# Patient Record
Sex: FEMALE | Race: WHITE | Employment: OTHER | URBAN - METROPOLITAN AREA
[De-identification: names, ages, dates, MRNs, and addresses within clinical notes are randomized per-mention and may not be internally consistent; named-entity substitution may affect disease eponyms.]

---

## 2017-12-27 ENCOUNTER — OFFICE VISIT (OUTPATIENT)
Dept: ORTHOPEDIC SURGERY | Age: 72
End: 2017-12-27

## 2017-12-27 VITALS — HEART RATE: 60 BPM | WEIGHT: 191.36 LBS | BODY MASS INDEX: 33.91 KG/M2 | HEIGHT: 63 IN | RESPIRATION RATE: 16 BRPM

## 2017-12-27 DIAGNOSIS — S42.202A CLOSED FRACTURE OF PROXIMAL END OF LEFT HUMERUS, UNSPECIFIED FRACTURE MORPHOLOGY, INITIAL ENCOUNTER: Primary | ICD-10-CM

## 2017-12-27 DIAGNOSIS — S92.355A CLOSED NONDISPLACED FRACTURE OF FIFTH METATARSAL BONE OF LEFT FOOT, INITIAL ENCOUNTER: ICD-10-CM

## 2017-12-27 PROCEDURE — 99203 OFFICE O/P NEW LOW 30 MIN: CPT | Performed by: ORTHOPAEDIC SURGERY

## 2017-12-27 NOTE — PROGRESS NOTES
CHIEF COMPLAINT: Left shoulder and foot pain. DATE OF INJURY: 12/25/17    HISTORY:  Ms. Kamran Lozano is a 67 y.o.  female, who presents today for evaluation of a left shoulder and foot injury which occurred on 12/25/17. The patient reports that this injury occurred when tripped and fell. She was first seen and evaluated in [facility], when she was evaluated, placed in sling and postop shoe, and asked to follow up with Orthopedics. The patient denies any other injuries. She rates pain at a level of 4/10 on an analog scale. Movement makes the pain worse. Sling / shoe, and resting makes the pain better. No numbness or tingling sensation. History reviewed. No pertinent past medical history. History reviewed. No pertinent surgical history. Current Outpatient Prescriptions   Medication Sig Dispense Refill    furosemide (LASIX) 20 MG tablet Take 20 mg by mouth 2 times daily      tiZANidine (ZANAFLEX) 4 MG tablet Take 4 mg by mouth every 6 hours as needed      gabapentin (NEURONTIN) 300 MG capsule Take 300 mg by mouth 3 times daily      prochlorperazine (COMPAZINE) 10 MG tablet Take 10 mg by mouth every 6 hours as needed      naproxen (NAPROSYN) 500 MG tablet Take 500 mg by mouth 2 times daily (with meals)      sertraline (ZOLOFT) 100 MG tablet Take 100 mg by mouth daily      omeprazole (PRILOSEC) 20 MG capsule Take 20 mg by mouth daily.  metoclopramide (REGLAN) 10 MG/10ML SOLN Take 10 mg by mouth 4 times daily (before meals and nightly).  spironolactone (ALDACTONE) 25 MG tablet Take 25 mg by mouth daily.  pregabalin (LYRICA) 300 MG capsule Take 300 mg by mouth 2 times daily.  celecoxib (CELEBREX) 200 MG capsule Take 200 mg by mouth 2 times daily.  calcium citrate-vitamin D (CITRICAL + D) 315-250 MG-UNIT TABS Take  by mouth.  Vitamin D (CHOLECALCIFEROL) 1000 UNITS CAPS capsule Take 1,000 Units by mouth daily.       ALPRAZolam (XANAX) 1 MG tablet Take 1 mg by mouth nightly as needed.  HYDROcodone-acetaminophen (NORCO) 5-325 MG per tablet Take 1 tablet by mouth every 6 hours as needed. No current facility-administered medications for this visit. Allergies   Allergen Reactions    Demerol Hcl [Meperidine]     Pcn [Penicillins]        Social History     Social History    Marital status:      Spouse name: N/A    Number of children: N/A    Years of education: N/A     Occupational History    Not on file. Social History Main Topics    Smoking status: Never Smoker    Smokeless tobacco: Never Used    Alcohol use Not on file    Drug use: No    Sexual activity: No     Other Topics Concern    Not on file     Social History Narrative    No narrative on file       History reviewed. No pertinent family history. Review of Systems:   Pertinent items are noted in HPI. Review of systems from Patient History Form dated 12/28/17 and available in the patient's chart under the Media tab. PHYSICAL EXAM:  Ms. Heywood Councilman is a 67 y.o.  female who presents today in no acute distress, awake, alert, and oriented. Pulse 60   Resp 16   Ht 5' 2.99\" (1.6 m)   Wt 191 lb 5.8 oz (86.8 kg)   BMI 33.91 kg/m²      On evaluation of her right upper extremity, there is no obvious deformity. There is swelling. She is tender to palpation over the proximal humerus, and otherwise nontender over the remainder of the extremity. Range of motion is decreased secondary to pain over the left shoulder. The skin overlying the left shoulder is intact without evidence of lesion, laceration or abrasion. Distal pulses are 2+ and symmetric bilaterally. Sensation is grossly intact to light touch and symmetric bilaterally. EPL / FPL / Interossei intact. On evaluation of her left lower extremity, there is no obvious deformity. There is swelling.   She is mildly tender to palpation over the proximal 5th metatarsal, and otherwise nontender over the remainder of the extremity. The skin overlying the left foot is intact without evidence of lesion, laceration or abrasion. Distal pulses are 2+ and symmetric bilaterally. Sensation is grossly intact to light touch and symmetric bilaterally. Dorsiflexion / plantarflexion intact. Left shoulder Xrays:  ER xrays reviewed. 3 view x-rays of the shoulder including AP in internal and external rotation and scapular Y were obtained and reviewed. Comminuted (at least 3 part)  impacted proximal humerus fracture. About 50% displacement on scapular Y view and one of AP views    Left foot xrays: from ER reviewed. 5th proximal metatarsal avulsion fracture        IMPRESSION:    Left proximal humerus fracture   Left foot 5th metatarsal proximal avulsion fracture  HTN  Osteoporosis      PLAN:  Discussed that metatarsal fracture can be treated nonoperatively. She is weight bearing as tolerated. Continue postop shoe. Ice to foot. Discussed proximal humerus fracture. It is at least 50% displaced. It is at least a 3-part fracture . Discussed nonoperative and operative treatment. Discussed that there is a chance the fracture could heal nonoperatively, but that there will be more likelihood for decreased function of the shoulder/arm and stiffness. She would like to discuss operative treatment. Will have her see Dr. Adriana Hookerr for surgical evaluation. She specifically does not want to see Dr. Harriett Valentine because of 's surgical history with him. Continue sling and swathe left shoulder.

## 2017-12-27 NOTE — LETTER
[facility]  [address] [facility]  [address]  Phone: [phone]  Fax: [phone]    Ida Gunn MD        December 28, 2017     [address] [place], [place]  [address] [facility] [place] [address]    Patient: Margot Rocha  MR Number: [id number]  YOB: 1945  Date of Visit: 12/27/2017    Dear  [address] [place]:    Thank you for the request for consultation for Yinka Randhawa to me for the evaluation. Below are the relevant portions of my assessment and plan of care. CHIEF COMPLAINT: Left shoulder and foot pain. DATE OF INJURY: 12/25/17    HISTORY:  Ms. Gaby Rangel is a 67 y.o.  female, who presents today for evaluation of a left shoulder and foot injury which occurred on 12/25/17. The patient reports that this injury occurred when tripped and fell. She was first seen and evaluated in [facility] ER, when she was evaluated, placed in sling and postop shoe, and asked to follow up with Orthopedics. The patient denies any other injuries. She rates pain at a level of 4/10 on an analog scale. Movement makes the pain worse. Sling / shoe, and resting makes the pain better. No numbness or tingling sensation. History reviewed. No pertinent past medical history. History reviewed. No pertinent surgical history.     Current Outpatient Prescriptions   Medication Sig Dispense Refill    furosemide (LASIX) 20 MG tablet Take 20 mg by mouth 2 times daily      tiZANidine (ZANAFLEX) 4 MG tablet Take 4 mg by mouth every 6 hours as needed      gabapentin (NEURONTIN) 300 MG capsule Take 300 mg by mouth 3 times daily      prochlorperazine (COMPAZINE) 10 MG tablet Take 10 mg by mouth every 6 hours as needed      naproxen (NAPROSYN) 500 MG tablet Take 500 mg by mouth 2 times daily (with meals)      sertraline (ZOLOFT) 100 MG tablet Take 100 mg by mouth daily There is swelling. She is tender to palpation over the proximal humerus, and otherwise nontender over the remainder of the extremity. Range of motion is decreased secondary to pain over the left shoulder. The skin overlying the left shoulder is intact without evidence of lesion, laceration or abrasion. Distal pulses are 2+ and symmetric bilaterally. Sensation is grossly intact to light touch and symmetric bilaterally. EPL / FPL / Interossei intact. On evaluation of her left lower extremity, there is no obvious deformity. There is swelling. She is mildly tender to palpation over the proximal 5th metatarsal, and otherwise nontender over the remainder of the extremity. The skin overlying the left foot is intact without evidence of lesion, laceration or abrasion. Distal pulses are 2+ and symmetric bilaterally. Sensation is grossly intact to light touch and symmetric bilaterally. Dorsiflexion / plantarflexion intact. Left shoulder Xrays:  ER xrays reviewed. 3 view x-rays of the shoulder including AP in internal and external rotation and scapular Y were obtained and reviewed. Comminuted (at least 3 part)  impacted proximal humerus fracture. About 50% displacement on scapular Y view and one of AP views    Left foot xrays: from ER reviewed. 5th proximal metatarsal avulsion fracture        IMPRESSION:    Left proximal humerus fracture   Left foot 5th metatarsal proximal avulsion fracture  HTN  Osteoporosis      PLAN:  Discussed that metatarsal fracture can be treated nonoperatively. She is weight bearing as tolerated. Continue postop shoe. Ice to foot. Discussed proximal humerus fracture. It is at least 50% displaced. It is at least a 3-part fracture . Discussed nonoperative and operative treatment.   Discussed that there is a chance the fracture could heal nonoperatively, but that there will be more likelihood for decreased

## 2018-01-13 PROBLEM — I48.91 ATRIAL FIBRILLATION WITH RVR (HCC): Status: ACTIVE | Noted: 2018-01-13

## 2018-01-13 PROBLEM — G93.40 ACUTE ENCEPHALOPATHY: Status: ACTIVE | Noted: 2018-01-13

## 2018-01-13 PROBLEM — R44.3 HALLUCINATION: Status: ACTIVE | Noted: 2018-01-13
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
## 2018-01-13 PROBLEM — D64.9 ANEMIA: Status: ACTIVE | Noted: 2018-01-13

## 2018-01-16 ENCOUNTER — TELEPHONE (OUTPATIENT)
Dept: ORTHOPEDIC SURGERY | Age: 73
End: 2018-01-16

## 2018-01-17 PROBLEM — S42.202D CLOSED FRACTURE OF PROXIMAL END OF LEFT HUMERUS WITH ROUTINE HEALING: Status: ACTIVE | Noted: 2017-12-27

## 2018-02-08 ENCOUNTER — OFFICE VISIT (OUTPATIENT)
Dept: ORTHOPEDIC SURGERY | Age: 73
End: 2018-02-08

## 2018-02-08 VITALS — HEART RATE: 85 BPM | RESPIRATION RATE: 16 BRPM | SYSTOLIC BLOOD PRESSURE: 120 MMHG | DIASTOLIC BLOOD PRESSURE: 71 MMHG

## 2018-02-08 DIAGNOSIS — S42.202D CLOSED FRACTURE OF PROXIMAL END OF LEFT HUMERUS WITH ROUTINE HEALING, UNSPECIFIED FRACTURE MORPHOLOGY, SUBSEQUENT ENCOUNTER: Primary | ICD-10-CM

## 2018-02-08 DIAGNOSIS — S92.355D CLOSED NONDISPLACED FRACTURE OF FIFTH METATARSAL BONE OF LEFT FOOT WITH ROUTINE HEALING, SUBSEQUENT ENCOUNTER: ICD-10-CM

## 2018-02-08 PROCEDURE — 99214 OFFICE O/P EST MOD 30 MIN: CPT | Performed by: ORTHOPAEDIC SURGERY

## 2018-02-08 NOTE — PROGRESS NOTES
Closed fracture of proximal end of left humerus with routine healing, unspecified fracture morphology, subsequent encounter  XR SHOULDER LEFT (MIN 2 VIEWS)   2. Closed nondisplaced fracture of fifth metatarsal bone of left foot with routine healing, subsequent encounter  XR FOOT LEFT (MIN 3 VIEWS)       No orders of the defined types were placed in this encounter.     Left shoulder - full passive ROM   Can start active assist ROM and active ROM    No lifting heavier than a cup of coffee    Left foot - WBAT, can discontinue cast shoe and use regular shoes   Compression stockings for swelling, elevation    FU 2 months with left shoulder XRs only    Mago Forman

## 2018-02-13 ENCOUNTER — TELEPHONE (OUTPATIENT)
Dept: ORTHOPEDIC SURGERY | Age: 73
End: 2018-02-13

## 2018-02-26 ENCOUNTER — OFFICE VISIT (OUTPATIENT)
Dept: ENT CLINIC | Age: 73
End: 2018-02-26

## 2018-02-26 VITALS — DIASTOLIC BLOOD PRESSURE: 70 MMHG | HEART RATE: 82 BPM | SYSTOLIC BLOOD PRESSURE: 110 MMHG

## 2018-02-26 DIAGNOSIS — H92.01 REFERRED OTALGIA OF RIGHT EAR: ICD-10-CM

## 2018-02-26 DIAGNOSIS — K14.8 TONGUE LESION: Primary | ICD-10-CM

## 2018-02-26 PROCEDURE — 99203 OFFICE O/P NEW LOW 30 MIN: CPT | Performed by: OTOLARYNGOLOGY

## 2018-02-26 RX ORDER — POTASSIUM CHLORIDE 20 MEQ/1
20 TABLET, EXTENDED RELEASE ORAL 2 TIMES DAILY
COMMUNITY
End: 2019-09-21 | Stop reason: SDUPTHER

## 2018-02-26 RX ORDER — HYDROCODONE BITARTRATE AND ACETAMINOPHEN 10; 325 MG/1; MG/1
1 TABLET ORAL EVERY 6 HOURS PRN
COMMUNITY
End: 2018-10-24 | Stop reason: ALTCHOICE

## 2018-02-26 RX ORDER — ALPRAZOLAM 1 MG/1
1 TABLET ORAL NIGHTLY PRN
COMMUNITY
End: 2018-10-24 | Stop reason: ALTCHOICE

## 2018-03-13 ENCOUNTER — OFFICE VISIT (OUTPATIENT)
Dept: ENT CLINIC | Age: 73
End: 2018-03-13

## 2018-03-13 VITALS — DIASTOLIC BLOOD PRESSURE: 70 MMHG | HEART RATE: 92 BPM | SYSTOLIC BLOOD PRESSURE: 130 MMHG

## 2018-03-13 DIAGNOSIS — K14.8 TONGUE LESION: Primary | ICD-10-CM

## 2018-03-13 PROCEDURE — 99212 OFFICE O/P EST SF 10 MIN: CPT | Performed by: OTOLARYNGOLOGY

## 2018-03-21 ENCOUNTER — OFFICE VISIT (OUTPATIENT)
Dept: CARDIOLOGY CLINIC | Age: 73
End: 2018-03-21

## 2018-03-21 VITALS
SYSTOLIC BLOOD PRESSURE: 120 MMHG | HEART RATE: 96 BPM | DIASTOLIC BLOOD PRESSURE: 70 MMHG | HEIGHT: 63 IN | WEIGHT: 167 LBS | OXYGEN SATURATION: 96 % | BODY MASS INDEX: 29.59 KG/M2

## 2018-03-21 DIAGNOSIS — I48.0 PAROXYSMAL A-FIB (HCC): Primary | ICD-10-CM

## 2018-03-21 DIAGNOSIS — R29.6 FALLS FREQUENTLY: ICD-10-CM

## 2018-03-21 PROCEDURE — 99215 OFFICE O/P EST HI 40 MIN: CPT | Performed by: INTERNAL MEDICINE

## 2018-03-21 PROCEDURE — 93000 ELECTROCARDIOGRAM COMPLETE: CPT | Performed by: INTERNAL MEDICINE

## 2018-03-21 NOTE — PROGRESS NOTES
today  However son says it is not that bad now  Encouraged use of walker for safety    Return in 4-6 weeks     Thank you very much for allowing me to participate in the care of your patient. Please do not hesitate to contact me if you have any questions.         Sincerely,    Brenden Rodriguez M.D  [facility], [address] [place], [place] [address] [address]  Ph: [phone]  Fax: [phone]

## 2018-03-21 NOTE — PATIENT INSTRUCTIONS
a pneumococcal vaccine shot. If you have had one before, ask your doctor whether you need another dose. Get a flu shot every year. If you must be around people with colds or flu, wash your hands often. Activity  ? · If your doctor recommends it, get more exercise. Walking is a good choice. Bit by bit, increase the amount you walk every day. Try for at least 30 minutes on most days of the week. You also may want to swim, bike, or do other activities. Your doctor may suggest that you join a cardiac rehabilitation program so that you can have help increasing your physical activity safely. ? · Start light exercise if your doctor says it is okay. Even a small amount will help you get stronger, have more energy, and manage stress. Walking is an easy way to get exercise. Start out by walking a little more than you did in the hospital. Gradually increase the amount you walk. ? · When you exercise, watch for signs that your heart is working too hard. You are pushing too hard if you cannot talk while you are exercising. If you become short of breath or dizzy or have chest pain, sit down and rest immediately. ? · Check your pulse regularly. Place two fingers on the artery at the palm side of your wrist, in line with your thumb. If your heartbeat seems uneven or fast, talk to your doctor. When should you call for help? Call 911 anytime you think you may need emergency care. For example, call if:  ? · You have symptoms of a heart attack. These may include:  ¨ Chest pain or pressure, or a strange feeling in the chest.  ¨ Sweating. ¨ Shortness of breath. ¨ Nausea or vomiting. ¨ Pain, pressure, or a strange feeling in the back, neck, jaw, or upper belly or in one or both shoulders or arms. ¨ Lightheadedness or sudden weakness. ¨ A fast or irregular heartbeat. After you call 911, the  may tell you to chew 1 adult-strength or 2 to 4 low-dose aspirin. Wait for an ambulance. Do not try to drive yourself.    ? · You have symptoms of a stroke. These may include:  ¨ Sudden numbness, tingling, weakness, or loss of movement in your face, arm, or leg, especially on only one side of your body. ¨ Sudden vision changes. ¨ Sudden trouble speaking. ¨ Sudden confusion or trouble understanding simple statements. ¨ Sudden problems with walking or balance. ¨ A sudden, severe headache that is different from past headaches. ? · You passed out (lost consciousness). ?Call your doctor now or seek immediate medical care if:  ? · You have new or increased shortness of breath. ? · You feel dizzy or lightheaded, or you feel like you may faint. ? · Your heart rate becomes irregular. ? · You can feel your heart flutter in your chest or skip heartbeats. Tell your doctor if these symptoms are new or worse. ? Watch closely for changes in your health, and be sure to contact your doctor if you have any problems. Where can you learn more? Go to [email or web address] org and sign in to your BISON account. Enter U020 in the PlayhouseSquare box to learn more about \"Atrial Fibrillation: Care Instructions. \"     If you do not have an account, please click on the \"Sign Up Now\" link. Current as of: September 21, 2016  Content Version: 11.5  © 8592-5598 [employer]. Care instructions adapted under license by [facility] ([facility]). If you have questions about a medical condition or this instruction, always ask your healthcare professional. [place] [address] any warranty or liability for your use of this information. Patient Education        Preventing Falls: Care Instructions  Your Care Instructions    Getting around your home safely can be a challenge if you have injuries or health problems that make it easy for you to fall. Loose rugs and furniture in walkways are among the dangers for many older people who have problems walking or who have poor eyesight.  People who have conditions such as [email or web address] org and sign in to your Jama Softwarehart account. Enter [phone] in the [facility] box to learn more about \"Preventing Falls: Care Instructions. \"     If you do not have an account, please click on the \"Sign Up Now\" link. Current as of: May 12, 2017  Content Version: 11.5  © 1269-4669 Healthwise, [employer]. Care instructions adapted under license by [facility] ([facility]). If you have questions about a medical condition or this instruction, always ask your healthcare professional. [place] [address] any warranty or liability for your use of this information.
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
## 2018-04-12 ENCOUNTER — OFFICE VISIT (OUTPATIENT)
Dept: ORTHOPEDIC SURGERY | Age: 73
End: 2018-04-12

## 2018-04-12 VITALS
WEIGHT: 167 LBS | RESPIRATION RATE: 16 BRPM | BODY MASS INDEX: 29.59 KG/M2 | SYSTOLIC BLOOD PRESSURE: 111 MMHG | DIASTOLIC BLOOD PRESSURE: 56 MMHG | HEIGHT: 63 IN | HEART RATE: 43 BPM

## 2018-04-12 DIAGNOSIS — S42.292K OTHER CLOSED DISPLACED FRACTURE OF PROXIMAL END OF LEFT HUMERUS WITH NONUNION, SUBSEQUENT ENCOUNTER: Primary | ICD-10-CM

## 2018-04-12 PROCEDURE — 99213 OFFICE O/P EST LOW 20 MIN: CPT | Performed by: ORTHOPAEDIC SURGERY

## 2018-04-17 ENCOUNTER — TELEPHONE (OUTPATIENT)
Dept: PAIN MANAGEMENT | Age: 73
End: 2018-04-17

## 2018-05-02 ENCOUNTER — OFFICE VISIT (OUTPATIENT)
Dept: CARDIOLOGY CLINIC | Age: 73
End: 2018-05-02

## 2018-05-02 VITALS
DIASTOLIC BLOOD PRESSURE: 62 MMHG | SYSTOLIC BLOOD PRESSURE: 118 MMHG | HEIGHT: 63 IN | BODY MASS INDEX: 28.7 KG/M2 | OXYGEN SATURATION: 98 % | HEART RATE: 96 BPM | WEIGHT: 162 LBS

## 2018-05-02 DIAGNOSIS — R29.6 FREQUENT FALLS: ICD-10-CM

## 2018-05-02 DIAGNOSIS — I48.0 PAROXYSMAL ATRIAL FIBRILLATION (HCC): Primary | ICD-10-CM

## 2018-05-02 PROCEDURE — 99214 OFFICE O/P EST MOD 30 MIN: CPT | Performed by: INTERNAL MEDICINE

## 2018-05-02 PROCEDURE — 93228 REMOTE 30 DAY ECG REV/REPORT: CPT | Performed by: INTERNAL MEDICINE

## 2018-05-02 PROCEDURE — 93000 ELECTROCARDIOGRAM COMPLETE: CPT | Performed by: INTERNAL MEDICINE

## 2018-05-02 RX ORDER — RANITIDINE 300 MG/1
300 TABLET ORAL NIGHTLY
COMMUNITY
Start: 2018-04-16 | End: 2018-11-28

## 2018-05-03 DIAGNOSIS — I48.91 ATRIAL FIBRILLATION WITH RAPID VENTRICULAR RESPONSE (HCC): Primary | ICD-10-CM

## 2018-08-21 ENCOUNTER — OFFICE VISIT (OUTPATIENT)
Dept: ORTHOPEDIC SURGERY | Age: 73
End: 2018-08-21

## 2018-08-21 VITALS
SYSTOLIC BLOOD PRESSURE: 122 MMHG | RESPIRATION RATE: 16 BRPM | HEART RATE: 87 BPM | WEIGHT: 169 LBS | HEIGHT: 63 IN | DIASTOLIC BLOOD PRESSURE: 62 MMHG | BODY MASS INDEX: 29.95 KG/M2

## 2018-08-21 DIAGNOSIS — S42.292K OTHER CLOSED DISPLACED FRACTURE OF PROXIMAL END OF LEFT HUMERUS WITH NONUNION, SUBSEQUENT ENCOUNTER: Primary | ICD-10-CM

## 2018-08-21 PROCEDURE — 99213 OFFICE O/P EST LOW 20 MIN: CPT | Performed by: ORTHOPAEDIC SURGERY

## 2018-08-21 PROCEDURE — 20610 DRAIN/INJ JOINT/BURSA W/O US: CPT | Performed by: ORTHOPAEDIC SURGERY

## 2018-10-24 ENCOUNTER — APPOINTMENT (OUTPATIENT)
Dept: GENERAL RADIOLOGY | Age: 73
DRG: 956 | End: 2018-10-24
Payer: MEDICARE

## 2018-10-24 ENCOUNTER — APPOINTMENT (OUTPATIENT)
Dept: CT IMAGING | Age: 73
DRG: 956 | End: 2018-10-24
Payer: MEDICARE

## 2018-10-24 ENCOUNTER — TELEPHONE (OUTPATIENT)
Dept: ORTHOPEDIC SURGERY | Age: 73
End: 2018-10-24

## 2018-10-24 ENCOUNTER — HOSPITAL ENCOUNTER (INPATIENT)
Age: 73
LOS: 9 days | Discharge: NURSING, REHAB OR LONG-TERM CARE | DRG: 956 | End: 2018-11-02
Attending: INTERNAL MEDICINE | Admitting: INTERNAL MEDICINE
Payer: MEDICARE

## 2018-10-24 DIAGNOSIS — E61.2 MAGNESIUM DEFICIENCY: ICD-10-CM

## 2018-10-24 DIAGNOSIS — E87.6 HYPOKALEMIA: ICD-10-CM

## 2018-10-24 DIAGNOSIS — T79.6XXA TRAUMATIC RHABDOMYOLYSIS, INITIAL ENCOUNTER (HCC): ICD-10-CM

## 2018-10-24 DIAGNOSIS — I21.4 NSTEMI (NON-ST ELEVATED MYOCARDIAL INFARCTION) (HCC): ICD-10-CM

## 2018-10-24 DIAGNOSIS — F41.9 ANXIETY: ICD-10-CM
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
## 2018-10-24 DIAGNOSIS — S72.331A CLOSED DISPLACED OBLIQUE FRACTURE OF SHAFT OF RIGHT FEMUR, INITIAL ENCOUNTER (HCC): ICD-10-CM

## 2018-10-24 DIAGNOSIS — S42.491A OTHER CLOSED DISPLACED FRACTURE OF DISTAL END OF RIGHT HUMERUS, INITIAL ENCOUNTER: ICD-10-CM

## 2018-10-24 DIAGNOSIS — I48.91 ATRIAL FIBRILLATION WITH RVR (HCC): ICD-10-CM

## 2018-10-24 PROBLEM — S72.91XA CLOSED FRACTURE OF RIGHT FEMUR (HCC): Status: ACTIVE | Noted: 2018-10-24

## 2018-10-24 LAB
A/G RATIO: 1.4 (ref 1.1–2.2)
ABO/RH: NORMAL
ALBUMIN SERPL-MCNC: 3.9 G/DL (ref 3.4–5)
ALP BLD-CCNC: 65 U/L (ref 40–129)
ALT SERPL-CCNC: 57 U/L (ref 10–40)
ANION GAP SERPL CALCULATED.3IONS-SCNC: 19 MMOL/L (ref 3–16)
ANTIBODY SCREEN: NORMAL
AST SERPL-CCNC: 80 U/L (ref 15–37)
BASOPHILS ABSOLUTE: 0 K/UL (ref 0–0.2)
BASOPHILS RELATIVE PERCENT: 0 %
BILIRUB SERPL-MCNC: 0.6 MG/DL (ref 0–1)
BILIRUBIN URINE: ABNORMAL
BLOOD, URINE: ABNORMAL
BUN BLDV-MCNC: 19 MG/DL (ref 7–20)
CALCIUM SERPL-MCNC: 9.6 MG/DL (ref 8.3–10.6)
CHLORIDE BLD-SCNC: 98 MMOL/L (ref 99–110)
CLARITY: CLEAR
CO2: 19 MMOL/L (ref 21–32)
COLOR: YELLOW
CREAT SERPL-MCNC: 1.2 MG/DL (ref 0.6–1.2)
EOSINOPHILS ABSOLUTE: 0 K/UL (ref 0–0.6)
EOSINOPHILS RELATIVE PERCENT: 0 %
EPITHELIAL CELLS, UA: 2 /HPF (ref 0–5)
GFR AFRICAN AMERICAN: 53
GFR NON-AFRICAN AMERICAN: 44
GLOBULIN: 2.8 G/DL
GLUCOSE BLD-MCNC: 172 MG/DL (ref 70–99)
GLUCOSE BLD-MCNC: 186 MG/DL (ref 70–99)
GLUCOSE BLD-MCNC: 194 MG/DL (ref 70–99)
GLUCOSE URINE: NEGATIVE MG/DL
HCT VFR BLD CALC: 35.2 % (ref 36–48)
HEMOGLOBIN: 11.8 G/DL (ref 12–16)
HYALINE CASTS: 5 /LPF (ref 0–8)
INR BLD: 1.09 (ref 0.86–1.14)
KETONES, URINE: 40 MG/DL
LACTIC ACID: 1.7 MMOL/L (ref 0.4–2)
LACTIC ACID: 2.8 MMOL/L (ref 0.4–2)
LEUKOCYTE ESTERASE, URINE: NEGATIVE
LYMPHOCYTES ABSOLUTE: 0.7 K/UL (ref 1–5.1)
LYMPHOCYTES RELATIVE PERCENT: 3.5 %
MAGNESIUM: 1.6 MG/DL (ref 1.8–2.4)
MCH RBC QN AUTO: 31.7 PG (ref 26–34)
MCHC RBC AUTO-ENTMCNC: 33.6 G/DL (ref 31–36)
MCV RBC AUTO: 94.2 FL (ref 80–100)
MICROSCOPIC EXAMINATION: YES
MONOCYTES ABSOLUTE: 1.4 K/UL (ref 0–1.3)
MONOCYTES RELATIVE PERCENT: 7.6 %
NEUTROPHILS ABSOLUTE: 16.9 K/UL (ref 1.7–7.7)
NEUTROPHILS RELATIVE PERCENT: 88.9 %
NITRITE, URINE: NEGATIVE
PDW BLD-RTO: 17.8 % (ref 12.4–15.4)
PERFORMED ON: ABNORMAL
PERFORMED ON: ABNORMAL
PH UA: 6
PLATELET # BLD: 236 K/UL (ref 135–450)
PMV BLD AUTO: 8.5 FL (ref 5–10.5)
POTASSIUM SERPL-SCNC: 3.3 MMOL/L (ref 3.5–5.1)
PROTEIN UA: 30 MG/DL
PROTHROMBIN TIME: 12.4 SEC (ref 9.8–13)
RBC # BLD: 3.74 M/UL (ref 4–5.2)
RBC UA: 2 /HPF (ref 0–4)
SODIUM BLD-SCNC: 136 MMOL/L (ref 136–145)
SPECIFIC GRAVITY UA: 1.02
TOTAL CK: 722 U/L (ref 26–192)
TOTAL PROTEIN: 6.7 G/DL (ref 6.4–8.2)
TROPONIN: 0.05 NG/ML
TROPONIN: 0.06 NG/ML
TSH SERPL DL<=0.05 MIU/L-ACNC: 0.44 UIU/ML (ref 0.27–4.2)
URINE REFLEX TO CULTURE: ABNORMAL
URINE TYPE: ABNORMAL
UROBILINOGEN, URINE: 0.2 E.U./DL
WBC # BLD: 19 K/UL (ref 4–11)
WBC UA: 5 /HPF (ref 0–5)

## 2018-10-24 PROCEDURE — 6360000002 HC RX W HCPCS: Performed by: NURSE PRACTITIONER

## 2018-10-24 PROCEDURE — 72128 CT CHEST SPINE W/O DYE: CPT

## 2018-10-24 PROCEDURE — 99221 1ST HOSP IP/OBS SF/LOW 40: CPT | Performed by: NURSE PRACTITIONER

## 2018-10-24 PROCEDURE — 6370000000 HC RX 637 (ALT 250 FOR IP): Performed by: INTERNAL MEDICINE

## 2018-10-24 PROCEDURE — 94664 DEMO&/EVAL PT USE INHALER: CPT

## 2018-10-24 PROCEDURE — 84484 ASSAY OF TROPONIN QUANT: CPT

## 2018-10-24 PROCEDURE — 73552 X-RAY EXAM OF FEMUR 2/>: CPT

## 2018-10-24 PROCEDURE — 93005 ELECTROCARDIOGRAM TRACING: CPT | Performed by: INTERNAL MEDICINE

## 2018-10-24 PROCEDURE — 99223 1ST HOSP IP/OBS HIGH 75: CPT | Performed by: INTERNAL MEDICINE

## 2018-10-24 PROCEDURE — 80053 COMPREHEN METABOLIC PANEL: CPT

## 2018-10-24 PROCEDURE — 86900 BLOOD TYPING SEROLOGIC ABO: CPT

## 2018-10-24 PROCEDURE — 82550 ASSAY OF CK (CPK): CPT

## 2018-10-24 PROCEDURE — 2580000003 HC RX 258: Performed by: NURSE PRACTITIONER

## 2018-10-24 PROCEDURE — 96368 THER/DIAG CONCURRENT INF: CPT

## 2018-10-24 PROCEDURE — 86901 BLOOD TYPING SEROLOGIC RH(D): CPT

## 2018-10-24 PROCEDURE — 81001 URINALYSIS AUTO W/SCOPE: CPT

## 2018-10-24 PROCEDURE — 83605 ASSAY OF LACTIC ACID: CPT

## 2018-10-24 PROCEDURE — 85025 COMPLETE CBC W/AUTO DIFF WBC: CPT

## 2018-10-24 PROCEDURE — 86850 RBC ANTIBODY SCREEN: CPT

## 2018-10-24 PROCEDURE — 96361 HYDRATE IV INFUSION ADD-ON: CPT

## 2018-10-24 PROCEDURE — 73060 X-RAY EXAM OF HUMERUS: CPT

## 2018-10-24 PROCEDURE — 96367 TX/PROPH/DG ADDL SEQ IV INF: CPT

## 2018-10-24 PROCEDURE — 2W3AX2Z IMMOBILIZATION OF RIGHT UPPER ARM USING CAST: ICD-10-PCS | Performed by: EMERGENCY MEDICINE

## 2018-10-24 PROCEDURE — 96375 TX/PRO/DX INJ NEW DRUG ADDON: CPT

## 2018-10-24 PROCEDURE — 2500000003 HC RX 250 WO HCPCS: Performed by: NURSE PRACTITIONER

## 2018-10-24 PROCEDURE — 96376 TX/PRO/DX INJ SAME DRUG ADON: CPT

## 2018-10-24 PROCEDURE — 36415 COLL VENOUS BLD VENIPUNCTURE: CPT

## 2018-10-24 PROCEDURE — 72125 CT NECK SPINE W/O DYE: CPT

## 2018-10-24 PROCEDURE — 73502 X-RAY EXAM HIP UNI 2-3 VIEWS: CPT

## 2018-10-24 PROCEDURE — 6360000002 HC RX W HCPCS: Performed by: INTERNAL MEDICINE

## 2018-10-24 PROCEDURE — 99285 EMERGENCY DEPT VISIT HI MDM: CPT

## 2018-10-24 PROCEDURE — 83735 ASSAY OF MAGNESIUM: CPT

## 2018-10-24 PROCEDURE — 96365 THER/PROPH/DIAG IV INF INIT: CPT

## 2018-10-24 PROCEDURE — 72131 CT LUMBAR SPINE W/O DYE: CPT

## 2018-10-24 PROCEDURE — 85610 PROTHROMBIN TIME: CPT

## 2018-10-24 PROCEDURE — 93005 ELECTROCARDIOGRAM TRACING: CPT | Performed by: NURSE PRACTITIONER

## 2018-10-24 PROCEDURE — 70450 CT HEAD/BRAIN W/O DYE: CPT

## 2018-10-24 PROCEDURE — 2060000000 HC ICU INTERMEDIATE R&B

## 2018-10-24 PROCEDURE — 71045 X-RAY EXAM CHEST 1 VIEW: CPT

## 2018-10-24 PROCEDURE — 73080 X-RAY EXAM OF ELBOW: CPT

## 2018-10-24 PROCEDURE — 84443 ASSAY THYROID STIM HORMONE: CPT

## 2018-10-24 RX ORDER — ALPRAZOLAM 0.5 MG/1
2 TABLET ORAL 2 TIMES DAILY PRN
Status: DISCONTINUED | OUTPATIENT
Start: 2018-10-24 | End: 2018-10-28 | Stop reason: ALTCHOICE

## 2018-10-24 RX ORDER — PANTOPRAZOLE SODIUM 40 MG/1
40 TABLET, DELAYED RELEASE ORAL
Status: DISCONTINUED | OUTPATIENT
Start: 2018-10-25 | End: 2018-11-02 | Stop reason: HOSPADM

## 2018-10-24 RX ORDER — METOPROLOL SUCCINATE 25 MG/1
25 TABLET, EXTENDED RELEASE ORAL DAILY
Status: DISCONTINUED | OUTPATIENT
Start: 2018-10-24 | End: 2018-10-24

## 2018-10-24 RX ORDER — ONDANSETRON 2 MG/ML
4 INJECTION INTRAMUSCULAR; INTRAVENOUS EVERY 6 HOURS PRN
Status: DISCONTINUED | OUTPATIENT
Start: 2018-10-24 | End: 2018-11-02 | Stop reason: HOSPADM

## 2018-10-24 RX ORDER — TRAZODONE HYDROCHLORIDE 100 MG/1
100 TABLET ORAL NIGHTLY
Status: DISCONTINUED | OUTPATIENT
Start: 2018-10-24 | End: 2018-10-28

## 2018-10-24 RX ORDER — FENTANYL CITRATE 50 UG/ML
25 INJECTION, SOLUTION INTRAMUSCULAR; INTRAVENOUS ONCE
Status: COMPLETED | OUTPATIENT
Start: 2018-10-24 | End: 2018-10-24

## 2018-10-24 RX ORDER — METHOCARBAMOL 100 MG/ML
250 INJECTION, SOLUTION INTRAMUSCULAR; INTRAVENOUS ONCE
Status: COMPLETED | OUTPATIENT
Start: 2018-10-24 | End: 2018-10-24

## 2018-10-24 RX ORDER — SODIUM CHLORIDE 0.9 % (FLUSH) 0.9 %
10 SYRINGE (ML) INJECTION EVERY 12 HOURS SCHEDULED
Status: DISCONTINUED | OUTPATIENT
Start: 2018-10-24 | End: 2018-11-02 | Stop reason: HOSPADM

## 2018-10-24 RX ORDER — POTASSIUM CHLORIDE 20 MEQ/1
20 TABLET, EXTENDED RELEASE ORAL 2 TIMES DAILY
Status: DISCONTINUED | OUTPATIENT
Start: 2018-10-24 | End: 2018-11-02 | Stop reason: HOSPADM

## 2018-10-24 RX ORDER — MAGNESIUM SULFATE 1 G/100ML
1 INJECTION INTRAVENOUS ONCE
Status: COMPLETED | OUTPATIENT
Start: 2018-10-24 | End: 2018-10-24

## 2018-10-24 RX ORDER — CLINDAMYCIN PHOSPHATE 900 MG/50ML
900 INJECTION INTRAVENOUS
Status: DISCONTINUED | OUTPATIENT
Start: 2018-10-25 | End: 2018-10-25

## 2018-10-24 RX ORDER — SERTRALINE HYDROCHLORIDE 100 MG/1
100 TABLET, FILM COATED ORAL DAILY
Status: DISCONTINUED | OUTPATIENT
Start: 2018-10-24 | End: 2018-11-02 | Stop reason: HOSPADM

## 2018-10-24 RX ORDER — MORPHINE SULFATE 2 MG/ML
2 INJECTION, SOLUTION INTRAMUSCULAR; INTRAVENOUS EVERY 6 HOURS PRN
Status: DISCONTINUED | OUTPATIENT
Start: 2018-10-24 | End: 2018-10-27

## 2018-10-24 RX ORDER — LANOLIN ALCOHOL/MO/W.PET/CERES
2000 CREAM (GRAM) TOPICAL DAILY
Status: DISCONTINUED | OUTPATIENT
Start: 2018-10-24 | End: 2018-11-02 | Stop reason: HOSPADM

## 2018-10-24 RX ORDER — MORPHINE SULFATE 4 MG/ML
4 INJECTION, SOLUTION INTRAMUSCULAR; INTRAVENOUS ONCE
Status: COMPLETED | OUTPATIENT
Start: 2018-10-24 | End: 2018-10-24

## 2018-10-24 RX ORDER — ONDANSETRON 2 MG/ML
4 INJECTION INTRAMUSCULAR; INTRAVENOUS ONCE
Status: COMPLETED | OUTPATIENT
Start: 2018-10-24 | End: 2018-10-24

## 2018-10-24 RX ORDER — ALPRAZOLAM 2 MG/1
2 TABLET ORAL 3 TIMES DAILY PRN
Status: ON HOLD | COMMUNITY
End: 2018-10-26 | Stop reason: HOSPADM

## 2018-10-24 RX ORDER — HYDROCODONE BITARTRATE AND ACETAMINOPHEN 5; 325 MG/1; MG/1
1 TABLET ORAL EVERY 6 HOURS PRN
Status: DISCONTINUED | OUTPATIENT
Start: 2018-10-24 | End: 2018-10-31

## 2018-10-24 RX ORDER — TRAZODONE HYDROCHLORIDE 100 MG/1
100 TABLET ORAL NIGHTLY
Status: ON HOLD | COMMUNITY
End: 2018-10-30 | Stop reason: HOSPADM

## 2018-10-24 RX ORDER — DILTIAZEM HYDROCHLORIDE 5 MG/ML
5 INJECTION INTRAVENOUS ONCE
Status: COMPLETED | OUTPATIENT
Start: 2018-10-24 | End: 2018-10-24

## 2018-10-24 RX ORDER — POTASSIUM CHLORIDE 7.45 MG/ML
10 INJECTION INTRAVENOUS ONCE
Status: COMPLETED | OUTPATIENT
Start: 2018-10-24 | End: 2018-10-24

## 2018-10-24 RX ORDER — SODIUM CHLORIDE, SODIUM LACTATE, POTASSIUM CHLORIDE, CALCIUM CHLORIDE 600; 310; 30; 20 MG/100ML; MG/100ML; MG/100ML; MG/100ML
INJECTION, SOLUTION INTRAVENOUS CONTINUOUS
Status: DISCONTINUED | OUTPATIENT
Start: 2018-10-24 | End: 2018-10-25

## 2018-10-24 RX ORDER — OMEPRAZOLE 40 MG/1
40 CAPSULE, DELAYED RELEASE ORAL DAILY
Status: ON HOLD | COMMUNITY
End: 2018-10-30 | Stop reason: HOSPADM

## 2018-10-24 RX ORDER — SODIUM CHLORIDE 0.9 % (FLUSH) 0.9 %
10 SYRINGE (ML) INJECTION PRN
Status: DISCONTINUED | OUTPATIENT
Start: 2018-10-24 | End: 2018-11-02 | Stop reason: HOSPADM

## 2018-10-24 RX ADMIN — MORPHINE SULFATE 2 MG: 2 INJECTION, SOLUTION INTRAMUSCULAR; INTRAVENOUS at 17:30

## 2018-10-24 RX ADMIN — DILTIAZEM HYDROCHLORIDE 10 MG/HR: 5 INJECTION INTRAVENOUS at 17:33

## 2018-10-24 RX ADMIN — METOPROLOL TARTRATE 25 MG: 25 TABLET ORAL at 17:34

## 2018-10-24 RX ADMIN — ONDANSETRON 4 MG: 2 INJECTION INTRAMUSCULAR; INTRAVENOUS at 12:46

## 2018-10-24 RX ADMIN — POTASSIUM CHLORIDE 20 MEQ: 20 TABLET, EXTENDED RELEASE ORAL at 22:22

## 2018-10-24 RX ADMIN — DILTIAZEM HYDROCHLORIDE 5 MG/HR: 5 INJECTION INTRAVENOUS at 15:25

## 2018-10-24 RX ADMIN — METHOCARBAMOL 250 MG: 1000 INJECTION, SOLUTION INTRAMUSCULAR; INTRAVENOUS at 12:47

## 2018-10-24 RX ADMIN — TRAZODONE HYDROCHLORIDE 100 MG: 100 TABLET ORAL at 22:22

## 2018-10-24 RX ADMIN — MORPHINE SULFATE 4 MG: 4 INJECTION INTRAVENOUS at 19:57

## 2018-10-24 RX ADMIN — SERTRALINE 100 MG: 100 TABLET, FILM COATED ORAL at 22:29

## 2018-10-24 RX ADMIN — FENTANYL CITRATE 25 MCG: 50 INJECTION, SOLUTION INTRAMUSCULAR; INTRAVENOUS at 12:56

## 2018-10-24 RX ADMIN — SODIUM CHLORIDE, POTASSIUM CHLORIDE, SODIUM LACTATE AND CALCIUM CHLORIDE: 600; 310; 30; 20 INJECTION, SOLUTION INTRAVENOUS at 12:44

## 2018-10-24 RX ADMIN — CYANOCOBALAMIN TAB 1000 MCG 2000 MCG: 1000 TAB at 22:22

## 2018-10-24 RX ADMIN — POTASSIUM CHLORIDE 10 MEQ: 7.46 INJECTION, SOLUTION INTRAVENOUS at 14:10

## 2018-10-24 RX ADMIN — ENOXAPARIN SODIUM 40 MG: 40 INJECTION SUBCUTANEOUS at 22:21

## 2018-10-24 RX ADMIN — MAGNESIUM SULFATE HEPTAHYDRATE 1 G: 1 INJECTION, SOLUTION INTRAVENOUS at 14:34

## 2018-10-24 RX ADMIN — ALPRAZOLAM 2 MG: 0.5 TABLET ORAL at 22:29

## 2018-10-24 RX ADMIN — ONDANSETRON 4 MG: 2 INJECTION INTRAMUSCULAR; INTRAVENOUS at 22:29

## 2018-10-24 RX ADMIN — DILTIAZEM HYDROCHLORIDE 5 MG: 5 INJECTION INTRAVENOUS at 15:20

## 2018-10-24 ASSESSMENT — ENCOUNTER SYMPTOMS
GASTROINTESTINAL NEGATIVE: 1
BACK PAIN: 0
RESPIRATORY NEGATIVE: 1

## 2018-10-24 ASSESSMENT — PAIN SCALES - GENERAL
PAINLEVEL_OUTOF10: 10
PAINLEVEL_OUTOF10: 7
PAINLEVEL_OUTOF10: 5
PAINLEVEL_OUTOF10: 9
PAINLEVEL_OUTOF10: 0
PAINLEVEL_OUTOF10: 10
PAINLEVEL_OUTOF10: 8
PAINLEVEL_OUTOF10: 10

## 2018-10-24 ASSESSMENT — PAIN DESCRIPTION - LOCATION
LOCATION: ARM;LEG
LOCATION_2: LEG

## 2018-10-24 ASSESSMENT — PAIN DESCRIPTION - PAIN TYPE
TYPE: ACUTE PAIN
TYPE_2: ACUTE PAIN

## 2018-10-24 ASSESSMENT — PAIN DESCRIPTION - INTENSITY: RATING_2: 8

## 2018-10-24 ASSESSMENT — PAIN DESCRIPTION - ONSET: ONSET: ON-GOING

## 2018-10-24 ASSESSMENT — PAIN DESCRIPTION - FREQUENCY
FREQUENCY: CONTINUOUS
FREQUENCY: CONTINUOUS

## 2018-10-24 ASSESSMENT — PAIN DESCRIPTION - ORIENTATION: ORIENTATION: RIGHT

## 2018-10-24 ASSESSMENT — PAIN DESCRIPTION - DESCRIPTORS
DESCRIPTORS: CONSTANT
DESCRIPTORS_2: CONSTANT

## 2018-10-24 ASSESSMENT — PAIN DESCRIPTION - PROGRESSION: CLINICAL_PROGRESSION: GRADUALLY IMPROVING

## 2018-10-24 NOTE — PROGRESS NOTES
[place] traction applied to RLE per orders with du rn at bedside. Educated bedside rn du on skin assessment guidelines.  Electronically signed by Walter Blue RN on 10/24/2018 at 5:51 PM

## 2018-10-24 NOTE — CONSULTS
arthralgias and pain  All other ROS reviewed in chart or with patient or family and are grossly negative. PHYSICAL EXAM:    VITALS:  BP (!) 154/89   Pulse 158   Temp 98.2 °F (36.8 °C) (Oral)   Resp 16   Ht 5' 3.5\" (1.613 m)   Wt 148 lb 5.9 oz (67.3 kg)   SpO2 99%   BMI 25.87 kg/m²     MUSCULOSKELETAL:  Right hand and right foot NVI. Tender right elbow and right hip. Mild tenderness right knee. NOntender right ankle. Nontender left hip knee or ankle.  Nontender right shoulder or wrist. Nontender left shoulder elbow or wrist.   NEUROLOGIC:   Sensory:    Touch:                     Right Upper Extremity:  normal                   Left Upper Extremity:  normal                  Right Lower Extremity:  normal                  Left Lower Extremity:  normal  Skin warm and dry  Resp deep and easy  Abdomen soft and round  Pulse is with regular rate and rhythm    DATA:    CBC:   Lab Results   Component Value Date    WBC 19.0 10/24/2018    RBC 3.74 10/24/2018    HGB 11.8 10/24/2018    HCT 35.2 10/24/2018    MCV 94.2 10/24/2018    MCH 31.7 10/24/2018    MCHC 33.6 10/24/2018    RDW 17.8 10/24/2018     10/24/2018    MPV 8.5 10/24/2018     WBC:    Lab Results   Component Value Date    WBC 19.0 10/24/2018     PT/INR:    Lab Results   Component Value Date    PROTIME 12.4 10/24/2018    INR 1.09 10/24/2018     PTT:    Lab Results   Component Value Date    APTT 31.5 06/02/2018   [APTT  Radiology Review:    Narrative   EXAMINATION:   3 XRAY VIEWS OF THE RIGHT ELBOW;       TWO XRAY VIEWS OF THE RIGHT HUMERUS       10/24/2018 11:36 am       COMPARISON:   None.       HISTORY:   ORDERING SYSTEM PROVIDED HISTORY: Injury   TECHNOLOGIST PROVIDED HISTORY:   Reason for exam:->Injury   Ordering Physician Provided Reason for Exam: FALL   Acuity: Acute   Type of Exam: Initial; ORDERING SYSTEM PROVIDED HISTORY: ? distal humeral   fracture, fall   TECHNOLOGIST PROVIDED HISTORY:   Reason for exam:->? distal humeral fracture, fall

## 2018-10-25 ENCOUNTER — ANESTHESIA (OUTPATIENT)
Dept: OPERATING ROOM | Age: 73
DRG: 956 | End: 2018-10-25
Payer: MEDICARE

## 2018-10-25 ENCOUNTER — ANESTHESIA EVENT (OUTPATIENT)
Dept: OPERATING ROOM | Age: 73
DRG: 956 | End: 2018-10-25
Payer: MEDICARE

## 2018-10-25 ENCOUNTER — APPOINTMENT (OUTPATIENT)
Dept: GENERAL RADIOLOGY | Age: 73
DRG: 956 | End: 2018-10-25
Payer: MEDICARE

## 2018-10-25 VITALS
OXYGEN SATURATION: 98 % | SYSTOLIC BLOOD PRESSURE: 128 MMHG | TEMPERATURE: 96.8 F | RESPIRATION RATE: 10 BRPM | DIASTOLIC BLOOD PRESSURE: 63 MMHG

## 2018-10-25 LAB
ANION GAP SERPL CALCULATED.3IONS-SCNC: 15 MMOL/L (ref 3–16)
BASOPHILS ABSOLUTE: 0 K/UL (ref 0–0.2)
BASOPHILS RELATIVE PERCENT: 0.2 %
BUN BLDV-MCNC: 20 MG/DL (ref 7–20)
CALCIUM SERPL-MCNC: 8.8 MG/DL (ref 8.3–10.6)
CHLORIDE BLD-SCNC: 98 MMOL/L (ref 99–110)
CO2: 20 MMOL/L (ref 21–32)
CREAT SERPL-MCNC: 1 MG/DL (ref 0.6–1.2)
EOSINOPHILS ABSOLUTE: 0 K/UL (ref 0–0.6)
EOSINOPHILS RELATIVE PERCENT: 0.1 %
GFR AFRICAN AMERICAN: >60
GFR NON-AFRICAN AMERICAN: 54
GLUCOSE BLD-MCNC: 119 MG/DL (ref 70–99)
GLUCOSE BLD-MCNC: 127 MG/DL (ref 70–99)
GLUCOSE BLD-MCNC: 128 MG/DL (ref 70–99)
HCT VFR BLD CALC: 28.2 % (ref 36–48)
HEMOGLOBIN: 9.4 G/DL (ref 12–16)
LACTIC ACID: 0.9 MMOL/L (ref 0.4–2)
LV EF: 60 %
LVEF MODALITY: NORMAL
LYMPHOCYTES ABSOLUTE: 0.9 K/UL (ref 1–5.1)
LYMPHOCYTES RELATIVE PERCENT: 7.2 %
MAGNESIUM: 1.8 MG/DL (ref 1.8–2.4)
MCH RBC QN AUTO: 31.9 PG (ref 26–34)
MCHC RBC AUTO-ENTMCNC: 33.3 G/DL (ref 31–36)
MCV RBC AUTO: 96 FL (ref 80–100)
MONOCYTES ABSOLUTE: 1 K/UL (ref 0–1.3)
MONOCYTES RELATIVE PERCENT: 7.8 %
NEUTROPHILS ABSOLUTE: 10.5 K/UL (ref 1.7–7.7)
NEUTROPHILS RELATIVE PERCENT: 84.7 %
PDW BLD-RTO: 17.8 % (ref 12.4–15.4)
PERFORMED ON: ABNORMAL
PERFORMED ON: ABNORMAL
PLATELET # BLD: 153 K/UL (ref 135–450)
PMV BLD AUTO: 8.9 FL (ref 5–10.5)
POTASSIUM SERPL-SCNC: 4.1 MMOL/L (ref 3.5–5.1)
RBC # BLD: 2.94 M/UL (ref 4–5.2)
SODIUM BLD-SCNC: 133 MMOL/L (ref 136–145)
TOTAL CK: 269 U/L (ref 26–192)
TROPONIN: 0.03 NG/ML
WBC # BLD: 12.3 K/UL (ref 4–11)

## 2018-10-25 PROCEDURE — 94664 DEMO&/EVAL PT USE INHALER: CPT

## 2018-10-25 PROCEDURE — 2500000003 HC RX 250 WO HCPCS: Performed by: ORTHOPAEDIC SURGERY

## 2018-10-25 PROCEDURE — 2580000003 HC RX 258: Performed by: NURSE ANESTHETIST, CERTIFIED REGISTERED

## 2018-10-25 PROCEDURE — 2580000003 HC RX 258: Performed by: INTERNAL MEDICINE

## 2018-10-25 PROCEDURE — 3600000015 HC SURGERY LEVEL 5 ADDTL 15MIN: Performed by: ORTHOPAEDIC SURGERY

## 2018-10-25 PROCEDURE — 6360000002 HC RX W HCPCS: Performed by: INTERNAL MEDICINE

## 2018-10-25 PROCEDURE — 3209999900 FLUORO FOR SURGICAL PROCEDURES

## 2018-10-25 PROCEDURE — 6370000000 HC RX 637 (ALT 250 FOR IP): Performed by: INTERNAL MEDICINE

## 2018-10-25 PROCEDURE — L1830 KO IMMOB CANVAS LONG PRE OTS: HCPCS | Performed by: ORTHOPAEDIC SURGERY

## 2018-10-25 PROCEDURE — 85025 COMPLETE CBC W/AUTO DIFF WBC: CPT

## 2018-10-25 PROCEDURE — C1769 GUIDE WIRE: HCPCS | Performed by: ORTHOPAEDIC SURGERY

## 2018-10-25 PROCEDURE — 2500000003 HC RX 250 WO HCPCS: Performed by: NURSE ANESTHETIST, CERTIFIED REGISTERED

## 2018-10-25 PROCEDURE — 84484 ASSAY OF TROPONIN QUANT: CPT

## 2018-10-25 PROCEDURE — 7100000000 HC PACU RECOVERY - FIRST 15 MIN: Performed by: ORTHOPAEDIC SURGERY

## 2018-10-25 PROCEDURE — 94762 N-INVAS EAR/PLS OXIMTRY CONT: CPT

## 2018-10-25 PROCEDURE — C1713 ANCHOR/SCREW BN/BN,TIS/BN: HCPCS | Performed by: ORTHOPAEDIC SURGERY

## 2018-10-25 PROCEDURE — 93306 TTE W/DOPPLER COMPLETE: CPT

## 2018-10-25 PROCEDURE — 6370000000 HC RX 637 (ALT 250 FOR IP): Performed by: NURSE PRACTITIONER

## 2018-10-25 PROCEDURE — 83605 ASSAY OF LACTIC ACID: CPT

## 2018-10-25 PROCEDURE — 2720000010 HC SURG SUPPLY STERILE: Performed by: ORTHOPAEDIC SURGERY

## 2018-10-25 PROCEDURE — 6360000002 HC RX W HCPCS: Performed by: NURSE ANESTHETIST, CERTIFIED REGISTERED

## 2018-10-25 PROCEDURE — 2580000003 HC RX 258: Performed by: NURSE PRACTITIONER

## 2018-10-25 PROCEDURE — 73552 X-RAY EXAM OF FEMUR 2/>: CPT

## 2018-10-25 PROCEDURE — 36415 COLL VENOUS BLD VENIPUNCTURE: CPT

## 2018-10-25 PROCEDURE — 94760 N-INVAS EAR/PLS OXIMETRY 1: CPT

## 2018-10-25 PROCEDURE — 2580000003 HC RX 258: Performed by: ORTHOPAEDIC SURGERY

## 2018-10-25 PROCEDURE — 7100000001 HC PACU RECOVERY - ADDTL 15 MIN: Performed by: ORTHOPAEDIC SURGERY

## 2018-10-25 PROCEDURE — 99233 SBSQ HOSP IP/OBS HIGH 50: CPT | Performed by: INTERNAL MEDICINE

## 2018-10-25 PROCEDURE — 0QSB04Z REPOSITION RIGHT LOWER FEMUR WITH INTERNAL FIXATION DEVICE, OPEN APPROACH: ICD-10-PCS | Performed by: ORTHOPAEDIC SURGERY

## 2018-10-25 PROCEDURE — 3700000001 HC ADD 15 MINUTES (ANESTHESIA): Performed by: ORTHOPAEDIC SURGERY

## 2018-10-25 PROCEDURE — 82550 ASSAY OF CK (CPK): CPT

## 2018-10-25 PROCEDURE — 3700000000 HC ANESTHESIA ATTENDED CARE: Performed by: ORTHOPAEDIC SURGERY

## 2018-10-25 PROCEDURE — 6370000000 HC RX 637 (ALT 250 FOR IP): Performed by: ORTHOPAEDIC SURGERY

## 2018-10-25 PROCEDURE — 80048 BASIC METABOLIC PNL TOTAL CA: CPT

## 2018-10-25 PROCEDURE — 83735 ASSAY OF MAGNESIUM: CPT

## 2018-10-25 PROCEDURE — 6360000002 HC RX W HCPCS: Performed by: ORTHOPAEDIC SURGERY

## 2018-10-25 PROCEDURE — 2709999900 HC NON-CHARGEABLE SUPPLY: Performed by: ORTHOPAEDIC SURGERY

## 2018-10-25 PROCEDURE — C1729 CATH, DRAINAGE: HCPCS | Performed by: ORTHOPAEDIC SURGERY

## 2018-10-25 PROCEDURE — 2500000003 HC RX 250 WO HCPCS: Performed by: NURSE PRACTITIONER

## 2018-10-25 PROCEDURE — 2060000000 HC ICU INTERMEDIATE R&B

## 2018-10-25 PROCEDURE — 3600000005 HC SURGERY LEVEL 5 BASE: Performed by: ORTHOPAEDIC SURGERY

## 2018-10-25 DEVICE — SUPRACONDYLAR NAIL
Type: IMPLANTABLE DEVICE | Site: FEMUR | Status: FUNCTIONAL
Brand: T2

## 2018-10-25 DEVICE — CABLE SURG L635MM DIA1.8MM CO CHROM CERCLAGE CBL RDY: Type: IMPLANTABLE DEVICE | Site: FEMUR | Status: FUNCTIONAL

## 2018-10-25 DEVICE — LOCKING SCREW, FULLY THREADED: Type: IMPLANTABLE DEVICE | Site: FEMUR | Status: FUNCTIONAL

## 2018-10-25 DEVICE — FIXATION K-WIRE SPI, WCH COATED
Type: IMPLANTABLE DEVICE | Site: FEMUR | Status: FUNCTIONAL
Brand: T2

## 2018-10-25 RX ORDER — FENTANYL CITRATE 50 UG/ML
25 INJECTION, SOLUTION INTRAMUSCULAR; INTRAVENOUS EVERY 5 MIN PRN
Status: DISCONTINUED | OUTPATIENT
Start: 2018-10-25 | End: 2018-10-25 | Stop reason: HOSPADM

## 2018-10-25 RX ORDER — NICOTINE POLACRILEX 4 MG
15 LOZENGE BUCCAL PRN
Status: DISCONTINUED | OUTPATIENT
Start: 2018-10-25 | End: 2018-11-02 | Stop reason: HOSPADM

## 2018-10-25 RX ORDER — CLINDAMYCIN PHOSPHATE 900 MG/50ML
900 INJECTION INTRAVENOUS
Status: COMPLETED | OUTPATIENT
Start: 2018-10-25 | End: 2018-10-25

## 2018-10-25 RX ORDER — ONDANSETRON 2 MG/ML
INJECTION INTRAMUSCULAR; INTRAVENOUS PRN
Status: DISCONTINUED | OUTPATIENT
Start: 2018-10-25 | End: 2018-10-25 | Stop reason: SDUPTHER

## 2018-10-25 RX ORDER — SUCCINYLCHOLINE CHLORIDE 20 MG/ML
INJECTION INTRAMUSCULAR; INTRAVENOUS PRN
Status: DISCONTINUED | OUTPATIENT
Start: 2018-10-25 | End: 2018-10-25 | Stop reason: SDUPTHER

## 2018-10-25 RX ORDER — DEXTROSE MONOHYDRATE 25 G/50ML
12.5 INJECTION, SOLUTION INTRAVENOUS PRN
Status: DISCONTINUED | OUTPATIENT
Start: 2018-10-25 | End: 2018-11-02 | Stop reason: HOSPADM

## 2018-10-25 RX ORDER — METOPROLOL TARTRATE 50 MG/1
50 TABLET, FILM COATED ORAL 2 TIMES DAILY
Status: DISCONTINUED | OUTPATIENT
Start: 2018-10-25 | End: 2018-11-02 | Stop reason: HOSPADM

## 2018-10-25 RX ORDER — OXYCODONE HYDROCHLORIDE AND ACETAMINOPHEN 5; 325 MG/1; MG/1
1 TABLET ORAL PRN
Status: DISCONTINUED | OUTPATIENT
Start: 2018-10-25 | End: 2018-10-25 | Stop reason: HOSPADM

## 2018-10-25 RX ORDER — DOCUSATE SODIUM 100 MG/1
100 CAPSULE, LIQUID FILLED ORAL 2 TIMES DAILY
Status: DISCONTINUED | OUTPATIENT
Start: 2018-10-25 | End: 2018-11-02 | Stop reason: HOSPADM

## 2018-10-25 RX ORDER — DEXTROSE MONOHYDRATE 50 MG/ML
100 INJECTION, SOLUTION INTRAVENOUS PRN
Status: DISCONTINUED | OUTPATIENT
Start: 2018-10-25 | End: 2018-11-02 | Stop reason: HOSPADM

## 2018-10-25 RX ORDER — LIDOCAINE HYDROCHLORIDE 20 MG/ML
INJECTION, SOLUTION EPIDURAL; INFILTRATION; INTRACAUDAL; PERINEURAL PRN
Status: DISCONTINUED | OUTPATIENT
Start: 2018-10-25 | End: 2018-10-25 | Stop reason: SDUPTHER

## 2018-10-25 RX ORDER — FENTANYL CITRATE 50 UG/ML
INJECTION, SOLUTION INTRAMUSCULAR; INTRAVENOUS PRN
Status: DISCONTINUED | OUTPATIENT
Start: 2018-10-25 | End: 2018-10-25 | Stop reason: SDUPTHER

## 2018-10-25 RX ORDER — PROPOFOL 10 MG/ML
INJECTION, EMULSION INTRAVENOUS PRN
Status: DISCONTINUED | OUTPATIENT
Start: 2018-10-25 | End: 2018-10-25 | Stop reason: SDUPTHER

## 2018-10-25 RX ORDER — OXYCODONE HYDROCHLORIDE AND ACETAMINOPHEN 5; 325 MG/1; MG/1
2 TABLET ORAL PRN
Status: DISCONTINUED | OUTPATIENT
Start: 2018-10-25 | End: 2018-10-25 | Stop reason: HOSPADM

## 2018-10-25 RX ORDER — SODIUM CHLORIDE, SODIUM LACTATE, POTASSIUM CHLORIDE, CALCIUM CHLORIDE 600; 310; 30; 20 MG/100ML; MG/100ML; MG/100ML; MG/100ML
INJECTION, SOLUTION INTRAVENOUS CONTINUOUS PRN
Status: DISCONTINUED | OUTPATIENT
Start: 2018-10-25 | End: 2018-10-25 | Stop reason: SDUPTHER

## 2018-10-25 RX ORDER — BUPIVACAINE HYDROCHLORIDE 2.5 MG/ML
INJECTION, SOLUTION EPIDURAL; INFILTRATION; INTRACAUDAL
Status: COMPLETED | OUTPATIENT
Start: 2018-10-25 | End: 2018-10-25

## 2018-10-25 RX ORDER — BUTALBITAL, ACETAMINOPHEN AND CAFFEINE 50; 325; 40 MG/1; MG/1; MG/1
1 TABLET ORAL EVERY 4 HOURS PRN
Status: DISCONTINUED | OUTPATIENT
Start: 2018-10-25 | End: 2018-10-29

## 2018-10-25 RX ORDER — SODIUM CHLORIDE 9 MG/ML
INJECTION, SOLUTION INTRAVENOUS CONTINUOUS
Status: DISCONTINUED | OUTPATIENT
Start: 2018-10-25 | End: 2018-10-28

## 2018-10-25 RX ORDER — ROCURONIUM BROMIDE 10 MG/ML
INJECTION, SOLUTION INTRAVENOUS PRN
Status: DISCONTINUED | OUTPATIENT
Start: 2018-10-25 | End: 2018-10-25 | Stop reason: SDUPTHER

## 2018-10-25 RX ORDER — DEXAMETHASONE SODIUM PHOSPHATE 4 MG/ML
INJECTION, SOLUTION INTRA-ARTICULAR; INTRALESIONAL; INTRAMUSCULAR; INTRAVENOUS; SOFT TISSUE PRN
Status: DISCONTINUED | OUTPATIENT
Start: 2018-10-25 | End: 2018-10-25 | Stop reason: SDUPTHER

## 2018-10-25 RX ORDER — EPHEDRINE SULFATE/0.9% NACL/PF 50 MG/5 ML
SYRINGE (ML) INTRAVENOUS PRN
Status: DISCONTINUED | OUTPATIENT
Start: 2018-10-25 | End: 2018-10-25 | Stop reason: SDUPTHER

## 2018-10-25 RX ORDER — ONDANSETRON 2 MG/ML
4 INJECTION INTRAMUSCULAR; INTRAVENOUS
Status: DISCONTINUED | OUTPATIENT
Start: 2018-10-25 | End: 2018-10-25 | Stop reason: HOSPADM

## 2018-10-25 RX ADMIN — DEXAMETHASONE SODIUM PHOSPHATE 8 MG: 4 INJECTION, SOLUTION INTRAMUSCULAR; INTRAVENOUS at 14:26

## 2018-10-25 RX ADMIN — SODIUM CHLORIDE: 9 INJECTION, SOLUTION INTRAVENOUS at 10:16

## 2018-10-25 RX ADMIN — ALPRAZOLAM 2 MG: 0.5 TABLET ORAL at 21:57

## 2018-10-25 RX ADMIN — HYDROCODONE BITARTRATE AND ACETAMINOPHEN 1 TABLET: 5; 325 TABLET ORAL at 21:56

## 2018-10-25 RX ADMIN — DILTIAZEM HYDROCHLORIDE 10 MG/HR: 5 INJECTION INTRAVENOUS at 05:23

## 2018-10-25 RX ADMIN — Medication 10 MG: at 14:26

## 2018-10-25 RX ADMIN — SUCCINYLCHOLINE CHLORIDE 100 MG: 20 INJECTION, SOLUTION INTRAMUSCULAR; INTRAVENOUS at 14:03

## 2018-10-25 RX ADMIN — PHENYLEPHRINE HYDROCHLORIDE 200 MCG: 10 INJECTION, SOLUTION INTRAMUSCULAR; INTRAVENOUS; SUBCUTANEOUS at 14:13

## 2018-10-25 RX ADMIN — CEFAZOLIN 2 G: 10 INJECTION, POWDER, FOR SOLUTION INTRAVENOUS; PARENTERAL at 22:07

## 2018-10-25 RX ADMIN — ONDANSETRON 4 MG: 2 INJECTION INTRAMUSCULAR; INTRAVENOUS at 06:55

## 2018-10-25 RX ADMIN — BUTALBITAL, ACETAMINOPHEN, AND CAFFEINE 1 TABLET: 50; 325; 40 TABLET ORAL at 21:36

## 2018-10-25 RX ADMIN — HYDROCODONE BITARTRATE AND ACETAMINOPHEN 1 TABLET: 5; 325 TABLET ORAL at 10:22

## 2018-10-25 RX ADMIN — Medication 10 MG: at 14:13

## 2018-10-25 RX ADMIN — TRAZODONE HYDROCHLORIDE 100 MG: 100 TABLET ORAL at 21:36

## 2018-10-25 RX ADMIN — SODIUM CHLORIDE: 9 INJECTION, SOLUTION INTRAVENOUS at 18:41

## 2018-10-25 RX ADMIN — SODIUM CHLORIDE: 9 INJECTION, SOLUTION INTRAVENOUS at 13:52

## 2018-10-25 RX ADMIN — FENTANYL CITRATE 25 MCG: 50 INJECTION INTRAMUSCULAR; INTRAVENOUS at 16:18

## 2018-10-25 RX ADMIN — FENTANYL CITRATE 50 MCG: 50 INJECTION INTRAMUSCULAR; INTRAVENOUS at 14:44

## 2018-10-25 RX ADMIN — MORPHINE SULFATE 2 MG: 2 INJECTION, SOLUTION INTRAMUSCULAR; INTRAVENOUS at 17:35

## 2018-10-25 RX ADMIN — PROPOFOL 150 MG: 10 INJECTION, EMULSION INTRAVENOUS at 14:03

## 2018-10-25 RX ADMIN — Medication 10 ML: at 21:36

## 2018-10-25 RX ADMIN — PHENYLEPHRINE HYDROCHLORIDE 100 MCG: 10 INJECTION, SOLUTION INTRAMUSCULAR; INTRAVENOUS; SUBCUTANEOUS at 14:10

## 2018-10-25 RX ADMIN — SODIUM CHLORIDE, POTASSIUM CHLORIDE, SODIUM LACTATE AND CALCIUM CHLORIDE: 600; 310; 30; 20 INJECTION, SOLUTION INTRAVENOUS at 15:05

## 2018-10-25 RX ADMIN — ROCURONIUM BROMIDE 10 MG: 10 INJECTION INTRAVENOUS at 14:03

## 2018-10-25 RX ADMIN — POTASSIUM CHLORIDE 20 MEQ: 20 TABLET, EXTENDED RELEASE ORAL at 10:22

## 2018-10-25 RX ADMIN — ONDANSETRON 4 MG: 2 INJECTION INTRAMUSCULAR; INTRAVENOUS at 12:30

## 2018-10-25 RX ADMIN — DOCUSATE SODIUM 100 MG: 100 CAPSULE, LIQUID FILLED ORAL at 21:36

## 2018-10-25 RX ADMIN — MORPHINE SULFATE 2 MG: 2 INJECTION, SOLUTION INTRAMUSCULAR; INTRAVENOUS at 06:55

## 2018-10-25 RX ADMIN — SUGAMMADEX 200 MG: 100 INJECTION, SOLUTION INTRAVENOUS at 15:46

## 2018-10-25 RX ADMIN — FENTANYL CITRATE 25 MCG: 50 INJECTION INTRAMUSCULAR; INTRAVENOUS at 15:50

## 2018-10-25 RX ADMIN — METOPROLOL TARTRATE 50 MG: 50 TABLET ORAL at 21:36

## 2018-10-25 RX ADMIN — LIDOCAINE HYDROCHLORIDE 80 MG: 20 INJECTION, SOLUTION EPIDURAL; INFILTRATION; INTRACAUDAL; PERINEURAL at 14:03

## 2018-10-25 RX ADMIN — ROCURONIUM BROMIDE 30 MG: 10 INJECTION INTRAVENOUS at 14:16

## 2018-10-25 RX ADMIN — CLINDAMYCIN PHOSPHATE 900 MG: 900 INJECTION, SOLUTION INTRAVENOUS at 13:56

## 2018-10-25 RX ADMIN — CYANOCOBALAMIN TAB 1000 MCG 2000 MCG: 1000 TAB at 21:57

## 2018-10-25 RX ADMIN — MORPHINE SULFATE 2 MG: 2 INJECTION, SOLUTION INTRAMUSCULAR; INTRAVENOUS at 00:21

## 2018-10-25 RX ADMIN — SERTRALINE 100 MG: 100 TABLET, FILM COATED ORAL at 21:56

## 2018-10-25 RX ADMIN — PHENYLEPHRINE HYDROCHLORIDE 200 MCG: 10 INJECTION, SOLUTION INTRAMUSCULAR; INTRAVENOUS; SUBCUTANEOUS at 14:07

## 2018-10-25 RX ADMIN — METOPROLOL TARTRATE 25 MG: 25 TABLET ORAL at 12:30

## 2018-10-25 RX ADMIN — METOPROLOL TARTRATE 25 MG: 25 TABLET ORAL at 10:22

## 2018-10-25 RX ADMIN — POTASSIUM CHLORIDE 20 MEQ: 20 TABLET, EXTENDED RELEASE ORAL at 21:36

## 2018-10-25 RX ADMIN — FENTANYL CITRATE 100 MCG: 50 INJECTION INTRAMUSCULAR; INTRAVENOUS at 14:02

## 2018-10-25 RX ADMIN — ONDANSETRON 4 MG: 2 INJECTION INTRAMUSCULAR; INTRAVENOUS at 14:34

## 2018-10-25 RX ADMIN — ROCURONIUM BROMIDE 10 MG: 10 INJECTION INTRAVENOUS at 14:44

## 2018-10-25 ASSESSMENT — PULMONARY FUNCTION TESTS
PIF_VALUE: 14
PIF_VALUE: 15
PIF_VALUE: 15
PIF_VALUE: 14
PIF_VALUE: 15
PIF_VALUE: 15
PIF_VALUE: 13
PIF_VALUE: 15
PIF_VALUE: 14
PIF_VALUE: 12
PIF_VALUE: 13
PIF_VALUE: 15
PIF_VALUE: 15
PIF_VALUE: 17
PIF_VALUE: 3
PIF_VALUE: 10
PIF_VALUE: 14
PIF_VALUE: 11
PIF_VALUE: 13
PIF_VALUE: 13
PIF_VALUE: 12
PIF_VALUE: 19
PIF_VALUE: 20
PIF_VALUE: 15
PIF_VALUE: 13
PIF_VALUE: 17
PIF_VALUE: 15
PIF_VALUE: 13
PIF_VALUE: 14
PIF_VALUE: 12
PIF_VALUE: 14
PIF_VALUE: 16
PIF_VALUE: 12
PIF_VALUE: 15
PIF_VALUE: 13
PIF_VALUE: 5
PIF_VALUE: 13
PIF_VALUE: 14
PIF_VALUE: 17
PIF_VALUE: 15
PIF_VALUE: 14
PIF_VALUE: 17
PIF_VALUE: 15
PIF_VALUE: 1
PIF_VALUE: 13
PIF_VALUE: 3
PIF_VALUE: 15
PIF_VALUE: 20
PIF_VALUE: 15
PIF_VALUE: 13
PIF_VALUE: 3
PIF_VALUE: 13
PIF_VALUE: 3
PIF_VALUE: 13
PIF_VALUE: 15
PIF_VALUE: 13
PIF_VALUE: 13
PIF_VALUE: 15
PIF_VALUE: 15
PIF_VALUE: 14
PIF_VALUE: 3
PIF_VALUE: 13
PIF_VALUE: 18
PIF_VALUE: 14
PIF_VALUE: 15
PIF_VALUE: 14
PIF_VALUE: 18
PIF_VALUE: 15
PIF_VALUE: 14
PIF_VALUE: 14
PIF_VALUE: 15
PIF_VALUE: 14
PIF_VALUE: 13
PIF_VALUE: 15
PIF_VALUE: 19
PIF_VALUE: 15
PIF_VALUE: 13
PIF_VALUE: 15
PIF_VALUE: 13
PIF_VALUE: 1
PIF_VALUE: 16
PIF_VALUE: 15
PIF_VALUE: 15
PIF_VALUE: 1
PIF_VALUE: 15
PIF_VALUE: 13
PIF_VALUE: 15
PIF_VALUE: 17
PIF_VALUE: 3
PIF_VALUE: 3
PIF_VALUE: 14
PIF_VALUE: 15
PIF_VALUE: 15
PIF_VALUE: 13
PIF_VALUE: 13
PIF_VALUE: 14
PIF_VALUE: 14
PIF_VALUE: 15
PIF_VALUE: 14
PIF_VALUE: 15
PIF_VALUE: 13
PIF_VALUE: 14
PIF_VALUE: 15
PIF_VALUE: 15
PIF_VALUE: 13
PIF_VALUE: 14
PIF_VALUE: 3
PIF_VALUE: 13
PIF_VALUE: 14
PIF_VALUE: 15
PIF_VALUE: 16
PIF_VALUE: 15
PIF_VALUE: 15
PIF_VALUE: 13
PIF_VALUE: 14
PIF_VALUE: 14
PIF_VALUE: 15
PIF_VALUE: 14
PIF_VALUE: 16
PIF_VALUE: 13
PIF_VALUE: 3
PIF_VALUE: 14
PIF_VALUE: 13
PIF_VALUE: 15
PIF_VALUE: 13
PIF_VALUE: 15

## 2018-10-25 ASSESSMENT — PAIN SCALES - GENERAL
PAINLEVEL_OUTOF10: 6
PAINLEVEL_OUTOF10: 8
PAINLEVEL_OUTOF10: 7
PAINLEVEL_OUTOF10: 0
PAINLEVEL_OUTOF10: 9
PAINLEVEL_OUTOF10: 0
PAINLEVEL_OUTOF10: 7
PAINLEVEL_OUTOF10: 8
PAINLEVEL_OUTOF10: 3
PAINLEVEL_OUTOF10: 8
PAINLEVEL_OUTOF10: 2
PAINLEVEL_OUTOF10: 2

## 2018-10-25 ASSESSMENT — ENCOUNTER SYMPTOMS: SHORTNESS OF BREATH: 0

## 2018-10-25 ASSESSMENT — LIFESTYLE VARIABLES: SMOKING_STATUS: 0

## 2018-10-25 ASSESSMENT — PAIN SCALES - WONG BAKER: WONGBAKER_NUMERICALRESPONSE: 0

## 2018-10-25 NOTE — PROGRESS NOTES
1740-pt returned from OR. Pt is awake alert and oriented, able to make needs known. Respirations easy even no distress. Pt placed back on telemetry monitor #21 and verified with CMU. Pt is complaining of pain, will give morphine, please see MAR. Pt reoriented to room, call light and safety measures. 1800-message left for Pollo Singh wound ostomy nurse in regards to [address] found on return to room from OR.  1810-low air loss mattress ordered for pt.

## 2018-10-25 NOTE — FLOWSHEET NOTE
HCPOA completed. Original and copies to patient. Copy placed on soft chart. 10/25/18 1249   Encounter Summary   Services provided to: Patient and family together   Referral/Consult From: Nurse   Support System Children;Family members   Place of [facility] none   Continue Visiting (support and prayer. HCPOA completed.  10/25)   Complexity of Encounter Moderate   Length of Encounter 30 minutes   Spiritual/Advent   Type Spiritual support   Assessment Calm; Approachable   Intervention Active listening;Prayer;Sustaining presence/ Ministry of presence   Outcome Expressed gratitude   Advance Directives (For Healthcare)   Healthcare Directive Yes, patient has an advance directive for healthcare treatment   Type of Healthcare Directive Durable power of  for health care   Copy in Chart Yes, copy in chart   Chart Copy Status : Active;Current   Date Reviewed and Current: 10/25/18   [address] power of    Healthcare Agent's Name Elliot Warner (daughter)   Healthcare Agent's Phone Number [phone]   If you are unable to speak for yourself, does your Healthcare Agent or Legal Spokesperson know your healthcare wishes?  Yes   [address]  Electronically signed by Harvey Guan on 10/25/2018 at 12:52 PM

## 2018-10-25 NOTE — ANESTHESIA PRE PROCEDURE
GI/Hepatic/Renal:   (+) GERD: well controlled,      (-) PUD, hepatitis, liver disease and no renal disease       Endo/Other:    (+) : arthritis:., .    (-) diabetes mellitus, hypothyroidism, hyperthyroidism               Abdominal:           Vascular:                                      Anesthesia Plan      general     ASA 3       Induction: intravenous. MIPS: Postoperative opioids intended and Prophylactic antiemetics administered. Anesthetic plan and risks discussed with patient. Plan discussed with CRNA. This pre-anesthesia assessment may be used as a history and physical.    DOS STAFF ADDENDUM:    Pt seen and examined, chart reviewed (including anesthesia, drug and allergy history). No interval changes to history and physical examination. Anesthetic plan, risks, benefits, alternatives, and personnel involved discussed with patient. Patient verbalized an understanding and agrees to proceed.       Keaton Reid MD  October 25, 2018  1:36 PM        Keaton Reid MD   10/25/2018

## 2018-10-25 NOTE — CARE COORDINATION
SOCIAL WORK ASSESSMENT:    Sw spoke with patient about her discharge plans. Patient is presented with her daughter Alize Terry [phone] and Nilsa Cornejo . Sw informed patient daughter that doctor is recommending a skilled nursing facility. Sw left a list with family of the facilities who accepts the patient's insurance. Pt daughter states she would like a referral sent to [facility] since her father did his rehab there and had a great experience. Pt daughter states the family is interested in [facility] along with Power of  and Living Will. [facility] and [facility].     Sw contacted [address]. Electronically signed by Elvia Denver on 10/25/2018 at 12:27 PM     3:32 PM     [address] states they have a bed available for patient as long as PT/OT states patient is a candidate and can participate in rehab.      Electronically signed by Elvia Denver on 10/25/2018 at 3:33 PM   Electronically signed by Ibrahima Temple on 10/25/2018 at 4:40 PM

## 2018-10-25 NOTE — PROGRESS NOTES
Pt's IV in left upper arm infiltrated. This nurse was unsuccessful starting a peripheral line. LR IVF are not compatible with cardizem. LR put on hold and cardizem infusing to IV in left hand. Message left with PICC nurse to attempt to start a peripheral line. Perfect Serve message sent to Dr. Benjamin Lynch to inform her and to ask if fluids can be changed to Normal Saline. Waiting for return call.

## 2018-10-25 NOTE — PROGRESS NOTES
Pt arrived to PACU from OR. Pt awake and alert. Right arm with ace wrap and splint noted. Right leg with knee immobilizer noted. Toes warm. Cap refill WNL. Denies any c/o pain at this time. VSS.

## 2018-10-25 NOTE — PLAN OF CARE
Problem: Falls - Risk of:  Goal: Will remain free from falls  Will remain free from falls   Outcome: Ongoing  Fall risk assessment completed every shift. All precautions in place. Pt has call light within reach at all times. Room clear of clutter. Pt aware to call for assistance when getting up. Goal: Absence of physical injury  Absence of physical injury   Outcome: Ongoing  No signs of physical injury. Problem: Risk for Impaired Skin Integrity  Goal: Tissue integrity - skin and mucous membranes  Structural intactness and normal physiological function of skin and  mucous membranes. Outcome: Ongoing  Skin assessment completed every shift. Pt assessed for incontinence, appropriate barrier cream applied prn. Pt encouraged to turn/rotate every 2 hours. Assistance provided if pt unable to do so themselves. Problem: Pain:  Goal: Pain level will decrease  Pain level will decrease   Outcome: Ongoing  Pt has received pain relief with pain medication. Goal: Control of acute pain  Control of acute pain   Outcome: Ongoing  Pain/discomfort being managed with PRN analgesics per MD orders. Pt able to express presence and absence of pain and rate pain appropriately using numerical scale. Goal: Control of chronic pain  Control of chronic pain   Outcome: Ongoing  . Problem: Skin Integrity:  Goal: Will show no infection signs and symptoms  Will show no infection signs and symptoms  Outcome: Ongoing  No signs of infection  Goal: Absence of new skin breakdown  Absence of new skin breakdown  Outcome: Ongoing  Upon returning from OR a DTI was discovered.

## 2018-10-25 NOTE — PROGRESS NOTES
Spoke to [place] [address] nurse Renee Loya- she reported that Dr. Shayan Newsome gave the clearance for surgery. Spoke with [facility] at Dr. Lowell Beavers office and informed her that pt is cleared for surgery.

## 2018-10-26 PROBLEM — E43 SEVERE MALNUTRITION (HCC): Chronic | Status: ACTIVE | Noted: 2018-10-26

## 2018-10-26 LAB
ANION GAP SERPL CALCULATED.3IONS-SCNC: 12 MMOL/L (ref 3–16)
BUN BLDV-MCNC: 18 MG/DL (ref 7–20)
CALCIUM SERPL-MCNC: 8.5 MG/DL (ref 8.3–10.6)
CHLORIDE BLD-SCNC: 103 MMOL/L (ref 99–110)
CO2: 20 MMOL/L (ref 21–32)
CREAT SERPL-MCNC: 0.8 MG/DL (ref 0.6–1.2)
EKG ATRIAL RATE: 116 BPM
EKG ATRIAL RATE: 182 BPM
EKG DIAGNOSIS: NORMAL
EKG DIAGNOSIS: NORMAL
EKG P AXIS: 75 DEGREES
EKG P AXIS: 86 DEGREES
EKG P-R INTERVAL: 114 MS
EKG P-R INTERVAL: 96 MS
EKG Q-T INTERVAL: 276 MS
EKG Q-T INTERVAL: 318 MS
EKG QRS DURATION: 76 MS
EKG QRS DURATION: 82 MS
EKG QTC CALCULATION (BAZETT): 434 MS
EKG QTC CALCULATION (BAZETT): 442 MS
EKG R AXIS: 23 DEGREES
EKG R AXIS: 78 DEGREES
EKG T AXIS: 72 DEGREES
EKG T AXIS: 85 DEGREES
EKG VENTRICULAR RATE: 116 BPM
EKG VENTRICULAR RATE: 149 BPM
GFR AFRICAN AMERICAN: >60
GFR NON-AFRICAN AMERICAN: >60
GLUCOSE BLD-MCNC: 104 MG/DL (ref 70–99)
GLUCOSE BLD-MCNC: 117 MG/DL (ref 70–99)
GLUCOSE BLD-MCNC: 148 MG/DL (ref 70–99)
GLUCOSE BLD-MCNC: 87 MG/DL (ref 70–99)
GLUCOSE BLD-MCNC: 98 MG/DL (ref 70–99)
HCT VFR BLD CALC: 22.1 % (ref 36–48)
HEMOGLOBIN: 7.4 G/DL (ref 12–16)
MCH RBC QN AUTO: 32.6 PG (ref 26–34)
MCHC RBC AUTO-ENTMCNC: 33.6 G/DL (ref 31–36)
MCV RBC AUTO: 97 FL (ref 80–100)
PDW BLD-RTO: 17.4 % (ref 12.4–15.4)
PERFORMED ON: ABNORMAL
PERFORMED ON: NORMAL
PLATELET # BLD: 132 K/UL (ref 135–450)
PMV BLD AUTO: 8.3 FL (ref 5–10.5)
POTASSIUM SERPL-SCNC: 4.4 MMOL/L (ref 3.5–5.1)
RBC # BLD: 2.28 M/UL (ref 4–5.2)
SODIUM BLD-SCNC: 135 MMOL/L (ref 136–145)
TOTAL CK: 300 U/L (ref 26–192)
WBC # BLD: 6.9 K/UL (ref 4–11)

## 2018-10-26 PROCEDURE — 6370000000 HC RX 637 (ALT 250 FOR IP): Performed by: NURSE PRACTITIONER

## 2018-10-26 PROCEDURE — G8979 MOBILITY GOAL STATUS: HCPCS

## 2018-10-26 PROCEDURE — 85027 COMPLETE CBC AUTOMATED: CPT

## 2018-10-26 PROCEDURE — 6370000000 HC RX 637 (ALT 250 FOR IP): Performed by: INTERNAL MEDICINE

## 2018-10-26 PROCEDURE — G8988 SELF CARE GOAL STATUS: HCPCS

## 2018-10-26 PROCEDURE — 6370000000 HC RX 637 (ALT 250 FOR IP): Performed by: ORTHOPAEDIC SURGERY

## 2018-10-26 PROCEDURE — 2580000003 HC RX 258: Performed by: ORTHOPAEDIC SURGERY

## 2018-10-26 PROCEDURE — G8978 MOBILITY CURRENT STATUS: HCPCS

## 2018-10-26 PROCEDURE — 82550 ASSAY OF CK (CPK): CPT

## 2018-10-26 PROCEDURE — 97166 OT EVAL MOD COMPLEX 45 MIN: CPT

## 2018-10-26 PROCEDURE — 1200000000 HC SEMI PRIVATE

## 2018-10-26 PROCEDURE — 6360000002 HC RX W HCPCS: Performed by: ORTHOPAEDIC SURGERY

## 2018-10-26 PROCEDURE — 97530 THERAPEUTIC ACTIVITIES: CPT

## 2018-10-26 PROCEDURE — 80048 BASIC METABOLIC PNL TOTAL CA: CPT

## 2018-10-26 PROCEDURE — 2580000003 HC RX 258: Performed by: INTERNAL MEDICINE

## 2018-10-26 PROCEDURE — 97110 THERAPEUTIC EXERCISES: CPT

## 2018-10-26 PROCEDURE — 94760 N-INVAS EAR/PLS OXIMETRY 1: CPT

## 2018-10-26 PROCEDURE — G8987 SELF CARE CURRENT STATUS: HCPCS

## 2018-10-26 PROCEDURE — 36415 COLL VENOUS BLD VENIPUNCTURE: CPT

## 2018-10-26 PROCEDURE — 93010 ELECTROCARDIOGRAM REPORT: CPT | Performed by: INTERNAL MEDICINE

## 2018-10-26 PROCEDURE — 97162 PT EVAL MOD COMPLEX 30 MIN: CPT

## 2018-10-26 PROCEDURE — 6360000002 HC RX W HCPCS: Performed by: INTERNAL MEDICINE

## 2018-10-26 RX ORDER — HYDROCODONE BITARTRATE AND ACETAMINOPHEN 5; 325 MG/1; MG/1
1 TABLET ORAL EVERY 6 HOURS PRN
Qty: 30 TABLET | Refills: 0 | Status: SHIPPED | OUTPATIENT
Start: 2018-10-26 | End: 2018-11-02 | Stop reason: HOSPADM

## 2018-10-26 RX ORDER — ALPRAZOLAM 2 MG/1
2 TABLET ORAL 2 TIMES DAILY PRN
Qty: 14 TABLET | Refills: 0 | Status: SHIPPED | OUTPATIENT
Start: 2018-10-26 | End: 2018-11-02

## 2018-10-26 RX ORDER — ASPIRIN 325 MG
325 TABLET, DELAYED RELEASE (ENTERIC COATED) ORAL DAILY
Qty: 30 TABLET | Refills: 0 | Status: SHIPPED | OUTPATIENT
Start: 2018-10-26 | End: 2018-10-30 | Stop reason: HOSPADM

## 2018-10-26 RX ADMIN — Medication 10 ML: at 20:56

## 2018-10-26 RX ADMIN — DOCUSATE SODIUM 100 MG: 100 CAPSULE, LIQUID FILLED ORAL at 20:56

## 2018-10-26 RX ADMIN — BUTALBITAL, ACETAMINOPHEN, AND CAFFEINE 1 TABLET: 50; 325; 40 TABLET ORAL at 22:11

## 2018-10-26 RX ADMIN — HYDROCODONE BITARTRATE AND ACETAMINOPHEN 1 TABLET: 5; 325 TABLET ORAL at 22:11

## 2018-10-26 RX ADMIN — SODIUM CHLORIDE: 9 INJECTION, SOLUTION INTRAVENOUS at 22:15

## 2018-10-26 RX ADMIN — ENOXAPARIN SODIUM 40 MG: 40 INJECTION SUBCUTANEOUS at 10:09

## 2018-10-26 RX ADMIN — METOPROLOL TARTRATE 50 MG: 50 TABLET ORAL at 20:56

## 2018-10-26 RX ADMIN — SERTRALINE 100 MG: 100 TABLET, FILM COATED ORAL at 10:08

## 2018-10-26 RX ADMIN — ONDANSETRON 4 MG: 2 INJECTION INTRAMUSCULAR; INTRAVENOUS at 10:08

## 2018-10-26 RX ADMIN — METOPROLOL TARTRATE 50 MG: 50 TABLET ORAL at 10:08

## 2018-10-26 RX ADMIN — HYDROCODONE BITARTRATE AND ACETAMINOPHEN 1 TABLET: 5; 325 TABLET ORAL at 15:59

## 2018-10-26 RX ADMIN — TRAZODONE HYDROCHLORIDE 100 MG: 100 TABLET ORAL at 20:55

## 2018-10-26 RX ADMIN — SODIUM CHLORIDE: 9 INJECTION, SOLUTION INTRAVENOUS at 10:07

## 2018-10-26 RX ADMIN — POTASSIUM CHLORIDE 20 MEQ: 20 TABLET, EXTENDED RELEASE ORAL at 20:55

## 2018-10-26 RX ADMIN — ALPRAZOLAM 2 MG: 0.5 TABLET ORAL at 10:08

## 2018-10-26 RX ADMIN — DOCUSATE SODIUM 100 MG: 100 CAPSULE, LIQUID FILLED ORAL at 10:08

## 2018-10-26 RX ADMIN — BUTALBITAL, ACETAMINOPHEN, AND CAFFEINE 1 TABLET: 50; 325; 40 TABLET ORAL at 10:08

## 2018-10-26 RX ADMIN — HYDROCODONE BITARTRATE AND ACETAMINOPHEN 1 TABLET: 5; 325 TABLET ORAL at 06:13

## 2018-10-26 RX ADMIN — CEFAZOLIN 2 G: 10 INJECTION, POWDER, FOR SOLUTION INTRAVENOUS; PARENTERAL at 06:01

## 2018-10-26 RX ADMIN — ALPRAZOLAM 2 MG: 0.5 TABLET ORAL at 22:11

## 2018-10-26 RX ADMIN — MAGNESIUM HYDROXIDE 30 ML: 400 SUSPENSION ORAL at 17:28

## 2018-10-26 RX ADMIN — CYANOCOBALAMIN TAB 1000 MCG 2000 MCG: 1000 TAB at 10:08

## 2018-10-26 RX ADMIN — POTASSIUM CHLORIDE 20 MEQ: 20 TABLET, EXTENDED RELEASE ORAL at 10:08

## 2018-10-26 RX ADMIN — BUTALBITAL, ACETAMINOPHEN, AND CAFFEINE 1 TABLET: 50; 325; 40 TABLET ORAL at 01:52

## 2018-10-26 RX ADMIN — INSULIN LISPRO 1 UNITS: 100 INJECTION, SOLUTION INTRAVENOUS; SUBCUTANEOUS at 22:18

## 2018-10-26 RX ADMIN — BUTALBITAL, ACETAMINOPHEN, AND CAFFEINE 1 TABLET: 50; 325; 40 TABLET ORAL at 17:28

## 2018-10-26 RX ADMIN — PANTOPRAZOLE SODIUM 40 MG: 40 TABLET, DELAYED RELEASE ORAL at 06:01

## 2018-10-26 ASSESSMENT — PAIN DESCRIPTION - PROGRESSION
CLINICAL_PROGRESSION: GRADUALLY IMPROVING
CLINICAL_PROGRESSION: NOT CHANGED

## 2018-10-26 ASSESSMENT — PAIN SCALES - GENERAL
PAINLEVEL_OUTOF10: 6
PAINLEVEL_OUTOF10: 8
PAINLEVEL_OUTOF10: 4
PAINLEVEL_OUTOF10: 5
PAINLEVEL_OUTOF10: 6
PAINLEVEL_OUTOF10: 8
PAINLEVEL_OUTOF10: 8
PAINLEVEL_OUTOF10: 6
PAINLEVEL_OUTOF10: 7
PAINLEVEL_OUTOF10: 5
PAINLEVEL_OUTOF10: 6

## 2018-10-26 ASSESSMENT — PAIN DESCRIPTION - ONSET
ONSET: ON-GOING
ONSET: GRADUAL
ONSET: ON-GOING
ONSET: ON-GOING
ONSET: GRADUAL
ONSET: GRADUAL

## 2018-10-26 ASSESSMENT — PAIN DESCRIPTION - LOCATION
LOCATION: LEG
LOCATION: HEAD
LOCATION: LEG
LOCATION: HIP;ARM

## 2018-10-26 ASSESSMENT — PAIN DESCRIPTION - PAIN TYPE
TYPE: ACUTE PAIN
TYPE: SURGICAL PAIN
TYPE: SURGICAL PAIN
TYPE: ACUTE PAIN
TYPE: ACUTE PAIN
TYPE: SURGICAL PAIN
TYPE: ACUTE PAIN

## 2018-10-26 ASSESSMENT — PAIN DESCRIPTION - ORIENTATION
ORIENTATION: RIGHT

## 2018-10-26 ASSESSMENT — PAIN DESCRIPTION - DESCRIPTORS
DESCRIPTORS: HEADACHE
DESCRIPTORS: ACHING
DESCRIPTORS: ACHING
DESCRIPTORS: HEADACHE

## 2018-10-26 ASSESSMENT — PAIN DESCRIPTION - FREQUENCY
FREQUENCY: CONTINUOUS

## 2018-10-26 NOTE — CARE COORDINATION
[address] [address] Continence Nurse  Consult Note       NAME:  Lolly Flores RECORD NUMBER:  [id number]  AGE: 75 y.o. GENDER: female  : 1945  TODAY'S DATE:  10/26/2018    Subjective   Reason for WOCN Evaluation and Assessment: to evaluate and treat \"Sacral deep tissue pressure injury with full and ruptured blisters\"       Rocio Fan is a 75 y.o. female referred by:   [] Physician  [x] Nursing  [] Other:     Wound Identification:  Wound Type: pressure  Contributing Factors: chronic pressure, decreased mobility and shear force    Wound History: admitted from home after falling at home multiple times. Pt found down of the floor by her neighbor. After patient returned from surgery bedside nurse noticed deep tissue pressure injury on sacrum. See wound details, measurement, photo, and plan of care. Will continue to follow. Bedside nurse to follow plan of care measures below. Patient Goal of Care:  [x] Wound Healing  [] Odor Control  [] Palliative Care  [] Pain Control   [] Other:         PAST MEDICAL HISTORY        Diagnosis Date    Atrial fibrillation ([facility] [address]        PAST SURGICAL HISTORY    Past Surgical History:   Procedure Laterality Date     SECTION      x4    OPEN TX FEMORAL SUPRACONDYLAR FRACTURE W/O EXTENSION Right 10/25/2018    OPEN REDUCTION INTERNAL FIXATION RIGHT FEMUR SUPRACONDYLAR FEMORAL FRACTURE WITH C-ARM performed by Kenneth Jimenez MD at [place] [address]      neck surgery     TOTAL KNEE ARTHROPLASTY Left        FAMILY HISTORY    History reviewed. No pertinent family history. SOCIAL HISTORY    Social History   Substance Use Topics    Smoking status: Never Smoker    Smokeless tobacco: Never Used    Alcohol use No       ALLERGIES    Allergies   Allergen Reactions    Demerol Hcl [Meperidine]     Pcn [Penicillins]      Patient reports she has not had since she was a child, thinks reaction was hives.         MEDICATIONS    No fracture of fifth left metatarsal bone with routine healing S92.355D    Referred otalgia of right ear H92.01    Tongue lesion K14.8    Closed fracture of right femur ([facility]) S72.91XA    Closed fracture of right distal humerus S42.401A    Tachyarrhythmia R00.0    Severe malnutrition ([facility]) E43       Measurements:  Wound 10/25/18 Other (Comment) Sacrum reddish purple area noted to coccyx sacrum upon return from surgery (Active)   Wound Image   10/26/2018 11:00 AM   Wound Type Wound 10/26/2018 11:00 AM   Wound Deep tissue/Injury 10/26/2018 11:00 AM   Dressing Status Changed 10/26/2018 11:00 AM   Dressing Changed Changed/New 10/26/2018 11:00 AM   Dressing/Treatment Barrier Film 10/26/2018 11:00 AM   Dressing Change Due 10/26/18 10/26/2018 11:00 AM   Wound Length (cm) 10 cm 10/26/2018 11:00 AM   Wound Width (cm) 5.5 cm 10/26/2018 11:00 AM   Wound Depth (cm)  0 10/26/2018 11:00 AM   Calculated Wound Size (cm^2) (l*w) 55 cm^2 10/26/2018 11:00 AM   Wound Assessment Dry; Intact;Painful;Light purple;Pink 10/26/2018 11:00 AM   Drainage Amount None 10/26/2018 11:00 AM   Odor None 10/26/2018 11:00 AM   Nallely-wound Assessment Dry; Intact; Full blisters; Open blisters 10/26/2018 11:00 AM   Non-staged Wound Description Partial thickness 10/26/2018 11:00 AM   Red%Wound Bed 50 10/26/2018 11:00 AM   Purple%Wound Bed 50 10/26/2018 11:00 AM   Number of days: 0     Sacral deep tissue pressure injury with full and ruptured blisters      Sacral deep tissue pressure injury with full and ruptured blisters     Response to treatment:  Well tolerated by patient., With complaints of pain. Pain Assessment:  Severity:  5 / 10  Quality of pain: burning, tender  Wound Pain Timing/Severity: intermittent  Premedicated: No    Plan   Plan of Care:  Incision 10/25/18 Leg Right-Dressing/Treatment: Other (Comment) (surgical dressing, dry guaze)  Wound 10/25/18 Other (Comment) Sacrum reddish purple area noted to coccyx sacrum upon return from

## 2018-10-26 NOTE — PROGRESS NOTES
assist x 2 to maintain RUE/RLE NWB). She is unsafe to return home, and would benefit from continued therapy to gradually improve strength and independence with mobility tasks. Recommend low-frequency PT upon D/C. Treatment Diagnosis: Decreased functional mobility  Specific instructions for Next Treatment: Progress EOB/OOB activity as tolerated  Prognosis: Good  Decision Making: Medium Complexity  History: A-fib, L TKA  Exam: Strength; ROM; Balance; Transfers  Clinical Presentation: Evolving  Patient Education: Goals of PT: HEP review; Review of WB precautions  Barriers to Learning: Pain; Fatigue  REQUIRES PT FOLLOW UP: Yes  Activity Tolerance  Activity Tolerance: Patient Tolerated treatment well         Plan   Plan  Times per week: 3-5x  Specific instructions for Next Treatment: Progress EOB/OOB activity as tolerated  Current Treatment Recommendations: Strengthening, ROM, Balance Training, Endurance Training, Functional Mobility Training, Wheelchair Mobility Training, Transfer Training, ADL/Self-care Training, Positioning, Pain Management, Modalities, Equipment Evaluation, Education, & procurement, Patient/Caregiver Education & Training, Safety Education & Training, Home Exercise Program, Neuromuscular Re-education, Manual Therapy - Soft Tissue Mobilization  Safety Devices  Type of devices: All fall risk precautions in place, Call light within reach, Bed alarm in place, Left in bed  Restraints  Initially in place: No    G-Code  PT G-Codes  Functional Assessment Tool Used: [facility] Mobility Inpatient  Score: 6  Functional Limitation: Mobility: Walking and moving around  Mobility: Walking and Moving Around Current Status (): 100 percent impaired, limited or restricted  Mobility: Walking and Moving Around Goal Status ():  At least 80 percent but less than 100 percent impaired, limited or restricted    AM-PAC Score  AM-PAC Inpatient Mobility Raw Score : 6  AM-PAC Inpatient T-Scale Score : 23.55  Mobility

## 2018-10-26 NOTE — PROGRESS NOTES
Accentuation of the lumbar lordosis. Mild-to-moderate multilevel facet   arthrosis. 3. Diffuse osteopenia. 4. Nonobstructing lower pole left renal calculi. 5. Partially visualized right adnexal cystic lesion measuring 2.8 x 2.5 cm. Further evaluation with pelvic ultrasound is recommended given the patient's   age and the presence of this finding. 6. Adreniform enlargement of the adrenal glands which can be seen with   adrenal hyperplasia. CT Head WO Contrast   Final Result   Artifact-limited exam.  No acute intracranial hemorrhage or mass effect. CT THORACIC SPINE WO CONTRAST   Final Result   No acute abnormality of the thoracic spine. CT Cervical Spine WO Contrast   Final Result   No acute fracture. Unchanged minimal anterolisthesis. XR HUMERUS RIGHT (MIN 2 VIEWS)   Final Result   Transverse distal humeral supracondylar fracture with moderate soft tissue   edema. XR ELBOW RIGHT (MIN 3 VIEWS)   Final Result   Transverse distal humeral supracondylar fracture with moderate soft tissue   edema. XR HIP 2-3 VW W PELVIS RIGHT   Final Result   Acute fracture of the femoral shaft. No acute abnormality of the right hip or pelvis. XR FEMUR RIGHT (MIN 2 VIEWS)   Final Result   Acute fracture of the femoral shaft. No acute abnormality of the right hip or pelvis. XR CHEST PORTABLE   Final Result   No evidence of acute cardiopulmonary disease. Assessment/Plan:    Active Hospital Problems    Diagnosis Date Noted    Severe malnutrition ([address] [E43] 10/26/2018    Closed fracture of right femur ([address] [place] 10/24/2018    Tachyarrhythmia [R00.0]          Par a fib - pt was in sinus tach flipping into par a fib, started on cardizem gtt in ER, cardio consulted, awaiting echo, HR better. TSH normal     Rt elbow fracture/Rt femur fracture - ortho consulted, pain meds prn.  S/p OR 10/25 for femur fracture repair   Elevated trop -

## 2018-10-26 NOTE — PROGRESS NOTES
Patient is alert and oriented, not OOB, call light within reach. Fall precautions in place. AM meds complete, patient tolerated well. VSS and WDL. No s/s of distress, no further needs noted at this time.  Electronically signed by Javy Calvert RN on 10/26/2018 at 10:42 AM

## 2018-10-26 NOTE — PLAN OF CARE
Problem: Falls - Risk of:  Goal: Will remain free from falls  Will remain free from falls   Outcome: Ongoing  Fall risk assessment completed every shift. All precautions in place. Pt has call light within reach at all times. Room clear of clutter. Pt aware to call for assistance when getting up. Bed alarm on. No falls this shift. Problem: Risk for Impaired Skin Integrity  Goal: Tissue integrity - skin and mucous membranes  Structural intactness and normal physiological function of skin and  mucous membranes. Outcome: Not Met This Shift  Treating sore on buttocks per protocol. Specialty bed to relieve pressure to area. Frequent repositioning throughout shift. Problem: Pain:  Goal: Pain level will decrease  Pain level will decrease   Outcome: Ongoing  Pain level and characteristics of pain assessed. Patient included in decisions related to pain management. Pain medications given as ordered after other non-medication management options have been attempted. Effectiveness of pain medications assessed and ineffective pain management reported to MD as needed. Beginning to have headaches as at home.  NP ordered PRN pain medication for HA per pt request.    Problem: Skin Integrity:  Goal: Will show no infection signs and symptoms  Will show no infection signs and symptoms   Outcome: Ongoing    Goal: Absence of new skin breakdown  Absence of new skin breakdown   Outcome: Ongoing

## 2018-10-26 NOTE — PROGRESS NOTES
female who presents s/p open reduction and IM nail fixation right supracondylar femur fracture without intracondylar extension d/t Closed right spiral supracondylar femur fracture is NWB on RLE. Pt fell at home and also presents with Right distal humeral supracondylar fx therefore NWB on RUE. Family / Caregiver Present: No  Referring Practitioner: Maco Alex MD  Subjective  Subjective: Patient presents in bed and agreeable to OT eval.   Pain Assessment  Patient Currently in Pain: Yes  Pain Assessment: 0-10  Araujo-Baker Pain Rating: No hurt  Pain Level: 8  Pain Type: Surgical pain  Pain Location: Hip, Arm  Pain Orientation: Right  Pain Descriptors: Constant  Pain Frequency: Continuous  Clinical Progression: Gradually improving  Patient's Stated Pain Goal: 3  Pain Intervention(s): Medication (see eMar)  Response to Pain Intervention: Asleep with RR greater than 10  Multiple Pain Sites: Yes     Social/Functional History  Social/Functional History  Lives With: Alone  Type of Home: House  Home Layout: One level  Home Access: Ramped entrance  Bathroom Shower/Tub: Tub/Shower unit  Bathroom Toilet: Standard  Bathroom Equipment: Shower chair, Grab bars in shower, Grab bars around toilet  Bathroom Accessibility: Walker accessible  Home Equipment: Rolling walker, 4 wheeled walker  ADL Assistance: Independent  Homemaking Responsibilities: Yes  Ambulation Assistance: Independent (4XQ)  Transfer Assistance: Independent  Additional Comments: Patient reports multiple falls at home; pt reports she sleeps in a flat bed at home. Objective   Vision: Within Functional Limits  Hearing: Within functional limits    Orientation  Overall Orientation Status: Within Functional Limits     Balance  Sitting Balance: Contact guard assistance (EOB)  Standing Balance: Dependent/Total (on STEDY for 15 secs and mod A with max assist of another to maintain NWB with RLE. )  Standing Balance  Sit to stand:  Moderate assistance (x 2 from Goal Status (): At least 40 percent but less than 60 percent impaired, limited or restricted    AM-PAC Score        AM-PAC Inpatient Daily Activity Raw Score: 11  AM-PAC Inpatient ADL T-Scale Score : 29.04  ADL Inpatient CMS 0-100% Score: 70.42  ADL Inpatient CMS G-Code Modifier : CL    Goals  Short term goals  Time Frame for Short term goals: until d/c:   Short term goal 1: Fxl transfers on STEDY with min A x 2 while maintaining NWB status. Short term goal 2: UE dressing/bathing with min A. Short term goal 3: 3 grooming tasks with setup and CGA. Short term goal 4: Bed mobility with max A x 2. Short term goal 5: Tolerate standing on STEDY for 1 min with LLE to increase activity tolerance for transfers. Patient Goals   Patient goals : \"I want to be able to be independent again\"        Therapy Time   Individual Concurrent Group Co-treatment   Time In 0905         Time Out 0958         Minutes 53         Timed Code Treatment Minutes: 38 Minutes (15 min eval )     If pt is discharged prior to next OT session, this note will serve as the discharge summary.   Jd Krishna OTR/L #[id number]

## 2018-10-27 LAB
ANION GAP SERPL CALCULATED.3IONS-SCNC: 11 MMOL/L (ref 3–16)
BASOPHILS ABSOLUTE: 0 K/UL (ref 0–0.2)
BASOPHILS RELATIVE PERCENT: 0 %
BILIRUBIN URINE: NEGATIVE
BLOOD BANK DISPENSE STATUS: NORMAL
BLOOD BANK PRODUCT CODE: NORMAL
BLOOD, URINE: ABNORMAL
BPU ID: NORMAL
BUN BLDV-MCNC: 10 MG/DL (ref 7–20)
CALCIUM SERPL-MCNC: 8.4 MG/DL (ref 8.3–10.6)
CHLORIDE BLD-SCNC: 104 MMOL/L (ref 99–110)
CLARITY: CLEAR
CO2: 22 MMOL/L (ref 21–32)
COLOR: YELLOW
CREAT SERPL-MCNC: 0.6 MG/DL (ref 0.6–1.2)
DESCRIPTION BLOOD BANK: NORMAL
EOSINOPHILS ABSOLUTE: 0 K/UL (ref 0–0.6)
EOSINOPHILS RELATIVE PERCENT: 0.1 %
EPITHELIAL CELLS, UA: 0 /HPF (ref 0–5)
GFR AFRICAN AMERICAN: >60
GFR NON-AFRICAN AMERICAN: >60
GLUCOSE BLD-MCNC: 104 MG/DL (ref 70–99)
GLUCOSE BLD-MCNC: 118 MG/DL (ref 70–99)
GLUCOSE BLD-MCNC: 198 MG/DL (ref 70–99)
GLUCOSE BLD-MCNC: 89 MG/DL (ref 70–99)
GLUCOSE BLD-MCNC: 97 MG/DL (ref 70–99)
GLUCOSE URINE: 100 MG/DL
HCT VFR BLD CALC: 19.1 % (ref 36–48)
HCT VFR BLD CALC: 26.5 % (ref 36–48)
HEMOGLOBIN: 6.5 G/DL (ref 12–16)
HEMOGLOBIN: 9.2 G/DL (ref 12–16)
HYALINE CASTS: 1 /LPF (ref 0–8)
KETONES, URINE: 15 MG/DL
LEUKOCYTE ESTERASE, URINE: NEGATIVE
LYMPHOCYTES ABSOLUTE: 0.7 K/UL (ref 1–5.1)
LYMPHOCYTES RELATIVE PERCENT: 13.4 %
MCH RBC QN AUTO: 32.5 PG (ref 26–34)
MCHC RBC AUTO-ENTMCNC: 34.2 G/DL (ref 31–36)
MCV RBC AUTO: 95.3 FL (ref 80–100)
MICROSCOPIC EXAMINATION: YES
MONOCYTES ABSOLUTE: 0.5 K/UL (ref 0–1.3)
MONOCYTES RELATIVE PERCENT: 10.6 %
NEUTROPHILS ABSOLUTE: 3.9 K/UL (ref 1.7–7.7)
NEUTROPHILS RELATIVE PERCENT: 75.9 %
NITRITE, URINE: NEGATIVE
PDW BLD-RTO: 16.9 % (ref 12.4–15.4)
PERFORMED ON: ABNORMAL
PERFORMED ON: NORMAL
PH UA: 7
PLATELET # BLD: 124 K/UL (ref 135–450)
PMV BLD AUTO: 8.2 FL (ref 5–10.5)
POTASSIUM SERPL-SCNC: 4 MMOL/L (ref 3.5–5.1)
PROTEIN UA: NEGATIVE MG/DL
RBC # BLD: 2.01 M/UL (ref 4–5.2)
RBC UA: 4 /HPF (ref 0–4)
SODIUM BLD-SCNC: 137 MMOL/L (ref 136–145)
SPECIFIC GRAVITY UA: 1.01
URINE TYPE: ABNORMAL
UROBILINOGEN, URINE: 0.2 E.U./DL
WBC # BLD: 5.1 K/UL (ref 4–11)
WBC UA: 2 /HPF (ref 0–5)

## 2018-10-27 PROCEDURE — 6370000000 HC RX 637 (ALT 250 FOR IP): Performed by: INTERNAL MEDICINE

## 2018-10-27 PROCEDURE — 86923 COMPATIBILITY TEST ELECTRIC: CPT

## 2018-10-27 PROCEDURE — 1200000000 HC SEMI PRIVATE

## 2018-10-27 PROCEDURE — 6370000000 HC RX 637 (ALT 250 FOR IP): Performed by: ORTHOPAEDIC SURGERY

## 2018-10-27 PROCEDURE — 85025 COMPLETE CBC W/AUTO DIFF WBC: CPT

## 2018-10-27 PROCEDURE — 85014 HEMATOCRIT: CPT

## 2018-10-27 PROCEDURE — 85018 HEMOGLOBIN: CPT

## 2018-10-27 PROCEDURE — 81001 URINALYSIS AUTO W/SCOPE: CPT

## 2018-10-27 PROCEDURE — 6360000002 HC RX W HCPCS: Performed by: INTERNAL MEDICINE

## 2018-10-27 PROCEDURE — 2580000003 HC RX 258: Performed by: INTERNAL MEDICINE

## 2018-10-27 PROCEDURE — 36430 TRANSFUSION BLD/BLD COMPNT: CPT

## 2018-10-27 PROCEDURE — 36415 COLL VENOUS BLD VENIPUNCTURE: CPT

## 2018-10-27 PROCEDURE — 94760 N-INVAS EAR/PLS OXIMETRY 1: CPT

## 2018-10-27 PROCEDURE — 6370000000 HC RX 637 (ALT 250 FOR IP): Performed by: NURSE PRACTITIONER

## 2018-10-27 PROCEDURE — P9016 RBC LEUKOCYTES REDUCED: HCPCS

## 2018-10-27 PROCEDURE — 6360000002 HC RX W HCPCS: Performed by: ORTHOPAEDIC SURGERY

## 2018-10-27 PROCEDURE — 80048 BASIC METABOLIC PNL TOTAL CA: CPT

## 2018-10-27 RX ORDER — LANOLIN ALCOHOL/MO/W.PET/CERES
3 CREAM (GRAM) TOPICAL NIGHTLY
Status: DISCONTINUED | OUTPATIENT
Start: 2018-10-27 | End: 2018-10-29

## 2018-10-27 RX ORDER — IRON POLYSACCHARIDE COMPLEX 150 MG
150 CAPSULE ORAL DAILY
Status: DISCONTINUED | OUTPATIENT
Start: 2018-10-27 | End: 2018-11-02 | Stop reason: HOSPADM

## 2018-10-27 RX ORDER — QUETIAPINE FUMARATE 25 MG/1
12.5 TABLET, FILM COATED ORAL NIGHTLY PRN
Status: DISCONTINUED | OUTPATIENT
Start: 2018-10-27 | End: 2018-11-02 | Stop reason: HOSPADM

## 2018-10-27 RX ORDER — 0.9 % SODIUM CHLORIDE 0.9 %
250 INTRAVENOUS SOLUTION INTRAVENOUS ONCE
Status: DISCONTINUED | OUTPATIENT
Start: 2018-10-27 | End: 2018-10-27

## 2018-10-27 RX ADMIN — HYDROCODONE BITARTRATE AND ACETAMINOPHEN 1 TABLET: 5; 325 TABLET ORAL at 12:36

## 2018-10-27 RX ADMIN — SERTRALINE 100 MG: 100 TABLET, FILM COATED ORAL at 09:59

## 2018-10-27 RX ADMIN — TRAZODONE HYDROCHLORIDE 100 MG: 100 TABLET ORAL at 21:06

## 2018-10-27 RX ADMIN — SODIUM CHLORIDE: 9 INJECTION, SOLUTION INTRAVENOUS at 15:03

## 2018-10-27 RX ADMIN — MORPHINE SULFATE 2 MG: 2 INJECTION, SOLUTION INTRAMUSCULAR; INTRAVENOUS at 04:51

## 2018-10-27 RX ADMIN — Medication 3 MG: at 18:07

## 2018-10-27 RX ADMIN — DOCUSATE SODIUM 100 MG: 100 CAPSULE, LIQUID FILLED ORAL at 21:06

## 2018-10-27 RX ADMIN — BUTALBITAL, ACETAMINOPHEN, AND CAFFEINE 1 TABLET: 50; 325; 40 TABLET ORAL at 21:05

## 2018-10-27 RX ADMIN — MAGNESIUM HYDROXIDE 30 ML: 400 SUSPENSION ORAL at 09:59

## 2018-10-27 RX ADMIN — PANTOPRAZOLE SODIUM 40 MG: 40 TABLET, DELAYED RELEASE ORAL at 05:59

## 2018-10-27 RX ADMIN — ALPRAZOLAM 2 MG: 0.5 TABLET ORAL at 05:58

## 2018-10-27 RX ADMIN — SODIUM CHLORIDE 250 ML: 9 INJECTION, SOLUTION INTRAVENOUS at 10:06

## 2018-10-27 RX ADMIN — HYDROCODONE BITARTRATE AND ACETAMINOPHEN 1 TABLET: 5; 325 TABLET ORAL at 22:43

## 2018-10-27 RX ADMIN — QUETIAPINE FUMARATE 12.5 MG: 25 TABLET ORAL at 21:06

## 2018-10-27 RX ADMIN — POTASSIUM CHLORIDE 20 MEQ: 20 TABLET, EXTENDED RELEASE ORAL at 09:59

## 2018-10-27 RX ADMIN — INSULIN LISPRO 2 UNITS: 100 INJECTION, SOLUTION INTRAVENOUS; SUBCUTANEOUS at 12:32

## 2018-10-27 RX ADMIN — POTASSIUM CHLORIDE 20 MEQ: 20 TABLET, EXTENDED RELEASE ORAL at 21:06

## 2018-10-27 RX ADMIN — CYANOCOBALAMIN TAB 1000 MCG 2000 MCG: 1000 TAB at 09:59

## 2018-10-27 RX ADMIN — METOPROLOL TARTRATE 50 MG: 50 TABLET ORAL at 21:05

## 2018-10-27 RX ADMIN — DOCUSATE SODIUM 100 MG: 100 CAPSULE, LIQUID FILLED ORAL at 09:59

## 2018-10-27 RX ADMIN — Medication 150 MG: at 18:07

## 2018-10-27 RX ADMIN — METOPROLOL TARTRATE 50 MG: 50 TABLET ORAL at 09:59

## 2018-10-27 RX ADMIN — ENOXAPARIN SODIUM 40 MG: 40 INJECTION SUBCUTANEOUS at 09:59

## 2018-10-27 ASSESSMENT — PAIN DESCRIPTION - PAIN TYPE
TYPE: ACUTE PAIN

## 2018-10-27 ASSESSMENT — PAIN SCALES - GENERAL
PAINLEVEL_OUTOF10: 0
PAINLEVEL_OUTOF10: 3
PAINLEVEL_OUTOF10: 4
PAINLEVEL_OUTOF10: 0
PAINLEVEL_OUTOF10: 7
PAINLEVEL_OUTOF10: 2
PAINLEVEL_OUTOF10: 8
PAINLEVEL_OUTOF10: 0
PAINLEVEL_OUTOF10: 0
PAINLEVEL_OUTOF10: 5
PAINLEVEL_OUTOF10: 3

## 2018-10-27 ASSESSMENT — PAIN DESCRIPTION - DESCRIPTORS
DESCRIPTORS: ACHING
DESCRIPTORS: THROBBING
DESCRIPTORS: SHARP

## 2018-10-27 ASSESSMENT — PAIN SCALES - WONG BAKER
WONGBAKER_NUMERICALRESPONSE: 0
WONGBAKER_NUMERICALRESPONSE: 4
WONGBAKER_NUMERICALRESPONSE: 0

## 2018-10-27 ASSESSMENT — PAIN DESCRIPTION - LOCATION
LOCATION: LEG;ELBOW
LOCATION: HEAD

## 2018-10-27 ASSESSMENT — PAIN DESCRIPTION - FREQUENCY
FREQUENCY: CONTINUOUS

## 2018-10-27 ASSESSMENT — PAIN DESCRIPTION - PROGRESSION
CLINICAL_PROGRESSION: GRADUALLY IMPROVING

## 2018-10-27 ASSESSMENT — PAIN DESCRIPTION - ORIENTATION
ORIENTATION: RIGHT

## 2018-10-27 ASSESSMENT — PAIN DESCRIPTION - ONSET: ONSET: GRADUAL

## 2018-10-28 LAB
AMMONIA: 32 UMOL/L (ref 11–51)
ANION GAP SERPL CALCULATED.3IONS-SCNC: 11 MMOL/L (ref 3–16)
BASOPHILS ABSOLUTE: 0 K/UL (ref 0–0.2)
BASOPHILS RELATIVE PERCENT: 0.1 %
BUN BLDV-MCNC: 6 MG/DL (ref 7–20)
CALCIUM SERPL-MCNC: 7.7 MG/DL (ref 8.3–10.6)
CHLORIDE BLD-SCNC: 104 MMOL/L (ref 99–110)
CO2: 22 MMOL/L (ref 21–32)
CREAT SERPL-MCNC: <0.5 MG/DL (ref 0.6–1.2)
EOSINOPHILS ABSOLUTE: 0 K/UL (ref 0–0.6)
EOSINOPHILS RELATIVE PERCENT: 0.4 %
FOLATE: 16.79 NG/ML (ref 4.78–24.2)
GFR AFRICAN AMERICAN: >60
GFR NON-AFRICAN AMERICAN: >60
GLUCOSE BLD-MCNC: 115 MG/DL (ref 70–99)
GLUCOSE BLD-MCNC: 129 MG/DL (ref 70–99)
GLUCOSE BLD-MCNC: 184 MG/DL (ref 70–99)
GLUCOSE BLD-MCNC: 88 MG/DL (ref 70–99)
GLUCOSE BLD-MCNC: 96 MG/DL (ref 70–99)
HCT VFR BLD CALC: 22.9 % (ref 36–48)
HCT VFR BLD CALC: 28.3 % (ref 36–48)
HEMOGLOBIN: 7.8 G/DL (ref 12–16)
HEMOGLOBIN: 9.6 G/DL (ref 12–16)
LYMPHOCYTES ABSOLUTE: 0.9 K/UL (ref 1–5.1)
LYMPHOCYTES RELATIVE PERCENT: 15 %
MCH RBC QN AUTO: 32.1 PG (ref 26–34)
MCHC RBC AUTO-ENTMCNC: 33.8 G/DL (ref 31–36)
MCV RBC AUTO: 95.1 FL (ref 80–100)
MONOCYTES ABSOLUTE: 0.7 K/UL (ref 0–1.3)
MONOCYTES RELATIVE PERCENT: 11.4 %
NEUTROPHILS ABSOLUTE: 4.2 K/UL (ref 1.7–7.7)
NEUTROPHILS RELATIVE PERCENT: 73.1 %
PDW BLD-RTO: 16.9 % (ref 12.4–15.4)
PERFORMED ON: ABNORMAL
PERFORMED ON: NORMAL
PLATELET # BLD: 155 K/UL (ref 135–450)
PMV BLD AUTO: 7.7 FL (ref 5–10.5)
POTASSIUM SERPL-SCNC: 3.6 MMOL/L (ref 3.5–5.1)
RBC # BLD: 2.41 M/UL (ref 4–5.2)
SODIUM BLD-SCNC: 137 MMOL/L (ref 136–145)
VITAMIN B-12: >2000 PG/ML (ref 211–911)
WBC # BLD: 5.7 K/UL (ref 4–11)

## 2018-10-28 PROCEDURE — 6370000000 HC RX 637 (ALT 250 FOR IP): Performed by: INTERNAL MEDICINE

## 2018-10-28 PROCEDURE — 85018 HEMOGLOBIN: CPT

## 2018-10-28 PROCEDURE — 82607 VITAMIN B-12: CPT

## 2018-10-28 PROCEDURE — 6370000000 HC RX 637 (ALT 250 FOR IP): Performed by: ORTHOPAEDIC SURGERY

## 2018-10-28 PROCEDURE — 2580000003 HC RX 258: Performed by: INTERNAL MEDICINE

## 2018-10-28 PROCEDURE — 85014 HEMATOCRIT: CPT

## 2018-10-28 PROCEDURE — 80048 BASIC METABOLIC PNL TOTAL CA: CPT

## 2018-10-28 PROCEDURE — 82746 ASSAY OF FOLIC ACID SERUM: CPT

## 2018-10-28 PROCEDURE — 6370000000 HC RX 637 (ALT 250 FOR IP): Performed by: NURSE PRACTITIONER

## 2018-10-28 PROCEDURE — 94760 N-INVAS EAR/PLS OXIMETRY 1: CPT

## 2018-10-28 PROCEDURE — 85025 COMPLETE CBC W/AUTO DIFF WBC: CPT

## 2018-10-28 PROCEDURE — 82140 ASSAY OF AMMONIA: CPT

## 2018-10-28 PROCEDURE — 6360000002 HC RX W HCPCS: Performed by: INTERNAL MEDICINE

## 2018-10-28 PROCEDURE — 36415 COLL VENOUS BLD VENIPUNCTURE: CPT

## 2018-10-28 PROCEDURE — 1200000000 HC SEMI PRIVATE

## 2018-10-28 RX ORDER — CALCIUM CARBONATE 200(500)MG
500 TABLET,CHEWABLE ORAL 3 TIMES DAILY PRN
Status: DISCONTINUED | OUTPATIENT
Start: 2018-10-28 | End: 2018-11-02 | Stop reason: HOSPADM

## 2018-10-28 RX ORDER — METHOCARBAMOL 500 MG/1
500 TABLET, FILM COATED ORAL 3 TIMES DAILY PRN
Status: DISCONTINUED | OUTPATIENT
Start: 2018-10-28 | End: 2018-10-29

## 2018-10-28 RX ORDER — ALPRAZOLAM 0.5 MG/1
1 TABLET ORAL 2 TIMES DAILY PRN
Status: DISCONTINUED | OUTPATIENT
Start: 2018-10-28 | End: 2018-11-02 | Stop reason: HOSPADM

## 2018-10-28 RX ADMIN — METHOCARBAMOL 500 MG: 500 TABLET, FILM COATED ORAL at 22:27

## 2018-10-28 RX ADMIN — SERTRALINE 100 MG: 100 TABLET, FILM COATED ORAL at 09:09

## 2018-10-28 RX ADMIN — ONDANSETRON 4 MG: 2 INJECTION INTRAMUSCULAR; INTRAVENOUS at 15:58

## 2018-10-28 RX ADMIN — BUTALBITAL, ACETAMINOPHEN, AND CAFFEINE 1 TABLET: 50; 325; 40 TABLET ORAL at 20:53

## 2018-10-28 RX ADMIN — POTASSIUM CHLORIDE 20 MEQ: 20 TABLET, EXTENDED RELEASE ORAL at 20:54

## 2018-10-28 RX ADMIN — DOCUSATE SODIUM 100 MG: 100 CAPSULE, LIQUID FILLED ORAL at 09:10

## 2018-10-28 RX ADMIN — Medication 3 MG: at 17:49

## 2018-10-28 RX ADMIN — METOPROLOL TARTRATE 50 MG: 50 TABLET ORAL at 20:53

## 2018-10-28 RX ADMIN — Medication 10 ML: at 09:10

## 2018-10-28 RX ADMIN — POTASSIUM CHLORIDE 20 MEQ: 20 TABLET, EXTENDED RELEASE ORAL at 09:09

## 2018-10-28 RX ADMIN — WARFARIN SODIUM 3.5 MG: 2.5 TABLET ORAL at 17:49

## 2018-10-28 RX ADMIN — ANTACID TABLETS 500 MG: 500 TABLET, CHEWABLE ORAL at 15:58

## 2018-10-28 RX ADMIN — QUETIAPINE FUMARATE 12.5 MG: 25 TABLET ORAL at 22:27

## 2018-10-28 RX ADMIN — Medication 10 ML: at 20:55

## 2018-10-28 RX ADMIN — Medication 150 MG: at 09:10

## 2018-10-28 RX ADMIN — CYANOCOBALAMIN TAB 1000 MCG 2000 MCG: 1000 TAB at 09:09

## 2018-10-28 RX ADMIN — PANTOPRAZOLE SODIUM 40 MG: 40 TABLET, DELAYED RELEASE ORAL at 05:47

## 2018-10-28 RX ADMIN — METOPROLOL TARTRATE 50 MG: 50 TABLET ORAL at 09:10

## 2018-10-28 RX ADMIN — ALPRAZOLAM 2 MG: 0.5 TABLET ORAL at 02:58

## 2018-10-28 RX ADMIN — ONDANSETRON 4 MG: 2 INJECTION INTRAMUSCULAR; INTRAVENOUS at 10:24

## 2018-10-28 RX ADMIN — SODIUM CHLORIDE: 9 INJECTION, SOLUTION INTRAVENOUS at 02:55

## 2018-10-28 RX ADMIN — BUTALBITAL, ACETAMINOPHEN, AND CAFFEINE 1 TABLET: 50; 325; 40 TABLET ORAL at 09:55

## 2018-10-28 RX ADMIN — DOCUSATE SODIUM 100 MG: 100 CAPSULE, LIQUID FILLED ORAL at 20:54

## 2018-10-28 RX ADMIN — INSULIN LISPRO 2 UNITS: 100 INJECTION, SOLUTION INTRAVENOUS; SUBCUTANEOUS at 17:50

## 2018-10-28 ASSESSMENT — PAIN SCALES - GENERAL
PAINLEVEL_OUTOF10: 7
PAINLEVEL_OUTOF10: 0
PAINLEVEL_OUTOF10: 4
PAINLEVEL_OUTOF10: 6
PAINLEVEL_OUTOF10: 0

## 2018-10-28 ASSESSMENT — PAIN DESCRIPTION - DESCRIPTORS: DESCRIPTORS: NAGGING

## 2018-10-28 ASSESSMENT — PAIN DESCRIPTION - PAIN TYPE
TYPE: ACUTE PAIN

## 2018-10-28 ASSESSMENT — PAIN DESCRIPTION - FREQUENCY: FREQUENCY: CONTINUOUS

## 2018-10-28 ASSESSMENT — PAIN DESCRIPTION - LOCATION
LOCATION: HEAD

## 2018-10-28 NOTE — PROGRESS NOTES
[facility] Orthopedic Surgery   Progress Note      S/P :  SUBJECTIVE  Continues with hallucinations. Pain is described in right arm and right leg and with the intensity of moderate at rest and severe with movment of limbs. Pain is described as aching, burning, shooting. OBJECTIVE              Physical                      VITALS:  BP (!) 146/66   Pulse 104   Temp 97.3 °F (36.3 °C) (Oral)   Resp 20   Ht 5' 3\" (1.6 m)   Wt 146 lb 6.2 oz (66.4 kg)   SpO2 94%   BMI 25.93 kg/m²     Gen: Awake in bed and alert, no acute distress. MUSCULOSKELETAL:  Right hand she can squeeze my hand and extend her thumb. Right LE: knee immobilizer in place and right foot NVI, warm and pink with brisk cap refill noted. Correa feet are cradled currently. NEUROLOGIC:                                  Sensory:  Touch:  Right Upper Extremity:  normal       Right Lower Extremity:  normal                                                 Surgical wound appears clean and dry right hip, knee and thigh.  Right arm in posterior splint with ACE    Data       CBC:   Lab Results   Component Value Date    WBC 5.7 10/28/2018    RBC 2.41 10/28/2018    HGB 7.8 10/28/2018    HCT 22.9 10/28/2018    MCV 95.1 10/28/2018    MCH 32.1 10/28/2018    MCHC 33.8 10/28/2018    RDW 16.9 10/28/2018     10/28/2018    MPV 7.7 10/28/2018        WBC:    Lab Results   Component Value Date    WBC 5.7 10/28/2018        Hemoglobin/Hematocrit:    Lab Results   Component Value Date    HGB 7.8 10/28/2018    HCT 22.9 10/28/2018        PT/INR:    Lab Results   Component Value Date    PROTIME 12.4 10/24/2018    INR 1.09 10/24/2018              Current Inpatient Medications             Current Facility-Administered Medications: iron polysaccharides (NIFEREX) capsule 150 mg, 150 mg, Oral, Daily  melatonin tablet 3 mg, 3 mg, Oral, Nightly  QUEtiapine (SEROQUEL) tablet 12.5 mg, 12.5 mg, Oral, Nightly PRN  0.9 % sodium chloride infusion, , to 2.5      Delirium / confusion: SpO2 does not indicate hypoxia, anemia may be contributing versus narcotics. Stopped morphine yesterday. Still on Xanax, trazadone and norco.  Seroquel started. DVT ppx: lovenox stopped, transition to warfarin for 4 weeks due to her limited mobility. No further orthopaedic interventions planned at this time.     Polly PABLO  10/28/2018  8:23 AM

## 2018-10-28 NOTE — PROGRESS NOTES
LUMBAR SPINE WO CONTRAST   Final Result   1. Remote compression deformity of T12.   2. No clear evidence for superimposed acute fracture in the lumbar spine. Accentuation of the lumbar lordosis. Mild-to-moderate multilevel facet   arthrosis. 3. Diffuse osteopenia. 4. Nonobstructing lower pole left renal calculi. 5. Partially visualized right adnexal cystic lesion measuring 2.8 x 2.5 cm. Further evaluation with pelvic ultrasound is recommended given the patient's   age and the presence of this finding. 6. Adreniform enlargement of the adrenal glands which can be seen with   adrenal hyperplasia. CT Head WO Contrast   Final Result   Artifact-limited exam.  No acute intracranial hemorrhage or mass effect. CT THORACIC SPINE WO CONTRAST   Final Result   No acute abnormality of the thoracic spine. CT Cervical Spine WO Contrast   Final Result   No acute fracture. Unchanged minimal anterolisthesis. XR HUMERUS RIGHT (MIN 2 VIEWS)   Final Result   Transverse distal humeral supracondylar fracture with moderate soft tissue   edema. XR ELBOW RIGHT (MIN 3 VIEWS)   Final Result   Transverse distal humeral supracondylar fracture with moderate soft tissue   edema. XR HIP 2-3 VW W PELVIS RIGHT   Final Result   Acute fracture of the femoral shaft. No acute abnormality of the right hip or pelvis. XR FEMUR RIGHT (MIN 2 VIEWS)   Final Result   Acute fracture of the femoral shaft. No acute abnormality of the right hip or pelvis. XR CHEST PORTABLE   Final Result   No evidence of acute cardiopulmonary disease.                  Assessment/Plan:    Active Hospital Problems    Diagnosis Date Noted    Severe malnutrition ([address] [E43] 10/26/2018    Closed fracture of right femur ([address] [facility] 10/24/2018    Tachyarrhythmia [R00.0]             Par a fib - pt was in sinus tach flipping into par a fib, started on cardizem gtt in ER, cardio consulted, awaiting echo, HR better. TSH normal     Rt elbow fracture/Rt femur fracture - ortho consulted, pain meds prn.  S/p OR 10/25 for femur fracture repair   Elevated trop - trend trops, check echo, cardio consulted  Hypokalemia/hypomag - repleted  Acute metabolic encephalopathy - suspect 2/2 anemia/meds  Lactic acidosis - no source of infection, cont IVF and recheck LA  Leucocytosis - suspect 2/2 stress, UA/CXR clean  Elevated CPK - 2/2 fall and found down, monitor CPK improved  Acute blood loss Anemia - hb dropped from 11.8-->9.4-->6.4, monitor post op, blood transfusion today        DVT Prophylaxis: lovenox  Diet: DIET GENERAL;  Code Status: Full Code    PT/OT Eval Status: ordered    [facility] care    Valarie Guevara MD

## 2018-10-29 ENCOUNTER — APPOINTMENT (OUTPATIENT)
Dept: CT IMAGING | Age: 73
DRG: 956 | End: 2018-10-29
Payer: MEDICARE

## 2018-10-29 LAB
ANION GAP SERPL CALCULATED.3IONS-SCNC: 12 MMOL/L (ref 3–16)
BASOPHILS ABSOLUTE: 0 K/UL (ref 0–0.2)
BASOPHILS RELATIVE PERCENT: 0.2 %
BUN BLDV-MCNC: 7 MG/DL (ref 7–20)
CALCIUM SERPL-MCNC: 8.6 MG/DL (ref 8.3–10.6)
CHLORIDE BLD-SCNC: 100 MMOL/L (ref 99–110)
CO2: 25 MMOL/L (ref 21–32)
CREAT SERPL-MCNC: <0.5 MG/DL (ref 0.6–1.2)
EOSINOPHILS ABSOLUTE: 0 K/UL (ref 0–0.6)
EOSINOPHILS RELATIVE PERCENT: 0.7 %
GFR AFRICAN AMERICAN: >60
GFR NON-AFRICAN AMERICAN: >60
GLUCOSE BLD-MCNC: 114 MG/DL (ref 70–99)
GLUCOSE BLD-MCNC: 115 MG/DL (ref 70–99)
GLUCOSE BLD-MCNC: 115 MG/DL (ref 70–99)
GLUCOSE BLD-MCNC: 169 MG/DL (ref 70–99)
GLUCOSE BLD-MCNC: 217 MG/DL (ref 70–99)
HCT VFR BLD CALC: 26 % (ref 36–48)
HCT VFR BLD CALC: 27.6 % (ref 36–48)
HEMOGLOBIN: 8.8 G/DL (ref 12–16)
HEMOGLOBIN: 9.5 G/DL (ref 12–16)
INR BLD: 1.26 (ref 0.86–1.14)
LYMPHOCYTES ABSOLUTE: 0.9 K/UL (ref 1–5.1)
LYMPHOCYTES RELATIVE PERCENT: 14.3 %
MCH RBC QN AUTO: 32.2 PG (ref 26–34)
MCHC RBC AUTO-ENTMCNC: 33.9 G/DL (ref 31–36)
MCV RBC AUTO: 95 FL (ref 80–100)
MONOCYTES ABSOLUTE: 0.8 K/UL (ref 0–1.3)
MONOCYTES RELATIVE PERCENT: 12.2 %
NEUTROPHILS ABSOLUTE: 4.6 K/UL (ref 1.7–7.7)
NEUTROPHILS RELATIVE PERCENT: 72.6 %
OCCULT BLOOD SCREENING: NORMAL
PDW BLD-RTO: 16.8 % (ref 12.4–15.4)
PERFORMED ON: ABNORMAL
PLATELET # BLD: 188 K/UL (ref 135–450)
PMV BLD AUTO: 7.5 FL (ref 5–10.5)
POTASSIUM SERPL-SCNC: 4.1 MMOL/L (ref 3.5–5.1)
PROTHROMBIN TIME: 14.4 SEC (ref 9.8–13)
RBC # BLD: 2.74 M/UL (ref 4–5.2)
SODIUM BLD-SCNC: 137 MMOL/L (ref 136–145)
WBC # BLD: 6.4 K/UL (ref 4–11)

## 2018-10-29 PROCEDURE — 2580000003 HC RX 258: Performed by: INTERNAL MEDICINE

## 2018-10-29 PROCEDURE — 85014 HEMATOCRIT: CPT

## 2018-10-29 PROCEDURE — 6370000000 HC RX 637 (ALT 250 FOR IP): Performed by: INTERNAL MEDICINE

## 2018-10-29 PROCEDURE — 73701 CT LOWER EXTREMITY W/DYE: CPT

## 2018-10-29 PROCEDURE — 97530 THERAPEUTIC ACTIVITIES: CPT | Performed by: PHYSICAL THERAPIST

## 2018-10-29 PROCEDURE — G0328 FECAL BLOOD SCRN IMMUNOASSAY: HCPCS

## 2018-10-29 PROCEDURE — 94760 N-INVAS EAR/PLS OXIMETRY 1: CPT

## 2018-10-29 PROCEDURE — 97530 THERAPEUTIC ACTIVITIES: CPT

## 2018-10-29 PROCEDURE — 85018 HEMOGLOBIN: CPT

## 2018-10-29 PROCEDURE — 85610 PROTHROMBIN TIME: CPT

## 2018-10-29 PROCEDURE — 6370000000 HC RX 637 (ALT 250 FOR IP): Performed by: ORTHOPAEDIC SURGERY

## 2018-10-29 PROCEDURE — 36415 COLL VENOUS BLD VENIPUNCTURE: CPT

## 2018-10-29 PROCEDURE — 1200000000 HC SEMI PRIVATE

## 2018-10-29 PROCEDURE — 6360000004 HC RX CONTRAST MEDICATION: Performed by: FAMILY MEDICINE

## 2018-10-29 PROCEDURE — 80048 BASIC METABOLIC PNL TOTAL CA: CPT

## 2018-10-29 PROCEDURE — 85025 COMPLETE CBC W/AUTO DIFF WBC: CPT

## 2018-10-29 PROCEDURE — 6370000000 HC RX 637 (ALT 250 FOR IP): Performed by: FAMILY MEDICINE

## 2018-10-29 RX ORDER — MIRTAZAPINE 15 MG/1
15 TABLET, ORALLY DISINTEGRATING ORAL NIGHTLY
Status: DISCONTINUED | OUTPATIENT
Start: 2018-10-29 | End: 2018-11-02 | Stop reason: HOSPADM

## 2018-10-29 RX ORDER — BACLOFEN 10 MG/1
5 TABLET ORAL 3 TIMES DAILY PRN
Status: DISCONTINUED | OUTPATIENT
Start: 2018-10-29 | End: 2018-11-02 | Stop reason: HOSPADM

## 2018-10-29 RX ORDER — WARFARIN SODIUM 2.5 MG/1
2.5 TABLET ORAL
Status: COMPLETED | OUTPATIENT
Start: 2018-10-29 | End: 2018-10-29

## 2018-10-29 RX ORDER — SENNA PLUS 8.6 MG/1
1 TABLET ORAL NIGHTLY
Status: DISCONTINUED | OUTPATIENT
Start: 2018-10-29 | End: 2018-11-02 | Stop reason: HOSPADM

## 2018-10-29 RX ADMIN — QUETIAPINE FUMARATE 12.5 MG: 25 TABLET ORAL at 20:02

## 2018-10-29 RX ADMIN — SERTRALINE 100 MG: 100 TABLET, FILM COATED ORAL at 09:26

## 2018-10-29 RX ADMIN — Medication 150 MG: at 09:26

## 2018-10-29 RX ADMIN — Medication 10 ML: at 21:00

## 2018-10-29 RX ADMIN — METOPROLOL TARTRATE 50 MG: 50 TABLET ORAL at 20:05

## 2018-10-29 RX ADMIN — POTASSIUM CHLORIDE 20 MEQ: 20 TABLET, EXTENDED RELEASE ORAL at 20:00

## 2018-10-29 RX ADMIN — PANTOPRAZOLE SODIUM 40 MG: 40 TABLET, DELAYED RELEASE ORAL at 06:11

## 2018-10-29 RX ADMIN — METHOCARBAMOL 500 MG: 500 TABLET, FILM COATED ORAL at 06:11

## 2018-10-29 RX ADMIN — MIRTAZAPINE 15 MG: 15 TABLET, ORALLY DISINTEGRATING ORAL at 20:15

## 2018-10-29 RX ADMIN — INSULIN LISPRO 4 UNITS: 100 INJECTION, SOLUTION INTRAVENOUS; SUBCUTANEOUS at 11:13

## 2018-10-29 RX ADMIN — WARFARIN SODIUM 2.5 MG: 2.5 TABLET ORAL at 17:07

## 2018-10-29 RX ADMIN — METOPROLOL TARTRATE 50 MG: 50 TABLET ORAL at 09:25

## 2018-10-29 RX ADMIN — POTASSIUM CHLORIDE 20 MEQ: 20 TABLET, EXTENDED RELEASE ORAL at 09:26

## 2018-10-29 RX ADMIN — HYDROCODONE BITARTRATE AND ACETAMINOPHEN 1 TABLET: 5; 325 TABLET ORAL at 16:29

## 2018-10-29 RX ADMIN — SENNOSIDES 8.6 MG: 8.6 TABLET, FILM COATED ORAL at 20:01

## 2018-10-29 RX ADMIN — INSULIN LISPRO 1 UNITS: 100 INJECTION, SOLUTION INTRAVENOUS; SUBCUTANEOUS at 22:59

## 2018-10-29 RX ADMIN — CYANOCOBALAMIN TAB 1000 MCG 2000 MCG: 1000 TAB at 09:25

## 2018-10-29 RX ADMIN — HYDROCODONE BITARTRATE AND ACETAMINOPHEN 1 TABLET: 5; 325 TABLET ORAL at 04:21

## 2018-10-29 RX ADMIN — IOPAMIDOL 75 ML: 755 INJECTION, SOLUTION INTRAVENOUS at 17:43

## 2018-10-29 RX ADMIN — DOCUSATE SODIUM 100 MG: 100 CAPSULE, LIQUID FILLED ORAL at 09:25

## 2018-10-29 RX ADMIN — Medication 10 ML: at 09:26

## 2018-10-29 RX ADMIN — BACLOFEN 5 MG: 10 TABLET ORAL at 20:03

## 2018-10-29 RX ADMIN — DOCUSATE SODIUM 100 MG: 100 CAPSULE, LIQUID FILLED ORAL at 20:15

## 2018-10-29 ASSESSMENT — PAIN DESCRIPTION - PAIN TYPE
TYPE: ACUTE PAIN

## 2018-10-29 ASSESSMENT — PAIN SCALES - GENERAL
PAINLEVEL_OUTOF10: 6
PAINLEVEL_OUTOF10: 5
PAINLEVEL_OUTOF10: 8
PAINLEVEL_OUTOF10: 3
PAINLEVEL_OUTOF10: 0
PAINLEVEL_OUTOF10: 0

## 2018-10-29 ASSESSMENT — PAIN DESCRIPTION - ORIENTATION
ORIENTATION: RIGHT

## 2018-10-29 ASSESSMENT — PAIN DESCRIPTION - LOCATION
LOCATION: ARM;LEG

## 2018-10-29 ASSESSMENT — PAIN DESCRIPTION - DESCRIPTORS
DESCRIPTORS: ACHING
DESCRIPTORS: SHARP;NAGGING
DESCRIPTORS: SHARP;NAGGING

## 2018-10-29 ASSESSMENT — PAIN DESCRIPTION - ONSET
ONSET: ON-GOING

## 2018-10-29 ASSESSMENT — PAIN DESCRIPTION - FREQUENCY
FREQUENCY: CONTINUOUS

## 2018-10-29 ASSESSMENT — PAIN DESCRIPTION - PROGRESSION
CLINICAL_PROGRESSION: GRADUALLY IMPROVING
CLINICAL_PROGRESSION: GRADUALLY IMPROVING

## 2018-10-29 NOTE — PROGRESS NOTES
Hospitalist Progress Note      PCP: Lenka Wei MD    Date of Admission: 10/24/2018    Subjective: Pt S/E. Pt with continued confusion. Son and granddaughter at bedside, updated, all questions answered. Medications:  Reviewed    Infusion Medications    dextrose       Scheduled Medications    warfarin  2.5 mg Oral Once    warfarin (COUMADIN) daily dosing (placeholder)   Other RX Placeholder    iron polysaccharides  150 mg Oral Daily    melatonin  3 mg Oral Nightly    metoprolol tartrate  50 mg Oral BID    docusate sodium  100 mg Oral BID    insulin lispro  0-12 Units Subcutaneous TID WC    insulin lispro  0-6 Units Subcutaneous Nightly    pantoprazole  40 mg Oral QAM AC    potassium chloride  20 mEq Oral BID    sertraline  100 mg Oral Daily    vitamin B-12  2,000 mcg Oral Daily    sodium chloride flush  10 mL Intravenous 2 times per day     PRN Meds: calcium carbonate, methocarbamol, ALPRAZolam, QUEtiapine, glucose, dextrose, glucagon (rDNA), dextrose, butalbital-acetaminophen-caffeine, sodium chloride flush, magnesium hydroxide, ondansetron, HYDROcodone 5 mg - acetaminophen      Intake/Output Summary (Last 24 hours) at 10/29/18 1007  Last data filed at 10/28/18 2058   Gross per 24 hour   Intake              480 ml   Output             1650 ml   Net            -1170 ml       Physical Exam Performed:    /78   Pulse 84   Temp 98.3 °F (36.8 °C) (Oral)   Resp 16   Ht 5' 3\" (1.6 m)   Wt 151 lb 0.2 oz (68.5 kg)   SpO2 93%   BMI 26.75 kg/m²     General appearance: No apparent distress appears. HEENT Normal cephalic, atraumatic without obvious deformity. Pupils equal, round, and reactive to light.  Extra ocular muscles intact.  Conjunctivae/corneas clear. Neck: Supple, No jugular venous distention/bruits.    Lungs: Clear to auscultation, bilaterally without Rales/Wheezes/Rhonchi with good respiratory effort.   Heart: Irregular rate and rhythm with Normal S1/S2  Abdomen: consulted, pain meds prn. S/p OR 10/25 for femur fracture repair. She will be NWB x 6 weeks and then follow up with ortho  Elevated trop - trend trops with no peak, cardio consulted  Hypokalemia/hypomag - repleted    Acute metabolic encephalopathy - suspect 2/2 polypharmacy. Pt/daughter states she takes 2mg xanax and benadryl every night since the death of her  and has been falling down 2/2 polypharmacy. During her admission in Jan, her xanax was cut back to 1mg BID prn but per daughter PCP increased it to 2mg. Discussed with pt and daughter, will try to wean down benzo off completely in 4 weeks. Will try Remeron for insomnia. Pt has had trazodone 100 mg without improvement. She had no help from melatonin    Lactic acidosis - no source of infection, resolved  Leucocytosis - suspect 2/2 stress, UA/CXR clean -resolved  Elevated CPK - 2/2 fall and found down, monitor CPK improved  Acute blood loss Anemia - unknown source, hgb dropped from 11.8-->9.4-->6.4, s/p blood transfusion, hgb slightly dropped, monitor hgb.  Check FOBT and CT rt hip     DVT Prophylaxis: lovenox  Diet: DIET GENERAL;  Code Status: Full Code    PT/OT Eval Status: ordered    Dispo - needs SNF on d/c    Niki Arriaga DO

## 2018-10-29 NOTE — CARE COORDINATION
10/29/2018  3:01 PM   Alma-wound Assessment Dry; Intact 10/29/2018  3:01 PM   Non-staged Wound Description Partial thickness 10/29/2018  3:01 PM   Pleak%Wound Bed 50 10/29/2018  3:01 PM   Red%Wound Bed 25 10/29/2018  3:01 PM   Purple%Wound Bed 25 10/29/2018  3:01 PM   Number of days: 3       Incision 10/25/18 Thigh Right;Upper (Active)   Wound Assessment JENNIFER 10/29/2018  7:32 AM   Alma-wound Assessment Clean;Dry; Intact 10/29/2018  7:32 AM   Drainage Amount None 10/29/2018  7:32 AM   Drainage Description Serosanguinous 10/26/2018 10:15 AM   Dressing/Treatment Other (Comment) 10/29/2018  7:32 AM   Dressing Status Clean;Dry; Intact 10/28/2018  8:30 PM   Number of days: 3     Mixed evolving deep tissue pressure injury and ruptured blisters with partial-thickness skin loss       Response to treatment:  Well tolerated by patient. Pain Assessment:  Severity:  0 / 10  Quality of pain: N/A  Wound Pain Timing/Severity: none  Premedicated: No  Plan:   Plan of Care: Incision 10/25/18 Thigh Right;Upper-Dressing/Treatment: Other (Comment) (mepilex AG)  Wound 10/25/18 Other (Comment) Sacrum reddish purple area noted to coccyx sacrum upon return from surgery-Dressing/Treatment: Pharmaceutical agent (see eMar) (Calmoseptine Ointment )  Incision 10/27/18 Knee Right-Dressing/Treatment: Other (Comment) (mepilex ag)  Incision 10/27/18 Thigh Right; Anterior; Lower-Dressing/Treatment: Other (Comment) (mepilex)     1. Implement Sami prevention orders. See orders for prevention order details   2. Implement Wound Care orders. See wound care orders for dressing change instructions    Sacrum: Calmoseptine Ointment bid and prn with alma-care   3. Will continue to follow   4. Apply Medline Boots (black) to BLE while in bed at all times and/ or when heels resting on surface. (ex. recliner chair foot rest). Check heel placement in heel protector boot every 2 hours   5. Use the Wedge Pillow to reposition the patient while in bed.    Keep Patient

## 2018-10-29 NOTE — CARE COORDINATION
Patient discharged. Spoke with Angela Jacobson at [facility]. Snf is not a [facility] facility. Per Jan Devoid needs to know how long the patient will be NWB. Not indicated in orthopedic surgery note.      RN case manager to call MD.     Electronically signed by HOLLY Mccallum, TMIOTHYW on 10/29/2018 at 12:23 PM

## 2018-10-29 NOTE — PROGRESS NOTES
T12.   2. No clear evidence for superimposed acute fracture in the lumbar spine. Accentuation of the lumbar lordosis. Mild-to-moderate multilevel facet   arthrosis. 3. Diffuse osteopenia. 4. Nonobstructing lower pole left renal calculi. 5. Partially visualized right adnexal cystic lesion measuring 2.8 x 2.5 cm. Further evaluation with pelvic ultrasound is recommended given the patient's   age and the presence of this finding. 6. Adreniform enlargement of the adrenal glands which can be seen with   adrenal hyperplasia. CT Head WO Contrast   Final Result   Artifact-limited exam.  No acute intracranial hemorrhage or mass effect. CT THORACIC SPINE WO CONTRAST   Final Result   No acute abnormality of the thoracic spine. CT Cervical Spine WO Contrast   Final Result   No acute fracture. Unchanged minimal anterolisthesis. XR HUMERUS RIGHT (MIN 2 VIEWS)   Final Result   Transverse distal humeral supracondylar fracture with moderate soft tissue   edema. XR ELBOW RIGHT (MIN 3 VIEWS)   Final Result   Transverse distal humeral supracondylar fracture with moderate soft tissue   edema. XR HIP 2-3 VW W PELVIS RIGHT   Final Result   Acute fracture of the femoral shaft. No acute abnormality of the right hip or pelvis. XR FEMUR RIGHT (MIN 2 VIEWS)   Final Result   Acute fracture of the femoral shaft. No acute abnormality of the right hip or pelvis. XR CHEST PORTABLE   Final Result   No evidence of acute cardiopulmonary disease. Assessment/Plan:    Active Hospital Problems    Diagnosis Date Noted    Severe malnutrition ([address] [E43] 10/26/2018    Closed fracture of right femur ([address] Ashish Clay 10/24/2018    Tachyarrhythmia [R00.0]             Par a fib - pt was in sinus tach flipping into par a fib, started on cardizem gtt in ER-->PO, cardio consulted, awaiting echo, HR better.  TSH normal     Rt elbow fracture/Rt femur fracture -

## 2018-10-29 NOTE — DISCHARGE INSTR - COC
Continuity of Care Form    Patient Name: Jack Mahoney   :  1945  MRN:  [id number]    Admit date:  10/24/2018  Discharge date:  10/29/18    Code Status Order: Full Code   Advance Directives:   [facility] Documentation     Date/Time Healthcare Directive Type of Healthcare Directive Copy in [address] Agent's Name Healthcare Agent's Phone Number    10/25/18 1247  Yes, patient has an advance directive for healthcare treatment  Durable power of  for health care  Yes, copy in chart  Healthcare power of   Ritika Almonte (daughter)  [phone]    10/25/18 1122  Other (Comment)   pt has advanced directives but no copy in chart  --  No, copy requested from family  --  --  --    10/24/18 1816  Yes, patient has an advance directive for healthcare treatment  Durable power of  for health care;Living will  No, copy requested from family  Healthcare power of   Nilsa Leong  [phone]   may leave voice mail          Admitting Physician:  Valarie Guevara MD  PCP: Kev Solorzano MD    Discharging Nurse: [facility] Unit/Room#: M5Y-5261/4303-19  Discharging Unit Phone Number: [phone]    Emergency Contact:   Extended Emergency Contact Information  Primary Emergency Contact: Raven Friend  Address: [place] [address], [address] [facility],[address] Phone: [phone]  Relation: Child  Secondary Emergency Contact: Slim De La Fuente   [address] Phone: [phone]  Relation: Child    Past Surgical History:  Past Surgical History:   Procedure Laterality Date     SECTION      x4    OPEN TX FEMORAL SUPRACONDYLAR FRACTURE W/O EXTENSION Right 10/25/2018    OPEN REDUCTION INTERNAL FIXATION RIGHT FEMUR SUPRACONDYLAR FEMORAL FRACTURE WITH C-ARM performed by Maco Alex MD at [place] [address]      neck surgery     [address] [place] [address] Left        Immunization History:   Immunization History   Administered Date(s) Administered    Influenza Vaccine, unspecified formulation 10/21/2018    Pneumococcal Vaccine, unspecified formulation 10/21/2018       Active Problems:  Patient Active Problem List   Diagnosis Code    Closed fracture of proximal end of left humerus with routine healing S42.202D    Closed nondisplaced fracture of fifth left metatarsal bone S92.355A    Acute encephalopathy G93.40    Fall W19. [facility]    Hallucination R44.3    Anemia D64.9    Atrial fibrillation with rapid ventricular response ([facility]) I48.91    Nondisplaced fracture of fifth left metatarsal bone with routine healing S92.355D    Referred otalgia of right ear H92.01    Tongue lesion K14.8    Closed fracture of right femur ([facility]) S72.91XA    Closed fracture of right distal humerus S42.401A    Tachyarrhythmia R00.0    Severe malnutrition ([facility]) E43       Isolation/Infection:   Isolation          No Isolation            Nurse Assessment:  Last Vital Signs: /72   Pulse 83   Temp 97.8 °F (36.6 °C) (Oral)   Resp 16   Ht 5' 3\" (1.6 m)   Wt 151 lb 0.2 oz (68.5 kg)   SpO2 96%   BMI 26.75 kg/m²     Last documented pain score (0-10 scale): Pain Level: 0  Last Weight:   Wt Readings from Last 1 Encounters:   10/29/18 151 lb 0.2 oz (68.5 kg)     Mental Status:  alert    IV Access:  - None    Nursing Mobility/ADLs:  Walking   Dependent  Transfer  Dependent  Bathing  Dependent  Dressing  Dependent  Toileting  Dependent  Feeding  Assisted  Med Admin  Dependent  Med Delivery   whole    Wound Care Documentation and Therapy:  Wound 01/14/18 Blister Foot Left blister that has popped (Active)   Number of days: 288       Wound 01/14/18 Other (Comment) Coccyx (Active)   Number of days: 288       Wound 10/25/18 Other (Comment) Sacrum reddish purple area noted to coccyx sacrum upon return from surgery (Active)   Wound Image   10/26/2018 11:00 AM   Wound Type Wound 10/29/2018 7:32 AM   Wound Pressure Stage  2 10/29/2018  7:32 AM   Dressing Status Other (Comment) 10/29/2018  7:32 AM   Dressing Changed Changed/New 10/27/2018  9:45 AM   Dressing/Treatment Barrier Film 10/28/2018  8:30 PM   Wound Cleansed Other (Comment) 10/29/2018  7:32 AM   Dressing Change Due 10/27/18 10/27/2018  9:45 AM   Wound Length (cm) 10 cm 10/26/2018 11:00 AM   Wound Width (cm) 5.5 cm 10/26/2018 11:00 AM   Wound Depth (cm)  0 10/26/2018 11:00 AM   Calculated Wound Size (cm^2) (l*w) 55 cm^2 10/26/2018 11:00 AM   Wound Assessment Dry; Intact;Painful;Light purple;Pink 10/26/2018 11:00 AM   Drainage Amount None 10/26/2018 11:00 AM   Odor None 10/26/2018 11:00 AM   Nallely-wound Assessment Dry; Intact; Full blisters; Open blisters 10/26/2018 11:00 AM   Non-staged Wound Description Partial thickness 10/26/2018 11:00 AM   Red%Wound Bed 50 10/26/2018 11:00 AM   Purple%Wound Bed 50 10/26/2018 11:00 AM   Number of days: 3       Incision 10/25/18 Thigh Right;Upper (Active)   Wound Assessment JENNIFER 10/29/2018  7:32 AM   Nallely-wound Assessment Clean;Dry; Intact 10/29/2018  7:32 AM   Drainage Amount None 10/29/2018  7:32 AM   Drainage Description Serosanguinous 10/26/2018 10:15 AM   Dressing/Treatment Other (Comment) 10/29/2018  7:32 AM   Dressing Status Clean;Dry; Intact 10/28/2018  8:30 PM   Number of days: 3       Incision 10/27/18 Knee Right (Active)   Wound Assessment Clean;Dry; Intact 10/29/2018  7:32 AM   Nallely-wound Assessment Intact 10/29/2018  7:32 AM   Drainage Amount None 10/29/2018  7:32 AM   Odor None 10/29/2018  7:32 AM   Dressing/Treatment Other (Comment) 10/29/2018  7:32 AM   Dressing Status Clean;Dry; Intact 10/28/2018  8:30 PM   Number of days: 1       Incision 10/27/18 Thigh Right; Anterior; Lower (Active)   Wound Assessment Clean;Dry; Intact 10/29/2018  7:32 AM   Nallely-wound Assessment Dry; Intact 10/29/2018  7:32 AM   Drainage Amount None 10/29/2018  7:32 AM   Odor None 10/29/2018  7:32 AM   Dressing/Treatment Other (Comment) 10/29/2018  7:32 AM   Dressing Status Clean;Dry; Intact 10/28/2018  8:30 PM   Number of days: 1        Elimination:  Continence:   · Bowel: No  · Bladder: No  Urinary Catheter: {Urinary Catheter:[id number]}   Colostomy/Ileostomy/Ileal Conduit: No       Date of Last BM: 10/29/18    Intake/Output Summary (Last 24 hours) at 10/29/18 1206  Last data filed at 10/29/18 1021   Gross per 24 hour   Intake              720 ml   Output             1650 ml   Net             -930 ml     I/O last 3 completed shifts: In: 600 [P.O.:600]  Out: 1650 [Urine:1650]    Safety Concerns:     History of Falls (last 30 days) and At Risk for Falls    Impairments/Disabilities:      None    Nutrition Therapy:  Current Nutrition Therapy:   - Oral Diet:  General    Routes of Feeding: Oral  Liquids:  Thin Liquids  Daily Fluid Restriction: no  Last Modified Barium Swallow with Video (Video Swallowing Test): not done    Treatments at the Time of Hospital Discharge:   Respiratory Treatments: nebulizers  Oxygen Therapy: RA  Ventilator:    - No ventilator support    Rehab Therapies: PT,OT  Weight Bearing Status/Restrictions: No weight bearing restirctions  Other Medical Equipment (for information only, NOT a DME order):  n/a  Other Treatments: n/a    Patient's personal belongings (please select all that are sent with patient):  all belongings    RN SIGNATURE:  Electronically signed by Juice Arevalo RN on 10/29/18 at 3:31 PM    CASE MANAGEMENT/SOCIAL WORK SECTION    Inpatient Status Date: 10/24/18    Readmission Risk Assessment Score:  Readmission Risk              Risk of Unplanned Readmission:        23           Discharging to Facility/ Agency   · Name: [facility]   · Address:  · [facility]:[phone]  Fax:[phone]    / signature: Electronically signed by HOLLY Cristina on 11/2/2018 at 12:56 PM      PHYSICIAN SECTION    Prognosis: Good    Condition at Discharge: Stable    Rehab Potential (if transferring to Rehab):

## 2018-10-29 NOTE — CARE COORDINATION
stool.    Transport arranged for 3:00 on 10/30 per daughter's request.  Sneha Arriaga MD, and bedside RN aware. Educated patient and other daughter in [place], who was currently on the phone, about the IMM letter. Letter signed by patient. Patient alert and oriented. Will continue to follow for discharge planning.      Electronically signed by HOLLY Alcaraz, TIMOTHYW on 10/29/2018 at 4:29 PM

## 2018-10-30 LAB
ANION GAP SERPL CALCULATED.3IONS-SCNC: 11 MMOL/L (ref 3–16)
BASOPHILS ABSOLUTE: 0 K/UL (ref 0–0.2)
BASOPHILS RELATIVE PERCENT: 0.2 %
BUN BLDV-MCNC: 8 MG/DL (ref 7–20)
CALCIUM SERPL-MCNC: 8.6 MG/DL (ref 8.3–10.6)
CHLORIDE BLD-SCNC: 102 MMOL/L (ref 99–110)
CO2: 25 MMOL/L (ref 21–32)
CREAT SERPL-MCNC: <0.5 MG/DL (ref 0.6–1.2)
EOSINOPHILS ABSOLUTE: 0.1 K/UL (ref 0–0.6)
EOSINOPHILS RELATIVE PERCENT: 0.8 %
GFR AFRICAN AMERICAN: >60
GFR NON-AFRICAN AMERICAN: >60
GLUCOSE BLD-MCNC: 132 MG/DL (ref 70–99)
GLUCOSE BLD-MCNC: 148 MG/DL (ref 70–99)
GLUCOSE BLD-MCNC: 175 MG/DL (ref 70–99)
GLUCOSE BLD-MCNC: 97 MG/DL (ref 70–99)
GLUCOSE BLD-MCNC: 99 MG/DL (ref 70–99)
HCT VFR BLD CALC: 26.3 % (ref 36–48)
HEMOGLOBIN: 8.8 G/DL (ref 12–16)
INR BLD: 1.9 (ref 0.86–1.14)
LYMPHOCYTES ABSOLUTE: 1.1 K/UL (ref 1–5.1)
LYMPHOCYTES RELATIVE PERCENT: 15.5 %
MCH RBC QN AUTO: 32.3 PG (ref 26–34)
MCHC RBC AUTO-ENTMCNC: 33.6 G/DL (ref 31–36)
MCV RBC AUTO: 96.2 FL (ref 80–100)
MONOCYTES ABSOLUTE: 0.8 K/UL (ref 0–1.3)
MONOCYTES RELATIVE PERCENT: 10.8 %
NEUTROPHILS ABSOLUTE: 5.1 K/UL (ref 1.7–7.7)
NEUTROPHILS RELATIVE PERCENT: 72.7 %
PDW BLD-RTO: 17.1 % (ref 12.4–15.4)
PERFORMED ON: ABNORMAL
PERFORMED ON: NORMAL
PLATELET # BLD: 198 K/UL (ref 135–450)
PMV BLD AUTO: 7.5 FL (ref 5–10.5)
POTASSIUM SERPL-SCNC: 4 MMOL/L (ref 3.5–5.1)
PROTHROMBIN TIME: 21.7 SEC (ref 9.8–13)
RBC # BLD: 2.73 M/UL (ref 4–5.2)
SODIUM BLD-SCNC: 138 MMOL/L (ref 136–145)
WBC # BLD: 7.1 K/UL (ref 4–11)

## 2018-10-30 PROCEDURE — 6370000000 HC RX 637 (ALT 250 FOR IP): Performed by: INTERNAL MEDICINE

## 2018-10-30 PROCEDURE — 85025 COMPLETE CBC W/AUTO DIFF WBC: CPT

## 2018-10-30 PROCEDURE — 6370000000 HC RX 637 (ALT 250 FOR IP): Performed by: FAMILY MEDICINE

## 2018-10-30 PROCEDURE — 97530 THERAPEUTIC ACTIVITIES: CPT

## 2018-10-30 PROCEDURE — 85610 PROTHROMBIN TIME: CPT

## 2018-10-30 PROCEDURE — 94760 N-INVAS EAR/PLS OXIMETRY 1: CPT

## 2018-10-30 PROCEDURE — 80048 BASIC METABOLIC PNL TOTAL CA: CPT

## 2018-10-30 PROCEDURE — 2580000003 HC RX 258: Performed by: INTERNAL MEDICINE

## 2018-10-30 PROCEDURE — 1200000000 HC SEMI PRIVATE

## 2018-10-30 PROCEDURE — 6370000000 HC RX 637 (ALT 250 FOR IP): Performed by: ORTHOPAEDIC SURGERY

## 2018-10-30 PROCEDURE — 36415 COLL VENOUS BLD VENIPUNCTURE: CPT

## 2018-10-30 RX ORDER — ALPRAZOLAM 1 MG/1
1 TABLET ORAL 2 TIMES DAILY PRN
Qty: 4 TABLET | Refills: 0 | Status: SHIPPED | OUTPATIENT
Start: 2018-10-30 | End: 2018-11-01

## 2018-10-30 RX ORDER — MIRTAZAPINE 15 MG/1
15 TABLET, ORALLY DISINTEGRATING ORAL NIGHTLY
Qty: 30 TABLET | Refills: 3 | Status: SHIPPED | OUTPATIENT
Start: 2018-10-30 | End: 2019-06-10 | Stop reason: ALTCHOICE

## 2018-10-30 RX ORDER — WARFARIN SODIUM 1 MG/1
1 TABLET ORAL
Status: COMPLETED | OUTPATIENT
Start: 2018-10-30 | End: 2018-10-30

## 2018-10-30 RX ORDER — PSEUDOEPHEDRINE HCL 30 MG
100 TABLET ORAL 2 TIMES DAILY
Qty: 60 CAPSULE | Refills: 0 | Status: SHIPPED | OUTPATIENT
Start: 2018-10-30 | End: 2019-07-19

## 2018-10-30 RX ORDER — BUTALBITAL, ACETAMINOPHEN AND CAFFEINE 50; 325; 40 MG/1; MG/1; MG/1
1 TABLET ORAL EVERY 4 HOURS PRN
Status: DISCONTINUED | OUTPATIENT
Start: 2018-10-30 | End: 2018-11-02 | Stop reason: HOSPADM

## 2018-10-30 RX ADMIN — DOCUSATE SODIUM 100 MG: 100 CAPSULE, LIQUID FILLED ORAL at 08:43

## 2018-10-30 RX ADMIN — BACLOFEN 5 MG: 10 TABLET ORAL at 03:30

## 2018-10-30 RX ADMIN — MIRTAZAPINE 15 MG: 15 TABLET, ORALLY DISINTEGRATING ORAL at 20:25

## 2018-10-30 RX ADMIN — Medication 10 ML: at 20:25

## 2018-10-30 RX ADMIN — BUTALBITAL, ACETAMINOPHEN, AND CAFFEINE 1 TABLET: 50; 325; 40 TABLET ORAL at 14:07

## 2018-10-30 RX ADMIN — PANTOPRAZOLE SODIUM 40 MG: 40 TABLET, DELAYED RELEASE ORAL at 06:31

## 2018-10-30 RX ADMIN — Medication 10 ML: at 08:43

## 2018-10-30 RX ADMIN — Medication 150 MG: at 08:47

## 2018-10-30 RX ADMIN — HYDROCODONE BITARTRATE AND ACETAMINOPHEN 1 TABLET: 5; 325 TABLET ORAL at 20:24

## 2018-10-30 RX ADMIN — CYANOCOBALAMIN TAB 1000 MCG 2000 MCG: 1000 TAB at 08:43

## 2018-10-30 RX ADMIN — WARFARIN SODIUM 1 MG: 1 TABLET ORAL at 17:14

## 2018-10-30 RX ADMIN — SERTRALINE 100 MG: 100 TABLET, FILM COATED ORAL at 08:43

## 2018-10-30 RX ADMIN — POTASSIUM CHLORIDE 20 MEQ: 20 TABLET, EXTENDED RELEASE ORAL at 20:25

## 2018-10-30 RX ADMIN — INSULIN LISPRO 2 UNITS: 100 INJECTION, SOLUTION INTRAVENOUS; SUBCUTANEOUS at 11:16

## 2018-10-30 RX ADMIN — INSULIN LISPRO 2 UNITS: 100 INJECTION, SOLUTION INTRAVENOUS; SUBCUTANEOUS at 17:14

## 2018-10-30 RX ADMIN — POTASSIUM CHLORIDE 20 MEQ: 20 TABLET, EXTENDED RELEASE ORAL at 08:43

## 2018-10-30 RX ADMIN — METOPROLOL TARTRATE 50 MG: 50 TABLET ORAL at 20:25

## 2018-10-30 RX ADMIN — SENNOSIDES 8.6 MG: 8.6 TABLET, FILM COATED ORAL at 20:25

## 2018-10-30 RX ADMIN — METOPROLOL TARTRATE 50 MG: 50 TABLET ORAL at 08:43

## 2018-10-30 RX ADMIN — HYDROCODONE BITARTRATE AND ACETAMINOPHEN 1 TABLET: 5; 325 TABLET ORAL at 08:50

## 2018-10-30 ASSESSMENT — PAIN DESCRIPTION - LOCATION
LOCATION: ARM;LEG
LOCATION: HEAD
LOCATION: ARM;LEG

## 2018-10-30 ASSESSMENT — PAIN DESCRIPTION - PROGRESSION
CLINICAL_PROGRESSION: GRADUALLY IMPROVING

## 2018-10-30 ASSESSMENT — PAIN SCALES - GENERAL
PAINLEVEL_OUTOF10: 8
PAINLEVEL_OUTOF10: 5
PAINLEVEL_OUTOF10: 2
PAINLEVEL_OUTOF10: 1
PAINLEVEL_OUTOF10: 0
PAINLEVEL_OUTOF10: 0

## 2018-10-30 ASSESSMENT — PAIN DESCRIPTION - PAIN TYPE
TYPE: ACUTE PAIN
TYPE: ACUTE PAIN;SURGICAL PAIN
TYPE: ACUTE PAIN

## 2018-10-30 ASSESSMENT — PAIN DESCRIPTION - FREQUENCY: FREQUENCY: CONTINUOUS

## 2018-10-30 ASSESSMENT — PAIN DESCRIPTION - ORIENTATION
ORIENTATION: RIGHT
ORIENTATION: RIGHT

## 2018-10-30 ASSESSMENT — PAIN DESCRIPTION - DESCRIPTORS: DESCRIPTORS: CRAMPING

## 2018-10-30 NOTE — PROGRESS NOTES
assess (nt--pt up in recliner at end of session. )  Scooting: Maximal assistance (cues to assist to get to eob. )  Transfers  Sit to stand:  (Max A of 2 in blanka stedy, unable to keep NWB. From elevated bed)  Stand to sit: Maximum assistance (Max A of 2, unable to keep NWB)  Transfer Comments: Transfer to recliner chair per blanka stedy and assist of 2. Patient unable to keep NWB. Patient required assist for left UE and LE. Cognition  Overall Cognitive Status: Exceptions  Arousal/Alertness: Delayed responses to stimuli  Following Commands: Follows one step commands with increased time; Follows one step commands with repetition  Attention Span: Attends with cues to redirect  Memory: Decreased recall of precautions;Decreased short term memory  Safety Judgement: Decreased awareness of need for safety;Decreased awareness of need for assistance  Problem Solving: Assistance required to generate solutions;Assistance required to implement solutions;Assistance required to correct errors made;Decreased awareness of errors  Insights: Decreased awareness of deficits  Initiation: Requires cues for all  Sequencing: Requires cues for all              Assessment   Performance deficits / Impairments: Decreased functional mobility ; Decreased ADL status; Decreased ROM; Decreased strength;Decreased safe awareness;Decreased cognition;Decreased endurance;Decreased balance  Assessment: Discussed with OTR am pac score is 11 which indicates need for continued skilled OT to increase [place] and decrease caregiver burden. Patient able to participate in therapy session. Patient able to transfer from bed to chair per blanka stedy and assist of 2, unable to keep NWB of right UE and LE. Patient is unable to return home at this time and is a high fall risk.    Patient Education: Role of OT, NWB status, d/c planning, safe transfers with STEDY  REQUIRES OT FOLLOW UP: Yes  Activity Tolerance  Activity Tolerance: Treatment limited secondary to

## 2018-10-30 NOTE — PROGRESS NOTES
knee immobilizer - may be removed for bathing and dressing, but avoid knee flexion d/t spiral fx. Rosaura  Subjective   General  Chart Reviewed: Yes  Additional Pertinent Hx: Per Dr. Toma Petit, 10/25, \"The patient is a 75 y.o. female with a fall at home resulting in a right supracondylar elbow fracture as well as a right spiral supracondylar femur fracture. He's falls occurred over the course of several days and on presentation to the ER she had rhabdomyolysis and was found to be in atrial fibrillation. \" Pt underwent ORIF R femur fx 10/25. She is NWB RLE and NWB RUE. PMH includes A-fib, L TKA. Subjective  Subjective: Pt supine in bed, very quiet with minimal interactions. Pt did get pain meds just prior to start of PT session. Pain Screening  Patient Currently in Pain: Yes  Pain Assessment  Pain Assessment:  (no # stated when asked. )  Pain Type: Acute pain;Surgical pain  Pain Location: Arm;Leg  Pain Orientation: Right       Orientation  Orientation  Overall Orientation Status: Within Functional Limits (decreased cognition/insight noted. )  Cognition      Objective   Bed mobility  Supine to Sit: Maximum assistance (A x 2, cues to initiate use of bedrail with L UE to assist into upright. )  Sit to Supine: Unable to assess (nt--pt up in recliner at end of session. )  Scooting: Maximal assistance (cues to assist to get to eob. )  Transfers  Sit to Stand: Maximum Assistance (A x 2 for sit<>stand, as well as for assist for NWB to R UE/R LE--pt with poor maintaince of wt bearing status. )  Stand to sit: Maximum Assistance  Bed to Chair: Dependent/Total (via SarMarky, A x 2 person with POOR maintaince of NWB R UE/R LE status.  )  Comment: recommend nursing using maxi move for safest transfers, total A x 2 person and decreased ability to keep NWB status.        Balance  Sitting - Static: Fair (CG/Min A sitting balance at eob. )  Sitting - Dynamic: Fair  Standing - Static: Poor  Standing - Dynamic:  (JENNIFER)       Assessment

## 2018-10-30 NOTE — PROGRESS NOTES
[facility] Orthopedic Surgery   Progress Note      S/P :  SUBJECTIVE  In chair. Was moved per [facility] lift and tolerated well. States has pain in right elbow and right leg that is intermittent and aches. Feels moderate in intensity. Daughter at bedside. May questions re plan of care . Pt appears content and in no distress, alert and oriented x3. OBJECTIVE              Physical                      VITALS:  /73   Pulse 70   Temp (!) 87.2 °F (30.7 °C) (Oral)   Resp 16   Ht 5' 3\" (1.6 m)   Wt 151 lb 14.4 oz (68.9 kg)   SpO2 97%   BMI 26.91 kg/m²                     MUSCULOSKELETAL:  Right hand and right foot NVI, warm and pink with FROM noted. Right arm posterior splint rewrapped since ill fitting ACE and pt tolerated well., No skin breakdown from splint on right arm proximally or distally. Right leg immobilizer removed to reveal clean and dry 2 Mepilex AG dressings on thigh and one Mepilex with scant red on anterior knee. No right leg bruising, deformity noted. Tissue of right thigh soft and supple, no notable hematomas.                     NEUROLOGIC:                                  Sensory:  Touch:  Right Upper Extremity:  normal  Right Lower Extremity:  normal                                               Data       CBC:   Lab Results   Component Value Date    WBC 7.1 10/30/2018    RBC 2.73 10/30/2018    HGB 8.8 10/30/2018    HCT 26.3 10/30/2018    MCV 96.2 10/30/2018    MCH 32.3 10/30/2018    MCHC 33.6 10/30/2018    RDW 17.1 10/30/2018     10/30/2018    MPV 7.5 10/30/2018        WBC:    Lab Results   Component Value Date    WBC 7.1 10/30/2018        Hemoglobin/Hematocrit:    Lab Results   Component Value Date    HGB 8.8 10/30/2018    HCT 26.3 10/30/2018        PT/INR:    Lab Results   Component Value Date    PROTIME 21.7 10/30/2018    INR 1.90 10/30/2018              Current Inpatient Medications             Current Facility-Administered Medications: warfarin (COUMADIN) tablet 1 mg, 1 mg, Oral,

## 2018-10-30 NOTE — PROGRESS NOTES
Hospitalist Progress Note      PCP: Srinivasa Garcia MD    Date of Admission: 10/24/2018    Subjective: Pt S/E. She has no acute complaints. Patient family had many questions about her mom's clinical status. Medications:  Reviewed    Infusion Medications    dextrose       Scheduled Medications    mirtazapine  15 mg Oral Nightly    senna  1 tablet Oral Nightly    warfarin (COUMADIN) daily dosing (placeholder)   Other RX Placeholder    iron polysaccharides  150 mg Oral Daily    metoprolol tartrate  50 mg Oral BID    docusate sodium  100 mg Oral BID    insulin lispro  0-12 Units Subcutaneous TID WC    insulin lispro  0-6 Units Subcutaneous Nightly    pantoprazole  40 mg Oral QAM AC    potassium chloride  20 mEq Oral BID    sertraline  100 mg Oral Daily    vitamin B-12  2,000 mcg Oral Daily    sodium chloride flush  10 mL Intravenous 2 times per day     PRN Meds: butalbital-acetaminophen-caffeine, baclofen, calcium carbonate, ALPRAZolam, QUEtiapine, glucose, dextrose, glucagon (rDNA), dextrose, sodium chloride flush, magnesium hydroxide, ondansetron, HYDROcodone 5 mg - acetaminophen      Intake/Output Summary (Last 24 hours) at 10/30/18 1935  Last data filed at 10/30/18 1633   Gross per 24 hour   Intake             1200 ml   Output             2100 ml   Net             -900 ml       Physical Exam Performed:    /70   Pulse 97   Temp 99.1 °F (37.3 °C) (Oral)   Resp 18   Ht 5' 3\" (1.6 m)   Wt 151 lb 14.4 oz (68.9 kg)   SpO2 97%   BMI 26.91 kg/m²     General appearance: No apparent distress. HEENT Normal cephalic, atraumatic without obvious deformity. Pupils equal, round, and reactive to light.  Extra ocular muscles intact.  Conjunctivae/corneas clear. Neck: Supple, No jugular venous distention/bruits.    Lungs: Clear to auscultation, bilaterally without Rales/Wheezes/Rhonchi with good respiratory effort.   Heart: Irregular rate and rhythm with Normal S1/S2  Abdomen: Soft, organized drainable fluid collection   identified. 3. Moderate to severe osteoarthritis of the right hip. 4. Moderate to severe tricompartmental osteoarthritis of the right knee with   small moderate right knee effusion present. FLUORO FOR SURGICAL PROCEDURES   Final Result   Intraprocedural fluoroscopic spot images as above. See separate procedure   report for more information. XR FEMUR RIGHT (MIN 2 VIEWS)   Final Result   Intraprocedural fluoroscopic spot images as above. See separate procedure   report for more information. CT LUMBAR SPINE WO CONTRAST   Final Result   1. Remote compression deformity of T12.   2. No clear evidence for superimposed acute fracture in the lumbar spine. Accentuation of the lumbar lordosis. Mild-to-moderate multilevel facet   arthrosis. 3. Diffuse osteopenia. 4. Nonobstructing lower pole left renal calculi. 5. Partially visualized right adnexal cystic lesion measuring 2.8 x 2.5 cm. Further evaluation with pelvic ultrasound is recommended given the patient's   age and the presence of this finding. 6. Adreniform enlargement of the adrenal glands which can be seen with   adrenal hyperplasia. CT Head WO Contrast   Final Result   Artifact-limited exam.  No acute intracranial hemorrhage or mass effect. CT THORACIC SPINE WO CONTRAST   Final Result   No acute abnormality of the thoracic spine. CT Cervical Spine WO Contrast   Final Result   No acute fracture. Unchanged minimal anterolisthesis. XR HUMERUS RIGHT (MIN 2 VIEWS)   Final Result   Transverse distal humeral supracondylar fracture with moderate soft tissue   edema. XR ELBOW RIGHT (MIN 3 VIEWS)   Final Result   Transverse distal humeral supracondylar fracture with moderate soft tissue   edema. XR HIP 2-3 VW W PELVIS RIGHT   Final Result   Acute fracture of the femoral shaft. No acute abnormality of the right hip or pelvis.          XR FEMUR warfarin  Diet: DIET GENERAL;  Code Status: Full Code    PT/OT Eval Status: ordered    Dispo - needs SNF on d/c. She is stable from a medical perspective for discharge however family is appealing the discharge. They felt patient was not yet ready for discharge due to recent changes in medications. Also complicating the matter was the patient's , and daughter's father passing away tragically 15 hours after being discharged to a SNF. Had long discussion today with family in regards to the medications she is currently taking and the rationale behind them.     Serina Yen MD

## 2018-10-31 LAB
ANION GAP SERPL CALCULATED.3IONS-SCNC: 11 MMOL/L (ref 3–16)
BASOPHILS ABSOLUTE: 0 K/UL (ref 0–0.2)
BASOPHILS RELATIVE PERCENT: 0.5 %
BUN BLDV-MCNC: 10 MG/DL (ref 7–20)
CALCIUM SERPL-MCNC: 8.6 MG/DL (ref 8.3–10.6)
CHLORIDE BLD-SCNC: 101 MMOL/L (ref 99–110)
CO2: 26 MMOL/L (ref 21–32)
CREAT SERPL-MCNC: <0.5 MG/DL (ref 0.6–1.2)
EOSINOPHILS ABSOLUTE: 0.1 K/UL (ref 0–0.6)
EOSINOPHILS RELATIVE PERCENT: 1 %
GFR AFRICAN AMERICAN: >60
GFR NON-AFRICAN AMERICAN: >60
GLUCOSE BLD-MCNC: 107 MG/DL (ref 70–99)
GLUCOSE BLD-MCNC: 111 MG/DL (ref 70–99)
GLUCOSE BLD-MCNC: 133 MG/DL (ref 70–99)
GLUCOSE BLD-MCNC: 206 MG/DL (ref 70–99)
GLUCOSE BLD-MCNC: 90 MG/DL (ref 70–99)
HCT VFR BLD CALC: 28.3 % (ref 36–48)
HEMOGLOBIN: 9.5 G/DL (ref 12–16)
INR BLD: 1.59 (ref 0.86–1.14)
LYMPHOCYTES ABSOLUTE: 1.1 K/UL (ref 1–5.1)
LYMPHOCYTES RELATIVE PERCENT: 16.6 %
MCH RBC QN AUTO: 32.1 PG (ref 26–34)
MCHC RBC AUTO-ENTMCNC: 33.5 G/DL (ref 31–36)
MCV RBC AUTO: 95.8 FL (ref 80–100)
MONOCYTES ABSOLUTE: 0.6 K/UL (ref 0–1.3)
MONOCYTES RELATIVE PERCENT: 8.8 %
NEUTROPHILS ABSOLUTE: 4.8 K/UL (ref 1.7–7.7)
NEUTROPHILS RELATIVE PERCENT: 73.1 %
PDW BLD-RTO: 17.5 % (ref 12.4–15.4)
PERFORMED ON: ABNORMAL
PERFORMED ON: NORMAL
PLATELET # BLD: 201 K/UL (ref 135–450)
PMV BLD AUTO: 7.4 FL (ref 5–10.5)
POTASSIUM SERPL-SCNC: 4.6 MMOL/L (ref 3.5–5.1)
PROTHROMBIN TIME: 18.1 SEC (ref 9.8–13)
RBC # BLD: 2.95 M/UL (ref 4–5.2)
SODIUM BLD-SCNC: 138 MMOL/L (ref 136–145)
WBC # BLD: 6.5 K/UL (ref 4–11)

## 2018-10-31 PROCEDURE — 85610 PROTHROMBIN TIME: CPT

## 2018-10-31 PROCEDURE — 80048 BASIC METABOLIC PNL TOTAL CA: CPT

## 2018-10-31 PROCEDURE — 1200000000 HC SEMI PRIVATE

## 2018-10-31 PROCEDURE — 2580000003 HC RX 258: Performed by: INTERNAL MEDICINE

## 2018-10-31 PROCEDURE — 6370000000 HC RX 637 (ALT 250 FOR IP): Performed by: ORTHOPAEDIC SURGERY

## 2018-10-31 PROCEDURE — 97110 THERAPEUTIC EXERCISES: CPT

## 2018-10-31 PROCEDURE — 97535 SELF CARE MNGMENT TRAINING: CPT

## 2018-10-31 PROCEDURE — 97530 THERAPEUTIC ACTIVITIES: CPT

## 2018-10-31 PROCEDURE — 6360000002 HC RX W HCPCS: Performed by: INTERNAL MEDICINE

## 2018-10-31 PROCEDURE — 6370000000 HC RX 637 (ALT 250 FOR IP): Performed by: INTERNAL MEDICINE

## 2018-10-31 PROCEDURE — 85025 COMPLETE CBC W/AUTO DIFF WBC: CPT

## 2018-10-31 PROCEDURE — 97110 THERAPEUTIC EXERCISES: CPT | Performed by: PHYSICAL THERAPIST

## 2018-10-31 PROCEDURE — 97530 THERAPEUTIC ACTIVITIES: CPT | Performed by: PHYSICAL THERAPIST

## 2018-10-31 RX ORDER — WARFARIN SODIUM 2 MG/1
2 TABLET ORAL
Status: COMPLETED | OUTPATIENT
Start: 2018-10-31 | End: 2018-10-31

## 2018-10-31 RX ORDER — OXYCODONE HYDROCHLORIDE AND ACETAMINOPHEN 5; 325 MG/1; MG/1
1 TABLET ORAL EVERY 4 HOURS PRN
Status: DISCONTINUED | OUTPATIENT
Start: 2018-10-31 | End: 2018-11-02 | Stop reason: HOSPADM

## 2018-10-31 RX ADMIN — HYDROCODONE BITARTRATE AND ACETAMINOPHEN 1 TABLET: 5; 325 TABLET ORAL at 14:46

## 2018-10-31 RX ADMIN — WARFARIN SODIUM 2 MG: 2 TABLET ORAL at 17:44

## 2018-10-31 RX ADMIN — PANTOPRAZOLE SODIUM 40 MG: 40 TABLET, DELAYED RELEASE ORAL at 06:20

## 2018-10-31 RX ADMIN — INSULIN LISPRO 4 UNITS: 100 INJECTION, SOLUTION INTRAVENOUS; SUBCUTANEOUS at 17:10

## 2018-10-31 RX ADMIN — METOPROLOL TARTRATE 50 MG: 50 TABLET ORAL at 10:00

## 2018-10-31 RX ADMIN — OXYCODONE AND ACETAMINOPHEN 1 TABLET: 5; 325 TABLET ORAL at 20:35

## 2018-10-31 RX ADMIN — Medication 10 ML: at 10:00

## 2018-10-31 RX ADMIN — ONDANSETRON 4 MG: 2 INJECTION INTRAMUSCULAR; INTRAVENOUS at 06:20

## 2018-10-31 RX ADMIN — POTASSIUM CHLORIDE 20 MEQ: 20 TABLET, EXTENDED RELEASE ORAL at 20:35

## 2018-10-31 RX ADMIN — ALPRAZOLAM 1 MG: 0.5 TABLET ORAL at 21:48

## 2018-10-31 RX ADMIN — POTASSIUM CHLORIDE 20 MEQ: 20 TABLET, EXTENDED RELEASE ORAL at 10:00

## 2018-10-31 RX ADMIN — CYANOCOBALAMIN TAB 1000 MCG 2000 MCG: 1000 TAB at 10:14

## 2018-10-31 RX ADMIN — DOCUSATE SODIUM 100 MG: 100 CAPSULE, LIQUID FILLED ORAL at 10:00

## 2018-10-31 RX ADMIN — QUETIAPINE FUMARATE 12.5 MG: 25 TABLET ORAL at 20:35

## 2018-10-31 RX ADMIN — METOPROLOL TARTRATE 50 MG: 50 TABLET ORAL at 20:35

## 2018-10-31 RX ADMIN — Medication 10 ML: at 20:36

## 2018-10-31 RX ADMIN — Medication 150 MG: at 10:14

## 2018-10-31 RX ADMIN — SERTRALINE 100 MG: 100 TABLET, FILM COATED ORAL at 10:14

## 2018-10-31 RX ADMIN — BUTALBITAL, ACETAMINOPHEN, AND CAFFEINE 1 TABLET: 50; 325; 40 TABLET ORAL at 10:13

## 2018-10-31 ASSESSMENT — PAIN SCALES - GENERAL
PAINLEVEL_OUTOF10: 8
PAINLEVEL_OUTOF10: 6
PAINLEVEL_OUTOF10: 8
PAINLEVEL_OUTOF10: 1
PAINLEVEL_OUTOF10: 0

## 2018-10-31 ASSESSMENT — PAIN DESCRIPTION - LOCATION
LOCATION: LEG
LOCATION: HEAD

## 2018-10-31 ASSESSMENT — PAIN DESCRIPTION - PAIN TYPE
TYPE: ACUTE PAIN
TYPE: SURGICAL PAIN

## 2018-10-31 ASSESSMENT — PAIN DESCRIPTION - DESCRIPTORS: DESCRIPTORS: ACHING;DISCOMFORT

## 2018-10-31 NOTE — PROGRESS NOTES
Norco 1 po given d/t c/o right leg pain 8/10. NPI -repositioning was ineffective. 3:36 pain 1/10 prn norco was effective,no further c/o'f of pain voiced,call light in reach.

## 2018-10-31 NOTE — PROGRESS NOTES
subcutaneous edema without organized drainable fluid collection   identified. 3. Moderate to severe osteoarthritis of the right hip. 4. Moderate to severe tricompartmental osteoarthritis of the right knee with   small moderate right knee effusion present. FLUORO FOR SURGICAL PROCEDURES   Final Result   Intraprocedural fluoroscopic spot images as above. See separate procedure   report for more information. XR FEMUR RIGHT (MIN 2 VIEWS)   Final Result   Intraprocedural fluoroscopic spot images as above. See separate procedure   report for more information. CT LUMBAR SPINE WO CONTRAST   Final Result   1. Remote compression deformity of T12.   2. No clear evidence for superimposed acute fracture in the lumbar spine. Accentuation of the lumbar lordosis. Mild-to-moderate multilevel facet   arthrosis. 3. Diffuse osteopenia. 4. Nonobstructing lower pole left renal calculi. 5. Partially visualized right adnexal cystic lesion measuring 2.8 x 2.5 cm. Further evaluation with pelvic ultrasound is recommended given the patient's   age and the presence of this finding. 6. Adreniform enlargement of the adrenal glands which can be seen with   adrenal hyperplasia. CT Head WO Contrast   Final Result   Artifact-limited exam.  No acute intracranial hemorrhage or mass effect. CT THORACIC SPINE WO CONTRAST   Final Result   No acute abnormality of the thoracic spine. CT Cervical Spine WO Contrast   Final Result   No acute fracture. Unchanged minimal anterolisthesis. XR HUMERUS RIGHT (MIN 2 VIEWS)   Final Result   Transverse distal humeral supracondylar fracture with moderate soft tissue   edema. XR ELBOW RIGHT (MIN 3 VIEWS)   Final Result   Transverse distal humeral supracondylar fracture with moderate soft tissue   edema. XR HIP 2-3 VW W PELVIS RIGHT   Final Result   Acute fracture of the femoral shaft.       No acute abnormality of the right hip or pelvis. XR FEMUR RIGHT (MIN 2 VIEWS)   Final Result   Acute fracture of the femoral shaft. No acute abnormality of the right hip or pelvis. XR CHEST PORTABLE   Final Result   No evidence of acute cardiopulmonary disease. Assessment/Plan:    Active Hospital Problems    Diagnosis Date Noted    Severe malnutrition ([address] [E43] 10/26/2018    Closed fracture of right femur ([facility] [address] Lupis Tierney 10/24/2018    Tachyarrhythmia [R00.0]         Paroxsymal a fib - pt was in sinus tach flipping into par a fib, started on cardizem gtt in ER-->PO, cardio consulted, echo with lvef 60%, mod pulm htn, HR better. TSH normal. HR is now controlled. -continue BB  -warfarin, pharmacy to dose     Rt elbow fracture/Rt femur fracture - ortho consulted, pain meds prn. S/p OR 10/25 for femur fracture repair. She will be NWB x 6 weeks and then follow up with ortho in 1 week. -stable for D/C per ortho  -will switch from [place] to Percocet and assess pain response    Elevated trop - trend trops with no peak, cardio consulted    Hypokalemia/hypomag - repleted    Acute metabolic encephalopathy - suspect 2/2 polypharmacy. Pt/daughter states she takes 2mg xanax and benadryl every night since the death of her  and has been falling down 2/2 polypharmacy. During her admission in Jan, her xanax was cut back to 1mg BID prn but per daughter PCP increased it to 2mg. Discussed with pt and daughter, will try to wean down benzo off completely in 4 weeks. Will try Remeron for insomnia. Pt has had trazodone 100 mg without improvement.  She had no help from melatonin  -also has Seroquel PRN for sleep    Lactic acidosis - no source of infection, resolved    Leukocytosis - suspect 2/2 stress, UA/CXR clean -resolved    Elevated CPK - 2/2 fall and found down, monitor CPK improved    Acute blood loss Anemia - unknown source but has stabilized, hgb dropped from 11.8-->9.4-->6.4, s/p blood transfusion, hgb slightly dropped,

## 2018-10-31 NOTE — PROGRESS NOTES
Risk  Lower Extremity Weight Bearing Restrictions  Right Lower Extremity Weight Bearing: Non Weight Bearing  Upper Extremity Weight Bearing Restrictions  Right Upper Extremity Weight Bearing: Non Weight Bearing  Position Activity Restriction  Other position/activity restrictions: R knee immobilizer - may be removed for bathing and dressing, but avoid knee flexion d/t spiral fx. Kaplan  Subjective   General  Chart Reviewed: Yes  Additional Pertinent Hx: Per Dr. Mike Lake, 10/25, \"The patient is a 75 y.o. female with a fall at home resulting in a right supracondylar elbow fracture as well as a right spiral supracondylar femur fracture. He's falls occurred over the course of several days and on presentation to the ER she had rhabdomyolysis and was found to be in atrial fibrillation. \" Pt underwent ORIF R femur fx 10/25. She is NWB RLE and NWB RUE. PMH includes A-fib, L TKA. Referring Practitioner: Dr. Galen Alvarez:  (pt agreeable to getting up with therapy)  Pain Screening  Patient Currently in Pain: No (discomfort with rolling)  Vital Signs  Patient Currently in Pain: No (discomfort with rolling)       Orientation  Orientation  Overall Orientation Status: Within Functional Limits  Cognition   Cognition  Arousal/Alertness: Appropriate responses to stimuli  Following Commands: Follows one step commands with increased time  Attention Span: Appears intact  Memory: Decreased recall of precautions (at times)  Safety Judgement: Good awareness of safety precautions  Problem Solving: Assistance required to generate solutions;Assistance required to implement solutions  Insights: Fully aware of deficits  Initiation: Requires cues for some  Sequencing: Requires cues for some  Objective   Bed mobility  Rolling to Left: Maximum assistance (of 1-2)  Rolling to Right: Moderate assistance; Independent  Comment: pt incontinent of stool upon entering room therefore rolled side to side several times to cleanse pt, change

## 2018-11-01 LAB
ANION GAP SERPL CALCULATED.3IONS-SCNC: 11 MMOL/L (ref 3–16)
BASOPHILS ABSOLUTE: 0 K/UL (ref 0–0.2)
BASOPHILS RELATIVE PERCENT: 0.5 %
BUN BLDV-MCNC: 12 MG/DL (ref 7–20)
CALCIUM SERPL-MCNC: 8.5 MG/DL (ref 8.3–10.6)
CHLORIDE BLD-SCNC: 103 MMOL/L (ref 99–110)
CO2: 24 MMOL/L (ref 21–32)
CREAT SERPL-MCNC: 0.5 MG/DL (ref 0.6–1.2)
EOSINOPHILS ABSOLUTE: 0.1 K/UL (ref 0–0.6)
EOSINOPHILS RELATIVE PERCENT: 1.1 %
GFR AFRICAN AMERICAN: >60
GFR NON-AFRICAN AMERICAN: >60
GLUCOSE BLD-MCNC: 100 MG/DL (ref 70–99)
GLUCOSE BLD-MCNC: 101 MG/DL (ref 70–99)
GLUCOSE BLD-MCNC: 106 MG/DL (ref 70–99)
GLUCOSE BLD-MCNC: 109 MG/DL (ref 70–99)
GLUCOSE BLD-MCNC: 152 MG/DL (ref 70–99)
GLUCOSE BLD-MCNC: 214 MG/DL (ref 70–99)
HCT VFR BLD CALC: 26.3 % (ref 36–48)
HEMOGLOBIN: 8.9 G/DL (ref 12–16)
INR BLD: 1.36 (ref 0.86–1.14)
LYMPHOCYTES ABSOLUTE: 1.3 K/UL (ref 1–5.1)
LYMPHOCYTES RELATIVE PERCENT: 18.7 %
MCH RBC QN AUTO: 32.7 PG (ref 26–34)
MCHC RBC AUTO-ENTMCNC: 33.7 G/DL (ref 31–36)
MCV RBC AUTO: 97 FL (ref 80–100)
MONOCYTES ABSOLUTE: 0.6 K/UL (ref 0–1.3)
MONOCYTES RELATIVE PERCENT: 8 %
NEUTROPHILS ABSOLUTE: 5 K/UL (ref 1.7–7.7)
NEUTROPHILS RELATIVE PERCENT: 71.7 %
PDW BLD-RTO: 17.5 % (ref 12.4–15.4)
PERFORMED ON: ABNORMAL
PLATELET # BLD: 206 K/UL (ref 135–450)
PMV BLD AUTO: 7.9 FL (ref 5–10.5)
POTASSIUM SERPL-SCNC: 4.4 MMOL/L (ref 3.5–5.1)
PROTHROMBIN TIME: 15.5 SEC (ref 9.8–13)
RBC # BLD: 2.71 M/UL (ref 4–5.2)
SODIUM BLD-SCNC: 138 MMOL/L (ref 136–145)
WBC # BLD: 7 K/UL (ref 4–11)

## 2018-11-01 PROCEDURE — 6370000000 HC RX 637 (ALT 250 FOR IP): Performed by: FAMILY MEDICINE

## 2018-11-01 PROCEDURE — 94760 N-INVAS EAR/PLS OXIMETRY 1: CPT

## 2018-11-01 PROCEDURE — 97530 THERAPEUTIC ACTIVITIES: CPT

## 2018-11-01 PROCEDURE — 6370000000 HC RX 637 (ALT 250 FOR IP): Performed by: INTERNAL MEDICINE

## 2018-11-01 PROCEDURE — 97530 THERAPEUTIC ACTIVITIES: CPT | Performed by: PHYSICAL THERAPIST

## 2018-11-01 PROCEDURE — 2580000003 HC RX 258: Performed by: INTERNAL MEDICINE

## 2018-11-01 PROCEDURE — 85025 COMPLETE CBC W/AUTO DIFF WBC: CPT

## 2018-11-01 PROCEDURE — 6370000000 HC RX 637 (ALT 250 FOR IP): Performed by: ORTHOPAEDIC SURGERY

## 2018-11-01 PROCEDURE — 6360000002 HC RX W HCPCS: Performed by: INTERNAL MEDICINE

## 2018-11-01 PROCEDURE — 80048 BASIC METABOLIC PNL TOTAL CA: CPT

## 2018-11-01 PROCEDURE — 85610 PROTHROMBIN TIME: CPT

## 2018-11-01 PROCEDURE — 36415 COLL VENOUS BLD VENIPUNCTURE: CPT

## 2018-11-01 PROCEDURE — 1200000000 HC SEMI PRIVATE

## 2018-11-01 RX ORDER — WARFARIN SODIUM 3 MG/1
3 TABLET ORAL
Status: COMPLETED | OUTPATIENT
Start: 2018-11-01 | End: 2018-11-01

## 2018-11-01 RX ADMIN — PANTOPRAZOLE SODIUM 40 MG: 40 TABLET, DELAYED RELEASE ORAL at 06:29

## 2018-11-01 RX ADMIN — METOPROLOL TARTRATE 50 MG: 50 TABLET ORAL at 08:11

## 2018-11-01 RX ADMIN — Medication 150 MG: at 08:20

## 2018-11-01 RX ADMIN — METOPROLOL TARTRATE 50 MG: 50 TABLET ORAL at 20:52

## 2018-11-01 RX ADMIN — DOCUSATE SODIUM 100 MG: 100 CAPSULE, LIQUID FILLED ORAL at 20:52

## 2018-11-01 RX ADMIN — Medication 10 ML: at 20:53

## 2018-11-01 RX ADMIN — MIRTAZAPINE 15 MG: 15 TABLET, ORALLY DISINTEGRATING ORAL at 00:52

## 2018-11-01 RX ADMIN — BUTALBITAL, ACETAMINOPHEN, AND CAFFEINE 1 TABLET: 50; 325; 40 TABLET ORAL at 15:36

## 2018-11-01 RX ADMIN — ALPRAZOLAM 1 MG: 0.5 TABLET ORAL at 20:57

## 2018-11-01 RX ADMIN — WARFARIN SODIUM 3 MG: 3 TABLET ORAL at 18:29

## 2018-11-01 RX ADMIN — OXYCODONE AND ACETAMINOPHEN 1 TABLET: 5; 325 TABLET ORAL at 08:11

## 2018-11-01 RX ADMIN — OXYCODONE AND ACETAMINOPHEN 1 TABLET: 5; 325 TABLET ORAL at 00:52

## 2018-11-01 RX ADMIN — ONDANSETRON 4 MG: 2 INJECTION INTRAMUSCULAR; INTRAVENOUS at 12:00

## 2018-11-01 RX ADMIN — Medication 10 ML: at 08:20

## 2018-11-01 RX ADMIN — CYANOCOBALAMIN TAB 1000 MCG 2000 MCG: 1000 TAB at 08:11

## 2018-11-01 RX ADMIN — INSULIN LISPRO 4 UNITS: 100 INJECTION, SOLUTION INTRAVENOUS; SUBCUTANEOUS at 12:08

## 2018-11-01 RX ADMIN — DOCUSATE SODIUM 100 MG: 100 CAPSULE, LIQUID FILLED ORAL at 08:11

## 2018-11-01 RX ADMIN — MIRTAZAPINE 15 MG: 15 TABLET, ORALLY DISINTEGRATING ORAL at 20:52

## 2018-11-01 RX ADMIN — SENNOSIDES 8.6 MG: 8.6 TABLET, FILM COATED ORAL at 20:52

## 2018-11-01 RX ADMIN — OXYCODONE AND ACETAMINOPHEN 1 TABLET: 5; 325 TABLET ORAL at 12:00

## 2018-11-01 RX ADMIN — POTASSIUM CHLORIDE 20 MEQ: 20 TABLET, EXTENDED RELEASE ORAL at 20:52

## 2018-11-01 RX ADMIN — BUTALBITAL, ACETAMINOPHEN, AND CAFFEINE 1 TABLET: 50; 325; 40 TABLET ORAL at 09:59

## 2018-11-01 RX ADMIN — SERTRALINE 100 MG: 100 TABLET, FILM COATED ORAL at 08:20

## 2018-11-01 RX ADMIN — POTASSIUM CHLORIDE 20 MEQ: 20 TABLET, EXTENDED RELEASE ORAL at 08:11

## 2018-11-01 RX ADMIN — OXYCODONE AND ACETAMINOPHEN 1 TABLET: 5; 325 TABLET ORAL at 16:42

## 2018-11-01 RX ADMIN — OXYCODONE AND ACETAMINOPHEN 1 TABLET: 5; 325 TABLET ORAL at 20:52

## 2018-11-01 ASSESSMENT — PAIN SCALES - GENERAL
PAINLEVEL_OUTOF10: 0
PAINLEVEL_OUTOF10: 0
PAINLEVEL_OUTOF10: 8
PAINLEVEL_OUTOF10: 0
PAINLEVEL_OUTOF10: 7
PAINLEVEL_OUTOF10: 6
PAINLEVEL_OUTOF10: 5
PAINLEVEL_OUTOF10: 0
PAINLEVEL_OUTOF10: 6
PAINLEVEL_OUTOF10: 0
PAINLEVEL_OUTOF10: 7
PAINLEVEL_OUTOF10: 0
PAINLEVEL_OUTOF10: 6
PAINLEVEL_OUTOF10: 5
PAINLEVEL_OUTOF10: 0
PAINLEVEL_OUTOF10: 6

## 2018-11-01 ASSESSMENT — PAIN DESCRIPTION - LOCATION: LOCATION: LEG

## 2018-11-01 ASSESSMENT — PAIN DESCRIPTION - PAIN TYPE
TYPE: SUPERFICIAL SOMATIC PAIN
TYPE: SURGICAL PAIN

## 2018-11-01 ASSESSMENT — PAIN DESCRIPTION - ONSET: ONSET: ON-GOING

## 2018-11-01 ASSESSMENT — PAIN DESCRIPTION - FREQUENCY: FREQUENCY: INTERMITTENT

## 2018-11-01 ASSESSMENT — PAIN DESCRIPTION - DESCRIPTORS: DESCRIPTORS: ACHING;DISCOMFORT

## 2018-11-01 ASSESSMENT — PAIN DESCRIPTION - ORIENTATION: ORIENTATION: RIGHT

## 2018-11-01 NOTE — PROGRESS NOTES
[facility] Orthopedic Surgery   Progress Note      S/P :  SUBJECTIVE  Up in chair. Alert and oriented. . Pain is   described in right elbow more than right leg and with the intensity of moderate. Pain is described as aching. OBJECTIVE              Physical                      VITALS:  /78   Pulse 101   Temp 98 °F (36.7 °C) (Oral)   Resp 16   Ht 5' 3\" (1.6 m)   Wt 153 lb 14.1 oz (69.8 kg)   SpO2 97%   BMI 27.26 kg/m²                     MUSCULOSKELETAL:  Right hand and right foot NVI. Wiggles fingers and toes well. Right posterior ankle padded with Mepilex dressing where pt complaint of brace rubbing on her, no skin breakdown noted.                     NEUROLOGIC:                                  Sensory:  Touch:  Right Upper Extremity:  normal  Right Lower Extremity:  normal                                                 Surgical wound appears clean and dry right leg with Mepilex dressings, right knee immobilizer is on  Rigth arm in posterior splint with ACE    Data       CBC:   Lab Results   Component Value Date    WBC 7.0 11/01/2018    RBC 2.71 11/01/2018    HGB 8.9 11/01/2018    HCT 26.3 11/01/2018    MCV 97.0 11/01/2018    MCH 32.7 11/01/2018    MCHC 33.7 11/01/2018    RDW 17.5 11/01/2018     11/01/2018    MPV 7.9 11/01/2018        WBC:    Lab Results   Component Value Date    WBC 7.0 11/01/2018        Hemoglobin/Hematocrit:    Lab Results   Component Value Date    HGB 8.9 11/01/2018    HCT 26.3 11/01/2018        PT/INR:    Lab Results   Component Value Date    PROTIME 15.5 11/01/2018    INR 1.36 11/01/2018              Current Inpatient Medications             Current Facility-Administered Medications: warfarin (COUMADIN) tablet 3 mg, 3 mg, Oral, Once  oxyCODONE-acetaminophen (PERCOCET) 5-325 MG per tablet 1 tablet, 1 tablet, Oral, Q4H PRN  butalbital-acetaminophen-caffeine (FIORICET, ESGIC) per tablet 1 tablet, 1 tablet, Oral, Q4H PRN  baclofen (LIORESAL) tablet 5 mg, 5 mg, Oral, TID observe. On Iron supp  Will FU Dr Radha Oakes in office next week to eval wounds, xrays for further plan of care.    Pain med as ordered  Stable for DC per betty [facility]  11/1/2018  10:36 AM

## 2018-11-01 NOTE — PROGRESS NOTES
Type of ROM/Therapeutic Exercise  Type of ROM/Therapeutic Exercise: AROM  Comment: RUE digits to decreased swelling x 10 reps; pt demonstrated improved RUE shoulder movement during session. Assessment   Performance deficits / Impairments: Decreased functional mobility ; Decreased ADL status; Decreased ROM; Decreased strength;Decreased safe awareness;Decreased cognition;Decreased endurance;Decreased balance  Assessment: Patient tolerated therapy session well this date and con't to present motivated during session to maximize pt potential. Pt participated in bed mobility with mod-max. Pt would benefit from con't therapy prior to returning home and has the potential to increase independence with the proper therapy equipment and w/c use. Therapy will con't to follow pt while in the acute care setting. Con't OT poc. Prognosis: Good  Patient Education: importance of OOB activity, safety, bed mobility  REQUIRES OT FOLLOW UP: Yes  Activity Tolerance  Activity Tolerance: Patient Tolerated treatment well  Safety Devices  Safety Devices in place: Yes  Type of devices: Nurse notified;Call light within reach; Left in chair          Plan   Plan  Times per week: 3-5  Times per day: Daily  Current Treatment Recommendations: ROM, Balance Training, Strengthening, Endurance Training, Functional Mobility Training, Safety Education & Training, Patient/Caregiver Education & Training, Self-Care / ADL    AM-PAC Score        AM-[facility] Inpatient Daily Activity Raw Score: 11  AM-PAC Inpatient ADL T-Scale Score : 29.04  ADL Inpatient CMS 0-100% Score: 70.42  ADL Inpatient CMS G-Code Modifier : CL    Goals  Short term goals  Time Frame for Short term goals: status: All goals ongoing   Short term goal 1: Fxl transfers on STEDY with min A x 2 while maintaining NWB status. Short term goal 2: UE dressing/bathing with min A. Short term goal 3: 3 grooming tasks with setup and CGA. Short term goal 4: Bed mobility with max A x 2.    Short term goal 5: Tolerate standing on STEDY for 1 min with LLE to increase activity tolerance for transfers. Patient Goals   Patient goals : \"I want to be able to be independent again\"        Therapy Time   Individual Concurrent Group Co-treatment   Time In 1450         Time Out 1533         Minutes 43         Timed Code Treatment Minutes: 37 Minutes     If pt is discharged prior to next OT session, this note will serve as the discharge summary.   Leslie Castro OTR/L #[id number]

## 2018-11-01 NOTE — PROGRESS NOTES
Physical Therapy  Facility/Department: [facility] 5W PROGRESSIVE CARE  Daily Treatment Note  NAME: Timmy Soto  : 1945  MRN: [id number]    Date of Service: 2018    Discharge Recommendations:  Patient would benefit from continued therapy after discharge, 3-5 sessions per week Pt is a good candidate for continued therapy in SNF setting 3-5x/wk as she will likely be able to make improvements and become more Ind from w/c level when she is in a rehab setting where she has adequate equipment (// bars, proper w/c and low mat tables) to practice transfers and partial stands, etc    PT Equipment Recommendations  Other: defer to next level of care  Timmy Soto scored a  on the AM-PAC short mobility form. Current research shows that an AM-PAC score of 17 or less is typically not associated with a discharge to the patient's home setting. Based on the patients AM-PAC score and their current functional mobility deficits, it is recommended that the patient have 3-5 sessions per week of Physical Therapy at d/c to increase the patients independence. Patient Diagnosis(es): The primary encounter diagnosis was Fall, initial encounter. Diagnoses of NSTEMI (non-ST elevated myocardial infarction) ([facility] [address] Closed displaced oblique fracture of shaft of right femur, initial encounter ([facility] [address] Other closed displaced fracture of distal end of right humerus, initial encounter, Hypokalemia, Magnesium deficiency, Traumatic rhabdomyolysis, initial encounter [facility]), Atrial fibrillation with RVR ([facility] [address] and Anxiety were also pertinent to this visit. has a past medical history of Atrial fibrillation ([address] has a past surgical history that includes  section; orthopedic surgery; Total knee arthroplasty (Left); and open tx femoral supracondylar fracture w/o extension (Right, 10/25/2018).     Restrictions  Restrictions/Precautions  Restrictions/Precautions: Weight Bearing, Fall Risk  Lower Extremity Weight Bearing to decreased swelling         Comment: positioned in chair for comfort and pressure relief using waffle cushion and pillows; assisted with ordering dinner tray              Assessment   Body structures, Functions, Activity limitations: Decreased functional mobility ; Decreased ROM; Decreased strength;Decreased balance;Decreased endurance;Decreased ADL status; Decreased high-level IADLs  Assessment: Pt is a 75 y.o. F. admitted 10/24 s/p several falls, and subsequent R humerus fx, R femur fx. She underwent ORIF for hip 10/25. She is NWB RUE and RLE x 6 weeks per chart. She required Max A x 2 for bed mobility, and was Aax A x2 for SaraStedy transfer bed to recliner (dependent). In addition, pt was total assist x 2 to maintain RUE/RLE NWB. She is unsafe to return home, and would benefit from continued therapy to gradually improve strength and independence with mobility tasks.   Pt is a good candidate for continued therapy in SNF setting 3-5x/wk as she will likely be able to make improvements and become more Ind from w/c level when she is in a rehab setting where she has adequate equipment (// bars, proper w/c and low mat tables) to practice transfers and partial stands, etc  Treatment Diagnosis: Decreased functional mobility  Prognosis: Fair;Good  Patient Education: reviewed call light, role & goals of PT   REQUIRES PT FOLLOW UP: Yes  Activity Tolerance  Activity Tolerance: Patient Tolerated treatment well  Activity Tolerance: somewhat limited by NWB status RUE/LE however pt will likely be able to make improvements and become more Ind from w/c level when she is in a rehab setting where she has adequate equipment (// bars, proper w/c and low mat tables) to practice transfers and partial stands, etc       AM-PAC Score  AM-PAC Inpatient Mobility Raw Score : 8  AM-PAC Inpatient T-Scale Score : 28.52  Mobility Inpatient CMS 0-100% Score: 86.62  Mobility Inpatient CMS G-Code Modifier : CM          Goals  Short term

## 2018-11-02 VITALS
BODY MASS INDEX: 27.23 KG/M2 | HEIGHT: 63 IN | SYSTOLIC BLOOD PRESSURE: 119 MMHG | RESPIRATION RATE: 18 BRPM | DIASTOLIC BLOOD PRESSURE: 67 MMHG | OXYGEN SATURATION: 97 % | TEMPERATURE: 98.2 F | WEIGHT: 153.66 LBS | HEART RATE: 80 BPM

## 2018-11-02 LAB
ANION GAP SERPL CALCULATED.3IONS-SCNC: 8 MMOL/L (ref 3–16)
BASOPHILS ABSOLUTE: 0 K/UL (ref 0–0.2)
BASOPHILS RELATIVE PERCENT: 0.3 %
BUN BLDV-MCNC: 15 MG/DL (ref 7–20)
CALCIUM SERPL-MCNC: 8.5 MG/DL (ref 8.3–10.6)
CHLORIDE BLD-SCNC: 104 MMOL/L (ref 99–110)
CO2: 27 MMOL/L (ref 21–32)
CREAT SERPL-MCNC: <0.5 MG/DL (ref 0.6–1.2)
EOSINOPHILS ABSOLUTE: 0.1 K/UL (ref 0–0.6)
EOSINOPHILS RELATIVE PERCENT: 1.1 %
GFR AFRICAN AMERICAN: >60
GFR NON-AFRICAN AMERICAN: >60
GLUCOSE BLD-MCNC: 102 MG/DL (ref 70–99)
HCT VFR BLD CALC: 28.1 % (ref 36–48)
HEMOGLOBIN: 9.1 G/DL (ref 12–16)
INR BLD: 1.39 (ref 0.86–1.14)
LYMPHOCYTES ABSOLUTE: 1 K/UL (ref 1–5.1)
LYMPHOCYTES RELATIVE PERCENT: 12.9 %
MCH RBC QN AUTO: 32.3 PG (ref 26–34)
MCHC RBC AUTO-ENTMCNC: 32.5 G/DL (ref 31–36)
MCV RBC AUTO: 99.2 FL (ref 80–100)
MONOCYTES ABSOLUTE: 0.5 K/UL (ref 0–1.3)
MONOCYTES RELATIVE PERCENT: 6.4 %
NEUTROPHILS ABSOLUTE: 5.8 K/UL (ref 1.7–7.7)
NEUTROPHILS RELATIVE PERCENT: 79.3 %
PDW BLD-RTO: 18 % (ref 12.4–15.4)
PLATELET # BLD: 199 K/UL (ref 135–450)
PMV BLD AUTO: 7.7 FL (ref 5–10.5)
POTASSIUM SERPL-SCNC: 4.8 MMOL/L (ref 3.5–5.1)
PROTHROMBIN TIME: 15.8 SEC (ref 9.8–13)
RBC # BLD: 2.83 M/UL (ref 4–5.2)
SODIUM BLD-SCNC: 139 MMOL/L (ref 136–145)
WBC # BLD: 7.3 K/UL (ref 4–11)

## 2018-11-02 PROCEDURE — 36415 COLL VENOUS BLD VENIPUNCTURE: CPT

## 2018-11-02 PROCEDURE — 94760 N-INVAS EAR/PLS OXIMETRY 1: CPT

## 2018-11-02 PROCEDURE — 80048 BASIC METABOLIC PNL TOTAL CA: CPT

## 2018-11-02 PROCEDURE — 6370000000 HC RX 637 (ALT 250 FOR IP): Performed by: INTERNAL MEDICINE

## 2018-11-02 PROCEDURE — 85025 COMPLETE CBC W/AUTO DIFF WBC: CPT

## 2018-11-02 PROCEDURE — 6370000000 HC RX 637 (ALT 250 FOR IP): Performed by: ORTHOPAEDIC SURGERY

## 2018-11-02 PROCEDURE — 6360000002 HC RX W HCPCS: Performed by: INTERNAL MEDICINE

## 2018-11-02 PROCEDURE — 2580000003 HC RX 258: Performed by: INTERNAL MEDICINE

## 2018-11-02 PROCEDURE — 85610 PROTHROMBIN TIME: CPT

## 2018-11-02 RX ORDER — OXYCODONE HYDROCHLORIDE AND ACETAMINOPHEN 5; 325 MG/1; MG/1
1 TABLET ORAL EVERY 4 HOURS PRN
Qty: 12 TABLET | Refills: 0 | Status: SHIPPED | OUTPATIENT
Start: 2018-11-02 | End: 2018-11-05

## 2018-11-02 RX ORDER — OXYCODONE HYDROCHLORIDE AND ACETAMINOPHEN 5; 325 MG/1; MG/1
2 TABLET ORAL ONCE
Status: COMPLETED | OUTPATIENT
Start: 2018-11-02 | End: 2018-11-02

## 2018-11-02 RX ORDER — BUTALBITAL, ACETAMINOPHEN AND CAFFEINE 50; 325; 40 MG/1; MG/1; MG/1
1 TABLET ORAL EVERY 6 HOURS PRN
Qty: 10 TABLET | Refills: 3 | Status: SHIPPED | OUTPATIENT
Start: 2018-11-02 | End: 2019-07-09 | Stop reason: SDUPTHER

## 2018-11-02 RX ORDER — ALPRAZOLAM 2 MG/1
2 TABLET ORAL 2 TIMES DAILY PRN
Qty: 14 TABLET | Refills: 0 | Status: SHIPPED | OUTPATIENT
Start: 2018-11-02 | End: 2018-11-09

## 2018-11-02 RX ORDER — WARFARIN SODIUM 2 MG/1
4 TABLET ORAL
Status: COMPLETED | OUTPATIENT
Start: 2018-11-02 | End: 2018-11-02

## 2018-11-02 RX ORDER — WARFARIN SODIUM 2 MG/1
TABLET ORAL
Qty: 30 TABLET | Refills: 3 | Status: SHIPPED | OUTPATIENT
Start: 2018-11-02 | End: 2019-06-10 | Stop reason: ALTCHOICE

## 2018-11-02 RX ADMIN — Medication 10 ML: at 09:39

## 2018-11-02 RX ADMIN — BUTALBITAL, ACETAMINOPHEN, AND CAFFEINE 1 TABLET: 50; 325; 40 TABLET ORAL at 09:38

## 2018-11-02 RX ADMIN — DOCUSATE SODIUM 100 MG: 100 CAPSULE, LIQUID FILLED ORAL at 09:38

## 2018-11-02 RX ADMIN — WARFARIN SODIUM 4 MG: 2 TABLET ORAL at 17:33

## 2018-11-02 RX ADMIN — ALPRAZOLAM 1 MG: 0.5 TABLET ORAL at 12:26

## 2018-11-02 RX ADMIN — SERTRALINE 100 MG: 100 TABLET, FILM COATED ORAL at 09:38

## 2018-11-02 RX ADMIN — OXYCODONE AND ACETAMINOPHEN 2 TABLET: 5; 325 TABLET ORAL at 17:33

## 2018-11-02 RX ADMIN — OXYCODONE AND ACETAMINOPHEN 1 TABLET: 5; 325 TABLET ORAL at 05:35

## 2018-11-02 RX ADMIN — CYANOCOBALAMIN TAB 1000 MCG 2000 MCG: 1000 TAB at 09:38

## 2018-11-02 RX ADMIN — PANTOPRAZOLE SODIUM 40 MG: 40 TABLET, DELAYED RELEASE ORAL at 05:35

## 2018-11-02 RX ADMIN — OXYCODONE AND ACETAMINOPHEN 1 TABLET: 5; 325 TABLET ORAL at 13:19

## 2018-11-02 RX ADMIN — ONDANSETRON 4 MG: 2 INJECTION INTRAMUSCULAR; INTRAVENOUS at 10:27

## 2018-11-02 RX ADMIN — OXYCODONE AND ACETAMINOPHEN 1 TABLET: 5; 325 TABLET ORAL at 01:08

## 2018-11-02 RX ADMIN — BUTALBITAL, ACETAMINOPHEN, AND CAFFEINE 1 TABLET: 50; 325; 40 TABLET ORAL at 15:18

## 2018-11-02 RX ADMIN — METOPROLOL TARTRATE 50 MG: 50 TABLET ORAL at 09:38

## 2018-11-02 RX ADMIN — OXYCODONE AND ACETAMINOPHEN 1 TABLET: 5; 325 TABLET ORAL at 09:38

## 2018-11-02 RX ADMIN — POTASSIUM CHLORIDE 20 MEQ: 20 TABLET, EXTENDED RELEASE ORAL at 09:38

## 2018-11-02 ASSESSMENT — PAIN SCALES - GENERAL
PAINLEVEL_OUTOF10: 0
PAINLEVEL_OUTOF10: 6
PAINLEVEL_OUTOF10: 6
PAINLEVEL_OUTOF10: 8
PAINLEVEL_OUTOF10: 0
PAINLEVEL_OUTOF10: 7
PAINLEVEL_OUTOF10: 0
PAINLEVEL_OUTOF10: 2
PAINLEVEL_OUTOF10: 6
PAINLEVEL_OUTOF10: 6
PAINLEVEL_OUTOF10: 0
PAINLEVEL_OUTOF10: 7
PAINLEVEL_OUTOF10: 5
PAINLEVEL_OUTOF10: 0
PAINLEVEL_OUTOF10: 7
PAINLEVEL_OUTOF10: 0
PAINLEVEL_OUTOF10: 0

## 2018-11-02 ASSESSMENT — PAIN DESCRIPTION - FREQUENCY
FREQUENCY: CONTINUOUS
FREQUENCY: INTERMITTENT

## 2018-11-02 ASSESSMENT — PAIN DESCRIPTION - ORIENTATION
ORIENTATION_2: RIGHT
ORIENTATION: RIGHT
ORIENTATION: RIGHT

## 2018-11-02 ASSESSMENT — PAIN DESCRIPTION - PAIN TYPE
TYPE: SURGICAL PAIN
TYPE: ACUTE PAIN

## 2018-11-02 ASSESSMENT — PAIN DESCRIPTION - LOCATION
LOCATION: LEG
LOCATION: LEG
LOCATION_2: ELBOW

## 2018-11-02 ASSESSMENT — PAIN DESCRIPTION - ONSET
ONSET_2: GRADUAL
ONSET: GRADUAL
ONSET: GRADUAL

## 2018-11-02 ASSESSMENT — PAIN DESCRIPTION - DESCRIPTORS
DESCRIPTORS: ACHING
DESCRIPTORS_2: ACHING
DESCRIPTORS: ACHING

## 2018-11-02 ASSESSMENT — PAIN DESCRIPTION - DURATION: DURATION_2: INTERMITTENT

## 2018-11-02 ASSESSMENT — PAIN SCALES - WONG BAKER
WONGBAKER_NUMERICALRESPONSE: 8
WONGBAKER_NUMERICALRESPONSE: 8

## 2018-11-02 ASSESSMENT — PAIN DESCRIPTION - PROGRESSION
CLINICAL_PROGRESSION: NOT CHANGED
CLINICAL_PROGRESSION_2: GRADUALLY WORSENING
CLINICAL_PROGRESSION: GRADUALLY WORSENING

## 2018-11-02 ASSESSMENT — PAIN DESCRIPTION - INTENSITY: RATING_2: 8

## 2018-11-02 NOTE — PROGRESS NOTES
Called to give report [phone]. tx me to receiving nurse no answer. Called back 1642 no answer and disconnected. Will attempt 3rd time if time allotted.

## 2018-11-02 NOTE — DISCHARGE SUMMARY
Hospital Medicine Discharge Summary    Patient ID: Dee Bosch      Patient's PCP: Marcello Pompa MD    Admit Date: 10/24/2018     Discharge Date:   11/2/2018      Admitting Physician: Madie Richardson MD     Discharge Physician: Madelyn Buchanan MD     Discharge Diagnoses:  Paroxysmal A. Fib  Right elbow right femoral fracture status post repair  Acute metabolic encephalopathy  Non-thrombotic troponin elevation  Acute blood loss anemia  Severe protein calorie malnutrition  Medical deconditioning       The patient was seen and examined on day of discharge and this discharge summary is in conjunction with any daily progress note from day of discharge. Hospital Course:    69-year-old female admitted to the hospital with fall  Found to have elbow and right femoral fracture. Went to the operating room with successful ORIF of femur. Patient had mild troponin elevation which was felt to be non-thrombotic. Patient had elevated CPK and was treated with IV fluids. Patient did require blood transfusion for low hemoglobin. Her medications were adjusted and at the time of discharge she was felt to be stable to go to a skilled nursing facility. Her daughter appealed discharge but appeal was denied and she was discharged to [facility] for acute rehab on 11/2/2018            Labs: For convenience and continuity at follow-up the following most recent labs are provided:      CBC:    Lab Results   Component Value Date    WBC 7.3 11/02/2018    HGB 9.1 11/02/2018    HCT 28.1 11/02/2018     11/02/2018       Renal:    Lab Results   Component Value Date     11/02/2018    K 4.8 11/02/2018    K 3.9 01/14/2018     11/02/2018    CO2 27 11/02/2018    BUN 15 11/02/2018    CREATININE <0.5 11/02/2018    CALCIUM 8.5 11/02/2018         Significant Diagnostic Studies    Radiology:   CT FEMUR RIGHT W CONTRAST   Final Result   1.  Status post ORIF of the right femur with no evidence for organized or   well-defined intramuscular hematoma. Mild heterogeneity of the adjacent   musculature of the anterior compartment of the right leg likely reflects   underlying muscular injury associated with right femur fracture and   postsurgical changes. 2. Diffuse subcutaneous edema without organized drainable fluid collection   identified. 3. Moderate to severe osteoarthritis of the right hip. 4. Moderate to severe tricompartmental osteoarthritis of the right knee with   small moderate right knee effusion present. FLUORO FOR SURGICAL PROCEDURES   Final Result   Intraprocedural fluoroscopic spot images as above. See separate procedure   report for more information. XR FEMUR RIGHT (MIN 2 VIEWS)   Final Result   Intraprocedural fluoroscopic spot images as above. See separate procedure   report for more information. CT LUMBAR SPINE WO CONTRAST   Final Result   1. Remote compression deformity of T12.   2. No clear evidence for superimposed acute fracture in the lumbar spine. Accentuation of the lumbar lordosis. Mild-to-moderate multilevel facet   arthrosis. 3. Diffuse osteopenia. 4. Nonobstructing lower pole left renal calculi. 5. Partially visualized right adnexal cystic lesion measuring 2.8 x 2.5 cm. Further evaluation with pelvic ultrasound is recommended given the patient's   age and the presence of this finding. 6. Adreniform enlargement of the adrenal glands which can be seen with   adrenal hyperplasia. CT Head WO Contrast   Final Result   Artifact-limited exam.  No acute intracranial hemorrhage or mass effect. CT THORACIC SPINE WO CONTRAST   Final Result   No acute abnormality of the thoracic spine. CT Cervical Spine WO Contrast   Final Result   No acute fracture. Unchanged minimal anterolisthesis. XR HUMERUS RIGHT (MIN 2 VIEWS)   Final Result   Transverse distal humeral supracondylar fracture with moderate soft tissue   edema.          XR

## 2018-11-09 ENCOUNTER — OFFICE VISIT (OUTPATIENT)
Dept: ORTHOPEDIC SURGERY | Age: 73
End: 2018-11-09

## 2018-11-09 VITALS
WEIGHT: 153 LBS | BODY MASS INDEX: 27.11 KG/M2 | DIASTOLIC BLOOD PRESSURE: 70 MMHG | HEIGHT: 63 IN | SYSTOLIC BLOOD PRESSURE: 138 MMHG | HEART RATE: 86 BPM

## 2018-11-09 DIAGNOSIS — S42.411D CLOSED SUPRACONDYLAR FRACTURE OF RIGHT HUMERUS WITH ROUTINE HEALING, SUBSEQUENT ENCOUNTER: ICD-10-CM

## 2018-11-09 DIAGNOSIS — S72.451D: Primary | ICD-10-CM

## 2018-11-09 PROCEDURE — 99024 POSTOP FOLLOW-UP VISIT: CPT | Performed by: PHYSICIAN ASSISTANT
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
## 2018-11-28 ENCOUNTER — OFFICE VISIT (OUTPATIENT)
Dept: ORTHOPEDIC SURGERY | Age: 73
End: 2018-11-28
Payer: MEDICARE

## 2018-11-28 VITALS
HEIGHT: 63 IN | WEIGHT: 153 LBS | SYSTOLIC BLOOD PRESSURE: 121 MMHG | BODY MASS INDEX: 27.11 KG/M2 | HEART RATE: 86 BPM | DIASTOLIC BLOOD PRESSURE: 69 MMHG

## 2018-11-28 DIAGNOSIS — S72.451D: Primary | ICD-10-CM

## 2018-11-28 DIAGNOSIS — S42.411D CLOSED SUPRACONDYLAR FRACTURE OF RIGHT HUMERUS WITH ROUTINE HEALING, SUBSEQUENT ENCOUNTER: ICD-10-CM

## 2018-11-28 PROCEDURE — L3660 SO 8 AB RSTR CAN/WEB PRE OTS: HCPCS | Performed by: PHYSICIAN ASSISTANT

## 2018-11-28 PROCEDURE — 99213 OFFICE O/P EST LOW 20 MIN: CPT | Performed by: PHYSICIAN ASSISTANT

## 2018-11-28 RX ORDER — OMEPRAZOLE 20 MG/1
40 CAPSULE, DELAYED RELEASE ORAL DAILY
COMMUNITY
End: 2019-09-26 | Stop reason: ALTCHOICE

## 2018-11-28 RX ORDER — ALPRAZOLAM 1 MG/1
1 TABLET ORAL NIGHTLY PRN
COMMUNITY
End: 2019-06-10

## 2018-11-28 RX ORDER — ONDANSETRON 4 MG/1
4 TABLET, FILM COATED ORAL EVERY 8 HOURS PRN
COMMUNITY
End: 2019-07-02 | Stop reason: SDUPTHER

## 2018-11-28 RX ORDER — OXYCODONE HYDROCHLORIDE AND ACETAMINOPHEN 5; 325 MG/1; MG/1
1 TABLET ORAL EVERY 4 HOURS PRN
COMMUNITY
End: 2019-06-10

## 2018-11-29 ENCOUNTER — TELEPHONE (OUTPATIENT)
Dept: ORTHOPEDIC SURGERY | Age: 73
End: 2018-11-29

## 2018-11-30 NOTE — TELEPHONE ENCOUNTER
LVM for [facility] informing them that PT should be done within the parameters outlined in her office visit from 11/28/18 and should not be advanced any further at this point.

## 2018-12-05 ENCOUNTER — TELEPHONE (OUTPATIENT)
Dept: ORTHOPEDIC SURGERY | Age: 73
End: 2018-12-05

## 2018-12-05 NOTE — TELEPHONE ENCOUNTER
M for Maddie asking her to call office. Also tried patient's mobile number. It went straight to  and home number listed for patient is no longer in service.

## 2018-12-11 ENCOUNTER — HOSPITAL ENCOUNTER (OUTPATIENT)
Dept: WOUND CARE | Age: 73
Discharge: HOME | End: 2018-12-11

## 2018-12-19 ENCOUNTER — TELEPHONE (OUTPATIENT)
Dept: ORTHOPEDIC SURGERY | Age: 73
End: 2018-12-19

## 2018-12-19 ENCOUNTER — OFFICE VISIT (OUTPATIENT)
Dept: ORTHOPEDIC SURGERY | Age: 73
End: 2018-12-19

## 2018-12-19 VITALS — WEIGHT: 155 LBS | HEIGHT: 63 IN | BODY MASS INDEX: 27.46 KG/M2

## 2018-12-19 DIAGNOSIS — S72.451D: Primary | ICD-10-CM

## 2018-12-19 DIAGNOSIS — M79.661 PAIN AND SWELLING OF RIGHT LOWER LEG: ICD-10-CM

## 2018-12-19 DIAGNOSIS — S42.411D CLOSED SUPRACONDYLAR FRACTURE OF RIGHT HUMERUS WITH ROUTINE HEALING, SUBSEQUENT ENCOUNTER: ICD-10-CM

## 2018-12-19 DIAGNOSIS — M79.89 PAIN AND SWELLING OF RIGHT LOWER LEG: ICD-10-CM

## 2018-12-19 PROCEDURE — 99024 POSTOP FOLLOW-UP VISIT: CPT | Performed by: PHYSICIAN ASSISTANT

## 2018-12-19 RX ORDER — HYDROCODONE BITARTRATE AND ACETAMINOPHEN 5; 325 MG/1; MG/1
1 TABLET ORAL EVERY 6 HOURS PRN
Qty: 28 TABLET | Refills: 0 | Status: SHIPPED | OUTPATIENT
Start: 2018-12-19 | End: 2018-12-26

## 2018-12-19 NOTE — PROGRESS NOTES
of her right elbow and wrist without difficulty. She has 4+/5 motor strength with hand grasp. She denies sensory deficits. On examination of patient's right leg there is still mild surgical site swelling. She denies tenderness on palpation. Her range of motion is still very limited with patient struggling to get hip flexion to 100°. Her right knee range of motion is 10-85°. The patient has significant weakness with movements of the right hip and knee. She has a 2+ right lower extremity pitting edema. There is no erythema, induration or warmth. She has 2+ distal pulses. She denies sensory deficits. Radiology:  X-rays obtained and reviewed in office:     Views: 2 view right humerus including AP and lateral  Impression: Patient has a healing transverse distal humeral supracondylar fracture.  There is no interval displacement of the fracture.      Views: 2 view right femur including AP and lateral  Impression: Patient has evidence of a reduced supracondylar fracture. Donzell Tiara is an IM nail and cerclage wires in place without evidence of loosening or hardware failure. Fracture shows evidence of good bone healing and callus formation. Orders:  Orders Placed This Encounter   Procedures    XR FEMUR RIGHT (MIN 2 VIEWS)    XR HUMERUS RIGHT (MIN 2 VIEWS)    US DUP LOWER EXTREMITY RIGHT BUBBA    Ana Michel MD, Hand Surgery (Hand, Wrist, Elbow), [facility]       Impression:   Diagnosis Orders   1. Closed traumatic displaced supracondylar fracture of RIGHT femur repaired with gamma nail and cerclage wires 10/25/2018  XR FEMUR RIGHT (MIN 2 VIEWS)    HYDROcodone-acetaminophen (NORCO) 5-325 MG per tablet   2. Closed supracondylar fracture of right humerus with routine healing, subsequent encounter  XR HUMERUS RIGHT (MIN 2 VIEWS)    HYDROcodone-acetaminophen (NORCO) 5-325 MG per tablet    Phyllis Jones MD, Hand Surgery (Hand, Wrist, Elbow), [facility]   3.  Pain and swelling of

## 2018-12-20 ENCOUNTER — TELEPHONE (OUTPATIENT)
Dept: ORTHOPEDIC SURGERY | Age: 73
End: 2018-12-20

## 2018-12-20 RX ORDER — MELOXICAM 15 MG/1
15 TABLET ORAL DAILY PRN
Qty: 30 TABLET | Refills: 0 | Status: SHIPPED | OUTPATIENT
Start: 2018-12-20 | End: 2019-06-10

## 2018-12-20 NOTE — TELEPHONE ENCOUNTER
Pt doesn't have any of the antiinflammatory left she said to call and let dr Celestino Estrada know that she needs a rx for that called in. Please call patient after we send it in.

## 2018-12-31 ENCOUNTER — TELEPHONE (OUTPATIENT)
Dept: ORTHOPEDIC SURGERY | Age: 73
End: 2018-12-31

## 2018-12-31 DIAGNOSIS — M79.89 PAIN AND SWELLING OF LOWER EXTREMITY, UNSPECIFIED LATERALITY: Primary | ICD-10-CM

## 2018-12-31 DIAGNOSIS — M79.606 PAIN AND SWELLING OF LOWER EXTREMITY, UNSPECIFIED LATERALITY: Primary | ICD-10-CM

## 2018-12-31 NOTE — TELEPHONE ENCOUNTER
Order placed for Bilat. I spoke to pt and let her know. Patient gave verbal understanding to stop the Mobic.

## 2019-01-02 ENCOUNTER — HOSPITAL ENCOUNTER (OUTPATIENT)
Age: 74
Discharge: HOME | End: 2019-01-02
Payer: MEDICARE

## 2019-01-02 ENCOUNTER — HOSPITAL ENCOUNTER (OUTPATIENT)
Dept: VASCULAR LAB | Age: 74
Discharge: HOME | End: 2019-01-02
Payer: MEDICARE

## 2019-01-02 DIAGNOSIS — M79.89 PAIN AND SWELLING OF RIGHT LOWER LEG: ICD-10-CM

## 2019-01-02 DIAGNOSIS — R60.9 SWELLING: Primary | ICD-10-CM

## 2019-01-02 DIAGNOSIS — M79.661 PAIN AND SWELLING OF RIGHT LOWER LEG: ICD-10-CM

## 2019-01-02 LAB
BASOPHILS ABSOLUTE: 0 K/UL (ref 0–0.2)
BASOPHILS RELATIVE PERCENT: 0.5 %
EOSINOPHILS ABSOLUTE: 0.1 K/UL (ref 0–0.6)
EOSINOPHILS RELATIVE PERCENT: 2 %
FERRITIN: 62.3 NG/ML (ref 15–150)
HCT VFR BLD CALC: 31 % (ref 36–48)
HEMOGLOBIN: 10.1 G/DL (ref 12–16)
IRON SATURATION: 32 % (ref 15–50)
IRON: 88 UG/DL (ref 37–145)
LYMPHOCYTES ABSOLUTE: 1 K/UL (ref 1–5.1)
LYMPHOCYTES RELATIVE PERCENT: 25.6 %
MCH RBC QN AUTO: 29.6 PG (ref 26–34)
MCHC RBC AUTO-ENTMCNC: 32.5 G/DL (ref 31–36)
MCV RBC AUTO: 91 FL (ref 80–100)
MONOCYTES ABSOLUTE: 0.3 K/UL (ref 0–1.3)
MONOCYTES RELATIVE PERCENT: 9.1 %
NEUTROPHILS ABSOLUTE: 2.3 K/UL (ref 1.7–7.7)
NEUTROPHILS RELATIVE PERCENT: 62.8 %
PDW BLD-RTO: 15.4 % (ref 12.4–15.4)
PLATELET # BLD: 198 K/UL (ref 135–450)
PMV BLD AUTO: 8.9 FL (ref 5–10.5)
RBC # BLD: 3.41 M/UL (ref 4–5.2)
TOTAL IRON BINDING CAPACITY: 272 UG/DL (ref 260–445)
WBC # BLD: 3.7 K/UL (ref 4–11)

## 2019-01-02 PROCEDURE — 93970 EXTREMITY STUDY: CPT

## 2019-01-02 PROCEDURE — 85025 COMPLETE CBC W/AUTO DIFF WBC: CPT

## 2019-01-02 PROCEDURE — 82728 ASSAY OF FERRITIN: CPT

## 2019-01-02 PROCEDURE — 83540 ASSAY OF IRON: CPT

## 2019-01-02 PROCEDURE — 36415 COLL VENOUS BLD VENIPUNCTURE: CPT

## 2019-01-02 PROCEDURE — 83550 IRON BINDING TEST: CPT

## 2019-01-03 ENCOUNTER — TELEPHONE (OUTPATIENT)
Dept: ORTHOPEDIC SURGERY | Age: 74
End: 2019-01-03

## 2019-01-11 ENCOUNTER — TELEPHONE (OUTPATIENT)
Dept: ORTHOPEDIC SURGERY | Age: 74
End: 2019-01-11

## 2019-01-14 ENCOUNTER — TELEPHONE (OUTPATIENT)
Dept: ORTHOPEDIC SURGERY | Age: 74
End: 2019-01-14

## 2019-01-14 DIAGNOSIS — S72.451D: ICD-10-CM

## 2019-01-14 DIAGNOSIS — S42.411D CLOSED SUPRACONDYLAR FRACTURE OF RIGHT HUMERUS WITH ROUTINE HEALING, SUBSEQUENT ENCOUNTER: ICD-10-CM

## 2019-01-14 RX ORDER — HYDROCODONE BITARTRATE AND ACETAMINOPHEN 5; 325 MG/1; MG/1
1 TABLET ORAL EVERY 6 HOURS PRN
Qty: 28 TABLET | Refills: 0 | Status: CANCELLED | OUTPATIENT
Start: 2019-01-14 | End: 2019-01-21

## 2019-01-15 ENCOUNTER — TELEPHONE (OUTPATIENT)
Dept: CARDIOLOGY CLINIC | Age: 74
End: 2019-01-15

## 2019-01-15 DIAGNOSIS — M79.89 BILATERAL SWELLING OF FEET: ICD-10-CM

## 2019-01-15 DIAGNOSIS — R63.5 WEIGHT GAIN: Primary | ICD-10-CM

## 2019-01-17 RX ORDER — FUROSEMIDE 20 MG/1
TABLET ORAL
COMMUNITY
Start: 2019-01-04 | End: 2019-01-21 | Stop reason: SDUPTHER

## 2019-01-21 ENCOUNTER — OFFICE VISIT (OUTPATIENT)
Dept: ORTHOPEDIC SURGERY | Age: 74
End: 2019-01-21
Payer: MEDICARE

## 2019-01-21 ENCOUNTER — TELEPHONE (OUTPATIENT)
Dept: CARDIOLOGY CLINIC | Age: 74
End: 2019-01-21

## 2019-01-21 VITALS
HEART RATE: 80 BPM | BODY MASS INDEX: 27.46 KG/M2 | HEIGHT: 63 IN | WEIGHT: 155 LBS | SYSTOLIC BLOOD PRESSURE: 138 MMHG | DIASTOLIC BLOOD PRESSURE: 86 MMHG

## 2019-01-21 DIAGNOSIS — S72.451D: Primary | ICD-10-CM

## 2019-01-21 DIAGNOSIS — S42.411D CLOSED SUPRACONDYLAR FRACTURE OF RIGHT HUMERUS WITH ROUTINE HEALING, SUBSEQUENT ENCOUNTER: ICD-10-CM

## 2019-01-21 PROCEDURE — 99213 OFFICE O/P EST LOW 20 MIN: CPT | Performed by: PHYSICIAN ASSISTANT

## 2019-01-22 RX ORDER — FUROSEMIDE 20 MG/1
20 TABLET ORAL 2 TIMES DAILY
Qty: 60 TABLET | Refills: 3 | Status: SHIPPED | OUTPATIENT
Start: 2019-01-22 | End: 2019-08-06 | Stop reason: SDUPTHER

## 2019-04-15 ENCOUNTER — OFFICE VISIT (OUTPATIENT)
Dept: ORTHOPEDIC SURGERY | Age: 74
End: 2019-04-15
Payer: MEDICARE

## 2019-04-15 VITALS
BODY MASS INDEX: 27.46 KG/M2 | HEART RATE: 74 BPM | DIASTOLIC BLOOD PRESSURE: 78 MMHG | WEIGHT: 155 LBS | SYSTOLIC BLOOD PRESSURE: 137 MMHG | HEIGHT: 63 IN

## 2019-04-15 DIAGNOSIS — S72.451D: ICD-10-CM

## 2019-04-15 DIAGNOSIS — S42.411D CLOSED SUPRACONDYLAR FRACTURE OF RIGHT HUMERUS WITH ROUTINE HEALING, SUBSEQUENT ENCOUNTER: Primary | ICD-10-CM

## 2019-04-15 PROCEDURE — 99214 OFFICE O/P EST MOD 30 MIN: CPT | Performed by: PHYSICIAN ASSISTANT

## 2019-04-15 NOTE — PROGRESS NOTES
including AP and lateral  Impression: Patient has a healed transverse distal humeral supracondylar fracture.       Views: 2 view right femur including AP and lateral  Impression: There is a healed oblique fracture of the distal femoral shaft with large callus formation. IM nail is in place without evidence of loosening or hardware failure. Cerclage wires show no evidence of loosening. Orders:  Orders Placed This Encounter   Procedures    XR HUMERUS RIGHT (MIN 2 VIEWS)    XR FEMUR RIGHT (MIN 2 VIEWS)       Impression:   Diagnosis Orders   1. Closed supracondylar fracture of right humerus with routine healing, subsequent encounter  XR HUMERUS RIGHT (MIN 2 VIEWS)   2. Closed traumatic displaced supracondylar fracture of RIGHT femur repaired with gamma nail and cerclage wires 10/25/2018  XR FEMUR RIGHT (MIN 2 VIEWS)       Treatment Plan:    Patient is doing very well almost 6 months out from her initial fall with right humerus and femur fractures. She is having minimal pain. She still lacks extension and flexion of the right elbow but does not have any pain with flexion or extension. She declined consultation with Dr. Jordy Coates during her last visit. She still states she is not interested in any surgical options. She is content with her range of motion. She will continue with her home exercises and stretches. She will continue using a walker as needed for ambulation as to prevent falls. She should not require a scheduled follow-up visit but will follow up as needed. Jovita Laboy was informed of the results of any imaging. We discussed treatment options and a time was given to answer questions. A plan was proposed and Jovita Laboy understand and accepts this course of care.           Electronically signed by Sonal Paula PA-C on 9/90/4518  Board Certified [facility]    Please note that portions of this note were completed with a voice recognition program.  Efforts were made to edit the dictations but occasionally words are mis-transcribed.

## 2019-05-28 ENCOUNTER — TELEPHONE (OUTPATIENT)
Dept: ORTHOPEDIC SURGERY | Age: 74
End: 2019-05-28

## 2019-05-28 DIAGNOSIS — S42.411D CLOSED SUPRACONDYLAR FRACTURE OF RIGHT HUMERUS WITH ROUTINE HEALING, SUBSEQUENT ENCOUNTER: Primary | ICD-10-CM

## 2019-05-28 NOTE — TELEPHONE ENCOUNTER
Pt is calling to let Dr Chanel Perez know that she keeps falling 3 times over weekend. She is a patient of Dr Chanel Perez, but this is a new problem, but could be from her leg giving out.

## 2019-05-28 NOTE — TELEPHONE ENCOUNTER
Falling is what broke her femur and elbow initially. Her leg will take 6 months of performing exercises to regain the strength to the level she had before she had the fracture.   Referral to Dr. Deborah Dela Cruz for further rehab management

## 2019-06-07 ENCOUNTER — NURSE TRIAGE (OUTPATIENT)
Dept: OTHER | Facility: CLINIC | Age: 74
End: 2019-06-07

## 2019-06-07 NOTE — TELEPHONE ENCOUNTER
Reason for Disposition   MODERATE swelling of both ankles (e.g., swelling extends up to the knees) AND new onset or worsening    Protocols used: LEG SWELLING AND EDEMA-ADULT-OH    Received voicemail from [facility] in [address] [place] [address] at [phone]. Patient states she fell twice in her home 5/26. Since that time she has had hip pain and worsening swelling of her lower legs. They are equal in size, but right hurts worse t gomez left, and that's the hip she fell on. She is on blood thinners. Denies chest pain or SOB. She states she does not have anyone to help her until her son gets back in town next week, and has appt scheduled 6/17/19. Explained she cannot wait that long to be seen. Should make other transportation arrangements to see provider or go to urgent care. Call soft transferred to Alba Edwards in [address] [place] [address] to schedule appointment.

## 2019-06-09 NOTE — PROGRESS NOTES
knee. Reports bruising and swelling of left wrist and hand without pain. She admits to hitting head. Denies LOC Increased pain and swelling since fall. She suffered a right femur fracture and right humerus fracture on 10/24 after a fall. The femur fracture was repaired on 10/25 on humerus fracture did not require surgery. Lab Results   Component Value Date    WBC 3.7 (L) 01/02/2019    HGB 10.1 (L) 01/02/2019    HCT 31.0 (L) 01/02/2019    MCV 91.0 01/02/2019     01/02/2019     Lab Results   Component Value Date     11/02/2018    K 4.8 11/02/2018     11/02/2018    CO2 27 11/02/2018    BUN 15 11/02/2018    CREATININE <0.5 (L) 11/02/2018    GLUCOSE 102 (H) 11/02/2018    CALCIUM 8.5 11/02/2018    PROT 6.7 10/24/2018    LABALBU 3.9 10/24/2018    BILITOT 0.6 10/24/2018    ALKPHOS 65 10/24/2018    AST 80 (H) 10/24/2018    ALT 57 (H) 10/24/2018    LABGLOM >60 11/02/2018    GFRAA >60 11/02/2018    AGRATIO 1.4 10/24/2018    GLOB 2.8 10/24/2018       Review of Systems   Constitutional: Negative for activity change and fever. HENT: Negative for congestion. Eyes: Negative for visual disturbance. Respiratory: Negative for chest tightness and shortness of breath. Cardiovascular: Positive for leg swelling. Negative for chest pain and palpitations. Gastrointestinal: Negative for abdominal pain, constipation and diarrhea. Endocrine: Negative for polyuria. Genitourinary: Positive for frequency. Negative for dysuria, pelvic pain and urgency. Musculoskeletal: Positive for gait problem (wheelchair and walker assist), joint swelling (right hip and right knee) and myalgias (right hip, right thigh, right knee). Negative for arthralgias. Skin: Negative for rash. Neurological: Positive for headaches. Negative for dizziness and light-headedness. Hematological: Bruises/bleeds easily (left hand and wrist).    Psychiatric/Behavioral: Negative for agitation, decreased concentration and sleep disturbance. The patient is nervous/anxious. Intermittent depressed mood       Prior to Visit Medications    Medication Sig Taking? Authorizing Provider   ALPRAZolam Randi Batch) 2 MG tablet  Yes Historical Provider, MD   Compression Stockings MISC by Does not apply route Knee highs , use daily for ADL. 20-30 mm/hg. Yes Audrey Terry APRN - CNP   furosemide (LASIX) 20 MG tablet Take 1 tablet by mouth 2 times daily Yes Scott Bolaños MD   omeprazole (PRILOSEC) 20 MG delayed release capsule Take 20 mg by mouth daily Yes Historical Provider, MD   ondansetron (ZOFRAN) 4 MG tablet Take 4 mg by mouth every 8 hours as needed for Nausea or Vomiting Yes Historical Provider, MD   butalbital-acetaminophen-caffeine (FIORICET, ESGIC) -40 MG per tablet Take 1 tablet by mouth every 6 hours as needed for Headaches or Migraine Yes Narayan Durand MD   docusate sodium (COLACE, DULCOLAX) 100 MG CAPS Take 100 mg by mouth 2 times daily Yes Niki Arriaga,    potassium chloride (KLOR-CON M20) 20 MEQ extended release tablet Take 20 mEq by mouth 2 times daily  Yes Historical Provider, MD   vitamin B-12 (CYANOCOBALAMIN) 1000 MCG tablet Take 2,000 mcg by mouth daily Yes Historical Provider, MD   sertraline (ZOLOFT) 100 MG tablet Take 100 mg by mouth daily Yes Historical Provider, MD   calcium citrate-vitamin D (CITRICAL + D) 315-250 MG-UNIT TABS Take 1 tablet by mouth daily (with breakfast)  Yes Historical Provider, MD       Allergies   Allergen Reactions    Demerol Hcl [Meperidine]     Pcn [Penicillins]      Patient reports she has not had since she was a child, thinks reaction was hives.         Past Medical History:   Diagnosis Date    Arthritis     Atrial fibrillation ([address]     Closed fracture dislocation of right elbow     Migraine        Past Surgical History:   Procedure Laterality Date     SECTION      x4    OPEN TX FEMORAL SUPRACONDYLAR FRACTURE W/O EXTENSION Right 10/25/2018    OPEN REDUCTION INTERNAL FIXATION RIGHT FEMUR SUPRACONDYLAR FEMORAL FRACTURE WITH C-ARM performed by [facility]ovana Shelton [facility] at [facility]      neck surgery     TOTAL KNEE ARTHROPLASTY Left        Social History     Socioeconomic History    Marital status:      Spouse name: Not on file    Number of children: 3    Years of education: Not on file    Highest education level: High school graduate   Occupational History    Not on file   Social Needs    Financial resource strain: Not on file    Food insecurity:     Worry: Never true     Inability: Never true   [employer] needs:     Medical: No     Non-medical: No   Tobacco Use    Smoking status: Never Smoker    Smokeless tobacco: Never Used   Substance and Sexual Activity    Alcohol use: No    Drug use: No    Sexual activity: Never   Lifestyle    Physical activity:     Days per week: Not on file     Minutes per session: Not on file    Stress: Not on file   Relationships    Social connections:     Talks on phone: Not on file     Gets together: Not on file     Attends Jehovah's witness service: Not on file     Active member of club or organization: Not on file     Attends meetings of clubs or organizations: Not on file     Relationship status: Not on file    Intimate partner violence:     Fear of current or ex partner: No     Emotionally abused: No     Physically abused: No     Forced sexual activity: No   Other Topics Concern    Not on file   Social History Narrative    Lives with son        History reviewed. No pertinent family history. Vitals:    06/10/19 1344   BP: 119/81   Site: Left Upper Arm   Position: Sitting   Cuff Size: Large Adult   Pulse: 73   SpO2: 97%   Weight: 165 lb (74.8 kg)   Height: 5' 3\" (1.6 m)     Estimated body mass index is 29.23 kg/m² as calculated from the following:    Height as of this encounter: 5' 3\" (1.6 m). Weight as of this encounter: 165 lb (74.8 kg).     Physical Exam   Constitutional: She is oriented to person, place, and time. Vital signs are normal. She appears well-developed and well-nourished. She is cooperative. HENT:   Head: Normocephalic. Cardiovascular: Normal rate, regular rhythm, S1 normal, S2 normal, normal heart sounds and intact distal pulses. Occasional extrasystoles are present. Bilateral lower extremity swelling   Pulmonary/Chest: Effort normal. She has decreased breath sounds in the right lower field and the left lower field. Musculoskeletal:        Right hip: She exhibits decreased strength, tenderness and bony tenderness. She exhibits no laceration. Right knee: She exhibits decreased range of motion and swelling. She exhibits no laceration. Tenderness found. Right ankle: She exhibits swelling. Left ankle: She exhibits swelling. Right upper leg: She exhibits tenderness, swelling and edema. She exhibits no laceration. Right foot: There is swelling. Left foot: There is swelling. Neurological: She is alert and oriented to person, place, and time. Skin: Skin is warm and dry. Bruising (swelling) and ecchymosis noted. Psychiatric: She has a normal mood and affect. Her behavior is normal. Judgment and thought content normal. Cognition and memory are normal.   Vitals reviewed. ASSESSMENT/PLAN:  1. Atrial fibrillation, unspecified type ([address]  -Stop taking Coumadin, not recommended by Cardiology  -Coumadin not being monitored  -suffers from frequent falls, last fall 5/26  - Moose Nieves MD, [facility], [facility]    2. Fall, initial encounter  -have x-rays performed  - XR KNEE RIGHT (3 VIEWS); Future  - XR FEMUR RIGHT (MIN 2 VIEWS); Future  - XR HIP RIGHT MIN 4VW W PELVIS; Future  - XR WRIST LEFT (2 VIEWS); Future  -will consider evaluation by Orthopedic surgery    3.  At risk for hemorrhage associated with anticoagulation therapy  -stop coumadin  -Have Cardiology evaluate for anticoagulation therapy  - Moose Nieves MD, [facility],

## 2019-06-10 ENCOUNTER — TELEPHONE (OUTPATIENT)
Dept: INTERNAL MEDICINE CLINIC | Age: 74
End: 2019-06-10

## 2019-06-10 ENCOUNTER — OFFICE VISIT (OUTPATIENT)
Dept: INTERNAL MEDICINE CLINIC | Age: 74
End: 2019-06-10
Payer: MEDICARE

## 2019-06-10 VITALS
DIASTOLIC BLOOD PRESSURE: 81 MMHG | OXYGEN SATURATION: 97 % | HEIGHT: 63 IN | SYSTOLIC BLOOD PRESSURE: 119 MMHG | BODY MASS INDEX: 29.23 KG/M2 | HEART RATE: 73 BPM | WEIGHT: 165 LBS
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
## 2019-06-10 DIAGNOSIS — M79.604 RIGHT LEG PAIN: ICD-10-CM

## 2019-06-10 DIAGNOSIS — M25.432 PAIN AND SWELLING OF LEFT WRIST: ICD-10-CM

## 2019-06-10 DIAGNOSIS — F32.A ANXIETY AND DEPRESSION: ICD-10-CM

## 2019-06-10 DIAGNOSIS — S42.401D CLOSED FRACTURE OF DISTAL END OF RIGHT HUMERUS WITH ROUTINE HEALING, UNSPECIFIED FRACTURE MORPHOLOGY, SUBSEQUENT ENCOUNTER: ICD-10-CM

## 2019-06-10 DIAGNOSIS — I48.91 ATRIAL FIBRILLATION, UNSPECIFIED TYPE (HCC): ICD-10-CM

## 2019-06-10 DIAGNOSIS — Z91.89 AT RISK FOR HEMORRHAGE ASSOCIATED WITH ANTICOAGULATION THERAPY: ICD-10-CM

## 2019-06-10 DIAGNOSIS — Z51.81 MEDICATION MONITORING ENCOUNTER: ICD-10-CM

## 2019-06-10 DIAGNOSIS — M25.532 PAIN AND SWELLING OF LEFT WRIST: ICD-10-CM

## 2019-06-10 DIAGNOSIS — R60.0 BILATERAL LEG EDEMA: ICD-10-CM

## 2019-06-10 DIAGNOSIS — S72.331D CLOSED DISPLACED OBLIQUE FRACTURE OF SHAFT OF RIGHT FEMUR WITH ROUTINE HEALING, SUBSEQUENT ENCOUNTER: ICD-10-CM

## 2019-06-10 DIAGNOSIS — M25.561 ACUTE PAIN OF RIGHT KNEE: ICD-10-CM

## 2019-06-10 DIAGNOSIS — F41.9 ANXIETY AND DEPRESSION: ICD-10-CM

## 2019-06-10 PROCEDURE — 99204 OFFICE O/P NEW MOD 45 MIN: CPT | Performed by: NURSE PRACTITIONER

## 2019-06-10 RX ORDER — ALPRAZOLAM 2 MG/1
TABLET ORAL
COMMUNITY
Start: 2019-06-05 | End: 2019-07-19

## 2019-06-10 SDOH — SOCIAL STABILITY: SOCIAL INSECURITY
WITHIN THE LAST YEAR, HAVE TO BEEN RAPED OR FORCED TO HAVE ANY KIND OF SEXUAL ACTIVITY BY YOUR PARTNER OR EX-PARTNER?: NO

## 2019-06-10 SDOH — ECONOMIC STABILITY: TRANSPORTATION INSECURITY
IN THE PAST 12 MONTHS, HAS LACK OF TRANSPORTATION KEPT YOU FROM MEETINGS, WORK, OR FROM GETTING THINGS NEEDED FOR DAILY LIVING?: NO

## 2019-06-10 SDOH — SOCIAL STABILITY: SOCIAL INSECURITY
WITHIN THE LAST YEAR, HAVE YOU BEEN KICKED, HIT, SLAPPED, OR OTHERWISE PHYSICALLY HURT BY YOUR PARTNER OR EX-PARTNER?: NO

## 2019-06-10 SDOH — ECONOMIC STABILITY: FOOD INSECURITY: WITHIN THE PAST 12 MONTHS, YOU WORRIED THAT YOUR FOOD WOULD RUN OUT BEFORE YOU GOT MONEY TO BUY MORE.: NEVER TRUE

## 2019-06-10 SDOH — ECONOMIC STABILITY: TRANSPORTATION INSECURITY
IN THE PAST 12 MONTHS, HAS THE LACK OF TRANSPORTATION KEPT YOU FROM MEDICAL APPOINTMENTS OR FROM GETTING MEDICATIONS?: NO

## 2019-06-10 SDOH — ECONOMIC STABILITY: FOOD INSECURITY: WITHIN THE PAST 12 MONTHS, THE FOOD YOU BOUGHT JUST DIDN'T LAST AND YOU DIDN'T HAVE MONEY TO GET MORE.: NEVER TRUE

## 2019-06-10 SDOH — SOCIAL STABILITY: SOCIAL INSECURITY: WITHIN THE LAST YEAR, HAVE YOU BEEN AFRAID OF YOUR PARTNER OR EX-PARTNER?: NO

## 2019-06-10 SDOH — SOCIAL STABILITY: SOCIAL INSECURITY: WITHIN THE LAST YEAR, HAVE YOU BEEN HUMILIATED OR EMOTIONALLY ABUSED IN OTHER WAYS BY YOUR PARTNER OR EX-PARTNER?: NO

## 2019-06-10 ASSESSMENT — ENCOUNTER SYMPTOMS
SHORTNESS OF BREATH: 0
DIARRHEA: 0
CHEST TIGHTNESS: 0
ABDOMINAL PAIN: 0
CONSTIPATION: 0

## 2019-06-10 NOTE — PATIENT INSTRUCTIONS
Stop Coumadin  Make appointment with Dr. Jenniffer Linares  Take 1/2 tablet of Remeron (mirtazapine) nightly )7.5 mg  Take Xanax 1 mg do not take more than three times daily (every 8 hours)  Make appointment to see [facility]  Wear compression stockings during the day, off at night  Have x-rays of right leg

## 2019-06-11 ENCOUNTER — HOSPITAL ENCOUNTER (OUTPATIENT)
Dept: GENERAL RADIOLOGY | Age: 74
Discharge: HOME | End: 2019-06-11
Payer: MEDICARE

## 2019-06-11 ENCOUNTER — HOSPITAL ENCOUNTER (OUTPATIENT)
Age: 74
Discharge: HOME | End: 2019-06-11
Payer: MEDICARE

## 2019-06-11 ENCOUNTER — TELEPHONE (OUTPATIENT)
Dept: INTERNAL MEDICINE CLINIC | Age: 74
End: 2019-06-11
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
## 2019-06-11 PROCEDURE — 73552 X-RAY EXAM OF FEMUR 2/>: CPT

## 2019-06-11 PROCEDURE — 73110 X-RAY EXAM OF WRIST: CPT

## 2019-06-11 PROCEDURE — 73501 X-RAY EXAM HIP UNI 1 VIEW: CPT

## 2019-06-11 PROCEDURE — 73560 X-RAY EXAM OF KNEE 1 OR 2: CPT

## 2019-06-11 NOTE — TELEPHONE ENCOUNTER
[address] [facility]   Xray results. See results    Spoke to [facility].

## 2019-06-11 NOTE — TELEPHONE ENCOUNTER
Advised to make an appointment with Orthopedics (Dr. Lei Cash) to review images and make recommendations.   H

## 2019-06-13 ENCOUNTER — OFFICE VISIT (OUTPATIENT)
Dept: ORTHOPEDIC SURGERY | Age: 74
End: 2019-06-13
Payer: MEDICARE

## 2019-06-13 VITALS
BODY MASS INDEX: 28.35 KG/M2 | HEART RATE: 81 BPM | SYSTOLIC BLOOD PRESSURE: 131 MMHG | DIASTOLIC BLOOD PRESSURE: 73 MMHG | HEIGHT: 63 IN | WEIGHT: 160 LBS

## 2019-06-13 DIAGNOSIS — S70.11XA CONTUSION OF RIGHT THIGH, INITIAL ENCOUNTER: Primary | ICD-10-CM

## 2019-06-13 PROCEDURE — 99214 OFFICE O/P EST MOD 30 MIN: CPT | Performed by: PHYSICIAN ASSISTANT

## 2019-06-13 NOTE — PROGRESS NOTES
Patient Name: Cris Aguayo  Medical Record Number: [id number]  YOB: 1945  Date of Encounter: 6/13/2019    Chief Complaint   Patient presents with    Injury     Possible right greater troch fx. Fell 3 times on 5/26/19. Pain to anterior thigh. History of Present Illness:  Cris Aguayo is a 75 y.o. female here for evaluation of her right thigh. Her pain assessment is documented below and I reviewed this with her today. Patient had a mechanical fall last year on 10/24/2018 and sustained a right supracondylar femur fracture that was repaired with ORIF with cerclage wires and also a intramedullary nail on 10/25/2018. Patient has mostly been in a wheelchair since that time despite being told that she is able to weight-bear as tolerated. She states she has been unable to do physical therapy because she cannot afford it. She lives with her son who is normally at her appointments with her. He is here today. Patient presents today stating she fell 3 times, 2 weeks ago, on 5/26/2019. She states she did fall onto her right side and has been having right mid anterior thigh pain since that time. Although, she states that this is where she has had pain since the initial surgery. She saw a new family doctor who ordered x-rays of her right hip, femur and knee. X-rays were read as a possible fracture of the greater trochanter and also a possible cortical defect of the medial tibial plateau. Patient states she does have posterior hip/buttock pain but this is not new. She denies new lateral or anterior hip pain. She states she also has chronic knee pain which is usually worse with weather changes. She states the pain she is having today is not any worse than baseline.     Pain Assessment  Location of Pain: Leg  Location Modifiers: Right  Severity of Pain: 8  Quality of Pain: Aching, Sharp  Duration of Pain: Persistent  Frequency of Pain: Constant  Date Pain First Started: 05/26/19  Result of Injury: Yes  Work-Related Injury: No  Are there other pain locations you wish to document?: No    Past Medical History:   Diagnosis Date    Arthritis     Atrial fibrillation ([address]     Closed fracture dislocation of right elbow     Migraine        Past Surgical History:   Procedure Laterality Date     SECTION      x4    OPEN TX FEMORAL SUPRACONDYLAR FRACTURE W/O EXTENSION Right 10/25/2018    OPEN REDUCTION INTERNAL FIXATION RIGHT FEMUR SUPRACONDYLAR FEMORAL FRACTURE WITH C-ARM performed by Rosalba Ulloa MD at [place] [address]      neck surgery     TOTAL KNEE ARTHROPLASTY Left        Current Outpatient Medications   Medication Sig Dispense Refill    Polyethylene Glycol 3350 (MIRALAX PO) Take by mouth      ALPRAZolam (XANAX) 2 MG tablet       Compression Stockings MISC by Does not apply route Knee highs , use daily for ADL. 20-30 mm/hg. 1 each 0    furosemide (LASIX) 20 MG tablet Take 1 tablet by mouth 2 times daily 60 tablet 3    omeprazole (PRILOSEC) 20 MG delayed release capsule Take 20 mg by mouth daily      ondansetron (ZOFRAN) 4 MG tablet Take 4 mg by mouth every 8 hours as needed for Nausea or Vomiting      butalbital-acetaminophen-caffeine (FIORICET, ESGIC) -40 MG per tablet Take 1 tablet by mouth every 6 hours as needed for Headaches or Migraine 10 tablet 3    docusate sodium (COLACE, DULCOLAX) 100 MG CAPS Take 100 mg by mouth 2 times daily 60 capsule 0    potassium chloride (KLOR-CON M20) 20 MEQ extended release tablet Take 20 mEq by mouth 2 times daily       vitamin B-12 (CYANOCOBALAMIN) 1000 MCG tablet Take 2,000 mcg by mouth daily      sertraline (ZOLOFT) 100 MG tablet Take 100 mg by mouth daily      calcium citrate-vitamin D (CITRICAL + D) 315-250 MG-UNIT TABS Take 1 tablet by mouth daily (with breakfast)        No current facility-administered medications for this visit.       Allergies, social and family histories were reviewed and updated as appropriate. Review of Systems:  Relevant review of systems reviewed and available in the patient's chart under the 'MEDIA' tab. Vital Signs:  /73   Pulse 81   Ht 5' 3\" (1.6 m)   Wt 160 lb (72.6 kg)   BMI 28.34 kg/m²     General Exam:   Constitutional: Patient is adequately groomed with no evidence of malnutrition  Mental Status: The patient is oriented to time, place and person. The patient's mood and affect are appropriate. Lymphatic: The lymphatic examination bilaterally reveals all areas to be without enlargement or induration. Neurological: The patient has good coordination and balance. There is no focal weakness or sensory deficit. Right Hip Examination:    Inspection:  Normal muscle contours and no significant limb length discrepancy. No gross atrophy in any particular myotome. There is no obvious swelling or joint effusion of the hip. There are no abrasions, lacerations, contusions, hematomas or ecchymosis. There is no erythema, induration or warmth to suggest an infectious process. Palpation: Patient denies tenderness on palpation over the greater trochanter. She does have tenderness on palpation of the mid lateral and anterior thigh. She denies tenderness on palpation of the distal femur/thigh. She denies tenderness on palpation of the medial or lateral joint lines. She specifically denies tenderness on palpation of the medial tibial plateau. Hip Range of Motion:   Hip flexion: 110°   Hip abduction: 40°   Hip adduction: 20°    Knee Range of Motion:   Extension: -10°   Flexion: 105°    Strength:    Hip flexion  4-/5   Hip abduction 4- / 5   Hip adduction 4-/5   Knee flexion and extension 4-/5 pain   DF/ PF ankle 4+-5/5    Skin: There are no rashes, ulcerations or lesions. Sensation to light touch intact. Circulation: The limb is warm and well perfused. Distal pulses intact. Capillary refill is intact. Edema: mild.     Gait: Patient is in a wheelchair at today's visit Radiology:     X-rays obtained at the hospital on 6/11/2019 and reviewed in office today:    FINDINGS:   Right hip:       Intramedullary solomon is partially visualized in the right femoral diaphysis.    This is likely related to ORIF of a prior right femoral diaphyseal fracture   deformity.       Moderate degenerative changes, joint space narrowing and osteophyte spurring   of the right hip joint space.  Mild to moderate degenerative change of the   left hip joint space.  Diffuse osteopenia.  Moderate degenerative changes in   the lower lumbar/lumbosacral spine.  Moderate stool burden.  Pelvic   phleboliths.  Both femoral heads project over the bilateral acetabula without   clear evidence for acute fracture, dislocation or femoral head flattening.       Lucency in the region of the greater trochanter raises suspicion for a   slightly displaced right greater trochanteric fracture fragment.       Right femur:       Intramedullary solomon with proximal and distal interlocking screws related to   prior right femoral diaphyseal fracture ORIF.  Moderate right hip   osteoarthrosis.  Right femoral head projects over the right acetabulum   without clear evidence for acute fracture, dislocation or femoral head   flattening.  Moderate degenerative changes of the pubic symphysis.  Pelvic   phleboliths.  Small suprapatellar right knee joint effusion.  Moderate to   severe degenerative changes at the patellofemoral compartment of the right   knee.  Diffuse osteopenia.       Right knee:       Intramedullary solomon and distal interlocking screws seen projecting over the   distal femur with associated healed distal right femoral diaphyseal fracture   deformity.       Chondrocalcinosis of the medial compartment.  Moderate to severe degenerative   changes of the patellofemoral compartment.  Small suprapatellar right knee   joint effusion.  Diffuse osteopenia.       Moderate to severe narrowing of the medial compartment.       Mild deformity [facility]    Please note that portions of this note were completed with a voice recognition program.  Efforts were made to edit the dictations but occasionally words are mis-transcribed.

## 2019-06-14 ENCOUNTER — TELEPHONE (OUTPATIENT)
Dept: INTERNAL MEDICINE CLINIC | Age: 74
End: 2019-06-14

## 2019-06-14 DIAGNOSIS — S42.401D CLOSED FRACTURE OF DISTAL END OF RIGHT HUMERUS WITH ROUTINE HEALING, UNSPECIFIED FRACTURE MORPHOLOGY, SUBSEQUENT ENCOUNTER: ICD-10-CM

## 2019-06-14 DIAGNOSIS — S72.331D CLOSED DISPLACED OBLIQUE FRACTURE OF SHAFT OF RIGHT FEMUR WITH ROUTINE HEALING, SUBSEQUENT ENCOUNTER: Primary | ICD-10-CM

## 2019-06-14 DIAGNOSIS — S42.202D CLOSED FRACTURE OF PROXIMAL END OF LEFT HUMERUS WITH ROUTINE HEALING, UNSPECIFIED FRACTURE MORPHOLOGY, SUBSEQUENT ENCOUNTER: ICD-10-CM

## 2019-06-14 DIAGNOSIS — S92.355D CLOSED NONDISPLACED FRACTURE OF FIFTH METATARSAL BONE OF LEFT FOOT WITH ROUTINE HEALING, SUBSEQUENT ENCOUNTER: ICD-10-CM

## 2019-06-14 RX ORDER — TRAMADOL HYDROCHLORIDE 50 MG/1
50 TABLET ORAL EVERY 4 HOURS PRN
Qty: 18 TABLET | Refills: 0 | Status: SHIPPED | OUTPATIENT
Start: 2019-06-14 | End: 2019-06-19

## 2019-06-17 RX ORDER — WARFARIN SODIUM 6 MG/1
6 TABLET ORAL DAILY
Qty: 30 TABLET | Refills: 3 | OUTPATIENT
Start: 2019-06-17

## 2019-06-26 ENCOUNTER — TELEPHONE (OUTPATIENT)
Dept: PAIN MANAGEMENT | Age: 74
End: 2019-06-26

## 2019-06-26 NOTE — TELEPHONE ENCOUNTER
She has been scheduled for a new pt appt. Informed her that the first appointment may only be rescheduled once and no showing or giving less than 24 hour notice of inability to come to first visit will result in a cancellation of the referral. Advised her that if she arrives to the appt without completed paperwork, or if she does not arrive early enough to complete it prior to being seen, her appointment may be rescheduled for the next available new patient appt. Also advised her to prepare to be in the office for at least 2-3 hours and that once established with one of our 2 providers, she may not switch to the other.      She requested her new patient packet to be sent to him via: Mail (address confirmed, appt card included)

## 2019-06-26 NOTE — TELEPHONE ENCOUNTER
This patients referral has been approved for scheduling with [facility].

## 2019-07-02 ENCOUNTER — TELEPHONE (OUTPATIENT)
Dept: INTERNAL MEDICINE CLINIC | Age: 74
End: 2019-07-02

## 2019-07-02 DIAGNOSIS — S72.331A CLOSED DISPLACED OBLIQUE FRACTURE OF SHAFT OF RIGHT FEMUR, INITIAL ENCOUNTER (HCC): ICD-10-CM

## 2019-07-02 RX ORDER — BUTALBITAL, ACETAMINOPHEN AND CAFFEINE 50; 325; 40 MG/1; MG/1; MG/1
1 TABLET ORAL EVERY 6 HOURS PRN
Qty: 10 TABLET | Refills: 3 | Status: CANCELLED | OUTPATIENT
Start: 2019-07-02

## 2019-07-02 RX ORDER — ONDANSETRON 4 MG/1
4 TABLET, FILM COATED ORAL EVERY 12 HOURS PRN
Qty: 30 TABLET | Refills: 0 | Status: SHIPPED | OUTPATIENT
Start: 2019-07-02 | End: 2019-07-19 | Stop reason: SDUPTHER

## 2019-07-03 ENCOUNTER — TELEPHONE (OUTPATIENT)
Dept: INTERNAL MEDICINE CLINIC | Age: 74
End: 2019-07-03

## 2019-07-05 NOTE — TELEPHONE ENCOUNTER
[address] [place] [address] calling about med refills.  Meeds to speak to Dr. Dorothy Munroe for clarification  Meds: Butalbital

## 2019-07-08 ENCOUNTER — TELEPHONE (OUTPATIENT)
Dept: INTERNAL MEDICINE CLINIC | Age: 74
End: 2019-07-08

## 2019-07-09 DIAGNOSIS — S72.331A CLOSED DISPLACED OBLIQUE FRACTURE OF SHAFT OF RIGHT FEMUR, INITIAL ENCOUNTER (HCC): ICD-10-CM

## 2019-07-09 RX ORDER — BUTALBITAL, ACETAMINOPHEN AND CAFFEINE 50; 325; 40 MG/1; MG/1; MG/1
1 TABLET ORAL EVERY 6 HOURS PRN
Qty: 10 TABLET | Refills: 3 | Status: SHIPPED | OUTPATIENT
Start: 2019-07-09 | End: 2019-07-19 | Stop reason: SDUPTHER

## 2019-07-09 RX ORDER — LORATADINE 10 MG/1
10 TABLET ORAL DAILY
Qty: 30 TABLET | Refills: 3 | Status: SHIPPED | OUTPATIENT
Start: 2019-07-09 | End: 2019-07-19

## 2019-07-15 ENCOUNTER — TELEPHONE (OUTPATIENT)
Dept: INTERNAL MEDICINE CLINIC | Age: 74
End: 2019-07-15

## 2019-07-19 ENCOUNTER — OFFICE VISIT (OUTPATIENT)
Dept: INTERNAL MEDICINE CLINIC | Age: 74
End: 2019-07-19
Payer: MEDICARE

## 2019-07-19 VITALS
WEIGHT: 179.8 LBS | DIASTOLIC BLOOD PRESSURE: 86 MMHG | SYSTOLIC BLOOD PRESSURE: 136 MMHG | HEART RATE: 87 BPM | HEIGHT: 62 IN | OXYGEN SATURATION: 96 % | BODY MASS INDEX: 33.09 KG/M2

## 2019-07-19 DIAGNOSIS — Z85.038 HISTORY OF COLON CANCER: ICD-10-CM

## 2019-07-19 DIAGNOSIS — H61.23 BILATERAL IMPACTED CERUMEN: ICD-10-CM

## 2019-07-19 DIAGNOSIS — R51.9 ACHING HEADACHE: Primary | ICD-10-CM

## 2019-07-19 DIAGNOSIS — M50.20 DISPLACEMENT OF CERVICAL INTERVERTEBRAL DISC WITHOUT MYELOPATHY: ICD-10-CM

## 2019-07-19 DIAGNOSIS — Z91.81 AT HIGH RISK FOR FALLS: ICD-10-CM

## 2019-07-19 DIAGNOSIS — K21.9 GASTROESOPHAGEAL REFLUX DISEASE WITHOUT ESOPHAGITIS: ICD-10-CM

## 2019-07-19 DIAGNOSIS — R11.0 NAUSEA: ICD-10-CM

## 2019-07-19 DIAGNOSIS — Z00.00 HEALTHCARE MAINTENANCE: ICD-10-CM

## 2019-07-19 DIAGNOSIS — R49.9 CHANGE IN VOICE: ICD-10-CM

## 2019-07-19 DIAGNOSIS — F41.9 ANXIETY: ICD-10-CM

## 2019-07-19 DIAGNOSIS — S72.331A CLOSED DISPLACED OBLIQUE FRACTURE OF SHAFT OF RIGHT FEMUR, INITIAL ENCOUNTER (HCC): ICD-10-CM

## 2019-07-19 LAB
ANION GAP SERPL CALCULATED.3IONS-SCNC: 12 MMOL/L (ref 3–16)
BASOPHILS ABSOLUTE: 0 K/UL (ref 0–0.2)
BASOPHILS RELATIVE PERCENT: 1 %
BUN BLDV-MCNC: 17 MG/DL (ref 7–20)
CALCIUM SERPL-MCNC: 9.8 MG/DL (ref 8.3–10.6)
CHLORIDE BLD-SCNC: 100 MMOL/L (ref 99–110)
CO2: 26 MMOL/L (ref 21–32)
CREAT SERPL-MCNC: 0.6 MG/DL (ref 0.6–1.2)
EOSINOPHILS ABSOLUTE: 0.1 K/UL (ref 0–0.6)
EOSINOPHILS RELATIVE PERCENT: 4.3 %
GFR AFRICAN AMERICAN: >60
GFR NON-AFRICAN AMERICAN: >60
GLUCOSE BLD-MCNC: 99 MG/DL (ref 70–99)
HCT VFR BLD CALC: 31 % (ref 36–48)
HEMOGLOBIN: 9.6 G/DL (ref 12–16)
LYMPHOCYTES ABSOLUTE: 0.7 K/UL (ref 1–5.1)
LYMPHOCYTES RELATIVE PERCENT: 30.1 %
MCH RBC QN AUTO: 27.3 PG (ref 26–34)
MCHC RBC AUTO-ENTMCNC: 31 G/DL (ref 31–36)
MCV RBC AUTO: 88.1 FL (ref 80–100)
MONOCYTES ABSOLUTE: 0.2 K/UL (ref 0–1.3)
MONOCYTES RELATIVE PERCENT: 6.6 %
NEUTROPHILS ABSOLUTE: 1.4 K/UL (ref 1.7–7.7)
NEUTROPHILS RELATIVE PERCENT: 58 %
PDW BLD-RTO: 18.6 % (ref 12.4–15.4)
PLATELET # BLD: 176 K/UL (ref 135–450)
PMV BLD AUTO: 8.7 FL (ref 5–10.5)
POTASSIUM SERPL-SCNC: 5.6 MMOL/L (ref 3.5–5.1)
RBC # BLD: 3.52 M/UL (ref 4–5.2)
SODIUM BLD-SCNC: 138 MMOL/L (ref 136–145)
WBC # BLD: 2.4 K/UL (ref 4–11)

## 2019-07-19 PROCEDURE — 36415 COLL VENOUS BLD VENIPUNCTURE: CPT | Performed by: NURSE PRACTITIONER

## 2019-07-19 PROCEDURE — 99213 OFFICE O/P EST LOW 20 MIN: CPT | Performed by: NURSE PRACTITIONER

## 2019-07-19 RX ORDER — ALPRAZOLAM 1 MG/1
1 TABLET ORAL 2 TIMES DAILY
Qty: 60 TABLET | Refills: 0 | Status: SHIPPED | OUTPATIENT
Start: 2019-07-19 | End: 2019-08-29 | Stop reason: SDUPTHER

## 2019-07-19 RX ORDER — ONDANSETRON 4 MG/1
4 TABLET, FILM COATED ORAL EVERY 12 HOURS PRN
Qty: 30 TABLET | Refills: 0 | Status: SHIPPED | OUTPATIENT
Start: 2019-07-19 | End: 2019-08-12 | Stop reason: SDUPTHER

## 2019-07-19 RX ORDER — BUTALBITAL, ACETAMINOPHEN AND CAFFEINE 50; 325; 40 MG/1; MG/1; MG/1
1 TABLET ORAL EVERY 6 HOURS PRN
Qty: 180 TABLET | Refills: 3 | Status: SHIPPED | OUTPATIENT
Start: 2019-07-19 | End: 2019-09-19 | Stop reason: ALTCHOICE

## 2019-07-19 RX ORDER — TIZANIDINE 4 MG/1
4 TABLET ORAL DAILY
COMMUNITY
Start: 2019-07-01 | End: 2019-08-05 | Stop reason: SDUPTHER

## 2019-07-19 RX ORDER — SERTRALINE HYDROCHLORIDE 100 MG/1
100 TABLET, FILM COATED ORAL DAILY
Qty: 30 TABLET | Refills: 3 | Status: SHIPPED | OUTPATIENT
Start: 2019-07-19 | End: 2019-09-19 | Stop reason: DRUGHIGH

## 2019-07-19 RX ORDER — RANITIDINE 150 MG/1
150 TABLET ORAL NIGHTLY
Qty: 30 TABLET | Refills: 3 | Status: SHIPPED | OUTPATIENT
Start: 2019-07-19 | End: 2019-08-26 | Stop reason: SDUPTHER

## 2019-07-19 RX ORDER — MIRTAZAPINE 15 MG/1
15 TABLET, FILM COATED ORAL DAILY
COMMUNITY
Start: 2019-07-02 | End: 2019-09-19 | Stop reason: SINTOL

## 2019-07-19 ASSESSMENT — PATIENT HEALTH QUESTIONNAIRE - PHQ9
SUM OF ALL RESPONSES TO PHQ QUESTIONS 1-9: 2
SUM OF ALL RESPONSES TO PHQ9 QUESTIONS 1 & 2: 2
SUM OF ALL RESPONSES TO PHQ QUESTIONS 1-9: 2
1. LITTLE INTEREST OR PLEASURE IN DOING THINGS: 2
2. FEELING DOWN, DEPRESSED OR HOPELESS: 0

## 2019-07-19 ASSESSMENT — ENCOUNTER SYMPTOMS
SHORTNESS OF BREATH: 0
CHOKING: 0
COUGH: 0

## 2019-07-19 NOTE — PATIENT INSTRUCTIONS
Start physical therapy  Continue to elevate legs during the day  Make appointment with  for nausea and GERD

## 2019-07-20 LAB
ESTIMATED AVERAGE GLUCOSE: 125.5 MG/DL
HBA1C MFR BLD: 6 %

## 2019-07-22 ENCOUNTER — TELEPHONE (OUTPATIENT)
Dept: INTERNAL MEDICINE CLINIC | Age: 74
End: 2019-07-22

## 2019-07-23 ENCOUNTER — TELEPHONE (OUTPATIENT)
Dept: INTERNAL MEDICINE CLINIC | Age: 74
End: 2019-07-23

## 2019-07-23 NOTE — TELEPHONE ENCOUNTER
Left a message on the answering machine that her lab values were abnormal and wanted to review with her. Advised her to call and review.

## 2019-07-24 ASSESSMENT — ENCOUNTER SYMPTOMS
ABDOMINAL DISTENTION: 0
VOMITING: 0
CONSTIPATION: 0
NAUSEA: 0
RECTAL PAIN: 0
DIARRHEA: 0
ABDOMINAL PAIN: 0
BLOOD IN STOOL: 0

## 2019-07-26 ENCOUNTER — TELEPHONE (OUTPATIENT)
Dept: INTERNAL MEDICINE CLINIC | Age: 74
End: 2019-07-26

## 2019-07-26 DIAGNOSIS — S72.331A CLOSED DISPLACED OBLIQUE FRACTURE OF SHAFT OF RIGHT FEMUR, INITIAL ENCOUNTER (HCC): ICD-10-CM

## 2019-07-26 NOTE — PROGRESS NOTES
Wt 164 lb (74.4 kg)   SpO2 96%   BMI 30.00 kg/m²    HEENT:  Face: Atraumatic, Conjunctiva: Pink; non icteric,  Mucous Memb:  Moist, No thyromegaly or Lymphadenopathy  Respiratory:  Resp Assessment: WNL, Resp Auscultation: clear  Cardiovascular: Auscultation: nl S1 & S2, Palpation:  Nl PMI; No heaves or thrills, JVP:  normal  Abdomen: Soft, non-tender, Normal bowel sounds,  No organomegaly  Extremities: No Cyanosis or Clubbing  Neurological: Oriented to time, place, and person, Non-anxious  Psychiatric: Normal mood and affect  Skin: Warm and dry,  No rash seen     Outpatient Medications Marked as Taking for the 7/30/19 encounter (Office Visit) with Karen Coulter MD   Medication Sig Dispense Refill    sertraline (ZOLOFT) 100 MG tablet Take 1 tablet by mouth daily 30 tablet 3    mirtazapine (REMERON) 15 MG tablet Take 15 mg by mouth daily      tiZANidine (ZANAFLEX) 4 MG tablet Take 4 mg by mouth daily      ranitidine (ZANTAC) 150 MG tablet Take 1 tablet by mouth nightly 30 tablet 3    butalbital-acetaminophen-caffeine (FIORICET, ESGIC) -40 MG per tablet Take 1 tablet by mouth every 6 hours as needed for Headaches or Migraine 180 tablet 3    ondansetron (ZOFRAN) 4 MG tablet Take 1 tablet by mouth every 12 hours as needed for Nausea or Vomiting 30 tablet 0    ALPRAZolam (XANAX) 1 MG tablet Take 1 tablet by mouth 2 times daily for 30 days.  60 tablet 0    Polyethylene Glycol 3350 (MIRALAX PO) Take by mouth      furosemide (LASIX) 20 MG tablet Take 1 tablet by mouth 2 times daily 60 tablet 3    omeprazole (PRILOSEC) 20 MG delayed release capsule Take 40 mg by mouth daily Indications: patient stated that she is taking 40 mg       potassium chloride (KLOR-CON M20) 20 MEQ extended release tablet Take 20 mEq by mouth 2 times daily       vitamin B-12 (CYANOCOBALAMIN) 1000 MCG tablet Take 2,000 mcg by mouth daily       EKG:   3/21/18: SR  5/2/2018: SR with PAC's  7/30/2019 SR rate 92       ECHO: 10/24/18  Normal LV size and systolic function. Estimated ejection fraction is 60%. Severe calcification of the posterior leaflet of the mitral valve. Mild mitral regurgitation is present. The left atrium is severely dilated. Mild aortic regurgitation is present. Mild-to-moderate tricuspid regurgitation  Systolic pulmonary artery pressure (SPAP) is estimated at 45-48 mm Hg consistent with moderate pulmonary hypertension       ASSESSMENT AND PLAN:      Palpitations  ECG today shows NSR  Occasional palpitations   30 day event monitor from 5/2018 showed a few brief episode of PSVT. No Atrial Fibrillation No need for anticoagulation    Will have her wear another 30 day event monitor to evaluate for Afib. LE Edema   Bilateral  Uses walker/wheelchair; sedentary lifestyle   Diuretic helps to relieve swelling   Recommend she lay down after taking Lasix  Keep feet/legs elevated as much as possible   Can also take an additional dose 1-2 days weekly as needed  Can wear support socks  Cut back on fluids and sodium intake      Follow up in 6 weeks        Thank you very much for allowing me to participate in the care of your patient. Please do not hesitate to contact me if you have any questions. Sincerely,      Judy Newton M.D  [facility], [address] [place] [address]  Ph: [phone]  Fax: [phone]    This note was scribed in the presence of Dr. Judy Newton MD by Rosita Moy    Physician Attestation:  The scribes documentation has been prepared under my direction and personally reviewed by me in its entirety. I confirm that the note above accurately reflects all work, treatment, procedures, and medical decision making performed by me.

## 2019-07-29 ENCOUNTER — TELEPHONE (OUTPATIENT)
Dept: ADMINISTRATIVE | Age: 74
End: 2019-07-29

## 2019-07-30 ENCOUNTER — OFFICE VISIT (OUTPATIENT)
Dept: CARDIOLOGY CLINIC | Age: 74
End: 2019-07-30
Payer: MEDICARE

## 2019-07-30 ENCOUNTER — TELEPHONE (OUTPATIENT)
Dept: CARDIOLOGY CLINIC | Age: 74
End: 2019-07-30

## 2019-07-30 VITALS
BODY MASS INDEX: 30.18 KG/M2 | HEART RATE: 64 BPM | OXYGEN SATURATION: 96 % | WEIGHT: 164 LBS | HEIGHT: 62 IN | SYSTOLIC BLOOD PRESSURE: 112 MMHG | DIASTOLIC BLOOD PRESSURE: 60 MMHG

## 2019-07-30 DIAGNOSIS — R60.0 BILATERAL LOWER EXTREMITY EDEMA: ICD-10-CM

## 2019-07-30 DIAGNOSIS — R00.2 PALPITATIONS: Primary | ICD-10-CM

## 2019-07-30 PROCEDURE — 99214 OFFICE O/P EST MOD 30 MIN: CPT | Performed by: INTERNAL MEDICINE

## 2019-07-30 PROCEDURE — 93000 ELECTROCARDIOGRAM COMPLETE: CPT | Performed by: INTERNAL MEDICINE

## 2019-07-30 NOTE — PATIENT INSTRUCTIONS
medical condition or this instruction, always ask your healthcare professional. [place] [address] any warranty or liability for your use of this information. Patient Education        Leg and Ankle Edema: Care Instructions  Your Care Instructions  Swelling in the legs, ankles, and feet is called edema. It is common after you sit or stand for a while. Long plane flights or car rides often cause swelling in the legs and feet. You may also have swelling if you have to stand for long periods of time at your job. Problems with the veins in the legs (varicose veins) and changes in hormones can also cause swelling. Sometimes the swelling in the ankles and feet is caused by a more serious problem, such as heart failure, infection, blood clots, or liver or kidney disease. Follow-up care is a key part of your treatment and safety. Be sure to make and go to all appointments, and call your doctor if you are having problems. It's also a good idea to know your test results and keep a list of the medicines you take. How can you care for yourself at home? · If your doctor gave you medicine, take it as prescribed. Call your doctor if you think you are having a problem with your medicine. · Whenever you are resting, raise your legs up. Try to keep the swollen area higher than the level of your heart. · Take breaks from standing or sitting in one position. ? Walk around to increase the blood flow in your lower legs. ? Move your feet and ankles often while you stand, or tighten and relax your leg muscles. · Wear support stockings. Put them on in the morning, before swelling gets worse. · Eat a balanced diet. Lose weight if you need to. · Limit the amount of salt (sodium) in your diet. Salt holds fluid in the body and may increase swelling. When should you call for help? Call 911 anytime you think you may need emergency care.  For example, call if:    · You have symptoms of a blood clot in your lung

## 2019-07-31 ENCOUNTER — TELEPHONE (OUTPATIENT)
Dept: INTERNAL MEDICINE CLINIC | Age: 74
End: 2019-07-31

## 2019-08-01 NOTE — TELEPHONE ENCOUNTER
Received DENIAL for butalbital-acetaminophen-caffeine (FIORICET, ESGIC) -40 MG per tablet. Denial letter attached. Please advise patient. Thank you.

## 2019-08-05 ENCOUNTER — OFFICE VISIT (OUTPATIENT)
Dept: INTERNAL MEDICINE CLINIC | Age: 74
End: 2019-08-05
Payer: MEDICARE

## 2019-08-05 VITALS
OXYGEN SATURATION: 96 % | BODY MASS INDEX: 29.85 KG/M2 | HEART RATE: 86 BPM | WEIGHT: 162.2 LBS | DIASTOLIC BLOOD PRESSURE: 63 MMHG | SYSTOLIC BLOOD PRESSURE: 107 MMHG | HEIGHT: 62 IN

## 2019-08-05 DIAGNOSIS — T30.0 BURN: Primary | ICD-10-CM

## 2019-08-05 PROCEDURE — 99212 OFFICE O/P EST SF 10 MIN: CPT | Performed by: NURSE PRACTITIONER

## 2019-08-05 RX ORDER — TIZANIDINE 4 MG/1
4 TABLET ORAL NIGHTLY
Qty: 30 TABLET | Refills: 3 | Status: SHIPPED | OUTPATIENT
Start: 2019-08-05 | End: 2019-08-12 | Stop reason: SDUPTHER

## 2019-08-05 RX ORDER — DOXYCYCLINE HYCLATE 100 MG
100 TABLET ORAL 2 TIMES DAILY
Qty: 14 TABLET | Refills: 0 | Status: SHIPPED | OUTPATIENT
Start: 2019-08-05 | End: 2019-08-12

## 2019-08-05 RX ORDER — TIZANIDINE 4 MG/1
4 TABLET ORAL DAILY
Status: CANCELLED | OUTPATIENT
Start: 2019-08-05

## 2019-08-05 NOTE — PROGRESS NOTES
[address] [facility]  [address]  [address] [facility] [place] [address]  Dept: [phone]       2019     Fredo Rick (:  )AE a 75 y.o. female, here for evaluation of the following medical concerns:    HPI   Wound  She reports spilling cream corn on hand and knee while removing from microwave on Monday or Tuesday last week. She states she has been applying triple antibiotic cream to area. She reports swelling of first two fingers and decreased ROM. Reports swelling and pain of right knee. Lab Results   Component Value Date    WBC 2.4 (L) 2019    HGB 9.6 (L) 2019    HCT 31.0 (L) 2019    MCV 88.1 2019     2019     Lab Results   Component Value Date     2019    K 5.6 (H) 2019     2019    CO2 26 2019    BUN 17 2019    CREATININE 0.6 2019    GLUCOSE 99 2019    CALCIUM 9.8 2019    PROT 6.7 10/24/2018    LABALBU 3.9 10/24/2018    BILITOT 0.6 10/24/2018    ALKPHOS 65 10/24/2018    AST 80 (H) 10/24/2018    ALT 57 (H) 10/24/2018    LABGLOM >60 2019    GFRAA >60 2019    AGRATIO 1.4 10/24/2018    GLOB 2.8 10/24/2018       Review of Systems   Respiratory: Negative for chest tightness, shortness of breath and wheezing. Cardiovascular: Negative for chest pain and palpitations. Skin: Positive for wound (right hand and right knee). Prior to Visit Medications    Medication Sig Taking? Authorizing Provider   silver sulfADIAZINE (SILVADENE) 1 % cream Apply topically daily.  Yes Hermine Soulier, APRN - CNP   doxycycline hyclate (VIBRA-TABS) 100 MG tablet Take 1 tablet by mouth 2 times daily for 7 days Yes Hermine Soulier, APRN - CNP   tiZANidine (ZANAFLEX) 4 MG tablet Take 1 tablet by mouth nightly Yes Meli Soulier, APRN - CNP   sertraline (ZOLOFT) 100 MG tablet Take 1 tablet by mouth daily Yes Hermine Soulier, APRN - CNP   mirtazapine (REMERON) 15 MG tablet Take 15 mg by mouth daily Yes Historical Provider, MD   ranitidine (ZANTAC) 150 MG tablet Take 1 tablet by mouth nightly Yes MUSA Bang CNP   butalbital-acetaminophen-caffeine (FIORICET, ESGIC) -40 MG per tablet Take 1 tablet by mouth every 6 hours as needed for Headaches or Migraine Yes MUSA Bang CNP   ondansetron (ZOFRAN) 4 MG tablet Take 1 tablet by mouth every 12 hours as needed for Nausea or Vomiting Yes MUSA Bang CNP   ALPRAZolam [place] [employer]) 1 MG tablet Take 1 tablet by mouth 2 times daily for 30 days. Yes MUSA Bang CNP   Polyethylene Glycol 3350 (MIRALAX PO) Take by mouth Yes Historical Provider, MD   furosemide (LASIX) 20 MG tablet Take 1 tablet by mouth 2 times daily Yes Yadira Huitron MD   omeprazole (PRILOSEC) 20 MG delayed release capsule Take 40 mg by mouth daily Indications: patient stated that she is taking 40 mg  Yes Historical Provider, MD   potassium chloride (KLOR-CON M20) 20 MEQ extended release tablet Take 20 mEq by mouth 2 times daily  Yes Historical Provider, MD   vitamin B-12 (CYANOCOBALAMIN) 1000 MCG tablet Take 2,000 mcg by mouth daily  Historical Provider, MD        Social History     Tobacco Use    Smoking status: Never Smoker    Smokeless tobacco: Never Used   Substance Use Topics    Alcohol use: No        Vitals:    08/05/19 1523   BP: 107/63   Pulse: 86   SpO2: 96%   Weight: 162 lb 3.2 oz (73.6 kg)   Height: 5' 2.4\" (1.585 m)     Estimated body mass index is 29.29 kg/m² as calculated from the following:    Height as of this encounter: 5' 2.4\" (1.585 m). Weight as of this encounter: 162 lb 3.2 oz (73.6 kg). Physical Exam   Musculoskeletal:        Right knee: She exhibits swelling. Tenderness found. Right hand: She exhibits decreased range of motion (index and middle finger), tenderness and swelling. Hands:       Legs:  Actual photo of right knee in Media       ASSESSMENT/PLAN:  1.  Burn  -second

## 2019-08-06 ASSESSMENT — ENCOUNTER SYMPTOMS
CHEST TIGHTNESS: 0
WHEEZING: 0
SHORTNESS OF BREATH: 0

## 2019-08-07 RX ORDER — FUROSEMIDE 20 MG/1
TABLET ORAL
Qty: 60 TABLET | Refills: 2 | Status: SHIPPED | OUTPATIENT
Start: 2019-08-07 | End: 2019-09-19 | Stop reason: SDUPTHER

## 2019-08-08 ENCOUNTER — TELEPHONE (OUTPATIENT)
Dept: INTERNAL MEDICINE CLINIC | Age: 74
End: 2019-08-08

## 2019-08-09 ENCOUNTER — TELEPHONE (OUTPATIENT)
Dept: INTERNAL MEDICINE CLINIC | Age: 74
End: 2019-08-09

## 2019-08-11 ENCOUNTER — HOSPITAL ENCOUNTER (EMERGENCY)
Age: 74
Discharge: HOME | End: 2019-08-12
Attending: EMERGENCY MEDICINE
Payer: MEDICARE

## 2019-08-11 ENCOUNTER — APPOINTMENT (OUTPATIENT)
Dept: GENERAL RADIOLOGY | Age: 74
End: 2019-08-11
Payer: MEDICARE

## 2019-08-11 ENCOUNTER — APPOINTMENT (OUTPATIENT)
Dept: CT IMAGING | Age: 74
End: 2019-08-11
Payer: MEDICARE

## 2019-08-11 DIAGNOSIS — K21.9 GASTROESOPHAGEAL REFLUX DISEASE WITHOUT ESOPHAGITIS: Primary | ICD-10-CM

## 2019-08-11 LAB
A/G RATIO: 1.5 (ref 1.1–2.2)
ALBUMIN SERPL-MCNC: 3.8 G/DL (ref 3.4–5)
ALP BLD-CCNC: 100 U/L (ref 40–129)
ALT SERPL-CCNC: 16 U/L (ref 10–40)
ANION GAP SERPL CALCULATED.3IONS-SCNC: 15 MMOL/L (ref 3–16)
AST SERPL-CCNC: 18 U/L (ref 15–37)
BASOPHILS ABSOLUTE: 0 K/UL (ref 0–0.2)
BASOPHILS RELATIVE PERCENT: 0.6 %
BILIRUB SERPL-MCNC: <0.2 MG/DL (ref 0–1)
BUN BLDV-MCNC: 30 MG/DL (ref 7–20)
CALCIUM SERPL-MCNC: 9.1 MG/DL (ref 8.3–10.6)
CHLORIDE BLD-SCNC: 99 MMOL/L (ref 99–110)
CO2: 24 MMOL/L (ref 21–32)
CREAT SERPL-MCNC: 0.9 MG/DL (ref 0.6–1.2)
EOSINOPHILS ABSOLUTE: 0.1 K/UL (ref 0–0.6)
EOSINOPHILS RELATIVE PERCENT: 2 %
GFR AFRICAN AMERICAN: >60
GFR NON-AFRICAN AMERICAN: >60
GLOBULIN: 2.5 G/DL
GLUCOSE BLD-MCNC: 119 MG/DL (ref 70–99)
HCT VFR BLD CALC: 29.8 % (ref 36–48)
HEMOGLOBIN: 9.5 G/DL (ref 12–16)
LIPASE: 19 U/L (ref 13–60)
LYMPHOCYTES ABSOLUTE: 1.4 K/UL (ref 1–5.1)
LYMPHOCYTES RELATIVE PERCENT: 38.3 %
MCH RBC QN AUTO: 26.9 PG (ref 26–34)
MCHC RBC AUTO-ENTMCNC: 32 G/DL (ref 31–36)
MCV RBC AUTO: 83.9 FL (ref 80–100)
MONOCYTES ABSOLUTE: 0.3 K/UL (ref 0–1.3)
MONOCYTES RELATIVE PERCENT: 8.6 %
NEUTROPHILS ABSOLUTE: 1.8 K/UL (ref 1.7–7.7)
NEUTROPHILS RELATIVE PERCENT: 50.5 %
PDW BLD-RTO: 19.4 % (ref 12.4–15.4)
PLATELET # BLD: 206 K/UL (ref 135–450)
PMV BLD AUTO: 8.6 FL (ref 5–10.5)
POTASSIUM REFLEX MAGNESIUM: 4.8 MMOL/L (ref 3.5–5.1)
RBC # BLD: 3.55 M/UL (ref 4–5.2)
SODIUM BLD-SCNC: 138 MMOL/L (ref 136–145)
TOTAL PROTEIN: 6.3 G/DL (ref 6.4–8.2)
TROPONIN: <0.01 NG/ML
WBC # BLD: 3.6 K/UL (ref 4–11)

## 2019-08-11 PROCEDURE — 96361 HYDRATE IV INFUSION ADD-ON: CPT

## 2019-08-11 PROCEDURE — 6370000000 HC RX 637 (ALT 250 FOR IP): Performed by: EMERGENCY MEDICINE

## 2019-08-11 PROCEDURE — 6360000004 HC RX CONTRAST MEDICATION: Performed by: EMERGENCY MEDICINE

## 2019-08-11 PROCEDURE — 85025 COMPLETE CBC W/AUTO DIFF WBC: CPT

## 2019-08-11 PROCEDURE — 71045 X-RAY EXAM CHEST 1 VIEW: CPT

## 2019-08-11 PROCEDURE — 2580000003 HC RX 258: Performed by: EMERGENCY MEDICINE

## 2019-08-11 PROCEDURE — 99284 EMERGENCY DEPT VISIT MOD MDM: CPT

## 2019-08-11 PROCEDURE — 83690 ASSAY OF LIPASE: CPT

## 2019-08-11 PROCEDURE — 71260 CT THORAX DX C+: CPT

## 2019-08-11 PROCEDURE — 84484 ASSAY OF TROPONIN QUANT: CPT

## 2019-08-11 PROCEDURE — 96374 THER/PROPH/DIAG INJ IV PUSH: CPT

## 2019-08-11 PROCEDURE — 93005 ELECTROCARDIOGRAM TRACING: CPT | Performed by: EMERGENCY MEDICINE

## 2019-08-11 PROCEDURE — 80053 COMPREHEN METABOLIC PANEL: CPT

## 2019-08-11 PROCEDURE — 2500000003 HC RX 250 WO HCPCS: Performed by: EMERGENCY MEDICINE

## 2019-08-11 RX ORDER — 0.9 % SODIUM CHLORIDE 0.9 %
1000 INTRAVENOUS SOLUTION INTRAVENOUS ONCE
Status: COMPLETED | OUTPATIENT
Start: 2019-08-11 | End: 2019-08-11

## 2019-08-11 RX ADMIN — IOPAMIDOL 75 ML: 755 INJECTION, SOLUTION INTRAVENOUS at 23:40

## 2019-08-11 RX ADMIN — FAMOTIDINE 20 MG: 10 INJECTION, SOLUTION INTRAVENOUS at 22:23

## 2019-08-11 RX ADMIN — SODIUM CHLORIDE 1000 ML: 9 INJECTION, SOLUTION INTRAVENOUS at 22:26

## 2019-08-11 RX ADMIN — LIDOCAINE HYDROCHLORIDE: 20 SOLUTION ORAL; TOPICAL at 21:30

## 2019-08-11 ASSESSMENT — ENCOUNTER SYMPTOMS
EYE DISCHARGE: 0
SORE THROAT: 0
RHINORRHEA: 0
NAUSEA: 0
EYE PAIN: 0
SHORTNESS OF BREATH: 0
DIARRHEA: 0
BACK PAIN: 0
COUGH: 0
ABDOMINAL PAIN: 1
VOMITING: 0
WHEEZING: 0

## 2019-08-11 ASSESSMENT — HEART SCORE: ECG: 0

## 2019-08-11 ASSESSMENT — PAIN DESCRIPTION - LOCATION: LOCATION: ABDOMEN

## 2019-08-11 ASSESSMENT — PAIN - FUNCTIONAL ASSESSMENT: PAIN_FUNCTIONAL_ASSESSMENT: ACTIVITIES ARE NOT PREVENTED

## 2019-08-11 ASSESSMENT — PAIN DESCRIPTION - PROGRESSION: CLINICAL_PROGRESSION: NOT CHANGED

## 2019-08-11 ASSESSMENT — PAIN DESCRIPTION - ORIENTATION: ORIENTATION: UPPER

## 2019-08-11 ASSESSMENT — PAIN DESCRIPTION - DESCRIPTORS: DESCRIPTORS: BURNING

## 2019-08-11 ASSESSMENT — PAIN DESCRIPTION - ONSET: ONSET: ON-GOING

## 2019-08-11 ASSESSMENT — PAIN DESCRIPTION - FREQUENCY: FREQUENCY: CONTINUOUS

## 2019-08-11 ASSESSMENT — PAIN DESCRIPTION - PAIN TYPE: TYPE: ACUTE PAIN

## 2019-08-11 ASSESSMENT — PAIN SCALES - GENERAL: PAINLEVEL_OUTOF10: 7

## 2019-08-12 ENCOUNTER — OFFICE VISIT (OUTPATIENT)
Dept: INTERNAL MEDICINE CLINIC | Age: 74
End: 2019-08-12
Payer: MEDICARE

## 2019-08-12 VITALS
DIASTOLIC BLOOD PRESSURE: 56 MMHG | HEIGHT: 62 IN | WEIGHT: 169.53 LBS | HEART RATE: 60 BPM | OXYGEN SATURATION: 97 % | TEMPERATURE: 98 F | BODY MASS INDEX: 31.2 KG/M2 | RESPIRATION RATE: 11 BRPM | SYSTOLIC BLOOD PRESSURE: 121 MMHG

## 2019-08-12 VITALS
BODY MASS INDEX: 30 KG/M2 | OXYGEN SATURATION: 96 % | WEIGHT: 164 LBS | HEART RATE: 70 BPM | DIASTOLIC BLOOD PRESSURE: 72 MMHG | SYSTOLIC BLOOD PRESSURE: 122 MMHG | RESPIRATION RATE: 18 BRPM

## 2019-08-12 DIAGNOSIS — K21.9 CHRONIC GERD: ICD-10-CM

## 2019-08-12 DIAGNOSIS — R11.0 NAUSEA: ICD-10-CM

## 2019-08-12 DIAGNOSIS — T23.201D PARTIAL THICKNESS BURN OF MULTIPLE SITES OF RIGHT HAND, SUBSEQUENT ENCOUNTER: ICD-10-CM

## 2019-08-12 DIAGNOSIS — T24.021D: ICD-10-CM

## 2019-08-12 PROBLEM — G93.40 ACUTE ENCEPHALOPATHY: Status: RESOLVED | Noted: 2018-01-13 | Resolved: 2019-08-12

## 2019-08-12 LAB
BILIRUBIN URINE: NEGATIVE
BLOOD, URINE: NEGATIVE
CLARITY: CLEAR
COLOR: YELLOW
EKG ATRIAL RATE: 51 BPM
EKG ATRIAL RATE: 56 BPM
EKG DIAGNOSIS: NORMAL
EKG DIAGNOSIS: NORMAL
EKG P AXIS: -7 DEGREES
EKG P AXIS: 28 DEGREES
EKG P-R INTERVAL: 124 MS
EKG P-R INTERVAL: 144 MS
EKG Q-T INTERVAL: 464 MS
EKG Q-T INTERVAL: 472 MS
EKG QRS DURATION: 80 MS
EKG QRS DURATION: 86 MS
EKG QTC CALCULATION (BAZETT): 435 MS
EKG QTC CALCULATION (BAZETT): 447 MS
EKG R AXIS: -1 DEGREES
EKG R AXIS: -2 DEGREES
EKG T AXIS: 25 DEGREES
EKG T AXIS: 37 DEGREES
EKG VENTRICULAR RATE: 51 BPM
EKG VENTRICULAR RATE: 56 BPM
GLUCOSE URINE: NEGATIVE MG/DL
KETONES, URINE: NEGATIVE MG/DL
LEUKOCYTE ESTERASE, URINE: NEGATIVE
MICROSCOPIC EXAMINATION: NORMAL
NITRITE, URINE: NEGATIVE
PH UA: 6.5 (ref 5–8)
PROTEIN UA: NEGATIVE MG/DL
SPECIFIC GRAVITY UA: 1.02 (ref 1–1.03)
TROPONIN: <0.01 NG/ML
URINE REFLEX TO CULTURE: NORMAL
URINE TYPE: NORMAL
UROBILINOGEN, URINE: 0.2 E.U./DL

## 2019-08-12 PROCEDURE — 99214 OFFICE O/P EST MOD 30 MIN: CPT | Performed by: FAMILY MEDICINE

## 2019-08-12 PROCEDURE — 84484 ASSAY OF TROPONIN QUANT: CPT

## 2019-08-12 PROCEDURE — 93005 ELECTROCARDIOGRAM TRACING: CPT | Performed by: EMERGENCY MEDICINE

## 2019-08-12 PROCEDURE — 6370000000 HC RX 637 (ALT 250 FOR IP): Performed by: EMERGENCY MEDICINE

## 2019-08-12 PROCEDURE — 81003 URINALYSIS AUTO W/O SCOPE: CPT

## 2019-08-12 PROCEDURE — 93010 ELECTROCARDIOGRAM REPORT: CPT | Performed by: INTERNAL MEDICINE

## 2019-08-12 RX ORDER — TIZANIDINE 4 MG/1
4 TABLET ORAL NIGHTLY
Qty: 7 TABLET | Refills: 0 | Status: SHIPPED | OUTPATIENT
Start: 2019-08-12 | End: 2019-08-19

## 2019-08-12 RX ORDER — ONDANSETRON 4 MG/1
4 TABLET, FILM COATED ORAL EVERY 12 HOURS PRN
Qty: 30 TABLET | Refills: 0 | Status: SHIPPED | OUTPATIENT
Start: 2019-08-12 | End: 2019-09-19 | Stop reason: SDUPTHER

## 2019-08-12 RX ORDER — BUTALBITAL, ACETAMINOPHEN AND CAFFEINE 50; 325; 40 MG/1; MG/1; MG/1
1 TABLET ORAL ONCE
Status: COMPLETED | OUTPATIENT
Start: 2019-08-12 | End: 2019-08-12

## 2019-08-12 RX ADMIN — BUTALBITAL, ACETAMINOPHEN, AND CAFFEINE 1 TABLET: 50; 325; 40 TABLET ORAL at 00:45

## 2019-08-12 ASSESSMENT — ENCOUNTER SYMPTOMS
DIARRHEA: 0
CONSTIPATION: 0
ABDOMINAL PAIN: 0
SHORTNESS OF BREATH: 0
BLOOD IN STOOL: 0

## 2019-08-12 ASSESSMENT — PAIN DESCRIPTION - PROGRESSION
CLINICAL_PROGRESSION: GRADUALLY IMPROVING
CLINICAL_PROGRESSION: RESOLVED
CLINICAL_PROGRESSION: RESOLVED

## 2019-08-12 ASSESSMENT — PAIN SCALES - GENERAL
PAINLEVEL_OUTOF10: 0
PAINLEVEL_OUTOF10: 0
PAINLEVEL_OUTOF10: 3
PAINLEVEL_OUTOF10: 2

## 2019-08-12 ASSESSMENT — PAIN DESCRIPTION - LOCATION: LOCATION: HEAD;ABDOMEN

## 2019-08-12 ASSESSMENT — PAIN DESCRIPTION - PAIN TYPE
TYPE: ACUTE PAIN
TYPE: ACUTE PAIN

## 2019-08-12 NOTE — ED NOTES
Pt presents to ED via EMS for c/o gastric reflux not relieved by her home medications. States she takes daily medication for this in the AM and PM. A couple of hours pta she had a more severe episode of reflux. She states \"I felt like my throat all the way to my stomach was on fire\". Reports some improvement in this pain since onset. GI cocktail given per order. PIV established. Labs obtained and sent. Awaiting test results. Pt verbalized understanding. Call light in reach.       Anton Longoria RN  08/11/19 0768

## 2019-08-12 NOTE — ED PROVIDER NOTES
Dr Ivanna Woodruff signed out patient for me  to follow up 2nd troponin. 75 yr old presented with Epigastirc burning pain now resolved. Hx GERD. GI cocktail given symptoms resolved. PE  Well appearing non toxic without abdominal TTP    Vitals:    08/12/19 0031 08/12/19 0101 08/12/19 0200 08/12/19 0230   BP: (!) 105/48 (!) 98/45 (!) 110/50 (!) 121/56   Pulse: 56 59 61 60   Resp: 12 11 13 11   Temp:       TempSrc:       SpO2: 96% 96% 96% 97%   Weight:       Height:         EKG reassuring    Trop x 2 negative    CT chest and abdomen pelvis reassuring      Both Dr Ivanna Woodruff and myself felt comfortable with discharging patient with PCP follow up in the next few days. I discussed with patient the results of evaluation in the ED, diagnosis, care, and prognosis. The plan is to discharge to home. Patient is in agreement with plan and questions have been answered.      I also discussed with patient the reasons which may require a return visit and the importance of follow-up care. The patient is well-appearing, nontoxic, and improved at the time of discharge. Patient agrees to call to arrange follow-up care as directed. Patient understands to return immediately for worsening/change in symptoms.          Pamela Carroll MD  08/12/19 5421

## 2019-08-12 NOTE — ED PROVIDER NOTES
discharge. Musculoskeletal: Negative for arthralgias, back pain, joint swelling and neck pain. Skin: Negative for rash. Allergic/Immunologic: Negative for environmental allergies. Neurological: Negative for dizziness, seizures, syncope and headaches. Hematological: Negative for adenopathy. Psychiatric/Behavioral: Negative for dysphoric mood and suicidal ideas. The patient is nervous/anxious. PAST MEDICAL HISTORY     Past Medical History:   Diagnosis Date    Arthritis     Atrial fibrillation ([address]     Closed fracture dislocation of right elbow     Migraine          SURGICAL HISTORY     Past Surgical History:   Procedure Laterality Date     SECTION      x4    OPEN TX FEMORAL SUPRACONDYLAR FRACTURE W/O EXTENSION Right 10/25/2018    OPEN REDUCTION INTERNAL FIXATION RIGHT FEMUR SUPRACONDYLAR FEMORAL FRACTURE WITH C-ARM performed by Laya Yost MD at [place] [address]      neck surgery     TOTAL KNEE ARTHROPLASTY Left          [address]       Discharge Medication List as of 2019  3:13 AM      CONTINUE these medications which have NOT CHANGED    Details   furosemide (LASIX) 20 MG tablet TAKE ONE TABLET BY MOUTH TWICE A DAY, Disp-60 tablet, R-2Normal      silver sulfADIAZINE (SILVADENE) 1 % cream Apply topically daily. , Disp-25 g, R-1, Normal      doxycycline hyclate (VIBRA-TABS) 100 MG tablet Take 1 tablet by mouth 2 times daily for 7 days, Disp-14 tablet, R-0Normal      tiZANidine (ZANAFLEX) 4 MG tablet Take 1 tablet by mouth nightly, Disp-30 tablet, R-3Normal      sertraline (ZOLOFT) 100 MG tablet Take 1 tablet by mouth daily, Disp-30 tablet, R-3Normal      mirtazapine (REMERON) 15 MG tablet Take 15 mg by mouth dailyHistorical Med      ranitidine (ZANTAC) 150 MG tablet Take 1 tablet by mouth nightly, Disp-30 tablet, R-3Normal      butalbital-acetaminophen-caffeine (FIORICET, ESGIC) -40 MG per tablet Take 1 tablet by mouth every 6 hours as caliber. No areas of focal wall thickening or obstruction identified. The appendix is not visualized. Pelvis: The bladder is unremarkable. Uterus is atrophic. Uterus and adnexa are without acute process. Peritoneum/Retroperitoneum: The visualized vasculature is without acute process. No lymphadenopathy, free intraperitoneal air, free fluid, or focal fluid collection identified. Bones/Soft Tissues: Soft tissues and osseous structures are without acute process. No acute intrathoracic, intra-abdominal, or intrapelvic abnormality. There is biliary ductal dilatation. While this may be related to the patient's cholecystectomy, this is new compared to the study from 2010. Clinical correlation for biliary obstruction recommended. Nonobstructing bilateral nephrolithiasis. There is minimal asymmetric prominence of the left-sided collecting system. This may be related to extrarenal pelvis. Recently passed stone is unlikely given the absence of adjacent inflammatory changes. Clinical correlation for site and type of abdominal pain recommended. Findings are in agreement with the preliminary report.            PROCEDURES   Unless otherwise noted below, none     Procedures    CRITICAL CARE TIME   N/A    CONSULTS:  None    EMERGENCY DEPARTMENT COURSE and DIFFERENTIAL DIAGNOSIS/MDM:   Vitals:    Vitals:    08/12/19 0031 08/12/19 0101 08/12/19 0200 08/12/19 0230   BP: (!) 105/48 (!) 98/45 (!) 110/50 (!) 121/56   Pulse: 56 59 61 60   Resp: 12 11 13 11   Temp:       TempSrc:       SpO2: 96% 96% 96% 97%   Weight:       Height:           Patient was given the following medications:  Medications   aluminum & magnesium hydroxide-simethicone (MAALOX) 30 mL, lidocaine viscous hcl (XYLOCAINE) 5 mL (GI COCKTAIL) ( Oral Given 8/11/19 2130)   famotidine (PEPCID) injection 20 mg (20 mg Intravenous Given 8/11/19 2223)   0.9 % sodium chloride bolus (0 mLs Intravenous Stopped 8/11/19 2326)   iopamidol (ISOVUE-370) 76 % injection 75 mL

## 2019-08-13 ENCOUNTER — TELEPHONE (OUTPATIENT)
Dept: INTERNAL MEDICINE CLINIC | Age: 74
End: 2019-08-13

## 2019-08-14 ENCOUNTER — TELEPHONE (OUTPATIENT)
Dept: INTERNAL MEDICINE CLINIC | Age: 74
End: 2019-08-14

## 2019-08-16 ENCOUNTER — TELEPHONE (OUTPATIENT)
Dept: CARDIOLOGY CLINIC | Age: 74
End: 2019-08-16

## 2019-08-16 DIAGNOSIS — I48.91 ATRIAL FIBRILLATION WITH RVR (HCC): Primary | ICD-10-CM

## 2019-08-16 DIAGNOSIS — Z51.81 ENCOUNTER FOR MONITORING FLECAINIDE THERAPY: ICD-10-CM

## 2019-08-16 DIAGNOSIS — I48.91 NEW ONSET A-FIB (HCC): ICD-10-CM

## 2019-08-16 DIAGNOSIS — Z79.899 ENCOUNTER FOR MONITORING FLECAINIDE THERAPY: ICD-10-CM

## 2019-08-16 DIAGNOSIS — R06.02 SHORTNESS OF BREATH: ICD-10-CM

## 2019-08-16 RX ORDER — FLECAINIDE ACETATE 50 MG/1
50 TABLET ORAL 2 TIMES DAILY
Qty: 60 TABLET | Refills: 11 | Status: SHIPPED | OUTPATIENT
Start: 2019-08-16 | End: 2019-09-12 | Stop reason: DRUGHIGH

## 2019-08-16 RX ORDER — METOPROLOL SUCCINATE 50 MG/1
50 TABLET, EXTENDED RELEASE ORAL DAILY
Qty: 30 TABLET | Refills: 11 | Status: SHIPPED | OUTPATIENT
Start: 2019-08-16 | End: 2020-07-21

## 2019-08-22 ENCOUNTER — HOSPITAL ENCOUNTER (OUTPATIENT)
Dept: NON INVASIVE DIAGNOSTICS | Age: 74
Discharge: HOME | End: 2019-08-22
Payer: MEDICARE

## 2019-08-22 DIAGNOSIS — R06.02 SHORTNESS OF BREATH: ICD-10-CM

## 2019-08-22 LAB
LV EF: 67 %
LVEF MODALITY: NORMAL

## 2019-08-22 PROCEDURE — 6360000002 HC RX W HCPCS: Performed by: INTERNAL MEDICINE

## 2019-08-22 PROCEDURE — 3430000000 HC RX DIAGNOSTIC RADIOPHARMACEUTICAL: Performed by: INTERNAL MEDICINE

## 2019-08-22 PROCEDURE — 93017 CV STRESS TEST TRACING ONLY: CPT

## 2019-08-22 PROCEDURE — A9502 TC99M TETROFOSMIN: HCPCS | Performed by: INTERNAL MEDICINE

## 2019-08-22 PROCEDURE — 78452 HT MUSCLE IMAGE SPECT MULT: CPT

## 2019-08-22 RX ADMIN — TETROFOSMIN 10 MILLICURIE: 1.38 INJECTION, POWDER, LYOPHILIZED, FOR SOLUTION INTRAVENOUS at 10:57

## 2019-08-22 RX ADMIN — TETROFOSMIN 30 MILLICURIE: 1.38 INJECTION, POWDER, LYOPHILIZED, FOR SOLUTION INTRAVENOUS at 12:50

## 2019-08-22 RX ADMIN — REGADENOSON 0.4 MG: 0.08 INJECTION, SOLUTION INTRAVENOUS at 12:48

## 2019-08-26 ENCOUNTER — OFFICE VISIT (OUTPATIENT)
Dept: INTERNAL MEDICINE CLINIC | Age: 74
End: 2019-08-26
Payer: MEDICARE

## 2019-08-26 VITALS
SYSTOLIC BLOOD PRESSURE: 139 MMHG | HEART RATE: 75 BPM | BODY MASS INDEX: 28.23 KG/M2 | OXYGEN SATURATION: 93 % | HEIGHT: 62 IN | DIASTOLIC BLOOD PRESSURE: 70 MMHG | WEIGHT: 153.4 LBS

## 2019-08-26 DIAGNOSIS — T21.22XD PARTIAL THICKNESS BURN OF ABDOMEN, SUBSEQUENT ENCOUNTER: Primary | ICD-10-CM

## 2019-08-26 DIAGNOSIS — R11.0 NAUSEA: ICD-10-CM

## 2019-08-26 DIAGNOSIS — K21.9 GASTROESOPHAGEAL REFLUX DISEASE WITHOUT ESOPHAGITIS: ICD-10-CM

## 2019-08-26 DIAGNOSIS — L29.9 ITCHING: ICD-10-CM

## 2019-08-26 PROCEDURE — 99213 OFFICE O/P EST LOW 20 MIN: CPT | Performed by: NURSE PRACTITIONER

## 2019-08-26 RX ORDER — LORATADINE 10 MG/1
10 CAPSULE, LIQUID FILLED ORAL DAILY
COMMUNITY
End: 2019-09-20 | Stop reason: ALTCHOICE

## 2019-08-26 RX ORDER — RANITIDINE 150 MG/1
150 TABLET ORAL NIGHTLY
Qty: 30 TABLET | Refills: 3 | Status: SHIPPED | OUTPATIENT
Start: 2019-08-26 | End: 2019-09-19 | Stop reason: SINTOL

## 2019-08-26 RX ORDER — FENOPROFEN CALCIUM 200 MG
CAPSULE ORAL
Qty: 59 ML | Refills: 3 | Status: SHIPPED | OUTPATIENT
Start: 2019-08-26 | End: 2019-09-20

## 2019-08-26 ASSESSMENT — ENCOUNTER SYMPTOMS
EYE DISCHARGE: 0
TROUBLE SWALLOWING: 0
EYE PAIN: 0
SHORTNESS OF BREATH: 0
VOICE CHANGE: 0
SINUS PRESSURE: 0
SORE THROAT: 0
CHEST TIGHTNESS: 0
CHOKING: 0
FACIAL SWELLING: 0
EYE REDNESS: 0
EYE ITCHING: 0
ABDOMINAL DISTENTION: 0

## 2019-08-26 NOTE — PATIENT INSTRUCTIONS
Take Benadryl for itching  Keep knee area clean and dry  Apply face moisturizer   Change hair products (shampoo and conditioner)  Apply hydrocortisone lotion

## 2019-08-26 NOTE — PROGRESS NOTES
[address] [facility]  [place] [address]  Dept: [phone]       2019     Ute Coronado (:  )GD a 75 y.o. female, here for evaluation of the following medical concerns:    HPI   Partial Thickness Burn  She reports spilling cream corn on hand and knee while removing from microwave @ the beginning of August.  She states she has been applying triple antibiotic cream to area. She reports swelling of first two fingers and decreased ROM. Reports swelling and pain of right knee. She was prescribed Silvadene cream and doxycycline. Today the lesion seems to be healing fairly well with  Scab formation. Lesion under right hand is already dry and almost healed but  lesion on the right knee is resolving. No redness, swelling. She is ambulating with a front wheel walker without difficulty  GERD  She is reporting the burning sensation has resolved. States she is taking medications as prescribed and nausea is resolving    Itching  She is reporting head, face, and neck itching. Denies rash or redness, or swelling. Denies new cream, lotion, oil, shampoo. She reports this began over 1 month ago. Denies sick contacts.     Lab Results   Component Value Date    WBC 3.6 (L) 2019    HGB 9.5 (L) 2019    HCT 29.8 (L) 2019    MCV 83.9 2019     2019     Lab Results   Component Value Date     2019    K 4.8 2019    CL 99 2019    CO2 24 2019    BUN 30 (H) 2019    CREATININE 0.9 2019    GLUCOSE 119 (H) 2019    CALCIUM 9.1 2019    PROT 6.3 (L) 2019    LABALBU 3.8 2019    BILITOT <0.2 2019    ALKPHOS 100 2019    AST 18 2019    ALT 16 2019    LABGLOM >60 2019    GFRAA >60 2019    AGRATIO 1.5 2019    GLOB 2.5 2019       Review of Systems   HENT: Negative for dental problem, facial swelling, sinus pressure, sore throat,

## 2019-08-29 DIAGNOSIS — F41.9 ANXIETY: ICD-10-CM

## 2019-08-30 RX ORDER — ALPRAZOLAM 0.5 MG/1
0.5 TABLET ORAL 2 TIMES DAILY
Qty: 60 TABLET | Refills: 0 | Status: SHIPPED | OUTPATIENT
Start: 2019-08-30 | End: 2019-09-29

## 2019-09-01 ENCOUNTER — HOSPITAL ENCOUNTER (EMERGENCY)
Age: 74
Discharge: HOME | End: 2019-09-01
Payer: MEDICARE

## 2019-09-01 ENCOUNTER — APPOINTMENT (OUTPATIENT)
Dept: CT IMAGING | Age: 74
End: 2019-09-01
Payer: MEDICARE

## 2019-09-01 VITALS
HEIGHT: 61 IN | DIASTOLIC BLOOD PRESSURE: 72 MMHG | HEART RATE: 74 BPM | WEIGHT: 160.94 LBS | OXYGEN SATURATION: 99 % | TEMPERATURE: 98.6 F | SYSTOLIC BLOOD PRESSURE: 139 MMHG | RESPIRATION RATE: 16 BRPM | BODY MASS INDEX: 30.39 KG/M2

## 2019-09-01 DIAGNOSIS — S00.03XA CONTUSION OF SCALP, INITIAL ENCOUNTER: ICD-10-CM

## 2019-09-01 DIAGNOSIS — S09.90XA INJURY OF HEAD, INITIAL ENCOUNTER: ICD-10-CM
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
## 2019-09-01 PROCEDURE — 72125 CT NECK SPINE W/O DYE: CPT

## 2019-09-01 PROCEDURE — 70450 CT HEAD/BRAIN W/O DYE: CPT

## 2019-09-01 PROCEDURE — 99284 EMERGENCY DEPT VISIT MOD MDM: CPT

## 2019-09-01 ASSESSMENT — PAIN DESCRIPTION - ONSET: ONSET: GRADUAL

## 2019-09-01 ASSESSMENT — PAIN DESCRIPTION - PROGRESSION: CLINICAL_PROGRESSION: GRADUALLY WORSENING

## 2019-09-01 ASSESSMENT — PAIN DESCRIPTION - PAIN TYPE: TYPE: ACUTE PAIN

## 2019-09-01 ASSESSMENT — ENCOUNTER SYMPTOMS: RESPIRATORY NEGATIVE: 1

## 2019-09-01 ASSESSMENT — PAIN SCALES - GENERAL
PAINLEVEL_OUTOF10: 3
PAINLEVEL_OUTOF10: 0

## 2019-09-01 ASSESSMENT — PAIN DESCRIPTION - LOCATION: LOCATION: HEAD

## 2019-09-01 ASSESSMENT — PAIN DESCRIPTION - FREQUENCY: FREQUENCY: CONTINUOUS

## 2019-09-01 ASSESSMENT — PAIN DESCRIPTION - DESCRIPTORS: DESCRIPTORS: ACHING;TENDER

## 2019-09-01 NOTE — ED PROVIDER NOTES
Negative. Respiratory: Negative. Cardiovascular: Negative. Skin: Positive for wound. Neurological: Positive for headaches. Except as noted above the remainder of the review of systems was reviewed and negative. PAST MEDICAL HISTORY         Diagnosis Date    Arthritis     Atrial fibrillation ([address]     Closed fracture dislocation of right elbow     Migraine        SURGICAL HISTORY           Procedure Laterality Date     SECTION      x4    OPEN TX FEMORAL SUPRACONDYLAR FRACTURE W/O EXTENSION Right 10/25/2018    OPEN REDUCTION INTERNAL FIXATION RIGHT FEMUR SUPRACONDYLAR FEMORAL FRACTURE WITH C-ARM performed by Wm Suero MD at [facility]      neck surgery     TOTAL KNEE ARTHROPLASTY Left        CURRENT MEDICATIONS     [unfilled]    ALLERGIES     Demerol hcl [meperidine] and Pcn [penicillins]    FAMILY HISTORY     History reviewed. No pertinent family history. No family status information on file. SOCIAL HISTORY      reports that she has never smoked. She has never used smokeless tobacco. She reports that she does not drink alcohol or use drugs. PHYSICAL EXAM    (up to 7 for level 4, 8 or more for level 5)     ED Triage Vitals [19 1321]   Enc Vitals Group      /68      Pulse 73      Resp 16      Temp 98.7 °F (37.1 °C)      Temp Source Oral      SpO2 98 %      Weight 160 lb 15 oz (73 kg)      Height 5' 1\" (1.549 m)      Head Circumference       Peak Flow       Pain Score       Pain Loc       Pain Edu? Excl. in [address]? Physical Exam   Constitutional: She is oriented to person, place, and time. She appears well-developed and well-nourished. No distress. HENT:   Head: Head is with abrasion and with contusion. Neck: Trachea normal, normal range of motion, full passive range of motion without pain and phonation normal. Neck supple. No spinous process tenderness and no muscular tenderness present. No neck rigidity. Height: 5' 1\" (1.549 m)       Medications - No data to display    MDM  Patient was seen and evaluated per myself. Dr. Daina De Oliveira present available for consultation as needed. Clinically the patient is awake alert and oriented. She does seem reliable. She is only complaining of mild scalp tenderness at the area of contusion. There is no evidence of laceration. No evidence of hemotympanum, espinoza sign or raccoon eyes. Musculoskeletal exam otherwise is unremarkable. Vital signs are stable. CT of the head negative for any evidence of skull fracture, mass mass-effect. CT the cervical spine negative for fracture subluxation dislocation. She has multilevel disease. I feel that this patient can be safely discharged home. There is no evidence of confusion. She is answering questions appropriately. No neurological derangement. Patient was given contusion and abrasion instructions for home management as well as head injury. She is encouraged to use Tylenol as needed for headache or discomfort. Patient verbalized understanding and she is discharged home in good condition. This dictation was performed with a verbal recognition program (DRAGON) and it was checked for errors. It is possible that there are still dictated errors within this office note. If so, please bring any errors to my attention for an addendum. All efforts were made to ensure that this office note is accurate. CONSULTS:  None    PROCEDURES:  Procedures    FINAL IMPRESSION      1. Fall from wheelchair, initial encounter    2. Contusion of scalp, initial encounter    3.  Injury of head, initial encounter          DISPOSITION/PLAN   [unfilled]    PATIENT REFERRED TO:  MUSA Walker - CNP  [address]  [phone]    Schedule an appointment as soon as possible for a visit         DISCHARGE MEDICATIONS:  Discharge Medication List as of 9/1/2019  2:55 PM          (Please note that portions of this note

## 2019-09-01 NOTE — ED NOTES
Bed: B-05  Expected date: 9/1/19  Expected time: 1:15 PM  Means of arrival: [facility]  Comments:  1 Brody Mensah RN  09/01/19 8588

## 2019-09-03 ENCOUNTER — HOSPITAL ENCOUNTER (EMERGENCY)
Age: 74
Discharge: HOME | End: 2019-09-03
Attending: EMERGENCY MEDICINE
Payer: MEDICARE

## 2019-09-03 VITALS
HEART RATE: 78 BPM | OXYGEN SATURATION: 98 % | RESPIRATION RATE: 18 BRPM | BODY MASS INDEX: 30.63 KG/M2 | TEMPERATURE: 100.1 F | SYSTOLIC BLOOD PRESSURE: 122 MMHG | HEIGHT: 61 IN | DIASTOLIC BLOOD PRESSURE: 57 MMHG | WEIGHT: 162.26 LBS

## 2019-09-03 DIAGNOSIS — R50.9 LOW GRADE FEVER: ICD-10-CM

## 2019-09-03 DIAGNOSIS — R41.82 ALTERED MENTAL STATUS, UNSPECIFIED ALTERED MENTAL STATUS TYPE: Primary | ICD-10-CM

## 2019-09-03 LAB
A/G RATIO: 1.5 (ref 1.1–2.2)
ALBUMIN SERPL-MCNC: 4.5 G/DL (ref 3.4–5)
ALP BLD-CCNC: 90 U/L (ref 40–129)
ALT SERPL-CCNC: 14 U/L (ref 10–40)
ANION GAP SERPL CALCULATED.3IONS-SCNC: 14 MMOL/L (ref 3–16)
AST SERPL-CCNC: 20 U/L (ref 15–37)
BASOPHILS ABSOLUTE: 0 K/UL (ref 0–0.2)
BASOPHILS RELATIVE PERCENT: 0.5 %
BILIRUB SERPL-MCNC: 0.4 MG/DL (ref 0–1)
BILIRUBIN URINE: NEGATIVE
BLOOD, URINE: NEGATIVE
BUN BLDV-MCNC: 17 MG/DL (ref 7–20)
CALCIUM SERPL-MCNC: 9.8 MG/DL (ref 8.3–10.6)
CHLORIDE BLD-SCNC: 97 MMOL/L (ref 99–110)
CLARITY: CLEAR
CO2: 28 MMOL/L (ref 21–32)
COLOR: YELLOW
CREAT SERPL-MCNC: 0.7 MG/DL (ref 0.6–1.2)
EOSINOPHILS ABSOLUTE: 0 K/UL (ref 0–0.6)
EOSINOPHILS RELATIVE PERCENT: 0.6 %
GFR AFRICAN AMERICAN: >60
GFR NON-AFRICAN AMERICAN: >60
GLOBULIN: 3.1 G/DL
GLUCOSE BLD-MCNC: 104 MG/DL (ref 70–99)
GLUCOSE URINE: NEGATIVE MG/DL
HCT VFR BLD CALC: 31.6 % (ref 36–48)
HEMOGLOBIN: 10.1 G/DL (ref 12–16)
KETONES, URINE: NEGATIVE MG/DL
LACTIC ACID: 1.3 MMOL/L (ref 0.4–2)
LEUKOCYTE ESTERASE, URINE: NEGATIVE
LYMPHOCYTES ABSOLUTE: 0.9 K/UL (ref 1–5.1)
LYMPHOCYTES RELATIVE PERCENT: 19.5 %
MCH RBC QN AUTO: 26.6 PG (ref 26–34)
MCHC RBC AUTO-ENTMCNC: 31.9 G/DL (ref 31–36)
MCV RBC AUTO: 83.4 FL (ref 80–100)
MICROSCOPIC EXAMINATION: ABNORMAL
MONOCYTES ABSOLUTE: 0.5 K/UL (ref 0–1.3)
MONOCYTES RELATIVE PERCENT: 10.5 %
NEUTROPHILS ABSOLUTE: 3.2 K/UL (ref 1.7–7.7)
NEUTROPHILS RELATIVE PERCENT: 68.9 %
NITRITE, URINE: NEGATIVE
PDW BLD-RTO: 19.9 % (ref 12.4–15.4)
PH UA: 7 (ref 5–8)
PLATELET # BLD: 226 K/UL (ref 135–450)
PMV BLD AUTO: 8.1 FL (ref 5–10.5)
POTASSIUM SERPL-SCNC: 4 MMOL/L (ref 3.5–5.1)
PROTEIN UA: NEGATIVE MG/DL
RBC # BLD: 3.79 M/UL (ref 4–5.2)
SODIUM BLD-SCNC: 139 MMOL/L (ref 136–145)
SPECIFIC GRAVITY UA: 1.01 (ref 1–1.03)
TOTAL PROTEIN: 7.6 G/DL (ref 6.4–8.2)
URINE REFLEX TO CULTURE: ABNORMAL
URINE TYPE: ABNORMAL
UROBILINOGEN, URINE: 2 E.U./DL
WBC # BLD: 4.7 K/UL (ref 4–11)

## 2019-09-03 PROCEDURE — 2580000003 HC RX 258: Performed by: EMERGENCY MEDICINE

## 2019-09-03 PROCEDURE — 6360000002 HC RX W HCPCS: Performed by: EMERGENCY MEDICINE

## 2019-09-03 PROCEDURE — 80053 COMPREHEN METABOLIC PANEL: CPT

## 2019-09-03 PROCEDURE — 85025 COMPLETE CBC W/AUTO DIFF WBC: CPT

## 2019-09-03 PROCEDURE — 96365 THER/PROPH/DIAG IV INF INIT: CPT

## 2019-09-03 PROCEDURE — 81003 URINALYSIS AUTO W/O SCOPE: CPT

## 2019-09-03 PROCEDURE — 96361 HYDRATE IV INFUSION ADD-ON: CPT

## 2019-09-03 PROCEDURE — 83605 ASSAY OF LACTIC ACID: CPT

## 2019-09-03 PROCEDURE — 99283 EMERGENCY DEPT VISIT LOW MDM: CPT

## 2019-09-03 RX ORDER — 0.9 % SODIUM CHLORIDE 0.9 %
1000 INTRAVENOUS SOLUTION INTRAVENOUS ONCE
Status: COMPLETED | OUTPATIENT
Start: 2019-09-03 | End: 2019-09-03

## 2019-09-03 RX ORDER — CIPROFLOXACIN 2 MG/ML
400 INJECTION, SOLUTION INTRAVENOUS ONCE
Status: COMPLETED | OUTPATIENT
Start: 2019-09-03 | End: 2019-09-03

## 2019-09-03 RX ORDER — CIPROFLOXACIN 500 MG/1
500 TABLET, FILM COATED ORAL 2 TIMES DAILY
Qty: 20 TABLET | Refills: 0 | Status: SHIPPED | OUTPATIENT
Start: 2019-09-03 | End: 2019-09-05 | Stop reason: ALTCHOICE

## 2019-09-03 RX ADMIN — CIPROFLOXACIN 400 MG: 2 INJECTION, SOLUTION INTRAVENOUS at 17:41

## 2019-09-03 RX ADMIN — SODIUM CHLORIDE 1000 ML: 9 INJECTION, SOLUTION INTRAVENOUS at 17:39

## 2019-09-03 RX ADMIN — SODIUM CHLORIDE 1000 ML: 9 INJECTION, SOLUTION INTRAVENOUS at 16:06

## 2019-09-03 ASSESSMENT — PAIN SCALES - GENERAL
PAINLEVEL_OUTOF10: 0

## 2019-09-03 ASSESSMENT — PAIN - FUNCTIONAL ASSESSMENT: PAIN_FUNCTIONAL_ASSESSMENT: 0-10

## 2019-09-03 NOTE — ED PROVIDER NOTES
(CYANOCOBALAMIN) 1000 MCG TABLET    Take 2,000 mcg by mouth daily       ALLERGIES     Demerol hcl [meperidine] and Pcn [penicillins]    FAMILY HISTORY     History reviewed. No pertinent family history. SOCIAL HISTORY       Social History     Socioeconomic History    Marital status:      Spouse name: None    Number of children: 4    Years of education: None    Highest education level: High school graduate   Occupational History    None   Social Needs    Financial resource strain: None    Food insecurity:     Worry: Never true     Inability: Never true    Transportation needs:     Medical: No     Non-medical: No   Tobacco Use    Smoking status: Never Smoker    Smokeless tobacco: Never Used   Substance and Sexual Activity    Alcohol use: No    Drug use: No    Sexual activity: Never   Lifestyle    Physical activity:     Days per week: None     Minutes per session: None    Stress: None   Relationships    Social connections:     Talks on phone: None     Gets together: None     Attends [employer] service: None     Active member of club or organization: None     Attends meetings of clubs or organizations: None     Relationship status: None    Intimate partner violence:     Fear of current or ex partner: No     Emotionally abused: No     Physically abused: No     Forced sexual activity: No   Other Topics Concern    None   Social History Narrative    Lives with son       SCREENINGS           PHYSICAL EXAM    (up to 7 for level 4, 8 or more for level 5)     ED Triage Vitals   BP Temp Temp src Pulse Resp SpO2 Height Weight   -- -- -- -- -- -- -- --       Physical Exam    General: Alert and awake ×1. Nontoxic appearance. Well-developed well-nourished elderly 76-year-old in no distress. Patient is acting confused. Patient is answering some basic questions like who she is and the name of her son. Patient however does not know the date. Denies any hallucination at this time.   HEENT: Normocephalic atraumatic. Neck is supple. Airway intact. No adenopathy  Cardiac: Regular rate and rhythm with no murmurs rubs or gallops  Pulmonary: Lungs are clear in all lung fields. No wheezing. No Rales. Abdomen: Soft and nontender. Negative hepatosplenomegaly. Bowel sounds are active  Extremities: Moving all extremities. No calf tenderness. Peripheral pulses all intact  Skin: No skin lesions. No rashes  Neurologic: Cranial nerves II through XII was grossly intact. Nonfocal neurological exam  Psychiatric: Patient is pleasant. Mood is appropriate. DIAGNOSTIC RESULTS     EKG (Per Emergency Physician):       RADIOLOGY (Per Emergency Physician): Interpretation per the Radiologist below, if available at the time of this note:  No results found.     ED BEDSIDE ULTRASOUND:   Performed by ED Physician - none    LABS:  Labs Reviewed   CBC WITH AUTO DIFFERENTIAL - Abnormal; Notable for the following components:       Result Value    RBC 3.79 (*)     Hemoglobin 10.1 (*)     Hematocrit 31.6 (*)     RDW 19.9 (*)     Lymphocytes Absolute 0.9 (*)     All other components within normal limits    Narrative:     Performed at:  [address] [employer] [address]   Phone [phone]   COMPREHENSIVE METABOLIC PANEL - Abnormal; Notable for the following components:    Chloride 97 (*)     Glucose 104 (*)     All other components within normal limits    Narrative:     Performed at:  [address] [employer] [address]   Phone [phone]   URINE RT REFLEX TO CULTURE - Abnormal; Notable for the following components:    Urobilinogen, Urine 2.0 (*)     All other components within normal limits    Narrative:     Performed at:  [address] [employer] [address]   Phone [phone]   LACTIC ACID, PLASMA    Narrative:     Performed at:  [facility]

## 2019-09-04 ENCOUNTER — TELEPHONE (OUTPATIENT)
Dept: INTERNAL MEDICINE CLINIC | Age: 74
End: 2019-09-04

## 2019-09-05 ENCOUNTER — TELEPHONE (OUTPATIENT)
Dept: INTERNAL MEDICINE CLINIC | Age: 74
End: 2019-09-05

## 2019-09-11 ENCOUNTER — TELEPHONE (OUTPATIENT)
Dept: INTERNAL MEDICINE CLINIC | Age: 74
End: 2019-09-11

## 2019-09-11 NOTE — PROGRESS NOTES
[facility]  Cardiology  Note      Aleena Youngblood  1945, 75 y.o.      CC: \" My doctor thinks I have Afib. \"                 MUSA Barrera - CNP:      HPI:   This is a 75 y.o. female with history of AFIB and multiple falls who presented to [facility] 1/13/18 with confusion and generalized weakness. This may have been associated with multiple falls. She was found to be in AFIB with RVR and started on Cardizem gtt which was transitioned to po. ECHO performed was sub-optimal and showed EF of 55-60%. Continues to complain of palpitations and wore 30 day event monitor showing Afib with RVR. Patient was started on Flecainide 50 mg BID and Toprol XL 25 mg daily (8/2019). Presented to ER 9/1/19 after falling out of her wheelchair at home. Per ER report, she hit her head on the floor, but did not lose consciousness. CT of head was negative. Another ER visit on 9/3/19 with reports of hallucinations and confusion. Treated empirically with antibiotics and discharged home. Patient returns today for check up of Afib. She uses a walker for assistance. She offers taking all medication as prescribed, but says she cannot afford Flecainide. Says she suffers from depression and takes medication for this. She reports compliance to medication and tolerating. Social History     Tobacco Use    Smoking status: Never Smoker    Smokeless tobacco: Never Used   Substance Use Topics    Alcohol use: No    Drug use: No     Allergies   Allergen Reactions    Demerol Hcl [Meperidine]     Pcn [Penicillins]      Patient reports she has not had since she was a child, thinks reaction was hives.           Review of Systems -   Constitutional: Negative for weight gain/loss; malaise, fever  Respiratory: Negative for Asthma;  cough and hemoptysis  Cardiovascular: Negative for palpitations,dizziness   Gastrointestinal: Negative for abd.pain; constipation/diarrhea;    Genitourinary: Negative for stones; hematuria;

## 2019-09-12 ENCOUNTER — OFFICE VISIT (OUTPATIENT)
Dept: CARDIOLOGY CLINIC | Age: 74
End: 2019-09-12
Payer: MEDICARE

## 2019-09-12 ENCOUNTER — TELEPHONE (OUTPATIENT)
Dept: CARDIOLOGY CLINIC | Age: 74
End: 2019-09-12

## 2019-09-12 VITALS
OXYGEN SATURATION: 98 % | HEART RATE: 67 BPM | DIASTOLIC BLOOD PRESSURE: 90 MMHG | SYSTOLIC BLOOD PRESSURE: 120 MMHG | BODY MASS INDEX: 30.4 KG/M2 | WEIGHT: 161 LBS | HEIGHT: 61 IN

## 2019-09-12 DIAGNOSIS — I48.0 PAF (PAROXYSMAL ATRIAL FIBRILLATION) (HCC): Primary | ICD-10-CM

## 2019-09-12 PROCEDURE — 93000 ELECTROCARDIOGRAM COMPLETE: CPT | Performed by: INTERNAL MEDICINE

## 2019-09-12 PROCEDURE — 99214 OFFICE O/P EST MOD 30 MIN: CPT | Performed by: INTERNAL MEDICINE

## 2019-09-12 RX ORDER — FLECAINIDE ACETATE 100 MG/1
100 TABLET ORAL 2 TIMES DAILY
Qty: 60 TABLET | Refills: 5 | Status: SHIPPED | OUTPATIENT
Start: 2019-09-12 | End: 2019-09-12 | Stop reason: SDUPTHER

## 2019-09-12 RX ORDER — FLECAINIDE ACETATE 100 MG/1
100 TABLET ORAL 2 TIMES DAILY
Qty: 60 TABLET | Refills: 5 | Status: SHIPPED
Start: 2019-09-12 | End: 2019-09-12 | Stop reason: SDUPTHER

## 2019-09-12 RX ORDER — FLECAINIDE ACETATE 100 MG/1
100 TABLET ORAL 2 TIMES DAILY
Qty: 60 TABLET | Refills: 5 | Status: SHIPPED
Start: 2019-09-12 | End: 2020-03-13 | Stop reason: DRUGHIGH

## 2019-09-12 NOTE — PATIENT INSTRUCTIONS
· You have symptoms of a stroke. These may include:  ? Sudden numbness, tingling, weakness, or loss of movement in your face, arm, or leg, especially on only one side of your body. ? Sudden vision changes. ? Sudden trouble speaking. ? Sudden confusion or trouble understanding simple statements. ? Sudden problems with walking or balance. ? A sudden, severe headache that is different from past headaches.     · You passed out (lost consciousness).    Call your doctor now or seek immediate medical care if:    · You have new or increased shortness of breath.     · You feel dizzy or lightheaded, or you feel like you may faint.     · Your heart rate becomes irregular.     · You can feel your heart flutter in your chest or skip heartbeats. Tell your doctor if these symptoms are new or worse.    Watch closely for changes in your health, and be sure to contact your doctor if you have any problems. Where can you learn more? Go to [email or web address]://[email or web address] org and sign in to your [employer] account. Enter U020 in the [employer] box to learn more about \"Atrial Fibrillation: Care Instructions. \"     If you do not have an account, please click on the \"Sign Up Now\" link. Current as of: July 22, 2018  Content Version: 12.1  © 6941-0764 Healthwise, Incorporated. Care instructions adapted under license by [facility] ([facility]). If you have questions about a medical condition or this instruction, always ask your healthcare professional. [place] [address] any warranty or liability for your use of this information. PLEASE CHECK WITH OTHER PHARMACIES TO SEE IF THE COST OF FLECAINIDE IS LESS EXPENSIVE THAN AT [facility]. I WILL PRESCRIBE A 100 MG TABLET AND YOU CAN CUT THE TABLET IN HALF. TAKE 1/2 TABLET DAILY. FLECAINIDE IS NEEDED TO KEEP YOU IN A REGULAR RHYTHM.

## 2019-09-13 ENCOUNTER — OFFICE VISIT (OUTPATIENT)
Dept: PSYCHIATRY | Age: 74
End: 2019-09-13
Payer: MEDICARE

## 2019-09-13 VITALS
HEART RATE: 68 BPM | DIASTOLIC BLOOD PRESSURE: 84 MMHG | SYSTOLIC BLOOD PRESSURE: 158 MMHG | BODY MASS INDEX: 30.4 KG/M2 | WEIGHT: 161 LBS | HEIGHT: 61 IN

## 2019-09-13 DIAGNOSIS — F41.1 GAD (GENERALIZED ANXIETY DISORDER): Primary | ICD-10-CM

## 2019-09-13 PROCEDURE — 99204 OFFICE O/P NEW MOD 45 MIN: CPT | Performed by: NURSE PRACTITIONER

## 2019-09-13 ASSESSMENT — PATIENT HEALTH QUESTIONNAIRE - PHQ9
5. POOR APPETITE OR OVEREATING: 2
4. FEELING TIRED OR HAVING LITTLE ENERGY: 0
6. FEELING BAD ABOUT YOURSELF - OR THAT YOU ARE A FAILURE OR HAVE LET YOURSELF OR YOUR FAMILY DOWN: 0
3. TROUBLE FALLING OR STAYING ASLEEP: 2
SUM OF ALL RESPONSES TO PHQ QUESTIONS 1-9: 6
10. IF YOU CHECKED OFF ANY PROBLEMS, HOW DIFFICULT HAVE THESE PROBLEMS MADE IT FOR YOU TO DO YOUR WORK, TAKE CARE OF THINGS AT HOME, OR GET ALONG WITH OTHER PEOPLE: 0
8. MOVING OR SPEAKING SO SLOWLY THAT OTHER PEOPLE COULD HAVE NOTICED. OR THE OPPOSITE, BEING SO FIGETY OR RESTLESS THAT YOU HAVE BEEN MOVING AROUND A LOT MORE THAN USUAL: 0
2. FEELING DOWN, DEPRESSED OR HOPELESS: 0
SUM OF ALL RESPONSES TO PHQ9 QUESTIONS 1 & 2: 2
7. TROUBLE CONCENTRATING ON THINGS, SUCH AS READING THE NEWSPAPER OR WATCHING TELEVISION: 0
SUM OF ALL RESPONSES TO PHQ QUESTIONS 1-9: 6
1. LITTLE INTEREST OR PLEASURE IN DOING THINGS: 2
9. THOUGHTS THAT YOU WOULD BE BETTER OFF DEAD, OR OF HURTING YOURSELF: 0

## 2019-09-13 ASSESSMENT — ANXIETY QUESTIONNAIRES
3. WORRYING TOO MUCH ABOUT DIFFERENT THINGS: 2-OVER HALF THE DAYS
6. BECOMING EASILY ANNOYED OR IRRITABLE: 0-NOT AT ALL SURE
7. FEELING AFRAID AS IF SOMETHING AWFUL MIGHT HAPPEN: 0-NOT AT ALL SURE
5. BEING SO RESTLESS THAT IT IS HARD TO SIT STILL: 0-NOT AT ALL SURE
2. NOT BEING ABLE TO STOP OR CONTROL WORRYING: 2-OVER HALF THE DAYS
1. FEELING NERVOUS, ANXIOUS, OR ON EDGE: 2-OVER HALF THE DAYS
4. TROUBLE RELAXING: 1-SEVERAL DAYS
GAD7 TOTAL SCORE: 7

## 2019-09-13 NOTE — LETTER
MEDICATION AGREEMENT     [facility]  [birth year]      For certain conditions, multiple classes of medications may be used to help better manage your symptoms, and to improve your ability to function at home, work and in social settings. However, these medications do have risks, which will be discussed with you, including addiction and dependency. The following prescribed medications need frequent monitoring and will require you to partner and assist in your healthcare. Medication  Dose, instructions and quantity as indicated on current prescription bottle Diagnosis/Reason(s) for Taking Category     xanax anxiety benzodiazepine                           Benefits and goals of Controlled Substance Medications: There are two potential goals for your treatment: (1) decreased pain and suffering (2) improved daily life functions. There are many possible treatments for your chronic condition(s), and, in addition to controlled substance medications, we will try alternatives such as physical therapy, yoga, massage, home daily exercise, meditation, relaxation techniques, injections, chiropractic manipulations, surgery, cognitive therapy, hypnosis and many medications that are not habit-forming. Use of controlled substance medications may be helpful, but they are unlikely to resolve all of your symptoms or restore all function. Risks of Controlled Substance Medications:    Opioid pain medications: These medications can lead to problems such as addiction/dependence, sedation, lightheadedness/dizziness, memory issues, falls, constipation, nausea, or vomiting. They may also impair the ability to drive or operate machinery. Additionally, these medications may lower testosterone levels, leading to loss of bone strength, stamina and sex drive.   They may cause problems with breathing, sleep apnea and reduced coughing, which are especially dangerous for patients with lung supplements and oral decongestants. Dependence withdrawal symptoms may include depressed mood, loss of interest, suicidal thoughts, anxiety, fatigue, appetite changes and agitation. Testosterone replacement therapy:  Potential side effects include increased risk of stroke and heart attack, blood clots, increased blood pressure, increased cholesterol, enlarged prostate, sleep apnea, irritability/aggression and other mood disorders, and decreased fertility. Other:     1. I understand that I have the following responsibilities:  · I will take medications at the dose and frequency prescribed. · I will not increase or change how I take my medications without the approval of the health care provider who signs this Medication Agreement. · I will arrange for refills at the prescribed interval ONLY during regular office hours. I will not ask for refills earlier than agreed, after-hours, on holidays or on weekends. · I will obtain all refills for these medications at  ·  ____________________________________  pharmacy (phone number  ·  ________________________), with full consent for my provider and pharmacist to exchange information in writing or verbally. · I will not request any pain medications or controlled substances from other providers and will inform this provider of all other medications I am taking. · I will inform my other health care providers that I am taking these medications and of the existence of this [address]. In the event of an emergency, I will provide the same information to the emergency department providers. · I will protect my prescriptions and medications. I understand that lost or misplaced prescriptions will not be replaced. · I will keep medications only for my own use and will not share them with others. I will keep all medications away from children. · I agree to participate in any medical, psychological or psychiatric assessments recommended by my provider. which may also result in my being prevented from receiving further care from this office. · Other:____________________________________________________________________    AGREEMENT:    I have read the above and have had all of my questions answered. For chronic disease management, I know that my symptoms can be managed with many types of treatments. A chronic medication trial may be part of my treatment, but I must be an active participant in my care. Medication therapy is only one part of my symptom management plan. In some cases, there may be limited scientific evidence to support the chronic use of certain medications to improve symptoms and daily function. Furthermore, in certain circumstances, there may be scientific information that suggests that use of chronic controlled substances may actually worsen my symptoms and increase my risk of unintentional death directly related to this medication therapy. I know that if my provider feels my risk from controlled medications is greater than my benefit, I will have my controlled substance medication(s) compassionately lowered or removed altogether. I agree to a controlled substance medication trial.      I further agree to allow this office to contact my HIPAA contact on file if there are concerns about my safety and use of controlled medications. I have agreed to use the following medications above as instructed by my physician and as stated in this [address].      Patient Signature:  ______________________  Date:9/13/2019 or _____________    Provider Signature:______________________  Date:9/13/2019 or _____________

## 2019-09-13 NOTE — PROGRESS NOTES
been on for years. The other doc I had had me up to 2mg. Never took that  Much, made me too tired, would break in half. Now simone gives me 0.5mg. And I break that in half. Can't do without it    Says xanax, fioricet and tizanidine help. Takes mirtazapine     Cardiology recently advised her to reduce zoloft to 50mg/day. Just started lower dose last night. Did ok    Hasn't driven in almost 2 years. Independent in self care, uses bench. Uses walker all the time for ambulation when out of house. Usually sits in manual wc at home, faster to get to bathroom     H/o depression for many years, predating husban'd death. Don't know why, don't have anything to be depressed about.   Doesn't feel overly depressed or anxious at present with current meds    Psych ROS:     Depression: rates 6-7/10 (10 best); decreased sleep (middle insomnia to urinate, sometimes just can't sleep), change in appetite (decreased, was 290# prior to 's death, now down to 161#, unintentional weight loss, stopped cooking after 's death) , sometimes feels tired, will nap for hour or so; increased isolation, DENIES SI/HI     Kenna: DENIES  insomnia with increased energy, rapid speech, easily distracted or decreased attention, irritability, racing thoughts, expansive mood, increase in energy and goal directed behavior, grandiosity, flight of ideas    Anxiety: rates 4/10 (10 worst); constant worry (kids, finances since only has her ssi and not 's), sleep disruption ( (middle insomnia to urinate, sometimes just can't sleep), somatic complaints (headaches, nausea),restlessness, fatigue; fear of doing/saying wrong things, fear of judgement, avoidant of social situations    OCD:  DENIES Repetitive actions or rituals, excessive hand washing, contamination fears, need for symmetry, violent thoughts, hoarding, fear of being harmed, mental or verbal repetition of words or phrases and counting    Panic:  DENIES Shortness of breath, dizziness, diaphoresis, trembling, palpitations, feeing of choking, nausea, feeling outside of self, numbness, chills, hot flashes, chest discomfort and fear of dying    Phobias:  Shortness of breath, trembling, increased heart rate, panic, dread, terror, horror, awareness that fear is out of proportion to the actual threat, overwhelming urge to escape the situation and intense measures taken to steer clear of the feared object or situation    Psychosis: couple weeks ago had hallucinations (seeing people coming to door that weren't there) after starting new heart medication. George Moseley sent me to hospital both times. Had fallen out of wc, thinks ran over shoestring. Has never had hallucinations in the past.  they decided I should go to ER and say maybe I had a uti. They treated me with cipro even though din't actually have uti. Pharmacist wouldn't fill the cipro d/t age and heart meds. Denies ongoing A/VH, paranoia, delusions             PTSD: DENIES nightmares, flashbacks, hypervigilance, easily startled, decreased sleep, reliving the event, avoiding situations that remind you of trauma, physical and mental paralysis when reminded of the experience, same despair, easily angry or irritable, trouble concentrating, fear for safety,  Numbness of emotions, feeling of detachment    Eating disorders      JENNIFER 7 SCORE 9/13/2019   JENNIFER-7 Total Score 7     Interpretation of JENNIFER-7 score: 5-9 = mild anxiety, 10-14 = moderate anxiety, 15+ = severe anxiety. Recommend referral to behavioral health for scores 10 or greater. PHQ-9 Total Score: 6 (9/13/2019 11:24 AM)  Thoughts that you would be better off dead, or of hurting yourself in some way: 0 (9/13/2019 11:24 AM)    Interpretation of PHQ-9 score:  1-4 = minimal depression, 5-9 = mild depression, 10-14 = moderate depression; 15-19 = moderately severe depression, 20-27 = severe depression    History obtained from patient and chart (confirmed by patient today).     Past Substance Monitoring:    Acute and Chronic Pain Monitoring:   No flowsheet data found. OBJECTIVE:  Vitals:   Vitals:    09/13/19 1123   BP: (!) 158/84   Pulse: 68     Wt Readings from Last 3 Encounters:   09/13/19 161 lb (73 kg)   09/12/19 161 lb (73 kg)   09/03/19 162 lb 4.1 oz (73.6 kg)           ROS: Denies trouble with fever, rash, headache, vision changes, chest pain, shortness of breath, nausea, extremity pain, weakness, dysuria.      Mental Status Exam:     Appearance    alert, cooperative, appropriate dress for season, adequate hygiene, appears stated age  Muscle strength/tone: no atrophy or abnormal movements  Gait/station: slow, with walker  Speech    spontaneous, normal rate and normal volume  Mood    Anxious  Affect    anxiety Congruent to thought content and mood  Thought Content    excessive preoccupations and intrusive thoughts, no delusions voiced  Thought Process    coherent   Associations    logical connections  Perceptions: denies AH/VH, does not appear preoccupied with the internal environment  [address]  Orientation    oriented to person, place, time, and general circumstances  Memory    recent and remote memory intact  Attention/Concentration    intact  Ability to understand instructions Yes  Ability to respond meaningfully Yes  Language: [address] of knowledge/Intellect: Average  SI:   no suicidal ideation  HI: Denies HI    Labs:   Lab Review   Admission on 09/03/2019, Discharged on 09/03/2019   Component Date Value    WBC 09/03/2019 4.7     RBC 09/03/2019 3.79*    Hemoglobin 09/03/2019 10.1*    Hematocrit 09/03/2019 31.6*    MCV 09/03/2019 83.4     MCH 09/03/2019 26.6     MCHC 09/03/2019 31.9     RDW 09/03/2019 19.9*    Platelets 89/20/9188 226     MPV 09/03/2019 8.1     Neutrophils % 09/03/2019 68.9     Lymphocytes % 09/03/2019 19.5     Monocytes % 09/03/2019 10.5     Eosinophils % 09/03/2019 0.6     Basophils % 09/03/2019 0.5     Neutrophils

## 2019-09-16 PROBLEM — S92.355A CLOSED NONDISPLACED FRACTURE OF FIFTH LEFT METATARSAL BONE: Status: RESOLVED | Noted: 2017-12-27 | Resolved: 2019-09-16

## 2019-09-16 PROBLEM — D64.9 ANEMIA: Status: RESOLVED | Noted: 2018-01-13 | Resolved: 2019-09-16

## 2019-09-16 PROBLEM — S42.202D CLOSED FRACTURE OF PROXIMAL END OF LEFT HUMERUS WITH ROUTINE HEALING: Status: RESOLVED | Noted: 2017-12-27 | Resolved: 2019-09-16

## 2019-09-16 PROBLEM — R44.3 HALLUCINATION: Status: RESOLVED | Noted: 2018-01-13 | Resolved: 2019-09-16

## 2019-09-16 PROBLEM — E43 SEVERE MALNUTRITION (HCC): Chronic | Status: RESOLVED | Noted: 2018-10-26 | Resolved: 2019-09-16

## 2019-09-16 PROBLEM — H92.01 REFERRED OTALGIA OF RIGHT EAR: Status: RESOLVED | Noted: 2018-02-26 | Resolved: 2019-09-16

## 2019-09-16 PROBLEM — K14.8 TONGUE LESION: Status: RESOLVED | Noted: 2018-03-13 | Resolved: 2019-09-16

## 2019-09-19 ENCOUNTER — OFFICE VISIT (OUTPATIENT)
Dept: INTERNAL MEDICINE CLINIC | Age: 74
End: 2019-09-19
Payer: MEDICARE

## 2019-09-19 VITALS
HEART RATE: 72 BPM | HEIGHT: 61 IN | DIASTOLIC BLOOD PRESSURE: 62 MMHG | OXYGEN SATURATION: 95 % | BODY MASS INDEX: 30.4 KG/M2 | WEIGHT: 161 LBS | SYSTOLIC BLOOD PRESSURE: 112 MMHG

## 2019-09-19 DIAGNOSIS — R51.9 ACHING HEADACHE: ICD-10-CM

## 2019-09-19 DIAGNOSIS — M79.7 FIBROMYALGIA: ICD-10-CM

## 2019-09-19 DIAGNOSIS — R11.0 NAUSEA: ICD-10-CM

## 2019-09-19 DIAGNOSIS — S72.331A CLOSED DISPLACED OBLIQUE FRACTURE OF SHAFT OF RIGHT FEMUR, INITIAL ENCOUNTER (HCC): ICD-10-CM

## 2019-09-19 DIAGNOSIS — K21.9 GASTROESOPHAGEAL REFLUX DISEASE WITHOUT ESOPHAGITIS: Primary | ICD-10-CM

## 2019-09-19 LAB
6-ACETYLMORPHINE: NOT DETECTED
7-AMINOCLONAZEPAM: NOT DETECTED
ALPHA-OH-ALPRAZOLAM: NOT DETECTED
ALPRAZOLAM: NOT DETECTED
AMPHETAMINE: NOT DETECTED
BARBITURATES: PRESENT
BENZOYLECGONINE: NOT DETECTED
BUPRENORPHINE: NOT DETECTED
CARISOPRODOL: NOT DETECTED
CLONAZEPAM: NOT DETECTED
CODEINE: NOT DETECTED
CREATININE URINE: 96.3 MG/DL (ref 20–400)
DIAZEPAM: NOT DETECTED
DRUGS EXPECTED: NORMAL
EER PAIN MGT DRUG PANEL, HIGH RES/EMIT U: NORMAL
ETHYL GLUCURONIDE: NOT DETECTED
FENTANYL: NOT DETECTED
HYDROCODONE: NOT DETECTED
HYDROMORPHONE: NOT DETECTED
LORAZEPAM: NOT DETECTED
MARIJUANA METABOLITE: NOT DETECTED
MDA: NOT DETECTED
MDEA: NOT DETECTED
MDMA URINE: NOT DETECTED
MEPERIDINE: NOT DETECTED
METHADONE: NOT DETECTED
METHAMPHETAMINE: NOT DETECTED
METHYLPHENIDATE: NOT DETECTED
MIDAZOLAM: NOT DETECTED
MORPHINE: NOT DETECTED
NORBUPRENORPHINE, FREE: NOT DETECTED
NORDIAZEPAM: NOT DETECTED
NORFENTANYL: NOT DETECTED
NORHYDROCODONE, URINE: NOT DETECTED
NOROXYCODONE: NOT DETECTED
NOROXYMORPHONE, URINE: NOT DETECTED
OXAZEPAM: NOT DETECTED
OXYCODONE: NOT DETECTED
OXYMORPHONE: NOT DETECTED
PAIN MANAGEMENT DRUG PANEL: NORMAL
PAIN MANAGEMENT DRUG PANEL: NORMAL
PCP: NOT DETECTED
PHENTERMINE: NOT DETECTED
PROPOXYPHENE: NOT DETECTED
TAPENTADOL, URINE: NOT DETECTED
TAPENTADOL-O-SULFATE, URINE: NOT DETECTED
TEMAZEPAM: NOT DETECTED
TRAMADOL: NOT DETECTED
ZOLPIDEM: NOT DETECTED

## 2019-09-19 PROCEDURE — 99214 OFFICE O/P EST MOD 30 MIN: CPT | Performed by: NURSE PRACTITIONER

## 2019-09-19 RX ORDER — TIZANIDINE 4 MG/1
4 TABLET ORAL NIGHTLY
Qty: 7 TABLET | Refills: 0 | Status: CANCELLED | OUTPATIENT
Start: 2019-09-19 | End: 2019-09-26

## 2019-09-19 RX ORDER — TIZANIDINE 2 MG/1
2 TABLET ORAL 2 TIMES DAILY
Qty: 60 TABLET | Refills: 1 | Status: SHIPPED | OUTPATIENT
Start: 2019-09-19 | End: 2019-12-04 | Stop reason: SDUPTHER

## 2019-09-19 RX ORDER — FAMOTIDINE 20 MG/1
20 TABLET, FILM COATED ORAL NIGHTLY
Qty: 60 TABLET | Refills: 3 | Status: SHIPPED | OUTPATIENT
Start: 2019-09-19 | End: 2019-09-24 | Stop reason: ALTCHOICE

## 2019-09-19 RX ORDER — SERTRALINE HYDROCHLORIDE 100 MG/1
50 TABLET, FILM COATED ORAL DAILY
Qty: 30 TABLET | Refills: 3 | Status: SHIPPED | OUTPATIENT
Start: 2019-09-19 | End: 2019-10-11

## 2019-09-19 RX ORDER — FUROSEMIDE 20 MG/1
TABLET ORAL
Qty: 60 TABLET | Refills: 2 | Status: SHIPPED | OUTPATIENT
Start: 2019-09-19 | End: 2020-03-09 | Stop reason: SDUPTHER

## 2019-09-19 RX ORDER — BUTALBITAL, ACETAMINOPHEN AND CAFFEINE 300; 40; 50 MG/1; MG/1; MG/1
1 CAPSULE ORAL EVERY 6 HOURS PRN
Qty: 120 CAPSULE | Refills: 0 | Status: SHIPPED | OUTPATIENT
Start: 2019-09-19 | End: 2019-09-21 | Stop reason: DRUGHIGH

## 2019-09-19 RX ORDER — BUTALBITAL, ACETAMINOPHEN AND CAFFEINE 50; 325; 40 MG/1; MG/1; MG/1
1 TABLET ORAL EVERY 8 HOURS
Qty: 90 TABLET | Refills: 3 | Status: CANCELLED | OUTPATIENT
Start: 2019-09-19

## 2019-09-19 RX ORDER — ONDANSETRON 4 MG/1
4 TABLET, FILM COATED ORAL EVERY 12 HOURS PRN
Qty: 30 TABLET | Refills: 0 | Status: SHIPPED | OUTPATIENT
Start: 2019-09-19 | End: 2019-10-16 | Stop reason: SDUPTHER

## 2019-09-19 NOTE — PROGRESS NOTES
Glycol 3350 (MIRALAX PO) Take by mouth Yes Historical Provider, MD   omeprazole (PRILOSEC) 20 MG delayed release capsule Take 40 mg by mouth daily Indications: patient stated that she is taking 40 mg  Yes Historical Provider, MD   potassium chloride (KLOR-CON M20) 20 MEQ extended release tablet Take 20 mEq by mouth 2 times daily  Yes Historical Provider, MD   vitamin B-12 (CYANOCOBALAMIN) 1000 MCG tablet Take 500 mcg by mouth daily Indications: patient is taking 2 tablets 1,000 mcg in the morning  Yes Historical Provider, MD        Social History     Tobacco Use    Smoking status: Never Smoker    Smokeless tobacco: Never Used   Substance Use Topics    Alcohol use: No        Vitals:    09/19/19 1409   BP: 112/62   Pulse: 72   SpO2: 95%   Weight: 161 lb (73 kg)   Height: 5' 1.2\" (1.554 m)     Estimated body mass index is 30.22 kg/m² as calculated from the following:    Height as of this encounter: 5' 1.2\" (1.554 m). Weight as of this encounter: 161 lb (73 kg). Physical Exam   Constitutional: She is oriented to person, place, and time. Vital signs are normal. She appears well-developed and well-nourished. She is cooperative. HENT:   Head: Normocephalic. Cardiovascular: Normal rate, regular rhythm, S1 normal, S2 normal, normal heart sounds and intact distal pulses. Pulmonary/Chest: Effort normal and breath sounds normal.   Abdominal: Soft. Bowel sounds are normal. She exhibits no distension. Neurological: She is alert and oriented to person, place, and time. Skin: Skin is warm and dry. Psychiatric: She has a normal mood and affect. Her behavior is normal. Thought content normal.   Vitals reviewed. ASSESSMENT/PLAN:  1. Closed displaced oblique fracture of shaft of right femur, initial encounter ([facility].)  -Advised to  Make appointment with Pain management  - tiZANidine (ZANAFLEX) 2 MG tablet; Take 1 tablet by mouth 2 times daily  Dispense: 60 tablet; Refill: 1    2.  Nausea  - ondansetron (ZOFRAN) 4 MG tablet; Take 1 tablet by mouth every 12 hours as needed for Nausea or Vomiting  Dispense: 30 tablet; Refill: 0  - AFL - Jose Weathers DO, Gastroenterology, [facility]    3. Gastroesophageal reflux disease without esophagitis  - MARIANO - Jose Weathers DO, Gastroenterology, [facility]  - famotidine (PEPCID) 20 MG tablet; Take 1 tablet by mouth nightly  Dispense: 60 tablet; Refill: 3    4. Aching headache  -Advised to make appointment with Neurology  - Rosa Isela Gonzalez MD, Neurology, [facility]  - butalbital-APAP-caffeine (FIORICET) -40 MG CAPS per capsule; Take 1 capsule by mouth every 6 hours as needed for Headaches  Dispense: 120 capsule; Refill: 0    5. Fibromyalgia  - tiZANidine (ZANAFLEX) 2 MG tablet; Take 1 tablet by mouth 2 times daily  Dispense: 60 tablet; Refill: 1      Return in about 3 months (around 12/19/2019).       Declined Flu, will obtain at local Pharmacy  --Kathleen Hartmann, MUSA - CNP on 9/20/2019 at 3:16 PM

## 2019-09-20 DIAGNOSIS — S72.331A CLOSED DISPLACED OBLIQUE FRACTURE OF SHAFT OF RIGHT FEMUR, INITIAL ENCOUNTER (HCC): ICD-10-CM

## 2019-09-20 DIAGNOSIS — R00.2 PALPITATIONS: ICD-10-CM

## 2019-09-20 PROBLEM — K21.9 GASTROESOPHAGEAL REFLUX DISEASE WITHOUT ESOPHAGITIS: Status: ACTIVE | Noted: 2019-09-20

## 2019-09-20 PROBLEM — M79.7 FIBROMYALGIA: Status: ACTIVE | Noted: 2019-09-20

## 2019-09-20 PROCEDURE — 93272 ECG/REVIEW INTERPRET ONLY: CPT | Performed by: INTERNAL MEDICINE

## 2019-09-20 ASSESSMENT — ENCOUNTER SYMPTOMS
SHORTNESS OF BREATH: 0
CHEST TIGHTNESS: 0
COUGH: 0
ABDOMINAL DISTENTION: 0
NAUSEA: 1
WHEEZING: 0

## 2019-09-20 NOTE — TELEPHONE ENCOUNTER
Pt cannot get the butalbital-APAP-caffeine (FIORICET) -40 MG CAPS per capsule   because they are over $200 so she cannot afford it. She stated that she will need to stay with the old rx which is butalbital-APAP-caffeine (FIORICET) -40 MG CAPS per capsule. Pt would like a call back. Please advise. Also, pt stated that she IS taking mirtazapine (REMERON) 15 MG tablet but she did not recognize the name, what JOHN Curran called it.

## 2019-09-21 RX ORDER — BUTALBITAL, ACETAMINOPHEN AND CAFFEINE 50; 325; 40 MG/1; MG/1; MG/1
1 TABLET ORAL EVERY 6 HOURS PRN
Qty: 180 TABLET | Refills: 0 | Status: SHIPPED | OUTPATIENT
Start: 2019-09-21 | End: 2019-11-20 | Stop reason: SDUPTHER

## 2019-09-23 ENCOUNTER — TELEPHONE (OUTPATIENT)
Dept: INTERNAL MEDICINE CLINIC | Age: 74
End: 2019-09-23

## 2019-09-23 NOTE — TELEPHONE ENCOUNTER
Ask her where she wants to go. She is usually concerned with the distance. They are located in [facility] and [facility].   If she knows of a closer one place an external referral.
Lauren Cinnamon called she was sent an RX for Pepcid but she usually takes Ranitidine. The Pepcid has warning  about taking it with Tizanidine RE drowsy and falls. Which one  do you want her to take?
all other ROS negative except as per HPI

## 2019-09-24 ENCOUNTER — TELEPHONE (OUTPATIENT)
Dept: INTERNAL MEDICINE CLINIC | Age: 74
End: 2019-09-24

## 2019-09-24 DIAGNOSIS — R21 RASH OF NECK: ICD-10-CM

## 2019-09-24 DIAGNOSIS — R21 FACIAL RASH: Primary | ICD-10-CM

## 2019-09-25 ENCOUNTER — TELEPHONE (OUTPATIENT)
Dept: INTERNAL MEDICINE CLINIC | Age: 74
End: 2019-09-25

## 2019-09-25 DIAGNOSIS — R21 FACIAL RASH: Primary | ICD-10-CM

## 2019-09-26 RX ORDER — PANTOPRAZOLE SODIUM 20 MG/1
20 TABLET, DELAYED RELEASE ORAL
Qty: 90 TABLET | Refills: 1 | Status: SHIPPED | OUTPATIENT
Start: 2019-09-26 | End: 2020-03-13 | Stop reason: DRUGHIGH

## 2019-10-04 ENCOUNTER — TELEPHONE (OUTPATIENT)
Dept: INTERNAL MEDICINE CLINIC | Age: 74
End: 2019-10-04

## 2019-10-11 ENCOUNTER — TELEPHONE (OUTPATIENT)
Dept: CARDIOLOGY CLINIC | Age: 74
End: 2019-10-11

## 2019-10-11 ENCOUNTER — OFFICE VISIT (OUTPATIENT)
Dept: PSYCHIATRY | Age: 74
End: 2019-10-11
Payer: MEDICARE

## 2019-10-11 VITALS
HEIGHT: 61 IN | DIASTOLIC BLOOD PRESSURE: 78 MMHG | HEART RATE: 76 BPM | BODY MASS INDEX: 33.23 KG/M2 | SYSTOLIC BLOOD PRESSURE: 138 MMHG | WEIGHT: 176 LBS

## 2019-10-11 DIAGNOSIS — F41.1 GAD (GENERALIZED ANXIETY DISORDER): Primary | ICD-10-CM

## 2019-10-11 DIAGNOSIS — F33.1 MODERATE EPISODE OF RECURRENT MAJOR DEPRESSIVE DISORDER (HCC): ICD-10-CM

## 2019-10-11 PROCEDURE — 99214 OFFICE O/P EST MOD 30 MIN: CPT | Performed by: NURSE PRACTITIONER

## 2019-10-11 RX ORDER — ALPRAZOLAM 0.5 MG/1
0.5 TABLET ORAL 2 TIMES DAILY PRN
Qty: 60 TABLET | Refills: 1 | Status: SHIPPED | OUTPATIENT
Start: 2019-10-11 | End: 2019-12-10

## 2019-10-11 RX ORDER — HYDROXYZINE HYDROCHLORIDE 25 MG/1
25 TABLET, FILM COATED ORAL 2 TIMES DAILY PRN
Qty: 60 TABLET | Refills: 1 | Status: SHIPPED | OUTPATIENT
Start: 2019-10-11 | End: 2019-12-10 | Stop reason: SDUPTHER

## 2019-10-11 RX ORDER — SERTRALINE HYDROCHLORIDE 100 MG/1
100 TABLET, FILM COATED ORAL DAILY
Qty: 30 TABLET | Refills: 3 | Status: SHIPPED | OUTPATIENT
Start: 2019-10-11 | End: 2020-03-09

## 2019-10-11 RX ORDER — MIRTAZAPINE 15 MG/1
15 TABLET, FILM COATED ORAL NIGHTLY
Qty: 30 TABLET | Refills: 3 | Status: SHIPPED | OUTPATIENT
Start: 2019-10-11 | End: 2019-12-20

## 2019-10-11 ASSESSMENT — PATIENT HEALTH QUESTIONNAIRE - PHQ9
2. FEELING DOWN, DEPRESSED OR HOPELESS: 2
1. LITTLE INTEREST OR PLEASURE IN DOING THINGS: 0
6. FEELING BAD ABOUT YOURSELF - OR THAT YOU ARE A FAILURE OR HAVE LET YOURSELF OR YOUR FAMILY DOWN: 0
9. THOUGHTS THAT YOU WOULD BE BETTER OFF DEAD, OR OF HURTING YOURSELF: 0
5. POOR APPETITE OR OVEREATING: 1
SUM OF ALL RESPONSES TO PHQ QUESTIONS 1-9: 7
3. TROUBLE FALLING OR STAYING ASLEEP: 2
4. FEELING TIRED OR HAVING LITTLE ENERGY: 2
7. TROUBLE CONCENTRATING ON THINGS, SUCH AS READING THE NEWSPAPER OR WATCHING TELEVISION: 0
SUM OF ALL RESPONSES TO PHQ QUESTIONS 1-9: 7
SUM OF ALL RESPONSES TO PHQ9 QUESTIONS 1 & 2: 2
8. MOVING OR SPEAKING SO SLOWLY THAT OTHER PEOPLE COULD HAVE NOTICED. OR THE OPPOSITE, BEING SO FIGETY OR RESTLESS THAT YOU HAVE BEEN MOVING AROUND A LOT MORE THAN USUAL: 0
10. IF YOU CHECKED OFF ANY PROBLEMS, HOW DIFFICULT HAVE THESE PROBLEMS MADE IT FOR YOU TO DO YOUR WORK, TAKE CARE OF THINGS AT HOME, OR GET ALONG WITH OTHER PEOPLE: 0

## 2019-10-11 ASSESSMENT — ANXIETY QUESTIONNAIRES
2. NOT BEING ABLE TO STOP OR CONTROL WORRYING: 1-SEVERAL DAYS
1. FEELING NERVOUS, ANXIOUS, OR ON EDGE: 3-NEARLY EVERY DAY
4. TROUBLE RELAXING: 2-OVER HALF THE DAYS
7. FEELING AFRAID AS IF SOMETHING AWFUL MIGHT HAPPEN: 0-NOT AT ALL SURE
5. BEING SO RESTLESS THAT IT IS HARD TO SIT STILL: 0-NOT AT ALL SURE
6. BECOMING EASILY ANNOYED OR IRRITABLE: 0-NOT AT ALL SURE
3. WORRYING TOO MUCH ABOUT DIFFERENT THINGS: 2-OVER HALF THE DAYS
GAD7 TOTAL SCORE: 8

## 2019-10-16 DIAGNOSIS — R11.0 NAUSEA: ICD-10-CM

## 2019-10-18 RX ORDER — ONDANSETRON 4 MG/1
TABLET, FILM COATED ORAL
Qty: 30 TABLET | Refills: 0 | Status: SHIPPED | OUTPATIENT
Start: 2019-10-18 | End: 2019-11-20 | Stop reason: SDUPTHER

## 2019-11-19 ENCOUNTER — OFFICE VISIT (OUTPATIENT)
Dept: NEUROLOGY | Age: 74
End: 2019-11-19
Payer: MEDICARE

## 2019-11-19 VITALS
BODY MASS INDEX: 30.78 KG/M2 | WEIGHT: 163 LBS | DIASTOLIC BLOOD PRESSURE: 72 MMHG | HEART RATE: 75 BPM | SYSTOLIC BLOOD PRESSURE: 132 MMHG | HEIGHT: 61 IN

## 2019-11-19 DIAGNOSIS — G43.719 INTRACTABLE CHRONIC MIGRAINE WITHOUT AURA AND WITHOUT STATUS MIGRAINOSUS: ICD-10-CM

## 2019-11-19 DIAGNOSIS — G44.40 REBOUND HEADACHE: Primary | ICD-10-CM

## 2019-11-19 PROCEDURE — 99204 OFFICE O/P NEW MOD 45 MIN: CPT | Performed by: PSYCHIATRY & NEUROLOGY

## 2019-11-20 ENCOUNTER — TELEPHONE (OUTPATIENT)
Dept: NEUROLOGY | Age: 74
End: 2019-11-20

## 2019-11-20 DIAGNOSIS — R11.0 NAUSEA: ICD-10-CM

## 2019-11-20 DIAGNOSIS — S72.331A CLOSED DISPLACED OBLIQUE FRACTURE OF SHAFT OF RIGHT FEMUR, INITIAL ENCOUNTER (HCC): ICD-10-CM

## 2019-11-20 RX ORDER — ONDANSETRON 4 MG/1
TABLET, FILM COATED ORAL
Qty: 30 TABLET | Refills: 0 | Status: SHIPPED | OUTPATIENT
Start: 2019-11-20 | End: 2020-09-24

## 2019-11-20 RX ORDER — BUTALBITAL, ACETAMINOPHEN AND CAFFEINE 50; 325; 40 MG/1; MG/1; MG/1
1 TABLET ORAL EVERY 6 HOURS PRN
Qty: 180 TABLET | Refills: 0 | Status: SHIPPED | OUTPATIENT
Start: 2019-11-20 | End: 2020-02-19 | Stop reason: ALTCHOICE

## 2019-11-25 ENCOUNTER — ANESTHESIA EVENT (OUTPATIENT)
Dept: ENDOSCOPY | Age: 74
End: 2019-11-25
Payer: MEDICARE

## 2019-11-25 RX ORDER — BIOTIN 1 MG
1 TABLET ORAL 2 TIMES DAILY
COMMUNITY
End: 2021-11-05

## 2019-11-26 ENCOUNTER — ANESTHESIA (OUTPATIENT)
Dept: ENDOSCOPY | Age: 74
End: 2019-11-26
Payer: MEDICARE

## 2019-11-26 ENCOUNTER — HOSPITAL ENCOUNTER (OUTPATIENT)
Age: 74
Setting detail: OUTPATIENT SURGERY
Discharge: HOME | End: 2019-11-26
Attending: INTERNAL MEDICINE | Admitting: INTERNAL MEDICINE
Payer: MEDICARE

## 2019-11-26 VITALS
DIASTOLIC BLOOD PRESSURE: 55 MMHG | TEMPERATURE: 98 F | RESPIRATION RATE: 9 BRPM | SYSTOLIC BLOOD PRESSURE: 97 MMHG | OXYGEN SATURATION: 100 %

## 2019-11-26 VITALS
SYSTOLIC BLOOD PRESSURE: 151 MMHG | BODY MASS INDEX: 30.21 KG/M2 | TEMPERATURE: 97 F | RESPIRATION RATE: 18 BRPM | HEIGHT: 61 IN | HEART RATE: 71 BPM | WEIGHT: 160 LBS | OXYGEN SATURATION: 98 % | DIASTOLIC BLOOD PRESSURE: 64 MMHG

## 2019-11-26 DIAGNOSIS — Z85.038 HISTORY OF COLON CANCER: ICD-10-CM

## 2019-11-26 LAB
GLUCOSE BLD-MCNC: 129 MG/DL (ref 70–99)
PERFORMED ON: ABNORMAL

## 2019-11-26 PROCEDURE — 88305 TISSUE EXAM BY PATHOLOGIST: CPT

## 2019-11-26 PROCEDURE — 3700000000 HC ANESTHESIA ATTENDED CARE: Performed by: INTERNAL MEDICINE

## 2019-11-26 PROCEDURE — 3700000001 HC ADD 15 MINUTES (ANESTHESIA): Performed by: INTERNAL MEDICINE

## 2019-11-26 PROCEDURE — 3609017100 HC EGD: Performed by: INTERNAL MEDICINE

## 2019-11-26 PROCEDURE — 2709999900 HC NON-CHARGEABLE SUPPLY: Performed by: INTERNAL MEDICINE

## 2019-11-26 PROCEDURE — 7100000011 HC PHASE II RECOVERY - ADDTL 15 MIN: Performed by: INTERNAL MEDICINE

## 2019-11-26 PROCEDURE — 7100000010 HC PHASE II RECOVERY - FIRST 15 MIN: Performed by: INTERNAL MEDICINE

## 2019-11-26 PROCEDURE — 6360000002 HC RX W HCPCS: Performed by: NURSE ANESTHETIST, CERTIFIED REGISTERED

## 2019-11-26 PROCEDURE — 2580000003 HC RX 258: Performed by: ANESTHESIOLOGY

## 2019-11-26 PROCEDURE — 7100000000 HC PACU RECOVERY - FIRST 15 MIN: Performed by: INTERNAL MEDICINE

## 2019-11-26 PROCEDURE — 2720000010 HC SURG SUPPLY STERILE: Performed by: INTERNAL MEDICINE

## 2019-11-26 PROCEDURE — 3609010600 HC COLONOSCOPY POLYPECTOMY SNARE/COLD BIOPSY: Performed by: INTERNAL MEDICINE

## 2019-11-26 PROCEDURE — 2500000003 HC RX 250 WO HCPCS: Performed by: NURSE ANESTHETIST, CERTIFIED REGISTERED

## 2019-11-26 PROCEDURE — 7100000001 HC PACU RECOVERY - ADDTL 15 MIN: Performed by: INTERNAL MEDICINE

## 2019-11-26 DEVICE — WORKING LENGTH 235CM, WORKING CHANNEL 2.8MM
Type: IMPLANTABLE DEVICE | Site: DUODENUM | Status: FUNCTIONAL
Brand: RESOLUTION 360 CLIP

## 2019-11-26 RX ORDER — SODIUM CHLORIDE 9 MG/ML
INJECTION, SOLUTION INTRAVENOUS CONTINUOUS
Status: DISCONTINUED | OUTPATIENT
Start: 2019-11-26 | End: 2019-11-26 | Stop reason: HOSPADM

## 2019-11-26 RX ORDER — SODIUM CHLORIDE 0.9 % (FLUSH) 0.9 %
10 SYRINGE (ML) INJECTION PRN
Status: DISCONTINUED | OUTPATIENT
Start: 2019-11-26 | End: 2019-11-26 | Stop reason: HOSPADM

## 2019-11-26 RX ORDER — ONDANSETRON 2 MG/ML
4 INJECTION INTRAMUSCULAR; INTRAVENOUS
Status: DISCONTINUED | OUTPATIENT
Start: 2019-11-26 | End: 2019-11-26 | Stop reason: HOSPADM

## 2019-11-26 RX ORDER — CETIRIZINE HYDROCHLORIDE 10 MG/1
10 TABLET ORAL DAILY
COMMUNITY
End: 2020-06-22

## 2019-11-26 RX ORDER — PROPOFOL 10 MG/ML
INJECTION, EMULSION INTRAVENOUS CONTINUOUS PRN
Status: DISCONTINUED | OUTPATIENT
Start: 2019-11-26 | End: 2019-11-26 | Stop reason: SDUPTHER

## 2019-11-26 RX ORDER — LIDOCAINE HYDROCHLORIDE 20 MG/ML
INJECTION, SOLUTION EPIDURAL; INFILTRATION; INTRACAUDAL; PERINEURAL PRN
Status: DISCONTINUED | OUTPATIENT
Start: 2019-11-26 | End: 2019-11-26 | Stop reason: SDUPTHER

## 2019-11-26 RX ORDER — SODIUM CHLORIDE 0.9 % (FLUSH) 0.9 %
10 SYRINGE (ML) INJECTION EVERY 12 HOURS SCHEDULED
Status: DISCONTINUED | OUTPATIENT
Start: 2019-11-26 | End: 2019-11-26 | Stop reason: HOSPADM

## 2019-11-26 RX ADMIN — SODIUM CHLORIDE: 9 INJECTION, SOLUTION INTRAVENOUS at 08:38

## 2019-11-26 RX ADMIN — PROPOFOL 180 MCG/KG/MIN: 10 INJECTION, EMULSION INTRAVENOUS at 10:12

## 2019-11-26 RX ADMIN — SODIUM CHLORIDE: 9 INJECTION, SOLUTION INTRAVENOUS at 10:00

## 2019-11-26 RX ADMIN — LIDOCAINE HYDROCHLORIDE 100 MG: 20 INJECTION, SOLUTION EPIDURAL; INFILTRATION; INTRACAUDAL; PERINEURAL at 10:12

## 2019-11-26 ASSESSMENT — PAIN - FUNCTIONAL ASSESSMENT: PAIN_FUNCTIONAL_ASSESSMENT: 0-10

## 2019-11-26 ASSESSMENT — LIFESTYLE VARIABLES: SMOKING_STATUS: 0

## 2019-11-26 ASSESSMENT — PULMONARY FUNCTION TESTS
PIF_VALUE: 0

## 2019-11-26 ASSESSMENT — PAIN SCALES - GENERAL
PAINLEVEL_OUTOF10: 0

## 2019-11-26 ASSESSMENT — ENCOUNTER SYMPTOMS: SHORTNESS OF BREATH: 0

## 2019-12-11 RX ORDER — HYDROXYZINE HYDROCHLORIDE 25 MG/1
TABLET, FILM COATED ORAL
Qty: 60 TABLET | Refills: 0 | Status: SHIPPED | OUTPATIENT
Start: 2019-12-11 | End: 2019-12-20

## 2019-12-20 ENCOUNTER — OFFICE VISIT (OUTPATIENT)
Dept: PSYCHIATRY | Age: 74
End: 2019-12-20
Payer: MEDICARE

## 2019-12-20 VITALS
HEART RATE: 60 BPM | BODY MASS INDEX: 33.49 KG/M2 | WEIGHT: 177.4 LBS | HEIGHT: 61 IN | DIASTOLIC BLOOD PRESSURE: 72 MMHG | SYSTOLIC BLOOD PRESSURE: 126 MMHG

## 2019-12-20 DIAGNOSIS — F33.1 MODERATE EPISODE OF RECURRENT MAJOR DEPRESSIVE DISORDER (HCC): ICD-10-CM

## 2019-12-20 DIAGNOSIS — F41.1 GAD (GENERALIZED ANXIETY DISORDER): Primary | ICD-10-CM

## 2019-12-20 PROCEDURE — 99214 OFFICE O/P EST MOD 30 MIN: CPT | Performed by: NURSE PRACTITIONER

## 2019-12-20 RX ORDER — HYDROXYZINE HYDROCHLORIDE 25 MG/1
TABLET, FILM COATED ORAL
Qty: 90 TABLET | Refills: 0 | Status: SHIPPED | OUTPATIENT
Start: 2019-12-20 | End: 2020-01-02

## 2019-12-20 RX ORDER — ALPRAZOLAM 0.5 MG/1
0.5 TABLET ORAL 2 TIMES DAILY PRN
Qty: 60 TABLET | Refills: 2 | Status: SHIPPED | OUTPATIENT
Start: 2019-12-20 | End: 2020-03-19

## 2019-12-20 RX ORDER — MIRTAZAPINE 15 MG/1
30 TABLET, FILM COATED ORAL NIGHTLY
Qty: 30 TABLET | Refills: 3
Start: 2019-12-20 | End: 2020-01-24 | Stop reason: SDUPTHER

## 2019-12-20 ASSESSMENT — PATIENT HEALTH QUESTIONNAIRE - PHQ9
4. FEELING TIRED OR HAVING LITTLE ENERGY: 2
9. THOUGHTS THAT YOU WOULD BE BETTER OFF DEAD, OR OF HURTING YOURSELF: 0
8. MOVING OR SPEAKING SO SLOWLY THAT OTHER PEOPLE COULD HAVE NOTICED. OR THE OPPOSITE, BEING SO FIGETY OR RESTLESS THAT YOU HAVE BEEN MOVING AROUND A LOT MORE THAN USUAL: 0
6. FEELING BAD ABOUT YOURSELF - OR THAT YOU ARE A FAILURE OR HAVE LET YOURSELF OR YOUR FAMILY DOWN: 0
3. TROUBLE FALLING OR STAYING ASLEEP: 3
7. TROUBLE CONCENTRATING ON THINGS, SUCH AS READING THE NEWSPAPER OR WATCHING TELEVISION: 0
SUM OF ALL RESPONSES TO PHQ9 QUESTIONS 1 & 2: 1
1. LITTLE INTEREST OR PLEASURE IN DOING THINGS: 0
SUM OF ALL RESPONSES TO PHQ QUESTIONS 1-9: 8
SUM OF ALL RESPONSES TO PHQ QUESTIONS 1-9: 8
10. IF YOU CHECKED OFF ANY PROBLEMS, HOW DIFFICULT HAVE THESE PROBLEMS MADE IT FOR YOU TO DO YOUR WORK, TAKE CARE OF THINGS AT HOME, OR GET ALONG WITH OTHER PEOPLE: 0
2. FEELING DOWN, DEPRESSED OR HOPELESS: 1
5. POOR APPETITE OR OVEREATING: 2

## 2019-12-20 ASSESSMENT — ANXIETY QUESTIONNAIRES
4. TROUBLE RELAXING: 0-NOT AT ALL
5. BEING SO RESTLESS THAT IT IS HARD TO SIT STILL: 0-NOT AT ALL
7. FEELING AFRAID AS IF SOMETHING AWFUL MIGHT HAPPEN: 0-NOT AT ALL
GAD7 TOTAL SCORE: 3
6. BECOMING EASILY ANNOYED OR IRRITABLE: 0-NOT AT ALL
2. NOT BEING ABLE TO STOP OR CONTROL WORRYING: 0-NOT AT ALL
1. FEELING NERVOUS, ANXIOUS, OR ON EDGE: 2-OVER HALF THE DAYS
3. WORRYING TOO MUCH ABOUT DIFFERENT THINGS: 1-SEVERAL DAYS

## 2019-12-26 DIAGNOSIS — S72.331A CLOSED DISPLACED OBLIQUE FRACTURE OF SHAFT OF RIGHT FEMUR, INITIAL ENCOUNTER (HCC): ICD-10-CM

## 2019-12-26 DIAGNOSIS — M79.7 FIBROMYALGIA: ICD-10-CM

## 2019-12-27 RX ORDER — TIZANIDINE 2 MG/1
2 TABLET ORAL 2 TIMES DAILY
Qty: 60 TABLET | Refills: 0 | Status: SHIPPED | OUTPATIENT
Start: 2019-12-27 | End: 2020-02-19 | Stop reason: SDUPTHER

## 2020-01-02 ENCOUNTER — CARE COORDINATION (OUTPATIENT)
Dept: CARE COORDINATION | Age: 75
End: 2020-01-02

## 2020-01-02 RX ORDER — HYDROXYZINE HYDROCHLORIDE 25 MG/1
TABLET, FILM COATED ORAL
Qty: 90 TABLET | Refills: 0 | Status: SHIPPED | OUTPATIENT
Start: 2020-01-02 | End: 2020-02-28 | Stop reason: SDUPTHER

## 2020-01-08 NOTE — CARE COORDINATION
[address] contacted pt. She is unsure if she will follow up with Claudell Seltzer NP. She reports that Venkatesh Bonds refused medication refills in past.    Action:  Encouraged to discuss with Jessy Amaral regarding medication refills. Advised to call back if she planned to follow up with Jessy Amaral.

## 2020-01-24 RX ORDER — MIRTAZAPINE 30 MG/1
30 TABLET, FILM COATED ORAL NIGHTLY
Qty: 30 TABLET | Refills: 3 | Status: SHIPPED | OUTPATIENT
Start: 2020-01-24 | End: 2020-05-20

## 2020-02-03 ENCOUNTER — CARE COORDINATION (OUTPATIENT)
Dept: CARE COORDINATION | Age: 75
End: 2020-02-03

## 2020-02-06 RX ORDER — MIRTAZAPINE 15 MG/1
TABLET, FILM COATED ORAL
Qty: 30 TABLET | Refills: 2 | OUTPATIENT
Start: 2020-02-06

## 2020-02-19 ENCOUNTER — OFFICE VISIT (OUTPATIENT)
Dept: INTERNAL MEDICINE CLINIC | Age: 75
End: 2020-02-19
Payer: MEDICARE

## 2020-02-19 VITALS
HEIGHT: 62 IN | DIASTOLIC BLOOD PRESSURE: 70 MMHG | OXYGEN SATURATION: 96 % | BODY MASS INDEX: 30.66 KG/M2 | RESPIRATION RATE: 16 BRPM | SYSTOLIC BLOOD PRESSURE: 110 MMHG | HEART RATE: 71 BPM | WEIGHT: 166.6 LBS

## 2020-02-19 LAB
A/G RATIO: 2 (ref 1.1–2.2)
ALBUMIN SERPL-MCNC: 4.2 G/DL (ref 3.4–5)
ALP BLD-CCNC: 74 U/L (ref 40–129)
ALT SERPL-CCNC: 11 U/L (ref 10–40)
ANION GAP SERPL CALCULATED.3IONS-SCNC: 12 MMOL/L (ref 3–16)
AST SERPL-CCNC: 16 U/L (ref 15–37)
BASOPHILS ABSOLUTE: 0 K/UL (ref 0–0.2)
BASOPHILS RELATIVE PERCENT: 0.6 %
BILIRUB SERPL-MCNC: <0.2 MG/DL (ref 0–1)
BUN BLDV-MCNC: 19 MG/DL (ref 7–20)
CALCIUM SERPL-MCNC: 9.5 MG/DL (ref 8.3–10.6)
CHLORIDE BLD-SCNC: 104 MMOL/L (ref 99–110)
CHOLESTEROL, TOTAL: 124 MG/DL (ref 0–199)
CO2: 25 MMOL/L (ref 21–32)
CREAT SERPL-MCNC: 0.6 MG/DL (ref 0.6–1.2)
EOSINOPHILS ABSOLUTE: 0.1 K/UL (ref 0–0.6)
EOSINOPHILS RELATIVE PERCENT: 2.1 %
GFR AFRICAN AMERICAN: >60
GFR NON-AFRICAN AMERICAN: >60
GLOBULIN: 2.1 G/DL
GLUCOSE BLD-MCNC: 116 MG/DL (ref 70–99)
HCT VFR BLD CALC: 29.4 % (ref 36–48)
HDLC SERPL-MCNC: 66 MG/DL (ref 40–60)
HEMOGLOBIN: 9.4 G/DL (ref 12–16)
LDL CHOLESTEROL CALCULATED: 38 MG/DL
LYMPHOCYTES ABSOLUTE: 1.2 K/UL (ref 1–5.1)
LYMPHOCYTES RELATIVE PERCENT: 32.9 %
MCH RBC QN AUTO: 26.1 PG (ref 26–34)
MCHC RBC AUTO-ENTMCNC: 32.1 G/DL (ref 31–36)
MCV RBC AUTO: 81.5 FL (ref 80–100)
MONOCYTES ABSOLUTE: 0.3 K/UL (ref 0–1.3)
MONOCYTES RELATIVE PERCENT: 7.5 %
NEUTROPHILS ABSOLUTE: 2.1 K/UL (ref 1.7–7.7)
NEUTROPHILS RELATIVE PERCENT: 56.9 %
PDW BLD-RTO: 25.3 % (ref 12.4–15.4)
PLATELET # BLD: 212 K/UL (ref 135–450)
PMV BLD AUTO: 7.7 FL (ref 5–10.5)
POTASSIUM SERPL-SCNC: 4.8 MMOL/L (ref 3.5–5.1)
RBC # BLD: 3.61 M/UL (ref 4–5.2)
SODIUM BLD-SCNC: 141 MMOL/L (ref 136–145)
TOTAL PROTEIN: 6.3 G/DL (ref 6.4–8.2)
TRIGL SERPL-MCNC: 102 MG/DL (ref 0–150)
TSH REFLEX: 0.52 UIU/ML (ref 0.27–4.2)
VLDLC SERPL CALC-MCNC: 20 MG/DL
WBC # BLD: 3.7 K/UL (ref 4–11)

## 2020-02-19 PROCEDURE — 36415 COLL VENOUS BLD VENIPUNCTURE: CPT | Performed by: NURSE PRACTITIONER

## 2020-02-19 PROCEDURE — G0008 ADMIN INFLUENZA VIRUS VAC: HCPCS | Performed by: NURSE PRACTITIONER

## 2020-02-19 PROCEDURE — 90686 IIV4 VACC NO PRSV 0.5 ML IM: CPT | Performed by: NURSE PRACTITIONER

## 2020-02-19 PROCEDURE — 99214 OFFICE O/P EST MOD 30 MIN: CPT | Performed by: NURSE PRACTITIONER

## 2020-02-19 RX ORDER — TIZANIDINE 2 MG/1
2 TABLET ORAL 2 TIMES DAILY
Qty: 60 TABLET | Refills: 3 | Status: SHIPPED | OUTPATIENT
Start: 2020-02-19 | End: 2020-03-20

## 2020-02-19 RX ORDER — SERTRALINE HYDROCHLORIDE 100 MG/1
TABLET, FILM COATED ORAL
Qty: 90 TABLET | Refills: 2 | OUTPATIENT
Start: 2020-02-19

## 2020-02-19 RX ORDER — SERTRALINE HYDROCHLORIDE 100 MG/1
100 TABLET, FILM COATED ORAL DAILY
Qty: 90 TABLET | Refills: 1 | Status: CANCELLED | OUTPATIENT
Start: 2020-02-19

## 2020-02-19 SDOH — ECONOMIC STABILITY: INCOME INSECURITY: HOW HARD IS IT FOR YOU TO PAY FOR THE VERY BASICS LIKE FOOD, HOUSING, MEDICAL CARE, AND HEATING?: VERY HARD

## 2020-02-19 SDOH — ECONOMIC STABILITY: FOOD INSECURITY: WITHIN THE PAST 12 MONTHS, THE FOOD YOU BOUGHT JUST DIDN'T LAST AND YOU DIDN'T HAVE MONEY TO GET MORE.: OFTEN TRUE

## 2020-02-19 SDOH — ECONOMIC STABILITY: FOOD INSECURITY: WITHIN THE PAST 12 MONTHS, YOU WORRIED THAT YOUR FOOD WOULD RUN OUT BEFORE YOU GOT MONEY TO BUY MORE.: OFTEN TRUE

## 2020-02-19 ASSESSMENT — PATIENT HEALTH QUESTIONNAIRE - PHQ9
SUM OF ALL RESPONSES TO PHQ QUESTIONS 1-9: 0
1. LITTLE INTEREST OR PLEASURE IN DOING THINGS: 0
SUM OF ALL RESPONSES TO PHQ9 QUESTIONS 1 & 2: 0
SUM OF ALL RESPONSES TO PHQ QUESTIONS 1-9: 0
2. FEELING DOWN, DEPRESSED OR HOPELESS: 0
SUM OF ALL RESPONSES TO PHQ QUESTIONS 1-9: 0
2. FEELING DOWN, DEPRESSED OR HOPELESS: 0
SUM OF ALL RESPONSES TO PHQ QUESTIONS 1-9: 0

## 2020-02-19 NOTE — PROGRESS NOTES
[address] [facility]  [place] [address]  Dept: [phone]       2020     Sae Pearce (:  )QL a 75 y.o. female, here for evaluation of the following medical concerns: This is an established patient being seen today for refill on medications. Gastroesophageal Reflux   She complains of heartburn and a hoarse voice. She reports no abdominal pain or no chest pain. This is a chronic problem. The problem occurs occasionally. The heartburn does not wake her from sleep. The heartburn does not limit her activity. The heartburn doesn't change with position. The symptoms are aggravated by certain foods. Pertinent negatives include no fatigue, melena or muscle weakness. Risk factors include lack of exercise. She has tried a PPI for the symptoms. The treatment provided moderate relief. Past procedures include an EGD. Depression  She reports taking medications as prescribed by Psychiatry, has appointment at end of month. She denies suicidal/homicidal ideations. Denies side effects of medication. Headaches  She reports daily headaches, was evaluated by Neurology. She reports  Neurology requests she discontinues all pain medication related to headache (Fioricet, Ibuprofen, tylenol), possibly causing rebound headaches. She admits to taking 1,000 mg of tylenol 4X daily without the Fioricet. Discuss the danger of greater than 4,000 mg of tylenol in 24 hour period. She states she is not interested in following up with Neurology. States refrains from caffeine and chocolate.      Lab Results   Component Value Date    WBC 3.7 (L) 2020    HGB 9.4 (L) 2020    HCT 29.4 (L) 2020    MCV 81.5 2020     2020     Lab Results   Component Value Date     2020    K 4.8 2020     2020    CO2 25 2020    BUN 19 2020    CREATININE 0.6 2020    GLUCOSE 116 (H) 2020 CALCIUM 9.5 02/19/2020    PROT 6.3 (L) 02/19/2020    LABALBU 4.2 02/19/2020    BILITOT <0.2 02/19/2020    ALKPHOS 74 02/19/2020    AST 16 02/19/2020    ALT 11 02/19/2020    LABGLOM >60 02/19/2020    GFRAA >60 02/19/2020    AGRATIO 2.0 02/19/2020    GLOB 2.1 02/19/2020       Review of Systems   Constitutional: Negative for activity change, fatigue and fever. HENT: Positive for hoarse voice. Negative for congestion. Eyes: Negative for visual disturbance. Respiratory: Negative for chest tightness and shortness of breath. Cardiovascular: Negative for chest pain, palpitations and leg swelling. Gastrointestinal: Positive for heartburn. Negative for abdominal pain, constipation, diarrhea and melena. Endocrine: Negative for polyuria. Genitourinary: Negative for dysuria. Musculoskeletal: Negative for arthralgias, myalgias and muscle weakness. Skin: Negative for rash. Neurological: Positive for headaches. Negative for dizziness and light-headedness. Psychiatric/Behavioral: Negative for agitation, decreased concentration and sleep disturbance. The patient is not nervous/anxious. Prior to Visit Medications    Medication Sig Taking? Authorizing Provider   tiZANidine (ZANAFLEX) 2 MG tablet Take 1 tablet by mouth 2 times daily Yes MUSA Hines CNP   mirtazapine (REMERON) 30 MG tablet Take 1 tablet by mouth nightly Yes MUSA Thacker CNP   hydrOXYzine (ATARAX) 25 MG tablet TAKE ONE TO TWO TABLETS BY MOUTH TWICE A DAY AS NEEDED FOR ITCHING Yes MUSA Thacker CNP   ALPRAZolam Shelba Moors) 0.5 MG tablet Take 1 tablet by mouth 2 times daily as needed for Anxiety for up to 90 days.  Yes MUSA Thacker CNP   cetirizine (ZYRTEC) 10 MG tablet Take 10 mg by mouth daily Yes Historical Provider, MD   vitamin D 25 MCG (1000 UT) CAPS Take 2 capsules by mouth daily Yes Historical Provider, MD   Biotin 1000 MCG TABS Take 1 tablet by mouth daily Yes Historical

## 2020-02-20 ENCOUNTER — CARE COORDINATION (OUTPATIENT)
Dept: CARE COORDINATION | Age: 75
End: 2020-02-20

## 2020-02-21 PROBLEM — I48.91 ATRIAL FIBRILLATION WITH RAPID VENTRICULAR RESPONSE (HCC): Status: RESOLVED | Noted: 2018-01-13 | Resolved: 2020-02-21

## 2020-02-21 ASSESSMENT — ENCOUNTER SYMPTOMS
CONSTIPATION: 0
HEARTBURN: 1
DIARRHEA: 0
HOARSE VOICE: 1
SHORTNESS OF BREATH: 0
CHEST TIGHTNESS: 0
ABDOMINAL PAIN: 0

## 2020-02-28 ENCOUNTER — OFFICE VISIT (OUTPATIENT)
Dept: PSYCHIATRY | Age: 75
End: 2020-02-28
Payer: MEDICARE

## 2020-02-28 VITALS
BODY MASS INDEX: 30.37 KG/M2 | HEART RATE: 64 BPM | HEIGHT: 63 IN | SYSTOLIC BLOOD PRESSURE: 117 MMHG | WEIGHT: 171.4 LBS | DIASTOLIC BLOOD PRESSURE: 72 MMHG

## 2020-02-28 PROCEDURE — 99214 OFFICE O/P EST MOD 30 MIN: CPT | Performed by: NURSE PRACTITIONER

## 2020-02-28 RX ORDER — HYDROXYZINE HYDROCHLORIDE 25 MG/1
TABLET, FILM COATED ORAL
Qty: 90 TABLET | Refills: 0 | Status: SHIPPED | OUTPATIENT
Start: 2020-02-28 | End: 2020-03-20 | Stop reason: SDUPTHER

## 2020-02-28 ASSESSMENT — PATIENT HEALTH QUESTIONNAIRE - PHQ9
3. TROUBLE FALLING OR STAYING ASLEEP: 0
8. MOVING OR SPEAKING SO SLOWLY THAT OTHER PEOPLE COULD HAVE NOTICED. OR THE OPPOSITE, BEING SO FIGETY OR RESTLESS THAT YOU HAVE BEEN MOVING AROUND A LOT MORE THAN USUAL: 0
1. LITTLE INTEREST OR PLEASURE IN DOING THINGS: 0
10. IF YOU CHECKED OFF ANY PROBLEMS, HOW DIFFICULT HAVE THESE PROBLEMS MADE IT FOR YOU TO DO YOUR WORK, TAKE CARE OF THINGS AT HOME, OR GET ALONG WITH OTHER PEOPLE: 0
6. FEELING BAD ABOUT YOURSELF - OR THAT YOU ARE A FAILURE OR HAVE LET YOURSELF OR YOUR FAMILY DOWN: 0
SUM OF ALL RESPONSES TO PHQ QUESTIONS 1-9: 0
4. FEELING TIRED OR HAVING LITTLE ENERGY: 0
5. POOR APPETITE OR OVEREATING: 0
SUM OF ALL RESPONSES TO PHQ QUESTIONS 1-9: 0
7. TROUBLE CONCENTRATING ON THINGS, SUCH AS READING THE NEWSPAPER OR WATCHING TELEVISION: 0
9. THOUGHTS THAT YOU WOULD BE BETTER OFF DEAD, OR OF HURTING YOURSELF: 0
SUM OF ALL RESPONSES TO PHQ9 QUESTIONS 1 & 2: 0
2. FEELING DOWN, DEPRESSED OR HOPELESS: 0

## 2020-02-28 ASSESSMENT — ANXIETY QUESTIONNAIRES
4. TROUBLE RELAXING: 0-NOT AT ALL
2. NOT BEING ABLE TO STOP OR CONTROL WORRYING: 0-NOT AT ALL
7. FEELING AFRAID AS IF SOMETHING AWFUL MIGHT HAPPEN: 0-NOT AT ALL
5. BEING SO RESTLESS THAT IT IS HARD TO SIT STILL: 0-NOT AT ALL
3. WORRYING TOO MUCH ABOUT DIFFERENT THINGS: 0-NOT AT ALL
GAD7 TOTAL SCORE: 0
6. BECOMING EASILY ANNOYED OR IRRITABLE: 0-NOT AT ALL
1. FEELING NERVOUS, ANXIOUS, OR ON EDGE: 0-NOT AT ALL

## 2020-02-28 NOTE — PROGRESS NOTES
PSYCHIATRY PROGRESS NOTE    [facility]  1945 2/28/20        Face to Face time: 40 mins  CC:   Chief Complaint   Patient presents with    Follow-up     Per excerpt of initial eval as completed by this provider on 9/13/19:    Son, Kira Kelley drove her to today's appointent. Kika Vences is oldest son. Kira Kelley is favorite child. Always been my favorite. My first girl is very jealous of apoorva being my favorite. Youngest daughter my favorite daughter.       Don't know why was asked to come see me. Thinks for anxiety and depression.       anxiety and depression     Lost  1/16/2018.  x 57 years. Just hard. Son marcelina moved in with me 12/2018 after I fell and broke my leg in 3 spots and my elbow. Had surgery and put a solomon in my leg. I fell over a rollator walker in the paiz. Doesn't recall if got dizzy or what.       Had broken shoulder the jason before when tripped over a cord. Had gotten up in a hurrt. Just fell and hit sholder     Feels like one thing after another     beatriz always an anxious person. Worry about any stupid thing. Try and relax as much as possible. Kids always on my mind. Even though they're grown, still worry. Youngest daughter not in good health, youngest son (apoorva) developed cancer 2 months ago. Was going to doctor for diarrhea. Didn't do colonoscopy. Then finally had blood in stool, went to hospital, tried to do colonoscopy, couldn't d/t mass in rectum. Started chemo/radiation. Now off that but still has diarrhea and radiation. To have colonoscopy on 23rd to see if can perform     Pt has h/o recal cancer. Treated by Dr. Tempie Skiff > 10 years ago. Oncologist wanted me to do chemo and radiation. Didn't want to because was working at that time.       Only takes xanax if has to. Thinks maybe 2-3 days/week, sometimes more, sometimes less. been on for years. The other doc I had had me up to 2mg. Never took that  Much, made me too tired, would break in half.   Now insomnia with increased energy, rapid speech, easily distracted or decreased attention, irritability, racing thoughts, expansive mood, increase in energy and goal directed behavior, grandiosity, flight of ideas     Anxiety: xanax at nighttime when I go to bed. Started taking when out of tizanadine      \"depends\" rates 2/10 (10 worst); intermittent worry HISTORICALLY worry about \"anything, everything\" (kids, finances since only has her ssi and not 's), LESS sleep disruption ( (initial insomnia), somatic complaints (headaches, nausea), restlessness, fatigue; fear of doing/saying wrong things, fear of judgement, avoidant of social situations     OCD:  DENIES Repetitive actions or rituals, excessive hand washing, contamination fears, need for symmetry, violent thoughts, hoarding, fear of being harmed, mental or verbal repetition of words or phrases and counting     Panic:  DENIES Shortness of breath, dizziness, diaphoresis, trembling, palpitations, feeing of choking, nausea, feeling outside of self, numbness, chills, hot flashes, chest discomfort and fear of dying     Phobias:  DENIES Shortness of breath, trembling, increased heart rate, panic, dread, terror, horror, awareness that fear is out of proportion to the actual threat, overwhelming urge to escape the situation and intense measures taken to steer clear of the feared object or situation     Psychosis: DENIES AVH, PARANOIA OR DELUSIONS  couple weeks ago had hallucinations (seeing people coming to door that weren't there) after starting new heart medication. Chica Rashid sent me to hospital both times. Had fallen out of wc, thinks ran over shoestring. Has never had hallucinations in the past.  they decided I should go to ER and say maybe I had a uti. They treated me with cipro even though din't actually have uti.   Pharmacist wouldn't fill the cipro d/t age and heart meds.                   Denies ongoing A/VH, paranoia, delusions               PTSD: DENIES nightmares, flashbacks, hypervigilance, easily startled, decreased sleep, reliving the event, avoiding situations that remind you of trauma, physical and mental paralysis when reminded of the experience, same despair, easily angry or irritable, trouble concentrating, fear for safety,  Numbness of emotions, feeling of detachment     Eating disorders:  DENIES         JENNIFER 7 SCORE 2/28/2020 12/20/2019 10/11/2019 9/13/2019   JENNIFER-7 Total Score 0 3 8 7     Interpretation of JENNIFER-7 score: 5-9 = mild anxiety, 10-14 = moderate anxiety,   15+ = severe anxiety. Recommend referral to behavioral health for scores 10 or greater.     PHQ-9 Total Score: 0 (2/28/2020 12:33 PM)  Thoughts that you would be better off dead, or of hurting yourself in some way: 0 (2/28/2020 12:33 PM)  PHQ-9 Total Score: 8 (12/20/2019 12:35 PM)  Thoughts that you would be better off dead, or of hurting yourself in some way: 0 (12/20/2019 12:35 PM)  PHQ-9 Total Score: 6 (9/13/2019 11:24 AM)  Thoughts that you would be better off dead, or of hurting yourself in some way: 0 (9/13/2019 11:24 AM)  Interpretation of PHQ-9 score:  1-4 = minimal depression, 5-9 = mild depression,   10-14 = moderate depression; 15-19 = moderately severe depression, 20-27 = severe depression      Past Psychiatric History:               Prior hospitalizations:denies              Prior diagnoses: anxiety, depression              Outpatient Treatment:                           Psychiatrist: denies                          Therapist: denies, uninterested              Suicide Attempts: denies              Hx SH:  denies     Past Psychopharmacologic Trials (including response/reactions):     1.  Just what on at present for many years     zoloft  Mirtazapine  Xanax     Past Medical/Surgical History:   Past Medical History:   Diagnosis Date    Acid reflux     Anxiety     Arthritis     Atrial fibrillation ([facility] [address]     Closed fracture dislocation of right elbow     Depression     Fibromyalgia     Migraine     [facility]al [facility])      Past Surgical History:   Procedure Laterality Date    APPENDECTOMY       SECTION      x4    CHOLECYSTECTOMY      COLONOSCOPY      SEVERAL    COLONOSCOPY N/A 2019    COLONOSCOPY POLYPECTOMY SNARE/COLD BIOPSY performed by Ab Sotelo MD at [address] [facility] W/O EXTENSION Right 10/25/2018    OPEN REDUCTION INTERNAL FIXATION RIGHT FEMUR SUPRACONDYLAR FEMORAL FRACTURE WITH C-ARM performed by Joan Gutiérrez MD at [address] [facility] TO REMOVE RECTAL CANCER    TOTAL KNEE ARTHROPLASTY Left     UPPER GASTROINTESTINAL ENDOSCOPY N/A 2019    ESOPHAGOGASTRODUODENOSCOPY performed by Ab Sotelo MD at [address] History   Problem Relation Age of Onset    COPD Mother          PCP: Derik Beavers, APRN - CNP      Allergies: Allergies   Allergen Reactions    Demerol Hcl [Meperidine]     Pcn [Penicillins]      Patient reports she has not had since she was a child, thinks reaction was hives.  Adhesive Tape Rash         Current Medications:   Current Outpatient Medications on File Prior to Visit   Medication Sig Dispense Refill    tiZANidine (ZANAFLEX) 2 MG tablet Take 1 tablet by mouth 2 times daily 60 tablet 3    mirtazapine (REMERON) 30 MG tablet Take 1 tablet by mouth nightly 30 tablet 3    ALPRAZolam (XANAX) 0.5 MG tablet Take 1 tablet by mouth 2 times daily as needed for Anxiety for up to 90 days.  60 tablet 2    cetirizine (ZYRTEC) 10 MG tablet Take 10 mg by mouth daily      vitamin D 25 MCG (1000 UT) CAPS Take 2 capsules by mouth daily      Biotin 1000 MCG TABS Take 1 tablet by mouth daily      ondansetron (ZOFRAN) 4 MG tablet TAKE ONE TABLET BY MOUTH EVERY 12 HOURS AS NEEDED FOR NAUSEA AND VOMITING 30 tablet 0    sertraline (ZOLOFT) 100 MG tablet Take 1 tablet by mouth daily (Patient taking differently: Take 100 mg by mouth nightly ) 30 tablet 3    pantoprazole (PROTONIX) 20 MG tablet Take 1 tablet by mouth every morning (before breakfast) (Patient taking differently: Take 20 mg by mouth 2 times daily ) 90 tablet 1    potassium chloride (KLOR-CON M20) 20 MEQ extended release tablet Take 1 tablet by mouth 2 times daily 180 tablet 3    furosemide (LASIX) 20 MG tablet TAKE ONE TABLET BY MOUTH TWICE A DAY 60 tablet 2    flecainide (TAMBOCOR) 100 MG tablet Take 1 tablet by mouth 2 times daily 60 tablet 5    metoprolol succinate (TOPROL XL) 50 MG extended release tablet Take 1 tablet by mouth daily 30 tablet 11    vitamin B-12 (CYANOCOBALAMIN) 1000 MCG tablet Take 500 mcg by mouth daily Indications: patient is taking 2 tablets 1,000 mcg in the morning        No current facility-administered medications on file prior to visit. Controlled Substance Monitoring:  - was checked during visit 12/20/19. Unable to document in system was checked today    Acute and Chronic Pain Monitoring:   RX Monitoring 10/11/2019   Periodic Controlled Substance Monitoring Possible medication side effects, risk of tolerance/dependence & alternative treatments discussed.      01/27/2020  1   12/20/2019  Alprazolam 0.5 MG Tablet  60.00 30 Ch Wet   [id number]   Kro ([id number])   1  2.00 LME  [facility]   12/28/2019  1   12/20/2019  Alprazolam 0.5 MG Tablet  60.00 30 Ch Wet   [id number]   Kro ([id number])   0  2.00 LME  [place]   11/27/2019  1   10/11/2019  Alprazolam 0.5 MG Tablet  60.00 30 Ch Wet   [id number]   Kro ([id number])   1  2.00 LME  [place]   10/11/2019  1   10/11/2019  Alprazolam 0.5 MG Tablet  60.00 30 Ch Wet   [id number]   Kro ([id number])   0  2.00 LME  [place]   09/04/2019  1   08/30/2019  Alprazolam 0.5 MG Tablet  60.00 30 Mi Michel   [id number]   Kro ([id number])   0  2.00 LME  [place]   08/04/2019  1   07/19/2019  Alprazolam 1 MG Tablet  60.00 30 Mi Michel   [id number]   Kro ([id number])   0  4.00 LME  [place] 6-Acetylmorphine 09/13/2019 Not Detected     Oxycodone 09/13/2019 Not Detected     Noroxycodone 09/13/2019 Not Detected     Oxymorphone 09/13/2019 Not Detected     NOROXYMORPHONE, URINE 09/13/2019 Not Detected     Hydrocodone 09/13/2019 Not Detected     NORHYDROCODONE, URINE 09/13/2019 Not Detected     Hydromorphone 09/13/2019 Not Detected     Buprenorphine 09/13/2019 Not Detected     Norbuprenorphine 09/13/2019 Not Detected     Fentanyl 09/13/2019 Not Detected     Norfentanyl 09/13/2019 Not Detected     Meperidine 09/13/2019 Not Detected     Tapentadol, Urine 09/13/2019 Not Detected     Tapentadol-O-Sulfate, Ur* 09/13/2019 Not Detected     Methadone 09/13/2019 Not Detected     Propoxyphene 09/13/2019 Not Detected     Tramadol 09/13/2019 Not Detected     Amphetamine 09/13/2019 Not Detected     Methamphetamine 09/13/2019 Not Detected     MDMA, Urine 09/13/2019 Not Detected     MDA 09/13/2019 Not Detected     MDEA 09/13/2019 Not Detected     Methylphenidate 09/13/2019 Not Detected     Phentermine 09/13/2019 Not Detected     Benzoylecgonine 09/13/2019 Not Detected     Alprazolam 09/13/2019 Not Detected     Alpha-OH-alprazolam 09/13/2019 Not Detected     Clonazepam 09/13/2019 Not Detected     7-aminoclonazepam 09/13/2019 Not Detected     Diazepam 09/13/2019 Not Detected     NORDIAZEPAM 09/13/2019 Not Detected     OXAZEPAM 09/13/2019 Not Detected     TEMAZEPAM 09/13/2019 Not Detected     Lorazepam 09/13/2019 Not Detected     Midazolam 09/13/2019 Not Detected     Zolpidem 09/13/2019 Not Detected     Barbiturates 09/13/2019 Present     Ethyl Glucuronide 09/13/2019 Not Detected     Marijuana Metabolite 09/13/2019 Not Detected     PCP 09/13/2019 Not Detected     CARISOPRODOL 09/13/2019 Not Detected     Pain Management Drug Pan* 09/13/2019 See Below     [facility] Pain Mgt Drug Panel,* 09/13/2019 See Note    Admission on 09/03/2019, Discharged on 09/03/2019   Component Date Value    WBC 2018    COCAIMETSCRU Neg 2018    MDMA Not Detected 2019    LABMETH Neg 2018    OPIATESCREENURINE POSITIVE 2018    PHENCYCLIDINESCREENURINE Neg 2018         Imagin/1/19 CT head wo contrast:      Impression   No acute traumatic intracranial abnormality       Atrophy and small-vessel ischemic change       Trace paranasal sinus disease             ASSESSMENT AND PLAN     Diagnosis Orders   1. JENNIFER (generalized anxiety disorder)     2. Moderate episode of recurrent major depressive disorder (HCC)            1. Safety: NO Imminent risk of danger to/self/others based on the factors considered below. Appropriate for outpatient level of care. Safety plan includes: 911, PES, hotlines, and interventions discussed today.      Risk factors: Age <25 or >55,, depressed mood,  social isolation,no outpatient services in place, and no collateral information to support safety.     Protective factors:, female gender,denies suicidal ideation, does not have lethal plan, does not have access to guns or weapons, patient is erika for safety, no prior suicide attempts, no family h/o suicide, no substance abuse,compliant with recommended medications,and patient is future oriented.        2. Psychiatric  - Pt declined need for med adjustments at this time. Feels is doing really well overall. Less depressive and anxiety symptoms. Reduced phq-9 and jennifer-7 scores.     - continue zoloft 100mg/day. Noted Significant increase in depressive symptoms and tearfulness when dose briefly reduced by cardiology in past.  Consider increase vs switch to different ssri if needed in future  - continue xanax 0.5mg bid. Has been on xanax for many years. She reports prn use but refill history cw regular use. Advised not long term strategy. Goal is to reduce/eliminate. Would go slowly d/t length of time has been on this.   Reports currently only using once/night      - continue mirtazapine  30mg nightly (was

## 2020-03-09 RX ORDER — FUROSEMIDE 20 MG/1
TABLET ORAL
Qty: 60 TABLET | Refills: 2 | Status: SHIPPED | OUTPATIENT
Start: 2020-03-09 | End: 2020-03-13

## 2020-03-13 ENCOUNTER — OFFICE VISIT (OUTPATIENT)
Dept: CARDIOLOGY CLINIC | Age: 75
End: 2020-03-13
Payer: MEDICARE

## 2020-03-13 VITALS
OXYGEN SATURATION: 98 % | WEIGHT: 176 LBS | DIASTOLIC BLOOD PRESSURE: 52 MMHG | HEART RATE: 83 BPM | BODY MASS INDEX: 31.18 KG/M2 | SYSTOLIC BLOOD PRESSURE: 88 MMHG | HEIGHT: 63 IN

## 2020-03-13 PROCEDURE — 99214 OFFICE O/P EST MOD 30 MIN: CPT | Performed by: INTERNAL MEDICINE

## 2020-03-13 PROCEDURE — 93000 ELECTROCARDIOGRAM COMPLETE: CPT | Performed by: INTERNAL MEDICINE

## 2020-03-13 RX ORDER — PANTOPRAZOLE SODIUM 40 MG/1
40 TABLET, DELAYED RELEASE ORAL 2 TIMES DAILY
COMMUNITY
End: 2020-03-23 | Stop reason: SDUPTHER

## 2020-03-13 RX ORDER — FLECAINIDE ACETATE 100 MG/1
50 TABLET ORAL 2 TIMES DAILY
COMMUNITY
End: 2020-07-21

## 2020-03-13 RX ORDER — FUROSEMIDE 20 MG/1
20 TABLET ORAL
Qty: 60 TABLET | Refills: 2
Start: 2020-03-13 | End: 2020-06-22

## 2020-03-13 RX ORDER — FUROSEMIDE 20 MG/1
20 TABLET ORAL DAILY
Qty: 60 TABLET | Refills: 2
Start: 2020-03-13 | End: 2020-03-13

## 2020-03-13 NOTE — PROGRESS NOTES
[facility]  Cardiology  Note      Tristan Alcala  1945, 75 y.o.      CC: \" No falls. \"                 Ana ManzanaresMUSA - CNP:      HPI:   This is a 75 y.o. female with history of AFIB and multiple falls who presented to [facility] 1/13/18 with confusion and generalized weakness. This may have been associated with multiple falls. She was found to be in AFIB with RVR and started on Cardizem gtt which was transitioned to po. ECHO performed was sub-optimal and showed EF of 55-60%. Continues to complain of palpitations and wore 30 day event monitor showing Afib with RVR. Patient was started on Flecainide 50 mg BID and Toprol XL 25 mg daily (8/2019). Presented to ER 9/1/19 after falling out of her wheelchair at home. Per ER report, she hit her head on the floor, but did not lose consciousness. CT of head was negative. Another ER visit on 9/3/19 with reports of hallucinations and confusion. Treated empirically with antibiotics and discharged home. Patient comes today for follow up of Afib. She reports no falls since our last visit. She uses a walker for assistance. She tried taking half tablet of Zoloft (50 mg) which caused her to have breakthrough crying. She resumed normal dose of 100 mg QD. She has been taking all other medication as prescribed with no adverse reactions. Denies any awareness of her heart racing. In general, has been well. Social History     Tobacco Use    Smoking status: Never Smoker    Smokeless tobacco: Never Used   Substance Use Topics    Alcohol use: No    Drug use: Never     Allergies   Allergen Reactions    Demerol Hcl [Meperidine]     Pcn [Penicillins]      Patient reports she has not had since she was a child, thinks reaction was hives.      Adhesive Tape Rash         Review of Systems -   Constitutional: Negative for weight gain/loss; malaise, fever  Respiratory: Negative for Asthma;  cough and hemoptysis  Cardiovascular: Negative for palpitations,dizziness   Gastrointestinal: Negative for abd.pain; constipation/diarrhea;    Genitourinary: Negative for stones; hematuria; frequency hesitancy  Integumentt: Negative for rash or pruritis  Hematologic/lymphatic: Negative for blood dyscrasia; leukemia/lymphoma  Musculoskeletal: Negative for Connective tissue disease  Neurological:  Negative for Seizure   Behavioral/Psych:Negative for Bipolar disorder, Schizophrenia; Dementia  Endocrine: negative for thyroid, parathyroid disease    Physical Examination:    BP (!) 88/52   Pulse 83   Ht 5' 2.5\" (1.588 m)   Wt 176 lb (79.8 kg)   SpO2 98%   BMI 31.68 kg/m²    HEENT:  Face: Atraumatic, Conjunctiva: Pink; non icteric,  Mucous Memb:  Moist, No thyromegaly or Lymphadenopathy  Respiratory:  Resp Assessment: WNL, Resp Auscultation: clear  Cardiovascular: Auscultation: nl S1 & S2, Palpation:  Nl PMI;  No heaves or thrills, JVP:  normal  Abdomen: Soft, non-tender, Normal bowel sounds,  No organomegaly  Extremities: No Cyanosis or Clubbing  Neurological: Oriented to time, place, and person, Non-anxious  Psychiatric: Normal mood and affect  Skin: Warm and dry,  No rash seen     Outpatient Medications Marked as Taking for the 3/13/20 encounter (Office Visit) with Mónica Woodruff MD   Medication Sig Dispense Refill    flecainide (TAMBOCOR) 100 MG tablet Take 50 mg by mouth 2 times daily      pantoprazole (PROTONIX) 40 MG tablet Take 40 mg by mouth 2 times daily      sertraline (ZOLOFT) 100 MG tablet Take 1 tablet by mouth nightly 90 tablet 1    furosemide (LASIX) 20 MG tablet TAKE ONE TABLET BY MOUTH TWICE A DAY 60 tablet 2    hydrOXYzine (ATARAX) 25 MG tablet TAKE ONE TO TWO TABLETS BY MOUTH TWICE A DAY AS NEEDED FOR ITCHING 90 tablet 0    tiZANidine (ZANAFLEX) 2 MG tablet Take 1 tablet by mouth 2 times daily 60 tablet 3    mirtazapine (REMERON) 30 MG tablet Take 1 tablet by mouth nightly 30 tablet 3    ALPRAZolam (XANAX) 0.5 MG tablet Take 1 tablet by mouth 2 times daily as needed for Anxiety for up to 90 days. 60 tablet 2    cetirizine (ZYRTEC) 10 MG tablet Take 10 mg by mouth daily      vitamin D 25 MCG (1000 UT) CAPS Take 2 capsules by mouth daily      Biotin 1000 MCG TABS Take 1 tablet by mouth daily      ondansetron (ZOFRAN) 4 MG tablet TAKE ONE TABLET BY MOUTH EVERY 12 HOURS AS NEEDED FOR NAUSEA AND VOMITING 30 tablet 0    potassium chloride (KLOR-CON M20) 20 MEQ extended release tablet Take 1 tablet by mouth 2 times daily 180 tablet 3    metoprolol succinate (TOPROL XL) 50 MG extended release tablet Take 1 tablet by mouth daily 30 tablet 11    vitamin B-12 (CYANOCOBALAMIN) 1000 MCG tablet Take 500 mcg by mouth daily Indications: patient is taking 2 tablets 1,000 mcg in the morning        EK19: Normal sinus rhythm  3/13/2: Normal rhythm, rate 75, QRS 92 msec      ECHO: 10/24/18  Normal LV size and systolic function. Estimated ejection fraction is 60%. Severe calcification of the posterior leaflet of the mitral valve. Mild mitral regurgitation is present. The left atrium is severely dilated. Mild aortic regurgitation is present. Mild-to-moderate tricuspid regurgitation  Systolic pulmonary artery pressure (SPAP) is estimated at 45-48 mm Hg consistent with moderate pulmonary hypertension       ASSESSMENT AND PLAN:    Atrial Fibrillation  With RVR on 30 day event monitor  Continue Flecainide and Toprol   In SR today  Has not been on [facility] therapy due to frequent falls.  No recent falls  Discussed the importance of DOAC for stroke prevention   Will trial Eliquis 5 mg BID; samples provided today   Have asked her to call immediately with any falls / bleeding issues     Depression  Takes Xanax and Zoloft   Tried taking 50 mg of Zoloft and had breakthrough crying; was told to increase back to 100 mg QD by psychiatrist    LE Edema   Chronic; bilateral  Uses walker/wheelchair; sedentary lifestyle   BP is low, so I have asked her to reduce Lasix to 20 mg MWF ( 3 times a week) in stead of 20 mg BID  Have instructed her to take an additional dose as needed      Follow up in 2 months       Thank you very much for allowing me to participate in the care of your patient. Please do not hesitate to contact me if you have any questions. Sincerely,      Karen Baltazar M.D  [facility], [address] [place] [address] [address] [place] [address]  Ph: [phone]  Fax: [phone]    This note was scribed in the presence of Dr. Karen Baltazar MD by [facility]    Physician Attestation:  The scribes documentation has been prepared under my direction and personally reviewed by me in its entirety. I confirm that the note above accurately reflects all work, treatment, procedures, and medical decision making performed by me.

## 2020-03-13 NOTE — PATIENT INSTRUCTIONS
Patient Education        Learning About Atrial Fibrillation  What is atrial fibrillation? Atrial fibrillation (say \"AY-tree-farhad eba-bmch-HOE-shun\") is the most common type of irregular heartbeat (arrhythmia). Normally, the heart beats in a strong, steady rhythm. In atrial fibrillation, a problem with the heart's electrical system causes the two upper parts of the heart (the atria) to quiver, or fibrillate. Your heart rate also may be faster than normal.  Atrial fibrillation can be dangerous because if the heartbeat isn't strong and steady, blood can collect, or pool, in the atria. And pooled blood is more likely to form clots. Clots can travel to the brain, block blood flow, and cause a stroke. Atrial fibrillation can also lead to heart failure. Treatment for atrial fibrillation helps prevent stroke and heart failure. It also helps relieve symptoms. Atrial fibrillation is often caused by another heart problem. It may happen after heart surgery. It may also be caused by other problems, such as an overactive thyroid gland or lung disease. Many people with atrial fibrillation are able to live full and active lives. What are the symptoms? Some people feel symptoms when they have episodes of atrial fibrillation. But other people don't notice any symptoms. If you have symptoms, you may feel:  · A fluttering, racing, or pounding feeling in your chest called palpitations. · Weak or tired. · Dizzy or lightheaded. · Short of breath. · Chest pain. · Confused. You may notice signs of atrial fibrillation when you check your pulse. Your pulse may seem uneven or fast.  What can you expect when you have atrial fibrillation? At first, spells of atrial fibrillation may come on suddenly and last a short time. It may go away on its own or it goes away after treatment. This is called paroxysmal atrial fibrillation. Over time, the spells may last longer and occur more often. They often don't go away on their own.   How is it treated? Treatments can help you feel better and prevent future problems, especially stroke and heart failure. The main types of treatment slow the heart rate, control the heart rhythm, and help prevent stroke. Your treatment will depend on the cause of your atrial fibrillation, your symptoms, and your risk for stroke. · Heart rate treatment. Medicine may be used to slow your heart rate. Your heartbeat may still be irregular. But these medicines keep your heart from beating too fast. They may also help relieve your symptoms. · Heart rhythm treatment. Different treatments may be used to try to stop atrial fibrillation and keep it from returning. They can also relieve symptoms. These treatments include medicine, electrical cardioversion to shock the heart back to a normal rhythm, a procedure called catheter ablation, and heart surgery. · Stroke prevention. You and your doctor can decide how to lower your risk. You may decide to take a blood-thinning medicine called an anticoagulant. How can you live well with it? You can live well and help manage atrial fibrillation by having a heart-healthy lifestyle. This lifestyle may help reduce how often you have episodes of atrial fibrillation. If you are overweight, losing weight can help relieve symptoms. To have a heart-healthy lifestyle:  · Don't smoke. · Eat heart-healthy foods. · Be active. Talk to your doctor about what type and level of exercise is safe for you. · Stay at a healthy weight. Lose weight if you need to. · Avoid alcohol if it triggers symptoms. · Manage other health problems such as high blood pressure, high cholesterol, and diabetes. · Avoid getting sick from the flu. Get a flu shot every year. · Manage stress. Where can you learn more? Go to https://jovanna.Molecule Synth. org and sign in to your Mindscore account. Enter W915 in the [facility] box to learn more about \"Learning About Atrial Fibrillation. \"     If you do not have an account, please click on the \"Sign Up Now\" link. Current as of: April 9, 2019  Content Version: 12.3  © 6362-4163 Air[employer]. Care instructions adapted under license by [facility] ([facility]). If you have questions about a medical condition or this instruction, always ask your healthcare professional. [address] any warranty or liability for your use of this information. Patient Education        Taking Blood Thinners Other Than Warfarin: Care Instructions  Your Care Instructions    Direct oral anticoagulants (DOACs) are medicines that help prevent blood clots. They also help treat problems caused by blood clots. These medicines are also called blood thinners. Blood thinners don't really thin the blood. They slow down the time it takes for a blood clot to form. They also keep existing blood clots from getting bigger. Blood thinners can help prevent a stroke caused by a heart rhythm problem (atrial fibrillation). This heart rhythm problem can form clots in the heart that can then go to the brain. Blood thinners can also help prevent or treat blood clots in the legs or lungs. Examples of DOACs include:  · Apixaban (Eliquis). · Dabigatran (Pradaxa). · Edoxaban (Savaysa). · Rivaroxaban (Xarelto). Blood thinners can help save lives. But they can also cause problems. They can make you more likely to bleed. It's important to take them right and do everything you can to keep yourself safe. Follow-up care is a key part of your treatment and safety. Be sure to make and go to all appointments, and call your doctor if you are having problems. It's also a good idea to know your test results and keep a list of the medicines you take. How can you care for yourself at home? · Take your anticoagulant (blood thinner) exactly as your doctor prescribed them. · If you miss a dose, don't take an extra dose to make up for it.  Your doctor can tell you exactly what to do so you don't take too much or too little. · Ask your pharmacist if you should take your blood thinner with food or without food. · Take your blood thinner at the same time every day. Be careful not to run out of them. · Ask your pharmacist how to store your blood thinner. · Don't take other medicines before talking to your doctor or pharmacist first. This includes prescription medicines, over-the-counter medicines, vitamins, and herbal products. It also includes aspirin and other pain relievers, such as ibuprofen (Motrin). · Tell your doctors, dentist, and pharmacist that you are taking a blood thinner. · Wear medical alert jewelry. This lets others know that you take a blood thinner. You can buy it at most drugstores. · If you are pregnant, breastfeeding, or trying to get pregnant, tell your doctor. You and your doctor will decide what medicines are safe. If you are not planning on getting pregnant, talk to your doctor about how you can prevent pregnancy. · Try to avoid injuries. For example, be careful when you are exercising or playing sports. Make your home safe to reduce your risk of falling. When should you call for help? Call 911 anytime you think you may need emergency care. For example, call if:    · You have a sudden, severe headache that is different from past headaches.    Call your doctor now or seek immediate medical care if:    · You have any abnormal bleeding, such as:  ? Nosebleeds. ? Vaginal bleeding that is different (heavier, more frequent, at a different time of the month) than what you are used to.  ? Bloody or black stools, or rectal bleeding. ? Bloody or pink urine.    Watch closely for changes in your health, and be sure to contact your doctor if you have any problems. Where can you learn more? Go to https://chyaredeb.healthVitriflexpartners. org and sign in to your [employer] account.  Enter B102 in the Netlogon box to learn more about \"Taking Blood Thinners Other Than

## 2020-03-20 RX ORDER — HYDROXYZINE HYDROCHLORIDE 25 MG/1
TABLET, FILM COATED ORAL
Qty: 90 TABLET | Refills: 0 | Status: SHIPPED | OUTPATIENT
Start: 2020-03-25 | End: 2020-04-06

## 2020-03-23 RX ORDER — PANTOPRAZOLE SODIUM 40 MG/1
40 TABLET, DELAYED RELEASE ORAL 2 TIMES DAILY
Qty: 60 TABLET | Refills: 3 | Status: SHIPPED | OUTPATIENT
Start: 2020-03-23 | End: 2021-05-14 | Stop reason: SDUPTHER

## 2020-03-23 RX ORDER — PANTOPRAZOLE SODIUM 20 MG/1
TABLET, DELAYED RELEASE ORAL
Qty: 90 TABLET | Refills: 0 | OUTPATIENT
Start: 2020-03-23

## 2020-03-23 NOTE — TELEPHONE ENCOUNTER
LOV: 2-19-20    Future Appointments   Date Time Provider Elysia Clau   4/15/2020  2:15 PM MUSA Spicer CNP [facility]   5/14/2020 11:00 AM [facility]   5/29/2020 12:30 PM MUSA Friend CNP [facility] [place]

## 2020-03-26 NOTE — TELEPHONE ENCOUNTER
Patient requesting a medication refill.   Medication:sertraline (ZOLOFT) 100 MG tablet [[id number]]  Pharmacy: [facility] [place], [facility] [address]  Last office visit: 03/13/20  Next office visit: 5/18/2020

## 2020-03-27 RX ORDER — SERTRALINE HYDROCHLORIDE 100 MG/1
100 TABLET, FILM COATED ORAL NIGHTLY
Qty: 90 TABLET | Refills: 1 | Status: SHIPPED | OUTPATIENT
Start: 2020-03-27 | End: 2020-08-28 | Stop reason: SDUPTHER

## 2020-05-07 ENCOUNTER — TELEPHONE (OUTPATIENT)
Dept: PRIMARY CARE CLINIC | Age: 75
End: 2020-05-07

## 2020-05-08 RX ORDER — ALPRAZOLAM 0.5 MG/1
0.5 TABLET ORAL 2 TIMES DAILY PRN
Qty: 60 TABLET | Refills: 0 | Status: SHIPPED | OUTPATIENT
Start: 2020-05-08 | End: 2020-05-29 | Stop reason: SDUPTHER

## 2020-05-17 NOTE — PROGRESS NOTES
CREATININE (mg/dL)   Date Value   02/19/2020 0.6         Lab Results   Component Value Date    CHOL 124 02/19/2020     Lab Results   Component Value Date    TRIG 102 02/19/2020     Lab Results   Component Value Date    HDL 66 (H) 02/19/2020     Lab Results   Component Value Date    LDLCALC 38 02/19/2020     Lab Results   Component Value Date    LABVLDL 20 02/19/2020     Lab Results   Component Value Date    WBC 3.7 (L) 02/19/2020    HGB 9.4 (L) 02/19/2020    HCT 29.4 (L) 02/19/2020    MCV 81.5 02/19/2020     02/19/2020     Lab Results   Component Value Date     02/19/2020    K 4.8 02/19/2020     02/19/2020    CO2 25 02/19/2020    BUN 19 02/19/2020    CREATININE 0.6 02/19/2020    GLUCOSE 116 (H) 02/19/2020    CALCIUM 9.5 02/19/2020    PROT 6.3 (L) 02/19/2020    LABALBU 4.2 02/19/2020    BILITOT <0.2 02/19/2020    ALKPHOS 74 02/19/2020    AST 16 02/19/2020    ALT 11 02/19/2020    LABGLOM >60 02/19/2020    GFRAA >60 02/19/2020    AGRATIO 2.0 02/19/2020    GLOB 2.1 02/19/2020       Review of Systems   Constitutional: Negative for weight loss. Respiratory: Positive for cough. Gastrointestinal: Positive for heartburn. Musculoskeletal: Negative for muscle weakness. Prior to Visit Medications    Medication Sig Taking? Authorizing Provider   apixaban (ELIQUIS) 5 MG TABS tablet Take 1 tablet by mouth 2 times daily Yes MUSA Nieves CNP   ALPRAZolam Karoline Chay) 0.5 MG tablet Take 1 tablet by mouth 2 times daily as needed for Anxiety for up to 30 days.   MUSA Huynh CNP   hydrOXYzine (ATARAX) 25 MG tablet TAKE ONE TO TWO TABLETS BY MOUTH TWICE A DAY AS NEEDED FOR ITCHING  [facility], APRN - CNP   sertraline (ZOLOFT) 100 MG tablet Take 1 tablet by mouth nightly  MUSA Huynh CNP   pantoprazole (PROTONIX) 40 MG tablet Take 1 tablet by mouth 2 times daily  MUSA Nieves CNP   flecainide (TAMBOCOR) 100 MG tablet Take 50 mg by mouth 2 times daily  Historical Provider, MD   furosemide (LASIX) 20 MG tablet Take 1 tablet by mouth three times a week . May take an additional dose as needed for ankle swelling  Cristopher Hinds MD   mirtazapine (REMERON) 30 MG tablet Take 1 tablet by mouth nightly  MUSA Gonzalez CNP   cetirizine (ZYRTEC) 10 MG tablet Take 10 mg by mouth daily  Historical Provider, MD   vitamin D 25 MCG (1000 UT) CAPS Take 2 capsules by mouth daily  Historical Provider, MD   Biotin 1000 MCG TABS Take 1 tablet by mouth daily  Historical Provider, MD   ondansetron (ZOFRAN) 4 MG tablet TAKE ONE TABLET BY MOUTH EVERY 12 HOURS AS NEEDED FOR NAUSEA AND VOMITING  MUSA Lopez CNP   potassium chloride (KLOR-CON M20) 20 MEQ extended release tablet Take 1 tablet by mouth 2 times daily  MUSA Lopez CNP   metoprolol succinate (TOPROL XL) 50 MG extended release tablet Take 1 tablet by mouth daily  Cristopher Hinds MD   vitamin B-12 (CYANOCOBALAMIN) 1000 MCG tablet Take 500 mcg by mouth daily Indications: patient is taking 2 tablets 1,000 mcg in the morning   Historical Provider, MD        Social History     Tobacco Use    Smoking status: Never Smoker    Smokeless tobacco: Never Used   Substance Use Topics    Alcohol use: No        There were no vitals filed for this visit. Estimated body mass index is 31.68 kg/m² as calculated from the following:    Height as of 3/13/20: 5' 2.5\" (1.588 m). Weight as of 3/13/20: 176 lb (79.8 kg). Physical exam limited due to virtual visit  Physical Exam  HENT:      Head: Normocephalic. Neurological:      Mental Status: She is alert and oriented to person, place, and time. GCS: GCS eye subscore is 4. GCS verbal subscore is 5. GCS motor subscore is 6.    Psychiatric:         Mood and Affect: Mood normal.         Speech: Speech normal.         Cognition and Memory: Cognition normal.         ASSESSMENT/PLAN:  1. DDD (degenerative disc disease), cervical  -Continue current

## 2020-05-18 ENCOUNTER — VIRTUAL VISIT (OUTPATIENT)
Dept: INTERNAL MEDICINE CLINIC | Age: 75
End: 2020-05-18
Payer: MEDICARE

## 2020-05-18 PROCEDURE — 99213 OFFICE O/P EST LOW 20 MIN: CPT | Performed by: NURSE PRACTITIONER

## 2020-05-18 ASSESSMENT — PATIENT HEALTH QUESTIONNAIRE - PHQ9
1. LITTLE INTEREST OR PLEASURE IN DOING THINGS: 0
2. FEELING DOWN, DEPRESSED OR HOPELESS: 0
SUM OF ALL RESPONSES TO PHQ QUESTIONS 1-9: 0
SUM OF ALL RESPONSES TO PHQ9 QUESTIONS 1 & 2: 0
SUM OF ALL RESPONSES TO PHQ QUESTIONS 1-9: 0

## 2020-05-18 ASSESSMENT — ENCOUNTER SYMPTOMS
COUGH: 1
HEARTBURN: 1

## 2020-05-19 RX ORDER — HYDROXYZINE HYDROCHLORIDE 25 MG/1
TABLET, FILM COATED ORAL
Qty: 90 TABLET | Refills: 1 | Status: SHIPPED | OUTPATIENT
Start: 2020-05-19 | End: 2020-07-24

## 2020-05-29 ENCOUNTER — VIRTUAL VISIT (OUTPATIENT)
Dept: PSYCHIATRY | Age: 75
End: 2020-05-29
Payer: MEDICARE

## 2020-05-29 PROCEDURE — 99443 PR PHYS/QHP TELEPHONE EVALUATION 21-30 MIN: CPT | Performed by: NURSE PRACTITIONER

## 2020-05-29 RX ORDER — ALPRAZOLAM 0.5 MG/1
0.5 TABLET ORAL 2 TIMES DAILY PRN
Qty: 60 TABLET | Refills: 2 | Status: SHIPPED | OUTPATIENT
Start: 2020-06-06 | End: 2020-08-28

## 2020-05-29 NOTE — PROGRESS NOTES
mirtazapine (REMERON) 30 MG tablet TAKE ONE TABLET BY MOUTH ONCE NIGHTLY 30 tablet 2    hydrOXYzine (ATARAX) 25 MG tablet TAKE ONE TO TWO TABLETS BY MOUTH TWICE A DAY AS NEEDED FOR ITCHING 90 tablet 1    apixaban (ELIQUIS) 5 MG TABS tablet Take 1 tablet by mouth 2 times daily 60 tablet 0    hydrOXYzine (ATARAX) 25 MG tablet TAKE ONE TO TWO TABLETS BY MOUTH TWICE A DAY AS NEEDED FOR ITCHING 90 tablet 1    sertraline (ZOLOFT) 100 MG tablet Take 1 tablet by mouth nightly 90 tablet 1    pantoprazole (PROTONIX) 40 MG tablet Take 1 tablet by mouth 2 times daily 60 tablet 3    flecainide (TAMBOCOR) 100 MG tablet Take 50 mg by mouth 2 times daily      furosemide (LASIX) 20 MG tablet Take 1 tablet by mouth three times a week . May take an additional dose as needed for ankle swelling 60 tablet 2    vitamin D 25 MCG (1000 UT) CAPS Take 2 capsules by mouth daily      Biotin 1000 MCG TABS Take 1 tablet by mouth daily      ondansetron (ZOFRAN) 4 MG tablet TAKE ONE TABLET BY MOUTH EVERY 12 HOURS AS NEEDED FOR NAUSEA AND VOMITING 30 tablet 0    potassium chloride (KLOR-CON M20) 20 MEQ extended release tablet Take 1 tablet by mouth 2 times daily 180 tablet 3    metoprolol succinate (TOPROL XL) 50 MG extended release tablet Take 1 tablet by mouth daily 30 tablet 11    vitamin B-12 (CYANOCOBALAMIN) 1000 MCG tablet Take 500 mcg by mouth daily Indications: patient is taking 2 tablets 1,000 mcg in the morning       cetirizine (ZYRTEC) 10 MG tablet Take 10 mg by mouth daily       No current facility-administered medications on file prior to visit. Controlled Substance Monitoring:      Acute and Chronic Pain Monitoring:   RX Monitoring 5/29/2020   Periodic Controlled Substance Monitoring No signs of potential drug abuse or diversion identified.      05/09/2020  1   05/08/2020  Alprazolam 0.5 MG Tablet  60.00 30 Ch Wet   [id number]   Kro (1802)   0  2.00 LME  [place]   04/09/2020  1   04/08/2020  Alprazolam 0.5 MG Tablet  60.00 30 Ch Wet   [id number]   Kro (1801)   0  2.00 LME  Comm [facility]   [place]   03/03/2020  1   12/20/2019  Alprazolam 0.5 MG Tablet  60.00 30 Ch Wet   [id number]   Kro (1801)   0  2.00 LME  [place]   01/27/2020  1   12/20/2019  Alprazolam 0.5 MG Tablet  60.00 30 Ch Wet   [id number]   Kro (1893)   1  2.00 LME  [place]   12/28/2019  1   12/20/2019  Alprazolam 0.5 MG Tablet  60.00 30 Ch Wet   [id number]   Kro (1893)   0  2.00 LME  [place]   11/27/2019  1   10/11/2019  Alprazolam 0.5 MG Tablet  60.00 30 Ch Wet   [id number]   Kro (1893)   1  2.00 LME  [place]   10/11/2019  1   10/11/2019  Alprazolam 0.5 MG Tablet  60.00 30 Ch Wet   [id number]   Kro (1893)   0  2.00 LME  [place]   09/04/2019  1   08/30/2019  Alprazolam 0.5 MG Tablet  60.00 30 Mi Michel   [id number]   Kro (1893)   0  2.00 LME  [place]   08/04/2019  1   07/19/2019  Alprazolam 1 MG Tablet  60.00 30 Mi Michel   [id number]   Kro (1893)   0  4.00 LME  [place]   07/06/2019  1   03/08/2019  Alprazolam 2 MG Tablet  90.00 30 Ma [facility]   [id number]   Kro (1893)   4  12.00 LME  [place]   06/15/2019  1   06/14/2019  Tramadol Hcl 50 MG Tablet  18.00 3 Mi Michel   [id number]   Kro (1893)   0  30.00 MME  [place]   06/06/2019  1   03/08/2019  Alprazolam 2 MG Tablet  90.00 30 Ma Flako   [id number]   Kro (1893)   3  12.00 LME  [place]   05/08/2019  1   03/08/2019  Alprazolam 2 MG Tablet  90.00 30 Ma Flako   [id number]   Kro (1893)   2  12.00 LME  [place]   04/05/2019  1   03/08/2019  Alprazolam 2 MG Tablet  90.00 30 Ma Flako   [id number]   Kro (1893)   1  12.00 LME  [place]   03/08/2019  1   03/08/2019  Alprazolam 2 MG Tablet  90.00 30 Ma Flako   [id number]   Kro (1893)   0  12.00 LME  [place]   02/04/2019  1   10/10/2018  Alprazolam 2 MG Tablet  90.00 30 Ma Flako   [id number]   Kro (1893)   3  12.00 LME  [place]   01/07/2019  1   10/10/2018  Alprazolam 2 MG Tablet  90.00 30 Ma Flako   [id number]   Kro (1893)   2  12.00 LME  [place]   [place]   12/24/2018  1 place, time, and general circumstances  Memory    recent and remote memory intact  Attention/Concentration    intact  Ability to understand instructions Yes  Ability to respond meaningfully Yes  Language: [address] of knowledge/Intellect: Average  SI:   no suicidal ideation  HI: Denies HI    Labs:     Office Visit on 02/19/2020   Component Date Value    WBC 02/19/2020 3.7*    RBC 02/19/2020 3.61*    Hemoglobin 02/19/2020 9.4*    Hematocrit 02/19/2020 29.4*    MCV 02/19/2020 81.5     MCH 02/19/2020 26.1     MCHC 02/19/2020 32.1     RDW 02/19/2020 25.3*    Platelets 39/81/9596 212     MPV 02/19/2020 7.7     Neutrophils % 02/19/2020 56.9     Lymphocytes % 02/19/2020 32.9     Monocytes % 02/19/2020 7.5     Eosinophils % 02/19/2020 2.1     Basophils % 02/19/2020 0.6     Neutrophils Absolute 02/19/2020 2.1     Lymphocytes Absolute 02/19/2020 1.2     Monocytes Absolute 02/19/2020 0.3     Eosinophils Absolute 02/19/2020 0.1     Basophils Absolute 02/19/2020 0.0     Sodium 02/19/2020 141     Potassium 02/19/2020 4.8     Chloride 02/19/2020 104     CO2 02/19/2020 25     Anion Gap 02/19/2020 12     Glucose 02/19/2020 116*    BUN 02/19/2020 19     CREATININE 02/19/2020 0.6     GFR Non- 02/19/2020 >60     GFR  02/19/2020 >60     Calcium 02/19/2020 9.5     Total Protein 02/19/2020 6.3*    Alb 02/19/2020 4.2     Albumin/Globulin Ratio 02/19/2020 2.0     Total Bilirubin 02/19/2020 <0.2     Alkaline Phosphatase 02/19/2020 74     ALT 02/19/2020 11     AST 02/19/2020 16     Globulin 02/19/2020 2.1     Cholesterol, Total 02/19/2020 124     Triglycerides 02/19/2020 102     HDL 02/19/2020 66*    LDL Calculated 02/19/2020 38     VLDL Cholesterol Calcula* 02/19/2020 20     TSH 02/19/2020 0.52        Last Drug screen:  Lab Results   Component Value Date    LABAMPH Neg 01/13/2018    LABBENZ Neg 01/13/2018    COCAIMETSCRU Neg 01/13/2018    MDMA Not Detected

## 2020-06-22 ENCOUNTER — OFFICE VISIT (OUTPATIENT)
Dept: CARDIOLOGY CLINIC | Age: 75
End: 2020-06-22
Payer: MEDICARE

## 2020-06-22 VITALS
SYSTOLIC BLOOD PRESSURE: 110 MMHG | BODY MASS INDEX: 32.98 KG/M2 | OXYGEN SATURATION: 95 % | TEMPERATURE: 98.6 F | HEIGHT: 64 IN | WEIGHT: 193.2 LBS | DIASTOLIC BLOOD PRESSURE: 72 MMHG | HEART RATE: 80 BPM

## 2020-06-22 PROCEDURE — 99214 OFFICE O/P EST MOD 30 MIN: CPT | Performed by: INTERNAL MEDICINE

## 2020-06-22 PROCEDURE — 93000 ELECTROCARDIOGRAM COMPLETE: CPT | Performed by: INTERNAL MEDICINE

## 2020-06-22 RX ORDER — TIZANIDINE 2 MG/1
2 TABLET ORAL 2 TIMES DAILY
Qty: 180 TABLET | Refills: 0 | Status: SHIPPED | OUTPATIENT
Start: 2020-06-22 | End: 2020-09-03 | Stop reason: SDUPTHER

## 2020-06-22 RX ORDER — FUROSEMIDE 20 MG/1
TABLET ORAL
Qty: 60 TABLET | Refills: 3 | Status: SHIPPED | OUTPATIENT
Start: 2020-06-22 | End: 2020-06-22

## 2020-06-22 RX ORDER — FUROSEMIDE 20 MG/1
40 TABLET ORAL DAILY
Qty: 120 TABLET | Refills: 5 | Status: SHIPPED | OUTPATIENT
Start: 2020-06-22 | End: 2020-12-18 | Stop reason: SDUPTHER

## 2020-06-22 NOTE — TELEPHONE ENCOUNTER
Last OV:03/13/20  Next OV: 06/22/20  Labs: [facility]-02/19/20  EKG:

## 2020-06-22 NOTE — PATIENT INSTRUCTIONS
signs of a blood clot, such as:  ? Pain in your calf, back of the knee, thigh, or groin. ? Redness and swelling in your leg or groin. · You have symptoms of infection, such as:  ? Increased pain, swelling, warmth, or redness. ? Red streaks or pus. ? A fever. Watch closely for changes in your health, and be sure to contact your doctor if:  · Your swelling is getting worse. · You have new or worsening pain in your legs. · You do not get better as expected. Where can you learn more? Go to [email or web address] [email or web address] the [email or web address] org and sign in to your [employer]ed account. Enter R118 in the waygum box to learn more about \"Leg and Ankle Edema: Care Instructions. \"     If you do not have an account, please click on the \"Sign Up Now\" link. Current as of: June 26, 2019               Content Version: 12.5  © 0546-1539 Healthwise, Incorporated. Care instructions adapted under license by [facility] ([facility]). If you have questions about a medical condition or this instruction, always ask your healthcare professional. [place] [address] any warranty or liability for your use of this information.

## 2020-06-22 NOTE — PROGRESS NOTES
[facility]  Cardiology  Note      Lima Pelletier  1945, 75 y.o.      CC: \" Fatigue and swelling. \"                 Maikol Blackwood, APRN - CNP:      HPI:   This is a 75 y.o. female with history of AFIB and multiple falls who presented to [facility] 1/13/18 with confusion and generalized weakness. This may have been associated with multiple falls. She was found to be in AFIB with RVR and started on Cardizem gtt which was transitioned to po. ECHO performed was sub-optimal and showed EF of 55-60%. Continues to complain of palpitations and wore 30 day event monitor showing Afib with RVR. Patient was started on Flecainide 50 mg BID and Toprol XL 25 mg daily (8/2019). Presented to ER 9/1/19 after falling out of her wheelchair at home. Per ER report, she hit her head on the floor, but did not lose consciousness. CT of head was negative. Another ER visit on 9/3/19 with reports of hallucinations and confusion. Treated empirically with antibiotics and discharged home. Patient comes for routine f/u. She uses a walker for assistance. Says she has been staying home. When she leaves the house, her grand-daughter or son goes into the store for her. She reports no new complaints. Continues with chronic fatigue and LE edema. Today, specifically denies any chest pain, pressure, tightness, nausea, vomiting, diaphoresis, SOB/CEDENO, palpitations, heart racing, dizziness/lightheadedness, orthopnea, PND or syncope. Social History     Tobacco Use    Smoking status: Never Smoker    Smokeless tobacco: Never Used   Substance Use Topics    Alcohol use: No    Drug use: Never     Allergies   Allergen Reactions    Demerol Hcl [Meperidine]     Pcn [Penicillins]      Patient reports she has not had since she was a child, thinks reaction was hives.      Adhesive Tape Rash         Review of Systems -   Constitutional: Negative for weight gain/loss; malaise, fever  Respiratory: Negative for Asthma;  cough and

## 2020-06-23 ENCOUNTER — TELEPHONE (OUTPATIENT)
Dept: CARDIOLOGY CLINIC | Age: 75
End: 2020-06-23

## 2020-06-23 NOTE — TELEPHONE ENCOUNTER
Pt was seen in office yesterday and states she forgot to ask Dr Asael Espinosa for a note so she can obtain a handicap sticker for her car. Pt son is dropping of paperwork to our office tomorrow and she wants to know if her son can pick it up then as well.     If you need to reach the pt you can call [phone]

## 2020-07-10 ENCOUNTER — NURSE TRIAGE (OUTPATIENT)
Dept: OTHER | Facility: CLINIC | Age: 75
End: 2020-07-10

## 2020-07-10 ENCOUNTER — TELEPHONE (OUTPATIENT)
Dept: INTERNAL MEDICINE CLINIC | Age: 75
End: 2020-07-10

## 2020-07-10 NOTE — TELEPHONE ENCOUNTER
Reason for Disposition   Thigh, calf, or ankle swelling in only one leg    Answer Assessment - Initial Assessment Questions  1. ONSET: \"When did the swelling start? \" (e.g., minutes, hours, days)      1 week ago  2. LOCATION: \"What part of the leg is swollen? \"  \"Are both legs swollen or just one leg? \"      Right foot and ankle  3. SEVERITY: \"How bad is the swelling? \" (e.g., localized; mild, moderate, severe)   - Localized - small area of swelling localized to one leg   - MILD pedal edema - swelling limited to foot and ankle, pitting edema < 1/4 inch (6 mm) deep, rest and elevation eliminate most or all swelling   - MODERATE edema - swelling of lower leg to knee, pitting edema > 1/4 inch (6 mm) deep, rest and elevation only partially reduce swelling   - SEVERE edema - swelling extends above knee, facial or hand swelling present   moderate  4. REDNESS: \"Does the swelling look red or infected? \"    no  5. PAIN: \"Is the swelling painful to touch? \" If so, ask: \"How painful is it? \"   (Scale 1-10; mild, moderate or severe)    Pain =10 when she moves it  6. FEVER: \"Do you have a fever? \" If so, ask: \"What is it, how was it measured, and when did it start? \"      no  7. CAUSE: \"What do you think is causing the leg swelling? \"  unsure  8. MEDICAL HISTORY: \"Do you have a history of heart failure, kidney disease, liver failure, or cancer? \"      no  9. RECURRENT SYMPTOM: \"Have you had leg swelling before? \" If so, ask: \"When was the last time? \" \"What happened that time? \"   yes months ago  10. OTHER SYMPTOMS: \"Do you have any other symptoms? \" (e.g., chest pain, difficulty breathing)  no    Protocols used: LEG SWELLING AND EDEMA-ADULT-[place]

## 2020-07-10 NOTE — TELEPHONE ENCOUNTER
Pt needs in office appt asap, per triage pt should be seen today and refuses urgent care. Foot and ankle swelling.     [phone]

## 2020-07-13 ENCOUNTER — OFFICE VISIT (OUTPATIENT)
Dept: INTERNAL MEDICINE CLINIC | Age: 75
End: 2020-07-13
Payer: MEDICARE

## 2020-07-13 VITALS
WEIGHT: 194 LBS | BODY MASS INDEX: 33.83 KG/M2 | TEMPERATURE: 98.2 F | RESPIRATION RATE: 18 BRPM | OXYGEN SATURATION: 94 % | HEART RATE: 79 BPM | SYSTOLIC BLOOD PRESSURE: 137 MMHG | DIASTOLIC BLOOD PRESSURE: 70 MMHG

## 2020-07-13 PROCEDURE — 99213 OFFICE O/P EST LOW 20 MIN: CPT | Performed by: NURSE PRACTITIONER

## 2020-07-13 NOTE — PROGRESS NOTES
[address]  [address] [facility] [place] [address]  Dept: [phone]       2020     Sae Pearce (:  [id number])LW a 67 y.o. female, here for evaluation of the following medical concerns: This is an established patient being seen today for leg pain, ambulating with front wheel walker. Leg Pain    There was no injury mechanism. The pain is present in the right leg. The quality of the pain is described as aching. The pain is moderate. Associated symptoms include tingling (foot). Numbness: foot. She reports no foreign bodies present. History of closed fracture of right femur with repair 10/2018. The supracondylar femur fracture was repaired with ORIF,  her supracondylar humeral fracture was not surgical.  Advised to follow up with Orthopedics for reevaluation. Bilateral Extremity Swelling  She reports a history of swelling, advised to increased Lasix 40 mg daily, recently increased by Dr. Bre Koch. M,W,F, take an additional 20 mg in the evening since . Not really any appreciable change in lower extremity swelling.   Lab Results   Component Value Date    CHOL 124 2020     Lab Results   Component Value Date    TRIG 102 2020     Lab Results   Component Value Date    HDL 66 (H) 2020     Lab Results   Component Value Date    LDLCALC 38 2020     Lab Results   Component Value Date    LABVLDL 20 2020     Lab Results   Component Value Date    WBC 3.7 (L) 2020    HGB 9.4 (L) 2020    HCT 29.4 (L) 2020    MCV 81.5 2020     2020     Lab Results   Component Value Date     2020    K 4.8 2020     2020    CO2 25 2020    BUN 19 2020    CREATININE 0.6 2020    GLUCOSE 116 (H) 2020    CALCIUM 9.5 2020    PROT 6.3 (L) 2020    LABALBU 4.2 2020    BILITOT <0.2 2020    ALKPHOS 74 2020    AST 16 2020    ALT 11 2020 LABGLOM >60 02/19/2020    GFRAA >60 02/19/2020    AGRATIO 2.0 02/19/2020    GLOB 2.1 02/19/2020       Review of Systems   Respiratory: Negative for cough, choking and chest tightness. Cardiovascular: Positive for leg swelling. Negative for chest pain. Musculoskeletal: Positive for myalgias (right hip pain). Neurological: Positive for tingling (foot). Numbness: foot. Psychiatric/Behavioral: Negative for agitation, self-injury and suicidal ideas. Prior to Visit Medications    Medication Sig Taking? Authorizing Provider   Compression Stockings MISC by Does not apply route 20-30 MM edema bilateral legs Yes MUSA Ghosh CNP   tiZANidine (ZANAFLEX) 2 MG tablet Take 1 tablet by mouth 2 times daily Yes MUSA Ghosh CNP   furosemide (LASIX) 20 MG tablet Take 2 tablets by mouth daily With an additional 20 mg in the evening on M,W,F only Yes Jose Vergara MD   ALPRAZolam Leidy Sherwood) 0.5 MG tablet Take 1 tablet by mouth 2 times daily as needed for Anxiety for up to 90 days.  Yes MUAS Seymour CNP   mirtazapine (REMERON) 30 MG tablet TAKE ONE TABLET BY MOUTH ONCE NIGHTLY Yes MUSA Seymour CNP   hydrOXYzine (ATARAX) 25 MG tablet TAKE ONE TO TWO TABLETS BY MOUTH TWICE A DAY AS NEEDED FOR ITCHING Yes MUSA Seymour CNP   apixaban (ELIQUIS) 5 MG TABS tablet Take 1 tablet by mouth 2 times daily Yes MUSA Ghosh CNP   sertraline (ZOLOFT) 100 MG tablet Take 1 tablet by mouth nightly Yes MUSA Seymour CNP   pantoprazole (PROTONIX) 40 MG tablet Take 1 tablet by mouth 2 times daily Yes MUSA Ghosh CNP   flecainide (TAMBOCOR) 100 MG tablet Take 50 mg by mouth 2 times daily Yes Historical Provider, MD   vitamin D 25 MCG (1000 UT) CAPS Take 2 capsules by mouth daily Yes Historical Provider, MD   Biotin 1000 MCG TABS Take 1 tablet by mouth daily Yes Historical Provider, MD   potassium chloride (KLOR-CON M20) 20 MEQ extended release tablet Take 1 tablet by mouth 2 times daily Yes MUSA Dickens - JOHN   metoprolol succinate (TOPROL XL) 50 MG extended release tablet Take 1 tablet by mouth daily Yes Shiva Eduardo MD   vitamin B-12 (CYANOCOBALAMIN) 1000 MCG tablet Take 500 mcg by mouth daily Indications: patient is taking 2 tablets 1,000 mcg in the morning  Yes Historical Provider, MD   ondansetron (ZOFRAN) 4 MG tablet TAKE ONE TABLET BY MOUTH EVERY 12 HOURS AS NEEDED FOR NAUSEA AND VOMITING  MUSA Dickens CNP        Social History     Tobacco Use    Smoking status: Never Smoker    Smokeless tobacco: Never Used   Substance Use Topics    Alcohol use: No        Vitals:    20 1509   BP: 137/70   Pulse: 79   Resp: 18   Temp: 98.2 °F (36.8 °C)   TempSrc: Temporal   SpO2: 94%   Weight: 194 lb (88 kg)     Estimated body mass index is 33.83 kg/m² as calculated from the following:    Height as of 20: 5' 3.5\" (1.613 m). Weight as of this encounter: 194 lb (88 kg). Physical Exam  Vitals signs reviewed. Constitutional:       Appearance: Normal appearance. She is well-developed and well-groomed. HENT:      Head: Normocephalic. Cardiovascular:      Rate and Rhythm: Normal rate and regular rhythm. Heart sounds: Normal heart sounds, S1 normal and S2 normal.   Pulmonary:      Effort: Pulmonary effort is normal.      Breath sounds: Normal breath sounds. Musculoskeletal:      Right lower le+ Pitting Edema present. Left lower le+ Pitting Edema present. Skin:     General: Skin is warm and dry. Comments: Pain radiates from right hip down to foot. Foot numbness and tingling has worsen. Neurological:      Mental Status: She is alert and oriented to person, place, and time. GCS: GCS eye subscore is 4. GCS verbal subscore is 5. GCS motor subscore is 6.    Psychiatric:         Attention and Perception: Attention normal.         Mood and Affect: Mood normal.         Speech: Speech normal.         Behavior: Behavior is cooperative. ASSESSMENT/PLAN:  1. Pain of right hip joint  -Make appointment with Orthopedic Surgeon  - XR LUMBAR SPINE (2-3 VIEWS); Future    2. Edema, lower extremity    - Compression Stockings MISC; by Does not apply route 20-30 MM edema bilateral legs  Dispense: 1 each; Refill: 0    3. Essential hypertension  -Continue current medications. No follow-ups on file.         --MUSA Dickens - CNP on 7/17/2020 at 2:32 PM

## 2020-07-17 ASSESSMENT — PATIENT HEALTH QUESTIONNAIRE - PHQ9
1. LITTLE INTEREST OR PLEASURE IN DOING THINGS: 1
SUM OF ALL RESPONSES TO PHQ QUESTIONS 1-9: 2
SUM OF ALL RESPONSES TO PHQ9 QUESTIONS 1 & 2: 2
2. FEELING DOWN, DEPRESSED OR HOPELESS: 1
SUM OF ALL RESPONSES TO PHQ QUESTIONS 1-9: 2

## 2020-07-17 ASSESSMENT — ENCOUNTER SYMPTOMS
COUGH: 0
CHEST TIGHTNESS: 0
CHOKING: 0

## 2020-07-20 ENCOUNTER — OFFICE VISIT (OUTPATIENT)
Dept: ORTHOPEDIC SURGERY | Age: 75
End: 2020-07-20
Payer: MEDICARE

## 2020-07-20 VITALS — TEMPERATURE: 98.2 F | BODY MASS INDEX: 33.31 KG/M2 | HEIGHT: 63 IN | WEIGHT: 188 LBS

## 2020-07-20 PROCEDURE — 99213 OFFICE O/P EST LOW 20 MIN: CPT | Performed by: PHYSICIAN ASSISTANT

## 2020-07-20 RX ORDER — ACETAMINOPHEN 500 MG
500 TABLET ORAL EVERY 6 HOURS PRN
COMMUNITY
End: 2021-11-05

## 2020-07-20 NOTE — PROGRESS NOTES
Patient Name: Davy Aguilar  Medical Record Number: [id number]  YOB: 1945  Date of Encounter: 7/20/2020     Chief Complaint   Patient presents with    Hip Pain     Right hip pain that radiates down the Right leg, x2 weeks. No injury. History of Present Illness:   Ms. Davy Aguilar is here in follow up regarding her right hip and thigh pain. Patient is almost 2-years post displaced supracondylar fracture of right femur that was repaired with gamma nail and cerclage wires on 10/25/2018. Patient had moderate pain for quite some time following that surgery. Patient states she has been feeling very well but 2 weeks ago developed right hip and thigh pain without any specific injury. She states most of her pain is in her groin and down her medial thigh. She has also been having an exacerbation of her chronic low back pain with sciatica. She followed up with her PCP and obtained an order for a lumbar x-ray which she has not yet completed. The patient's past medical history, medications, allergies, family history, social history, and review of systems have been reviewed, and dated and are recorded in the chart under the 'MEDIA\" tab. Physical Exam:    Ms. Davy Aguilar appears well, she is in no apparent distress, she demonstrates appropriate mood & affect. She is alert and oriented to person, place and time. Temp 98.2 °F (36.8 °C) (Infrared)   Ht 5' 3\" (1.6 m)   Wt 188 lb (85.3 kg)   BMI 33.30 kg/m²     On examination of patient's right hip and thigh there is no swelling or joint effusion. Patient really has no tenderness on palpation of the right hip or thigh. She has a lot of difficulty with any active flexion of the hip secondary to pain. She is able to extend the knee without difficulty. There is no significant lower extremity edema or signs of DVT.       Radiology:  X-rays obtained and reviewed in office:   Views: 2 view right femur including AP and lateral  Impression: There is a healed supracondylar femur fracture. Hardware is in place without evidence of loosening or hardware failure. There is a stable minimally displaced fracture of the right greater trochanter. There are moderate to severe degenerative changes of the right glenohumeral joint. Orders:  Orders Placed This Encounter   Procedures    CT FEMUR RIGHT WO CONTRAST       Impression:   Diagnosis Orders   1. Closed fracture of neck of right femur, initial encounter ([facility] [address]  CT FEMUR RIGHT WO CONTRAST   2. Right thigh pain  CT FEMUR RIGHT WO CONTRAST   3. Closed traumatic displaced supracondylar fracture of RIGHT femur repaired with gamma nail and cerclage wires 10/25/2018   CT FEMUR RIGHT WO CONTRAST       Treatment Plan:    Patient began having worsening right hip and thigh pain 2 weeks ago without any specific injury. X-rays show a stable minimally displaced fracture of the greater trochanter. There is some concern for a possible occult femoral neck fracture. Her supracondylar femur fracture is well-healed and hardware shows no signs of loosening or failure. Patient will continue using her walker and do protected weightbearing on the right lower extremity. She will be sent for CT scan of her right femur to further evaluate possible femoral neck fracture. Patient does have moderate to severe degenerative changes of the right glenohumeral joint which can explain her symptoms however I feel it is important to rule out occult fracture. Patient will follow-up after CT scan. Zak Melton was informed of the results of any imaging. We discussed treatment options and a time was given to answer questions. A plan was proposed and Giogina Melton understand and accepts this course of care.           Electronically signed by Tan Beavers PA-C on 2/42/1491  Board Certified [facility]    Please note that portions of this note were completed with a voice recognition program.  Efforts were made to edit the dictations but occasionally words are mis-transcribed.

## 2020-07-21 ENCOUNTER — TELEPHONE (OUTPATIENT)
Dept: ORTHOPEDIC SURGERY | Age: 75
End: 2020-07-21

## 2020-07-21 RX ORDER — METOPROLOL SUCCINATE 50 MG/1
TABLET, EXTENDED RELEASE ORAL
Qty: 90 TABLET | Refills: 0 | Status: SHIPPED | OUTPATIENT
Start: 2020-07-21 | End: 2020-10-23

## 2020-07-21 RX ORDER — APIXABAN 5 MG/1
TABLET, FILM COATED ORAL
Qty: 60 TABLET | Refills: 3 | Status: SHIPPED | OUTPATIENT
Start: 2020-07-21 | End: 2020-10-06 | Stop reason: SDUPTHER

## 2020-07-21 RX ORDER — FLECAINIDE ACETATE 100 MG/1
TABLET ORAL
Qty: 60 TABLET | Refills: 3 | Status: SHIPPED | OUTPATIENT
Start: 2020-07-21 | End: 2021-03-05 | Stop reason: SDUPTHER

## 2020-07-21 NOTE — TELEPHONE ENCOUNTER
MRI order and authorization faxed to [facility] [phone]

## 2020-07-27 ENCOUNTER — HOSPITAL ENCOUNTER (OUTPATIENT)
Dept: CT IMAGING | Age: 75
Discharge: HOME | End: 2020-07-27
Payer: MEDICARE

## 2020-07-27 ENCOUNTER — HOSPITAL ENCOUNTER (OUTPATIENT)
Dept: GENERAL RADIOLOGY | Age: 75
Discharge: HOME | End: 2020-07-27
Payer: MEDICARE

## 2020-07-27 ENCOUNTER — HOSPITAL ENCOUNTER (OUTPATIENT)
Age: 75
Discharge: HOME | End: 2020-07-27
Payer: MEDICARE

## 2020-07-27 ENCOUNTER — TELEPHONE (OUTPATIENT)
Dept: ORTHOPEDIC SURGERY | Age: 75
End: 2020-07-27

## 2020-07-27 PROCEDURE — 73700 CT LOWER EXTREMITY W/O DYE: CPT

## 2020-07-27 PROCEDURE — 72100 X-RAY EXAM L-S SPINE 2/3 VWS: CPT

## 2020-07-27 NOTE — TELEPHONE ENCOUNTER
Can you help patient schedule an appointment with Dr. Jonathon Alba to discuss MRI results and treatment options?  Thanks

## 2020-07-30 ENCOUNTER — TELEPHONE (OUTPATIENT)
Dept: INTERNAL MEDICINE CLINIC | Age: 75
End: 2020-07-30

## 2020-07-31 ENCOUNTER — OFFICE VISIT (OUTPATIENT)
Dept: ORTHOPEDIC SURGERY | Age: 75
End: 2020-07-31
Payer: MEDICARE

## 2020-07-31 VITALS — HEIGHT: 63 IN | WEIGHT: 182 LBS | TEMPERATURE: 97.2 F | BODY MASS INDEX: 32.25 KG/M2

## 2020-07-31 PROBLEM — M79.651 PAIN OF RIGHT THIGH: Status: ACTIVE | Noted: 2020-07-31

## 2020-07-31 PROBLEM — M54.16 LUMBAR RADICULAR PAIN: Status: ACTIVE | Noted: 2020-07-31

## 2020-07-31 PROCEDURE — 99213 OFFICE O/P EST LOW 20 MIN: CPT | Performed by: ORTHOPAEDIC SURGERY

## 2020-07-31 NOTE — PROGRESS NOTES
Elizabeth Villavicencio MD  [address]    History of Present Illness:  Chief Complaint   Patient presents with    Follow-up     CT results, Right femur. Lindsey Chavez is a 67 y.o. female here for evaluation of right femur pain. She saw Jay Orellana at her last visit on July 20 with more pain up towards the right hip region and out of concern for possible evolving femoral neck fracture a CT scan was ordered. I treated her previous right supracondylar femur fracture with an intramedullary nail placed 10/25/2018 and she is following up to discuss the results of a CT scan performed 7/27/2020 on the right femur. Pain today is rated 4/10 in severity. She still has been using a walker to reduce her fall risk. Patient feels pain in the thigh while lifting the right lower extremity and walking. She also has an underlying history of lumbar degenerative disc disease. She is on Eliquis. She is not currently on any vitamin D supplementation.     Medication Review:  Current Outpatient Medications   Medication Sig Dispense Refill    hydrOXYzine (ATARAX) 25 MG tablet TAKE ONE TO TWO TABLETS BY MOUTH TWICE A DAY AS NEEDED FOR ITCHING 90 tablet 2    flecainide (TAMBOCOR) 100 MG tablet TAKE 1 TABLET BY MOUTH 2 TIMES A DAY 60 tablet 3    ELIQUIS 5 MG TABS tablet TAKE 1 TABLET BY MOUTH 2 TIMES A DAY 60 tablet 3    metoprolol succinate (TOPROL XL) 50 MG extended release tablet TAKE 1 TABLET BY MOUTH ONE TIME A DAY 90 tablet 0    acetaminophen (TYLENOL) 500 MG tablet Take 500 mg by mouth every 6 hours as needed for Pain      Compression Stockings MISC by Does not apply route 20-30 MM edema bilateral legs 1 each 0    tiZANidine (ZANAFLEX) 2 MG tablet Take 1 tablet by mouth 2 times daily 180 tablet 0    furosemide (LASIX) 20 MG tablet Take 2 tablets by mouth daily With an additional 20 mg in the evening on M,W,F only 120 tablet 5    ALPRAZolam (XANAX) 0.5 MG tablet Take 1 tablet by mouth 2 times daily as needed for Anxiety for up to 90 days. 60 tablet 2    mirtazapine (REMERON) 30 MG tablet TAKE ONE TABLET BY MOUTH ONCE NIGHTLY 30 tablet 2    sertraline (ZOLOFT) 100 MG tablet Take 1 tablet by mouth nightly 90 tablet 1    pantoprazole (PROTONIX) 40 MG tablet Take 1 tablet by mouth 2 times daily 60 tablet 3    vitamin D 25 MCG (1000 UT) CAPS Take 2 capsules by mouth daily      Biotin 1000 MCG TABS Take 1 tablet by mouth daily      ondansetron (ZOFRAN) 4 MG tablet TAKE ONE TABLET BY MOUTH EVERY 12 HOURS AS NEEDED FOR NAUSEA AND VOMITING 30 tablet 0    potassium chloride (KLOR-CON M20) 20 MEQ extended release tablet Take 1 tablet by mouth 2 times daily 180 tablet 3    vitamin B-12 (CYANOCOBALAMIN) 1000 MCG tablet Take 500 mcg by mouth daily Indications: patient is taking 2 tablets 1,000 mcg in the morning        No current facility-administered medications for this visit. Review of Systems:  Relevant review of systems reviewed and can be found in the Media section of patient's chart.        Medical History:  Past Medical History:   Diagnosis Date    Acid reflux     Anxiety     Arthritis     Atrial fibrillation (HCC)     Closed fracture dislocation of right elbow     Depression     Fibromyalgia     Migraine     Rectal cancer (HCC)         Past Surgical History:   Procedure Laterality Date    APPENDECTOMY       SECTION      x4    CHOLECYSTECTOMY      COLONOSCOPY      SEVERAL    COLONOSCOPY N/A 2019    COLONOSCOPY POLYPECTOMY SNARE/COLD BIOPSY performed by Tan Gutierrez MD at [address] [facility] Right     NECK SURGERY      OPEN  [facility] W/O EXTENSION Right 10/25/2018    OPEN REDUCTION INTERNAL FIXATION RIGHT FEMUR SUPRACONDYLAR FEMORAL FRACTURE WITH C-ARM performed by Mary Mendes Jodi Robert MD at [address] [place] [address] TO REMOVE RECTAL CANCER    TOTAL KNEE ARTHROPLASTY Left     UPPER GASTROINTESTINAL ENDOSCOPY N/A 11/26/2019    ESOPHAGOGASTRODUODENOSCOPY performed by Kumar Sam MD at [place] [address], social and family histories, and medications were reviewed and updated as appropriate. General Exam:  Vital Signs:  Temp 97.2 °F (36.2 °C) (Infrared)   Ht 5' 3\" (1.6 m)   Wt 182 lb (82.6 kg)   BMI 32.24 kg/m²    Constitutional: Patient is adequately groomed with no evidence of malnutrition. Mental Status: The patient is oriented to time, place and person. The patient's mood and affect are appropriate. Lymphatic: The lymphatic examination bilaterally reveals all areas to be without enlargement or induration. Vascular: Examination reveals no swelling or calf tenderness. 2+ palpable pulses distally. Neurological: The patient has good coordination. There is no focal weakness or sensory deficit. Focal examination of right hip is as follows: Inspection:  Normal muscle contours and no significant limb length discrepancy. Age and activity related thigh muscle atrophy noted without specific dermatomal pattern. Palpation: No tenderness to the greater trochanter/trochanteric bursa. Mild tenderness to the sacroiliac joint. No tenderness to the knee joint. Mild tenderness over the mid thigh. Range of Motion:    Hip flexion 90°. Hip abduction 10°.  Hip internal rotation at 90° of flexion is -5°. Hip external rotation at 90° of flexion is 15°.  Knee range of motion shows functional range with mild thigh pain. Ankle dorsiflexion and plantarflexion show functional range of motion. Strength:    Hip flexion 3+/5    Hip abduction 3+/ 5    Hip adduction 3+/5.  Knee flexion and extension 4±5/5 without pain. Dorsiflexion and plantarflexion in ankle are 4±5/5.       Additional comments: Sensation to light touch grossly present and capillary refill less than 2 seconds. Skin: There are no rashes, ulcerations or lesions. Gait: Slow-paced gait with decreased weight shift favoring her right side. Adaptive device: Rolling walker. Radiology: There was no x-ray imaging obtained during today's office encounter. CT left femur performed 7/27/2020: Impression    1. No acute osseous abnormality. 2. Status post ORIF of a chronic right femoral shaft fracture with    significant healing.  The fracture remains partially ununited. X-ray lumbar spine performed 7/27/2020: Impression    No acute finding in the lumbar spine.  Degenerative changes appear chronic    and there is a remote T12 compression fracture that is stable. Office Orders/Procedures:  Orders Placed This Encounter   Procedures    DEXA AXIAL SKELETON W VERTEBRAL FX ASST     Standing Status:   Future     Standing Expiration Date:   8/2/2021     Order Specific Question:   Reason for exam:     Answer:   fracture risk assessement    VITAMIN D 25 HYDROXY     Standing Status:   Future     Standing Expiration Date:   8/2/2021       Impression:   Diagnosis Orders   1. Lumbar radicular pain     2. Pain of right thigh     3. Compression fracture of T12 vertebra, sequela  DEXA AXIAL SKELETON W VERTEBRAL FX ASST   4. Closed traumatic displaced supracondylar fracture of RIGHT femur repaired with gamma nail and cerclage wires 10/25/2018   DEXA AXIAL SKELETON W VERTEBRAL FX ASST    VITAMIN D 25 HYDROXY   5. Disorder of bone, unspecified   DEXA AXIAL SKELETON W VERTEBRAL FX ASST    VITAMIN D 25 HYDROXY        Treatment Plan:  We discussed her recent CT scan on her left femur as well as x-rays on her lumbar spine. She really has robust callus formation at her fracture site and although we can still see some residual fracture lines I suspect she has significant structural healing at the fracture.   I suspect her current pain is radicular from pathology in the lower back and I recommend she follow up with our spine team at [facility] for consideration for further lumbar workup and possible epidural injections. As for the femur, the CT scan shows significant healing but a few cracks remain which is not uncommon at this stage. If treatment of her lumbar degenerative changes are ineffective in relieving her symptoms, then we will have her follow up for continued workup of the femur. A bone scan may be of value in the future for persistent thigh pain. She may also benefit from vitamin D treatment and could also need an endocrinology referral to discuss other anabolic / antiresorptive bone medications based on her fracture risk and DEXA results. I have reviewed patient's pertinent medical history, relevant laboratory and imaging studies, and past surgical history. Patient's medications have been reviewed and were discussed during the visit. Patient was advised to keep future appointments with their respective specialty care team(s). Patient had the opportunity to ask questions, all of which were answered to the best of my ability and with patient satisfaction. Patient understands and is agreeable with the care plan following today's visit. Patient is to schedule an appointment for any new or worsening symptoms. By signing my name below, Zenon Levy, attest that this documentation has been prepared under the direction and in the presence of Jenniffer Gracia MD.   Electronically Signed: Vanessa Castro, 7/31/20, 10:27 AM EDT. Bernard Landry MD, personally performed the services described in this documentation. All medical record entries made by the vanessa were at my direction and in my presence. I have reviewed the chart and discharge instructions (if applicable) and agree that the record reflects my personal performance and is accurate and complete. Jenniffer Gracia MD, 8/2/20, 9:57 AM EDT.         Some documentation was done using voice recognition dragon software. Every effort was made to ensure accuracy; however, inadvertent unintentional computerized transcription errors may be present.

## 2020-08-02 PROBLEM — S72.451G: Status: ACTIVE | Noted: 2020-08-02

## 2020-08-03 ENCOUNTER — TELEPHONE (OUTPATIENT)
Dept: ORTHOPEDIC SURGERY | Age: 75
End: 2020-08-03

## 2020-08-03 ENCOUNTER — TELEPHONE (OUTPATIENT)
Dept: INTERNAL MEDICINE CLINIC | Age: 75
End: 2020-08-03

## 2020-08-03 NOTE — TELEPHONE ENCOUNTER
Pt says she called [facility] and was told that they don't do Dexa Scan only [facility].  Please advise

## 2020-08-04 ENCOUNTER — TELEPHONE (OUTPATIENT)
Dept: ORTHOPEDIC SURGERY | Age: 75
End: 2020-08-04

## 2020-08-04 NOTE — TELEPHONE ENCOUNTER
This is not Glendell Primrose, PA-C patient. Encounter provider was changed to Dr. Barth [address] as she is under his care.

## 2020-08-04 NOTE — TELEPHONE ENCOUNTER
Spoke to patient and let her know her Dexa can be done at [place] and she can have her labs for vitamin levels checked at that time.

## 2020-08-10 NOTE — TELEPHONE ENCOUNTER
Type of dexa ordered is apparently only done at [facility] and patient does not want to travel that far to have it done. She is asking if you know of any other facilities where she can have this done.

## 2020-08-10 NOTE — TELEPHONE ENCOUNTER
May have to go with regular dexa. She does have a compression fracture, just not sure a special study in [place] is needed.

## 2020-08-10 NOTE — TELEPHONE ENCOUNTER
Sounds like we need to order the right test.  Probably should check with radiology first to make sure we have the right order.

## 2020-08-10 NOTE — TELEPHONE ENCOUNTER
You ordered the dexa axial skeleton for vertebral fx assessment. Should it have been a regular dexa?

## 2020-08-11 ENCOUNTER — TELEPHONE (OUTPATIENT)
Dept: ORTHOPEDIC SURGERY | Age: 75
End: 2020-08-11

## 2020-08-11 NOTE — TELEPHONE ENCOUNTER
Dow Cushing at 8/11/2020  9:05 AM     Status: Signed       She needs an order dexa scan.           Fax# [phone]

## 2020-08-12 ENCOUNTER — OFFICE VISIT (OUTPATIENT)
Dept: PRIMARY CARE CLINIC | Age: 75
End: 2020-08-12
Payer: MEDICARE

## 2020-08-12 ENCOUNTER — OFFICE VISIT (OUTPATIENT)
Dept: ORTHOPEDIC SURGERY | Age: 75
End: 2020-08-12
Payer: MEDICARE

## 2020-08-12 VITALS — WEIGHT: 182 LBS | BODY MASS INDEX: 32.25 KG/M2 | HEIGHT: 63 IN | TEMPERATURE: 97.2 F

## 2020-08-12 PROCEDURE — 99213 OFFICE O/P EST LOW 20 MIN: CPT | Performed by: PHYSICIAN ASSISTANT

## 2020-08-12 PROCEDURE — 99211 OFF/OP EST MAY X REQ PHY/QHP: CPT | Performed by: NURSE PRACTITIONER

## 2020-08-12 NOTE — PROGRESS NOTES
Patient presented to [facility] drive up clinic for preop testing. Patient was swabbed and given information advising them to remain isolated until procedure date.

## 2020-08-14 LAB
SARS-COV-2: NOT DETECTED
SOURCE: NORMAL

## 2020-08-17 ENCOUNTER — ANESTHESIA EVENT (OUTPATIENT)
Dept: ENDOSCOPY | Age: 75
End: 2020-08-17
Payer: MEDICARE

## 2020-08-17 ENCOUNTER — TELEPHONE (OUTPATIENT)
Dept: ORTHOPEDIC SURGERY | Age: 75
End: 2020-08-17

## 2020-08-17 NOTE — TELEPHONE ENCOUNTER
[facility] from [facility] called; wants to verify an order that was sent over. Please advise.        Call [phone] to discuss

## 2020-08-18 ENCOUNTER — ANESTHESIA (OUTPATIENT)
Dept: ENDOSCOPY | Age: 75
End: 2020-08-18
Payer: MEDICARE

## 2020-08-18 ENCOUNTER — HOSPITAL ENCOUNTER (OUTPATIENT)
Age: 75
Setting detail: OUTPATIENT SURGERY
Discharge: HOME | End: 2020-08-18
Attending: INTERNAL MEDICINE | Admitting: INTERNAL MEDICINE
Payer: MEDICARE

## 2020-08-18 VITALS
DIASTOLIC BLOOD PRESSURE: 58 MMHG | OXYGEN SATURATION: 100 % | RESPIRATION RATE: 14 BRPM | SYSTOLIC BLOOD PRESSURE: 123 MMHG

## 2020-08-18 VITALS
HEIGHT: 63 IN | WEIGHT: 179.12 LBS | RESPIRATION RATE: 16 BRPM | SYSTOLIC BLOOD PRESSURE: 124 MMHG | OXYGEN SATURATION: 97 % | DIASTOLIC BLOOD PRESSURE: 67 MMHG | TEMPERATURE: 97 F | HEART RATE: 96 BPM | BODY MASS INDEX: 31.74 KG/M2

## 2020-08-18 PROCEDURE — 7100000000 HC PACU RECOVERY - FIRST 15 MIN: Performed by: INTERNAL MEDICINE

## 2020-08-18 PROCEDURE — 3609012400 HC EGD TRANSORAL BIOPSY SINGLE/MULTIPLE: Performed by: INTERNAL MEDICINE

## 2020-08-18 PROCEDURE — 87205 SMEAR GRAM STAIN: CPT

## 2020-08-18 PROCEDURE — 2580000003 HC RX 258: Performed by: ANESTHESIOLOGY

## 2020-08-18 PROCEDURE — 3700000001 HC ADD 15 MINUTES (ANESTHESIA): Performed by: INTERNAL MEDICINE

## 2020-08-18 PROCEDURE — 7100000011 HC PHASE II RECOVERY - ADDTL 15 MIN: Performed by: INTERNAL MEDICINE

## 2020-08-18 PROCEDURE — 7100000001 HC PACU RECOVERY - ADDTL 15 MIN: Performed by: INTERNAL MEDICINE

## 2020-08-18 PROCEDURE — 88305 TISSUE EXAM BY PATHOLOGIST: CPT

## 2020-08-18 PROCEDURE — 2709999900 HC NON-CHARGEABLE SUPPLY: Performed by: INTERNAL MEDICINE

## 2020-08-18 PROCEDURE — 2500000003 HC RX 250 WO HCPCS: Performed by: NURSE ANESTHETIST, CERTIFIED REGISTERED

## 2020-08-18 PROCEDURE — 87102 FUNGUS ISOLATION CULTURE: CPT

## 2020-08-18 PROCEDURE — 88312 SPECIAL STAINS GROUP 1: CPT

## 2020-08-18 PROCEDURE — 3700000000 HC ANESTHESIA ATTENDED CARE: Performed by: INTERNAL MEDICINE

## 2020-08-18 PROCEDURE — 7100000010 HC PHASE II RECOVERY - FIRST 15 MIN: Performed by: INTERNAL MEDICINE

## 2020-08-18 PROCEDURE — 6360000002 HC RX W HCPCS: Performed by: NURSE ANESTHETIST, CERTIFIED REGISTERED

## 2020-08-18 RX ORDER — SODIUM CHLORIDE 0.9 % (FLUSH) 0.9 %
10 SYRINGE (ML) INJECTION EVERY 12 HOURS SCHEDULED
Status: DISCONTINUED | OUTPATIENT
Start: 2020-08-18 | End: 2020-08-18 | Stop reason: HOSPADM

## 2020-08-18 RX ORDER — PROPOFOL 10 MG/ML
INJECTION, EMULSION INTRAVENOUS PRN
Status: DISCONTINUED | OUTPATIENT
Start: 2020-08-18 | End: 2020-08-18 | Stop reason: SDUPTHER

## 2020-08-18 RX ORDER — SODIUM CHLORIDE 0.9 % (FLUSH) 0.9 %
10 SYRINGE (ML) INJECTION PRN
Status: DISCONTINUED | OUTPATIENT
Start: 2020-08-18 | End: 2020-08-18 | Stop reason: HOSPADM

## 2020-08-18 RX ORDER — SODIUM CHLORIDE 9 MG/ML
INJECTION, SOLUTION INTRAVENOUS CONTINUOUS
Status: DISCONTINUED | OUTPATIENT
Start: 2020-08-18 | End: 2020-08-18 | Stop reason: HOSPADM

## 2020-08-18 RX ORDER — LIDOCAINE HYDROCHLORIDE 20 MG/ML
INJECTION, SOLUTION EPIDURAL; INFILTRATION; INTRACAUDAL; PERINEURAL PRN
Status: DISCONTINUED | OUTPATIENT
Start: 2020-08-18 | End: 2020-08-18 | Stop reason: SDUPTHER

## 2020-08-18 RX ORDER — METOCLOPRAMIDE 5 MG/1
5 TABLET ORAL 3 TIMES DAILY PRN
Qty: 120 TABLET | Refills: 3 | Status: SHIPPED | OUTPATIENT
Start: 2020-08-18 | End: 2021-04-13

## 2020-08-18 RX ORDER — PROPOFOL 10 MG/ML
INJECTION, EMULSION INTRAVENOUS CONTINUOUS PRN
Status: DISCONTINUED | OUTPATIENT
Start: 2020-08-18 | End: 2020-08-18 | Stop reason: SDUPTHER

## 2020-08-18 RX ADMIN — LIDOCAINE HYDROCHLORIDE 60 MG: 20 INJECTION, SOLUTION EPIDURAL; INFILTRATION; INTRACAUDAL; PERINEURAL at 09:56

## 2020-08-18 RX ADMIN — SODIUM CHLORIDE: 9 INJECTION, SOLUTION INTRAVENOUS at 08:50

## 2020-08-18 RX ADMIN — PROPOFOL 30 MG: 10 INJECTION, EMULSION INTRAVENOUS at 10:07

## 2020-08-18 RX ADMIN — PROPOFOL 120 MCG/KG/MIN: 10 INJECTION, EMULSION INTRAVENOUS at 09:57

## 2020-08-18 RX ADMIN — PROPOFOL 70 MG: 10 INJECTION, EMULSION INTRAVENOUS at 09:56

## 2020-08-18 ASSESSMENT — PAIN SCALES - GENERAL
PAINLEVEL_OUTOF10: 0

## 2020-08-18 ASSESSMENT — PULMONARY FUNCTION TESTS
PIF_VALUE: 0
PIF_VALUE: 1
PIF_VALUE: 0

## 2020-08-18 ASSESSMENT — ENCOUNTER SYMPTOMS: SHORTNESS OF BREATH: 0

## 2020-08-18 ASSESSMENT — PAIN - FUNCTIONAL ASSESSMENT: PAIN_FUNCTIONAL_ASSESSMENT: 0-10

## 2020-08-18 NOTE — OP NOTE
Endoscopy Note    Patient: Carleen Mack  : 1945  Acct#:     Procedure: Esophagogastroduodenoscopy with biopsy, brush biopsy                        Date:  2020     Surgeon:   Payal Oakes MD    Referring Physician:  Debra Pena APRN - CNP    Indications: The patient is a 67year old female with extensive PMHx which includes rectal cancer s/p resection, HTN, A.Fib, DM-II, spinal stenosis, obesity who presents for EGD due to abdominal pain and early satiety. Postoperative Diagnosis:    1. White linear plaques in the distal esophagus, which pealed off with suction and biopsy. Biopsies were obtained, and the plaque lesions were also collected with brush biopsy for evaluation. 2. Evidence of Shanice-en-Y gastric bypass, with a small gastric pouch. Healthy anastamosis site. 3. Patchy white numular lesions and plaques in the jejunum, biopsied for further evaluation. Anesthesia:  MAC    Consent:  The patient or their legal guardian has signed a consent, and is aware of the potential risks, benefits, alternatives, and potential complications of this procedure. These include, but are not limited to hemorrhage, bleeding, post procedural pain, perforation, phlebitis, aspiration, hypotension, hypoxia, cardiovascular events such as arryhthmia, and possibly death. Additionally, the possibility of missed colonic polyps and interval colon cancer was discussed in the consent. Description of Procedure: The patient was then taken to the endoscopy suite, placed in the left lateral decubitus position and the above IV sedation was administrered. The Olympus video endoscope was placed through the patient's oropharynx without difficulty to the mid-jejunum, 100 cm from the nicisors. Both forward and retroflexed views of the stomach were obtained. Findings:    Esophagus: The esophagus was notable for white linear plaques in the distal esophagus, which pealed off with suction and biopsy. Biopsies were obtained, and the plaque lesions were also collected with brush biopsy for evaluation. . The Z line was located 36 cm from the incisors   Stomach: The stomach showed evidence of Shanice-en-Y gastric bypass, with a small gastric pouch. Healthy anastamosis site was present without ulceration or stenosis. There was a small pouch of stomach which appeared to herniate adjacent to the gastrojejunal anastamosis site. Jejunum: Patchy white numular lesions and plaques in the jejunum, biopsied for further evaluation. The scope was then withdrawn back into the stomach, it was decompressed, and the scope was completely withdrawn. The patient tolerated the procedure well and was taken to the PACU in good condition. Estimated Blood Loss: Minimal     Impression:    1) See post procedure diagnoses    Recommendations:   - Follow-up pathology results in 7 days, by calling the office at [phone].  - Resume regular medications. - Resume diet as tolerated. - Will begin low dose Reglan (5 mg tid) for presumed Shanice limb stasis.       [facility] [address] [facility]

## 2020-08-18 NOTE — ANESTHESIA PRE PROCEDURE
SEVERAL    COLONOSCOPY N/A 11/26/2019    COLONOSCOPY POLYPECTOMY SNARE/COLD BIOPSY performed by Luiz Tejada MD at [facility] Right     NECK SURGERY      OPEN [facility] W/O EXTENSION Right 10/25/2018    OPEN REDUCTION INTERNAL FIXATION RIGHT FEMUR SUPRACONDYLAR FEMORAL FRACTURE WITH C-ARM performed by Susanne Monae MD at [facility] TO REMOVE RECTAL CANCER    TOTAL KNEE ARTHROPLASTY Left     UPPER GASTROINTESTINAL ENDOSCOPY N/A 11/26/2019    ESOPHAGOGASTRODUODENOSCOPY performed by Luiz Tejada MD at [facility] History     Tobacco Use    Smoking status: Never Smoker    Smokeless tobacco: Never Used   Substance Use Topics    Alcohol use: No    Drug use: Never     Medications  No current facility-administered medications on file prior to encounter. Current Outpatient Medications on File Prior to Encounter   Medication Sig Dispense Refill    acetaminophen (TYLENOL) 500 MG tablet Take 500 mg by mouth every 6 hours as needed for Pain      tiZANidine (ZANAFLEX) 2 MG tablet Take 1 tablet by mouth 2 times daily 180 tablet 0    furosemide (LASIX) 20 MG tablet Take 2 tablets by mouth daily With an additional 20 mg in the evening on M,W,F only 120 tablet 5    ALPRAZolam (XANAX) 0.5 MG tablet Take 1 tablet by mouth 2 times daily as needed for Anxiety for up to 90 days.  60 tablet 2    mirtazapine (REMERON) 30 MG tablet TAKE ONE TABLET BY MOUTH ONCE NIGHTLY 30 tablet 2    sertraline (ZOLOFT) 100 MG tablet Take 1 tablet by mouth nightly 90 tablet 1    pantoprazole (PROTONIX) 40 MG tablet Take 1 tablet by mouth 2 times daily 60 tablet 3    vitamin D 25 MCG (1000 UT) CAPS Take 2 capsules by mouth daily      Biotin 1000 MCG TABS Take 1 tablet by mouth daily      ondansetron (ZOFRAN) 4 MG tablet TAKE ONE TABLET BY MOUTH EVERY 12 HOURS AS NEEDED FOR NAUSEA AND VOMITING 30 tablet 0    potassium chloride (KLOR-CON M20) 20 MEQ extended release tablet Take 1 tablet by mouth 2 times daily 180 tablet 3    vitamin B-12 (CYANOCOBALAMIN) 1000 MCG tablet Take 500 mcg by mouth daily Indications: patient is taking 2 tablets 1,000 mcg in the morning       Compression Stockings MISC by Does not apply route 20-30 MM edema bilateral legs 1 each 0     Current Facility-Administered Medications   Medication Dose Route Frequency Provider Last Rate Last Dose    0.9 % sodium chloride infusion   Intravenous Continuous Aubree Nicole MD        sodium chloride flush 0.9 % injection 10 mL  10 mL Intravenous 2 times per day Aubree Nicole MD        sodium chloride flush 0.9 % injection 10 mL  10 mL Intravenous PRN Aubree Nicole MD         Vital Signs (Current)   Vitals:    20 1055 20 0826 20 0836   BP:   127/68   Pulse:   85   Resp:   16   Temp:   97.4 °F (36.3 °C)   TempSrc:   Temporal   SpO2:   95%   Weight: 185 lb (83.9 kg) 179 lb 2 oz (81.3 kg)    Height: 5' 3\" (1.6 m) 5' 3\" (1.6 m)                                           BP Readings from Last 3 Encounters:   20 127/68   20 137/70   20 110/72     Vital Signs Statistics (for past 48 hrs)     Temp  Av.4 °F (36.3 °C)  Min: 97.4 °F (36.3 °C)   Min taken time: 20 0836  Max: 97.4 °F (36.3 °C)   Max taken time: 20 0836  Pulse  Av  Min: 85   Min taken time: 20 0836  Max: 85   Max taken time: 20 0836  Resp  Av  Min: 12   Min taken time: 20 0836  Max: 16   Max taken time: 20 0836  BP  Min: 127/68   Min taken time: 20 0836  Max: 127/68   Max taken time: 20 0836  SpO2  Av %  Min: 95 %   Min taken time: 20 0836  Max: 95 %   Max taken time: 20 0836  BP Readings from Last 3 Encounters:   20 127/68   20 137/70   20 110/72       BMI  Body mass index is 31.73 kg/m².   Estimated body mass index is 31.73 kg/m² as calculated from the following:    Height as of this encounter: 5' 3\" (1.6 m). Weight as of this encounter: 179 lb 2 oz (81.3 kg). CBC   Lab Results   Component Value Date    WBC 3.7 02/19/2020    RBC 3.61 02/19/2020    HGB 9.4 02/19/2020    HCT 29.4 02/19/2020    MCV 81.5 02/19/2020    RDW 25.3 02/19/2020     02/19/2020     CMP    Lab Results   Component Value Date     02/19/2020    K 4.8 02/19/2020    K 4.8 08/11/2019     02/19/2020    CO2 25 02/19/2020    BUN 19 02/19/2020    CREATININE 0.6 02/19/2020    GFRAA >60 02/19/2020    AGRATIO 2.0 02/19/2020    LABGLOM >60 02/19/2020    GLUCOSE 116 02/19/2020    PROT 6.3 02/19/2020    CALCIUM 9.5 02/19/2020    BILITOT <0.2 02/19/2020    ALKPHOS 74 02/19/2020    AST 16 02/19/2020    ALT 11 02/19/2020     BMP    Lab Results   Component Value Date     02/19/2020    K 4.8 02/19/2020    K 4.8 08/11/2019     02/19/2020    CO2 25 02/19/2020    BUN 19 02/19/2020    CREATININE 0.6 02/19/2020    CALCIUM 9.5 02/19/2020    GFRAA >60 02/19/2020    LABGLOM >60 02/19/2020    GLUCOSE 116 02/19/2020     POCGlucose  No results for input(s): GLUCOSE in the last 72 hours.    Coags    Lab Results   Component Value Date    PROTIME 15.8 11/02/2018    INR 1.39 11/02/2018    APTT 31.5 90/91/7009     HCG (If Applicable) No results found for: PREGTESTUR, PREGSERUM, HCG, HCGQUANT   ABGs No results found for: PHART, PO2ART, YTK4BZX, EEW1YTO, BEART, R7EXIFOA   Type & Screen (If Applicable)  No results found for: LABABO, LABRH                         BMI: Wt Readings from Last 3 Encounters:       NPO Status:   Date of last liquid consumption: 08/17/20   Time of last liquid consumption: 2345   Date of last solid food consumption: 08/17/20      Time of last solid consumption: 1900       Anesthesia Evaluation  Patient summary reviewed  Airway: Mallampati: II  TM distance: >3 FB   Neck ROM: full  Mouth opening: > = 3 FB Dental:    (+) upper dentures      Pulmonary:       (-) COPD, asthma and shortness of breath                           Cardiovascular:    (+) hypertension:, dysrhythmias: atrial fibrillation,     (-) valvular problems/murmurs, past MI, CAD, CABG/stent and  angina                Neuro/Psych:   (+) neuromuscular disease:, headaches:, psychiatric history:   (-) seizures, TIA and CVA           GI/Hepatic/Renal:   (+) GERD:,      (-) PUD, liver disease and no renal disease       Endo/Other:    (+) Diabetes, : arthritis:., .                 Abdominal:           Vascular: negative vascular ROS. Anesthesia Plan      MAC     ASA 3     (I discussed local anesthesia with intravenous sedation to the   patient's satisfaction and agreed including risks and alternatives. The  patient has no further questions. FÉLIX FERGUSON )  Induction: intravenous. Anesthetic plan and risks discussed with patient. Plan discussed with CRNA. This pre-anesthesia assessment may be used as a history and physical.    DOS STAFF ADDENDUM:    Pt seen and examined, chart reviewed (including anesthesia, drug and allergy history). No interval changes to history and physical examination. Anesthetic plan, risks, benefits, alternatives, and personnel involved discussed with patient. Patient verbalized an understanding and agrees to proceed.       Torres Hung MD  August 18, 2020  8:43 AM

## 2020-08-18 NOTE — ANESTHESIA POSTPROCEDURE EVALUATION
Department of Anesthesiology  Postprocedure Note    Patient: Gabe Antony  MRN: [id number]  YOB: 1945  Date of evaluation: 8/18/2020  Time:  11:06 AM     Procedure Summary     Date:  08/18/20 Room / Location:  [address] [place] [address]    Anesthesia Start:  3853 Anesthesia Stop:  1088    Procedure:  EGD BIOPSY (N/A ) Diagnosis:  (GASTROESOPHAGEAL REFLUX DISEASE)    Surgeon:  Reid Fisher MD Responsible Provider:  All Guajardo MD    Anesthesia Type:  MAC ASA Status:  3          Anesthesia Type: MAC    Becky Phase I: Becky Score: 10    Becky Phase II: Becky Score: 10    Last vitals: Reviewed and per EMR flowsheets.        Anesthesia Post Evaluation    Patient location during evaluation: PACU  Level of consciousness: awake and alert  Airway patency: patent  Nausea & Vomiting: no nausea and no vomiting  Complications: no  Cardiovascular status: blood pressure returned to baseline  Respiratory status: acceptable  Hydration status: euvolemic  Comments: Postoperative Anesthesia Note    Name:    Gabe Antony  MRN:      [id number]    Patient Vitals in the past 12 hrs:  08/18/20 1050, BP:129/63, Temp:97 °F (36.1 °C), Temp src:Axillary, Pulse:78, Resp:16, SpO2:96 %  08/18/20 1040, BP:(!) 137/57, Pulse:81, Resp:14, SpO2:98 %  08/18/20 1039, Temp:97 °F (36.1 °C), Temp src:Temporal  08/18/20 1035, BP:(!) 124/54, Pulse:76, Resp:15, SpO2:97 %  08/18/20 1030, BP:(!) 115/57, Pulse:76, Resp:14, SpO2:97 %  08/18/20 1025, Pulse:79, Resp:13, SpO2:96 %  08/18/20 1024, Pulse:79, Resp:13, SpO2:97 %  08/18/20 1023, Pulse:83, Resp:14, SpO2:97 %  08/18/20 1022, BP:(!) 107/46, Pulse:80, Resp:15, SpO2:97 %  08/18/20 1021, Pulse:80, Resp:13, SpO2:98 %  08/18/20 1020, Pulse:84, Resp:16, SpO2:99 %  08/18/20 1019, Pulse:82, SpO2:100 %  08/18/20 1018, Temp:98.8 °F (37.1 °C), Temp src:Temporal, Pulse:83, Resp:14, SpO2:98 %  08/18/20 0836, BP:127/68, Temp:97.4 °F (36.3 °C), Temp src:Temporal, Pulse:85, Resp:16, SpO2:95 %  08/18/20 0826, Height:5' 3\" (1.6 m), Weight:179 lb 2 oz (81.3 kg)     LABS:    CBC  Lab Results       Component                Value               Date/Time                  WBC                      3.7 (L)             02/19/2020 01:30 PM        HGB                      9.4 (L)             02/19/2020 01:30 PM        HCT                      29.4 (L)            02/19/2020 01:30 PM        PLT                      212                 02/19/2020 01:30 PM   RENAL  Lab Results       Component                Value               Date/Time                  NA                       141                 02/19/2020 01:30 PM        K                        4.8                 02/19/2020 01:30 PM        K                        4.8                 08/11/2019 09:40 PM        CL                       104                 02/19/2020 01:30 PM        CO2                      25                  02/19/2020 01:30 PM        BUN                      19                  02/19/2020 01:30 PM        CREATININE               0.6                 02/19/2020 01:30 PM        GLUCOSE                  116 (H)             02/19/2020 01:30 PM   COAGS  Lab Results       Component                Value               Date/Time                  PROTIME                  15.8 (H)            11/02/2018 06:49 AM        INR                      1.39 (H)            11/02/2018 06:49 AM        APTT                     31.5                06/02/2018 11:29 PM     Intake & Output:  @64FLNT@    Nausea & Vomiting:  No    Level of Consciousness:  Awake    Pain Assessment:  Adequate analgesia    Anesthesia Complications:  No apparent anesthetic complications    SUMMARY      Vital signs stable  OK to discharge from Stage I post anesthesia care.   Care transferred from Anesthesiology department on discharge from perioperative area

## 2020-08-18 NOTE — H&P
Pre-operative History and Physical    Patient: Oneil Perez  : 1945  Acct#:     Intended Procedure:  EGD     HISTORY OF PRESENT ILLNESS:  The patient is a 67year old female with extensive PMHx which includes rectal cancer s/p resection, HTN, A.Fib, DM-II, spinal stenosis, obesity who presents for EGD due to abdominal pain and early satiety. Past Medical History:        Diagnosis Date    Acid reflux     Anxiety     Arthritis     Atrial fibrillation (HCC)     Closed fracture dislocation of right elbow     Depression     Fibromyalgia     Migraine     Rectal cancer (HCC)     Wears glasses     DRIVING     Past Surgical History:        Procedure Laterality Date    APPENDECTOMY       SECTION      x4    CHOLECYSTECTOMY      COLONOSCOPY      SEVERAL    COLONOSCOPY N/A 2019    COLONOSCOPY POLYPECTOMY SNARE/COLD BIOPSY performed by Darian Hoffman MD at [facility] Right     NECK SURGERY      OPEN  [facility] W/O EXTENSION Right 10/25/2018    OPEN REDUCTION INTERNAL FIXATION RIGHT FEMUR SUPRACONDYLAR FEMORAL FRACTURE WITH C-ARM performed by Sandy Armstrong MD at [facility] TO REMOVE RECTAL CANCER    TOTAL KNEE ARTHROPLASTY Left     UPPER GASTROINTESTINAL ENDOSCOPY N/A 2019    ESOPHAGOGASTRODUODENOSCOPY performed by Darian Hoffman MD at [facility]     Medications Prior to Admission:   No current facility-administered medications on file prior to encounter.       Current Outpatient Medications on File Prior to Encounter   Medication Sig Dispense Refill    acetaminophen (TYLENOL) 500 MG tablet Take 500 mg by mouth every 6 hours as needed for Pain      tiZANidine (ZANAFLEX) 2 MG tablet Take 1 tablet by mouth 2 times daily 180 tablet 0    furosemide (LASIX) 20 MG tablet Take 2 tablets by mouth daily With an additional 20 mg in the evening on ,,F only 120 tablet 5    ALPRAZolam (XANAX) 0.5 MG tablet Take 1 tablet by mouth 2 times daily as needed for Anxiety for up to 90 days. 60 tablet 2    mirtazapine (REMERON) 30 MG tablet TAKE ONE TABLET BY MOUTH ONCE NIGHTLY 30 tablet 2    sertraline (ZOLOFT) 100 MG tablet Take 1 tablet by mouth nightly 90 tablet 1    pantoprazole (PROTONIX) 40 MG tablet Take 1 tablet by mouth 2 times daily 60 tablet 3    vitamin D 25 MCG (1000 UT) CAPS Take 2 capsules by mouth daily      Biotin 1000 MCG TABS Take 1 tablet by mouth daily      ondansetron (ZOFRAN) 4 MG tablet TAKE ONE TABLET BY MOUTH EVERY 12 HOURS AS NEEDED FOR NAUSEA AND VOMITING 30 tablet 0    potassium chloride (KLOR-CON M20) 20 MEQ extended release tablet Take 1 tablet by mouth 2 times daily 180 tablet 3    vitamin B-12 (CYANOCOBALAMIN) 1000 MCG tablet Take 500 mcg by mouth daily Indications: patient is taking 2 tablets 1,000 mcg in the morning       Compression Stockings MISC by Does not apply route 20-30 MM edema bilateral legs 1 each 0        Allergies:  Demerol hcl [meperidine]; Pcn [penicillins]; and Adhesive tape    Social History:   TOBACCO:   reports that she has never smoked. She has never used smokeless tobacco.  ETOH:   reports no history of alcohol use. DRUGS:   reports no history of drug use. PHYSICAL EXAM:      Vital Signs: /68   Pulse 85   Temp 97.4 °F (36.3 °C) (Temporal)   Resp 16   Ht 5' 3\" (1.6 m)   Wt 179 lb 2 oz (81.3 kg)   SpO2 95%   BMI 31.73 kg/m²    Airway: No stridor or wheezing noted. Good air movement  Pulmonary: without wheezes.   Clear to auscultation  Cardiac:regular rate and rhythm without loud murmurs  Abdomen:soft, nontender,  Bowel sounds present    Pre-Procedure Assessment / Plan:  1) EGD     ASA Grade:  ASA 3 - Patient with moderate systemic disease with functional limitations  Mallampati Classification:  Class III    Level of Sedation Plan:MAC    Post Procedure plan: Return to same level of care    I assessed the patient and find that the patient is in satisfactory condition to proceed with the planned procedure and sedation plan. I have explained the risk, benefits, and alternatives to the procedure; the patient understands and agrees to proceed.        Yosvany Eldridge MD  8/18/2020

## 2020-08-18 NOTE — PROGRESS NOTES
Alert and oriented. No c/o. Vss. Abd soft. Tolerated sitting up and po fluids and cookies well. patient and daughter verbalized understanding of discharge instructions.
Arrives to pacu awake and alert, abd soft, denies pain
C-Difficile admission screening and protocol:     * Admitted with diarrhea? NO     *Prior history of C-Diff. In last 3 months? NO     *Antibiotic use in the past 6-8 weeks? NO     *Prior hospitalization or nursing home in the last month?     NO
PATIENT INSTRUCTED TO CALL DR. Clarissa Churchill OFFICE REGARDING INSTRUCTIONS ABOUT HER ELOQUIS PRIOR TO HER PROCEDURE ON 8/18/2020 AND ALSO NOTIFY HER CARDIOLOGIST DR. WILLETT REARDING HER INSTRUCTIONS ABOUT HER ELOQUIS
Preoperative Screening for Elective Surgery/Invasive Procedures While COVID-19 present in the community     Have you tested positive or have been told to self-isolate for COVID-19 like symptoms within the past 28 days? NO   Do you currently have any of the following symptoms? o Fever >100.0 F or 99.9 F in immunocompromised patients? NO  o New onset cough, shortness of breath or difficulty breathing? NO  o New onset sore throat, myalgia (muscle aches and pains), headache, loss of taste/smell or diarrhea? NO   Have you had a potential exposure to COVID-19 within the past 14 days by:  o Close contact with a confirmed case? NO  o Close contact with a healthcare worker,  or essential infrastructure worker (grocery store, [employer], gas station, public utilities or transportation)? Gilberto Speed you reside in a congregate setting such as; skilled nursing facility, adult home, correctional facility, homeless shelter or other institutional setting? NO  o Have you had recent travel to a known COVID-19 hotspot? NO    Indicate if the patient has a positive screen by answering yes to one or more of the above questions. Patients who test positive or screen positive prior to surgery or on the day of surgery should be evaluated in conjunction with the surgeon/proceduralist/anesthesiologist to determine the urgency of the procedure.
Scope date verified
instructions according to your surgery.

## 2020-08-21 RX ORDER — MIRTAZAPINE 30 MG/1
TABLET, FILM COATED ORAL
Qty: 30 TABLET | Refills: 1 | Status: SHIPPED | OUTPATIENT
Start: 2020-08-21 | End: 2020-08-28 | Stop reason: SDUPTHER

## 2020-08-28 ENCOUNTER — OFFICE VISIT (OUTPATIENT)
Dept: PSYCHIATRY | Age: 75
End: 2020-08-28
Payer: MEDICARE

## 2020-08-28 ENCOUNTER — TELEPHONE (OUTPATIENT)
Dept: ORTHOPEDIC SURGERY | Age: 75
End: 2020-08-28

## 2020-08-28 PROCEDURE — 99443 PR PHYS/QHP TELEPHONE EVALUATION 21-30 MIN: CPT | Performed by: NURSE PRACTITIONER

## 2020-08-28 RX ORDER — HYDROXYZINE HYDROCHLORIDE 25 MG/1
TABLET, FILM COATED ORAL
Qty: 90 TABLET | Refills: 2 | Status: SHIPPED | OUTPATIENT
Start: 2020-08-28 | End: 2020-11-13 | Stop reason: SDUPTHER

## 2020-08-28 RX ORDER — SERTRALINE HYDROCHLORIDE 100 MG/1
100 TABLET, FILM COATED ORAL NIGHTLY
Qty: 90 TABLET | Refills: 1 | Status: SHIPPED | OUTPATIENT
Start: 2020-08-28 | End: 2021-03-01

## 2020-08-28 RX ORDER — MIRTAZAPINE 30 MG/1
TABLET, FILM COATED ORAL
Qty: 90 TABLET | Refills: 1 | Status: SHIPPED | OUTPATIENT
Start: 2020-08-28 | End: 2021-04-19

## 2020-08-28 RX ORDER — ALPRAZOLAM 0.5 MG/1
0.5 TABLET ORAL 3 TIMES DAILY PRN
Qty: 90 TABLET | Refills: 2 | Status: SHIPPED | OUTPATIENT
Start: 2020-08-28 | End: 2021-01-20

## 2020-08-28 NOTE — PROGRESS NOTES
PSYCHIATRY PROGRESS NOTE    Baca Winnielacelia KENDALL  1945 8/28/20    Phone call start time: 12:31pm  Phone call end time: 1:00pm      CC:   Chief Complaint   Patient presents with    Follow-up     Per excerpt of initial eval as completed by this provider on 9/13/19:    Son, Tan Morelos drove her to today's appointent. Noris Postal is oldest son. Tan Morelos is favorite child. Always been my favorite. My first girl is very jealous of apoorva being my favorite. Youngest daughter my favorite daughter.       Don't know why was asked to come see me. Thinks for anxiety and depression.       anxiety and depression     Lost  1/16/2018.  x 57 years. Just hard. Son marcelina moved in with me 12/2018 after I fell and broke my leg in 3 spots and my elbow. Had surgery and put a solomon in my leg. I fell over a rollator walker in the paiz. Doesn't recall if got dizzy or what.       Had broken shoulder the jason before when tripped over a cord. Had gotten up in a hurrt. Just fell and hit sholder     Feels like one thing after another     beatriz always an anxious person. Worry about any stupid thing. Try and relax as much as possible. Kids always on my mind. Even though they're grown, still worry. Youngest daughter not in good health, youngest son (apoorva) developed cancer 2 months ago. Was going to doctor for diarrhea. Didn't do colonoscopy. Then finally had blood in stool, went to hospital, tried to do colonoscopy, couldn't d/t mass in rectum. Started chemo/radiation. Now off that but still has diarrhea and radiation. To have colonoscopy on 23rd to see if can perform     Pt has h/o recal cancer. Treated by Dr. Desiree Tompkins > 10 years ago. Oncologist wanted me to do chemo and radiation. Didn't want to because was working at that time.       Only takes xanax if has to. Thinks maybe 2-3 days/week, sometimes more, sometimes less. been on for years. The other doc I had had me up to 2mg.   Never took that  Much, made me too tired, would break in half. Now simone gives me 0.5mg. And I break that in half. Can't do without it     Says xanax, fioricet and tizanidine help. Takes mirtazapine      Cardiology recently advised her to reduce zoloft to 50mg/day. Just started lower dose last night. Did ok     Hasn't driven in almost 2 years. Independent in self care, uses bench. Uses walker all the time for ambulation when out of house. Usually sits in manual wc at home, faster to get to bathroom      H/o depression for many years, predating husban'd death. Don't know why, don't have anything to be depressed about. Doesn't feel overly depressed or anxious at present with current meds    HPI:   Zak Melton is a 67 y.o. female with h/o anxiety and depression who was contacted via telehealth for follow up. Due to the COVID-19 pandemic restrictions on close contact interactions as recommended by CDC and health authorities, the patient's visit was conducted via telehealth (phone call visit) in lieu of a face to face visit. Patient verbally consented and agreed to proceed. Since last visit 5/29/20:    EGD 8/18/20. Plaque in esophagus and small intestine. Did bx, neg for \"infection\". . ? Implications of \"plaques\". Can only eat a little bit at a time. GI said d/t previous gastric bypass. Is \"really small\". \"Been doing pretty good\"    Mood/anxiety pretty good. Few breakthrough crying spells. Watches [employer] channel often. i'm fine really    Denies recent falls. Was having some trouble with leg. Xrays. Saw ortho again (did sx when had fx). Sent to another doc, who sent me to another doc. They think maybe I should do epidural injections. Another one said PT. Cost $40 per visit, they want me to do 3/week. Can't afford that. One of docs wanted dexa scan with some kind of xray. Ana only does at [facility]. [facility] does it, had it done there week ago. I beatriz read the report.   Didn't make heads or tails of the report. Not heard from doctor's office yet    Pain intermittent right leg/hip (same leg as prior surgery). Denies recent obvious trigger      Hydroxyzine:  Use varies. Some days takes  several, other days maybe 1-2. Helps the itching. No longer on my face. Now only on sides of hands  Ongoing intermittent pruritis    Hydroxyzine usually helps. No known liver problems      Son, Parag Teresa, and granddaughter still living with pt. Other son, apoorva,  in NH currently for rehab (Had rectal cancer. Now total colostomy bag. Had to close off his rectum.  had internal flesh eating bacteria. Had to remove part of bone and muscle from left leg). He was 430#, down to 294#. They say his nutrition not what needs to be to have surgery. They put him on boost.      Sometimes take xanax twice/day d/t increased anxiety r/t son's health    Taking mirtazapine 30mg nightly and sertraline 100mg nightly consistently. Denies se's    Sometimes runs out of medications and can't get refilled d/t limited finances. Asks about getting more quantity of mirtazapine and xanax to help last longer and reduce cost.  Currently received sertralin as 90-day supply           Alcohol: denies              Illicits: denies              Caffeine: denies   Tobacco:  Denies       Psych ROS:      Depression: \"pretty good\"  rates 8-9/10 (10 best);  sleep better. Take xanax at night with tizanidine and hydroxyzine. Fall asleep, most the time sleeps 6-7 hours/night  That's good for me. Get really tired around 4-5 pm.  Laid down at 830pm.  Is doing stuff around the house. Other day took son to grocery store. Out for few hours. Didn't go into stores. Stayed in car. Was really hot that day. Had air on but still felt exhuasted. Encouraged to listen to body. Takes breaks/naps if needed. Try to avoid excessive daytime napping to ensure no insomnia.   Thus far, no issues    good appetite (two meals/day), but limited quantity d/t previous safety,  Numbness of emotions, feeling of detachment     Eating disorders:  DENIES         JENNIFER 7 SCORE 2020 2019 10/11/2019 2019   JENNIFER-7 Total Score 0 3 8 7     Interpretation of JENNIFER-7 score: 5-9 = mild anxiety, 10-14 = moderate anxiety,   15+ = severe anxiety. Recommend referral to behavioral health for scores 10 or greater.     No data recorded  PHQ-9 Total Score: 8 (2019 12:35 PM)  Thoughts that you would be better off dead, or of hurting yourself in some way: 0 (2019 12:35 PM)  PHQ-9 Total Score: 6 (2019 11:24 AM)  Thoughts that you would be better off dead, or of hurting yourself in some way: 0 (2019 11:24 AM)  Interpretation of PHQ-9 score:  1-4 = minimal depression, 5-9 = mild depression,   10-14 = moderate depression; 15-19 = moderately severe depression, 20-27 = severe depression      Past Psychiatric History:               Prior hospitalizations:denies              Prior diagnoses: anxiety, depression              Outpatient Treatment:                           Psychiatrist: denies                          Therapist: denies, uninterested              Suicide Attempts: denies              Hx SH:  denies     Past Psychopharmacologic Trials (including response/reactions):     1.  Just what on at present for many years     zoloft  Mirtazapine  Xanax     Past Medical/Surgical History:   Past Medical History:   Diagnosis Date    Acid reflux     Anxiety     Arthritis     Atrial fibrillation ([address]     Closed fracture dislocation of right elbow     Depression     Fibromyalgia     Migraine     [facility]     Wears glasses     DRIVING     Past Surgical History:   Procedure Laterality Date    APPENDECTOMY       SECTION      x4    CHOLECYSTECTOMY      COLONOSCOPY      SEVERAL    COLONOSCOPY N/A 2019    COLONOSCOPY POLYPECTOMY SNARE/COLD BIOPSY performed by Anabela Perkins MD at [facility] [id number]   LCL (1893)   1  2.00 LME  [place]   10/11/2019  1   10/11/2019  Alprazolam 0.5 MG Tablet  60.00 30 Ch Wet   [id number]   Kro (1893)   0  2.00 LME  [place]   09/04/2019  1   08/30/2019  Alprazolam 0.5 MG Tablet  60.00 30 Mi Michel   [id number]   Kro (1893)   0  2.00 LME  [place]   08/04/2019  1   07/19/2019  Alprazolam 1 MG Tablet  60.00 30 Mi Michel   [id number]   Kro (1893)   0  4.00 LME  [place]   07/06/2019  1   03/08/2019  Alprazolam 2 MG Tablet  90.00 30 Ma Flako   [id number]   Kro (1893)   4  12.00 LME  [place]   06/15/2019  1   06/14/2019  Tramadol Hcl 50 MG Tablet  18.00 [facility] [place]   [id number]   Kro (1893)   0  30.00 MME  [place]   06/06/2019  1   03/08/2019  Alprazolam 2 MG Tablet  90.00 30 Ma Flako   [id number]   Kro (1893)   3  12.00 LME  [place]   05/08/2019  1   03/08/2019  Alprazolam 2 MG Tablet  90.00 30 Ma Flako   [id number]   Kro (1893)   2  12.00 LME  [place]   04/05/2019  1   03/08/2019  Alprazolam 2 MG Tablet  90.00 30 Ma Flako   [id number]   Kro (1893)   1  12.00 LME  [place]   03/08/2019  1   03/08/2019  Alprazolam 2 MG Tablet  90.00 30 Ma Flako   [id number]   Kro (1893)   0  12.00 LME  [place]   02/04/2019  1   10/10/2018  Alprazolam 2 MG Tablet  90.00 30 Ma Flako   [id number]   Kro (1893)   3  12.00 LME  [place]   01/07/2019  1   10/10/2018  Alprazolam 2 MG Tablet  90.00 30 Ma Flako   [id number]   Kro (1893)   2  12.00 LME  [place]   12/24/2018  1   12/19/2018  Hydrocodone-Acetamin 5-325 MG  28.00 7 Br Bar   [id number]   Kro (1893)   0  20.00 MME  [place]   12/08/2018  1   10/10/2018  Alprazolam 2 MG Tablet  90.00 30 Ma Flako   [id number]   Kro (1893)   1  12.00 LME  [place]   10/13/2018  1   10/10/2018  Alprazolam 2 MG Tablet  90.00 30 Ma Flako   [id number]   Kro (1893)   0  12.00 LME  [place]   09/13/2018  1   05/10/2018  Alprazolam 2 MG Tablet  90.00 30 Ma [facility]   [id number]   Kro (1893)   4  12.00 LME  [place]are   [place]       OBJECTIVE:  Vitals:    Wt Readings from Last 3 Encounters:   20 179 lb 2 oz (81.3 kg)   20 182 lb (82.6 kg)   20 182 lb (82.6 kg)       There were no vitals filed for this visit. ROS: Denies trouble with fever, rash, headache, vision changes, chest pain, shortness of breath, nausea, extremity pain, weakness, dysuria.  Pruritis     Mental Status Exam:      Appearance    unable to assess d/t f/u visit completed via pc  Muscle strength/tone: unable to assess d/t f/u visit completed via pc  Gait/station: unable to assess d/t f/u visit completed via pc    Speech    spontaneous, normal rate and normal volume  Mood    \"pretty good\", Anxious  Affect   Unable to fully assess d/t f/u visit completed via telehealth (pc) though sounds   Congruent to thought content and mood  Thought Content    preoccupations, no delusions voiced  Thought Process    coherent, linear  Associations    logical connections  Perceptions: denies AH/VH,   [address]  Orientation    oriented to person, place, time, and general circumstances  Memory    recent and remote memory intact  Attention/Concentration    intact  Ability to understand instructions Yes  Ability to respond meaningfully Yes  Language: [address] [place] [address] knowledge/Intellect: Average  SI:   no suicidal ideation  HI: Denies HI    Labs:     Admission on 2020, Discharged on 2020   Component Date Value    Fungus Stain 2020 No Fungal elements seen    Office Visit on 2020   Component Date Value    SARS-CoV-2 2020 Not Detected     Source 2020 NP swab        Last Drug screen:  Lab Results   Component Value Date    LABAMPH Neg 2018    LABBENZ Neg 2018    COCAIMETSCRU Neg 2018    MDMA Not Detected 2019    LABMETH Neg 2018    OPIATESCREENURINE POSITIVE 2018    PHENCYCLIDINESCREENURINE Neg 2018         Imagin/1/19 CT head wo contrast:      Impression   No acute traumatic intracranial abnormality       Atrophy and small-vessel ischemic change       Trace paranasal sinus disease             ASSESSMENT AND PLAN     Diagnosis Orders   1. JENNIFER (generalized anxiety disorder)     2. Moderate episode of recurrent major depressive disorder (HCC)            1. Safety: NO Imminent risk of danger to/self/others based on the factors considered below. Appropriate for outpatient level of care. Safety plan includes: 911, PES, hotlines, and interventions discussed today.      Risk factors: Age <25 or >55,  social isolation,no outpatient services in place, and no collateral information to support safety.     Protective factors:, female gender,denies depression, denies suicidal ideation, does not have lethal plan, does not have access to guns or weapons, patient is erika for safety, no prior suicide attempts, no family h/o suicide, no substance abuse,compliant with recommended medications,and patient is future oriented.        2. Psychiatric  - Feels is doing relatively well overall. stable depressive and anxiety symptoms. - continue zoloft 100mg/day. Noted Significant increase in depressive symptoms and tearfulness when dose briefly reduced by cardiology in past.  Consider increase vs switch to different ssri if needed in future    - change xanax 0.5mg to tid to make prescription last longer. Agrees not to take more than bid. Endorses some issues with getting med refills monthly d/t limited finances. Has been on xanax for many years. refill history cw regular use bid. Advised not long term strategy. Goal is to reduce/eliminate. Would go slowly d/t length of time has been on this. Dose was reduced from previous 2mg bid to 0.5mg bid      - continue mirtazapine  30mg nightly (was increased 12/2019). Notes this has helped with insomnia, mood and anxiety symptoms   If no improvement in sleep with higher dose, will plan to dc and trial low dose seroquel     - continue hydroxyzine 25-50mg bid prn itching.   Caution with possible anticholinergic se's including confusion     - med contract signed 9/13/19  - UDS 9/13/19      -Labs: reviewed in [facility]  - uninterested in outpt therapy.     -OARRS reviewed, c/w history  -R/b/se/a d/w pt who consents.     3. Medical  -Following with MUSA Giraldo - CNP     4. Substance   -No active issues.     5.  RTC - 12 weeks, call sooner if needed    Cecil Holt  Psychiatric Nurse Practitioner

## 2020-09-03 ENCOUNTER — OFFICE VISIT (OUTPATIENT)
Dept: ENDOCRINOLOGY | Age: 75
End: 2020-09-03
Payer: MEDICARE

## 2020-09-03 ENCOUNTER — OFFICE VISIT (OUTPATIENT)
Dept: INTERNAL MEDICINE CLINIC | Age: 75
End: 2020-09-03
Payer: MEDICARE

## 2020-09-03 VITALS
DIASTOLIC BLOOD PRESSURE: 58 MMHG | OXYGEN SATURATION: 95 % | SYSTOLIC BLOOD PRESSURE: 116 MMHG | HEART RATE: 77 BPM | RESPIRATION RATE: 18 BRPM | HEIGHT: 61 IN | BODY MASS INDEX: 34.78 KG/M2 | WEIGHT: 184.2 LBS

## 2020-09-03 VITALS
OXYGEN SATURATION: 95 % | HEIGHT: 61 IN | TEMPERATURE: 97.1 F | BODY MASS INDEX: 34.78 KG/M2 | HEART RATE: 77 BPM | SYSTOLIC BLOOD PRESSURE: 116 MMHG | WEIGHT: 184.2 LBS | RESPIRATION RATE: 18 BRPM | DIASTOLIC BLOOD PRESSURE: 58 MMHG

## 2020-09-03 PROCEDURE — 90654 INFLUENZA, QUADV, ADJUVANTED, 65 YRS +, IM, PF, PREFILL SYR, 0.5ML (FLUAD): CPT | Performed by: NURSE PRACTITIONER

## 2020-09-03 PROCEDURE — G0438 PPPS, INITIAL VISIT: HCPCS | Performed by: NURSE PRACTITIONER

## 2020-09-03 PROCEDURE — 99204 OFFICE O/P NEW MOD 45 MIN: CPT | Performed by: INTERNAL MEDICINE

## 2020-09-03 PROCEDURE — G0008 ADMIN INFLUENZA VIRUS VAC: HCPCS | Performed by: NURSE PRACTITIONER

## 2020-09-03 RX ORDER — TIZANIDINE 2 MG/1
2 TABLET ORAL 2 TIMES DAILY
Qty: 180 TABLET | Refills: 0 | Status: SHIPPED | OUTPATIENT
Start: 2020-09-03 | End: 2020-12-01 | Stop reason: SDUPTHER

## 2020-09-03 ASSESSMENT — PATIENT HEALTH QUESTIONNAIRE - PHQ9
SUM OF ALL RESPONSES TO PHQ QUESTIONS 1-9: 0
SUM OF ALL RESPONSES TO PHQ9 QUESTIONS 1 & 2: 0
2. FEELING DOWN, DEPRESSED OR HOPELESS: 0
1. LITTLE INTEREST OR PLEASURE IN DOING THINGS: 0
SUM OF ALL RESPONSES TO PHQ QUESTIONS 1-9: 0

## 2020-09-03 ASSESSMENT — LIFESTYLE VARIABLES: HOW OFTEN DO YOU HAVE A DRINK CONTAINING ALCOHOL: 0

## 2020-09-03 NOTE — PROGRESS NOTES
Subjective: Jesse Zavala is a  who is here for initial evaluation of osteoporosis. Patient is being seen at the request of Dr. Tab [address]    Her last Dexa showed osteopenia    She has a h/o previous fractures with fall      DEXA scan 8/20     COMPARISON: DEXA 4/9/2013. CT chest, abdomen, and pelvis 4/1/2015.              REGION ASSESSED:              L Spine (L 1-4): BMD= 1.135 g/cm2, T-score= 0.8, Z-score= 3.3, %Change = +4.1%              L Hip: BMD= 0.808 g/cm2, T-score= -1.1, Z-score= 0.7, %Change = -20.1%    L Femoral Neck: BMD= 0.813 g/cm2, T-score= -0.3, Z-score= 1.8        Dexa 4.13    Normal bone mineral density of the lumbar spine.  Bone density is increased by 23% since prior study. Bone mineral density may be falsely elevated due to osteophyte formation. Normal bone mineral density  of the left hip.  Bone density is decreased by 2% since prior study. Normal bone mineral density  of the right hip.        History of fractures :  2018  . Status post ORIF of a chronic right femoral shaft fracture with    significant healing.  The fracture remains partially ununited. Right humeral fracture- non surgical tx    She had a fall at that time. Also reports left shoulder fracture in 2016, had a fall-hit the wall- non surgical Tx    8/20 VFA  Vertebral body fracture assessment was performed with a low-dose lateral radiograph of the    thoracic spine from T4 through L4. There is mild anterior wedge compression deformity of T12    which is unchanged since CT from 2015. The height of the T11 vertebral body is not optimally    evaluated because of positioning and low-dose technique. No definite compression deformity at    T11. No other compression deformity is identified within the thoracic or lumbar spine from T4    through L4. Reports she has had a fall on back in the past    Moderate, controlled, no worsening factors    7/20 CT  1. No acute osseous abnormality.     2. Status post ORIF of a chronic right femoral shaft fracture with    significant healing.  The fracture remains partially ununited. Pharmacological therapy for Osteoprosis:  None     FH of Osteoporosis: none   FH of hip fracture:none    Secondary causes of osteoprorsis: Menopause, smoking, prolonged steroid use. On PPI  Calcium: Diet:  600mg  Vitamin D:  D3 2000 IU daily  Exercise:  No   Follows up regularly with a dentist. No major dental procedures planned.     She has continue right leg pain and is being worked up for it    Report h/o gastric bypass    Reports h/o Rectal Cancer, no XRT    Past Medical History:   Diagnosis Date    Acid reflux     Anxiety     Arthritis     Atrial fibrillation (HCC)     Closed fracture dislocation of right elbow     Depression     Fibromyalgia     Migraine     [facility]     Wears glasses     DRIVING     Past Surgical History:   Procedure Laterality Date    APPENDECTOMY       SECTION      x4    CHOLECYSTECTOMY      COLONOSCOPY      SEVERAL    COLONOSCOPY N/A 2019    COLONOSCOPY POLYPECTOMY SNARE/COLD BIOPSY performed by Sandoval Lovell MD at [facility] Right     [id number] W/O EXTENSION Right 10/25/2018    OPEN REDUCTION INTERNAL FIXATION RIGHT FEMUR SUPRACONDYLAR FEMORAL FRACTURE WITH C-ARM performed by Jerson Hodge MD at [facility] TO REMOVE RECTAL CANCER    TOTAL KNEE ARTHROPLASTY Left     UPPER GASTROINTESTINAL ENDOSCOPY N/A 2019    ESOPHAGOGASTRODUODENOSCOPY performed by Sandoval Lovell MD at [address] [facility] 2020    EGD BIOPSY performed by Sandoval Lovell MD at [facility]       Current Outpatient Medications   Medication Sig Dispense Refill    tiZANidine (ZANAFLEX) 2 MG tablet Take 1 tablet by mouth 2 times daily 180 tablet 0    mirtazapine (REMERON) 30 MG tablet TAKE ONE TABLET BY MOUTH ONCE NIGHTLY 90 tablet 1    ALPRAZolam (XANAX) 0.5 MG tablet Take 1 tablet by mouth 3 times daily as needed for Anxiety for up to 90 days. 90 tablet 2    hydrOXYzine (ATARAX) 25 MG tablet TAKE ONE TO TWO TABLETS BY MOUTH TWICE A DAY AS NEEDED FOR ITCHING 90 tablet 2    sertraline (ZOLOFT) 100 MG tablet Take 1 tablet by mouth nightly 90 tablet 1    metoclopramide (REGLAN) 5 MG tablet Take 1 tablet by mouth 3 times daily as needed (Nausea and abdominal) 120 tablet 3    flecainide (TAMBOCOR) 100 MG tablet TAKE 1 TABLET BY MOUTH 2 TIMES A DAY 60 tablet 3    ELIQUIS 5 MG TABS tablet TAKE 1 TABLET BY MOUTH 2 TIMES A DAY 60 tablet 3    metoprolol succinate (TOPROL XL) 50 MG extended release tablet TAKE 1 TABLET BY MOUTH ONE TIME A DAY 90 tablet 0    acetaminophen (TYLENOL) 500 MG tablet Take 500 mg by mouth every 6 hours as needed for Pain      Compression Stockings MISC by Does not apply route 20-30 MM edema bilateral legs 1 each 0    furosemide (LASIX) 20 MG tablet Take 2 tablets by mouth daily With an additional 20 mg in the evening on M,W,F only 120 tablet 5    pantoprazole (PROTONIX) 40 MG tablet Take 1 tablet by mouth 2 times daily 60 tablet 3    vitamin D 25 MCG (1000 UT) CAPS Take 2 capsules by mouth daily      Biotin 1000 MCG TABS Take 1 tablet by mouth daily      ondansetron (ZOFRAN) 4 MG tablet TAKE ONE TABLET BY MOUTH EVERY 12 HOURS AS NEEDED FOR NAUSEA AND VOMITING 30 tablet 0    potassium chloride (KLOR-CON M20) 20 MEQ extended release tablet Take 1 tablet by mouth 2 times daily 180 tablet 3    vitamin B-12 (CYANOCOBALAMIN) 1000 MCG tablet Take 500 mcg by mouth daily Indications: patient is taking 2 tablets 1,000 mcg in the morning        No current facility-administered medications for this visit. Review of Systems  Please see scanned     Objective: There were no vitals taken for this visit.   Wt Readings from Last 3 Encounters:   09/03/20 184 lb

## 2020-09-03 NOTE — PROGRESS NOTES
Discussed advanced directives with patient. They state that they do not have anything in place at this time. I gave them blank copies of 1) DNR form, 2) Kaylene [address], 3) Power of [employer], and 4) Organ donor registration. I also gave them instruction information for these. Asked that at this point, they take them home, review the information, and their practitioner will discuss this further with them at the next office visit.

## 2020-09-03 NOTE — PATIENT INSTRUCTIONS
Personalized Preventive Plan for Christy Torres - 9/3/2020  Medicare offers a range of preventive health benefits. Some of the tests and screenings are paid in full while other may be subject to a deductible, co-insurance, and/or copay. Some of these benefits include a comprehensive review of your medical history including lifestyle, illnesses that may run in your family, and various assessments and screenings as appropriate. After reviewing your medical record and screening and assessments performed today your provider may have ordered immunizations, labs, imaging, and/or referrals for you. A list of these orders (if applicable) as well as your Preventive Care list are included within your After Visit Summary for your review. Other Preventive Recommendations:    · A preventive eye exam performed by an eye specialist is recommended every 1-2 years to screen for glaucoma; cataracts, macular degeneration, and other eye disorders. · A preventive dental visit is recommended every 6 months. · Try to get at least 150 minutes of exercise per week or 10,000 steps per day on a pedometer . · Order or download the FREE \"Exercise & Physical Activity: Your Everyday Guide\" from [employer]. Call 0-551.194.3406 or search [employer] online. · You need 0306-1471 mg of calcium and 1860-3402 IU of vitamin D per day. It is possible to meet your calcium requirement with diet alone, but a vitamin D supplement is usually necessary to meet this goal.  · When exposed to the sun, use a sunscreen that protects against both UVA and UVB radiation with an SPF of 30 or greater. Reapply every 2 to 3 hours or after sweating, drying off with a towel, or swimming. · Always wear a seat belt when traveling in a car. Always wear a helmet when riding a bicycle or motorcycle.

## 2020-09-03 NOTE — PROGRESS NOTES
Medicare Annual Wellness Visit  Name: Dominic Rose Date: 9/3/2020   MRN: [id number] Sex: Female   Age: 67 y.o. Ethnicity: Non-/Non    : 1945 Race: Ayleen Morris is here for Medicare AWV    Screenings for behavioral, psychosocial and functional/safety risks, and cognitive dysfunction are all negative except as indicated below. These results, as well as other patient data from the [address] form, are documented in Flowsheets linked to this Encounter. Allergies   Allergen Reactions    Demerol Hcl [Meperidine] Other (See Comments)     PATIENT STATES SHE GOT VERY ANXIOUS-LIKE SHE WAS CLIMBING THE WALLS    Pcn [Penicillins]      Patient reports she has not had since she was a child, thinks reaction was hives.  Adhesive Tape Rash       Prior to Visit Medications    Medication Sig Taking? Authorizing Provider   mirtazapine (REMERON) 30 MG tablet TAKE ONE TABLET BY MOUTH ONCE NIGHTLY Yes MUSA Skinner CNP   ALPRAZolam Veola [place]) 0.5 MG tablet Take 1 tablet by mouth 3 times daily as needed for Anxiety for up to 90 days.  Yes MUSA Skinner CNP   hydrOXYzine (ATARAX) 25 MG tablet TAKE ONE TO TWO TABLETS BY MOUTH TWICE A DAY AS NEEDED FOR ITCHING Yes MUSA Skinner CNP   sertraline (ZOLOFT) 100 MG tablet Take 1 tablet by mouth nightly Yes MUSA Skinner CNP   metoclopramide (REGLAN) 5 MG tablet Take 1 tablet by mouth 3 times daily as needed (Nausea and abdominal) Yes Tan Gutierrez MD   flecainide (TAMBOCOR) 100 MG tablet TAKE 1 TABLET BY MOUTH 2 TIMES A DAY Yes Julissa Bailon MD   ELIQUIS 5 MG TABS tablet TAKE 1 TABLET BY MOUTH 2 TIMES A DAY Yes Julissa Bailon MD   metoprolol succinate (TOPROL XL) 50 MG extended release tablet TAKE 1 TABLET BY MOUTH ONE TIME A DAY Yes Julissa Bailon MD   acetaminophen (TYLENOL) 500 MG tablet Take 500 mg by mouth every 6 hours as needed for Pain Yes Historical Provider, MD tiZANidine (ZANAFLEX) 2 MG tablet Take 1 tablet by mouth 2 times daily Yes MUSA Storm CNP   pantoprazole (PROTONIX) 40 MG tablet Take 1 tablet by mouth 2 times daily Yes MUSA Storm CNP   vitamin D 25 MCG (1000 UT) CAPS Take 2 capsules by mouth daily Yes Historical Provider, MD   Biotin 1000 MCG TABS Take 1 tablet by mouth daily Yes Historical Provider, MD   ondansetron (ZOFRAN) 4 MG tablet TAKE ONE TABLET BY MOUTH EVERY 12 HOURS AS NEEDED FOR NAUSEA AND VOMITING Yes MUSA Storm CNP   potassium chloride (KLOR-CON M20) 20 MEQ extended release tablet Take 1 tablet by mouth 2 times daily Yes MUSA Storm CNP   vitamin B-12 (CYANOCOBALAMIN) 1000 MCG tablet Take 500 mcg by mouth daily Indications: patient is taking 2 tablets 1,000 mcg in the morning  Yes Historical Provider, MD   Compression Stockings MISC by Does not apply route 20-30 MM edema bilateral legs  MUSA Storm CNP   furosemide (LASIX) 20 MG tablet Take 2 tablets by mouth daily With an additional 20 mg in the evening on M,W,F only  Yohan Edwards MD       Past Medical History:   Diagnosis Date    Acid reflux     Anxiety     Arthritis     Atrial fibrillation (HCC)     Closed fracture dislocation of right elbow     Depression     Fibromyalgia     Migraine     Rectal cancer ([address]     Wears glasses     DRIVING       Past Surgical History:   Procedure Laterality Date    APPENDECTOMY       SECTION      x4    CHOLECYSTECTOMY      COLONOSCOPY      SEVERAL    COLONOSCOPY N/A 2019    COLONOSCOPY POLYPECTOMY SNARE/COLD BIOPSY performed by Bea Quinteros MD at [address] [facility] Right     [address] [facility] W/O EXTENSION Right 10/25/2018    OPEN REDUCTION INTERNAL FIXATION RIGHT FEMUR SUPRACONDYLAR FEMORAL FRACTURE WITH C-ARM performed by Ravin Pickard MD at [address] [facility] 20 minutes 2-3 times per week?: (!) No  Have you lost any weight without trying in the past 3 months?: No  Do you eat fewer than 2 meals per day?: No  Have you seen a dentist within the past year?: (!) No  Body mass index is 34.8 kg/m². Health Habits/Nutrition Interventions:  · Dental exam overdue:  patient encouraged to make appointment with his/her dentist    Hearing/Vision:  No exam data present  Hearing/Vision  Do you or your family notice any trouble with your hearing?: No  Do you have difficulty driving, watching TV, or doing any of your daily activities because of your eyesight?: No  Have you had an eye exam within the past year?: (!) No  Hearing/Vision Interventions:  · no hearing or vision concern    Personalized Preventive Plan   Current Health Maintenance Status  Immunization History   Administered Date(s) Administered    Influenza Vaccine, unspecified formulation 10/21/2018    Influenza, High Dose (Fluzone 65 yrs and older) 12/09/2015, 10/27/2016, 09/22/2017, 10/21/2018    Influenza, Quadv, IM, PF (6 mo and older Fluzone, Flulaval, Fluarix, and 3 yrs and older Afluria) 02/19/2020    Pneumococcal Conjugate 13-valent ([id number]) 12/09/2015    Pneumococcal Conjugate Vaccine 10/21/2018    Pneumococcal Polysaccharide ([id number]) 10/21/2018    Tdap (Boostrix, Adacel) 12/09/2015        Health Maintenance   Topic Date Due    Shingles Vaccine (1 of 2) 06/27/1994    Annual Wellness Visit (AWV)  06/10/2019    Flu vaccine (1) 09/01/2020    Potassium monitoring  02/19/2021    Creatinine monitoring  02/19/2021    Pneumococcal 65+ years Vaccine (2 of 2 - PPSV23) 10/21/2023    DTaP/Tdap/Td vaccine (2 - Td) 12/09/2025    DEXA (modify frequency per FRAX score)  Completed    Hepatitis A vaccine  Aged Out    Hib vaccine  Aged Out    Meningococcal (ACWY) vaccine  Aged Out     Recommendations for [employer] Due: see orders and patient instructions/AVS.  .   Recommended screening schedule for the

## 2020-09-03 NOTE — PATIENT INSTRUCTIONS
Start urine collection at 8 am   First urine goes to bathroom, since it is from the previous night. After that, all the urine should be collected into the Jug. Keep the Jug on Ice or in a Fridge.    Next morning complete urine collection at 8 am and take urine to the lab,       Hold lasix 2 days before test

## 2020-09-03 NOTE — PROGRESS NOTES
Vaccine Information Sheet, \"Influenza - Inactivated\"  given to Christy Torres, or parent/legal guardian of  Christy Torres and verbalized understanding. Patient responses:    Have you ever had a reaction to a flu vaccine? No  Do you have any current illness? No  Have you ever had [place] Syndrome? No  Do you have a serious allergy to any of the follow: Neomycin, Polymyxin, Thimerosal, eggs or egg products? No    Flu vaccine given per order. Please see immunization tab. Risks and benefits explained. Current VIS given.

## 2020-09-08 DIAGNOSIS — M85.80 OSTEOPENIA, UNSPECIFIED LOCATION: ICD-10-CM

## 2020-09-08 DIAGNOSIS — S22.000A COMPRESSION FRACTURE OF BODY OF THORACIC VERTEBRA (HCC): ICD-10-CM

## 2020-09-08 LAB
24HR URINE VOLUME (ML): 1350 ML
ANION GAP SERPL CALCULATED.3IONS-SCNC: 16 MMOL/L (ref 3–16)
BUN BLDV-MCNC: 15 MG/DL (ref 7–20)
CALCIUM 24 HOUR URINE: 86 MG/24 HR (ref 42–353)
CALCIUM SERPL-MCNC: 9.3 MG/DL (ref 8.3–10.6)
CHLORIDE BLD-SCNC: 102 MMOL/L (ref 99–110)
CO2: 23 MMOL/L (ref 21–32)
CREAT SERPL-MCNC: 0.7 MG/DL (ref 0.6–1.2)
CREATININE 24 HOUR URINE: 0.8 G/24HR (ref 0.6–1.5)
GFR AFRICAN AMERICAN: >60
GFR NON-AFRICAN AMERICAN: >60
GLUCOSE BLD-MCNC: 115 MG/DL (ref 70–99)
PARATHYROID HORMONE INTACT: 93.9 PG/ML (ref 14–72)
POTASSIUM SERPL-SCNC: 4.5 MMOL/L (ref 3.5–5.1)
SODIUM BLD-SCNC: 141 MMOL/L (ref 136–145)
VITAMIN D 25-HYDROXY: 38.8 NG/ML

## 2020-09-09 NOTE — PROGRESS NOTES
Vit D 38 PTH 93.9  Ca 9.3  24 hour urine Ca 86 - start Ca 600mg  Monitor PTH  Start Reclast  N-telopeptide 62

## 2020-09-10 PROBLEM — M85.80 OSTEOPENIA: Status: ACTIVE | Noted: 2020-09-10

## 2020-09-10 LAB
CREATININE URINE: 57 MG/DL
N-TELOPEPTIDE, CROSS-LINKED, URINE: 62

## 2020-09-10 RX ORDER — SODIUM CHLORIDE 0.9 % (FLUSH) 0.9 %
10 SYRINGE (ML) INJECTION PRN
Status: CANCELLED | OUTPATIENT
Start: 2020-09-10

## 2020-09-10 RX ORDER — SODIUM CHLORIDE 9 MG/ML
INJECTION, SOLUTION INTRAVENOUS CONTINUOUS
Status: CANCELLED | OUTPATIENT
Start: 2020-09-10

## 2020-09-10 RX ORDER — 0.9 % SODIUM CHLORIDE 0.9 %
250 INTRAVENOUS SOLUTION INTRAVENOUS ONCE
Status: CANCELLED | OUTPATIENT
Start: 2020-09-10

## 2020-09-10 RX ORDER — SODIUM CHLORIDE 0.9 % (FLUSH) 0.9 %
5 SYRINGE (ML) INJECTION PRN
Status: CANCELLED | OUTPATIENT
Start: 2020-09-10

## 2020-09-10 RX ORDER — HEPARIN SODIUM (PORCINE) LOCK FLUSH IV SOLN 100 UNIT/ML 100 UNIT/ML
500 SOLUTION INTRAVENOUS PRN
Status: CANCELLED | OUTPATIENT
Start: 2020-09-10

## 2020-09-10 RX ORDER — DIPHENHYDRAMINE HYDROCHLORIDE 50 MG/ML
50 INJECTION INTRAMUSCULAR; INTRAVENOUS ONCE
Status: CANCELLED | OUTPATIENT
Start: 2020-09-10

## 2020-09-10 RX ORDER — METHYLPREDNISOLONE SODIUM SUCCINATE 125 MG/2ML
125 INJECTION, POWDER, LYOPHILIZED, FOR SOLUTION INTRAMUSCULAR; INTRAVENOUS ONCE
Status: CANCELLED | OUTPATIENT
Start: 2020-09-10

## 2020-09-10 RX ORDER — EPINEPHRINE 1 MG/ML
0.3 INJECTION, SOLUTION, CONCENTRATE INTRAVENOUS PRN
Status: CANCELLED | OUTPATIENT
Start: 2020-09-10

## 2020-09-10 RX ORDER — ZOLEDRONIC ACID 5 MG/100ML
5 INJECTION, SOLUTION INTRAVENOUS ONCE
Status: CANCELLED | OUTPATIENT
Start: 2020-09-10

## 2020-09-18 RX ORDER — POTASSIUM CHLORIDE 20 MEQ/1
20 TABLET, EXTENDED RELEASE ORAL 2 TIMES DAILY
Qty: 180 TABLET | Refills: 1 | Status: ON HOLD | OUTPATIENT
Start: 2020-09-18 | End: 2021-02-22 | Stop reason: SDUPTHER

## 2020-09-21 LAB
FUNGUS (MYCOLOGY) CULTURE: NORMAL
FUNGUS STAIN: NORMAL

## 2020-09-29 ENCOUNTER — HOSPITAL ENCOUNTER (OUTPATIENT)
Dept: INFUSION THERAPY | Age: 75
Setting detail: INFUSION SERIES
Discharge: HOME | End: 2020-09-29
Payer: MEDICARE

## 2020-09-29 VITALS
RESPIRATION RATE: 18 BRPM | DIASTOLIC BLOOD PRESSURE: 80 MMHG | HEART RATE: 67 BPM | TEMPERATURE: 98.2 F | SYSTOLIC BLOOD PRESSURE: 123 MMHG

## 2020-09-29 DIAGNOSIS — M85.80 OSTEOPENIA, UNSPECIFIED LOCATION: Primary | ICD-10-CM

## 2020-09-29 PROCEDURE — 2580000003 HC RX 258: Performed by: INTERNAL MEDICINE

## 2020-09-29 PROCEDURE — 6360000002 HC RX W HCPCS: Performed by: INTERNAL MEDICINE

## 2020-09-29 PROCEDURE — 96365 THER/PROPH/DIAG IV INF INIT: CPT

## 2020-09-29 RX ORDER — ZOLEDRONIC ACID 5 MG/100ML
5 INJECTION, SOLUTION INTRAVENOUS ONCE
Status: CANCELLED | OUTPATIENT
Start: 2020-09-29

## 2020-09-29 RX ORDER — EPINEPHRINE 1 MG/ML
0.3 INJECTION, SOLUTION, CONCENTRATE INTRAVENOUS PRN
Status: CANCELLED | OUTPATIENT
Start: 2020-09-29

## 2020-09-29 RX ORDER — 0.9 % SODIUM CHLORIDE 0.9 %
250 INTRAVENOUS SOLUTION INTRAVENOUS ONCE
Status: CANCELLED | OUTPATIENT
Start: 2020-09-29

## 2020-09-29 RX ORDER — DIPHENHYDRAMINE HYDROCHLORIDE 50 MG/ML
50 INJECTION INTRAMUSCULAR; INTRAVENOUS ONCE
Status: CANCELLED | OUTPATIENT
Start: 2020-09-29

## 2020-09-29 RX ORDER — SODIUM CHLORIDE 9 MG/ML
INJECTION, SOLUTION INTRAVENOUS CONTINUOUS
Status: CANCELLED | OUTPATIENT
Start: 2020-09-29

## 2020-09-29 RX ORDER — SODIUM CHLORIDE 0.9 % (FLUSH) 0.9 %
10 SYRINGE (ML) INJECTION PRN
Status: DISCONTINUED | OUTPATIENT
Start: 2020-09-29 | End: 2020-09-30 | Stop reason: HOSPADM

## 2020-09-29 RX ORDER — HEPARIN SODIUM (PORCINE) LOCK FLUSH IV SOLN 100 UNIT/ML 100 UNIT/ML
500 SOLUTION INTRAVENOUS PRN
Status: CANCELLED | OUTPATIENT
Start: 2020-09-29

## 2020-09-29 RX ORDER — ZOLEDRONIC ACID 5 MG/100ML
5 INJECTION, SOLUTION INTRAVENOUS ONCE
Status: COMPLETED | OUTPATIENT
Start: 2020-09-29 | End: 2020-09-29

## 2020-09-29 RX ORDER — METHYLPREDNISOLONE SODIUM SUCCINATE 125 MG/2ML
125 INJECTION, POWDER, LYOPHILIZED, FOR SOLUTION INTRAMUSCULAR; INTRAVENOUS ONCE
Status: CANCELLED | OUTPATIENT
Start: 2020-09-29

## 2020-09-29 RX ORDER — SODIUM CHLORIDE 0.9 % (FLUSH) 0.9 %
10 SYRINGE (ML) INJECTION PRN
Status: CANCELLED | OUTPATIENT
Start: 2020-09-29

## 2020-09-29 RX ORDER — SODIUM CHLORIDE 0.9 % (FLUSH) 0.9 %
5 SYRINGE (ML) INJECTION PRN
Status: CANCELLED | OUTPATIENT
Start: 2020-09-29

## 2020-09-29 RX ADMIN — Medication 10 ML: at 14:35

## 2020-09-29 RX ADMIN — Medication 10 ML: at 13:40

## 2020-09-29 RX ADMIN — ZOLEDRONIC ACID 5 MG: 5 INJECTION, SOLUTION INTRAVENOUS at 14:15

## 2020-09-29 NOTE — PROGRESS NOTES
Outpatient [id number] [employer]    Reclast Infusion    NAME:  Madai Menard  YOB: 1945  MEDICAL RECORD NUMBER:  [id number]  DATE:  9/29/2020    Patient [facility] to [facility]   [] per wheelchair   [x] ambulatory     Is this the patient's first Reclast Infusion? Yes     Date of last Reclast Infusion? N/A    Did the patient experience any adverse reactions to Reclast? NA    Any recent oral or dental surgery? No    Any recent active fever, infections and/or illnesses? No    Patient has history of pathological fracture? Has history of T-12 compression fracture    Patient has had a recent fracture due to trauma or injury? Yes has history of multiple fractures, most recent ORIF of right leg and hip in 2018. In 2016 fell and fractured left shoulder. Patient has had a recent orthopedic surgery or procedure done? No not since 2018    Approximate date of last Dexa scan? 8/18/2020     Patient has had at least 24 oz. of fluids today without caffeine? Patient states she did drink some water today but not as much as she was told to. Drinking some water presently. Reviewed with patient actions and potential side effects of Reclast including Low grade temperature and bone pain possibly with first dose of reclast; potential of renal insufficiency and need to be well hydrated the rest of the day to protect kidneys; potential for osteonecrosis of jaw but patient has not had any dental work recently. Instructed patient to drink a lot of water or non caffeine drinks rest of the day to protect kidney function, she likes propel water. Also instructed patient to take some tylenol tonight and again tomorrow to minimize any discomfort. Patient verbalizes understanding.       /80   Pulse 67   Temp 98.2 °F (36.8 °C) (Oral)   Resp 18     Labs   BMP:   Lab Results   Component Value Date     09/08/2020    K 4.5 09/08/2020    K 4.8 08/11/2019     09/08/2020 CO2 23 09/08/2020    BUN 15 09/08/2020    LABALBU 4.2 02/19/2020    CREATININE 0.7 09/08/2020    CALCIUM 9.3 09/08/2020    GFRAA >60 09/08/2020    LABGLOM >60 09/08/2020    GLUCOSE 115 09/08/2020       Administered Reclast via: [x] Peripheral access    [] PICC access    [] Port access    Reclast 5 mg given IVPB over 30 minutes. Response to treatment:  Well tolerated by patient.       Electronically signed by Selvin Briones RN on 9/29/2020 at 8:46 PM

## 2020-10-05 ENCOUNTER — TELEPHONE (OUTPATIENT)
Dept: CARDIOLOGY CLINIC | Age: 75
End: 2020-10-05

## 2020-10-05 NOTE — TELEPHONE ENCOUNTER
If I'm reading the message correctly, she has enough medication to take until this coming Sunday and she can get refill a the end of this week; Friday? ? She should be covered without having to miss any doses. Pls send refill request to Dr. Hunter Tian in Dr. Brandon Devine absence, thanks.

## 2020-10-05 NOTE — TELEPHONE ENCOUNTER
Spoke w/ pt, states she won't have money to get her medication until the end of the week ($49). Pt has enough medication until this Sunday. We are all out of SAMPLES at this time. Please advise.   Last OV note 6/22/20: Fatigue, Atrial Fibrillation, Depression, LE Edema

## 2020-10-05 NOTE — TELEPHONE ENCOUNTER
Medication Samples    Medication: Eliquis     Dosage of the medication: 5 mg     How are you taking this medication (QD, BID, TID, QID, PRN): TAKE 1 TABLET BY MOUTH 2 TIMES A DAY      in the office or Mail to your home?   #[phone]

## 2020-10-23 RX ORDER — METOPROLOL SUCCINATE 50 MG/1
TABLET, EXTENDED RELEASE ORAL
Qty: 90 TABLET | Refills: 1 | Status: SHIPPED | OUTPATIENT
Start: 2020-10-23 | End: 2020-12-18 | Stop reason: SDUPTHER

## 2020-11-13 ENCOUNTER — VIRTUAL VISIT (OUTPATIENT)
Dept: PSYCHIATRY | Age: 75
End: 2020-11-13
Payer: MEDICARE

## 2020-11-13 PROCEDURE — 99442 PR PHYS/QHP TELEPHONE EVALUATION 11-20 MIN: CPT | Performed by: NURSE PRACTITIONER

## 2020-11-13 RX ORDER — MAGNESIUM OXIDE 400 MG/1
400 TABLET ORAL DAILY
COMMUNITY
End: 2021-07-02 | Stop reason: ALTCHOICE

## 2020-11-13 RX ORDER — HYDROXYZINE HYDROCHLORIDE 25 MG/1
TABLET, FILM COATED ORAL
Qty: 90 TABLET | Refills: 2 | Status: SHIPPED | OUTPATIENT
Start: 2020-11-13 | End: 2021-04-13

## 2020-11-13 NOTE — PROGRESS NOTES
tired, would break in half. Now simone gives me 0.5mg. And I break that in half. Can't do without it     Says xanax, fioricet and tizanidine help. Takes mirtazapine      Cardiology recently advised her to reduce zoloft to 50mg/day. Just started lower dose last night. Did ok     Hasn't driven in almost 2 years. Independent in self care, uses bench. Uses walker all the time for ambulation when out of house. Usually sits in manual wc at home, faster to get to bathroom      H/o depression for many years, predating husban'd death. Don't know why, don't have anything to be depressed about. Doesn't feel overly depressed or anxious at present with current meds    HPI:   Ernestina Thomson is a 67 y.o. female with h/o anxiety and depression who was contacted via telehealth for follow up. Due to the COVID-19 pandemic restrictions on close contact interactions as recommended by CDC and health authorities, the patient's visit was conducted via telehealth (phone call visit) in lieu of a face to face visit. Patient verbally consented and agreed to proceed. Since last visit 8/28/20:    \"pretty good\". No physical complaints    Mood \"fine\". Denies lability    Son, William Muro, still living with pt. Don's doing good. Granddaughter no longer living with pt, is now living with her [facility] home now. (Had rectal cancer. Now total colostomy bag. Had to close off his rectum.  had internal flesh eating bacteria. Had to remove part of bone and muscle from left leg). sent home for IV tx. Back to hospital 12/2 to get retested and maybe surgery to close flap on rectum. Going through this since march. I did visit him in October at [facility] x 4-5 hours. Hydroxyzine:  Not really much pruritis. now only uses hydroxyine infrequently      Taking mirtazapine 30mg nightly and sertraline 100mg nightly consistently.   Denies se's    Sometimes runs out of medications and can't get refilled d/t limited finances. AT last visit, asked about getting more quantity of mirtazapine and xanax to help last longer and reduce cost.  Currently received sertraline as 90-day supply           Alcohol: denies              Illicits: denies              Caffeine: denies   Tobacco:  Denies       Psych ROS:      Depression: \"pretty good\"  rates 8-9/10 (10 best);  sleep good, \"just right\"  Listens to body, sleeps when tired.       good appetite (two meals/day), but limited quantity d/t previous gastric bypass , pretty good energy, occ napping;  isolation  DENIES SI/HI        Kenna: DENIES  insomnia with increased energy, rapid speech, easily distracted or decreased attention, irritability, racing thoughts, expansive mood, increase in energy and goal directed behavior, grandiosity, flight of ideas     Anxiety: \"not really\" much lately, rates 2-3/10 (10 worst); intermittent worry HISTORICALLY worry about \"anything, everything\" (kids, finances since only has her ssi and not 's), LESS sleep disruption ( (initial insomnia), somatic complaints (headaches, nausea), restlessness, fatigue; fear of doing/saying wrong things, fear of judgement, avoidant of social situations     OCD:  DENIES Repetitive actions or rituals, excessive hand washing, contamination fears, need for symmetry, violent thoughts, hoarding, fear of being harmed, mental or verbal repetition of words or phrases and counting     Panic:  DENIES Shortness of breath, dizziness, diaphoresis, trembling, palpitations, feeing of choking, nausea, feeling outside of self, numbness, chills, hot flashes, chest discomfort and fear of dying     Phobias:  DENIES Shortness of breath, trembling, increased heart rate, panic, dread, terror, horror, awareness that fear is out of proportion to the actual threat, overwhelming urge to escape the situation and intense measures taken to steer clear of the feared object or situation     Psychosis: DENIES AVH, PARANOIA OR DELUSIONS       Denies ongoing A/VH, paranoia, delusions             PTSD: DENIES nightmares, flashbacks, hypervigilance, easily startled, decreased sleep, reliving the event, avoiding situations that remind you of trauma, physical and mental paralysis when reminded of the experience, same despair, easily angry or irritable, trouble concentrating, fear for safety,  Numbness of emotions, feeling of detachment     Eating disorders:  DENIES         JENNIFER 7 SCORE 2/28/2020 12/20/2019 10/11/2019 9/13/2019   JENNIFER-7 Total Score 0 3 8 7     Interpretation of JENNIFER-7 score: 5-9 = mild anxiety, 10-14 = moderate anxiety,   15+ = severe anxiety. Recommend referral to behavioral health for scores 10 or greater.     No data recorded  PHQ-9 Total Score: 8 (12/20/2019 12:35 PM)  Thoughts that you would be better off dead, or of hurting yourself in some way: 0 (12/20/2019 12:35 PM)  PHQ-9 Total Score: 6 (9/13/2019 11:24 AM)  Thoughts that you would be better off dead, or of hurting yourself in some way: 0 (9/13/2019 11:24 AM)  Interpretation of PHQ-9 score:  1-4 = minimal depression, 5-9 = mild depression,   10-14 = moderate depression; 15-19 = moderately severe depression, 20-27 = severe depression      Past Psychiatric History:               Prior hospitalizations:denies              Prior diagnoses: anxiety, depression              Outpatient Treatment:                           Psychiatrist: denies                          Therapist: denies, uninterested              Suicide Attempts: denies              Hx SH:  denies     Past Psychopharmacologic Trials (including response/reactions):     1.  Just what on at present for many years     zoloft  Mirtazapine  Xanax     Past Medical/Surgical History:   Past Medical History:   Diagnosis Date    Acid reflux     Anxiety     Arthritis     Atrial fibrillation (HCC)     Closed fracture dislocation of right elbow     Depression     Fibromyalgia     Migraine     [facility] ([facility] [address]     Wears times daily Takes 150 mg 2 times a day      potassium chloride (KLOR-CON M20) 20 MEQ extended release tablet Take 1 tablet by mouth 2 times daily 180 tablet 1    tiZANidine (ZANAFLEX) 2 MG tablet Take 1 tablet by mouth 2 times daily (Patient taking differently: Take 2 mg by mouth 2 times daily Indications: usually 1/day ) 180 tablet 0    mirtazapine (REMERON) 30 MG tablet TAKE ONE TABLET BY MOUTH ONCE NIGHTLY 90 tablet 1    ALPRAZolam (XANAX) 0.5 MG tablet Take 1 tablet by mouth 3 times daily as needed for Anxiety for up to 90 days. 90 tablet 2    sertraline (ZOLOFT) 100 MG tablet Take 1 tablet by mouth nightly 90 tablet 1    metoclopramide (REGLAN) 5 MG tablet Take 1 tablet by mouth 3 times daily as needed (Nausea and abdominal) 120 tablet 3    flecainide (TAMBOCOR) 100 MG tablet TAKE 1 TABLET BY MOUTH 2 TIMES A DAY 60 tablet 3    acetaminophen (TYLENOL) 500 MG tablet Take 500 mg by mouth every 6 hours as needed for Pain      furosemide (LASIX) 20 MG tablet Take 2 tablets by mouth daily With an additional 20 mg in the evening on M,W,F only 120 tablet 5    pantoprazole (PROTONIX) 40 MG tablet Take 1 tablet by mouth 2 times daily 60 tablet 3    vitamin D 25 MCG (1000 UT) CAPS Take 2 capsules by mouth daily      Biotin 1000 MCG TABS Take 1 tablet by mouth daily      vitamin B-12 (CYANOCOBALAMIN) 1000 MCG tablet Take 500 mcg by mouth daily Indications: patient is taking 2 tablets 1,000 mcg in the morning       Compression Stockings MISC by Does not apply route 20-30 MM edema bilateral legs 1 each 0     No current facility-administered medications on file prior to visit. Controlled Substance Monitoring:      Acute and Chronic Pain Monitoring:   RX Monitoring 11/13/2020   Periodic Controlled Substance Monitoring No signs of potential drug abuse or diversion identified.      10/24/2020  1   08/28/2020  Alprazolam 0.5 MG Tablet  90.00  30 Ch Wet   [id number]   Kro (1801)   1  3.00 LME  [place] 09/16/2020  1   08/28/2020  Alprazolam 0.5 MG Tablet  90.00  30 Ch Wet   [id number]   Kro (1801)   0  3.00 LME  [place]   08/17/2020  1   05/29/2020  Alprazolam 0.5 MG Tablet  60.00  30 Ch Wet   [id number]   Kro (1801)   2  2.00 LME  [place]   07/13/2020  1   05/29/2020  Alprazolam 0.5 MG Tablet  60.00  30 Ch Wet   [id number]   Kro (1801)   1  2.00 LME  [place]   06/15/2020  1   05/29/2020  Alprazolam 0.5 MG Tablet  60.00  30 Ch Wet   [id number]   Kro (1801)   0  2.00 LME  [place]   05/09/2020  1   05/08/2020  Alprazolam 0.5 MG Tablet  60.00  30 Ch Wet   [id number]   Kro (1801)   0  2.00 LME  [place]   04/09/2020  1   04/08/2020  Alprazolam 0.5 MG Tablet  60.00  30 Ch Wet   [id number]   Kro (1801)   0  2.00 LME  [place]   03/03/2020  1   12/20/2019  Alprazolam 0.5 MG Tablet  60.00  30 Ch Wet   [id number]   Kro (1801)   0  2.00 LME  [place]   01/27/2020  1   12/20/2019  Alprazolam 0.5 MG Tablet  60.00  30 Ch Wet   [id number]   Kro (1893)   1  2.00 LME  [place]   12/28/2019  1   12/20/2019  Alprazolam 0.5 MG Tablet  60.00  30 Ch Wet   [id number]   Kro (1893)   0  2.00 LME  [place]   11/27/2019  1   10/11/2019  Alprazolam 0.5 MG Tablet  60.00  30 Ch Wet   [id number]   Kro (1893)   1  2.00 LME  [place]   10/11/2019  1   10/11/2019  Alprazolam 0.5 MG Tablet  60.00  30 Ch Wet   [id number]   Kro (1893)   0  2.00 LME  [place]   09/04/2019  1   08/30/2019  Alprazolam 0.5 MG Tablet  60.00  30 Mi Michel   [id number]   Kro (1893)   0  2.00 LME  [place]   [place]   08/04/2019  1   07/19/2019  Alprazolam 1 MG Tablet  60.00  30 [facility]   [id number]   Kro (1893)   0  4.00 LME  [place]   07/06/2019  1   03/08/2019  Alprazolam 2 MG Tablet  90.00  30 [facility]   [id number]   Kro (1893)   4  12.00 LME  [place]   06/15/2019  1   06/14/2019  Tramadol Hcl 50 MG Tablet  18.00  3 [facility]   [id number]   Kro (1893)   0  30.00 MME  [place]   06/06/2019  1   03/08/2019  Alprazolam 2 MG Tablet  90.00  30 Ma Flako [id number]   Kro (1893)   3  12.00 LME  [place]   05/08/2019  1   03/08/2019  Alprazolam 2 MG Tablet  90.00  30 Ma Flako   [id number]   Kro (1893)   2  12.00 LME  [place]   04/05/2019  1   03/08/2019  Alprazolam 2 MG Tablet  90.00  30 Ma Flako   [id number]   Kro (1893)   1  12.00 LME  [place]   03/08/2019  1   03/08/2019  Alprazolam 2 MG Tablet  90.00  30 Ma Flako   [id number]   Kro (1893)   0  12.00 LME  [place]   02/04/2019  1   10/10/2018  Alprazolam 2 MG Tablet  90.00  30 Ma Flako   [id number]   Kro (1893)   3  12.00 LME  [place]   01/07/2019  1   10/10/2018  Alprazolam 2 MG Tablet  90.00  30 Ma Flako   [id number]   Kro (1893)   2  12.00 LME  [place]   12/24/2018  1   12/19/2018  Hydrocodone-Acetamin 5-325 MG  28.00  7 Br Bar   [id number]   Kro (1893)   0  20.00 MME  [place]   12/08/2018  1   10/10/2018  Alprazolam 2 MG Tablet  90.00  30 Ma Flako   [id number]   Kro (1893)   1  12.00 LME  Medicare          OBJECTIVE:  Vitals: Wt Readings from Last 3 Encounters:   09/03/20 184 lb 3.2 oz (83.6 kg)   09/03/20 184 lb 3.2 oz (83.6 kg)   08/18/20 179 lb 2 oz (81.3 kg)       There were no vitals filed for this visit. ROS: Denies trouble with fever, rash, headache, vision changes, chest pain, shortness of breath, nausea, extremity pain, weakness, dysuria.  Pruritis     Mental Status Exam:      Appearance    unable to assess d/t f/u visit completed via pc  Muscle strength/tone: unable to assess d/t f/u visit completed via pc  Gait/station: unable to assess d/t f/u visit completed via pc    Speech    spontaneous, normal rate and normal volume  Mood    \"pretty good\", Anxious  Affect   Unable to fully assess d/t f/u visit completed via telehealth (pc) though sounds   Congruent to thought content and mood  Thought Content    preoccupations, no delusions voiced  Thought Process    coherent, linear  Associations    logical connections  Perceptions: denies AH/VH,   [address]  Orientation oriented to person, place, time, and general circumstances  Memory    recent and remote memory intact  Attention/Concentration    intact  Ability to understand instructions Yes  Ability to respond meaningfully Yes  Language: [address] of knowledge/Intellect: Average  SI:   no suicidal ideation  HI: Denies HI    Labs:     Orders Only on 2020   Component Date Value    N-TELOPEPTIDE, CROSS-VAIBHAV* 2020 62     Creatinine, Ur 2020 57     Sodium 2020 141     Potassium 2020 4.5     Chloride 2020 102     CO2 2020 23     Anion Gap 2020 16     Glucose 2020 115*    BUN 2020 15     CREATININE 2020 0.7     GFR Non- 2020 >60     GFR  2020 >60     Calcium 2020 9.3     PTH 2020 93.9*    24hr Urine Volume (ml) 2020 1350     Calcium, 24H Urine 2020 86     Creatinine, 24H Ur 2020 0.8     Vit D, 25-Hydroxy 2020 38.8    Admission on 2020, Discharged on 2020   Component Date Value    Fungus (Mycology) Culture 2020 No growth after 4 weeks of incubation.  Fungus Stain 2020 No Fungal elements seen    Office Visit on 2020   Component Date Value    SARS-CoV-2 2020 Not Detected     Source 2020 NP swab        Last Drug screen:  Lab Results   Component Value Date    LABAMPH Neg 2018    LABBENZ Neg 2018    COCAIMETSCRU Neg 2018    MDMA Not Detected 2019    LABMETH Neg 2018    OPIATESCREENURINE POSITIVE 2018    PHENCYCLIDINESCREENURINE Neg 2018         Imagin/1/19 CT head wo contrast:      Impression   No acute traumatic intracranial abnormality       Atrophy and small-vessel ischemic change       Trace paranasal sinus disease             ASSESSMENT AND PLAN     Diagnosis Orders   1. JENNIFER (generalized anxiety disorder)     2. Mild episode of recurrent major depressive disorder ([facility] [address]     3. Moderate episode of recurrent major depressive disorder (HCC)            1. Safety: NO Imminent risk of danger to/self/others based on the factors considered below. Appropriate for outpatient level of care. Safety plan includes: 911, PES, hotlines, and interventions discussed today.      Risk factors: Age <25 or >55,  social isolation,no outpatient services in place, and no collateral information to support safety.     Protective factors:, female gender,denies depression, denies suicidal ideation, does not have lethal plan, does not have access to guns or weapons, patient is erika for safety, no prior suicide attempts, no family h/o suicide, no substance abuse,compliant with recommended medications,and patient is future oriented.        2. Psychiatric  - Feels is doing relatively well overall. stable depressive and anxiety symptoms. - continue zoloft 100mg/day. Noted Significant increase in depressive symptoms and tearfulness when dose briefly reduced by cardiology in past.  Consider increase vs switch to different ssri if needed in future    - continue xanax 0.5mg (written as tid to make prescription last longer, though reports doesn't take more than bid. Endorses some issues with getting med refills monthly d/t limited finances. Has been on xanax for many years. refill history cw regular use bid. Advised not long term strategy. Goal is to reduce/eliminate. Would go slowly d/t length of time has been on this. Dose was reduced from previous 2mg bid to 0.5mg bid      - continue mirtazapine  30mg nightly (was increased 12/2019). Notes this has helped with insomnia, mood and anxiety symptoms   If no improvement in sleep with higher dose, will plan to dc and trial low dose seroquel     - continue hydroxyzine 25-50mg bid prn itching. Caution with possible anticholinergic se's including confusion.   Denies this being issue     - med contract signed 9/13/19  - UDS 9/13/19      -Labs: reviewed in

## 2020-12-01 ENCOUNTER — OFFICE VISIT (OUTPATIENT)
Dept: PRIMARY CARE CLINIC | Age: 75
End: 2020-12-01
Payer: MEDICARE

## 2020-12-01 VITALS
HEART RATE: 78 BPM | SYSTOLIC BLOOD PRESSURE: 112 MMHG | DIASTOLIC BLOOD PRESSURE: 70 MMHG | OXYGEN SATURATION: 97 % | TEMPERATURE: 96.8 F | BODY MASS INDEX: 32.73 KG/M2 | WEIGHT: 173.2 LBS

## 2020-12-01 LAB — HBA1C MFR BLD: 5.8 %

## 2020-12-01 PROCEDURE — 83036 HEMOGLOBIN GLYCOSYLATED A1C: CPT | Performed by: NURSE PRACTITIONER

## 2020-12-01 PROCEDURE — 99214 OFFICE O/P EST MOD 30 MIN: CPT | Performed by: NURSE PRACTITIONER

## 2020-12-01 RX ORDER — TIZANIDINE 2 MG/1
2 TABLET ORAL 2 TIMES DAILY
Qty: 180 TABLET | Refills: 0 | Status: SHIPPED | OUTPATIENT
Start: 2020-12-01 | End: 2021-03-22

## 2020-12-01 ASSESSMENT — ENCOUNTER SYMPTOMS
VISUAL CHANGE: 0
SORE THROAT: 0
SWOLLEN GLANDS: 0

## 2020-12-01 NOTE — PROGRESS NOTES
[address]   [address]  [address] [facility] [place] [address]  Dept: [phone]       2020     Sae Pearce (:  [phone])JACK a 67 y.o. female, here for evaluation of bilateral lower extremity swelling, hypertension, and fatigue. She is ambulating with a front wheel walker. Fatigue   This is a chronic problem. The current episode started more than 1 year ago. The problem occurs constantly. Associated symptoms include fatigue. Pertinent negatives include no abdominal pain, arthralgias, chest pain, chills, congestion, fever, headaches, joint swelling, myalgias, numbness, rash, sore throat, swollen glands or visual change. Nothing aggravates the symptoms. She has tried rest and sleep for the symptoms. The treatment provided no relief. Patient is taking several medications that could contribute to her fatigue:  Hydroxyzine, Metoprolol, Atarax and Flecainide. Her last A1C 6.0%. She has persistent bilateral lower extremity swelling. Leg Pain    There was no injury mechanism. The pain is present in the right leg. The quality of the pain is described as aching. The pain is moderate. Associated symptoms include tingling (foot). Numbness: foot. She reports no foreign bodies present. History of closed fracture of right femur with repair 10/2018. The supracondylar femur fracture was repaired with ORIF,  her supracondylar humeral fracture was not surgical.  Advised to follow up with Orthopedics for reevaluation. Bilateral Extremity Swelling  She reports a history of swelling, advised to increased Lasix 40 mg daily, recently increased by Dr. Aureliano Valdes. M,W,F, take an additional 20 mg in the evening since . Not really any appreciable change in lower extremity swelling. Headaches  States that Neurologist prescribed pB2, magnesium, metoprolol, and Co Q10 for headaches, States it is ineffective and expensive. Has follow up appointment.     Behavioral Health   Being followed by Psychiatry, Reports taking Remeron, hydroxyzine, xanax, and sertraline as prescribed, with some effectiveness. Health Care Maintenance  Shingles vaccine: declined due to cost: $42   Flu Vaccine: UTD  Pneumonia Vaccine: UTD  Lab Results   Component Value Date    CHOL 124 02/19/2020     Lab Results   Component Value Date    TRIG 102 02/19/2020     Lab Results   Component Value Date    HDL 66 (H) 02/19/2020     Lab Results   Component Value Date    LDLCALC 38 02/19/2020     Lab Results   Component Value Date    LABVLDL 20 02/19/2020       Lab Results   Component Value Date    WBC 3.7 (L) 02/19/2020    HGB 9.4 (L) 02/19/2020    HCT 29.4 (L) 02/19/2020    MCV 81.5 02/19/2020     02/19/2020     Lab Results   Component Value Date     09/08/2020    K 4.5 09/08/2020     09/08/2020    CO2 23 09/08/2020    BUN 15 09/08/2020    CREATININE 0.7 09/08/2020    GLUCOSE 115 (H) 09/08/2020    CALCIUM 9.3 09/08/2020    PROT 6.3 (L) 02/19/2020    LABALBU 4.2 02/19/2020    BILITOT <0.2 02/19/2020    ALKPHOS 74 02/19/2020    AST 16 02/19/2020    ALT 11 02/19/2020    LABGLOM >60 09/08/2020    GFRAA >60 09/08/2020    AGRATIO 2.0 02/19/2020    GLOB 2.1 02/19/2020       Review of Systems   Constitutional: Positive for fatigue. Negative for activity change, chills and fever. HENT: Negative for congestion and sore throat. Eyes: Negative for visual disturbance. Respiratory: Negative for chest tightness and shortness of breath. Cardiovascular: Positive for leg swelling. Negative for chest pain and palpitations. Gastrointestinal: Negative for abdominal pain, constipation and diarrhea. Endocrine: Negative for polyuria. Genitourinary: Negative for dysuria. Musculoskeletal: Negative for arthralgias, joint swelling and myalgias. Skin: Negative for rash. Neurological: Negative for dizziness, light-headedness, numbness and headaches.    Psychiatric/Behavioral: Negative for agitation, decreased concentration and sleep disturbance. The patient is not nervous/anxious. Prior to Visit Medications    Medication Sig Taking?  Authorizing Provider   tiZANidine (ZANAFLEX) 2 MG tablet Take 1 tablet by mouth 2 times daily Yes MUSA Gordillo CNP   calcium carbonate (CALTRATE 600) 1500 (600 Ca) MG TABS tablet Take 600 mg by mouth daily Yes Historical Provider, MD   Riboflavin (B-2-400) 400 MG CAPS Take by mouth Yes Historical Provider, MD   magnesium oxide (MAG-OX) 400 MG tablet Take 400 mg by mouth daily Yes Historical Provider, MD   hydrOXYzine (ATARAX) 25 MG tablet TAKE ONE TO TWO TABLETS BY MOUTH TWICE A DAY AS NEEDED FOR ITCHING Yes MUSA Patel CNP   metoprolol succinate (TOPROL XL) 50 MG extended release tablet TAKE 1 TABLET BY MOUTH ONE TIME A DAY Yes Ever MD Iva   apixaban (ELIQUIS) 5 MG TABS tablet TAKE 1 TABLET BY MOUTH 2 TIMES A DAY Yes Ever MD Iva   co-enzyme Q-10 50 MG capsule Take 50 mg by mouth 3 times daily Takes 150 mg 2 times a day Yes Historical Provider, MD   potassium chloride (KLOR-CON M20) 20 MEQ extended release tablet Take 1 tablet by mouth 2 times daily Yes MUSA Gordillo CNP   mirtazapine (REMERON) 30 MG tablet TAKE ONE TABLET BY MOUTH ONCE NIGHTLY Yes MUSA Patel CNP   sertraline (ZOLOFT) 100 MG tablet Take 1 tablet by mouth nightly Yes MUSA Patel CNP   metoclopramide (REGLAN) 5 MG tablet Take 1 tablet by mouth 3 times daily as needed (Nausea and abdominal) Yes Jess Gregorio MD   flecainide (TAMBOCOR) 100 MG tablet TAKE 1 TABLET BY MOUTH 2 TIMES A DAY Yes Ever MD Iva   acetaminophen (TYLENOL) 500 MG tablet Take 500 mg by mouth every 6 hours as needed for Pain Yes Historical Provider, MD   furosemide (LASIX) 20 MG tablet Take 2 tablets by mouth daily With an additional 20 mg in the evening on M,W,F only Yes Ever MD Iva   pantoprazole (PROTONIX) 40 MG tablet Take 1 tablet by mouth 2 times daily Yes medications. 3. Fatigue, unspecified type  -Experiencing fatigue, BLE swelling despite medication management  -Medication induced vs worsening Aortic valve disorder vs heart failure      Return in about 3 months (around 3/1/2021) for HTN. Reviewed patient's pertinent medical history, relevant laboratory results, imaging studies, and health maintenance. Medications have been reviewed and discussed with the patient, refills otherwise up-to-date. Discussed the importance of adhering to current medication regimen. Advised:  (1) continue to work on eating a healthy balanced diet; (2) stay active by exercising within your personal limits. Patient was advised to keep future appointments with their respective specialty care team(s). Questions and concerns addressed, care plan reviewed and patient is agreeable with the care plan following today's visit.       --MUSA Hyde - CNP on 12/3/2020 at 11:55 AM

## 2020-12-03 ASSESSMENT — ENCOUNTER SYMPTOMS
SHORTNESS OF BREATH: 0
CONSTIPATION: 0
DIARRHEA: 0
ABDOMINAL PAIN: 0
CHEST TIGHTNESS: 0

## 2020-12-03 ASSESSMENT — VISUAL ACUITY: OU: 1

## 2020-12-16 NOTE — PROGRESS NOTES
[address]  Cardiology  Note      Mita Mejia  1945, 67 y.o.      CC: \" Fatigue and swelling. \"                 Susie Gilliland, APRN - CNP:      HPI:   This is a 67 y.o. female with history of AFIB and multiple falls who presented to [facility] 1/13/18 with confusion and generalized weakness. This may have been associated with multiple falls. She was found to be in AFIB with RVR and started on Cardizem gtt which was transitioned to po. ECHO performed was sub-optimal and showed EF of 55-60%. Continues to complain of palpitations and wore 30 day event monitor showing Afib with RVR. Patient was started on Flecainide 50 mg BID and Toprol XL 25 mg daily (8/2019). Presented to ER 9/1/19 after falling out of her wheelchair at home. Per ER report, she hit her head on the floor, but did not lose consciousness. CT of head was negative. Another ER visit on 9/3/19 with reports of hallucinations and confusion. Treated empirically with antibiotics and discharged home. Patient comes for routine f/u. She uses a walker for assistance. Only complaint is fatigue. Denies falls, palpitations and dyspnea          Social History     Tobacco Use    Smoking status: Never Smoker    Smokeless tobacco: Never Used   Substance Use Topics    Alcohol use: No    Drug use: Never     Allergies   Allergen Reactions    Demerol Hcl [Meperidine] Other (See Comments)     PATIENT STATES SHE GOT VERY ANXIOUS-LIKE SHE WAS CLIMBING THE WALLS    Pcn [Penicillins]      Patient reports she has not had since she was a child, thinks reaction was hives.      Adhesive Tape Rash         Review of Systems -   Constitutional: Negative for weight gain/loss; malaise, fever  Respiratory: Negative for Asthma;  cough and hemoptysis  Cardiovascular: Negative for palpitations,dizziness   Gastrointestinal: Negative for abd.pain; constipation/diarrhea;    Genitourinary: Negative for stones; hematuria; frequency hesitancy  Integumentt: Negative for rash or pruritis  Hematologic/lymphatic: Negative for blood dyscrasia; leukemia/lymphoma  Musculoskeletal: Negative for Connective tissue disease  Neurological:  Negative for Seizure   Behavioral/Psych:Negative for Bipolar disorder, Schizophrenia; Dementia  Endocrine: negative for thyroid, parathyroid disease    Physical Examination:    /72   Pulse 81   Temp 97.5 °F (36.4 °C)   Ht 5' 1\" (1.549 m)   Wt 173 lb 9.6 oz (78.7 kg)   SpO2 97%   BMI 32.80 kg/m²    HEENT:  Face: Atraumatic, Conjunctiva: Pink; non icteric,  Mucous Memb:  Moist, No thyromegaly or Lymphadenopathy  Respiratory:  Resp Assessment: WNL, Resp Auscultation: clear  Cardiovascular: Auscultation: nl S1 & S2, Palpation:  Nl PMI; No heaves or thrills, JVP:  normal  Abdomen: Soft, non-tender, Normal bowel sounds,  No organomegaly  Extremities: No Cyanosis or Clubbing, LE edema +2 bilaterally  Neurological: Oriented to time, place, and person, Non-anxious  Psychiatric: Normal mood and affect  Skin: Warm and dry,  No rash seen     No outpatient medications have been marked as taking for the 12/18/20 encounter (Appointment) with Estrella Luna MD.     EKG:   NSR     ECHO: 10/24/18  Normal LV size and systolic function. Estimated ejection fraction is 60%. Severe calcification of the posterior leaflet of the mitral valve. Mild mitral regurgitation is present. The left atrium is severely dilated. Mild aortic regurgitation is present. Mild-to-moderate tricuspid regurgitation  Systolic pulmonary artery pressure (SPAP) is estimated at 45-48 mm Hg consistent with moderate pulmonary hypertension       ASSESSMENT AND PLAN:    Fatigue  Chronic  TSH normal (2/2020)  Likely related to polypharmacy    Atrial Fibrillation  In SR today  Continue Flecainide and Toprol   No abnormal bleeding since restarting Eliquis     LE Edema   improved      Follow up in 6 months       Thank you very much for allowing me to participate in the care of your patient.  Please do not hesitate to contact me if you have any questions. Sincerely,      Izabela Brunner M.D  [facility], [address] [facility] [address] [place] [address]  Ph: [phone]  Fax: [phone]    Physician Attestation:  The scribes documentation has been prepared under my direction and personally reviewed by me in its entirety. I confirm that the note above accurately reflects all work, treatment, procedures, and medical decision making performed by me.

## 2020-12-18 ENCOUNTER — OFFICE VISIT (OUTPATIENT)
Dept: CARDIOLOGY CLINIC | Age: 75
End: 2020-12-18
Payer: MEDICARE

## 2020-12-18 VITALS
DIASTOLIC BLOOD PRESSURE: 72 MMHG | WEIGHT: 173.6 LBS | OXYGEN SATURATION: 97 % | TEMPERATURE: 97.5 F | HEIGHT: 61 IN | HEART RATE: 81 BPM | SYSTOLIC BLOOD PRESSURE: 124 MMHG | BODY MASS INDEX: 32.77 KG/M2

## 2020-12-18 PROCEDURE — 93000 ELECTROCARDIOGRAM COMPLETE: CPT | Performed by: INTERNAL MEDICINE

## 2020-12-18 PROCEDURE — 99214 OFFICE O/P EST MOD 30 MIN: CPT | Performed by: INTERNAL MEDICINE

## 2020-12-18 RX ORDER — METOPROLOL SUCCINATE 50 MG/1
TABLET, EXTENDED RELEASE ORAL
Qty: 90 TABLET | Refills: 5 | Status: SHIPPED | OUTPATIENT
Start: 2020-12-18 | End: 2021-06-04

## 2020-12-18 RX ORDER — FUROSEMIDE 20 MG/1
40 TABLET ORAL DAILY
Qty: 120 TABLET | Refills: 5 | Status: ON HOLD
Start: 2020-12-18 | End: 2021-02-22 | Stop reason: HOSPADM

## 2020-12-18 NOTE — PATIENT INSTRUCTIONS
trans fat  · Read food labels, and try to avoid saturated and trans fats. They increase your risk of heart disease. · Use olive or canola oil when you cook. · Bake, broil, grill, or steam foods instead of frying them. · Choose lean meats instead of high-fat meats such as hot dogs and sausages. Cut off all visible fat when you prepare meat. · Eat fish, skinless poultry, and meat alternatives such as soy products instead of high-fat meats. Soy products, such as tofu, may be especially good for your heart. · Choose low-fat or fat-free milk and dairy products. Eat foods high in fiber  · Eat a variety of grain products every day. Include whole-grain foods that have lots of fiber and nutrients. Examples of whole-grain foods include oats, whole wheat bread, and brown rice. · Buy whole-grain breads and cereals, instead of white bread or pastries. Limit salt and sodium  · Limit how much salt and sodium you eat to help lower your blood pressure. · Taste food before you salt it. Add only a little salt when you think you need it. With time, your taste buds will adjust to less salt. · Eat fewer snack items, fast foods, and other high-salt, processed foods. Check food labels for the amount of sodium in packaged foods. · Choose low-sodium versions of canned goods (such as soups, vegetables, and beans). Limit sugar  · Limit drinks and foods with added sugar. These include candy, desserts, and soda pop. Limit alcohol  · Limit alcohol to no more than 2 drinks a day for men and 1 drink a day for women. Too much alcohol can cause health problems. When should you call for help? Watch closely for changes in your health, and be sure to contact your doctor if:    · You would like help planning heart-healthy meals. Where can you learn more? Go to https://chyaredeb.healthSpot [employer]ners. org and sign in to your [email or web address] account.  Enter V137 in the [employer] box to learn more about \"Heart-Healthy Diet: Care Instructions. \"     If you do not have an account, please click on the \"Sign Up Now\" link. Current as of: August 22, 2019               Content Version: 12.6  © 6368-3092 [employer]. Care instructions adapted under license by [facility] ([facility]). If you have questions about a medical condition or this instruction, always ask your healthcare professional. [address] any warranty or liability for your use of this information.

## 2021-01-28 ENCOUNTER — APPOINTMENT (OUTPATIENT)
Dept: CT IMAGING | Age: 76
DRG: 981 | End: 2021-01-28
Payer: MEDICARE

## 2021-01-28 ENCOUNTER — ANESTHESIA EVENT (OUTPATIENT)
Dept: OPERATING ROOM | Age: 76
DRG: 981 | End: 2021-01-28
Payer: MEDICARE

## 2021-01-28 ENCOUNTER — APPOINTMENT (OUTPATIENT)
Dept: GENERAL RADIOLOGY | Age: 76
DRG: 981 | End: 2021-01-28
Payer: MEDICARE

## 2021-01-28 ENCOUNTER — HOSPITAL ENCOUNTER (INPATIENT)
Age: 76
LOS: 14 days | Discharge: NURSING, REHAB OR LONG-TERM CARE | DRG: 981 | End: 2021-02-11
Attending: EMERGENCY MEDICINE | Admitting: STUDENT IN AN ORGANIZED HEALTH CARE EDUCATION/TRAINING PROGRAM
Payer: MEDICARE

## 2021-01-28 ENCOUNTER — ANESTHESIA (OUTPATIENT)
Dept: OPERATING ROOM | Age: 76
DRG: 981 | End: 2021-01-28
Payer: MEDICARE

## 2021-01-28 VITALS
SYSTOLIC BLOOD PRESSURE: 108 MMHG | DIASTOLIC BLOOD PRESSURE: 59 MMHG | OXYGEN SATURATION: 100 % | RESPIRATION RATE: 2 BRPM

## 2021-01-28 DIAGNOSIS — E86.1 HYPOTENSION DUE TO HYPOVOLEMIA: ICD-10-CM

## 2021-01-28 DIAGNOSIS — Z79.01 ANTICOAGULATED: ICD-10-CM

## 2021-01-28 DIAGNOSIS — S80.11XA CONTUSION OF LEG, RIGHT, INITIAL ENCOUNTER: ICD-10-CM

## 2021-01-28 DIAGNOSIS — I95.89 HYPOTENSION DUE TO HYPOVOLEMIA: ICD-10-CM

## 2021-01-28 DIAGNOSIS — S80.11XA TRAUMATIC HEMATOMA OF RIGHT LOWER LEG, INITIAL ENCOUNTER: Primary | ICD-10-CM

## 2021-01-28 DIAGNOSIS — D64.89 ANEMIA DUE TO OTHER CAUSE, NOT CLASSIFIED: ICD-10-CM

## 2021-01-28 PROBLEM — R57.8 HEMORRHAGIC SHOCK (HCC): Status: ACTIVE | Noted: 2021-01-28

## 2021-01-28 PROBLEM — T14.8XXA HEMATOMA: Status: ACTIVE | Noted: 2021-01-28

## 2021-01-28 LAB
A/G RATIO: 1.8 (ref 1.1–2.2)
ABO/RH: NORMAL
ALBUMIN SERPL-MCNC: 3.9 G/DL (ref 3.4–5)
ALP BLD-CCNC: 68 U/L (ref 40–129)
ALT SERPL-CCNC: 11 U/L (ref 10–40)
ANION GAP SERPL CALCULATED.3IONS-SCNC: 11 MMOL/L (ref 3–16)
ANTIBODY SCREEN: NORMAL
AST SERPL-CCNC: 20 U/L (ref 15–37)
BASOPHILS ABSOLUTE: 0 K/UL (ref 0–0.2)
BASOPHILS RELATIVE PERCENT: 0.4 %
BILIRUB SERPL-MCNC: <0.2 MG/DL (ref 0–1)
BLOOD BANK DISPENSE STATUS: NORMAL
BLOOD BANK PRODUCT CODE: NORMAL
BPU ID: NORMAL
BUN BLDV-MCNC: 19 MG/DL (ref 7–20)
CALCIUM SERPL-MCNC: 9.4 MG/DL (ref 8.3–10.6)
CHLORIDE BLD-SCNC: 101 MMOL/L (ref 99–110)
CO2: 22 MMOL/L (ref 21–32)
CREAT SERPL-MCNC: 0.6 MG/DL (ref 0.6–1.2)
DESCRIPTION BLOOD BANK: NORMAL
EOSINOPHILS ABSOLUTE: 0 K/UL (ref 0–0.6)
EOSINOPHILS RELATIVE PERCENT: 0.7 %
GFR AFRICAN AMERICAN: >60
GFR NON-AFRICAN AMERICAN: >60
GLOBULIN: 2.2 G/DL
GLUCOSE BLD-MCNC: 196 MG/DL (ref 70–99)
HCT VFR BLD CALC: 21.7 % (ref 36–48)
HCT VFR BLD CALC: 27 % (ref 36–48)
HEMOGLOBIN: 6.5 G/DL (ref 12–16)
HEMOGLOBIN: 8.3 G/DL (ref 12–16)
INR BLD: 1.37 (ref 0.86–1.14)
LYMPHOCYTES ABSOLUTE: 1 K/UL (ref 1–5.1)
LYMPHOCYTES RELATIVE PERCENT: 16.4 %
MCH RBC QN AUTO: 27.1 PG (ref 26–34)
MCHC RBC AUTO-ENTMCNC: 30.7 G/DL (ref 31–36)
MCV RBC AUTO: 88.3 FL (ref 80–100)
MONOCYTES ABSOLUTE: 0.4 K/UL (ref 0–1.3)
MONOCYTES RELATIVE PERCENT: 5.8 %
NEUTROPHILS ABSOLUTE: 4.8 K/UL (ref 1.7–7.7)
NEUTROPHILS RELATIVE PERCENT: 76.7 %
PDW BLD-RTO: 16.9 % (ref 12.4–15.4)
PLATELET # BLD: 275 K/UL (ref 135–450)
PMV BLD AUTO: 8.4 FL (ref 5–10.5)
POTASSIUM REFLEX MAGNESIUM: 4.6 MMOL/L (ref 3.5–5.1)
PROTHROMBIN TIME: 16 SEC (ref 10–13.2)
RBC # BLD: 3.05 M/UL (ref 4–5.2)
SARS-COV-2, NAAT: NOT DETECTED
SODIUM BLD-SCNC: 134 MMOL/L (ref 136–145)
TOTAL CK: 49 U/L (ref 26–192)
TOTAL PROTEIN: 6.1 G/DL (ref 6.4–8.2)
WBC # BLD: 6.3 K/UL (ref 4–11)

## 2021-01-28 PROCEDURE — P9016 RBC LEUKOCYTES REDUCED: HCPCS

## 2021-01-28 PROCEDURE — 85018 HEMOGLOBIN: CPT

## 2021-01-28 PROCEDURE — 99223 1ST HOSP IP/OBS HIGH 75: CPT | Performed by: ORTHOPAEDIC SURGERY

## 2021-01-28 PROCEDURE — 2500000003 HC RX 250 WO HCPCS: Performed by: ANESTHESIOLOGY

## 2021-01-28 PROCEDURE — 6360000002 HC RX W HCPCS: Performed by: ANESTHESIOLOGY

## 2021-01-28 PROCEDURE — 2580000003 HC RX 258: Performed by: ANESTHESIOLOGY

## 2021-01-28 PROCEDURE — 6360000002 HC RX W HCPCS: Performed by: ORTHOPAEDIC SURGERY

## 2021-01-28 PROCEDURE — 2709999900 HC NON-CHARGEABLE SUPPLY: Performed by: ORTHOPAEDIC SURGERY

## 2021-01-28 PROCEDURE — 2580000003 HC RX 258: Performed by: ORTHOPAEDIC SURGERY

## 2021-01-28 PROCEDURE — 3700000001 HC ADD 15 MINUTES (ANESTHESIA): Performed by: ORTHOPAEDIC SURGERY

## 2021-01-28 PROCEDURE — 82550 ASSAY OF CK (CPK): CPT

## 2021-01-28 PROCEDURE — 3600000002 HC SURGERY LEVEL 2 BASE: Performed by: ORTHOPAEDIC SURGERY

## 2021-01-28 PROCEDURE — 80053 COMPREHEN METABOLIC PANEL: CPT

## 2021-01-28 PROCEDURE — 86923 COMPATIBILITY TEST ELECTRIC: CPT

## 2021-01-28 PROCEDURE — U0002 COVID-19 LAB TEST NON-CDC: HCPCS

## 2021-01-28 PROCEDURE — 73706 CT ANGIO LWR EXTR W/O&W/DYE: CPT

## 2021-01-28 PROCEDURE — 6370000000 HC RX 637 (ALT 250 FOR IP): Performed by: EMERGENCY MEDICINE

## 2021-01-28 PROCEDURE — 6370000000 HC RX 637 (ALT 250 FOR IP): Performed by: NURSE PRACTITIONER

## 2021-01-28 PROCEDURE — 7100000001 HC PACU RECOVERY - ADDTL 15 MIN: Performed by: ORTHOPAEDIC SURGERY

## 2021-01-28 PROCEDURE — 3700000000 HC ANESTHESIA ATTENDED CARE: Performed by: ORTHOPAEDIC SURGERY

## 2021-01-28 PROCEDURE — 86900 BLOOD TYPING SEROLOGIC ABO: CPT

## 2021-01-28 PROCEDURE — 99285 EMERGENCY DEPT VISIT HI MDM: CPT

## 2021-01-28 PROCEDURE — 36430 TRANSFUSION BLD/BLD COMPNT: CPT

## 2021-01-28 PROCEDURE — 27603 DRAIN LOWER LEG LESION: CPT | Performed by: ORTHOPAEDIC SURGERY

## 2021-01-28 PROCEDURE — 2000000000 HC ICU R&B

## 2021-01-28 PROCEDURE — 0HCKXZZ EXTIRPATION OF MATTER FROM RIGHT LOWER LEG SKIN, EXTERNAL APPROACH: ICD-10-PCS | Performed by: ORTHOPAEDIC SURGERY

## 2021-01-28 PROCEDURE — 73590 X-RAY EXAM OF LOWER LEG: CPT

## 2021-01-28 PROCEDURE — 86901 BLOOD TYPING SEROLOGIC RH(D): CPT

## 2021-01-28 PROCEDURE — 85610 PROTHROMBIN TIME: CPT

## 2021-01-28 PROCEDURE — 85025 COMPLETE CBC W/AUTO DIFF WBC: CPT

## 2021-01-28 PROCEDURE — 3600000012 HC SURGERY LEVEL 2 ADDTL 15MIN: Performed by: ORTHOPAEDIC SURGERY

## 2021-01-28 PROCEDURE — 2580000003 HC RX 258: Performed by: EMERGENCY MEDICINE

## 2021-01-28 PROCEDURE — 7100000000 HC PACU RECOVERY - FIRST 15 MIN: Performed by: ORTHOPAEDIC SURGERY

## 2021-01-28 PROCEDURE — 6370000000 HC RX 637 (ALT 250 FOR IP): Performed by: ORTHOPAEDIC SURGERY

## 2021-01-28 PROCEDURE — 86850 RBC ANTIBODY SCREEN: CPT

## 2021-01-28 PROCEDURE — 6360000004 HC RX CONTRAST MEDICATION: Performed by: EMERGENCY MEDICINE

## 2021-01-28 PROCEDURE — 85014 HEMATOCRIT: CPT

## 2021-01-28 RX ORDER — ONDANSETRON 2 MG/ML
4 INJECTION INTRAMUSCULAR; INTRAVENOUS ONCE
Status: COMPLETED | OUTPATIENT
Start: 2021-01-28 | End: 2021-01-28

## 2021-01-28 RX ORDER — OXYCODONE HYDROCHLORIDE AND ACETAMINOPHEN 5; 325 MG/1; MG/1
1 TABLET ORAL PRN
Status: DISCONTINUED | OUTPATIENT
Start: 2021-01-28 | End: 2021-01-28

## 2021-01-28 RX ORDER — SODIUM CHLORIDE 0.9 % (FLUSH) 0.9 %
10 SYRINGE (ML) INJECTION EVERY 12 HOURS SCHEDULED
Status: DISCONTINUED | OUTPATIENT
Start: 2021-01-28 | End: 2021-01-28

## 2021-01-28 RX ORDER — PHENYLEPHRINE HCL IN 0.9% NACL 1 MG/10 ML
SYRINGE (ML) INTRAVENOUS PRN
Status: DISCONTINUED | OUTPATIENT
Start: 2021-01-28 | End: 2021-01-28 | Stop reason: SDUPTHER

## 2021-01-28 RX ORDER — SODIUM CHLORIDE 9 MG/ML
INJECTION, SOLUTION INTRAVENOUS CONTINUOUS PRN
Status: DISCONTINUED | OUTPATIENT
Start: 2021-01-28 | End: 2021-01-28 | Stop reason: SDUPTHER

## 2021-01-28 RX ORDER — HYDROCODONE BITARTRATE AND ACETAMINOPHEN 5; 325 MG/1; MG/1
1 TABLET ORAL ONCE
Status: COMPLETED | OUTPATIENT
Start: 2021-01-28 | End: 2021-01-28

## 2021-01-28 RX ORDER — LABETALOL HYDROCHLORIDE 5 MG/ML
5 INJECTION, SOLUTION INTRAVENOUS EVERY 10 MIN PRN
Status: DISCONTINUED | OUTPATIENT
Start: 2021-01-28 | End: 2021-01-28

## 2021-01-28 RX ORDER — MORPHINE SULFATE 2 MG/ML
1 INJECTION, SOLUTION INTRAMUSCULAR; INTRAVENOUS EVERY 5 MIN PRN
Status: DISCONTINUED | OUTPATIENT
Start: 2021-01-28 | End: 2021-01-28

## 2021-01-28 RX ORDER — SODIUM CHLORIDE 0.9 % (FLUSH) 0.9 %
10 SYRINGE (ML) INJECTION PRN
Status: DISCONTINUED | OUTPATIENT
Start: 2021-01-28 | End: 2021-02-11 | Stop reason: HOSPADM

## 2021-01-28 RX ORDER — MAGNESIUM HYDROXIDE 1200 MG/15ML
LIQUID ORAL CONTINUOUS PRN
Status: COMPLETED | OUTPATIENT
Start: 2021-01-28 | End: 2021-01-28

## 2021-01-28 RX ORDER — HYDRALAZINE HYDROCHLORIDE 20 MG/ML
5 INJECTION INTRAMUSCULAR; INTRAVENOUS
Status: DISCONTINUED | OUTPATIENT
Start: 2021-01-28 | End: 2021-01-28

## 2021-01-28 RX ORDER — SODIUM CHLORIDE 9 MG/ML
INJECTION, SOLUTION INTRAVENOUS PRN
Status: DISCONTINUED | OUTPATIENT
Start: 2021-01-28 | End: 2021-01-28

## 2021-01-28 RX ORDER — OXYCODONE HYDROCHLORIDE AND ACETAMINOPHEN 5; 325 MG/1; MG/1
2 TABLET ORAL PRN
Status: DISCONTINUED | OUTPATIENT
Start: 2021-01-28 | End: 2021-01-28

## 2021-01-28 RX ORDER — FENTANYL CITRATE 50 UG/ML
INJECTION, SOLUTION INTRAMUSCULAR; INTRAVENOUS PRN
Status: DISCONTINUED | OUTPATIENT
Start: 2021-01-28 | End: 2021-01-28 | Stop reason: SDUPTHER

## 2021-01-28 RX ORDER — PROMETHAZINE HYDROCHLORIDE 25 MG/1
12.5 TABLET ORAL EVERY 6 HOURS PRN
Status: DISCONTINUED | OUTPATIENT
Start: 2021-01-28 | End: 2021-02-11 | Stop reason: HOSPADM

## 2021-01-28 RX ORDER — ONDANSETRON 2 MG/ML
4 INJECTION INTRAMUSCULAR; INTRAVENOUS EVERY 6 HOURS PRN
Status: DISCONTINUED | OUTPATIENT
Start: 2021-01-28 | End: 2021-01-28 | Stop reason: SDUPTHER

## 2021-01-28 RX ORDER — ONDANSETRON 2 MG/ML
4 INJECTION INTRAMUSCULAR; INTRAVENOUS
Status: DISCONTINUED | OUTPATIENT
Start: 2021-01-28 | End: 2021-01-28

## 2021-01-28 RX ORDER — SODIUM CHLORIDE 0.9 % (FLUSH) 0.9 %
10 SYRINGE (ML) INJECTION EVERY 12 HOURS SCHEDULED
Status: DISCONTINUED | OUTPATIENT
Start: 2021-01-28 | End: 2021-02-11 | Stop reason: HOSPADM

## 2021-01-28 RX ORDER — ACETAMINOPHEN 325 MG/1
650 TABLET ORAL EVERY 6 HOURS PRN
Status: DISCONTINUED | OUTPATIENT
Start: 2021-01-28 | End: 2021-02-11 | Stop reason: HOSPADM

## 2021-01-28 RX ORDER — MORPHINE SULFATE 2 MG/ML
2 INJECTION, SOLUTION INTRAMUSCULAR; INTRAVENOUS EVERY 5 MIN PRN
Status: DISCONTINUED | OUTPATIENT
Start: 2021-01-28 | End: 2021-01-28

## 2021-01-28 RX ORDER — PROPOFOL 10 MG/ML
INJECTION, EMULSION INTRAVENOUS PRN
Status: DISCONTINUED | OUTPATIENT
Start: 2021-01-28 | End: 2021-01-28 | Stop reason: SDUPTHER

## 2021-01-28 RX ORDER — HYDROCODONE BITARTRATE AND ACETAMINOPHEN 5; 325 MG/1; MG/1
1 TABLET ORAL EVERY 4 HOURS PRN
Status: DISCONTINUED | OUTPATIENT
Start: 2021-01-28 | End: 2021-02-01

## 2021-01-28 RX ORDER — EPHEDRINE SULFATE/0.9% NACL/PF 50 MG/5 ML
SYRINGE (ML) INTRAVENOUS PRN
Status: DISCONTINUED | OUTPATIENT
Start: 2021-01-28 | End: 2021-01-28 | Stop reason: SDUPTHER

## 2021-01-28 RX ORDER — 0.9 % SODIUM CHLORIDE 0.9 %
1000 INTRAVENOUS SOLUTION INTRAVENOUS ONCE
Status: COMPLETED | OUTPATIENT
Start: 2021-01-28 | End: 2021-01-28

## 2021-01-28 RX ORDER — ONDANSETRON 4 MG/1
4 TABLET, FILM COATED ORAL ONCE
Status: COMPLETED | OUTPATIENT
Start: 2021-01-28 | End: 2021-01-28

## 2021-01-28 RX ORDER — ONDANSETRON 2 MG/ML
4 INJECTION INTRAMUSCULAR; INTRAVENOUS EVERY 6 HOURS PRN
Status: DISCONTINUED | OUTPATIENT
Start: 2021-01-28 | End: 2021-02-11 | Stop reason: HOSPADM

## 2021-01-28 RX ORDER — ACETAMINOPHEN 650 MG/1
650 SUPPOSITORY RECTAL EVERY 6 HOURS PRN
Status: DISCONTINUED | OUTPATIENT
Start: 2021-01-28 | End: 2021-02-11 | Stop reason: HOSPADM

## 2021-01-28 RX ORDER — SODIUM CHLORIDE 0.9 % (FLUSH) 0.9 %
10 SYRINGE (ML) INJECTION PRN
Status: DISCONTINUED | OUTPATIENT
Start: 2021-01-28 | End: 2021-01-28 | Stop reason: SDUPTHER

## 2021-01-28 RX ORDER — SUCCINYLCHOLINE/SOD CL,ISO/PF 200MG/10ML
SYRINGE (ML) INTRAVENOUS PRN
Status: DISCONTINUED | OUTPATIENT
Start: 2021-01-28 | End: 2021-01-28 | Stop reason: SDUPTHER

## 2021-01-28 RX ADMIN — SODIUM CHLORIDE, PRESERVATIVE FREE 10 ML: 5 INJECTION INTRAVENOUS at 22:30

## 2021-01-28 RX ADMIN — HYDROCODONE BITARTRATE AND ACETAMINOPHEN 1 TABLET: 5; 325 TABLET ORAL at 15:30

## 2021-01-28 RX ADMIN — ONDANSETRON 4 MG: 2 INJECTION INTRAMUSCULAR; INTRAVENOUS at 22:11

## 2021-01-28 RX ADMIN — Medication 5 MG: at 21:26

## 2021-01-28 RX ADMIN — HYDROCODONE BITARTRATE AND ACETAMINOPHEN 1 TABLET: 5; 325 TABLET ORAL at 23:25

## 2021-01-28 RX ADMIN — SODIUM CHLORIDE 1000 ML: 9 INJECTION, SOLUTION INTRAVENOUS at 17:19

## 2021-01-28 RX ADMIN — PROPOFOL 150 MG: 10 INJECTION, EMULSION INTRAVENOUS at 21:16

## 2021-01-28 RX ADMIN — FENTANYL CITRATE 50 MCG: 50 INJECTION INTRAMUSCULAR; INTRAVENOUS at 21:14

## 2021-01-28 RX ADMIN — Medication 100 MCG: at 21:21

## 2021-01-28 RX ADMIN — HYDROCODONE BITARTRATE AND ACETAMINOPHEN 1 TABLET: 5; 325 TABLET ORAL at 17:46

## 2021-01-28 RX ADMIN — ONDANSETRON HYDROCHLORIDE 4 MG: 4 TABLET, FILM COATED ORAL at 15:30

## 2021-01-28 RX ADMIN — Medication 80 MG: at 21:16

## 2021-01-28 RX ADMIN — SODIUM CHLORIDE: 9 INJECTION, SOLUTION INTRAVENOUS at 21:10

## 2021-01-28 RX ADMIN — IOPAMIDOL 75 ML: 755 INJECTION, SOLUTION INTRAVENOUS at 17:05

## 2021-01-28 RX ADMIN — Medication 100 MCG: at 21:24

## 2021-01-28 RX ADMIN — CEFAZOLIN SODIUM 2 G: 10 INJECTION, POWDER, FOR SOLUTION INTRAVENOUS at 21:20

## 2021-01-28 ASSESSMENT — PAIN DESCRIPTION - PAIN TYPE
TYPE: ACUTE PAIN;SURGICAL PAIN
TYPE: ACUTE PAIN
TYPE: ACUTE PAIN;SURGICAL PAIN

## 2021-01-28 ASSESSMENT — PAIN SCALES - GENERAL
PAINLEVEL_OUTOF10: 0
PAINLEVEL_OUTOF10: 5
PAINLEVEL_OUTOF10: 8
PAINLEVEL_OUTOF10: 3

## 2021-01-28 ASSESSMENT — PULMONARY FUNCTION TESTS
PIF_VALUE: 14
PIF_VALUE: 4
PIF_VALUE: 0
PIF_VALUE: 14
PIF_VALUE: 0
PIF_VALUE: 1
PIF_VALUE: 0
PIF_VALUE: 15
PIF_VALUE: 1
PIF_VALUE: 15
PIF_VALUE: 14
PIF_VALUE: 15

## 2021-01-28 ASSESSMENT — PAIN DESCRIPTION - ORIENTATION
ORIENTATION: RIGHT

## 2021-01-28 ASSESSMENT — PAIN - FUNCTIONAL ASSESSMENT
PAIN_FUNCTIONAL_ASSESSMENT: PREVENTS OR INTERFERES SOME ACTIVE ACTIVITIES AND ADLS
PAIN_FUNCTIONAL_ASSESSMENT: PREVENTS OR INTERFERES SOME ACTIVE ACTIVITIES AND ADLS

## 2021-01-28 ASSESSMENT — PAIN DESCRIPTION - DESCRIPTORS
DESCRIPTORS: ACHING;SHARP
DESCRIPTORS: ACHING

## 2021-01-28 ASSESSMENT — PAIN DESCRIPTION - LOCATION
LOCATION: LEG
LOCATION: LEG

## 2021-01-28 ASSESSMENT — PAIN DESCRIPTION - PROGRESSION: CLINICAL_PROGRESSION: NOT CHANGED

## 2021-01-28 ASSESSMENT — PAIN DESCRIPTION - FREQUENCY: FREQUENCY: INTERMITTENT

## 2021-01-28 ASSESSMENT — PAIN DESCRIPTION - ONSET: ONSET: ON-GOING

## 2021-01-28 ASSESSMENT — ENCOUNTER SYMPTOMS: SHORTNESS OF BREATH: 0

## 2021-01-28 NOTE — ED PROVIDER NOTES
9 [facility]        Pt Name: Herb Baer  MRN: [id number]  [place] 1945  Date of evaluation: 1/28/2021  Provider: MUSA Wilkins CNP  PCP: MUSA Hernandez CNP     I have seen and evaluated this patient with my supervising physician Kevan Rey, [facility]       Chief Complaint   Patient presents with    Leg Injury     hit right leg on car door. large hematoma forming to right shin area. pt on eliquis. HISTORY OF PRESENT ILLNESS   (Location, Timing/Onset, Context/Setting, Quality, Duration, Modifying Factors, Severity, Associated Signs and Symptoms)  Note limiting factors. Herb Baer is a 67 y.o. female with medical history of GERD, anxiety, arthritis, A. fib takes Eliquis, fibromyalgia, rectal cancer, migraine, appendectomy, cholecystectomy who presents to the ED with complaints of injury to her right shin that occurred approximately 1300 today. Patient says she usually ambulates with a walker, however whenever she goes grocery shopping with extended walking she uses a motorized scooter. Said she was getting out of her car and believes she does not give herself enough room between the car door and the motor scooter and therefore hit her right anterior shin on the scooter. Since then she had increasing pain, bruising, and swelling and now has difficulty weightbearing. Patient does note that she takes Eliquis for her A. fib. She denied any open wounds at that time. However has noticed increased bruising and swelling that extends distally to her foot. Denies any other head trauma or LOC. Nursing Notes were all reviewed and agreed with or any disagreements were addressed in the HPI. REVIEW OF SYSTEMS    (2-9 systems for level 4, 10 or more for level 5)     Review of Systems    Positives and Pertinent negatives as per HPI.   Except as noted above in the ROS, all other systems were reviewed and negative.        PAST MEDICAL HISTORY     Past Medical History:   Diagnosis Date    Acid reflux     Anxiety     Arthritis     Atrial fibrillation ([address]     Closed fracture dislocation of right elbow     Depression     Fibromyalgia     Migraine     Rectal cancer (HCC)     Wears glasses     DRIVING         SURGICAL HISTORY     Past Surgical History:   Procedure Laterality Date    APPENDECTOMY       SECTION      x4    CHOLECYSTECTOMY      COLONOSCOPY      SEVERAL    COLONOSCOPY N/A 2019    COLONOSCOPY POLYPECTOMY SNARE/COLD BIOPSY performed by Jeny Ojeda MD at [facility] Right     NECK SURGERY      OPEN TX FEMORAL SUPRACONDYLAR FRACTURE W/O EXTENSION Right 10/25/2018    OPEN REDUCTION INTERNAL FIXATION RIGHT FEMUR SUPRACONDYLAR FEMORAL FRACTURE WITH C-ARM performed by Yamileth Hui MD at [facility] TO REMOVE RECTAL CANCER    TOTAL KNEE ARTHROPLASTY Left     UPPER GASTROINTESTINAL ENDOSCOPY N/A 2019    ESOPHAGOGASTRODUODENOSCOPY performed by Jeny Ojeda MD at [facility] 2020    EGD BIOPSY performed by Jeny Ojeda MD at [address]       Previous Medications    ACETAMINOPHEN (TYLENOL) 500 MG TABLET    Take 500 mg by mouth every 6 hours as needed for Pain    ALPRAZOLAM (XANAX) 0.5 MG TABLET    TAKE ONE TABLET BY MOUTH THREE TIMES A DAY AS NEEDED FOR ANXIETY    APIXABAN (ELIQUIS) 5 MG TABS TABLET    TAKE 1 TABLET BY MOUTH 2 TIMES A DAY    BIOTIN 1000 MCG TABS    Take 1 tablet by mouth daily    CALCIUM CARBONATE (CALTRATE 600) 1500 (600 CA) MG TABS TABLET    Take 600 mg by mouth daily    CO-ENZYME Q-10 50 MG CAPSULE    Take 50 mg by mouth 3 times daily Takes 150 mg 2 times a day    FLECAINIDE (TAMBOCOR) 100 MG TABLET    TAKE 1 TABLET BY MOUTH 2 TIMES A DAY    FUROSEMIDE (LASIX) 20 MG TABLET    Take 2 tablets by mouth daily With an additional 20 mg in the evening on M,W,F only    HYDROXYZINE (ATARAX) 25 MG TABLET    TAKE ONE TO TWO TABLETS BY MOUTH TWICE A DAY AS NEEDED FOR ITCHING    MAGNESIUM OXIDE (MAG-OX) 400 MG TABLET    Take 400 mg by mouth daily    METOCLOPRAMIDE (REGLAN) 5 MG TABLET    Take 1 tablet by mouth 3 times daily as needed (Nausea and abdominal)    METOPROLOL SUCCINATE (TOPROL XL) 50 MG EXTENDED RELEASE TABLET    TAKE 1 TABLET BY MOUTH ONE TIME A DAY    MIRTAZAPINE (REMERON) 30 MG TABLET    TAKE ONE TABLET BY MOUTH ONCE NIGHTLY    PANTOPRAZOLE (PROTONIX) 40 MG TABLET    Take 1 tablet by mouth 2 times daily    POTASSIUM CHLORIDE (KLOR-CON M20) 20 MEQ EXTENDED RELEASE TABLET    Take 1 tablet by mouth 2 times daily    RIBOFLAVIN (B-2-400) 400 MG CAPS    Take by mouth    SERTRALINE (ZOLOFT) 100 MG TABLET    Take 1 tablet by mouth nightly    TIZANIDINE (ZANAFLEX) 2 MG TABLET    Take 1 tablet by mouth 2 times daily    VITAMIN B-12 (CYANOCOBALAMIN) 1000 MCG TABLET    Take 500 mcg by mouth daily Indications: patient is taking 2 tablets 1,000 mcg in the morning     VITAMIN D 25 MCG (1000 UT) CAPS    Take 2 capsules by mouth daily         ALLERGIES     Demerol hcl [meperidine], Pcn [penicillins], and Adhesive tape    FAMILYHISTORY       Family History   Problem Relation Age of Onset    COPD Mother           SOCIAL HISTORY       Social History     Tobacco Use    Smoking status: Never Smoker    Smokeless tobacco: Never Used   Substance Use Topics    Alcohol use: No    Drug use: Never       SCREENINGS             PHYSICAL EXAM    (up to 7 for level 4, 8 or more for level 5)     ED Triage Vitals [01/28/21 1417]   BP Temp Temp src Pulse Resp SpO2 Height Weight   (!) 101/47 97.9 °F (36.6 °C) -- 67 16 -- 5' 1\" (1.549 m) 173 lb 4.5 oz (78.6 kg)       Physical Exam  Vitals signs and nursing note reviewed. Constitutional:       General: She is awake.       Appearance: Normal appearance. She is well-developed and overweight. HENT:      Head: Normocephalic and atraumatic. Nose: Nose normal.   Eyes:      General:         Right eye: No discharge. Left eye: No discharge. Neck:      Musculoskeletal: Normal range of motion. Cardiovascular:      Rate and Rhythm: Normal rate and regular rhythm. Pulses:           Dorsalis pedis pulses are 2+ on the right side and 2+ on the left side. Heart sounds: Normal heart sounds. Pulmonary:      Effort: Pulmonary effort is normal. No respiratory distress. Breath sounds: Normal breath sounds. Abdominal:      General: Bowel sounds are normal.      Palpations: Abdomen is soft. Tenderness: There is no abdominal tenderness. Musculoskeletal: Normal range of motion. Legs:    Skin:     General: Skin is warm and dry. Coloration: Skin is not pale. Neurological:      Mental Status: She is alert and oriented to person, place, and time. Sensory: Sensation is intact. Psychiatric:         Behavior: Behavior normal. Behavior is cooperative.                  DIAGNOSTIC RESULTS   LABS:    Labs Reviewed   CBC WITH AUTO DIFFERENTIAL - Abnormal; Notable for the following components:       Result Value    RBC 3.05 (*)     Hemoglobin 8.3 (*)     Hematocrit 27.0 (*)     MCHC 30.7 (*)     RDW 16.9 (*)     All other components within normal limits    Narrative:     Performed at:  [facility]  [address] [address]   Phone [phone]   PROTIME-INR - Abnormal; Notable for the following components:    Protime 16.0 (*)     INR 1.37 (*)     All other components within normal limits    Narrative:     Performed at:  [facility]  [address] [place], [place] [address]   Phone [phone]   COMPREHENSIVE METABOLIC PANEL W/ REFLEX TO MG FOR LOW K - Abnormal; Notable for the following components:    Sodium 134 (*)     Glucose 196 (*) DISPOSITION    Admission       (Please note that portions of this note were completed with a voice recognition program.  Efforts were made to edit the dictations but occasionally words are mis-transcribed.)    MUSA Lassiter CNP (electronically signed)            MUSA Lassiter CNP  01/28/21 0949

## 2021-01-28 NOTE — ED PROVIDER NOTES
for further evaluation treatment. Hospitalist requested I call the orthopedic doctor for consult. Dr. Shanda Ignacio was notified at [address]. Patient had eaten a half a sandwich at 1900 hrs.     Vitals:    01/28/21 1900   BP: (!) 112/49   Pulse: 76   Resp: 18   Temp:    SpO2: 100%       Lab results  Labs Reviewed   CBC WITH AUTO DIFFERENTIAL - Abnormal; Notable for the following components:       Result Value    RBC 3.05 (*)     Hemoglobin 8.3 (*)     Hematocrit 27.0 (*)     MCHC 30.7 (*)     RDW 16.9 (*)     All other components within normal limits    Narrative:     Performed at:  [address] [place] [facility] [address]   Phone [phone]   PROTIME-INR - Abnormal; Notable for the following components:    Protime 16.0 (*)     INR 1.37 (*)     All other components within normal limits    Narrative:     Performed at:  [address] [place] [facility] [address]   Phone [phone]   COMPREHENSIVE METABOLIC PANEL W/ REFLEX TO MG FOR LOW K - Abnormal; Notable for the following components:    Sodium 134 (*)     Glucose 196 (*)     Total Protein 6.1 (*)     All other components within normal limits    Narrative:     Performed at:  [facility]  [place] [facility] [address]   Phone [phone]   HEMOGLOBIN AND HEMATOCRIT, BLOOD - Abnormal; Notable for the following components:    Hemoglobin 6.5 (*)     Hematocrit 21.7 (*)     All other components within normal limits    Narrative:     Christi Aldana tel. [phone], Hematology results called to and read back  by [facility]  Hematology results called to and read back by [facility], 01/28/2021  18:59, by [facility]  Performed at:  [address] [place] [facility] [address]   Phone [phone]   CK    Narrative:     Performed at:  [facility]  [address] [facility] [place] [address]   Phone [phone]   TYPE AND SCREEN    Narrative:     Performed at:  [facility]  [address] [place] [address]   Phone [phone]       Radiology results  CTA LOWER EXTREMITY RIGHT W CONTRAST   Final Result   Large hematoma of the anterior calf. No active extravasation or etiology of   hemorrhage is identified. Popliteal artery is patent. There is significant trifurcation level disease   with primary runoff via the disease posterior tibial.      No acute osseous abnormality. XR TIBIA FIBULA RIGHT (2 VIEWS)   Final Result   Pretibial soft tissue swelling, possible hematoma. No acute osseous injury. Medications   HYDROcodone-acetaminophen (NORCO) 5-325 MG per tablet 1 tablet (1 tablet Oral Given 1/28/21 1530)   ondansetron (ZOFRAN) tablet 4 mg (4 mg Oral Given 1/28/21 1530)   iopamidol (ISOVUE-370) 76 % injection 75 mL (75 mLs Intravenous Given 1/28/21 1705)   0.9 % sodium chloride bolus (1,000 mLs Intravenous New Bag 1/28/21 1719)   HYDROcodone-acetaminophen (NORCO) 5-325 MG per tablet 1 tablet (1 tablet Oral Given 1/28/21 1746)       New Prescriptions    No medications on file       The patient's blood pressure was not found to be elevated according to CMS/Medicare and the Affordable Care Act/ObamaCare criteria. IMPRESSIONS:  1. Traumatic hematoma of right lower leg, initial encounter    2. Contusion of leg, right, initial encounter    3. Anticoagulated    4. Hypotension due to hypovolemia    5.  Anemia due to other cause, not classified               Adan Waggoner, DO  01/28/21 1701 Kimberlee Singh DO  01/28/21 1943

## 2021-01-28 NOTE — ED TRIAGE NOTES
Patient to ED via private car reports she hit right leg on car door. large hematoma forming to right shin area. pt on eliquis.

## 2021-01-28 NOTE — ED NOTES
Bed: [facility]  Expected date:   Expected time:   Means of arrival:   Comments:  Once clean 66 y/o      Silvia Rodgers RN  01/28/21 2363

## 2021-01-29 LAB
ANION GAP SERPL CALCULATED.3IONS-SCNC: 7 MMOL/L (ref 3–16)
BLOOD BANK DISPENSE STATUS: NORMAL
BLOOD BANK DISPENSE STATUS: NORMAL
BLOOD BANK PRODUCT CODE: NORMAL
BLOOD BANK PRODUCT CODE: NORMAL
BPU ID: NORMAL
BPU ID: NORMAL
BUN BLDV-MCNC: 12 MG/DL (ref 7–20)
CALCIUM SERPL-MCNC: 8.5 MG/DL (ref 8.3–10.6)
CHLORIDE BLD-SCNC: 107 MMOL/L (ref 99–110)
CO2: 24 MMOL/L (ref 21–32)
CREAT SERPL-MCNC: <0.5 MG/DL (ref 0.6–1.2)
DESCRIPTION BLOOD BANK: NORMAL
DESCRIPTION BLOOD BANK: NORMAL
GFR AFRICAN AMERICAN: >60
GFR NON-AFRICAN AMERICAN: >60
GLUCOSE BLD-MCNC: 135 MG/DL (ref 70–99)
HCT VFR BLD CALC: 23.3 % (ref 36–48)
HCT VFR BLD CALC: 26.4 % (ref 36–48)
HCT VFR BLD CALC: 27 % (ref 36–48)
HCT VFR BLD CALC: 28.1 % (ref 36–48)
HEMOGLOBIN: 7.2 G/DL (ref 12–16)
HEMOGLOBIN: 8.4 G/DL (ref 12–16)
HEMOGLOBIN: 8.7 G/DL (ref 12–16)
HEMOGLOBIN: 9 G/DL (ref 12–16)
LACTIC ACID: 1 MMOL/L (ref 0.4–2)
LACTIC ACID: 2 MMOL/L (ref 0.4–2)
MAGNESIUM: 1.7 MG/DL (ref 1.8–2.4)
POTASSIUM SERPL-SCNC: 4.1 MMOL/L (ref 3.5–5.1)
SODIUM BLD-SCNC: 138 MMOL/L (ref 136–145)

## 2021-01-29 PROCEDURE — 76937 US GUIDE VASCULAR ACCESS: CPT

## 2021-01-29 PROCEDURE — 83735 ASSAY OF MAGNESIUM: CPT

## 2021-01-29 PROCEDURE — 6360000002 HC RX W HCPCS: Performed by: INTERNAL MEDICINE

## 2021-01-29 PROCEDURE — 94761 N-INVAS EAR/PLS OXIMETRY MLT: CPT

## 2021-01-29 PROCEDURE — 6370000000 HC RX 637 (ALT 250 FOR IP): Performed by: INTERNAL MEDICINE

## 2021-01-29 PROCEDURE — 85014 HEMATOCRIT: CPT

## 2021-01-29 PROCEDURE — 80048 BASIC METABOLIC PNL TOTAL CA: CPT

## 2021-01-29 PROCEDURE — 83605 ASSAY OF LACTIC ACID: CPT

## 2021-01-29 PROCEDURE — 6370000000 HC RX 637 (ALT 250 FOR IP): Performed by: ORTHOPAEDIC SURGERY

## 2021-01-29 PROCEDURE — 2000000000 HC ICU R&B

## 2021-01-29 PROCEDURE — 99291 CRITICAL CARE FIRST HOUR: CPT | Performed by: INTERNAL MEDICINE

## 2021-01-29 PROCEDURE — 6360000002 HC RX W HCPCS: Performed by: ORTHOPAEDIC SURGERY

## 2021-01-29 PROCEDURE — 2580000003 HC RX 258: Performed by: STUDENT IN AN ORGANIZED HEALTH CARE EDUCATION/TRAINING PROGRAM

## 2021-01-29 PROCEDURE — 85018 HEMOGLOBIN: CPT

## 2021-01-29 PROCEDURE — 2500000003 HC RX 250 WO HCPCS: Performed by: STUDENT IN AN ORGANIZED HEALTH CARE EDUCATION/TRAINING PROGRAM

## 2021-01-29 PROCEDURE — C1751 CATH, INF, PER/CENT/MIDLINE: HCPCS

## 2021-01-29 PROCEDURE — 6370000000 HC RX 637 (ALT 250 FOR IP): Performed by: STUDENT IN AN ORGANIZED HEALTH CARE EDUCATION/TRAINING PROGRAM

## 2021-01-29 PROCEDURE — 05HY33Z INSERTION OF INFUSION DEVICE INTO UPPER VEIN, PERCUTANEOUS APPROACH: ICD-10-PCS | Performed by: STUDENT IN AN ORGANIZED HEALTH CARE EDUCATION/TRAINING PROGRAM

## 2021-01-29 PROCEDURE — 2580000003 HC RX 258: Performed by: ORTHOPAEDIC SURGERY

## 2021-01-29 PROCEDURE — 36569 INSJ PICC 5 YR+ W/O IMAGING: CPT

## 2021-01-29 PROCEDURE — 36415 COLL VENOUS BLD VENIPUNCTURE: CPT

## 2021-01-29 PROCEDURE — 36592 COLLECT BLOOD FROM PICC: CPT

## 2021-01-29 PROCEDURE — 99024 POSTOP FOLLOW-UP VISIT: CPT | Performed by: ORTHOPAEDIC SURGERY

## 2021-01-29 RX ORDER — FLECAINIDE ACETATE 100 MG/1
100 TABLET ORAL EVERY 12 HOURS SCHEDULED
Status: DISCONTINUED | OUTPATIENT
Start: 2021-01-29 | End: 2021-02-11 | Stop reason: HOSPADM

## 2021-01-29 RX ORDER — SODIUM CHLORIDE 0.9 % (FLUSH) 0.9 %
10 SYRINGE (ML) INJECTION PRN
Status: DISCONTINUED | OUTPATIENT
Start: 2021-01-29 | End: 2021-02-11 | Stop reason: HOSPADM

## 2021-01-29 RX ORDER — SODIUM CHLORIDE 0.9 % (FLUSH) 0.9 %
10 SYRINGE (ML) INJECTION EVERY 12 HOURS SCHEDULED
Status: DISCONTINUED | OUTPATIENT
Start: 2021-01-29 | End: 2021-02-11 | Stop reason: HOSPADM

## 2021-01-29 RX ORDER — CALCIUM CARBONATE 500(1250)
500 TABLET ORAL DAILY
Status: DISCONTINUED | OUTPATIENT
Start: 2021-01-29 | End: 2021-02-11 | Stop reason: HOSPADM

## 2021-01-29 RX ORDER — SODIUM CHLORIDE 9 MG/ML
INJECTION, SOLUTION INTRAVENOUS PRN
Status: DISCONTINUED | OUTPATIENT
Start: 2021-01-29 | End: 2021-01-31

## 2021-01-29 RX ORDER — ALPRAZOLAM 0.5 MG/1
0.5 TABLET ORAL NIGHTLY PRN
Status: DISCONTINUED | OUTPATIENT
Start: 2021-01-29 | End: 2021-02-11 | Stop reason: HOSPADM

## 2021-01-29 RX ORDER — SERTRALINE HYDROCHLORIDE 100 MG/1
100 TABLET, FILM COATED ORAL NIGHTLY
Status: DISCONTINUED | OUTPATIENT
Start: 2021-01-29 | End: 2021-02-11 | Stop reason: HOSPADM

## 2021-01-29 RX ORDER — TIZANIDINE 4 MG/1
2 TABLET ORAL NIGHTLY PRN
Status: DISCONTINUED | OUTPATIENT
Start: 2021-01-29 | End: 2021-02-11 | Stop reason: HOSPADM

## 2021-01-29 RX ORDER — MAGNESIUM SULFATE IN WATER 40 MG/ML
2000 INJECTION, SOLUTION INTRAVENOUS ONCE
Status: COMPLETED | OUTPATIENT
Start: 2021-01-29 | End: 2021-01-29

## 2021-01-29 RX ORDER — ALPRAZOLAM 0.5 MG/1
0.5 TABLET ORAL 3 TIMES DAILY PRN
Status: DISCONTINUED | OUTPATIENT
Start: 2021-01-29 | End: 2021-01-29

## 2021-01-29 RX ORDER — LIDOCAINE HYDROCHLORIDE 10 MG/ML
5 INJECTION, SOLUTION EPIDURAL; INFILTRATION; INTRACAUDAL; PERINEURAL ONCE
Status: DISCONTINUED | OUTPATIENT
Start: 2021-01-29 | End: 2021-02-11 | Stop reason: HOSPADM

## 2021-01-29 RX ADMIN — ONDANSETRON 4 MG: 2 INJECTION INTRAMUSCULAR; INTRAVENOUS at 10:46

## 2021-01-29 RX ADMIN — TIZANIDINE 2 MG: 4 TABLET ORAL at 23:18

## 2021-01-29 RX ADMIN — HYDROCODONE BITARTRATE AND ACETAMINOPHEN 1 TABLET: 5; 325 TABLET ORAL at 22:27

## 2021-01-29 RX ADMIN — ALPRAZOLAM 0.5 MG: 0.5 TABLET ORAL at 23:18

## 2021-01-29 RX ADMIN — HYDROCODONE BITARTRATE AND ACETAMINOPHEN 1 TABLET: 5; 325 TABLET ORAL at 04:25

## 2021-01-29 RX ADMIN — SERTRALINE 100 MG: 100 TABLET, FILM COATED ORAL at 20:39

## 2021-01-29 RX ADMIN — CEFAZOLIN SODIUM 2000 MG: 10 INJECTION, POWDER, FOR SOLUTION INTRAVENOUS at 05:46

## 2021-01-29 RX ADMIN — Medication 5 MCG/MIN: at 00:02

## 2021-01-29 RX ADMIN — Medication 500 MG: at 09:54

## 2021-01-29 RX ADMIN — SODIUM CHLORIDE, PRESERVATIVE FREE 10 ML: 5 INJECTION INTRAVENOUS at 09:56

## 2021-01-29 RX ADMIN — CEFAZOLIN SODIUM 2000 MG: 10 INJECTION, POWDER, FOR SOLUTION INTRAVENOUS at 13:21

## 2021-01-29 RX ADMIN — HYDROCODONE BITARTRATE AND ACETAMINOPHEN 1 TABLET: 5; 325 TABLET ORAL at 18:18

## 2021-01-29 RX ADMIN — FLECAINIDE ACETATE 100 MG: 100 TABLET ORAL at 20:39

## 2021-01-29 RX ADMIN — ACETAMINOPHEN 650 MG: 325 TABLET ORAL at 13:31

## 2021-01-29 RX ADMIN — HYDROCODONE BITARTRATE AND ACETAMINOPHEN 1 TABLET: 5; 325 TABLET ORAL at 09:54

## 2021-01-29 RX ADMIN — SODIUM CHLORIDE, PRESERVATIVE FREE 10 ML: 5 INJECTION INTRAVENOUS at 20:38

## 2021-01-29 RX ADMIN — ONDANSETRON 4 MG: 2 INJECTION INTRAMUSCULAR; INTRAVENOUS at 16:53

## 2021-01-29 RX ADMIN — MAGNESIUM SULFATE HEPTAHYDRATE 2000 MG: 40 INJECTION, SOLUTION INTRAVENOUS at 10:46

## 2021-01-29 RX ADMIN — HYDROCODONE BITARTRATE AND ACETAMINOPHEN 1 TABLET: 5; 325 TABLET ORAL at 14:01

## 2021-01-29 RX ADMIN — SODIUM CHLORIDE, PRESERVATIVE FREE 10 ML: 5 INJECTION INTRAVENOUS at 10:46

## 2021-01-29 RX ADMIN — SODIUM CHLORIDE, PRESERVATIVE FREE 10 ML: 5 INJECTION INTRAVENOUS at 20:47

## 2021-01-29 RX ADMIN — ONDANSETRON 4 MG: 2 INJECTION INTRAMUSCULAR; INTRAVENOUS at 04:25

## 2021-01-29 RX ADMIN — FLECAINIDE ACETATE 100 MG: 100 TABLET ORAL at 09:54

## 2021-01-29 ASSESSMENT — PAIN DESCRIPTION - PROGRESSION
CLINICAL_PROGRESSION: GRADUALLY WORSENING
CLINICAL_PROGRESSION: NOT CHANGED
CLINICAL_PROGRESSION: NOT CHANGED
CLINICAL_PROGRESSION: RAPIDLY WORSENING

## 2021-01-29 ASSESSMENT — PAIN DESCRIPTION - LOCATION
LOCATION: LEG
LOCATION: HEAD
LOCATION: LEG
LOCATION: LEG

## 2021-01-29 ASSESSMENT — PAIN SCALES - GENERAL
PAINLEVEL_OUTOF10: 4
PAINLEVEL_OUTOF10: 6
PAINLEVEL_OUTOF10: 2
PAINLEVEL_OUTOF10: 2

## 2021-01-29 ASSESSMENT — PAIN DESCRIPTION - FREQUENCY
FREQUENCY: INTERMITTENT
FREQUENCY: INTERMITTENT

## 2021-01-29 ASSESSMENT — PAIN DESCRIPTION - PAIN TYPE
TYPE: ACUTE PAIN
TYPE: ACUTE PAIN;SURGICAL PAIN
TYPE: SURGICAL PAIN

## 2021-01-29 ASSESSMENT — PAIN DESCRIPTION - ORIENTATION
ORIENTATION: RIGHT

## 2021-01-29 ASSESSMENT — PAIN DESCRIPTION - DESCRIPTORS: DESCRIPTORS: ACHING;DISCOMFORT

## 2021-01-29 ASSESSMENT — PAIN DESCRIPTION - ONSET: ONSET: ON-GOING

## 2021-01-29 NOTE — CONSULTS
8/18/2020    EGD BIOPSY performed by Javi Al MD at [address] [place] [address]:  Current Facility-Administered Medications: 0.9 % sodium chloride infusion, , Intravenous, PRN  lidocaine PF 1 % injection 5 mL, 5 mL, Intradermal, Once  sodium chloride flush 0.9 % injection 10 mL, 10 mL, Intravenous, 2 times per day  sodium chloride flush 0.9 % injection 10 mL, 10 mL, Intravenous, PRN  0.9 % sodium chloride infusion, , Intravenous, PRN  0.9 % sodium chloride infusion, , Intravenous, PRN  calcium elemental (OSCAL) tablet 500 mg, 500 mg, Oral, Daily  flecainide (TAMBOCOR) tablet 100 mg, 100 mg, Oral, 2 times per day  sertraline (ZOLOFT) tablet 100 mg, 100 mg, Oral, Nightly  ALPRAZolam (XANAX) tablet 0.5 mg, 0.5 mg, Oral, TID PRN  magnesium sulfate 2000 mg in 50 mL IVPB premix, 2,000 mg, Intravenous, Once  acetaminophen (TYLENOL) tablet 650 mg, 650 mg, Oral, Q6H PRN **OR** acetaminophen (TYLENOL) suppository 650 mg, 650 mg, Rectal, Q6H PRN  norepinephrine (LEVOPHED) 16 mg in dextrose 5% 250 mL infusion, 2-100 mcg/min, Intravenous, Continuous  sodium chloride flush 0.9 % injection 10 mL, 10 mL, Intravenous, 2 times per day  sodium chloride flush 0.9 % injection 10 mL, 10 mL, Intravenous, PRN  promethazine (PHENERGAN) tablet 12.5 mg, 12.5 mg, Oral, Q6H PRN **OR** ondansetron (ZOFRAN) injection 4 mg, 4 mg, Intravenous, Q6H PRN  ceFAZolin (ANCEF) 2000 mg in dextrose 5 % 100 mL IVPB, 2,000 mg, Intravenous, Q8H  HYDROcodone-acetaminophen (NORCO) 5-325 MG per tablet 1 tablet, 1 tablet, Oral, Q4H PRN  bisacodyl (DULCOLAX) EC tablet 5 mg, 5 mg, Oral, Daily PRN      ALLERGIES:  Patient is allergic to demerol hcl [meperidine]; pcn [penicillins]; and adhesive tape. FAMILY HISTORY:  family history includes COPD in her mother. SOCIAL HISTORY:  Social History     Socioeconomic History    Marital status:       Spouse name: Not on file    Number of children: 4    Years of education: Not on file    Highest education level: High school graduate   Occupational History    Not on file   Social Needs    Financial resource strain: Very hard    Food insecurity     Worry: Often true     Inability: Often true    Transportation needs     Medical: No     Non-medical: No   Tobacco Use    Smoking status: Never Smoker    Smokeless tobacco: Never Used   Substance and Sexual Activity    Alcohol use: No    Drug use: Never    Sexual activity: Never   Lifestyle    Physical activity     Days per week: Not on file     Minutes per session: Not on file    Stress: Not on file   Relationships    Social connections     Talks on phone: Not on file     Gets together: Not on file     Attends Protestant service: Not on file     Active member of club or organization: Not on file     Attends meetings of clubs or organizations: Not on file     Relationship status: Not on file    Intimate partner violence     Fear of current or ex partner: No     Emotionally abused: No     Physically abused: No     Forced sexual activity: No   Other Topics Concern    Not on file   Social History Narrative    Lives with son      reports that she has never smoked. She has never used smokeless tobacco.    REVIEW OF SYSTEMS:  Constitutional: Negative for fever  HENT: Negative for sore throat  Eyes: Negative for redness   Respiratory: Negative for dyspnea, cough  Cardiovascular: Negative for chest pain  Gastrointestinal: Negative for vomiting, diarrhea   Genitourinary: Negative for hematuria   Musculoskeletal: Negative for arthralgias   Skin: Negative for rash  Neurological: Negative for syncope  Hematological: Negative for adenopathy  Psychiatric/Behavorial: + for anxiety/depression     Objective:   PHYSICAL EXAM:  Blood pressure (!) 109/43, pulse 74, temperature 98.5 °F (36.9 °C), temperature source Oral, resp. rate 10, height 5' 1\" (1.549 m), weight 173 lb 4.5 oz (78.6 kg), SpO2 98 %, not currently breastfeeding. on RA    Gen: Well developed; well nourished  Eyes: No scleral icterus. No conjunctival injection. ENT:  Oropharynx clear. External appearance of ears and nose normal.  Neck: Trachea midline. No obvious mass. No visible thyroid enlargement    Respiratory: Clear to auscultation bilaterally, no accessory muscle use  Cardiovascular: Irregular, no appreciable murmurs. No edema. Gastrointestinal: Soft, non-tender. No hernia  Skin: Warm and dry. No rashes or ulcers on visible areas. Normal texture and turgor. RLE dressing c/d/i  Lymphatic: No cervical LAD. No supraclavicular LAD. Musculoskeletal: No cyanosis, clubbing or joint deformity. Psychiatric: Normal mood and affect; exhibits normal insight and judgement       Data Reviewed:   LABS:  CBC:   Recent Labs     01/28/21  1426 01/28/21  1819 01/29/21  0007 01/29/21  0747   WBC 6.3  --   --   --    HGB 8.3* 6.5* 7.2* 9.0*   HCT 27.0* 21.7* 23.3* 28.1*   MCV 88.3  --   --   --      --   --   --      BMP:   Recent Labs     01/28/21  1426 01/29/21  0747   * 138   K 4.6 4.1    107   CO2 22 24   BUN 19 12   CREATININE 0.6 <0.5*     LIVER PROFILE:   Recent Labs     01/28/21  1426   AST 20   ALT 11   BILITOT <0.2   ALKPHOS 68     PT/INR:   Recent Labs     01/28/21  1426   PROTIME 16.0*   INR 1.37*     APTT: No results for input(s): APTT in the last 72 hours. Images and reports from chest imaging were reviewed by me. My interpretation is:  CXR (8/11/19): Clear lung fields       ECHO (10/24/18)  Summary   Normal LV size and systolic function. Estimated ejection fraction is 60%. Severe calcification of the posterior leaflet of the mitral valve. Mild   mitral regurgitation is present. The left atrium is severely dilated. Mild aortic regurgitation is present.    Mild-to-moderate tricuspid regurgitation   Systolic pulmonary artery pressure (SPAP) is estimated at 45-48mmHg   consistent with moderate pulmonary hypertension      Assessment:     Hemorrhagic shock  Leg hematoma   Acute blood loss anemia   Atrial fibrillation     Plan:      Hemorrhagic shock  -s/p 3 units PRBCs  -Levophed to keep MAP>60  -Check lactic acid    Leg hematoma   -s/p hematoma evacuation on 1/28/21  -Orthopedic surgery following    Acute blood loss anemia   -H/H every 6 hours    Atrial fibrillation   -Flecainide twice daily  -Hold eliquis      Prophylaxis  DVT- SCDs, no chemoprophylaxis given hematoma     I spent 45 minutes of critical care time with this patient excluding procedures.     MD Leo [facility]

## 2021-01-29 NOTE — PROGRESS NOTES
PACU Transfer Note    Vitals:    01/28/21 2220   BP: (!) 110/52   Pulse: 77   Resp: 14   Temp: 97.4 °F (36.3 °C)   SpO2: 100%   BP stable    No intake/output data recorded.     Pain assessment:  none  Pain Level: 0    Report given to Receiving unit ICU RN.    1/28/2021 10:25 PM
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
## 2021-01-29 NOTE — ACP (ADVANCE CARE PLANNING)
Advance Care Planning     Advance Care Planning Activator (Inpatient)  Conversation Note      Date of ACP Conversation: 1/29/2021    Conversation Conducted with: Patient with Decision Making Capacity    ACP Activator: [address] Decision Maker:     Current Designated Health Care Decision Maker:     Primary Decision Maker: Bree Mcneill - [phone]; Nikky Trujillo - [phone]  Today we documented Decision Maker(s) consistent with ACP documents on file. Care Preferences    Ventilation: \"If you were in your present state of health and suddenly became very ill and were unable to breathe on your own, what would your preference be about the use of a ventilator (breathing machine) if it were available to you? \"      Would the patient desire the use of ventilator (breathing machine)?: yes    \"If your health worsens and it becomes clear that your chance of recovery is unlikely, what would your preference be about the use of a ventilator (breathing machine) if it were available to you? \"     Would the patient desire the use of ventilator (breathing machine)?: Yes      Resuscitation  \"CPR works best to restart the heart when there is a sudden event, like a heart attack, in someone who is otherwise healthy. Unfortunately, CPR does not typically restart the heart for people who have serious health conditions or who are very sick. \"    \"In the event your heart stopped as a result of an underlying serious health condition, would you want attempts to be made to restart your heart (answer \"yes\" for attempt to resuscitate) or would you prefer a natural death (answer \"no\" for do not attempt to resuscitate)? \" yes       [] Yes   [x] No   Educated Patient / Leopoldo Barwick regarding differences between Advance Directives and portable DNR orders.     Length of ACP Conversation in minutes:   10    Conversation Outcomes:  [] ACP discussion completed  [x] Existing advance directive reviewed with patient; no changes to patient's previously recorded wishes  [] New Advance Directive completed  [] Portable Do Not Rescitate prepared for Provider review and signature  [] POLST/POST/MOLST/MOST prepared for Provider review and signature      Follow-up plan:    [] Schedule follow-up conversation to continue planning  [] Referred individual to Provider for additional questions/concerns   [] Advised patient/agent/surrogate to review completed ACP document and update if needed with changes in condition, patient preferences or care setting    [x] This note routed to one or more involved healthcare providers    HOLLY Camejo, [place], Social Work/Case Management   [phone]  Electronically signed by HOLLY Camejo, [facility] on 1/29/2021 at 2:04 PM
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
## 2021-01-29 NOTE — PROGRESS NOTES
Telephone Encounter by Jeanie Felix MD at 09/28/18 04:09 PM     Author:  Jeanie Felix MD Service:  (none) Author Type:  Physician     Filed:  09/28/18 04:09 PM Encounter Date:  9/28/2018 Status:  Signed     :  Jeanie Felix MD (Physician)            Ok to increase to 10/325[AD1.1M]      Revision History        User Key Date/Time User Provider Type Action    > AD1.1 09/28/18 04:09 PM Jeanie Felix MD Physician Sign    M - Manual [facility] Orthopedic Surgery   Progress Note      SUBJECTIVE: She is in the ICU. She states her leg feels better today. She is still on Levophed. OBJECTIVE:  BP (!) 109/43   Pulse 74   Temp 98.5 °F (36.9 °C) (Oral)   Resp 10   Ht 5' 1\" (1.549 m)   Wt 173 lb 4.5 oz (78.6 kg)   SpO2 98%   BMI 32.74 kg/m²   Patient is awake, alert, and in no acute distress. Dressing / incision clean, dry, intact. Some drainage in dressing. Sensation is intact to light touch throughout the Right leg. Dorsiflexion / plantarflexion intact right leg  The Right leg is warm and well perfused. CBC:   Lab Results   Component Value Date    WBC 6.3 01/28/2021    RBC 3.05 01/28/2021    HGB 9.0 01/29/2021    HCT 28.1 01/29/2021    MCV 88.3 01/28/2021    MCH 27.1 01/28/2021    MCHC 30.7 01/28/2021    RDW 16.9 01/28/2021     01/28/2021    MPV 8.4 01/28/2021       PT/INR:    Lab Results   Component Value Date    PROTIME 16.0 01/28/2021    INR 1.37 01/28/2021       Chem Basic:   Lab Results   Component Value Date     01/29/2021    K 4.1 01/29/2021    K 4.6 01/28/2021     01/29/2021    CO2 24 01/29/2021    GLUCOSE 135 01/29/2021    BUN 12 01/29/2021    CREATININE <0.5 01/29/2021           ASSESSMENT:  POD#1 s/p right leg evacuation of hematoma  Acute blood loss anemai  Afib on ELiquis  Fibromyalgia  Anxiety  Depression      PLAN:    --Pain control  --Finish prophylactic IV abx  --Change dressing daily. Local wound care. Discussed with patient that we will monitor the skin to wait for it to declare itself to see what skin will be ok and what will necrose. --Elevate right leg. --Plastic surgery consult. I discussed patient with Dr. Leonor Rollins last night. He said he would see her today or tomorrow. --Medical management.    --Will follow      Electronically signed by Micheal Ruano MD on 1/29/2021 at 8:43 AM

## 2021-01-29 NOTE — PROGRESS NOTES
Patient admitted to ICU 2112 from OR at 2154 post RIGHT LEG EVACUATION OF HEMATOMA  per Dr. Jose Carlos Dennison. Attached to ICU monitoring system and report received from anesthesia provider. Patient was reported to be hemodynamically stable during procedure. Patient awake on admission and denied pain.
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
## 2021-01-29 NOTE — ED NOTES
Blood consent signed. Surgical consent awaiting to speak with surgeon.       Tiffanie Kuo RN  01/28/21 2007

## 2021-01-29 NOTE — BRIEF OP NOTE
Brief Postoperative Note      Patient: Ying Birch  YOB: 1945  MRN: [id number]    Date of Procedure: 1/28/2021    Pre-Op Diagnosis: Right leg hematoma with pending skin compromise    Post-Op Diagnosis: Same       Procedure(s):  RIGHT LEG EVACUATION OF HEMATOMA    Surgeon(s):  Denzel Dye MD    Assistant:  Surgical Assistant: Angel Ariza RN    Anesthesia: Choice    Estimated Blood Loss (mL): 500cc hematoma/blood evacuated    Complications: None    Specimens:   * No specimens in log *    Implants:  * No implants in log *      Drains:   Urethral Catheter (Active)       Findings: large hematoma      Electronically signed by Denzel Dye MD on 1/28/2021 at 9:41 PM

## 2021-01-29 NOTE — CONSULTS
Orthopaedic Surgery Consult Note      Reason for consult:  Evaluate for right leg compartment syndrome    Requesting physician: Kristen Zelaya DO    CHIEF COMPLAINT: Right swelling pain / hematom    HISTORY:  Ms. Campbell Purvis is a 67 y.o. female, who presented today to [facility] for evaluation of a right leg injury. The patient reports that this injury occurred when she was getting out of the car. SHe states the wind blew the car door causing it to hit her leg  She was first seen and evaluated in the ER. She had ecchymosis and swelling of her right anterior leg. Orthopedic consult was requested by  to evaluate for compartment syndrome. Unfortunately, she was just ate 1/2 a sandwich about 1 hour ago.       Past Medical History:   Diagnosis Date    Acid reflux     Anxiety     Arthritis     Atrial fibrillation (HCC)     Closed fracture dislocation of right elbow     Depression     Fibromyalgia     Migraine     Rectal cancer (HCC)     Wears glasses     DRIVING       Past Surgical History:   Procedure Laterality Date    APPENDECTOMY       SECTION      x4    CHOLECYSTECTOMY      COLONOSCOPY      SEVERAL    COLONOSCOPY N/A 2019    COLONOSCOPY POLYPECTOMY SNARE/COLD BIOPSY performed by Shazia Dorado MD at [facility] Right     NECK SURGERY      OPEN  [facility] W/O EXTENSION Right 10/25/2018    OPEN REDUCTION INTERNAL FIXATION RIGHT FEMUR SUPRACONDYLAR FEMORAL FRACTURE WITH C-ARM performed by Luiz Navarro MD at [facility] TO REMOVE RECTAL CANCER    TOTAL KNEE ARTHROPLASTY Left     UPPER GASTROINTESTINAL ENDOSCOPY N/A 2019    ESOPHAGOGASTRODUODENOSCOPY performed by Shazia Dorado MD at [address] [facility] 2020    EGD BIOPSY performed by Gerrianne Kayser History   Problem Relation Age of Onset    COPD Mother        Review of Systems:   General: negative  Psychological: negative  Ophthalmic: negative  ENT: negative  Hematological and Lymphatic: negative  Endocrine: negative  Respiratory: negative  Cardiovascular: negative  Gastrointestinal: negative  Genito-Urinary: negative  Musculoskeletal: negative. See HPI for pertinent positives. Neurological: negative  Dermatological: negative       PHYSICAL EXAM:  Ms. Nancy Love is a 67 y.o. female who presents today in no acute distress, awake, alert, and oriented. BP (!) 104/56   Pulse 79   Temp 97.9 °F (36.6 °C)   Resp 15   Ht 5' 1\" (1.549 m)   Wt 173 lb 4.5 oz (78.6 kg)   SpO2 97%   BMI 32.74 kg/m²      On evaluation of her right lower extremity, there is no obvious deformity. There is significant swelling and ecchymosis of the anterolateral leg/tibia. She is tender to palpation over the hematoma. The skin is blistering with a hematoma. The skin is weeping. He has no pain with passive range of motion of her ankle. There is brisk cap refill. Sensation is grossly intact to light touch and symmetric bilaterally. Dorsiflexion / plantarflexion intact bilaterally. Right tib/fib Xrays: I independently reviewed the images, as well as the radiology report. Pretibial soft tissue swelling, possible hematoma.       No acute osseous injury. CT right leg: I independently reviewed the images, as well as the radiology report. Large hematoma of the anterior calf.  No active extravasation or etiology of   hemorrhage is identified.       Popliteal artery is patent. Isa[facility] is significant trifurcation level disease   with primary runoff via the disease posterior tibial.       No acute osseous abnormality.            CBC:   Lab Results   Component Value Date    WBC 6.3 01/28/2021    RBC 3.05 01/28/2021    HGB 6.5 01/28/2021    HCT 21.7 01/28/2021    MCV 88.3 01/28/2021    MCH 27.1 01/28/2021    MCHC 30.7 01/28/2021 RDW 16.9 01/28/2021     01/28/2021    MPV 8.4 01/28/2021       PT/INR:    Lab Results   Component Value Date    PROTIME 16.0 01/28/2021    INR 1.37 01/28/2021       Chem Basic:   Lab Results   Component Value Date     01/28/2021    K 4.6 01/28/2021     01/28/2021    CO2 22 01/28/2021    GLUCOSE 196 01/28/2021    BUN 19 01/28/2021    CREATININE 0.6 01/28/2021          IMPRESSION:    Right leg hematoma with skin compromise  Acute blood loss anemia  Afib on ELiquis  Fibromyalgia  Anxiety  Depression      PLAN:  Discussed with patient with the patient and her injury. marquise has a large hematoma underneath her skin, starting to compromise the skin. It needs to be emergently evacuated otherwise she may face skin necrosis. Discussed with her that even if it is evacuated there is still risk of necrosis down the road. I had an extensive discussion with Ms. Ana M Hernandez and/or family regarding the natural history, etiology, and long term consequences of her condition. I have outlined a treatment plan with them and, in my opinion, surgical intervention is indicated at this time. I have discussed the potential complications (including, but not limited to injury to nerve or blood vessel, infection, bleeding, DVT or PE, stiffness, incomplete pain relief, need for further surgery, and anesthetic complications), limitations, expectations, alternatives, and risks of the surgical procedure. We also discussed the importance of postoperative rehabilitation. She has had full opportunity to ask her questions. I have answered them all to her satisfaction. I feel that Ms. Ana M Hernandez understands our discussion today and she will provide written informed consent for the procedure. To OR for emergent right leg evacuation of hematoma. NPO. Transfuse PRBCs given her significant acute blood loss anemia. Hold anticoagulation. Lora Oliver.  Jc Reid MD  Orthopaedic Surgery and Sports Medicine

## 2021-01-29 NOTE — ED PROVIDER NOTES
Emergency Department Encounter  Location: [address] [place] [address]    Patient: Adria Noriega  MRN: [id number]  : 1945  Date of evaluation: 2021  ED Provider: Heber Wick MD      Adria Noriega was checked out to me by Dr. Sanjuana Sigala. Please see his/her initial documentation for details of the patient's initial ED presentation, physical exam and completed studies. Final ED Course and MDM: In brief, Adria Noriega is a 67 y.o. female that presented to the emergency department with RLE pain and swelling, found to have hematoma, Hb 8.3, borderline BP then becoming hypotensive, receiving PRBCs with improvement in her BP after initiation of PRBC administration peripherally, pt mentating well, in Trendelenburg position. OR transport here to take the pt to the OR. I was initially requested to place a CVC by the admitting team but with improvement in her BP with peripheral blood product admin and pending OR transport, will not delay care by performing the procedure. Pt continuing to receive PRBC infusion during transport to OR.     I have reviewed and interpreted all of the currently available lab results and diagnostics from this visit:  Results for orders placed or performed during the hospital encounter of 21   CBC Auto Differential   Result Value Ref Range    WBC 6.3 4.0 - 11.0 K/uL    RBC 3.05 (L) 4.00 - 5.20 M/uL    Hemoglobin 8.3 (L) 12.0 - 16.0 g/dL    Hematocrit 27.0 (L) 36.0 - 48.0 %    MCV 88.3 80.0 - 100.0 fL    MCH 27.1 26.0 - 34.0 pg    MCHC 30.7 (L) 31.0 - 36.0 g/dL    RDW 16.9 (H) 12.4 - 15.4 %    Platelets 737 718 - 720 K/uL    MPV 8.4 5.0 - 10.5 fL    Neutrophils % 76.7 %    Lymphocytes % 16.4 %    Monocytes % 5.8 %    Eosinophils % 0.7 %    Basophils % 0.4 %    Neutrophils Absolute 4.8 1.7 - 7.7 K/uL    Lymphocytes Absolute 1.0 1.0 - 5.1 K/uL    Monocytes Absolute 0.4 0.0 - 1.3 K/uL    Eosinophils Absolute 0.0 0.0 - 0.6 K/uL    Basophils Absolute 0.0 0.0 - 0.2 K/uL   Protime-INR   Result Value Ref Range    Protime 16.0 (H) 10.0 - 13.2 sec    INR 1.37 (H) 0.86 - 1.14   CK   Result Value Ref Range    Total CK 49 26 - 192 U/L   Comprehensive Metabolic Panel w/ Reflex to MG   Result Value Ref Range    Sodium 134 (L) 136 - 145 mmol/L    Potassium reflex Magnesium 4.6 3.5 - 5.1 mmol/L    Chloride 101 99 - 110 mmol/L    CO2 22 21 - 32 mmol/L    Anion Gap 11 3 - 16    Glucose 196 (H) 70 - 99 mg/dL    BUN 19 7 - 20 mg/dL    CREATININE 0.6 0.6 - 1.2 mg/dL    GFR Non-African American >60 >60    GFR African American >60 >60    Calcium 9.4 8.3 - 10.6 mg/dL    Total Protein 6.1 (L) 6.4 - 8.2 g/dL    Albumin 3.9 3.4 - 5.0 g/dL    Albumin/Globulin Ratio 1.8 1.1 - 2.2    Total Bilirubin <0.2 0.0 - 1.0 mg/dL    Alkaline Phosphatase 68 40 - 129 U/L    ALT 11 10 - 40 U/L    AST 20 15 - 37 U/L    Globulin 2.2 g/dL   Hemoglobin and Hematocrit, Blood   Result Value Ref Range    Hemoglobin 6.5 (LL) 12.0 - 16.0 g/dL    Hematocrit 21.7 (L) 36.0 - 48.0 %   COVID-19   Result Value Ref Range    SARS-CoV-2, NAAT Not Detected Not Detected   TYPE AND SCREEN   Result Value Ref Range    ABO/Rh O POS     Antibody Screen NEG    PREPARE RBC (CROSSMATCH), 1 Units   Result Value Ref Range    Product Code Blood Bank [id number]     Description Blood Bank Red Blood Cells, Apheresis, Leuko-reduced     Unit Number [id number]     Dispense Status Blood Bank issued      Xr Tibia Fibula Right (2 Views)    Result Date: 1/28/2021  EXAMINATION: 3 XRAY VIEWS OF THE RIGHT TIBIA AND FIBULA 1/28/2021 2:40 pm COMPARISON: None. HISTORY: ORDERING SYSTEM PROVIDED HISTORY: hit on car door TECHNOLOGIST PROVIDED HISTORY: Reason for exam:->hit on car door Reason for Exam: hit on car door; swelling and discoloration (grayish) visible Acuity: Acute Type of Exam: Initial FINDINGS: The alignment of the tibia and fibula is normal.  There is no acute fracture or dislocation. There is pretibial soft tissue swelling.  Generalized primary runoff via the disease posterior tibial. No acute osseous abnormality.              ED Medication Orders (From admission, onward)    Start Ordered     Status Ordering Provider    01/28/21 2104 01/28/21 2104  HYDROmorphone (DILAUDID) injection 0.5 mg  EVERY 5 MIN PRN      Ordered FÉLIX FERGUSON    01/28/21 2104 01/28/21 2104  morphine (PF) injection 1 mg  EVERY 5 MIN PRN      Ordered FÉLIX FERGUSON    01/28/21 2104 01/28/21 2104  morphine (PF) injection 2 mg  EVERY 5 MIN PRN      Ordered Randy ZHOU    01/28/21 2104 01/28/21 2104  oxyCODONE-acetaminophen (PERCOCET) 5-325 MG per tablet 1 tablet  PRN      Ordered Randy ZHOU    01/28/21 2104 01/28/21 2104  oxyCODONE-acetaminophen (PERCOCET) 5-325 MG per tablet 2 tablet  PRN      Ordered FÉLIX FERGUSON    01/28/21 2104 01/28/21 2104  ondansetron (ZOFRAN) injection 4 mg  ONCE PRN      Ordered Randy ZHOU    01/28/21 2104 01/28/21 2104  labetalol (NORMODYNE;TRANDATE) injection 5 mg  EVERY 10 MIN PRN      Ordered Randy ZHOU    01/28/21 2104 01/28/21 2104  hydrALAZINE (APRESOLINE) injection 5 mg  EVERY 15 MIN PRN      Ordered FÉLIX FERGUSON    01/28/21 2103 01/28/21 2104  HYDROmorphone (DILAUDID) injection 0.25 mg  EVERY 5 MIN PRN      Ordered Randy ZHOU    01/28/21 2045 01/28/21 2036  ceFAZolin (ANCEF) 2000 mg in dextrose 5 % 100 mL IVPB  ONCE     Question:  Antimicrobial Indications  Answer:  Surgical Prophylaxis    Last MAR action: Given - by Randy Shah on 01/28/21 at [address]    01/28/21 1954 01/28/21 1955  0.9 % sodium chloride infusion  PRN      Ordered FÉLIX BALDWIN    01/28/21 1745 01/28/21 1735  HYDROcodone-acetaminophen (NORCO) 5-325 MG per tablet 1 tablet  ONCE      Last MAR action: Given - by Lizette Mandujano on 01/28/21 at [address], [place]ANCISCO JAVIER K    01/28/21 1730 01/28/21 1722  0.9 % sodium chloride bolus  ONCE      Last MAR action:

## 2021-01-29 NOTE — ANESTHESIA POSTPROCEDURE EVALUATION
Department of Anesthesiology  Postprocedure Note    Patient: María Reynoso  MRN: [id number]  YOB: 1945  Date of evaluation: 1/28/2021  Time:  9:55 PM     Procedure Summary     Date: 01/28/21 Room / Location: [address] [facility]    Anesthesia Start: 2110 Anesthesia Stop: 2154    Procedure: RIGHT LEG EVACUATION OF HEMATOMA (Right Leg Lower) Diagnosis: (.)    Surgeons: Bin Wilkins MD Responsible Provider: Quoc Pedersen MD    Anesthesia Type: general ASA Status: 4 - Emergent          Anesthesia Type: general    Becky Phase I:      Becky Phase II:      Last vitals: Reviewed and per EMR flowsheets.        Anesthesia Post Evaluation    Patient location during evaluation: ICU  Patient participation: complete - patient participated  Level of consciousness: awake and alert  Pain score: 0  Airway patency: patent  Nausea & Vomiting: no nausea and no vomiting  Complications: no  Cardiovascular status: blood pressure returned to baseline  Respiratory status: acceptable and spontaneous ventilation  Hydration status: stable

## 2021-01-29 NOTE — ED NOTES
Handoff given to [address] [place] [address]  Patient transported with her to OR at this time. Blood infusing.       Bharath Richey RN  01/28/21 1994

## 2021-01-29 NOTE — CARE COORDINATION
INITIAL CASE MANAGEMENT ASSESSMENT     Reviewed chart, spoke with patient to assess possible discharge needs. Explained Case Management role/services. Living Situation: Patient verified address. Patient lives with son Jeniffer Prince) in a house; ramp to enter, no steps in the home. ADLs: Independent prior to admission. DME: Viktoria Davila    PT/OT Recs: Not ordered     Active Services: No active services     Transportation: Patient is an active . Family to transport at discharge. Medications: [facility] on [address]  Patient is on Eliquis prescribed by Dr. Cindy Mendiola, and confirmed her son, Annette Johnson, will pickup prescription from the pharmacy. Verified insurance, and reported no issues obtaining medication. PCP: MUSA Porter CNP      PLAN/COMMENTS: Goal: return home at discharge with son and additional family support. No anticipated needs at this time. ACP reviewed with patient. SW/CM provided contact information for patient or family to call with any questions. SW/CM will follow and assist as needed.     HOLLY Guzman, [facility] Social Work  [phone]  Electronically signed by HOLLY Guzman, [facility] on 1/29/2021 at 1:57 PM

## 2021-01-29 NOTE — ED NOTES
Fall risk screening completed. Fall risk bracelet applied to patient. Bed alarm on and activated. The fall risk is indicated using dome light . The call light is within the patient's reach and instructions for use were provided. The bed is in the lowest position with wheels locked. The patient has been advised to notify staff using the call light if there is a need to get up or use restroom. The patient verbalized understanding of safety precautions and how to contact staff for assistance.         Daisha Albert RN  01/28/21 2032

## 2021-01-29 NOTE — ANESTHESIA PRE PROCEDURE
[facility] Department of Anesthesiology  Pre-Anesthesia Evaluation/Consultation       Name:  Holly Cranker  : 1945  Age:  67 y.o. MRN:  [id number]  Date: 2021           Surgeon: Surgeon(s):  Caroline Coronado MD    Procedure: Procedure(s):  RIGHT LEG EVACUATION OF HEMATOMA     Allergies   Allergen Reactions    Demerol Hcl [Meperidine] Other (See Comments)     PATIENT STATES SHE GOT VERY ANXIOUS-LIKE SHE WAS CLIMBING THE WALLS    Pcn [Penicillins]      Patient reports she has not had since she was a child, thinks reaction was hives.      Adhesive Tape Rash     Patient Active Problem List   Diagnosis    Symptomatic anemia    A-fib (HCC)    Closed fracture of right femur ([address]    Aortic valve disorder    Arthropathy    Cervical spondylosis without myelopathy    DDD (degenerative disc disease), cervical    DDD (degenerative disc disease), lumbosacral    Displacement of cervical intervertebral disc without myelopathy    Essential hypertension    History of malignant neoplasm of large intestine    Malignant neoplasm of colon ([address]    Observation for suspected malignant neoplasm    Spinal stenosis in cervical region    Spinal stenosis, lumbar    Fibromyalgia    Gastroesophageal reflux disease without esophagitis    Lumbar radicular pain    Pain of right thigh    Traumatic closed displaced supracondylar fracture of femur, right, with delayed healing, subsequent encounter    Osteopenia    Leg hematoma, right, initial encounter    Hemorrhagic shock ([address]    Hematoma     Past Medical History:   Diagnosis Date    Acid reflux     Anxiety     Arthritis     Atrial fibrillation (HCC)     Closed fracture dislocation of right elbow     Depression     Fibromyalgia     Migraine     Rectal cancer ([address]     Wears glasses     DRIVING     Past Surgical History:   Procedure Laterality Date    APPENDECTOMY       SECTION      x4    CHOLECYSTECTOMY      COLONOSCOPY      SEVERAL    COLONOSCOPY N/A 11/26/2019    COLONOSCOPY POLYPECTOMY SNARE/COLD BIOPSY performed by Johnell Gilford, MD at [facility] Right     NECK SURGERY      OPEN [facility] W/O EXTENSION Right 10/25/2018    OPEN REDUCTION INTERNAL FIXATION RIGHT FEMUR SUPRACONDYLAR FEMORAL FRACTURE WITH C-ARM performed by Sabina Pappas MD at [address] [facility] TO REMOVE RECTAL CANCER    TOTAL KNEE ARTHROPLASTY Left     UPPER GASTROINTESTINAL ENDOSCOPY N/A 11/26/2019    ESOPHAGOGASTRODUODENOSCOPY performed by Johnell Gilford, MD at [address] N/A 8/18/2020    EGD BIOPSY performed by Johnell Gilford, MD at [facility] History     Tobacco Use    Smoking status: Never Smoker    Smokeless tobacco: Never Used   Substance Use Topics    Alcohol use: No    Drug use: Never     Medications  No current facility-administered medications on file prior to encounter.       Current Outpatient Medications on File Prior to Encounter   Medication Sig Dispense Refill    ALPRAZolam (XANAX) 0.5 MG tablet TAKE ONE TABLET BY MOUTH THREE TIMES A DAY AS NEEDED FOR ANXIETY 90 tablet 1    apixaban (ELIQUIS) 5 MG TABS tablet TAKE 1 TABLET BY MOUTH 2 TIMES A DAY 60 tablet 5    calcium carbonate (CALTRATE 600) 1500 (600 Ca) MG TABS tablet Take 600 mg by mouth daily      co-enzyme Q-10 50 MG capsule Take 150 mg by mouth 2 times daily       acetaminophen (TYLENOL) 500 MG tablet Take 500 mg by mouth every 6 hours as needed for Pain      Biotin 1000 MCG TABS Take 1 tablet by mouth 2 times daily       furosemide (LASIX) 20 MG tablet Take 2 tablets by mouth daily With an additional 20 mg in the evening on M,W,F only 120 tablet 5    metoprolol succinate (TOPROL XL) 50 MG extended release tablet TAKE 1 TABLET BY MOUTH ONE TIME A DAY 90 tablet 5    tiZANidine (ZANAFLEX) 2 MG tablet Take 1 tablet by mouth 2 times daily 180 tablet 0    Riboflavin (B-2-400) 400 MG CAPS Take by mouth      magnesium oxide (MAG-OX) 400 MG tablet Take 400 mg by mouth daily      hydrOXYzine (ATARAX) 25 MG tablet TAKE ONE TO TWO TABLETS BY MOUTH TWICE A DAY AS NEEDED FOR ITCHING 90 tablet 2    potassium chloride (KLOR-CON M20) 20 MEQ extended release tablet Take 1 tablet by mouth 2 times daily 180 tablet 1    mirtazapine (REMERON) 30 MG tablet TAKE ONE TABLET BY MOUTH ONCE NIGHTLY 90 tablet 1    sertraline (ZOLOFT) 100 MG tablet Take 1 tablet by mouth nightly 90 tablet 1    metoclopramide (REGLAN) 5 MG tablet Take 1 tablet by mouth 3 times daily as needed (Nausea and abdominal) 120 tablet 3    flecainide (TAMBOCOR) 100 MG tablet TAKE 1 TABLET BY MOUTH 2 TIMES A DAY 60 tablet 3    pantoprazole (PROTONIX) 40 MG tablet Take 1 tablet by mouth 2 times daily 60 tablet 3    vitamin D 25 MCG (1000 UT) CAPS Take 2 capsules by mouth daily      vitamin B-12 (CYANOCOBALAMIN) 1000 MCG tablet Take 500 mcg by mouth daily Indications: patient is taking 2 tablets 1,000 mcg in the morning        Current Facility-Administered Medications   Medication Dose Route Frequency Provider Last Rate Last Admin    0.9 % sodium chloride infusion   Intravenous PRN Denzel Dye MD        ceFAZolin (ANCEF) 2000 mg in dextrose 5 % 100 mL IVPB  2,000 mg Intravenous Once Denzel Dye MD         Current Outpatient Medications   Medication Sig Dispense Refill    ALPRAZolam (XANAX) 0.5 MG tablet TAKE ONE TABLET BY MOUTH THREE TIMES A DAY AS NEEDED FOR ANXIETY 90 tablet 1    apixaban (ELIQUIS) 5 MG TABS tablet TAKE 1 TABLET BY MOUTH 2 TIMES A DAY 60 tablet 5    calcium carbonate (CALTRATE 600) 1500 (600 Ca) MG TABS tablet Take 600 mg by mouth daily      co-enzyme Q-10 50 MG capsule Take 150 mg by mouth 2 times daily       acetaminophen (TYLENOL) 500 MG tablet Take 500 mg by mouth every 6 hours as needed for Pain      Biotin 1000 MCG TABS Take 1 tablet by mouth 2 times daily       furosemide (LASIX) 20 MG tablet Take 2 tablets by mouth daily With an additional 20 mg in the evening on M,W,F only 120 tablet 5    metoprolol succinate (TOPROL XL) 50 MG extended release tablet TAKE 1 TABLET BY MOUTH ONE TIME A DAY 90 tablet 5    tiZANidine (ZANAFLEX) 2 MG tablet Take 1 tablet by mouth 2 times daily 180 tablet 0    Riboflavin (B-2-400) 400 MG CAPS Take by mouth      magnesium oxide (MAG-OX) 400 MG tablet Take 400 mg by mouth daily      hydrOXYzine (ATARAX) 25 MG tablet TAKE ONE TO TWO TABLETS BY MOUTH TWICE A DAY AS NEEDED FOR ITCHING 90 tablet 2    potassium chloride (KLOR-CON M20) 20 MEQ extended release tablet Take 1 tablet by mouth 2 times daily 180 tablet 1    mirtazapine (REMERON) 30 MG tablet TAKE ONE TABLET BY MOUTH ONCE NIGHTLY 90 tablet 1    sertraline (ZOLOFT) 100 MG tablet Take 1 tablet by mouth nightly 90 tablet 1    metoclopramide (REGLAN) 5 MG tablet Take 1 tablet by mouth 3 times daily as needed (Nausea and abdominal) 120 tablet 3    flecainide (TAMBOCOR) 100 MG tablet TAKE 1 TABLET BY MOUTH 2 TIMES A DAY 60 tablet 3    pantoprazole (PROTONIX) 40 MG tablet Take 1 tablet by mouth 2 times daily 60 tablet 3    vitamin D 25 MCG (1000 UT) CAPS Take 2 capsules by mouth daily      vitamin B-12 (CYANOCOBALAMIN) 1000 MCG tablet Take 500 mcg by mouth daily Indications: patient is taking 2 tablets 1,000 mcg in the morning        Vital Signs (Current)   Vitals:    01/28/21 1800 01/28/21 1900 01/28/21 2000 01/28/21 2042   BP: (!) 116/54 (!) 112/49 (!) 104/56 (!) 83/55   Pulse: 75 76 79 76   Resp: 21 18 15 15   Temp:    98.9 °F (37.2 °C)   TempSrc:    Oral   SpO2: 96% 100% 97% 99%   Weight:       Height:                                              BP Readings from Last 3 Encounters:   01/28/21 (!) 83/55   12/18/20 124/72   12/01/20 112/70     Vital Signs Statistics (for past 48 hrs)     Temp  Avg: 98.4 °F (36.9 °C)  Min: 97.9 °F (36.6 °C)   Min taken time: 21 1417  Max: 98.9 °F (37.2 °C)   Max taken time: 21 204  Pulse  Av.9  Min: 79   Min taken time: 21 1417  Max: 78   Max taken time: 21 2000  Resp  Av  Min: 15   Min taken time: 21 174  Max: 21   Max taken time: 21 1800  BP  Min: 78/37   Min taken time: 21 170  Max: 117/51   Max taken time: 21 172  MAP (mmHg)  Av.4  Min: 52   Min taken time: 21  Max: 79   Max taken time: 21 2000  SpO2  Av.3 %  Min: 96 %   Min taken time: 21 1800  Max: 100 %   Max taken time: 21 1900  BP Readings from Last 3 Encounters:   21 (!) 83/55   20 124/72   20 112/70       BMI  Body mass index is 32.74 kg/m². Estimated body mass index is 32.74 kg/m² as calculated from the following:    Height as of this encounter: 5' 1\" (1.549 m). Weight as of this encounter: 173 lb 4.5 oz (78.6 kg).     CBC   Lab Results   Component Value Date    WBC 6.3 2021    RBC 3.05 2021    HGB 6.5 2021    HCT 21.7 2021    MCV 88.3 2021    RDW 16.9 2021     2021     CMP    Lab Results   Component Value Date     2021    K 4.6 2021     2021    CO2 22 2021    BUN 19 2021    CREATININE 0.6 2021    GFRAA >60 2021    AGRATIO 1.8 2021    LABGLOM >60 2021    GLUCOSE 196 2021    PROT 6.1 2021    CALCIUM 9.4 2021    BILITOT <0.2 2021    ALKPHOS 68 2021    AST 20 2021    ALT 11 2021     BMP    Lab Results   Component Value Date     2021    K 4.6 2021     2021    CO2 22 2021    BUN 19 2021    CREATININE 0.6 2021    CALCIUM 9.4 2021    GFRAA >60 2021    LABGLOM >60 2021    GLUCOSE 196 2021     POCGlucose  Recent Labs     21  1426   GLUCOSE 196*      Coags    Lab Results Component Value Date    PROTIME 16.0 01/28/2021    INR 1.37 01/28/2021    APTT 31.5 95/24/2318     HCG (If Applicable) No results found for: PREGTESTUR, PREGSERUM, HCG, HCGQUANT   ABGs No results found for: PHART, PO2ART, QCO3UOW, GFM4VNK, BEART, W4FIZOKT   Type & Screen (If Applicable)  No results found for: LABABO, LABRH                         BMI: Wt Readings from Last 3 Encounters:       NPO Status:                          Anesthesia Evaluation  Patient summary reviewed  Airway: Mallampati: II  TM distance: >3 FB   Neck ROM: full  Mouth opening: > = 3 FB Dental:          Pulmonary:       (-) COPD, asthma and shortness of breath                           Cardiovascular:    (+) hypertension:, dysrhythmias: atrial fibrillation,     (-) valvular problems/murmurs, past MI, CAD, CABG/stent and  angina                Neuro/Psych:   (+) neuromuscular disease:, headaches:, psychiatric history:depression/anxiety    (-) seizures, TIA and CVA           GI/Hepatic/Renal:   (+) GERD:,      (-) PUD, liver disease and no renal disease       Endo/Other:    (+) blood dyscrasia: anemia, arthritis:., malignancy/cancer. (-) diabetes mellitus               Abdominal:           Vascular: negative vascular ROS. Anesthesia Plan      general     ASA 4 - emergent     (I discussed with the patient the risks and benefits of PIV, general anesthesia, IV Narcotics, PACU. All questions were answered the patient agrees with the plan.)  Induction: intravenous. Anesthetic plan and risks discussed with patient. Plan discussed with CRNA. This pre-anesthesia assessment may be used as a history and physical.    DOS STAFF ADDENDUM:    Pt seen and examined, chart reviewed (including anesthesia, drug and allergy history). No interval changes to history and physical examination. Anesthetic plan, risks, benefits, alternatives, and personnel involved discussed with patient.   Patient verbalized an understanding and agrees to proceed.       Fay Chau MD  January 28, 2021  8:46 PM

## 2021-01-29 NOTE — H&P
Octavia Bowman took patient to the OR for evacuation of the hematoma and then patient was brought afterwards to the ICU. Past Medical History:        Diagnosis Date    Acid reflux     Anxiety     Arthritis     Atrial fibrillation (HCC)     Closed fracture dislocation of right elbow     Depression     Fibromyalgia     Migraine     Rectal cancer (HCC)     Wears glasses     DRIVING       Past Surgical History:        Procedure Laterality Date    APPENDECTOMY       SECTION      x4    CHOLECYSTECTOMY      COLONOSCOPY      SEVERAL    COLONOSCOPY N/A 2019    COLONOSCOPY POLYPECTOMY SNARE/COLD BIOPSY performed by Estella Zamora MD at [facility] Right     NECK SURGERY      OPEN  [facility] W/O EXTENSION Right 10/25/2018    OPEN REDUCTION INTERNAL FIXATION RIGHT FEMUR SUPRACONDYLAR FEMORAL FRACTURE WITH C-ARM performed by Jose Rafael Callejas MD at [facility] TO REMOVE RECTAL CANCER    TOTAL KNEE ARTHROPLASTY Left     UPPER GASTROINTESTINAL ENDOSCOPY N/A 2019    ESOPHAGOGASTRODUODENOSCOPY performed by Estella Zamora MD at [address] [facility] 2020    EGD BIOPSY performed by Estella Zamora MD at [address] [facility]       Medications Prior to Admission:    Prior to Admission medications    Medication Sig Start Date End Date Taking?  Authorizing Provider   ALPRAZolam Ortiz Rick) 0.5 MG tablet TAKE ONE TABLET BY MOUTH THREE TIMES A DAY AS NEEDED FOR ANXIETY 1/20/21 3/21/21 Yes Suha Kyle, APRN - CNP   apixaban (ELIQUIS) 5 MG TABS tablet TAKE 1 TABLET BY MOUTH 2 TIMES A DAY 20  Yes Tonette Mcardle, MD   calcium carbonate (CALTRATE 600) 1500 (600 Ca) MG TABS tablet Take 600 mg by mouth daily   Yes Historical Provider, MD   co-enzyme Q-10 50 MG capsule Take 150 mg by mouth 2 times daily    Yes Historical Provider, MD   acetaminophen (TYLENOL) 500 MG tablet Take 500 mg by mouth every 6 hours as needed for Pain   Yes Historical Provider, MD   Biotin 1000 MCG TABS Take 1 tablet by mouth 2 times daily    Yes Historical Provider, MD   furosemide (LASIX) 20 MG tablet Take 2 tablets by mouth daily With an additional 20 mg in the evening on M,W,F only 12/18/20   Jameson Stephenson MD   metoprolol succinate (TOPROL XL) 50 MG extended release tablet TAKE 1 TABLET BY MOUTH ONE TIME A DAY 12/18/20   Jameson Stephenson MD   tiZANidine (ZANAFLEX) 2 MG tablet Take 1 tablet by mouth 2 times daily 12/1/20   MUSA Ventura CNP   Riboflavin (B-2-400) 400 MG CAPS Take by mouth    Historical Provider, MD   magnesium oxide (MAG-OX) 400 MG tablet Take 400 mg by mouth daily    Historical Provider, MD   hydrOXYzine (ATARAX) 25 MG tablet TAKE ONE TO TWO TABLETS BY MOUTH TWICE A DAY AS NEEDED FOR ITCHING 11/13/20   MUSA Pickens CNP   potassium chloride (KLOR-CON M20) 20 MEQ extended release tablet Take 1 tablet by mouth 2 times daily 9/18/20   MUSA Ventura CNP   mirtazapine (REMERON) 30 MG tablet TAKE ONE TABLET BY MOUTH ONCE NIGHTLY 8/28/20   MUSA Pickens CNP   sertraline (ZOLOFT) 100 MG tablet Take 1 tablet by mouth nightly 8/28/20   MUSA Pickens CNP   metoclopramide (REGLAN) 5 MG tablet Take 1 tablet by mouth 3 times daily as needed (Nausea and abdominal) 8/18/20   Baldemar Reyes MD   flecainide (TAMBOCOR) 100 MG tablet TAKE 1 TABLET BY MOUTH 2 TIMES A DAY 7/21/20   Jameson Stephenson MD   pantoprazole (PROTONIX) 40 MG tablet Take 1 tablet by mouth 2 times daily 3/23/20   MUSA Ventura CNP   vitamin D 25 MCG (1000 UT) CAPS Take 2 capsules by mouth daily    Historical Provider, MD   vitamin B-12 (CYANOCOBALAMIN) 1000 MCG tablet Take 500 mcg by mouth daily Indications: patient is taking 2 tablets 1,000 mcg in the morning     Historical Provider, MD       Allergies:  Demerol hcl [meperidine], Pcn [penicillins], and Adhesive tape    Social History:  The patient currently lives home    TOBACCO:   reports that she has never smoked. She has never used smokeless tobacco.  ETOH:   reports no history of alcohol use. Family History:  Reviewed in detail and negative for DM, Early CAD, Cancer, CVA. Positive as follows:        Problem Relation Age of Onset    COPD Mother        REVIEW OF SYSTEMS:    as noted in the HPI. All other systems reviewed and negative. PHYSICAL EXAM:    BP (!) 118/55   Pulse 74   Temp 98.3 °F (36.8 °C) (Oral)   Resp 15   Ht 5' 1\" (1.549 m)   Wt 173 lb 4.5 oz (78.6 kg)   SpO2 99%   BMI 32.74 kg/m²     General appearance: Pale appearing, fatigued appearing, pleasant and conversational, no acute distress at this time, alert and oriented x4  HEENT Normal cephalic, atraumatic without obvious deformity. Pupils equal, round, and reactive to light. Extra ocular muscles intact. Conjunctivae/corneas clear. Moist mucous membranes  Neck: Supple, no JVD  Lungs: Clear to auscultation, bilaterally without Rales/Wheezes/Rhonchi with good respiratory effort. Heart: Regular rate and rhythm with Normal S1/S2 without murmurs, rubs or gallops, point of maximum impulse non-displaced  Abdomen: Soft, non-tender or non-distended without rigidity or guarding and positive bowel sounds all four quadrants. Extremities: Right lower extremity with noted large circumferential bruised raised indurated weeping vesicular lesion that is draining serous fluid and does seem to have slight amounts of blood mixed in. Left lower extremity is completely intact and notes no swelling comparatively  Skin: Skin described as above, no rashes otherwise  Neurologic: Alert and oriented X 3, neurovascularly intact with sensory/motor intact upper extremities/lower extremities, bilaterally. Cranial nerves: II-XII intact, grossly non-focal.  Mental status: Alert, oriented, thought content appropriate.   Capillary Refill: Acceptable  < 3 seconds  Peripheral Pulses: +3 Easily felt, not easily obliterated with pressure      X-ray of the tibia and fibula right: Pretibial soft tissue swelling, possible hematoma. No acute osseous injury  CTA of the lower extremity right with IV contrast:  Large hematoma of the anterior calf.  No active extravasation or etiology of   hemorrhage is identified.       Popliteal artery is patent. Anca Minden is significant trifurcation level disease   with primary runoff via the disease posterior tibial.       No acute osseous abnormality. CBC   Recent Labs     01/28/21  1426 01/28/21  1819 01/29/21  0007   WBC 6.3  --   --    HGB 8.3* 6.5* 7.2*   HCT 27.0* 21.7* 23.3*     --   --       RENAL  Recent Labs     01/28/21  1426   *   K 4.6      CO2 22   BUN 19   CREATININE 0.6     LFT'S  Recent Labs     01/28/21  1426   AST 20   ALT 11   BILITOT <0.2   ALKPHOS 68     COAG  Recent Labs     01/28/21  1426   INR 1.37*     CARDIAC ENZYMES  Recent Labs     01/28/21  1426   CKTOTAL 49       U/A:    Lab Results   Component Value Date    COLORU Yellow 09/03/2019    WBCUA 2 10/27/2018    RBCUA 4 10/27/2018    CLARITYU Clear 09/03/2019    SPECGRAV 1.010 09/03/2019    LEUKOCYTESUR Negative 09/03/2019    BLOODU Negative 09/03/2019    GLUCOSEU Negative 09/03/2019       ABG  No results found for: [id number], BEART, K5MHWOWK, PHART, THGBART, ZAL0JYB, PO2ART, TZG6NNY        Active Hospital Problems    Diagnosis Date Noted    Traumatic hematoma of right lower leg [S80.11XA] 01/28/2021     Priority: High    Hemorrhagic shock ([address] [R57.8] 01/28/2021     Priority: High    Hematoma [T14. 8XXA] 01/28/2021     Priority: High    Symptomatic anemia [D64.9] 01/13/2018     Priority: High    A-fib [facility]) [I48.91] 01/13/2018    Aortic valve disorder [I35.9] 01/14/2011         PHYSICIANS CERTIFICATION:    I certify that Cesar Osorio is expected to be hospitalized for greater than 2 midnights based on the following assessment and plan:      ASSESSMENT/PLAN:  · Initial hemoglobin was 8.3, repeat was 6.5 in the ED, given 1 unit of PRBCs in the ED, given 2 more units of PRBCs while in the ICU, transfusion threshold is less than 8  · We will continue to trend hemoglobins and repeat labs in the morning  · Started patient on Levophed for hypotension  · Holding most home medications, restarted flecainide for antiarrhythmic but holding metoprolol due to hypotension, holding all anticoagulation and antiplatelet agents at this time given hematoma  · Orthopedic surgery consulted, will be following along  · Once patient is finished with blood, may consider switching to maintenance IV fluids  · Orthopedic surgery has also consulted plastic surgery to be called in the morning, Dr. Fannie Zhang          DVT Prophylaxis: None  Diet: DIET GENERAL;  Code Status: Full Code  PT/OT Eval Status: Ambulatory at baseline    Dispo -pending clinical course       Ravinder Simms DO    Thank you MUSA Guzman - JOHN for the opportunity to be involved in this patient's care. If you have any questions or concerns please feel free to contact me at [phone].

## 2021-01-29 NOTE — PROGRESS NOTES
Order for PICC line for pressors per Dr. Juli Pardo. Pre procedure and timeout done with pt's RN Madeline Plunkett. Successful insertion of a right double lumen PICC line into pt's basilic vein. No issues gaining access or advancing guidewire/introducer/PICC line. PICC tip terminates in the SVC according to Sherlock 3CG tip confirmation system. PICC was seen dropping into SVC with tip tracking technology and discernable peaked p waves were noted without negative deflection. A printout will be in pt's chart. PICC is cut at 39 cm and out externally 3 cm. Both lumens flush without resistance and draw back brisk blood return. PICC site CDI with hemostasis maintained and a biopatch applied to site. Pt instructed to stay in bed and keep arm flat and still for 30 minutes  to promote hemostasis.      Madeline Plunkett RN given handoff report

## 2021-01-30 LAB
ANION GAP SERPL CALCULATED.3IONS-SCNC: 8 MMOL/L (ref 3–16)
BLOOD BANK DISPENSE STATUS: NORMAL
BLOOD BANK PRODUCT CODE: NORMAL
BPU ID: NORMAL
BUN BLDV-MCNC: 10 MG/DL (ref 7–20)
CALCIUM SERPL-MCNC: 8.4 MG/DL (ref 8.3–10.6)
CHLORIDE BLD-SCNC: 105 MMOL/L (ref 99–110)
CO2: 25 MMOL/L (ref 21–32)
CREAT SERPL-MCNC: 0.6 MG/DL (ref 0.6–1.2)
DESCRIPTION BLOOD BANK: NORMAL
GFR AFRICAN AMERICAN: >60
GFR NON-AFRICAN AMERICAN: >60
GLUCOSE BLD-MCNC: 155 MG/DL (ref 70–99)
HCT VFR BLD CALC: 24.9 % (ref 36–48)
HCT VFR BLD CALC: 25.8 % (ref 36–48)
HEMOGLOBIN: 7.9 G/DL (ref 12–16)
HEMOGLOBIN: 8.3 G/DL (ref 12–16)
LV EF: 68 %
LVEF MODALITY: NORMAL
MAGNESIUM: 1.7 MG/DL (ref 1.8–2.4)
POTASSIUM SERPL-SCNC: 3.7 MMOL/L (ref 3.5–5.1)
SODIUM BLD-SCNC: 138 MMOL/L (ref 136–145)

## 2021-01-30 PROCEDURE — 85014 HEMATOCRIT: CPT

## 2021-01-30 PROCEDURE — 6360000002 HC RX W HCPCS: Performed by: ORTHOPAEDIC SURGERY

## 2021-01-30 PROCEDURE — 99024 POSTOP FOLLOW-UP VISIT: CPT | Performed by: ORTHOPAEDIC SURGERY

## 2021-01-30 PROCEDURE — 85018 HEMOGLOBIN: CPT

## 2021-01-30 PROCEDURE — 93306 TTE W/DOPPLER COMPLETE: CPT

## 2021-01-30 PROCEDURE — 94760 N-INVAS EAR/PLS OXIMETRY 1: CPT

## 2021-01-30 PROCEDURE — 6370000000 HC RX 637 (ALT 250 FOR IP): Performed by: ORTHOPAEDIC SURGERY

## 2021-01-30 PROCEDURE — 2580000003 HC RX 258: Performed by: ORTHOPAEDIC SURGERY

## 2021-01-30 PROCEDURE — 6370000000 HC RX 637 (ALT 250 FOR IP): Performed by: INTERNAL MEDICINE

## 2021-01-30 PROCEDURE — 2500000003 HC RX 250 WO HCPCS: Performed by: STUDENT IN AN ORGANIZED HEALTH CARE EDUCATION/TRAINING PROGRAM

## 2021-01-30 PROCEDURE — 2000000000 HC ICU R&B

## 2021-01-30 PROCEDURE — 99223 1ST HOSP IP/OBS HIGH 75: CPT | Performed by: SURGERY

## 2021-01-30 PROCEDURE — 2580000003 HC RX 258: Performed by: STUDENT IN AN ORGANIZED HEALTH CARE EDUCATION/TRAINING PROGRAM

## 2021-01-30 PROCEDURE — 6370000000 HC RX 637 (ALT 250 FOR IP): Performed by: STUDENT IN AN ORGANIZED HEALTH CARE EDUCATION/TRAINING PROGRAM

## 2021-01-30 PROCEDURE — 99291 CRITICAL CARE FIRST HOUR: CPT | Performed by: INTERNAL MEDICINE

## 2021-01-30 PROCEDURE — 83735 ASSAY OF MAGNESIUM: CPT

## 2021-01-30 PROCEDURE — 36592 COLLECT BLOOD FROM PICC: CPT

## 2021-01-30 PROCEDURE — 6360000002 HC RX W HCPCS: Performed by: INTERNAL MEDICINE

## 2021-01-30 PROCEDURE — 80048 BASIC METABOLIC PNL TOTAL CA: CPT

## 2021-01-30 PROCEDURE — P9045 ALBUMIN (HUMAN), 5%, 250 ML: HCPCS | Performed by: INTERNAL MEDICINE

## 2021-01-30 RX ORDER — POTASSIUM CHLORIDE 20 MEQ/1
40 TABLET, EXTENDED RELEASE ORAL ONCE
Status: COMPLETED | OUTPATIENT
Start: 2021-01-30 | End: 2021-01-30

## 2021-01-30 RX ORDER — MEMANTINE HYDROCHLORIDE 5 MG/1
5 TABLET ORAL 2 TIMES DAILY
Status: ON HOLD | COMMUNITY
End: 2021-02-22 | Stop reason: SDUPTHER

## 2021-01-30 RX ORDER — MAGNESIUM SULFATE IN WATER 40 MG/ML
2000 INJECTION, SOLUTION INTRAVENOUS ONCE
Status: COMPLETED | OUTPATIENT
Start: 2021-01-30 | End: 2021-01-30

## 2021-01-30 RX ORDER — PROCHLORPERAZINE EDISYLATE 5 MG/ML
10 INJECTION INTRAMUSCULAR; INTRAVENOUS EVERY 6 HOURS PRN
Status: DISCONTINUED | OUTPATIENT
Start: 2021-01-30 | End: 2021-02-11 | Stop reason: HOSPADM

## 2021-01-30 RX ORDER — SIMETHICONE 80 MG
80 TABLET,CHEWABLE ORAL EVERY 6 HOURS PRN
Status: DISCONTINUED | OUTPATIENT
Start: 2021-01-30 | End: 2021-02-11 | Stop reason: HOSPADM

## 2021-01-30 RX ORDER — SODIUM CHLORIDE 9 MG/ML
INJECTION, SOLUTION INTRAVENOUS PRN
Status: DISCONTINUED | OUTPATIENT
Start: 2021-01-30 | End: 2021-01-31

## 2021-01-30 RX ORDER — ALBUMIN, HUMAN INJ 5% 5 %
25 SOLUTION INTRAVENOUS ONCE
Status: COMPLETED | OUTPATIENT
Start: 2021-01-30 | End: 2021-01-30

## 2021-01-30 RX ORDER — METOCLOPRAMIDE HYDROCHLORIDE 5 MG/ML
5 INJECTION INTRAMUSCULAR; INTRAVENOUS 3 TIMES DAILY
Status: DISCONTINUED | OUTPATIENT
Start: 2021-01-30 | End: 2021-02-08

## 2021-01-30 RX ADMIN — ACETAMINOPHEN 650 MG: 325 TABLET ORAL at 08:28

## 2021-01-30 RX ADMIN — METOCLOPRAMIDE HYDROCHLORIDE 5 MG: 5 INJECTION INTRAMUSCULAR; INTRAVENOUS at 15:02

## 2021-01-30 RX ADMIN — PROCHLORPERAZINE EDISYLATE 10 MG: 5 INJECTION INTRAMUSCULAR; INTRAVENOUS at 12:01

## 2021-01-30 RX ADMIN — SODIUM CHLORIDE, PRESERVATIVE FREE 10 ML: 5 INJECTION INTRAVENOUS at 08:29

## 2021-01-30 RX ADMIN — SIMETHICONE CHEW TAB 80 MG 80 MG: 80 TABLET ORAL at 12:01

## 2021-01-30 RX ADMIN — Medication 500 MG: at 08:28

## 2021-01-30 RX ADMIN — TIZANIDINE 2 MG: 4 TABLET ORAL at 22:15

## 2021-01-30 RX ADMIN — FLECAINIDE ACETATE 100 MG: 100 TABLET ORAL at 08:29

## 2021-01-30 RX ADMIN — SERTRALINE 100 MG: 100 TABLET, FILM COATED ORAL at 20:53

## 2021-01-30 RX ADMIN — FLECAINIDE ACETATE 100 MG: 100 TABLET ORAL at 20:53

## 2021-01-30 RX ADMIN — Medication 12 MCG/MIN: at 03:27

## 2021-01-30 RX ADMIN — SODIUM CHLORIDE, PRESERVATIVE FREE 10 ML: 5 INJECTION INTRAVENOUS at 20:53

## 2021-01-30 RX ADMIN — ALPRAZOLAM 0.5 MG: 0.5 TABLET ORAL at 22:14

## 2021-01-30 RX ADMIN — HYDROCODONE BITARTRATE AND ACETAMINOPHEN 1 TABLET: 5; 325 TABLET ORAL at 13:01

## 2021-01-30 RX ADMIN — MAGNESIUM SULFATE HEPTAHYDRATE 2000 MG: 40 INJECTION, SOLUTION INTRAVENOUS at 09:59

## 2021-01-30 RX ADMIN — ALBUMIN (HUMAN) 25 G: 12.5 INJECTION, SOLUTION INTRAVENOUS at 08:29

## 2021-01-30 RX ADMIN — HYDROCODONE BITARTRATE AND ACETAMINOPHEN 1 TABLET: 5; 325 TABLET ORAL at 18:07

## 2021-01-30 RX ADMIN — ONDANSETRON 4 MG: 2 INJECTION INTRAMUSCULAR; INTRAVENOUS at 10:26

## 2021-01-30 RX ADMIN — POTASSIUM CHLORIDE 40 MEQ: 1500 TABLET, EXTENDED RELEASE ORAL at 09:54

## 2021-01-30 RX ADMIN — METOCLOPRAMIDE HYDROCHLORIDE 5 MG: 5 INJECTION INTRAMUSCULAR; INTRAVENOUS at 20:53

## 2021-01-30 ASSESSMENT — PAIN DESCRIPTION - PROGRESSION: CLINICAL_PROGRESSION: NOT CHANGED

## 2021-01-30 ASSESSMENT — PAIN DESCRIPTION - ORIENTATION
ORIENTATION: RIGHT
ORIENTATION: POSTERIOR

## 2021-01-30 ASSESSMENT — PAIN SCALES - GENERAL
PAINLEVEL_OUTOF10: 5
PAINLEVEL_OUTOF10: 2
PAINLEVEL_OUTOF10: 5
PAINLEVEL_OUTOF10: 1

## 2021-01-30 ASSESSMENT — PAIN DESCRIPTION - FREQUENCY
FREQUENCY: INTERMITTENT

## 2021-01-30 ASSESSMENT — PAIN DESCRIPTION - LOCATION
LOCATION: LEG
LOCATION: LEG

## 2021-01-30 ASSESSMENT — PAIN - FUNCTIONAL ASSESSMENT
PAIN_FUNCTIONAL_ASSESSMENT: ACTIVITIES ARE NOT PREVENTED
PAIN_FUNCTIONAL_ASSESSMENT: PREVENTS OR INTERFERES WITH MANY ACTIVE NOT PASSIVE ACTIVITIES

## 2021-01-30 ASSESSMENT — PAIN DESCRIPTION - DESCRIPTORS
DESCRIPTORS: DISCOMFORT;ACHING
DESCRIPTORS: STABBING

## 2021-01-30 ASSESSMENT — PAIN DESCRIPTION - ONSET
ONSET: ON-GOING
ONSET: SUDDEN

## 2021-01-30 ASSESSMENT — PAIN DESCRIPTION - PAIN TYPE
TYPE: CHRONIC PAIN
TYPE: ACUTE PAIN

## 2021-01-30 NOTE — PROGRESS NOTES
MD Magui Mcdonnell at bedside. MD removed dressing and assessed wound and new kerlex wrap applied per MD. Per MD plan for OR in morning on 1/31/21  MD suggest hospitalist to order one more unit of PRBC. Perfect serve sent to MD [facility]. New orders received.

## 2021-01-30 NOTE — PROGRESS NOTES
Pulmonary Progress Note    CC: Hemorrhagic shock  Leg hematoma   Acute blood loss anemia   Atrial fibrillation   Nausea       24 hr events: On levophed. Had 1.4L of urine output in the past 24 hours. ROS:  No SOB  No cough  +nausea    PHYSICAL EXAM:  Blood pressure (!) 107/41, pulse 80, temperature 98.3 °F (36.8 °C), temperature source Oral, resp. rate 11, height 5' 1\" (1.549 m), weight 181 lb (82.1 kg), SpO2 97 %, not currently breastfeeding. on RA    Intake/Output Summary (Last 24 hours) at 1/30/2021 0817  Last data filed at 1/30/2021 2932  Gross per 24 hour   Intake 915.38 ml   Output 1415 ml   Net -499.62 ml       Gen: Well developed; well nourished  Eyes: No scleral icterus. No conjunctival injection. ENT: External appearance of ears and nose normal.  Neck: Trachea midline. No obvious mass. No visible thyroid enlargement    Respiratory: Clear to auscultation bilaterally, no accessory muscle use  Cardiovascular: Irregular, no appreciable murmurs. No edema. Gastrointestinal: Soft, non-tender. No hernia  Skin: Warm and dry. No rashes or ulcers on visible areas. Normal texture and turgor. RLE dressing c/d/i  Musculoskeletal: No cyanosis, clubbing or joint deformity.     Psychiatric: Normal mood and affect; exhibits normal insight and judgement     Medications:  Current Facility-Administered Medications: 0.9 % sodium chloride infusion, , Intravenous, PRN  lidocaine PF 1 % injection 5 mL, 5 mL, Intradermal, Once  sodium chloride flush 0.9 % injection 10 mL, 10 mL, Intravenous, 2 times per day  sodium chloride flush 0.9 % injection 10 mL, 10 mL, Intravenous, PRN  0.9 % sodium chloride infusion, , Intravenous, PRN  0.9 % sodium chloride infusion, , Intravenous, PRN  calcium elemental (OSCAL) tablet 500 mg, 500 mg, Oral, Daily  flecainide (TAMBOCOR) tablet 100 mg, 100 mg, Oral, 2 times per day  sertraline (ZOLOFT) tablet 100 mg, 100 mg, Oral, Nightly  ALPRAZolam (XANAX) tablet 0.5 mg, 0.5 mg, Oral, Nightly PRN  tiZANidine (ZANAFLEX) tablet 2 mg, 2 mg, Oral, Nightly PRN  acetaminophen (TYLENOL) tablet 650 mg, 650 mg, Oral, Q6H PRN **OR** acetaminophen (TYLENOL) suppository 650 mg, 650 mg, Rectal, Q6H PRN  norepinephrine (LEVOPHED) 16 mg in dextrose 5% 250 mL infusion, 2-100 mcg/min, Intravenous, Continuous  sodium chloride flush 0.9 % injection 10 mL, 10 mL, Intravenous, 2 times per day  sodium chloride flush 0.9 % injection 10 mL, 10 mL, Intravenous, PRN  promethazine (PHENERGAN) tablet 12.5 mg, 12.5 mg, Oral, Q6H PRN **OR** ondansetron (ZOFRAN) injection 4 mg, 4 mg, Intravenous, Q6H PRN  HYDROcodone-acetaminophen (NORCO) 5-325 MG per tablet 1 tablet, 1 tablet, Oral, Q4H PRN  bisacodyl (DULCOLAX) EC tablet 5 mg, 5 mg, Oral, Daily PRN    Data reviewed:  Labs:  CBC:   Recent Labs     01/28/21  1426 01/28/21  1426 01/29/21  1155 01/29/21  1940 01/30/21  0410   WBC 6.3  --   --   --   --    HGB 8.3*   < > 8.7* 8.4* 7.9*   HCT 27.0*   < > 27.0* 26.4* 24.9*   MCV 88.3  --   --   --   --      --   --   --   --     < > = values in this interval not displayed. BMP:   Recent Labs     01/28/21  1426 01/29/21  0747 01/30/21  0410   * 138 138   K 4.6 4.1 3.7    107 105   CO2 22 24 25   BUN 19 12 10   CREATININE 0.6 <0.5* 0.6     LIVER PROFILE:   Recent Labs     01/28/21  1426   AST 20   ALT 11   BILITOT <0.2   ALKPHOS 68     PT/INR:   Recent Labs     01/28/21 1426   PROTIME 16.0*   INR 1.37*     APTT: No results for input(s): APTT in the last 72 hours. Films:  Chest images and reports were reviewed by me. My interpretation is:  No new images      Assessment:     Hemorrhagic shock  Leg hematoma   Acute blood loss anemia   Atrial fibrillation   Nausea       Plan:    Hemorrhagic shock  -s/p 3 units PRBCs. Remains on levophed.  Give albumin and 1 unit PRBCs  -Levophed to keep MAP>60     Leg hematoma   -s/p hematoma evacuation on 1/28/21  -Orthopedic and plastic surgery following     Acute blood loss anemia

## 2021-01-30 NOTE — PROGRESS NOTES
[id number] [facility] Orthopedic Surgery   Progress Note      SUBJECTIVE:  Leg feels ok. Still on Levophed. Just finished another unit of PRBC. OBJECTIVE:  BP (!) 81/50   Pulse 92   Temp 97.7 °F (36.5 °C) (Axillary)   Resp 16   Ht 5' 1\" (1.549 m)   Wt 181 lb (82.1 kg)   SpO2 97%   BMI 34.20 kg/m²   Patient is awake, alert, and in no acute distress. Dressing with some bloody drainage. Not taken down as it was changed this morning with Dr. lE Flores  Sensation is intact to light touch throughout the Right leg. Dorsiflexion / plantarflexion intact. The Right leg is warm and well perfused. CBC:   Lab Results   Component Value Date    WBC 6.3 01/28/2021    RBC 3.05 01/28/2021    HGB 7.9 01/30/2021    HCT 24.9 01/30/2021    MCV 88.3 01/28/2021    MCH 27.1 01/28/2021    MCHC 30.7 01/28/2021    RDW 16.9 01/28/2021     01/28/2021    MPV 8.4 01/28/2021       PT/INR:    Lab Results   Component Value Date    PROTIME 16.0 01/28/2021    INR 1.37 01/28/2021       Chem Basic:   Lab Results   Component Value Date     01/30/2021    K 3.7 01/30/2021    K 4.6 01/28/2021     01/30/2021    CO2 25 01/30/2021    GLUCOSE 155 01/30/2021    BUN 10 01/30/2021    CREATININE 0.6 01/30/2021         ASSESSMENT:  POD#2 s/p right leg evacuation of hematoma  Hemorrhagic shock  Afib on Coumadin  Acute blood loss anemia      PLAN:    Discussed with Dr. El Flores this AM. He plans to take her to OR tomorrow AM for repeat further evacuation of hematoma, debridement of devitalized tissue, washout, ad Vareflo vac application. Continue local wound care. Medical resuscitation. Medical management. Hold anticoagulation for now given bleeding risk/anemia/hemorrhagic shock. NPO after midnight.         Electronically signed by Bin Wilkins MD on 1/30/2021 at 2:29 PM

## 2021-01-30 NOTE — PROGRESS NOTES
Son at bedside. Update provided. Pt called family to update on her condition and surgery in the morning. She declined the need for RN to call family also.

## 2021-01-30 NOTE — PROGRESS NOTES
2102: Patient alert and oriented. States \" I am feeling better today\". RLE wound CDI at this time. RLE pulses present with doppler. Denies numbness on RLE. Continued on Levophed drip.      2302:C/o unable to sleep. PRN xanax given. 0410: AM labs collected and sent for processing. 0720: Shift hand off report given to Gisele Bautista resuming patient's care.    Electronically signed by Alina Joel RN on 1/30/2021 at 7:27 AM

## 2021-01-30 NOTE — PLAN OF CARE
Signs and symptoms of infection will decrease  Description: Signs and symptoms of infection will decrease  Outcome: Ongoing   Tello catheter noted to be intact and secured with use of stat-lock. No signs of infection noted to urine or alma area; including an absence of urine odor/discoloration, or alma area swelling/redness/tenderness/drainage. Alma care completed per shift and prn basis. Tello bag suspended from bed per protocol. Continued monitoring for risks of infection remains in place. Will re-assess clinical need for continued tello catheterization; Will discontinue per protocol/physician order.         Electronically signed by Lei Calderon RN on 1/29/2021 at 7:09 PM
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
## 2021-01-31 ENCOUNTER — ANESTHESIA EVENT (OUTPATIENT)
Dept: OPERATING ROOM | Age: 76
DRG: 981 | End: 2021-01-31
Payer: MEDICARE

## 2021-01-31 ENCOUNTER — ANESTHESIA (OUTPATIENT)
Dept: OPERATING ROOM | Age: 76
DRG: 981 | End: 2021-01-31
Payer: MEDICARE

## 2021-01-31 VITALS
OXYGEN SATURATION: 100 % | DIASTOLIC BLOOD PRESSURE: 55 MMHG | RESPIRATION RATE: 13 BRPM | SYSTOLIC BLOOD PRESSURE: 104 MMHG

## 2021-01-31 LAB
ANION GAP SERPL CALCULATED.3IONS-SCNC: 7 MMOL/L (ref 3–16)
BASOPHILS ABSOLUTE: 0 K/UL (ref 0–0.2)
BASOPHILS RELATIVE PERCENT: 0.4 %
BUN BLDV-MCNC: 6 MG/DL (ref 7–20)
CALCIUM SERPL-MCNC: 8.4 MG/DL (ref 8.3–10.6)
CHLORIDE BLD-SCNC: 108 MMOL/L (ref 99–110)
CO2: 26 MMOL/L (ref 21–32)
CREAT SERPL-MCNC: <0.5 MG/DL (ref 0.6–1.2)
EOSINOPHILS ABSOLUTE: 0.1 K/UL (ref 0–0.6)
EOSINOPHILS RELATIVE PERCENT: 2.3 %
GFR AFRICAN AMERICAN: >60
GFR NON-AFRICAN AMERICAN: >60
GLUCOSE BLD-MCNC: 114 MG/DL (ref 70–99)
HCT VFR BLD CALC: 23.9 % (ref 36–48)
HCT VFR BLD CALC: 24.8 % (ref 36–48)
HEMOGLOBIN: 7.8 G/DL (ref 12–16)
HEMOGLOBIN: 8.1 G/DL (ref 12–16)
LYMPHOCYTES ABSOLUTE: 1.2 K/UL (ref 1–5.1)
LYMPHOCYTES RELATIVE PERCENT: 20.1 %
MAGNESIUM: 1.7 MG/DL (ref 1.8–2.4)
MCH RBC QN AUTO: 28.3 PG (ref 26–34)
MCHC RBC AUTO-ENTMCNC: 32.6 G/DL (ref 31–36)
MCV RBC AUTO: 86.6 FL (ref 80–100)
MONOCYTES ABSOLUTE: 0.7 K/UL (ref 0–1.3)
MONOCYTES RELATIVE PERCENT: 11.4 %
NEUTROPHILS ABSOLUTE: 4 K/UL (ref 1.7–7.7)
NEUTROPHILS RELATIVE PERCENT: 65.8 %
PDW BLD-RTO: 15.9 % (ref 12.4–15.4)
PLATELET # BLD: 132 K/UL (ref 135–450)
PMV BLD AUTO: 8 FL (ref 5–10.5)
POTASSIUM SERPL-SCNC: 3.9 MMOL/L (ref 3.5–5.1)
RBC # BLD: 2.87 M/UL (ref 4–5.2)
SODIUM BLD-SCNC: 141 MMOL/L (ref 136–145)
WBC # BLD: 6.1 K/UL (ref 4–11)

## 2021-01-31 PROCEDURE — 87205 SMEAR GRAM STAIN: CPT

## 2021-01-31 PROCEDURE — 3600000002 HC SURGERY LEVEL 2 BASE: Performed by: SURGERY

## 2021-01-31 PROCEDURE — 99233 SBSQ HOSP IP/OBS HIGH 50: CPT | Performed by: INTERNAL MEDICINE

## 2021-01-31 PROCEDURE — 87070 CULTURE OTHR SPECIMN AEROBIC: CPT

## 2021-01-31 PROCEDURE — 2500000003 HC RX 250 WO HCPCS: Performed by: ANESTHESIOLOGY

## 2021-01-31 PROCEDURE — 3600000012 HC SURGERY LEVEL 2 ADDTL 15MIN: Performed by: SURGERY

## 2021-01-31 PROCEDURE — 6370000000 HC RX 637 (ALT 250 FOR IP): Performed by: STUDENT IN AN ORGANIZED HEALTH CARE EDUCATION/TRAINING PROGRAM

## 2021-01-31 PROCEDURE — 80048 BASIC METABOLIC PNL TOTAL CA: CPT

## 2021-01-31 PROCEDURE — 11046 DBRDMT MUSC&/FSCA EA ADDL: CPT | Performed by: SURGERY

## 2021-01-31 PROCEDURE — 94761 N-INVAS EAR/PLS OXIMETRY MLT: CPT

## 2021-01-31 PROCEDURE — 83735 ASSAY OF MAGNESIUM: CPT

## 2021-01-31 PROCEDURE — 6370000000 HC RX 637 (ALT 250 FOR IP): Performed by: INTERNAL MEDICINE

## 2021-01-31 PROCEDURE — 2000000000 HC ICU R&B

## 2021-01-31 PROCEDURE — 2580000003 HC RX 258: Performed by: ORTHOPAEDIC SURGERY

## 2021-01-31 PROCEDURE — 3700000000 HC ANESTHESIA ATTENDED CARE: Performed by: SURGERY

## 2021-01-31 PROCEDURE — 2580000003 HC RX 258: Performed by: ANESTHESIOLOGY

## 2021-01-31 PROCEDURE — 2580000003 HC RX 258: Performed by: STUDENT IN AN ORGANIZED HEALTH CARE EDUCATION/TRAINING PROGRAM

## 2021-01-31 PROCEDURE — 7100000001 HC PACU RECOVERY - ADDTL 15 MIN: Performed by: SURGERY

## 2021-01-31 PROCEDURE — 36592 COLLECT BLOOD FROM PICC: CPT

## 2021-01-31 PROCEDURE — 97606 NEG PRS WND THER DME>50 SQCM: CPT | Performed by: SURGERY

## 2021-01-31 PROCEDURE — 2780000010 HC IMPLANT OTHER: Performed by: SURGERY

## 2021-01-31 PROCEDURE — 6360000002 HC RX W HCPCS: Performed by: ANESTHESIOLOGY

## 2021-01-31 PROCEDURE — 3700000001 HC ADD 15 MINUTES (ANESTHESIA): Performed by: SURGERY

## 2021-01-31 PROCEDURE — 6370000000 HC RX 637 (ALT 250 FOR IP): Performed by: ORTHOPAEDIC SURGERY

## 2021-01-31 PROCEDURE — 11043 DBRDMT MUSC&/FSCA 1ST 20/<: CPT | Performed by: SURGERY

## 2021-01-31 PROCEDURE — 7100000000 HC PACU RECOVERY - FIRST 15 MIN: Performed by: SURGERY

## 2021-01-31 PROCEDURE — 2709999900 HC NON-CHARGEABLE SUPPLY: Performed by: SURGERY

## 2021-01-31 PROCEDURE — 0KBS0ZZ EXCISION OF RIGHT LOWER LEG MUSCLE, OPEN APPROACH: ICD-10-PCS | Performed by: SURGERY

## 2021-01-31 PROCEDURE — 85018 HEMOGLOBIN: CPT

## 2021-01-31 PROCEDURE — 85014 HEMATOCRIT: CPT

## 2021-01-31 PROCEDURE — 6360000002 HC RX W HCPCS: Performed by: INTERNAL MEDICINE

## 2021-01-31 PROCEDURE — 2580000003 HC RX 258: Performed by: SURGERY

## 2021-01-31 PROCEDURE — 85025 COMPLETE CBC W/AUTO DIFF WBC: CPT

## 2021-01-31 RX ORDER — OXYCODONE HYDROCHLORIDE AND ACETAMINOPHEN 5; 325 MG/1; MG/1
1 TABLET ORAL PRN
Status: DISCONTINUED | OUTPATIENT
Start: 2021-01-31 | End: 2021-01-31 | Stop reason: HOSPADM

## 2021-01-31 RX ORDER — PROPOFOL 10 MG/ML
INJECTION, EMULSION INTRAVENOUS PRN
Status: DISCONTINUED | OUTPATIENT
Start: 2021-01-31 | End: 2021-01-31 | Stop reason: SDUPTHER

## 2021-01-31 RX ORDER — SODIUM CHLORIDE 9 MG/ML
INJECTION, SOLUTION INTRAVENOUS CONTINUOUS PRN
Status: DISCONTINUED | OUTPATIENT
Start: 2021-01-31 | End: 2021-01-31 | Stop reason: SDUPTHER

## 2021-01-31 RX ORDER — FENTANYL CITRATE 50 UG/ML
25 INJECTION, SOLUTION INTRAMUSCULAR; INTRAVENOUS EVERY 5 MIN PRN
Status: DISCONTINUED | OUTPATIENT
Start: 2021-01-31 | End: 2021-01-31 | Stop reason: HOSPADM

## 2021-01-31 RX ORDER — FENTANYL CITRATE 50 UG/ML
INJECTION, SOLUTION INTRAMUSCULAR; INTRAVENOUS PRN
Status: DISCONTINUED | OUTPATIENT
Start: 2021-01-31 | End: 2021-01-31 | Stop reason: SDUPTHER

## 2021-01-31 RX ORDER — MAGNESIUM HYDROXIDE 1200 MG/15ML
LIQUID ORAL CONTINUOUS PRN
Status: COMPLETED | OUTPATIENT
Start: 2021-01-31 | End: 2021-01-31

## 2021-01-31 RX ORDER — LIDOCAINE HYDROCHLORIDE 20 MG/ML
INJECTION, SOLUTION INFILTRATION; PERINEURAL PRN
Status: DISCONTINUED | OUTPATIENT
Start: 2021-01-31 | End: 2021-01-31 | Stop reason: SDUPTHER

## 2021-01-31 RX ORDER — CEFAZOLIN SODIUM 1 G/3ML
INJECTION, POWDER, FOR SOLUTION INTRAMUSCULAR; INTRAVENOUS PRN
Status: DISCONTINUED | OUTPATIENT
Start: 2021-01-31 | End: 2021-01-31 | Stop reason: SDUPTHER

## 2021-01-31 RX ORDER — ONDANSETRON 2 MG/ML
4 INJECTION INTRAMUSCULAR; INTRAVENOUS
Status: DISCONTINUED | OUTPATIENT
Start: 2021-01-31 | End: 2021-01-31 | Stop reason: HOSPADM

## 2021-01-31 RX ORDER — PANTOPRAZOLE SODIUM 40 MG/1
40 TABLET, DELAYED RELEASE ORAL
Status: DISCONTINUED | OUTPATIENT
Start: 2021-01-31 | End: 2021-02-11 | Stop reason: HOSPADM

## 2021-01-31 RX ORDER — ONDANSETRON 2 MG/ML
INJECTION INTRAMUSCULAR; INTRAVENOUS PRN
Status: DISCONTINUED | OUTPATIENT
Start: 2021-01-31 | End: 2021-01-31 | Stop reason: SDUPTHER

## 2021-01-31 RX ORDER — OXYCODONE HYDROCHLORIDE AND ACETAMINOPHEN 5; 325 MG/1; MG/1
2 TABLET ORAL PRN
Status: DISCONTINUED | OUTPATIENT
Start: 2021-01-31 | End: 2021-01-31 | Stop reason: HOSPADM

## 2021-01-31 RX ADMIN — HYDROCODONE BITARTRATE AND ACETAMINOPHEN 1 TABLET: 5; 325 TABLET ORAL at 15:25

## 2021-01-31 RX ADMIN — Medication 5 MCG/MIN: at 09:06

## 2021-01-31 RX ADMIN — SODIUM CHLORIDE, PRESERVATIVE FREE 10 ML: 5 INJECTION INTRAVENOUS at 20:54

## 2021-01-31 RX ADMIN — PANTOPRAZOLE SODIUM 40 MG: 40 TABLET, DELAYED RELEASE ORAL at 17:15

## 2021-01-31 RX ADMIN — METOCLOPRAMIDE HYDROCHLORIDE 5 MG: 5 INJECTION INTRAMUSCULAR; INTRAVENOUS at 08:40

## 2021-01-31 RX ADMIN — LIDOCAINE HYDROCHLORIDE 60 MG: 20 INJECTION, SOLUTION INFILTRATION; PERINEURAL at 09:06

## 2021-01-31 RX ADMIN — HYDROCODONE BITARTRATE AND ACETAMINOPHEN 1 TABLET: 5; 325 TABLET ORAL at 10:41

## 2021-01-31 RX ADMIN — HYDROCODONE BITARTRATE AND ACETAMINOPHEN 1 TABLET: 5; 325 TABLET ORAL at 19:26

## 2021-01-31 RX ADMIN — FLECAINIDE ACETATE 100 MG: 100 TABLET ORAL at 10:36

## 2021-01-31 RX ADMIN — PROPOFOL 60 MG: 10 INJECTION, EMULSION INTRAVENOUS at 09:06

## 2021-01-31 RX ADMIN — ONDANSETRON 4 MG: 2 INJECTION INTRAMUSCULAR; INTRAVENOUS at 09:30

## 2021-01-31 RX ADMIN — SERTRALINE 100 MG: 100 TABLET, FILM COATED ORAL at 20:53

## 2021-01-31 RX ADMIN — SODIUM CHLORIDE: 9 INJECTION, SOLUTION INTRAVENOUS at 08:53

## 2021-01-31 RX ADMIN — FENTANYL CITRATE 100 MCG: 50 INJECTION INTRAMUSCULAR; INTRAVENOUS at 09:06

## 2021-01-31 RX ADMIN — FLECAINIDE ACETATE 100 MG: 100 TABLET ORAL at 20:53

## 2021-01-31 RX ADMIN — SODIUM CHLORIDE, PRESERVATIVE FREE 10 ML: 5 INJECTION INTRAVENOUS at 20:53

## 2021-01-31 RX ADMIN — ALPRAZOLAM 0.5 MG: 0.5 TABLET ORAL at 22:29

## 2021-01-31 RX ADMIN — SODIUM CHLORIDE, PRESERVATIVE FREE 10 ML: 5 INJECTION INTRAVENOUS at 07:35

## 2021-01-31 RX ADMIN — TIZANIDINE 2 MG: 4 TABLET ORAL at 22:29

## 2021-01-31 RX ADMIN — Medication 500 MG: at 10:36

## 2021-01-31 RX ADMIN — METOCLOPRAMIDE HYDROCHLORIDE 5 MG: 5 INJECTION INTRAMUSCULAR; INTRAVENOUS at 13:39

## 2021-01-31 RX ADMIN — CEFAZOLIN SODIUM 2000 MG: 1 INJECTION, POWDER, FOR SOLUTION INTRAMUSCULAR; INTRAVENOUS at 09:18

## 2021-01-31 RX ADMIN — METOCLOPRAMIDE HYDROCHLORIDE 5 MG: 5 INJECTION INTRAMUSCULAR; INTRAVENOUS at 19:27

## 2021-01-31 ASSESSMENT — PAIN SCALES - GENERAL
PAINLEVEL_OUTOF10: 0
PAINLEVEL_OUTOF10: 4
PAINLEVEL_OUTOF10: 6
PAINLEVEL_OUTOF10: 6
PAINLEVEL_OUTOF10: 7
PAINLEVEL_OUTOF10: 0
PAINLEVEL_OUTOF10: 3
PAINLEVEL_OUTOF10: 5

## 2021-01-31 ASSESSMENT — PAIN DESCRIPTION - LOCATION
LOCATION: LEG
LOCATION: LEG

## 2021-01-31 ASSESSMENT — PULMONARY FUNCTION TESTS
PIF_VALUE: 10
PIF_VALUE: 9
PIF_VALUE: 2
PIF_VALUE: 11
PIF_VALUE: 10
PIF_VALUE: 10
PIF_VALUE: 9
PIF_VALUE: 9
PIF_VALUE: 11
PIF_VALUE: 9
PIF_VALUE: 8
PIF_VALUE: 11
PIF_VALUE: 8
PIF_VALUE: 9
PIF_VALUE: 9
PIF_VALUE: 8
PIF_VALUE: 9
PIF_VALUE: 10
PIF_VALUE: 9
PIF_VALUE: 0
PIF_VALUE: 0
PIF_VALUE: 11
PIF_VALUE: 8
PIF_VALUE: 8
PIF_VALUE: 11
PIF_VALUE: 8
PIF_VALUE: 1

## 2021-01-31 ASSESSMENT — PAIN DESCRIPTION - FREQUENCY
FREQUENCY: CONTINUOUS
FREQUENCY: INTERMITTENT

## 2021-01-31 ASSESSMENT — PAIN DESCRIPTION - DESCRIPTORS
DESCRIPTORS: STABBING
DESCRIPTORS: ACHING;THROBBING
DESCRIPTORS: THROBBING

## 2021-01-31 ASSESSMENT — PAIN DESCRIPTION - PAIN TYPE
TYPE: ACUTE PAIN;SURGICAL PAIN
TYPE: ACUTE PAIN;SURGICAL PAIN

## 2021-01-31 ASSESSMENT — PAIN - FUNCTIONAL ASSESSMENT: PAIN_FUNCTIONAL_ASSESSMENT: PREVENTS OR INTERFERES WITH ALL ACTIVE AND SOME PASSIVE ACTIVITIES

## 2021-01-31 ASSESSMENT — ENCOUNTER SYMPTOMS: SHORTNESS OF BREATH: 0

## 2021-01-31 ASSESSMENT — PAIN DESCRIPTION - ORIENTATION
ORIENTATION: RIGHT
ORIENTATION: RIGHT

## 2021-01-31 ASSESSMENT — PAIN DESCRIPTION - ONSET: ONSET: ON-GOING

## 2021-01-31 ASSESSMENT — PAIN DESCRIPTION - PROGRESSION: CLINICAL_PROGRESSION: NOT CHANGED

## 2021-01-31 NOTE — PROGRESS NOTES
sodium chloride infusion, , Intravenous, PRN  0.9 % sodium chloride infusion, , Intravenous, PRN  calcium elemental (OSCAL) tablet 500 mg, 500 mg, Oral, Daily  flecainide (TAMBOCOR) tablet 100 mg, 100 mg, Oral, 2 times per day  sertraline (ZOLOFT) tablet 100 mg, 100 mg, Oral, Nightly  ALPRAZolam (XANAX) tablet 0.5 mg, 0.5 mg, Oral, Nightly PRN  tiZANidine (ZANAFLEX) tablet 2 mg, 2 mg, Oral, Nightly PRN  acetaminophen (TYLENOL) tablet 650 mg, 650 mg, Oral, Q6H PRN **OR** acetaminophen (TYLENOL) suppository 650 mg, 650 mg, Rectal, Q6H PRN  norepinephrine (LEVOPHED) 16 mg in dextrose 5% 250 mL infusion, 2-100 mcg/min, Intravenous, Continuous  sodium chloride flush 0.9 % injection 10 mL, 10 mL, Intravenous, 2 times per day  sodium chloride flush 0.9 % injection 10 mL, 10 mL, Intravenous, PRN  promethazine (PHENERGAN) tablet 12.5 mg, 12.5 mg, Oral, Q6H PRN **OR** ondansetron (ZOFRAN) injection 4 mg, 4 mg, Intravenous, Q6H PRN  HYDROcodone-acetaminophen (NORCO) 5-325 MG per tablet 1 tablet, 1 tablet, Oral, Q4H PRN  bisacodyl (DULCOLAX) EC tablet 5 mg, 5 mg, Oral, Daily PRN    Data reviewed:  Labs:  CBC:   Recent Labs     01/28/21  1426 01/28/21  1426 01/30/21  0410 01/30/21  1702 01/31/21  0600   WBC 6.3  --   --   --  6.1   HGB 8.3*   < > 7.9* 8.3* 8.1*   HCT 27.0*   < > 24.9* 25.8* 24.8*   MCV 88.3  --   --   --  86.6     --   --   --  132*    < > = values in this interval not displayed. BMP:   Recent Labs     01/29/21  0747 01/30/21  0410 01/31/21  0600    138 141   K 4.1 3.7 3.9    105 108   CO2 24 25 26   BUN 12 10 6*   CREATININE <0.5* 0.6 <0.5*     LIVER PROFILE:   Recent Labs     01/28/21  1426   AST 20   ALT 11   BILITOT <0.2   ALKPHOS 68     PT/INR:   Recent Labs     01/28/21  1426   PROTIME 16.0*   INR 1.37*     APTT: No results for input(s): APTT in the last 72 hours. Films:  Chest images and reports were reviewed by me.  My interpretation is:  No new images      Assessment: Hemorrhagic shock  Leg hematoma   Acute blood loss anemia   Atrial fibrillation        Plan:    Hemorrhagic shock  -s/p 4 units PRBCs. She remains on Levophed  -Levophed to keep SBP>100     Leg hematoma   -s/p hematoma evacuation on 1/28/21 and today  -Orthopedic and plastic surgery following     Acute blood loss anemia   -H/H every 12 hours     Atrial fibrillation   -Flecainide twice daily  -Holding  eliquis       Prophylaxis  DVT- SCDs, no chemoprophylaxis given hematoma      Patient is at high risk given the presence of shock requiring the use of vasopressors.     Caitlin Bruce MD  [facility] Sleep

## 2021-01-31 NOTE — PROGRESS NOTES
2045: Shift assessment. Patient alert and oriented x4. Patient remain on low dose Levophed. Pulses Present with doppler on RLE. Small amount of drainage noted on RLE surgical wound site. Bed pad changed. Patient states pain present but tolerable at this time. But educated to remain NPO for AM OR schedule. Patient afebrile. 2215: Xanax and Zanaflex given per patient request.     1484: CHG bath given. Complete linen change done. 0600: AM labs collected and sent for processing. 0715: Shift hand off report given to Lise Osullivan RN resuming patient's care.   Electronically signed by Siobhan Dorantes RN on 1/31/2021 at 7:29 AM

## 2021-01-31 NOTE — ANESTHESIA PRE PROCEDURE
[facility] Department of Anesthesiology  Pre-Anesthesia Evaluation/Consultation       Name:  Omar Hirsch  : 1945  Age:  67 y.o. MRN:  [id number]  Date: 2021           Surgeon: Surgeon(s):  Kiran Auguste MD    Procedure: Procedure(s):  RIGHT LEG HEMATOMA EVAKUATION, DEBRIDEMENT, AND WOUND VAC PLACEMENT     Allergies   Allergen Reactions    Demerol Hcl [Meperidine] Other (See Comments)     PATIENT STATES SHE GOT VERY ANXIOUS-LIKE SHE WAS CLIMBING [place] [Penicillins]      Patient reports she has not had since she was a child, thinks reaction was hives.      Adhesive Tape Rash     Patient Active Problem List   Diagnosis    Symptomatic anemia    A-fib (HCC)    Closed fracture of right femur ([address]    Aortic valve disorder    Arthropathy    Cervical spondylosis without myelopathy    DDD (degenerative disc disease), cervical    DDD (degenerative disc disease), lumbosacral    Displacement of cervical intervertebral disc without myelopathy    Essential hypertension    History of malignant neoplasm of large intestine    Malignant neoplasm of colon ([address]    Observation for suspected malignant neoplasm    Spinal stenosis in cervical region    Spinal stenosis, lumbar    Fibromyalgia    Gastroesophageal reflux disease without esophagitis    Lumbar radicular pain    Pain of right thigh    Traumatic closed displaced supracondylar fracture of femur, right, with delayed healing, subsequent encounter    Osteopenia    Traumatic hematoma of right lower leg    Hemorrhagic shock ([address]    Hematoma    Hematoma of right lower extremity    Acute blood loss anemia    Nausea     Past Medical History:   Diagnosis Date    Acid reflux     Anxiety     Arthritis     Atrial fibrillation (HCC)     Closed fracture dislocation of right elbow     Depression     Fibromyalgia     Migraine     Rectal cancer (HCC)     Wears glasses     DRIVING Past Surgical History:   Procedure Laterality Date    APPENDECTOMY       SECTION      x4    CHOLECYSTECTOMY      COLONOSCOPY      SEVERAL    COLONOSCOPY N/A 2019    COLONOSCOPY POLYPECTOMY SNARE/COLD BIOPSY performed by Forrest Walls MD at [address] Right 2021    RIGHT LEG EVACUATION OF HEMATOMA [facility]     NECK SURGERY      OPEN  [facility] W/O EXTENSION Right 10/25/2018    OPEN REDUCTION INTERNAL FIXATION RIGHT FEMUR SUPRACONDYLAR FEMORAL FRACTURE WITH C-ARM performed by Lauren Yoo MD at [address] [place] [address] TO REMOVE RECTAL CANCER    TOTAL KNEE ARTHROPLASTY Left     UPPER GASTROINTESTINAL ENDOSCOPY N/A 2019    ESOPHAGOGASTRODUODENOSCOPY performed by Forrest Walls MD at [address] [facility] N/A 2020    EGD BIOPSY performed by Forrest Walls MD at [address] [facility] History     Tobacco Use    Smoking status: Never Smoker    Smokeless tobacco: Never Used   Substance Use Topics    Alcohol use: No    Drug use: Never     Medications  Current Facility-Administered Medications on File Prior to Visit   Medication Dose Route Frequency Provider Last Rate Last Admin    fentaNYL (SUBLIMAZE) injection 25 mcg  25 mcg Intravenous Q5 Min PRN Randal Funk MD        HYDROmorphone (DILAUDID) injection 0.5 mg  0.5 mg Intravenous Q5 Min PRN Randal Funk MD        fentaNYL (SUBLIMAZE) injection 25 mcg  25 mcg Intravenous Q5 Min PRN Randal Funk MD        HYDROmorphone (DILAUDID) injection 0.5 mg  0.5 mg Intravenous Q5 Min PRN Randal Funk MD        oxyCODONE-acetaminophen (PERCOCET) 5-325 MG per tablet 1 tablet  1 tablet Oral PRN Randal Funk MD        Or    oxyCODONE-acetaminophen (PERCOCET) 5-325 MG per tablet 2 tablet  2 tablet Oral PRN Manuel De La Fuente Yue Dean MD        ondansetron [facility]) injection 4 mg  4 mg Intravenous Once PRN Jay Patel MD        0.9 % sodium chloride infusion   Intravenous PRN Cachorro Lunsford MD        0.9 % sodium chloride infusion   Intravenous PRN Terry Galloway MD        metoclopramide (REGLAN) injection 5 mg  5 mg Intravenous TID Terry Galloway MD   5 mg at 01/31/21 0840    prochlorperazine (COMPAZINE) injection 10 mg  10 mg Intravenous Q6H PRN Terry Galloway MD   10 mg at 01/30/21 1201    simethicone (MYLICON) chewable tablet 80 mg  80 mg Oral Q6H PRN Terry Galloway MD   80 mg at 01/30/21 1201    0.9 % sodium chloride infusion   Intravenous PRN [facility], DO        lidocaine PF 1 % injection 5 mL  5 mL Intradermal Once [facility]        sodium chloride flush 0.9 % injection 10 mL  10 mL Intravenous 2 times per day [facility], DO   10 mL at 01/31/21 0735    sodium chloride flush 0.9 % injection 10 mL  10 mL Intravenous PRN [facility], DO        0.9 % sodium chloride infusion   Intravenous PRN [facility], DO        0.9 % sodium chloride infusion   Intravenous PRN Ravinder [facility]        calcium elemental (OSCAL) tablet 500 mg  500 mg Oral Daily [facility] Tab, DO   500 mg at 01/30/21 6913    flecainide (TAMBOCOR) tablet 100 mg  100 mg Oral 2 times per day [facility]   100 mg at 01/30/21 2053    sertraline (ZOLOFT) tablet 100 mg  100 mg Oral Nightly Ravinder  Tab, DO   100 mg at 01/30/21 2053    ALPRAZolam (XANAX) tablet 0.5 mg  0.5 mg Oral Nightly PRN Terry Galloway MD   0.5 mg at 01/30/21 2214    tiZANidine (ZANAFLEX) tablet 2 mg  2 mg Oral Nightly PRN Terry Galloway MD   2 mg at 01/30/21 2215    acetaminophen (TYLENOL) tablet 650 mg  650 mg Oral Q6H PRN Ravinder NEPTALI Barth, DO   650 mg at 01/30/21 9701    Or    acetaminophen (TYLENOL) suppository 650 mg  650 mg Rectal Q6H PRN Ravinder NEPTALI Barth,         norepinephrine (LEVOPHED) 16 mg in dextrose 5% 250 mL infusion  2-100 mcg/min Intravenous Continuous Amrik Christensen MD 3.8 mL/hr at 01/31/21 0602 4 mcg/min at 01/31/21 0602    sodium chloride flush 0.9 % injection 10 mL  10 mL Intravenous 2 times per day Lay Frances MD   10 mL at 01/30/21 2053    sodium chloride flush 0.9 % injection 10 mL  10 mL Intravenous PRN Lay Frances MD        promethazine [facility]) tablet 12.5 mg  12.5 mg Oral Q6H PRN Lay Frances MD        Or    ondansetron [facility]) injection 4 mg  4 mg Intravenous Q6H PRN Lay Frances MD   4 mg at 01/30/21 1026    HYDROcodone-acetaminophen (NORCO) 5-325 MG per tablet 1 tablet  1 tablet Oral Q4H PRN Lay Frances MD   1 tablet at 01/30/21 1807    bisacodyl (DULCOLAX) EC tablet 5 mg  5 mg Oral Daily PRN Lay Frances MD         Current Outpatient Medications on File Prior to Visit   Medication Sig Dispense Refill    memantine (NAMENDA) 5 MG tablet Take 5 mg by mouth 2 times daily      ALPRAZolam (XANAX) 0.5 MG tablet TAKE ONE TABLET BY MOUTH THREE TIMES A DAY AS NEEDED FOR ANXIETY 90 tablet 1    furosemide (LASIX) 20 MG tablet Take 2 tablets by mouth daily With an additional 20 mg in the evening on M,W,F only 120 tablet 5    metoprolol succinate (TOPROL XL) 50 MG extended release tablet TAKE 1 TABLET BY MOUTH ONE TIME A DAY 90 tablet 5    apixaban (ELIQUIS) 5 MG TABS tablet TAKE 1 TABLET BY MOUTH 2 TIMES A DAY 60 tablet 5    tiZANidine (ZANAFLEX) 2 MG tablet Take 1 tablet by mouth 2 times daily 180 tablet 0    calcium carbonate (CALTRATE 600) 1500 (600 Ca) MG TABS tablet Take 600 mg by mouth daily      Riboflavin (B-2-400) 400 MG CAPS Take by mouth      magnesium oxide (MAG-OX) 400 MG tablet Take 400 mg by mouth daily      hydrOXYzine (ATARAX) 25 MG tablet TAKE ONE TO TWO TABLETS BY MOUTH TWICE A DAY AS NEEDED FOR ITCHING 90 tablet 2    co-enzyme Q-10 50 MG capsule Take 150 mg by mouth 2 times daily       potassium chloride (KLOR-CON M20) 20 MEQ 0074    fentaNYL (SUBLIMAZE) injection    PRN Marcus Restrepo MD   100 mcg at 01/31/21 0906    0.9 % sodium chloride infusion    Continuous PRN Marcus Restrepo MD   New Bag at 01/31/21 9606    lidocaine 2 % injection    PRN Marcus Restrepo MD   60 mg at 01/31/21 0906    0.9 % sodium chloride infusion   Intravenous PRN Raul Mallory MD        0.9 % sodium chloride infusion   Intravenous PRN Maycol Andujar MD        metoclopramide (REGLAN) injection 5 mg  5 mg Intravenous TID Maycol Andujar MD   5 mg at 01/31/21 0840    prochlorperazine (COMPAZINE) injection 10 mg  10 mg Intravenous Q6H PRN Maycol Andujar MD   10 mg at 01/30/21 1201    simethicone (MYLICON) chewable tablet 80 mg  80 mg Oral Q6H PRN Maycol Andujar MD   80 mg at 01/30/21 1201    0.9 % sodium chloride infusion   Intravenous PRN [facility] DO        lidocaine PF 1 % injection 5 mL  5 mL Intradermal Once [facility], DO        sodium chloride flush 0.9 % injection 10 mL  10 mL Intravenous 2 times per day [facility], DO   10 mL at 01/31/21 0735    sodium chloride flush 0.9 % injection 10 mL  10 mL Intravenous PRN [facility], [facility]        0.9 % sodium chloride infusion   Intravenous PRN [facility], DO        0.9 % sodium chloride infusion   Intravenous PRN [facility], DO        calcium elemental (OSCAL) tablet 500 mg  500 mg Oral Daily [facility] Tab, DO   500 mg at 01/30/21 8314    flecainide (TAMBOCOR) tablet 100 mg  100 mg Oral 2 times per day Guillaume Gómez DO   100 mg at 01/30/21 2053    sertraline (ZOLOFT) tablet 100 mg  100 mg Oral Nightly Ravinder  Tab, DO   100 mg at 01/30/21 2053    ALPRAZolam (XANAX) tablet 0.5 mg  0.5 mg Oral Nightly PRN Maycol Andujar MD   0.5 mg at 01/30/21 2214    tiZANidine (ZANAFLEX) tablet 2 mg  2 mg Oral Nightly PRN Maycol Andujar MD   2 mg at 01/30/21 2215    acetaminophen (TYLENOL) tablet 650 mg  650 mg Oral Q6H PRN Ravinder Harden, DO 650 mg at 21 4983    Or    acetaminophen (TYLENOL) suppository 650 mg  650 mg Rectal Q6H PRN Ravinder Barth DO        norepinephrine (LEVOPHED) 16 mg in dextrose 5% 250 mL infusion  2-100 mcg/min Intravenous Continuous Bekah Castañeda MD 3.8 mL/hr at 21 0602 4 mcg/min at 21 0602    sodium chloride flush 0.9 % injection 10 mL  10 mL Intravenous 2 times per day Chris Grace MD   10 mL at 21    sodium chloride flush 0.9 % injection 10 mL  10 mL Intravenous PRN Chris Grace MD        [facility]) tablet 12.5 mg  12.5 mg Oral Q6H PRN Chris Grace MD        Or    ondansetron [facility]) injection 4 mg  4 mg Intravenous Q6H PRN Chris Grace MD   4 mg at 21 1026    HYDROcodone-acetaminophen (NORCO) 5-325 MG per tablet 1 tablet  1 tablet Oral Q4H PRN Chris Grace MD   1 tablet at 21 1807    bisacodyl (DULCOLAX) EC tablet 5 mg  5 mg Oral Daily PRN Chris Grace MD         Vital Signs (Current)   There were no vitals filed for this visit.                                        BP Readings from Last 3 Encounters:   21 (!) 138/55   21 (!) 121/56   21 (!) 108/59     Vital Signs Statistics (for past 48 hrs)     Temp  Av.1 °F (36.7 °C)  Min: 97.7 °F (36.5 °C)   Min taken time: 21 1400  Max: 98.6 °F (37 °C)   Max taken time: 21 0930  Pulse  Av  Min: 65   Min taken time: 21 0530  Max: 114   Max taken time: 21 1109  Resp  Av.7  Min: 0   Min taken time: 21 1330  Max: 39   Max taken time: 21 2000  BP  Min: 67/36   Min taken time: 21 1200  Max: 149/68   Max taken time: 21 2230  MAP (mmHg)  Av.1  Min: 37   Min taken time: 21 1200  Max: 97   Max taken time: 21 0800  SpO2  Av %  Min: 89 %   Min taken time: 21 1930  Max: 100 %   Max taken time: 21 1109  BP Readings from Last 3 Encounters:   21 (!) 138/55   21 (!) 121/56   21 (!) 108/59       BMI  There is no height or weight on file to calculate BMI. Estimated body mass index is 34.32 kg/m² as calculated from the following:    Height as of 1/28/21: 5' 1\" (1.549 m). Weight as of an earlier encounter on 1/31/21: 181 lb 10.5 oz (82.4 kg).     CBC   Lab Results   Component Value Date    WBC 6.1 01/31/2021    RBC 2.87 01/31/2021    HGB 8.1 01/31/2021    HCT 24.8 01/31/2021    MCV 86.6 01/31/2021    RDW 15.9 01/31/2021     01/31/2021     CMP    Lab Results   Component Value Date     01/31/2021    K 3.9 01/31/2021    K 4.6 01/28/2021     01/31/2021    CO2 26 01/31/2021    BUN 6 01/31/2021    CREATININE <0.5 01/31/2021    GFRAA >60 01/31/2021    AGRATIO 1.8 01/28/2021    LABGLOM >60 01/31/2021    GLUCOSE 114 01/31/2021    PROT 6.1 01/28/2021    CALCIUM 8.4 01/31/2021    BILITOT <0.2 01/28/2021    ALKPHOS 68 01/28/2021    AST 20 01/28/2021    ALT 11 01/28/2021     BMP    Lab Results   Component Value Date     01/31/2021    K 3.9 01/31/2021    K 4.6 01/28/2021     01/31/2021    CO2 26 01/31/2021    BUN 6 01/31/2021    CREATININE <0.5 01/31/2021    CALCIUM 8.4 01/31/2021    GFRAA >60 01/31/2021    LABGLOM >60 01/31/2021    GLUCOSE 114 01/31/2021     POCGlucose  Recent Labs     01/29/21  0747 01/30/21  0410 01/31/21  0600   GLUCOSE 135* 155* 114*      Coags    Lab Results   Component Value Date    PROTIME 16.0 01/28/2021    INR 1.37 01/28/2021    APTT 31.5 81/14/5425     HCG (If Applicable) No results found for: PREGTESTUR, PREGSERUM, HCG, HCGQUANT   ABGs No results found for: PHART, PO2ART, MOE3XFS, ORC0PMF, BEART, B6LWWNTZ   Type & Screen (If Applicable)  No results found for: LABABO, LABRH                         BMI: Wt Readings from Last 3 Encounters:       NPO Status:                          Anesthesia Evaluation  Patient summary reviewed  Airway: Mallampati: II  TM distance: >3 FB   Neck ROM: full  Mouth opening: > = 3 FB Dental:          Pulmonary:       (-) COPD, asthma and shortness of breath                           Cardiovascular:    (+) hypertension:, dysrhythmias: atrial fibrillation,     (-) valvular problems/murmurs, past MI, CAD, CABG/stent and  angina                Neuro/Psych:   (+) neuromuscular disease:, headaches:, psychiatric history:depression/anxiety    (-) seizures, TIA and CVA           GI/Hepatic/Renal:   (+) GERD:,      (-) PUD, liver disease and no renal disease       Endo/Other:    (+) blood dyscrasia: anemia, arthritis:., malignancy/cancer. (-) diabetes mellitus               Abdominal:           Vascular: negative vascular ROS. Anesthesia Plan      general     ASA 4 - emergent     (Still on Norepinephrine; anemic )  Induction: intravenous. MIPS: Prophylactic antiemetics administered. Anesthetic plan and risks discussed with patient. Use of blood products discussed with patient whom consented to blood products. Plan discussed with CRNA. Attending anesthesiologist reviewed and agrees with Pre Eval content              This pre-anesthesia assessment may be used as a history and physical.    DOS STAFF ADDENDUM:    Pt seen and examined, chart reviewed (including anesthesia, drug and allergy history). No interval changes to history and physical examination. Anesthetic plan, risks, benefits, alternatives, and personnel involved discussed with patient. Patient verbalized an understanding and agrees to proceed.       Jaelyn Manzanares MD  January 31, 2021  9:12 AM
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
## 2021-02-01 LAB
ABO/RH: NORMAL
ANION GAP SERPL CALCULATED.3IONS-SCNC: 8 MMOL/L (ref 3–16)
ANTIBODY SCREEN: NORMAL
BASOPHILS ABSOLUTE: 0 K/UL (ref 0–0.2)
BASOPHILS RELATIVE PERCENT: 0.2 %
BLOOD BANK DISPENSE STATUS: NORMAL
BLOOD BANK PRODUCT CODE: NORMAL
BPU ID: NORMAL
BUN BLDV-MCNC: 9 MG/DL (ref 7–20)
CALCIUM SERPL-MCNC: 8.4 MG/DL (ref 8.3–10.6)
CHLORIDE BLD-SCNC: 103 MMOL/L (ref 99–110)
CO2: 28 MMOL/L (ref 21–32)
CREAT SERPL-MCNC: <0.5 MG/DL (ref 0.6–1.2)
DESCRIPTION BLOOD BANK: NORMAL
EOSINOPHILS ABSOLUTE: 0.1 K/UL (ref 0–0.6)
EOSINOPHILS RELATIVE PERCENT: 2.1 %
GFR AFRICAN AMERICAN: >60
GFR NON-AFRICAN AMERICAN: >60
GLUCOSE BLD-MCNC: 129 MG/DL (ref 70–99)
HCT VFR BLD CALC: 23.3 % (ref 36–48)
HCT VFR BLD CALC: 26.2 % (ref 36–48)
HEMOGLOBIN: 7.7 G/DL (ref 12–16)
HEMOGLOBIN: 8.7 G/DL (ref 12–16)
LYMPHOCYTES ABSOLUTE: 1.1 K/UL (ref 1–5.1)
LYMPHOCYTES RELATIVE PERCENT: 20.3 %
MAGNESIUM: 1.8 MG/DL (ref 1.8–2.4)
MCH RBC QN AUTO: 28.6 PG (ref 26–34)
MCHC RBC AUTO-ENTMCNC: 32.9 G/DL (ref 31–36)
MCV RBC AUTO: 86.9 FL (ref 80–100)
MONOCYTES ABSOLUTE: 0.6 K/UL (ref 0–1.3)
MONOCYTES RELATIVE PERCENT: 10.6 %
NEUTROPHILS ABSOLUTE: 3.7 K/UL (ref 1.7–7.7)
NEUTROPHILS RELATIVE PERCENT: 66.8 %
PDW BLD-RTO: 16.1 % (ref 12.4–15.4)
PLATELET # BLD: 154 K/UL (ref 135–450)
PMV BLD AUTO: 7.9 FL (ref 5–10.5)
POTASSIUM SERPL-SCNC: 3.7 MMOL/L (ref 3.5–5.1)
RBC # BLD: 2.68 M/UL (ref 4–5.2)
SODIUM BLD-SCNC: 139 MMOL/L (ref 136–145)
WBC # BLD: 5.6 K/UL (ref 4–11)

## 2021-02-01 PROCEDURE — 86900 BLOOD TYPING SEROLOGIC ABO: CPT

## 2021-02-01 PROCEDURE — 85014 HEMATOCRIT: CPT

## 2021-02-01 PROCEDURE — APPNB30 APP NON BILLABLE TIME 0-30 MINS: Performed by: PHYSICIAN ASSISTANT

## 2021-02-01 PROCEDURE — 6360000002 HC RX W HCPCS: Performed by: INTERNAL MEDICINE

## 2021-02-01 PROCEDURE — 6370000000 HC RX 637 (ALT 250 FOR IP): Performed by: ORTHOPAEDIC SURGERY

## 2021-02-01 PROCEDURE — P9016 RBC LEUKOCYTES REDUCED: HCPCS

## 2021-02-01 PROCEDURE — 80048 BASIC METABOLIC PNL TOTAL CA: CPT

## 2021-02-01 PROCEDURE — 36415 COLL VENOUS BLD VENIPUNCTURE: CPT

## 2021-02-01 PROCEDURE — APPSS15 APP SPLIT SHARED TIME 0-15 MINUTES: Performed by: PHYSICIAN ASSISTANT

## 2021-02-01 PROCEDURE — 86923 COMPATIBILITY TEST ELECTRIC: CPT

## 2021-02-01 PROCEDURE — 83735 ASSAY OF MAGNESIUM: CPT

## 2021-02-01 PROCEDURE — 36430 TRANSFUSION BLD/BLD COMPNT: CPT

## 2021-02-01 PROCEDURE — 2580000003 HC RX 258: Performed by: ORTHOPAEDIC SURGERY

## 2021-02-01 PROCEDURE — 36592 COLLECT BLOOD FROM PICC: CPT

## 2021-02-01 PROCEDURE — 6370000000 HC RX 637 (ALT 250 FOR IP): Performed by: INTERNAL MEDICINE

## 2021-02-01 PROCEDURE — 2580000003 HC RX 258: Performed by: STUDENT IN AN ORGANIZED HEALTH CARE EDUCATION/TRAINING PROGRAM

## 2021-02-01 PROCEDURE — 2000000000 HC ICU R&B

## 2021-02-01 PROCEDURE — 6370000000 HC RX 637 (ALT 250 FOR IP): Performed by: PEDIATRICS

## 2021-02-01 PROCEDURE — 6370000000 HC RX 637 (ALT 250 FOR IP): Performed by: STUDENT IN AN ORGANIZED HEALTH CARE EDUCATION/TRAINING PROGRAM

## 2021-02-01 PROCEDURE — 86901 BLOOD TYPING SEROLOGIC RH(D): CPT

## 2021-02-01 PROCEDURE — 85025 COMPLETE CBC W/AUTO DIFF WBC: CPT

## 2021-02-01 PROCEDURE — 2500000003 HC RX 250 WO HCPCS: Performed by: INTERNAL MEDICINE

## 2021-02-01 PROCEDURE — 99291 CRITICAL CARE FIRST HOUR: CPT | Performed by: INTERNAL MEDICINE

## 2021-02-01 PROCEDURE — 85018 HEMOGLOBIN: CPT

## 2021-02-01 PROCEDURE — 86850 RBC ANTIBODY SCREEN: CPT

## 2021-02-01 RX ORDER — MAGNESIUM HYDROXIDE 1200 MG/15ML
1000 LIQUID ORAL CONTINUOUS PRN
Status: DISCONTINUED | OUTPATIENT
Start: 2021-02-01 | End: 2021-02-11 | Stop reason: HOSPADM

## 2021-02-01 RX ORDER — HYDROCODONE BITARTRATE AND ACETAMINOPHEN 5; 325 MG/1; MG/1
2 TABLET ORAL EVERY 4 HOURS PRN
Status: DISCONTINUED | OUTPATIENT
Start: 2021-02-01 | End: 2021-02-04

## 2021-02-01 RX ORDER — SODIUM CHLORIDE 9 MG/ML
INJECTION, SOLUTION INTRAVENOUS PRN
Status: DISCONTINUED | OUTPATIENT
Start: 2021-02-01 | End: 2021-02-04

## 2021-02-01 RX ADMIN — Medication 8 MCG/MIN: at 11:46

## 2021-02-01 RX ADMIN — PANTOPRAZOLE SODIUM 40 MG: 40 TABLET, DELAYED RELEASE ORAL at 15:29

## 2021-02-01 RX ADMIN — SODIUM CHLORIDE, PRESERVATIVE FREE 10 ML: 5 INJECTION INTRAVENOUS at 21:25

## 2021-02-01 RX ADMIN — METOCLOPRAMIDE HYDROCHLORIDE 5 MG: 5 INJECTION INTRAMUSCULAR; INTRAVENOUS at 21:25

## 2021-02-01 RX ADMIN — TIZANIDINE 2 MG: 4 TABLET ORAL at 23:10

## 2021-02-01 RX ADMIN — FLECAINIDE ACETATE 100 MG: 100 TABLET ORAL at 09:01

## 2021-02-01 RX ADMIN — METOCLOPRAMIDE HYDROCHLORIDE 5 MG: 5 INJECTION INTRAMUSCULAR; INTRAVENOUS at 09:01

## 2021-02-01 RX ADMIN — ACETAMINOPHEN 650 MG: 325 TABLET ORAL at 12:32

## 2021-02-01 RX ADMIN — METOCLOPRAMIDE HYDROCHLORIDE 5 MG: 5 INJECTION INTRAMUSCULAR; INTRAVENOUS at 13:45

## 2021-02-01 RX ADMIN — SODIUM CHLORIDE, PRESERVATIVE FREE 10 ML: 5 INJECTION INTRAVENOUS at 09:03

## 2021-02-01 RX ADMIN — HYDROCODONE BITARTRATE AND ACETAMINOPHEN 1 TABLET: 5; 325 TABLET ORAL at 10:35

## 2021-02-01 RX ADMIN — SODIUM CHLORIDE, PRESERVATIVE FREE 10 ML: 5 INJECTION INTRAVENOUS at 21:26

## 2021-02-01 RX ADMIN — FLECAINIDE ACETATE 100 MG: 100 TABLET ORAL at 21:23

## 2021-02-01 RX ADMIN — SERTRALINE 100 MG: 100 TABLET, FILM COATED ORAL at 21:22

## 2021-02-01 RX ADMIN — SODIUM CHLORIDE, PRESERVATIVE FREE 10 ML: 5 INJECTION INTRAVENOUS at 09:02

## 2021-02-01 RX ADMIN — HYDROCODONE BITARTRATE AND ACETAMINOPHEN 2 TABLET: 5; 325 TABLET ORAL at 21:23

## 2021-02-01 RX ADMIN — PANTOPRAZOLE SODIUM 40 MG: 40 TABLET, DELAYED RELEASE ORAL at 05:49

## 2021-02-01 RX ADMIN — Medication 500 MG: at 09:01

## 2021-02-01 RX ADMIN — HYDROCODONE BITARTRATE AND ACETAMINOPHEN 1 TABLET: 5; 325 TABLET ORAL at 15:29

## 2021-02-01 RX ADMIN — HYDROCODONE BITARTRATE AND ACETAMINOPHEN 1 TABLET: 5; 325 TABLET ORAL at 03:01

## 2021-02-01 RX ADMIN — ALPRAZOLAM 0.5 MG: 0.5 TABLET ORAL at 23:09

## 2021-02-01 ASSESSMENT — PAIN DESCRIPTION - FREQUENCY
FREQUENCY: INTERMITTENT
FREQUENCY: INTERMITTENT

## 2021-02-01 ASSESSMENT — PAIN DESCRIPTION - LOCATION
LOCATION: LEG
LOCATION: LEG

## 2021-02-01 ASSESSMENT — PAIN DESCRIPTION - ONSET
ONSET: ON-GOING

## 2021-02-01 ASSESSMENT — PAIN DESCRIPTION - PAIN TYPE
TYPE: SURGICAL PAIN;ACUTE PAIN
TYPE: SURGICAL PAIN;ACUTE PAIN
TYPE: ACUTE PAIN;SURGICAL PAIN

## 2021-02-01 ASSESSMENT — PAIN DESCRIPTION - DESCRIPTORS
DESCRIPTORS: ACHING;DISCOMFORT
DESCRIPTORS: ACHING;DISCOMFORT

## 2021-02-01 ASSESSMENT — PAIN SCALES - GENERAL
PAINLEVEL_OUTOF10: 4
PAINLEVEL_OUTOF10: 0
PAINLEVEL_OUTOF10: 5
PAINLEVEL_OUTOF10: 6
PAINLEVEL_OUTOF10: 0
PAINLEVEL_OUTOF10: 4
PAINLEVEL_OUTOF10: 0
PAINLEVEL_OUTOF10: 0

## 2021-02-01 ASSESSMENT — PAIN DESCRIPTION - ORIENTATION
ORIENTATION: RIGHT

## 2021-02-01 ASSESSMENT — PAIN - FUNCTIONAL ASSESSMENT
PAIN_FUNCTIONAL_ASSESSMENT: PREVENTS OR INTERFERES SOME ACTIVE ACTIVITIES AND ADLS

## 2021-02-01 ASSESSMENT — PAIN DESCRIPTION - DIRECTION: RADIATING_TOWARDS: KNEE

## 2021-02-01 NOTE — CARE COORDINATION
Case management follow up. Surgery to remove hematoma yesterday. New wound vac in place. Remains on levophed drip. Plan was home w/ son at discharge, monitor for new discharge plan. Will need PT/OT eval when patient able to participate.  Electronically signed by Marianela Ba RN Case Management [phone] on 2/1/2021 at 3:02 PM

## 2021-02-01 NOTE — PROGRESS NOTES
1930: Resumed care. Patient c/o RLE pain. PRN percocet given per order. Patient repositioned in bed. Pulses on RLE present with doppler. Wound vac in place and functioning appropriately. 2230: C/o unable to fall asleep. Xanax given per order. 0302: PRN percocet given for 8/10 RLE pain. 0600: AM labs collected and sent for processing. 0732: Shift hand off report given to ONEL Cabrera resuming patient's care.    Electronically signed by Kori Suarez RN on 2/1/2021 at 7:35 AM

## 2021-02-01 NOTE — PROGRESS NOTES
Pulmonary Progress Note    Date of Admission: 1/28/2021   LOS: 4 days     CC:  Chief Complaint   Patient presents with    Leg Injury     hit right leg on car door. large hematoma forming to right shin area. pt on eliquis. HPI/Subjective  Overnight no events, remains on low dose levophed  Getting PRBCs today    ROS:   No nausea  No Vomiting  No chest pain      Intake/Output Summary (Last 24 hours) at 2/1/2021 1055  Last data filed at 2/1/2021 1033  Gross per 24 hour   Intake 1244.7 ml   Output 1040 ml   Net 204.7 ml         PHYSICAL EXAM:   Blood pressure 90/74, pulse 103, temperature 97.8 °F (36.6 °C), temperature source Axillary, resp. rate 18, height 5' 1\" (1.549 m), weight 181 lb 3.5 oz (82.2 kg), SpO2 96 %, not currently breastfeeding.'  Gen:  No acute distress. Eyes: PERRL. Anicteric sclera. No conjunctival injection. ENT: No discharge. Posterior oropharynx clear. External appearance of ears and nose normal.  Neck: Trachea midline. No mass   Resp:  No crackles. No wheezes. No rhonchi. No dullness on percussion. CV: Regular rate. Regular rhythm. No murmur or rub. No edema. GI: Soft, Non-tender. Non-distended. +BS  Skin: Warm, dry, w/o erythema. Lymph: No cervical or supraclavicular LAD. M/S: No cyanosis. No clubbing. Neuro:   no focal neurologic deficit. Moves all extremities  Psych: Awake and alert, Oriented x 3. Judgement and insight appropriate. Mood stable.       Medications:    Scheduled Meds:   pantoprazole  40 mg Oral BID AC    metoclopramide  5 mg Intravenous TID    lidocaine 1 % injection  5 mL Intradermal Once    sodium chloride flush  10 mL Intravenous 2 times per day    calcium elemental  500 mg Oral Daily    flecainide  100 mg Oral 2 times per day    sertraline  100 mg Oral Nightly    sodium chloride flush  10 mL Intravenous 2 times per day       Continuous Infusions:   sodium chloride      norepinephrine 6 mcg/min (02/01/21 0906)       PRN Meds:  sodium chloride, prochlorperazine, simethicone, sodium chloride flush, ALPRAZolam, tiZANidine, acetaminophen **OR** acetaminophen, sodium chloride flush, promethazine **OR** ondansetron, HYDROcodone 5 mg - acetaminophen, bisacodyl    Labs reviewed:  CBC:   Recent Labs     01/31/21  0600 01/31/21  2230 02/01/21  0545   WBC 6.1  --  5.6   HGB 8.1* 7.8* 7.7*   HCT 24.8* 23.9* 23.3*   MCV 86.6  --  86.9   *  --  154     BMP:   Recent Labs     01/30/21  0410 01/31/21  0600 02/01/21  0545    141 139   K 3.7 3.9 3.7    108 103   CO2 25 26 28   BUN 10 6* 9   CREATININE 0.6 <0.5* <0.5*     LIVER PROFILE: No results for input(s): AST, ALT, LIPASE, BILIDIR, BILITOT, ALKPHOS in the last 72 hours. Invalid input(s): AMYLASE,  ALB  PT/INR: No results for input(s): PROTIME, INR in the last 72 hours. APTT: No results for input(s): APTT in the last 72 hours. UA:No results for input(s): NITRITE, COLORU, PHUR, LABCAST, WBCUA, RBCUA, MUCUS, TRICHOMONAS, YEAST, BACTERIA, CLARITYU, SPECGRAV, LEUKOCYTESUR, UROBILINOGEN, BILIRUBINUR, BLOODU, GLUCOSEU, AMORPHOUS in the last 72 hours. Invalid input(s): Alana Reyna  No results for input(s): PH, PCO2, PO2 in the last 72 hours. Films:  Radiology Review:  Pertinent images / reports were reviewed as a part of this visit. CT Chest w/ contrast: No results found for this or any previous visit. CT Chest w/o contrast: No results found for this or any previous visit. CTPA: No results found for this or any previous visit. CXR PA/LAT:   Results for orders placed during the hospital encounter of 01/14/18   XR CHEST STANDARD (2 VW)    Narrative EXAMINATION:  TWO VIEWS OF THE CHEST    1/13/2018 10:08 pm    COMPARISON:  December 29, 2010.     HISTORY:  ORDERING PHYSICIAN PROVIDED HISTORY: altered mental status  TECHNOLOGIST PROVIDED HISTORY:  Technologist Provided Reason for Exam: ams  pt. cannot raise left arm due to injury  Acuity: Acute  Type of Encounter: Initial    FINDINGS:  Cardiac and mediastinal contours are enlarged but unchanged. No focal  consolidation. No pneumothorax. No pleural effusion. Multiple calcified  granuloma within right lung      Impression Enlarged cardiomediastinal silhouette. No focal consolidation, pneumothorax, or sizable pleural effusion. CXR portable:   Results for orders placed during the hospital encounter of 08/11/19   XR CHEST PORTABLE    Narrative EXAMINATION:  ONE XRAY VIEW OF THE CHEST    8/11/2019 9:08 pm    COMPARISON:  10/24/2018    HISTORY:  ORDERING SYSTEM PROVIDED HISTORY: chest pain  TECHNOLOGIST PROVIDED HISTORY:  Reason for exam:->chest pain  Reason for Exam: chest pain  Acuity: Acute  Type of Exam: Initial    FINDINGS:  Dial device overlies the left upper chest.  Cardiomediastinal silhouette is  within normal limits. No pneumothorax or effusion. Partially imaged  cervical spine fusion hardware. No focal consolidation. No acute osseous  abnormality. Impression No acute cardiopulmonary disease. Access  Arterial       PICC       PICC Double Lumen 37/78/78 Right Basilic (Active)   Continued need for line? Yes 02/01/21 1000   Is this a power PICC? Yes 02/01/21 1000   Site Assessment Clean;Dry; Intact 02/01/21 1000   Red Lumen Status Infusing 02/01/21 1000   Purple Lumen Status Infusing 02/01/21 1000   Exposed Catheter (cm) 3 cm 01/29/21 0338   Extremity Circumference (cm) 29 cm 01/29/21 0338   Line Care Connections checked and tightened 01/29/21 0800   Dressing Status Clean;Dry; Intact 02/01/21 1000   Dressing Type Anti-microbial patch;Transparent 02/01/21 1000   Dressing Change Due 02/03/21 02/01/21 1000   Dressing Intervention New 01/29/21 0338   Reason Not Rotated Not due 02/01/21 1000   Number of days: 3        CVC                 Assessment:     Hemorrhagic Shock  Leg Hematoma  Acute Blood loss anemia  AFib    Plan:      -titrate levophed goal sbp 100  -s/p hematoma evacuation, wound vac

## 2021-02-01 NOTE — CARE COORDINATION
Wound care notified of wound vac placement. Reviewed veraflo settings and assessed patient. Pt vac has good seal. Pulses palpable with pitting edema in RLE. Orders placed for veraflo settings for nursing management. Will continue to follow incase needed. 02/01/21 1342   Negative Pressure Wound Therapy Leg Anterior; Lower;Right   Placement Date/Time: 01/31/21 0935   Pre-existing: No  Inserted by: Dr. Carri Nguyễn  Location: Leg  Wound Location Orientation: Anterior; Lower;Right   Wound Type Surgical   Unit Type Ulta   Dressing Type Black foam;Veraflo   Cycle Continuous   Target Pressure (mmHg) 125   Intensity 5   Irrigation Solution Sodium chloride 0.9%   Instillation Volume  28 mL   Soak Time  8 minutes   Vac Frequency 4 hours   Dressing Status Clean;Dry; Intact     Electronically signed by Rivera Vogt RN on 2/1/2021 at 1:48 PM

## 2021-02-01 NOTE — PROGRESS NOTES
1430: Patient wanting this RN to call daughter Maria Guadalupe Woody to give update. No answer @ this time. Voicemail left  1550: Spoke with Raven (Daughter).  Update given  Electronically signed by Darwin Vicente RN on 2/1/2021 at 4:17 PM

## 2021-02-01 NOTE — PROGRESS NOTES
Hospitalist Progress Note      PCP: Stephania Mejia, APRN - CNP    Date of Admission: 1/28/2021    Chief Complaint: car door slammed against her R leg at a parking lot. Developed large hematoma RLE. Pt on eliquis for Afib. Subjective:     1/29  Pain in R leg controlled. BP low - no levophed this am.       1/30  Ongoing need for levophed - 10 this am.   Bp better with albumin infusion. Hgb improved with transfusion - but with ongoing downtrend - additional pRBC ordered today due to shock. Vascular surgery involved per ortho recs - plan to return to OR today. 1/31  In OR this am with plastic surgery team. .   BP better after pRBC albeit still on levophed at 3.       2/1  BP still challenging. Pain controlled.          Medications:  Reviewed    Infusion Medications    sodium chloride      norepinephrine 8 mcg/min (02/01/21 1104)     Scheduled Medications    pantoprazole  40 mg Oral BID AC    metoclopramide  5 mg Intravenous TID    lidocaine 1 % injection  5 mL Intradermal Once    sodium chloride flush  10 mL Intravenous 2 times per day    calcium elemental  500 mg Oral Daily    flecainide  100 mg Oral 2 times per day    sertraline  100 mg Oral Nightly    sodium chloride flush  10 mL Intravenous 2 times per day     PRN Meds: sodium chloride, prochlorperazine, simethicone, sodium chloride flush, ALPRAZolam, tiZANidine, acetaminophen **OR** acetaminophen, sodium chloride flush, promethazine **OR** ondansetron, HYDROcodone 5 mg - acetaminophen, bisacodyl      Intake/Output Summary (Last 24 hours) at 2/1/2021 1146  Last data filed at 2/1/2021 1033  Gross per 24 hour   Intake 1244.7 ml   Output 1040 ml   Net 204.7 ml       Physical Exam Performed:    BP (!) 105/39   Pulse 84   Temp 97.8 °F (36.6 °C) (Axillary)   Resp 12   Ht 5' 1\" (1.549 m)   Wt 181 lb 3.5 oz (82.2 kg)   SpO2 96%   BMI 34.24 kg/m²     General appearance: No apparent distress, appears stated age and cooperative. HEENT: Pupils equal, round, and reactive to light. Conjunctivae/corneas clear. Neck: Supple, with full range of motion. No jugular venous distention. Trachea midline. Respiratory:  Normal respiratory effort. Clear to auscultation, bilaterally without Rales/Wheezes/Rhonchi. Cardiovascular: Regular rate and rhythm with normal S1/S2 without murmurs, rubs or gallops. Abdomen: Soft, non-tender, non-distended with normal bowel sounds. Musculoskeletal: large area of hematoma with post op dressing in place. Neurologic:  Neurovascularly intact without any focal sensory/motor deficits. Cranial nerves: II-XII intact, grossly non-focal.  Psychiatric: Alert and oriented, thought content appropriate, normal insight  Capillary Refill: Brisk,< 3 seconds   Peripheral Pulses: +2 palpable, equal bilaterally       Labs:   Recent Labs     01/31/21  0600 01/31/21  2230 02/01/21  0545   WBC 6.1  --  5.6   HGB 8.1* 7.8* 7.7*   HCT 24.8* 23.9* 23.3*   *  --  154     Recent Labs     01/30/21  0410 01/31/21  0600 02/01/21  0545    141 139   K 3.7 3.9 3.7    108 103   CO2 25 26 28   BUN 10 6* 9   CREATININE 0.6 <0.5* <0.5*   CALCIUM 8.4 8.4 8.4     No results for input(s): AST, ALT, BILIDIR, BILITOT, ALKPHOS in the last 72 hours. No results for input(s): INR in the last 72 hours. No results for input(s): Stephanie Older in the last 72 hours. Urinalysis:      Lab Results   Component Value Date    NITRU Negative 09/03/2019    WBCUA 2 10/27/2018    RBCUA 4 10/27/2018    BLOODU Negative 09/03/2019    SPECGRAV 1.010 09/03/2019    GLUCOSEU Negative 09/03/2019       Radiology:  CTA LOWER EXTREMITY RIGHT W CONTRAST   Final Result   Large hematoma of the anterior calf. No active extravasation or etiology of   hemorrhage is identified. Popliteal artery is patent. There is significant trifurcation level disease   with primary runoff via the disease posterior tibial.      No acute osseous abnormality. XR TIBIA FIBULA RIGHT (2 VIEWS)   Final Result   Pretibial soft tissue swelling, possible hematoma. No acute osseous injury. Assessment/Plan:    Active Hospital Problems    Diagnosis    Nausea [R11.0]    Hematoma of right lower extremity [S80.11XA]    Acute blood loss anemia [D62]    Traumatic hematoma of right lower leg [S80.11XA]    Hemorrhagic shock (HCC) [R57.8]    Hematoma [T14. 8XXA]    Symptomatic anemia [D64.9]    A-fib ([address] [I48.91]    Aortic valve disorder [I35.9]         Acute Traumatic hematoma RLE  Coagulopathy due to eliquis - now on hold. S/p OR with Orthopedics. Hgb dropped to 6.5 - s/p 3 units pRBC - Hgb better but with ongoing hypotension. Additional pRBC 2 units 1/30  To OR with plastic surgery team 1/31  BP still Low 2/1 - transfuse additional unit of pRBC. Afib paroxysmal.   Eliquis on hold. Cont flacainide. Shock - appears hemorrhagic   Will check ECHO - preserved EF without major valvular abnormality. Wean levophed as able. S/p albumin infusion 1/30  s/p additional pRBC 1/30. Hx of Gastric Bypass - Shanice-En-Y - 30+ years ago. Chronic on Reglan due to recurrent nausea. EGD in August - path fairly unremarkable. DVT Prophylaxis: SCD  Diet: DIET GENERAL;  Code Status: Full Code      Dispo - inpatient. ICU still on levophed.      Carine Barron MD

## 2021-02-01 NOTE — PROGRESS NOTES
General and Vascular Surgery                                                           Daily Progress Note                                                             Steven Castañeda PA-C     Pt Name: Margaret Soriano  Medical Record Number: [id number]  Date of Birth 1945   Today's Date: 2/1/2021    ASSESSMENT/PLAN  S/P 1/31/21: 1) Evacuation of right lower extremity hematoma  2) Excisional debridement of right lower extremity (20 x 16 cm)  3) Application of Veraflo wound vacuum    1. Pain controlled   2. Veraflo wound VAC in place  3. Will change VAC Wednesday  4. Monitor and control pain  5. No further general surgery plans at this time. 6. Paroxysmal Afib Eliquis on hold     EDUCATION  Patient educated about their illness/diagnosis, stated above, and all questions answered. We discussed the importance of nutrition, medications they are taking, and healthy lifestyle. Lou Area has improved from yesterday. Pain is well controlled. OBJECTIVE  VITALS:  height is 5' 1\" (1.549 m) and weight is 181 lb 3.5 oz (82.2 kg). Her axillary temperature is 97.8 °F (36.6 °C). Her blood pressure is 84/30 (abnormal) and her pulse is 85. Her respiration is 12 and oxygen saturation is 95%. VITALS:  BP (!) 84/30   Pulse 85   Temp 97.8 °F (36.6 °C) (Axillary)   Resp 12   Ht 5' 1\" (1.549 m)   Wt 181 lb 3.5 oz (82.2 kg)   SpO2 95%   BMI 34.24 kg/m²   GENERAL: alert, cooperative, no distress, smiling  ABDOMEN: soft, non-tender and non-distended  EXTREMITY: RLE wound. Veraflow VAC in place. I/O last 3 completed shifts: In: 904.7 [P.O.:720; I.V.:184.7]  Out: 2313 [Urine:895; Drains:50; Blood:300]  I/O this shift:   In: 400 [P.O.:400]  Out: 115 [Urine:115]    LABS  Recent Labs     02/01/21  0545   WBC 5.6   HGB 7.7*   HCT 23.3*         K 3.7      CO2 28   BUN 9   CREATININE <0.5*   MG 1.80   CALCIUM 8.4     CBC:   Lab Results   Component Value Date    WBC 5.6 02/01/2021    RBC 2.68 02/01/2021    HGB 7.7 02/01/2021    HCT 23.3 02/01/2021    MCV 86.9 02/01/2021    MCH 28.6 02/01/2021    MCHC 32.9 02/01/2021    RDW 16.1 02/01/2021     02/01/2021    MPV 7.9 02/01/2021     CMP:    Lab Results   Component Value Date     02/01/2021    K 3.7 02/01/2021    K 4.6 01/28/2021     02/01/2021    CO2 28 02/01/2021    BUN 9 02/01/2021    CREATININE <0.5 02/01/2021    GFRAA >60 02/01/2021    AGRATIO 1.8 01/28/2021    LABGLOM >60 02/01/2021    GLUCOSE 129 02/01/2021    PROT 6.1 01/28/2021    LABALBU 3.9 01/28/2021    CALCIUM 8.4 02/01/2021    BILITOT <0.2 01/28/2021    ALKPHOS 68 01/28/2021    AST 20 01/28/2021    ALT 11 01/28/2021         Lorrie Vázquez PA-C  Electronically signed 2/1/2021 at 11:08 AM     As above  Wound  Vac in place  HG stable  Output appears serous today, slightly blood tinged    Still on levophed, trying to wean    Continue current care    Electronically signed by Brianna Ellis MD on 2/1/2021 at 5:05 PM

## 2021-02-02 LAB
ANION GAP SERPL CALCULATED.3IONS-SCNC: 7 MMOL/L (ref 3–16)
BASOPHILS ABSOLUTE: 0 K/UL (ref 0–0.2)
BASOPHILS RELATIVE PERCENT: 0.3 %
BUN BLDV-MCNC: 11 MG/DL (ref 7–20)
CALCIUM SERPL-MCNC: 8.1 MG/DL (ref 8.3–10.6)
CHLORIDE BLD-SCNC: 105 MMOL/L (ref 99–110)
CO2: 27 MMOL/L (ref 21–32)
CREAT SERPL-MCNC: 0.5 MG/DL (ref 0.6–1.2)
EOSINOPHILS ABSOLUTE: 0.1 K/UL (ref 0–0.6)
EOSINOPHILS RELATIVE PERCENT: 2.6 %
GFR AFRICAN AMERICAN: >60
GFR NON-AFRICAN AMERICAN: >60
GLUCOSE BLD-MCNC: 121 MG/DL (ref 70–99)
HCT VFR BLD CALC: 23.5 % (ref 36–48)
HCT VFR BLD CALC: 26.5 % (ref 36–48)
HEMOGLOBIN: 7.7 G/DL (ref 12–16)
HEMOGLOBIN: 8.5 G/DL (ref 12–16)
LYMPHOCYTES ABSOLUTE: 0.9 K/UL (ref 1–5.1)
LYMPHOCYTES RELATIVE PERCENT: 18.7 %
MAGNESIUM: 1.7 MG/DL (ref 1.8–2.4)
MCH RBC QN AUTO: 28.3 PG (ref 26–34)
MCHC RBC AUTO-ENTMCNC: 32.2 G/DL (ref 31–36)
MCV RBC AUTO: 87.9 FL (ref 80–100)
MONOCYTES ABSOLUTE: 0.6 K/UL (ref 0–1.3)
MONOCYTES RELATIVE PERCENT: 11.4 %
NEUTROPHILS ABSOLUTE: 3.3 K/UL (ref 1.7–7.7)
NEUTROPHILS RELATIVE PERCENT: 67 %
PDW BLD-RTO: 16.1 % (ref 12.4–15.4)
PLATELET # BLD: 143 K/UL (ref 135–450)
PMV BLD AUTO: 7.8 FL (ref 5–10.5)
POTASSIUM SERPL-SCNC: 3.7 MMOL/L (ref 3.5–5.1)
RBC # BLD: 3.02 M/UL (ref 4–5.2)
SODIUM BLD-SCNC: 139 MMOL/L (ref 136–145)
WBC # BLD: 5 K/UL (ref 4–11)

## 2021-02-02 PROCEDURE — 2580000003 HC RX 258: Performed by: ORTHOPAEDIC SURGERY

## 2021-02-02 PROCEDURE — 2580000003 HC RX 258: Performed by: INTERNAL MEDICINE

## 2021-02-02 PROCEDURE — 99291 CRITICAL CARE FIRST HOUR: CPT | Performed by: INTERNAL MEDICINE

## 2021-02-02 PROCEDURE — P9047 ALBUMIN (HUMAN), 25%, 50ML: HCPCS | Performed by: INTERNAL MEDICINE

## 2021-02-02 PROCEDURE — 83735 ASSAY OF MAGNESIUM: CPT

## 2021-02-02 PROCEDURE — 6370000000 HC RX 637 (ALT 250 FOR IP): Performed by: STUDENT IN AN ORGANIZED HEALTH CARE EDUCATION/TRAINING PROGRAM

## 2021-02-02 PROCEDURE — 6360000002 HC RX W HCPCS

## 2021-02-02 PROCEDURE — 6360000002 HC RX W HCPCS: Performed by: INTERNAL MEDICINE

## 2021-02-02 PROCEDURE — 6370000000 HC RX 637 (ALT 250 FOR IP): Performed by: PEDIATRICS

## 2021-02-02 PROCEDURE — 36592 COLLECT BLOOD FROM PICC: CPT

## 2021-02-02 PROCEDURE — 80048 BASIC METABOLIC PNL TOTAL CA: CPT

## 2021-02-02 PROCEDURE — APPNB30 APP NON BILLABLE TIME 0-30 MINS: Performed by: PHYSICIAN ASSISTANT

## 2021-02-02 PROCEDURE — 2580000003 HC RX 258: Performed by: STUDENT IN AN ORGANIZED HEALTH CARE EDUCATION/TRAINING PROGRAM

## 2021-02-02 PROCEDURE — 2000000000 HC ICU R&B

## 2021-02-02 PROCEDURE — APPSS15 APP SPLIT SHARED TIME 0-15 MINUTES: Performed by: PHYSICIAN ASSISTANT

## 2021-02-02 PROCEDURE — 94760 N-INVAS EAR/PLS OXIMETRY 1: CPT

## 2021-02-02 PROCEDURE — 85014 HEMATOCRIT: CPT

## 2021-02-02 PROCEDURE — 85018 HEMOGLOBIN: CPT

## 2021-02-02 PROCEDURE — 6370000000 HC RX 637 (ALT 250 FOR IP): Performed by: INTERNAL MEDICINE

## 2021-02-02 PROCEDURE — 85025 COMPLETE CBC W/AUTO DIFF WBC: CPT

## 2021-02-02 RX ORDER — ALBUMIN (HUMAN) 12.5 G/50ML
25 SOLUTION INTRAVENOUS ONCE
Status: COMPLETED | OUTPATIENT
Start: 2021-02-02 | End: 2021-02-02

## 2021-02-02 RX ORDER — 0.9 % SODIUM CHLORIDE 0.9 %
1000 INTRAVENOUS SOLUTION INTRAVENOUS ONCE
Status: COMPLETED | OUTPATIENT
Start: 2021-02-02 | End: 2021-02-02

## 2021-02-02 RX ORDER — POTASSIUM CHLORIDE 20 MEQ/1
40 TABLET, EXTENDED RELEASE ORAL ONCE
Status: COMPLETED | OUTPATIENT
Start: 2021-02-02 | End: 2021-02-02

## 2021-02-02 RX ORDER — MORPHINE SULFATE 2 MG/ML
INJECTION, SOLUTION INTRAMUSCULAR; INTRAVENOUS
Status: COMPLETED
Start: 2021-02-02 | End: 2021-02-02

## 2021-02-02 RX ORDER — MORPHINE SULFATE 2 MG/ML
2 INJECTION, SOLUTION INTRAMUSCULAR; INTRAVENOUS ONCE
Status: COMPLETED | OUTPATIENT
Start: 2021-02-02 | End: 2021-02-02

## 2021-02-02 RX ORDER — SODIUM CHLORIDE 9 MG/ML
INJECTION, SOLUTION INTRAVENOUS PRN
Status: DISCONTINUED | OUTPATIENT
Start: 2021-02-02 | End: 2021-02-04

## 2021-02-02 RX ORDER — MAGNESIUM SULFATE IN WATER 40 MG/ML
2000 INJECTION, SOLUTION INTRAVENOUS ONCE
Status: COMPLETED | OUTPATIENT
Start: 2021-02-02 | End: 2021-02-02

## 2021-02-02 RX ORDER — MAGNESIUM SULFATE IN WATER 40 MG/ML
2000 INJECTION, SOLUTION INTRAVENOUS ONCE
Status: CANCELLED | OUTPATIENT
Start: 2021-02-02 | End: 2021-02-02

## 2021-02-02 RX ADMIN — MORPHINE SULFATE 2 MG: 2 INJECTION, SOLUTION INTRAMUSCULAR; INTRAVENOUS at 09:33

## 2021-02-02 RX ADMIN — HYDROCODONE BITARTRATE AND ACETAMINOPHEN 2 TABLET: 5; 325 TABLET ORAL at 21:44

## 2021-02-02 RX ADMIN — TIZANIDINE 2 MG: 4 TABLET ORAL at 23:38

## 2021-02-02 RX ADMIN — HYDROCODONE BITARTRATE AND ACETAMINOPHEN 2 TABLET: 5; 325 TABLET ORAL at 16:04

## 2021-02-02 RX ADMIN — HYDROCODONE BITARTRATE AND ACETAMINOPHEN 2 TABLET: 5; 325 TABLET ORAL at 05:05

## 2021-02-02 RX ADMIN — SODIUM CHLORIDE 1000 ML: 9 INJECTION, SOLUTION INTRAVENOUS at 15:18

## 2021-02-02 RX ADMIN — SODIUM CHLORIDE, PRESERVATIVE FREE 10 ML: 5 INJECTION INTRAVENOUS at 09:00

## 2021-02-02 RX ADMIN — SODIUM CHLORIDE, PRESERVATIVE FREE 10 ML: 5 INJECTION INTRAVENOUS at 21:46

## 2021-02-02 RX ADMIN — FLECAINIDE ACETATE 100 MG: 100 TABLET ORAL at 21:45

## 2021-02-02 RX ADMIN — SODIUM CHLORIDE 1000 ML: 9 INJECTION, SOLUTION INTRAVENOUS at 12:52

## 2021-02-02 RX ADMIN — MAGNESIUM SULFATE IN WATER 2000 MG: 40 INJECTION, SOLUTION INTRAVENOUS at 09:00

## 2021-02-02 RX ADMIN — METOCLOPRAMIDE HYDROCHLORIDE 5 MG: 5 INJECTION INTRAMUSCULAR; INTRAVENOUS at 09:00

## 2021-02-02 RX ADMIN — FLECAINIDE ACETATE 100 MG: 100 TABLET ORAL at 09:00

## 2021-02-02 RX ADMIN — ALBUMIN (HUMAN) 25 G: 0.25 INJECTION, SOLUTION INTRAVENOUS at 12:52

## 2021-02-02 RX ADMIN — PANTOPRAZOLE SODIUM 40 MG: 40 TABLET, DELAYED RELEASE ORAL at 15:20

## 2021-02-02 RX ADMIN — POTASSIUM CHLORIDE 40 MEQ: 1500 TABLET, EXTENDED RELEASE ORAL at 08:59

## 2021-02-02 RX ADMIN — SODIUM CHLORIDE 1000 ML: 9 INJECTION, SOLUTION INTRAVENOUS at 09:35

## 2021-02-02 RX ADMIN — ALPRAZOLAM 0.5 MG: 0.5 TABLET ORAL at 23:38

## 2021-02-02 RX ADMIN — SERTRALINE 100 MG: 100 TABLET, FILM COATED ORAL at 21:44

## 2021-02-02 RX ADMIN — PANTOPRAZOLE SODIUM 40 MG: 40 TABLET, DELAYED RELEASE ORAL at 05:05

## 2021-02-02 RX ADMIN — METOCLOPRAMIDE HYDROCHLORIDE 5 MG: 5 INJECTION INTRAMUSCULAR; INTRAVENOUS at 21:45

## 2021-02-02 RX ADMIN — Medication 500 MG: at 09:00

## 2021-02-02 RX ADMIN — ACETAMINOPHEN 650 MG: 325 TABLET ORAL at 10:44

## 2021-02-02 RX ADMIN — METOCLOPRAMIDE HYDROCHLORIDE 5 MG: 5 INJECTION INTRAMUSCULAR; INTRAVENOUS at 12:53

## 2021-02-02 ASSESSMENT — PAIN DESCRIPTION - ORIENTATION
ORIENTATION: RIGHT
ORIENTATION: RIGHT;LOWER

## 2021-02-02 ASSESSMENT — PAIN DESCRIPTION - LOCATION: LOCATION: HEAD

## 2021-02-02 ASSESSMENT — PAIN DESCRIPTION - PAIN TYPE
TYPE: SURGICAL PAIN
TYPE: ACUTE PAIN
TYPE: SURGICAL PAIN

## 2021-02-02 ASSESSMENT — PAIN DESCRIPTION - ONSET
ONSET: ON-GOING

## 2021-02-02 ASSESSMENT — PAIN DESCRIPTION - PROGRESSION
CLINICAL_PROGRESSION: GRADUALLY IMPROVING
CLINICAL_PROGRESSION: NOT CHANGED

## 2021-02-02 ASSESSMENT — PAIN - FUNCTIONAL ASSESSMENT
PAIN_FUNCTIONAL_ASSESSMENT: PREVENTS OR INTERFERES WITH MANY ACTIVE NOT PASSIVE ACTIVITIES
PAIN_FUNCTIONAL_ASSESSMENT: PREVENTS OR INTERFERES WITH ALL ACTIVE AND SOME PASSIVE ACTIVITIES
PAIN_FUNCTIONAL_ASSESSMENT: PREVENTS OR INTERFERES SOME ACTIVE ACTIVITIES AND ADLS
PAIN_FUNCTIONAL_ASSESSMENT: ACTIVITIES ARE NOT PREVENTED

## 2021-02-02 ASSESSMENT — PAIN SCALES - GENERAL
PAINLEVEL_OUTOF10: 8
PAINLEVEL_OUTOF10: 5
PAINLEVEL_OUTOF10: 7
PAINLEVEL_OUTOF10: 8

## 2021-02-02 ASSESSMENT — PAIN DESCRIPTION - DESCRIPTORS: DESCRIPTORS: ACHING;DISCOMFORT

## 2021-02-02 ASSESSMENT — PAIN DESCRIPTION - FREQUENCY
FREQUENCY: INTERMITTENT

## 2021-02-02 ASSESSMENT — PAIN DESCRIPTION - DIRECTION: RADIATING_TOWARDS: KNEE

## 2021-02-02 NOTE — PROGRESS NOTES
Hospitalist Progress Note      PCP: MUSA Ivey - CNP    Date of Admission: 1/28/2021    Chief Complaint: car door slammed against her R leg at a parking lot. Developed large hematoma RLE. Pt on eliquis for Afib. Subjective:     1/29  Pain in R leg controlled. BP low - no levophed this am.       1/30  Ongoing need for levophed - 10 this am.   Bp better with albumin infusion. Hgb improved with transfusion - but with ongoing downtrend - additional pRBC ordered today due to shock. Vascular surgery involved per ortho recs - plan to return to OR today. 1/31  In OR this am with plastic surgery team. .   BP better after pRBC albeit still on levophed at 3.       2/1  BP still challenging. Pain controlled. 2/2  Still on levophed at 7 this am.   Pain control in RLE becoming challenging. Wound vac in place.              Medications:  Reviewed    Infusion Medications    sodium chloride      sodium chloride      norepinephrine 5 mcg/min (02/02/21 1040)     Scheduled Medications    albumin human  25 g Intravenous Once    sodium chloride  1,000 mL Intravenous Once    pantoprazole  40 mg Oral BID AC    metoclopramide  5 mg Intravenous TID    lidocaine 1 % injection  5 mL Intradermal Once    sodium chloride flush  10 mL Intravenous 2 times per day    calcium elemental  500 mg Oral Daily    flecainide  100 mg Oral 2 times per day    sertraline  100 mg Oral Nightly    sodium chloride flush  10 mL Intravenous 2 times per day     PRN Meds: sodium chloride, sodium chloride, HYDROcodone 5 mg - acetaminophen, prochlorperazine, simethicone, sodium chloride flush, ALPRAZolam, tiZANidine, acetaminophen **OR** acetaminophen, sodium chloride flush, promethazine **OR** ondansetron, bisacodyl      Intake/Output Summary (Last 24 hours) at 2/2/2021 1343  Last data filed at 2/2/2021 1300  Gross per 24 hour   Intake 2361.2 ml   Output 1055 ml   Net 1306.2 ml       Physical Exam Performed:    BP (!) 112/49   Pulse 78   Temp 98 °F (36.7 °C) (Oral)   Resp 14   Ht 5' 1\" (1.549 m)   Wt 184 lb 1.4 oz (83.5 kg)   SpO2 96%   BMI 34.78 kg/m²     General appearance: No apparent distress, appears stated age and cooperative. HEENT: Pupils equal, round, and reactive to light. Conjunctivae/corneas clear. Neck: Supple, with full range of motion. No jugular venous distention. Trachea midline. Respiratory:  Normal respiratory effort. Clear to auscultation, bilaterally without Rales/Wheezes/Rhonchi. Cardiovascular: Regular rate and rhythm with normal S1/S2 without murmurs, rubs or gallops. Abdomen: Soft, non-tender, non-distended with normal bowel sounds. Musculoskeletal: large area of hematoma with post op dressing in place. Neurologic:  Neurovascularly intact without any focal sensory/motor deficits. Cranial nerves: II-XII intact, grossly non-focal.  Psychiatric: Alert and oriented, thought content appropriate, normal insight  Capillary Refill: Brisk,< 3 seconds   Peripheral Pulses: +2 palpable, equal bilaterally       Labs:   Recent Labs     01/31/21  0600 01/31/21  0600 02/01/21  0545 02/01/21  1532 02/02/21  0445   WBC 6.1  --  5.6  --  5.0   HGB 8.1*   < > 7.7* 8.7* 8.5*   HCT 24.8*   < > 23.3* 26.2* 26.5*   *  --  154  --  143    < > = values in this interval not displayed. Recent Labs     01/31/21  0600 02/01/21  0545 02/02/21  0445    139 139   K 3.9 3.7 3.7    103 105   CO2 26 28 27   BUN 6* 9 11   CREATININE <0.5* <0.5* 0.5*   CALCIUM 8.4 8.4 8.1*     No results for input(s): AST, ALT, BILIDIR, BILITOT, ALKPHOS in the last 72 hours. No results for input(s): INR in the last 72 hours. No results for input(s): Mejia Scrivener in the last 72 hours.     Urinalysis:      Lab Results   Component Value Date    NITRU Negative 09/03/2019    WBCUA 2 10/27/2018    RBCUA 4 10/27/2018    BLOODU Negative 09/03/2019    SPECGRAV 1.010 09/03/2019    GLUCOSEU Negative 09/03/2019       Radiology:  CTA LOWER EXTREMITY RIGHT W CONTRAST   Final Result   Large hematoma of the anterior calf. No active extravasation or etiology of   hemorrhage is identified. Popliteal artery is patent. There is significant trifurcation level disease   with primary runoff via the disease posterior tibial.      No acute osseous abnormality. XR TIBIA FIBULA RIGHT (2 VIEWS)   Final Result   Pretibial soft tissue swelling, possible hematoma. No acute osseous injury. Assessment/Plan:    Active Hospital Problems    Diagnosis    Nausea [R11.0]    Hematoma of right lower extremity [S80.11XA]    Acute blood loss anemia [D62]    Traumatic hematoma of right lower leg [S80.11XA]    Hemorrhagic shock (HCC) [R57.8]    Hematoma [T14. 8XXA]    Symptomatic anemia [D64.9]    A-fib ([address] [I48.91]    Aortic valve disorder [I35.9]         Acute Traumatic hematoma RLE  Coagulopathy due to eliquis - now on hold. S/p OR with Orthopedics. Hgb dropped to 6.5 - s/p 3 units pRBC - Hgb better but with ongoing hypotension. Additional pRBC 2 units 1/30  To OR with plastic surgery team 1/31  BP still Low 2/1 - transfused additional unit of pRBC. Hgb 8.5 today. Afib paroxysmal.   Eliquis on hold. Cont flacainide. Shock - appears hemorrhagic - ongoing. Checked ECHO - preserved EF without major valvular abnormality. S/p albumin infusion 1/30  s/p additional pRBC as above. Checked Cheetah 2/2 - Index >40 - suggesting pt is fluid responsive. Given IVF bolus - levophed weaned to 5 but BP low. Will give second IVF bolus along with albumin and recheck Cheetah. Hx of Gastric Bypass - Shanice-En-Y - 30+ years ago. Chronic nausea on Reglan. EGD in August - pathology report fairly unremarkable. DVT Prophylaxis: SCD  Diet: DIET GENERAL;  Code Status: Full Code      Dispo - inpatient. ICU still on levophed.      Tristan Arrington MD

## 2021-02-02 NOTE — PROGRESS NOTES
Spoke to Daughter Marty Estrada. Thankful for telephone update. Daughter with concern about pt temp of 99.2 as she normally runs low. I assured Raven we will monitor her closely.   All other questions answered

## 2021-02-02 NOTE — PROGRESS NOTES
6761: Nicom placed on patient per order. PLR performed. SVI delta 40.3% change noted. Communicated hemodynamic parameters and PLR results to Dr. Yarely Hollis. Further orders have been received. Will continue to monitor patient with Nicom. 1200: Dr. Yarely Hollis aware of recent BP after Bolus. Orders to follow  1500: Nicom placed on patient per order. PLR performed. SVI delta 23.4% change noted. Communicated hemodynamic parameters and PLR results to Dr. Yarely Hollis. Further orders have been received. 1538: Nicom placed on patient per order. Bolus performed. SVI delta 13% change noted. Communicated hemodynamic parameters and PLR results to Dr. Yarely Hollis. Further orders have been received-continue with ordered bolus.     Electronically signed by Marichuy Trivedi RN on 2/2/2021 at 6:04 PM

## 2021-02-02 NOTE — PROGRESS NOTES
Pulmonary Progress Note    Date of Admission: 1/28/2021   LOS: 5 days     CC:  Chief Complaint   Patient presents with    Leg Injury     hit right leg on car door. large hematoma forming to right shin area. pt on eliquis. HPI/Subjective  Overnight no events, remains on low dose levophed  Getting PRBCs today    ROS:   No nausea  No Vomiting  No chest pain      Intake/Output Summary (Last 24 hours) at 2/2/2021 0944  Last data filed at 2/2/2021 0935  Gross per 24 hour   Intake 1681.2 ml   Output 970 ml   Net 711.2 ml         PHYSICAL EXAM:   Blood pressure (!) 149/58, pulse 79, temperature 97.4 °F (36.3 °C), temperature source Oral, resp. rate 14, height 5' 1\" (1.549 m), weight 184 lb 1.4 oz (83.5 kg), SpO2 97 %, not currently breastfeeding.'  Gen:  No acute distress. Eyes: PERRL. Anicteric sclera. No conjunctival injection. ENT: No discharge. Posterior oropharynx clear. External appearance of ears and nose normal.  Neck: Trachea midline. No mass   Resp:  No crackles. No wheezes. No rhonchi. No dullness on percussion. CV: Regular rate. Regular rhythm. No murmur or rub. No edema. GI: Soft, Non-tender. Non-distended. +BS  Skin: Warm, dry, w/o erythema. Lymph: No cervical or supraclavicular LAD. M/S: No cyanosis. No clubbing. Neuro:   no focal neurologic deficit. Moves all extremities  Psych: Awake and alert, Oriented x 3. Judgement and insight appropriate. Mood stable.       Medications:    Scheduled Meds:   magnesium sulfate  2,000 mg Intravenous Once    sodium chloride  1,000 mL Intravenous Once    pantoprazole  40 mg Oral BID AC    metoclopramide  5 mg Intravenous TID    lidocaine 1 % injection  5 mL Intradermal Once    sodium chloride flush  10 mL Intravenous 2 times per day    calcium elemental  500 mg Oral Daily    flecainide  100 mg Oral 2 times per day    sertraline  100 mg Oral Nightly    sodium chloride flush  10 mL Intravenous 2 times per day       Continuous Infusions:   pm    COMPARISON:  December 29, 2010. HISTORY:  ORDERING PHYSICIAN PROVIDED HISTORY: altered mental status  TECHNOLOGIST PROVIDED HISTORY:  Technologist Provided Reason for Exam: ams  pt. cannot raise left arm due to injury  Acuity: Acute  Type of Encounter: Initial    FINDINGS:  Cardiac and mediastinal contours are enlarged but unchanged. No focal  consolidation. No pneumothorax. No pleural effusion. Multiple calcified  granuloma within right lung      Impression Enlarged cardiomediastinal silhouette. No focal consolidation, pneumothorax, or sizable pleural effusion. CXR portable:   Results for orders placed during the hospital encounter of 08/11/19   XR CHEST PORTABLE    Narrative EXAMINATION:  ONE XRAY VIEW OF THE CHEST    8/11/2019 9:08 pm    COMPARISON:  10/24/2018    HISTORY:  ORDERING SYSTEM PROVIDED HISTORY: chest pain  TECHNOLOGIST PROVIDED HISTORY:  Reason for exam:->chest pain  Reason for Exam: chest pain  Acuity: Acute  Type of Exam: Initial    FINDINGS:  Dial device overlies the left upper chest.  Cardiomediastinal silhouette is  within normal limits. No pneumothorax or effusion. Partially imaged  cervical spine fusion hardware. No focal consolidation. No acute osseous  abnormality. Impression No acute cardiopulmonary disease. Access  Arterial       PICC       PICC Double Lumen 13/17/15 Right Basilic (Active)   Continued need for line? Yes 02/01/21 1000   Is this a power PICC? Yes 02/01/21 1000   Site Assessment Clean;Dry; Intact 02/01/21 1000   Red Lumen Status Infusing 02/01/21 1000   Purple Lumen Status Infusing 02/01/21 1000   Exposed Catheter (cm) 3 cm 01/29/21 0338   Extremity Circumference (cm) 29 cm 01/29/21 0338   Line Care Connections checked and tightened 01/29/21 0800   Dressing Status Clean;Dry; Intact 02/01/21 1000   Dressing Type Anti-microbial patch;Transparent 02/01/21 1000   Dressing Change Due 02/03/21 02/01/21 1000   Dressing Intervention New 01/29/21 0338   Reason Not Rotated Not due 02/01/21 1000   Number of days: 3        CVC                 Assessment:     Hemorrhagic Shock  Leg Hematoma  Acute Blood loss anemia  AFib    Plan:      -titrate levophed goal sbp 100  -s/p hematoma evacuation, wound vac in place  -trending hgb, >8 today  -1L IVFB for cheetah >40%  -eliquis on hold    scds      Due to the immediate potential for life-threatening deterioration due to shock and acute anemia , I spent 33 minutes providing critical care. This time is excluding time spent performing separately billable procedures.       Thank you for this consult,    Yordan Varelaus [address] [facility]

## 2021-02-02 NOTE — PROGRESS NOTES
General and Vascular Surgery                                                           Daily Progress Note                                                             Shaan Ivy PA-C     Pt Name: Nick De La Torre  Medical Record Number: [id number]  Date of Birth 1945   Today's Date: 2/2/2021    ASSESSMENT/PLAN  S/P 1/31/21: 1) Evacuation of right lower extremity hematoma  2) Excisional debridement of right lower extremity (20 x 16 cm)  3) Application of Veraflo wound vacuum    1. Pain controlled   2. Veraflo wound VAC changed today, patient tolerated well  3. Wound site clean. No further debridement needed. Good vascular tissue noted  4. Monitor and control pain  5. No further general surgery plans at this time. 6. Paroxysmal Afib Eliquis on hold     EDUCATION  Patient educated about their illness/diagnosis, stated above, and all questions answered. We discussed the importance of nutrition, medications they are taking, and healthy lifestyle. Ellen Jade has improved. Pain is well controlled. OBJECTIVE  VITALS:  height is 5' 1\" (1.549 m) and weight is 184 lb 1.4 oz (83.5 kg). Her oral temperature is 97.4 °F (36.3 °C). Her blood pressure is 149/58 (abnormal) and her pulse is 79. Her respiration is 14 and oxygen saturation is 97%. VITALS:  BP (!) 149/58   Pulse 79   Temp 97.4 °F (36.3 °C) (Oral)   Resp 14   Ht 5' 1\" (1.549 m)   Wt 184 lb 1.4 oz (83.5 kg)   SpO2 97%   BMI 34.78 kg/m²   GENERAL: alert, cooperative, no distress, smiling  ABDOMEN: soft, non-tender and non-distended  EXTREMITY: RLE wound looks good. Good vascular tissue. pain controlled. I/O last 3 completed shifts:   In: 1031.2 [P.O.:640; I.V.:116.2; Blood:275]  Out: 925 [Urine:925]  I/O this shift:  In: 1050 [IV Piggyback:1050]  Out: 120 [Urine:120]    LABS  Recent Labs     02/02/21  0445   WBC 5.0   HGB 8.5*   HCT 26.5*         K 3.7      CO2 27 BUN 11   CREATININE 0.5*   MG 1.70*   CALCIUM 8.1*     CBC:   Lab Results   Component Value Date    WBC 5.0 02/02/2021    RBC 3.02 02/02/2021    HGB 8.5 02/02/2021    HCT 26.5 02/02/2021    MCV 87.9 02/02/2021    MCH 28.3 02/02/2021    MCHC 32.2 02/02/2021    RDW 16.1 02/02/2021     02/02/2021    MPV 7.8 02/02/2021     CMP:    Lab Results   Component Value Date     02/02/2021    K 3.7 02/02/2021    K 4.6 01/28/2021     02/02/2021    CO2 27 02/02/2021    BUN 11 02/02/2021    CREATININE 0.5 02/02/2021    GFRAA >60 02/02/2021    AGRATIO 1.8 01/28/2021    LABGLOM >60 02/02/2021    GLUCOSE 121 02/02/2021    PROT 6.1 01/28/2021    LABALBU 3.9 01/28/2021    CALCIUM 8.1 02/02/2021    BILITOT <0.2 01/28/2021    ALKPHOS 68 01/28/2021    AST 20 01/28/2021    ALT 11 01/28/2021         Sophia English PA-C  Electronically signed 2/2/2021 at 10:10 AM     As above  Still on levophed  Hg stable  VAC changed today, wound clear  Continue supportive care, wean pressors as able    Electronically signed by Deidre Watts MD on 2/2/2021 at 10:23 AM

## 2021-02-03 LAB
ANION GAP SERPL CALCULATED.3IONS-SCNC: 7 MMOL/L (ref 3–16)
BASOPHILS ABSOLUTE: 0 K/UL (ref 0–0.2)
BASOPHILS RELATIVE PERCENT: 0.5 %
BUN BLDV-MCNC: 10 MG/DL (ref 7–20)
CALCIUM SERPL-MCNC: 8.2 MG/DL (ref 8.3–10.6)
CHLORIDE BLD-SCNC: 108 MMOL/L (ref 99–110)
CO2: 25 MMOL/L (ref 21–32)
CREAT SERPL-MCNC: <0.5 MG/DL (ref 0.6–1.2)
EOSINOPHILS ABSOLUTE: 0.1 K/UL (ref 0–0.6)
EOSINOPHILS RELATIVE PERCENT: 1.7 %
GFR AFRICAN AMERICAN: >60
GFR NON-AFRICAN AMERICAN: >60
GLUCOSE BLD-MCNC: 109 MG/DL (ref 70–99)
HCT VFR BLD CALC: 25 % (ref 36–48)
HCT VFR BLD CALC: 25.1 % (ref 36–48)
HEMOGLOBIN: 8.1 G/DL (ref 12–16)
HEMOGLOBIN: 8.2 G/DL (ref 12–16)
LYMPHOCYTES ABSOLUTE: 0.7 K/UL (ref 1–5.1)
LYMPHOCYTES RELATIVE PERCENT: 14.1 %
MAGNESIUM: 1.8 MG/DL (ref 1.8–2.4)
MCH RBC QN AUTO: 28.6 PG (ref 26–34)
MCHC RBC AUTO-ENTMCNC: 32.7 G/DL (ref 31–36)
MCV RBC AUTO: 87.6 FL (ref 80–100)
MONOCYTES ABSOLUTE: 0.6 K/UL (ref 0–1.3)
MONOCYTES RELATIVE PERCENT: 10.6 %
NEUTROPHILS ABSOLUTE: 3.8 K/UL (ref 1.7–7.7)
NEUTROPHILS RELATIVE PERCENT: 73.1 %
PDW BLD-RTO: 16 % (ref 12.4–15.4)
PLATELET # BLD: 144 K/UL (ref 135–450)
PMV BLD AUTO: 8.1 FL (ref 5–10.5)
POTASSIUM SERPL-SCNC: 3.8 MMOL/L (ref 3.5–5.1)
RBC # BLD: 2.85 M/UL (ref 4–5.2)
SODIUM BLD-SCNC: 140 MMOL/L (ref 136–145)
WBC # BLD: 5.2 K/UL (ref 4–11)

## 2021-02-03 PROCEDURE — 80048 BASIC METABOLIC PNL TOTAL CA: CPT

## 2021-02-03 PROCEDURE — APPNB30 APP NON BILLABLE TIME 0-30 MINS: Performed by: PHYSICIAN ASSISTANT

## 2021-02-03 PROCEDURE — 6360000002 HC RX W HCPCS: Performed by: INTERNAL MEDICINE

## 2021-02-03 PROCEDURE — 2580000003 HC RX 258: Performed by: ORTHOPAEDIC SURGERY

## 2021-02-03 PROCEDURE — 2000000000 HC ICU R&B

## 2021-02-03 PROCEDURE — 85018 HEMOGLOBIN: CPT

## 2021-02-03 PROCEDURE — 85014 HEMATOCRIT: CPT

## 2021-02-03 PROCEDURE — 85025 COMPLETE CBC W/AUTO DIFF WBC: CPT

## 2021-02-03 PROCEDURE — 6370000000 HC RX 637 (ALT 250 FOR IP): Performed by: STUDENT IN AN ORGANIZED HEALTH CARE EDUCATION/TRAINING PROGRAM

## 2021-02-03 PROCEDURE — 6370000000 HC RX 637 (ALT 250 FOR IP): Performed by: INTERNAL MEDICINE

## 2021-02-03 PROCEDURE — 2580000003 HC RX 258: Performed by: STUDENT IN AN ORGANIZED HEALTH CARE EDUCATION/TRAINING PROGRAM

## 2021-02-03 PROCEDURE — 83735 ASSAY OF MAGNESIUM: CPT

## 2021-02-03 PROCEDURE — 36592 COLLECT BLOOD FROM PICC: CPT

## 2021-02-03 PROCEDURE — APPSS15 APP SPLIT SHARED TIME 0-15 MINUTES: Performed by: PHYSICIAN ASSISTANT

## 2021-02-03 PROCEDURE — 6370000000 HC RX 637 (ALT 250 FOR IP): Performed by: PEDIATRICS

## 2021-02-03 PROCEDURE — 99024 POSTOP FOLLOW-UP VISIT: CPT | Performed by: SURGERY

## 2021-02-03 PROCEDURE — 6370000000 HC RX 637 (ALT 250 FOR IP): Performed by: ORTHOPAEDIC SURGERY

## 2021-02-03 RX ADMIN — HYDROCODONE BITARTRATE AND ACETAMINOPHEN 2 TABLET: 5; 325 TABLET ORAL at 14:06

## 2021-02-03 RX ADMIN — FLECAINIDE ACETATE 100 MG: 100 TABLET ORAL at 21:02

## 2021-02-03 RX ADMIN — TIZANIDINE 2 MG: 4 TABLET ORAL at 22:04

## 2021-02-03 RX ADMIN — PANTOPRAZOLE SODIUM 40 MG: 40 TABLET, DELAYED RELEASE ORAL at 16:54

## 2021-02-03 RX ADMIN — ALPRAZOLAM 0.5 MG: 0.5 TABLET ORAL at 22:04

## 2021-02-03 RX ADMIN — FLECAINIDE ACETATE 100 MG: 100 TABLET ORAL at 09:11

## 2021-02-03 RX ADMIN — SODIUM CHLORIDE, PRESERVATIVE FREE 10 ML: 5 INJECTION INTRAVENOUS at 09:11

## 2021-02-03 RX ADMIN — BISACODYL 5 MG: 5 TABLET, COATED ORAL at 13:52

## 2021-02-03 RX ADMIN — METOCLOPRAMIDE HYDROCHLORIDE 5 MG: 5 INJECTION INTRAMUSCULAR; INTRAVENOUS at 09:11

## 2021-02-03 RX ADMIN — SODIUM CHLORIDE, PRESERVATIVE FREE 10 ML: 5 INJECTION INTRAVENOUS at 09:15

## 2021-02-03 RX ADMIN — SODIUM CHLORIDE, PRESERVATIVE FREE 10 ML: 5 INJECTION INTRAVENOUS at 21:10

## 2021-02-03 RX ADMIN — METOCLOPRAMIDE HYDROCHLORIDE 5 MG: 5 INJECTION INTRAMUSCULAR; INTRAVENOUS at 13:52

## 2021-02-03 RX ADMIN — Medication 500 MG: at 09:11

## 2021-02-03 RX ADMIN — SODIUM CHLORIDE, PRESERVATIVE FREE 10 ML: 5 INJECTION INTRAVENOUS at 21:00

## 2021-02-03 RX ADMIN — ACETAMINOPHEN 650 MG: 325 TABLET ORAL at 05:37

## 2021-02-03 RX ADMIN — HYDROCODONE BITARTRATE AND ACETAMINOPHEN 2 TABLET: 5; 325 TABLET ORAL at 21:07

## 2021-02-03 RX ADMIN — ACETAMINOPHEN 650 MG: 325 TABLET ORAL at 16:58

## 2021-02-03 RX ADMIN — METOCLOPRAMIDE HYDROCHLORIDE 5 MG: 5 INJECTION INTRAMUSCULAR; INTRAVENOUS at 21:01

## 2021-02-03 RX ADMIN — SERTRALINE 100 MG: 100 TABLET, FILM COATED ORAL at 21:02

## 2021-02-03 RX ADMIN — PANTOPRAZOLE SODIUM 40 MG: 40 TABLET, DELAYED RELEASE ORAL at 05:37

## 2021-02-03 RX ADMIN — Medication 10 ML: at 13:52

## 2021-02-03 RX ADMIN — HYDROCODONE BITARTRATE AND ACETAMINOPHEN 2 TABLET: 5; 325 TABLET ORAL at 09:11

## 2021-02-03 ASSESSMENT — PAIN DESCRIPTION - PROGRESSION
CLINICAL_PROGRESSION: NOT CHANGED
CLINICAL_PROGRESSION: NOT CHANGED
CLINICAL_PROGRESSION: GRADUALLY IMPROVING

## 2021-02-03 ASSESSMENT — PAIN DESCRIPTION - FREQUENCY
FREQUENCY: CONTINUOUS
FREQUENCY: INTERMITTENT

## 2021-02-03 ASSESSMENT — PAIN DESCRIPTION - LOCATION
LOCATION: HEAD
LOCATION: LEG

## 2021-02-03 ASSESSMENT — PAIN SCALES - GENERAL
PAINLEVEL_OUTOF10: 2
PAINLEVEL_OUTOF10: 3
PAINLEVEL_OUTOF10: 6
PAINLEVEL_OUTOF10: 4
PAINLEVEL_OUTOF10: 3
PAINLEVEL_OUTOF10: 2
PAINLEVEL_OUTOF10: 0
PAINLEVEL_OUTOF10: 2
PAINLEVEL_OUTOF10: 3
PAINLEVEL_OUTOF10: 3

## 2021-02-03 ASSESSMENT — PAIN - FUNCTIONAL ASSESSMENT
PAIN_FUNCTIONAL_ASSESSMENT: PREVENTS OR INTERFERES SOME ACTIVE ACTIVITIES AND ADLS
PAIN_FUNCTIONAL_ASSESSMENT: PREVENTS OR INTERFERES SOME ACTIVE ACTIVITIES AND ADLS
PAIN_FUNCTIONAL_ASSESSMENT: ACTIVITIES ARE NOT PREVENTED

## 2021-02-03 ASSESSMENT — PAIN DESCRIPTION - PAIN TYPE
TYPE: ACUTE PAIN
TYPE: ACUTE PAIN

## 2021-02-03 ASSESSMENT — PAIN DESCRIPTION - DESCRIPTORS
DESCRIPTORS: ACHING

## 2021-02-03 ASSESSMENT — PAIN DESCRIPTION - ONSET
ONSET: ON-GOING
ONSET: ON-GOING

## 2021-02-03 NOTE — PROGRESS NOTES
Hospitalist Progress Note      PCP: MUSA Miller - CNP    Date of Admission: 1/28/2021    Subjective: Pt S/E. sbp was good this am, in the 130s, levo weaned to 1 mcg/min and attempting to stop today    1/29  Pain in R leg controlled. BP low - no levophed this am but later required    1/30  Ongoing need for levophed - 10 this am.   Bp better with albumin infusion. Hgb improved with transfusion - but with ongoing downtrend - additional pRBC ordered today due to shock. Vascular surgery involved per ortho recs - plan to return to OR soon     1/31  In OR this am with plastic surgery team.   BP better after pRBC, albeit still on levophed      2/1  BP still challenging, on levo  Pain controlled. 2/2  Still on levophed at 7 this am.   Pain control in RLE becoming challenging. Wound vac in place.      2/3   Levo weaned down to 1, bp improving  Pain controlled today  prns x 24 hrs: morphine 2 mg iv (with wound vac change)  Tylenol 650 x2  norco 5/325 x 4    Medications:  Reviewed    Infusion Medications    sodium chloride      sodium chloride      sodium chloride      sodium chloride      sodium chloride      norepinephrine 2 mcg/min (02/02/21 1750)     Scheduled Medications    pantoprazole  40 mg Oral BID AC    metoclopramide  5 mg Intravenous TID    lidocaine 1 % injection  5 mL Intradermal Once    sodium chloride flush  10 mL Intravenous 2 times per day    calcium elemental  500 mg Oral Daily    flecainide  100 mg Oral 2 times per day    sertraline  100 mg Oral Nightly    sodium chloride flush  10 mL Intravenous 2 times per day     PRN Meds: sodium chloride, sodium chloride, sodium chloride, sodium chloride, sodium chloride, HYDROcodone 5 mg - acetaminophen, prochlorperazine, simethicone, sodium chloride flush, ALPRAZolam, tiZANidine, acetaminophen **OR** acetaminophen, sodium chloride flush, promethazine **OR** ondansetron, bisacodyl      Intake/Output Summary (Last 24 hours) at is fluid responsive. Given IVF bolus - levophed weaned to 1 today, bp borderline  second IVF bolus along with albumin yesterday and recheck Cheetah with good response     Hx of Gastric Bypass - Shanice-En-Y - 30+ years ago. Chronic nausea on Reglan. EGD in August - pathology report fairly unremarkable. DVT Prophylaxis: SCD  Diet: DIET GENERAL;  Code Status: Full Code      Dispo - inpatient. ICU still on levophed.      Nkii Dawson, DO

## 2021-02-03 NOTE — PLAN OF CARE
Problem: Pain:  Goal: Pain level will decrease  Description: Pain level will decrease  Outcome: Ongoing  Goal: Control of acute pain  Description: Control of acute pain  Outcome: Ongoing  Goal: Control of chronic pain  Description: Control of chronic pain  Outcome: Ongoing     Problem: Falls - Risk of:  Goal: Will remain free from falls  Description: Will remain free from falls  Outcome: Ongoing  Goal: Absence of physical injury  Description: Absence of physical injury  Outcome: Ongoing     Problem: Skin Integrity:  Goal: Will show no infection signs and symptoms  Description: Will show no infection signs and symptoms  Outcome: Ongoing  Goal: Absence of new skin breakdown  Description: Absence of new skin breakdown  Outcome: Ongoing     Problem: Infection - Central Venous Catheter-Associated Bloodstream Infection:  Goal: Will show no infection signs and symptoms  Description: Will show no infection signs and symptoms  Outcome: Ongoing     Problem: Urinary Elimination:  Goal: Signs and symptoms of infection will decrease  Description: Signs and symptoms of infection will decrease  Outcome: Ongoing  Goal: Complications related to the disease process, condition or treatment will be avoided or minimized  Description: Complications related to the disease process, condition or treatment will be avoided or minimized  Outcome: Ongoing

## 2021-02-03 NOTE — PROGRESS NOTES
6384: pt resting in bed w/eyes closed, respirations easy/even. Wound vac intact. Call light within reach. 6220: Levophed decreased to 1mcg/min, /46. Pt requests to move to [facility] for BM at this time. Nurse assists, pt stands/pivots to [facility]. Tolerates well. Pt requests privacy, nurse agreeable. Call light within reach, monitors connected to patient. 1191: medium formed BM. Pt requests to move to bedside chair. X1 w/stedy due to distance from [facility] to chair. Pt tolerates well. 1059: Pt X1 w/stedy back to bed at this time. Tolerates well. Pt denies pain, positioned for comfort. 1352: Levophed off at this time, /60. Will monitor. 1431: /49    1745: Pt up to chair following BM on BSC. X1 w/stedy. Pt tolerates well.        Electronically signed by Zeinab Tomlinson RN on 2/3/2021 at 7:51 PM

## 2021-02-03 NOTE — PROGRESS NOTES
General and Vascular Surgery                                                           Daily Progress Note                                                             Bryant Rose PA-C     Pt Name: Pinky Easley  Medical Record Number: [id number]  Date of Birth 1945   Today's Date: 2/3/2021    ASSESSMENT/PLAN  S/P 1/31/21: 1) Evacuation of right lower extremity hematoma  2) Excisional debridement of right lower extremity (20 x 16 cm)  3) Application of Veraflo wound vacuum    1. Pain controlled   2. Veraflo wound VAC changed yesterday. Working well. 3. Wound site clean yesterday. No further debridement needed. Good vascular tissue noted  4. Monitor and control pain  5. No further general surgery plans at this time. 6. Paroxysmal Afib Eliquis on hold     EDUCATION  Patient educated about their illness/diagnosis, stated above, and all questions answered. We discussed the importance of nutrition, medications they are taking, and healthy lifestyle. Lazaro Caraballos has improved. Pain is well controlled. OBJECTIVE  VITALS:  height is 5' 1\" (1.549 m) and weight is 188 lb 11.4 oz (85.6 kg). Her oral temperature is 98.1 °F (36.7 °C). Her blood pressure is 112/49 (abnormal) and her pulse is 93. Her respiration is 19 and oxygen saturation is 91%. VITALS:  BP (!) 112/49   Pulse 93   Temp 98.1 °F (36.7 °C) (Oral)   Resp 19   Ht 5' 1\" (1.549 m)   Wt 188 lb 11.4 oz (85.6 kg)   SpO2 91%   BMI 35.66 kg/m²   GENERAL: alert, cooperative, no distress, smiling  ABDOMEN: soft, non-tender and non-distended  EXTREMITY: RLE wound looks good. Good vascular tissue. pain controlled. I/O last 3 completed shifts: In: 3254.9 [P.O.:960; I.V.:44.9; IV Piggyback:2250]  Out: 2385 [Urine:1735; Drains:650]  No intake/output data recorded.     LABS  Recent Labs     02/03/21  0455   WBC 5.2   HGB 8.2*   HCT 25.0*         K 3.8      CO2 25   BUN 10 CREATININE <0.5*   MG 1.80   CALCIUM 8.2*     CBC:   Lab Results   Component Value Date    WBC 5.2 02/03/2021    RBC 2.85 02/03/2021    HGB 8.2 02/03/2021    HCT 25.0 02/03/2021    MCV 87.6 02/03/2021    MCH 28.6 02/03/2021    MCHC 32.7 02/03/2021    RDW 16.0 02/03/2021     02/03/2021    MPV 8.1 02/03/2021     CMP:    Lab Results   Component Value Date     02/03/2021    K 3.8 02/03/2021    K 4.6 01/28/2021     02/03/2021    CO2 25 02/03/2021    BUN 10 02/03/2021    CREATININE <0.5 02/03/2021    GFRAA >60 02/03/2021    AGRATIO 1.8 01/28/2021    LABGLOM >60 02/03/2021    GLUCOSE 109 02/03/2021    PROT 6.1 01/28/2021    LABALBU 3.9 01/28/2021    CALCIUM 8.2 02/03/2021    BILITOT <0.2 01/28/2021    ALKPHOS 68 01/28/2021    AST 20 01/28/2021    ALT 11 01/28/2021         Scot Kemp PA-C  Electronically signed 2/3/2021 at 10:24 AM     As above  Feels good overall  Tolerating PO  Pain tolerable  VAC in place, plan dressing change Friday  Weaning Levophed, nearly off    Electronically signed by Jeanine Pan MD on 2/3/2021 at 12:59 PM

## 2021-02-04 LAB
ANION GAP SERPL CALCULATED.3IONS-SCNC: 6 MMOL/L (ref 3–16)
BASOPHILS ABSOLUTE: 0 K/UL (ref 0–0.2)
BASOPHILS RELATIVE PERCENT: 0.3 %
BUN BLDV-MCNC: 10 MG/DL (ref 7–20)
CALCIUM SERPL-MCNC: 8.6 MG/DL (ref 8.3–10.6)
CHLORIDE BLD-SCNC: 104 MMOL/L (ref 99–110)
CO2: 27 MMOL/L (ref 21–32)
CREAT SERPL-MCNC: <0.5 MG/DL (ref 0.6–1.2)
EOSINOPHILS ABSOLUTE: 0 K/UL (ref 0–0.6)
EOSINOPHILS RELATIVE PERCENT: 1.1 %
GFR AFRICAN AMERICAN: >60
GFR NON-AFRICAN AMERICAN: >60
GLUCOSE BLD-MCNC: 110 MG/DL (ref 70–99)
HCT VFR BLD CALC: 23.9 % (ref 36–48)
HCT VFR BLD CALC: 25.6 % (ref 36–48)
HEMOGLOBIN: 7.8 G/DL (ref 12–16)
HEMOGLOBIN: 8.3 G/DL (ref 12–16)
INR BLD: 1.08 (ref 0.86–1.14)
LYMPHOCYTES ABSOLUTE: 0.5 K/UL (ref 1–5.1)
LYMPHOCYTES RELATIVE PERCENT: 11.6 %
MAGNESIUM: 1.7 MG/DL (ref 1.8–2.4)
MCH RBC QN AUTO: 28.7 PG (ref 26–34)
MCHC RBC AUTO-ENTMCNC: 32.7 G/DL (ref 31–36)
MCV RBC AUTO: 87.8 FL (ref 80–100)
MONOCYTES ABSOLUTE: 0.5 K/UL (ref 0–1.3)
MONOCYTES RELATIVE PERCENT: 11 %
NEUTROPHILS ABSOLUTE: 3.5 K/UL (ref 1.7–7.7)
NEUTROPHILS RELATIVE PERCENT: 76 %
PDW BLD-RTO: 15.8 % (ref 12.4–15.4)
PLATELET # BLD: 136 K/UL (ref 135–450)
PMV BLD AUTO: 8.2 FL (ref 5–10.5)
POTASSIUM SERPL-SCNC: 3.5 MMOL/L (ref 3.5–5.1)
PROTHROMBIN TIME: 12.5 SEC (ref 10–13.2)
RBC # BLD: 2.73 M/UL (ref 4–5.2)
SODIUM BLD-SCNC: 137 MMOL/L (ref 136–145)
WBC # BLD: 4.6 K/UL (ref 4–11)

## 2021-02-04 PROCEDURE — 99024 POSTOP FOLLOW-UP VISIT: CPT | Performed by: SURGERY

## 2021-02-04 PROCEDURE — 6370000000 HC RX 637 (ALT 250 FOR IP): Performed by: ORTHOPAEDIC SURGERY

## 2021-02-04 PROCEDURE — 6370000000 HC RX 637 (ALT 250 FOR IP): Performed by: STUDENT IN AN ORGANIZED HEALTH CARE EDUCATION/TRAINING PROGRAM

## 2021-02-04 PROCEDURE — 6370000000 HC RX 637 (ALT 250 FOR IP): Performed by: FAMILY MEDICINE

## 2021-02-04 PROCEDURE — 83735 ASSAY OF MAGNESIUM: CPT

## 2021-02-04 PROCEDURE — 2580000003 HC RX 258: Performed by: STUDENT IN AN ORGANIZED HEALTH CARE EDUCATION/TRAINING PROGRAM

## 2021-02-04 PROCEDURE — 2060000000 HC ICU INTERMEDIATE R&B

## 2021-02-04 PROCEDURE — 85025 COMPLETE CBC W/AUTO DIFF WBC: CPT

## 2021-02-04 PROCEDURE — 6370000000 HC RX 637 (ALT 250 FOR IP): Performed by: PEDIATRICS

## 2021-02-04 PROCEDURE — 85610 PROTHROMBIN TIME: CPT

## 2021-02-04 PROCEDURE — 6370000000 HC RX 637 (ALT 250 FOR IP): Performed by: INTERNAL MEDICINE

## 2021-02-04 PROCEDURE — 80048 BASIC METABOLIC PNL TOTAL CA: CPT

## 2021-02-04 PROCEDURE — 85014 HEMATOCRIT: CPT

## 2021-02-04 PROCEDURE — 6360000002 HC RX W HCPCS: Performed by: INTERNAL MEDICINE

## 2021-02-04 PROCEDURE — 2580000003 HC RX 258: Performed by: ORTHOPAEDIC SURGERY

## 2021-02-04 PROCEDURE — 36592 COLLECT BLOOD FROM PICC: CPT

## 2021-02-04 PROCEDURE — 85018 HEMOGLOBIN: CPT

## 2021-02-04 RX ORDER — HYDROCODONE BITARTRATE AND ACETAMINOPHEN 5; 325 MG/1; MG/1
2 TABLET ORAL EVERY 6 HOURS PRN
Status: DISCONTINUED | OUTPATIENT
Start: 2021-02-04 | End: 2021-02-11 | Stop reason: HOSPADM

## 2021-02-04 RX ORDER — MAGNESIUM SULFATE 1 G/100ML
1000 INJECTION INTRAVENOUS ONCE
Status: COMPLETED | OUTPATIENT
Start: 2021-02-04 | End: 2021-02-04

## 2021-02-04 RX ORDER — MORPHINE SULFATE 2 MG/ML
2 INJECTION, SOLUTION INTRAMUSCULAR; INTRAVENOUS EVERY 4 HOURS PRN
Status: DISCONTINUED | OUTPATIENT
Start: 2021-02-04 | End: 2021-02-11 | Stop reason: HOSPADM

## 2021-02-04 RX ORDER — POLYETHYLENE GLYCOL 3350 17 G/17G
17 POWDER, FOR SOLUTION ORAL DAILY PRN
Status: DISCONTINUED | OUTPATIENT
Start: 2021-02-04 | End: 2021-02-11 | Stop reason: HOSPADM

## 2021-02-04 RX ADMIN — TIZANIDINE 2 MG: 4 TABLET ORAL at 21:17

## 2021-02-04 RX ADMIN — PANTOPRAZOLE SODIUM 40 MG: 40 TABLET, DELAYED RELEASE ORAL at 08:39

## 2021-02-04 RX ADMIN — ACETAMINOPHEN 650 MG: 325 TABLET ORAL at 15:23

## 2021-02-04 RX ADMIN — METOCLOPRAMIDE HYDROCHLORIDE 5 MG: 5 INJECTION INTRAMUSCULAR; INTRAVENOUS at 08:39

## 2021-02-04 RX ADMIN — ALPRAZOLAM 0.5 MG: 0.5 TABLET ORAL at 21:11

## 2021-02-04 RX ADMIN — FLECAINIDE ACETATE 100 MG: 100 TABLET ORAL at 08:39

## 2021-02-04 RX ADMIN — POLYETHYLENE GLYCOL 3350 17 G: 17 POWDER, FOR SOLUTION ORAL at 08:39

## 2021-02-04 RX ADMIN — SIMETHICONE CHEW TAB 80 MG 80 MG: 80 TABLET ORAL at 14:25

## 2021-02-04 RX ADMIN — BISACODYL 5 MG: 5 TABLET, COATED ORAL at 12:08

## 2021-02-04 RX ADMIN — SODIUM CHLORIDE, PRESERVATIVE FREE 10 ML: 5 INJECTION INTRAVENOUS at 08:40

## 2021-02-04 RX ADMIN — SODIUM CHLORIDE, PRESERVATIVE FREE 10 ML: 5 INJECTION INTRAVENOUS at 21:18

## 2021-02-04 RX ADMIN — MAGNESIUM SULFATE 1000 MG: 1 INJECTION INTRAVENOUS at 10:23

## 2021-02-04 RX ADMIN — SODIUM CHLORIDE, PRESERVATIVE FREE 10 ML: 5 INJECTION INTRAVENOUS at 21:14

## 2021-02-04 RX ADMIN — HYDROCODONE BITARTRATE AND ACETAMINOPHEN 2 TABLET: 5; 325 TABLET ORAL at 18:43

## 2021-02-04 RX ADMIN — METOCLOPRAMIDE HYDROCHLORIDE 5 MG: 5 INJECTION INTRAMUSCULAR; INTRAVENOUS at 21:07

## 2021-02-04 RX ADMIN — SERTRALINE 100 MG: 100 TABLET, FILM COATED ORAL at 21:08

## 2021-02-04 RX ADMIN — HYDROCODONE BITARTRATE AND ACETAMINOPHEN 2 TABLET: 5; 325 TABLET ORAL at 12:08

## 2021-02-04 RX ADMIN — METOCLOPRAMIDE HYDROCHLORIDE 5 MG: 5 INJECTION INTRAMUSCULAR; INTRAVENOUS at 14:07

## 2021-02-04 RX ADMIN — FLECAINIDE ACETATE 100 MG: 100 TABLET ORAL at 21:07

## 2021-02-04 RX ADMIN — SODIUM CHLORIDE, PRESERVATIVE FREE 10 ML: 5 INJECTION INTRAVENOUS at 08:39

## 2021-02-04 RX ADMIN — ACETAMINOPHEN 650 MG: 325 TABLET ORAL at 09:32

## 2021-02-04 RX ADMIN — PANTOPRAZOLE SODIUM 40 MG: 40 TABLET, DELAYED RELEASE ORAL at 15:23

## 2021-02-04 RX ADMIN — HYDROCODONE BITARTRATE AND ACETAMINOPHEN 2 TABLET: 5; 325 TABLET ORAL at 02:34

## 2021-02-04 RX ADMIN — Medication 500 MG: at 08:39

## 2021-02-04 ASSESSMENT — PAIN DESCRIPTION - LOCATION
LOCATION: LEG
LOCATION: HEAD

## 2021-02-04 ASSESSMENT — PAIN DESCRIPTION - FREQUENCY
FREQUENCY: CONTINUOUS
FREQUENCY: CONTINUOUS

## 2021-02-04 ASSESSMENT — PAIN SCALES - GENERAL
PAINLEVEL_OUTOF10: 0
PAINLEVEL_OUTOF10: 4
PAINLEVEL_OUTOF10: 0

## 2021-02-04 ASSESSMENT — PAIN DESCRIPTION - PAIN TYPE
TYPE: ACUTE PAIN
TYPE: ACUTE PAIN

## 2021-02-04 ASSESSMENT — PAIN DESCRIPTION - DESCRIPTORS
DESCRIPTORS: ACHING
DESCRIPTORS: ACHING

## 2021-02-04 ASSESSMENT — PAIN DESCRIPTION - PROGRESSION
CLINICAL_PROGRESSION: NOT CHANGED
CLINICAL_PROGRESSION: NOT CHANGED

## 2021-02-04 NOTE — PROGRESS NOTES
Hospitalist Progress Note      PCP: MUSA Hernandez - CNP    Date of Admission: 1/28/2021    Subjective: Pt S/E. Levo has been off since yesterday 9am. bp dropped to 70/40 overnight, but rebounded to the 100 - 120s since then. She reports feeling better today. 1/29  Pain in R leg controlled. BP low - no levophed this am but later required    1/30  Ongoing need for levophed - 10 this am.   Bp better with albumin infusion. Hgb improved with transfusion - but with ongoing downtrend - additional pRBC ordered today due to shock. Vascular surgery involved per ortho recs - plan to return to OR soon     1/31  In OR this am with plastic surgery team.   BP better after pRBC, albeit still on levophed      2/1  BP still challenging, on levo  Pain controlled. 2/2  Still on levophed at 7 this am.   Pain control in RLE becoming challenging. Wound vac in place.      2/3   Levo weaned down to 1, bp improving  Pain controlled today  prns x 24 hrs: morphine 2 mg iv (with wound vac change)  Tylenol x 2  norco 10 mg    Medications:  Reviewed    Infusion Medications    sodium chloride      norepinephrine Stopped (02/03/21 1352)     Scheduled Medications    pantoprazole  40 mg Oral BID AC    metoclopramide  5 mg Intravenous TID    lidocaine 1 % injection  5 mL Intradermal Once    sodium chloride flush  10 mL Intravenous 2 times per day    calcium elemental  500 mg Oral Daily    flecainide  100 mg Oral 2 times per day    sertraline  100 mg Oral Nightly    sodium chloride flush  10 mL Intravenous 2 times per day     PRN Meds: HYDROcodone 5 mg - acetaminophen, polyethylene glycol, sodium chloride, prochlorperazine, simethicone, sodium chloride flush, ALPRAZolam, tiZANidine, acetaminophen **OR** acetaminophen, sodium chloride flush, promethazine **OR** ondansetron, bisacodyl      Intake/Output Summary (Last 24 hours) at 2/4/2021 0914  Last data filed at 2/4/2021 0600  Gross per 24 hour   Intake 480 ml Output 1650 ml   Net -1170 ml       Physical Exam Performed:    /71   Pulse 84   Temp 97.7 °F (36.5 °C) (Oral)   Resp 11   Ht 5' 1\" (1.549 m)   Wt 185 lb 10 oz (84.2 kg)   SpO2 95%   BMI 35.07 kg/m²     General appearance: No apparent distress, appears comfortable   HEENT: Pupils equal, round, and reactive to light. Conjunctivae/corneas clear. Neck: Supple, with full range of motion. No jugular venous distention. Respiratory:  Normal respiratory effort. Clear to auscultation, bilaterally without Rales/Wheezes/Rhonchi. Cardiovascular: Regular rate and rhythm with normal S1/S2 without murmurs, rubs or gallops. Abdomen: Soft, non-tender, non-distended with normal bowel sounds. Musculoskeletal: large area of hematoma with wound vac in place  Neurologic:  Neurovascularly intact without any focal sensory/motor deficits. grossly non-focal.  Psychiatric: Alert and oriented, thought content appropriate, normal insight  Capillary Refill: Brisk,< 3 seconds   Peripheral Pulses: +2 palpable, equal bilaterally       Labs:   Recent Labs     02/02/21 0445 02/02/21  0445 02/03/21  0455 02/03/21  1703 02/04/21  0500   WBC 5.0  --  5.2  --  4.6   HGB 8.5*   < > 8.2* 8.1* 7.8*   HCT 26.5*   < > 25.0* 25.1* 23.9*     --  144  --  136    < > = values in this interval not displayed. Recent Labs     02/02/21 0445 02/03/21  0455 02/04/21  0500    140 137   K 3.7 3.8 3.5    108 104   CO2 27 25 27   BUN 11 10 10   CREATININE 0.5* <0.5* <0.5*   CALCIUM 8.1* 8.2* 8.6     No results for input(s): AST, ALT, BILIDIR, BILITOT, ALKPHOS in the last 72 hours. No results for input(s): INR in the last 72 hours. No results for input(s): Mojgan Foster in the last 72 hours.     Urinalysis:      Lab Results   Component Value Date    NITRU Negative 09/03/2019    WBCUA 2 10/27/2018    RBCUA 4 10/27/2018    BLOODU Negative 09/03/2019    SPECGRAV 1.010 09/03/2019    GLUCOSEU Negative 09/03/2019 Radiology:  CTA LOWER EXTREMITY RIGHT W CONTRAST   Final Result   Large hematoma of the anterior calf. No active extravasation or etiology of   hemorrhage is identified. Popliteal artery is patent. There is significant trifurcation level disease   with primary runoff via the disease posterior tibial.      No acute osseous abnormality. XR TIBIA FIBULA RIGHT (2 VIEWS)   Final Result   Pretibial soft tissue swelling, possible hematoma. No acute osseous injury. Assessment/Plan:    Active Hospital Problems    Diagnosis    Nausea [R11.0]    Hematoma of right lower extremity [S80.11XA]    Acute blood loss anemia [D62]    Traumatic hematoma of right lower leg [S80.11XA]    Hemorrhagic shock (HCC) [R57.8]    Hematoma [T14. 8XXA]    Symptomatic anemia [D64.9]    A-fib ([address] [I48.91]    Aortic valve disorder [I35.9]     Hospital course: a 68 yo female admitted with a right leg hematoma after having a car door slammed against her R leg. Pt on eliquis for Afib. She has been evaluated by ortho and had a hematoma evacuated in the OR. She sancho needed multiple blood transfusions due to hemorrhagic shock, but appears to be improving now. Acute Traumatic hematoma RLE  Coagulopathy due to eliquis - now on hold. S/p OR with Orthopedics 1/28  Hgb dropped to 6.5 - s/p 3 units pRBC on 1/28 - Hgb better but with ongoing hypotension. Additional pRBC 2 units 1/30  To OR with plastic surgery team 1/31  BP still Low 2/1 - transfused additional unit of pRBC. Hgb 7.8 today. Afib paroxysmal.   currently in nsr  Eliquis on hold. Cont flacainide. Shock - appears hemorrhagic - resolved  Checked ECHO - preserved EF without major valvular abnormality. S/p albumin infusion 1/30  s/p additional pRBC as above. Levo stopped    Hx of Gastric Bypass - Shanice-En-Y - 30+ years ago. Chronic nausea on Reglan. EGD in August - pathology report fairly unremarkable.        DVT Prophylaxis: SCD  Diet: DIET GENERAL;  Code Status: Full Code    Dispo - inpatient, can transfer to pcu    Niki Arriaga DO

## 2021-02-04 NOTE — PROGRESS NOTES
6735: Pt resting w/eyes closed. Respirations easy/even, no facial grimace. Call light within reach. 8537: Pt requests PRN miralax due to incomplete BM 2/3/21. See emar. 1115: Pt to transfer out of ICU to PCU per MD order. Awaiting pcu bed availability. 1139: pt aware of transfer order and requests to get up to bedside commode. Pt X1 w/stedy, tolerates well. Pt to call out when ready to move from [facility] to bedside chair. Call light within reach. 1215: Pt to bedside chair from commode at this time. Tolerates well. 1416: Nurse calls to confirm that general surgery will call 30-45 minutes prior to patient wound vac dressing change 2/5. Lety Smiley confirms this will occur, as it is normal practice for them to call prior to pre-medicate patient for pain. Patient aware, tis eases pt anxiety. 1448: Pt returns to bed f/chair at this time. Pt tolerates well. Pt requests tylenol for a headache, nurse advises patient that this medication can be given at 1530, pt agreeable. Pt son at bedside and is aware patient will be transferred to PCU when a bed becomes available. 1802: Pt denies any needs at this time. Wound vac to 125mmHg, dressing intact. Pt denies pain at this time. Call light within reach.     Electronically signed by Siva Snow RN on 2/4/2021 at 6:02 PM

## 2021-02-04 NOTE — PROGRESS NOTES
Comprehensive Nutrition Assessment    Type and Reason for Visit:  Initial(LOS)    Nutrition Recommendations/Plan:   Continue General diet. Start Ensure HP BID. Nutrition Assessment:  LOS. Pt presented with R leg hematoma caused by car door hitting her leg. Pt s/p evacuation of hematoma on 1/28 and I&D on 1/31 with wound vac placement. Pt developed hypotension and needed levo. Levo currently off. Pt's intakes have been >50% since admission. Will add high protein ONS to optimize nutrition. Malnutrition Assessment:  Malnutrition Status:  No malnutrition      Nutrition Related Findings:  +1 LLE, +2 RLE edema; BM 2/3      Wounds:  Surgical Incision, Wound Vac       Current Nutrition Therapies:    DIET GENERAL;     Anthropometric Measures:  · Height: 5' 1\" (154.9 cm)  · Current Body Weight: 185 lb (83.9 kg)   · Admission Body Weight: 173 lb (78.5 kg)    · Ideal Body Weight: 105 lbs; % Ideal Body Weight 176.2 %   · BMI: 35  · BMI Categories: Obese Class 2 (BMI 35.0 -39.9)       Nutrition Diagnosis:   · Increased nutrient needs related to increase demand for energy/nutrients as evidenced by wounds    Nutrition Interventions:   Food and/or Nutrient Delivery:  Continue Current Diet, Start Oral Nutrition Supplement  Nutrition Education/Counseling:  No recommendation at this time   Coordination of Nutrition Care:  Continue to monitor while inpatient    Discharge Planning:    Continue Oral Nutrition Supplement     Electronically signed by Quinten Cruz RD, PATIENCE on 2/4/21 at 11:04 AM EST    Contact: [phone]

## 2021-02-04 NOTE — PROGRESS NOTES
[facility] Oneil [facility] Surgery [phone]                                     Daily Progress Note                                                         Pt Name: [facility]  Medical Record Number: [id number]  Date of Birth 1945   Today's Date: 2/4/2021  Chief Complaint   Patient presents with    Leg Injury     hit right leg on car door. large hematoma forming to right shin area. pt on eliquis. ASSESSMENT/PLAN  Right lower extremity hematoma  -1/31 evacuation of hematoma, excisional debridement, wound vac application.   -Wound vac changed 2/2. Plan for change tomorrow 2/5           Karen Coto is sleeping, easily awoken. Wound vac functioning. OBJECTIVE  VITALS:  height is 5' 1\" (1.549 m) and weight is 185 lb 10 oz (84.2 kg). Her oral temperature is 97.6 °F (36.4 °C). Her blood pressure is 115/69 and her pulse is 82. Her respiration is 10 and oxygen saturation is 97%. INTAKE/OUTPUT:    Intake/Output Summary (Last 24 hours) at 2/4/2021 0746  Last data filed at 2/4/2021 0600  Gross per 24 hour   Intake 720 ml   Output 1895 ml   Net -1175 ml     GENERAL: alert, no distress    I/O last 3 completed shifts:   In: 5 [P.O.:720]  Out: Ruba Rondon [ELYLJ:1693; Drains:400]      LABS  Recent Labs     02/04/21  0500   WBC 4.6   HGB 7.8*   HCT 23.9*         K 3.5      CO2 27   BUN 10   CREATININE <0.5*   MG 1.70*   CALCIUM 8.6       EDUCATION  Patient educated about Disease Process, Medications, Smoking Cessation, Oxygenation, Incentive Spirometry and Deep Breath and Cough, Diabetes, Hyperlipidemia, Smoking Cessation, Nutrition, Exercise and Hypertension    Electronically signed by MUSA Fitzgerald CNP on 2/4/2021 at 7:46 AM      [facility] [place] [facility]   [phone] Office  [phone] Cell       As above  Off pressors  VAC in place, plan change Friday    Electronically signed by Darrian Sue MD on 2/4/2021 at 12:24 PM

## 2021-02-05 LAB
ANAEROBIC CULTURE: NORMAL
ANION GAP SERPL CALCULATED.3IONS-SCNC: 8 MMOL/L (ref 3–16)
BASOPHILS ABSOLUTE: 0 K/UL (ref 0–0.2)
BASOPHILS RELATIVE PERCENT: 0.4 %
BUN BLDV-MCNC: 11 MG/DL (ref 7–20)
CALCIUM SERPL-MCNC: 8.7 MG/DL (ref 8.3–10.6)
CHLORIDE BLD-SCNC: 104 MMOL/L (ref 99–110)
CO2: 28 MMOL/L (ref 21–32)
CREAT SERPL-MCNC: <0.5 MG/DL (ref 0.6–1.2)
EOSINOPHILS ABSOLUTE: 0.1 K/UL (ref 0–0.6)
EOSINOPHILS RELATIVE PERCENT: 2 %
FERRITIN: 64.6 NG/ML (ref 15–150)
GFR AFRICAN AMERICAN: >60
GFR NON-AFRICAN AMERICAN: >60
GLUCOSE BLD-MCNC: 93 MG/DL (ref 70–99)
GRAM STAIN RESULT: NORMAL
HCT VFR BLD CALC: 23.1 % (ref 36–48)
HCT VFR BLD CALC: 23.4 % (ref 36–48)
HEMOGLOBIN: 7.3 G/DL (ref 12–16)
HEMOGLOBIN: 7.5 G/DL (ref 12–16)
IRON SATURATION: 12 % (ref 15–50)
IRON: 28 UG/DL (ref 37–145)
LYMPHOCYTES ABSOLUTE: 0.8 K/UL (ref 1–5.1)
LYMPHOCYTES RELATIVE PERCENT: 20.2 %
MAGNESIUM: 1.7 MG/DL (ref 1.8–2.4)
MCH RBC QN AUTO: 28.2 PG (ref 26–34)
MCHC RBC AUTO-ENTMCNC: 32.2 G/DL (ref 31–36)
MCV RBC AUTO: 87.6 FL (ref 80–100)
MONOCYTES ABSOLUTE: 0.5 K/UL (ref 0–1.3)
MONOCYTES RELATIVE PERCENT: 12.4 %
NEUTROPHILS ABSOLUTE: 2.7 K/UL (ref 1.7–7.7)
NEUTROPHILS RELATIVE PERCENT: 65 %
PDW BLD-RTO: 15.7 % (ref 12.4–15.4)
PLATELET # BLD: 148 K/UL (ref 135–450)
PMV BLD AUTO: 8.2 FL (ref 5–10.5)
POTASSIUM SERPL-SCNC: 3.7 MMOL/L (ref 3.5–5.1)
RBC # BLD: 2.67 M/UL (ref 4–5.2)
SODIUM BLD-SCNC: 140 MMOL/L (ref 136–145)
TOTAL IRON BINDING CAPACITY: 238 UG/DL (ref 260–445)
WBC # BLD: 4.2 K/UL (ref 4–11)
WOUND/ABSCESS: NORMAL

## 2021-02-05 PROCEDURE — 85014 HEMATOCRIT: CPT

## 2021-02-05 PROCEDURE — 6360000002 HC RX W HCPCS: Performed by: INTERNAL MEDICINE

## 2021-02-05 PROCEDURE — 82728 ASSAY OF FERRITIN: CPT

## 2021-02-05 PROCEDURE — 2060000000 HC ICU INTERMEDIATE R&B

## 2021-02-05 PROCEDURE — 6360000002 HC RX W HCPCS: Performed by: FAMILY MEDICINE

## 2021-02-05 PROCEDURE — 2580000003 HC RX 258: Performed by: ORTHOPAEDIC SURGERY

## 2021-02-05 PROCEDURE — 6370000000 HC RX 637 (ALT 250 FOR IP): Performed by: STUDENT IN AN ORGANIZED HEALTH CARE EDUCATION/TRAINING PROGRAM

## 2021-02-05 PROCEDURE — 94760 N-INVAS EAR/PLS OXIMETRY 1: CPT

## 2021-02-05 PROCEDURE — 6370000000 HC RX 637 (ALT 250 FOR IP): Performed by: INTERNAL MEDICINE

## 2021-02-05 PROCEDURE — APPNB15 APP NON BILLABLE TIME 0-15 MINS: Performed by: NURSE PRACTITIONER

## 2021-02-05 PROCEDURE — 97530 THERAPEUTIC ACTIVITIES: CPT

## 2021-02-05 PROCEDURE — 97166 OT EVAL MOD COMPLEX 45 MIN: CPT

## 2021-02-05 PROCEDURE — 83540 ASSAY OF IRON: CPT

## 2021-02-05 PROCEDURE — 85018 HEMOGLOBIN: CPT

## 2021-02-05 PROCEDURE — 85025 COMPLETE CBC W/AUTO DIFF WBC: CPT

## 2021-02-05 PROCEDURE — 83550 IRON BINDING TEST: CPT

## 2021-02-05 PROCEDURE — 2580000003 HC RX 258: Performed by: STUDENT IN AN ORGANIZED HEALTH CARE EDUCATION/TRAINING PROGRAM

## 2021-02-05 PROCEDURE — 97162 PT EVAL MOD COMPLEX 30 MIN: CPT

## 2021-02-05 PROCEDURE — 83735 ASSAY OF MAGNESIUM: CPT

## 2021-02-05 PROCEDURE — 80048 BASIC METABOLIC PNL TOTAL CA: CPT

## 2021-02-05 PROCEDURE — 6370000000 HC RX 637 (ALT 250 FOR IP): Performed by: FAMILY MEDICINE

## 2021-02-05 PROCEDURE — APPSS15 APP SPLIT SHARED TIME 0-15 MINUTES: Performed by: NURSE PRACTITIONER

## 2021-02-05 RX ORDER — MAGNESIUM SULFATE 1 G/100ML
1000 INJECTION INTRAVENOUS ONCE
Status: COMPLETED | OUTPATIENT
Start: 2021-02-05 | End: 2021-02-05

## 2021-02-05 RX ADMIN — HYDROCODONE BITARTRATE AND ACETAMINOPHEN 2 TABLET: 5; 325 TABLET ORAL at 22:12

## 2021-02-05 RX ADMIN — HYDROCODONE BITARTRATE AND ACETAMINOPHEN 2 TABLET: 5; 325 TABLET ORAL at 15:52

## 2021-02-05 RX ADMIN — ALPRAZOLAM 0.5 MG: 0.5 TABLET ORAL at 23:43

## 2021-02-05 RX ADMIN — SODIUM CHLORIDE, PRESERVATIVE FREE 10 ML: 5 INJECTION INTRAVENOUS at 20:35

## 2021-02-05 RX ADMIN — METOCLOPRAMIDE HYDROCHLORIDE 5 MG: 5 INJECTION INTRAMUSCULAR; INTRAVENOUS at 07:36

## 2021-02-05 RX ADMIN — PANTOPRAZOLE SODIUM 40 MG: 40 TABLET, DELAYED RELEASE ORAL at 15:52

## 2021-02-05 RX ADMIN — MAGNESIUM SULFATE 1000 MG: 1 INJECTION INTRAVENOUS at 16:33

## 2021-02-05 RX ADMIN — HYDROCODONE BITARTRATE AND ACETAMINOPHEN 2 TABLET: 5; 325 TABLET ORAL at 10:06

## 2021-02-05 RX ADMIN — MORPHINE SULFATE 2 MG: 2 INJECTION, SOLUTION INTRAMUSCULAR; INTRAVENOUS at 07:36

## 2021-02-05 RX ADMIN — HYDROCODONE BITARTRATE AND ACETAMINOPHEN 2 TABLET: 5; 325 TABLET ORAL at 00:51

## 2021-02-05 RX ADMIN — TIZANIDINE 2 MG: 4 TABLET ORAL at 23:43

## 2021-02-05 RX ADMIN — PANTOPRAZOLE SODIUM 40 MG: 40 TABLET, DELAYED RELEASE ORAL at 07:37

## 2021-02-05 RX ADMIN — SERTRALINE 100 MG: 100 TABLET, FILM COATED ORAL at 20:35

## 2021-02-05 RX ADMIN — SODIUM CHLORIDE, PRESERVATIVE FREE 10 ML: 5 INJECTION INTRAVENOUS at 07:37

## 2021-02-05 RX ADMIN — Medication 500 MG: at 07:37

## 2021-02-05 RX ADMIN — FLECAINIDE ACETATE 100 MG: 100 TABLET ORAL at 20:35

## 2021-02-05 RX ADMIN — FLECAINIDE ACETATE 100 MG: 100 TABLET ORAL at 07:37

## 2021-02-05 RX ADMIN — METOCLOPRAMIDE HYDROCHLORIDE 5 MG: 5 INJECTION INTRAMUSCULAR; INTRAVENOUS at 15:52

## 2021-02-05 RX ADMIN — ACETAMINOPHEN 650 MG: 325 TABLET ORAL at 07:37

## 2021-02-05 RX ADMIN — METOCLOPRAMIDE HYDROCHLORIDE 5 MG: 5 INJECTION INTRAMUSCULAR; INTRAVENOUS at 20:35

## 2021-02-05 ASSESSMENT — PAIN DESCRIPTION - DESCRIPTORS
DESCRIPTORS: ACHING;THROBBING
DESCRIPTORS: ACHING
DESCRIPTORS: ACHING;THROBBING
DESCRIPTORS: ACHING;THROBBING
DESCRIPTORS: ACHING
DESCRIPTORS: ACHING;THROBBING

## 2021-02-05 ASSESSMENT — PAIN DESCRIPTION - LOCATION
LOCATION: LEG

## 2021-02-05 ASSESSMENT — PAIN SCALES - GENERAL
PAINLEVEL_OUTOF10: 6
PAINLEVEL_OUTOF10: 1
PAINLEVEL_OUTOF10: 8
PAINLEVEL_OUTOF10: 8
PAINLEVEL_OUTOF10: 5
PAINLEVEL_OUTOF10: 5
PAINLEVEL_OUTOF10: 8
PAINLEVEL_OUTOF10: 3

## 2021-02-05 ASSESSMENT — PAIN - FUNCTIONAL ASSESSMENT
PAIN_FUNCTIONAL_ASSESSMENT: INTOLERABLE, UNABLE TO DO ANY ACTIVE OR PASSIVE ACTIVITIES
PAIN_FUNCTIONAL_ASSESSMENT: PREVENTS OR INTERFERES WITH ALL ACTIVE AND SOME PASSIVE ACTIVITIES
PAIN_FUNCTIONAL_ASSESSMENT: INTOLERABLE, UNABLE TO DO ANY ACTIVE OR PASSIVE ACTIVITIES

## 2021-02-05 ASSESSMENT — PAIN DESCRIPTION - ORIENTATION
ORIENTATION: RIGHT
ORIENTATION: RIGHT;LOWER
ORIENTATION: RIGHT
ORIENTATION: RIGHT

## 2021-02-05 ASSESSMENT — PAIN DESCRIPTION - ONSET
ONSET: ON-GOING

## 2021-02-05 ASSESSMENT — PAIN DESCRIPTION - PAIN TYPE
TYPE: ACUTE PAIN

## 2021-02-05 ASSESSMENT — PAIN DESCRIPTION - DIRECTION
RADIATING_TOWARDS: KNEE
RADIATING_TOWARDS: KNEE

## 2021-02-05 ASSESSMENT — PAIN DESCRIPTION - FREQUENCY
FREQUENCY: CONTINUOUS

## 2021-02-05 ASSESSMENT — PAIN DESCRIPTION - PROGRESSION
CLINICAL_PROGRESSION: GRADUALLY IMPROVING
CLINICAL_PROGRESSION: NOT CHANGED
CLINICAL_PROGRESSION: GRADUALLY IMPROVING

## 2021-02-05 NOTE — PROGRESS NOTES
Occupational Therapy   Occupational Therapy Initial Assessment  Date: 2021   Patient Name: Jimmy Cisneros  MRN: [id number]     : 1945    Date of Service: 2021    Discharge Recommendations:  Patient would benefit from continued therapy after discharge, 5-7 sessions per week  OT Equipment Recommendations  Other: defer to [facility] scored a 16/24 on the AM-PAC ADL Inpatient form. Current research shows that an AM-PAC score of 17 or less is typically not associated with a discharge to the patient's home setting. Based on the patient's AM-PAC score and their current ADL deficits, it is recommended that the patient have 5-7 sessions per week of Occupational Therapy at d/c to increase the patient's independence. At this time, this patient demonstrates the endurance, and/or tolerance for 3 hours of therapy each day, with a treatment frequency of 5-7x/wk. Please see assessment section for further patient specific details. If patient discharges prior to next session this note will serve as a discharge summary. Please see below for the latest assessment towards goals. Assessment   Performance deficits / Impairments: Decreased functional mobility ; Decreased endurance;Decreased strength;Decreased ADL status; Decreased safe awareness;Decreased high-level IADLs;Decreased balance  Assessment: 66 y/o female admit 2021 with R LE Traumatic Hematoma (hit R LE on car door).  s/p Evac of R LE Hematoma. Pt returned to OR 2021 S/P Evac of R LE Hematoma, Excisional Debride (85B28 cm), Applic of Wound Vac. Plastic and Reconstruct Surg follow for Staged Reconstruct with Skin Graft. PMH as noted including Gastric Bypass, Rectal Ca, L TKR, R Femur Fx/ORIF (10/2018). PTA pt lives at home with son and independent with ADLs and functional mobility. Today, pt required min A to stand to blanka stedy and trnasfer back to bed.  Pt with functional UE ROM for self care and anticipate will Yes  Additional Pertinent Hx: 66 y/o female admit 1/31/2021 with R LE Traumatic Hematoma (hit R LE on car door). 1/28 s/p Evac of R LE Hematoma. Pt returned to OR 1/31/2021 S/P Evac of R LE Hematoma, Excisional Debride (71G60 cm), Applic of Wound Vac. Plastic and Reconstruct Surg follow for Staged Reconstruct with Skin Graft. PMH as noted including Gastric Bypass, Rectal Ca, L TKR, R Femur Fx/ORIF (10/2018). Family / Caregiver Present: No  Referring Practitioner: Tyler Mcmullen DO  Subjective  Subjective: Pt seen bedside and agreeable to therapy. General Comment  Comments: Per RN ok for therapy. Social/Functional History  Social/Functional History  Lives With: Son(Does not work)  Type of Home: House  Home Layout: One level  Home Access: Ramped entrance  Bathroom Shower/Tub: Tub/Shower unit  Bathroom Toilet: Standard  Bathroom Equipment: Tub transfer bench, Grab bars in shower, Hand-held shower, 3-in-1 commode  Bathroom Accessibility: Accessible  Home Equipment: Rolling walker, Wheelchair-manual  ADL Assistance: Independent  Homemaking Assistance: (son completes [de-identified] of IADLs)  Ambulation Assistance: Independent(with rw)  Transfer Assistance: Independent  Active : No(Son drives.)  Occupation: Retired  IADL Comments: Pt sleeps in regular bed. Objective   Vision: Impaired  Vision Exceptions: Wears glasses for reading  Hearing: Within functional limits    Orientation  Overall Orientation Status: Within Functional Limits  Observation/Palpation  Observation: Wound Vac/Dressing R Lat Calf.   Balance  Sitting Balance: Stand by assistance(briefly EOB prior to returning to supine)  Standing Balance  Time: stood briefly with blanka stedy support to transfer back to bed  Functional Mobility  Functional Mobility Comments: chair > bed with blanka stedy  ADL  Toileting: Dependent/Total(tello)  Additional Comments: Anticipate pt will require max A for LB bathing/dressing, SBA for UB bathing/dressing and grooming when seated based on balance and endurance observed        Bed mobility  Supine to Sit: (pt in chair at start of session)  Sit to Supine: Moderate assistance  Transfers  Sit to stand: Minimal assistance  Stand to sit: Minimal assistance  Transfer Comments: to/from blanka gusman     Cognition  Overall Cognitive Status: WFL  Perception  Overall Perceptual Status: WFL     Sensation  Overall Sensation Status: (Appears intact, limited assess R distal LE.)        LUE AROM (degrees)  LUE AROM : WFL  Left Hand AROM (degrees)  Left Hand AROM: WFL  RUE AROM (degrees)  RUE AROM : WFL  Right Hand AROM (degrees)  Right Hand AROM: WFL                      Plan   Plan  Times per week: 3-5  Current Treatment Recommendations: Strengthening, Endurance Training, Balance Training, Gait Training, Functional Mobility Training, Self-Care / ADL    AM-PAC Score        AM-[facility] Inpatient Daily Activity Raw Score: 16 (02/05/21 1518)  AM-PAC Inpatient ADL T-Scale Score : 35.96 (02/05/21 1518)  ADL Inpatient CMS 0-100% Score: 53.32 (02/05/21 1518)  ADL Inpatient CMS G-Code Modifier : CK (02/05/21 1518)    Goals  Short term goals  Time Frame for Short term goals: Prior to DC: Short term goal 1: Pt will complete ADL transfers with SBA  Short term goal 2: Pt will complete functional mobility with SBA  Short term goal 3: Pt will tolerate standing > 5 min for functional task with SBA  Short term goal 4: Pt will complete LB dressing with SBA  Short term goal 5: Pt will complete toileting with SBA  Patient Goals   Patient goals : to return home asap       Therapy Time   Individual Concurrent Group Co-treatment   Time In 1400         Time Out 1430         Minutes 30         Timed Code Treatment Minutes: 15 Minutes     This note to serve as OT d/c summary if pt is d/c-ed prior to next therapy session.     Jg Alex OTR/L

## 2021-02-05 NOTE — PROGRESS NOTES
Physical Therapy    Facility/Department: [facility] ICU  Initial Assessment    NAME: Ana M Hernandez  : 1945  MRN: [id number]    Date of Service: 2021    Discharge Recommendations:  Continue to assess pending progress   PT Equipment Recommendations  Other: Will monitor for potential equipt needs. Assessment   Body structures, Functions, Activity limitations: Decreased functional mobility ; Decreased strength;Decreased endurance; Increased pain  Assessment: 66 y/o female admit 2021 with R LE Traumatic Hematoma (hit R LE on car door), MAX and Hypotension. 2021 S/P Evac of R LE Hematoma, Excisional Debride (10K48 cm), Applic of Wound Vac. Plastic and Reconstruct Surg follow for Staged Reconstruct with Skin Graft. PMH as noted including Gastric Bypass, Rectal Ca, L TKR, R Femur Fx/ORIF (10/2018). PTA pt living with son in home with ramp access and 1st floor bed/bath. Prior, pt independent with daily care and functional mobility. Pt sherri OOB activities via Mariano relatively well. Given pt's ability WBAT, anticipate good progress with functional activities for in home mobility. At this time, recommend cont PT Services (pt prefers 5-7) prior return home with son. Will monitor pt's progress. Prognosis: Good  Decision Making: Medium Complexity  History: 66 y/o female admit 2021 with R LE Traumatic Hematoma (hit R LE on car door), MAX and Hypotension. 2021 S/P Evac of R LE Hematoma, Excisional Debride (65K65 cm), Applic of Wound Vac. Plastic and Reconstruct Surg follow for Staged Reconstruct with Skin Graft. PMH as noted including Gastric Bypass, Rectal Ca, L TKR, R Femur Fx/ORIF (10/2018). Exam: See above. Clinical Presentation: See above. Patient Education: Role of PT, POC, OOB via [place]. Barriers to Learning: [place].   REQUIRES PT FOLLOW UP: Yes  Activity Tolerance  Activity Tolerance: Patient limited by endurance       Patient Diagnosis(es): The primary encounter diagnosis was Traumatic hematoma of right lower leg, initial encounter. Diagnoses of Contusion of leg, right, initial encounter, Anticoagulated, Hypotension due to hypovolemia, and Anemia due to other cause, not classified were also pertinent to this visit. has a past medical history of Acid reflux, Anxiety, Arthritis, Atrial fibrillation (HCC), Closed fracture dislocation of right elbow, Depression, Fibromyalgia, Migraine, Rectal cancer ([address] and Wears glasses. has a past surgical history that includes  section; Total knee arthroplasty (Left); open tx femoral supracondylar fracture w/o extension (Right, 10/25/2018); Gastric bypass surgery; Neck surgery; other surgical history; Cholecystectomy; Appendectomy; Colonoscopy; Upper gastrointestinal endoscopy (N/A, 2019); Colonoscopy (N/A, 2019); knee surgery (Right); Upper gastrointestinal endoscopy (N/A, 2020); incision and drainage (Right, 2021); and Skin graft (Right, 2021). Restrictions  Restrictions/Precautions  Restrictions/Precautions: Fall Risk, Weight Bearing  Lower Extremity Weight Bearing Restrictions  Right Lower Extremity Weight Bearing: Weight Bearing As Tolerated  Position Activity Restriction  Other position/activity restrictions: Wound Vac R Lat Calf. PT spoke with Surg (LUCAS Eddy NP) : WBAT R LE. Vision/Hearing  Vision: Impaired  Hearing: Within functional limits     Subjective  General  Chart Reviewed: Yes  Patient assessed for rehabilitation services?: Yes  Additional Pertinent Hx: 68 y/o female admit 2021 with R LE Traumatic Hematoma (hit R LE on car door), MAX and Hypotension. 2021 S/P Evac of R LE Hematoma, Excisional Debride (07I80 cm), Applic of Wound Vac. Plastic and Reconstruct Surg follow for Staged Reconstruct with Skin Graft. PMH as noted including Gastric Bypass, Rectal Ca, L TKR, R Femur Fx/ORIF (10/2018).   Family / Caregiver Present: No  Referring Practitioner: Dr Edgar Hook  Diagnosis: Uma Page Traumatic Hemtoma S/P Evac and Debridement. Follows Commands: Within Functional Limits  Subjective  Subjective: Pt agreeable to PT Eval/Rx. Pain Screening  Patient Currently in Pain: Yes          Orientation  Orientation  Overall Orientation Status: Within Functional Limits  Social/Functional History  Social/Functional History  Lives With: Son(Does not work)  Type of Home: House  Home Layout: One level  Home Access: Ramped entrance  Bathroom Shower/Tub: Tub/Shower unit  Bathroom Toilet: Standard  Bathroom Equipment: Tub transfer bench, Grab bars in shower, Hand-held shower  Bathroom Accessibility: Accessible  Home Equipment: Rolling walker, Wheelchair-manual  ADL Assistance: Independent  Homemaking Assistance: (son completes [de-identified] of IADLs)  Ambulation Assistance: Independent(with rw)  Transfer Assistance: Independent  Active : No(Son drives.)  Occupation: Retired  IADL Comments: Pt sleeps in regular bed. Cognition   Cognition  Overall Cognitive Status: WFL    Objective     Observation/Palpation  Observation: Wound Vac/Dressing R Lat Calf. AROM RLE (degrees)  RLE AROM: WFL  AROM LLE (degrees)  LLE AROM : WFL  AROM RUE (degrees)  RUE AROM : WFL  AROM LUE (degrees)  LUE AROM : WFL  Strength RLE  Comment: Hip 3+/5; Knee 3/5; Ankle 3+/5 (limited resist testing). Strength LLE  Strength LLE: WFL  Strength RUE  Strength RUE: WFL  Strength LUE  Strength LUE: WFL     Sensation  Overall Sensation Status: (Appears intact, limited assess R distal LE.)  Bed mobility  Sit to Supine: Moderate assistance(HOB elevated.)  Transfers  Sit to Stand: Minimal Assistance(Via Stedy.)  Stand to sit: Minimal Assistance(Via Stedy.)  Bed to Chair: Dependent/Total(Via Stedy.)  Ambulation  Ambulation?: No(Bed to/from chair via [facility].)  WB Status: Obtained Wgt Bear Status (WBAT) following PT Eval this date. Balance  Comments: EOB Good, Supervision. Static Sitting during Transfer via Stedy CGA.   Exercises  Quad Sets: 10x  Gluteal Sets: 10x  Ankle Pumps: 10x     Plan   Plan  Times per week: 3-5x week while in acute care setting. Current Treatment Recommendations: Strengthening, Functional Mobility Training, Transfer Training, Gait Training, Safety Education & Training, Patient/Caregiver Education & Training  Safety Devices  Type of devices: Bed alarm in place, Call light within reach, Left in bed, Nurse notified                 AM-PAC Score  AM-PAC Inpatient Mobility Raw Score : 11 (02/05/21 1458)  AM-PAC Inpatient T-Scale Score : 33.86 (02/05/21 1458)  Mobility Inpatient CMS 0-100% Score: 72.57 (02/05/21 1458)  Mobility Inpatient CMS G-Code Modifier : CL (02/05/21 1458)          Goals  Short term goals  Time Frame for Short term goals: Upon d/c acute care setting. Short term goal 1: Bed Mob SBA. Short term goal 2: Transfers with assist device CGA. Short term goal 3: Amb with assist device, WBAT R LE, 25' Min assist.  Patient Goals   Patient goals : Be able to return home with son.        Therapy Time   Individual Concurrent Group Co-treatment   Time In 1400         Time Out 1430         Minutes [address]

## 2021-02-05 NOTE — PROGRESS NOTES
Hospitalist Progress Note      PCP: MUSA Thomas - CNP    Date of Admission: 1/28/2021    Subjective: Pt S/E. bp and hgb stable today. 1/29  Pain in R leg controlled. BP low - no levophed this am but later required    1/30  Ongoing need for levophed - 10 this am.   Bp better with albumin infusion. Hgb improved with transfusion - but with ongoing downtrend - additional pRBC ordered today due to shock. Vascular surgery involved per ortho recs - plan to return to OR soon     1/31  In OR this am with plastic surgery team.   BP better after pRBC, albeit still on levophed      2/1  BP still challenging, on levo  Pain controlled. 2/2  Still on levophed at 7 this am.   Pain control in RLE becoming challenging. Wound vac in place.      2/3   Levo weaned down to 1, bp improving  Pain controlled today  prns x 24 hrs: morphine 2 mg iv (with wound vac change)  Tylenol x 2  norco 10 mg    2/4 stable off levo, hgb stable > 7.0    Medications:  Reviewed    Infusion Medications    sodium chloride       Scheduled Medications    pantoprazole  40 mg Oral BID AC    metoclopramide  5 mg Intravenous TID    lidocaine 1 % injection  5 mL Intradermal Once    sodium chloride flush  10 mL Intravenous 2 times per day    calcium elemental  500 mg Oral Daily    flecainide  100 mg Oral 2 times per day    sertraline  100 mg Oral Nightly    sodium chloride flush  10 mL Intravenous 2 times per day     PRN Meds: HYDROcodone 5 mg - acetaminophen, polyethylene glycol, morphine, sodium chloride, prochlorperazine, simethicone, sodium chloride flush, ALPRAZolam, tiZANidine, acetaminophen **OR** acetaminophen, sodium chloride flush, promethazine **OR** ondansetron, bisacodyl      Intake/Output Summary (Last 24 hours) at 2/5/2021 0813  Last data filed at 2/5/2021 0736  Gross per 24 hour   Intake 250 ml   Output 1480 ml   Net -1230 ml       Physical Exam Performed:    BP (!) 106/57   Pulse 77   Temp 98.4 °F (36.9 °C) (Oral)   Resp 15   Ht 5' 1\" (1.549 m)   Wt 185 lb 3.2 oz (84 kg)   SpO2 93%   BMI 34.99 kg/m²     General appearance: No apparent distress, appears comfortable   HEENT: Pupils equal, round, and reactive to light. Neck: Supple, with full range of motion. No jugular venous distention. Respiratory:  Normal respiratory effort. Clear to auscultation, bilaterally without Rales/Wheezes/Rhonchi. Cardiovascular: Regular rate and rhythm with normal S1/S2 without murmurs, rubs or gallops. Abdomen: Soft, non-tender, non-distended with normal bowel sounds. Musculoskeletal: large area of hematoma with wound vac in place  Neurologic:  Neurovascularly intact without any focal sensory/motor deficits. grossly non-focal.  Psychiatric: Alert and oriented, thought content appropriate, normal insight  Capillary Refill: Brisk,< 3 seconds   Peripheral Pulses: +2 palpable, equal bilaterally       Labs:   Recent Labs     02/03/21  0455 02/03/21  0455 02/04/21  0500 02/04/21  1712 02/05/21  0445   WBC 5.2  --  4.6  --  4.2   HGB 8.2*   < > 7.8* 8.3* 7.3*  7.5*   HCT 25.0*   < > 23.9* 25.6* 23.1*  23.4*     --  136  --  148    < > = values in this interval not displayed. Recent Labs     02/03/21  0455 02/04/21  0500 02/05/21  0445    137 140   K 3.8 3.5 3.7    104 104   CO2 25 27 28   BUN 10 10 11   CREATININE <0.5* <0.5* <0.5*   CALCIUM 8.2* 8.6 8.7     No results for input(s): AST, ALT, BILIDIR, BILITOT, ALKPHOS in the last 72 hours. Recent Labs     02/04/21  1839   INR 1.08     No results for input(s): Galaviz Sanjuana in the last 72 hours. Urinalysis:      Lab Results   Component Value Date    NITRU Negative 09/03/2019    WBCUA 2 10/27/2018    RBCUA 4 10/27/2018    BLOODU Negative 09/03/2019    SPECGRAV 1.010 09/03/2019    GLUCOSEU Negative 09/03/2019       Radiology:  CTA LOWER EXTREMITY RIGHT W CONTRAST   Final Result   Large hematoma of the anterior calf.   No active extravasation or etiology of hemorrhage is identified. Popliteal artery is patent. There is significant trifurcation level disease   with primary runoff via the disease posterior tibial.      No acute osseous abnormality. XR TIBIA FIBULA RIGHT (2 VIEWS)   Final Result   Pretibial soft tissue swelling, possible hematoma. No acute osseous injury. Assessment/Plan:    Active Hospital Problems    Diagnosis    Nausea [R11.0]    Hematoma of right lower extremity [S80.11XA]    Acute blood loss anemia [D62]    Traumatic hematoma of right lower leg [S80.11XA]    Hemorrhagic shock (HCC) [R57.8]    Hematoma [T14. 8XXA]    Symptomatic anemia [D64.9]    A-fib ([address] [I48.91]    Aortic valve disorder [I35.9]     Hospital course: a 66 yo female admitted with a right leg hematoma after having a car door slammed against her R leg. Pt on eliquis for Afib. She has been evaluated by ortho and had a hematoma evacuated in the OR. She sancho needed multiple blood transfusions due to hemorrhagic shock, but appears to be improving now. Acute Traumatic hematoma RLE  Coagulopathy due to eliquis - now on hold. S/p OR with Orthopedics 1/28  To OR with plastic surgery team 1/31  Wound vac change today per surgery    Shock - appears hemorrhagic - resolved  Acute blood loss anemia on chronic anemia   Checked ECHO - preserved EF without major valvular abnormality. Hgb dropped to 6.5 - s/p 3 units pRBC on 1/28 - Hgb better but with ongoing hypotension. Additional pRBC 2 units and albumin on 1/30 2/1 - additional 2 units of pRBC transfused  Hgb has been > 7.0 since last transfusion  s/p additional pRBC as above. Levo stopped  Check Fe studies for replacement    Afib paroxysmal.   currently in nsr  Eliquis on hold. Cont flacainide. Hx of Gastric Bypass - Shanice-En-Y - 30+ years ago. Chronic nausea on Reglan. EGD in August - pathology report fairly unremarkable.        DVT Prophylaxis: SCD  Diet: DIET GENERAL;  Dietary Nutrition Supplements: Low Calorie High Protein Supplement  Code Status: Full Code    Dispo - inpatient, consult pm&r for possible aru at discharge  [address] [facility] [address], DO

## 2021-02-05 NOTE — PROGRESS NOTES
[facility] Surgery [phone]                                     Daily Progress Note                                                         Pt Name: Sabina Bains  Medical Record Number: [id number]  Date of Birth 1945   Today's Date: 2/5/2021  Chief Complaint   Patient presents with    Leg Injury     hit right leg on car door. large hematoma forming to right shin area. pt on eliquis. ASSESSMENT/PLAN  Right lower extremity hematoma  -1/31 evacuation of hematoma, excisional debridement, wound vac application.   -Wound vac changed this morning. See pics below. Wound base healthy. Distal skin edge with ecchymosis and minimal necrosis. May need further debridement in the future, but stable and continue wound vac for now. -Ace wrap to help with distal leg and wound edema  -Off pressors. In ICU but med/surg status. SUBJECTIVE  Olamide Almeida reports minimal wound pain with functioning vac in place. OBJECTIVE  VITALS:  height is 5' 1\" (1.549 m) and weight is 185 lb 3.2 oz (84 kg). Her axillary temperature is 97.8 °F (36.6 °C). Her blood pressure is 119/66 and her pulse is 84. Her respiration is 16 and oxygen saturation is 93%. INTAKE/OUTPUT:      Intake/Output Summary (Last 24 hours) at 2/5/2021 0917  Last data filed at 2/5/2021 8773  Gross per 24 hour   Intake 310 ml   Output 1480 ml   Net -1170 ml     GENERAL: alert, no distress            I/O last 3 completed shifts:   In: 240 [P.O.:240]  Out: 1480 [Urine:1280; Drains:200]      LABS  Recent Labs     02/04/21  1839 02/05/21  0445   WBC  --  4.2   HGB  --  7.3*  7.5*   HCT  --  23.1*  23.4*   PLT  --  148   NA  --  140   K  --  3.7   CL  --  104   CO2  --  28   BUN  --  11   CREATININE  --  <0.5*   MG  --  1.70*   CALCIUM  --  8.7   INR 1.08  --        EDUCATION  Patient educated about Disease Process, Medications, Smoking Cessation, Oxygenation, Incentive Spirometry and Deep Breath and Cough, Diabetes, Hyperlipidemia, Smoking Cessation, Nutrition, Exercise and Hypertension    Electronically signed by MUSA Quiñones CNP on 2/5/2021 at 9:17 AM      [facility]   [phone] Office  [phone] Cell     As above  Tolerated wound VAC change well, less pain  Continue for now    Electronically signed by Brianna Ellis MD on 2/5/2021 at 12:09 PM

## 2021-02-06 LAB
ANION GAP SERPL CALCULATED.3IONS-SCNC: 9 MMOL/L (ref 3–16)
BASOPHILS ABSOLUTE: 0 K/UL (ref 0–0.2)
BASOPHILS RELATIVE PERCENT: 0.5 %
BUN BLDV-MCNC: 10 MG/DL (ref 7–20)
CALCIUM SERPL-MCNC: 8.6 MG/DL (ref 8.3–10.6)
CHLORIDE BLD-SCNC: 102 MMOL/L (ref 99–110)
CO2: 26 MMOL/L (ref 21–32)
CREAT SERPL-MCNC: <0.5 MG/DL (ref 0.6–1.2)
EOSINOPHILS ABSOLUTE: 0.1 K/UL (ref 0–0.6)
EOSINOPHILS RELATIVE PERCENT: 1.6 %
GFR AFRICAN AMERICAN: >60
GFR NON-AFRICAN AMERICAN: >60
GLUCOSE BLD-MCNC: 85 MG/DL (ref 70–99)
HCT VFR BLD CALC: 23.5 % (ref 36–48)
HEMOGLOBIN: 7.7 G/DL (ref 12–16)
LYMPHOCYTES ABSOLUTE: 0.8 K/UL (ref 1–5.1)
LYMPHOCYTES RELATIVE PERCENT: 17.6 %
MAGNESIUM: 1.7 MG/DL (ref 1.8–2.4)
MCH RBC QN AUTO: 28.4 PG (ref 26–34)
MCHC RBC AUTO-ENTMCNC: 32.7 G/DL (ref 31–36)
MCV RBC AUTO: 87 FL (ref 80–100)
MONOCYTES ABSOLUTE: 0.6 K/UL (ref 0–1.3)
MONOCYTES RELATIVE PERCENT: 12.1 %
NEUTROPHILS ABSOLUTE: 3.1 K/UL (ref 1.7–7.7)
NEUTROPHILS RELATIVE PERCENT: 68.2 %
PDW BLD-RTO: 16.4 % (ref 12.4–15.4)
PLATELET # BLD: 183 K/UL (ref 135–450)
PMV BLD AUTO: 8.3 FL (ref 5–10.5)
POTASSIUM SERPL-SCNC: 3.5 MMOL/L (ref 3.5–5.1)
RBC # BLD: 2.7 M/UL (ref 4–5.2)
SODIUM BLD-SCNC: 137 MMOL/L (ref 136–145)
WBC # BLD: 4.6 K/UL (ref 4–11)

## 2021-02-06 PROCEDURE — 99231 SBSQ HOSP IP/OBS SF/LOW 25: CPT | Performed by: SURGERY

## 2021-02-06 PROCEDURE — 6370000000 HC RX 637 (ALT 250 FOR IP): Performed by: INTERNAL MEDICINE

## 2021-02-06 PROCEDURE — 6370000000 HC RX 637 (ALT 250 FOR IP): Performed by: FAMILY MEDICINE

## 2021-02-06 PROCEDURE — 2060000000 HC ICU INTERMEDIATE R&B

## 2021-02-06 PROCEDURE — 6360000002 HC RX W HCPCS: Performed by: INTERNAL MEDICINE

## 2021-02-06 PROCEDURE — 2580000003 HC RX 258: Performed by: STUDENT IN AN ORGANIZED HEALTH CARE EDUCATION/TRAINING PROGRAM

## 2021-02-06 PROCEDURE — 2580000003 HC RX 258: Performed by: ORTHOPAEDIC SURGERY

## 2021-02-06 PROCEDURE — 2580000003 HC RX 258: Performed by: SURGERY

## 2021-02-06 PROCEDURE — 94760 N-INVAS EAR/PLS OXIMETRY 1: CPT

## 2021-02-06 PROCEDURE — 6360000002 HC RX W HCPCS: Performed by: FAMILY MEDICINE

## 2021-02-06 PROCEDURE — 99024 POSTOP FOLLOW-UP VISIT: CPT | Performed by: SURGERY

## 2021-02-06 PROCEDURE — 6370000000 HC RX 637 (ALT 250 FOR IP): Performed by: ORTHOPAEDIC SURGERY

## 2021-02-06 PROCEDURE — 85025 COMPLETE CBC W/AUTO DIFF WBC: CPT

## 2021-02-06 PROCEDURE — 83735 ASSAY OF MAGNESIUM: CPT

## 2021-02-06 PROCEDURE — 80048 BASIC METABOLIC PNL TOTAL CA: CPT

## 2021-02-06 PROCEDURE — 6370000000 HC RX 637 (ALT 250 FOR IP): Performed by: STUDENT IN AN ORGANIZED HEALTH CARE EDUCATION/TRAINING PROGRAM

## 2021-02-06 PROCEDURE — 36592 COLLECT BLOOD FROM PICC: CPT

## 2021-02-06 RX ORDER — LANOLIN ALCOHOL/MO/W.PET/CERES
400 CREAM (GRAM) TOPICAL DAILY
Status: DISCONTINUED | OUTPATIENT
Start: 2021-02-06 | End: 2021-02-11 | Stop reason: HOSPADM

## 2021-02-06 RX ORDER — MAGNESIUM SULFATE 1 G/100ML
1000 INJECTION INTRAVENOUS ONCE
Status: COMPLETED | OUTPATIENT
Start: 2021-02-06 | End: 2021-02-06

## 2021-02-06 RX ADMIN — SERTRALINE 100 MG: 100 TABLET, FILM COATED ORAL at 20:05

## 2021-02-06 RX ADMIN — HYDROCODONE BITARTRATE AND ACETAMINOPHEN 2 TABLET: 5; 325 TABLET ORAL at 06:36

## 2021-02-06 RX ADMIN — PROMETHAZINE HYDROCHLORIDE 12.5 MG: 25 TABLET ORAL at 09:14

## 2021-02-06 RX ADMIN — HYDROCODONE BITARTRATE AND ACETAMINOPHEN 2 TABLET: 5; 325 TABLET ORAL at 13:27

## 2021-02-06 RX ADMIN — PANTOPRAZOLE SODIUM 40 MG: 40 TABLET, DELAYED RELEASE ORAL at 16:46

## 2021-02-06 RX ADMIN — METOCLOPRAMIDE HYDROCHLORIDE 5 MG: 5 INJECTION INTRAMUSCULAR; INTRAVENOUS at 08:57

## 2021-02-06 RX ADMIN — PANTOPRAZOLE SODIUM 40 MG: 40 TABLET, DELAYED RELEASE ORAL at 05:21

## 2021-02-06 RX ADMIN — SODIUM CHLORIDE 1000 ML: 900 IRRIGANT IRRIGATION at 23:55

## 2021-02-06 RX ADMIN — SODIUM CHLORIDE, PRESERVATIVE FREE 10 ML: 5 INJECTION INTRAVENOUS at 08:58

## 2021-02-06 RX ADMIN — TIZANIDINE 2 MG: 4 TABLET ORAL at 23:50

## 2021-02-06 RX ADMIN — FLECAINIDE ACETATE 100 MG: 100 TABLET ORAL at 20:05

## 2021-02-06 RX ADMIN — SODIUM CHLORIDE, PRESERVATIVE FREE 10 ML: 5 INJECTION INTRAVENOUS at 20:05

## 2021-02-06 RX ADMIN — HYDROCODONE BITARTRATE AND ACETAMINOPHEN 2 TABLET: 5; 325 TABLET ORAL at 19:52

## 2021-02-06 RX ADMIN — FLECAINIDE ACETATE 100 MG: 100 TABLET ORAL at 08:57

## 2021-02-06 RX ADMIN — IRON SUCROSE 200 MG: 20 INJECTION, SOLUTION INTRAVENOUS at 09:14

## 2021-02-06 RX ADMIN — MAGNESIUM SULFATE 1000 MG: 1 INJECTION INTRAVENOUS at 09:15

## 2021-02-06 RX ADMIN — ALPRAZOLAM 0.5 MG: 0.5 TABLET ORAL at 23:50

## 2021-02-06 RX ADMIN — METOCLOPRAMIDE HYDROCHLORIDE 5 MG: 5 INJECTION INTRAMUSCULAR; INTRAVENOUS at 20:05

## 2021-02-06 RX ADMIN — Medication 400 MG: at 10:22

## 2021-02-06 RX ADMIN — Medication 500 MG: at 08:57

## 2021-02-06 RX ADMIN — METOCLOPRAMIDE HYDROCHLORIDE 5 MG: 5 INJECTION INTRAMUSCULAR; INTRAVENOUS at 13:27

## 2021-02-06 ASSESSMENT — PAIN SCALES - GENERAL
PAINLEVEL_OUTOF10: 2
PAINLEVEL_OUTOF10: 6

## 2021-02-06 ASSESSMENT — PAIN DESCRIPTION - FREQUENCY: FREQUENCY: CONTINUOUS

## 2021-02-06 ASSESSMENT — PAIN DESCRIPTION - LOCATION: LOCATION: LEG

## 2021-02-06 ASSESSMENT — PAIN DESCRIPTION - ONSET: ONSET: ON-GOING

## 2021-02-06 ASSESSMENT — PAIN DESCRIPTION - DESCRIPTORS: DESCRIPTORS: JABBING

## 2021-02-06 ASSESSMENT — PAIN DESCRIPTION - ORIENTATION
ORIENTATION: RIGHT;LOWER
ORIENTATION: RIGHT;LOWER

## 2021-02-06 ASSESSMENT — PAIN DESCRIPTION - PAIN TYPE: TYPE: ACUTE PAIN

## 2021-02-06 ASSESSMENT — PAIN DESCRIPTION - PROGRESSION
CLINICAL_PROGRESSION: NOT CHANGED
CLINICAL_PROGRESSION: GRADUALLY WORSENING

## 2021-02-06 NOTE — PROGRESS NOTES
Hospitalist Progress Note      PCP: MUSA Ventura - CNP    Date of Admission: 1/28/2021    Subjective: Pt S/E. Pt feeling better. hgb stable. 1/29  Pain in R leg controlled. BP low - no levophed this am but later required    1/30  Ongoing need for levophed - 10 this am.   Bp better with albumin infusion. Hgb improved with transfusion - but with ongoing downtrend - additional pRBC ordered today due to shock. Vascular surgery involved per ortho recs - plan to return to OR soon     1/31  In OR this am with plastic surgery team.   BP better after pRBC, albeit still on levophed      2/1  BP still challenging, on levo  Pain controlled. 2/2  Still on levophed at 7 this am.   Pain control in RLE becoming challenging. Wound vac in place.      2/3   Levo weaned down to 1, bp improving  Pain controlled today  prns x 24 hrs: morphine 2 mg iv (with wound vac change)  Tylenol x 2  norco 10 mg    2/4 stable off levo, hgb stable > 7.0    Medications:  Reviewed    Infusion Medications    sodium chloride       Scheduled Medications    pantoprazole  40 mg Oral BID AC    metoclopramide  5 mg Intravenous TID    lidocaine 1 % injection  5 mL Intradermal Once    sodium chloride flush  10 mL Intravenous 2 times per day    calcium elemental  500 mg Oral Daily    flecainide  100 mg Oral 2 times per day    sertraline  100 mg Oral Nightly    sodium chloride flush  10 mL Intravenous 2 times per day     PRN Meds: HYDROcodone 5 mg - acetaminophen, polyethylene glycol, morphine, sodium chloride, prochlorperazine, simethicone, sodium chloride flush, ALPRAZolam, tiZANidine, acetaminophen **OR** acetaminophen, sodium chloride flush, promethazine **OR** ondansetron, bisacodyl      Intake/Output Summary (Last 24 hours) at 2/6/2021 0901  Last data filed at 2/6/2021 0537  Gross per 24 hour   Intake 180 ml   Output 1975 ml   Net -1795 ml       Physical Exam Performed:    BP (!) 142/81   Pulse 84   Temp 98.1 °F (36.7 °C) (Oral)   Resp 16   Ht 5' 1\" (1.549 m)   Wt 184 lb 1.4 oz (83.5 kg)   SpO2 92%   BMI 34.78 kg/m²     General appearance: No apparent distress, appears comfortable   HEENT: Pupils equal, round, and reactive to light. Neck: Supple, with full range of motion. No jugular venous distention. Respiratory:  Normal respiratory effort. Clear to auscultation, bilaterally without Rales/Wheezes/Rhonchi. Cardiovascular: Regular rate and rhythm with normal S1/S2 without murmurs, rubs or gallops. Abdomen: Soft, non-tender, non-distended with normal bowel sounds. Musculoskeletal: large area of hematoma with wound vac in place  Neurologic:  Neurovascularly intact without any focal sensory/motor deficits. grossly non-focal.  Psychiatric: Alert and oriented x3, thought content appropriate, normal insight  Capillary Refill: Brisk,< 3 seconds   Peripheral Pulses: +2 palpable, equal bilaterally       Labs:   Recent Labs     02/04/21  0500 02/04/21  1712 02/05/21  0445 02/06/21  0546   WBC 4.6  --  4.2 4.6   HGB 7.8* 8.3* 7.3*  7.5* 7.7*   HCT 23.9* 25.6* 23.1*  23.4* 23.5*     --  148 183     Recent Labs     02/04/21  0500 02/05/21  0445 02/06/21  0546    140 137   K 3.5 3.7 3.5    104 102   CO2 27 28 26   BUN 10 11 10   CREATININE <0.5* <0.5* <0.5*   CALCIUM 8.6 8.7 8.6     No results for input(s): AST, ALT, BILIDIR, BILITOT, ALKPHOS in the last 72 hours. Recent Labs     02/04/21  1839   INR 1.08     No results for input(s): Katrin Gaster in the last 72 hours. Urinalysis:      Lab Results   Component Value Date    NITRU Negative 09/03/2019    WBCUA 2 10/27/2018    RBCUA 4 10/27/2018    BLOODU Negative 09/03/2019    SPECGRAV 1.010 09/03/2019    GLUCOSEU Negative 09/03/2019       Radiology:  CTA LOWER EXTREMITY RIGHT W CONTRAST   Final Result   Large hematoma of the anterior calf. No active extravasation or etiology of   hemorrhage is identified. Popliteal artery is patent.   There is significant trifurcation level disease   with primary runoff via the disease posterior tibial.      No acute osseous abnormality. XR TIBIA FIBULA RIGHT (2 VIEWS)   Final Result   Pretibial soft tissue swelling, possible hematoma. No acute osseous injury. Assessment/Plan:    Active Hospital Problems    Diagnosis    Nausea [R11.0]    Hematoma of right lower extremity [S80.11XA]    Acute blood loss anemia [D62]    Traumatic hematoma of right lower leg [S80.11XA]    Hemorrhagic shock (HCC) [R57.8]    Hematoma [T14. 8XXA]    Symptomatic anemia [D64.9]    A-fib ([address] [I48.91]    Aortic valve disorder [I35.9]     Hospital course: a 68 yo female admitted with a right leg hematoma after having a car door slammed against her R leg. Pt on eliquis for Afib. She has been evaluated by ortho and had a hematoma evacuated in the OR. She sancho needed multiple blood transfusions due to hemorrhagic shock, but appears to be improving now. Acute Traumatic hematoma RLE  Coagulopathy due to eliquis - now on hold. S/p OR with Orthopedics 1/28  To OR with plastic surgery team 1/31  Wound vac changes per surgery, early next week  Per plastic surgery, \"plan for STSG prior to DC. Will need wound vac for 1 week after grafting. \"    Shock - appears hemorrhagic - resolved  Acute blood loss anemia on chronic anemia   Checked ECHO - preserved EF without major valvular abnormality. Hgb dropped to 6.5 - s/p 3 units pRBC on 1/28 - Hgb better but with ongoing hypotension. Additional pRBC 2 units and albumin on 1/30 2/1 - additional 2 units of pRBC transfused  Hgb has been > 7.0 since last transfusion  s/p additional pRBC as above. Levo stopped  Fe studies low, normal ferritin -> venofer    Afib paroxysmal.   currently in nsr  Eliquis on hold. Cont flacainide. Hx of Gastric Bypass - Shanice-En-Y - 30+ years ago. Chronic nausea on Reglan. EGD in August - pathology report fairly unremarkable. DVT Prophylaxis: SCD  Diet: DIET GENERAL;  Dietary Nutrition Supplements: Low Calorie High Protein Supplement  Code Status: Full Code    Dispo - inpatient, consult pm&r for possible aru at discharge  Kim Figueroa DO

## 2021-02-06 NOTE — PROGRESS NOTES
[facility]    Pictures seen in Epic from previous vac change. Some skin changes that may require additional debridement. Now HD stable. If wound appears improved next week, will plan for STSG prior to DC. Will need wound vac for 1 week after grafting.     Ramin Severino MD  [address] [place] [address] [facility]  [phone]  02/06/21

## 2021-02-06 NOTE — CONSULTS
Consult Note  Physical Medicine and Rehabilitation    Patient: Nick De La Torre  [id number]  Date: 2021      Chief Complaint: RLE pain    History of Present Illness/Hospital Course:  Patient is a 68 yo F with pmh Afib, Anxiety, Depression, Fibromyalgia, Migraines who initially presented 2021 with right leg pain and swelling after car door was slammed against it. Found to have hematoma and hemorrhagic shock. Home Eliquis held, required multiple transfusions and pressors. Underwent hematoma evacuation ( with Dr. Jacquie Bey). Returned to OR for repeat evacuation, debridement and wound vac placement ( with Dr. Erna Domingo). Currently, patient reports mild to moderate right lateral lower leg pain. Worse with movement. Improves with rest and medication. She has chronic tingling/numbness in her feet but no new numbness. She feels generally weak and fatigued. She would like to come to ARU to improve her function prior to returning home.      Prior Level of Function:  Independent for mobility, ADLs    Current Level of Function:  Min-total assist for mobility and ADLs    Pertinent Social History:  Support: lives with son  Home set-up: home with [facility], ramped entry    Past Medical History:   Diagnosis Date    Acid reflux     Anxiety     Arthritis     Atrial fibrillation (HCC)     Closed fracture dislocation of right elbow     Depression     Fibromyalgia     Migraine     [facility]al cancer ([facility]     Wears glasses     DRIVING       Past Surgical History:   Procedure Laterality Date    APPENDECTOMY       SECTION      x4    CHOLECYSTECTOMY      COLONOSCOPY      SEVERAL    COLONOSCOPY N/A 2019    COLONOSCOPY POLYPECTOMY SNARE/COLD BIOPSY performed by Kat Pope MD at [address] Right 2021    RIGHT LEG EVACUATION OF HEMATOMA performed by Ruiz Farmer MD at [address] [facility] Right    [address] SUPRACONDYLAR FRACTURE W/O EXTENSION Right 10/25/2018    OPEN REDUCTION INTERNAL FIXATION RIGHT FEMUR SUPRACONDYLAR FEMORAL FRACTURE WITH C-ARM performed by Kayla Gallardo MD at [facility]    SKIN GRAFT Right 1/31/2021    RIGHT LEG HEMATOMA EVAKUATION, DEBRIDEMENT, AND WOUND VAC PLACEMENT performed by Dee Sykes MD at [address] [place] [address] Left     UPPER GASTROINTESTINAL ENDOSCOPY N/A 11/26/2019    ESOPHAGOGASTRODUODENOSCOPY performed by Javi Al MD at [address] N/A 8/18/2020    EGD BIOPSY performed by Javi Al MD at [address] [place] [address] Rd History   Problem Relation Age of Onset    COPD Mother        Social History     Socioeconomic History    Marital status:       Spouse name: None    Number of children: 4    Years of education: None    Highest education level: High school graduate   Occupational History    None   Social Needs    Financial resource strain: Very hard    Food insecurity     Worry: Often true     Inability: Often true    Transportation needs     Medical: No     Non-medical: No   Tobacco Use    Smoking status: Never Smoker    Smokeless tobacco: Never Used   Substance and Sexual Activity    Alcohol use: No    Drug use: Never    Sexual activity: Never   Lifestyle    Physical activity     Days per week: None     Minutes per session: None    Stress: None   Relationships    Social connections     Talks on phone: None     Gets together: None     Attends Judaism service: None     Active member of club or organization: None     Attends meetings of clubs or organizations: None     Relationship status: None    Intimate partner violence     Fear of current or ex partner: No     Emotionally abused: No     Physically abused: No     Forced sexual activity: No   Other Topics Concern    None   Social History Narrative    Lives with son 65-70%. No regional wall motion abnormalities   are seen. The right ventricle is normal in size and function. The left atrium is severely dilated. Mild mitral regurgitation. The aortic valve leaflets are not well visualized. Aortic sclerosis versus   mild aortic stenosis (peak velocity of 2.67m/s and a mean pressure gradient   of 16mmHg). Mild aortic regurgitation. Mild tricuspid regurgitation. Most recent imaging studies revealed:    CTA RLE  Large hematoma of the anterior calf.  No active extravasation or etiology of   hemorrhage is identified.       Popliteal artery is patent. Patricia Gravely is significant trifurcation level disease   with primary runoff via the disease posterior tibial.       No acute osseous abnormality. Xray tib/fib  Pretibial soft tissue swelling, possible hematoma.       No acute osseous injury.           Assessment:  Debility  Right leg hematoma - s/p evacuation (1/28 with Dr. Dee Portillo), evacuation, debridement and wound vac placement (1/31 with Dr. Yuly Devries)  Hemorrhagic shock  Acute blood loss anemia  Paroxysmal Afib  H/o gastric bypass (Shanice-en-Y)  Anxiety/depression  Fibromyalgia     Impairments: generalized weakness, decreased ROM distal RLE, decreased balance, endurance. Recommendations:    Patient with new functional deficits and ongoing medical complexity. Demonstrates ability to tolerate 3 hours therapy/day. She is a good candidate for acute inpatient rehab when medically appropriate. Will require insurance pre-cert. Thank you for this consult. Please contact me with any questions or concerns. Glendy Rangel.  Noy Mahoney MD 2/6/2021, 11:57 AM

## 2021-02-06 NOTE — PROGRESS NOTES
2100: Shift assessment. Patient alert and oriented x4. Resting quietly in bed. RLE surgical wound with wound vac in place working appropriately. 2345: patient received PRN xanax for sleep.     0108: Attempted to call report. Not successful at this time. 0145: Patient transferred to [address] [place] [address]. Transfer report given at bed side to [address] resuming patient's care. Patient's belongings including cell phone and clothing's packed and sent with patient.    Electronically signed by Lindsey Wilkerson RN on 2/6/2021 at 2:12 AM

## 2021-02-06 NOTE — PROGRESS NOTES
Patient resting quietly in bed after transfer from ICU. Alert, oriented x4. Wound Vac intact to right lower extremity. RLE elevated. Patient sent a text message to yanci Gallagher regarding new room number.

## 2021-02-06 NOTE — PROGRESS NOTES
[facility] [phone]                                     Daily Progress Note                                                         Pt Name: Geoff Amanda  Medical Record Number: [id number]  Date of Birth 1945   Today's Date: 2/6/2021  Chief Complaint   Patient presents with    Leg Injury     hit right leg on car door. large hematoma forming to right shin area. pt on eliquis. ASSESSMENT/PLAN  Right lower extremity hematoma  -1/31 evacuation of hematoma, excisional debridement, wound vac application. -H/H stable  -continue wound vac. Plan to change early next week       [address] reports adequate pain control    OBJECTIVE  VITALS:  height is 5' 1\" (1.549 m) and weight is 184 lb 1.4 oz (83.5 kg). Her oral temperature is 98.1 °F (36.7 °C). Her blood pressure is 142/81 (abnormal) and her pulse is 84. Her respiration is 14 and oxygen saturation is 93%. INTAKE/OUTPUT:      Intake/Output Summary (Last 24 hours) at 2/6/2021 0750  Last data filed at 2/6/2021 0537  Gross per 24 hour   Intake 180 ml   Output 1975 ml   Net -1795 ml     GENERAL: alert, no distress  EXTREMITY: Vac in place    I/O last 3 completed shifts:   In: 190 [P.O.:180; I.V.:10]  Out: 1975 [OMNRB:6576; Drains:400]      LABS  Recent Labs     02/04/21  1839 02/05/21  0445 02/06/21  0546   WBC  --  4.2 4.6   HGB  --  7.3*  7.5* 7.7*   HCT  --  23.1*  23.4* 23.5*   PLT  --  148 183   NA  --  140 137   K  --  3.7  --    CL  --  104 102   CO2  --  28 26   BUN  --  11 10   CREATININE  --  <0.5* <0.5*   MG  --  1.70* 1.70*   CALCIUM  --  8.7 8.6   INR 1.08  --   --      Electronically signed by Milton Mead MD on 2/6/2021 at 7:50 AM

## 2021-02-07 LAB
ANION GAP SERPL CALCULATED.3IONS-SCNC: 10 MMOL/L (ref 3–16)
BASOPHILS ABSOLUTE: 0 K/UL (ref 0–0.2)
BASOPHILS RELATIVE PERCENT: 0.4 %
BUN BLDV-MCNC: 11 MG/DL (ref 7–20)
CALCIUM SERPL-MCNC: 8.7 MG/DL (ref 8.3–10.6)
CHLORIDE BLD-SCNC: 107 MMOL/L (ref 99–110)
CO2: 28 MMOL/L (ref 21–32)
CREAT SERPL-MCNC: <0.5 MG/DL (ref 0.6–1.2)
EOSINOPHILS ABSOLUTE: 0.1 K/UL (ref 0–0.6)
EOSINOPHILS RELATIVE PERCENT: 1.8 %
GFR AFRICAN AMERICAN: >60
GFR NON-AFRICAN AMERICAN: >60
GLUCOSE BLD-MCNC: 91 MG/DL (ref 70–99)
HCT VFR BLD CALC: 23.5 % (ref 36–48)
HEMOGLOBIN: 7.7 G/DL (ref 12–16)
LYMPHOCYTES ABSOLUTE: 0.7 K/UL (ref 1–5.1)
LYMPHOCYTES RELATIVE PERCENT: 15 %
MAGNESIUM: 1.8 MG/DL (ref 1.8–2.4)
MCH RBC QN AUTO: 28.6 PG (ref 26–34)
MCHC RBC AUTO-ENTMCNC: 32.7 G/DL (ref 31–36)
MCV RBC AUTO: 87.4 FL (ref 80–100)
MONOCYTES ABSOLUTE: 0.5 K/UL (ref 0–1.3)
MONOCYTES RELATIVE PERCENT: 10.9 %
NEUTROPHILS ABSOLUTE: 3.6 K/UL (ref 1.7–7.7)
NEUTROPHILS RELATIVE PERCENT: 71.9 %
PDW BLD-RTO: 16.2 % (ref 12.4–15.4)
PLATELET # BLD: 193 K/UL (ref 135–450)
PMV BLD AUTO: 7.6 FL (ref 5–10.5)
POTASSIUM SERPL-SCNC: 3.5 MMOL/L (ref 3.5–5.1)
RBC # BLD: 2.68 M/UL (ref 4–5.2)
SODIUM BLD-SCNC: 145 MMOL/L (ref 136–145)
WBC # BLD: 5 K/UL (ref 4–11)

## 2021-02-07 PROCEDURE — 6370000000 HC RX 637 (ALT 250 FOR IP): Performed by: INTERNAL MEDICINE

## 2021-02-07 PROCEDURE — 6370000000 HC RX 637 (ALT 250 FOR IP): Performed by: FAMILY MEDICINE

## 2021-02-07 PROCEDURE — 2580000003 HC RX 258: Performed by: ORTHOPAEDIC SURGERY

## 2021-02-07 PROCEDURE — 85025 COMPLETE CBC W/AUTO DIFF WBC: CPT

## 2021-02-07 PROCEDURE — 6360000002 HC RX W HCPCS: Performed by: INTERNAL MEDICINE

## 2021-02-07 PROCEDURE — 2580000003 HC RX 258: Performed by: STUDENT IN AN ORGANIZED HEALTH CARE EDUCATION/TRAINING PROGRAM

## 2021-02-07 PROCEDURE — 6360000002 HC RX W HCPCS: Performed by: FAMILY MEDICINE

## 2021-02-07 PROCEDURE — 36592 COLLECT BLOOD FROM PICC: CPT

## 2021-02-07 PROCEDURE — 6370000000 HC RX 637 (ALT 250 FOR IP): Performed by: STUDENT IN AN ORGANIZED HEALTH CARE EDUCATION/TRAINING PROGRAM

## 2021-02-07 PROCEDURE — 99024 POSTOP FOLLOW-UP VISIT: CPT | Performed by: SURGERY

## 2021-02-07 PROCEDURE — 2060000000 HC ICU INTERMEDIATE R&B

## 2021-02-07 PROCEDURE — 94760 N-INVAS EAR/PLS OXIMETRY 1: CPT

## 2021-02-07 PROCEDURE — 83735 ASSAY OF MAGNESIUM: CPT

## 2021-02-07 PROCEDURE — 80048 BASIC METABOLIC PNL TOTAL CA: CPT

## 2021-02-07 RX ORDER — POTASSIUM CHLORIDE 20 MEQ/1
40 TABLET, EXTENDED RELEASE ORAL ONCE
Status: COMPLETED | OUTPATIENT
Start: 2021-02-07 | End: 2021-02-07

## 2021-02-07 RX ADMIN — TIZANIDINE 2 MG: 4 TABLET ORAL at 23:11

## 2021-02-07 RX ADMIN — HYDROCODONE BITARTRATE AND ACETAMINOPHEN 2 TABLET: 5; 325 TABLET ORAL at 13:33

## 2021-02-07 RX ADMIN — FLECAINIDE ACETATE 100 MG: 100 TABLET ORAL at 20:55

## 2021-02-07 RX ADMIN — SODIUM CHLORIDE, PRESERVATIVE FREE 10 ML: 5 INJECTION INTRAVENOUS at 09:08

## 2021-02-07 RX ADMIN — SODIUM CHLORIDE, PRESERVATIVE FREE 10 ML: 5 INJECTION INTRAVENOUS at 20:55

## 2021-02-07 RX ADMIN — SODIUM CHLORIDE, PRESERVATIVE FREE 10 ML: 5 INJECTION INTRAVENOUS at 09:44

## 2021-02-07 RX ADMIN — IRON SUCROSE 200 MG: 20 INJECTION, SOLUTION INTRAVENOUS at 09:08

## 2021-02-07 RX ADMIN — PANTOPRAZOLE SODIUM 40 MG: 40 TABLET, DELAYED RELEASE ORAL at 17:18

## 2021-02-07 RX ADMIN — METOCLOPRAMIDE HYDROCHLORIDE 5 MG: 5 INJECTION INTRAMUSCULAR; INTRAVENOUS at 13:34

## 2021-02-07 RX ADMIN — ALPRAZOLAM 0.5 MG: 0.5 TABLET ORAL at 23:11

## 2021-02-07 RX ADMIN — METOCLOPRAMIDE HYDROCHLORIDE 5 MG: 5 INJECTION INTRAMUSCULAR; INTRAVENOUS at 20:55

## 2021-02-07 RX ADMIN — Medication 400 MG: at 09:06

## 2021-02-07 RX ADMIN — ACETAMINOPHEN 650 MG: 325 TABLET ORAL at 09:25

## 2021-02-07 RX ADMIN — POTASSIUM CHLORIDE 40 MEQ: 1500 TABLET, EXTENDED RELEASE ORAL at 13:33

## 2021-02-07 RX ADMIN — METOCLOPRAMIDE HYDROCHLORIDE 5 MG: 5 INJECTION INTRAMUSCULAR; INTRAVENOUS at 09:07

## 2021-02-07 RX ADMIN — FLECAINIDE ACETATE 100 MG: 100 TABLET ORAL at 09:06

## 2021-02-07 RX ADMIN — HYDROCODONE BITARTRATE AND ACETAMINOPHEN 2 TABLET: 5; 325 TABLET ORAL at 05:45

## 2021-02-07 RX ADMIN — SODIUM CHLORIDE, PRESERVATIVE FREE 10 ML: 5 INJECTION INTRAVENOUS at 23:11

## 2021-02-07 RX ADMIN — ACETAMINOPHEN 650 MG: 325 TABLET ORAL at 18:50

## 2021-02-07 RX ADMIN — Medication 500 MG: at 09:06

## 2021-02-07 RX ADMIN — HYDROCODONE BITARTRATE AND ACETAMINOPHEN 2 TABLET: 5; 325 TABLET ORAL at 20:55

## 2021-02-07 RX ADMIN — PANTOPRAZOLE SODIUM 40 MG: 40 TABLET, DELAYED RELEASE ORAL at 05:45

## 2021-02-07 RX ADMIN — SERTRALINE 100 MG: 100 TABLET, FILM COATED ORAL at 20:55

## 2021-02-07 RX ADMIN — SODIUM CHLORIDE, PRESERVATIVE FREE 10 ML: 5 INJECTION INTRAVENOUS at 23:12

## 2021-02-07 ASSESSMENT — PAIN SCALES - GENERAL
PAINLEVEL_OUTOF10: 6
PAINLEVEL_OUTOF10: 5
PAINLEVEL_OUTOF10: 0

## 2021-02-07 ASSESSMENT — PAIN DESCRIPTION - PROGRESSION
CLINICAL_PROGRESSION: GRADUALLY WORSENING
CLINICAL_PROGRESSION: GRADUALLY IMPROVING
CLINICAL_PROGRESSION: GRADUALLY WORSENING
CLINICAL_PROGRESSION: NOT CHANGED

## 2021-02-07 ASSESSMENT — PAIN DESCRIPTION - ORIENTATION: ORIENTATION: RIGHT

## 2021-02-07 ASSESSMENT — PAIN DESCRIPTION - DESCRIPTORS
DESCRIPTORS: ACHING;DISCOMFORT
DESCRIPTORS: ACHING

## 2021-02-07 ASSESSMENT — PAIN DESCRIPTION - FREQUENCY
FREQUENCY: CONTINUOUS
FREQUENCY: CONTINUOUS

## 2021-02-07 ASSESSMENT — PAIN DESCRIPTION - PAIN TYPE
TYPE: ACUTE PAIN
TYPE: ACUTE PAIN

## 2021-02-07 ASSESSMENT — PAIN DESCRIPTION - LOCATION
LOCATION: LEG
LOCATION: HEAD
LOCATION: HEAD

## 2021-02-07 ASSESSMENT — PAIN DESCRIPTION - ONSET
ONSET: ON-GOING

## 2021-02-07 ASSESSMENT — PAIN - FUNCTIONAL ASSESSMENT
PAIN_FUNCTIONAL_ASSESSMENT: PREVENTS OR INTERFERES SOME ACTIVE ACTIVITIES AND ADLS
PAIN_FUNCTIONAL_ASSESSMENT: ACTIVITIES ARE NOT PREVENTED

## 2021-02-07 ASSESSMENT — PAIN SCALES - WONG BAKER: WONGBAKER_NUMERICALRESPONSE: 0

## 2021-02-07 NOTE — PROGRESS NOTES
[facility] [phone]                                     Daily Progress Note                                                         Pt Name: Pinky Easley  Medical Record Number: [id number]  Date of Birth 1945   Today's Date: 2/7/2021  Chief Complaint   Patient presents with    Leg Injury     hit right leg on car door. large hematoma forming to right shin area. pt on eliquis. ASSESSMENT/PLAN  Right lower extremity hematoma  -1/31 evacuation of hematoma, excisional debridement, wound vac application. -H/H stable  -continue wound vac. Plan to change Tuesday  -Possible STSG this week with Dr. Boone Domingo reports adequate pain control    OBJECTIVE  VITALS:  height is 5' 1\" (1.549 m) and weight is 188 lb 0.8 oz (85.3 kg). Her axillary temperature is 98.3 °F (36.8 °C). Her blood pressure is 132/77 and her pulse is 80. Her respiration is 16 and oxygen saturation is 91%. INTAKE/OUTPUT:      Intake/Output Summary (Last 24 hours) at 2/7/2021 0825  Last data filed at 2/7/2021 0540  Gross per 24 hour   Intake 480 ml   Output 1450 ml   Net -970 ml     GENERAL: alert, no distress  EXTREMITY: Vac in place    I/O last 3 completed shifts: In: 480 [P.O.:480]  Out: 1450 [Urine:1450]      LABS  Recent Labs     02/04/21  1839 02/04/21  1839 02/07/21  0600   WBC  --    < > 5.0   HGB  --    < > 7.7*   HCT  --    < > 23.5*   PLT  --    < > 193   NA  --    < > 145   K  --    < > 3.5   CL  --    < > 107   CO2  --    < > 28   BUN  --    < > 11   CREATININE  --    < > <0.5*   MG  --    < > 1.80   CALCIUM  --    < > 8.7   INR 1.08  --   --     < > = values in this interval not displayed.      Electronically signed by Jolly Coates MD on 2/7/2021 at 8:25 AM

## 2021-02-07 NOTE — PLAN OF CARE
Problem: Pain:  Goal: Pain level will decrease  Description: Pain level will decrease  Outcome: Ongoing  Goal: Control of acute pain  Description: Control of acute pain  Outcome: Ongoing  Goal: Control of chronic pain  Description: Control of chronic pain  Outcome: Ongoing     Problem: Falls - Risk of:  Goal: Will remain free from falls  Description: Will remain free from falls  Outcome: Ongoing  Goal: Absence of physical injury  Description: Absence of physical injury  Outcome: Ongoing     Problem: Skin Integrity:  Goal: Will show no infection signs and symptoms  Description: Will show no infection signs and symptoms  Outcome: Ongoing  Goal: Absence of new skin breakdown  Description: Absence of new skin breakdown  Outcome: Ongoing  Goal: Skin integrity will be maintained  Description: Skin integrity will be maintained  Outcome: Ongoing  Goal: Skin integrity will improve  Description: Skin integrity will improve  Outcome: Ongoing     Problem: Infection - Central Venous Catheter-Associated Bloodstream Infection:  Goal: Will show no infection signs and symptoms  Description: Will show no infection signs and symptoms  Outcome: Ongoing     Problem: Urinary Elimination:  Goal: Signs and symptoms of infection will decrease  Description: Signs and symptoms of infection will decrease  Outcome: Ongoing     Problem: Sensory:  Goal: Pain level will decrease  Description: Pain level will decrease  Outcome: Ongoing

## 2021-02-07 NOTE — PROGRESS NOTES
Hospitalist Progress Note      PCP: MUSA Thomas - CNP    Date of Admission: 1/28/2021    Subjective: Pt S/E. Pt feeling much better today. 1/29  Pain in R leg controlled. BP low - no levophed this am but later required    1/30  Ongoing need for levophed - 10 this am.   Bp better with albumin infusion. Hgb improved with transfusion - but with ongoing downtrend - additional pRBC ordered today due to shock. Vascular surgery involved per ortho recs - plan to return to OR soon     1/31  In OR this am with plastic surgery team.   BP better after pRBC, albeit still on levophed      2/1  BP still challenging, on levo  Pain controlled. 2/2  Still on levophed at 7 this am.   Pain control in RLE becoming challenging. Wound vac in place.      2/3   Levo weaned down to 1, bp improving  Pain controlled today  prns x 24 hrs: morphine 2 mg iv (with wound vac change)  Tylenol x 2  norco 10 mg    2/4 stable off levo, hgb stable > 7.0  2/7 bp has remained stable, hgb > 7.0, pm&r accepted for aru when ready    Medications:  Reviewed    Infusion Medications    sodium chloride       Scheduled Medications    magnesium oxide  400 mg Oral Daily    iron sucrose  200 mg Intravenous Q24H    pantoprazole  40 mg Oral BID AC    metoclopramide  5 mg Intravenous TID    lidocaine 1 % injection  5 mL Intradermal Once    sodium chloride flush  10 mL Intravenous 2 times per day    calcium elemental  500 mg Oral Daily    flecainide  100 mg Oral 2 times per day    sertraline  100 mg Oral Nightly    sodium chloride flush  10 mL Intravenous 2 times per day     PRN Meds: HYDROcodone 5 mg - acetaminophen, polyethylene glycol, morphine, sodium chloride, prochlorperazine, simethicone, sodium chloride flush, ALPRAZolam, tiZANidine, acetaminophen **OR** acetaminophen, sodium chloride flush, promethazine **OR** ondansetron, bisacodyl      Intake/Output Summary (Last 24 hours) at 2/7/2021 0915  Last data filed at 2/7/2021 0540  Gross per 24 hour   Intake 480 ml   Output 1450 ml   Net -970 ml       Physical Exam Performed:    /77   Pulse 80   Temp 98.3 °F (36.8 °C) (Axillary)   Resp 16   Ht 5' 1\" (1.549 m)   Wt 188 lb 0.8 oz (85.3 kg)   SpO2 91%   BMI 35.53 kg/m²     General appearance: No apparent distress, appears comfortable   HEENT: Pupils equal, round, and reactive to light. Neck: Supple, with full range of motion. No jugular venous distention. Respiratory:  Normal respiratory effort. Clear to auscultation, bilaterally without Rales/Wheezes/Rhonchi. Cardiovascular: Regular rate and rhythm with normal S1/S2 without murmurs, rubs or gallops. Abdomen: Soft, non-tender, non-distended with normal bowel sounds. Musculoskeletal: large area of hematoma with wound vac in place  Neurologic:  Neurovascularly intact without any focal sensory/motor deficits. grossly non-focal.  Psychiatric: Alert and oriented x3, affect pleasant, normal insight  Capillary Refill: Brisk,< 3 seconds   Peripheral Pulses: +2 palpable, equal bilaterally       Labs:   Recent Labs     02/05/21  0445 02/06/21  0546 02/07/21  0600   WBC 4.2 4.6 5.0   HGB 7.3*  7.5* 7.7* 7.7*   HCT 23.1*  23.4* 23.5* 23.5*    183 193     Recent Labs     02/05/21  0445 02/06/21  0546 02/07/21  0600    137 145   K 3.7 3.5 3.5    102 107   CO2 28 26 28   BUN 11 10 11   CREATININE <0.5* <0.5* <0.5*   CALCIUM 8.7 8.6 8.7     No results for input(s): AST, ALT, BILIDIR, BILITOT, ALKPHOS in the last 72 hours. Recent Labs     02/04/21  1839   INR 1.08     No results for input(s): Evangelina Alia in the last 72 hours. Urinalysis:      Lab Results   Component Value Date    NITRU Negative 09/03/2019    WBCUA 2 10/27/2018    RBCUA 4 10/27/2018    BLOODU Negative 09/03/2019    SPECGRAV 1.010 09/03/2019    GLUCOSEU Negative 09/03/2019       Radiology:  CTA LOWER EXTREMITY RIGHT W CONTRAST   Final Result   Large hematoma of the anterior calf.   No active extravasation or etiology of   hemorrhage is identified. Popliteal artery is patent. There is significant trifurcation level disease   with primary runoff via the disease posterior tibial.      No acute osseous abnormality. XR TIBIA FIBULA RIGHT (2 VIEWS)   Final Result   Pretibial soft tissue swelling, possible hematoma. No acute osseous injury. Assessment/Plan:    Active Hospital Problems    Diagnosis    Nausea [R11.0]    Hematoma of right lower extremity [S80.11XA]    Acute blood loss anemia [D62]    Traumatic hematoma of right lower leg [S80.11XA]    Hemorrhagic shock (HCC) [R57.8]    Hematoma [T14. 8XXA]    Symptomatic anemia [D64.9]    A-fib ([address] [I48.91]    Aortic valve disorder [I35.9]     Hospital course: a 68 yo female admitted with a right leg hematoma after having a car door slammed against her R leg. Pt on eliquis for Afib. She has been evaluated by ortho and had a hematoma evacuated in the OR. She sancho needed multiple blood transfusions due to hemorrhagic shock, but appears to be improving now. Acute Traumatic hematoma RLE  Coagulopathy due to eliquis - now on hold. S/p OR with Orthopedics 1/28  To OR with plastic surgery team 1/31  Wound vac changes per surgery, early next week  Per plastic surgery, \"plan for STSG prior to DC. Will need wound vac for 1 week after grafting. \"    Shock - appears hemorrhagic - resolved  Acute blood loss anemia on chronic anemia   Checked ECHO - preserved EF without major valvular abnormality. Hgb dropped to 6.5 - s/p 3 units pRBC on 1/28  Additional pRBC 2 units and albumin on 1/30 2/1 - additional 2 units of pRBC transfused  Hgb has been > 7.0 since last transfusion  Fe studies low, normal ferritin -> venofer    Afib paroxysmal.   currently in nsr  Eliquis on hold. Cont flacainide. Hx of Gastric Bypass - Shanice-En-Y - 30+ years ago. Chronic nausea on Reglan.    EGD in August - pathology report fairly unremarkable.        DVT Prophylaxis: SCD  Diet: DIET GENERAL;  Dietary Nutrition Supplements: Low Calorie High Protein Supplement  Code Status: Full Code    Dispo - inpatient, aru at discharge, has been evaluated by Dr Lukas Sheikh DO

## 2021-02-07 NOTE — PLAN OF CARE
Problem: Infection - Central Venous Catheter-Associated Bloodstream Infection:  Goal: Will show no infection signs and symptoms  Description: Will show no infection signs and symptoms  2/7/2021 1028 by Miriam Davenport RN  Outcome: Ongoing  2/7/2021 0452 by Vernell Dudley RN  Outcome: Ongoing     Problem: Urinary Elimination:  Goal: Signs and symptoms of infection will decrease  Description: Signs and symptoms of infection will decrease  2/7/2021 1028 by Miriam Davenport RN  Outcome: Ongoing  2/7/2021 0452 by Vernell Dudley RN  Outcome: Ongoing  Goal: Complications related to the disease process, condition or treatment will be avoided or minimized  Description: Complications related to the disease process, condition or treatment will be avoided or minimized  Outcome: Ongoing     Problem: Physical Regulation:  Goal: Complications related to the disease process, condition or treatment will be avoided or minimized  Description: Complications related to the disease process, condition or treatment will be avoided or minimized  Outcome: Ongoing     Problem: Sensory:  Goal: Pain level will decrease  Description: Pain level will decrease  2/7/2021 1028 by Miriam Davenport RN  Outcome: Ongoing  2/7/2021 0452 by Vernell Dudley RN  Outcome: Ongoing     Problem: Tissue Perfusion:  Goal: Ability to maintain adequate tissue perfusion will improve  Description: Ability to maintain adequate tissue perfusion will improve  Outcome: Ongoing

## 2021-02-08 LAB
ANION GAP SERPL CALCULATED.3IONS-SCNC: 8 MMOL/L (ref 3–16)
BASOPHILS ABSOLUTE: 0 K/UL (ref 0–0.2)
BASOPHILS RELATIVE PERCENT: 0.4 %
BUN BLDV-MCNC: 11 MG/DL (ref 7–20)
CALCIUM SERPL-MCNC: 8.8 MG/DL (ref 8.3–10.6)
CHLORIDE BLD-SCNC: 103 MMOL/L (ref 99–110)
CO2: 27 MMOL/L (ref 21–32)
CREAT SERPL-MCNC: <0.5 MG/DL (ref 0.6–1.2)
EOSINOPHILS ABSOLUTE: 0.1 K/UL (ref 0–0.6)
EOSINOPHILS RELATIVE PERCENT: 1.7 %
GFR AFRICAN AMERICAN: >60
GFR NON-AFRICAN AMERICAN: >60
GLUCOSE BLD-MCNC: 98 MG/DL (ref 70–99)
HCT VFR BLD CALC: 24.3 % (ref 36–48)
HEMOGLOBIN: 7.7 G/DL (ref 12–16)
LYMPHOCYTES ABSOLUTE: 0.7 K/UL (ref 1–5.1)
LYMPHOCYTES RELATIVE PERCENT: 13.6 %
MAGNESIUM: 1.8 MG/DL (ref 1.8–2.4)
MCH RBC QN AUTO: 28 PG (ref 26–34)
MCHC RBC AUTO-ENTMCNC: 31.7 G/DL (ref 31–36)
MCV RBC AUTO: 88.3 FL (ref 80–100)
MONOCYTES ABSOLUTE: 0.6 K/UL (ref 0–1.3)
MONOCYTES RELATIVE PERCENT: 11.4 %
NEUTROPHILS ABSOLUTE: 3.6 K/UL (ref 1.7–7.7)
NEUTROPHILS RELATIVE PERCENT: 72.9 %
PDW BLD-RTO: 16.7 % (ref 12.4–15.4)
PLATELET # BLD: 207 K/UL (ref 135–450)
PMV BLD AUTO: 7.9 FL (ref 5–10.5)
POTASSIUM SERPL-SCNC: 3.8 MMOL/L (ref 3.5–5.1)
RBC # BLD: 2.76 M/UL (ref 4–5.2)
SODIUM BLD-SCNC: 138 MMOL/L (ref 136–145)
WBC # BLD: 5 K/UL (ref 4–11)

## 2021-02-08 PROCEDURE — 97530 THERAPEUTIC ACTIVITIES: CPT

## 2021-02-08 PROCEDURE — 1200000000 HC SEMI PRIVATE

## 2021-02-08 PROCEDURE — APPSS15 APP SPLIT SHARED TIME 0-15 MINUTES: Performed by: PHYSICIAN ASSISTANT

## 2021-02-08 PROCEDURE — 80048 BASIC METABOLIC PNL TOTAL CA: CPT

## 2021-02-08 PROCEDURE — 2580000003 HC RX 258: Performed by: ORTHOPAEDIC SURGERY

## 2021-02-08 PROCEDURE — 85025 COMPLETE CBC W/AUTO DIFF WBC: CPT

## 2021-02-08 PROCEDURE — 83735 ASSAY OF MAGNESIUM: CPT

## 2021-02-08 PROCEDURE — 99024 POSTOP FOLLOW-UP VISIT: CPT | Performed by: PHYSICIAN ASSISTANT

## 2021-02-08 PROCEDURE — 6360000002 HC RX W HCPCS: Performed by: FAMILY MEDICINE

## 2021-02-08 PROCEDURE — APPNB30 APP NON BILLABLE TIME 0-30 MINS: Performed by: PHYSICIAN ASSISTANT

## 2021-02-08 PROCEDURE — 6370000000 HC RX 637 (ALT 250 FOR IP): Performed by: FAMILY MEDICINE

## 2021-02-08 PROCEDURE — 6370000000 HC RX 637 (ALT 250 FOR IP): Performed by: INTERNAL MEDICINE

## 2021-02-08 PROCEDURE — 6370000000 HC RX 637 (ALT 250 FOR IP): Performed by: STUDENT IN AN ORGANIZED HEALTH CARE EDUCATION/TRAINING PROGRAM

## 2021-02-08 PROCEDURE — 6370000000 HC RX 637 (ALT 250 FOR IP): Performed by: ORTHOPAEDIC SURGERY

## 2021-02-08 PROCEDURE — 2580000003 HC RX 258: Performed by: STUDENT IN AN ORGANIZED HEALTH CARE EDUCATION/TRAINING PROGRAM

## 2021-02-08 PROCEDURE — 97110 THERAPEUTIC EXERCISES: CPT

## 2021-02-08 PROCEDURE — 36592 COLLECT BLOOD FROM PICC: CPT

## 2021-02-08 PROCEDURE — 6360000002 HC RX W HCPCS: Performed by: INTERNAL MEDICINE

## 2021-02-08 RX ORDER — METOCLOPRAMIDE 10 MG/1
5 TABLET ORAL
Status: DISCONTINUED | OUTPATIENT
Start: 2021-02-08 | End: 2021-02-11 | Stop reason: HOSPADM

## 2021-02-08 RX ORDER — MEMANTINE HYDROCHLORIDE 5 MG/1
5 TABLET ORAL 2 TIMES DAILY
Status: DISCONTINUED | OUTPATIENT
Start: 2021-02-08 | End: 2021-02-11 | Stop reason: HOSPADM

## 2021-02-08 RX ORDER — FUROSEMIDE 20 MG/1
20 TABLET ORAL DAILY
Status: DISCONTINUED | OUTPATIENT
Start: 2021-02-08 | End: 2021-02-11 | Stop reason: HOSPADM

## 2021-02-08 RX ORDER — METOPROLOL SUCCINATE 50 MG/1
50 TABLET, EXTENDED RELEASE ORAL DAILY
Status: DISCONTINUED | OUTPATIENT
Start: 2021-02-08 | End: 2021-02-11 | Stop reason: HOSPADM

## 2021-02-08 RX ADMIN — SODIUM CHLORIDE, PRESERVATIVE FREE 10 ML: 5 INJECTION INTRAVENOUS at 08:23

## 2021-02-08 RX ADMIN — PROCHLORPERAZINE EDISYLATE 10 MG: 5 INJECTION INTRAMUSCULAR; INTRAVENOUS at 20:32

## 2021-02-08 RX ADMIN — MEMANTINE 5 MG: 5 TABLET ORAL at 20:51

## 2021-02-08 RX ADMIN — HYDROCODONE BITARTRATE AND ACETAMINOPHEN 2 TABLET: 5; 325 TABLET ORAL at 14:43

## 2021-02-08 RX ADMIN — PANTOPRAZOLE SODIUM 40 MG: 40 TABLET, DELAYED RELEASE ORAL at 06:00

## 2021-02-08 RX ADMIN — SODIUM CHLORIDE, PRESERVATIVE FREE 10 ML: 5 INJECTION INTRAVENOUS at 20:52

## 2021-02-08 RX ADMIN — ACETAMINOPHEN 650 MG: 325 TABLET ORAL at 08:20

## 2021-02-08 RX ADMIN — ALPRAZOLAM 0.5 MG: 0.5 TABLET ORAL at 20:51

## 2021-02-08 RX ADMIN — FUROSEMIDE 20 MG: 20 TABLET ORAL at 09:54

## 2021-02-08 RX ADMIN — IRON SUCROSE 200 MG: 20 INJECTION, SOLUTION INTRAVENOUS at 08:22

## 2021-02-08 RX ADMIN — METOCLOPRAMIDE HYDROCHLORIDE 5 MG: 5 INJECTION INTRAMUSCULAR; INTRAVENOUS at 08:21

## 2021-02-08 RX ADMIN — PANTOPRAZOLE SODIUM 40 MG: 40 TABLET, DELAYED RELEASE ORAL at 14:43

## 2021-02-08 RX ADMIN — PROMETHAZINE HYDROCHLORIDE 12.5 MG: 25 TABLET ORAL at 19:33

## 2021-02-08 RX ADMIN — SODIUM CHLORIDE, PRESERVATIVE FREE 10 ML: 5 INJECTION INTRAVENOUS at 08:21

## 2021-02-08 RX ADMIN — SERTRALINE 100 MG: 100 TABLET, FILM COATED ORAL at 20:51

## 2021-02-08 RX ADMIN — HYDROCODONE BITARTRATE AND ACETAMINOPHEN 2 TABLET: 5; 325 TABLET ORAL at 06:00

## 2021-02-08 RX ADMIN — MEMANTINE 5 MG: 5 TABLET ORAL at 09:55

## 2021-02-08 RX ADMIN — Medication 400 MG: at 08:20

## 2021-02-08 RX ADMIN — FLECAINIDE ACETATE 100 MG: 100 TABLET ORAL at 20:51

## 2021-02-08 RX ADMIN — ACETAMINOPHEN 650 MG: 325 TABLET ORAL at 17:47

## 2021-02-08 RX ADMIN — METOCLOPRAMIDE 5 MG: 10 TABLET ORAL at 14:46

## 2021-02-08 RX ADMIN — FLECAINIDE ACETATE 100 MG: 100 TABLET ORAL at 08:20

## 2021-02-08 RX ADMIN — METOPROLOL SUCCINATE 50 MG: 50 TABLET, EXTENDED RELEASE ORAL at 09:54

## 2021-02-08 RX ADMIN — Medication 500 MG: at 08:20

## 2021-02-08 ASSESSMENT — PAIN - FUNCTIONAL ASSESSMENT: PAIN_FUNCTIONAL_ASSESSMENT: PREVENTS OR INTERFERES SOME ACTIVE ACTIVITIES AND ADLS

## 2021-02-08 ASSESSMENT — PAIN DESCRIPTION - ONSET
ONSET: GRADUAL
ONSET: ON-GOING
ONSET: PROGRESSIVE
ONSET: SUDDEN

## 2021-02-08 ASSESSMENT — PAIN DESCRIPTION - LOCATION
LOCATION: NECK
LOCATION: LEG
LOCATION: LEG

## 2021-02-08 ASSESSMENT — PAIN DESCRIPTION - PAIN TYPE
TYPE: ACUTE PAIN;SURGICAL PAIN;CHRONIC PAIN
TYPE: ACUTE PAIN
TYPE: SURGICAL PAIN

## 2021-02-08 ASSESSMENT — PAIN SCALES - GENERAL
PAINLEVEL_OUTOF10: 6
PAINLEVEL_OUTOF10: 5
PAINLEVEL_OUTOF10: 2

## 2021-02-08 ASSESSMENT — PAIN DESCRIPTION - ORIENTATION
ORIENTATION: POSTERIOR
ORIENTATION: RIGHT;LOWER
ORIENTATION: RIGHT
ORIENTATION: RIGHT;LOWER

## 2021-02-08 ASSESSMENT — PAIN DESCRIPTION - PROGRESSION
CLINICAL_PROGRESSION: NOT CHANGED
CLINICAL_PROGRESSION: GRADUALLY WORSENING

## 2021-02-08 ASSESSMENT — PAIN DESCRIPTION - DESCRIPTORS: DESCRIPTORS: ACHING

## 2021-02-08 ASSESSMENT — PAIN DESCRIPTION - FREQUENCY: FREQUENCY: CONTINUOUS

## 2021-02-08 NOTE — CARE COORDINATION
Discharge Planning:  Pt is agreeable to go to [address] [place] [address] if insurance approves ARU. Pre cert started this morning. Will await response. PLAN: [facility] ARU pending pre cert.   [facility]  [phone]  Electronically signed by Mike Peralta on 2/8/2021 at 10:45 AM

## 2021-02-08 NOTE — PROGRESS NOTES
Department of Physical Medicine & Rehabilitation  Progress Note    Patient Identification:  Luci Melendez  [id number]  : 1945  Admit date: 2021    Chief Complaint: RLE pain    Subjective:   No acute events overnight. Patient seen this afternoon sitting up in room. Denies any new concerns. RLE pain stable. Still interested in coming to ARU prior to discharging home. ROS: No f/c, n/v, cp     Objective:  Patient Vitals for the past 24 hrs:   BP Temp Temp src Pulse Resp SpO2 Weight   21 1228 131/80 98.4 °F (36.9 °C) Oral 75 18 91 % --   21 0817 (!) 142/87 98.2 °F (36.8 °C) Oral 84 12 93 % --   21 0522 (!) 144/81 98.2 °F (36.8 °C) Oral 88 16 92 % 187 lb 9.8 oz (85.1 kg)   21 2328 124/80 98.2 °F (36.8 °C) Oral 87 16 93 % --   21 1938 134/81 97.9 °F (36.6 °C) Oral 91 16 93 % --   21 1535 131/71 97.8 °F (36.6 °C) Axillary 90 16 90 % --     Const: Alert. No distress, pleasant. HEENT: Normocephalic, atraumatic. Normal sclera/conjunctiva. MMM. CV: Regular rate and rhythm. Resp: No respiratory distress. Lungs CTAB. Abd: Soft, nontender, nondistended, NABS+   Ext: RLE wound with vac in place, RLE edema  Neuro: Alert, oriented, appropriately interactive. Psych: Cooperative, appropriate mood and affect    Laboratory data: Available via EMR.    Last 24 hour lab  Recent Results (from the past 24 hour(s))   Basic Metabolic Panel    Collection Time: 21  6:26 AM   Result Value Ref Range    Sodium 138 136 - 145 mmol/L    Potassium 3.8 3.5 - 5.1 mmol/L    Chloride 103 99 - 110 mmol/L    CO2 27 21 - 32 mmol/L    Anion Gap 8 3 - 16    Glucose 98 70 - 99 mg/dL    BUN 11 7 - 20 mg/dL    CREATININE <0.5 (L) 0.6 - 1.2 mg/dL    GFR Non-African American >60 >60    GFR African American >60 >60    Calcium 8.8 8.3 - 10.6 mg/dL   Magnesium    Collection Time: 21  6:26 AM   Result Value Ref Range    Magnesium 1.80 1.80 - 2.40 mg/dL   CBC Auto Differential    Collection Time: 02/08/21  6:26 AM   Result Value Ref Range    WBC 5.0 4.0 - 11.0 K/uL    RBC 2.76 (L) 4.00 - 5.20 M/uL    Hemoglobin 7.7 (L) 12.0 - 16.0 g/dL    Hematocrit 24.3 (L) 36.0 - 48.0 %    MCV 88.3 80.0 - 100.0 fL    MCH 28.0 26.0 - 34.0 pg    MCHC 31.7 31.0 - 36.0 g/dL    RDW 16.7 (H) 12.4 - 15.4 %    Platelets 316 861 - 693 K/uL    MPV 7.9 5.0 - 10.5 fL    Neutrophils % 72.9 %    Lymphocytes % 13.6 %    Monocytes % 11.4 %    Eosinophils % 1.7 %    Basophils % 0.4 %    Neutrophils Absolute 3.6 1.7 - 7.7 K/uL    Lymphocytes Absolute 0.7 (L) 1.0 - 5.1 K/uL    Monocytes Absolute 0.6 0.0 - 1.3 K/uL    Eosinophils Absolute 0.1 0.0 - 0.6 K/uL    Basophils Absolute 0.0 0.0 - 0.2 K/uL       Therapy progress:  PT  Position Activity Restriction  Other position/activity restrictions: Wound Vac R Lat Calf. PT spoke with Surg (LUCAS Eddy NP) : WBAT R LE. Objective     Sit to Stand: Minimal Assistance(With Walker. Cues for safe hand placement.)  Stand to sit: Minimal Assistance(With Walker. Cues for safe hand placement.)  Bed to Chair: Dependent/Total([place] [place].)  Device: Rolling Walker  Distance: Pt amb 4-5 steps, additional 4-5 steps forward/back with Shafer Potter assist.  WBAT R LE, pt off-load onto UEs due to weak R LE during single leg stance. OT  PT Equipment Recommendations  Other: Will monitor for potential equipt needs. Assessment        SLP          Body mass index is 35.45 kg/m². Assessment:  Debility  Right leg hematoma - s/p evacuation (1/28 with Dr. Vamsi Cates), evacuation, debridement and wound vac placement (1/31 with Dr. Bakari Rahman)  Hemorrhagic shock  Acute blood loss anemia  Paroxysmal Afib  H/o gastric bypass ([place])  Anxiety/depression  Fibromyalgia      Impairments: generalized weakness, decreased ROM distal RLE, decreased balance, endurance.      Recommendations:     Patient remains candidate for ARU based on current functional level.     Will require insurance pre-cert. ?  Timing of STSG, possibly this week per Surgery.      Thank you for this consult. Please contact me with any questions or concerns. Jose Armando Prather MD 2/8/2021, 12:45 PM

## 2021-02-08 NOTE — PROGRESS NOTES
Occupational Therapy  Facility/Department: [facility] 5 PROGRESSIVE CARE  Daily Treatment Note  NAME: Cesar Osorio  : 1945  MRN: [id number]    Date of Service: 2021    Discharge Recommendations:  Patient would benefit from continued therapy after discharge, 5-7 sessions per week  OT Equipment Recommendations  Other: defer to [address] scored a 16/24 on the AM-PAC ADL Inpatient form. Current research shows that an AM-PAC score of 17 or less is typically not associated with a discharge to the patient's home setting. Based on the patient's AM-PAC score and their current ADL deficits, it is recommended that the patient have 5-7 sessions per week of Occupational Therapy at d/c to increase the patient's independence. At this time, this patient demonstrates the endurance, and/or tolerance for 3 hours of therapy each day, with a treatment frequency of 5-7x/wk. Please see assessment section for further patient specific details. If patient discharges prior to next session this note will serve as a discharge summary. Please see below for the latest assessment towards goals. Assessment   Performance deficits / Impairments: Decreased functional mobility ; Decreased endurance;Decreased strength;Decreased ADL status; Decreased safe awareness;Decreased high-level IADLs;Decreased balance  Assessment: Discussed with OTR: Pt tolerated session fairly well, limited by the above deficits, including decreased activity tolerance, strength; and pain (RLE). Pt required Min/CGA for sit><stands at bed rail initially, then at walker. Pt standing statically for 1-1.5 min before requesting to sit. Pt tolerating BUE ex's to increase ROM, strength for ADL's, transfers, mob. Anticipate pt would require overall Max A for ADL's. Cont poc and recommendation for mod-high frequency therapy at d/c. Prognosis: Good  History: 66 y/o female admit 2021 with R LE Traumatic Hematoma (hit R LE on car door).   s/p Evac of R LE Hematoma. Pt returned to OR 2021 S/P Evac of R LE Hematoma, Excisional Debride (65M73 cm), Applic of Wound Vac. Plastic and Reconstruct Surg follow for Staged Reconstruct with Skin Graft. PMH as noted including Gastric Bypass, Rectal Ca, L TKR, R Femur Fx/ORIF (10/2018). PTA pt lives at home with son and independent with ADLs and functional mobility. OT Education: Transfer Training;OT Role;Plan of Care  REQUIRES OT FOLLOW UP: Yes  Activity Tolerance  Activity Tolerance: Patient limited by pain; Patient limited by fatigue;Patient Tolerated treatment well  Activity Tolerance: RLE c/o pain, weakness, with standing  Safety Devices  Safety Devices in place: Yes  Type of devices: Nurse notified;Call light within reach; Left in chair;Chair alarm in place;Gait belt         Patient Diagnosis(es): The primary encounter diagnosis was Traumatic hematoma of right lower leg, initial encounter. Diagnoses of Contusion of leg, right, initial encounter, Anticoagulated, Hypotension due to hypovolemia, and Anemia due to other cause, not classified were also pertinent to this visit. has a past medical history of Acid reflux, Anxiety, Arthritis, Atrial fibrillation (HCC), Closed fracture dislocation of right elbow, Depression, Fibromyalgia, Migraine, Rectal cancer ([facility] [address] and Wears glasses. has a past surgical history that includes  section; Total knee arthroplasty (Left); open tx femoral supracondylar fracture w/o extension (Right, 10/25/2018); Gastric bypass surgery; Neck surgery; other surgical history; Cholecystectomy; Appendectomy; Colonoscopy; Upper gastrointestinal endoscopy (N/A, 2019); Colonoscopy (N/A, 2019); knee surgery (Right); Upper gastrointestinal endoscopy (N/A, 2020); incision and drainage (Right, 2021); and Skin graft (Right, 2021).     Restrictions  Restrictions/Precautions  Restrictions/Precautions: Fall Risk, Weight Bearing  Lower Extremity Weight Bearing Restrictions  Right Lower Extremity Weight Bearing: Weight Bearing As Tolerated  Position Activity Restriction  Other position/activity restrictions: Wound Vac R Lat Calf. PT spoke with Surg (LUCAS Eddy NP) : WBAT R LE. Subjective   General  Chart Reviewed: Yes, Orders, Progress Notes, Labs  Patient assessed for rehabilitation services?: Yes  Additional Pertinent Hx: 68 y/o female admit 1/31/2021 with R LE Traumatic Hematoma (hit R LE on car door). 1/28 s/p Evac of R LE Hematoma. Pt returned to OR 1/31/2021 S/P Evac of R LE Hematoma, Excisional Debride (12U76 cm), Applic of Wound Vac. Plastic and Reconstruct Surg follow for Staged Reconstruct with Skin Graft. PMH as noted including Gastric Bypass, Rectal Ca, L TKR, R Femur Fx/ORIF (10/2018). Family / Caregiver Present: No  Referring Practitioner: Reno Riding, DO  Subjective  Subjective: Pt met BS, sitting up in recliner. (up per nursing). Pt agreeable to OT. Pt c/o RLE pain (not rated). General Comment  Comments: Per RN ok for therapy. Orientation  Orientation  Overall Orientation Status: Within Functional Limits  Objective    ADL  Grooming: Setup(brushed teeth seated from recliner chair.)  Additional Comments: Pt recently bathed, toileted per nursing assist. Anticipate pt would require Max A for LB bathing/dressing. Standing Balance  Time: 1 nina 17 sec, 1 min 33 sec  Activity: Static stance at bed rail 1x and 1x at walker, with CGA and pt expressing need to sit both times d/t pain, weakness increasing in RLE. Pt unable to take steps with RW. Transfers  Sit to stand: Minimal assistance  Stand to sit: Contact guard assistance  Transfer Comments: to/from recliner, at bed rail 1x and walker 1x.  Pt cued for hand placement         Cognition  Overall Cognitive Status: WFL         Type of ROM/Therapeutic Exercise  Type of ROM/Therapeutic Exercise: AROM  Exercises  Shoulder Flexion: x10  Horizontal ABduction: x10  Horizontal ADduction: x10  Chair Push-ups: x10  Elbow Flexion: x15  Elbow Extension: x15                    Plan   Plan  Times per week: 3-5  Current Treatment Recommendations: Strengthening, Endurance Training, Balance Training, Gait Training, Functional Mobility Training, Self-Care / ADL    AM-PAC Score        AM-PAC Inpatient Daily Activity Raw Score: 16 (02/08/21 1024)  AM-PAC Inpatient ADL T-Scale Score : 35.96 (02/08/21 1024)  ADL Inpatient CMS 0-100% Score: 53.32 (02/08/21 1024)  ADL Inpatient CMS G-Code Modifier : CK (02/08/21 1024)    Goals  Short term goals  Time Frame for Short term goals: Prior to DC: Status: goals ongoing  Short term goal 1: Pt will complete ADL transfers with SBA  Short term goal 2: Pt will complete functional mobility with SBA  Short term goal 3: Pt will tolerate standing > 5 min for functional task with SBA  Short term goal 4: Pt will complete LB dressing with SBA  Short term goal 5: Pt will complete toileting with SBA  Patient Goals   Patient goals : to return home asap       Therapy Time   Individual Concurrent Group Co-treatment   Time In 0958         Time Out 1033         Minutes 35              Joel LEE/LUCAS,515

## 2021-02-08 NOTE — PLAN OF CARE
Problem: Pain:  Goal: Pain level will decrease  Description: Pain level will decrease  2/8/2021 1051 by Rosendo Davis RN  Outcome: Ongoing  Pain/discomfort being managed with PRN analgesics per MD orders. Pt able to express presence and absence of pain and rate pain appropriately using numerical scale. Problem: Falls - Risk of:  Goal: Will remain free from falls  Description: Will remain free from falls  2/8/2021 1051 by Rosendo Davis RN  Outcome: Ongoing   Bed alarm kept on. Call light in reach. Side rails up x2. Pt reminded to use call light for assistance getting out of bed. Hourly rounding done to anticipate pt needs. Problem: Infection - Central Venous Catheter-Associated Bloodstream Infection:  Goal: Will show no infection signs and symptoms  Description: Will show no infection signs and symptoms  2/8/2021 1051 by Rosendo Davis RN  Outcome: Ongoing     Problem: Urinary Elimination:  Goal: Signs and symptoms of infection will decrease  Description: Signs and symptoms of infection will decrease  2/8/2021 1051 by Rosendo Davis RN  Outcome: Ongoing     Problem: Physical Regulation:  Goal: Complications related to the disease process, condition or treatment will be avoided or minimized  Description: Complications related to the disease process, condition or treatment will be avoided or minimized  2/8/2021 1051 by Roesndo Davis RN  Outcome: Ongoing     Problem: Tissue Perfusion:  Goal: Ability to maintain adequate tissue perfusion will improve  Description: Ability to maintain adequate tissue perfusion will improve  2/8/2021 1051 by Rosendo Davis RN  Outcome: Ongoing   Electronically signed by Rosendo Davis RN on 2/8/2021 at 10:53 AM    Problem: Skin Integrity:  Goal: Will show no infection signs and symptoms  Description: Will show no infection signs and symptoms  2/8/2021 1051 by Rosendo Davis RN  Outcome: Ongoing  Skin assessment completed every shift.  Pt assessed for incontinence, appropriate barrier cream applied prn. Pt encouraged to turn/rotate every 2 hours. Assistance provided if pt unable to do so themselves.

## 2021-02-08 NOTE — PROGRESS NOTES
[facility] [phone]                                     Daily Progress Note                                                         Pt Name: yTler Salas  Medical Record Number: [id number]  Date of Birth 1945   Today's Date: 2/8/2021  Chief Complaint   Patient presents with    Leg Injury     hit right leg on car door. large hematoma forming to right shin area. pt on eliquis. ASSESSMENT/PLAN  Right lower extremity hematoma  -1/31 evacuation of hematoma, excisional debridement, wound vac application. -H/H stable  -continue wound vac. Plan to change Tuesday.   -Possible STSG this week with Dr. Sabi Gay  -Sitting up in chair at visit, pain controlled       [address] reports adequate pain control    OBJECTIVE  VITALS:  height is 5' 1\" (1.549 m) and weight is 187 lb 9.8 oz (85.1 kg). Her oral temperature is 98.2 °F (36.8 °C). Her blood pressure is 142/87 (abnormal) and her pulse is 84. Her respiration is 12 and oxygen saturation is 93%. INTAKE/OUTPUT:      Intake/Output Summary (Last 24 hours) at 2/8/2021 1158  Last data filed at 2/8/2021 1001  Gross per 24 hour   Intake 880 ml   Output 1175 ml   Net -295 ml     GENERAL: alert, no distress  EXTREMITY: Vac in place    I/O last 3 completed shifts:   In: 880 [P.O.:880]  Out: 2150 [Urine:1175; Drains:975]      LABS  Recent Labs     02/08/21  0626   WBC 5.0   HGB 7.7*   HCT 24.3*         K 3.8      CO2 27   BUN 11   CREATININE <0.5*   MG 1.80   CALCIUM 8.8     Electronically signed by Pedro Sanchez PA-C on 2/8/2021 at 11:58 AM

## 2021-02-08 NOTE — PROGRESS NOTES
Physical Therapy  Facility/Department: [facility] 5W PROGRESSIVE CARE  Daily Treatment Note  NAME: Rafaela Velazquez  : 1945  MRN: [id number]    Date of Service: 2021    Discharge Recommendations:  Continue to assess pending progress   PT Equipment Recommendations  Other: Will monitor for potential equipt needs. Rafaela Velazquez scored a 14/24 on the AM-PAC short mobility form. Current research shows that an AM-PAC score of 17 or less is typically not associated with a discharge to the patient's home setting. Based on the patient's AM-PAC score and their current functional mobility deficits, it is recommended that the patient have 5-7 sessions per week of Physical Therapy at d/c to increase the patient's independence. At this time, this patient demonstrates the endurance, and/or tolerance for 3 hours of therapy each day, with a treatment frequency of 5-7x/wk. Please see assessment section for further patient specific details. If patient discharges prior to next session this note will serve as a discharge summary. Please see below for the latest assessment towards goals. Assessment   Body structures, Functions, Activity limitations: Decreased functional mobility ; Decreased strength;Decreased endurance; Increased pain  Assessment: 66 y/o female admit 2021 with R LE Traumatic Hematoma (hit R LE on car door). 2021 S/P Evac of R LE Hematoma, Excisional Debride (91W12 cm), Applic of Wound Vac. Plastic and Reconstruct Surg follow for Staged Reconstruct with Skin Graft. PMH as noted including Gastric Bypass, Rectal Ca, L TKR, R Femur Fx/ORIF (10/2018). PTA pt living with son in home with ramp access and 1st floor bed/bath. Prior, pt independent with daily care and functional mobility. Pt sherri Transfers, Stand and Amb few steps with Walker (WBAT). At this time, recommend cont PT Services (pt prefers 5-7) prior return home with son. (Per Surg, anticipate possible STSG this week.   Will need clarification wgt bear/activity following surg). Prognosis: Good  Decision Making: Medium Complexity  History: 66 y/o female admit 2021 with R LE Traumatic Hematoma (hit R LE on car door). 2021 S/P Evac of R LE Hematoma, Excisional Debride (85J87 cm), Applic of Wound Vac. Plastic and Reconstruct Surg follow for Staged Reconstruct with Skin Graft. PMH as noted including Gastric Bypass, Rectal Ca, L TKR, R Femur Fx/ORIF (10/2018). Exam: See above. Clinical Presentation: See above. Patient Education: Role of PT, POC, LE Exs, Safe use of Walker. Barriers to Learning: [place]. REQUIRES PT FOLLOW UP: Yes  Activity Tolerance  Activity Tolerance: Patient Tolerated treatment well     Patient Diagnosis(es): The primary encounter diagnosis was Traumatic hematoma of right lower leg, initial encounter. Diagnoses of Contusion of leg, right, initial encounter, Anticoagulated, Hypotension due to hypovolemia, and Anemia due to other cause, not classified were also pertinent to this visit. has a past medical history of Acid reflux, Anxiety, Arthritis, Atrial fibrillation (HCC), Closed fracture dislocation of right elbow, Depression, Fibromyalgia, Migraine, Rectal cancer ([facility] [address] and Wears glasses. has a past surgical history that includes  section; Total knee arthroplasty (Left); open tx femoral supracondylar fracture w/o extension (Right, 10/25/2018); Gastric bypass surgery; Neck surgery; other surgical history; Cholecystectomy; Appendectomy; Colonoscopy; Upper gastrointestinal endoscopy (N/A, 2019); Colonoscopy (N/A, 2019); knee surgery (Right); Upper gastrointestinal endoscopy (N/A, 2020); incision and drainage (Right, 2021); and Skin graft (Right, 2021).     Restrictions  Restrictions/Precautions  Restrictions/Precautions: Fall Risk, Weight Bearing  Lower Extremity Weight Bearing Restrictions  Right Lower Extremity Weight Bearing: Weight Bearing As Tolerated  Position Activity Restriction  Other position/activity restrictions: Wound Vac R Lat Calf. PT spoke with Surg (LUCAS Eddy NP) : WBAT R LE. Subjective   General  Chart Reviewed: Yes  Additional Pertinent Hx: 66 y/o female admit 1/31/2021 with R LE Traumatic Hematoma (hit R LE on car door). 1/31/2021 S/P Evac of R LE Hematoma, Excisional Debride (51I40 cm), Applic of Wound Vac. Plastic and Reconstruct Surg follow for Staged Reconstruct with Skin Graft. PMH as noted including Gastric Bypass, Rectal Ca, L TKR, R Femur Fx/ORIF (10/2018). Response To Previous Treatment: Patient with no complaints from previous session. Family / Caregiver Present: No  Referring Practitioner: Dr Radha Lara: Pt agreeable to PT Rx. Orientation  Orientation  Overall Orientation Status: Within Functional Limits  Cognition   Cognition  Overall Cognitive Status: WFL  Objective   Bed mobility  Sit to Supine: Minimal assistance  Transfers  Sit to Stand: Minimal Assistance(With Walker. Cues for safe hand placement.)  Stand to sit: Minimal Assistance(With Walker. Cues for safe hand placement.)  Ambulation  Ambulation?: Yes  Ambulation 1  Surface: level tile  Device: Rolling Walker  Distance: Pt amb 4-5 steps, additional 4-5 steps forward/back with Walker Min assist.  WBAT R LE, pt off-load onto UEs due to weak R LE during single leg stance. Exercises  Quad Sets: 10x  Gluteal Sets: 10x  Hip Flexion: 10x  Knee Long Arc Quad: 10x  Ankle Pumps: 10x, Toe/Heel Raises 10x              AM-PAC Score  AM-PAC Inpatient Mobility Raw Score : 14 (02/08/21 1234)  AM-PAC Inpatient T-Scale Score : 38.1 (02/08/21 1234)  Mobility Inpatient CMS 0-100% Score: 61.29 (02/08/21 1234)  Mobility Inpatient CMS G-Code Modifier : CL (02/08/21 1234)          Goals  Short term goals  Time Frame for Short term goals: Upon d/c acute care setting. Short term goal 1: Bed Mob SBA. Short term goal 2: Transfers with assist device CGA.   Short term goal 3: Amb with assist device, WBAT R LE, 25' Min assist.  Patient Goals   Patient goals : Be able to return home with son. Plan    Plan  Times per week: 3-5x week while in acute care setting.   Current Treatment Recommendations: Strengthening, Functional Mobility Training, Transfer Training, Gait Training, Safety Education & Training, Patient/Caregiver Education & Training  Safety Devices  Type of devices: Call light within reach, Chair alarm in place, Left in chair, Nurse notified     Therapy Time   Individual Concurrent Group Co-treatment   Time In 1030         Time Out 1110         Minutes 1200 [facility]

## 2021-02-08 NOTE — PLAN OF CARE
Problem: Pain:  Goal: Pain level will decrease  Description: Pain level will decrease  Outcome: Ongoing     Problem: Pain:  Goal: Control of acute pain  Description: Control of acute pain  Outcome: Ongoing     Problem: Falls - Risk of:  Goal: Will remain free from falls  Description: Will remain free from falls  Outcome: Ongoing     Problem: Falls - Risk of:  Goal: Absence of physical injury  Description: Absence of physical injury  Outcome: Ongoing     Problem: Skin Integrity:  Goal: Will show no infection signs and symptoms  Description: Will show no infection signs and symptoms  Outcome: Ongoing     Problem: Skin Integrity:  Goal: Absence of new skin breakdown  Description: Absence of new skin breakdown  Outcome: Ongoing     Problem: Skin Integrity:  Goal: Skin integrity will improve  Description: Skin integrity will improve  Outcome: Ongoing     Problem: Infection - Central Venous Catheter-Associated Bloodstream Infection:  Goal: Will show no infection signs and symptoms  Description: Will show no infection signs and symptoms  Outcome: Ongoing     Problem: Urinary Elimination:  Goal: Signs and symptoms of infection will decrease  Description: Signs and symptoms of infection will decrease  Outcome: Ongoing     Problem: Physical Regulation:  Goal: Complications related to the disease process, condition or treatment will be avoided or minimized  Description: Complications related to the disease process, condition or treatment will be avoided or minimized  Outcome: Ongoing     Problem: Sensory:  Goal: Pain level will decrease  Description: Pain level will decrease  Outcome: Ongoing     Problem: Tissue Perfusion:  Goal: Ability to maintain adequate tissue perfusion will improve  Description: Ability to maintain adequate tissue perfusion will improve  Outcome: Ongoing

## 2021-02-09 LAB
ANION GAP SERPL CALCULATED.3IONS-SCNC: 9 MMOL/L (ref 3–16)
BASOPHILS ABSOLUTE: 0 K/UL (ref 0–0.2)
BASOPHILS RELATIVE PERCENT: 0.5 %
BUN BLDV-MCNC: 14 MG/DL (ref 7–20)
CALCIUM SERPL-MCNC: 8.9 MG/DL (ref 8.3–10.6)
CHLORIDE BLD-SCNC: 102 MMOL/L (ref 99–110)
CO2: 28 MMOL/L (ref 21–32)
CREAT SERPL-MCNC: <0.5 MG/DL (ref 0.6–1.2)
EOSINOPHILS ABSOLUTE: 0.1 K/UL (ref 0–0.6)
EOSINOPHILS RELATIVE PERCENT: 1.3 %
GFR AFRICAN AMERICAN: >60
GFR NON-AFRICAN AMERICAN: >60
GLUCOSE BLD-MCNC: 89 MG/DL (ref 70–99)
HCT VFR BLD CALC: 24.2 % (ref 36–48)
HEMOGLOBIN: 7.6 G/DL (ref 12–16)
LYMPHOCYTES ABSOLUTE: 1 K/UL (ref 1–5.1)
LYMPHOCYTES RELATIVE PERCENT: 18.2 %
MAGNESIUM: 1.8 MG/DL (ref 1.8–2.4)
MCH RBC QN AUTO: 27.9 PG (ref 26–34)
MCHC RBC AUTO-ENTMCNC: 31.3 G/DL (ref 31–36)
MCV RBC AUTO: 89.1 FL (ref 80–100)
MONOCYTES ABSOLUTE: 0.6 K/UL (ref 0–1.3)
MONOCYTES RELATIVE PERCENT: 10.6 %
NEUTROPHILS ABSOLUTE: 3.7 K/UL (ref 1.7–7.7)
NEUTROPHILS RELATIVE PERCENT: 69.4 %
PDW BLD-RTO: 16.9 % (ref 12.4–15.4)
PLATELET # BLD: 241 K/UL (ref 135–450)
PMV BLD AUTO: 7.7 FL (ref 5–10.5)
POTASSIUM SERPL-SCNC: 3.8 MMOL/L (ref 3.5–5.1)
RBC # BLD: 2.72 M/UL (ref 4–5.2)
SODIUM BLD-SCNC: 139 MMOL/L (ref 136–145)
WBC # BLD: 5.4 K/UL (ref 4–11)

## 2021-02-09 PROCEDURE — 6370000000 HC RX 637 (ALT 250 FOR IP): Performed by: INTERNAL MEDICINE

## 2021-02-09 PROCEDURE — 36592 COLLECT BLOOD FROM PICC: CPT

## 2021-02-09 PROCEDURE — 6360000002 HC RX W HCPCS: Performed by: INTERNAL MEDICINE

## 2021-02-09 PROCEDURE — 6360000002 HC RX W HCPCS: Performed by: ORTHOPAEDIC SURGERY

## 2021-02-09 PROCEDURE — APPNB30 APP NON BILLABLE TIME 0-30 MINS: Performed by: PHYSICIAN ASSISTANT

## 2021-02-09 PROCEDURE — 6370000000 HC RX 637 (ALT 250 FOR IP): Performed by: FAMILY MEDICINE

## 2021-02-09 PROCEDURE — 2580000003 HC RX 258: Performed by: STUDENT IN AN ORGANIZED HEALTH CARE EDUCATION/TRAINING PROGRAM

## 2021-02-09 PROCEDURE — 99024 POSTOP FOLLOW-UP VISIT: CPT | Performed by: PHYSICIAN ASSISTANT

## 2021-02-09 PROCEDURE — 97110 THERAPEUTIC EXERCISES: CPT | Performed by: PHYSICAL THERAPIST

## 2021-02-09 PROCEDURE — 94760 N-INVAS EAR/PLS OXIMETRY 1: CPT

## 2021-02-09 PROCEDURE — 85025 COMPLETE CBC W/AUTO DIFF WBC: CPT

## 2021-02-09 PROCEDURE — 97116 GAIT TRAINING THERAPY: CPT | Performed by: PHYSICAL THERAPIST

## 2021-02-09 PROCEDURE — 6370000000 HC RX 637 (ALT 250 FOR IP): Performed by: STUDENT IN AN ORGANIZED HEALTH CARE EDUCATION/TRAINING PROGRAM

## 2021-02-09 PROCEDURE — 1200000000 HC SEMI PRIVATE

## 2021-02-09 PROCEDURE — 97530 THERAPEUTIC ACTIVITIES: CPT | Performed by: PHYSICAL THERAPIST

## 2021-02-09 PROCEDURE — 80048 BASIC METABOLIC PNL TOTAL CA: CPT

## 2021-02-09 PROCEDURE — 83735 ASSAY OF MAGNESIUM: CPT

## 2021-02-09 PROCEDURE — 99024 POSTOP FOLLOW-UP VISIT: CPT | Performed by: SURGERY

## 2021-02-09 PROCEDURE — 97110 THERAPEUTIC EXERCISES: CPT

## 2021-02-09 PROCEDURE — 2580000003 HC RX 258: Performed by: ORTHOPAEDIC SURGERY

## 2021-02-09 PROCEDURE — 6370000000 HC RX 637 (ALT 250 FOR IP): Performed by: SURGERY

## 2021-02-09 PROCEDURE — 6360000002 HC RX W HCPCS: Performed by: FAMILY MEDICINE

## 2021-02-09 PROCEDURE — APPSS15 APP SPLIT SHARED TIME 0-15 MINUTES: Performed by: PHYSICIAN ASSISTANT

## 2021-02-09 RX ORDER — HYDROCODONE BITARTRATE AND ACETAMINOPHEN 5; 325 MG/1; MG/1
2 TABLET ORAL ONCE
Status: COMPLETED | OUTPATIENT
Start: 2021-02-09 | End: 2021-02-09

## 2021-02-09 RX ADMIN — MORPHINE SULFATE 2 MG: 2 INJECTION, SOLUTION INTRAMUSCULAR; INTRAVENOUS at 08:53

## 2021-02-09 RX ADMIN — SODIUM CHLORIDE, PRESERVATIVE FREE 10 ML: 5 INJECTION INTRAVENOUS at 08:53

## 2021-02-09 RX ADMIN — SERTRALINE 100 MG: 100 TABLET, FILM COATED ORAL at 20:30

## 2021-02-09 RX ADMIN — METOCLOPRAMIDE 5 MG: 10 TABLET ORAL at 15:26

## 2021-02-09 RX ADMIN — METOPROLOL SUCCINATE 50 MG: 50 TABLET, EXTENDED RELEASE ORAL at 08:50

## 2021-02-09 RX ADMIN — SODIUM CHLORIDE, PRESERVATIVE FREE 10 ML: 5 INJECTION INTRAVENOUS at 22:27

## 2021-02-09 RX ADMIN — PANTOPRAZOLE SODIUM 40 MG: 40 TABLET, DELAYED RELEASE ORAL at 06:16

## 2021-02-09 RX ADMIN — Medication 500 MG: at 08:49

## 2021-02-09 RX ADMIN — PANTOPRAZOLE SODIUM 40 MG: 40 TABLET, DELAYED RELEASE ORAL at 15:26

## 2021-02-09 RX ADMIN — FLECAINIDE ACETATE 100 MG: 100 TABLET ORAL at 20:30

## 2021-02-09 RX ADMIN — MEMANTINE 5 MG: 5 TABLET ORAL at 08:50

## 2021-02-09 RX ADMIN — MEMANTINE 5 MG: 5 TABLET ORAL at 20:30

## 2021-02-09 RX ADMIN — METOCLOPRAMIDE 5 MG: 10 TABLET ORAL at 08:49

## 2021-02-09 RX ADMIN — HYDROCODONE BITARTRATE AND ACETAMINOPHEN 2 TABLET: 5; 325 TABLET ORAL at 08:54

## 2021-02-09 RX ADMIN — FUROSEMIDE 20 MG: 20 TABLET ORAL at 08:49

## 2021-02-09 RX ADMIN — METOCLOPRAMIDE 5 MG: 10 TABLET ORAL at 12:33

## 2021-02-09 RX ADMIN — TIZANIDINE 2 MG: 4 TABLET ORAL at 22:26

## 2021-02-09 RX ADMIN — FLECAINIDE ACETATE 100 MG: 100 TABLET ORAL at 08:50

## 2021-02-09 RX ADMIN — HYDROCODONE BITARTRATE AND ACETAMINOPHEN 2 TABLET: 5; 325 TABLET ORAL at 03:57

## 2021-02-09 RX ADMIN — ONDANSETRON 4 MG: 2 INJECTION INTRAMUSCULAR; INTRAVENOUS at 03:57

## 2021-02-09 RX ADMIN — HYDROCODONE BITARTRATE AND ACETAMINOPHEN 2 TABLET: 5; 325 TABLET ORAL at 12:33

## 2021-02-09 RX ADMIN — PROCHLORPERAZINE EDISYLATE 10 MG: 5 INJECTION INTRAMUSCULAR; INTRAVENOUS at 20:31

## 2021-02-09 RX ADMIN — ALPRAZOLAM 0.5 MG: 0.5 TABLET ORAL at 22:26

## 2021-02-09 RX ADMIN — Medication 400 MG: at 08:50

## 2021-02-09 ASSESSMENT — PAIN SCALES - GENERAL
PAINLEVEL_OUTOF10: 0
PAINLEVEL_OUTOF10: 0
PAINLEVEL_OUTOF10: 5
PAINLEVEL_OUTOF10: 3
PAINLEVEL_OUTOF10: 0

## 2021-02-09 ASSESSMENT — PAIN DESCRIPTION - DESCRIPTORS
DESCRIPTORS: ACHING;BURNING
DESCRIPTORS: ACHING;BURNING;CONSTANT

## 2021-02-09 ASSESSMENT — PAIN DESCRIPTION - PROGRESSION
CLINICAL_PROGRESSION: NOT CHANGED

## 2021-02-09 ASSESSMENT — PAIN - FUNCTIONAL ASSESSMENT: PAIN_FUNCTIONAL_ASSESSMENT: PREVENTS OR INTERFERES SOME ACTIVE ACTIVITIES AND ADLS

## 2021-02-09 ASSESSMENT — PAIN DESCRIPTION - ORIENTATION
ORIENTATION: RIGHT
ORIENTATION: RIGHT

## 2021-02-09 ASSESSMENT — PAIN DESCRIPTION - ONSET
ONSET: ON-GOING
ONSET: ON-GOING

## 2021-02-09 ASSESSMENT — PAIN DESCRIPTION - LOCATION: LOCATION: LEG

## 2021-02-09 ASSESSMENT — PAIN DESCRIPTION - PAIN TYPE: TYPE: ACUTE PAIN

## 2021-02-09 NOTE — PROGRESS NOTES
Spoke to Dr Elma Shah RN he told her he wasn't planning on surgery for the next week or two so she can go to rehab.  He asked that pictures of the wound be placed in epic with the next dressing change so he can follow the progress

## 2021-02-09 NOTE — PROGRESS NOTES
[facility] [address]   2/9/2021  [id number]    Pt met bedside for OT treatment session. Agreeable for short therapy session, reports just finished working with PT. Pt completed sit <> stand from recliner chair to RW. Agreeable to timed static standing, completed trial of 2 min 3 seconds and 2 min 14 seconds with SBA/CGA. Pt declined need for ADL tasks. Continue to report some concern about longer distance ambulation however encouragement provided - reports she will try next session. Completed UE ROM exercises - discussed completing several times throughout the day to increase strength and activity tolerance. Pt would benefit from continued therapy - anticipate would tolerate high frequency therapy. Tyler Salas scored a 16/24 on the AM-PAC ADL Inpatient form. Current research shows that an AM-PAC score of 17 or less is typically not associated with a discharge to the patient's home setting. Based on the patient's AM-PAC score and their current ADL deficits, it is recommended that the patient have 5-7 sessions per week of Occupational Therapy at d/c to increase the patient's independence. At this time, this patient demonstrates the endurance, and/or tolerance for 3 hours of therapy each day, with a treatment frequency of 5-7x/wk. Please see assessment section for further patient specific details. If patient discharges prior to next session this note will serve as a discharge summary. Please see below for the latest assessment towards goals.      Time in 1510  Time out 1525  Total time 15 minutes     Electronically signed by EMORY Fatima/L#[id number]  on 2/9/2021 at 3:28 PM

## 2021-02-09 NOTE — PLAN OF CARE
Problem: Pain:  Goal: Pain level will decrease  Description: Pain level will decrease  2/9/2021 0027 by Lauren Burdick RN  Outcome: Ongoing  Note: Pain/discomfort being managed with PRN analgesics per MD orders. Pt able to express presence and absence of pain and rate pain appropriately using numerical scale. Problem: Pain:  Goal: Control of acute pain  Description: Control of acute pain  2/9/2021 0027 by Lauren Burdick RN  Outcome: Ongoing     Problem: Pain:  Goal: Control of chronic pain  Description: Control of chronic pain  2/9/2021 0027 by Lauren Burdick RN  Outcome: Ongoing     Problem: Falls - Risk of:  Goal: Will remain free from falls  Description: Will remain free from falls  2/9/2021 0027 by Lauren Burdick RN  Outcome: Ongoing  Note: Fall risk precautions in place. Bed in lowest position with wheels locked,bed alarm in place and activated,non-skid socks on pt, fall risk ID on pt, call light in reach, will continue to monitor. STEDY utilized for transfer. Problem: Falls - Risk of:  Goal: Absence of physical injury  Description: Absence of physical injury  2/9/2021 0027 by Lauren Burdick RN  Outcome: Ongoing     Problem: Skin Integrity:  Goal: Will show no infection signs and symptoms  Description: Will show no infection signs and symptoms  2/9/2021 0027 by Lauren Burdick RN  Outcome: Ongoing  Note: Hub on cap is scrubbed with alcohol on each access, Alcohol caps applied when line is not in use, dressing and site assessed q2hr       Problem: Skin Integrity:  Goal: Absence of new skin breakdown  Description: Absence of new skin breakdown  2/9/2021 0027 by Lauren Burdick RN  Outcome: Ongoing  Note: Skin assessment completed every shift. Pt assessed for incontinence, appropriate barrier cream applied prn. Pt encouraged to turn/rotate every 2 hours. Assistance provided if pt unable to do so themselves.          Problem: Skin Integrity:  Goal: Skin integrity will be maintained  Description: Skin integrity will be maintained  2/9/2021 0027 by Raj Quijano RN  Outcome: Ongoing     Problem: Skin Integrity:  Goal: Skin integrity will improve  Description: Skin integrity will improve  2/9/2021 0027 by Raj Quijano RN  Outcome: Ongoing     Problem: Infection - Central Venous Catheter-Associated Bloodstream Infection:  Goal: Will show no infection signs and symptoms  Description: Will show no infection signs and symptoms  2/9/2021 0027 by Raj Quijano RN  Outcome: Ongoing  Note: Hub on cap is scrubbed with alcohol on each access, Alcohol caps applied when line is not in use, dressing and site assessed q2hr       Problem: Urinary Elimination:  Goal: Signs and symptoms of infection will decrease  Description: Signs and symptoms of infection will decrease  2/9/2021 0027 by Raj Quijano RN  Outcome: Ongoing     Problem: Urinary Elimination:  Goal: Complications related to the disease process, condition or treatment will be avoided or minimized  Description: Complications related to the disease process, condition or treatment will be avoided or minimized  2/9/2021 0027 by Raj Quijano RN  Outcome: Ongoing     Problem: Physical Regulation:  Goal: Complications related to the disease process, condition or treatment will be avoided or minimized  Description: Complications related to the disease process, condition or treatment will be avoided or minimized  2/9/2021 0027 by Raj Quijano RN  Outcome: Ongoing     Problem: Sensory:  Goal: Pain level will decrease  Description: Pain level will decrease  2/9/2021 0027 by Raj Quijano RN  Outcome: Ongoing  Note: Pain/discomfort being managed with PRN analgesics per MD orders. Pt able to express presence and absence of pain and rate pain appropriately using numerical scale.        Problem: Tissue Perfusion:  Goal: Ability to maintain adequate tissue perfusion will improve  Description: Ability to maintain adequate tissue perfusion will improve  2/9/2021 0027 by Patricia Hall RN  Outcome: Ongoing   Electronically signed by Patricia Hall RN on 2/9/2021 at 12:30 AM

## 2021-02-09 NOTE — PROGRESS NOTES
[facility] [phone]                                     Daily Progress Note                                                         Pt Name: Margaret Soriano  Medical Record Number: [id number]  Date of Birth 1945   Today's Date: 2/9/2021  Chief Complaint   Patient presents with    Leg Injury     hit right leg on car door. large hematoma forming to right shin area. pt on eliquis. ASSESSMENT/PLAN  Right lower extremity hematoma  -1/31 evacuation of hematoma, excisional debridement  -Wound Vac changed today. Patient tolerated well. Premedicated. Site looks good with good vascular tissue. Mils surrounding erythema and bruising no necrotic tissue noted or further debridement needed. -H/H stable  -Possible STSG this week with Dr. Rodo Carey  -Sitting up in chair at visit, pain controlled     [address] reports adequate pain control    OBJECTIVE  VITALS:  height is 5' 1\" (1.549 m) and weight is 186 lb 4.6 oz (84.5 kg). Her oral temperature is 97.9 °F (36.6 °C). Her blood pressure is 124/76 and her pulse is 69. Her respiration is 14 and oxygen saturation is 92%. INTAKE/OUTPUT:      Intake/Output Summary (Last 24 hours) at 2/9/2021 1139  Last data filed at 2/9/2021 0541  Gross per 24 hour   Intake 540 ml   Output 1945 ml   Net -1405 ml     GENERAL: alert, no distress  EXTREMITY: Vac in place    I/O last 3 completed shifts:   In: 900 [P.O.:900]  Out: 1945 [Urine:925; Drains:1020]      LABS  Recent Labs     02/09/21  0605   WBC 5.4   HGB 7.6*   HCT 24.2*         K 3.8      CO2 28   BUN 14   CREATININE <0.5*   MG 1.80   CALCIUM 8.9     Electronically signed by Steven Castañeda PA-C on 2/9/2021 at 11:39 AM    As above  Continues to improve  Vac changed today  For skin graft per Dr. Rodo Carey    Electronically signed by Vinny Sanchez MD on 2/9/2021 at 12:37 PM

## 2021-02-09 NOTE — PROGRESS NOTES
Hospitalist Progress Note      PCP: MUSA Porter - CNP    Date of Admission: 1/28/2021    Subjective: Pt S/E. No acute events. Pt wants to go to rehab, awaiting on surgery date. 1/29  Pain in R leg controlled. BP low - no levophed this am but later required    1/30  Ongoing need for levophed - 10 this am.   Bp better with albumin infusion. Hgb improved with transfusion - but with ongoing downtrend - additional pRBC ordered today due to shock. Vascular surgery involved per ortho recs - plan to return to OR soon     1/31  In OR this am with plastic surgery team.   BP better after pRBC, albeit still on levophed      2/1  BP still challenging, on levo  Pain controlled. 2/2  Still on levophed at 7 this am.   Pain control in RLE becoming challenging. Wound vac in place.      2/3   Levo weaned down to 1, bp improving  Pain controlled today  prns x 24 hrs: morphine 2 mg iv (with wound vac change)  Tylenol x 2  norco 10 mg    2/4 stable off levo, hgb stable > 7.0  2/7 bp has remained stable, hgb > 7.0, pm&r accepted for aru when ready    Medications:  Reviewed    Infusion Medications    sodium chloride       Scheduled Medications    furosemide  20 mg Oral Daily    memantine  5 mg Oral BID    metoprolol succinate  50 mg Oral Daily    metoclopramide  5 mg Oral TID AC    magnesium oxide  400 mg Oral Daily    pantoprazole  40 mg Oral BID AC    lidocaine 1 % injection  5 mL Intradermal Once    sodium chloride flush  10 mL Intravenous 2 times per day    calcium elemental  500 mg Oral Daily    flecainide  100 mg Oral 2 times per day    sertraline  100 mg Oral Nightly    sodium chloride flush  10 mL Intravenous 2 times per day     PRN Meds: HYDROcodone 5 mg - acetaminophen, polyethylene glycol, morphine, sodium chloride, prochlorperazine, simethicone, sodium chloride flush, ALPRAZolam, tiZANidine, acetaminophen **OR** acetaminophen, sodium chloride flush, promethazine **OR** ondansetron, Negative 09/03/2019       Radiology:  CTA LOWER EXTREMITY RIGHT W CONTRAST   Final Result   Large hematoma of the anterior calf. No active extravasation or etiology of   hemorrhage is identified. Popliteal artery is patent. There is significant trifurcation level disease   with primary runoff via the disease posterior tibial.      No acute osseous abnormality. XR TIBIA FIBULA RIGHT (2 VIEWS)   Final Result   Pretibial soft tissue swelling, possible hematoma. No acute osseous injury. Assessment/Plan:    Active Hospital Problems    Diagnosis    Nausea [R11.0]    Hematoma of right lower extremity [S80.11XA]    Acute blood loss anemia [D62]    Traumatic hematoma of right lower leg [S80.11XA]    Hemorrhagic shock (HCC) [R57.8]    Hematoma [T14. 8XXA]    Symptomatic anemia [D64.9]    A-fib ([address] [I48.91]    Aortic valve disorder [I35.9]     Hospital course: a 66 yo female admitted with a right leg hematoma after having a car door slammed against her R leg. Pt on eliquis for Afib. She has been evaluated by ortho and had a hematoma evacuated in the OR. She sancho needed multiple blood transfusions due to hemorrhagic shock, but appears to be improving now. Acute Traumatic hematoma RLE  Coagulopathy due to eliquis - holding for now, restart per surgery  S/p OR with Orthopedics 1/28  To OR with plastic surgery team 1/31  Wound vac changes per surgery  Per plastic surgery, \"plan for skin graft prior to DC. Will need wound vac for 1 week after grafting. \"    Shock - appears hemorrhagic - resolved  Acute blood loss anemia on chronic anemia   Checked ECHO - preserved EF without major valvular abnormality.    Hgb dropped to 6.5 - s/p 3 units pRBC on 1/28  Additional pRBC 2 units and albumin on 1/30 2/1 - additional 2 units of pRBC transfused  Hgb has been > 7.0 since last transfusion  Fe studies low, normal ferritin -> venofer 200 mg x 3 days    Afib paroxysmal.   currently in nsr  Eliquis on hold. Cont flacainide. HTN -bp normalized  - restart toprol    Hx of Gastric Bypass - Shanice-En-Y - 30+ years ago. Chronic nausea on Reglan. EGD in August - pathology report fairly unremarkable.      DVT Prophylaxis: SCD  Diet: DIET GENERAL;  Dietary Nutrition Supplements: Low Calorie High Protein Supplement  Code Status: Full Code    Dispo - inpatient, aru at discharge, has been evaluated by Dr Karime Carmen, DO

## 2021-02-09 NOTE — CARE COORDINATION
Spoke with Wm Orona in [address] [place] [address]. She advised they just checked with [place] and no precert has been obtained yet. They will check again in 1 hour. Updated the charge nurse. Anshu Glez RN, BSN, Case Management  Phone: [phone]  Electronically signed by Anshu Glez RN on 2/9/2021 at 11:19 AM     Left message for Wm Orona in ARU regarding precert status. Per conversation with Dr. Jose Gibbons, she is waiting to hear back from plastic surgery regarding possible skin grafting before patient is D/C'd to ARU.   Anshu Glez RN, BSN, Case Management  Phone: [phone]  Electronically signed by Anshu Glez RN on 2/9/2021 at 3:55 PM

## 2021-02-09 NOTE — PROGRESS NOTES
Physical Therapy  Facility/Department: [facility] 5W PROGRESSIVE CARE  Daily Treatment Note  NAME: Jalil Dorado  : 1945  MRN: [id number]    Date of Service: 2021    Discharge Recommendations:  Continue to assess pending progress   PT Equipment Recommendations  Other: Will monitor for potential equipt needs. Jalil Dorado scored a 16/24 on the AM-PAC short mobility form. Current research shows that an AM-PAC score of 17 or less is typically not associated with a discharge to the patient's home setting. Based on the patient's AM-PAC score and their current functional mobility deficits, it is recommended that the patient have 5-7 sessions per week of Physical Therapy at d/c to increase the patient's independence. At this time, this patient demonstrates the endurance, and/or tolerance for 3 hours of therapy each day, with a treatment frequency of 5-7x/wk. Please see assessment section for further patient specific details. Patient is motivated and verbally agreeable to intensive therapy on inpatient rehab. Awaiting approval from insurance. Apparently skin graft will not occur this week, so would be appropriate to continue with intensive therapies to improve mobility prior to her next surgery. If patient discharges prior to next session this note will serve as a discharge summary. Please see below for the latest assessment towards goals. Assessment   Body structures, Functions, Activity limitations: Decreased functional mobility ; Decreased strength;Decreased endurance; Increased pain  Assessment: 68 y/o female admit 2021 with R LE Traumatic Hematoma (hit R LE on car door). 2021 S/P Evac of R LE Hematoma, Excisional Debride (09I44 cm), Applic of Wound Vac. Plastic and Reconstruct Surg follow for Staged Reconstruct with Skin Graft. PMH as noted including Gastric Bypass, Rectal Ca, L TKR, R Femur Fx/ORIF (10/2018).     PTA pt living with son in home with ramp access and 1st floor bed/bath. Prior, pt independent with daily care and functional mobility. Patient able to transfer and ambulate up to 5' with wheeled walker and min A. Patient is below baseline functional status and therefore would benefit from additional therapy. At this time, recommend cont PT Services (pt prefers 5-7) prior return home with son. Patient is motivated and verbally agreeable to intensive therapy on inpatient rehab. Awaiting approval from insurance. Apparently skin graft will not occur this week, so would be appropriate to continue with intensive therapies to improve mobility prior to her next surgery. Prognosis: Good  Decision Making: Medium Complexity  History: 68 y/o female admit 1/31/2021 with R LE Traumatic Hematoma (hit R LE on car door). 1/31/2021 S/P Evac of R LE Hematoma, Excisional Debride (41X29 cm), Applic of Wound Vac. Plastic and Reconstruct Surg follow for Staged Reconstruct with Skin Graft. PMH as noted including Gastric Bypass, Rectal Ca, L TKR, R Femur Fx/ORIF (10/2018). Exam: See above. Clinical Presentation: See above. PT Education: Goals;PT Role;Plan of Care;Transfer Training;General Safety;Gait Training;Functional Mobility Training  Patient Education: expectations on rehab, process for insurance to decide, social work/case management involvement with discharge planning  Barriers to Learning: [facility] REQUIRES PT FOLLOW UP: Yes  Activity Tolerance  Activity Tolerance: Patient Tolerated treatment well     Patient Diagnosis(es): The primary encounter diagnosis was Traumatic hematoma of right lower leg, initial encounter. Diagnoses of Contusion of leg, right, initial encounter, Anticoagulated, Hypotension due to hypovolemia, and Anemia due to other cause, not classified were also pertinent to this visit.      has a past medical history of Acid reflux, Anxiety, Arthritis, Atrial fibrillation (HCC), Closed fracture dislocation of right elbow, Depression, Fibromyalgia, Migraine, Rectal experience. Orientation  Orientation  Overall Orientation Status: Within Functional Limits  Cognition   Cognition  Overall Cognitive Status: WFL  Objective   Bed mobility  Supine to Sit: Contact guard assistance  Comment: head of bed elevated, used rails and UEs to assist LEs, remained up in recliner at end of session  Transfers  Sit to Stand: Minimal Assistance  Stand to sit: Minimal Assistance  Bed to Chair: Minimal assistance(with wheeled walker, cues for effective use of UEs)  Comment: cues for hand placement, assist to manage lines for wound vac and catheter  Ambulation  Ambulation?: Yes  WB Status: WBAT R LE  Ambulation 1  Surface: level tile  Device: Rolling Walker  Assistance: Minimal assistance  Quality of Gait: slow and guarded, cues for sequencing, walker safety and effective use of UEs for protective weightbearing on RLE  Gait Deviations: Decreased step height;Decreased step length; Slow Marlena  Distance: 5' bed to chair with turn and backing up  Comments: self-limiting - did not wish to attempt to ambulate farther than to the chair - has Stedy in her bathroom which nursing staff is using with patient        Exercises  Hip Flexion: 15, BLEs  Hip Abduction: hip add sets x 15  Knee Long Arc Quad: 15, BLEs  Ankle Pumps: Toe/Heel Raises 20x         Other Activities: Other (see comment)  Comment: Answered patient's questions regarding potential transfer to and expectations on acute rehab. Patient eager to start rehab program, but may have further surgery for skin graft later this week. Awaiting insurance decision on approval for rehab. Patient stated that if she can't go to rehab, she would rather go home with her son - did not have a good experience with SNU prior.                 AM-PAC Score  AM-PAC Inpatient Mobility Raw Score : 16 (02/09/21 1700)  AM-PAC Inpatient T-Scale Score : 40.78 (02/09/21 1700)  Mobility Inpatient CMS 0-100% Score: 54.16 (02/09/21 1700)  Mobility Inpatient CMS G-Code Modifier : CK (02/09/21 1940)          Goals  Short term goals  Time Frame for Short term goals: Upon d/c acute care setting. Short term goal 1: Bed Mob SBA. Short term goal 2: Transfers with assist device CGA. Short term goal 3: Amb with assist device, WBAT R LE, 25' Min assist.  Patient Goals   Patient goals : Be able to return home with son. Plan    Plan  Times per week: 3-5x week while in acute care setting.   Current Treatment Recommendations: Strengthening, Functional Mobility Training, Transfer Training, Gait Training, Safety Education & Training, Patient/Caregiver Education & Training  Safety Devices  Type of devices: Call light within reach, Chair alarm in place, Left in chair, Nurse notified, All fall risk precautions in place, Gait belt     Therapy Time   Individual Concurrent Group Co-treatment   Time In 1425         Time Out 1505         Minutes 40         Timed Code Treatment Minutes: Carrington Cohen [id number], [address]

## 2021-02-09 NOTE — PLAN OF CARE
Pain/discomfort being managed with PRN analgesics per MD orders. Pt able to express presence and absence of pain and rate pain appropriately using numerical scale. Fall risk assessment completed every shift. All precautions in place. Pt has call light within reach at all times. Room clear of clutter. Pt aware to call for assistance when getting up. Skin assessment completed every shift. Pt assessed for incontinence, appropriate barrier cream applied prn. Pt encouraged to turn/rotate every 2 hours. Assistance provided if pt unable to do so themselves. Patient assessed for fall risk; fall precautions initiated. Patient and family instructed about safety devices. Environment kept free of clutter and adequate lighting provided. Bed locked and in lowest position. Call light within reach. Will continue to monitor.     Intake/Output Summary (Last 24 hours) at 2/9/2021 0913  Last data filed at 2/9/2021 0541  Gross per 24 hour   Intake 900 ml   Output 1945 ml   Net -1045 ml     Vitals:    02/09/21 0722   BP: 124/76   Pulse: 69   Resp: 14   Temp: 97.9 °F (36.6 °C)   SpO2: 91%

## 2021-02-09 NOTE — PROGRESS NOTES
Collection Time: 02/09/21  6:05 AM   Result Value Ref Range    WBC 5.4 4.0 - 11.0 K/uL    RBC 2.72 (L) 4.00 - 5.20 M/uL    Hemoglobin 7.6 (L) 12.0 - 16.0 g/dL    Hematocrit 24.2 (L) 36.0 - 48.0 %    MCV 89.1 80.0 - 100.0 fL    MCH 27.9 26.0 - 34.0 pg    MCHC 31.3 31.0 - 36.0 g/dL    RDW 16.9 (H) 12.4 - 15.4 %    Platelets 532 917 - 972 K/uL    MPV 7.7 5.0 - 10.5 fL    Neutrophils % 69.4 %    Lymphocytes % 18.2 %    Monocytes % 10.6 %    Eosinophils % 1.3 %    Basophils % 0.5 %    Neutrophils Absolute 3.7 1.7 - 7.7 K/uL    Lymphocytes Absolute 1.0 1.0 - 5.1 K/uL    Monocytes Absolute 0.6 0.0 - 1.3 K/uL    Eosinophils Absolute 0.1 0.0 - 0.6 K/uL    Basophils Absolute 0.0 0.0 - 0.2 K/uL       Therapy progress:  PT  Position Activity Restriction  Other position/activity restrictions: Wound Vac R Lat Calf. PT spoke with Surg (LUCAS Eddy NP) : WBAT R LE. Objective     Sit to Stand: Minimal Assistance  Stand to sit: Minimal Assistance  Bed to Chair: Minimal assistance(with wheeled walker, cues for effective use of UEs)  Device: Rolling Walker  Distance: 5' bed to chair with turn and backing up  OT  PT Equipment Recommendations  Other: Will monitor for potential equipt needs. Assessment        SLP          Body mass index is 35.2 kg/m². Assessment:  Debility  Right leg hematoma - s/p evacuation (1/28 with Dr. Dione Randhawa), evacuation, debridement and wound vac placement (1/31 with Dr. Nelsy Gonzalez)  Hemorrhagic shock  Acute blood loss anemia  Paroxysmal Afib  H/o gastric bypass ([place])  Anxiety/depression  Fibromyalgia      Impairments: generalized weakness, decreased ROM distal RLE, decreased balance, endurance.      Recommendations:     Patient remains candidate for ARU based on current functional level. Insurance decision pending. ? Timing of STSG, possibly this week per Surgery.      Thank you for this consult. Please contact me with any questions or concerns. Pratibha Acosta.  Cornelio Jack MD 2/9/2021, 4:59 PM

## 2021-02-10 ENCOUNTER — TELEPHONE (OUTPATIENT)
Dept: SURGERY | Age: 76
End: 2021-02-10

## 2021-02-10 LAB
ANION GAP SERPL CALCULATED.3IONS-SCNC: 8 MMOL/L (ref 3–16)
BASOPHILS ABSOLUTE: 0 K/UL (ref 0–0.2)
BASOPHILS RELATIVE PERCENT: 0.4 %
BUN BLDV-MCNC: 15 MG/DL (ref 7–20)
CALCIUM SERPL-MCNC: 8.7 MG/DL (ref 8.3–10.6)
CHLORIDE BLD-SCNC: 100 MMOL/L (ref 99–110)
CO2: 29 MMOL/L (ref 21–32)
CREAT SERPL-MCNC: <0.5 MG/DL (ref 0.6–1.2)
EOSINOPHILS ABSOLUTE: 0.1 K/UL (ref 0–0.6)
EOSINOPHILS RELATIVE PERCENT: 1 %
GFR AFRICAN AMERICAN: >60
GFR NON-AFRICAN AMERICAN: >60
GLUCOSE BLD-MCNC: 110 MG/DL (ref 70–99)
GLUCOSE BLD-MCNC: 152 MG/DL (ref 70–99)
HCT VFR BLD CALC: 23.9 % (ref 36–48)
HEMOGLOBIN: 7.6 G/DL (ref 12–16)
LYMPHOCYTES ABSOLUTE: 0.7 K/UL (ref 1–5.1)
LYMPHOCYTES RELATIVE PERCENT: 13.4 %
MAGNESIUM: 1.7 MG/DL (ref 1.8–2.4)
MCH RBC QN AUTO: 28.7 PG (ref 26–34)
MCHC RBC AUTO-ENTMCNC: 31.7 G/DL (ref 31–36)
MCV RBC AUTO: 90.5 FL (ref 80–100)
MONOCYTES ABSOLUTE: 0.6 K/UL (ref 0–1.3)
MONOCYTES RELATIVE PERCENT: 11 %
NEUTROPHILS ABSOLUTE: 4.1 K/UL (ref 1.7–7.7)
NEUTROPHILS RELATIVE PERCENT: 74.2 %
PDW BLD-RTO: 16.7 % (ref 12.4–15.4)
PERFORMED ON: ABNORMAL
PLATELET # BLD: 233 K/UL (ref 135–450)
PMV BLD AUTO: 7.9 FL (ref 5–10.5)
POTASSIUM SERPL-SCNC: 3.9 MMOL/L (ref 3.5–5.1)
RBC # BLD: 2.64 M/UL (ref 4–5.2)
SODIUM BLD-SCNC: 137 MMOL/L (ref 136–145)
WBC # BLD: 5.6 K/UL (ref 4–11)

## 2021-02-10 PROCEDURE — 97530 THERAPEUTIC ACTIVITIES: CPT

## 2021-02-10 PROCEDURE — 2580000003 HC RX 258: Performed by: ORTHOPAEDIC SURGERY

## 2021-02-10 PROCEDURE — 2580000003 HC RX 258: Performed by: STUDENT IN AN ORGANIZED HEALTH CARE EDUCATION/TRAINING PROGRAM

## 2021-02-10 PROCEDURE — 6360000002 HC RX W HCPCS: Performed by: HOSPITALIST

## 2021-02-10 PROCEDURE — 6370000000 HC RX 637 (ALT 250 FOR IP): Performed by: INTERNAL MEDICINE

## 2021-02-10 PROCEDURE — 94760 N-INVAS EAR/PLS OXIMETRY 1: CPT

## 2021-02-10 PROCEDURE — 1200000000 HC SEMI PRIVATE

## 2021-02-10 PROCEDURE — 6370000000 HC RX 637 (ALT 250 FOR IP): Performed by: STUDENT IN AN ORGANIZED HEALTH CARE EDUCATION/TRAINING PROGRAM

## 2021-02-10 PROCEDURE — 85025 COMPLETE CBC W/AUTO DIFF WBC: CPT

## 2021-02-10 PROCEDURE — 80048 BASIC METABOLIC PNL TOTAL CA: CPT

## 2021-02-10 PROCEDURE — 6370000000 HC RX 637 (ALT 250 FOR IP): Performed by: FAMILY MEDICINE

## 2021-02-10 PROCEDURE — 83735 ASSAY OF MAGNESIUM: CPT

## 2021-02-10 PROCEDURE — 36415 COLL VENOUS BLD VENIPUNCTURE: CPT

## 2021-02-10 PROCEDURE — 6360000002 HC RX W HCPCS: Performed by: INTERNAL MEDICINE

## 2021-02-10 RX ORDER — MAGNESIUM SULFATE IN WATER 40 MG/ML
2000 INJECTION, SOLUTION INTRAVENOUS ONCE
Status: COMPLETED | OUTPATIENT
Start: 2021-02-10 | End: 2021-02-10

## 2021-02-10 RX ADMIN — PANTOPRAZOLE SODIUM 40 MG: 40 TABLET, DELAYED RELEASE ORAL at 16:06

## 2021-02-10 RX ADMIN — METOCLOPRAMIDE 5 MG: 10 TABLET ORAL at 14:04

## 2021-02-10 RX ADMIN — PANTOPRAZOLE SODIUM 40 MG: 40 TABLET, DELAYED RELEASE ORAL at 05:11

## 2021-02-10 RX ADMIN — METOPROLOL SUCCINATE 50 MG: 50 TABLET, EXTENDED RELEASE ORAL at 08:38

## 2021-02-10 RX ADMIN — HYDROCODONE BITARTRATE AND ACETAMINOPHEN 2 TABLET: 5; 325 TABLET ORAL at 04:35

## 2021-02-10 RX ADMIN — TIZANIDINE 2 MG: 4 TABLET ORAL at 23:32

## 2021-02-10 RX ADMIN — FUROSEMIDE 20 MG: 20 TABLET ORAL at 08:38

## 2021-02-10 RX ADMIN — SODIUM CHLORIDE, PRESERVATIVE FREE 10 ML: 5 INJECTION INTRAVENOUS at 08:40

## 2021-02-10 RX ADMIN — SERTRALINE 100 MG: 100 TABLET, FILM COATED ORAL at 22:04

## 2021-02-10 RX ADMIN — SODIUM CHLORIDE, PRESERVATIVE FREE 10 ML: 5 INJECTION INTRAVENOUS at 08:39

## 2021-02-10 RX ADMIN — METOCLOPRAMIDE 5 MG: 10 TABLET ORAL at 08:37

## 2021-02-10 RX ADMIN — FLECAINIDE ACETATE 100 MG: 100 TABLET ORAL at 08:38

## 2021-02-10 RX ADMIN — MEMANTINE 5 MG: 5 TABLET ORAL at 22:04

## 2021-02-10 RX ADMIN — FLECAINIDE ACETATE 100 MG: 100 TABLET ORAL at 22:04

## 2021-02-10 RX ADMIN — SODIUM CHLORIDE, PRESERVATIVE FREE 10 ML: 5 INJECTION INTRAVENOUS at 22:09

## 2021-02-10 RX ADMIN — METOCLOPRAMIDE 5 MG: 10 TABLET ORAL at 17:35

## 2021-02-10 RX ADMIN — MAGNESIUM SULFATE HEPTAHYDRATE 2000 MG: 40 INJECTION, SOLUTION INTRAVENOUS at 11:47

## 2021-02-10 RX ADMIN — Medication 500 MG: at 08:38

## 2021-02-10 RX ADMIN — Medication 400 MG: at 08:38

## 2021-02-10 RX ADMIN — ALPRAZOLAM 0.5 MG: 0.5 TABLET ORAL at 23:32

## 2021-02-10 RX ADMIN — MEMANTINE 5 MG: 5 TABLET ORAL at 08:38

## 2021-02-10 RX ADMIN — HYDROCODONE BITARTRATE AND ACETAMINOPHEN 2 TABLET: 5; 325 TABLET ORAL at 12:03

## 2021-02-10 RX ADMIN — PROCHLORPERAZINE EDISYLATE 10 MG: 5 INJECTION INTRAMUSCULAR; INTRAVENOUS at 23:32

## 2021-02-10 ASSESSMENT — PAIN SCALES - GENERAL
PAINLEVEL_OUTOF10: 0
PAINLEVEL_OUTOF10: 6
PAINLEVEL_OUTOF10: 4

## 2021-02-10 NOTE — PROGRESS NOTES
Occupational Therapy  Facility/Department: [facility] 5W PROGRESSIVE CARE  Daily Treatment Note  NAME: Molly Mortimer  : 1945  MRN: [id number]    Date of Service: 2/10/2021    Discharge Recommendations:  Patient would benefit from continued therapy after discharge, 5-7 sessions per week       Assessment   Performance deficits / Impairments: Decreased functional mobility ; Decreased endurance;Decreased strength;Decreased ADL status; Decreased safe awareness;Decreased high-level IADLs;Decreased balance  Assessment: Pt tolerated session well. She was able to complete mobility in the room to the bathroom and transfer to the toilet. She required CGA with mobility and transfers. She required assist with managing the wound VAC to place on the walker for transport. She was able to cross her left leg to manage her socks but required assist with right sock as she is limited due to the wound VAC. Pt is not safe to return home at this time and would benefit from therapy at a mod-high frequency at d/c. Prognosis: Good  History: 66 y/o female admit 2021 with R LE Traumatic Hematoma (hit R LE on car door).  s/p Evac of R LE Hematoma. Pt returned to OR 2021 S/P Evac of R LE Hematoma, Excisional Debride (49G79 cm), Applic of Wound Vac. Plastic and Reconstruct Surg follow for Staged Reconstruct with Skin Graft. PMH as noted including Gastric Bypass, Rectal Ca, L TKR, R Femur Fx/ORIF (10/2018). PTA pt lives at home with son and independent with ADLs and functional mobility. REQUIRES OT FOLLOW UP: Yes  Activity Tolerance  Activity Tolerance: Patient Tolerated treatment well  Safety Devices  Type of devices: Call light within reach; Chair alarm in place; Left in chair;Gait belt         Patient Diagnosis(es): The primary encounter diagnosis was Traumatic hematoma of right lower leg, initial encounter.  Diagnoses of Contusion of leg, right, initial encounter, Anticoagulated, Hypotension due to hypovolemia, and Anemia due to other cause, not classified were also pertinent to this visit. has a past medical history of Acid reflux, Anxiety, Arthritis, Atrial fibrillation (HCC), Closed fracture dislocation of right elbow, Depression, Fibromyalgia, Migraine, Rectal cancer ([facility] [address] and Wears glasses. has a past surgical history that includes  section; Total knee arthroplasty (Left); open tx femoral supracondylar fracture w/o extension (Right, 10/25/2018); Gastric bypass surgery; Neck surgery; other surgical history; Cholecystectomy; Appendectomy; Colonoscopy; Upper gastrointestinal endoscopy (N/A, 2019); Colonoscopy (N/A, 2019); knee surgery (Right); Upper gastrointestinal endoscopy (N/A, 2020); incision and drainage (Right, 2021); and Skin graft (Right, 2021). Restrictions  Restrictions/Precautions  Restrictions/Precautions: Fall Risk, Weight Bearing  Lower Extremity Weight Bearing Restrictions  Right Lower Extremity Weight Bearing: Weight Bearing As Tolerated  Position Activity Restriction  Other position/activity restrictions: Wound Vac R Lat Calf. PT spoke with Surg (LUCAS Eddy NP) : WBAT R LE. Subjective   General  Chart Reviewed: Yes, Progress Notes  Patient assessed for rehabilitation services?: Yes  Additional Pertinent Hx: 68 y/o female admit 2021 with R LE Traumatic Hematoma (hit R LE on car door).  s/p Evac of R LE Hematoma. Pt returned to OR 2021 S/P Evac of R LE Hematoma, Excisional Debride (63A31 cm), Applic of Wound Vac. Plastic and Reconstruct Surg follow for Staged Reconstruct with Skin Graft. PMH as noted including Gastric Bypass, Rectal Ca, L TKR, R Femur Fx/ORIF (10/2018). Family / Caregiver Present: No  Referring Practitioner: Lukas Bob DO  Subjective  Subjective: Pt seen bedside and agreed to OT treatment. Pt agreed to attempting to ambulate to the bathroom. General Comment  Comments: Per RN ok for therapy.       Objective    ADL  LE complete ADL transfers with SBA  Short term goal 2: Pt will complete functional mobility with SBA  Short term goal 3: Pt will tolerate standing > 5 min for functional task with SBA  Short term goal 4: Pt will complete LB dressing with SBA  Short term goal 5: Pt will complete toileting with SBA  Patient Goals   Patient goals : to return home asap       Therapy Time   Individual Concurrent Group Co-treatment   Time In 1015         Time Out 1100         Minutes 45              This note to serve as OT d/c summary if pt is d/c-ed from hospital prior to next OT session.       Lavinia Alpers, [address]

## 2021-02-10 NOTE — PROGRESS NOTES
Pt to be transferred to 4277. Report called to RN. All questions answered.      Electronically signed by Kolby Damian RN on 2/10/2021 at 11:58 AM

## 2021-02-10 NOTE — TELEPHONE ENCOUNTER
I spoke with Mary Chin at [facility] ([phone]) to see if CPT Code 17579 requires pre certification. Pre certification is not required for this code. Call Reference #  [id number]. I will need to obtain the diagnosis code. I will then fax the surgery letter to [facility] [phone]. I will leave the phone note open.

## 2021-02-10 NOTE — PROGRESS NOTES
Physical Therapy  Facility/Department: [facility] 5W PROGRESSIVE CARE  Daily Treatment Note  NAME: Teena Churchill  : 1945  MRN: [id number]    Date of Service: 2/10/2021    Discharge Recommendations:  Patient would benefit from continued therapy after discharge, 5-7 sessions per week   PT Equipment Recommendations  Other: Will monitor for potential equipt needs. Teena Churchill scored a 17/24 on the AM-PAC short mobility form. Current research shows that an AM-PAC score of 17 or less is typically not associated with a discharge to the patient's home setting. Based on the patient's AM-PAC score and their current functional mobility deficits, it is recommended that the patient have 5-7 sessions per week of Physical Therapy at d/c to increase the patient's independence. At this time, this patient demonstrates the endurance, and/or tolerance for 3 hours of therapy each day, with a treatment frequency of 5-7x/wk. Please see assessment section for further patient specific details. If patient discharges prior to next session this note will serve as a discharge summary. Please see below for the latest assessment towards goals. Assessment   Body structures, Functions, Activity limitations: Decreased functional mobility ; Decreased strength;Decreased endurance; Increased pain  Assessment: PTA pt living with son in home with ramp access and 1st floor bed/bath. Prior, pt independent with daily care and functional mobility. Patient able to transfer and ambulate up to 36' with wheeled walker and CGA. Patient is below baseline functional status and therefore would benefit from additional therapy. At this time, recommend cont PT Services (pt prefers 5-7) prior return home with son. Patient is motivated and verbally agreeable to intensive therapy on inpatient rehab. Awaiting approval from insurance.   Apparently skin graft will not occur this week, so would be appropriate to continue with intensive therapies to improve mobility prior to her next surgery. Prognosis: Good  Decision Making: Medium Complexity  History: 68 y/o female admit 2021 with R LE Traumatic Hematoma (hit R LE on car door). 2021 S/P Evac of R LE Hematoma, Excisional Debride (96J44 cm), Applic of Wound Vac. Plastic and Reconstruct Surg follow for Staged Reconstruct with Skin Graft. PMH as noted including Gastric Bypass, Rectal Ca, L TKR, R Femur Fx/ORIF (10/2018). Exam: See above. Clinical Presentation: See above. PT Education: Goals;PT Role;Plan of Care;Transfer Training;General Safety;Gait Training;Functional Mobility Training  Patient Education: expectations on rehab  Barriers to Learnin W Rajan Ramos. REQUIRES PT FOLLOW UP: Yes  Activity Tolerance  Activity Tolerance: Patient Tolerated treatment well     Patient Diagnosis(es): The primary encounter diagnosis was Traumatic hematoma of right lower leg, initial encounter. Diagnoses of Contusion of leg, right, initial encounter, Anticoagulated, Hypotension due to hypovolemia, and Anemia due to other cause, not classified were also pertinent to this visit. has a past medical history of Acid reflux, Anxiety, Arthritis, Atrial fibrillation (HCC), Closed fracture dislocation of right elbow, Depression, Fibromyalgia, Migraine, Rectal cancer ([facility] [address] and Wears glasses. has a past surgical history that includes  section; Total knee arthroplasty (Left); open tx femoral supracondylar fracture w/o extension (Right, 10/25/2018); Gastric bypass surgery; Neck surgery; other surgical history; Cholecystectomy; Appendectomy; Colonoscopy; Upper gastrointestinal endoscopy (N/A, 2019); Colonoscopy (N/A, 2019); knee surgery (Right); Upper gastrointestinal endoscopy (N/A, 2020); incision and drainage (Right, 2021); and Skin graft (Right, 2021).     Restrictions  Restrictions/Precautions  Restrictions/Precautions: Fall Risk, Weight Bearing  Lower Extremity Weight Bearing Restrictions  Right Lower Extremity Weight Bearing: Weight Bearing As Tolerated  Position Activity Restriction  Other position/activity restrictions: Wound Vac R Lat Calf. PT spoke with Surg (LUCAS Eddy NP) : WBAT R LE. Subjective   General  Chart Reviewed: Yes  Additional Pertinent Hx: 66 y/o female admit 1/31/2021 with R LE Traumatic Hematoma (hit R LE on car door). 1/31/2021 S/P Evac of R LE Hematoma, Excisional Debride (15Y91 cm), Applic of Wound Vac. Plastic and Reconstruct Surg follow for Staged Reconstruct with Skin Graft. PMH as noted including Gastric Bypass, Rectal Ca, L TKR, R Femur Fx/ORIF (10/2018). Response To Previous Treatment: Patient with no complaints from previous session. Family / Caregiver Present: No  Referring Practitioner: Dr Lorin Andersen: Pt is agreeable to PT. Denies pain. Continues to wish for rehab. Orientation  Orientation  Overall Orientation Status: Within Functional Limits     Cognition   Cognition  Overall Cognitive Status: WFL     Objective   Bed mobility  Supine to Sit: Unable to assess  Sit to Supine: Unable to assess  Comment: Pt in recliner at beginning and end of session. Transfers  Sit to Stand: Contact guard assistance  Stand to sit: Contact guard assistance  Comment: x 2 trials, slow  Ambulation  Ambulation?: Yes  WB Status: WBAT R LE  Ambulation 1  Surface: level tile  Device: Rolling Walker  Assistance: Contact guard assistance  Quality of Gait: slow marlena; decreased toe clearance on RLE  Gait Deviations: Decreased step height;Decreased step length; Slow Marlena  Distance: 40' x 2 trials with ~8 minute seated rest break between  Comments: Discussed not using stedy anymore - pt reports not trusting other staff outside of therapy.   Stairs/Curb  Stairs?: No     Balance  Posture: Good  Sitting - Static: Good  Sitting - Dynamic: Good  Standing - Static: [email or web address])  Standing - Dynamic: [email or web address])           AM-PAC Score  AM-PAC Inpatient Mobility Raw Score : 17 (02/10/21 1131)  AM-PAC Inpatient T-Scale Score : 42.13 (02/10/21 1131)  Mobility Inpatient CMS 0-100% Score: 50.57 (02/10/21 1131)  Mobility Inpatient CMS G-Code Modifier : CK (02/10/21 1131)          Goals  Short term goals  Time Frame for Short term goals: Upon d/c acute care setting - all goals ongoing unless noted otherwise  Short term goal 1: Bed Mob SBA. Short term goal 2: Transfers with assist device CGA. - MET 2/10/21  Short term goal 3: Amb with assist device, WBAT R LE, 25' Min assist. - MET 2/10/21  Short term goal 4: NEW GOAL 2/10/21: ambulate > [de-identified]' with RW and CGA  Patient Goals   Patient goals : Be able to return home with son. Plan    Plan  Times per week: 3-5x/week  Current Treatment Recommendations: Strengthening, Functional Mobility Training, Transfer Training, Gait Training, Safety Education & Training, Patient/Caregiver Education & Training  Safety Devices  Type of devices:  All fall risk precautions in place, Call light within reach, Gait belt, Left in chair, Chair alarm in place, Patient at risk for falls, Nurse notified     Therapy Time   Individual Concurrent Group Co-treatment   Time In 1105         Time Out 1128         Minutes 23         Timed Code Treatment Minutes: 238 Raimundo Chapman, PT

## 2021-02-10 NOTE — CARE COORDINATION
Sw met with patient to discuss insurance denial for ARU. Patient states she would like to appeal the decision. Perfectserve sent to Dr. Tram Hogan regarding appeal process. Dr. Tram Hogan in agreement with peer to peer, available 2/11 from 11-3pm.     Sw attempted to submit for peer to peer but appeal can not be submitted until the day of. Sw will need to initiate a peer to peer conversation by submitting a request to the [place] on 2/11:    Email: [email or web address]    [phone], Option #1 then Option #2- if provider states they do not have access to email    Member: Keara Fierro    Ref #: [id number]    EDIE MarvinS, Social Work  [phone]    Electronically signed by HOLLY Ruiz LSW on 2/10/2021 at 2:35 PM    Sw met with pt in room to inform of appeal process. Discussed possible SNF options if appeal is denied. Patient refusing SNF placement. Sw left CMS list of facilities with patient to review if needed. Patient reports she is unsure of plan if ARU is denied, she would like to go home with assistance from her son.      VON Marvin, Social Work  [phone]    Electronically signed by HOLLY Ruiz LSW on 2/10/2021 at 3:04 PM

## 2021-02-10 NOTE — PLAN OF CARE
Problem: Pain:  Goal: Pain level will decrease  Description: Pain level will decrease  2/10/2021 1142 by Ap Suarez RN  Outcome: Ongoing     Problem: Pain:  Goal: Control of acute pain  Description: Control of acute pain  2/10/2021 1142 by Ap Suarez RN  Outcome: Ongoing     Problem: Pain:  Goal: Control of chronic pain  Description: Control of chronic pain  2/10/2021 1142 by Ap Suarez RN  Outcome: Ongoing     Problem: Falls - Risk of:  Goal: Will remain free from falls  Description: Will remain free from falls  2/10/2021 1142 by Ap Suarez RN  Outcome: Ongoing     Problem: Falls - Risk of:  Goal: Absence of physical injury  Description: Absence of physical injury  2/10/2021 1142 by Ap Suarez RN  Outcome: Ongoing     Problem: Skin Integrity:  Goal: Will show no infection signs and symptoms  Description: Will show no infection signs and symptoms  2/10/2021 1142 by Ap Suarez RN  Outcome: Ongoing     Problem: Skin Integrity:  Goal: Absence of new skin breakdown  Description: Absence of new skin breakdown  2/10/2021 1142 by Ap Suarez RN  Outcome: Ongoing     Problem: Skin Integrity:  Goal: Skin integrity will be maintained  Description: Skin integrity will be maintained  2/10/2021 1142 by Ap Suarez RN  Outcome: Ongoing     Problem: Skin Integrity:  Goal: Skin integrity will improve  Description: Skin integrity will improve  2/10/2021 0830 by Merry Sandoval RN  Outcome: Ongoing       Problem: Infection - Central Venous Catheter-Associated Bloodstream Infection:  Goal: Will show no infection signs and symptoms  Description: Will show no infection signs and symptoms  2/10/2021 1142 by Ap Suarez RN  Outcome: Ongoing     Problem: Urinary Elimination:  Goal: Signs and symptoms of infection will decrease  Description: Signs and symptoms of infection will decrease  2/10/2021 1142 by Ap Suarez RN  Outcome: Ongoing     Problem: Urinary Elimination:  Goal: Complications related to the disease process, condition or treatment will be avoided or minimized  Description: Complications related to the disease process, condition or treatment will be avoided or minimized  2/10/2021 1142 by Irma Molina RN  Outcome: Ongoing     Problem: Physical Regulation:  Goal: Complications related to the disease process, condition or treatment will be avoided or minimized  Description: Complications related to the disease process, condition or treatment will be avoided or minimized  2/10/2021 1142 by Irma Molina RN  Outcome: Ongoing     Problem: Sensory:  Goal: Pain level will decrease  Description: Pain level will decrease  2/10/2021 1142 by Irma Molina RN  Outcome: Ongoing     Problem: Tissue Perfusion:  Goal: Ability to maintain adequate tissue perfusion will improve  Description: Ability to maintain adequate tissue perfusion will improve  2/10/2021 1142 by Irma Molina RN  Outcome: Ongoing

## 2021-02-10 NOTE — PROGRESS NOTES
Pt is now in room 4277, VSS on room air. Pt is A+Ox4. Wound vac set up and working properly. Pt orientated to room, all fall precautions are in place. Pt states all needs have been meet at this time. Will continue to monitor.      Electronically signed by Dez Jauregui RN on 2/10/2021 at 1:51 PM

## 2021-02-10 NOTE — TELEPHONE ENCOUNTER
I received the following email from [facility].     Dr. Geoff Dee saw a patient at [facility] and asked our NP Kehinde Falk to schedule the patient for surgery next Wednesday. 2/17 @ 7:15 AM. I was able to hold the time with Sammie. The patient is RIK De La Fuente 1945. He is wanting a Right Lower Extremity Wound Debridement, Possible Split-Thickness Skin Graft. I don't know who does his scheduling, so I figured I would start with you. [facility] OR just needs a scheduling form sent to them, please    Please send surgery letter.     I will route to MD.

## 2021-02-10 NOTE — PLAN OF CARE
Pt medicated with Norco once during shift for pain in her RLE that was a 6/10. Pt's pain goal is 4/10 and it was reached by taking the Norco. Pt's RLE is also elevated on 2 pillows. Pt is a high fall risk. She is up with a Stedy and assist X 1. Bed in locked, low position with side rails up X 2 and an active bed alarm. Fall risk bracelet, sign, and socks in place. Pt has not attempted to get OOB unassisted. Pt has a wound vac to her RLE that is collecting serosanguinous drainage. The dressing is to be changed by general surgery and was last changed on day shift per nursing report. Pump alarmed occlusion once during shift. Tubing was checked and pump was restarted. It did not alarm again during shift. Pt's central line dressing change was completed. Pt's catheter noted to have blood return X 2 lumens. The catheter was at the 4 cm salma VS the 3 cm when it was originally placed. Pt has a tello in place that is draining hazy, jarrell urine that is malodorous.

## 2021-02-10 NOTE — PROGRESS NOTES
Hospitalist Progress Note      PCP: MUSA Sanchez CNP    Date of Admission: 1/28/2021    Hospital course  50-year-old female patient admitted with right leg hematoma after the car door slammed against her right leg. Patient was developed hemorrhagic shock requiring blood transfusion patient anticoagulated with Eliquis for atrial fibrillation. She developed significant hematoma requiring evacuation. Subjective:      Patient seen and examined  , No acute distress. Pain is controlled  No major overnight event  Hemoglobin remained stable    Medications:  Reviewed    Infusion Medications    sodium chloride       Scheduled Medications    furosemide  20 mg Oral Daily    memantine  5 mg Oral BID    metoprolol succinate  50 mg Oral Daily    metoclopramide  5 mg Oral TID AC    magnesium oxide  400 mg Oral Daily    pantoprazole  40 mg Oral BID AC    lidocaine 1 % injection  5 mL Intradermal Once    sodium chloride flush  10 mL Intravenous 2 times per day    calcium elemental  500 mg Oral Daily    flecainide  100 mg Oral 2 times per day    sertraline  100 mg Oral Nightly    sodium chloride flush  10 mL Intravenous 2 times per day     PRN Meds: HYDROcodone 5 mg - acetaminophen, polyethylene glycol, morphine, sodium chloride, prochlorperazine, simethicone, sodium chloride flush, ALPRAZolam, tiZANidine, acetaminophen **OR** acetaminophen, sodium chloride flush, promethazine **OR** ondansetron, bisacodyl      Intake/Output Summary (Last 24 hours) at 2/10/2021 1116  Last data filed at 2/10/2021 0944  Gross per 24 hour   Intake 1115 ml   Output 2175 ml   Net -1060 ml       Physical Exam Performed:    /65   Pulse 72   Temp 97.9 °F (36.6 °C) (Oral)   Resp 16   Ht 5' 1\" (1.549 m)   Wt 186 lb 11.7 oz (84.7 kg)   SpO2 91%   BMI 35.28 kg/m²     Gen: Not in distress. Alert. Head: Normocephalic. Atraumatic. Eyes: Conjunctivae/corneas clear. ENT: Oral mucosa moist  Neck: No JVD.  No obvious thyromegaly. CVS: Nml S1S2, no murmur  , RRR  Pulmomary: Clear bilaterally. No crackles. No wheezes. Gastrointestinal: Soft, non tender, non distend, . Musculoskeletal: Right lower extremity wound with wound VAC  Neuro: No focal deficit. Moves extremity spontaneously. Psychiatry: Appropriate affect. Not agitated. Labs:   Recent Labs     02/08/21  0626 02/09/21  0605 02/10/21  0445   WBC 5.0 5.4 5.6   HGB 7.7* 7.6* 7.6*   HCT 24.3* 24.2* 23.9*    241 233     Recent Labs     02/08/21  0626 02/09/21  0605 02/10/21  0445    139 137   K 3.8 3.8 3.9    102 100   CO2 27 28 29   BUN 11 14 15   CREATININE <0.5* <0.5* <0.5*   CALCIUM 8.8 8.9 8.7     No results for input(s): AST, ALT, BILIDIR, BILITOT, ALKPHOS in the last 72 hours. No results for input(s): INR in the last 72 hours. No results for input(s): Stephanie Older in the last 72 hours. Urinalysis:      Lab Results   Component Value Date    NITRU Negative 09/03/2019    WBCUA 2 10/27/2018    RBCUA 4 10/27/2018    BLOODU Negative 09/03/2019    SPECGRAV 1.010 09/03/2019    GLUCOSEU Negative 09/03/2019       Radiology:  CTA LOWER EXTREMITY RIGHT W CONTRAST   Final Result   Large hematoma of the anterior calf. No active extravasation or etiology of   hemorrhage is identified. Popliteal artery is patent. There is significant trifurcation level disease   with primary runoff via the disease posterior tibial.      No acute osseous abnormality. XR TIBIA FIBULA RIGHT (2 VIEWS)   Final Result   Pretibial soft tissue swelling, possible hematoma. No acute osseous injury. Assessment/Plan:    Right leg hematoma in the setting of anticoagulation with Eliquis, status post evacuation by Ortho 1/28/2021, plastic surgery 1/31/2021 with wound VAC placement.   Acute Traumatic hematoma RLE     Circulatory shock, likely hemorrhagic, resolved    Acute blood loss anemia, received 3 units of packed RBCs 1/28  Continue monitoring hemoglobin  Received IV iron while inpatient      Paroxysmal atrial fibrillation  Continue flecainide  Remain off anticoagulation secondary to above    Comorbidities  · History of gastric bypass  · Chronic nausea, on Reglan      Hx of Gastric Bypass - Shanice-En-Y - 30+ years ago. Chronic nausea on Reglan. EGD in August - pathology report fairly unremarkable. DVT Prophylaxis: SCD  Diet: DIET GENERAL;  Dietary Nutrition Supplements: Low Calorie High Protein Supplement  Code Status: Full Code    Dispo -initial plan for inpatient rehab, denied, appealing.   Eventually will need skin grafting next week      Jacqui Chavez MD

## 2021-02-10 NOTE — PROGRESS NOTES
Department of Physical Medicine & Rehabilitation  Progress Note    Patient Identification:  Virginia Olson  [id number]  : 1945  Admit date: 2021    Chief Complaint: RLE pain    Subjective:   No acute events overnight. Patient seen this afternoon sitting up in room. She is disappointed in insurance decision to deny ARU. Per Surgery, plan for STSG next week. ROS: No f/c, n/v, cp     Objective:  Patient Vitals for the past 24 hrs:   BP Temp Temp src Pulse Resp SpO2 Weight   02/10/21 1123 101/63 97.5 °F (36.4 °C) Oral 66 16 90 % --   02/10/21 0834 -- -- -- -- -- 91 % --   02/10/21 0832 111/65 97.9 °F (36.6 °C) Oral 72 16 91 % --   02/10/21 0511 111/72 98.7 °F (37.1 °C) Oral 71 16 91 % 186 lb 11.7 oz (84.7 kg)   02/10/21 0013 (!) 90/54 97.5 °F (36.4 °C) Oral 73 14 91 % --   21 132/80 98.3 °F (36.8 °C) Oral 83 15 90 % --   21 1625 102/64 95.2 °F (35.1 °C) Oral 85 12 92 % --     Const: Alert. No distress, pleasant. HEENT: Normocephalic, atraumatic. Normal sclera/conjunctiva. MMM. CV: Regular rate and rhythm. Resp: No respiratory distress. Lungs CTAB. Abd: Soft, nontender, nondistended, NABS+   Ext: RLE wound with vac in place, RLE edema  Neuro: Alert, oriented, appropriately interactive. Psych: Cooperative, appropriate mood and affect    Laboratory data: Available via EMR.    Last 24 hour lab  Recent Results (from the past 24 hour(s))   Basic Metabolic Panel    Collection Time: 02/10/21  4:45 AM   Result Value Ref Range    Sodium 137 136 - 145 mmol/L    Potassium 3.9 3.5 - 5.1 mmol/L    Chloride 100 99 - 110 mmol/L    CO2 29 21 - 32 mmol/L    Anion Gap 8 3 - 16    Glucose 110 (H) 70 - 99 mg/dL    BUN 15 7 - 20 mg/dL    CREATININE <0.5 (L) 0.6 - 1.2 mg/dL    GFR Non-African American >60 >60    GFR African American >60 >60    Calcium 8.7 8.3 - 10.6 mg/dL   Magnesium    Collection Time: 02/10/21  4:45 AM   Result Value Ref Range    Magnesium 1.70 (L) 1.80 - 2.40 mg/dL   CBC Auto Differential    Collection Time: 02/10/21  4:45 AM   Result Value Ref Range    WBC 5.6 4.0 - 11.0 K/uL    RBC 2.64 (L) 4.00 - 5.20 M/uL    Hemoglobin 7.6 (L) 12.0 - 16.0 g/dL    Hematocrit 23.9 (L) 36.0 - 48.0 %    MCV 90.5 80.0 - 100.0 fL    MCH 28.7 26.0 - 34.0 pg    MCHC 31.7 31.0 - 36.0 g/dL    RDW 16.7 (H) 12.4 - 15.4 %    Platelets 456 361 - 128 K/uL    MPV 7.9 5.0 - 10.5 fL    Neutrophils % 74.2 %    Lymphocytes % 13.4 %    Monocytes % 11.0 %    Eosinophils % 1.0 %    Basophils % 0.4 %    Neutrophils Absolute 4.1 1.7 - 7.7 K/uL    Lymphocytes Absolute 0.7 (L) 1.0 - 5.1 K/uL    Monocytes Absolute 0.6 0.0 - 1.3 K/uL    Eosinophils Absolute 0.1 0.0 - 0.6 K/uL    Basophils Absolute 0.0 0.0 - 0.2 K/uL       Therapy progress:  PT  Position Activity Restriction  Other position/activity restrictions: Wound Vac R Lat Calf. PT spoke with Surg (LUCAS Eddy NP) : WBAT R LE. Objective     Sit to Stand: Contact guard assistance  Stand to sit: Contact guard assistance  Bed to Chair: Minimal assistance(with wheeled walker, cues for effective use of UEs)  Device: Rolling Walker  Assistance: Contact guard assistance  Distance: 40' x 2 trials with ~8 minute seated rest break between  OT  PT Equipment Recommendations  Other: Will monitor for potential equipt needs. Toilet - Technique: Ambulating  Equipment Used: Grab bars  Assessment        SLP          Body mass index is 35.28 kg/m². Assessment:  Debility  Right leg hematoma - s/p evacuation (1/28 with Dr. Kev Nice), evacuation, debridement and wound vac placement (1/31 with Dr. Orquidea Murrieta)  Hemorrhagic shock  Acute blood loss anemia  Paroxysmal Afib  H/o gastric bypass (Shanice-en-Y)  Anxiety/depression  Fibromyalgia      Impairments: generalized weakness, decreased ROM distal RLE, decreased balance, endurance.      Recommendations:     I believe patient is a candidate for ARU, however insurance company has denied.      Discussed possible SNF with patient but she indicates she will refuse. However will be difficult for her to manage at home unless she has physical assist from son/other family members. .      Thank you for this consult. Please contact me with any questions or concerns. Ileana Sun.  Allegra Goldberg, MD 2/10/2021, 2:03 PM

## 2021-02-11 ENCOUNTER — HOSPITAL ENCOUNTER (INPATIENT)
Age: 76
LOS: 12 days | Discharge: HOME HEALTH | DRG: 940 | End: 2021-02-23
Attending: PHYSICAL MEDICINE & REHABILITATION | Admitting: PHYSICAL MEDICINE & REHABILITATION
Payer: MEDICARE

## 2021-02-11 ENCOUNTER — APPOINTMENT (OUTPATIENT)
Dept: GENERAL RADIOLOGY | Age: 76
DRG: 981 | End: 2021-02-11
Payer: MEDICARE

## 2021-02-11 VITALS
DIASTOLIC BLOOD PRESSURE: 79 MMHG | RESPIRATION RATE: 17 BRPM | TEMPERATURE: 98.1 F | BODY MASS INDEX: 39.71 KG/M2 | SYSTOLIC BLOOD PRESSURE: 120 MMHG | WEIGHT: 210.32 LBS | OXYGEN SATURATION: 93 % | HEART RATE: 69 BPM | HEIGHT: 61 IN

## 2021-02-11 DIAGNOSIS — L08.9 TRAUMATIC HEMATOMA OF RIGHT LOWER LEG WITH INFECTION, SEQUELA: Primary | ICD-10-CM

## 2021-02-11 DIAGNOSIS — S80.11XS TRAUMATIC HEMATOMA OF RIGHT LOWER LEG WITH INFECTION, SEQUELA: Primary | ICD-10-CM

## 2021-02-11 PROBLEM — S80.11XA TRAUMATIC HEMATOMA OF RIGHT LOWER LEG WITH INFECTION: Status: ACTIVE | Noted: 2021-02-11

## 2021-02-11 LAB
ANION GAP SERPL CALCULATED.3IONS-SCNC: 8 MMOL/L (ref 3–16)
BASOPHILS ABSOLUTE: 0 K/UL (ref 0–0.2)
BASOPHILS RELATIVE PERCENT: 0.5 %
BUN BLDV-MCNC: 13 MG/DL (ref 7–20)
CALCIUM SERPL-MCNC: 8.5 MG/DL (ref 8.3–10.6)
CHLORIDE BLD-SCNC: 102 MMOL/L (ref 99–110)
CO2: 27 MMOL/L (ref 21–32)
CREAT SERPL-MCNC: <0.5 MG/DL (ref 0.6–1.2)
EOSINOPHILS ABSOLUTE: 0.1 K/UL (ref 0–0.6)
EOSINOPHILS RELATIVE PERCENT: 1.3 %
GFR AFRICAN AMERICAN: >60
GFR NON-AFRICAN AMERICAN: >60
GLUCOSE BLD-MCNC: 94 MG/DL (ref 70–99)
HCT VFR BLD CALC: 26.2 % (ref 36–48)
HEMOGLOBIN: 8.3 G/DL (ref 12–16)
LYMPHOCYTES ABSOLUTE: 0.8 K/UL (ref 1–5.1)
LYMPHOCYTES RELATIVE PERCENT: 17 %
MAGNESIUM: 2 MG/DL (ref 1.8–2.4)
MCH RBC QN AUTO: 28.8 PG (ref 26–34)
MCHC RBC AUTO-ENTMCNC: 31.7 G/DL (ref 31–36)
MCV RBC AUTO: 90.9 FL (ref 80–100)
MONOCYTES ABSOLUTE: 0.5 K/UL (ref 0–1.3)
MONOCYTES RELATIVE PERCENT: 9.8 %
NEUTROPHILS ABSOLUTE: 3.3 K/UL (ref 1.7–7.7)
NEUTROPHILS RELATIVE PERCENT: 71.4 %
PDW BLD-RTO: 17.4 % (ref 12.4–15.4)
PLATELET # BLD: 257 K/UL (ref 135–450)
PMV BLD AUTO: 7.9 FL (ref 5–10.5)
POTASSIUM SERPL-SCNC: 3.6 MMOL/L (ref 3.5–5.1)
RBC # BLD: 2.88 M/UL (ref 4–5.2)
SODIUM BLD-SCNC: 137 MMOL/L (ref 136–145)
WBC # BLD: 4.7 K/UL (ref 4–11)

## 2021-02-11 PROCEDURE — 6370000000 HC RX 637 (ALT 250 FOR IP): Performed by: FAMILY MEDICINE

## 2021-02-11 PROCEDURE — 94760 N-INVAS EAR/PLS OXIMETRY 1: CPT

## 2021-02-11 PROCEDURE — 80048 BASIC METABOLIC PNL TOTAL CA: CPT

## 2021-02-11 PROCEDURE — 2580000003 HC RX 258: Performed by: PHYSICAL MEDICINE & REHABILITATION

## 2021-02-11 PROCEDURE — 6360000002 HC RX W HCPCS: Performed by: HOSPITALIST

## 2021-02-11 PROCEDURE — 83735 ASSAY OF MAGNESIUM: CPT

## 2021-02-11 PROCEDURE — 1280000000 HC REHAB R&B

## 2021-02-11 PROCEDURE — 97530 THERAPEUTIC ACTIVITIES: CPT

## 2021-02-11 PROCEDURE — 02HV33Z INSERTION OF INFUSION DEVICE INTO SUPERIOR VENA CAVA, PERCUTANEOUS APPROACH: ICD-10-PCS | Performed by: HOSPITALIST

## 2021-02-11 PROCEDURE — 2580000003 HC RX 258: Performed by: STUDENT IN AN ORGANIZED HEALTH CARE EDUCATION/TRAINING PROGRAM

## 2021-02-11 PROCEDURE — 85025 COMPLETE CBC W/AUTO DIFF WBC: CPT

## 2021-02-11 PROCEDURE — 36593 DECLOT VASCULAR DEVICE: CPT

## 2021-02-11 PROCEDURE — 97535 SELF CARE MNGMENT TRAINING: CPT

## 2021-02-11 PROCEDURE — 71045 X-RAY EXAM CHEST 1 VIEW: CPT

## 2021-02-11 PROCEDURE — 6370000000 HC RX 637 (ALT 250 FOR IP): Performed by: PHYSICAL MEDICINE & REHABILITATION

## 2021-02-11 PROCEDURE — 6370000000 HC RX 637 (ALT 250 FOR IP): Performed by: STUDENT IN AN ORGANIZED HEALTH CARE EDUCATION/TRAINING PROGRAM

## 2021-02-11 PROCEDURE — 6360000002 HC RX W HCPCS: Performed by: PHYSICAL MEDICINE & REHABILITATION

## 2021-02-11 PROCEDURE — 6370000000 HC RX 637 (ALT 250 FOR IP): Performed by: INTERNAL MEDICINE

## 2021-02-11 RX ORDER — METOCLOPRAMIDE 10 MG/1
5 TABLET ORAL
Status: DISCONTINUED | OUTPATIENT
Start: 2021-02-12 | End: 2021-02-11

## 2021-02-11 RX ORDER — ACETAMINOPHEN 325 MG/1
650 TABLET ORAL EVERY 6 HOURS PRN
Status: CANCELLED | OUTPATIENT
Start: 2021-02-11

## 2021-02-11 RX ORDER — CALCIUM CARBONATE 500(1250)
500 TABLET ORAL DAILY
Status: CANCELLED | OUTPATIENT
Start: 2021-02-12

## 2021-02-11 RX ORDER — LANOLIN ALCOHOL/MO/W.PET/CERES
3 CREAM (GRAM) TOPICAL NIGHTLY PRN
Status: CANCELLED | OUTPATIENT
Start: 2021-02-11

## 2021-02-11 RX ORDER — PANTOPRAZOLE SODIUM 40 MG/1
40 TABLET, DELAYED RELEASE ORAL
Status: DISCONTINUED | OUTPATIENT
Start: 2021-02-12 | End: 2021-02-11

## 2021-02-11 RX ORDER — SODIUM CHLORIDE 0.9 % (FLUSH) 0.9 %
10 SYRINGE (ML) INJECTION PRN
Status: CANCELLED | OUTPATIENT
Start: 2021-02-11

## 2021-02-11 RX ORDER — MAGNESIUM HYDROXIDE 1200 MG/15ML
1000 LIQUID ORAL CONTINUOUS PRN
Status: CANCELLED | OUTPATIENT
Start: 2021-02-11

## 2021-02-11 RX ORDER — BISACODYL 10 MG
10 SUPPOSITORY, RECTAL RECTAL DAILY PRN
Status: CANCELLED | OUTPATIENT
Start: 2021-02-11

## 2021-02-11 RX ORDER — ACETAMINOPHEN 650 MG/1
650 SUPPOSITORY RECTAL EVERY 6 HOURS PRN
Status: DISCONTINUED | OUTPATIENT
Start: 2021-02-11 | End: 2021-02-23 | Stop reason: HOSPADM

## 2021-02-11 RX ORDER — ONDANSETRON 2 MG/ML
4 INJECTION INTRAMUSCULAR; INTRAVENOUS EVERY 6 HOURS PRN
Status: CANCELLED | OUTPATIENT
Start: 2021-02-11

## 2021-02-11 RX ORDER — ACETAMINOPHEN 325 MG/1
650 TABLET ORAL EVERY 6 HOURS PRN
Status: DISCONTINUED | OUTPATIENT
Start: 2021-02-11 | End: 2021-02-23 | Stop reason: HOSPADM

## 2021-02-11 RX ORDER — SODIUM CHLORIDE 0.9 % (FLUSH) 0.9 %
10 SYRINGE (ML) INJECTION PRN
Status: DISCONTINUED | OUTPATIENT
Start: 2021-02-11 | End: 2021-02-23 | Stop reason: HOSPADM

## 2021-02-11 RX ORDER — POLYETHYLENE GLYCOL 3350 17 G/17G
17 POWDER, FOR SOLUTION ORAL DAILY PRN
Status: CANCELLED | OUTPATIENT
Start: 2021-02-11

## 2021-02-11 RX ORDER — HYDROCODONE BITARTRATE AND ACETAMINOPHEN 5; 325 MG/1; MG/1
2 TABLET ORAL EVERY 6 HOURS PRN
Status: CANCELLED | OUTPATIENT
Start: 2021-02-11

## 2021-02-11 RX ORDER — FUROSEMIDE 20 MG/1
20 TABLET ORAL DAILY
Status: CANCELLED | OUTPATIENT
Start: 2021-02-12

## 2021-02-11 RX ORDER — BISACODYL 10 MG
10 SUPPOSITORY, RECTAL RECTAL DAILY PRN
Status: DISCONTINUED | OUTPATIENT
Start: 2021-02-11 | End: 2021-02-23 | Stop reason: HOSPADM

## 2021-02-11 RX ORDER — SERTRALINE HYDROCHLORIDE 100 MG/1
100 TABLET, FILM COATED ORAL NIGHTLY
Status: DISCONTINUED | OUTPATIENT
Start: 2021-02-11 | End: 2021-02-23 | Stop reason: HOSPADM

## 2021-02-11 RX ORDER — SERTRALINE HYDROCHLORIDE 100 MG/1
100 TABLET, FILM COATED ORAL NIGHTLY
Status: CANCELLED | OUTPATIENT
Start: 2021-02-11

## 2021-02-11 RX ORDER — ALPRAZOLAM 0.5 MG/1
0.5 TABLET ORAL NIGHTLY PRN
Status: CANCELLED | OUTPATIENT
Start: 2021-02-11

## 2021-02-11 RX ORDER — LANOLIN ALCOHOL/MO/W.PET/CERES
400 CREAM (GRAM) TOPICAL DAILY
Status: DISCONTINUED | OUTPATIENT
Start: 2021-02-12 | End: 2021-02-15

## 2021-02-11 RX ORDER — MEMANTINE HYDROCHLORIDE 5 MG/1
5 TABLET ORAL 2 TIMES DAILY
Status: DISCONTINUED | OUTPATIENT
Start: 2021-02-11 | End: 2021-02-23 | Stop reason: HOSPADM

## 2021-02-11 RX ORDER — LANOLIN ALCOHOL/MO/W.PET/CERES
3 CREAM (GRAM) TOPICAL NIGHTLY PRN
Status: DISCONTINUED | OUTPATIENT
Start: 2021-02-11 | End: 2021-02-23 | Stop reason: HOSPADM

## 2021-02-11 RX ORDER — PROMETHAZINE HYDROCHLORIDE 25 MG/1
12.5 TABLET ORAL EVERY 6 HOURS PRN
Status: CANCELLED | OUTPATIENT
Start: 2021-02-11

## 2021-02-11 RX ORDER — ONDANSETRON 2 MG/ML
4 INJECTION INTRAMUSCULAR; INTRAVENOUS EVERY 6 HOURS PRN
Status: DISCONTINUED | OUTPATIENT
Start: 2021-02-11 | End: 2021-02-23 | Stop reason: HOSPADM

## 2021-02-11 RX ORDER — CALCIUM CARBONATE 500(1250)
500 TABLET ORAL DAILY
Status: DISCONTINUED | OUTPATIENT
Start: 2021-02-12 | End: 2021-02-23 | Stop reason: HOSPADM

## 2021-02-11 RX ORDER — FLECAINIDE ACETATE 100 MG/1
100 TABLET ORAL EVERY 12 HOURS SCHEDULED
Status: CANCELLED | OUTPATIENT
Start: 2021-02-11

## 2021-02-11 RX ORDER — TIZANIDINE 4 MG/1
2 TABLET ORAL NIGHTLY PRN
Status: DISCONTINUED | OUTPATIENT
Start: 2021-02-11 | End: 2021-02-23 | Stop reason: HOSPADM

## 2021-02-11 RX ORDER — METOPROLOL SUCCINATE 50 MG/1
50 TABLET, EXTENDED RELEASE ORAL DAILY
Status: CANCELLED | OUTPATIENT
Start: 2021-02-12

## 2021-02-11 RX ORDER — PANTOPRAZOLE SODIUM 40 MG/1
40 TABLET, DELAYED RELEASE ORAL
Status: DISCONTINUED | OUTPATIENT
Start: 2021-02-11 | End: 2021-02-23 | Stop reason: HOSPADM

## 2021-02-11 RX ORDER — METOPROLOL SUCCINATE 50 MG/1
50 TABLET, EXTENDED RELEASE ORAL DAILY
Status: DISCONTINUED | OUTPATIENT
Start: 2021-02-12 | End: 2021-02-23 | Stop reason: HOSPADM

## 2021-02-11 RX ORDER — HYDROCODONE BITARTRATE AND ACETAMINOPHEN 5; 325 MG/1; MG/1
2 TABLET ORAL EVERY 6 HOURS PRN
Status: DISCONTINUED | OUTPATIENT
Start: 2021-02-11 | End: 2021-02-23 | Stop reason: HOSPADM

## 2021-02-11 RX ORDER — SIMETHICONE 80 MG
80 TABLET,CHEWABLE ORAL EVERY 6 HOURS PRN
Qty: 180 TABLET | Refills: 3 | DISCHARGE
Start: 2021-02-11 | End: 2021-06-17

## 2021-02-11 RX ORDER — METOCLOPRAMIDE 10 MG/1
5 TABLET ORAL
Status: CANCELLED | OUTPATIENT
Start: 2021-02-11

## 2021-02-11 RX ORDER — TIZANIDINE 4 MG/1
2 TABLET ORAL NIGHTLY PRN
Status: CANCELLED | OUTPATIENT
Start: 2021-02-11

## 2021-02-11 RX ORDER — SODIUM CHLORIDE 0.9 % (FLUSH) 0.9 %
10 SYRINGE (ML) INJECTION EVERY 12 HOURS SCHEDULED
Status: DISCONTINUED | OUTPATIENT
Start: 2021-02-11 | End: 2021-02-23 | Stop reason: HOSPADM

## 2021-02-11 RX ORDER — ACETAMINOPHEN 650 MG/1
650 SUPPOSITORY RECTAL EVERY 6 HOURS PRN
Status: CANCELLED | OUTPATIENT
Start: 2021-02-11

## 2021-02-11 RX ORDER — SODIUM CHLORIDE 0.9 % (FLUSH) 0.9 %
10 SYRINGE (ML) INJECTION EVERY 12 HOURS SCHEDULED
Status: CANCELLED | OUTPATIENT
Start: 2021-02-11

## 2021-02-11 RX ORDER — MEMANTINE HYDROCHLORIDE 5 MG/1
5 TABLET ORAL 2 TIMES DAILY
Status: CANCELLED | OUTPATIENT
Start: 2021-02-11

## 2021-02-11 RX ORDER — FUROSEMIDE 20 MG/1
20 TABLET ORAL DAILY
Status: DISCONTINUED | OUTPATIENT
Start: 2021-02-12 | End: 2021-02-23 | Stop reason: HOSPADM

## 2021-02-11 RX ORDER — SIMETHICONE 80 MG
80 TABLET,CHEWABLE ORAL EVERY 6 HOURS PRN
Status: CANCELLED | OUTPATIENT
Start: 2021-02-11

## 2021-02-11 RX ORDER — FLECAINIDE ACETATE 100 MG/1
100 TABLET ORAL EVERY 12 HOURS SCHEDULED
Status: DISCONTINUED | OUTPATIENT
Start: 2021-02-11 | End: 2021-02-23 | Stop reason: HOSPADM

## 2021-02-11 RX ORDER — METOCLOPRAMIDE 10 MG/1
5 TABLET ORAL
Status: DISCONTINUED | OUTPATIENT
Start: 2021-02-11 | End: 2021-02-23 | Stop reason: HOSPADM

## 2021-02-11 RX ORDER — PROMETHAZINE HYDROCHLORIDE 25 MG/1
12.5 TABLET ORAL EVERY 6 HOURS PRN
Status: DISCONTINUED | OUTPATIENT
Start: 2021-02-11 | End: 2021-02-23 | Stop reason: HOSPADM

## 2021-02-11 RX ORDER — POLYETHYLENE GLYCOL 3350 17 G/17G
17 POWDER, FOR SOLUTION ORAL DAILY PRN
Qty: 527 G | Refills: 1 | DISCHARGE
Start: 2021-02-11 | End: 2021-03-13

## 2021-02-11 RX ORDER — MAGNESIUM HYDROXIDE 1200 MG/15ML
1000 LIQUID ORAL CONTINUOUS PRN
Status: DISCONTINUED | OUTPATIENT
Start: 2021-02-11 | End: 2021-02-23 | Stop reason: HOSPADM

## 2021-02-11 RX ORDER — ACETAMINOPHEN 325 MG/1
650 TABLET ORAL EVERY 6 HOURS PRN
Status: DISCONTINUED | OUTPATIENT
Start: 2021-02-11 | End: 2021-02-11 | Stop reason: SDUPTHER

## 2021-02-11 RX ORDER — SIMETHICONE 80 MG
80 TABLET,CHEWABLE ORAL EVERY 6 HOURS PRN
Status: DISCONTINUED | OUTPATIENT
Start: 2021-02-11 | End: 2021-02-23 | Stop reason: HOSPADM

## 2021-02-11 RX ORDER — ALPRAZOLAM 0.5 MG/1
0.5 TABLET ORAL NIGHTLY PRN
Status: DISCONTINUED | OUTPATIENT
Start: 2021-02-11 | End: 2021-02-23 | Stop reason: HOSPADM

## 2021-02-11 RX ORDER — LANOLIN ALCOHOL/MO/W.PET/CERES
400 CREAM (GRAM) TOPICAL DAILY
Status: CANCELLED | OUTPATIENT
Start: 2021-02-12

## 2021-02-11 RX ORDER — PANTOPRAZOLE SODIUM 40 MG/1
40 TABLET, DELAYED RELEASE ORAL
Status: CANCELLED | OUTPATIENT
Start: 2021-02-11

## 2021-02-11 RX ORDER — POLYETHYLENE GLYCOL 3350 17 G/17G
17 POWDER, FOR SOLUTION ORAL DAILY PRN
Status: DISCONTINUED | OUTPATIENT
Start: 2021-02-11 | End: 2021-02-23 | Stop reason: HOSPADM

## 2021-02-11 RX ADMIN — MEMANTINE 5 MG: 5 TABLET ORAL at 21:57

## 2021-02-11 RX ADMIN — MEMANTINE 5 MG: 5 TABLET ORAL at 09:58

## 2021-02-11 RX ADMIN — Medication 500 MG: at 09:58

## 2021-02-11 RX ADMIN — METOCLOPRAMIDE 5 MG: 10 TABLET ORAL at 18:08

## 2021-02-11 RX ADMIN — SODIUM CHLORIDE, PRESERVATIVE FREE 10 ML: 5 INJECTION INTRAVENOUS at 22:08

## 2021-02-11 RX ADMIN — METOCLOPRAMIDE 5 MG: 10 TABLET ORAL at 09:58

## 2021-02-11 RX ADMIN — PANTOPRAZOLE SODIUM 40 MG: 40 TABLET, DELAYED RELEASE ORAL at 05:11

## 2021-02-11 RX ADMIN — Medication 400 MG: at 09:58

## 2021-02-11 RX ADMIN — Medication 3 MG: at 21:57

## 2021-02-11 RX ADMIN — METOPROLOL SUCCINATE 50 MG: 50 TABLET, EXTENDED RELEASE ORAL at 09:58

## 2021-02-11 RX ADMIN — ALTEPLASE 2 MG: 2.2 INJECTION, POWDER, LYOPHILIZED, FOR SOLUTION INTRAVENOUS at 14:19

## 2021-02-11 RX ADMIN — FLECAINIDE ACETATE 100 MG: 100 TABLET ORAL at 21:57

## 2021-02-11 RX ADMIN — FLECAINIDE ACETATE 100 MG: 100 TABLET ORAL at 09:58

## 2021-02-11 RX ADMIN — HYDROCODONE BITARTRATE AND ACETAMINOPHEN 2 TABLET: 5; 325 TABLET ORAL at 05:11

## 2021-02-11 RX ADMIN — SODIUM CHLORIDE, PRESERVATIVE FREE 10 ML: 5 INJECTION INTRAVENOUS at 09:59

## 2021-02-11 RX ADMIN — METOCLOPRAMIDE 5 MG: 10 TABLET ORAL at 13:08

## 2021-02-11 RX ADMIN — ONDANSETRON 4 MG: 2 INJECTION INTRAMUSCULAR; INTRAVENOUS at 22:16

## 2021-02-11 RX ADMIN — PANTOPRAZOLE SODIUM 40 MG: 40 TABLET, DELAYED RELEASE ORAL at 17:55

## 2021-02-11 RX ADMIN — SERTRALINE 100 MG: 100 TABLET, FILM COATED ORAL at 21:57

## 2021-02-11 RX ADMIN — HYDROCODONE BITARTRATE AND ACETAMINOPHEN 2 TABLET: 5; 325 TABLET ORAL at 13:08

## 2021-02-11 RX ADMIN — FUROSEMIDE 20 MG: 20 TABLET ORAL at 09:58

## 2021-02-11 ASSESSMENT — PAIN DESCRIPTION - FREQUENCY
FREQUENCY: CONTINUOUS
FREQUENCY: CONTINUOUS

## 2021-02-11 ASSESSMENT — PAIN DESCRIPTION - ONSET: ONSET: ON-GOING

## 2021-02-11 ASSESSMENT — PAIN - FUNCTIONAL ASSESSMENT: PAIN_FUNCTIONAL_ASSESSMENT: PREVENTS OR INTERFERES SOME ACTIVE ACTIVITIES AND ADLS

## 2021-02-11 ASSESSMENT — PAIN SCALES - GENERAL
PAINLEVEL_OUTOF10: 2
PAINLEVEL_OUTOF10: 5
PAINLEVEL_OUTOF10: 6
PAINLEVEL_OUTOF10: 0
PAINLEVEL_OUTOF10: 5

## 2021-02-11 ASSESSMENT — PAIN DESCRIPTION - LOCATION: LOCATION: LEG

## 2021-02-11 ASSESSMENT — PAIN DESCRIPTION - PROGRESSION: CLINICAL_PROGRESSION: NOT CHANGED

## 2021-02-11 ASSESSMENT — PAIN DESCRIPTION - DESCRIPTORS: DESCRIPTORS: ACHING

## 2021-02-11 NOTE — PROGRESS NOTES
Cathflow flushed, red lumen now flushes easily and blood return noted. Purple lumen still does not draw blood, but flushes appropriately. Report called to 3N nurse, all questions answered.      Electronically signed by Rochelle Zimmer RN on 2/11/2021 at 3:26 PM

## 2021-02-11 NOTE — TELEPHONE ENCOUNTER
The patient is currently admitted at [facility], so she will not need an H&P or COVID. I will close this phone note.

## 2021-02-11 NOTE — CARE COORDINATION
Received notification from Dr. Mildred Beck that the peer to peer was overturned and patient is now approved for ARU. Spoke with [facility] in [facility] and they can accept the patient today.   Tash Peter RN, BSN, Case Management  Phone: [phone]  Electronically signed by Tash Peter RN on 2/11/2021 at 12:51 PM

## 2021-02-11 NOTE — PROGRESS NOTES
Pt CXR states PICC is in place, order for cathflow to unclog PICC lumens. Will administer.      Electronically signed by Donnie Broderick RN on 2/11/2021 at 2:14 PM

## 2021-02-11 NOTE — PROGRESS NOTES
Patient admitted to room 3267 per staff. In bed. Patient was oriented to the Call Light, Phone, TV, Thermostat, Bed Controls, Bathroom and Emergency Cord. Patient verbalized and demonstrated understanding of all. Patient was also given an over view of Unit Routines for Acute Rehab, including what to wear for therapy. The patient's role in goal setting was reviewed along with an explanation of the Interdisciplinary Team meeting, the 's role in coordinating services and the Discharge Planning/Continuum of Care process. Patient Rights and Responsibilities were reviewed. Meal times were explained, including how to order food. The white board (used for communication) was pointed out emphasizing  the 3 hours/day Therapy Schedule (posted most evenings), the number (and process) for reporting grievances, and the Doctor's, Nurse's, and PCA's names. It was recommended that any family that will be care givers or any care givers the patient has, take part in therapy. Visitor restrictions reviewed. Wound vac canister full, changed.

## 2021-02-11 NOTE — PROGRESS NOTES
Physical Therapy  Facility/Department: [address] MED SURG  Daily Treatment Note  NAME: Molly Mortimer  : 1945  MRN: [id number]    Date of Service: 2021    Discharge Recommendations:  Patient would benefit from continued therapy after discharge, 5-7 sessions per week   PT Equipment Recommendations  Other: Will monitor for potential equipt needs. Molly Mortimer scored a 17/24 on the AM-PAC short mobility form. Current research shows that an AM-PAC score of 17 or less is typically not associated with a discharge to the patient's home setting. Based on the patient's AM-PAC score and their current functional mobility deficits, it is recommended that the patient have 5-7 sessions per week of Physical Therapy at d/c to increase the patient's independence. At this time, this patient demonstrates the endurance, and/or tolerance for 3 hours of therapy each day, with a treatment frequency of 5-7x/wk. Please see assessment section for further patient specific details. If patient discharges prior to next session this note will serve as a discharge summary. Please see below for the latest assessment towards goals. Assessment   Body structures, Functions, Activity limitations: Decreased functional mobility ; Decreased strength;Decreased endurance; Increased pain  Assessment: PTA pt living with son in home with ramp access and 1st floor bed/bath. Prior, pt independent with daily care and functional mobility. Patient able to transfer and ambulate up to 48' with wheeled walker and CGA - able to tolerate 45 minute session this date. Patient is below baseline functional status and therefore would benefit from additional therapy. At this time, recommend cont PT Services (pt prefers 5-7) prior return home with son. Patient is motivated and verbally agreeable to intensive therapy on inpatient rehab. Awaiting approval from insurance.   Apparently skin graft will not occur this week, so would be appropriate to Restrictions  Right Lower Extremity Weight Bearing: Weight Bearing As Tolerated  Position Activity Restriction  Other position/activity restrictions: Wound Vac R Lat Calf. PT spoke with Surg (LUCAS Eddy NP) : WBAT R LE. Subjective   General  Chart Reviewed: Yes  Additional Pertinent Hx: 68 y/o female admit 1/31/2021 with R LE Traumatic Hematoma (hit R LE on car door). 1/31/2021 S/P Evac of R LE Hematoma, Excisional Debride (04N99 cm), Applic of Wound Vac. Plastic and Reconstruct Surg follow for Staged Reconstruct with Skin Graft. PMH as noted including Gastric Bypass, Rectal Ca, L TKR, R Femur Fx/ORIF (10/2018). Response To Previous Treatment: Patient with no complaints from previous session. Family / Caregiver Present: No  Referring Practitioner: Dr Rosario Sanchez: Pt is agreeable to PT. Orientation  Orientation  Overall Orientation Status: Within Functional Limits     Cognition   Cognition  Overall Cognitive Status: WFL     Objective   Bed mobility  Supine to Sit: Stand by assistance  Sit to Supine: Unable to assess(in recliner at end of session)  Transfers  Sit to Stand: Contact guard assistance  Stand to sit: Contact guard assistance  Ambulation  Ambulation?: Yes  WB Status: WBAT R LE  Ambulation 1  Surface: level tile  Device: Rolling Walker  Assistance: Contact guard assistance  Quality of Gait: slow marlena; decreased toe clearance on RLE  Gait Deviations: Decreased step height;Decreased step length; Slow Marlena  Distance: 15' bed to bathroom where she performed full ADL (sponge bath, wash hair, brush teeth), then 25' to recliner, then 50' after 2 minute seated rest break. Comments: Discussed not using stedy anymore - pt reports not trusting other staff outside of therapy.   Stairs/Curb  Stairs?: No     Balance  Posture: Good  Sitting - Static: Good  Sitting - Dynamic: Good  Standing - Static: [email or web address])  Standing - Dynamic: [email or web address])           AM-PAC Score  AM-PAC

## 2021-02-11 NOTE — PLAN OF CARE
Problem: Pain:  Goal: Pain level will decrease  Description: Pain level will decrease  2/11/2021 0139 by Olga Rodriguez  Outcome: Ongoing  2/10/2021 1142 by Kolby Damian RN  Outcome: Ongoing  Goal: Control of acute pain  Description: Control of acute pain  2/11/2021 0139 by Olga Rodriguez  Outcome: Ongoing  2/10/2021 1142 by Kolby Damian RN  Outcome: Ongoing  Goal: Control of chronic pain  Description: Control of chronic pain  2/11/2021 0139 by Olga Rodriguez  Outcome: Ongoing  2/10/2021 1142 by Kolby Damian RN  Outcome: Ongoing     Problem: Falls - Risk of:  Goal: Will remain free from falls  Description: Will remain free from falls  2/11/2021 0139 by Olga Rodriguez  Outcome: Ongoing  2/10/2021 1142 by Kolby Damian RN  Outcome: Ongoing  Goal: Absence of physical injury  Description: Absence of physical injury  2/11/2021 0139 by Olga Rodriguez  Outcome: Ongoing  2/10/2021 1142 by Kolby Damian RN  Outcome: Ongoing     Problem: Skin Integrity:  Goal: Will show no infection signs and symptoms  Description: Will show no infection signs and symptoms  2/11/2021 0139 by Olga Rodriguez  Outcome: Ongoing  2/10/2021 1142 by Kolby Damian RN  Outcome: Ongoing  Goal: Absence of new skin breakdown  Description: Absence of new skin breakdown  2/11/2021 0139 by Olga Rodriguez  Outcome: Ongoing  2/10/2021 1142 by Kolby Damian RN  Outcome: Ongoing  Goal: Skin integrity will be maintained  Description: Skin integrity will be maintained  2/11/2021 0139 by Olga Rodriguez  Outcome: Ongoing  2/10/2021 1142 by Kolby Damian RN  Outcome: Ongoing  Goal: Skin integrity will improve  Description: Skin integrity will improve  2/11/2021 0139 by Olga Rodriguez  Outcome: Ongoing  2/10/2021 1142 by Kolby Damian RN  Outcome: Ongoing     Problem: Infection - Central Venous Catheter-Associated Bloodstream Infection:  Goal: Will show no infection signs and symptoms  Description: Will show no infection signs and symptoms  2/11/2021 0139 by Olga Rodriguez  Outcome: Ongoing  2/10/2021 1142 by Sri Rai RN  Outcome: Ongoing     Problem: Urinary Elimination:  Goal: Signs and symptoms of infection will decrease  Description: Signs and symptoms of infection will decrease  2/11/2021 0139 by Ric Valenzuela  Outcome: Ongoing  2/10/2021 1142 by Sri Rai RN  Outcome: Ongoing  Goal: Complications related to the disease process, condition or treatment will be avoided or minimized  Description: Complications related to the disease process, condition or treatment will be avoided or minimized  2/11/2021 0139 by Ric Valenzuela  Outcome: Ongoing  2/10/2021 1142 by Sri Rai RN  Outcome: Ongoing     Problem: Physical Regulation:  Goal: Complications related to the disease process, condition or treatment will be avoided or minimized  Description: Complications related to the disease process, condition or treatment will be avoided or minimized  2/11/2021 0139 by Ric Valenzuela  Outcome: Ongoing  2/10/2021 1142 by Sri Rai RN  Outcome: Ongoing     Problem: Sensory:  Goal: Pain level will decrease  Description: Pain level will decrease  2/11/2021 0139 by Ric Valenzuela  Outcome: Ongoing  2/10/2021 1142 by Sri Ria RN  Outcome: Ongoing     Problem: Tissue Perfusion:  Goal: Ability to maintain adequate tissue perfusion will improve  Description: Ability to maintain adequate tissue perfusion will improve  2/11/2021 0139 by Ric Valenzuela  Outcome: Ongoing  2/10/2021 1142 by Sri Rai RN  Outcome: Ongoing

## 2021-02-11 NOTE — DISCHARGE INSTR - COC
Continuity of Care Form    Patient Name: Rocio Land   :  1945  MRN:  [id number]    Admit date:  2021  Discharge date:  ***    Code Status Order: Full Code   Advance Directives:   Advance Care Flowsheet Documentation       Date/Time Healthcare Directive Type of Healthcare Directive Copy in [address] Agent's Name Healthcare Agent's Phone Number    21 0055  No, patient does not have an advance directive for healthcare treatment -- -- -- -- --    21 0446  No, patient does not have an advance directive for healthcare treatment -- -- -- -- --            Admitting Physician:  Vinnie To DO  PCP: MUSA Jim CNP    Discharging Nurse: [facility] Unit/Room#: W7M-2130/5192-78  Discharging Unit Phone Number: ***    Emergency Contact:   Extended Emergency Contact Information  Primary Emergency Contact: Jeff [facility] [address]  Phone: [phone]  Mobile Phone: [phone]  Relation: Child  Secondary Emergency Contact: Raven Friend  Address: [address] [facility] Phone: [phone]  Mobile Phone: [phone]  Relation: Child    Past Surgical History:  Past Surgical History:   Procedure Laterality Date    APPENDECTOMY       SECTION      x4    CHOLECYSTECTOMY      COLONOSCOPY      SEVERAL    COLONOSCOPY N/A 2019    COLONOSCOPY POLYPECTOMY SNARE/COLD BIOPSY performed by Jacob Figueroa MD at [address] [place] [address] Right 2021    RIGHT LEG EVACUATION OF HEMATOMA performed by George Mccormack MD at [place] [address] Right     [address] W/O EXTENSION Right 10/25/2018    OPEN REDUCTION INTERNAL FIXATION RIGHT FEMUR SUPRACONDYLAR FEMORAL FRACTURE WITH C-ARM performed by Linda Trinidad MD at [place] [address] closed displaced supracondylar fracture of femur, right, with delayed healing, subsequent encounter S72.451G    Osteopenia M85.80    Traumatic hematoma of right lower leg S80.11XA    Hemorrhagic shock (HCC) R57.8    Hematoma T14. 8XXA    Hematoma of right lower extremity S80.11XA    Acute blood loss anemia D62    Nausea R11.0       Isolation/Infection:   Isolation            No Isolation          Patient Infection Status       Infection Onset Added Last Indicated Last Indicated By Review Planned Expiration Resolved Resolved By    None active    Resolved    COVID-19 Rule Out 01/28/21 01/28/21 01/28/21 COVID-19 (Ordered)   01/28/21 Rule-Out Test Resulted            Nurse Assessment:  Last Vital Signs: /81   Pulse 76   Temp 98.4 °F (36.9 °C) (Oral)   Resp 18   Ht 5' 1\" (1.549 m)   Wt 210 lb 5.1 oz (95.4 kg)   SpO2 94%   BMI 39.74 kg/m²     Last documented pain score (0-10 scale): Pain Level: 6  Last Weight:   Wt Readings from Last 1 Encounters:   02/11/21 210 lb 5.1 oz (95.4 kg)     Mental Status:  {IP PT MENTAL STATUS:49102:::0}    IV Access:  { [facility] IV ACCESS:162302264:::0}    Nursing Mobility/ADLs:  Walking   {CHP DME ADLs:386525580:::0}  Transfer  {CHP DME ADLs:016813337:::0}  Bathing  {CHP DME ADLs:214750268:::0}  Dressing  {CHP DME ADLs:523284685:::0}  Toileting  {CHP DME ADLs:879871838:::0}  Feeding  {CHP DME ADLs:887052403:::0}  Med Admin  {P DME ADLs:252664360:::0}  Med Delivery   { [facility] MED Delivery:444162197:::0}    Wound Care Documentation and Therapy:  Negative Pressure Wound Therapy Leg Anterior; Lower;Right (Active)   Wound Type Surgical 02/11/21 0830   Unit Type Veraflo 02/08/21 2039   Dressing Type Veraflo;Black foam 02/11/21 0830   Cycle On;Continuous 02/11/21 0830   Target Pressure (mmHg) 125 02/11/21 0830   Intensity 5 02/11/21 0830   Irrigation Solution Sodium chloride 0.9% 02/11/21 0123   Instillation Volume  28 mL 02/01/21 1342   Soak Time  8 minutes 02/04/21 0754   Vac Frequency 4 hours 21 0754   Canister changed? Yes 21   Dressing Status Clean;Dry; Intact 21   Dressing Changed Dressing reinforced 21 0123   Drainage Amount Small 21   Drainage Description Serosanguinous 21   Dressing Change Due 21   Output (ml) 800 ml 02/10/21 0917   Wound Assessment Other (Comment) 21   Nallely-wound Assessment Ecchymosis; Red 2130   Number of days: 11        Elimination:  Continence: Bowel: {YES / Telugu:37318}  Bladder: {YES / OK:10887}  Urinary Catheter: {Urinary Catheter:969925455:::0}   Colostomy/Ileostomy/Ileal Conduit: {YES / PF:78535}       Date of Last BM: ***    Intake/Output Summary (Last 24 hours) at 2021 1217  Last data filed at 2021 4781  Gross per 24 hour   Intake 240 ml   Output 1150 ml   Net -910 ml     I/O last 3 completed shifts: In: 480 [P.O.:480]  Out: 1950 [Urine:550; Drains:800;  Other:600]    Safety Concerns:     [address] Safety Concerns:830739190:::0}    Impairments/Disabilities:      [address] Impairments/Disabilities:113317861:::0}    Nutrition Therapy:  Current Nutrition Therapy:   [address] Diet List:128758981:::0}    Routes of Feeding: {CHP DME Other Feedings:633449494:::0}  Liquids: {Slp liquid thickness:18271}  Daily Fluid Restriction: {CHP DME Yes amt example:559158458:::0}  Last Modified Barium Swallow with Video (Video Swallowing Test): {Done Not Done LTOP:675986515:::0}    Treatments at the Time of Hospital Discharge:   Respiratory Treatments: ***  Oxygen Therapy:  {Therapy; copd oxygen:50008:::0}  Ventilator:    { CC Vent List:962447193:::0}    Rehab Therapies: {THERAPEUTIC INTERVENTION:9142536888}  Weight Bearing Status/Restrictions: [facility] Weight Bearin:::0}  Other Medical Equipment (for information only, NOT a DME order):  {EQUIPMENT:363989503}  Other Treatments: ***    Patient's personal belongings (please select all that are sent with patient):  {[facility] DME Belongings:217005390:::0}    RN SIGNATURE:  {Esignature:322882704:::0}    CASE MANAGEMENT/SOCIAL WORK SECTION    Inpatient Status Date: ***    Readmission Risk Assessment Score:  Readmission Risk              Risk of Unplanned Readmission:        26           Discharging to Facility/ Agency   Name:   Address:  Phone:  Fax:    Dialysis Facility (if applicable)   Name:  Address:  Dialysis Schedule:  Phone:  Fax:    / signature: {Esignature:513841638:::0}    PHYSICIAN SECTION    Prognosis: {Prognosis:1652877731:::0}    Condition at Discharge: [address] [place] [address] Patient Condition:621035055:::0}    Rehab Potential (if transferring to Rehab): {Prognosis:7822273855:::0}    Recommended Labs or Other Treatments After Discharge: ***    Physician Certification: I certify the above information and transfer of Nadja Khanna  is necessary for the continuing treatment of the diagnosis listed and that she requires {Admit to Appropriate Level of Care:35810:::0} for {GREATER/LESS:223338138} 30 days.      Update Admission H&P: {CHP DME Changes in HandP:557689746:::0}    PHYSICIAN SIGNATURE:  {Esignature:029248533:::0}

## 2021-02-11 NOTE — CARE COORDINATION
MARIANO emailed info for peer to peer to insurance this AM. We will wait for a response and ensure that patient and family are ready to move on to the next steps if peer to peer is denied for [address] [place] [address]. Respectfully submitted,    Frances BARRERA, VON  [facility]   [phone]    Electronically signed by VON Domingo on 2/11/2021 at 8:53 AM

## 2021-02-11 NOTE — PROGRESS NOTES
Order for V.A.C.®  Negative Pressure Wound Therapy  Please fax this form to [employer] at [facility] Customer Service: [phone]? [address]:   [facility]  Patient Information:   Pinky Xie [address] [place] [address] Drive [address] [place] [address],Suite [facility]   [phone]   : 1945  AGE: 67 y.o. GENDER: female   TODAYS DATE:  2021  Insurance:   PRIMARY INSURANCE:  Plan: Deepa Navas ESSENTIAL/PLUS  Coverage: BCBS MEDICARE  Effective Date: 2020  [id number] - (Medicare Managed)    Payor/Plan Subscr  Sex Relation Sub. Ins. ID Effective Group Num   1. BCBS MEDICAREJossie Paling 1945 Female Self [id number] [facility]                                   [address]       Patient Wound Information:     Duration of the V.A.C.®  Negative Pressure Wound Therapy: 4 Month which includes up to 15 dressings per wound and up to 10 canisters per month    Goal at the completion of V.A.C.®  Negative Pressure Therapy: Assist in granulation tissue formation     Will HomeCare provide V.A.C. ®  Therapy? Yes Homecare will provide VAC Therapy. The date in which Homecare will initiate VAC Therapy is at hospital discharge. Equipment for Delivery:     Delivery Location V.A.C. ® Therapy Pump: [facility]    Up to 15 dressings per wound, per month  VAC Canisters: Up to 10 canisters per month  V.A.C.® Dressing: Luiz Jacob Dressing Large    Clinical Information by Wound Type: Was NPWT initiated in an inpatient facility? Yes NPWT was initiated on this date 2021 or  Has the patient been on NPWT anytime during the last 60 days? Yes [address] of [address]     Is the patients nutritional status compromised?  No    Please salma all dressings that apply  Please indicate all other therapies that have been previously tried and/or failed to maintain a moist wound environment: Saline moisten gauze and Absorptive    Please salma all that apply  If other therapies were considered and ruled out, what conditions prevented you from using other therapies prior to applying V.A.C. ® Therapy? Presence of co-morbidities, High risk of infections and Need for accelerated granulation tissue    Please salma all that apply  Which of the following co?morbidities apply? Immobility and Obesity    Is Osteomyelitis present in Wound? No    Patient's Primary Wound Type:     Patient's Primary Wound Type:   Right leg hematoma OPERATION PERFORMED: 1/31/2021  1) Evacuation of right lower extremity hematoma  2) Excisional debridement of right lower extremity (20 x 16 cm)  3) Application of Veraflo wound vacuum        Is this wound a direct result of an accident? No     Wound(s) Description:     Is there eschar tissue present in the wound? No Eschar in noted in Wound(s) base  Has debridement been attempted in the last 10 days? No  Are serial debridements required? No    Negative Pressure Wound Therapy Leg Anterior; Lower;Right (Active)   Wound Type Surgical 02/11/21 0830   Unit Type Veraflo 02/08/21 2039   Dressing Type Veraflo;Black foam 02/11/21 0830   Cycle On;Continuous 02/11/21 0830   Target Pressure (mmHg) 125 02/11/21 0830   Intensity 5 02/11/21 0830   Irrigation Solution Sodium chloride 0.9% 02/11/21 0123   Instillation Volume  28 mL 02/01/21 1342   Soak Time  8 minutes 02/04/21 0754   Vac Frequency 4 hours 02/04/21 0754   Canister changed? Yes 02/11/21 0830   Dressing Status Clean;Dry; Intact 02/11/21 0830   Dressing Changed Dressing reinforced 02/11/21 0123   Drainage Amount Small 02/11/21 0830   Drainage Description Serosanguinous 02/11/21 0830   Dressing Change Due 02/12/21 02/11/21 0830   Output (ml) 800 ml 02/10/21 0917   Wound Assessment Other (Comment) 02/11/21 0830   Nallely-wound Assessment Ecchymosis; Red 02/11/21 0830   Number of days: 11       Electronically signed by MUSA Pavon CNP on 1/28/2021 at 10:54 AM    Providers Information:      PROVIDER'S NAME: Kenny VIGIL-JOHN     NPI: [id number]

## 2021-02-11 NOTE — DISCHARGE SUMMARY
Discharge summary      PCP: MUSA Orr CNP    Date of Admission: 1/28/2021    Discharge Date:    2/11/2021    Admitting Physician: Karen Haider DO     Discharge Physician: Soy Rider MD       Consults:     IP CONSULT TO PLASTIC SURGERY  IP CONSULT TO PHYSICAL MEDICINE REHAB    Disposition: Inpatient rehab    Condition at Discharge: Stable    Code Status:  Full Code          Patient Instructions:    Discharge lab/important testing/finding that need follow up :   Skin graft on the left lower extremity, wound VAC management  Restart Eliquis when deemed safe by surgery service    Activity: activity as tolerated    Diet: DIET GENERAL;  Dietary Nutrition Supplements: Low Calorie High Protein Supplement      Follow-up visits:   No follow-up provider specified.        Discharge Medications:      Latanya Siemens   Home Medication Instructions HYU:[id number]    Printed on:02/11/21 7047   Medication Information                      acetaminophen (TYLENOL) 500 MG tablet  Take 500 mg by mouth every 6 hours as needed for Pain             ALPRAZolam (XANAX) 0.5 MG tablet  TAKE ONE TABLET BY MOUTH THREE TIMES A DAY AS NEEDED FOR ANXIETY             Biotin 1000 MCG TABS  Take 1 tablet by mouth 2 times daily              calcium carbonate (CALTRATE 600) 1500 (600 Ca) MG TABS tablet  Take 600 mg by mouth daily             co-enzyme Q-10 50 MG capsule  Take 150 mg by mouth 2 times daily              flecainide (TAMBOCOR) 100 MG tablet  TAKE 1 TABLET BY MOUTH 2 TIMES A DAY             furosemide (LASIX) 20 MG tablet  Take 2 tablets by mouth daily With an additional 20 mg in the evening on M,W,F only             hydrOXYzine (ATARAX) 25 MG tablet  TAKE ONE TO TWO TABLETS BY MOUTH TWICE A DAY AS NEEDED FOR ITCHING             magnesium oxide (MAG-OX) 400 MG tablet  Take 400 mg by mouth daily             memantine (NAMENDA) 5 MG tablet  Take 5 mg by mouth 2 times daily             metoclopramide (REGLAN) 5 MG tablet  Take 1 tablet by mouth 3 times daily as needed (Nausea and abdominal)             metoprolol succinate (TOPROL XL) 50 MG extended release tablet  TAKE 1 TABLET BY MOUTH ONE TIME A DAY             mirtazapine (REMERON) 30 MG tablet  TAKE ONE TABLET BY MOUTH ONCE NIGHTLY             pantoprazole (PROTONIX) 40 MG tablet  Take 1 tablet by mouth 2 times daily             polyethylene glycol (GLYCOLAX) 17 g packet  Take 17 g by mouth daily as needed for Constipation             potassium chloride (KLOR-CON M20) 20 MEQ extended release tablet  Take 1 tablet by mouth 2 times daily             Riboflavin (B-2-400) 400 MG CAPS  Take by mouth             sertraline (ZOLOFT) 100 MG tablet  Take 1 tablet by mouth nightly             simethicone (MYLICON) 80 MG chewable tablet  Take 1 tablet by mouth every 6 hours as needed for Flatulence             tiZANidine (ZANAFLEX) 2 MG tablet  Take 1 tablet by mouth 2 times daily             vitamin B-12 (CYANOCOBALAMIN) 1000 MCG tablet  Take 500 mcg by mouth daily Indications: patient is taking 2 tablets 1,000 mcg in the morning              vitamin D 25 MCG (1000 UT) CAPS  Take 2 capsules by mouth daily                   Patient seen and examined in the day of discharge. Vital signs reviewed. /81   Pulse 76   Temp 98.4 °F (36.9 °C) (Oral)   Resp 18   Ht 5' 1\" (1.549 m)   Wt 210 lb 5.1 oz (95.4 kg)   SpO2 94%   BMI 39.74 kg/m²     Patient alert, oriented, comfortable, clear lungs, normal first and second heart sound. Abdomen soft. Right lower extremity swelling with wound VAC in place. *. Patient reached the maximum benefit of this hospitalization. Patient  was hemodynamically stable on discharge   Available consultant are in agreement with discharge planning. Patient symptoms was controlled and in agreement with discharge planning.        Time Spent on discharge is 50 minutes in the examination, evaluation, counseling and review of medications and Negative 09/03/2019    WBCUA 2 10/27/2018    RBCUA 4 10/27/2018    BLOODU Negative 09/03/2019    SPECGRAV 1.010 09/03/2019    GLUCOSEU Negative 09/03/2019       Radiology:  CTA LOWER EXTREMITY RIGHT W CONTRAST   Final Result   Large hematoma of the anterior calf. No active extravasation or etiology of   hemorrhage is identified. Popliteal artery is patent. There is significant trifurcation level disease   with primary runoff via the disease posterior tibial.      No acute osseous abnormality. XR TIBIA FIBULA RIGHT (2 VIEWS)   Final Result   Pretibial soft tissue swelling, possible hematoma. No acute osseous injury. Discharge diagnoses and hospital course       67-year-old female patient admitted with right leg hematoma after the car door slammed against her right leg. Patient anticoagulated with Eliquis for A. fib. Eliquis discontinued     She developed significant hematoma, hemorrhagic shock requiring evacuation and wound VAC placement. To require 3 units of packed RBCs, IV iron transfusion. Right leg hematoma in the setting of anticoagulation with Eliquis,   status post evacuation by Ortho 1/28/2021, plastic surgery 1/31/2021 with wound VAC placement.   Tentative plan for skin graft 2/17/2021     Circulatory shock, likely hemorrhagic, resolved    Acute blood loss anemia,   received 3 units of packed RBCs 1/28  Received IV iron  Hemoglobin stabilized       Paroxysmal atrial fibrillation  Continue flecainide  Patient to continue off Eliquis until deemed safe per surgery service      Comorbidities  · History of gastric bypass,  Chronic nausea, on Emre MD Genet

## 2021-02-11 NOTE — PROGRESS NOTES
Pt completed functional mobility with RW with CGA, steady with no overt LOB. Pt completed toileting with CGA/min A and sponge bath with CGA. Anticipate pt to require min A for ADLs upon d/c. Pt would benefit from continued thearpy to increase mobility, safety, and independence. Anticipate pt would tolerate high frequency therapy - high motivation to increase independence and return home with son. Prognosis: Good  History: 68 y/o female admit 2021 with R LE Traumatic Hematoma (hit R LE on car door).  s/p Evac of R LE Hematoma. Pt returned to OR 2021 S/P Evac of R LE Hematoma, Excisional Debride (46O93 cm), Applic of Wound Vac. Plastic and Reconstruct Surg follow for Staged Reconstruct with Skin Graft. PMH as noted including Gastric Bypass, Rectal Ca, L TKR, R Femur Fx/ORIF (10/2018). PTA pt lives at home with son and independent with ADLs and functional mobility. OT Education: Transfer Training;OT Role;Plan of Care  REQUIRES OT FOLLOW UP: Yes  Activity Tolerance  Activity Tolerance: Patient Tolerated treatment well  Safety Devices  Safety Devices in place: Yes(RN (Joshua) notified)  Type of devices: Call light within reach; Chair alarm in place; Left in chair;Gait belt;Nurse notified         Patient Diagnosis(es): The primary encounter diagnosis was Traumatic hematoma of right lower leg, initial encounter. Diagnoses of Contusion of leg, right, initial encounter, Anticoagulated, Hypotension due to hypovolemia, and Anemia due to other cause, not classified were also pertinent to this visit. has a past medical history of Acid reflux, Anxiety, Arthritis, Atrial fibrillation (HCC), Closed fracture dislocation of right elbow, Depression, Fibromyalgia, Migraine, Rectal cancer ([facility] [address] and Wears glasses. has a past surgical history that includes  section; Total knee arthroplasty (Left); open tx femoral supracondylar fracture w/o extension (Right, 10/25/2018); Gastric bypass surgery;  Neck surgery; other surgical history; Cholecystectomy; Appendectomy; Colonoscopy; Upper gastrointestinal endoscopy (N/A, 11/26/2019); Colonoscopy (N/A, 11/26/2019); knee surgery (Right); Upper gastrointestinal endoscopy (N/A, 8/18/2020); incision and drainage (Right, 1/28/2021); and Skin graft (Right, 1/31/2021). Restrictions  Restrictions/Precautions  Restrictions/Precautions: Fall Risk, Weight Bearing  Lower Extremity Weight Bearing Restrictions  Right Lower Extremity Weight Bearing: Weight Bearing As Tolerated  Position Activity Restriction  Other position/activity restrictions: Wound Vac R Lat Calf. PT spoke with Surg (LUCAS Eddy NP) : WBAT R LE. Subjective   General  Chart Reviewed: Yes  Patient assessed for rehabilitation services?: Yes  Additional Pertinent Hx: 66 y/o female admit 1/31/2021 with R LE Traumatic Hematoma (hit R LE on car door). 1/28 s/p Evac of R LE Hematoma. Pt returned to OR 1/31/2021 S/P Evac of R LE Hematoma, Excisional Debride (43E15 cm), Applic of Wound Vac. Plastic and Reconstruct Surg follow for Staged Reconstruct with Skin Graft. PMH as noted including Gastric Bypass, Rectal Ca, L TKR, R Femur Fx/ORIF (10/2018). Family / Caregiver Present: No  Referring Practitioner: Anai Cordero DO  Subjective  Subjective: Pt met bedside, agreeable for therapy treatment session. General Comment  Comments: Per RN ok for therapy. Vital Signs  Patient Currently in Pain: (Reporting mild/moderate pain)        Objective    ADL  Grooming: Setup(Pt washed hands/face, oral care, assist to wash hair with shower cap - pt combed.  Completed all while seated.)  UE Bathing: Setup(Pt completed sponge bath while seated at sink with setup)  LE Bathing: (Completed periarea with CGA in standing - declined washing LLE, RLE to remain dry)  UE Dressing: (Assist to change gown)  LE Dressing: (Declined donning depends)  Toileting: (Catheter in place, attempted BM - unsuccessful, provided assist to ensure cleanliess.)  Additional Comments: Completed full sponge bath while seated at sink        Balance  Sitting Balance: Stand by assistance  Standing Balance: Contact guard assistance  Standing Balance  Time: 1-2 minutes x2 trials  Activity: Func mob, transfers, and ADL tasks    Functional Mobility  Functional - Mobility Device: Rolling Walker  Activity: To/from bathroom; Other  Assist Level: Contact guard assistance  Functional Mobility Comments: Pt completed functional mobility to/from bathroom and around room x2 with RW with CGA, assist to manage wound vac. Toilet Transfers  Toilet - Technique: Ambulating(RW)  Equipment Used: Grab bars(Comfort height commode)  Toilet Transfer: Contact guard assistance    Bed mobility  Supine to Sit: Stand by assistance  Sit to Supine: Unable to assess(in recliner at end of session)     Transfers  Sit to stand: Contact guard assistance  Stand to sit: Contact guard assistance  Transfer Comments: CGA for sit <> stand from EOB to RW and sit to recliner chair     Cognition  Overall Cognitive Status: [facility]  Times per week: 3-5  Current Treatment Recommendations: Strengthening, Endurance Training, Balance Training, Gait Training, Functional Mobility Training, Self-Care / ADL    AM-PAC Score    Charmaine Floyd scored a 16/24 on the AM-PAC ADL Inpatient form. Current research shows that an AM-PAC score of 17 or less is typically not associated with a discharge to the patient's home setting. Based on the patient's AM-PAC score and their current ADL deficits, it is recommended that the patient have 5-7 sessions per week of Occupational Therapy at d/c to increase the patient's independence. At this time, this patient demonstrates the endurance, and/or tolerance for 3 hours of therapy each day, with a treatment frequency of 5-7x/wk. Please see assessment section for further patient specific details.     If patient discharges prior to next session this note will serve as a discharge summary. Please see below for the latest assessment towards goals. AM-PAC Inpatient Daily Activity Raw Score: 16 (02/11/21 0939)  AM-PAC Inpatient ADL T-Scale Score : 35.96 (02/11/21 8917)  ADL Inpatient CMS 0-100% Score: 53.32 (02/11/21 0939)  ADL Inpatient CMS G-Code Modifier : CK (02/11/21 0939)    Goals  Short term goals  Time Frame for Short term goals: Prior to DC: Status: goals ongoing as of 2/11/21  Short term goal 1: Pt will complete ADL transfers with SBA  Short term goal 2: Pt will complete functional mobility with SBA  Short term goal 3: Pt will tolerate standing > 5 min for functional task with SBA  Short term goal 4: Pt will complete LB dressing with SBA  Short term goal 5: Pt will complete toileting with SBA  Patient Goals   Patient goals : to return home asap       Therapy Time   Individual Concurrent Group Co-treatment   Time In       0850   Time Out       0935   Minutes       45   Timed Code Treatment Minutes: 39 Minutes     If pt is discharged prior to next OT session, this note will serve as the discharge summary.     Lorin Rahman, [facility]/S#[id number]

## 2021-02-11 NOTE — PLAN OF CARE
Problem: Pain:  Goal: Pain level will decrease  Description: Pain level will decrease  2/11/2021 1055 by Lesly De La Fuente RN  Outcome: Ongoing  Note: Fall risk preventions in place. Bed in lowest position, call light within reach, non-skid socks on when ambulating, bed alarm on, bed wheels locked. Pt. Educated and agreeable on usage of call light before ambulation. Problem: Pain:  Goal: Control of acute pain  Description: Control of acute pain  2/11/2021 1055 by Lesly De La Fuente RN  Outcome: Ongoing     Problem: Pain:  Goal: Control of chronic pain  Description: Control of chronic pain  2/11/2021 1055 by Lesly De La Fuente RN  Outcome: Ongoing     Problem: Falls - Risk of:  Goal: Will remain free from falls  Description: Will remain free from falls  2/11/2021 1055 by Lesly De La Fuente RN  Outcome: Ongoing  Note: Fall risk preventions in place. Bed in lowest position, call light within reach, non-skid socks on when ambulating, bed alarm on, bed wheels locked. Pt. Educated and agreeable on usage of call light before ambulation. Problem: Falls - Risk of:  Goal: Absence of physical injury  Description: Absence of physical injury  2/11/2021 1055 by Lesly De La Fuente RN  Outcome: Ongoing     Problem: Skin Integrity:  Goal: Will show no infection signs and symptoms  Description: Will show no infection signs and symptoms  2/11/2021 1055 by Lesly De La Fuente RN  Outcome: Ongoing  Note:   Pt will continue daily activities as tolerated and alert RN to any pain and skin changes. RN will continue to assess skin condition, note changes, and take preventative measures against skin breakdown such as movement, foam/silicone dressings on bony prominences, and making sure pt has adequate nutrition.           Problem: Skin Integrity:  Goal: Absence of new skin breakdown  Description: Absence of new skin breakdown  2/11/2021 1055 by Lesly De La Fuente RN  Outcome: Ongoing     Problem: Skin Integrity:  Goal: Skin integrity will be maintained  Description: Skin integrity will be maintained  2/11/2021 1055 by Karen Tony RN  Outcome: Ongoing     Problem: Infection - Central Venous Catheter-Associated Bloodstream Infection:  Goal: Will show no infection signs and symptoms  Description: Will show no infection signs and symptoms  2/11/2021 1055 by Karen Tony RN  Outcome: Ongoing  Note:   Pt will continue daily activities as tolerated and alert RN to any pain and skin changes. RN will continue to assess skin condition, note changes, and take preventative measures against skin breakdown such as movement, foam/silicone dressings on bony prominences, and making sure pt has adequate nutrition. Problem: Urinary Elimination:  Goal: Signs and symptoms of infection will decrease  Description: Signs and symptoms of infection will decrease  2/11/2021 1055 by Karen Tony RN  Outcome: Ongoing     Problem: Urinary Elimination:  Goal: Complications related to the disease process, condition or treatment will be avoided or minimized  Description: Complications related to the disease process, condition or treatment will be avoided or minimized  2/11/2021 1055 by Karen Tony RN  Outcome: Ongoing     Problem: Sensory:  Goal: Pain level will decrease  Description: Pain level will decrease  2/11/2021 1055 by Karen Tony RN  Outcome: Ongoing  Note: Fall risk preventions in place. Bed in lowest position, call light within reach, non-skid socks on when ambulating, bed alarm on, bed wheels locked. Pt. Educated and agreeable on usage of call light before ambulation.         Problem: Tissue Perfusion:  Goal: Ability to maintain adequate tissue perfusion will improve  Description: Ability to maintain adequate tissue perfusion will improve  2/11/2021 1055 by Karen Tony RN  Outcome: Ongoing

## 2021-02-11 NOTE — TELEPHONE ENCOUNTER
I faxed the surgery letter to [facility] at [phone]. I will scan the surgery letter and fax success into Epic under the media tab. Does the patient need an H&P or COVID testing? I will leave this phone note open.

## 2021-02-11 NOTE — H&P
Department of Physical Medicine & Rehabilitation  History & Physical      Patient Identification:  Sabina Bains  : 1945  Admit date: 2021   Attending provider: Bishop Bey MD        Primary care provider: MUSA Moeller CNP     Chief Complaint: RLE pain     History of Present Illness/Hospital Course:  Patient is a 66 yo F with pmh Afib, Anxiety, Depression, Fibromyalgia, Migraines who initially presented 2021 with right leg pain and swelling after car door was slammed against it. Found to have hematoma and hemorrhagic shock. Home Eliquis held, required multiple transfusions and pressors. Underwent hematoma evacuation ( with Dr. Serge Cerrato). Returned to OR for repeat evacuation, debridement and wound vac placement ( with Dr. Julia Oneil). Plan for STSG next week. Now presents to ARU with impaired mobility and self-care below her baseline. Currently, patient reports mild to moderate right lateral lower leg pain. Worse with movement. Improves with rest and medication. She has chronic tingling/numbness in her feet but no new numbness. She feels generally weak and fatigued.  She is motivated to start inpatient rehab program.      Prior Level of Function:  Independent for mobility, ADLs     Current Level of Function:  Min-total assist for mobility and ADLs     Pertinent Social History:  Support: lives with son  Home set-up: home with [facility], ramped entry    Past Medical History:   Diagnosis Date    Acid reflux     Anxiety     Arthritis     [facility]rial fibrillation [facility]     Closed fracture dislocation of right elbow     Depression     Fibromyalgia     Migraine     Rectal cancer ([facility] [address] [facility]     Wears glasses     DRIVING       Past Surgical History:   Procedure Laterality Date    APPENDECTOMY       SECTION      x4    CHOLECYSTECTOMY      COLONOSCOPY      SEVERAL    COLONOSCOPY N/A 2019    COLONOSCOPY POLYPECTOMY SNARE/COLD BIOPSY performed by Prashanth Ackerman [facility] at [facility] ENDOSCOPY    GASTRIC BYPASS SURGERY      INCISION AND DRAINAGE Right 1/28/2021    [facility]G [facility]     NECK SURGERY      OPEN [facility] W/O EXTENSION Right 10/25/2018    OPEN REDUCTION INTERNAL FIXATION RIGHT FEMUR SUPRACONDYLAR FEMORAL FRACTURE WITH C-ARM performed by Diamante Mejia MD at [address] [place] [address] TO REMOVE RECTAL CANCER    SKIN GRAFT Right 1/31/2021    RIGHT LEG HEMATOMA EVAKUATION, DEBRIDEMENT, AND WOUND VAC PLACEMENT performed by Judene Hodgkins, MD at [address] [place] [address]     UPPER GASTROINTESTINAL ENDOSCOPY N/A 11/26/2019    ESOPHAGOGASTRODUODENOSCOPY performed by Baldemar Reyes MD at [facility] N/A 8/18/2020    EGD BIOPSY performed by Baldemar Reyes MD at [address] [place] [address] History   Problem Relation Age of Onset    COPD Mother        Social History     Socioeconomic History    Marital status:       Spouse name: Not on file    Number of children: 3    Years of education: Not on file    Highest education level: High school graduate   Occupational History    Not on file   Social Needs    Financial resource strain: Very hard    Food insecurity     Worry: Often true     Inability: Often true    Transportation needs     Medical: No     Non-medical: No   Tobacco Use    Smoking status: Never Smoker    Smokeless tobacco: Never Used   Substance and Sexual Activity    Alcohol use: No    Drug use: Never    Sexual activity: Never   Lifestyle    Physical activity     Days per week: Not on file     Minutes per session: Not on file    Stress: Not on file   Relationships    Social connections     Talks on phone: Not on file     Gets together: Not on file     Attends [employer] service: Not on file     Active member of club or organization: Not on file     Attends meetings of clubs or organizations: Not on file     Relationship status: Not on file    Intimate partner violence     Fear of current or ex partner: No     Emotionally abused: No     Physically abused: No     Forced sexual activity: No   Other Topics Concern    Not on file   Social History Narrative    Lives with son       Allergies   Allergen Reactions    Demerol Hcl [Meperidine] Other (See Comments)     PATIENT STATES SHE GOT VERY ANXIOUS-LIKE [address] [Penicillins]      Patient reports she has not had since she was a child, thinks reaction was hives.      Adhesive Tape Rash         Current Facility-Administered Medications   Medication Dose Route Frequency Provider Last Rate Last Admin    acetaminophen (TYLENOL) tablet 650 mg  650 mg Oral Q6H PRN Avtar Driscoll MD        Or    acetaminophen (TYLENOL) suppository 650 mg  650 mg Rectal Q6H PRN Avtar Driscoll MD        magnesium hydroxide (MILK OF MAGNESIA) 400 MG/5ML suspension 30 mL  30 mL Oral Daily PRN Avtra Driscoll MD        polyethylene glycol [facility]) packet 17 g  17 g Oral Daily PRN Avtar Driscoll MD        ALPRAZolam [facility] [place]) tablet 0.5 mg  0.5 mg Oral Nightly PRN Avtar Driscoll MD        bisacodyl (DULCOLAX) suppository 10 mg  10 mg Rectal Daily PRN Avtar Driscoll MD        [START ON 2/12/2021] calcium elemental (OSCAL) tablet 500 mg  500 mg Oral Daily Avtar Driscoll MD        flecainide [facility]) tablet 100 mg  100 mg Oral 2 times per day Avtar Driscoll MD   100 mg at 02/11/21 2157    [START ON 2/12/2021] furosemide (LASIX) tablet 20 mg  20 mg Oral Daily Avtar Driscoll MD        HYDROcodone-acetaminophen [facility]) 5-325 MG per tablet 2 tablet  2 tablet Oral Q6H PRN Avtar Driscoll MD        [START ON 2/12/2021] magnesium oxide (MAG-OX) tablet 400 mg  400 mg Oral Daily Avtar Drisocll MD        melatonin tablet 3 mg  3 mg Oral Nightly PRN Avtar Driscoll MD   3 mg at 02/11/21 2157  memantine (NAMENDA) tablet 5 mg  5 mg Oral BID Nelsy Mckeon MD   5 mg at 02/11/21 2157    [START ON 2/12/2021] metoprolol succinate (TOPROL XL) extended release tablet 50 mg  50 mg Oral Daily Nelsy Mckeon MD        sertraline (ZOLOFT) tablet 100 mg  100 mg Oral Nightly Nelsy Mckeon MD   100 mg at 02/11/21 2157    simethicone (MYLICON) chewable tablet 80 mg  80 mg Oral Q6H PRN Nelsy Mckeon MD        sodium chloride 0.9 % irrigation 1,000 mL  1,000 mL Irrigation Continuous PRN Nelsy Mckeon MD        sodium chloride flush 0.9 % injection 10 mL  10 mL Intravenous PRN Nelsy Mckeon MD        sodium chloride flush 0.9 % injection 10 mL  10 mL Intravenous 2 times per day Nelsy Mckeon MD   10 mL at 02/11/21 2208    tiZANidine (ZANAFLEX) tablet 2 mg  2 mg Oral Nightly PRN Nelsy Mckeon MD        [facility]) tablet 12.5 mg  12.5 mg Oral Q6H PRN Nelsy Mckeon MD        Or    ondansetron [facility]) injection 4 mg  4 mg Intravenous Q6H PRN Nelsy Mckeon MD   4 mg at 02/11/21 2216    metoclopramide (REGLAN) tablet 5 mg  5 mg Oral TID [facility] Nelsy Mckeon MD   5 mg at 02/11/21 1808    pantoprazole (PROTONIX) tablet 40 mg  40 mg Oral BID  Nelsy Mckeon MD   40 mg at 02/11/21 1755         REVIEW OF SYSTEMS:   CONSTITUTIONAL: negative for fevers, chills, diaphoresis, appetite change, night sweats, unexpected weight change, +fatigue  EYES: negative for blurred vision, eye discharge, visual disturbance and icterus  HEENT: negative for hearing loss, tinnitus, ear drainage, sinus pressure, nasal congestion, epistaxis and snoring  RESPIRATORY: Negative for hemoptysis, cough, sputum production  CARDIOVASCULAR: negative for chest pain, palpitations, exertional chest pressure/discomfort, syncope, edema  GASTROINTESTINAL: negative for nausea, vomiting, diarrhea, blood in stool, abdominal pain, constipation  GENITOURINARY: negative for frequency, dysuria, urinary incontinence, decreased urine volume, and hematuria  HEMATOLOGIC/LYMPHATIC: negative for easy bruising, bleeding and lymphadenopathy  ALLERGIC/IMMUNOLOGIC: negative for recurrent infections, angioedema, anaphylaxis and drug reactions  ENDOCRINE: negative for weight changes and diabetic symptoms including polyuria, polydipsia and polyphagia  MUSCULOSKELETAL: refer to HPI  NEUROLOGICAL: negative for headaches, slurred speech, unilateral weakness  PSYCHIATRIC/BEHAVIORAL: negative for hallucinations, behavioral problems, confusion and agitation. All pertinent positives are noted in the HPI. Physical Examination:  Vitals:   Patient Vitals for the past 24 hrs:   BP Temp Temp src Pulse Resp Height   02/11/21 1800 120/64 97.7 °F (36.5 °C) Oral 75 18 5' 1\" (1.549 m)       Const: Alert. WDWN. No distress  Eyes: Conjunctiva noninjected, no icterus noted; pupils equal, round, and reactive to light. HENT: Atraumatic, normocephalic; Oral mucosa moist  Neck: Trachea midline, neck supple. No thyromegaly noted. CV: Regular rate and rhythm, no murmur rub or gallop noted  Resp: Lungs clear to auscultation bilaterally, no rales wheezes or rhonchi, no retractions. Respirations unlabored. GI: Soft, nontender, nondistended. Normal bowel sounds. No palpable masses. Skin: Normal temperature and turgor. No rashes or breakdown noted. Ext: RLE with edema, lateral wound with vac dressing in place, draining serosanguinous fluid. No varicosities. MSK: RLe wound as above, resulted decreased right ankle ROM (due to pain). No joint tenderness, erythema, warmth noted. PROM intact. Neuro: Alert, oriented, appropriate. No cranial nerve deficits appreciated. Sensation intact to light touch. Motor examination reveals strength 5-/5 in BUE and BLE except 4/5 left hip flexion and 3/5 right hip flexion. No abnormalities with finger/nose noted. Reflexes diminished, symmetric.   Psych: Stable mood, normal judgement, normal affect Lab Results   Component Value Date    WBC 4.7 02/11/2021    HGB 8.3 (L) 02/11/2021    HCT 26.2 (L) 02/11/2021    MCV 90.9 02/11/2021     02/11/2021     Lab Results   Component Value Date    INR 1.08 02/04/2021    INR 1.37 (H) 01/28/2021    INR 1.39 (H) 11/02/2018    PROTIME 12.5 02/04/2021    PROTIME 16.0 (H) 01/28/2021    PROTIME 15.8 (H) 11/02/2018     Lab Results   Component Value Date    CREATININE <0.5 (L) 02/11/2021    BUN 13 02/11/2021     02/11/2021    K 3.6 02/11/2021     02/11/2021    CO2 27 02/11/2021     Lab Results   Component Value Date    ALT 11 01/28/2021    AST 20 01/28/2021    ALKPHOS 68 01/28/2021    BILITOT <0.2 01/28/2021       Most recent echocardiogram revealed:   Normal left ventricle size, wall thickness, and systolic function with an   estimated ejection fraction of 65-70%. No regional wall motion abnormalities   are seen.   The right ventricle is normal in size and function.   The left atrium is severely dilated.   Mild mitral regurgitation.   The aortic valve leaflets are not well visualized. Aortic sclerosis versus   mild aortic stenosis (peak velocity of 2.67m/s and a mean pressure gradient   of 16mmHg). Mild aortic regurgitation.   Mild tricuspid regurgitation.     Most recent imaging studies revealed:     CTA RLE  Large hematoma of the anterior calf.  No active extravasation or etiology of   hemorrhage is identified.       Popliteal artery is patent.  There is significant trifurcation level disease   with primary runoff via the disease posterior tibial.       No acute osseous abnormality.         Xray tib/fib  Pretibial soft tissue swelling, possible hematoma.       No acute osseous injury.               The above laboratory data have been reviewed. The above imaging data have been reviewed. The above medical testing have been reviewed. Body mass index is 39.74 kg/m².     POST ADMISSION PHYSICIAN EVALUATION  The patient has agreed to being admitted to our comprehensive inpatient rehabilitation facility and can tolerate the intensity of service consisting of at least:  --180 minutes of therapy a day, 5 out of 7 days a week. OR  --15 hours of intensive therapy within a 7 consecutive day period. The patient/family has a good understanding of our discharge process and will benefit from an interdisciplinary inpatient rehabilitation program. The patient has potential to make improvement and is in need of at least two of the following multidisciplinary therapies including but not limited to physical, occupational, respiratory, and speech, nutritional services, wound care, and prosthetics and orthotics. Given the patients complex condition and risk of further medical complications, rehabilitation services cannot be safely provided at a lower level of care such as a skilled nursing facility. All of the goals listed below were reviewed with the patient and he/she is in agreement. I have compared the patients medical and functional status at the time of the preadmission screening and the same on this date, and there are no significant changes. By signing this document, I acknowledge that I have personally performed a full physical examination on this patient within 24 hours of admission to this inpatient rehabilitation facility and have determined the patient to be able to tolerate the above course of treatment at an intensive level for a reasonable period of time. I will be completing a detailed individualized  Plan of Care for this patient by day four of the patients stay based upon the Preadmission Screen, this Post-Admission Evaluation, and the therapy evaluations.      Barriers: generalized weakness, decreased ROM distal RLE, decreased balance, endurance, medical comorbidities  Services Required: PT, OT  Goals: Frida for mobility and ADLs  Prognosis: Good  Anticipated Dispo: home with son  ELOS: 7-10 days    Rehabilitation Diagnosis:   16.0, Debility (non-cardiac, non-pulmonary      Assessment and Plan:    Impairments: generalized weakness, decreased ROM distal RLE, decreased balance, endurance    Debility  -PT/OT    Right leg hematoma   -s/p evacuation (1/28 with Dr. Bebeto Araiza)  -s/p evacuation, debridement and wound vac placement (1/31 with Dr. Martin Cranker)  -Plan for STSG on 2/17     Acute blood loss anemia with hemorrhagic shock  -Due to traumatic hematoma   -Monitor Hgb, transfuse prn <7. Paroxysmal Afib  -Eliquis on hold due to above until cleared by Surgery  -flecainide, metoporlol    H/o gastric bypass (Shanice-en-Y)  -Vitamin supplements  -Dietitian consult    Hypomagnesemia  -supplement    Anxiety/depression  -sertraline, prn alprazolam    Acute on chronic pain control  -H/o fibromyalgia  -prn Norco, tizanidine    Bladder   -High risk retention   -Monitor PVRs, SC prn >300cc    Bowel   -High risk constipation   -senna+colace BID, PRN miralax, MoM, and bisacodyl supp. Safety   -fall precautions    PPx  -DVT: on hold due to hematoma  -GI: pantoprazole    FULL CODE    Darinel Boyer MD 2/11/2021, 11:03 PM

## 2021-02-11 NOTE — PROGRESS NOTES
[facility] Surgery [phone]                                     Daily Progress Note                                                         Pt Name: Rafaela Velazquez  Medical Record Number: [id number]  Date of Birth 1945   Today's Date: 2/11/2021  CC: right lower extremity hematoma    ASSESSMENT/PLAN  Right lower extremity hematoma  -1/31 evacuation of hematoma, excisional debridement  -Wound Vac changed 2/9.   -Acute rehab denied by insurance. Peer to peer being done. E rx for wound vac completed so it's ready in case insurance denies rehab again.   -Dr. Milton Bunch on possible debridement, [facility] Wound vac malfunction overnight, tubing clogged. Trac pad changed per nursing staff. Vac functioning well currently. OBJECTIVE  VITALS:  height is 5' 1\" (1.549 m) and weight is 210 lb 5.1 oz (95.4 kg). Her oral temperature is 98.4 °F (36.9 °C). Her blood pressure is 134/81 and her pulse is 76. Her respiration is 18 and oxygen saturation is 94%. INTAKE/OUTPUT:      Intake/Output Summary (Last 24 hours) at 2/11/2021 1100  Last data filed at 2/11/2021 6273  Gross per 24 hour   Intake 240 ml   Output 1150 ml   Net -910 ml     GENERAL: alert, no distress  EXTREMITY: Vac in place    I/O last 3 completed shifts: In: 480 [P.O.:480]  Out: 1950 [Urine:550; Drains:800;  Other:600]      LABS  Recent Labs     02/11/21  0736   WBC 4.7   HGB 8.3*   HCT 26.2*         K 3.6      CO2 27   BUN 13   CREATININE <0.5*   MG 2.00   CALCIUM 8.5     Electronically signed by MUSA Mcgill CNP on 2/11/2021 at 11:00 AM

## 2021-02-12 LAB
ANION GAP SERPL CALCULATED.3IONS-SCNC: 8 MMOL/L (ref 3–16)
BASOPHILS ABSOLUTE: 0 K/UL (ref 0–0.2)
BASOPHILS RELATIVE PERCENT: 0.4 %
BUN BLDV-MCNC: 15 MG/DL (ref 7–20)
CALCIUM SERPL-MCNC: 8.5 MG/DL (ref 8.3–10.6)
CHLORIDE BLD-SCNC: 103 MMOL/L (ref 99–110)
CO2: 28 MMOL/L (ref 21–32)
CREAT SERPL-MCNC: <0.5 MG/DL (ref 0.6–1.2)
EOSINOPHILS ABSOLUTE: 0.1 K/UL (ref 0–0.6)
EOSINOPHILS RELATIVE PERCENT: 1.3 %
GFR AFRICAN AMERICAN: >60
GFR NON-AFRICAN AMERICAN: >60
GLUCOSE BLD-MCNC: 91 MG/DL (ref 70–99)
HCT VFR BLD CALC: 24.9 % (ref 36–48)
HEMOGLOBIN: 7.8 G/DL (ref 12–16)
LYMPHOCYTES ABSOLUTE: 0.9 K/UL (ref 1–5.1)
LYMPHOCYTES RELATIVE PERCENT: 18.3 %
MAGNESIUM: 1.9 MG/DL (ref 1.8–2.4)
MCH RBC QN AUTO: 28.4 PG (ref 26–34)
MCHC RBC AUTO-ENTMCNC: 31.3 G/DL (ref 31–36)
MCV RBC AUTO: 91 FL (ref 80–100)
MONOCYTES ABSOLUTE: 0.6 K/UL (ref 0–1.3)
MONOCYTES RELATIVE PERCENT: 12.1 %
NEUTROPHILS ABSOLUTE: 3.3 K/UL (ref 1.7–7.7)
NEUTROPHILS RELATIVE PERCENT: 67.9 %
PDW BLD-RTO: 17.7 % (ref 12.4–15.4)
PLATELET # BLD: 254 K/UL (ref 135–450)
PMV BLD AUTO: 7.5 FL (ref 5–10.5)
POTASSIUM REFLEX MAGNESIUM: 3.5 MMOL/L (ref 3.5–5.1)
PREALBUMIN: 12.6 MG/DL (ref 20–40)
RBC # BLD: 2.74 M/UL (ref 4–5.2)
SODIUM BLD-SCNC: 139 MMOL/L (ref 136–145)
WBC # BLD: 4.9 K/UL (ref 4–11)

## 2021-02-12 PROCEDURE — 6370000000 HC RX 637 (ALT 250 FOR IP): Performed by: PHYSICAL MEDICINE & REHABILITATION

## 2021-02-12 PROCEDURE — 80048 BASIC METABOLIC PNL TOTAL CA: CPT

## 2021-02-12 PROCEDURE — APPNB15 APP NON BILLABLE TIME 0-15 MINS: Performed by: NURSE PRACTITIONER

## 2021-02-12 PROCEDURE — APPSS15 APP SPLIT SHARED TIME 0-15 MINUTES: Performed by: NURSE PRACTITIONER

## 2021-02-12 PROCEDURE — 97535 SELF CARE MNGMENT TRAINING: CPT

## 2021-02-12 PROCEDURE — 84134 ASSAY OF PREALBUMIN: CPT

## 2021-02-12 PROCEDURE — 97166 OT EVAL MOD COMPLEX 45 MIN: CPT

## 2021-02-12 PROCEDURE — 97530 THERAPEUTIC ACTIVITIES: CPT

## 2021-02-12 PROCEDURE — 97110 THERAPEUTIC EXERCISES: CPT

## 2021-02-12 PROCEDURE — 2580000003 HC RX 258: Performed by: PHYSICAL MEDICINE & REHABILITATION

## 2021-02-12 PROCEDURE — 97162 PT EVAL MOD COMPLEX 30 MIN: CPT

## 2021-02-12 PROCEDURE — 85025 COMPLETE CBC W/AUTO DIFF WBC: CPT

## 2021-02-12 PROCEDURE — 1280000000 HC REHAB R&B

## 2021-02-12 PROCEDURE — 97116 GAIT TRAINING THERAPY: CPT

## 2021-02-12 PROCEDURE — 83735 ASSAY OF MAGNESIUM: CPT

## 2021-02-12 PROCEDURE — 6360000002 HC RX W HCPCS: Performed by: PHYSICAL MEDICINE & REHABILITATION

## 2021-02-12 PROCEDURE — 36592 COLLECT BLOOD FROM PICC: CPT

## 2021-02-12 RX ADMIN — ALPRAZOLAM 0.5 MG: 0.5 TABLET ORAL at 22:17

## 2021-02-12 RX ADMIN — METOCLOPRAMIDE 5 MG: 10 TABLET ORAL at 16:10

## 2021-02-12 RX ADMIN — HYDROCODONE BITARTRATE AND ACETAMINOPHEN 2 TABLET: 5; 325 TABLET ORAL at 14:06

## 2021-02-12 RX ADMIN — FLECAINIDE ACETATE 100 MG: 100 TABLET ORAL at 20:56

## 2021-02-12 RX ADMIN — PANTOPRAZOLE SODIUM 40 MG: 40 TABLET, DELAYED RELEASE ORAL at 05:39

## 2021-02-12 RX ADMIN — METOCLOPRAMIDE 5 MG: 10 TABLET ORAL at 08:35

## 2021-02-12 RX ADMIN — Medication 10 ML: at 20:59

## 2021-02-12 RX ADMIN — SERTRALINE 100 MG: 100 TABLET, FILM COATED ORAL at 20:56

## 2021-02-12 RX ADMIN — SODIUM CHLORIDE, PRESERVATIVE FREE 10 ML: 5 INJECTION INTRAVENOUS at 08:36

## 2021-02-12 RX ADMIN — PANTOPRAZOLE SODIUM 40 MG: 40 TABLET, DELAYED RELEASE ORAL at 16:10

## 2021-02-12 RX ADMIN — HYDROCODONE BITARTRATE AND ACETAMINOPHEN 2 TABLET: 5; 325 TABLET ORAL at 20:56

## 2021-02-12 RX ADMIN — MEMANTINE 5 MG: 5 TABLET ORAL at 08:35

## 2021-02-12 RX ADMIN — FLECAINIDE ACETATE 100 MG: 100 TABLET ORAL at 08:35

## 2021-02-12 RX ADMIN — ONDANSETRON 4 MG: 2 INJECTION INTRAMUSCULAR; INTRAVENOUS at 08:35

## 2021-02-12 RX ADMIN — SODIUM CHLORIDE, PRESERVATIVE FREE 10 ML: 5 INJECTION INTRAVENOUS at 20:59

## 2021-02-12 RX ADMIN — PROMETHAZINE HYDROCHLORIDE 12.5 MG: 25 TABLET ORAL at 22:17

## 2021-02-12 RX ADMIN — HYDROCODONE BITARTRATE AND ACETAMINOPHEN 2 TABLET: 5; 325 TABLET ORAL at 08:35

## 2021-02-12 RX ADMIN — METOPROLOL SUCCINATE 50 MG: 50 TABLET, EXTENDED RELEASE ORAL at 08:35

## 2021-02-12 RX ADMIN — FUROSEMIDE 20 MG: 20 TABLET ORAL at 08:35

## 2021-02-12 RX ADMIN — MEMANTINE 5 MG: 5 TABLET ORAL at 20:56

## 2021-02-12 RX ADMIN — CALCIUM 500 MG: 500 TABLET ORAL at 08:35

## 2021-02-12 RX ADMIN — Medication 400 MG: at 08:35

## 2021-02-12 RX ADMIN — Medication 3 MG: at 20:56

## 2021-02-12 RX ADMIN — METOCLOPRAMIDE 5 MG: 10 TABLET ORAL at 11:54

## 2021-02-12 RX ADMIN — TIZANIDINE 2 MG: 4 TABLET ORAL at 22:17

## 2021-02-12 ASSESSMENT — PAIN SCALES - GENERAL
PAINLEVEL_OUTOF10: 5
PAINLEVEL_OUTOF10: 2

## 2021-02-12 ASSESSMENT — PAIN DESCRIPTION - LOCATION
LOCATION: LEG
LOCATION: LEG

## 2021-02-12 ASSESSMENT — PAIN DESCRIPTION - ONSET
ONSET: ON-GOING
ONSET: ON-GOING

## 2021-02-12 ASSESSMENT — PAIN - FUNCTIONAL ASSESSMENT: PAIN_FUNCTIONAL_ASSESSMENT: ACTIVITIES ARE NOT PREVENTED

## 2021-02-12 ASSESSMENT — PAIN SCALES - WONG BAKER
WONGBAKER_NUMERICALRESPONSE: 0
WONGBAKER_NUMERICALRESPONSE: 0
WONGBAKER_NUMERICALRESPONSE: 2
WONGBAKER_NUMERICALRESPONSE: 0

## 2021-02-12 ASSESSMENT — PAIN DESCRIPTION - DESCRIPTORS
DESCRIPTORS: ACHING
DESCRIPTORS: ACHING

## 2021-02-12 ASSESSMENT — PAIN DESCRIPTION - PAIN TYPE
TYPE: ACUTE PAIN
TYPE: ACUTE PAIN

## 2021-02-12 ASSESSMENT — PAIN DESCRIPTION - ORIENTATION: ORIENTATION: RIGHT

## 2021-02-12 ASSESSMENT — PAIN DESCRIPTION - PROGRESSION: CLINICAL_PROGRESSION: NOT CHANGED

## 2021-02-12 ASSESSMENT — PAIN DESCRIPTION - FREQUENCY: FREQUENCY: CONTINUOUS

## 2021-02-12 NOTE — PLAN OF CARE
ARU PATIENT TREATMENT PLAN  [facility]   [address]  [phone]    Herb Baer    : 1945  [facility] [[facility]  MRN: [id number]   PHYSICIAN:  Manju Du MD    Rehabilitation Diagnosis:    Debility  -PT/OT     Right leg hematoma   -s/p evacuation ( with Dr. Danni Grider)  -s/p evacuation, debridement and wound vac placement ( with Dr. Geoff Dee)  -Plan for STSG on       Acute blood loss anemia with hemorrhagic shock  -Due to traumatic hematoma   -Monitor Hgb, transfuse prn <7.      Paroxysmal Afib  -Eliquis on hold due to above until cleared by Surgery  -flecainide, metoporlol     H/o gastric bypass (Shanice-en-Y)  -Vitamin supplements  -Dietitian consult     Hypomagnesemia  -supplement     Anxiety/depression  -sertraline, prn alprazolam     Acute on chronic pain control  -H/o fibromyalgia  -prn Norco, tizanidine     Bladder   -High risk retention   -Monitor PVRs, SC prn >300cc     Bowel   -High risk constipation   -senna+colace BID, PRN miralax, MoM, and bisacodyl supp.     Safety   -fall precautions     PPx  -DVT: on hold due to hematoma  -GI: pantoprazole       ADMIT DATE:2021    Patient Goals: \"I want to get my legs stronger\" and be able to return home with son     Admitting Impairments:  generalized weakness, decreased ROM distal RLE, decreased balance, endurance    Barriers: generalized weakness, decreased ROM distal RLE, decreased balance, endurance, medical comorbidities    Participation: patient lives with son, is independent with RW     CARE PLAN     NURSING:  Herb Baer while on this unit will:     [x] Be continent of bowel and bladder     [x] Have an adequate number of bowel movements  [x] Urinate with no urinary retention >300ml in bladder  [x] Complete bladder protocol with tello removal  [x] Maintain O2 SATs at 92%  [x] Have pain managed while on ARU       [] Be pain free by discharge   [] Have no skin breakdown while on ARU  [x] Have improved skin integrity via wound measurements  [x] Have no new signs/symptoms of infection at the wound site  [x] Be free from injury during hospitalization   [x] Complete education with patient/family with understanding demonstrated for:  [x] Adjustment   [x] Other: wound vac   Nursing interventions may include bowel/bladder training, education for medical assistive devices, medication education, O2 saturation management, energy conservation, stress management techniques, fall prevention, alarms protocol, seating and positioning, skin/wound care, pressure relief instruction,dressing changes,  infection protection, DVT prophylaxis, and/or assistance with in room safety with transfers to bed, toilet, wheelchair, shower as well as bathroom activities and hygiene. Patient/caregiver education for:   [x] Disease/sustained injury/management      [x] Medication Use   [x] Surgical intervention   [x] Safety   [x] Body mechanics and or joint protection   [x] Health maintenance         PHYSICAL THERAPY:  Goals:                  Short term goals  Time Frame for Short term goals: 1 week  Short term goal 1: Bed mobility modif I  Short term goal 2: transfers from all surfaces including car modif I  Short term goal 3: Amb with assist device, WBAT R LE, 150' modif I  Short term goal 4: curb step with wh walker SBA            Long term goals  Time Frame for Long term goals : set after skin grafting if needed  These goals were reviewed with this patient at the time of assessment and Rafaela Velazquez is in agreement. Plan of Care: Pt to be seen 90   mins per day for 5-6 day/week 7 to 10 days.                    Current Treatment Recommendations: Strengthening, Functional Mobility Training, Transfer Training, Gait Training, Safety Education & Training, Patient/Caregiver Education & Training, Endurance Training      OCCUPATIONAL THERAPY:  Goals:             Short term goals  Time Frame for Short term goals: 1 week pt dom Dang term goal 1: bathe indep with AD as needed  Short term goal 2: dress indep with AD as needed  Short term goal 3: toilet indep with AD  Short term goal 4: transfers indep with AD  Short term goal 5: functional mobility indep  Short term goal 6: increase activity tolerance to stand 3 min for ADL/mobility :  Long term goals  Time Frame for Long term goals : same as stg : These goals were reviewed with this patient at the time of assessment and Cesar Osorio is in agreement    Plan of Care:  Pt to be seen 90   mins per day for 5-6 day/week 1 week. SPEECH THERAPY: Goals will be left blank if speech is not following this patient. Goals:                                                             CASE MANAGEMENT:  Goals:   Assist patient/family with discharge planning, patient/family counseling,   and coordination with insurance during ARU stay. Admission Period/Goal QIM CODES   QIM  Admit/Goal Score    Eating  6/6   Oral Hygiene  5/6   [address]  4/6   Shower/Bathe Self  4/6   Upper Body Dressing  3/6   Lower Body Dressing  3/6   Putting on/Taking off Footwear  3/6   Roll Left and Right  4/6   Sit to Lying  4/6   Lying to Sitting on Side of Bed  3/6   Sit to Stand  4/6   Chair/Bed to Chair Transfer  4/6   Toilet Transfer  4/6   Car Transfer  4/6   Walk 10 feet  4/6   Walk 50 feet with 2 Turns  4/6   Walk 150 feet with 2 turns  88/6   Walk 10 feet on Uneven Surfaces  4/6   1 Step  88/4   4 Steps  88   12 Steps  88   Picking up Object  88/4   Wheel 50 feet with 2 Turns   Type?  na   Wheel 150 feet with 2 Turns   Type?  na          Cesar Osorio will be seen a minimum of 3 hours of therapy per day, a minimum of 5 out of 7 days per week. [] In this rare instance due to the nature of this patient's medical involvement, this patient will be seen 15 hours per week (900 minutes within a 7 day period).     Treatments may include therapeutic exercises, gait training, neuromuscular re-ed, transfer training, community reintegration, bed mobility, w/c mobility and training, self care, home mgmt, cognitive training, energy conservation,dysphagia tx, speech/language/communication therapy, group therapy, and patient/family education. In addition, dietician/nutritionist may monitor calorie count as well as intake and collaboratively work with SLP on dietary upgrades. Neuropsychology/Psychology may evaluate and provide necessary support. Medical issues being managed closely and that require 24 hour availability of a physician:   [] Swallowing Precautions  [x] Bowel/Bladder Fx  [] Weight bearing precautions   [x] Wound Care    [x] Pain Mgmt   [x] Infection Protection   [x] DVT Prophylaxis   [x] Fall Precautions  [x] Fluid/Electrolyte/Nutrition Balance   [] Voice Protection   [] Respiratory  [] Other:    Medical Prognosis: [x] Good  [] Fair    [] Guarded   Total expected IRF days 7  Anticipated discharge destination:    [x] Home Independently    [] Home with supervision    []SNF     [] Other                                           Physician anticipated functional outcomes:  Improve gait, transfers, self care to independent with DME as needed to allow safe return home with son   IPOC brief synthesis: Patient is a 68 yo F with pmh Afib, Anxiety, Depression, Fibromyalgia, Migraines who initially presented 1/28/2021 with right leg pain and swelling after car door was slammed against it. Found to have hematoma and hemorrhagic shock. Home Eliquis held, required multiple transfusions and pressors. Underwent hematoma evacuation (1/28 with Dr. Dee Portillo). Returned to OR for repeat evacuation, debridement and wound vac placement (1/31 with Dr. Yuly Devries). Plan for STSG next week. Now presents to ARU with impaired mobility and self-care below her baseline. She requires comprehensive inpatient rehab program in order to return to community setting.        I have reviewed this initial plan of care and agree with its contents:    Title   Name    Date    Time    Physician: Pratibha Acosta.  Cornelio Jack MD 2/12/2021, 5:35 PM    Case Mgmt:   [facility]     Case Management   [phone]    2/12/2021  4:22 PM      OT: Daniel Mayer OTR/L  #[id number] 2/12/21 1:03 PM      PT:Electronically signed by Bhanu Botello, PT on 2/12/21 at 10:37 AM EST    RN: Inna Hicks RN, CRRN 02/12/2021 4:16 pm    ST:    :  Dewey Campos, JODIE  2/14/2021  12:25    Other:

## 2021-02-12 NOTE — PROGRESS NOTES
Occupational Therapy   Occupational Therapy Initial Assessment  Date: 2021   Patient Name: Jimmy Cisneros  MRN: [id number]     : 1945    Date of Service: 2021    Discharge Recommendations:  Home with Home health OT, Continue to assess pending progress, Patient would benefit from continued therapy after discharge, Home with assist PRN  OT Equipment Recommendations  Other: TBD    Assessment   Performance deficits / Impairments: Decreased functional mobility ; Decreased endurance;Decreased strength;Decreased ADL status; Decreased safe awareness;Decreased high-level IADLs;Decreased balance  Assessment: Pt is a 66 yo female admitted with tramautic hematoma and infection to left leg. Has wound vac and surgery planned for  for skin graft. Pt lives with son, but was functioning independently PTA. She is currently functioning below her baseline in above areas, requires CGA for bathing, min assist UB dressing,mod assist LB dressing, CGA transfers and functional mobility using rolling walker - has surgery planned for  right LE. Anticipate pt will require 1 week inpt rehab with skilled OT services to maximize independence for safe return home with son  Treatment Diagnosis: decreased ADL, balance, activity tolerance,  Prognosis: Good  Decision Making: Medium Complexity  History: 66 y/o female admit 2021 with R LE Traumatic Hematoma (hit R LE on car door).  s/p Evac of R LE Hematoma. Pt returned to OR 2021 S/P Evac of R LE Hematoma, Excisional Debride (66P19 cm), Applic of Wound Vac. Plastic and Reconstruct Surg follow for Staged Reconstruct with Skin Graft. PMH as noted including Gastric Bypass, Rectal Ca, L TKR, R Femur Fx/ORIF (10/2018).   Exam: bathing, dressing, cognition, UE, activity tolernace, grooming, transfsers, mobiltiy  Assistance / Modification: CGA mobility with rolling walker, CGA bathing, min UB dressing, Mod LB dressing  OT Education: Transfer Training;OT Role;Plan of Care;ADL Adaptive Strategies  REQUIRES OT FOLLOW UP: Yes  Activity Tolerance  Activity Tolerance: Patient Tolerated treatment well  Safety Devices  Safety Devices in place: Yes  Type of devices: Call light within reach; Chair alarm in place; Left in chair;Gait belt;Nurse notified           Patient Diagnosis(es):traumatic hematoma   has a past medical history of Acid reflux, Anxiety, Arthritis, Atrial fibrillation (HCC), Closed fracture dislocation of right elbow, Depression, Fibromyalgia, Migraine, Rectal cancer ([address] and Wears glasses. has a past surgical history that includes  section; Total knee arthroplasty (Left); open tx femoral supracondylar fracture w/o extension (Right, 10/25/2018); Gastric bypass surgery; Neck surgery; other surgical history; Cholecystectomy; Appendectomy; Colonoscopy; Upper gastrointestinal endoscopy (N/A, 2019); Colonoscopy (N/A, 2019); knee surgery (Right); Upper gastrointestinal endoscopy (N/A, 2020); incision and drainage (Right, 2021); and Skin graft (Right, 2021). Treatment Diagnosis: decreased ADL, balance, activity tolerance,      Restrictions  Restrictions/Precautions  Restrictions/Precautions: Fall Risk, Weight Bearing  Lower Extremity Weight Bearing Restrictions  Right Lower Extremity Weight Bearing: Weight Bearing As Tolerated  Position Activity Restriction  Other position/activity restrictions: Wound Vac R Lat Calf. PT spoke with Surg (LUCAS Eddy NP) : WBAT R LE. Right leg wrapped in ace wrap, wound vac not in place at this time 21,  omer    Subjective   General  Chart Reviewed: Yes, Progress Notes, History and Physical, Orders, Previous Admission  Patient assessed for rehabilitation services?: Yes  Additional Pertinent Hx: 68 y/o female admit 2021 with R LE Traumatic Hematoma (hit R LE on car door).  s/p Evac of R LE Hematoma.  Pt returned to OR 2021 S/P Evac of R LE Hematoma, Excisional Debride (60R95 cm), Applic of Wound Vac. Plastic and Reconstruct Surg follow for Staged Reconstruct with Skin Graft. PMH as noted including Gastric Bypass, Rectal Ca, L TKR, R Femur Fx/ORIF (10/2018). Family / Caregiver Present: No  Referring Practitioner: Kami/Jackson  Diagnosis: traumatic hematoma RLE with infection  Subjective  Subjective: Pt met bedside, agreeable to OT. Pt stated Hector Francisco Javier her leg and will have wound vac replaced later today  Height and Weight  Height: 5' 1\" (154.9 cm)  Social/Functional History  Social/Functional History  Lives With: Son(Does not work)  Type of Home: House  Home Layout: One level  Home Access: Ramped entrance  Bathroom Shower/Tub: Tub/Shower unit  Bathroom Toilet: Standard  Bathroom Equipment: Tub transfer bench, Grab bars in shower, Hand-held shower, 3-in-1 commode, Grab bars around toilet(has not needed to use commode)  Bathroom Accessibility: Accessible  Home Equipment: Rolling walker, Wheelchair-manual  ADL Assistance: Independent  Homemaking Assistance: (son completes [de-identified] of IADLs, pt does some cooking and laundry, does dishes)  Ambulation Assistance: Independent(with rw)  Transfer Assistance: Independent  Active : Yes  Occupation: Retired  Type of occupation: medical assistant, patient representative, billing  IADL Comments: Pt sleeps in regular bed. Objective   Vision: Impaired  Vision Exceptions: Wears glasses for distance  Hearing: Within functional limits    Orientation  Overall Orientation Status: Within Functional Limits  Observation/Palpation  Posture: Good  Observation: Wound Vac/Dressing R Lat Calf. Balance  Sitting Balance: Independent  Standing Balance: Contact guard assistance  Functional Mobility  Functional - Mobility Device: Rolling Walker  Activity: To/from bathroom; Other  Assist Level: Contact guard assistance  Functional Mobility Comments: good walker safety, CGA/SBA, catheter hanging on walker  Shower Transfers  Shower Transfers: Not tested  [employer] Transfers Comments: unable at this time  Wheelchair Bed Transfers  Wheelchair/Bed - Technique: Ambulating  Equipment Used: Wheelchair;Bed  Level of Asssistance: Contact guard assistance  Wheelchair Transfers Comments: RW  ADL  Feeding: Independent(set up own lunch)  Grooming: Setup(seated for oral care, hair)  UE Bathing: Setup(cues for CHG soap)  LE Bathing: Contact guard assistance(CGA/SBA stance, crossed left leg to bathe, right wrapped in ace, so unable to bathe)  UE Dressing: Minimal assistance(assisted min with bra left arm per pt request, shirt per self)  LE Dressing: Moderate assistance(assisted wtih right slipper sock, left per self, assisted to thread underwear and hospital pants over left leg, thread catheter, right per self, pants over hips with SBA/CGA)  Toileting: (catheter)  Additional Comments: Completed full sponge bath while seated at sink  Tone RUE  RUE Tone: Normotonic  Tone LUE  LUE Tone: Normotonic  Coordination  Movements Are Fluid And Coordinated: Yes     Bed mobility  Supine to Sit: Stand by assistance        Cognition  Overall Cognitive Status: WFL        Sensation  Overall Sensation Status: WFL(UE)        LUE AROM (degrees)  LUE AROM : Exceptions  LUE General AROM: shoulder flex approx 100 deg, otherwise WFL  RUE AROM (degrees)  RUE AROM : Exceptions  RUE General AROM: shoulder flex approx 120 deg, otherwise WFL  LUE Strength  Gross LUE Strength: WFL  RUE Strength  Gross RUE Strength: WFL                   Plan   Plan  Times per week: 5-6  Times per day: Twice a day  Plan weeks: 1 week  Current Treatment Recommendations: Strengthening, Endurance Training, Balance Training, Gait Training, Functional Mobility Training, Self-Care / ADL, Patient/Caregiver Education & Training, Equipment Evaluation, Education, & procurement, Home Management Training, Safety Education & Training            Goals  Short term goals  Time Frame for Short term goals: 1 week pt dom [place] Shayla term goal 1: bathe indep with AD as needed  Short term goal 2: dress indep with AD as needed  Short term goal 3: toilet indep with AD  Short term goal 4: transfers indep with AD  Short term goal 5: functional mobility indep  Short term goal 6: increase activity tolerance to stand 3 min for ADL/mobility  Long term goals  Time Frame for Long term goals : same as stg  Patient Goals   Patient goals : \"I want to get my legs stronger\"       Therapy Time   Individual Concurrent Group Co-treatment   Time In 1040         Time Out 1215         Minutes 95         Timed Code Treatment Minutes: 80 Minutes(+15 min eval)       Bethany Guillaume, OTR/L [id number]

## 2021-02-12 NOTE — PROGRESS NOTES
is not having any pain. Continues to deny pain throughout session although pt demonstrated decreased weight bearing on R LE with gt. Subjective  Subjective: Pt is agreeable to PT. Pain Screening  Patient Currently in Pain: Denies  Vital Signs  Pulse: 77(after walking)  BP: 121/80  BP Location: Left lower arm  Patient Currently in Pain: Denies  Oxygen Therapy  SpO2: 94 %(after walking)  Pulse Oximeter Device Mode: Intermittent       Orientation  Orientation  Overall Orientation Status: Within Normal Limits  Social/Functional History  Social/Functional History  Lives With: Son(Does not work)  Type of Home: House  Home Layout: One level  Home Access: Ramped entrance  Bathroom Shower/Tub: Tub/Shower unit  Bathroom Toilet: Standard  Bathroom Equipment: Tub transfer bench, Grab bars in shower, Hand-held shower, 3-in-1 commode(has not needed to use commode)  Bathroom Accessibility: Accessible  Home Equipment: Rolling walker, Wheelchair-manual  ADL Assistance: Independent  Homemaking Assistance: (son completes majority of IADLs, pt does some cooking and laundry, does dishes)  Ambulation Assistance: Independent(with rw)  Transfer Assistance: Independent  Active : Yes  Occupation: Retired  Type of occupation: medical assistant, patient representative  IADL Comments: Pt sleeps in regular bed. Cognition   Cognition  Overall Cognitive Status: WFL    Objective     Observation/Palpation  Posture: Good  Observation: Wound Vac/Dressing R Lat Calf. AROM RLE (degrees)  RLE AROM: WFL  AROM LLE (degrees)  LLE AROM : WFL  Strength RLE  Comment: Hip 4/5; Knee 3/5; Ankle 4/5 (limited resist testing).   Strength LLE  Strength LLE: WFL  Strength RUE  Strength RUE: WFL  Strength LUE  Strength LUE: WFL  Tone RLE  RLE Tone: Normotonic  Tone LLE  LLE Tone: Normotonic  Motor Control  Gross Motor?: WFL  Sensation  Overall Sensation Status: WFL(to lt touch and proprioception, pt states decreased sensation in B feet but protective sensation is present)  Bed mobility  Rolling to Right: Supervision  Supine to Sit: Stand by assistance(flat bed, no rail)  Sit to Supine: Moderate assistance(A with B feet, flat bed, no rail)  Scooting: Stand by assistance(with bedrails)  Transfers  Sit to Stand: Stand by assistance(from bed and w/c, pt recalls correct technique)  Stand to sit: Stand by assistance  Car Transfer: Contact guard assistance  Ambulation  Ambulation?: Yes  WB Status: WBAT R LE  More Ambulation?: No  Ambulation 1  Surface: level tile  Device: Rolling Walker  Assistance: Contact guard assistance  Quality of Gait: slow amarilis; decreased toe clearance on RLE, decreased stance phase on R but pt denies pain  Gait Deviations: Decreased step height;Decreased step length; Slow Amarilis  Distance: 43', 52', shorter distances in gym and room  Stairs/Curb  Stairs?: No(pt has none at home)  Stairs  Curbs: (not assessed st this time, pt feels weak and fatigued after walking)     Balance  Posture: Good  Sitting - Static: Good  Sitting - Dynamic: Good  Standing - Static: Good;-  Standing - Dynamic: Good;-  Exercises  Hip Flexion: 15  Knee Long Arc Quad: 15  Ankle Pumps: 15     Plan   Plan  Times per week: 5-6x/week  Times per day: Twice a day  Plan weeks: 7 to 10 days depending on how pt tolerates skinn grafting next week  Specific instructions for Next Treatment: increase gt, try curb step as able  Current Treatment Recommendations: Strengthening, Functional Mobility Training, Transfer Training, Gait Training, Safety Education & Training, Patient/Caregiver Education & Training, Endurance Training  Safety Devices  Type of devices:  All fall risk precautions in place, Call light within reach, Bed alarm in place, Gait belt, Left in bed    Goals  Short term goals  Time Frame for Short term goals: 1 week  Short term goal 1: Bed mobility modif I  Short term goal 2: transfers from all surfaces including car modif I  Short term goal 3: Amb with assist device, WBAT R LE, 150' modif I  Short term goal 4: curb step with wh walker SBA  Long term goals  Time Frame for Long term goals : set after skin grafting if needed  Patient Goals   Patient goals : Be able to return home with son.        Therapy Time   Individual Concurrent Group Co-treatment   Time In 0900         Time Out 1030         Minutes 90         Timed Code Treatment Minutes: [address] [facility], [address] [facility]

## 2021-02-12 NOTE — PROGRESS NOTES
[facility] [phone]                                     Daily Progress Note                                                         Pt Name: Tri Gunderson  Medical Record Number: [id number]  Date of Birth 1945   Today's Date: 2/12/2021  CC: right lower extremity hematoma    ASSESSMENT/PLAN  Right lower extremity hematoma  -1/31 evacuation of hematoma, excisional debridement  -transferred to acute rehab yesterday  -Wound vac changed  -scheduled for STSG Wednesday with Dr. Preet Cagle. Armand Jamison is about the same. Minimal wound pain when vac dressing removed. OBJECTIVE  VITALS:  height is 5' 1\" (1.549 m) and weight is 184 lb 11.9 oz (83.8 kg). Her oral temperature is 99 °F (37.2 °C). Her blood pressure is 121/80 and her pulse is 77. Her respiration is 17 and oxygen saturation is 94%. INTAKE/OUTPUT:      Intake/Output Summary (Last 24 hours) at 2/12/2021 1321  Last data filed at 2/12/2021 1301  Gross per 24 hour   Intake 600 ml   Output 1350 ml   Net -750 ml     GENERAL: alert, no distress  EXTREMITY: Vac in place            I/O last 3 completed shifts:   In: 240 [P.O.:240]  Out: 500 [Drains:500]      LABS  Recent Labs     02/12/21  0530 02/12/21  0555   WBC 4.9  --    HGB 7.8*  --    HCT 24.9*  --      --    NA  --  139   K  --  3.5   CL  --  103   CO2  --  28   BUN  --  15   CREATININE  --  <0.5*   MG  --  1.90   CALCIUM  --  8.5     Electronically signed by MUSA Moss CNP on 2/12/2021 at 1:21 PM

## 2021-02-12 NOTE — CARE COORDINATION
TIMOTHYW reviewed chart. LSW met with patient to introduce  role, initiate discussion regarding DC planning needs and to inform of [facility] Team conferences on Wednesday. Her goal is to return home. She likes to be called Cristóbal or Yue Bradshaw. SOCIAL WORK ASSESSMENT      GOAL:   Goal is to return hme with son. HOME SITUATION:   Patient and son live in a house with ramped entry from driveway. Everything she needs is on first floor. Son, aged 46 does not work. She reports she was totally independent prior to admit with all personal care needs. She uses a wh walker but she also sometimes would use a transport chair in the house if she needed to get to the bathroom quicker. There is a cat in the home. She shares in some of the household chores but mostly the son does them. She oversees the finances and her own medications. She is active with [facility] for RN pcp needs and likes to fill scripts between [facility] and [facility] in [place]. Personal Goals:     She would like to be stronger                                    She would like to be able to toilet independently        PRIOR LEVEL OF FUNCTIONING:               PERSONAL CARE:     Independent     Used wh walker or transport chair in the house             Drives:    yes            Finances:   ind            Meals:   shared            Grocery Shopping:     Son          Medications:    indep                                           DME CURRENTLY AT HOME:   Transport chair, wh walker,  Hand held shower chead, bars in shower and around toilet,      CURRENT HOME CARE/SERVICES:   None currently. TIMOTHYW informed her of possible post acute services such as home care or outpatient services to continue her progress. She is agreeable to what md orders. PREFERRED HOME CARE:   To be determined. TEAM CONFERENCE DAY:    Wednesdays.   TIMOTHYW informed her of weekly Team Conferences where Team will review progress, goals and DC date. This worker will return to give her this update and plan for DC needs. She does not have IV s at this time but does have a wound vac. She is to have skin graft on Wed. LSW informed patient of preferred  time on date of discharge which is between 10 - 12 noon. LSW informed patient of recommendation for PCP visit within 7 days post discharge.         Gurmeet Rodriguez [place]     Case Management   [phone]    2/12/2021  4:28 PM

## 2021-02-12 NOTE — PROGRESS NOTES
Department of Physical Medicine & Rehabilitation  Progress Note    Patient Identification:  Tri Gunderson  [id number]  : 1945  Admit date: 2021    Chief Complaint: Traumatic hematoma of right lower leg with infection    Subjective:   No acute events overnight. Patient seen this afternoon sitting up in room. Reports pain controlled. Having some intermittent abdominal upset which is chronic for her. ROS: No f/c, n/v, cp     Objective:  Patient Vitals for the past 24 hrs:   BP Temp Temp src Pulse Resp SpO2 Height Weight   21 1105 -- -- -- -- -- -- 5' 1\" (1.549 m) --   21 1010 121/80 -- -- 77 -- 94 % -- --   21 0718 -- -- -- -- -- -- -- 184 lb 11.9 oz (83.8 kg)   21 0701 126/89 99 °F (37.2 °C) Oral 68 17 98 % -- --   21 2214 118/70 98.2 °F (36.8 °C) Oral 77 18 -- -- --   21 1800 120/64 97.7 °F (36.5 °C) Oral 75 18 -- 5' 1\" (1.549 m) --     Const: Alert. No distress, pleasant. HEENT: Normocephalic, atraumatic. Normal sclera/conjunctiva. MMM. CV: Regular rate and rhythm. Resp: No respiratory distress. Lungs CTAB. Abd: Soft, nontender, nondistended, NABS+   Ext: No edema. RLE wound vac in place  Neuro: Alert, oriented, appropriately interactive. Psych: Cooperative, appropriate mood and affect    Laboratory data: Available via EMR.    Last 24 hour lab  Recent Results (from the past 24 hour(s))   CBC auto differential    Collection Time: 21  5:30 AM   Result Value Ref Range    WBC 4.9 4.0 - 11.0 K/uL    RBC 2.74 (L) 4.00 - 5.20 M/uL    Hemoglobin 7.8 (L) 12.0 - 16.0 g/dL    Hematocrit 24.9 (L) 36.0 - 48.0 %    MCV 91.0 80.0 - 100.0 fL    MCH 28.4 26.0 - 34.0 pg    MCHC 31.3 31.0 - 36.0 g/dL    RDW 17.7 (H) 12.4 - 15.4 %    Platelets 147 224 - 128 K/uL    MPV 7.5 5.0 - 10.5 fL    Neutrophils % 67.9 %    Lymphocytes % 18.3 %    Monocytes % 12.1 %    Eosinophils % 1.3 %    Basophils % 0.4 %    Neutrophils Absolute 3.3 1.7 - 7.7 K/uL    Lymphocytes Absolute 0.9 (L) 1.0 - 5.1 K/uL    Monocytes Absolute 0.6 0.0 - 1.3 K/uL    Eosinophils Absolute 0.1 0.0 - 0.6 K/uL    Basophils Absolute 0.0 0.0 - 0.2 K/uL   Prealbumin    Collection Time: 02/12/21  5:30 AM   Result Value Ref Range    Prealbumin 12.6 (L) 20.0 - 40.0 mg/dL   Basic Metabolic Panel w/ Reflex to MG    Collection Time: 02/12/21  5:55 AM   Result Value Ref Range    Sodium 139 136 - 145 mmol/L    Potassium reflex Magnesium 3.5 3.5 - 5.1 mmol/L    Chloride 103 99 - 110 mmol/L    CO2 28 21 - 32 mmol/L    Anion Gap 8 3 - 16    Glucose 91 70 - 99 mg/dL    BUN 15 7 - 20 mg/dL    CREATININE <0.5 (L) 0.6 - 1.2 mg/dL    GFR Non-African American >60 >60    GFR African American >60 >60    Calcium 8.5 8.3 - 10.6 mg/dL   Magnesium    Collection Time: 02/12/21  5:55 AM   Result Value Ref Range    Magnesium 1.90 1.80 - 2.40 mg/dL       Therapy progress:  PT  Position Activity Restriction  Other position/activity restrictions: Wound Vac R Lat Calf. PT spoke with Surg (LUCAS Eddy NP) : WBAT R LE. Right leg wrapped in ace wrap, wound vac not in place at this time 2/12/21,  omer  Objective     Sit to Stand: Stand by assistance(from bed and w/c, pt recalls correct technique)  Stand to sit: Stand by assistance  Device: Rolling Walker  Assistance: Contact guard assistance  Distance: 43', 52', shorter distances in gym and room  OT  PT Equipment Recommendations  Other: Will monitor for potential equipt needs. Assessment        SLP          Body mass index is 34.91 kg/m².     Rehabilitation Diagnosis:   16.0, Debility (non-cardiac, non-pulmonary        Assessment and Plan:     Impairments: generalized weakness, decreased ROM distal RLE, decreased balance, endurance     Debility  -PT/OT     Right leg hematoma   -s/p evacuation (1/28 with Dr. Alma Valdes)  -s/p evacuation, debridement and wound vac placement (1/31 with Dr. Magui Mcdonnell)  -Plan for STSG on 2/17      Acute blood loss anemia with hemorrhagic shock  -Due to traumatic hematoma -Monitor Hgb, transfuse prn <7.      Paroxysmal Afib  -Eliquis on hold due to above until cleared by Surgery  -flecainide, metoporlol     H/o gastric bypass (Shanice-en-Y)  -Vitamin supplements  -Dietitian consult     Hypomagnesemia  -supplement     Anxiety/depression  -sertraline, prn alprazolam     Acute on chronic pain control  -H/o fibromyalgia  -prn Norco, tizanidine     Bladder   -High risk retention   -Monitor PVRs, SC prn >300cc     Bowel   -High risk constipation   -senna+colace BID, PRN miralax, MoM, and bisacodyl supp.     Safety   -fall precautions     PPx  -DVT: on hold due to hematoma  -GI: pantoprazole    Rehab Progress: Tolerating therapy well thus far. Working on functional mobility, compensatory strategies, balance, strength, endurance. Anticipated Dispo: home with son  Services/DME: TBD  ELOS: 7-10 days      [address]  Amberly Hutchison MD 2/12/2021, 5:35 PM

## 2021-02-12 NOTE — PROGRESS NOTES
Patient admitted to rehab with traumatic hematoma of right lower leg with infection. A/A/O x 4. Transfers with walker x 1. On General diet, tolerating well. Medications taken whole in water. On Eliquis for DVT prophylaxis. Skin with large hematoma and wound vac on right lower leg. On room air. Has been continent of bowel and bladder, using tello catheter currently. LBM 2/11. Chair/bed alarms in use and call light in reach. Will monitor for safety.

## 2021-02-12 NOTE — CONSULTS
Nutrition Assessment     Type and Reason for Visit: Initial, Consult, Wound, Positive Nutrition Screen    Nutrition Recommendations/Plan: Pt to continue General Diet. Pt reports negative response to Ensure High Protein. Recommending Meir Bid to increase protein needs to promote wound healing. Will monitor pt labs, fluids, weight, and meal time behavior while inpatient. Nutrition Assessment:  Pt admitted with R leg hematoma caused by car door hitting her leg. Pt PMDx includes Afib, Anxiety, Depression, Fibromyalgia, Migraines. Pt has pressure wound on lower RE. Pt reports no fluctuation in weight recently. Pt to continue General Diet. Recommeding Meir BID to increase protein needs for wound healing. Malnutrition Assessment:  Malnutrition Status: No malnutrition    Estimated Daily Nutrient Needs:  Energy (kcal): 1245-1494kcal/day(15-18kcal/kg); Weight Used for Energy Requirements:  Current     Protein (g): 71-94g/day(1.5-2.0g/kg); Weight Used for Protein Requirements:  Ideal        Fluid (ml/day):  ; Weight Used for Fluid Requirements:  1 ml/kcal      Nutrition Related Findings: Pt reports no pain at this time. Last B/M on 2/11. Non-pitting Edema in BLE. Current Nutrition Therapies:    DIET GENERAL;  Dietary Nutrition Supplements: Low Calorie High Protein Supplement    Anthropometric Measures:  · Height: 5' 1\" (154.9 cm)  · Current Body Wt: 184 lb (83.5 kg)   · BMI: 34.8    Nutrition Diagnosis:   · Increased nutrient needs related to inadequate protein-energy intake as evidenced by wounds, intake 26-50%, poor intake prior to admission      Nutrition Interventions:   Food and/or Nutrient Delivery:  Continue Current Diet, Modify Oral Nutrition Supplement  Nutrition Education/Counseling:  No recommendation at this time   Coordination of Nutrition Care:  Continue to monitor while inpatient    Goals:  Pt to consume >50% of meals and supplements.        Nutrition Monitoring and Evaluation: Behavioral-Environmental Outcomes:  None Identified   Food/Nutrient Intake Outcomes:  Food and Nutrient Intake, Supplement Intake  Physical Signs/Symptoms Outcomes:  Biochemical Data, Nausea or Vomiting, Fluid Status or Edema, Weight, Skin, Meal Time Behavior     Discharge Planning:     Too soon to determine     Electronically signed by Catherine Engle on 2/12/21 at 1:04 PM EST    Contact: [phone]

## 2021-02-13 PROCEDURE — 97110 THERAPEUTIC EXERCISES: CPT

## 2021-02-13 PROCEDURE — 6370000000 HC RX 637 (ALT 250 FOR IP): Performed by: PHYSICAL MEDICINE & REHABILITATION

## 2021-02-13 PROCEDURE — 2580000003 HC RX 258: Performed by: PHYSICAL MEDICINE & REHABILITATION

## 2021-02-13 PROCEDURE — 1280000000 HC REHAB R&B

## 2021-02-13 PROCEDURE — 97530 THERAPEUTIC ACTIVITIES: CPT

## 2021-02-13 PROCEDURE — 97535 SELF CARE MNGMENT TRAINING: CPT

## 2021-02-13 PROCEDURE — 97116 GAIT TRAINING THERAPY: CPT

## 2021-02-13 RX ADMIN — Medication 400 MG: at 10:00

## 2021-02-13 RX ADMIN — ALPRAZOLAM 0.5 MG: 0.5 TABLET ORAL at 21:12

## 2021-02-13 RX ADMIN — HYDROCODONE BITARTRATE AND ACETAMINOPHEN 2 TABLET: 5; 325 TABLET ORAL at 11:51

## 2021-02-13 RX ADMIN — FUROSEMIDE 20 MG: 20 TABLET ORAL at 10:00

## 2021-02-13 RX ADMIN — METOCLOPRAMIDE 5 MG: 10 TABLET ORAL at 16:53

## 2021-02-13 RX ADMIN — METOCLOPRAMIDE 5 MG: 10 TABLET ORAL at 12:00

## 2021-02-13 RX ADMIN — SODIUM CHLORIDE, PRESERVATIVE FREE 10 ML: 5 INJECTION INTRAVENOUS at 20:35

## 2021-02-13 RX ADMIN — TIZANIDINE 2 MG: 4 TABLET ORAL at 21:12

## 2021-02-13 RX ADMIN — FLECAINIDE ACETATE 100 MG: 100 TABLET ORAL at 10:00

## 2021-02-13 RX ADMIN — HYDROCODONE BITARTRATE AND ACETAMINOPHEN 2 TABLET: 5; 325 TABLET ORAL at 04:48

## 2021-02-13 RX ADMIN — MEMANTINE 5 MG: 5 TABLET ORAL at 10:00

## 2021-02-13 RX ADMIN — METOCLOPRAMIDE 5 MG: 10 TABLET ORAL at 10:00

## 2021-02-13 RX ADMIN — PANTOPRAZOLE SODIUM 40 MG: 40 TABLET, DELAYED RELEASE ORAL at 16:53

## 2021-02-13 RX ADMIN — PANTOPRAZOLE SODIUM 40 MG: 40 TABLET, DELAYED RELEASE ORAL at 07:15

## 2021-02-13 RX ADMIN — METOPROLOL SUCCINATE 50 MG: 50 TABLET, EXTENDED RELEASE ORAL at 10:00

## 2021-02-13 RX ADMIN — PROMETHAZINE HYDROCHLORIDE 12.5 MG: 25 TABLET ORAL at 21:12

## 2021-02-13 RX ADMIN — SERTRALINE 100 MG: 100 TABLET, FILM COATED ORAL at 20:34

## 2021-02-13 RX ADMIN — SODIUM CHLORIDE, PRESERVATIVE FREE 10 ML: 5 INJECTION INTRAVENOUS at 10:03

## 2021-02-13 RX ADMIN — HYDROCODONE BITARTRATE AND ACETAMINOPHEN 2 TABLET: 5; 325 TABLET ORAL at 18:39

## 2021-02-13 RX ADMIN — FLECAINIDE ACETATE 100 MG: 100 TABLET ORAL at 20:34

## 2021-02-13 RX ADMIN — CALCIUM 500 MG: 500 TABLET ORAL at 10:00

## 2021-02-13 RX ADMIN — MEMANTINE 5 MG: 5 TABLET ORAL at 20:34

## 2021-02-13 ASSESSMENT — PAIN DESCRIPTION - PROGRESSION
CLINICAL_PROGRESSION: NOT CHANGED
CLINICAL_PROGRESSION: NOT CHANGED

## 2021-02-13 ASSESSMENT — PAIN DESCRIPTION - ONSET
ONSET: ON-GOING
ONSET: ON-GOING

## 2021-02-13 ASSESSMENT — PAIN DESCRIPTION - FREQUENCY: FREQUENCY: CONTINUOUS

## 2021-02-13 ASSESSMENT — PAIN SCALES - GENERAL
PAINLEVEL_OUTOF10: 4
PAINLEVEL_OUTOF10: 2

## 2021-02-13 ASSESSMENT — PAIN - FUNCTIONAL ASSESSMENT
PAIN_FUNCTIONAL_ASSESSMENT: ACTIVITIES ARE NOT PREVENTED
PAIN_FUNCTIONAL_ASSESSMENT: ACTIVITIES ARE NOT PREVENTED

## 2021-02-13 ASSESSMENT — PAIN DESCRIPTION - ORIENTATION
ORIENTATION: RIGHT
ORIENTATION: RIGHT

## 2021-02-13 ASSESSMENT — PAIN DESCRIPTION - DESCRIPTORS
DESCRIPTORS: ACHING
DESCRIPTORS: ACHING
DESCRIPTORS: ACHING;DISCOMFORT

## 2021-02-13 ASSESSMENT — PAIN SCALES - WONG BAKER
WONGBAKER_NUMERICALRESPONSE: 0

## 2021-02-13 ASSESSMENT — PAIN DESCRIPTION - LOCATION
LOCATION: LEG
LOCATION: LEG

## 2021-02-13 ASSESSMENT — PAIN DESCRIPTION - PAIN TYPE
TYPE: ACUTE PAIN
TYPE: ACUTE PAIN

## 2021-02-13 NOTE — PLAN OF CARE
Problem: IP BLADDER/VOIDING  Goal: STG - Patient demonstrates ability to take care of indwelling catheter  Outcome: Completed     Problem: IP BLADDER/VOIDING  Goal: STG - Patient demonstrates no accidents  Outcome: Ongoing  Note: Responding to call lights immediately, ambulate to bathroom with appropriate assistance and asking patient every 2 hours if there is a need for toilet use. Problem: IP BOWEL ELIMINATION  Goal: STG - patient will be accident free  Outcome: Ongoing  Note: All safety precautions in place including nonskid socks on feet, bed exit alarm on and posey clip attached to patient when in chair, phones and call light in reach, will continue to monitor. Problem: SKIN INTEGRITY  Goal: LTG - Patient will be free from infection  Outcome: Ongoing  Note: Skin assessment performed for signs and symptoms of infection. Problem: PAIN  Goal: STG - patient verbalizes a reduction in pain level  Outcome: Ongoing  Note: Patient uses pain scale appropriately. Problem: Nutrition  Goal: Optimal nutrition therapy  Outcome: Ongoing  Note: Staff assists with ordering of all meals as needed.

## 2021-02-13 NOTE — PROGRESS NOTES
Occupational Therapy  Facility/Department: [address] IP REHAB  Daily Treatment Note  NAME: Nadja Khanna  : 1945  MRN: [id number]    Date of Service: 2021    Discharge Recommendations:  Home with Home health OT, Continue to assess pending progress, Patient would benefit from continued therapy after discharge, Home with assist PRN  OT Equipment Recommendations  Other: TBD    Assessment   Performance deficits / Impairments: Decreased functional mobility ; Decreased endurance;Decreased strength;Decreased ADL status; Decreased safe awareness;Decreased high-level IADLs;Decreased balance  Assessment: Pt seen in room for ADL. Pt agreed to sponge bath due to wound vac and picc. Pt completed sponge bath with SBA for stance, dressed with min a for LB due to wound vac. Pt completed transfers with CGA, gave HEP for use in room and completed UE ex with 10# push box x 2.5 min in gym. Louise well. Cont OT POC. OT Education: Transfer Training;OT Role;Plan of Care;ADL Adaptive Strategies; Home Exercise Program  Barriers to Learning: none  REQUIRES OT FOLLOW UP: Yes  Activity Tolerance  Activity Tolerance: Patient Tolerated treatment well  Safety Devices  Safety Devices in place: Yes  Type of devices: Call light within reach; Chair alarm in place; Left in chair;Gait belt;Nurse notified         Patient Diagnosis(es): There were no encounter diagnoses. has a past medical history of Acid reflux, Anxiety, Arthritis, Atrial fibrillation (HCC), Closed fracture dislocation of right elbow, Depression, Fibromyalgia, Migraine, Rectal cancer ([facility] [address] and Wears glasses. has a past surgical history that includes  section; Total knee arthroplasty (Left); open tx femoral supracondylar fracture w/o extension (Right, 10/25/2018); Gastric bypass surgery; Neck surgery; other surgical history; Cholecystectomy; Appendectomy; Colonoscopy; Upper gastrointestinal endoscopy (N/A, 2019);  Colonoscopy (N/A, 2019); knee surgery (Right); Upper gastrointestinal endoscopy (N/A, 8/18/2020); incision and drainage (Right, 1/28/2021); and Skin graft (Right, 1/31/2021). Restrictions  Restrictions/Precautions  Restrictions/Precautions: Fall Risk, Weight Bearing  Lower Extremity Weight Bearing Restrictions  Right Lower Extremity Weight Bearing: Weight Bearing As Tolerated  Position Activity Restriction  Other position/activity restrictions: Wound Vac R Lat Calf. PT spoke with Surg (LUCAS Eddy NP) : WBAT R LE. Subjective   General  Chart Reviewed: Yes, Progress Notes, History and Physical, Orders, Previous Admission  Patient assessed for rehabilitation services?: Yes  Additional Pertinent Hx: 66 y/o female admit 1/31/2021 with R LE Traumatic Hematoma (hit R LE on car door). 1/28 s/p Evac of R LE Hematoma. Pt returned to OR 1/31/2021 S/P Evac of R LE Hematoma, Excisional Debride (23D12 cm), Applic of Wound Vac. Plastic and Reconstruct Surg follow for Staged Reconstruct with Skin Graft. PMH as noted including Gastric Bypass, Rectal Ca, L TKR, R Femur Fx/ORIF (10/2018). Family / Caregiver Present: No  Referring Practitioner: Kami/Jackson  Diagnosis: traumatic hematoma RLE with infection  Subjective  Subjective: Seen in room, no complaints except pain \"4/10\" in LE, just received pain meds. Agreed to sponge bath due to wound vac and picc.       Orientation  Orientation  Overall Orientation Status: Within Functional Limits  Objective    ADL  Feeding: Independent  Grooming: Setup(while seated in w/c at sink)  UE Bathing: Supervision;Setup  LE Bathing: Stand by assistance;Setup  UE Dressing: Stand by assistance  LE Dressing: Minimal assistance(assist to start pants and manage wound vac)  Toileting: Stand by assistance  Additional Comments: Completed full sponge bath while seated at sink        Balance  Sitting Balance: Independent  Standing Balance: Stand by assistance  Functional Mobility  Functional - Mobility Device: Rolling Walker  Activity: To/from bathroom; Other  Assist Level: Contact guard assistance  Functional Mobility Comments: good walker safety, CGA/SBA, catheter hanging on walker  Bed mobility  Supine to Sit: Stand by assistance  Sit to Supine: Unable to assess(taken to dept)  Transfers  Sit to stand: Contact guard assistance  Stand to sit: Contact guard assistance                                            Type of ROM/Therapeutic Exercise  Comment: given red theraband HEP for use in room                    Plan   Plan  Times per week: 5-6  Times per day: Twice a day  Plan weeks: 1 week  Current Treatment Recommendations: Strengthening, Endurance Training, Balance Training, Gait Training, Functional Mobility Training, Self-Care / ADL, Patient/Caregiver Education & Training, Equipment Evaluation, Education, & procurement, Home Management Training, Safety Education & Training               Goals  Short term goals  Time Frame for Short term goals: 1 week pt dom Don term goal 1: bathe indep with AD as needed  Short term goal 2: dress indep with AD as needed  Short term goal 3: toilet indep with AD  Short term goal 4: transfers indep with AD  Short term goal 5: functional mobility indep  Short term goal 6: increase activity tolerance to stand 3 min for ADL/mobility  Long term goals  Time Frame for Long term goals : same as stg  Patient Goals   Patient goals : \"I want to get my legs stronger\"       Therapy Time   Individual Concurrent Group Co-treatment   Time In 0720         Time Out 0850         Minutes 90         Timed Code Treatment Minutes: 1200 [facility]

## 2021-02-13 NOTE — PROGRESS NOTES
Physical Therapy  Facility/Department: [address] IP REHAB  Daily Treatment Note  NAME: Rocio Land  : 1945  MRN: [id number]    Date of Service: 2021    Discharge Recommendations:  Patient would benefit from continued therapy after discharge, Home with Home health PT, Home with assist PRN, Continue to assess pending progress(dependent upon how pt tolerates skin grafting and if she has mobility restrictions after grafting)   PT Equipment Recommendations  Other: Will monitor for potential equipt needs. pt has a RW and rollator    Assessment   Body structures, Functions, Activity limitations: Decreased functional mobility ; Decreased strength;Decreased endurance; Increased pain  Assessment: PTA pt living with son in home with ramp access and 1st floor bed/bath. Prior, pt independent with daily care and functional mobility. Pt is limited by decreased activity tolerance and has increased RLE pain with mobility. She was able to ambulate household distances with RW and SBA and completed transfers with SBA and VCs. She does require modA for sit to supine due to LE weakness. Patient is below baseline functional status and therefore would benefit from additional therapy. Skin graft will occur next week, so would be appropriate to continue with intensive therapies to improve mobility prior to her next surgery. Treatment Diagnosis: decreased activity tolerance  Prognosis: Good  PT Education: Goals;PT Role;Plan of Care;Transfer Training;General Safety;Gait Training;Functional Mobility Training  REQUIRES PT FOLLOW UP: Yes  Activity Tolerance  Activity Tolerance: Patient Tolerated treatment well;Patient limited by endurance     Patient Diagnosis(es): There were no encounter diagnoses. has a past medical history of Acid reflux, Anxiety, Arthritis, Atrial fibrillation (HCC), Closed fracture dislocation of right elbow, Depression, Fibromyalgia, Migraine, Rectal cancer ([facility] [address] and Wears glasses.    has a past surgical history that includes  section; Total knee arthroplasty (Left); open tx femoral supracondylar fracture w/o extension (Right, 10/25/2018); Gastric bypass surgery; Neck surgery; other surgical history; Cholecystectomy; Appendectomy; Colonoscopy; Upper gastrointestinal endoscopy (N/A, 2019); Colonoscopy (N/A, 2019); knee surgery (Right); Upper gastrointestinal endoscopy (N/A, 2020); incision and drainage (Right, 2021); and Skin graft (Right, 2021). Restrictions  Restrictions/Precautions  Restrictions/Precautions: Fall Risk, Weight Bearing  Lower Extremity Weight Bearing Restrictions  Right Lower Extremity Weight Bearing: Weight Bearing As Tolerated  Position Activity Restriction  Other position/activity restrictions: Wound Vac R Lat Calf. PT spoke with Surg (LUCAS Eddy NP) : WBAT R LE. Social/Functional History  Lives With: Son(Does not work)  Type of Home: House  Home Layout: One level  Home Access: Ramped entrance  Bathroom Shower/Tub: Tub/Shower unit  Bathroom Toilet: Standard  Bathroom Equipment: Tub transfer bench, Grab bars in shower, Hand-held shower, 3-in-1 commode, Grab bars around toilet(has not needed to use commode)  Bathroom Accessibility: [facility]: Rolling walker, Wheelchair-manual  ADL Assistance: Independent  Homemaking Assistance: (son completes [de-identified] of IADLs, pt does some cooking and laundry, does dishes)  Ambulation Assistance: Independent(with rw)  Transfer Assistance: Independent  Active : Yes  Occupation: Retired  Type of occupation: medical assistant, patient representative, billing  IADL Comments: Pt sleeps in regular bed. Subjective   General  Chart Reviewed: Yes  Additional Pertinent Hx: 66 y/o female admit 2021 with R LE Traumatic Hematoma (hit R LE on car door). 2021 S/P Evac of R LE Hematoma, Excisional Debride (39U78 cm), Applic of Wound Vac.    Plastic and Reconstruct Surg follow for Staged Reconstruct with Skin Graft. PMH as noted including Gastric Bypass, Rectal Ca, L TKR, R Femur Fx/ORIF (10/2018). Response To Previous Treatment: Patient with no complaints from previous session. Family / Caregiver Present: No  Referring Practitioner: Dr Fuad Del Angel, Dr Joe Man  Subjective  Subjective: Pt pleasant and agreeable to PT treatment. Denies pain. Objective   Bed mobility  Sit to Supine: Moderate assistance(assist with BLEs)  Scooting: Stand by assistance  Comment: flat hospital bed     Transfers  Sit to Stand: Stand by assistance  Stand to sit: Stand by assistance  Comment: cues to keep both hands on RW when backing up to chair, cues for hand placement, extra time     Ambulation  Ambulation?: Yes  WB Status: WBAT R LE  More Ambulation?: No  Ambulation 1  Surface: level tile  Device: Rolling Walker  Other Apparatus: (therapist managed wound vac on IV pole)  Assistance: Stand by assistance  Quality of Gait: slow marlena; decreased toe clearance on RLE, decreased stance phase on R, forward flexed posture and behind walker base with increased marlena as pt fatigues  Gait Deviations: Decreased step height;Decreased step length; Slow Marlena  Distance: 58'x2 with multiple turns each trial, short distances in therapy gym and room  Comments: distance limited by increase in RLE pain and fatigue  Stairs/Curb  Stairs?: Yes(pt has no steps at home and has ramped entrance)  Stairs  # Steps : 1  Curbs: 6\"  Device: Rolling walker  Other Apparatus: (therapist managed wound vac on IV pole)  Assistance: Contact guard assistance;Minimal assistance  Comment: pt instructed on \"up with the good, down with the bad\" and able to complete with Mino ascending and CGA descending        Exercises  Hamstring Sets: 2x10 lino hamstring curls, red t band LLE, no resistance RLE due to wound vac lower leg  Gluteal Sets: 2x10  Hip Flexion: 2x10 lino  Hip Abduction: hip add sets 2x10, 2x10 hip ABD lino with red t band  Knee Long Arc Quad: 2x10 lino  Ankle Pumps: x20 heel raises/toe raises lino  Comments: ther ex seated in w/c  Other exercises  Other exercises?: Yes  Other exercises 1: 2x5 sit to stand  Other exercises 2: 2x10 SAQs lino in supine         Other Activities: Other (see comment)  Comment: pt in restroom with OT at beginning of session; care transitioned to OT; pt had BM and indep with pericare and SBA for balance during clothing management; SBA for balance washing hands at sink. Pt positioned for comfort in bed at end of session. RN in during session to give meds. Goals  Short term goals  Time Frame for Short term goals: 1 week  Short term goal 1: Bed mobility modif I  Short term goal 2: transfers from all surfaces including car modif I  Short term goal 3: Amb with assist device, WBAT R LE, 150' modif I  Short term goal 4: curb step with wh walker SBA  Long term goals  Time Frame for Long term goals : set after skin grafting if needed  Patient Goals   Patient goals : Be able to return home with son. Plan    Plan  Times per week: 5-6x/week  Times per day: Twice a day  Plan weeks: 7 to 10 days depending on how pt tolerates skinn grafting next week  Specific instructions for Next Treatment: increase gt, try curb step as able  Current Treatment Recommendations: Strengthening, Functional Mobility Training, Transfer Training, Gait Training, Safety Education & Training, Patient/Caregiver Education & Training, Endurance Training  Safety Devices  Type of devices:  All fall risk precautions in place, Call light within reach, Gait belt, Bed alarm in place, Left in bed, Nurse notified(ONEL Izquierdo notified)  Restraints  Initially in place: No     Therapy Time   Individual Concurrent Group Co-treatment   Time In 0850         Time Out 1020         Minutes 90         Timed Code Treatment Minutes: 90 Minutes         Electronically signed by Kaylee Hannah on 2/13/2021 at 10:29 AM

## 2021-02-14 PROCEDURE — 99232 SBSQ HOSP IP/OBS MODERATE 35: CPT | Performed by: SURGERY

## 2021-02-14 PROCEDURE — 1280000000 HC REHAB R&B

## 2021-02-14 PROCEDURE — 2580000003 HC RX 258: Performed by: PHYSICAL MEDICINE & REHABILITATION

## 2021-02-14 PROCEDURE — 6370000000 HC RX 637 (ALT 250 FOR IP): Performed by: PHYSICAL MEDICINE & REHABILITATION

## 2021-02-14 PROCEDURE — 94760 N-INVAS EAR/PLS OXIMETRY 1: CPT

## 2021-02-14 RX ADMIN — METOCLOPRAMIDE 5 MG: 10 TABLET ORAL at 16:40

## 2021-02-14 RX ADMIN — HYDROCODONE BITARTRATE AND ACETAMINOPHEN 2 TABLET: 5; 325 TABLET ORAL at 18:55

## 2021-02-14 RX ADMIN — SODIUM CHLORIDE, PRESERVATIVE FREE 10 ML: 5 INJECTION INTRAVENOUS at 21:41

## 2021-02-14 RX ADMIN — PANTOPRAZOLE SODIUM 40 MG: 40 TABLET, DELAYED RELEASE ORAL at 16:41

## 2021-02-14 RX ADMIN — SODIUM CHLORIDE, PRESERVATIVE FREE 10 ML: 5 INJECTION INTRAVENOUS at 09:36

## 2021-02-14 RX ADMIN — ACETAMINOPHEN 650 MG: 325 TABLET ORAL at 16:41

## 2021-02-14 RX ADMIN — Medication 400 MG: at 09:30

## 2021-02-14 RX ADMIN — METOCLOPRAMIDE 5 MG: 10 TABLET ORAL at 09:28

## 2021-02-14 RX ADMIN — METOPROLOL SUCCINATE 50 MG: 50 TABLET, EXTENDED RELEASE ORAL at 09:30

## 2021-02-14 RX ADMIN — TIZANIDINE 2 MG: 4 TABLET ORAL at 21:41

## 2021-02-14 RX ADMIN — HYDROCODONE BITARTRATE AND ACETAMINOPHEN 2 TABLET: 5; 325 TABLET ORAL at 05:16

## 2021-02-14 RX ADMIN — PANTOPRAZOLE SODIUM 40 MG: 40 TABLET, DELAYED RELEASE ORAL at 05:16

## 2021-02-14 RX ADMIN — CALCIUM 500 MG: 500 TABLET ORAL at 09:30

## 2021-02-14 RX ADMIN — FLECAINIDE ACETATE 100 MG: 100 TABLET ORAL at 09:28

## 2021-02-14 RX ADMIN — ALPRAZOLAM 0.5 MG: 0.5 TABLET ORAL at 21:41

## 2021-02-14 RX ADMIN — MEMANTINE 5 MG: 5 TABLET ORAL at 21:41

## 2021-02-14 RX ADMIN — FLECAINIDE ACETATE 100 MG: 100 TABLET ORAL at 21:41

## 2021-02-14 RX ADMIN — METOCLOPRAMIDE 5 MG: 10 TABLET ORAL at 11:00

## 2021-02-14 RX ADMIN — PROMETHAZINE HYDROCHLORIDE 12.5 MG: 25 TABLET ORAL at 11:01

## 2021-02-14 RX ADMIN — MEMANTINE 5 MG: 5 TABLET ORAL at 09:30

## 2021-02-14 RX ADMIN — SERTRALINE 100 MG: 100 TABLET, FILM COATED ORAL at 21:41

## 2021-02-14 RX ADMIN — PROMETHAZINE HYDROCHLORIDE 12.5 MG: 25 TABLET ORAL at 21:41

## 2021-02-14 RX ADMIN — FUROSEMIDE 20 MG: 20 TABLET ORAL at 09:30

## 2021-02-14 RX ADMIN — HYDROCODONE BITARTRATE AND ACETAMINOPHEN 2 TABLET: 5; 325 TABLET ORAL at 11:16

## 2021-02-14 ASSESSMENT — PAIN DESCRIPTION - DESCRIPTORS
DESCRIPTORS: ACHING;DISCOMFORT

## 2021-02-14 ASSESSMENT — PAIN DESCRIPTION - PAIN TYPE
TYPE: ACUTE PAIN;SURGICAL PAIN

## 2021-02-14 ASSESSMENT — PAIN DESCRIPTION - FREQUENCY
FREQUENCY: CONTINUOUS
FREQUENCY: CONTINUOUS

## 2021-02-14 ASSESSMENT — PAIN DESCRIPTION - ONSET
ONSET: ON-GOING

## 2021-02-14 ASSESSMENT — PAIN SCALES - GENERAL
PAINLEVEL_OUTOF10: 4
PAINLEVEL_OUTOF10: 2
PAINLEVEL_OUTOF10: 1
PAINLEVEL_OUTOF10: 0
PAINLEVEL_OUTOF10: 6

## 2021-02-14 ASSESSMENT — PAIN - FUNCTIONAL ASSESSMENT
PAIN_FUNCTIONAL_ASSESSMENT: ACTIVITIES ARE NOT PREVENTED

## 2021-02-14 ASSESSMENT — PAIN DESCRIPTION - LOCATION
LOCATION: LEG

## 2021-02-14 ASSESSMENT — PAIN DESCRIPTION - PROGRESSION: CLINICAL_PROGRESSION: NOT CHANGED

## 2021-02-14 ASSESSMENT — PAIN DESCRIPTION - ORIENTATION
ORIENTATION: RIGHT

## 2021-02-14 NOTE — PROGRESS NOTES
Department of Physical Medicine & Rehabilitation  Progress Note    Patient Identification:  Nick De La Torre  [id number]  : 1945  Admit date: 2021    Chief Complaint: Traumatic hematoma of right lower leg with infection    Subjective:   No acute events overnight. Patient seen this am sitting up in room. She is feeling well, denies new concerns. ROS: No f/c, n/v, cp     Objective:  Patient Vitals for the past 24 hrs:   BP Temp Temp src Pulse Resp SpO2 Weight   21 0812 -- -- -- -- -- 94 % --   21 0513 (!) 147/80 98 °F (36.7 °C) Oral 73 16 91 % 191 lb 2.2 oz (86.7 kg)   21 1913 121/77 98.4 °F (36.9 °C) Oral 74 16 94 % --   21 1456 (!) 149/85 97.9 °F (36.6 °C) Oral 77 17 94 % --     Const: Alert. No distress, pleasant. HEENT: Normocephalic, atraumatic. Normal sclera/conjunctiva. MMM. CV: Regular rate and rhythm. Resp: No respiratory distress. Lungs CTAB. Abd: Soft, nontender, nondistended, NABS+   Ext: No edema. RLE wound vac in place  Neuro: Alert, oriented, appropriately interactive. Psych: Cooperative, appropriate mood and affect    Laboratory data: Available via EMR. Last 24 hour lab  No results found for this or any previous visit (from the past 24 hour(s)). Therapy progress:  PT  Position Activity Restriction  Other position/activity restrictions: Wound Vac R Lat Calf. PT spoke with Surg (LUCAS Eddy NP) : WBAT R LE. Objective     Sit to Stand: Stand by assistance  Stand to sit: Stand by assistance  Device: Rolling Walker  Other Apparatus: (therapist managed wound vac on IV pole)  Assistance: Stand by assistance  Distance: 58'x2 with multiple turns each trial, short distances in therapy gym and room  OT  PT Equipment Recommendations  Other: Will monitor for potential equipt needs. pt has a RW and rollator     Assessment        SLP          Body mass index is 36.12 kg/m².     Rehabilitation Diagnosis:   16.0, Debility (non-cardiac, non-pulmonary        Assessment and Plan:     Impairments: generalized weakness, decreased ROM distal RLE, decreased balance, endurance     Debility  -PT/OT     Right leg hematoma   -s/p evacuation (1/28 with Dr. Zenia Essex)  -s/p evacuation, debridement and wound vac placement (1/31 with Dr. Pedro Hopper)  -Plan for STSG on 2/17      Acute blood loss anemia with hemorrhagic shock  -Due to traumatic hematoma   -Monitor Hgb, transfuse prn <7.      Paroxysmal Afib  -Eliquis on hold due to above until cleared by Surgery  -flecainide, metoporlol     H/o gastric bypass (Shanice-en-Y)  -Vitamin supplements  -Dietitian consult     Hypomagnesemia  -supplement     Anxiety/depression  -sertraline, prn alprazolam     Acute on chronic pain control  -H/o fibromyalgia  -prn Norco, tizanidine     Bladder   -High risk retention   -Monitor PVRs, SC prn >300cc     Bowel   -High risk constipation   -senna+colace BID, PRN miralax, MoM, and bisacodyl supp.     Safety   -fall precautions     PPx  -DVT: on hold due to hematoma  -GI: pantoprazole    Rehab Progress: Tolerating therapy well thus far. Working on functional mobility, compensatory strategies, balance, strength, endurance. Anticipated Dispo: home with son  Services/DME: TBD  ELOS: 7-10 days      [address]  Eligio Renteria MD 2/14/2021, 9:21 AM

## 2021-02-14 NOTE — PROGRESS NOTES
[facility] PLAST[facility]    CC: RLE wound    HPI: 70F with RLE wound s/p debridement/hematoma evacuation and vac application. She has been doing well in rehab overall. Vac still in place and has been changed regularly. PMHx/PSHx/MEDS/ALL/SHx: Unchanged and in epic. 14 point review of systems negative save for HPI    BP (!) 147/80   Pulse 73   Temp 98 °F (36.7 °C) (Oral)   Resp 16   Ht 5' 1\" (1.549 m)   Wt 191 lb 2.2 oz (86.7 kg)   SpO2 94%   BMI 36.12 kg/m²     GEN: NAD  EXT: Vac intact with good seal  Pics form previous vac change reviewed. LABS/IMAGING: Reviewed    IMP: 75 with RLE wound  PLAN: To OR on Wednesday AM for STSG from ipsilateral thigh. Will then have vac applied for 5 days (no changes). R/B/A/O/P discussed with the patient who agreed to proceed.     Frankey Boatman, MD  [address] Reconstructive Surgery  [phone]  02/14/21

## 2021-02-14 NOTE — PLAN OF CARE
Problem: PAIN  Goal: LTG - Patient will manage pain with the appropriate technique/Intervention  Outcome: Ongoing  Note: Pt assessed for pain this shift. Pt pain/discomfort is managed with PRN pain medications per md order. Pt able to verbalize pain by using numerical scale. Education provided and documented. Problem: Falls - Risk of:  Goal: Will remain free from falls  Description: Will remain free from falls  Outcome: Ongoing  Note: Fall risk assessment completed as charted. Pt is at risk for falls. Safety precautions in place. Call light and pt belongings in reach. Bed in low position. Bed/Chair Alarm on. Nonskid footwear on. All needs met. Pt instructed to call before getting up or out of bed. Pt verbalized understanding. Problem: Skin Integrity:  Goal: Will show no infection signs and symptoms  Description: Will show no infection signs and symptoms  Outcome: Ongoing  Note: Pt assessed for risk of skin breakdown. Encouraged pt to turn and reposition self while in bed. Skin clean and dry. No new skin breakdown noted. Heels floating. Will continue to assess skin condition each shift.

## 2021-02-15 LAB
ANION GAP SERPL CALCULATED.3IONS-SCNC: 7 MMOL/L (ref 3–16)
BASOPHILS ABSOLUTE: 0 K/UL (ref 0–0.2)
BASOPHILS RELATIVE PERCENT: 0.6 %
BUN BLDV-MCNC: 17 MG/DL (ref 7–20)
CALCIUM SERPL-MCNC: 8.9 MG/DL (ref 8.3–10.6)
CHLORIDE BLD-SCNC: 102 MMOL/L (ref 99–110)
CO2: 29 MMOL/L (ref 21–32)
CREAT SERPL-MCNC: <0.5 MG/DL (ref 0.6–1.2)
EOSINOPHILS ABSOLUTE: 0.1 K/UL (ref 0–0.6)
EOSINOPHILS RELATIVE PERCENT: 2.2 %
GFR AFRICAN AMERICAN: >60
GFR NON-AFRICAN AMERICAN: >60
GLUCOSE BLD-MCNC: 83 MG/DL (ref 70–99)
HCT VFR BLD CALC: 26.4 % (ref 36–48)
HEMOGLOBIN: 8.2 G/DL (ref 12–16)
LYMPHOCYTES ABSOLUTE: 1.1 K/UL (ref 1–5.1)
LYMPHOCYTES RELATIVE PERCENT: 26.3 %
MAGNESIUM: 1.7 MG/DL (ref 1.8–2.4)
MCH RBC QN AUTO: 28.8 PG (ref 26–34)
MCHC RBC AUTO-ENTMCNC: 31.3 G/DL (ref 31–36)
MCV RBC AUTO: 91.9 FL (ref 80–100)
MONOCYTES ABSOLUTE: 0.4 K/UL (ref 0–1.3)
MONOCYTES RELATIVE PERCENT: 9.8 %
NEUTROPHILS ABSOLUTE: 2.5 K/UL (ref 1.7–7.7)
NEUTROPHILS RELATIVE PERCENT: 61.1 %
PDW BLD-RTO: 19.6 % (ref 12.4–15.4)
PLATELET # BLD: 239 K/UL (ref 135–450)
PMV BLD AUTO: 7.4 FL (ref 5–10.5)
POTASSIUM SERPL-SCNC: 3.7 MMOL/L (ref 3.5–5.1)
RBC # BLD: 2.87 M/UL (ref 4–5.2)
SODIUM BLD-SCNC: 138 MMOL/L (ref 136–145)
WBC # BLD: 4.1 K/UL (ref 4–11)

## 2021-02-15 PROCEDURE — 97535 SELF CARE MNGMENT TRAINING: CPT

## 2021-02-15 PROCEDURE — 80048 BASIC METABOLIC PNL TOTAL CA: CPT

## 2021-02-15 PROCEDURE — 36592 COLLECT BLOOD FROM PICC: CPT

## 2021-02-15 PROCEDURE — 1280000000 HC REHAB R&B

## 2021-02-15 PROCEDURE — 6370000000 HC RX 637 (ALT 250 FOR IP): Performed by: PHYSICAL MEDICINE & REHABILITATION

## 2021-02-15 PROCEDURE — 97110 THERAPEUTIC EXERCISES: CPT | Performed by: PHYSICAL THERAPIST

## 2021-02-15 PROCEDURE — 2580000003 HC RX 258: Performed by: PHYSICAL MEDICINE & REHABILITATION

## 2021-02-15 PROCEDURE — 97116 GAIT TRAINING THERAPY: CPT | Performed by: PHYSICAL THERAPIST

## 2021-02-15 PROCEDURE — 83735 ASSAY OF MAGNESIUM: CPT

## 2021-02-15 PROCEDURE — 85025 COMPLETE CBC W/AUTO DIFF WBC: CPT

## 2021-02-15 PROCEDURE — 94760 N-INVAS EAR/PLS OXIMETRY 1: CPT

## 2021-02-15 PROCEDURE — 97530 THERAPEUTIC ACTIVITIES: CPT

## 2021-02-15 PROCEDURE — 97530 THERAPEUTIC ACTIVITIES: CPT | Performed by: PHYSICAL THERAPIST

## 2021-02-15 PROCEDURE — 97110 THERAPEUTIC EXERCISES: CPT

## 2021-02-15 RX ORDER — LANOLIN ALCOHOL/MO/W.PET/CERES
400 CREAM (GRAM) TOPICAL 2 TIMES DAILY
Status: DISCONTINUED | OUTPATIENT
Start: 2021-02-15 | End: 2021-02-23 | Stop reason: HOSPADM

## 2021-02-15 RX ADMIN — PROMETHAZINE HYDROCHLORIDE 12.5 MG: 25 TABLET ORAL at 21:53

## 2021-02-15 RX ADMIN — MEMANTINE 5 MG: 5 TABLET ORAL at 09:35

## 2021-02-15 RX ADMIN — METOPROLOL SUCCINATE 50 MG: 50 TABLET, EXTENDED RELEASE ORAL at 09:35

## 2021-02-15 RX ADMIN — FLECAINIDE ACETATE 100 MG: 100 TABLET ORAL at 09:35

## 2021-02-15 RX ADMIN — HYDROCODONE BITARTRATE AND ACETAMINOPHEN 2 TABLET: 5; 325 TABLET ORAL at 05:25

## 2021-02-15 RX ADMIN — TIZANIDINE 2 MG: 4 TABLET ORAL at 21:54

## 2021-02-15 RX ADMIN — PROMETHAZINE HYDROCHLORIDE 12.5 MG: 25 TABLET ORAL at 08:32

## 2021-02-15 RX ADMIN — Medication 400 MG: at 21:54

## 2021-02-15 RX ADMIN — MEMANTINE 5 MG: 5 TABLET ORAL at 21:54

## 2021-02-15 RX ADMIN — HYDROCODONE BITARTRATE AND ACETAMINOPHEN 2 TABLET: 5; 325 TABLET ORAL at 10:27

## 2021-02-15 RX ADMIN — Medication 400 MG: at 09:35

## 2021-02-15 RX ADMIN — SODIUM CHLORIDE, PRESERVATIVE FREE 10 ML: 5 INJECTION INTRAVENOUS at 21:53

## 2021-02-15 RX ADMIN — FUROSEMIDE 20 MG: 20 TABLET ORAL at 09:35

## 2021-02-15 RX ADMIN — METOCLOPRAMIDE 5 MG: 10 TABLET ORAL at 16:25

## 2021-02-15 RX ADMIN — PANTOPRAZOLE SODIUM 40 MG: 40 TABLET, DELAYED RELEASE ORAL at 16:25

## 2021-02-15 RX ADMIN — METOCLOPRAMIDE 5 MG: 10 TABLET ORAL at 12:13

## 2021-02-15 RX ADMIN — CALCIUM 500 MG: 500 TABLET ORAL at 09:35

## 2021-02-15 RX ADMIN — SODIUM CHLORIDE, PRESERVATIVE FREE 10 ML: 5 INJECTION INTRAVENOUS at 09:37

## 2021-02-15 RX ADMIN — ALPRAZOLAM 0.5 MG: 0.5 TABLET ORAL at 21:54

## 2021-02-15 RX ADMIN — SERTRALINE 100 MG: 100 TABLET, FILM COATED ORAL at 21:54

## 2021-02-15 RX ADMIN — METOCLOPRAMIDE 5 MG: 10 TABLET ORAL at 09:35

## 2021-02-15 RX ADMIN — PANTOPRAZOLE SODIUM 40 MG: 40 TABLET, DELAYED RELEASE ORAL at 05:25

## 2021-02-15 RX ADMIN — FLECAINIDE ACETATE 100 MG: 100 TABLET ORAL at 21:54

## 2021-02-15 RX ADMIN — HYDROCODONE BITARTRATE AND ACETAMINOPHEN 2 TABLET: 5; 325 TABLET ORAL at 16:28

## 2021-02-15 ASSESSMENT — PAIN DESCRIPTION - FREQUENCY
FREQUENCY: CONTINUOUS
FREQUENCY: CONTINUOUS

## 2021-02-15 ASSESSMENT — PAIN DESCRIPTION - PROGRESSION
CLINICAL_PROGRESSION: NOT CHANGED
CLINICAL_PROGRESSION: NOT CHANGED

## 2021-02-15 ASSESSMENT — PAIN DESCRIPTION - DESCRIPTORS: DESCRIPTORS: ACHING;DISCOMFORT

## 2021-02-15 ASSESSMENT — PAIN DESCRIPTION - ORIENTATION
ORIENTATION: RIGHT;LOWER
ORIENTATION: RIGHT;LOWER

## 2021-02-15 ASSESSMENT — PAIN SCALES - GENERAL
PAINLEVEL_OUTOF10: 7
PAINLEVEL_OUTOF10: 7
PAINLEVEL_OUTOF10: 2
PAINLEVEL_OUTOF10: 2

## 2021-02-15 ASSESSMENT — PAIN DESCRIPTION - ONSET
ONSET: ON-GOING
ONSET: ON-GOING

## 2021-02-15 ASSESSMENT — PAIN DESCRIPTION - LOCATION
LOCATION: LEG
LOCATION: LEG

## 2021-02-15 ASSESSMENT — PAIN DESCRIPTION - PAIN TYPE
TYPE: ACUTE PAIN;SURGICAL PAIN
TYPE: ACUTE PAIN;SURGICAL PAIN

## 2021-02-15 ASSESSMENT — PAIN - FUNCTIONAL ASSESSMENT: PAIN_FUNCTIONAL_ASSESSMENT: PREVENTS OR INTERFERES SOME ACTIVE ACTIVITIES AND ADLS

## 2021-02-15 NOTE — PROGRESS NOTES
Pt A&O x 4. Tranfers with FWW x 1 assist.  Wound vac to right lower leg in place. Wound vac draining well. Pt with intermittent complaints of pain. PRN pain medication administered. PRN muscle relaxer administered as well. Medication effective for relief. Medication administered whole with thins. Continent of bowel and bladder. PICC to RUE flushes well with blood return noted. Bed alarm engaged. Pt educated to call for assistance. Call light and bedside table within reach.

## 2021-02-15 NOTE — PLAN OF CARE
Problem: Falls - Risk of:  Goal: Will remain free from falls  Description: Will remain free from falls  2/15/2021 0919 by Radha Yun RN  Outcome: Ongoing  Note: Fall risk band on patient. Orange light on outside of room. Non skid footwear in place. Alarms used appropriately. Patient instructed to call and wait for staff before getting up. Rounding done to anticipate needs. Appropriate safety devices used for transfers. Problem: SKIN INTEGRITY  Goal: LTG - Patient will be free from infection  2/15/2021 0919 by Radha Yun RN  Outcome: Ongoing  Note: No s/s of infection to [facility] site noted. Problem: SKIN INTEGRITY  Goal: STG - patient will maintain good skin integrity  2/15/2021 0919 by Radha Yun RN  Outcome: Ongoing  Note: Skin assessment completed on admission and every shift. Barrier wipes used in the event of incontinence. Pressure relief techniques used as needed while in chair and in bed. Position changes encouraged at least every two hours while in bed.

## 2021-02-15 NOTE — PROGRESS NOTES
Occupational Therapy  [facility]  Daily Treatment Note  NAME: Nick De La Torre  : 1945  MRN: [id number]    Date of Service: 2/15/2021    Discharge Recommendations:  Home with Home health OT, Continue to assess pending progress, Patient would benefit from continued therapy after discharge, Home with assist PRN       Patient Diagnosis(es): There were no encounter diagnoses. has a past medical history of Acid reflux, Anxiety, Arthritis, Atrial fibrillation (HCC), Closed fracture dislocation of right elbow, Depression, Fibromyalgia, Migraine, Rectal cancer ([facility] [address] and Wears glasses. has a past surgical history that includes  section; Total knee arthroplasty (Left); open tx femoral supracondylar fracture w/o extension (Right, 10/25/2018); Gastric bypass surgery; Neck surgery; other surgical history; Cholecystectomy; Appendectomy; Colonoscopy; Upper gastrointestinal endoscopy (N/A, 2019); Colonoscopy (N/A, 2019); knee surgery (Right); Upper gastrointestinal endoscopy (N/A, 2020); incision and drainage (Right, 2021); and Skin graft (Right, 2021). Restrictions  Restrictions/Precautions  Restrictions/Precautions: Fall Risk, Weight Bearing  Lower Extremity Weight Bearing Restrictions  Right Lower Extremity Weight Bearing: Weight Bearing As Tolerated  Position Activity Restriction  Other position/activity restrictions: Wound Vac R Lat Calf. PT spoke with Surg (LUCAS Eddy NP) : WBAT R LE. Subjective   General  Chart Reviewed: Yes  Patient assessed for rehabilitation services?: Yes  Additional Pertinent Hx: 66 y/o female admit 2021 with R LE Traumatic Hematoma (hit R LE on car door).  s/p Evac of R LE Hematoma. Pt returned to OR 2021 S/P Evac of R LE Hematoma, Excisional Debride (16B62 cm), Applic of Wound Vac. Plastic and Reconstruct Surg follow for Staged Reconstruct with Skin Graft.   PMH as noted including Gastric Bypass, Rectal Ca, L TKR, R Femur Fx/ORIF (10/2018). Response to previous treatment: Patient with no complaints from previous session  Family / Caregiver Present: No  Referring Practitioner: Kaim/[place]  Diagnosis: traumatic hematoma RLE with infection  Subjective  Subjective: Patient seen in therapy department, agreeable to OT. Mild reports of pain in right LE      Orientation  Orientation  Overall Orientation Status: Within Functional Limits  Objective    ADL  Grooming: Stand by assistance(standing in front of sink to wash hands)  Toileting: Stand by assistance        Balance  Sitting Balance: Independent  Standing Balance: Stand by assistance  Standing Balance  Activity: Stood for ADL tasks  Functional Mobility  Functional - Mobility Device: Rolling Walker  Activity: To/from bathroom; Other  Assist Level: Contact guard assistance  Functional Mobility Comments: Functional mobility with RW with CGA/SBA with slow steady gait, with no overt LOB noted  Toilet Transfers  Toilet - Technique: Ambulating  Equipment Used: Grab bars  Toilet Transfer: Stand by assistance;Contact guard assistance  Bed mobility  Supine to Sit: Stand by assistance  Sit to Supine: Contact guard assistance;Minimal assistance  Comment: flat regular bed without rails  Transfers  Sit to stand: Stand by assistance;Contact guard assistance  Stand to sit: Stand by assistance;Contact guard assistance  Transfer Comments: SBA/CGA for sit<>stand from w/c to recliner chair to regular bed to comfort height commode with rails with CGA/SBA           Cognition  Overall Cognitive Status: WFL           Type of ROM/Therapeutic Exercise  Type of ROM/Therapeutic Exercise: Free weights  Comment: 1# free weight with B UE's 10 reps to increase [place] with functional mobility and transfers. Exercises  Grasp/Release: 25 reps with yellow digi flex with both hands     Assessment   Performance deficits / Impairments: Decreased functional mobility ; Decreased endurance;Decreased

## 2021-02-15 NOTE — PROGRESS NOTES
Patient admitted with hematoma to right leg, requiring surgeries and wound vac. Vac working well with veriflo, no issues. PICC to RUE. Pain meds PRN. Pills whole in thin liquids. Continent of bowel and bladder. Phenergan given for nausea this AM, she has chronic nausea post gastric bypass. Will monitor.

## 2021-02-15 NOTE — PROGRESS NOTES
Department of Physical Medicine & Rehabilitation  Progress Note    Patient Identification:  Virgen Ahmadi  [id number]  : 1945  Admit date: 2021    Chief Complaint: Traumatic hematoma of right lower leg with infection    Subjective:   Patient seen this AM sitting up in bed. Patient did well over the weekend, with no problems noted. She states her right lower leg is feeling better. Repeat labs this morning  look good and are stable. Patient is set up to go to the OR on Wednesday for skin graft from ipsilateral thigh per plastic surgery. ROS: No f/c, n/v, cp     Objective:  Patient Vitals for the past 24 hrs:   BP Temp Temp src Pulse Resp SpO2 Weight   02/15/21 0930 118/72 -- -- 80 -- -- --   02/15/21 0439 (!) 143/82 97.8 °F (36.6 °C) Oral 70 16 93 % 184 lb 4.9 oz (83.6 kg)   21 194 137/80 98.5 °F (36.9 °C) Oral 67 16 93 % --   21 1500 (!) 144/76 98.4 °F (36.9 °C) Oral 65 18 93 % --     Const: Alert. No distress, pleasant. HEENT: Normocephalic, atraumatic. Normal sclera/conjunctiva. MMM. CV: Regular rate and rhythm. Resp: No respiratory distress. Lungs CTAB. Abd: Soft, nontender, nondistended, NABS+   Ext: No edema. RLE wound vac in place  Neuro: Alert, oriented, appropriately interactive. Psych: Cooperative, appropriate mood and affect    Laboratory data: Available via EMR.    Last 24 hour lab  Recent Results (from the past 24 hour(s))   Basic Metabolic Panel    Collection Time: 02/15/21  5:30 AM   Result Value Ref Range    Sodium 138 136 - 145 mmol/L    Potassium 3.7 3.5 - 5.1 mmol/L    Chloride 102 99 - 110 mmol/L    CO2 29 21 - 32 mmol/L    Anion Gap 7 3 - 16    Glucose 83 70 - 99 mg/dL    BUN 17 7 - 20 mg/dL    CREATININE <0.5 (L) 0.6 - 1.2 mg/dL    GFR Non-African American >60 >60    GFR African American >60 >60    Calcium 8.9 8.3 - 10.6 mg/dL   CBC Auto Differential    Collection Time: 02/15/21  5:30 AM   Result Value Ref Range    WBC 4.1 4.0 - 11.0 K/uL    RBC 2.87 (L) 4.00 - 5.20 M/uL    Hemoglobin 8.2 (L) 12.0 - 16.0 g/dL    Hematocrit 26.4 (L) 36.0 - 48.0 %    MCV 91.9 80.0 - 100.0 fL    MCH 28.8 26.0 - 34.0 pg    MCHC 31.3 31.0 - 36.0 g/dL    RDW 19.6 (H) 12.4 - 15.4 %    Platelets 275 714 - 941 K/uL    MPV 7.4 5.0 - 10.5 fL    Neutrophils % 61.1 %    Lymphocytes % 26.3 %    Monocytes % 9.8 %    Eosinophils % 2.2 %    Basophils % 0.6 %    Neutrophils Absolute 2.5 1.7 - 7.7 K/uL    Lymphocytes Absolute 1.1 1.0 - 5.1 K/uL    Monocytes Absolute 0.4 0.0 - 1.3 K/uL    Eosinophils Absolute 0.1 0.0 - 0.6 K/uL    Basophils Absolute 0.0 0.0 - 0.2 K/uL   Magnesium    Collection Time: 02/15/21  5:30 AM   Result Value Ref Range    Magnesium 1.70 (L) 1.80 - 2.40 mg/dL       Therapy progress:  PT  Position Activity Restriction  Other position/activity restrictions: Wound Vac R Lat Calf. PT spoke with Surg (LUCAS Eddy NP) : WBAT R LE. Objective     Sit to Stand: Stand by assistance  Stand to sit: Stand by assistance  Device: Rolling Walker  Other Apparatus: (therapist managed wound vac on IV pole)  Assistance: Stand by assistance  Distance: 58'x2 with multiple turns each trial, short distances in therapy gym and room  OT  PT Equipment Recommendations  Other: Will monitor for potential equipt needs. pt has a RW and rollator          Body mass index is 34.82 kg/m².     Rehabilitation Diagnosis:   16.0, Debility (non-cardiac, non-pulmonary        Assessment and Plan:     Impairments: generalized weakness, decreased ROM distal RLE, decreased balance, endurance     Debility  -PT/OT     Right leg hematoma   -s/p evacuation (1/28 with Dr. Deion Ratliff)  -s/p evacuation, debridement and wound vac placement (1/31 with Dr. Dorothy Owen)  -Plan for STSG on 2/17      Acute blood loss anemia with hemorrhagic shock  -Due to traumatic hematoma   -Monitor Hgb, transfuse prn <7.      Paroxysmal Afib  -Eliquis on hold due to above until cleared by Surgery  -flecainide, metoporlol     H/o gastric bypass (Shanice-en-Y)  -Vitamin supplements  -Dietitian consult     Hypomagnesemia  -supplement     Anxiety/depression  -sertraline, prn alprazolam     Acute on chronic pain control  -H/o fibromyalgia  -prn Norco, tizanidine     Bladder   -High risk retention   -Monitor PVRs, SC prn >300cc     Bowel   -High risk constipation   -senna+colace BID, PRN miralax, MoM, and bisacodyl supp.     Safety   -fall precautions     PPx  -DVT: on hold due to hematoma  -GI: pantoprazole    Rehab Progress: Tolerating therapy well thus far. Working on functional mobility, compensatory strategies, balance, strength, endurance. Anticipated Dispo: home with son  Services/DME: [facility]CHAR[facility]  ELOS: 7-10 days      Kerrie Gordon.  MD [place] 2/15/2021, 10:02 AM

## 2021-02-15 NOTE — PROGRESS NOTES
glasses. has a past surgical history that includes  section; Total knee arthroplasty (Left); open tx femoral supracondylar fracture w/o extension (Right, 10/25/2018); Gastric bypass surgery; Neck surgery; other surgical history; Cholecystectomy; Appendectomy; Colonoscopy; Upper gastrointestinal endoscopy (N/A, 2019); Colonoscopy (N/A, 2019); knee surgery (Right); Upper gastrointestinal endoscopy (N/A, 2020); incision and drainage (Right, 2021); and Skin graft (Right, 2021). Restrictions  Restrictions/Precautions  Restrictions/Precautions: Fall Risk, Weight Bearing  Lower Extremity Weight Bearing Restrictions  Right Lower Extremity Weight Bearing: Weight Bearing As Tolerated  Position Activity Restriction  Other position/activity restrictions: Wound Vac R Lat Calf. PT spoke with Surg (LUCAS Eddy NP) : WBAT R LE. Subjective   General  Chart Reviewed: Yes  Additional Pertinent Hx: 68 y/o female admit 2021 with R LE Traumatic Hematoma (hit R LE on car door). 2021 S/P Evac of R LE Hematoma, Excisional Debride (62E59 cm), Applic of Wound Vac. Plastic and Reconstruct Surg follow for Staged Reconstruct with Skin Graft. PMH as noted including Gastric Bypass, Rectal Ca, L TKR, R Femur Fx/ORIF (10/2018). Response To Previous Treatment: Patient with no complaints from previous session. Family / Caregiver Present: No  Referring Practitioner: Dr Marco Montano  Subjective  Subjective: Pt pleasant and agreeable to PT treatment. 5/10 pain R lower leg with main meds. Orientation  Orientation  Overall Orientation Status: Within Normal Limits  Objective   Bed mobility  Bridging: Stand by assistance  Supine to Sit: Minimal assistance  Sit to Supine:  Moderate assistance  Comment: Flat mat, could not get LEs on/off bed  Transfers  Sit to Stand: Stand by assistance  Stand to sit: Stand by assistance  Comment: cues to keep both hands on RW when backing up to chair, cues for hand term goal 2: transfers from all surfaces including car modif I  Short term goal 3: Amb with assist device, WBAT R LE, 150' modif I  Short term goal 4: curb step with wh walker SBA  Long term goals  Time Frame for Long term goals : set after skin grafting if needed  Patient Goals   Patient goals : Be able to return home with son. Plan    Plan  Times per week: 5-6x/week  Times per day: Twice a day  Plan weeks: 7 to 10 days depending on how pt tolerates skinn grafting next week  Specific instructions for Next Treatment: increase gt, try curb step as able  Current Treatment Recommendations: Strengthening, Functional Mobility Training, Transfer Training, Gait Training, Safety Education & Training, Patient/Caregiver Education & Training, Endurance Training, Equipment Evaluation, Education, & procurement, Home Exercise Program  Safety Devices  Type of devices:  All fall risk precautions in place, Gait belt, Patient at risk for falls(Pt to OT with Alveria Cue)  Restraints  Initially in place: No     Therapy Time AM:   Individual Concurrent Group Co-treatment   Time In 1030         Time Out 1115         Minutes 45         Timed Code Treatment Minutes: 45 Minutes    Therapy Time AM:   Individual Concurrent Group Co-treatment   Time In 1430         Time Out 1515         Minutes 45         Timed Code Treatment Minutes: [address], [address] [address] [place] [address]

## 2021-02-15 NOTE — PROGRESS NOTES
OCCUPATIONAL THERAPY  Progress Note   Second Session    Patient Name: Rafaela Velazquez  Medical Record Number: [id number]    Treatment Diagnosis: decreased activity tolerance    General  Chart Reviewed: Yes  Patient assessed for rehabilitation services?: Yes  Additional Pertinent Hx: 66 y/o female admit 1/31/2021 with R LE Traumatic Hematoma (hit R LE on car door). 1/28 s/p Evac of R LE Hematoma. Pt returned to OR 1/31/2021 S/P Evac of R LE Hematoma, Excisional Debride (05Q29 cm), Applic of Wound Vac. Plastic and Reconstruct Surg follow for Staged Reconstruct with Skin Graft. PMH as noted including Gastric Bypass, Rectal Ca, L TKR, R Femur Fx/ORIF (10/2018). Response to previous treatment: Patient with no complaints from previous session  Family / Caregiver Present: No  Referring Practitioner: Kami/Jackson  Diagnosis: traumatic hematoma RLE with infection     Restrictions/Precautions  Restrictions/Precautions: Fall Risk, Weight Bearing  Lower Extremity Weight Bearing Restrictions  Right Lower Extremity Weight Bearing: Weight Bearing As Tolerated     Position Activity Restriction  Other position/activity restrictions: Wound Vac R Lat Calf. PT spoke with Surg (LUCAS Eddy NP) : WBAT R LE. Subjective: pt met in dept for OT. Pt agreeable, denied pain    Objective:  ADLs: Pt practiced threading hospital pants over LEs with use of reacher for LLE with min A to get over  socks. Pt doffed off feet using reacher. Pt reports she has a reacher at home. IADL: Pt ambulated in OT kitchen SBA using RW. Pt retrieved juice from fridge with SBA, cues for best hand placement. Pt transported to kitchen table in walker basket, then sat in arm chair at table SBA. Pt then transported cup from table > counter to practice putting away in sink.  Pt highly interested in walker basket, reports walker tray has not worked well for her in the past.    Therex: Pt used 4# medicine ball for x10-15 reps: elbow flexion/extension, chest press, front raises to 90*, diagonal patterns. Pt tolerated well with rest breaks PRN. Standing tolerance: Pt stood 2 mins 55 seconds at the table while completing [place] puzzle in order to improve standing tolerance/endurance. Pt tolerated fairly well, though reports LEs feel tired. Handoff with Hai Lind PT. Assessment: Pt tolerated tx session fair though continues to be limited by decreased activity tolerance. Pt practiced completing simple kitchen tasks using walker basket and is interested in obtaining basket for home.  Cont per POC    Safety Device - Type of devices:  []  All fall risk precautions in place [] Bed alarm in place  [] Call light within reach [] Chair alarm in place [] Positioning belt [x] Gait belt [] Patient at risk for falls [] Left in bed [] Left in chair [] Telesitter in use [] Sitter present [] Nurse notified []  None    Therapy Time   Individual Co-treatment   Time In 1345     Time Out 1430     Minutes 45         Electronically signed by Balaji Cuevas OT on 2/15/2021 at 1:59 PM

## 2021-02-15 NOTE — PLAN OF CARE
Problem: Falls - Risk of:  Goal: Will remain free from falls  Description: Will remain free from falls  Outcome: Ongoing  Note: Fall risk assessment completed as charted. Pt is at risk for falls. Safety precautions in place. Call light and pt belongings in reach. Bed in low position. Bed/Chair Alarm on. Nonskid footwear on. All needs met. Pt instructed to call before getting up or out of bed. Pt verbalized understanding. Problem: Skin Integrity:  Goal: Will show no infection signs and symptoms  Description: Will show no infection signs and symptoms  Outcome: Ongoing  Note: Pt assessed for risk of skin breakdown. Encouraged pt to turn and reposition self while in bed. Skin clean and dry. No new skin breakdown noted. Heels floating. Will continue to assess skin condition each shift. Problem: Pain:  Goal: Pain level will decrease  Description: Pain level will decrease  Outcome: Ongoing  Note: Pt assessed for pain this shift. Pt pain/discomfort is managed with PRN pain medications per md order. Pt able to verbalize pain by using numerical scale. Education provided and documented.

## 2021-02-16 ENCOUNTER — ANESTHESIA EVENT (OUTPATIENT)
Dept: OPERATING ROOM | Age: 76
DRG: 940 | End: 2021-02-16
Payer: MEDICARE

## 2021-02-16 PROCEDURE — 97110 THERAPEUTIC EXERCISES: CPT

## 2021-02-16 PROCEDURE — 1280000000 HC REHAB R&B

## 2021-02-16 PROCEDURE — 2580000003 HC RX 258: Performed by: PHYSICAL MEDICINE & REHABILITATION

## 2021-02-16 PROCEDURE — 97535 SELF CARE MNGMENT TRAINING: CPT

## 2021-02-16 PROCEDURE — 6370000000 HC RX 637 (ALT 250 FOR IP): Performed by: PHYSICAL MEDICINE & REHABILITATION

## 2021-02-16 PROCEDURE — 94760 N-INVAS EAR/PLS OXIMETRY 1: CPT

## 2021-02-16 PROCEDURE — 97530 THERAPEUTIC ACTIVITIES: CPT | Performed by: PHYSICAL THERAPIST

## 2021-02-16 PROCEDURE — 97530 THERAPEUTIC ACTIVITIES: CPT

## 2021-02-16 PROCEDURE — 97116 GAIT TRAINING THERAPY: CPT | Performed by: PHYSICAL THERAPIST

## 2021-02-16 PROCEDURE — 97116 GAIT TRAINING THERAPY: CPT

## 2021-02-16 PROCEDURE — 97110 THERAPEUTIC EXERCISES: CPT | Performed by: PHYSICAL THERAPIST

## 2021-02-16 RX ADMIN — METOCLOPRAMIDE 5 MG: 10 TABLET ORAL at 11:22

## 2021-02-16 RX ADMIN — HYDROCODONE BITARTRATE AND ACETAMINOPHEN 2 TABLET: 5; 325 TABLET ORAL at 10:40

## 2021-02-16 RX ADMIN — PANTOPRAZOLE SODIUM 40 MG: 40 TABLET, DELAYED RELEASE ORAL at 16:18

## 2021-02-16 RX ADMIN — SODIUM CHLORIDE, PRESERVATIVE FREE 10 ML: 5 INJECTION INTRAVENOUS at 21:36

## 2021-02-16 RX ADMIN — ACETAMINOPHEN 650 MG: 325 TABLET ORAL at 14:12

## 2021-02-16 RX ADMIN — METOCLOPRAMIDE 5 MG: 10 TABLET ORAL at 16:18

## 2021-02-16 RX ADMIN — CALCIUM 500 MG: 500 TABLET ORAL at 07:27

## 2021-02-16 RX ADMIN — MEMANTINE 5 MG: 5 TABLET ORAL at 21:34

## 2021-02-16 RX ADMIN — HYDROCODONE BITARTRATE AND ACETAMINOPHEN 2 TABLET: 5; 325 TABLET ORAL at 23:23

## 2021-02-16 RX ADMIN — SODIUM CHLORIDE, PRESERVATIVE FREE 10 ML: 5 INJECTION INTRAVENOUS at 07:28

## 2021-02-16 RX ADMIN — MEMANTINE 5 MG: 5 TABLET ORAL at 07:27

## 2021-02-16 RX ADMIN — METOCLOPRAMIDE 5 MG: 10 TABLET ORAL at 07:27

## 2021-02-16 RX ADMIN — ALPRAZOLAM 0.5 MG: 0.5 TABLET ORAL at 21:34

## 2021-02-16 RX ADMIN — METOPROLOL SUCCINATE 50 MG: 50 TABLET, EXTENDED RELEASE ORAL at 07:26

## 2021-02-16 RX ADMIN — PROMETHAZINE HYDROCHLORIDE 12.5 MG: 25 TABLET ORAL at 07:27

## 2021-02-16 RX ADMIN — HYDROCODONE BITARTRATE AND ACETAMINOPHEN 2 TABLET: 5; 325 TABLET ORAL at 17:26

## 2021-02-16 RX ADMIN — TIZANIDINE 2 MG: 4 TABLET ORAL at 21:34

## 2021-02-16 RX ADMIN — Medication 400 MG: at 21:34

## 2021-02-16 RX ADMIN — PANTOPRAZOLE SODIUM 40 MG: 40 TABLET, DELAYED RELEASE ORAL at 05:38

## 2021-02-16 RX ADMIN — FUROSEMIDE 20 MG: 20 TABLET ORAL at 07:26

## 2021-02-16 RX ADMIN — Medication 400 MG: at 07:27

## 2021-02-16 RX ADMIN — FLECAINIDE ACETATE 100 MG: 100 TABLET ORAL at 07:27

## 2021-02-16 RX ADMIN — HYDROCODONE BITARTRATE AND ACETAMINOPHEN 2 TABLET: 5; 325 TABLET ORAL at 04:01

## 2021-02-16 RX ADMIN — FLECAINIDE ACETATE 100 MG: 100 TABLET ORAL at 21:34

## 2021-02-16 RX ADMIN — PROMETHAZINE HYDROCHLORIDE 12.5 MG: 25 TABLET ORAL at 21:34

## 2021-02-16 RX ADMIN — SERTRALINE 100 MG: 100 TABLET, FILM COATED ORAL at 21:34

## 2021-02-16 ASSESSMENT — PAIN DESCRIPTION - DESCRIPTORS
DESCRIPTORS: ACHING;DISCOMFORT

## 2021-02-16 ASSESSMENT — PAIN SCALES - GENERAL
PAINLEVEL_OUTOF10: 5
PAINLEVEL_OUTOF10: 2
PAINLEVEL_OUTOF10: 5
PAINLEVEL_OUTOF10: 5
PAINLEVEL_OUTOF10: 6
PAINLEVEL_OUTOF10: 5
PAINLEVEL_OUTOF10: 6
PAINLEVEL_OUTOF10: 5

## 2021-02-16 ASSESSMENT — PAIN DESCRIPTION - PROGRESSION
CLINICAL_PROGRESSION: GRADUALLY WORSENING
CLINICAL_PROGRESSION: RESOLVED
CLINICAL_PROGRESSION: GRADUALLY IMPROVING
CLINICAL_PROGRESSION: GRADUALLY IMPROVING
CLINICAL_PROGRESSION: NOT CHANGED
CLINICAL_PROGRESSION: GRADUALLY WORSENING
CLINICAL_PROGRESSION: GRADUALLY WORSENING

## 2021-02-16 ASSESSMENT — PAIN DESCRIPTION - ONSET
ONSET: ON-GOING

## 2021-02-16 ASSESSMENT — PAIN - FUNCTIONAL ASSESSMENT
PAIN_FUNCTIONAL_ASSESSMENT: ACTIVITIES ARE NOT PREVENTED

## 2021-02-16 ASSESSMENT — PAIN DESCRIPTION - LOCATION
LOCATION: LEG
LOCATION: LEG

## 2021-02-16 ASSESSMENT — PAIN DESCRIPTION - FREQUENCY
FREQUENCY: CONTINUOUS

## 2021-02-16 ASSESSMENT — PAIN DESCRIPTION - ORIENTATION
ORIENTATION: RIGHT
ORIENTATION: RIGHT

## 2021-02-16 ASSESSMENT — PAIN DESCRIPTION - PAIN TYPE: TYPE: ACUTE PAIN;SURGICAL PAIN

## 2021-02-16 NOTE — PATIENT CARE CONFERENCE
[facility]  Inpatient Rehabilitation  Weekly Team Conference Note      Date: 2021  Patient Name:  Stew Matthews    MRN: [id number]  : 1945  Gender: female  Physician: Dr Hector Cowan   Diagnosis: Traumatic hematoma of right lower leg with infection, initial encounter [S80.11XA, L08.9]    CASE MANAGEMENT  Assessment:    Goal is home.       PHYSICAL THERAPY    Bed mobility  Bridging: Stand by assistance  Rolling to Right: Supervision  Supine to Sit: Minimal assistance  Sit to Supine: Minimal assistance, Contact guard assistance  Scooting: Stand by assistance  Comment: flat mat, able to better lift legs on/off mat, up to min A for trunk with supine to R side to sit on edge of bed - complain of R elbow pain due to history of R elbow fracture    Transfers:  Sit to Stand: Stand by assistance  Stand to sit: Stand by assistance  Comment: cues for hand placement -carol to keep both hands on RW when backing up to chair and with stand to sit    WB Status: WBAT R LE  Ambulation 1  Surface: level tile  Device: Rolling Walker  Other Apparatus: (therapist managed wound vac on IV pole)  Assistance: Stand by assistance  Quality of Gait: slow marlena; decreased toe clearance on RLE, decreased stance phase on R, forward flexed posture, tends to push walker to side as turning and backing to sit  Gait Deviations: Decreased step height, Decreased step length, Slow Marlena  Distance: 90'x 2 with multiple turns, shorter distances with multiple turns to access curb and requested to rest in chair prior to attempting curb after ~40' ambulation  Comments: distance limited by increase in RLE pain and general fatigue, mild shortness of breath           Stairs  # Steps : 1  Curbs: 6\"  Device: Rolling walker  Other Apparatus: (therapist managed wound vac on IV pole)  Assistance: Contact guard assistance, Minimal assistance  Comment: pt instructed on \"up with the good, down with the bad\" and able to complete with Mino ascending and CGA descending    Car Transfer: Contact guard assistance, Stand by assistance(cues for walker safety with turning and backing, cues for hand placement and assist to manage wound vac)      Assessment: PTA pt living with son in home with ramp access and 1st floor bed/bath. Prior, pt independent with daily care and functional mobility. Pt is limited by decreased activity tolerance and has increased RLE pain with mobility. She was able to ambulate 80' with RW and SBA and completed transfers with SBA and verbal cues for walker safety and hand placement. She does require up to min A for bed mobility, but was able to manage LEs better with slightly improving strength. Patient is below baseline functional status and therefore would benefit from additional therapy. Anticipate skin graft to occur on Wednesday, 2/17, so would be appropriate to continue with intensive therapies to improve mobility prior to her next surgery and as ordered and appropriate after skin graft. Patient anticipates returning directly to inpatient rehab after anticipated skin graft to continue with mobility training and goal to return home. SPEECH THERAPY (intentionally left blank if not actively being seen by this service):      OCCUPATIONAL THERAPY    ADL  Equipment Provided: Reacher, Sock aid, Long-handled shoe horn  Feeding: Independent  Grooming: Independent(seated in wheelchair at sink for oral care, hair)  UE Bathing: Modified independent   LE Bathing: Stand by assistance(SBA to stand to bathe buttocks, managed wound vac tubing, long sponge to bathe feet, reacher/towel to dry)  UE Dressing: Independent(pt took off shirt and replaced same shirt as it was clean this AM)  LE Dressing: Stand by assistance(did not remove underwear and pants all the way, but did pull over hips.   Doffed slipper socks using reacher, donned using sock aid with education)  Toileting: Stand by assistance(urinated on commode)  Additional Comments: Completed full sponge bath while seated at sink. Educated pt regarding LB bathing and dressing equipment    Bed mobility  Bridging: Stand by assistance  Rolling to Right: Supervision  Supine to Sit: Minimal assistance  Sit to Supine: Minimal assistance, Contact guard assistance  Scooting: Stand by assistance  Comment: flat mat, able to better lift legs on/off mat, up to min A for trunk with supine to R side to sit on edge of bed - complain of R elbow pain due to history of R elbow fracture    Functional Transfers: Toilet Transfers  Toilet - Technique: Ambulating  Equipment Used: Grab bars  Toilet Transfer: Stand by assistance, Contact guard assistance     Shower Transfers  Shower Transfers: Not tested  [employer] Comments: unable at this time              UE Function:  WFL    Assessment: Pt has improved to SBA for sponge bath and dressing using LB bathing and dressing equipment. She requires SBA for transfers and functional mobility using rolling walker, but assist to manage wound vac. Pt is to have surgery Weds 2/17, anticipate she will be ready for discharge by the end of the week pending medical status after surgery              NUTRITION  Most recent weightWeight: 184 lb 8.4 oz (83.7 kg)     BMI (Calculated): 34.9  Please see nutrition note for details. NURSING  Continent of bladder and bowel. Monitor wound vac and skin graft. Monitor PICC line. Family Education: Patient education: wound, skin graft care, medications, pain control, safety and fall prevention. MEDICAL   She had a skin graft applied this morning, and the application of the skin graft went well after talking to Dr. Clois Frankel, the plastic surgeon. The wound VAC has been applied over the skin graft to help it adhere, and this will be removed on Tuesday before her discharge. Will recheck her labs tomorrow morning.     TEAM SUMMARY AND DISCHARGE PLAN  Estimated Length of Stay: DC 2/23/21  Destination: home with son and support from home care   · Anticipated Services at Discharge:    [] OT  [x] PT   [] SLP    [x] RN   [x] Home Health aide []   Community Resources: _______________________________  Equipment recommendations:  [] Hospital bed [] Tub bench  [] Shower chair [] Hand held shower  [] Raised toilet seat [] Toilet safety frame [] Bedside commode   [] W/C: _____  [] Rolling Walker [] Standard walker [] Gait belt [] cane: _________  [] Sliding board [] Alternate seating/furniture [] O2 [] Hip Kit: _______  [] Life Line [] Other: __has needed DME_____  Factors facilitating achievement of predicted outcomes: Family support, Caregiver support, Cooperative and Pleasant  Barriers to the achievement of predicted outcomes/Interventions:   Decreased activity tolerance- strengthening, conditioning, activity pacing and energy conservation strategies for safe self care and mobility      Interdisciplinary Individualized Plan of Care Review:    · Continue Current Plan of Care: Yes    · Modifications:_____________________________    Special Needs in the Upcoming Week :    [] Family/Caregiver Education  [] Home visit  []Therapeutic Pass   [] Consults:_______    [] Other;_______    Patient Rehab Team Goals for the Upcoming Week:  1. Patient will ambulate 150' with wheeled walker and supervision/SBA if continues to be WBAT after anticipated skin graft on 2/17/2021.  2. Transfers SBA- independent with RW    3. Patient goals : \"I want to get my legs stronger\"          Team Members Present at Conference:  Physician: Dr Juan Luis Baer  : JUAN JOSÉ Hendrickson    Occupational Therapist: Bernadette Leo OTR/L [address]  Physical Therapist:  Boone Coleman, [facility]  Speech Therapist:   Nurse: Chucky Maxwell RN, CRRN  Nathaniel Mora, VONDA, LD   Lucila Larry     I led this team conference and I approve the established interdisciplinary plan of care as documented within the medical record of [facility].     MD: Dr. Juan Luis Baer  2/17/2021 10:27 AM

## 2021-02-16 NOTE — PLAN OF CARE
Problem: SKIN INTEGRITY  Goal: LTG - Patient will be free from infection  2/15/2021 2302 by Chey Be RN  Outcome: Ongoing  Note: Pt assessed for risk of skin breakdown. Encouraged pt to turn and reposition self while in bed. Skin clean and dry. No new skin breakdown noted. Heels floating. Will continue to assess skin condition each shift. Problem: PAIN  Goal: LTG - Patient will manage pain with the appropriate technique/Intervention  Outcome: Ongoing  Note: Pt assessed for pain this shift. Pt pain/discomfort is managed with PRN pain medications per md order. Pt able to verbalize pain by using numerical scale. Education provided and documented. Problem: Falls - Risk of:  Goal: Will remain free from falls  Description: Will remain free from falls  2/15/2021 2302 by Chey Be RN  Outcome: Ongoing  Note: Fall risk assessment completed as charted. Pt is at risk for falls. Safety precautions in place. Call light and pt belongings in reach. Bed in low position. Bed/Chair Alarm on. Nonskid footwear on. All needs met. Pt instructed to call before getting up or out of bed. Pt verbalized understanding.

## 2021-02-16 NOTE — PROGRESS NOTES
Occupational Therapy  Facility/Department: [address] IP REHAB  Daily Treatment Note  NAME: Nick De La Torre  : 1945  MRN: [id number]    Date of Service: 2021    Discharge Recommendations:  Home with Home health OT, Continue to assess pending progress, Patient would benefit from continued therapy after discharge, Home with assist PRN  OT Equipment Recommendations  Other: TBD    Assessment   Performance deficits / Impairments: Decreased functional mobility ; Decreased endurance;Decreased strength;Decreased ADL status; Decreased safe awareness;Decreased high-level IADLs;Decreased balance  Assessment: Pt has improved to SBA for sponge bath and dressing using LB bathing and dressing equipment. She requires SBA for transfers and functional mobility using rolling walker, but assist to manage wound vac. Pt is to have surgery Weds , anticipate she will be ready for discharge by the end of the week pending medical status after surgery  Treatment Diagnosis: decreased ADL, balance, activity tolerance,  Prognosis: Good  OT Education: ADL Adaptive Strategies  REQUIRES OT FOLLOW UP: Yes  Activity Tolerance  Activity Tolerance: Patient Tolerated treatment well  Safety Devices  Safety Devices in place: Yes  Type of devices: Gait belt;Call light within reach; Chair alarm in place; Left in chair              has a past medical history of Acid reflux, Anxiety, Arthritis, Atrial fibrillation (HCC), Closed fracture dislocation of right elbow, Depression, Fibromyalgia, Migraine, Rectal cancer ([facility] [address] and Wears glasses. has a past surgical history that includes  section; Total knee arthroplasty (Left); open tx femoral supracondylar fracture w/o extension (Right, 10/25/2018); Gastric bypass surgery; Neck surgery; other surgical history; Cholecystectomy; Appendectomy; Colonoscopy; Upper gastrointestinal endoscopy (N/A, 2019); Colonoscopy (N/A, 2019); knee surgery (Right);  Upper gastrointestinal endoscopy (N/A, 8/18/2020); incision and drainage (Right, 1/28/2021); and Skin graft (Right, 1/31/2021). Restrictions  Restrictions/Precautions  Restrictions/Precautions: Fall Risk, Weight Bearing  Lower Extremity Weight Bearing Restrictions  Right Lower Extremity Weight Bearing: Weight Bearing As Tolerated  Position Activity Restriction  Other position/activity restrictions: Wound Vac R Lat Calf. PT spoke with Surg (LUCAS Eddy NP) : WBAT R LE. Subjective   General  Chart Reviewed: Yes, Progress Notes  Patient assessed for rehabilitation services?: Yes  Additional Pertinent Hx: 66 y/o female admit 1/31/2021 with R LE Traumatic Hematoma (hit R LE on car door). 1/28 s/p Evac of R LE Hematoma. Pt returned to OR 1/31/2021 S/P Evac of R LE Hematoma, Excisional Debride (28Z60 cm), Applic of Wound Vac. Plastic and Reconstruct Surg follow for Staged Reconstruct with Skin Graft. PMH as noted including Gastric Bypass, Rectal Ca, L TKR, R Femur Fx/ORIF (10/2018). Response to previous treatment: Patient with no complaints from previous session  Family / Caregiver Present: No  Referring Practitioner: Kami/Jackson  Diagnosis: traumatic hematoma RLE with infection  Subjective  Subjective: pt met bedside, agreeable to sponge bath, no complaint of pain           Objective    ADL  Equipment Provided: Reacher;Sock aid;Long-handled shoe horn  Grooming: Independent(seated in wheelchair at sink for oral care, hair)  UE Bathing: Modified independent   LE Bathing: Stand by assistance(SBA to stand to bathe buttocks, managed wound vac tubing, long sponge to bathe feet, reacher/towel to dry)  UE Dressing: Independent(pt took off shirt and replaced same shirt as it was clean this AM)  LE Dressing: Stand by assistance(did not remove underwear and pants all the way, but did pull over hips.   Doffed slipper socks using reacher, donned using sock aid with education)  Toileting: Stand by assistance(urinated on commode)  Additional Comments: Completed full sponge bath while seated at sink. Educated pt regarding LB bathing and dressing equipment        Balance  Sitting Balance: Independent  Standing Balance: Stand by assistance  Functional Mobility  Functional - Mobility Device: Rolling Walker  Activity: To/from bathroom  Assist Level: Stand by assistance  Functional Mobility Comments: assist to manage wound vac  Wheelchair Bed Transfers  Wheelchair/Bed - Technique: Ambulating  Equipment Used: Wheelchair; Other(recliner)  Level of Asssistance: Stand by assistance  Wheelchair Transfers Comments: RW                             Cognition  Overall Cognitive Status: WFL               PM session  Pt met in dept, agreeable to OT, requests laundry  Returned pt to room, gathered laundry from closet seated in wheelchair indep. Amb part way to washer, SBA using rolling walker, assist to manage wound vac, carried laundry over walker,  Set to top of washer and then placed in machine. Cues to set washer due to unfamiliar machine, sat briefly to rest, but no difficulty noted  As pt amb back to wheelchair, approx 30 ft, she stated she felt SOB, so sat to wheelchair vs amb to dept as planned. Nurse notified, O2 sats 98 %, HR 68 bpm, quickly resolved. UE ex 2 lb dumbbell to improve activity tolerance for ADL/mobility, emphasis on deep breathing during exercises  10 reps each, shoulder flex/ext, horizontal abd/add, int/ext shoulder rotation, elbow flex/ext, sup/pron, wrist flex/ext    Notified PT of pt's SOB as she was amb further than this in previous sessions.     Pt to PT after OT                          Plan   Plan  Times per week: 5-6  Times per day: Twice a day  Plan weeks: 1 week  Current Treatment Recommendations: Strengthening, Endurance Training, Balance Training, Gait Training, Functional Mobility Training, Self-Care / ADL, Patient/Caregiver Education & Training, Equipment Evaluation, Education, & procurement, Home Management Training, Safety Education & Training               Goals  Short term goals  Time Frame for Short term goals: 1 week pt dom [place] term goal 1: bathe indep with AD as needed  Short term goal 2: dress indep with AD as needed  Short term goal 3: toilet indep with AD  Short term goal 4: transfers indep with AD  Short term goal 5: functional mobility indep  Short term goal 6: increase activity tolerance to stand 3 min for ADL/mobility  Long term goals  Time Frame for Long term goals : same as stg  Patient Goals   Patient goals : \"I want to get my legs stronger\"       Therapy Time   Individual Concurrent Group Co-treatment   Time In 0930         Time Out 1030         Minutes 60         Timed Code Treatment Minutes: 60 Minutes     Therapy Time     Individual Co-treatment   Time In [address]     Minutes [address] [facility], OTR/L 770

## 2021-02-16 NOTE — PLAN OF CARE
Problem: PAIN  Goal: STG - pain is manageable through therapies  Outcome: Ongoing  Note: Pt assessed for pain. Pt in pain and assessed with 0-10 pain rating scale. Pt given prescribed analgesic for pain. (See eMar) Pt satisfied with pain relief thus far. Will reassess and continue to monitor. Problem: Falls - Risk of:  Goal: Will remain free from falls  Description: Will remain free from falls  Outcome: Ongoing  Note: Fall risk assessment completed. Fall precautions in place. Call light within reach. Pt educated on calling for assistance before getting up. Walkway free of clutter. Will continue to monitor. Problem: Skin Integrity:  Goal: Absence of new skin breakdown  Description: Absence of new skin breakdown  Outcome: Ongoing  Note: Patient's skin was assessed. Pt has wound on RLE with continuous wound vac. No new findings were noted. Patient is being educated on importance of turning/repostioning at least every two hours. RN will continue to educate and monitor.

## 2021-02-16 NOTE — PROGRESS NOTES
Recommendations  Other: Will monitor for potential equipt needs. pt has a RW and rollator  Toilet - Technique: Ambulating  Equipment Used: Canva       Body mass index is 34.87 kg/m². Rehabilitation Diagnosis:   16.0, Debility (non-cardiac, non-pulmonary        Assessment and Plan:     Impairments: generalized weakness, decreased ROM distal RLE, decreased balance, endurance     Debility  -PT/OT     Right leg hematoma   -s/p evacuation (1/28 with Dr. Aretha Martinez)  -s/p evacuation, debridement and wound vac placement (1/31 with Dr. Otilia Robles)  -Plan for STSG on 2/17      Acute blood loss anemia with hemorrhagic shock  -Due to traumatic hematoma   -Monitor Hgb, transfuse prn <7.      Paroxysmal Afib  -Eliquis on hold due to above until cleared by Surgery  -flecainide, metoporlol     H/o gastric bypass (Shanice-en-Y)  -Vitamin supplements  -Dietitian consult     Hypomagnesemia  -supplement     Anxiety/depression  -sertraline, prn alprazolam     Acute on chronic pain control  -H/o fibromyalgia  -prn Norco, tizanidine     Bladder   -High risk retention   -Monitor PVRs, SC prn >300cc     Bowel   -High risk constipation   -senna+colace BID, PRN miralax, MoM, and bisacodyl supp.     Safety   -fall precautions     PPx  -DVT: on hold due to hematoma  -GI: pantoprazole    Rehab Progress: Tolerating therapy well thus far. Working on functional mobility, compensatory strategies, balance, strength, endurance. Anticipated Dispo: home with son  Services/DME: TBD  ELOS: 7-10 days      Magno Hinojosa.  MD Jackson 2/16/2021, 11:37 AM

## 2021-02-16 NOTE — PROGRESS NOTES
Patient admitted to rehab with traumatic hematoma of RLE with infection. A&Ox4. Transfers with walker x1 assist. On general diet, tolerating well. Medications taken whole with thin liquids. Pt currently has wound vac on RLE. On room air. Has been continent of bowel and bladder. LBM 2/16/2021. Chair/bed alarms in use and call light in reach. Will continue to monitor.

## 2021-02-16 NOTE — PROGRESS NOTES
PHYSICAL THERAPY  Progress Note   Second Session    Patient Name: Nadja Khanna  Medical Record Number: [id number]    Treatment Diagnosis: decreased activity tolerance      Chart Reviewed: Yes   Restrictions/Precautions: Fall Risk, Weight Bearing Other position/activity restrictions: Wound Vac R Lat Calf. PT spoke with Surg (LUCAS Eddy NP) : WBAT R LE. Additional Pertinent Hx: 66 y/o female admit 1/31/2021 with R LE Traumatic Hematoma (hit R LE on car door). 1/31/2021 S/P Evac of R LE Hematoma, Excisional Debride (83W10 cm), Applic of Wound Vac. Plastic and Reconstruct Surg follow for Staged Reconstruct with Skin Graft. PMH as noted including Gastric Bypass, Rectal Ca, L TKR, R Femur Fx/ORIF (10/2018). WB Status: WBAT R LE      Subjective: Pt pleasant and agreeable to PT treatment. Reports 6/10 pain in RLE and reports shortness of breath. Plan is for skin grafting tomorrow. Objective  Seated exercises: x20 heel raises/toe raises, x15 alternating marches, x15 alternating LAQs, x15 glute sets, x15 hip ADD sets, x15 hip ABD lino with red t band, x15 hamstring curls lino (red to band LLE only due to wound vac RLE)  Pt ambulated 90'x2 with RW and SBA. Sp02 93-96% prior to ambulation and 96% after ambulation. Pt reports improvement in SOB as compared to OT session. Cues to stay within walker base. 2x5 sit to stand with SBA. Pt transported back to room via w/c at end of session. Ambulated short distance w/c to recliner chair with SBA. Pt positioned for comfort in recliner chair at end of session. Transfers throughout session SBA. PT managed wound vac on IV pole throughout the session. Assessment: PTA pt living with son in home with ramp access and 1st floor bed/bath. Prior, pt independent with daily care and functional mobility. Pt is limited by decreased activity tolerance and has increased RLE pain with mobility.   Pt reported having episode of SOB during OT prior to PT session, but improved during PT session. Sp02 WNL. Patient is below baseline functional status and therefore would benefit from additional therapy. Anticipate skin graft to occur on Wednesday, 2/17, so would be appropriate to continue with intensive therapies to improve mobility prior to her next surgery and as ordered and appropriate after skin graft. Patient anticipates returning directly to inpatient rehab after anticipated skin graft to continue with mobility training and goal to return home.       Safety Device - Type of devices:  [x]  All fall risk precautions in place [] Bed alarm in place  [x] Call light within reach [x] Chair alarm in place [] Positioning belt [x] Gait belt [] Patient at risk for falls [] Left in bed [x] Left in chair [] Telesitter in use [] Sitter present [] Nurse notified []  None      Therapy Time   Individual Co-treatment   Time In 1525     Time Out 1600     Minutes 35         Electronically signed by Lidya Beverly, [place] [id number] on 2/16/2021 at 4:08 PM

## 2021-02-16 NOTE — PROGRESS NOTES
Physical Therapy  Facility/Department: [address] [facility]HAB  Daily Treatment Note  NAME: Charmaine Floyd  : 1945  MRN: [id number]    Date of Service: 2021    Discharge Recommendations:  Patient would benefit from continued therapy after discharge, Home with Home health PT, Home with assist PRN, Continue to assess pending progress(dependent upon how pt tolerates skin grafting and if she has mobility restrictions after grafting)   PT Equipment Recommendations  Other: Will monitor for potential equipt needs. pt has a RW and rollator    Assessment   Body structures, Functions, Activity limitations: Decreased functional mobility ; Decreased strength;Decreased endurance; Increased pain;Decreased posture;Decreased balance  Assessment: PTA pt living with son in home with ramp access and 1st floor bed/bath. Prior, pt independent with daily care and functional mobility. Pt is limited by decreased activity tolerance and has increased RLE pain with mobility. She was able to ambulate 80' with RW and SBA and completed transfers with SBA and verbal cues for walker safety and hand placement. She does require up to min A for bed mobility, but was able to manage LEs better with slightly improving strength. Patient is below baseline functional status and therefore would benefit from additional therapy. Anticipate skin graft to occur on Wednesday, , so would be appropriate to continue with intensive therapies to improve mobility prior to her next surgery and as ordered and appropriate after skin graft. Patient anticipates returning directly to inpatient rehab after anticipated skin graft to continue with mobility training and goal to return home. Treatment Diagnosis: decreased activity tolerance  Prognosis: Good  PT Education: Transfer Training;General Safety;Gait Training;Functional Mobility Training; Adaptive Device Training  Patient Education: safe use of wheeled walker - cues for turning and backing up with wheeled walker with safe/preferred hand placement  REQUIRES PT FOLLOW UP: Yes  Activity Tolerance  Activity Tolerance: Patient Tolerated treatment well;Patient limited by endurance     Patient Diagnosis(es): There were no encounter diagnoses. has a past medical history of Acid reflux, Anxiety, Arthritis, Atrial fibrillation (HCC), Closed fracture dislocation of right elbow, Depression, Fibromyalgia, Migraine, Rectal cancer ([address] and Wears glasses. has a past surgical history that includes  section; Total knee arthroplasty (Left); open tx femoral supracondylar fracture w/o extension (Right, 10/25/2018); Gastric bypass surgery; Neck surgery; other surgical history; Cholecystectomy; Appendectomy; Colonoscopy; Upper gastrointestinal endoscopy (N/A, 2019); Colonoscopy (N/A, 2019); knee surgery (Right); Upper gastrointestinal endoscopy (N/A, 2020); incision and drainage (Right, 2021); and Skin graft (Right, 2021). Restrictions  Restrictions/Precautions  Restrictions/Precautions: Fall Risk, Weight Bearing  Lower Extremity Weight Bearing Restrictions  Right Lower Extremity Weight Bearing: Weight Bearing As Tolerated  Position Activity Restriction  Other position/activity restrictions: Wound Vac R Lat Calf. PT spoke with Surg (LUCAS Eddy NP) : WBAT R LE. Subjective   General  Chart Reviewed: Yes  Response To Previous Treatment: Patient with no complaints from previous session. Referring Practitioner: Dr. Paz/ Dr Livingston Simple  Subjective  Subjective: Pt pleasant and agreeable to PT treatment. To therapy department via [facility] with transporter. 4-5/10 pain R lower leg and \"little bit of a headache. \" Thinks she last had pain meds about 4am, which was confirmed and patient able to take pain meds only ~every 6 hours.           Orientation   WFL  Cognition   Cognition  Overall Cognitive Status: WFL  Objective   Bed mobility  Supine to Sit: Minimal assistance  Sit to Supine: Minimal assistance;Contact guard assistance  Comment: flat mat, able to better lift legs on/off mat, up to min A for trunk with supine to R side to sit on edge of bed - complain of R elbow pain due to history of R elbow fracture     Transfers  Sit to Stand: Stand by assistance  Stand to sit: Stand by assistance  Car Transfer: Contact guard assistance;Stand by assistance(cues for walker safety with turning and backing, cues for hand placement and assist to manage wound vac)  Comment: cues for hand placement -carol to keep both hands on RW when backing up to chair and with stand to sit     Ambulation  Ambulation?: Yes  WB Status: WBAT R LE  More Ambulation?: No  Ambulation 1  Surface: level tile  Device: Rolling Walker  Other Apparatus: (therapist managed wound vac on IV pole)  Assistance: Stand by assistance  Quality of Gait: slow amarilis; decreased toe clearance on RLE, decreased stance phase on R, forward flexed posture, tends to push walker to side as turning and backing to sit  Gait Deviations: Decreased step height;Decreased step length; Slow Amarilis  Distance: 90'x 2 with multiple turns, shorter distances with multiple turns to access curb and requested to rest in chair prior to attempting curb after ~40' ambulation  Comments: distance limited by increase in RLE pain and general fatigue, mild shortness of breath  Stairs/Curb  Stairs?: Yes(pt has no steps at home and has ramped entrance)  Stairs  # Steps : 1  Curbs: 6\"  Device: Rolling walker  Other Apparatus: (therapist managed wound vac on IV pole)  Assistance: Contact guard assistance;Minimal assistance  Comment: pt instructed on \"up with the good, down with the bad\" and able to complete with Mino ascending and CGA descending        Exercises  Straight Leg Raise: 10 with min assist for RLE  Heelslides: 10  Hip Abduction: 10                          Goals  Short term goals  Time Frame for Short term goals: 1 week  Short term goal 1: Bed mobility modif I  Short term goal

## 2021-02-17 ENCOUNTER — ANESTHESIA (OUTPATIENT)
Dept: OPERATING ROOM | Age: 76
DRG: 940 | End: 2021-02-17
Payer: MEDICARE

## 2021-02-17 VITALS
RESPIRATION RATE: 6 BRPM | TEMPERATURE: 96.8 F | DIASTOLIC BLOOD PRESSURE: 63 MMHG | SYSTOLIC BLOOD PRESSURE: 134 MMHG | OXYGEN SATURATION: 100 %

## 2021-02-17 LAB — SARS-COV-2, NAAT: NOT DETECTED

## 2021-02-17 PROCEDURE — 2580000003 HC RX 258: Performed by: SURGERY

## 2021-02-17 PROCEDURE — 15101 SPLT AGRFT T/A/L EA ADDL 100: CPT | Performed by: SURGERY

## 2021-02-17 PROCEDURE — 0HRKX74 REPLACEMENT OF RIGHT LOWER LEG SKIN WITH AUTOLOGOUS TISSUE SUBSTITUTE, PARTIAL THICKNESS, EXTERNAL APPROACH: ICD-10-PCS | Performed by: SURGERY

## 2021-02-17 PROCEDURE — 2580000003 HC RX 258: Performed by: PHYSICAL MEDICINE & REHABILITATION

## 2021-02-17 PROCEDURE — 2580000003 HC RX 258: Performed by: NURSE ANESTHETIST, CERTIFIED REGISTERED

## 2021-02-17 PROCEDURE — 3600000014 HC SURGERY LEVEL 4 ADDTL 15MIN: Performed by: SURGERY

## 2021-02-17 PROCEDURE — 6360000002 HC RX W HCPCS: Performed by: SURGERY

## 2021-02-17 PROCEDURE — 15100 SPLT AGRFT T/A/L 1ST 100SQCM: CPT | Performed by: SURGERY

## 2021-02-17 PROCEDURE — 97606 NEG PRS WND THER DME>50 SQCM: CPT | Performed by: SURGERY

## 2021-02-17 PROCEDURE — C9290 INJ, BUPIVACAINE LIPOSOME: HCPCS | Performed by: SURGERY

## 2021-02-17 PROCEDURE — 3600000004 HC SURGERY LEVEL 4 BASE: Performed by: SURGERY

## 2021-02-17 PROCEDURE — 3700000001 HC ADD 15 MINUTES (ANESTHESIA): Performed by: SURGERY

## 2021-02-17 PROCEDURE — 7100000000 HC PACU RECOVERY - FIRST 15 MIN: Performed by: SURGERY

## 2021-02-17 PROCEDURE — 11043 DBRDMT MUSC&/FSCA 1ST 20/<: CPT | Performed by: SURGERY

## 2021-02-17 PROCEDURE — 7100000001 HC PACU RECOVERY - ADDTL 15 MIN: Performed by: SURGERY

## 2021-02-17 PROCEDURE — 0KBS0ZZ EXCISION OF RIGHT LOWER LEG MUSCLE, OPEN APPROACH: ICD-10-PCS | Performed by: SURGERY

## 2021-02-17 PROCEDURE — 6370000000 HC RX 637 (ALT 250 FOR IP): Performed by: PHYSICAL MEDICINE & REHABILITATION

## 2021-02-17 PROCEDURE — 6360000002 HC RX W HCPCS: Performed by: NURSE ANESTHETIST, CERTIFIED REGISTERED

## 2021-02-17 PROCEDURE — 2709999900 HC NON-CHARGEABLE SUPPLY: Performed by: SURGERY

## 2021-02-17 PROCEDURE — 1280000000 HC REHAB R&B

## 2021-02-17 PROCEDURE — 87635 SARS-COV-2 COVID-19 AMP PRB: CPT

## 2021-02-17 PROCEDURE — 3700000000 HC ANESTHESIA ATTENDED CARE: Performed by: SURGERY

## 2021-02-17 PROCEDURE — 0HBHXZZ EXCISION OF RIGHT UPPER LEG SKIN, EXTERNAL APPROACH: ICD-10-PCS | Performed by: SURGERY

## 2021-02-17 PROCEDURE — 2500000003 HC RX 250 WO HCPCS: Performed by: NURSE ANESTHETIST, CERTIFIED REGISTERED

## 2021-02-17 RX ORDER — SODIUM CHLORIDE 9 MG/ML
INJECTION, SOLUTION INTRAVENOUS CONTINUOUS
Status: DISCONTINUED | OUTPATIENT
Start: 2021-02-17 | End: 2021-02-23 | Stop reason: HOSPADM

## 2021-02-17 RX ORDER — VANCOMYCIN HYDROCHLORIDE 1 G/20ML
INJECTION, POWDER, LYOPHILIZED, FOR SOLUTION INTRAVENOUS
Status: COMPLETED | OUTPATIENT
Start: 2021-02-17 | End: 2021-02-17

## 2021-02-17 RX ORDER — ONDANSETRON 2 MG/ML
INJECTION INTRAMUSCULAR; INTRAVENOUS PRN
Status: DISCONTINUED | OUTPATIENT
Start: 2021-02-17 | End: 2021-02-17 | Stop reason: SDUPTHER

## 2021-02-17 RX ORDER — HYDROCODONE BITARTRATE AND ACETAMINOPHEN 5; 325 MG/1; MG/1
2 TABLET ORAL PRN
Status: DISCONTINUED | OUTPATIENT
Start: 2021-02-17 | End: 2021-02-17 | Stop reason: HOSPADM

## 2021-02-17 RX ORDER — HYDRALAZINE HYDROCHLORIDE 20 MG/ML
5 INJECTION INTRAMUSCULAR; INTRAVENOUS EVERY 10 MIN PRN
Status: DISCONTINUED | OUTPATIENT
Start: 2021-02-17 | End: 2021-02-17 | Stop reason: HOSPADM

## 2021-02-17 RX ORDER — PROMETHAZINE HYDROCHLORIDE 25 MG/ML
6.25 INJECTION, SOLUTION INTRAMUSCULAR; INTRAVENOUS
Status: DISCONTINUED | OUTPATIENT
Start: 2021-02-17 | End: 2021-02-17 | Stop reason: HOSPADM

## 2021-02-17 RX ORDER — ONDANSETRON 2 MG/ML
4 INJECTION INTRAMUSCULAR; INTRAVENOUS
Status: DISCONTINUED | OUTPATIENT
Start: 2021-02-17 | End: 2021-02-17 | Stop reason: HOSPADM

## 2021-02-17 RX ORDER — FENTANYL CITRATE 50 UG/ML
INJECTION, SOLUTION INTRAMUSCULAR; INTRAVENOUS PRN
Status: DISCONTINUED | OUTPATIENT
Start: 2021-02-17 | End: 2021-02-17 | Stop reason: SDUPTHER

## 2021-02-17 RX ORDER — SODIUM CHLORIDE 0.9 % (FLUSH) 0.9 %
10 SYRINGE (ML) INJECTION PRN
Status: DISCONTINUED | OUTPATIENT
Start: 2021-02-17 | End: 2021-02-17 | Stop reason: HOSPADM

## 2021-02-17 RX ORDER — SODIUM CHLORIDE 9 MG/ML
INJECTION, SOLUTION INTRAVENOUS CONTINUOUS PRN
Status: DISCONTINUED | OUTPATIENT
Start: 2021-02-17 | End: 2021-02-17 | Stop reason: SDUPTHER

## 2021-02-17 RX ORDER — PROPOFOL 10 MG/ML
INJECTION, EMULSION INTRAVENOUS PRN
Status: DISCONTINUED | OUTPATIENT
Start: 2021-02-17 | End: 2021-02-17 | Stop reason: SDUPTHER

## 2021-02-17 RX ORDER — CLINDAMYCIN PHOSPHATE 900 MG/50ML
INJECTION INTRAVENOUS PRN
Status: DISCONTINUED | OUTPATIENT
Start: 2021-02-17 | End: 2021-02-17 | Stop reason: SDUPTHER

## 2021-02-17 RX ORDER — HYDROCODONE BITARTRATE AND ACETAMINOPHEN 5; 325 MG/1; MG/1
1 TABLET ORAL PRN
Status: DISCONTINUED | OUTPATIENT
Start: 2021-02-17 | End: 2021-02-17 | Stop reason: HOSPADM

## 2021-02-17 RX ORDER — DEXAMETHASONE SODIUM PHOSPHATE 4 MG/ML
INJECTION, SOLUTION INTRA-ARTICULAR; INTRALESIONAL; INTRAMUSCULAR; INTRAVENOUS; SOFT TISSUE PRN
Status: DISCONTINUED | OUTPATIENT
Start: 2021-02-17 | End: 2021-02-17 | Stop reason: SDUPTHER

## 2021-02-17 RX ORDER — GLYCOPYRROLATE 0.2 MG/ML
INJECTION INTRAMUSCULAR; INTRAVENOUS PRN
Status: DISCONTINUED | OUTPATIENT
Start: 2021-02-17 | End: 2021-02-17 | Stop reason: SDUPTHER

## 2021-02-17 RX ORDER — LIDOCAINE HYDROCHLORIDE 20 MG/ML
INJECTION, SOLUTION EPIDURAL; INFILTRATION; INTRACAUDAL; PERINEURAL PRN
Status: DISCONTINUED | OUTPATIENT
Start: 2021-02-17 | End: 2021-02-17 | Stop reason: SDUPTHER

## 2021-02-17 RX ORDER — EPHEDRINE SULFATE/0.9% NACL/PF 50 MG/5 ML
SYRINGE (ML) INTRAVENOUS PRN
Status: DISCONTINUED | OUTPATIENT
Start: 2021-02-17 | End: 2021-02-17 | Stop reason: SDUPTHER

## 2021-02-17 RX ORDER — PHENYLEPHRINE HCL IN 0.9% NACL 1 MG/10 ML
SYRINGE (ML) INTRAVENOUS PRN
Status: DISCONTINUED | OUTPATIENT
Start: 2021-02-17 | End: 2021-02-17 | Stop reason: SDUPTHER

## 2021-02-17 RX ORDER — SODIUM CHLORIDE 0.9 % (FLUSH) 0.9 %
10 SYRINGE (ML) INJECTION EVERY 12 HOURS SCHEDULED
Status: DISCONTINUED | OUTPATIENT
Start: 2021-02-17 | End: 2021-02-17 | Stop reason: HOSPADM

## 2021-02-17 RX ORDER — FENTANYL CITRATE 50 UG/ML
25 INJECTION, SOLUTION INTRAMUSCULAR; INTRAVENOUS EVERY 5 MIN PRN
Status: DISCONTINUED | OUTPATIENT
Start: 2021-02-17 | End: 2021-02-17 | Stop reason: HOSPADM

## 2021-02-17 RX ORDER — MAGNESIUM HYDROXIDE 1200 MG/15ML
LIQUID ORAL CONTINUOUS PRN
Status: COMPLETED | OUTPATIENT
Start: 2021-02-17 | End: 2021-02-17

## 2021-02-17 RX ORDER — FENTANYL CITRATE 50 UG/ML
50 INJECTION, SOLUTION INTRAMUSCULAR; INTRAVENOUS EVERY 5 MIN PRN
Status: DISCONTINUED | OUTPATIENT
Start: 2021-02-17 | End: 2021-02-17 | Stop reason: HOSPADM

## 2021-02-17 RX ADMIN — DEXAMETHASONE SODIUM PHOSPHATE 6 MG: 4 INJECTION, SOLUTION INTRAMUSCULAR; INTRAVENOUS at 07:31

## 2021-02-17 RX ADMIN — ACETAMINOPHEN 650 MG: 325 TABLET ORAL at 20:43

## 2021-02-17 RX ADMIN — SODIUM CHLORIDE, PRESERVATIVE FREE 10 ML: 5 INJECTION INTRAVENOUS at 10:20

## 2021-02-17 RX ADMIN — Medication 100 MCG: at 07:45

## 2021-02-17 RX ADMIN — FUROSEMIDE 20 MG: 20 TABLET ORAL at 10:45

## 2021-02-17 RX ADMIN — Medication 100 MCG: at 07:42

## 2021-02-17 RX ADMIN — MEMANTINE 5 MG: 5 TABLET ORAL at 20:44

## 2021-02-17 RX ADMIN — Medication 10 MG: at 07:47

## 2021-02-17 RX ADMIN — FENTANYL CITRATE 25 MCG: 50 INJECTION INTRAMUSCULAR; INTRAVENOUS at 08:42

## 2021-02-17 RX ADMIN — METOPROLOL SUCCINATE 50 MG: 50 TABLET, EXTENDED RELEASE ORAL at 10:45

## 2021-02-17 RX ADMIN — Medication 20 MG: at 07:59

## 2021-02-17 RX ADMIN — FENTANYL CITRATE 25 MCG: 50 INJECTION INTRAMUSCULAR; INTRAVENOUS at 08:30

## 2021-02-17 RX ADMIN — Medication 400 MG: at 20:44

## 2021-02-17 RX ADMIN — Medication 3 MG: at 20:44

## 2021-02-17 RX ADMIN — SODIUM CHLORIDE: 9 INJECTION, SOLUTION INTRAVENOUS at 07:24

## 2021-02-17 RX ADMIN — CALCIUM 500 MG: 500 TABLET ORAL at 10:45

## 2021-02-17 RX ADMIN — FLECAINIDE ACETATE 100 MG: 100 TABLET ORAL at 20:44

## 2021-02-17 RX ADMIN — Medication 400 MG: at 10:44

## 2021-02-17 RX ADMIN — FLECAINIDE ACETATE 100 MG: 100 TABLET ORAL at 10:45

## 2021-02-17 RX ADMIN — HYDROCODONE BITARTRATE AND ACETAMINOPHEN 2 TABLET: 5; 325 TABLET ORAL at 16:43

## 2021-02-17 RX ADMIN — METOCLOPRAMIDE 5 MG: 10 TABLET ORAL at 16:43

## 2021-02-17 RX ADMIN — MEMANTINE 5 MG: 5 TABLET ORAL at 10:49

## 2021-02-17 RX ADMIN — SODIUM CHLORIDE, PRESERVATIVE FREE 10 ML: 5 INJECTION INTRAVENOUS at 22:15

## 2021-02-17 RX ADMIN — PANTOPRAZOLE SODIUM 40 MG: 40 TABLET, DELAYED RELEASE ORAL at 10:32

## 2021-02-17 RX ADMIN — Medication 200 MCG: at 07:52

## 2021-02-17 RX ADMIN — GLYCOPYRROLATE 0.1 MG: 0.2 INJECTION, SOLUTION INTRAMUSCULAR; INTRAVENOUS at 07:24

## 2021-02-17 RX ADMIN — SERTRALINE 100 MG: 100 TABLET, FILM COATED ORAL at 20:44

## 2021-02-17 RX ADMIN — FENTANYL CITRATE 25 MCG: 50 INJECTION INTRAMUSCULAR; INTRAVENOUS at 07:31

## 2021-02-17 RX ADMIN — Medication 10 MG: at 07:50

## 2021-02-17 RX ADMIN — Medication 10 MG: at 08:13

## 2021-02-17 RX ADMIN — PANTOPRAZOLE SODIUM 40 MG: 40 TABLET, DELAYED RELEASE ORAL at 16:43

## 2021-02-17 RX ADMIN — PROPOFOL 30 MG: 10 INJECTION, EMULSION INTRAVENOUS at 08:30

## 2021-02-17 RX ADMIN — LIDOCAINE HYDROCHLORIDE 4 ML: 20 INJECTION, SOLUTION EPIDURAL; INFILTRATION; INTRACAUDAL; PERINEURAL at 07:31

## 2021-02-17 RX ADMIN — METOCLOPRAMIDE 5 MG: 10 TABLET ORAL at 10:45

## 2021-02-17 RX ADMIN — CLINDAMYCIN PHOSPHATE 900 MG: 18 INJECTION, SOLUTION INTRAMUSCULAR; INTRAVENOUS at 07:35

## 2021-02-17 RX ADMIN — FENTANYL CITRATE 25 MCG: 50 INJECTION INTRAMUSCULAR; INTRAVENOUS at 07:24

## 2021-02-17 RX ADMIN — PROPOFOL 150 MG: 10 INJECTION, EMULSION INTRAVENOUS at 07:31

## 2021-02-17 RX ADMIN — HYDROCODONE BITARTRATE AND ACETAMINOPHEN 2 TABLET: 5; 325 TABLET ORAL at 10:33

## 2021-02-17 RX ADMIN — ALPRAZOLAM 0.5 MG: 0.5 TABLET ORAL at 20:43

## 2021-02-17 RX ADMIN — ONDANSETRON 4 MG: 2 INJECTION INTRAMUSCULAR; INTRAVENOUS at 08:18

## 2021-02-17 ASSESSMENT — PULMONARY FUNCTION TESTS
PIF_VALUE: 10
PIF_VALUE: 1
PIF_VALUE: 12
PIF_VALUE: 10
PIF_VALUE: 11
PIF_VALUE: 12
PIF_VALUE: 11
PIF_VALUE: 11
PIF_VALUE: 0
PIF_VALUE: 10
PIF_VALUE: 11
PIF_VALUE: 12
PIF_VALUE: 12
PIF_VALUE: 11
PIF_VALUE: 11
PIF_VALUE: 12
PIF_VALUE: 11
PIF_VALUE: 12
PIF_VALUE: 11
PIF_VALUE: 11
PIF_VALUE: 12
PIF_VALUE: 11
PIF_VALUE: 12
PIF_VALUE: 11
PIF_VALUE: 12
PIF_VALUE: 11
PIF_VALUE: 12
PIF_VALUE: 11
PIF_VALUE: 12
PIF_VALUE: 11

## 2021-02-17 ASSESSMENT — PAIN DESCRIPTION - FREQUENCY: FREQUENCY: CONTINUOUS

## 2021-02-17 ASSESSMENT — PAIN DESCRIPTION - ORIENTATION
ORIENTATION: RIGHT
ORIENTATION: RIGHT

## 2021-02-17 ASSESSMENT — PAIN DESCRIPTION - PROGRESSION
CLINICAL_PROGRESSION: GRADUALLY WORSENING
CLINICAL_PROGRESSION: NOT CHANGED

## 2021-02-17 ASSESSMENT — PAIN - FUNCTIONAL ASSESSMENT
PAIN_FUNCTIONAL_ASSESSMENT: PREVENTS OR INTERFERES SOME ACTIVE ACTIVITIES AND ADLS
PAIN_FUNCTIONAL_ASSESSMENT: PREVENTS OR INTERFERES WITH MANY ACTIVE NOT PASSIVE ACTIVITIES

## 2021-02-17 ASSESSMENT — PAIN DESCRIPTION - PAIN TYPE
TYPE: SURGICAL PAIN
TYPE: SURGICAL PAIN

## 2021-02-17 ASSESSMENT — PAIN DESCRIPTION - ONSET: ONSET: ON-GOING

## 2021-02-17 ASSESSMENT — PAIN DESCRIPTION - DESCRIPTORS
DESCRIPTORS: OTHER (COMMENT)
DESCRIPTORS: BURNING

## 2021-02-17 ASSESSMENT — PAIN SCALES - GENERAL
PAINLEVEL_OUTOF10: 2
PAINLEVEL_OUTOF10: 4
PAINLEVEL_OUTOF10: 8

## 2021-02-17 ASSESSMENT — PAIN SCALES - WONG BAKER
WONGBAKER_NUMERICALRESPONSE: 0
WONGBAKER_NUMERICALRESPONSE: 0

## 2021-02-17 ASSESSMENT — ENCOUNTER SYMPTOMS: SHORTNESS OF BREATH: 0

## 2021-02-17 ASSESSMENT — PAIN DESCRIPTION - LOCATION
LOCATION: LEG
LOCATION: LEG

## 2021-02-17 NOTE — PROGRESS NOTES
Pt A&O x4. Noted with wound vac to right lower leg. Dressing intact. No issues noted. PICC to RUE. Flushes well. Blood return noted. Continent of bowel and bladder. Transfers with FWW x 1 assist.  Medications whole with thins. General diet tolerated. NPO at midnight. Surgery scheduled for the am.  Skin graft to be completed. Pain control with prns. All needs met. Bed alarm engaged. Call light and bedside table within reach.

## 2021-02-17 NOTE — PROGRESS NOTES
To pacu from OR. Pt awake. Denies pain. States tingling in BLE  Ace wrap to right leg dry and intact. Negative pressure wound vac in use - no drainage noted at present. IV infusing per PICC line. Monitor in sinus rhythm.

## 2021-02-17 NOTE — ANESTHESIA PRE PROCEDURE
[facility] Department of Anesthesiology  Pre-Anesthesia Evaluation/Consultation       Name:  Pinky Easley  : 1945  Age:  67 y.o. MRN:  [id number]  Date: 2021           Surgeon: Surgeon(s):  Abram Bryan MD    Procedure: Procedure(s):  RIGHT LOWER EXTREMITY SKIN GRAFT     Allergies   Allergen Reactions    Demerol Hcl [Meperidine] Other (See Comments)     PATIENT STATES SHE GOT VERY ANXIOUS-LIKE SHE WAS CLIMBING THE WALLS    Pcn [Penicillins]      Patient reports she has not had since she was a child, thinks reaction was hives.      Adhesive Tape Rash     Patient Active Problem List   Diagnosis    Symptomatic anemia    A-fib (HCC)    Closed fracture of right femur ([address]    Aortic valve disorder    Arthropathy    Cervical spondylosis without myelopathy    DDD (degenerative disc disease), cervical    DDD (degenerative disc disease), lumbosacral    Displacement of cervical intervertebral disc without myelopathy    Essential hypertension    History of malignant neoplasm of large intestine    Malignant neoplasm of colon ([address]    Observation for suspected malignant neoplasm    Spinal stenosis in cervical region    Spinal stenosis, lumbar    Fibromyalgia    Gastroesophageal reflux disease without esophagitis    Lumbar radicular pain    Pain of right thigh    Traumatic closed displaced supracondylar fracture of femur, right, with delayed healing, subsequent encounter    Osteopenia    Traumatic hematoma of right lower leg    Hemorrhagic shock ([address]    Hematoma    Hematoma of right lower extremity    Acute blood loss anemia    Nausea    Traumatic hematoma of right lower leg with infection     Past Medical History:   Diagnosis Date    Acid reflux     Anxiety     Arthritis     Atrial fibrillation (HCC)     Closed fracture dislocation of right elbow     Depression     Fibromyalgia     Migraine     Rectal cancer ([address]     Wears glasses     DRIVING     Past Surgical History:   Procedure Laterality Date    APPENDECTOMY       SECTION      x4    CHOLECYSTECTOMY      COLONOSCOPY      SEVERAL    COLONOSCOPY N/A 2019    COLONOSCOPY POLYPECTOMY SNARE/COLD BIOPSY performed by Connor Harding MD at [address] Right 2021    RIGHT LEG EVACUATION OF HEMATOMA performed by Claudell Fleeting, MD at [address] Right     NECK SURGERY      OPEN  [facility] W/O EXTENSION Right 10/25/2018    OPEN REDUCTION INTERNAL FIXATION RIGHT FEMUR SUPRACONDYLAR FEMORAL FRACTURE WITH C-ARM performed by Keo Beal MD at [facility] TO REMOVE RECTAL CANCER    SKIN GRAFT Right 2021    RIGHT LEG HEMATOMA EVAKUATION, DEBRIDEMENT, AND WOUND VAC PLACEMENT performed by Luz Elena Lamas MD at [address] [facility] Left     UPPER GASTROINTESTINAL ENDOSCOPY N/A 2019    ESOPHAGOGASTRODUODENOSCOPY performed by Connor Harding MD at [facility] N/A 2020    EGD BIOPSY performed by Connor Harding MD at [address] [facility] History     Tobacco Use    Smoking status: Never Smoker    Smokeless tobacco: Never Used   Substance Use Topics    Alcohol use: No    Drug use: Never     Medications  Current Facility-Administered Medications on File Prior to Visit   Medication Dose Route Frequency Provider Last Rate Last Admin    magnesium oxide (MAG-OX) tablet 400 mg  400 mg Oral BID Kimberly Paz MD   400 mg at 21 2134    acetaminophen (TYLENOL) tablet 650 mg  650 mg Oral Q6H PRN Leslie Delgadillo MD   650 mg at 21 1412    Or    acetaminophen (TYLENOL) suppository 650 mg  650 mg Rectal Q6H PRN Leslie Delgadillo MD        magnesium hydroxide (MILK OF MAGNESIA) 400 MG/5ML suspension 30 mL  30 mL Oral Daily PRN MD Shraddha Isaacs polyethylene glycol (GLYCOLAX) packet 17 g  17 g Oral Daily PRN Manju Du MD        ALPRAZolam [facility]) tablet 0.5 mg  0.5 mg Oral Nightly PRN Manju Du MD   0.5 mg at 02/16/21 2134    bisacodyl (DULCOLAX) suppository 10 mg  10 mg Rectal Daily PRN Manju Du MD        calcium elemental (OSCAL) tablet 500 mg  500 mg Oral Daily Manju Du MD   500 mg at 02/16/21 8615    flecainide (TAMBOCOR) tablet 100 mg  100 mg Oral 2 times per day Manju Du MD   100 mg at 02/16/21 2134    furosemide (LASIX) tablet 20 mg  20 mg Oral Daily Manju Du MD   20 mg at 02/16/21 0726    HYDROcodone-acetaminophen ([facility]) 5-325 MG per tablet 2 tablet  2 tablet Oral Q6H PRN Manju Du MD   2 tablet at 02/16/21 2323    melatonin tablet 3 mg  3 mg Oral Nightly PRN Manju Du MD   3 mg at 02/12/21 2056    memantine (NAMENDA) tablet 5 mg  5 mg Oral BID Manju Du MD   5 mg at 02/16/21 2134    metoprolol succinate (TOPROL XL) extended release tablet 50 mg  50 mg Oral Daily Manju Du MD   50 mg at 02/16/21 0726    sertraline (ZOLOFT) tablet 100 mg  100 mg Oral Nightly Manju Du MD   100 mg at 02/16/21 2134    simethicone (MYLICON) chewable tablet 80 mg  80 mg Oral Q6H PRN Manju Du MD        sodium chloride 0.9 % irrigation 1,000 mL  1,000 mL Irrigation Continuous PRN Manju Du MD        sodium chloride flush 0.9 % injection 10 mL  10 mL Intravenous PRN Manju Du MD   10 mL at 02/12/21 2059    sodium chloride flush 0.9 % injection 10 mL  10 mL Intravenous 2 times per day Manju Du MD   10 mL at 02/16/21 2136    tiZANidine (ZANAFLEX) tablet 2 mg  2 mg Oral Nightly PRN Manju Du MD   2 mg at 02/16/21 2134    promethazine (PHENERGAN) tablet 12.5 mg  12.5 mg Oral Q6H PRN Manju Du MD   12.5 mg at 02/16/21 2134    Or    ondansetron (ZOFRAN) injection 4 mg  4 mg Intravenous Q6H PRN [facility] Kenzie Malone MD   4 mg at 02/12/21 0835    metoclopramide (REGLAN) tablet 5 mg  5 mg Oral TID [facility] Saurav Holbrook MD   5 mg at 02/16/21 1618    pantoprazole (PROTONIX) tablet 40 mg  40 mg Oral BID AC Saurav Holbrook MD   40 mg at 02/16/21 1618     Current Outpatient Medications on File Prior to Visit   Medication Sig Dispense Refill    polyethylene glycol (GLYCOLAX) 17 g packet Take 17 g by mouth daily as needed for Constipation 527 g 1    simethicone (MYLICON) 80 MG chewable tablet Take 1 tablet by mouth every 6 hours as needed for Flatulence 180 tablet 3    memantine (NAMENDA) 5 MG tablet Take 5 mg by mouth 2 times daily      ALPRAZolam (XANAX) 0.5 MG tablet TAKE ONE TABLET BY MOUTH THREE TIMES A DAY AS NEEDED FOR ANXIETY 90 tablet 1    furosemide (LASIX) 20 MG tablet Take 2 tablets by mouth daily With an additional 20 mg in the evening on M,W,F only 120 tablet 5    metoprolol succinate (TOPROL XL) 50 MG extended release tablet TAKE 1 TABLET BY MOUTH ONE TIME A DAY 90 tablet 5    tiZANidine (ZANAFLEX) 2 MG tablet Take 1 tablet by mouth 2 times daily 180 tablet 0    calcium carbonate (CALTRATE 600) 1500 (600 Ca) MG TABS tablet Take 600 mg by mouth daily      Riboflavin (B-2-400) 400 MG CAPS Take 400 mg by mouth daily       magnesium oxide (MAG-OX) 400 MG tablet Take 400 mg by mouth daily      hydrOXYzine (ATARAX) 25 MG tablet TAKE ONE TO TWO TABLETS BY MOUTH TWICE A DAY AS NEEDED FOR ITCHING 90 tablet 2    co-enzyme Q-10 50 MG capsule Take 150 mg by mouth 2 times daily       potassium chloride (KLOR-CON M20) 20 MEQ extended release tablet Take 1 tablet by mouth 2 times daily 180 tablet 1    mirtazapine (REMERON) 30 MG tablet TAKE ONE TABLET BY MOUTH ONCE NIGHTLY 90 tablet 1    sertraline (ZOLOFT) 100 MG tablet Take 1 tablet by mouth nightly 90 tablet 1    metoclopramide (REGLAN) 5 MG tablet Take 1 tablet by mouth 3 times daily as needed (Nausea and abdominal) 120 tablet 3    flecainide (TAMBOCOR) 100 MG tablet TAKE 1 TABLET BY MOUTH 2 TIMES A DAY 60 tablet 3    acetaminophen (TYLENOL) 500 MG tablet Take 500 mg by mouth every 6 hours as needed for Pain      pantoprazole (PROTONIX) 40 MG tablet Take 1 tablet by mouth 2 times daily 60 tablet 3    vitamin D 25 MCG (1000 UT) CAPS Take 2 capsules by mouth daily      Biotin 1000 MCG TABS Take 1 tablet by mouth 2 times daily       vitamin B-12 (CYANOCOBALAMIN) 1000 MCG tablet Take 500 mcg by mouth daily Indications: patient is taking 2 tablets 1,000 mcg in the morning        No current facility-administered medications for this visit. No current outpatient medications on file.      Facility-Administered Medications Ordered in Other Visits   Medication Dose Route Frequency Provider Last Rate Last Admin    magnesium oxide (MAG-OX) tablet 400 mg  400 mg Oral BID Maida Paz MD   400 mg at 02/16/21 2134    acetaminophen (TYLENOL) tablet 650 mg  650 mg Oral Q6H PRN Jose Galdamez MD   650 mg at 02/16/21 1412    Or    acetaminophen (TYLENOL) suppository 650 mg  650 mg Rectal Q6H PRN Jose Galdamez MD        magnesium hydroxide (MILK OF MAGNESIA) 400 MG/5ML suspension 30 mL  30 mL Oral Daily PRN Jose Galdamez MD        polyethylene glycol [facility]) packet 17 g  17 g Oral Daily PRN Jose Galdamez MD        ALPRAZolam [facility]) tablet 0.5 mg  0.5 mg Oral Nightly PRN Jose Galdamez MD   0.5 mg at 02/16/21 2134    bisacodyl (DULCOLAX) suppository 10 mg  10 mg Rectal Daily PRN Jose Galdamez MD        calcium elemental (OSCAL) tablet 500 mg  500 mg Oral Daily Jose Galdamez MD   500 mg at 02/16/21 1875    flecainide (TAMBOCOR) tablet 100 mg  100 mg Oral 2 times per day Jose Galdamez MD   100 mg at 02/16/21 2134    furosemide (LASIX) tablet 20 mg  20 mg Oral Daily Jose Galdamez MD   20 mg at 02/16/21 0726    HYDROcodone-acetaminophen (NORCO) 5-325 MG per tablet 2 tablet  2 tablet Oral Q6H PRN Darinel Hany Max MD   2 tablet at 21    melatonin tablet 3 mg  3 mg Oral Nightly PRN Zainab Guillaume MD   3 mg at 21    memantine (NAMENDA) tablet 5 mg  5 mg Oral BID Zainab Guillaume MD   5 mg at 21    metoprolol succinate (TOPROL XL) extended release tablet 50 mg  50 mg Oral Daily Zainab Guillaume MD   50 mg at 21    sertraline (ZOLOFT) tablet 100 mg  100 mg Oral Nightly Zainab Guillaume MD   100 mg at 21    simethicone (MYLICON) chewable tablet 80 mg  80 mg Oral Q6H PRN Zainab Guillaume MD        sodium chloride 0.9 % irrigation 1,000 mL  1,000 mL Irrigation Continuous PRN Zainab Guillaume MD        sodium chloride flush 0.9 % injection 10 mL  10 mL Intravenous PRN Zainab Guillaume MD   10 mL at 21    sodium chloride flush 0.9 % injection 10 mL  10 mL Intravenous 2 times per day Zainab Guillaume MD   10 mL at 21    tiZANidine (ZANAFLEX) tablet 2 mg  2 mg Oral Nightly PRN Zainab Guillaume MD   2 mg at 21    promethazine (PHENERGAN) tablet 12.5 mg  12.5 mg Oral Q6H PRN Zainab Guillaume MD   12.5 mg at 21    Or    ondansetron (ZOFRAN) injection 4 mg  4 mg Intravenous Q6H PRN Zainab Guillaume MD   4 mg at 21 0835    metoclopramide (REGLAN) tablet 5 mg  5 mg Oral TID [facility] Zainab Guillaume MD   5 mg at 21 1618    pantoprazole (PROTONIX) tablet 40 mg  40 mg Oral BID AC Zainab Guillaume MD   40 mg at 21 1618     Vital Signs (Current)   There were no vitals filed for this visit.                                        BP Readings from Last 3 Encounters:   21 122/72   21 120/79   21 (!) 104/55     Vital Signs Statistics (for past 48 hrs)     Temp  Av.8 °F (36.6 °C)  Min: 97.6 °F (36.4 °C)   Min taken time: 21 0715  Max: 98 °F (36.7 °C)   Max taken time: 21 0609  Pulse  Av.5  Min: 61   Min taken time: 21 1412  Max: [de-identified]   Max taken time: PREGTESTUR, PREGSERUM, HCG, HCGQUANT   ABGs No results found for: PHART, PO2ART, TWN0RLT, XOC6GXP, BEART, K2SWVQFQ   Type & Screen (If Applicable)  No results found for: LABABO, LABRH                         BMI: Wt Readings from Last 3 Encounters:       NPO Status:                          Anesthesia Evaluation  Patient summary reviewed no history of anesthetic complications:   Airway: Mallampati: II  TM distance: >3 FB   Neck ROM: full  Mouth opening: > = 3 FB Dental: normal exam         Pulmonary: breath sounds clear to auscultation      (-) COPD, asthma and shortness of breath                           Cardiovascular:    (+) hypertension:, dysrhythmias: atrial fibrillation,     (-) valvular problems/murmurs, past MI, CAD, CABG/stent and  angina        Rate: normal                    Neuro/Psych:   (+) neuromuscular disease:, headaches:, psychiatric history:depression/anxiety    (-) seizures, TIA and CVA           GI/Hepatic/Renal:   (+) GERD:,      (-) PUD, liver disease and no renal disease       Endo/Other:    (+) blood dyscrasia: anemia, arthritis:., malignancy/cancer. (-) diabetes mellitus               Abdominal:           Vascular: negative vascular ROS. Anesthesia Plan      general     ASA 3       Induction: intravenous. MIPS: Postoperative opioids intended and Prophylactic antiemetics administered. Anesthetic plan and risks discussed with patient. Plan discussed with CRNA. This pre-anesthesia assessment may be used as a history and physical.    DOS STAFF ADDENDUM:    Pt seen and examined, chart reviewed (including anesthesia, drug and allergy history). No interval changes to history and physical examination. Anesthetic plan, risks, benefits, alternatives, and personnel involved discussed with patient. Patient verbalized an understanding and agrees to proceed.       Melissa Hartmann MD  February 17, 2021  6:35 AM

## 2021-02-17 NOTE — PLAN OF CARE
Problem: Falls - Risk of:  Goal: Will remain free from falls  Description: Will remain free from falls  2/16/2021 2243 by Chey Be RN  Outcome: Ongoing  Note: Fall risk assessment completed as charted. Pt is at risk for falls. Safety precautions in place. Call light and pt belongings in reach. Bed in low position. Bed/Chair Alarm on. Nonskid footwear on. All needs met. Pt instructed to call before getting up or out of bed. Pt verbalized understanding. Problem: Skin Integrity:  Goal: Will show no infection signs and symptoms  Description: Will show no infection signs and symptoms  2/16/2021 2243 by Chey Be RN  Outcome: Ongoing  Note: Pt assessed for risk of skin breakdown. Encouraged pt to turn and reposition self while in bed. Skin clean and dry. No new skin breakdown noted. Heels floating. Will continue to assess skin condition each shift. Problem: Pain:  Goal: Pain level will decrease  Description: Pain level will decrease  2/16/2021 2243 by Chey Be RN  Outcome: Ongoing  Note: Pt assessed for pain this shift. Pt pain/discomfort is managed with PRN pain medications per md order. Pt able to verbalize pain by using numerical scale. Education provided and documented.

## 2021-02-17 NOTE — ANESTHESIA POSTPROCEDURE EVALUATION
Department of Anesthesiology  Postprocedure Note    Patient: Charmaine Floyd  MRN: [id number]  YOB: 1945  Date of evaluation: 2/17/2021  Time:  9:00 AM     Procedure Summary     Date: 02/17/21 Room / Location:  [facility] [address] [place] [address]    Anesthesia Start: 6948 Anesthesia Stop: 4361    Procedure: RIGHT LOWER EXTREMITY SKIN GRAFT (Right ) Diagnosis: (RIGHT LOWER EXTREMITY WOUND)    Surgeons: Delmy Ramos MD Responsible Provider: Claudine Nascimento MD    Anesthesia Type: general ASA Status: 3          Anesthesia Type: general    Becky Phase I: Becky Score: 8    Becky Phase II:      Last vitals: Reviewed and per EMR flowsheets.        Anesthesia Post Evaluation    Patient location during evaluation: PACU  Patient participation: complete - patient participated  Level of consciousness: awake and alert  Pain score: 3  Airway patency: patent  Nausea & Vomiting: no nausea and no vomiting  Complications: no  Cardiovascular status: blood pressure returned to baseline  Respiratory status: acceptable  Hydration status: euvolemic

## 2021-02-17 NOTE — OP NOTE
[place] [address] SURGERY     OPERATIVE DICTATION    NAME: Pinky Easley   MRN: [id number]  DATE: 2/17/2021     AGE: 67 y.o. LOCATION: [facility]    PREOPERATIVE DIAGNOSIS:  Right lower extremity hematoma, leg wound     POSTOPERATIVE DIAGNOSIS:  Same. OPERATION PERFORMED:   1) Excisional debridement of right lower extremity wound (2 x 2 cm)  2) Application of split thickness skin graft to the right lower extremity (22 x 15 cm)  3) Application of wound vacuum device             SURGEON:  Abram Bryan MD     ANESTHESIA:  General     ESTIMATED BLOOD LOSS:  50cc     DRAINS: Wound vac     SPECIMENS: Tissue for culture     OPERATIVE INDICATIONS:  This is a 67 y.o. female who presented to [facility] ED with a right leg hematoma (patient on anticoagulation for Afib). She was taken to the operating room by orthopedics for evacuation of a hematoma. She was then taken to the ICU for hemorrhagic shock working toward resuscitation. Her skin was compromised due to pressure from the hematoma and plastic surgery was consulted. After evaluation, it was noted that there was reaccumulation of clot and nonviable skin. She was taken for debridement and additional clot evacuation. She improved and was taken to the rehab unit with vac changes. Her wound had healthy granulation tissue and she is brought to the operating room today for the aforementioned procedure. A thorough discusthe risks, benefits, alternatives, outcomes, and personnel involved was performed with the patient. After all questions were answered to the patient's satisfaction, they agreed to proceed. OPERATIVE PROCEDURE:  The patient was brought to the operating room on her hospital bed. She was then placed in the supine position on the operating room table and underwent general anesthesia without difficulty. The patient was prepped and draped in the usual sterile manner.   A time-out was performed confirming the patient and the procedure to be performed. The wound overall had an excellent appearance. The inferior aspect of the wound had nonviable skin from previous vac application. This was debrided in a full thickness manner including skin, subcutaneous tissue and outer aspect of muscle. After completion, there was improved bleeding. The wound was copiously irrigated with 3L of saline solution using a Pulse Lavage. Attention was then directed to the right upper thigh. A split thickness skin graft was harvested with a Coleen dermatome. The graft was then meshed in a 3:1 manner and sutured into position using 4-0 Chromic sutures. The donor site had hemostasis obtained with epinephrine soaked Telfa. Postoperative analgesia was provided with Exparel. The donor site was then dressed with a Xeroform that was sutured into position followed by a Tegaderm. The graft was bolstered with a wound vac that was protected with Adaptic. There was an excellent seal on the vac. The leg was then wrapped. The patient was then awakened and taken to the PACU in stable condition. There were no immediate complications and the patient tolerated the procedure well. At the end of the case, all counts were correct.     Jose Higuera MD

## 2021-02-17 NOTE — PROGRESS NOTES
Department of Physical Medicine & Rehabilitation  Progress Note    Patient Identification:  Virginia Olson  [id number]  : 1945  Admit date: 2021    Chief Complaint: Traumatic hematoma of right lower leg with infection    Subjective:  Patient was discussed this morning in our weekly rehab conference for 5 to 10 minutes with the entire rehab team, which includes PT, OT, nursing, dietary, social service, and rehab unit manager, to determine how much progress the patient has made in therapies and when the patient will be ready for discharge to home safely.  We think the patient will be ready for discharge to home next Tuesday. She had a skin graft applied this morning, and the application of the skin graft went well after talking to Dr. Riana Hernandez, the plastic surgeon. The wound VAC has been applied over the skin graft to help it adhere, and this will be removed on Tuesday before her discharge. Will recheck her labs tomorrow morning. ROS: No f/c, n/v, cp     Objective:  Patient Vitals for the past 24 hrs:   BP Temp Temp src Pulse Resp SpO2 Weight   21 0657 (!) 127/54 98.4 °F (36.9 °C) Temporal 64 16 94 % --   21 0609 122/72 98 °F (36.7 °C) Oral 75 15 95 % 183 lb 13.8 oz (83.4 kg)   21 1412 119/75 97.8 °F (36.6 °C) Oral 60 18 95 % --   21 0858 -- -- -- -- -- 94 % --     Const: Alert. No distress, pleasant. HEENT: Normocephalic, atraumatic. Normal sclera/conjunctiva. MMM. CV: Regular rate and rhythm. Resp: No respiratory distress. Lungs CTAB. Abd: Soft, nontender, nondistended, NABS+   Ext: No edema. RLE wound vac in place  Neuro: Alert, oriented, appropriately interactive. Psych: Cooperative, appropriate mood and affect    Laboratory data: Available via EMR.    Last 24 hour lab  Recent Results (from the past 24 hour(s))   COVID-19, Rapid    Collection Time: 21  5:55 AM   Result Value Ref Range    SARS-CoV-2, NAAT Not Detected Not Detected       Therapy progress:  PT  Position Activity Restriction  Other position/activity restrictions: Wound Vac R Lat Calf. PT spoke with Surg (LUCAS Eddy NP) : WBAT R LE. Objective     Sit to Stand: Stand by assistance  Stand to sit: Stand by assistance  Device: Rolling Walker  Other Apparatus: (therapist managed wound vac on IV pole)  Assistance: Stand by assistance  Distance: 90'x 2 with multiple turns, shorter distances with multiple turns to access curb and requested to rest in chair prior to attempting curb after ~40' ambulation  OT  PT Equipment Recommendations  Other: Will monitor for potential equipt needs. pt has a RW and rollator  Toilet - Technique: Ambulating  Equipment Used: [employer]       Body mass index is 34.74 kg/m². Rehabilitation Diagnosis:   16.0, Debility (non-cardiac, non-pulmonary        Assessment and Plan:     Impairments: generalized weakness, decreased ROM distal RLE, decreased balance, endurance     Debility  -PT/OT     Right leg hematoma   -s/p evacuation (1/28 with Dr. Alisha Torres)  -s/p evacuation, debridement and wound vac placement (1/31 with Dr. Phyllis Sorto)  -Plan for STSG on 2/17      Acute blood loss anemia with hemorrhagic shock  -Due to traumatic hematoma   -Monitor Hgb, transfuse prn <7.      Paroxysmal Afib  -Eliquis on hold due to above until cleared by Surgery  -flecainide, metoporlol     H/o gastric bypass (Shanice-en-Y)  -Vitamin supplements  -Dietitian consult     Hypomagnesemia  -supplement     Anxiety/depression  -sertraline, prn alprazolam     Acute on chronic pain control  -H/o fibromyalgia  -prn Norco, tizanidine     Bladder   -High risk retention   -Monitor PVRs, SC prn >300cc     Bowel   -High risk constipation   -senna+colace BID, PRN miralax, MoM, and bisacodyl supp.     Safety   -fall precautions     PPx  -DVT: on hold due to hematoma  -GI: pantoprazole    Rehab Progress: Tolerating therapy well thus far.  Working on functional mobility, compensatory strategies, balance, strength, endurance. Anticipated Dispo: home with son  Services/DME: TBD  ELOS: 7-10 days      Cristy Paz MD 2/17/2021, 7:53 AM

## 2021-02-18 LAB
ANION GAP SERPL CALCULATED.3IONS-SCNC: 7 MMOL/L (ref 3–16)
BASOPHILS ABSOLUTE: 0 K/UL (ref 0–0.2)
BASOPHILS RELATIVE PERCENT: 0.3 %
BUN BLDV-MCNC: 13 MG/DL (ref 7–20)
CALCIUM SERPL-MCNC: 8.4 MG/DL (ref 8.3–10.6)
CHLORIDE BLD-SCNC: 101 MMOL/L (ref 99–110)
CO2: 29 MMOL/L (ref 21–32)
CREAT SERPL-MCNC: <0.5 MG/DL (ref 0.6–1.2)
EOSINOPHILS ABSOLUTE: 0 K/UL (ref 0–0.6)
EOSINOPHILS RELATIVE PERCENT: 0.8 %
GFR AFRICAN AMERICAN: >60
GFR NON-AFRICAN AMERICAN: >60
GLUCOSE BLD-MCNC: 84 MG/DL (ref 70–99)
HCT VFR BLD CALC: 25.5 % (ref 36–48)
HEMOGLOBIN: 7.9 G/DL (ref 12–16)
LYMPHOCYTES ABSOLUTE: 1.2 K/UL (ref 1–5.1)
LYMPHOCYTES RELATIVE PERCENT: 23.8 %
MAGNESIUM: 2.1 MG/DL (ref 1.8–2.4)
MCH RBC QN AUTO: 29 PG (ref 26–34)
MCHC RBC AUTO-ENTMCNC: 31 G/DL (ref 31–36)
MCV RBC AUTO: 93.5 FL (ref 80–100)
MONOCYTES ABSOLUTE: 0.4 K/UL (ref 0–1.3)
MONOCYTES RELATIVE PERCENT: 8.9 %
NEUTROPHILS ABSOLUTE: 3.3 K/UL (ref 1.7–7.7)
NEUTROPHILS RELATIVE PERCENT: 66.2 %
PDW BLD-RTO: 21.4 % (ref 12.4–15.4)
PLATELET # BLD: 196 K/UL (ref 135–450)
PMV BLD AUTO: 7.8 FL (ref 5–10.5)
POTASSIUM SERPL-SCNC: 3.9 MMOL/L (ref 3.5–5.1)
RBC # BLD: 2.72 M/UL (ref 4–5.2)
SODIUM BLD-SCNC: 137 MMOL/L (ref 136–145)
WBC # BLD: 4.9 K/UL (ref 4–11)

## 2021-02-18 PROCEDURE — 6370000000 HC RX 637 (ALT 250 FOR IP): Performed by: SURGERY

## 2021-02-18 PROCEDURE — 97530 THERAPEUTIC ACTIVITIES: CPT

## 2021-02-18 PROCEDURE — 2580000003 HC RX 258: Performed by: PHYSICAL MEDICINE & REHABILITATION

## 2021-02-18 PROCEDURE — 97110 THERAPEUTIC EXERCISES: CPT

## 2021-02-18 PROCEDURE — 97535 SELF CARE MNGMENT TRAINING: CPT

## 2021-02-18 PROCEDURE — 83735 ASSAY OF MAGNESIUM: CPT

## 2021-02-18 PROCEDURE — 1280000000 HC REHAB R&B

## 2021-02-18 PROCEDURE — 85025 COMPLETE CBC W/AUTO DIFF WBC: CPT

## 2021-02-18 PROCEDURE — 80048 BASIC METABOLIC PNL TOTAL CA: CPT

## 2021-02-18 PROCEDURE — 6370000000 HC RX 637 (ALT 250 FOR IP): Performed by: PHYSICAL MEDICINE & REHABILITATION

## 2021-02-18 PROCEDURE — 97116 GAIT TRAINING THERAPY: CPT

## 2021-02-18 RX ADMIN — HYDROCODONE BITARTRATE AND ACETAMINOPHEN 2 TABLET: 5; 325 TABLET ORAL at 00:56

## 2021-02-18 RX ADMIN — MEMANTINE 5 MG: 5 TABLET ORAL at 07:39

## 2021-02-18 RX ADMIN — BENZOCAINE AND MENTHOL 1 LOZENGE: 15; 3.6 LOZENGE ORAL at 12:23

## 2021-02-18 RX ADMIN — FLECAINIDE ACETATE 100 MG: 100 TABLET ORAL at 21:31

## 2021-02-18 RX ADMIN — FUROSEMIDE 20 MG: 20 TABLET ORAL at 07:40

## 2021-02-18 RX ADMIN — FLECAINIDE ACETATE 100 MG: 100 TABLET ORAL at 07:40

## 2021-02-18 RX ADMIN — METOCLOPRAMIDE 5 MG: 10 TABLET ORAL at 07:40

## 2021-02-18 RX ADMIN — PANTOPRAZOLE SODIUM 40 MG: 40 TABLET, DELAYED RELEASE ORAL at 16:12

## 2021-02-18 RX ADMIN — MEMANTINE 5 MG: 5 TABLET ORAL at 21:31

## 2021-02-18 RX ADMIN — HYDROCODONE BITARTRATE AND ACETAMINOPHEN 2 TABLET: 5; 325 TABLET ORAL at 07:39

## 2021-02-18 RX ADMIN — Medication 400 MG: at 07:39

## 2021-02-18 RX ADMIN — TIZANIDINE 2 MG: 4 TABLET ORAL at 21:31

## 2021-02-18 RX ADMIN — SERTRALINE 100 MG: 100 TABLET, FILM COATED ORAL at 21:31

## 2021-02-18 RX ADMIN — Medication 3 MG: at 21:31

## 2021-02-18 RX ADMIN — METOCLOPRAMIDE 5 MG: 10 TABLET ORAL at 11:33

## 2021-02-18 RX ADMIN — PROMETHAZINE HYDROCHLORIDE 12.5 MG: 25 TABLET ORAL at 07:46

## 2021-02-18 RX ADMIN — PANTOPRAZOLE SODIUM 40 MG: 40 TABLET, DELAYED RELEASE ORAL at 05:11

## 2021-02-18 RX ADMIN — SODIUM CHLORIDE, PRESERVATIVE FREE 10 ML: 5 INJECTION INTRAVENOUS at 21:33

## 2021-02-18 RX ADMIN — Medication 400 MG: at 21:31

## 2021-02-18 RX ADMIN — ALPRAZOLAM 0.5 MG: 0.5 TABLET ORAL at 21:31

## 2021-02-18 RX ADMIN — ACETAMINOPHEN 650 MG: 325 TABLET ORAL at 12:15

## 2021-02-18 RX ADMIN — SODIUM CHLORIDE, PRESERVATIVE FREE 10 ML: 5 INJECTION INTRAVENOUS at 07:47

## 2021-02-18 RX ADMIN — METOCLOPRAMIDE 5 MG: 10 TABLET ORAL at 16:12

## 2021-02-18 RX ADMIN — HYDROCODONE BITARTRATE AND ACETAMINOPHEN 2 TABLET: 5; 325 TABLET ORAL at 17:30

## 2021-02-18 RX ADMIN — METOPROLOL SUCCINATE 50 MG: 50 TABLET, EXTENDED RELEASE ORAL at 07:39

## 2021-02-18 RX ADMIN — ACETAMINOPHEN 650 MG: 325 TABLET ORAL at 20:35

## 2021-02-18 RX ADMIN — CALCIUM 500 MG: 500 TABLET ORAL at 07:39

## 2021-02-18 ASSESSMENT — PAIN DESCRIPTION - PAIN TYPE
TYPE: SURGICAL PAIN
TYPE: SURGICAL PAIN
TYPE: ACUTE PAIN
TYPE: ACUTE PAIN;SURGICAL PAIN

## 2021-02-18 ASSESSMENT — PAIN DESCRIPTION - PROGRESSION
CLINICAL_PROGRESSION: GRADUALLY WORSENING
CLINICAL_PROGRESSION: GRADUALLY IMPROVING
CLINICAL_PROGRESSION: GRADUALLY WORSENING

## 2021-02-18 ASSESSMENT — PAIN DESCRIPTION - ORIENTATION
ORIENTATION: RIGHT
ORIENTATION: RIGHT
ORIENTATION: POSTERIOR
ORIENTATION: RIGHT
ORIENTATION: RIGHT

## 2021-02-18 ASSESSMENT — PAIN DESCRIPTION - LOCATION
LOCATION: HIP
LOCATION: NECK;LEG

## 2021-02-18 ASSESSMENT — PAIN - FUNCTIONAL ASSESSMENT
PAIN_FUNCTIONAL_ASSESSMENT: ACTIVITIES ARE NOT PREVENTED
PAIN_FUNCTIONAL_ASSESSMENT: PREVENTS OR INTERFERES SOME ACTIVE ACTIVITIES AND ADLS

## 2021-02-18 ASSESSMENT — PAIN SCALES - GENERAL
PAINLEVEL_OUTOF10: 2
PAINLEVEL_OUTOF10: 8
PAINLEVEL_OUTOF10: 4
PAINLEVEL_OUTOF10: 2
PAINLEVEL_OUTOF10: 0
PAINLEVEL_OUTOF10: 6
PAINLEVEL_OUTOF10: 0

## 2021-02-18 ASSESSMENT — PAIN DESCRIPTION - ONSET
ONSET: ON-GOING

## 2021-02-18 ASSESSMENT — PAIN DESCRIPTION - DESCRIPTORS
DESCRIPTORS: OTHER (COMMENT)
DESCRIPTORS: BURNING
DESCRIPTORS: THROBBING
DESCRIPTORS: ACHING

## 2021-02-18 ASSESSMENT — PAIN DESCRIPTION - FREQUENCY
FREQUENCY: INTERMITTENT
FREQUENCY: CONTINUOUS
FREQUENCY: INTERMITTENT
FREQUENCY: CONTINUOUS

## 2021-02-18 NOTE — PROGRESS NOTES
Patient admitted to rehab with traumatic hematoma of RLE with infection. A&Ox4. Transfers with walker/ gait belt x1 assist. On general diet, tolerating well. Medications taken whole with thin liquids. Pt currently has wound vac on RLE and graft site on right thigh. On room air. Has been continent of bowel and bladder. LBM 2/18/2021. Chair/bed alarms in use and call light in reach. Will continue to monitor.

## 2021-02-18 NOTE — PROGRESS NOTES
PHYSICAL THERAPY  Progress Note   Second Session    Patient Name: Nadja Khanna  Medical Record Number: [id number]    Treatment Diagnosis: decreased activity tolerance    Chart Reviewed: Yes   Restrictions/Precautions: Fall Risk, Weight Bearing Other position/activity restrictions: Wound Vac R Lat Calf. PT spoke with Surg Kennedy Eddy NP) : WBAT R LE. s/p skin graft on 2/17. Additional Pertinent Hx: 66 y/o female admit 1/31/2021 with R LE Traumatic Hematoma (hit R LE on car door). 1/31/2021 S/P Evac of R LE Hematoma, Excisional Debride (17K64 cm), Applic of Wound Vac. Plastic and Reconstruct Surg follow for Staged Reconstruct with Skin Graft. S/p skin graft on 2/17. PMH as noted including Gastric Bypass, Rectal Ca, L TKR, R Femur Fx/ORIF (10/2018). WB Status: WBAT R LE    Subjective: Pt agreeable to therapy. Reports 5/10 pain donor graft site. Objective:  Sit-stands from w/c to RW with SBA  Ambulation: 90' x4 with RW and SBA , step to gait pattern, decreased amarilis, decreased stance time on RLE due to pain; seated rest breaks   Seated ther ex: x15 BLE hip flexion, LAQ, heel/toe raises, hip abd/add  Repetitive sit-stands x10 reps from w/c to RW with SBA  Alternating toe taps on 4\" step with RW and CGA 2x10 reps    Assessment: Pt continues to require SBA for transfers and ambulation with RW. Will continue to progress mobility as pt tolerates.     Safety Device - Type of devices: left in gym with OT present  [x]  All fall risk precautions in place [] Bed alarm in place  [] Call light within reach [] Chair alarm in place [] Positioning belt [] Gait belt [] Patient at risk for falls [] Left in bed [] Left in chair [] Telesitter in use [] Sitter present [] Nurse notified []  None    Therapy Time   Individual Co-treatment   Time In 1430     Time Out 1515     Minutes 45          Electronically signed by Lakshmi Braga PT on 2/18/2021 at 2:54 PM

## 2021-02-18 NOTE — PLAN OF CARE
Problem: Nutrition  Goal: Optimal nutrition therapy  Outcome: Ongoing     Nutrition Problem #1: Increased nutrient needs  Intervention: Food and/or Nutrient Delivery: Continue Current Diet, Start Oral Nutrition Supplement  Nutritional Goals: Pt to consume >50% of meals and supplements.

## 2021-02-18 NOTE — PLAN OF CARE
Problem: PAIN  Goal: STG - pain is manageable through therapies  Outcome: Ongoing  Note: Pt assessed for pain. Pt in pain and assessed with 0-10 pain rating scale. Pt given prescribed analgesic for pain. (See eMar) Pt satisfied with pain relief thus far. Will reassess and continue to monitor. Problem: Falls - Risk of:  Goal: Will remain free from falls  Description: Will remain free from falls  Outcome: Ongoing  Note: Fall risk assessment completed. Fall precautions in place. Call light within reach. Pt educated on calling for assistance before getting up. Walkway free of clutter. Will continue to monitor. Problem: Skin Integrity:  Goal: Absence of new skin breakdown  Description: Absence of new skin breakdown  Outcome: Ongoing  Note: Patient's skin was assessed. Pt has wound on RLE with continuous wound vac and graft site on right thigh, wrapped in ACE wrap. No new findings were noted. Patient is being educated on importance of turning/repostioning at least every two hours. RN will continue to educate and monitor.

## 2021-02-18 NOTE — PROGRESS NOTES
Department of Physical Medicine & Rehabilitation  Progress Note    Patient Identification:  Gerri Michaels  [id number]  : 1945  Admit date: 2021    Chief Complaint: Traumatic hematoma of right lower leg with infection    Subjective:  Patient seen this AM. Patient was discussed ye in our weekly rehab conference for 5 to 10 minutes with the entire rehab team, and we think the patient will be ready for discharge to home next Tuesday. She had a skin graft applied yesterday , and the application of the skin graft went well after talking to Dr. Nelsy Gonzalez, the plastic surgeon. The wound VAC has been applied over the skin graft to help it adhere, and this will be removed on Tuesday before her discharge. Repeat labs this morning look good and are stable. Her pain medication is keeping her wound graft pain under control. ROS: No f/c, n/v, cp     Objective:  Patient Vitals for the past 24 hrs:   BP Temp Temp src Pulse Resp SpO2 Weight   21 0730 137/82 -- -- 76 -- -- --   21 0502 98/63 97.9 °F (36.6 °C) -- 73 16 92 % 189 lb 2.5 oz (85.8 kg)   21 2030 101/67 98.3 °F (36.8 °C) Oral 72 16 -- --   21 1507 (!) 107/57 98.5 °F (36.9 °C) Oral 74 16 92 % --   21 1032 120/62 97.5 °F (36.4 °C) Oral 78 16 97 % --   21 0902 (!) 110/46 -- -- 77 13 96 % --   21 0856 (!) 116/51 -- -- 79 12 97 % --   21 0852 (!) 111/55 -- -- 79 14 98 % --   21 0847 (!) 117/53 -- -- 77 12 97 % --   21 0841 (!) 124/55 -- -- 80 15 94 % --   21 0838 125/63 98.9 °F (37.2 °C) Temporal 82 13 96 % --     Const: Alert. No distress, pleasant. HEENT: Normocephalic, atraumatic. Normal sclera/conjunctiva. MMM. CV: Regular rate and rhythm. Resp: No respiratory distress. Lungs CTAB. Abd: Soft, nontender, nondistended, NABS+   Ext: No edema. RLE wound vac in place  Neuro: Alert, oriented, appropriately interactive.    Psych: Cooperative, appropriate mood and affect    Laboratory data: Available via EMR. Last 24 hour lab  Recent Results (from the past 24 hour(s))   Basic Metabolic Panel    Collection Time: 02/18/21  5:15 AM   Result Value Ref Range    Sodium 137 136 - 145 mmol/L    Potassium 3.9 3.5 - 5.1 mmol/L    Chloride 101 99 - 110 mmol/L    CO2 29 21 - 32 mmol/L    Anion Gap 7 3 - 16    Glucose 84 70 - 99 mg/dL    BUN 13 7 - 20 mg/dL    CREATININE <0.5 (L) 0.6 - 1.2 mg/dL    GFR Non-African American >60 >60    GFR African American >60 >60    Calcium 8.4 8.3 - 10.6 mg/dL   CBC Auto Differential    Collection Time: 02/18/21  5:15 AM   Result Value Ref Range    WBC 4.9 4.0 - 11.0 K/uL    RBC 2.72 (L) 4.00 - 5.20 M/uL    Hemoglobin 7.9 (L) 12.0 - 16.0 g/dL    Hematocrit 25.5 (L) 36.0 - 48.0 %    MCV 93.5 80.0 - 100.0 fL    MCH 29.0 26.0 - 34.0 pg    MCHC 31.0 31.0 - 36.0 g/dL    RDW 21.4 (H) 12.4 - 15.4 %    Platelets 952 679 - 672 K/uL    MPV 7.8 5.0 - 10.5 fL    Neutrophils % 66.2 %    Lymphocytes % 23.8 %    Monocytes % 8.9 %    Eosinophils % 0.8 %    Basophils % 0.3 %    Neutrophils Absolute 3.3 1.7 - 7.7 K/uL    Lymphocytes Absolute 1.2 1.0 - 5.1 K/uL    Monocytes Absolute 0.4 0.0 - 1.3 K/uL    Eosinophils Absolute 0.0 0.0 - 0.6 K/uL    Basophils Absolute 0.0 0.0 - 0.2 K/uL   Magnesium    Collection Time: 02/18/21  5:15 AM   Result Value Ref Range    Magnesium 2.10 1.80 - 2.40 mg/dL       Therapy progress:  PT  Position Activity Restriction  Other position/activity restrictions: Wound Vac R Lat Calf. PT spoke with Surg (LUCAS Eddy NP) : WBAT R LE.   Objective     Sit to Stand: Stand by assistance  Stand to sit: Stand by assistance  Device: Rolling Walker  Other Apparatus: (therapist managed wound vac on IV pole)  Assistance: Stand by assistance  Distance: 90'x 2 with multiple turns, shorter distances with multiple turns to access curb and requested to rest in chair prior to attempting curb after ~40' ambulation  OT  PT Equipment Recommendations  Other: Will monitor for potential equipt needs. pt has a RW and rollator  Toilet - Technique: Ambulating  Equipment Used: [employer]       Body mass index is 35.74 kg/m². Rehabilitation Diagnosis:   16.0, Debility (non-cardiac, non-pulmonary        Assessment and Plan:     Impairments: generalized weakness, decreased ROM distal RLE, decreased balance, endurance     Debility  -PT/OT     Right leg hematoma   -s/p evacuation (1/28 with Dr. Serge Cerrato)  -s/p evacuation, debridement and wound vac placement (1/31 with Dr. Julia Oneil)  -Plan for STSG on 2/17      Acute blood loss anemia with hemorrhagic shock  -Due to traumatic hematoma   -Monitor Hgb, transfuse prn <7.      Paroxysmal Afib  -Eliquis on hold due to above until cleared by Surgery  -flecainide, metoporlol     H/o gastric bypass (Shanice-en-Y)  -Vitamin supplements  -Dietitian consult     Hypomagnesemia  -supplement     Anxiety/depression  -sertraline, prn alprazolam     Acute on chronic pain control  -H/o fibromyalgia  -prn Norco, tizanidine     Bladder   -High risk retention   -Monitor PVRs, SC prn >300cc     Bowel   -High risk constipation   -senna+colace BID, PRN miralax, MoM, and bisacodyl supp.     Safety   -fall precautions     PPx  -DVT: on hold due to hematoma  -GI: pantoprazole    Rehab Progress: Tolerating therapy well thus far. Working on functional mobility, compensatory strategies, balance, strength, endurance. Anticipated Dispo: home with son  Services/DME: TBD  ELOS: 7-10 days      Lizzy Paz MD 2/18/2021, 8:01 AM

## 2021-02-18 NOTE — PROGRESS NOTES
Comprehensive Nutrition Assessment    Type and Reason for Visit:  Reassess    Nutrition Recommendations/Plan:   General diet   Meir BID  Will monitor nutritional adequacy, nutrition-related labs, weights, BMs, and clinical progress     Nutrition Assessment:  Follow-up. Pt with skin graft applied yesterday with wound vac. On General diet. ONS was ordered but was d/c when pt went NPO. Pt continues to eat around 50% of meals. Will reorder Meir, pt previously did not like Ensure HP. Malnutrition Assessment:  Malnutrition Status:  No malnutrition      Estimated Daily Nutrient Needs:  Energy (kcal):  1245-1494kcal/day(15-18kcal/kg); Weight Used for Energy Requirements:  Current     Protein (g):  71-94g/day(1.5-2.0g/kg); Weight Used for Protein Requirements:  Ideal        Fluid (ml/day):   ; Method Used for Fluid Requirements:  1 ml/kcal      Nutrition Related Findings:  Last BM 2/16; reviewed labs      Wounds:  Wound Vac, Surgical Incision       Current Nutrition Therapies:    DIET GENERAL; Anthropometric Measures:  · Height: 5' 1\" (154.9 cm)  · Current Body Weight: 189 lb (85.7 kg)   · Admission Body Weight: 173 lb (78.5 kg)    · Usual Body Weight: 184 lb (83.5 kg)     · Ideal Body Weight: 105 lbs; % Ideal Body Weight 180 %   · BMI: 35.7  · Adjusted Body Weight:  ; No Adjustment   · BMI Categories: Obese Class 1 (BMI 30.0-34. 9)       Nutrition Diagnosis:   · Increased nutrient needs related to inadequate protein-energy intake as evidenced by wounds, intake 26-50%, poor intake prior to admission      Nutrition Interventions:   Food and/or Nutrient Delivery:  Continue Current Diet, Start Oral Nutrition Supplement  Nutrition Education/Counseling:  No recommendation at this time   Coordination of Nutrition Care:  Continue to monitor while inpatient    Goals:  Pt to consume >50% of meals and supplements.        Nutrition Monitoring and Evaluation:   Behavioral-Environmental Outcomes:  None Identified Food/Nutrient Intake Outcomes:  Food and Nutrient Intake, Supplement Intake  Physical Signs/Symptoms Outcomes:  Biochemical Data, Nutrition Focused Physical Findings, Skin, Weight     Discharge Planning:    Continue Oral Nutrition Supplement     Electronically signed by Rama Pierson RD, PATIENCE on 2/18/21 at 10:56 AM EST    Contact: [phone]

## 2021-02-18 NOTE — PROGRESS NOTES
Physical Therapy  Facility/Department: [address] IP REHAB  Daily Treatment Note  NAME: Nadja Khanna  : 1945  MRN: [id number]    Date of Service: 2021    Discharge Recommendations:  Patient would benefit from continued therapy after discharge, Home with Home health PT, Home with assist PRN, S Level 1   PT Equipment Recommendations  Other: Will monitor for potential equipt needs. pt has a RW and rollator    Assessment   Body structures, Functions, Activity limitations: Decreased functional mobility ; Decreased strength;Decreased endurance; Increased pain;Decreased posture;Decreased balance  Assessment: PTA pt living with son in home with ramp access and 1st floor bed/bath. Prior, pt independent with daily care and functional mobility. Pt is limited by decreased activity tolerance and has increased RLE pain with mobility. Patient is below baseline functional status and therefore would benefit from additional therapy. Pt is s/p skin grafting on  and has similar functional mobility status as prior to procedure, requiring SBA ambulating 90'x2 with RW. Anticipate that pt will be safe to d/c home with assist PRN and home health PT by mid next week. Treatment Diagnosis: decreased activity tolerance  Prognosis: Good  PT Education: Transfer Training;General Safety;Gait Training;Functional Mobility Training;PT Role;Plan of Care  REQUIRES PT FOLLOW UP: Yes  Activity Tolerance  Activity Tolerance: Patient Tolerated treatment well;Patient limited by endurance; Patient limited by pain     Patient Diagnosis(es): There were no encounter diagnoses. has a past medical history of Acid reflux, Anxiety, Arthritis, Atrial fibrillation (HCC), Closed fracture dislocation of right elbow, Depression, Fibromyalgia, Migraine, Rectal cancer ([facility] [address] and Wears glasses. has a past surgical history that includes  section;  Total knee arthroplasty (Left); open tx femoral supracondylar fracture w/o extension (Right, 10/25/2018); Gastric bypass surgery; Neck surgery; other surgical history; Cholecystectomy; Appendectomy; Colonoscopy; Upper gastrointestinal endoscopy (N/A, 11/26/2019); Colonoscopy (N/A, 11/26/2019); knee surgery (Right); Upper gastrointestinal endoscopy (N/A, 8/18/2020); incision and drainage (Right, 1/28/2021); Skin graft (Right, 1/31/2021); and Skin graft (Right, 2/17/2021). Restrictions  Restrictions/Precautions  Restrictions/Precautions: Fall Risk, Weight Bearing  Lower Extremity Weight Bearing Restrictions  Right Lower Extremity Weight Bearing: Weight Bearing As Tolerated  Position Activity Restriction  Other position/activity restrictions: Wound Vac R Lat Calf. PT spoke with Surg Ray Eddy NP) : WBAT R LE. s/p skin graft on 2/17. Social/Functional History  Lives With: Son(Does not work)  Type of Home: House  Home Layout: One level  Home Access: Ramped entrance  Bathroom Shower/Tub: Tub/Shower unit  Bathroom Toilet: Standard  Bathroom Equipment: Tub transfer bench, Grab bars in shower, Hand-held shower, 3-in-1 commode, Grab bars around toilet(has not needed to use commode)  Bathroom Accessibility: [facility]: Rolling walker, Wheelchair-manual  ADL Assistance: Independent  Homemaking Assistance: (son completes [de-identified] of IADLs, pt does some cooking and laundry, does dishes)  Ambulation Assistance: Independent(with rw)  Transfer Assistance: Independent  Active : Yes  Occupation: Retired  Type of occupation: medical assistant, patient representative, billing  IADL Comments: Pt sleeps in regular bed. Subjective   General  Chart Reviewed: Yes  Additional Pertinent Hx: 66 y/o female admit 1/31/2021 with R LE Traumatic Hematoma (hit R LE on car door). 1/31/2021 S/P Evac of R LE Hematoma, Excisional Debride (74G42 cm), Applic of Wound Vac. Plastic and Reconstruct Surg follow for Staged Reconstruct with Skin Graft. S/p skin graft on 2/17.   PMH as noted including Gastric Bypass, comment)  Comment: pt used restroom at end of session and urinated on toilet; indep with pericare seated; supervision for standing balance while she managed her own clothing; supervision for standing balance washing hands at sink; pt positioned for comfort in bed at end of session           Goals  Short term goals  Time Frame for Short term goals: 1 week  Short term goal 1: Bed mobility modif I  Short term goal 2: transfers from all surfaces including car modif I  Short term goal 3: Amb with assist device, WBAT R LE, 150' modif I  Short term goal 4: curb step with wh walker SBA  Long term goals  Time Frame for Long term goals : set after skin grafting if needed  Patient Goals   Patient goals : Be able to return home with son. Plan    Plan  Times per week: 5-6x/week  Times per day: Twice a day  Plan weeks: 7 to 10 days  Specific instructions for Next Treatment: increase gt, try curb step as able  Current Treatment Recommendations: Strengthening, Functional Mobility Training, Transfer Training, Gait Training, Safety Education & Training, Patient/Caregiver Education & Training, Endurance Training, Equipment Evaluation, Education, & procurement, Home Exercise Program  Safety Devices  Type of devices:  All fall risk precautions in place, Gait belt, Bed alarm in place, Call light within reach, Left in bed  Restraints  Initially in place: No     Therapy Time   Individual Concurrent Group Co-treatment   Time In 0930         Time Out 1015         Minutes 45         Timed Code Treatment Minutes: 45 Minutes         Electronically signed by Kaylee Camilo on 2/18/2021 at 10:26 AM

## 2021-02-18 NOTE — PROGRESS NOTES
Occupational Therapy  Facility/Department: [address] IP REHAB  Daily Treatment Note  NAME: Jack Preciado  : 1945  MRN: [id number]    Date of Service: 2021    Discharge Recommendations:  Home with Home health OT, Continue to assess pending progress, Patient would benefit from continued therapy after discharge, Home with assist PRN       Assessment   Performance deficits / Impairments: Decreased functional mobility ; Decreased endurance;Decreased strength;Decreased ADL status; Decreased safe awareness;Decreased high-level IADLs;Decreased balance  Assessment: Pt toelrated session well. She completed mobility in the room and transfers with SBA. She required assist to manage the VAC tubing. She was able to complete all ADL tasks except she required assist to thread the vac through her pant legs. Prognosis: Good  History: 68 y/o female admit 2021 with R LE Traumatic Hematoma (hit R LE on car door).  s/p Evac of R LE Hematoma. Pt returned to OR 2021 S/P Evac of R LE Hematoma, Excisional Debride (52C09 cm), Applic of Wound Vac. Plastic and Reconstruct Surg follow for Staged Reconstruct with Skin Graft. PMH as noted including Gastric Bypass, Rectal Ca, L TKR, R Femur Fx/ORIF (10/2018). REQUIRES OT FOLLOW UP: Yes  Activity Tolerance  Activity Tolerance: Patient Tolerated treatment well  Safety Devices  Type of devices: Call light within reach; Chair alarm in place; Left in chair;Gait belt         Patient Diagnosis(es): There were no encounter diagnoses. has a past medical history of Acid reflux, Anxiety, Arthritis, Atrial fibrillation (HCC), Closed fracture dislocation of right elbow, Depression, Fibromyalgia, Migraine, Rectal cancer ([facility] [address] and Wears glasses. has a past surgical history that includes  section; Total knee arthroplasty (Left); open tx femoral supracondylar fracture w/o extension (Right, 10/25/2018); Gastric bypass surgery;  Neck surgery; other surgical history; Cholecystectomy; Appendectomy; Colonoscopy; Upper gastrointestinal endoscopy (N/A, 11/26/2019); Colonoscopy (N/A, 11/26/2019); knee surgery (Right); Upper gastrointestinal endoscopy (N/A, 8/18/2020); incision and drainage (Right, 1/28/2021); Skin graft (Right, 1/31/2021); and Skin graft (Right, 2/17/2021). Restrictions  Restrictions/Precautions  Restrictions/Precautions: Fall Risk, Weight Bearing  Lower Extremity Weight Bearing Restrictions  Right Lower Extremity Weight Bearing: Weight Bearing As Tolerated  Position Activity Restriction  Other position/activity restrictions: Wound Vac R Lat Calf. PT spoke with Surg Ellen Eddy NP) : WBAT R LE. s/p skin graft on 2/17. Subjective   General  Chart Reviewed: Yes, Progress Notes  Patient assessed for rehabilitation services?: Yes  Additional Pertinent Hx: 66 y/o female admit 1/31/2021 with R LE Traumatic Hematoma (hit R LE on car door). 1/28 s/p Evac of R LE Hematoma. Pt returned to OR 1/31/2021 S/P Evac of R LE Hematoma, Excisional Debride (10F03 cm), Applic of Wound Vac. Plastic and Reconstruct Surg follow for Staged Reconstruct with Skin Graft. PMH as noted including Gastric Bypass, Rectal Ca, L TKR, R Femur Fx/ORIF (10/2018). Response to previous treatment: Patient with no complaints from previous session  Family / Caregiver Present: No  Referring Practitioner: Kami/Jackson  Diagnosis: traumatic hematoma RLE with infection  Subjective  Subjective: Pt seen bedside and agreed to a sponge bath. Pt with c/o pain at graft site 7/10. Pt reports just got pain medication prior to this session. Objective    ADL  Grooming: Independent(brushed teeth and hair seated at the sink.)  UE Bathing: Modified independent   LE Bathing: Stand by assistance(Pt stood with SBA to wash buttocks. Pt used LH sponge to wash her feet.)  UE Dressing: Independent(managed bra and shirt)  LE Dressing: Minimal assistance(Assist for threading wound vac in underwear and pants.   Pt able to manage pants and underwear over her feet. Used sock aid to manage socks.)  Toileting: Stand by assistance        Functional Mobility  Functional - Mobility Device: Rolling Walker  Activity: To/from bathroom  Assist Level: Stand by assistance  Toilet Transfers  Toilet - Technique: Ambulating  Equipment Used: Grab bars  Toilet Transfer: Stand by assistance  Shower Transfers  Shower Transfers Comments: unable at this time  Wheelchair [employer]  Wheelchair/Bed - Technique: Ambulating  Equipment Used: Other; Wheelchair(recliner)  Level of Asssistance: Stand by assistance     Transfers  Sit to stand: Stand by assistance  Stand to sit: Stand by assistance         PM Session- Pt seen in the department and agreed to OT treatment. She completed mobility in the gym with SBA. She required assist with managing the VAC. She completed meal prep using RW. She gathered items to prepare an egg. She demonstrated good safety with hand placement when reaching for items. She used a walker basket on the walker to assist with transporting items. She was able to select the correct burner. She was able to stand the entirety of cooking the egg and gathering items approximately 10 minutes. After she was finished cooking she turned off the burner and placed items in the . She completed all tasks with SBA. She was asking questions about purchasing a walker basket from here. She was advised to have someone take a picture of her walker so we can help her determine if the walker basket fits her walker. She also was advised she could find out what walker brand she has and try to find that brand of basket on [employer].                                                         Plan   Plan  Times per week: 5-6  Times per day: Twice a day  Plan weeks: 1 week  Current Treatment Recommendations: Strengthening, Endurance Training, Balance Training, Gait Training, Functional Mobility Training, Self-Care / ADL, Patient/Caregiver Education & Training, Equipment Evaluation, Education, & procurement, Home Management Training, Safety Education & Training    Goals  Short term goals  Time Frame for Short term goals: 1 week pt dom [place] term goal 1: bathe indep with AD as needed  Short term goal 2: dress indep with AD as needed  Short term goal 3: toilet indep with AD  Short term goal 4: transfers indep with AD  Short term goal 5: functional mobility indep  Short term goal 6: increase activity tolerance to stand 3 min for ADL/mobility  Long term goals  Time Frame for Long term goals : same as stg  Patient Goals   Patient goals : \"I want to get my legs stronger\"       Therapy Time   Individual Concurrent Group PM Session   Time In [facility]   Time Out 1042      [address]   Minutes 60      [facility], [address]

## 2021-02-19 PROCEDURE — 94760 N-INVAS EAR/PLS OXIMETRY 1: CPT

## 2021-02-19 PROCEDURE — 97530 THERAPEUTIC ACTIVITIES: CPT

## 2021-02-19 PROCEDURE — 6370000000 HC RX 637 (ALT 250 FOR IP): Performed by: PHYSICAL MEDICINE & REHABILITATION

## 2021-02-19 PROCEDURE — 2580000003 HC RX 258: Performed by: PHYSICAL MEDICINE & REHABILITATION

## 2021-02-19 PROCEDURE — 1280000000 HC REHAB R&B

## 2021-02-19 PROCEDURE — 97530 THERAPEUTIC ACTIVITIES: CPT | Performed by: PHYSICAL THERAPIST

## 2021-02-19 PROCEDURE — 97110 THERAPEUTIC EXERCISES: CPT | Performed by: PHYSICAL THERAPIST

## 2021-02-19 PROCEDURE — 97116 GAIT TRAINING THERAPY: CPT | Performed by: PHYSICAL THERAPIST

## 2021-02-19 RX ADMIN — FUROSEMIDE 20 MG: 20 TABLET ORAL at 10:02

## 2021-02-19 RX ADMIN — ACETAMINOPHEN 650 MG: 325 TABLET ORAL at 19:52

## 2021-02-19 RX ADMIN — METOCLOPRAMIDE 5 MG: 10 TABLET ORAL at 16:38

## 2021-02-19 RX ADMIN — PROMETHAZINE HYDROCHLORIDE 12.5 MG: 25 TABLET ORAL at 09:59

## 2021-02-19 RX ADMIN — HYDROCODONE BITARTRATE AND ACETAMINOPHEN 2 TABLET: 5; 325 TABLET ORAL at 16:38

## 2021-02-19 RX ADMIN — PANTOPRAZOLE SODIUM 40 MG: 40 TABLET, DELAYED RELEASE ORAL at 05:25

## 2021-02-19 RX ADMIN — SERTRALINE 100 MG: 100 TABLET, FILM COATED ORAL at 19:52

## 2021-02-19 RX ADMIN — PROMETHAZINE HYDROCHLORIDE 12.5 MG: 25 TABLET ORAL at 19:52

## 2021-02-19 RX ADMIN — HYDROCODONE BITARTRATE AND ACETAMINOPHEN 2 TABLET: 5; 325 TABLET ORAL at 21:50

## 2021-02-19 RX ADMIN — FLECAINIDE ACETATE 100 MG: 100 TABLET ORAL at 19:52

## 2021-02-19 RX ADMIN — MEMANTINE 5 MG: 5 TABLET ORAL at 19:52

## 2021-02-19 RX ADMIN — CALCIUM 500 MG: 500 TABLET ORAL at 10:04

## 2021-02-19 RX ADMIN — METOCLOPRAMIDE 5 MG: 10 TABLET ORAL at 10:03

## 2021-02-19 RX ADMIN — FLECAINIDE ACETATE 100 MG: 100 TABLET ORAL at 10:04

## 2021-02-19 RX ADMIN — METOCLOPRAMIDE 5 MG: 10 TABLET ORAL at 12:28

## 2021-02-19 RX ADMIN — METOPROLOL SUCCINATE 50 MG: 50 TABLET, EXTENDED RELEASE ORAL at 10:04

## 2021-02-19 RX ADMIN — SODIUM CHLORIDE, PRESERVATIVE FREE 10 ML: 5 INJECTION INTRAVENOUS at 20:14

## 2021-02-19 RX ADMIN — PANTOPRAZOLE SODIUM 40 MG: 40 TABLET, DELAYED RELEASE ORAL at 16:38

## 2021-02-19 RX ADMIN — SODIUM CHLORIDE, PRESERVATIVE FREE 10 ML: 5 INJECTION INTRAVENOUS at 10:05

## 2021-02-19 RX ADMIN — ACETAMINOPHEN 650 MG: 325 TABLET ORAL at 12:39

## 2021-02-19 RX ADMIN — ALPRAZOLAM 0.5 MG: 0.5 TABLET ORAL at 21:50

## 2021-02-19 RX ADMIN — Medication 400 MG: at 19:52

## 2021-02-19 RX ADMIN — HYDROCODONE BITARTRATE AND ACETAMINOPHEN 2 TABLET: 5; 325 TABLET ORAL at 09:59

## 2021-02-19 RX ADMIN — MEMANTINE 5 MG: 5 TABLET ORAL at 10:02

## 2021-02-19 RX ADMIN — HYDROCODONE BITARTRATE AND ACETAMINOPHEN 2 TABLET: 5; 325 TABLET ORAL at 03:16

## 2021-02-19 RX ADMIN — Medication 400 MG: at 10:02

## 2021-02-19 ASSESSMENT — PAIN DESCRIPTION - PROGRESSION: CLINICAL_PROGRESSION: GRADUALLY WORSENING

## 2021-02-19 ASSESSMENT — PAIN - FUNCTIONAL ASSESSMENT
PAIN_FUNCTIONAL_ASSESSMENT: ACTIVITIES ARE NOT PREVENTED

## 2021-02-19 ASSESSMENT — PAIN DESCRIPTION - ORIENTATION
ORIENTATION: POSTERIOR
ORIENTATION: RIGHT
ORIENTATION: POSTERIOR
ORIENTATION: POSTERIOR

## 2021-02-19 ASSESSMENT — PAIN SCALES - GENERAL
PAINLEVEL_OUTOF10: 2
PAINLEVEL_OUTOF10: 2
PAINLEVEL_OUTOF10: 6
PAINLEVEL_OUTOF10: 5
PAINLEVEL_OUTOF10: 4

## 2021-02-19 ASSESSMENT — PAIN DESCRIPTION - LOCATION
LOCATION: LEG
LOCATION: NECK

## 2021-02-19 ASSESSMENT — PAIN DESCRIPTION - FREQUENCY
FREQUENCY: CONTINUOUS

## 2021-02-19 ASSESSMENT — PAIN DESCRIPTION - PAIN TYPE
TYPE: SURGICAL PAIN
TYPE: ACUTE PAIN

## 2021-02-19 ASSESSMENT — PAIN DESCRIPTION - ONSET: ONSET: ON-GOING

## 2021-02-19 ASSESSMENT — PAIN DESCRIPTION - DESCRIPTORS
DESCRIPTORS: ACHING
DESCRIPTORS: ACHING;DISCOMFORT
DESCRIPTORS: ACHING;DISCOMFORT
DESCRIPTORS: ACHING

## 2021-02-19 NOTE — PROGRESS NOTES
Pt resting in bed comfortably. A&O X4. Admitted on 2/11/2021 with traumatic hematoma of right lower leg. Reported pain 6/10 to right leg, pain medication given per mar. Reported tingling BLE. HR regular. Patient denies any palpitation, chest pain, or chest tightness. Lungs sounds clear. Abd soft and nontender. Active bowel sounds. Graft site to right thigh with dressing intact. Wound vac intact. Scattered bruising noted. PICC to [facility], flushed well. HS medication given. Tolerated well. Education provided on pain management, fall precaution, skin care, and medication. Call light in reach. Patient instructed to call if there is any needs or changes.  Electronically signed by Nataliia Medina RN on 2/19/2021 at 3:33 AM

## 2021-02-19 NOTE — PROGRESS NOTES
[facility]    CC: RLE wound    HPI: 70F with RLE wound s/p debridement/hematoma evacuation and vac application. 2/17 S/p split thickness skin graft, wound vac application  PMHx/PSHx/MEDS/ALL/SHx: Unchanged and in epic. 14 point review of systems negative save for HPI    /69   Pulse 65   Temp 98 °F (36.7 °C) (Oral)   Resp 16   Ht 5' 1\" (1.549 m)   Wt 188 lb 0.8 oz (85.3 kg)   SpO2 94%   BMI 35.53 kg/m²     GEN: NAD  EXT: Vac intact with good seal lower leg. Thigh donor site tegaderm changed.              LABS/IMAGING: Reviewed    IMP: 75 with RLE wound s/p STSG 2/17  PLAN: remove vac over graft Monday       Keren VIGIL-CNP 5:23 PM 2/19/2021   [facility] General and Vascular Surgery  Office: [phone]

## 2021-02-19 NOTE — CARE COORDINATION
JUAN JOSÉ reviewed chart. Per Dr Marco Brooke' note, the wound vac will be removed on Tuesday prior to her discharge. Her goal is to return home with her son present. She is agreeable to home care and is selecting the agency she wants to use from list provided to her.   [place], [place]     Case Management   [phone]    2/19/2021  1:50 PM

## 2021-02-19 NOTE — PROGRESS NOTES
Occupational Therapy  Facility/Department: [address] IP REHAB  Daily Treatment Note  NAME: Omar Hirsch  : 1945  MRN: [id number]    Date of Service: 2021    Discharge Recommendations:  Home with Home health OT, Continue to assess pending progress, Patient would benefit from continued therapy after discharge, Home with assist PRN       Assessment   Performance deficits / Impairments: Decreased functional mobility ; Decreased endurance;Decreased strength;Decreased ADL status; Decreased safe awareness;Decreased high-level IADLs;Decreased balance  Assessment: Pt tolerated session well. Pt had a picture of her walker. It is the [employer] with one bar. The drive walker basket will fit the walker. Looked up on [employer] and it will take a week to get. Printed out a copy for pt to order and also given the price to purchase through [employer]. Pt will let us know what she choses. She was able to complete mobility activities with SBA. She requires assist with wound VAC. Plan for discharge on Tuesday. Treatment Diagnosis: decreased ADL, balance, activity tolerance,  Prognosis: Good  History: 66 y/o female admit 2021 with R LE Traumatic Hematoma (hit R LE on car door).  s/p Evac of R LE Hematoma. Pt returned to OR 2021 S/P Evac of R LE Hematoma, Excisional Debride (97O23 cm), Applic of Wound Vac. Plastic and Reconstruct Surg follow for Staged Reconstruct with Skin Graft. PMH as noted including Gastric Bypass, Rectal Ca, L TKR, R Femur Fx/ORIF (10/2018). REQUIRES OT FOLLOW UP: Yes  Activity Tolerance  Activity Tolerance: Patient Tolerated treatment well  Safety Devices  Type of devices: Call light within reach; Chair alarm in place; Left in chair;Gait belt         Patient Diagnosis(es): There were no encounter diagnoses.       has a past medical history of Acid reflux, Anxiety, Arthritis, Atrial fibrillation (HCC), Closed fracture dislocation of right elbow, Depression, Fibromyalgia, Migraine, Rectal cancer ([facility] [address] and Wears glasses. has a past surgical history that includes  section; Total knee arthroplasty (Left); open tx femoral supracondylar fracture w/o extension (Right, 10/25/2018); Gastric bypass surgery; Neck surgery; other surgical history; Cholecystectomy; Appendectomy; Colonoscopy; Upper gastrointestinal endoscopy (N/A, 2019); Colonoscopy (N/A, 2019); knee surgery (Right); Upper gastrointestinal endoscopy (N/A, 2020); incision and drainage (Right, 2021); Skin graft (Right, 2021); and Skin graft (Right, 2021). Restrictions  Restrictions/Precautions  Restrictions/Precautions: Fall Risk, Weight Bearing  Lower Extremity Weight Bearing Restrictions  Right Lower Extremity Weight Bearing: Weight Bearing As Tolerated  Position Activity Restriction  Other position/activity restrictions: Wound Vac R Lat Calf. PT spoke with Surg Julia Eddy NP) : WBAT R LE. s/p skin graft on . Subjective   General  Chart Reviewed: Yes, Progress Notes  Patient assessed for rehabilitation services?: Yes  Additional Pertinent Hx: 66 y/o female admit 2021 with R LE Traumatic Hematoma (hit R LE on car door).  s/p Evac of R LE Hematoma. Pt returned to OR 2021 S/P Evac of R LE Hematoma, Excisional Debride (88E04 cm), Applic of Wound Vac. Plastic and Reconstruct Surg follow for Staged Reconstruct with Skin Graft. PMH as noted including Gastric Bypass, Rectal Ca, L TKR, R Femur Fx/ORIF (10/2018). Response to previous treatment: Patient with no complaints from previous session  Family / Caregiver Present: No  Referring Practitioner: Kami/Jackson  Diagnosis: traumatic hematoma RLE with infection  Subjective  Subjective: Pt seen in the department and agreed to OT treatment. Pt with no c/o pain this session. Pt reports getting pain medication prior to OT session.       Objective       Light Housekeeping  Light Housekeeping Level: Sophronia Brim Housekeeping Level of as needed  Short term goal 3: toilet indep with AD  Short term goal 4: transfers indep with AD  Short term goal 5: functional mobility indep  Short term goal 6: increase activity tolerance to stand 3 min for ADL/mobility  Long term goals  Time Frame for Long term goals : same as stg  Patient Goals   Patient goals : \"I want to get my legs stronger\"       Therapy Time   Individual Concurrent Group PM Session   Time In 1115      1300   Time Out 1200      1345   Minutes 45      [address] Carmen Huber, [address] [place] [address]

## 2021-02-19 NOTE — PROGRESS NOTES
Department of Physical Medicine & Rehabilitation  Progress Note    Patient Identification:  Pinky Easley  [id number]  : 1945  Admit date: 2021    Chief Complaint: Traumatic hematoma of right lower leg with infection    Subjective:  Patient seen this AM. Patient feeling better this morning. She has less pain in her skin graft area. We think the patient will be ready for discharge to home next Tuesday. The wound VAC is present over the skin graft to help it adhere, and this will be removed on Tuesday before her discharge. Repeat labs look good and are stable. Her pain medication is keeping her wound graft pain under control. ROS: No f/c, n/v, cp     Objective:  Patient Vitals for the past 24 hrs:   BP Temp Temp src Pulse Resp SpO2 Height Weight   21 0525 121/62 97.2 °F (36.2 °C) Oral 63 18 93 % -- 188 lb 0.8 oz (85.3 kg)   21 1932 135/77 98.4 °F (36.9 °C) Oral 70 20 94 % -- --   21 1600 (!) 142/79 98.1 °F (36.7 °C) Oral 93 16 93 % -- --   21 1042 -- -- -- -- -- -- 5' 1\" (1.549 m) --     Const: Alert. No distress, pleasant. HEENT: Normocephalic, atraumatic. Normal sclera/conjunctiva. MMM. CV: Regular rate and rhythm. Resp: No respiratory distress. Lungs CTAB. Abd: Soft, nontender, nondistended, NABS+   Ext: No edema. RLE wound vac in place  Neuro: Alert, oriented, appropriately interactive. Psych: Cooperative, appropriate mood and affect    Laboratory data: Available via EMR. Last 24 hour lab  No results found for this or any previous visit (from the past 24 hour(s)). Therapy progress:  PT  Position Activity Restriction  Other position/activity restrictions: Wound Vac R Lat Calf. PT spoke with Jeremy Eddy NP) : WBAT R LE. s/p skin graft on .   Objective     Sit to Stand: Stand by assistance  Stand to sit: Stand by assistance  Device: Rolling Walker  Other Apparatus: (therapist managed IV wound vac on IV pole)  Assistance: Stand by assistance  Distance: 90'x2 and short distances in therapy gym and room  OT  PT Equipment Recommendations  Other: Will monitor for potential equipt needs. pt has a RW and rollator  Toilet - Technique: Ambulating  Equipment Used: [employer]       Body mass index is 35.53 kg/m². Rehabilitation Diagnosis:   16.0, Debility (non-cardiac, non-pulmonary        Assessment and Plan:     Impairments: generalized weakness, decreased ROM distal RLE, decreased balance, endurance     Debility  -PT/OT     Right leg hematoma   -s/p evacuation (1/28 with Dr. Cleopatra Fallon)  -s/p evacuation, debridement and wound vac placement (1/31 with Dr. Apryl Clark)  -Plan for STSG on 2/17      Acute blood loss anemia with hemorrhagic shock  -Due to traumatic hematoma   -Monitor Hgb, transfuse prn <7.      Paroxysmal Afib  -Eliquis on hold due to above until cleared by Surgery  -flecainide, metoporlol     H/o gastric bypass (Shanice-en-Y)  -Vitamin supplements  -Dietitian consult     Hypomagnesemia  -supplement     Anxiety/depression  -sertraline, prn alprazolam     Acute on chronic pain control  -H/o fibromyalgia  -prn Norco, tizanidine     Bladder   -High risk retention   -Monitor PVRs, SC prn >300cc     Bowel   -High risk constipation   -senna+colace BID, PRN miralax, MoM, and bisacodyl supp.     Safety   -fall precautions     PPx  -DVT: on hold due to hematoma  -GI: pantoprazole    Rehab Progress: Tolerating therapy well thus far. Working on functional mobility, compensatory strategies, balance, strength, endurance. Anticipated Dispo: home with son  Services/DME: TBD  ELOS: 7-10 days      Malick Naqvi.  MD [place] 2/19/2021, 7:46 AM

## 2021-02-19 NOTE — PROGRESS NOTES
Physical Therapy  Facility/Department: [address] REHAB  Daily Treatment Note  - AM and PM Sessions    NAME: Nadja Khanna  : 1945  MRN: [id number]    Date of Service: 2021    Discharge Recommendations:  Patient would benefit from continued therapy after discharge, Home with Home health PT, Home with assist PRN, S Level 1   PT Equipment Recommendations  Other: Will monitor for potential equipt needs. pt has a RW and rollator    Assessment   Body structures, Functions, Activity limitations: Decreased functional mobility ; Decreased strength;Decreased endurance; Increased pain;Decreased posture;Decreased balance  Assessment: PTA pt living with son in home with ramp access and 1st floor bed/bath. Prior to admission, pt independent with daily care and functional mobility. Pt continues to be limited by decreased activity tolerance and has increased RLE pain with mobility. Patient is below baseline functional status and therefore would benefit from additional therapy. Pt is s/p skin grafting on  and has similar functional mobility status as prior to procedure, requiring SBA ambulating up to 90'  with RW, distance limited by both pain in R thigh at graft site and shortness of breath. Able to ascend/descend curb with wheeled walker and CGA/SBA. Anticipate that pt will be safe to d/c home with assist PRN and home health PT by mid next week - currently anticipate discharge on Tuesday, . Treatment Diagnosis: decreased activity tolerance  Prognosis: Good  PT Education: Transfer Training;General Safety;Gait Training;Functional Mobility Training;PT Role;Plan of Care  REQUIRES PT FOLLOW UP: Yes  Activity Tolerance  Activity Tolerance: Patient Tolerated treatment well;Patient limited by endurance; Patient limited by pain     Patient Diagnosis(es): There were no encounter diagnoses.      has a past medical history of Acid reflux, Anxiety, Arthritis, Atrial fibrillation (HCC), Closed fracture dislocation of right elbow, Depression, Fibromyalgia, Migraine, Rectal cancer ([facility] [address] and Wears glasses. has a past surgical history that includes  section; Total knee arthroplasty (Left); open tx femoral supracondylar fracture w/o extension (Right, 10/25/2018); Gastric bypass surgery; Neck surgery; other surgical history; Cholecystectomy; Appendectomy; Colonoscopy; Upper gastrointestinal endoscopy (N/A, 2019); Colonoscopy (N/A, 2019); knee surgery (Right); Upper gastrointestinal endoscopy (N/A, 2020); incision and drainage (Right, 2021); Skin graft (Right, 2021); and Skin graft (Right, 2021). Restrictions  Restrictions/Precautions  Restrictions/Precautions: Fall Risk, Weight Bearing  Lower Extremity Weight Bearing Restrictions  Right Lower Extremity Weight Bearing: Weight Bearing As Tolerated  Position Activity Restriction  Other position/activity restrictions: Wound Vac R Lat Calf. PT spoke with Surg Annette Eddy NP) : WBAT R LE. s/p skin graft on . Subjective   General  Chart Reviewed: Yes  Response To Previous Treatment: Patient with no complaints from previous session. Family / Caregiver Present: No  Referring Practitioner: Dr. Paz/ Dr Kenzie Malone  Subjective  Subjective: Pt pleasant and agreeable to PT treatment. Reports 5/10 pain at donor graft site on R thigh - denied need for pain med.           Orientation  Orientation  Overall Orientation Status: Within Normal Limits  Cognition   Cognition  Overall Cognitive Status: WFL  Objective      Transfers  Sit to Stand: Stand by assistance  Stand to sit: Stand by assistance  Comment: occasional cues for hand placement and safety  Ambulation  Ambulation?: Yes  WB Status: WBAT R LE  More Ambulation?: No  Ambulation 1  Surface: level tile  Device: Rolling Walker  Other Apparatus: (therapist managed IV wound vac on IV pole)  Assistance: Stand by assistance  Quality of Gait: slow amarilis; decreased toe clearance on RLE, decreased stance RLE, TKEs, and SLRs with CGA/min A for BLEx 10 reps each LE. With SLR needed cues to perform quad set prior to lifting, especially with LLE as quad weakness in apparent, likely due to limited rehabilitation after L TKR. Supine to sit via R side with SBA, cues for technique, and encouragement. Seated knee ext x 20 reps each LE, cues for full knee ext. Transfer mat to [facility] with wheeled walker with SBA. Returned to room via [facility] with transporter. Goals  Short term goals  Time Frame for Short term goals: 1 week  Short term goal 1: Bed mobility modif I  Short term goal 2: transfers from all surfaces including car modif I  Short term goal 3: Amb with assist device, WBAT R LE, 150' modif I  Short term goal 4: curb step with wh walker SBA  Long term goals  Time Frame for Long term goals : set after skin grafting if needed - remained WBAT so will continue with goals set  Patient Goals   Patient goals : Be able to return home with son. Plan    Plan  Times per week: 5-6x/week  Times per day: Twice a day  Plan weeks: 7 to 10 days  Specific instructions for Next Treatment: increase gt, try curb step as able  Current Treatment Recommendations: Strengthening, Functional Mobility Training, Transfer Training, Gait Training, Safety Education & Training, Patient/Caregiver Education & Training, Endurance Training, Equipment Evaluation, Education, & procurement, Home Exercise Program  Safety Devices  Type of devices:  All fall risk precautions in place, Gait belt, Patient at risk for falls(returned to room via [facility] with transporter)  Restraints  Initially in place: No     Therapy Time   Individual Concurrent Group Co-treatment   Time In 0900         Time Out 0945         Minutes 45         Timed Code Treatment Minutes: 39 Minutes     Second Session Therapy Time   Individual Co-treatment   Time In [address] [facility]     Time Out [address] [facility], [address] [facility]

## 2021-02-19 NOTE — PLAN OF CARE
Problem: Falls - Risk of:  Goal: Will remain free from falls  Description: Will remain free from falls  Outcome: Ongoing  Note: Pt AAO X4. Admitted with traumatic hematoma of RLE with infection. Free of fall. Transfers with assist of one, walker, and gait belt. Yellow socks on. Wheels locked. Bed lowest position. Alarm on. Call light, over the bed table, and personal belonging in reach. Hourly rounding. Patient instructed to call for needs or changes. Problem: Skin Integrity:  Goal: Absence of new skin breakdown  Description: Absence of new skin breakdown  Outcome: Ongoing  Note: Graft site to right thigh with dressing intact. Wound vac intact. Scattered bruising noted. No new skin breakdown. Able to reposition self. Pillow support. Lower extremities swollen. Heels elevated. Problem: Pain:  Goal: Pain level will decrease  Description: Pain level will decrease  Outcome: Ongoing  Note: Patient able to verbalize pain in scale of 0-10. Pain medication given. Reassessed. Education provided about pain management.

## 2021-02-20 PROCEDURE — 94760 N-INVAS EAR/PLS OXIMETRY 1: CPT

## 2021-02-20 PROCEDURE — 97530 THERAPEUTIC ACTIVITIES: CPT

## 2021-02-20 PROCEDURE — 6370000000 HC RX 637 (ALT 250 FOR IP): Performed by: PHYSICAL MEDICINE & REHABILITATION

## 2021-02-20 PROCEDURE — 1280000000 HC REHAB R&B

## 2021-02-20 PROCEDURE — 2580000003 HC RX 258: Performed by: PHYSICAL MEDICINE & REHABILITATION

## 2021-02-20 PROCEDURE — 99024 POSTOP FOLLOW-UP VISIT: CPT | Performed by: SURGERY

## 2021-02-20 RX ADMIN — FLECAINIDE ACETATE 100 MG: 100 TABLET ORAL at 21:08

## 2021-02-20 RX ADMIN — PANTOPRAZOLE SODIUM 40 MG: 40 TABLET, DELAYED RELEASE ORAL at 16:05

## 2021-02-20 RX ADMIN — HYDROCODONE BITARTRATE AND ACETAMINOPHEN 2 TABLET: 5; 325 TABLET ORAL at 06:35

## 2021-02-20 RX ADMIN — ACETAMINOPHEN 650 MG: 325 TABLET ORAL at 19:54

## 2021-02-20 RX ADMIN — HYDROCODONE BITARTRATE AND ACETAMINOPHEN 2 TABLET: 5; 325 TABLET ORAL at 14:50

## 2021-02-20 RX ADMIN — METOCLOPRAMIDE 5 MG: 10 TABLET ORAL at 11:30

## 2021-02-20 RX ADMIN — FUROSEMIDE 20 MG: 20 TABLET ORAL at 08:17

## 2021-02-20 RX ADMIN — SERTRALINE 100 MG: 100 TABLET, FILM COATED ORAL at 21:08

## 2021-02-20 RX ADMIN — PANTOPRAZOLE SODIUM 40 MG: 40 TABLET, DELAYED RELEASE ORAL at 06:20

## 2021-02-20 RX ADMIN — ALPRAZOLAM 0.5 MG: 0.5 TABLET ORAL at 21:08

## 2021-02-20 RX ADMIN — TIZANIDINE 2 MG: 4 TABLET ORAL at 21:08

## 2021-02-20 RX ADMIN — SODIUM CHLORIDE, PRESERVATIVE FREE 10 ML: 5 INJECTION INTRAVENOUS at 21:10

## 2021-02-20 RX ADMIN — FLECAINIDE ACETATE 100 MG: 100 TABLET ORAL at 08:17

## 2021-02-20 RX ADMIN — Medication 400 MG: at 08:17

## 2021-02-20 RX ADMIN — ACETAMINOPHEN 650 MG: 325 TABLET ORAL at 10:07

## 2021-02-20 RX ADMIN — SODIUM CHLORIDE, PRESERVATIVE FREE 10 ML: 5 INJECTION INTRAVENOUS at 08:16

## 2021-02-20 RX ADMIN — MEMANTINE 5 MG: 5 TABLET ORAL at 21:08

## 2021-02-20 RX ADMIN — Medication 400 MG: at 21:08

## 2021-02-20 RX ADMIN — METOCLOPRAMIDE 5 MG: 10 TABLET ORAL at 16:05

## 2021-02-20 RX ADMIN — Medication 3 MG: at 21:08

## 2021-02-20 RX ADMIN — MEMANTINE 5 MG: 5 TABLET ORAL at 08:17

## 2021-02-20 RX ADMIN — METOCLOPRAMIDE 5 MG: 10 TABLET ORAL at 08:17

## 2021-02-20 RX ADMIN — CALCIUM 500 MG: 500 TABLET ORAL at 08:17

## 2021-02-20 RX ADMIN — METOPROLOL SUCCINATE 50 MG: 50 TABLET, EXTENDED RELEASE ORAL at 08:17

## 2021-02-20 ASSESSMENT — PAIN DESCRIPTION - PROGRESSION
CLINICAL_PROGRESSION: NOT CHANGED
CLINICAL_PROGRESSION: GRADUALLY WORSENING
CLINICAL_PROGRESSION: NOT CHANGED

## 2021-02-20 ASSESSMENT — PAIN DESCRIPTION - FREQUENCY
FREQUENCY: CONTINUOUS
FREQUENCY: CONTINUOUS

## 2021-02-20 ASSESSMENT — PAIN DESCRIPTION - PAIN TYPE
TYPE: ACUTE PAIN
TYPE: ACUTE PAIN

## 2021-02-20 ASSESSMENT — PAIN DESCRIPTION - LOCATION
LOCATION: LEG
LOCATION: LEG

## 2021-02-20 ASSESSMENT — PAIN DESCRIPTION - ORIENTATION
ORIENTATION: RIGHT

## 2021-02-20 ASSESSMENT — PAIN SCALES - GENERAL
PAINLEVEL_OUTOF10: 2
PAINLEVEL_OUTOF10: 0

## 2021-02-20 ASSESSMENT — PAIN DESCRIPTION - ONSET
ONSET: GRADUAL
ONSET: ON-GOING

## 2021-02-20 ASSESSMENT — PAIN SCALES - WONG BAKER: WONGBAKER_NUMERICALRESPONSE: 0

## 2021-02-20 ASSESSMENT — PAIN DESCRIPTION - DESCRIPTORS
DESCRIPTORS: ACHING
DESCRIPTORS: ACHING

## 2021-02-20 NOTE — PROGRESS NOTES
[facility]    Attempted to see, but patient out of room. Chart reviewed and doing well. Plan for vac to come down on Monday and then to be dressed daily with Bacitrain/Xerofom/Gauze daily. Will follow-up in office in 2 weeks.     Ayanna Mulligan MD  [address] [place] [address] [address] Reconstructive Surgery  [phone]  02/20/21

## 2021-02-20 NOTE — PLAN OF CARE
Problem: IP BLADDER/VOIDING  Goal: LTG - patient will complete bladder elimination  2/20/2021 0356 by Jose Albarado RN  Outcome: Ongoing  Goal: LTG - Patient will utilize adaptive techniques/equipment to complete bladder elimination  2/20/2021 0356 by Jose Albarado RN  Outcome: Ongoing  Goal: LTG - patient will achieve acceptable level of continence  2/20/2021 0356 by Jose Albarado RN  Outcome: Ongoing  Goal: STG - Patient demonstrates no accidents  2/20/2021 0356 by Jose Albarado RN  Outcome: Ongoing  Goal: STG - Patient will state signs and symptoms of UTI  2/20/2021 0356 by Jose Albarado RN  Outcome: Ongoing  Goal: STG - patient will be able to empty bladder  2/20/2021 0356 by Jose Albarado RN  Outcome: Ongoing  Goal: STG - patient participates in bladder program by expressing need to void  2/20/2021 0356 by Jose Albarado RN  Outcome: Ongoing     Problem: IP BOWEL ELIMINATION  Goal: LTG - patient will utilize adaptive techniques/equipment to complete bowel elimination  2/20/2021 0356 by Jose Albarado RN  Outcome: Ongoing  Goal: LTG - patient will have regular and routine bowel evacuation  2/20/2021 0356 by Jose Albarado RN  Outcome: Ongoing  Goal: STG - patient will be accident free  2/20/2021 0356 by Jose Albarado RN  Outcome: Ongoing  Goal: STG - Patient participates in bowel management program  2/20/2021 0356 by Jose Albarado RN  Outcome: Ongoing  Goal: STG - Patient will verbalize signs and symptoms of constipation and how to prevent/alleviate  2/20/2021 0356 by Jose Albarado RN  Outcome: Ongoing  Goal: STG - patient will be continent of stool  2/20/2021 0356 by Jose Albarado RN  Outcome: Ongoing  Goal: STG - Patient completes digital stimulation technique  2/20/2021 0356 by Jose Albarado RN  Outcome: Ongoing  Goal: STG - Patient verbalizes knowledge about relationship between diet, fluid intake, activity and medication on constipation  2/20/2021 0356 by Jose Albarado RN  Outcome: Ongoing     Problem: IP BREATHING  Goal: LTG - patient will mobilize secretions and maintain airway  2/20/2021 0356 by Guille Yarbrough RN  Outcome: Ongoing  Goal: LTG - Patient/caregiver will demonstrate/perform proper techniques to maintain patent airway  2/20/2021 0356 by Guille Yarbrough RN  Outcome: Ongoing  Goal: LTG - patient/caregiver will demonstrate/perform improved or stable self care abilities within physical limitations  2/20/2021 0356 by Guille Yarbrough RN  Outcome: Ongoing  Goal: STG - respiratory rate and effort will be within normal limits for the patient  2/20/2021 0356 by Guille Yarbrough RN  Outcome: Ongoing  Goal: STG - patient/caregiver will be able to verbalize oxygen safety precautions  2/20/2021 0356 by Guille Yarbrough RN  Outcome: Ongoing  Goal: STG - Patient/caregiver demonstrates correct suctioning technique  2/20/2021 0356 by Guille Yarbruogh RN  Outcome: Ongoing  Goal: STG - patient will utilize incentive spirometer  2/20/2021 0356 by Guille Yarbrough RN  Outcome: Ongoing  Goal: STG - Patient can utilize incentive spirometer  2/20/2021 0356 by Guille Yarbrough RN  Outcome: Ongoing  Goal: STG - family can complete suctioning  2/20/2021 0356 by Guille Yarbrough RN  Outcome: Ongoing     Problem: NUTRITION  Goal: Patient maintains adequate hydration  2/20/2021 0356 by Guille Yarbrough RN  Outcome: Ongoing  Goal: Patient maintains weight  2/20/2021 0356 by Guille Yarbrough RN  Outcome: Ongoing  Goal: Patient/Family demonstrates understanding of diet  2/20/2021 0356 by Guille Yarbrough RN  Outcome: Ongoing  Goal: Patient/Family independently completes tube feeding  2/20/2021 0356 by Guille Yarbrough RN  Outcome: Ongoing  Goal: Patient will have no more than 5 lb weight change during LOS  2/20/2021 0356 by Guille Yarbrough RN  Outcome: Ongoing  Goal: Patient will utilize adaptive techniques to administer nutrition  2/20/2021 0356 by Guille Yarbrough RN  Outcome: Ongoing  Goal: Patient will verbalize dietary restrictions  2/20/2021 0356 by Guille Yarbrough RN  Outcome: Ongoing     Problem: SAFETY  Goal: LTG - patient will adhere to hip precautions during ADL's and transfers  2/20/2021 0356 by Holly Edwards RN  Outcome: Ongoing  Goal: LTG - Patient will demonstrate safety requirements appropriate to situation/environment  2/20/2021 0356 by Holly Edwards RN  Outcome: Ongoing  Goal: LTG - patient will utilize safety techniques  2/20/2021 0356 by Holly Edwards RN  Outcome: Ongoing  Goal: STG - patient locks brakes on wheelchair  2/20/2021 0356 by Holly Edwards RN  Outcome: Ongoing  Goal: STG - Patient uses call light consistently to request assistance with transfers  2/20/2021 0356 by Holly Edwards RN  Outcome: Ongoing  Goal: STG - patient uses gait belt during all transfers  2/20/2021 0356 by Holly Edwards RN  Outcome: Ongoing     Problem: SKIN INTEGRITY  Goal: LTG - Patient will be free from infection  2/20/2021 0356 by Holly Edwards RN  Outcome: Ongoing  Goal: LTG - patient will maintain/improve skin integrity through proper skin care techniques  2/20/2021 0356 by Holly Edwards RN  Outcome: Ongoing  Goal: LTG - Patient will demonstrate appropriate pressure relief techniques  2/20/2021 0356 by Holly Edwards RN  Outcome: Ongoing  Goal: LTG - patient will demonstrate appropriate skin care techniques  2/20/2021 0356 by Holly Edwards RN  Outcome: Ongoing  Goal: LTG - Patient will be free from infection  2/20/2021 0356 by Holly Edwards RN  Outcome: Ongoing  Goal: STG - Patient demonstrates skin care/treatment/dressing change  2/20/2021 0356 by Holly Edwards RN  Outcome: Ongoing  Goal: STG - patient will maintain good skin integrity  2/20/2021 0356 by Holly Edwards RN  Outcome: Ongoing  Goal: STG - Patient exhibits signs of wound healing.  2/20/2021 0356 by Holly Edwards RN  Outcome: Ongoing  Goal: STG - patient demonstrates pressure reduction techniques  2/20/2021 0356 by Holly Edwards RN  Outcome: Ongoing  Goal: STG - Patient demonstrates preventative skin care measures  2/20/2021 0356 by Holly Edwards RN  Outcome: Ongoing     Problem: PAIN  Goal: LTG - Patient will manage pain with the appropriate technique/Intervention  2/20/2021 1056 by Maira Jimenez RN  Outcome: Ongoing  Note: Continues with pain to graft site; prn pain med and repositioning effective. Continue to position for comfort. 2/20/2021 0356 by Radha Trejo RN  Outcome: Ongoing  Goal: LTG - Patient will demonstrate intervention for managing pain  2/20/2021 0356 by Radha Trejo RN  Outcome: Ongoing  Goal: STG - Patient will reduce or eliminate use of analgesics  2/20/2021 0356 by Radha Trejo RN  Outcome: Ongoing  Goal: STG - pain is manageable through therapies  2/20/2021 0356 by Radha Trejo RN  Outcome: Ongoing  Goal: STG - Patient will verbalize an acceptable level of pain  2/20/2021 0356 by Radha Trejo RN  Outcome: Ongoing  Goal: STG - patients pain is managed to allow active participation in daily activities  2/20/2021 0356 by Radha Trejo RN  Outcome: Ongoing  Goal: STG - Patient will increase activity level  2/20/2021 0356 by Radha Trejo RN  Outcome: Ongoing  Goal: STG - patient verbalizes a reduction in pain level  2/20/2021 0356 by Radha Trejo RN  Outcome: Ongoing     Problem: Falls - Risk of:  Goal: Will remain free from falls  Description: Will remain free from falls  2/20/2021 1056 by Maira Jimenez RN  Outcome: Ongoing  Note: Risk assessment complete; call light and needed items within reach. Compliant with call light use and staff assist.   2/20/2021 0356 by Radha Trejo RN  Outcome: Ongoing  Note: Fall risk band on patient. Orange light on outside of room. Non skid footwear in place. Alarms used appropriately. Patient instructed to call and wait for staff before getting up. Rounding done to anticipate needs. Appropriate safety devices used for transfers.    Goal: Absence of physical injury  Description: Absence of physical injury  2/20/2021 0356 by Radha Trejo RN  Outcome: Ongoing     Problem: Skin Integrity:  Goal: Will show no infection signs and symptoms  Description: Will show no infection signs and symptoms  2/20/2021 0356 by Radha Trejo RN  Outcome: Ongoing  Goal: Absence of new skin breakdown  Description: Absence of new skin breakdown  2/20/2021 0356 by Radha Rueda RN  Outcome: Ongoing     Problem: Pain:  Goal: Pain level will decrease  Description: Pain level will decrease  2/20/2021 0356 by Radha Rueda RN  Outcome: Ongoing  Goal: Control of acute pain  Description: Control of acute pain  2/20/2021 0356 by Radha Rueda RN  Outcome: Ongoing  Goal: Control of chronic pain  Description: Control of chronic pain  2/20/2021 0356 by Radha Rueda RN  Outcome: Ongoing     Problem: Nutrition  Goal: Optimal nutrition therapy  2/20/2021 0356 by Radha Rueda RN  Outcome: Ongoing

## 2021-02-20 NOTE — PROGRESS NOTES
Patient admitted to rehab with Traumatic hematoma of right lower leg with infection. A/A/O x 4. Transfers with RW, gait belt and 1 staff assist. On general diet, tolerating well. Medications taken whole with thin liquids. On ASA for DVT prophylaxis. Skin continues with wound vac to RLE with surgery following and providing dressing changes. Graft site to right thigh with dressing intact. On room air. Has been continent of bowel and bladder. LBM 2/20. Chair/bed alarms in use. Continue with intentional rounding. Call light and needed items within reach.

## 2021-02-20 NOTE — PROGRESS NOTES
Physical Therapy  Facility/Department: [address] IP REHAB  Daily Treatment Note  NAME: Rocio Land  : 1945  MRN: [id number]    Date of Service: 2021    Discharge Recommendations:  Patient would benefit from continued therapy after discharge, Home with Home health PT, Home with assist PRN, S Level 1   PT Equipment Recommendations  Other: Will monitor for potential equipt needs. pt has a RW and rollator    Assessment   Body structures, Functions, Activity limitations: Decreased functional mobility ; Decreased strength;Decreased endurance; Increased pain;Decreased posture;Decreased balance  Assessment: PTA pt living with son in home with ramp access and 1st floor bed/bath. Prior to admission, pt independent with daily care and functional mobility. Pt continues to be limited by decreased activity tolerance and has increased RLE pain with mobility. Patient is below baseline functional status and therefore would benefit from additional therapy. Pt this date  limited mobility in room due to increased fatigue and pain. Anticipate that pt will be safe to d/c home with assist PRN and home health PT by mid next week - currently anticipate discharge on Tuesday, . Treatment Diagnosis: decreased activity tolerance  Prognosis: Good  PT Education: Transfer Training;General Safety;Gait Training;Functional Mobility Training;PT Role;Plan of Care  Patient Education: safe use of wheeled walker - cues for turning and backing up with wheeled walker with safe/preferred hand placement  REQUIRES PT FOLLOW UP: Yes  Activity Tolerance  Activity Tolerance: Patient Tolerated treatment well;Patient limited by endurance; Patient limited by pain     Patient Diagnosis(es): There were no encounter diagnoses. has a past medical history of Acid reflux, Anxiety, Arthritis, Atrial fibrillation (HCC), Closed fracture dislocation of right elbow, Depression, Fibromyalgia, Migraine, Rectal cancer ([facility] [address] and Wears glasses. has a past surgical history that includes  section; Total knee arthroplasty (Left); open tx femoral supracondylar fracture w/o extension (Right, 10/25/2018); Gastric bypass surgery; Neck surgery; other surgical history; Cholecystectomy; Appendectomy; Colonoscopy; Upper gastrointestinal endoscopy (N/A, 2019); Colonoscopy (N/A, 2019); knee surgery (Right); Upper gastrointestinal endoscopy (N/A, 2020); incision and drainage (Right, 2021); Skin graft (Right, 2021); and Skin graft (Right, 2021). Social/Functional History  Lives With: Son(Does not work)  Type of Home: House  Home Layout: One level  Home Access: Ramped entrance  Bathroom Shower/Tub: Tub/Shower unit  Bathroom Toilet: Standard  Bathroom Equipment: Tub transfer bench, Grab bars in shower, Hand-held shower, 3-in-1 commode, Grab bars around toilet(has not needed to use commode)  Bathroom Accessibility: [facility]: Rolling walker, Wheelchair-manual  ADL Assistance: Independent  Homemaking Assistance: (son completes [de-identified] of IADLs, pt does some cooking and laundry, does dishes)  Ambulation Assistance: Independent(with rw)  Transfer Assistance: Independent  Active : Yes  Occupation: Retired  Type of occupation: medical assistant, patient representative, billing  IADL Comments: Pt sleeps in regular bed. \  Restrictions  Restrictions/Precautions  Restrictions/Precautions: Fall Risk, Weight Bearing  Lower Extremity Weight Bearing Restrictions  Right Lower Extremity Weight Bearing: Weight Bearing As Tolerated  Position Activity Restriction  Other position/activity restrictions: Wound Vac R Lat Calf. PT spoke with Surg Jayne Eddy NP) : WBAT R LE. s/p skin graft on . Subjective   General  Chart Reviewed: Yes  Additional Pertinent Hx: 66 y/o female admit 2021 with R LE Traumatic Hematoma (hit R LE on car door).   2021 S/P Evac of R LE Hematoma, Excisional Debride (75W54 cm), Applic of Wound Vac.   Plastic and Reconstruct Surg follow for Staged Reconstruct with Skin Graft. S/p skin graft on 2/17. PMH as noted including Gastric Bypass, Rectal Ca, L TKR, R Femur Fx/ORIF (10/2018). Response To Previous Treatment: Patient with no complaints from previous session. Family / Caregiver Present: No  Referring Practitioner: Dr. Paz/ Dr Mena Catching: Pt supine in bed upon arrival to room, pt reports moderate pain. Requesting to get OOB to use bathroom. Agreeable to PT to assist          Orientation  Orientation  Overall Orientation Status: Within Normal Limits    Objective   Bed mobility  Supine to Sit: Supervision  Sit to Supine: Minimal assistance(assistance with BLEs with increased time)  Scooting: Supervision;Minimal assistance  Comment: HOB elevated supine to sit with increased time using railing, sitting to supine bed flat. Supervision increased time sitting and min assist supine in bed [facility] flat/trendelenburg position for assistance due to generalized weakness. Transfers  Sit to Stand: Stand by assistance  Stand to sit: Stand by assistance  Comment: EOB and sitting on commode  Ambulation  Ambulation?: Yes  WB Status: WBAT R LE  Ambulation 1  Surface: level tile  Device: Rolling Walker  Assistance: Stand by assistance  Quality of Gait: slow amarilis; decreased toe clearance on RLE, decreased stance phase on R with mildly antalgic gait pattern, forward flexed posture, increased pain at skin graft donor site with mobility  Gait Deviations: Decreased step height;Decreased step length; Slow Amarilis  Distance: 15' X 2 (EOB <-> commode)  Comments: requesting to hold off on further ambulation this date dueto fatigue and pain  Stairs/Curb  Stairs?: No     Balance  Posture: Good  Sitting - Static: Good  Sitting - Dynamic: Good  Standing - Static: Good;-  Standing - Dynamic: Good;-            Comment:  Mod I sitting on commode able to perform alma care, SBA standing at [facility] for doffing/donning

## 2021-02-21 PROCEDURE — 6370000000 HC RX 637 (ALT 250 FOR IP): Performed by: PHYSICAL MEDICINE & REHABILITATION

## 2021-02-21 PROCEDURE — 2580000003 HC RX 258: Performed by: PHYSICAL MEDICINE & REHABILITATION

## 2021-02-21 PROCEDURE — 1280000000 HC REHAB R&B

## 2021-02-21 PROCEDURE — 94761 N-INVAS EAR/PLS OXIMETRY MLT: CPT

## 2021-02-21 RX ADMIN — PANTOPRAZOLE SODIUM 40 MG: 40 TABLET, DELAYED RELEASE ORAL at 05:40

## 2021-02-21 RX ADMIN — SERTRALINE 100 MG: 100 TABLET, FILM COATED ORAL at 21:05

## 2021-02-21 RX ADMIN — HYDROCODONE BITARTRATE AND ACETAMINOPHEN 2 TABLET: 5; 325 TABLET ORAL at 11:50

## 2021-02-21 RX ADMIN — FUROSEMIDE 20 MG: 20 TABLET ORAL at 07:41

## 2021-02-21 RX ADMIN — PROMETHAZINE HYDROCHLORIDE 12.5 MG: 25 TABLET ORAL at 17:50

## 2021-02-21 RX ADMIN — CALCIUM 500 MG: 500 TABLET ORAL at 07:41

## 2021-02-21 RX ADMIN — FLECAINIDE ACETATE 100 MG: 100 TABLET ORAL at 07:41

## 2021-02-21 RX ADMIN — HYDROCODONE BITARTRATE AND ACETAMINOPHEN 2 TABLET: 5; 325 TABLET ORAL at 02:12

## 2021-02-21 RX ADMIN — ACETAMINOPHEN 650 MG: 325 TABLET ORAL at 07:46

## 2021-02-21 RX ADMIN — Medication 3 MG: at 21:05

## 2021-02-21 RX ADMIN — METOCLOPRAMIDE 5 MG: 10 TABLET ORAL at 07:41

## 2021-02-21 RX ADMIN — TIZANIDINE 2 MG: 4 TABLET ORAL at 21:05

## 2021-02-21 RX ADMIN — METOPROLOL SUCCINATE 50 MG: 50 TABLET, EXTENDED RELEASE ORAL at 07:41

## 2021-02-21 RX ADMIN — ALPRAZOLAM 0.5 MG: 0.5 TABLET ORAL at 21:05

## 2021-02-21 RX ADMIN — METOCLOPRAMIDE 5 MG: 10 TABLET ORAL at 11:50

## 2021-02-21 RX ADMIN — SODIUM CHLORIDE, PRESERVATIVE FREE 10 ML: 5 INJECTION INTRAVENOUS at 21:06

## 2021-02-21 RX ADMIN — SODIUM CHLORIDE, PRESERVATIVE FREE 10 ML: 5 INJECTION INTRAVENOUS at 07:47

## 2021-02-21 RX ADMIN — MEMANTINE 5 MG: 5 TABLET ORAL at 07:41

## 2021-02-21 RX ADMIN — Medication 400 MG: at 21:05

## 2021-02-21 RX ADMIN — ACETAMINOPHEN 650 MG: 325 TABLET ORAL at 15:17

## 2021-02-21 RX ADMIN — Medication 400 MG: at 07:42

## 2021-02-21 RX ADMIN — MEMANTINE 5 MG: 5 TABLET ORAL at 21:05

## 2021-02-21 RX ADMIN — FLECAINIDE ACETATE 100 MG: 100 TABLET ORAL at 21:05

## 2021-02-21 RX ADMIN — HYDROCODONE BITARTRATE AND ACETAMINOPHEN 2 TABLET: 5; 325 TABLET ORAL at 17:54

## 2021-02-21 RX ADMIN — PANTOPRAZOLE SODIUM 40 MG: 40 TABLET, DELAYED RELEASE ORAL at 16:16

## 2021-02-21 RX ADMIN — METOCLOPRAMIDE 5 MG: 10 TABLET ORAL at 16:16

## 2021-02-21 ASSESSMENT — PAIN - FUNCTIONAL ASSESSMENT
PAIN_FUNCTIONAL_ASSESSMENT: ACTIVITIES ARE NOT PREVENTED

## 2021-02-21 ASSESSMENT — PAIN DESCRIPTION - PAIN TYPE
TYPE: ACUTE PAIN
TYPE: SURGICAL PAIN;ACUTE PAIN

## 2021-02-21 ASSESSMENT — PAIN DESCRIPTION - DESCRIPTORS
DESCRIPTORS: HEADACHE
DESCRIPTORS: ACHING

## 2021-02-21 ASSESSMENT — PAIN DESCRIPTION - PROGRESSION
CLINICAL_PROGRESSION: NOT CHANGED

## 2021-02-21 ASSESSMENT — PAIN DESCRIPTION - FREQUENCY: FREQUENCY: INTERMITTENT

## 2021-02-21 ASSESSMENT — PAIN DESCRIPTION - ORIENTATION
ORIENTATION: RIGHT
ORIENTATION: RIGHT

## 2021-02-21 ASSESSMENT — PAIN SCALES - GENERAL
PAINLEVEL_OUTOF10: 3
PAINLEVEL_OUTOF10: 1
PAINLEVEL_OUTOF10: 4
PAINLEVEL_OUTOF10: 6
PAINLEVEL_OUTOF10: 1
PAINLEVEL_OUTOF10: 3

## 2021-02-21 ASSESSMENT — PAIN DESCRIPTION - LOCATION
LOCATION: LEG
LOCATION: HEAD
LOCATION: HEAD

## 2021-02-21 ASSESSMENT — PAIN DESCRIPTION - ONSET: ONSET: GRADUAL

## 2021-02-21 NOTE — PROGRESS NOTES
Department of Physical Medicine & Rehabilitation  Progress Note    Patient Identification:  Pinky Easley  [id number]  : 1945  Admit date: 2021    Chief Complaint: Traumatic hematoma of right lower leg with infection    Subjective:    Patient resting in bed, no new complaints, doing well this afternoon      ROS: No f/c, n/v, cp     Objective:  Patient Vitals for the past 24 hrs:   BP Temp Temp src Pulse Resp SpO2 Weight   21 1453 (!) 165/79 98.1 °F (36.7 °C) Oral 66 18 95 % --   21 0817 110/74 -- -- 73 -- -- --   21 0750 -- -- -- -- -- 96 % --   21 0445 125/72 98.2 °F (36.8 °C) Oral 64 16 -- 191 lb 12.8 oz (87 kg)     Const: Alert. No distress, pleasant. HEENT: Normocephalic, atraumatic. Normal sclera/conjunctiva. MMM. CV: Regular rate and rhythm. Resp: No respiratory distress. Lungs CTAB. Abd: Soft, nontender, nondistended, NABS+   Ext: No edema. RLE wound vac in place  Neuro: Alert, oriented, appropriately interactive. Psych: Cooperative, appropriate mood and affect    Laboratory data: Available via EMR. Last 24 hour lab  No results found for this or any previous visit (from the past 24 hour(s)). Therapy progress:  PT  Position Activity Restriction  Other position/activity restrictions: Wound Vac R Lat Calf. PT spoke with Jeremy Eddy NP) : WBAT R LE. s/p skin graft on . Objective     Sit to Stand: Stand by assistance  Stand to sit: Stand by assistance  Device: Rolling Walker  Other Apparatus: (therapist managed IV wound vac on IV pole)  Assistance: Stand by assistance  Distance: 15' X 2 (EOB <-> commode)  OT  PT Equipment Recommendations  Other: Will monitor for potential equipt needs. pt has a RW and rollator  Toilet - Technique: Ambulating  Equipment Used: bright box       Body mass index is 36.24 kg/m².     Rehabilitation Diagnosis:   16.0, Debility (non-cardiac, non-pulmonary        Assessment and Plan:     Impairments: generalized weakness, decreased ROM distal RLE, decreased balance, endurance     Debility  -PT/OT     Right leg hematoma   -s/p evacuation (1/28 with Dr. Marco Alcala)  -s/p evacuation, debridement and wound vac placement (1/31 with Dr. Clois Frankel)  - Dr. Dr. Clois Frankel - plan for vac DC on Monday     Acute blood loss anemia with hemorrhagic shock  -Due to traumatic hematoma   -Monitor Hgb, transfuse prn <7.      Paroxysmal Afib  -Eliquis on hold due to above until cleared by Surgery  -flecainide, metoporlol     H/o gastric bypass ([facility]anice-en-Y)  -Vitamin supplements  -Dietitian consult     Hypomagnesemia  -supplement     Anxiety/depression  -sertraline, prn alprazolam     Acute on chronic pain control  -H/o fibromyalgia  -prn Norco, tizanidine     Bladder   -High risk retention   -Monitor PVRs, SC prn >300cc     Bowel   -High risk constipation   -senna+colace BID, PRN miralax, MoM, and bisacodyl supp.     Safety   -fall precautions     PPx  -DVT: on hold due to hematoma  -GI: pantoprazole    MUSA Saldaña - CNP  02/20/21  10:23 PM

## 2021-02-21 NOTE — PROGRESS NOTES
Assessment complete. Sitting in bedside chair for breakfast.  Denies c/o,  Call light and needed items within reach.

## 2021-02-21 NOTE — PROGRESS NOTES
Pt resting in bed comfortably. A&O X4. Admitted on 2/11/2021 with traumatic hematoma of right lower leg. Reported pain 5/10 to right leg, pain medication given per mar. Reported tingling BLE. HR regular. Patient denies any palpitation, chest pain, or chest tightness. Lungs sounds clear. Abd soft and nontender. Active bowel sounds. Graft site to right thigh. Drainage leaking out to abd pad, dressing reinforced. Wound vac intact. Scattered bruising noted. PICC to [facility], flushed well. HS medication given. Tolerated well. Education provided on pain management, fall precaution, skin care, and medication. Call light in reach. Patient instructed to call if there is any needs or changes.  Electronically signed by Jovany Hay RN on 2/21/2021 at 12:31 AM

## 2021-02-21 NOTE — PLAN OF CARE
Problem: Falls - Risk of:  Goal: Will remain free from falls  Description: Will remain free from falls  Outcome: Ongoing  Note: Pt AAO X4. Admitted with traumatic hematoma of RLE with infection. Free of fall. Transfers with assist of one, walker, and gait belt. Yellow socks on. Wheels locked. Bed lowest position. Alarm on. Call light, over the bed table, and personal belonging in reach. Hourly rounding. Patient instructed to call for needs or changes. Problem: Skin Integrity:  Goal: Absence of new skin breakdown  Description: Absence of new skin breakdown  Outcome: Ongoing  Note: Graft site to right thigh. Drainage leaking from dressing. Dressing reinforced. Wound vac intact. Scattered bruising noted. No new skin breakdown. Able to reposition self. Pillow support. Lower extremities swollen. Heels elevated. Problem: Pain:  Goal: Pain level will decrease  Description: Pain level will decrease  Outcome: Ongoing  Note: Patient able to verbalize pain in scale of 0-10. Pain medication given. Reassessed. Education provided about pain management.

## 2021-02-21 NOTE — PLAN OF CARE
Problem: Falls - Risk of:  Goal: Will remain free from falls  Description: Will remain free from falls  Outcome: Ongoing  Note: Risk assessment complete with interventions in place and effective. Call light and needed items within reach. Problem: Skin Integrity:  Goal: Absence of new skin breakdown  Description: Absence of new skin breakdown  Outcome: Ongoing  Note: NO change in skin integrity. Graft site noted to be draining serous fluid; outside abd pad changed with ace wrap reapplied. Patient tolerated well. Problem: Pain:  Goal: Pain level will decrease  Description: Pain level will decrease  Outcome: Ongoing  Note: Noted with headache this am; prn tylenol given per patient request and verbalized effectiveness.

## 2021-02-21 NOTE — PROGRESS NOTES
Patient admitted to rehab with Traumatic hematoma of right lower leg with infection. A/A/O x 4. Transfers with RW, gait belt and 1 staff assist. On general diet, tolerating well. Medications taken whole with thin liquids. On ASA for DVT prophylaxis. Skin continues with wound vac to RLE with surgery following and providing dressing changes. Graft site to right thigh with dressing reinforced this shift. On room air. Has been continent of bowel and bladder. LBM 2/21. Chair/bed alarms in use. Continue with intentional rounding. Call light and needed items within reach.

## 2021-02-22 LAB
ANION GAP SERPL CALCULATED.3IONS-SCNC: 8 MMOL/L (ref 3–16)
BASOPHILS ABSOLUTE: 0 K/UL (ref 0–0.2)
BASOPHILS RELATIVE PERCENT: 0.5 %
BUN BLDV-MCNC: 18 MG/DL (ref 7–20)
CALCIUM SERPL-MCNC: 8.8 MG/DL (ref 8.3–10.6)
CHLORIDE BLD-SCNC: 101 MMOL/L (ref 99–110)
CO2: 29 MMOL/L (ref 21–32)
CREAT SERPL-MCNC: 0.5 MG/DL (ref 0.6–1.2)
EOSINOPHILS ABSOLUTE: 0.1 K/UL (ref 0–0.6)
EOSINOPHILS RELATIVE PERCENT: 1.8 %
GFR AFRICAN AMERICAN: >60
GFR NON-AFRICAN AMERICAN: >60
GLUCOSE BLD-MCNC: 96 MG/DL (ref 70–99)
HCT VFR BLD CALC: 27.7 % (ref 36–48)
HEMOGLOBIN: 8.7 G/DL (ref 12–16)
LYMPHOCYTES ABSOLUTE: 0.9 K/UL (ref 1–5.1)
LYMPHOCYTES RELATIVE PERCENT: 22.5 %
MAGNESIUM: 2.1 MG/DL (ref 1.8–2.4)
MCH RBC QN AUTO: 29.4 PG (ref 26–34)
MCHC RBC AUTO-ENTMCNC: 31.4 G/DL (ref 31–36)
MCV RBC AUTO: 93.4 FL (ref 80–100)
MONOCYTES ABSOLUTE: 0.5 K/UL (ref 0–1.3)
MONOCYTES RELATIVE PERCENT: 11 %
NEUTROPHILS ABSOLUTE: 2.7 K/UL (ref 1.7–7.7)
NEUTROPHILS RELATIVE PERCENT: 64.2 %
PDW BLD-RTO: 21.2 % (ref 12.4–15.4)
PLATELET # BLD: 177 K/UL (ref 135–450)
PMV BLD AUTO: 8.2 FL (ref 5–10.5)
POTASSIUM SERPL-SCNC: 4.2 MMOL/L (ref 3.5–5.1)
RBC # BLD: 2.96 M/UL (ref 4–5.2)
SODIUM BLD-SCNC: 138 MMOL/L (ref 136–145)
WBC # BLD: 4.1 K/UL (ref 4–11)

## 2021-02-22 PROCEDURE — 97110 THERAPEUTIC EXERCISES: CPT | Performed by: PHYSICAL THERAPIST

## 2021-02-22 PROCEDURE — 97530 THERAPEUTIC ACTIVITIES: CPT

## 2021-02-22 PROCEDURE — 83735 ASSAY OF MAGNESIUM: CPT

## 2021-02-22 PROCEDURE — 6370000000 HC RX 637 (ALT 250 FOR IP): Performed by: PHYSICAL MEDICINE & REHABILITATION

## 2021-02-22 PROCEDURE — 97110 THERAPEUTIC EXERCISES: CPT

## 2021-02-22 PROCEDURE — 85025 COMPLETE CBC W/AUTO DIFF WBC: CPT

## 2021-02-22 PROCEDURE — 97116 GAIT TRAINING THERAPY: CPT | Performed by: PHYSICAL THERAPIST

## 2021-02-22 PROCEDURE — 80048 BASIC METABOLIC PNL TOTAL CA: CPT

## 2021-02-22 PROCEDURE — 1280000000 HC REHAB R&B

## 2021-02-22 PROCEDURE — 6370000000 HC RX 637 (ALT 250 FOR IP): Performed by: SURGERY

## 2021-02-22 PROCEDURE — 97530 THERAPEUTIC ACTIVITIES: CPT | Performed by: PHYSICAL THERAPIST

## 2021-02-22 PROCEDURE — 2580000003 HC RX 258: Performed by: PHYSICAL MEDICINE & REHABILITATION

## 2021-02-22 PROCEDURE — 97535 SELF CARE MNGMENT TRAINING: CPT

## 2021-02-22 PROCEDURE — 94760 N-INVAS EAR/PLS OXIMETRY 1: CPT

## 2021-02-22 RX ORDER — HYDROCODONE BITARTRATE AND ACETAMINOPHEN 5; 325 MG/1; MG/1
2 TABLET ORAL EVERY 6 HOURS PRN
Qty: 40 TABLET | Refills: 0 | Status: SHIPPED | OUTPATIENT
Start: 2021-02-22 | End: 2021-03-08

## 2021-02-22 RX ORDER — MEMANTINE HYDROCHLORIDE 5 MG/1
5 TABLET ORAL 2 TIMES DAILY
Qty: 60 TABLET | Refills: 3 | Status: SHIPPED | OUTPATIENT
Start: 2021-02-22 | End: 2021-04-13

## 2021-02-22 RX ORDER — POTASSIUM CHLORIDE 20 MEQ/1
20 TABLET, EXTENDED RELEASE ORAL DAILY
Qty: 60 TABLET | Refills: 1 | Status: SHIPPED | OUTPATIENT
Start: 2021-02-22 | End: 2021-03-19

## 2021-02-22 RX ORDER — FUROSEMIDE 20 MG/1
20 TABLET ORAL DAILY
Qty: 30 TABLET | Refills: 3 | Status: SHIPPED | OUTPATIENT
Start: 2021-02-23 | End: 2021-12-20 | Stop reason: SDUPTHER

## 2021-02-22 RX ADMIN — TIZANIDINE 2 MG: 4 TABLET ORAL at 19:56

## 2021-02-22 RX ADMIN — BENZOCAINE AND MENTHOL 1 LOZENGE: 15; 3.6 LOZENGE ORAL at 19:41

## 2021-02-22 RX ADMIN — PANTOPRAZOLE SODIUM 40 MG: 40 TABLET, DELAYED RELEASE ORAL at 05:27

## 2021-02-22 RX ADMIN — Medication 400 MG: at 08:38

## 2021-02-22 RX ADMIN — FUROSEMIDE 20 MG: 20 TABLET ORAL at 08:38

## 2021-02-22 RX ADMIN — METOCLOPRAMIDE 5 MG: 10 TABLET ORAL at 11:00

## 2021-02-22 RX ADMIN — FLECAINIDE ACETATE 100 MG: 100 TABLET ORAL at 08:38

## 2021-02-22 RX ADMIN — CALCIUM 500 MG: 500 TABLET ORAL at 08:38

## 2021-02-22 RX ADMIN — Medication 400 MG: at 19:51

## 2021-02-22 RX ADMIN — HYDROCODONE BITARTRATE AND ACETAMINOPHEN 2 TABLET: 5; 325 TABLET ORAL at 03:23

## 2021-02-22 RX ADMIN — PANTOPRAZOLE SODIUM 40 MG: 40 TABLET, DELAYED RELEASE ORAL at 16:42

## 2021-02-22 RX ADMIN — FLECAINIDE ACETATE 100 MG: 100 TABLET ORAL at 19:50

## 2021-02-22 RX ADMIN — METOCLOPRAMIDE 5 MG: 10 TABLET ORAL at 08:37

## 2021-02-22 RX ADMIN — MEMANTINE 5 MG: 5 TABLET ORAL at 08:38

## 2021-02-22 RX ADMIN — HYDROCODONE BITARTRATE AND ACETAMINOPHEN 2 TABLET: 5; 325 TABLET ORAL at 16:42

## 2021-02-22 RX ADMIN — SERTRALINE 100 MG: 100 TABLET, FILM COATED ORAL at 19:49

## 2021-02-22 RX ADMIN — HYDROCODONE BITARTRATE AND ACETAMINOPHEN 2 TABLET: 5; 325 TABLET ORAL at 10:28

## 2021-02-22 RX ADMIN — SODIUM CHLORIDE, PRESERVATIVE FREE 10 ML: 5 INJECTION INTRAVENOUS at 20:24

## 2021-02-22 RX ADMIN — METOCLOPRAMIDE 5 MG: 10 TABLET ORAL at 16:42

## 2021-02-22 RX ADMIN — ACETAMINOPHEN 650 MG: 325 TABLET ORAL at 20:14

## 2021-02-22 RX ADMIN — ALPRAZOLAM 0.5 MG: 0.5 TABLET ORAL at 19:51

## 2021-02-22 RX ADMIN — MEMANTINE 5 MG: 5 TABLET ORAL at 19:50

## 2021-02-22 RX ADMIN — ACETAMINOPHEN 650 MG: 325 TABLET ORAL at 08:54

## 2021-02-22 RX ADMIN — SODIUM CHLORIDE, PRESERVATIVE FREE 10 ML: 5 INJECTION INTRAVENOUS at 08:40

## 2021-02-22 RX ADMIN — Medication 3 MG: at 19:51

## 2021-02-22 RX ADMIN — METOPROLOL SUCCINATE 50 MG: 50 TABLET, EXTENDED RELEASE ORAL at 08:37

## 2021-02-22 ASSESSMENT — PAIN DESCRIPTION - ONSET
ONSET: ON-GOING

## 2021-02-22 ASSESSMENT — PAIN DESCRIPTION - PAIN TYPE
TYPE: ACUTE PAIN

## 2021-02-22 ASSESSMENT — PAIN DESCRIPTION - DESCRIPTORS
DESCRIPTORS: ACHING

## 2021-02-22 ASSESSMENT — PAIN SCALES - GENERAL
PAINLEVEL_OUTOF10: 7
PAINLEVEL_OUTOF10: 3
PAINLEVEL_OUTOF10: 0
PAINLEVEL_OUTOF10: 5
PAINLEVEL_OUTOF10: 5

## 2021-02-22 ASSESSMENT — PAIN - FUNCTIONAL ASSESSMENT
PAIN_FUNCTIONAL_ASSESSMENT: ACTIVITIES ARE NOT PREVENTED

## 2021-02-22 ASSESSMENT — PAIN DESCRIPTION - LOCATION
LOCATION: LEG
LOCATION: LEG

## 2021-02-22 ASSESSMENT — PAIN DESCRIPTION - FREQUENCY
FREQUENCY: CONTINUOUS
FREQUENCY: CONTINUOUS

## 2021-02-22 ASSESSMENT — PAIN DESCRIPTION - ORIENTATION: ORIENTATION: RIGHT

## 2021-02-22 NOTE — PLAN OF CARE
Problem: IP BLADDER/VOIDING  Goal: LTG - patient will complete bladder elimination  Outcome: Ongoing  Goal: STG - Patient demonstrates no accidents  Outcome: Ongoing  Goal: STG - Patient will state signs and symptoms of UTI  Outcome: Ongoing

## 2021-02-22 NOTE — CARE COORDINATION
TIMOTHYW me twith patient to review DC for tomorrow. IMM letter presented. Review of home care orders for SN/PT/ and she has chosen [facility] but she reports it is not on the list of in network providers that was given to her. LSW to call [facility] on Tuesday. Son will transport home. TIMOTHYW sent all scripts to [facility] as directed. The Plan for Transition of Care is related to the following treatment goals: To continue her progress in personal care and ambulation needs. The Patient  was provided with a choice of provider and agrees   with the discharge plan. [x] Yes [] No    Freedom of choice list was provided with basic dialogue that supports the patient's individualized plan of care/goals, treatment preferences and shares the quality data associated with the providers.  [x] Yes [] No  [place]     Case Management   [phone]    2/22/2021  5:15 PM

## 2021-02-22 NOTE — PROGRESS NOTES
Physical Therapy  Facility/Department: [address] IP REHAB  Daily Treatment Note/Discharge Summary  NAME: Fredis Sanchez  : 1945  MRN: [id number]    Date of Service: 2021    Discharge Recommendations:  Patient would benefit from continued therapy after discharge, Home with Home health PT, Home with assist PRN, S Level 1   PT Equipment Recommendations  Other: Will monitor for potential equipt needs. pt has a RW and rollator    Assessment   Body structures, Functions, Activity limitations: Decreased functional mobility ; Decreased strength;Decreased endurance; Increased pain;Decreased posture;Decreased balance  Assessment: PTA pt living with son in home with ramp access and 1st floor bed/bath. Prior to admission, pt independent with daily care and functional mobility. Pt has met 3/4 goals and continues to be limited by decreased activity tolerance and has increased RLE pain with mobility. She is Mod I with bed mobiliity and transfers except for getting 1 leg into bed but is helped by a belt or strap. She amb household distances with Mod I but can't amb community distances yet. She can go up/down curb with SBA and RW. Patient is below baseline functional status and therefore would benefit from continnuing PT at home to maximize independence and safety at home. Anticipate that pt will be safe to d/c home with assist PRN and home health PT on Tuesday, . Treatment Diagnosis: decreased activity tolerance  Prognosis: Good  PT Education: Transfer Training;General Safety;Gait Training;Functional Mobility Training;Plan of Care;Home Exercise Program  REQUIRES PT FOLLOW UP: Yes  Activity Tolerance  Activity Tolerance: Patient Tolerated treatment well;Patient limited by endurance; Patient limited by pain     Patient Diagnosis(es): The encounter diagnosis was Traumatic hematoma of right lower leg with infection, sequela.      has a past medical history of Acid reflux, Anxiety, Arthritis, Atrial fibrillation ([facility] [address] Closed fracture dislocation of right elbow, Depression, Fibromyalgia, Migraine, Rectal cancer ([address] and Wears glasses. has a past surgical history that includes  section; Total knee arthroplasty (Left); open tx femoral supracondylar fracture w/o extension (Right, 10/25/2018); Gastric bypass surgery; Neck surgery; other surgical history; Cholecystectomy; Appendectomy; Colonoscopy; Upper gastrointestinal endoscopy (N/A, 2019); Colonoscopy (N/A, 2019); knee surgery (Right); Upper gastrointestinal endoscopy (N/A, 2020); incision and drainage (Right, 2021); Skin graft (Right, 2021); and Skin graft (Right, 2021). Restrictions  Restrictions/Precautions  Restrictions/Precautions: Fall Risk, Weight Bearing  Lower Extremity Weight Bearing Restrictions  Right Lower Extremity Weight Bearing: Weight Bearing As Tolerated  Position Activity Restriction  Other position/activity restrictions: Wound Vac R Lat Calf. PT spoke with Surg Jennifer Eddy NP) : WBAT R LE. s/p skin graft on . Subjective   General  Chart Reviewed: Yes  Additional Pertinent Hx: 66 y/o female admit 2021 with R LE Traumatic Hematoma (hit R LE on car door). 2021 S/P Evac of R LE Hematoma, Excisional Debride (78C78 cm), Applic of Wound Vac. Plastic and Reconstruct Surg follow for Staged Reconstruct with Skin Graft. S/p skin graft on . PMH as noted including Gastric Bypass, Rectal Ca, L TKR, R Femur Fx/ORIF (10/2018). Family / Caregiver Present: No  Referring Practitioner: Dr. Paz/ Dr Rubens Acharya: Pt reports 5/10 pain at graft and donor sites, RN gave her meds an hour ago per pt. Pt happy to be going home tomorrow.      Orientation  Orientation  Overall Orientation Status: Within Normal Limits  Objective   Bed mobility  Bridging: Modified independent   Rolling to Left: Modified independent  Rolling to Right: Modified independent  Supine to Sit: Modified independent  Sit to Supine: Minimal assistance, Worked on again in the PM and she was SBA with leg   Scooting: Modified independent  Comment: Regular flat bed, struggles, can't get the closest leg R or L into the bed.w/o assist  Transfers  Sit to Stand: Modified independent  Stand to sit: Modified independent  Bed to Chair: Modified independent  Comment: Occ cues for hand placement  Ambulation  Ambulation?: Yes  WB Status: WBAT R LE  Ambulation 1  Surface: level tile  Device: Rolling Walker  Other Apparatus: (PT managed wound vac which she won't have at home)  Assistance: Modified Independent  Quality of Gait: slow amarilis; decreased toe clearance on RLE, decreased stance phase on R with mildly antalgic gait pattern, forward flexed posture, increased pain at skin graft donor site with mobility  Gait Deviations: Decreased step height;Decreased step length; Slow Amarilis  Distance: 50', 54', 50' with 2 turns  Comments: Picked up object from the floor with RW and reacher Mod I  Stairs/Curb  Stairs?: Yes  Stairs  Curbs: 6\"  Device: Rolling walker  Other Apparatus: (PT managed IV pole which she won't have at home)  Assistance: Stand by assistance  Comment: recalled proper sequencing and able to safely manage wheeled walker, PT managing line for wound vac     PM session:  Exercises  Straight Leg Raise: 10 with assist  Hamstring Sets: 10  Quad Sets: 10  Heelslides: 10  Gluteal Sets: 10  Hip Flexion: 15  Hip Abduction: 10, ADD sets x10  Knee Long Arc Quad: 10  Knee Short Arc Quad: 10  Ankle Pumps: 10  Comments: Exs done on mat   Standing with UE support: heel/toe raises, mini squats, marches, HS curl, hip ABD and ext x10  Pt was taught a written and illustrated HEP of the above exs and issued ex sheets.     Goals  Short term goals  Time Frame for Short term goals: 1 week  Short term goal 1: Bed mobility modif I-met 2/22  Short term goal 2: transfers from all surfaces including car modif I-met 2/22  Short term goal 3: Amb with assist device, WBAT R LE, 150' modif I-not met for 150' pt can go max 80' with mod I 2* to endurance and B LE strength  Short term goal 4: curb step with wh walker SBA-met 2/22  Long term goals  Time Frame for Long term goals : set after skin grafting if needed - remained WBAT so will continue with goals set  Patient Goals   Patient goals : Be able to return home with son. Plan    Plan  Times per week: 5-6x/week  Times per day: Twice a day  Plan weeks: 7 to 10 days  Specific instructions for Next Treatment: increase gt, try curb step as able  Current Treatment Recommendations: Strengthening, Functional Mobility Training, Transfer Training, Gait Training, Safety Education & Training, Patient/Caregiver Education & Training, Endurance Training, Equipment Evaluation, Education, & procurement, Home Exercise Program  Safety Devices  Type of devices:  All fall risk precautions in place, Gait belt, Patient at risk for falls(Pt to room with transporter)  Restraints  Initially in place: No     Therapy Time   Individual Concurrent Group Co-treatment   Time In 1115         Time Out 1200         Minutes 45         Timed Code Treatment Minutes: [facility]   Time In [address]     Time Out [address]     Minutes [facility], [address] [address] [facility]

## 2021-02-22 NOTE — DISCHARGE INSTR - COC
Continuity of Care Form    Patient Name: Tyler Salas   :  1945  MRN:  [id number]    Admit date:  2021  Discharge date:      Code Status Order: Full Code   Advance Directives:   Advance Care Flowsheet Documentation       Date/Time Healthcare Directive Type of Healthcare Directive Copy in [facility] [address] Agent's Name Healthcare Agent's Phone Number    21 3833  No, patient does not have an advance directive for healthcare treatment -- -- -- -- --    21 1832  No, patient does not have an advance directive for healthcare treatment -- -- -- -- --            Admitting Physician:  Elle Camacho MD  PCP: MUSA Porter - CNP    Discharging Nurse: [facility] Unit/Room#: O6I-8860/6181-71  Discharging Unit Phone Number: [phone]    Emergency Contact:   Extended Emergency Contact Information  Primary Emergency Contact: Perfecto Paci [address] Phone: [phone]  Mobile Phone: [phone]  Relation: Child  Secondary Emergency Contact: Raven Friend  Address: [address] Phone: [phone]  Mobile Phone: [phone]  Relation: Child    Past Surgical History:  Past Surgical History:   Procedure Laterality Date    APPENDECTOMY       SECTION      x4    CHOLECYSTECTOMY      COLONOSCOPY      SEVERAL    COLONOSCOPY N/A 2019    COLONOSCOPY POLYPECTOMY SNARE/COLD BIOPSY performed by Daina Rodriguez MD at [address] [place] [address] Right 2021    RIGHT LEG EVACUATION OF HEMATOMA performed by Anastasiya Clayton MD at [place] [address] Right     [facility] W/O EXTENSION Right 10/25/2018    OPEN REDUCTION INTERNAL FIXATION RIGHT FEMUR SUPRACONDYLAR FEMORAL FRACTURE WITH C-ARM performed by Junior Small MD at [address] [place] [address] reflux disease without esophagitis K21.9    Lumbar radicular pain M54.16    Pain of right thigh M79.651    Traumatic closed displaced supracondylar fracture of femur, right, with delayed healing, subsequent encounter S72.451G    Osteopenia M85.80    Traumatic hematoma of right lower leg S80.11XA    Hemorrhagic shock (HCC) R57.8    Hematoma T14. 8XXA    Hematoma of right lower extremity S80.11XA    Acute blood loss anemia D62    Nausea R11.0    Traumatic hematoma of right lower leg with infection S80.11XA, L08.9       Isolation/Infection:   Isolation            No Isolation          Patient Infection Status       Infection Onset Added Last Indicated Last Indicated By Review Planned Expiration Resolved Resolved By    None active    Resolved    COVID-19 Rule Out 02/17/21 02/17/21 02/17/21 COVID-19, Rapid (Ordered)   02/17/21 Rule-Out Test Resulted    COVID-19 Rule Out 01/28/21 01/28/21 01/28/21 COVID-19 (Ordered)   01/28/21 Rule-Out Test Resulted            Nurse Assessment:  Last Vital Signs: /65   Pulse 65   Temp 98 °F (36.7 °C) (Oral)   Resp 16   Ht 5' 1\" (1.549 m)   Wt 181 lb (82.1 kg)   SpO2 93%   BMI 34.20 kg/m²     Last documented pain score (0-10 scale): Pain Level: 5  Last Weight:   Wt Readings from Last 1 Encounters:   02/22/21 181 lb (82.1 kg)     Mental Status:  oriented and alert    IV Access:  - None    Nursing Mobility/ADLs:  Walking   Assisted  Transfer  Assisted  Bathing  Assisted  Dressing  Independent  Toileting  Independent  Feeding  Independent  Med Admin  Independent  Med Delivery   whole    Wound Care Documentation and Therapy:  Negative Pressure Wound Therapy Leg Anterior; Lower;Right (Active)   Dressing Type Other (Comment) 02/22/21 0845   Target Pressure (mmHg) 150 02/21/21 0741   Dressing Status Intact 02/21/21 0741   Dressing Changed Dressing reinforced 02/18/21 0855   Drainage Amount Copious 02/17/21 0914   Number of days: 5        Elimination:  Continence:    Bowel: Yes  Bladder: Yes  Urinary Catheter: None   Colostomy/Ileostomy/Ileal Conduit: No       Date of Last BM: 2/23/2021    Intake/Output Summary (Last 24 hours) at 2/22/2021 0938  Last data filed at 2/22/2021 0840  Gross per 24 hour   Intake 730 ml   Output --   Net 730 ml     I/O last 3 completed shifts: In: 600 [P.O.:600]  Out: -     Safety Concerns:     None    Impairments/Disabilities:      None    Nutrition Therapy:  Current Nutrition Therapy:   - Oral Diet:  General    Routes of Feeding: Oral  Liquids: Thin Liquids  Daily Fluid Restriction: no  Last Modified Barium Swallow with Video (Video Swallowing Test): not done    Treatments at the Time of Hospital Discharge:   Respiratory Treatments:   Oxygen Therapy:  is not on home oxygen therapy. Ventilator:    - No ventilator support    Rehab Therapies: Physical Therapy  Weight Bearing Status/Restrictions: No weight bearing restirctions  Other Medical Equipment (for information only, NOT a DME order): Other Treatments:     Patient's personal belongings (please select all that are sent with patient):      RN SIGNATURE:  Electronically signed by Arabella Queen RN on 2/23/21 at 6:23 AM EST    CASE MANAGEMENT/SOCIAL WORK SECTION    Inpatient Status Date: 2-    Readmission Risk Assessment Score:  Readmission Risk              Risk of Unplanned Readmission:        22           Discharging to Facility/ Agency   Name:    [facility]  Address:  Phone:     [phone]  Fax:         [phone]      / signature: Param Alfredo     Case Management   [phone]    2/23/2021  9:33 AM      PHYSICIAN SECTION    Prognosis: Good    Condition at Discharge: Stable    Rehab Potential (if transferring to Rehab): Good    Recommended Labs or Other Treatments After Discharge: PT,OT,VN  Right lower extremity: bacitracin/xeroform/kerlix/ACE wrap to right lower extremity from toes to thigh daily. Donor site-change tegaderm as needed.    Call Dr. Massiel Aldana office with questions    Physician Certification: I certify the above information and transfer of Pinky Easley  is necessary for the continuing treatment of the diagnosis listed and that she requires [address] for less 30 days.      Update Admission H&P: No change in H&P    PHYSICIAN SIGNATURE:  Electronically signed by Dajuan Patterson MD on 2/22/21 at 9:39 AM EST

## 2021-02-22 NOTE — PROGRESS NOTES
Occupational Therapy  Facility/Department: [address] REHAB  Daily Treatment Note/Discharge Summary  NAME: Adria Noriega  : 1945  MRN: [id number]    Date of Service: 2021    Discharge Recommendations:  Home with Home health OT, Continue to assess pending progress, Patient would benefit from continued therapy after discharge, Home with assist PRN  OT Equipment Recommendations  Other: son purchased walker basket, she owns a reacher and sock aid    Assessment   Performance deficits / Impairments: Decreased functional mobility ; Decreased endurance;Decreased strength;Decreased ADL status; Decreased safe awareness;Decreased high-level IADLs;Decreased balance  Assessment: Pt was able to complete sponge bath at sink indep, dressed indep with nursing only managing wound vac. Groomed indep at sink, toileted indep. Pt is able to ambulate indep using rolling walker, assist to manage wound vac which is to be removed prior to discharge. Pt stated her son purchased walker basket on [address], she has a reacher and sock aid, but has to find the sock aid. At end of session, noted bloody drainage on pants, nursing notifited and added clean pad, rewrapped ace to right thigh, stated she will contact wound care for direction. Anticipatea pt will be ready for discharge home tomorrow as planned with son assisting as needed and home OT for safety and carryover in her own environment. Treatment Diagnosis: decreased ADL, balance, activity tolerance,  Prognosis: Good  OT Education: ADL Adaptive Strategies  REQUIRES OT FOLLOW UP: Yes  Activity Tolerance  Activity Tolerance: Patient Tolerated treatment well  Safety Devices  Safety Devices in place: Yes  Type of devices: Call light within reach; Chair alarm in place; Left in chair;Gait belt         Patient Diagnosis(es): The encounter diagnosis was Traumatic hematoma of right lower leg with infection, sequela.       has a past medical history of Acid reflux, Anxiety, Arthritis, Atrial fibrillation ([facility] [address] Closed fracture dislocation of right elbow, Depression, Fibromyalgia, Migraine, Rectal cancer ([facility] [address] and Wears glasses. has a past surgical history that includes  section; Total knee arthroplasty (Left); open tx femoral supracondylar fracture w/o extension (Right, 10/25/2018); Gastric bypass surgery; Neck surgery; other surgical history; Cholecystectomy; Appendectomy; Colonoscopy; Upper gastrointestinal endoscopy (N/A, 2019); Colonoscopy (N/A, 2019); knee surgery (Right); Upper gastrointestinal endoscopy (N/A, 2020); incision and drainage (Right, 2021); Skin graft (Right, 2021); and Skin graft (Right, 2021). Restrictions  Restrictions/Precautions  Restrictions/Precautions: Fall Risk, Weight Bearing  Lower Extremity Weight Bearing Restrictions  Right Lower Extremity Weight Bearing: Weight Bearing As Tolerated  Position Activity Restriction  Other position/activity restrictions: Wound Vac R Lat Calf. PT spoke with Surg Ray Eddy NP) : WBAT R LE. s/p skin graft on . Subjective   General  Chart Reviewed: Yes, Progress Notes  Patient assessed for rehabilitation services?: Yes  Additional Pertinent Hx: 68 y/o female admit 2021 with R LE Traumatic Hematoma (hit R LE on car door).  s/p Evac of R LE Hematoma. Pt returned to OR 2021 S/P Evac of R LE Hematoma, Excisional Debride (66N56 cm), Applic of Wound Vac. Plastic and Reconstruct Surg follow for Staged Reconstruct with Skin Graft. PMH as noted including Gastric Bypass, Rectal Ca, L TKR, R Femur Fx/ORIF (10/2018).   Response to previous treatment: Patient with no complaints from previous session  Family / Caregiver Present: No  Referring Practitioner: Kami/Jackson  Diagnosis: traumatic hematoma RLE with infection  Subjective  Subjective: pt met bedside, agreeable to sponge bath, requested shampoo cap.  stated she has pain at graft site and wound vac Orientation  Orientation  Overall Orientation Status: Within Functional Limits  Objective    ADL  Grooming: Independent(seated in wheelchair at sink)  UE Bathing: Modified independent   LE Bathing: Modified independent (seated in wheelchair at sink, long sponge for left foot)  UE Dressing: Independent(bra and shirt)  LE Dressing: Modified independent (able to thread pant/underwear over feet, nursing managed wound vac - will be discontected prior to discharge. Sock aid for slipper socks, hopes to be able to wear her shoes after dressing is changed, may not have ace over foot)  Toileting: Modified independent (grab bar for safety. Hygiene, pants down/up indep after urination)  Additional Comments: Completed full sponge bath while seated at sink. Balance  Sitting Balance: Independent  Standing Balance: Modified independent   Functional Mobility  Functional - Mobility Device: Rolling Walker  Activity: To/from bathroom  Assist Level: Modified independent   Functional Mobility Comments: assist to manage wound vac only  Toilet Transfers  Toilet - Technique: Ambulating  Equipment Used: Grab bars  Toilet Transfer: Modified independent  Shower Transfers  Shower Transfers: Not tested  Shower Transfers Comments: unable at this time  Wheelchair [employer]  Wheelchair/Bed - Technique: Ambulating  Equipment Used: Other(recliner)  Level of Asssistance: Modified independent   Wheelchair Transfers Comments: RW                             Cognition  Overall Cognitive Status: WFL            PM session  Pt met bedside, agreeable to OT  Pt amb recliner to wheelchair indep, good safety awareness, but assist to manage wound vac line. Transfer to hospital bed with rails down due to this being closer to the height of her bed at home. She has a cloth gait belt to use as leg . She was able to complete sit to supine indep using belt, supine to sit without belt indep.     Transported to ancillary gym, transferred to [facility] car indep      Returned to therapy gym to complete UE exercises. 2 lb dumbbell 10 reps each shoulder flex/ext, horizontal abd/add,, elbow flex/ext, sup/pron, wrist flex/ext. To improve activity tolerance for ADL/mobility. Pt returned to room, completed toileting indep - urinated on commode, then requested to go to bed for rest prior to PT. Used same technique with gait belt to assist with sit to supine. Bed alarm set, call light and phone in reach, all needs met                       Plan   Plan  Times per week: 5-6  Times per day: Twice a day  Plan weeks: 1 week  Current Treatment Recommendations: Strengthening, Endurance Training, Balance Training, Gait Training, Functional Mobility Training, Self-Care / ADL, Patient/Caregiver Education & Training, Equipment Evaluation, Education, & procurement, Home Management Training, Safety Education & Training               Goals  Short term goals  Time Frame for Short term goals: 1 week pt oumoulDiony Ventura term goal 1: bathe indep with AD as needed  Goal Met  Short term goal 2: dress indep with AD as needed  Goal Met  Short term goal 3: toilet indep with AD  Goal Met  Short term goal 4: transfers indep with AD  Goal Met  Short term goal 5: functional mobility indep  Goal Met  Short term goal 6: increase activity tolerance to stand 3 min for ADL/mobility  Goal Met  Long term goals  Time Frame for Long term goals : same as stg  Patient Goals   Patient goals : \"I want to get my legs stronger\"       Therapy Time   Individual Concurrent Group Co-treatment   Time In 0915         Time Out 1015         Minutes 60         Timed Code Treatment Minutes: 60 Minutes     Therapy Time     Individual Co-treatment   Time In [facility]     Time Out 1345     Minutes [address] [place] [address], OTR/L 770

## 2021-02-22 NOTE — PROGRESS NOTES
Department of Physical Medicine & Rehabilitation  Progress Note    Patient Identification:  Shaquille Pinedo  [id number]  : 1945  Admit date: 2021    Chief Complaint: Traumatic hematoma of right lower leg with infection    Subjective:  Patient seen this AM. Patient feeling better this morning. She did well over the weekend. We think the patient will be ready for discharge to home tomorrow. The wound VAC is present over the skin graft to help it adhere, and this will be removed by plastic surgery later today or tomorrow before her discharge. Repeat labs look good and are stable. Her pain medication is keeping her wound graft pain under control. I will fill out her WINSTON and meds today for her discharge tomorrow. ROS: No f/c, n/v, cp     Objective:  Patient Vitals for the past 24 hrs:   BP Temp Temp src Pulse Resp SpO2 Weight   21 0334 137/65 98 °F (36.7 °C) Oral 65 16 93 % 181 lb (82.1 kg)   21 1434 (!) 99/57 98.4 °F (36.9 °C) Oral 66 16 93 % --     Const: Alert. No distress, pleasant. HEENT: Normocephalic, atraumatic. Normal sclera/conjunctiva. MMM. CV: Regular rate and rhythm. Resp: No respiratory distress. Lungs CTAB. Abd: Soft, nontender, nondistended, NABS+   Ext: No edema. RLE wound vac in place  Neuro: Alert, oriented, appropriately interactive. Psych: Cooperative, appropriate mood and affect    Laboratory data: Available via EMR.    Last 24 hour lab  Recent Results (from the past 24 hour(s))   Basic Metabolic Panel    Collection Time: 21  5:34 AM   Result Value Ref Range    Sodium 138 136 - 145 mmol/L    Potassium 4.2 3.5 - 5.1 mmol/L    Chloride 101 99 - 110 mmol/L    CO2 29 21 - 32 mmol/L    Anion Gap 8 3 - 16    Glucose 96 70 - 99 mg/dL    BUN 18 7 - 20 mg/dL    CREATININE 0.5 (L) 0.6 - 1.2 mg/dL    GFR Non-African American >60 >60    GFR African American >60 >60    Calcium 8.8 8.3 - 10.6 mg/dL   CBC Auto Differential    Collection Time: 21  5:34 AM Result Value Ref Range    WBC 4.1 4.0 - 11.0 K/uL    RBC 2.96 (L) 4.00 - 5.20 M/uL    Hemoglobin 8.7 (L) 12.0 - 16.0 g/dL    Hematocrit 27.7 (L) 36.0 - 48.0 %    MCV 93.4 80.0 - 100.0 fL    MCH 29.4 26.0 - 34.0 pg    MCHC 31.4 31.0 - 36.0 g/dL    RDW 21.2 (H) 12.4 - 15.4 %    Platelets 920 971 - 964 K/uL    MPV 8.2 5.0 - 10.5 fL    Neutrophils % 64.2 %    Lymphocytes % 22.5 %    Monocytes % 11.0 %    Eosinophils % 1.8 %    Basophils % 0.5 %    Neutrophils Absolute 2.7 1.7 - 7.7 K/uL    Lymphocytes Absolute 0.9 (L) 1.0 - 5.1 K/uL    Monocytes Absolute 0.5 0.0 - 1.3 K/uL    Eosinophils Absolute 0.1 0.0 - 0.6 K/uL    Basophils Absolute 0.0 0.0 - 0.2 K/uL   Magnesium    Collection Time: 02/22/21  5:34 AM   Result Value Ref Range    Magnesium 2.10 1.80 - 2.40 mg/dL       Therapy progress:  PT  Position Activity Restriction  Other position/activity restrictions: Wound Vac R Lat Calf. PT spoke with Jeremy Eddy NP) : WBAT R LE. s/p skin graft on 2/17. Objective     Sit to Stand: Stand by assistance  Stand to sit: Stand by assistance  Device: Rolling Walker  Other Apparatus: (therapist managed IV wound vac on IV pole)  Assistance: Stand by assistance  Distance: 15' X 2 (EOB <-> commode)  OT  PT Equipment Recommendations  Other: Will monitor for potential equipt needs. pt has a RW and rollator  Toilet - Technique: Ambulating  Equipment Used: Saundra ansari       Body mass index is 34.2 kg/m².     Rehabilitation Diagnosis:   16.0, Debility (non-cardiac, non-pulmonary        Assessment and Plan:     Impairments: generalized weakness, decreased ROM distal RLE, decreased balance, endurance     Debility  -PT/OT     Right leg hematoma   -s/p evacuation (1/28 with Dr. Ramana Tucker)  -s/p evacuation, debridement and wound vac placement (1/31 with Dr. Jake Nye)  -STSG on 2/17      Acute blood loss anemia with hemorrhagic shock  -Due to traumatic hematoma   -Monitor Hgb, transfuse prn <7.      Paroxysmal Afib  -Eliquis on hold due to above until cleared by Surgery  -flecainide, metoporlol     H/o gastric bypass (Shanice-en-Y)  -Vitamin supplements  -Dietitian consult     Hypomagnesemia  -supplement     Anxiety/depression  -sertraline, prn alprazolam     Acute on chronic pain control  -H/o fibromyalgia  -prn Norco, tizanidine     Bladder   -High risk retention   -Monitor PVRs, SC prn >300cc     Bowel   -High risk constipation   -senna+colace BID, PRN miralax, MoM, and bisacodyl supp.     Safety   -fall precautions     PPx  -DVT: on hold due to hematoma  -GI: pantoprazole    Rehab Progress: Tolerating therapy well thus far. Working on functional mobility, compensatory strategies, balance, strength, endurance. Anticipated Dispo: home with son  Services/DME: PT, OT, visiting nurse  ELOS: 2/23      Ge Underwood.  MD [place] 2/22/2021, 9:23 AM
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
## 2021-02-23 VITALS
WEIGHT: 185.41 LBS | TEMPERATURE: 97.4 F | BODY MASS INDEX: 35.01 KG/M2 | OXYGEN SATURATION: 95 % | SYSTOLIC BLOOD PRESSURE: 122 MMHG | RESPIRATION RATE: 16 BRPM | HEIGHT: 61 IN | DIASTOLIC BLOOD PRESSURE: 70 MMHG | HEART RATE: 60 BPM

## 2021-02-23 PROCEDURE — APPNB30 APP NON BILLABLE TIME 0-30 MINS: Performed by: PHYSICIAN ASSISTANT

## 2021-02-23 PROCEDURE — 94761 N-INVAS EAR/PLS OXIMETRY MLT: CPT

## 2021-02-23 PROCEDURE — 6370000000 HC RX 637 (ALT 250 FOR IP): Performed by: PHYSICAL MEDICINE & REHABILITATION

## 2021-02-23 PROCEDURE — 6370000000 HC RX 637 (ALT 250 FOR IP): Performed by: NURSE PRACTITIONER

## 2021-02-23 RX ORDER — BACITRACIN, NEOMYCIN, POLYMYXIN B 400; 3.5; 5 [USP'U]/G; MG/G; [USP'U]/G
1 OINTMENT TOPICAL DAILY
Qty: 1 TUBE | Refills: 0 | Status: SHIPPED | OUTPATIENT
Start: 2021-02-23 | End: 2021-03-25

## 2021-02-23 RX ORDER — BACITRACIN, NEOMYCIN, POLYMYXIN B 400; 3.5; 5 [USP'U]/G; MG/G; [USP'U]/G
OINTMENT TOPICAL DAILY
Status: DISCONTINUED | OUTPATIENT
Start: 2021-02-23 | End: 2021-02-23 | Stop reason: HOSPADM

## 2021-02-23 RX ADMIN — METOCLOPRAMIDE 5 MG: 10 TABLET ORAL at 07:29

## 2021-02-23 RX ADMIN — FLECAINIDE ACETATE 100 MG: 100 TABLET ORAL at 07:28

## 2021-02-23 RX ADMIN — HYDROCODONE BITARTRATE AND ACETAMINOPHEN 2 TABLET: 5; 325 TABLET ORAL at 00:54

## 2021-02-23 RX ADMIN — METOPROLOL SUCCINATE 50 MG: 50 TABLET, EXTENDED RELEASE ORAL at 07:29

## 2021-02-23 RX ADMIN — HYDROCODONE BITARTRATE AND ACETAMINOPHEN 2 TABLET: 5; 325 TABLET ORAL at 07:29

## 2021-02-23 RX ADMIN — METOCLOPRAMIDE 5 MG: 10 TABLET ORAL at 12:08

## 2021-02-23 RX ADMIN — MEMANTINE 5 MG: 5 TABLET ORAL at 07:29

## 2021-02-23 RX ADMIN — Medication 400 MG: at 07:29

## 2021-02-23 RX ADMIN — CALCIUM 500 MG: 500 TABLET ORAL at 07:28

## 2021-02-23 RX ADMIN — POLYMYXIN B SULFATE, BACITRACIN ZINC, NEOMYCIN SULFATE: 5000; 3.5; 4 OINTMENT TOPICAL at 11:27

## 2021-02-23 RX ADMIN — FUROSEMIDE 20 MG: 20 TABLET ORAL at 07:29

## 2021-02-23 RX ADMIN — PANTOPRAZOLE SODIUM 40 MG: 40 TABLET, DELAYED RELEASE ORAL at 05:27

## 2021-02-23 ASSESSMENT — PAIN SCALES - WONG BAKER: WONGBAKER_NUMERICALRESPONSE: 0

## 2021-02-23 ASSESSMENT — PAIN DESCRIPTION - ORIENTATION
ORIENTATION: RIGHT
ORIENTATION: RIGHT

## 2021-02-23 ASSESSMENT — PAIN SCALES - GENERAL
PAINLEVEL_OUTOF10: 3
PAINLEVEL_OUTOF10: 7
PAINLEVEL_OUTOF10: 0
PAINLEVEL_OUTOF10: 6

## 2021-02-23 ASSESSMENT — PAIN - FUNCTIONAL ASSESSMENT: PAIN_FUNCTIONAL_ASSESSMENT: PREVENTS OR INTERFERES SOME ACTIVE ACTIVITIES AND ADLS

## 2021-02-23 ASSESSMENT — PAIN DESCRIPTION - PROGRESSION: CLINICAL_PROGRESSION: NOT CHANGED

## 2021-02-23 ASSESSMENT — PAIN DESCRIPTION - DESCRIPTORS: DESCRIPTORS: ACHING

## 2021-02-23 ASSESSMENT — PAIN DESCRIPTION - LOCATION: LOCATION: LEG

## 2021-02-23 ASSESSMENT — PAIN DESCRIPTION - FREQUENCY: FREQUENCY: CONTINUOUS

## 2021-02-23 NOTE — PROGRESS NOTES
CLINICAL PHARMACY NOTE: MEDS TO [facility] [employer] Select Patient?: No  Total # of Prescriptions Filled: 4   The following medications were delivered to the patient:  Discharge Medication List as of 2/23/2021 10:26 AM      START taking these medications    Details   neomycin-bacitracin-polymyxin (NEOSPORIN) 400-5-5000 ointment Apply 1 each topically daily Apply right lower extremity wound daily, Topical, DAILY Starting Tue 2/23/2021, Until Thu 3/25/2021, For 30 days, Disp-1 Tube, R-0, Print      HYDROcodone-acetaminophen (NORCO) 5-325 MG per tablet Take 2 tablets by mouth every 6 hours as needed for Pain for up to 14 days. , Disp-40 tablet, R-0Print         · potassium chloride er 20mg  · Furosemide 20mg  Total # of Interventions Completed: 0  Time Spent (min): 30    Additional Documentation:

## 2021-02-23 NOTE — PROGRESS NOTES
Discharge order received. Wound vac and PICC removed prior to discharge. Instructions reviewed, medications delivered to room. All belongings packed. Patient left with son.

## 2021-02-23 NOTE — PROGRESS NOTES
Department of Physical Medicine & Rehabilitation  Progress Note    Patient Identification:  Pinky Easley  [id number]  : 1945  Admit date: 2021    Chief Complaint: Traumatic hematoma of right lower leg with infection    Subjective:  Patient seen this AM. Patient feeling better this morning. She is excited about going home today. The wound VAC is present over the skin graft to help it adhere, and this will be removed by plastic surgery later today before her discharge. Repeat labs look good and are stable. Her pain medication is keeping her wound graft pain under control. I have filled out her WINSTON and meds for her discharge today. We will have her continue with therapies at home after discharge. She will follow-up with her family doctor and surgery after discharge. ROS: No f/c, n/v, cp     Objective:  Patient Vitals for the past 24 hrs:   BP Temp Temp src Pulse Resp SpO2 Weight   21 0810 -- -- -- -- -- 95 % --   21 0726 122/70 -- -- 60 -- -- --   21 0527 (!) 108/53 97.4 °F (36.3 °C) Oral 65 16 94 % 185 lb 6.5 oz (84.1 kg)   21 1629 (!) 147/75 98.4 °F (36.9 °C) Oral 66 16 95 % --   21 0840 -- -- -- -- -- 92 % --     Const: Alert. No distress, pleasant. HEENT: Normocephalic, atraumatic. Normal sclera/conjunctiva. MMM. CV: Regular rate and rhythm. Resp: No respiratory distress. Lungs CTAB. Abd: Soft, nontender, nondistended, NABS+   Ext: No edema. RLE wound vac in place  Neuro: Alert, oriented, appropriately interactive. Psych: Cooperative, appropriate mood and affect    Laboratory data: Available via EMR. Last 24 hour lab  No results found for this or any previous visit (from the past 24 hour(s)). Therapy progress:  PT  Position Activity Restriction  Other position/activity restrictions: Wound Vac R Lat Calf. PT spoke with Jeremy Eddy NP) : WBAT R LE. s/p skin graft on .   Objective     Sit to Stand: Modified independent  Stand to sit: Modified independent  Bed to Chair: Modified independent  Device: Rolling Walker  Other Apparatus: (PT managed wound vac which she won't have at home)  Assistance: Modified Independent  Distance: 48', 47', 48' with 2 turns  OT  PT Equipment Recommendations  Other: Will monitor for potential equipt needs. pt has a RW and rollator  Toilet - Technique: Ambulating  Equipment Used: [employer]       Body mass index is 35.03 kg/m². Rehabilitation Diagnosis:   16.0, Debility (non-cardiac, non-pulmonary        Assessment and Plan:     Impairments: generalized weakness, decreased ROM distal RLE, decreased balance, endurance     Debility  -PT/OT     Right leg hematoma   -s/p evacuation (1/28 with Dr. Jocelyn Guzman)  -s/p evacuation, debridement and wound vac placement (1/31 with Dr. Karina Lubin)  -STSG on 2/17      Acute blood loss anemia with hemorrhagic shock  -Due to traumatic hematoma   -Monitor Hgb, transfuse prn <7.      Paroxysmal Afib  -Eliquis on hold due to above until cleared by Surgery  -flecainide, metoporlol     H/o gastric bypass (Shanice-en-Y)  -Vitamin supplements  -Dietitian consult     Hypomagnesemia  -supplement     Anxiety/depression  -sertraline, prn alprazolam     Acute on chronic pain control  -H/o fibromyalgia  -prn Norco, tizanidine     Bladder   -High risk retention   -Monitor PVRs, SC prn >300cc     Bowel   -High risk constipation   -senna+colace BID, PRN miralax, MoM, and bisacodyl supp.     Safety   -fall precautions     PPx  -DVT: on hold due to hematoma  -GI: pantoprazole    Rehab Progress: Tolerating therapy well thus far. Working on functional mobility, compensatory strategies, balance, strength, endurance. Anticipated Dispo: home with son  Services/DME: PT, OT, visiting nurse  ELOS: 2/23      Garfield Hester.  MD [place] 2/23/2021, 8:34 AM No

## 2021-02-23 NOTE — PLAN OF CARE
Problem: Falls - Risk of:  Goal: Will remain free from falls  Description: Will remain free from falls  2/15/2021 0919 by Markos Howard RN  Outcome: Ongoing  Note: Fall risk band on patient. Orange light on outside of room. Non skid footwear in place. Alarms used appropriately. Patient instructed to call and wait for staff before getting up. Rounding done to anticipate needs. Appropriate safety devices used for transfers. Problem: SKIN INTEGRITY  Goal: LTG - Patient will be free from infection  2/15/2021 0919 by Markos Howard RN  Outcome: Ongoing  Note: No s/s of infection to [facility] site noted. Problem: SKIN INTEGRITY  Goal: STG - patient will maintain good skin integrity  2/15/2021 0919 by Markos Howard RN  Outcome: Ongoing  Note: Skin assessment completed on admission and every shift. Barrier wipes used in the event of incontinence. Pressure relief techniques used as needed while in chair and in bed. Position changes encouraged at least every two hours while in bed.

## 2021-02-23 NOTE — CARE COORDINATION
Call placed to Anahi Mathis at [facility] to inquire if they take her insurance. She will run the insurance and call me back.   [place], [place]     Case Management   [phone]    2/23/2021  8:40 AM

## 2021-02-23 NOTE — PROGRESS NOTES
Patient admitted with hematoma to right leg, requiring surgeries and wound vac. Vac working well. PICC to RUE. Pain meds PRN. Pills whole in thin liquids. Continent of bowel and bladder. Plan for discharge today. Will monitor.

## 2021-02-23 NOTE — DISCHARGE SUMMARY
clearance on RLE, decreased stance phase on R with mildly antalgic gait pattern, forward flexed posture, increased pain at skin graft donor site with mobility  Gait Deviations: Decreased step height, Decreased step length, Slow Marlena  Distance: 50', 54', 50' with 2 turns  Comments: Picked up object from the floor with RW and reaccher Mod I, Stairs  # Steps : 1  Curbs: 6\"  Device: Rolling walker  Other Apparatus: (PT managed IV pole which she won't have at home)  Assistance: Stand by assistance  Comment: recalled proper sequencing and able to safely manage wheeled walker, PT managing line for wound vac  Mobility:  , PT Equipment Recommendations  Other: Will monitor for potential equipt needs. pt has a RW and rollator, Assessment: PTA pt living with son in home with ramp access and 1st floor bed/bath. Prior to admission, pt independent with daily care and functional mobility. Pt has met 3/4 goals and continues to be limited by decreased activity tolerance and has increased RLE pain with mobility. She is Mod I with bed mobiliity and transfers except for getting 1 leg into bed but is helped by a belt or strap. She amb household distances with Mod I but can't amb community distances yet. She can go up/down curb with SBA and RW. Patient is below baseline functional status and therefore would benefit from continnuing PT at home to maximize independence and safety at home. Anticipate that pt will be safe to d/c home with assist PRN and home health PT on Tuesday, 2/23. Occupational therapy:  ,  , Assessment: Pt was able to complete sponge bath at sink indep, dressed indep with nursing only managing wound vac. Groomed indep at sink, toileted indep. Pt is able to ambulate indep using rolling walker, assist to manage wound vac which is to be removed prior to discharge. Pt stated her son purchased walker basket on [address], she has a reacher and sock aid, but has to find the sock aid.   At end of session, noted bloody drainage on pants, nursing notifited and added clean pad, rewrapped ace to right thigh, stated she will contact wound care for direction. Anticipatea pt will be ready for discharge home tomorrow as planned with son assisting as needed and home OT for safety and carryover in her own environment. Inpatient Rehabilitation Course: Cesar Osorio is a 67 y.o. female admitted to inpatient rehabilitation on 2/11/2021 for s/p Traumatic hematoma of right lower leg with infection. The patient participated in an aggressive multidisciplinary inpatient rehabilitation program involving 3 hours per day, 5 days per week of rehabilitation. Patient is a 66 yo F with pmh Afib, Anxiety, Depression, Fibromyalgia, Migraines who initially presented 1/28/2021 with right leg pain and swelling after car door was slammed against it. Found to have hematoma and hemorrhagic shock. Home Eliquis held, required multiple transfusions and pressors. Underwent hematoma evacuation (1/28 with Dr. Vamsi Cates). Returned to OR for repeat evacuation, debridement and wound vac placement (1/31 with Dr. Bakari Rahman). Plan for STSG next week. Now presents to ARU with impaired mobility and self-care below her baseline. Currently, patient reports mild to moderate right lateral lower leg pain. Worse with movement. Improves with rest and medication. She has chronic tingling/numbness in her feet but no new numbness. She feels generally weak and fatigued. She is motivated to start inpatient rehab program.  After admission to the Rehab Unit, she made steady progress in her therapies as listed above under each therapy section. Her upper and lower extremity coordination and strength did improve in therapy, and she was starting to improve with her endurance and functional status as she  participated in her rehab therapies. Her wound healing, balance, labs and blood pressure were monitored while on the rehabilitation unit.   Patient seen this AM. Patient feeling better this morning. She is excited about going home today. The wound VAC is present over the skin graft to help it adhere, and this will be removed by plastic surgery later today before her discharge. Repeat labs look good and are stable. Her pain medication is keeping her wound graft pain under control. I have filled out her WINSTON and meds for her discharge today. We will have her continue with therapies at home after discharge. She will follow-up with her family doctor and surgery after discharge.       Condition at Discharge: Good    Significant Diagnostics:   Lab Results   Component Value Date     02/22/2021    K 4.2 02/22/2021     02/22/2021    BUN 18 02/22/2021    CREATININE 0.5 (L) 02/22/2021    GLUCOSE 96 02/22/2021    CALCIUM 8.8 02/22/2021     Lab Results   Component Value Date    WBC 4.1 02/22/2021    RBC 2.96 (L) 02/22/2021    HGB 8.7 (L) 02/22/2021    HCT 27.7 (L) 02/22/2021    MCV 93.4 02/22/2021    MCH 29.4 02/22/2021    MCHC 31.4 02/22/2021    RDW 21.2 (H) 02/22/2021     02/22/2021    MPV 8.2 02/22/2021     Lab Results   Component Value Date    PROTIME 12.5 02/04/2021    INR 1.08 02/04/2021     Lab Results   Component Value Date    APTT 31.5 06/02/2018     Lab Results   Component Value Date    COLORU Yellow 09/03/2019    CLARITYU Clear 09/03/2019    GLUCOSEU Negative 09/03/2019    BILIRUBINUR Negative 09/03/2019    KETUA Negative 09/03/2019    SPECGRAV 1.010 09/03/2019    BLOODU Negative 09/03/2019    PHUR 7.0 09/03/2019    PROTEINU Negative 09/03/2019    UROBILINOGEN 2.0 (A) 09/03/2019    NITRU Negative 09/03/2019    LEUKOCYTESUR Negative 09/03/2019    LABMICR Not Indicated 09/03/2019    URRFLXCULT Not Indicated 09/03/2019    URINETYPE ns 09/03/2019     Lab Results   Component Value Date    WBCUA 2 10/27/2018    EPIU 0 10/27/2018     No results found for: Cheyenne Francisco    Echo Complete 2d W Doppler W Color    Result Date: 1/30/2021  Transthoracic Echocardiography Report (TTE) Demographics   Patient Name      Najma Calderon   Date of Study     01/30/2021          Gender              Female   Patient Number    [id number]          Date of Birth       1945   Visit Number      [id number]           Age                 67 year(s)   Accession Number  [id number]          Room Number         KOR   Corporate ID      [id number]             Sonographer         Kalpana Sevilla.  Physician         Francis Bloom MD        Physician           Syeda Cano MD  Procedure Type of Study   TTE procedure:ECHOCARDIOGRAM COMPLETE 2D W DOPPLER W COLOR. Procedure Date Date: 01/30/2021 Start: 12:40 PM Study Location: [facility] Technical Quality: Limited visualization due to patient immobility. Additional Indications:Hypotension, hemorrhagic shock, evaluate EF. Patient Status: Routine Height: 61 inches Weight: 181 pounds BSA: 1.81 m2 BMI: 34.2 kg/m2 Rhythm: Within normal limits HR: 88 bpm BP: 100/43 mmHg  Conclusions   Summary  Normal left ventricle size, wall thickness, and systolic function with an  estimated ejection fraction of 65-70%. No regional wall motion abnormalities  are seen. The right ventricle is normal in size and function. The left atrium is severely dilated. Mild mitral regurgitation. The aortic valve leaflets are not well visualized. Aortic sclerosis versus  mild aortic stenosis (peak velocity of 2.67m/s and a mean pressure gradient  of 16mmHg). Mild aortic regurgitation. Mild tricuspid regurgitation.    Signature   ------------------------------------------------------------------  Electronically signed by Wolf Rodriguez MD  (Interpreting physician) on 01/31/2021 at 09:56 AM  ------------------------------------------------------------------   Findings   Left Ventricle  Normal left ventricle size, wall thickness, and systolic function with an  estimated ejection fraction of 65-70%. No regional wall motion abnormalities  are seen. Indeterminate diastolic function. Mitral Valve  Moderate mitral annular calcification. No evidence of mitral valve stenosis. Mild mitral regurgitation. Left Atrium  The left atrium is severely dilated. Aortic Valve  The aortic valve leaflets are not well visualized. Aortic sclerosis versus mild aortic stenosis (peak velocity of 2.67m/s and a  mean pressure gradient of 16mmHg). Mild aortic regurgitation. Aorta  The aortic root is normal in size. Right Ventricle  The right ventricle is normal in size and function. Tricuspid Valve  No evidence of tricuspid stenosis. Mild tricuspid regurgitation. Tricuspid valve is structurally normal.   Right Atrium  The right atrial size is normal.   Pulmonic Valve  The pulmonic valve is not well visualized. There is no evidence of significant pulmonic valve regurgitation or  stenosis. Pericardial Effusion  No pericardial effusion noted. Pleural Effusion  No pleural effusion. Miscellaneous  IVC size is normal (<2.1cm) and collapses > 50% with respiration consistent  with normal RA pressure (3mmHg). Unable to estimate pulmonary artery pressure secondary to incomplete TR jet  envelope.   M-Mode/2D Measurements (cm)   LV Diastolic Dimension: 4.1 cm  LV Systolic Dimension: 0.35 cm  LV Septum Diastolic: 1.1 cm  LV PW Diastolic: 0.20 cm        AO Root Dimension: 3.2 cm  RV Diastolic Dimension: 7.21 cm                                  LA Area: 31.4 cm2  LVOT: 2 cm                      LA volume/Index: 112 ml /62 ml/m2  Doppler Measurements   AV Peak Velocity: 267 cm/s    MV Peak E-Wave: 132 cm/s  AV Peak Gradient: 28.52 mmHg  MV Peak A-Wave: 138 cm/s  AV Mean Gradient: 16 mmHg     MV E/A Ratio: 0.96  LVOT Peak Velocity: 149 cm/s  MV P1/2t: 75 msec  AV Area (Continuity):1.94 cm2   TR Velocity:341 cm/s  TR Gradient:46.51 mmHg        MV Deceleration Time: 256 msec Estimated RAP:3 mmHg          MV Area (PHT): 2.93 cm2  Estimated RVSP: 50 mmHg  E' Septal Velocity: 5.87 cm/s  E' Lateral Velocity: 7.8 cm/s  PV Peak Velocity: 130 cm/s  PV Peak Gradient: 6.76 mmHg   Aortic Valve   Peak Velocity: 267 cm/s     Mean Velocity: 185 cm/s  Peak Gradient: 28.52 mmHg   Mean Gradient: 16 mmHg  Area (continuity): 1.94 cm2  AV VTI: 54.6 cm  AI P1/2t: 428 msec  Aorta   Aortic Root: 3.2 cm  LVOT Diameter: 2 cm      Xr Tibia Fibula Right (2 Views)    Result Date: 1/28/2021  EXAMINATION: 3 XRAY VIEWS OF THE RIGHT TIBIA AND FIBULA 1/28/2021 2:40 pm COMPARISON: None. HISTORY: ORDERING SYSTEM PROVIDED HISTORY: hit on car door TECHNOLOGIST PROVIDED HISTORY: Reason for exam:->hit on car door Reason for Exam: hit on car door; swelling and discoloration (grayish) visible Acuity: Acute Type of Exam: Initial FINDINGS: The alignment of the tibia and fibula is normal.  There is no acute fracture or dislocation. There is pretibial soft tissue swelling. Generalized osteopenia is present. The distal aspect of a femoral intramedullary nail with several interlocking screws are noted, in normal position. Moderate osteoarthritis of the right knee. Pretibial soft tissue swelling, possible hematoma. No acute osseous injury. Xr Chest Portable    Result Date: 2/11/2021  EXAMINATION: ONE XRAY VIEW OF THE CHEST 2/11/2021 1:20 pm COMPARISON: None. HISTORY: ORDERING SYSTEM PROVIDED HISTORY: PICC placement re-verification TECHNOLOGIST PROVIDED HISTORY: Reason for exam:->PICC placement re-verification Reason for Exam: PICC placement re-verification FINDINGS: There is a right-sided PICC line with the tip at the lower SVC. Heart size borderline. Pulmonary vasculature within normal limits. Lungs clear except for minimal atelectasis in the bases. Minimal blunting of the left costophrenic angle     1. PICC line tip in the lower SVC 2.  Possible trace left pleural effusion     Cta Lower Extremity Right W Contrast    Result Date: 1/28/2021  EXAMINATION: CTA OF THE RIGHT LOWER EXTREMITY 1/28/2021 4:58 pm TECHNIQUE: CTA of the right lower extremity was performed after the administration of intravenous contrast. Multiplanar reformatted images are provided for review. MIP images are provided for review. . Dose modulation, iterative reconstruction, and/or weight based adjustment of the mA/kV was utilized to reduce the radiation dose to as low as reasonably achievable. COMPARISON: None. HISTORY ORDERING SYSTEM PROVIDED HISTORY: hematoma to shin, takes eliquis TECHNOLOGIST PROVIDED HISTORY: Reason for exam:->hematoma to shin, takes eliquis Reason for Exam: hematoma to shin, takes eliquis Acuity: Acute Type of Exam: Initial FINDINGS: The lower extremity is visualized from the distal femur to the foot. The popliteal artery is widely patent. There is significant trifurcation level disease. The anterior tibial artery is occluded by the proximal to mid tibia. The peroneal is proximally occluded. There is at least 1 severe focal stenosis at the origin of the posterior tibial artery. It appears partially reconstituted and visualized at the level of the distal tibia. There is a large anterolateral hematoma of the calf, measuring 3 x 11 x 22 cm. No active extravasation or arterial feeding vessel is identified. An intramedullary nail is noted in the distal femur. There is moderate osteoarthritis of the right knee. No acute fracture is identified. Large hematoma of the anterior calf. No active extravasation or etiology of hemorrhage is identified. Popliteal artery is patent. There is significant trifurcation level disease with primary runoff via the disease posterior tibial. No acute osseous abnormality. Patient Instructions: Follow-up visits: She will follow-up with her family doctor and surgery after discharge.     Discharge Medications:     Medication List      START taking these medications HYDROcodone-acetaminophen 5-325 MG per tablet  Commonly known as: NORCO  Take 2 tablets by mouth every 6 hours as needed for Pain for up to 14 days.         CHANGE how you take these medications    furosemide 20 MG tablet  Commonly known as: LASIX  Take 1 tablet by mouth daily  What changed:   · how much to take  · additional instructions     potassium chloride 20 MEQ extended release tablet  Commonly known as: Klor-Con M20  Take 1 tablet by mouth daily  What changed: when to take this        CONTINUE taking these medications    acetaminophen 500 MG tablet  Commonly known as: TYLENOL     ALPRAZolam 0.5 MG tablet  Commonly known as: XANAX  TAKE ONE TABLET BY MOUTH THREE TIMES A DAY AS NEEDED FOR ANXIETY     B-2-400 400 MG Caps  Generic drug: Riboflavin     Biotin 1000 MCG Tabs     Caltrate 600 1500 (600 Ca) MG Tabs tablet  Generic drug: calcium carbonate     co-enzyme Q-10 50 MG capsule     flecainide 100 MG tablet  Commonly known as: TAMBOCOR  TAKE 1 TABLET BY MOUTH 2 TIMES A DAY     hydrOXYzine 25 MG tablet  Commonly known as: ATARAX  TAKE ONE TO TWO TABLETS BY MOUTH TWICE A DAY AS NEEDED FOR ITCHING     magnesium oxide 400 MG tablet  Commonly known as: MAG-OX     memantine 5 MG tablet  Commonly known as: NAMENDA  Take 1 tablet by mouth 2 times daily     metoclopramide 5 MG tablet  Commonly known as: Reglan  Take 1 tablet by mouth 3 times daily as needed (Nausea and abdominal)     metoprolol succinate 50 MG extended release tablet  Commonly known as: TOPROL XL  TAKE 1 TABLET BY MOUTH ONE TIME A DAY     mirtazapine 30 MG tablet  Commonly known as: REMERON  TAKE ONE TABLET BY MOUTH ONCE NIGHTLY     pantoprazole 40 MG tablet  Commonly known as: PROTONIX  Take 1 tablet by mouth 2 times daily     polyethylene glycol 17 g packet  Commonly known as: GLYCOLAX  Take 17 g by mouth daily as needed for Constipation     sertraline 100 MG tablet  Commonly known as: ZOLOFT  Take 1 tablet by mouth nightly     simethicone 80 MG chewable tablet  Commonly known as: MYLICON  Take 1 tablet by mouth every 6 hours as needed for Flatulence     tiZANidine 2 MG tablet  Commonly known as: ZANAFLEX  Take 1 tablet by mouth 2 times daily     vitamin B-12 1000 MCG tablet  Commonly known as: CYANOCOBALAMIN     vitamin D 25 MCG (1000 UT) Caps           Where to Get Your Medications      You can get these medications from any pharmacy    Bring a paper prescription for each of these medications  · furosemide 20 MG tablet  · HYDROcodone-acetaminophen 5-325 MG per tablet  · memantine 5 MG tablet  · potassium chloride 20 MEQ extended release tablet            I spent over 35 minutes on this discharge encounter between counseling, coordination of care, and medication reconciliation.         To comply with [facility] alla R.II.4.1:   Discharge order placed in advance to facilitate patients discharge needs      Magdalena Lizama MD, 2/23/2021, 9:04 AM

## 2021-02-23 NOTE — CARE COORDINATION
[facility] has accepted this referral.  [place], [place]     Case Management   [phone]    2/23/2021  9:33 AM

## 2021-02-24 ENCOUNTER — CARE COORDINATION (OUTPATIENT)
Dept: CASE MANAGEMENT | Age: 76
End: 2021-02-24

## 2021-02-24 DIAGNOSIS — D64.9 SYMPTOMATIC ANEMIA: Primary | ICD-10-CM

## 2021-02-24 PROCEDURE — 1111F DSCHRG MED/CURRENT MED MERGE: CPT | Performed by: NURSE PRACTITIONER

## 2021-02-24 NOTE — CARE COORDINATION
[facility] Transitions Initial Follow Up Call    Call within 2 business days of discharge: Yes    Patient: Keyana Conde Patient : 1945   MRN: [id number]  Reason for Admission: traumatic hematoma right lower leg with infection  Discharge Date: 21 RARS: Readmission Risk Score: 23      Last Discharge [facility]       Complaint Diagnosis Description Type Department Provider    21  Traumatic hematoma of right lower leg with infection, sequela Admission (Discharged) WSJANAY 3N Mauricio Dennison MD          Challenges to be reviewed by the provider   Additional needs identified to be addressed with provider No  none    Discussed COVID-19 related testing which was available at this time. Test results were negative. Patient informed of results, if available? Yes         Method of communication with provider : none      Was this a readmission? No      Care Transition Nurse (CTN) contacted the patient by telephone to perform post hospital discharge assessment. Verified name and  with patient as identifiers. Provided introduction to self, and explanation of the CTN role. CTN reviewed discharge instructions, medical action plan and red flags with patient who verbalized understanding. Patient given an opportunity to ask questions and does not have any further questions or concerns at this time. Were discharge instructions available to patient? Yes. Reviewed appropriate site of care based on symptoms and resources available to patient including: PCP, Specialist and Home health. The patient agrees to contact the PCP office for questions related to their healthcare. Medication reconciliation was performed with patient, who verbalizes understanding of administration of home medications. Advised obtaining a 90-day supply of all daily and as-needed medications. Discussed follow-up appointments.  If no appointment was previously scheduled, appointment scheduling offered: has PCP f/u, pt to call surgeon for f/u. Is follow up appointment scheduled within 7 days of discharge? No  Non-[facility] follow up appointment(s):     Plan for follow-up call in 7-10 days based on severity of symptoms and risk factors. Plan for next call: symptom management-pain, constipation  CTN provided contact information for future needs. Non-face-to-face services provided:  Obtained and reviewed discharge summary and/or continuity of care documents  Assessment and support for treatment adherence and medication management-[id number] completed    Care Transitions 24 Hour Call    Do you have any ongoing symptoms?: Yes  Patient-reported symptoms: Pain  Do you have a copy of your discharge instructions?: Yes  Do you have all of your prescriptions and are they filled?: Yes  Have you scheduled your follow up appointment?: Yes  How are you going to get to your appointment?: Car - family or friend to transport  Were you discharged with any Home Care or Post Acute Services: Yes  Post Acute Services: [facility] Transitions Interventions     Writer spoke with patient, Radha Prado. She stated her pain is a 5 on 0-10 scale and she took a pain pill at 430 am.  Writer explained to patient to watch for constipation due to pain pills and decreased activity, patient has miralax prn, patient verbalized understanding. Patient stated has pants on but as far as she knows her dressings are D&I. Patient stated [facility] coming this afternoon. Patient stated has a transport WC and a walker, has been using transport WC. Patient stated has reviewed graft instructions in AVS and has no questions.       Follow Up  Future Appointments   Date Time Provider Elysia Olguin   3/9/2021  1:30 PM MUSA Emerson - [address] [facility]   6/17/2021 11:45 AM Tonette Mcardle, MD [facility]       Fitz Friend RN

## 2021-02-25 ENCOUNTER — TELEPHONE (OUTPATIENT)
Dept: PRIMARY CARE CLINIC | Age: 76
End: 2021-02-25

## 2021-02-25 ENCOUNTER — TELEPHONE (OUTPATIENT)
Dept: SURGERY | Age: 76
End: 2021-02-25

## 2021-02-25 NOTE — TELEPHONE ENCOUNTER
Tram from patient [facility] called to discuss patient wound care. Patient was discharged from [facility] with daily wound dressing changes with xeroform. Patient insurance will only cover 3 times per week, they are asking that we give a verbal for dressing changes Mon, Wed, Fri. Routing to MD to give ok.

## 2021-03-01 ENCOUNTER — TELEPHONE (OUTPATIENT)
Dept: PRIMARY CARE CLINIC | Age: 76
End: 2021-03-01

## 2021-03-01 RX ORDER — SERTRALINE HYDROCHLORIDE 100 MG/1
TABLET, FILM COATED ORAL
Qty: 90 TABLET | Refills: 0 | Status: SHIPPED | OUTPATIENT
Start: 2021-03-01 | End: 2021-06-21 | Stop reason: SDUPTHER

## 2021-03-01 NOTE — TELEPHONE ENCOUNTER
Medication:   Requested Prescriptions     Pending Prescriptions Disp Refills    sertraline (ZOLOFT) 100 MG tablet [Pharmacy Med Name: SERTRALINE  MG TABLET] 90 tablet 0     Sig: TAKE ONE TABLET BY MOUTH ONCE NIGHTLY        Last Filled:      Patient Phone Number: [phone] (home)     Last appt: 11/13/20   Next appt: Visit date not found    Last OARRS:   RX Monitoring 1/20/2021   Periodic Controlled Substance Monitoring No signs of potential drug abuse or diversion identified.

## 2021-03-02 NOTE — TELEPHONE ENCOUNTER
I spoke with Felisa on the phone. She stated that she is establishing with a home health service, but she is waiting to see if they take her insurance. Her son changed her bandage yesterday.

## 2021-03-03 ENCOUNTER — OFFICE VISIT (OUTPATIENT)
Dept: SURGERY | Age: 76
End: 2021-03-03

## 2021-03-03 ENCOUNTER — CARE COORDINATION (OUTPATIENT)
Dept: CASE MANAGEMENT | Age: 76
End: 2021-03-03

## 2021-03-03 VITALS
TEMPERATURE: 98 F | WEIGHT: 175 LBS | RESPIRATION RATE: 16 BRPM | OXYGEN SATURATION: 98 % | SYSTOLIC BLOOD PRESSURE: 180 MMHG | BODY MASS INDEX: 33.07 KG/M2 | HEART RATE: 56 BPM | DIASTOLIC BLOOD PRESSURE: 99 MMHG

## 2021-03-03 DIAGNOSIS — Z09 POSTOP CHECK: Primary | ICD-10-CM

## 2021-03-03 PROCEDURE — 99024 POSTOP FOLLOW-UP VISIT: CPT | Performed by: SURGERY

## 2021-03-03 NOTE — CARE COORDINATION
[facility] Transitions Follow Up Call    3/3/2021    Patient: Stew Matthews  Patient : 1945   MRN: [id number]  Reason for Admission: traumatic hematoma right lower leg  Discharge Date: 21 RARS: Readmission Risk Score: 23      Needs to be reviewed by the provider   Additional needs identified to be addressed with provider No  none           Method of communication with provider : none    Discussed COVID-19 related testing which was available at this time. Test results were negative. Patient informed of results, if available? Yes    Care Transition Nurse (CTN) contacted the patient by telephone to follow up after admission on 2021 Verified name and  with patient as identifiers. Addressed changes since last contact: symptom management-r leg pain  Follow up appointment completed? Yes     Discussed appropriate site of care based on symptoms and resources available to patient including: PCP and Specialist. The patient agrees to contact the PCP office for questions related to their healthcare. Plan for follow-up call in 7-10 days based on severity of symptoms and risk factors. Plan for next call: symptom management-R leg pain, healing  CTN provided contact information for future needs. Writer spoke with patient, Larna Mohs. She stated went to surgeon today and she no longer has a dressing on her leg and she needs to put antibiotic ointment on it. She stated her pain is \"not bad\". No needs at this time. Care Transitions Subsequent and Final Call    Subsequent and Final Calls  Care Transitions Interventions  Other Interventions:            Follow Up  Future Appointments   Date Time Provider Elysia Olguin   3/9/2021  1:30 PM MUSA Fishman - [address] [facility]   2021 11:45 AM Jodee Sanderson MD [facility]   2021 11:45 AM Bebeto Ferro MD PLASTICS/REC [facility]       Anjel Cordova, RN

## 2021-03-03 NOTE — PROGRESS NOTES
[facility]    PROCEDURE: 1) Excisional debridement of right lower extremity wound (2 x 2 cm)  2) Application of split thickness skin graft to the right lower extremity (22 x 15 cm)  3) Application of wound vacuum device   DATE: 2/17/21    Keyana Conde has been recovering well since her procedure. Pain has been well controlled with intermittent pain medications     EXAM    BP (!) 180/99   Pulse 56   Temp 98 °F (36.7 °C)   Resp 16   Wt 175 lb (79.4 kg)   SpO2 98%   BMI 33.07 kg/m²     GEN: NAD   EXT: Graft with 100% take  Donor site healing well    IMP: 67 y. o.female s/p STSG to RLE  PLAN: Doing well overall. Will follow-up in 6 months to ensure continued wound healing success.      Jana Olivia MD  [address] Reconstructive Surgery  [phone]  03/03/21

## 2021-03-05 DIAGNOSIS — I48.0 PAF (PAROXYSMAL ATRIAL FIBRILLATION) (HCC): ICD-10-CM

## 2021-03-05 RX ORDER — FLECAINIDE ACETATE 100 MG/1
TABLET ORAL
Qty: 60 TABLET | Refills: 3 | Status: SHIPPED | OUTPATIENT
Start: 2021-03-05 | End: 2021-07-02 | Stop reason: SDUPTHER

## 2021-03-08 ENCOUNTER — TELEPHONE (OUTPATIENT)
Dept: SURGERY | Age: 76
End: 2021-03-08

## 2021-03-08 NOTE — TELEPHONE ENCOUNTER
Patient called back and is wanting to know if she should resume Eliquis. Her lasix was reduced during hospitalization and she is complaining of edema. Informed patient that she can take an additional dose of Lasix for a few days, then resume 20 mg daily. I will have to review hospital notes and discuss with Dr. Deion Kent in regards to resuming [address]. Informed patient I will call her back later today.

## 2021-03-08 NOTE — TELEPHONE ENCOUNTER
Informed patient that she can resume Eliquis 5 mg BID and she verbalized understanding.  Rx sent to Dr. Rigoberto Flores for approval.

## 2021-03-08 NOTE — TELEPHONE ENCOUNTER
Geri with Dr. Jocelyne Hendrickson office called in requesting to know if the patient can resume her Eliquis.     Patient does AFIB and needs ASAP    Please contact Dr. Jocelyne Hendrickson office at [phone]

## 2021-03-08 NOTE — TELEPHONE ENCOUNTER
Dr. Kendall Mills: FYI:     Patient was seen in office on 12/18/20 and had no complaints of bleeding while on Eliquis. She presented to ED on 1/28/21 and admitted with right leg hematoma (\"car door slammed on leg\"). Eliquis was discontinued and she received 3 units of PRBCs and IV iron. She is s/p excisional debridement of Rt lower extremity wound and application of split thickness skin graft with wound vac device on 2/17/21 by Dr. Waleska Diez. She is to remain off of Eliquis until deemed safe by surgeon. Patient says she followed up with Dr. Sarah Sharma who said she should call cardiology to find out if it is safe to resume Eliquis. I spoke with Samm Voss at Dr. Maryuri Braun office to clarify if patient is safe to resume Eliquis. I explained that we definitely want her to resume Eliquis to reduce risk of stroke given her hx of Atrial fibrillation. Samm Voss will have Dr. Maryuri Braun clinical team return my call with recommendations.

## 2021-03-08 NOTE — TELEPHONE ENCOUNTER
Medication Refill    Medication needing refilled:  metoprolol succinate (TOPROL XL)       Dosage of the medication:  50 MG extended release tablet       How are you taking this medication (QD, BID, TID, QID, PRN):  TAKE 1 TABLET BY MOUTH ONE TIME A DAY    30 or 90 day supply called in:    When will you run out of your medication: 90 day supply     Which Pharmacy are we sending the medication to?:    [facility] [place], [address] [phone]   [address] [place] [address], [address] [place] [address]   Phone:  [phone]  Fax:  [phone]      Sergio Fink also wanted to know if ONEL Nevarez could give her a call back regarding her medications.  You can reach Sergio Fink at #[phone]

## 2021-03-08 NOTE — TELEPHONE ENCOUNTER
Luz Jenkins returned your call from Dr. Jarvis Wu office to report that the patient can go back on her Eliquis therapy now.

## 2021-03-09 ENCOUNTER — OFFICE VISIT (OUTPATIENT)
Dept: PRIMARY CARE CLINIC | Age: 76
End: 2021-03-09
Payer: MEDICARE

## 2021-03-09 VITALS
HEART RATE: 65 BPM | TEMPERATURE: 97.9 F | BODY MASS INDEX: 33.1 KG/M2 | DIASTOLIC BLOOD PRESSURE: 68 MMHG | SYSTOLIC BLOOD PRESSURE: 107 MMHG | WEIGHT: 175.2 LBS | OXYGEN SATURATION: 98 %

## 2021-03-09 DIAGNOSIS — K21.9 GASTROESOPHAGEAL REFLUX DISEASE WITHOUT ESOPHAGITIS: ICD-10-CM

## 2021-03-09 DIAGNOSIS — Z11.59 NEED FOR HEPATITIS C SCREENING TEST: ICD-10-CM

## 2021-03-09 DIAGNOSIS — I10 ESSENTIAL HYPERTENSION: ICD-10-CM

## 2021-03-09 DIAGNOSIS — I48.91 ATRIAL FIBRILLATION, UNSPECIFIED TYPE (HCC): ICD-10-CM

## 2021-03-09 DIAGNOSIS — S72.451G: ICD-10-CM

## 2021-03-09 DIAGNOSIS — M89.9 DISORDER OF BONE, UNSPECIFIED: ICD-10-CM

## 2021-03-09 DIAGNOSIS — D62 ACUTE BLOOD LOSS ANEMIA: ICD-10-CM

## 2021-03-09 DIAGNOSIS — I10 ESSENTIAL HYPERTENSION: Primary | ICD-10-CM

## 2021-03-09 DIAGNOSIS — S80.11XD TRAUMATIC HEMATOMA OF RIGHT LOWER LEG, SUBSEQUENT ENCOUNTER: ICD-10-CM

## 2021-03-09 LAB
BASOPHILS ABSOLUTE: 0 K/UL (ref 0–0.2)
BASOPHILS RELATIVE PERCENT: 0.5 %
EOSINOPHILS ABSOLUTE: 0.1 K/UL (ref 0–0.6)
EOSINOPHILS RELATIVE PERCENT: 1 %
HCT VFR BLD CALC: 35.1 % (ref 36–48)
HEMOGLOBIN: 11.4 G/DL (ref 12–16)
HEPATITIS C ANTIBODY INTERPRETATION: NORMAL
LYMPHOCYTES ABSOLUTE: 1.2 K/UL (ref 1–5.1)
LYMPHOCYTES RELATIVE PERCENT: 23.2 %
MCH RBC QN AUTO: 30.5 PG (ref 26–34)
MCHC RBC AUTO-ENTMCNC: 32.4 G/DL (ref 31–36)
MCV RBC AUTO: 94.4 FL (ref 80–100)
MONOCYTES ABSOLUTE: 0.6 K/UL (ref 0–1.3)
MONOCYTES RELATIVE PERCENT: 11.1 %
NEUTROPHILS ABSOLUTE: 3.3 K/UL (ref 1.7–7.7)
NEUTROPHILS RELATIVE PERCENT: 64.2 %
PDW BLD-RTO: 20.4 % (ref 12.4–15.4)
PLATELET # BLD: 253 K/UL (ref 135–450)
PMV BLD AUTO: 7.4 FL (ref 5–10.5)
POTASSIUM SERPL-SCNC: 5 MMOL/L (ref 3.5–5.1)
RBC # BLD: 3.72 M/UL (ref 4–5.2)
VITAMIN D 25-HYDROXY: 45.4 NG/ML
WBC # BLD: 5.1 K/UL (ref 4–11)

## 2021-03-09 PROCEDURE — 99214 OFFICE O/P EST MOD 30 MIN: CPT | Performed by: NURSE PRACTITIONER

## 2021-03-09 ASSESSMENT — PATIENT HEALTH QUESTIONNAIRE - PHQ9
SUM OF ALL RESPONSES TO PHQ QUESTIONS 1-9: 0
1. LITTLE INTEREST OR PLEASURE IN DOING THINGS: 0
2. FEELING DOWN, DEPRESSED OR HOPELESS: 0
SUM OF ALL RESPONSES TO PHQ QUESTIONS 1-9: 0

## 2021-03-09 NOTE — PROGRESS NOTES
[address] [facility] [address]  Dept: [phone]       3/9/2021     Virgen Ahmadi (:  )CW a 67 y.o. female, here for evaluation of the following medical concerns:  Ambulating front wheel walker. HPI   Reports she was out shopping with her son, while getting out of the car, wind blew the car door which slammed against her leg. States pain increased to the point she could not drive, went to emergency department. Patient takes Eliquis twice daily for atrial fibrillation. Upon arrival to ED she was found to have a severe hematoma to RLE. Per Hospital Documentation:   Found to have hematoma and hemorrhagic shock. Eliquis held, required multiple transfusions and pressors. Underwent hematoma evacuation ( with Dr. Betina Branch). Returned to OR for repeat evacuation, debridement and wound vac placement ( with Dr. Mathilda Libman for graft. Following inpatient hospitalization she was transferred to rehab from -3/1. She restarted Eliquis today, hesitant to continue due to previous injury. Encouraged to continue Eliquis and note any signs of bleeding. She is ambulating with front wheel walker. Well healed graft donor site and right lower leg. Reports her pain is well controlled.      Lab Results   Component Value Date    CHOL 124 2020     Lab Results   Component Value Date    TRIG 102 2020     Lab Results   Component Value Date    HDL 66 (H) 2020     Lab Results   Component Value Date    LDLCALC 38 2020     Lab Results   Component Value Date    LABVLDL 20 2020     Lab Results   Component Value Date    WBC 5.1 2021    HGB 11.4 (L) 2021    HCT 35.1 (L) 2021    MCV 94.4 2021     2021     Lab Results   Component Value Date     2021    K 5.0 2021     2021    CO2 29 2021    BUN 18 2021    CREATININE 0.5 (L) 2021    GLUCOSE 96 02/22/2021    CALCIUM 8.8 02/22/2021    PROT 6.1 (L) 01/28/2021    LABALBU 3.9 01/28/2021    BILITOT <0.2 01/28/2021    ALKPHOS 68 01/28/2021    AST 20 01/28/2021    ALT 11 01/28/2021    LABGLOM >60 02/22/2021    GFRAA >60 02/22/2021    AGRATIO 1.8 01/28/2021    GLOB 2.2 01/28/2021       Review of Systems   Constitutional: Negative for activity change and fever. HENT: Negative for congestion. Eyes: Negative for visual disturbance. Respiratory: Negative for chest tightness and shortness of breath. Cardiovascular: Negative for chest pain, palpitations and leg swelling. Atrial fibrillation   Gastrointestinal: Negative for abdominal pain, constipation and diarrhea. Rectal cancer  GERD  Appendectomy, cholecystectomy   Endocrine: Negative for polyuria. Genitourinary: Negative for dysuria. Musculoskeletal: Negative for arthralgias and myalgias. Cervical and lumbar degenerative disc disease  Fibromyalgia  Total left knee replacement   ORIF right femur following traumatic fracture 10/2018  Closed fracture dislocation right elbow   Skin: Negative for rash. Neurological: Negative for dizziness, light-headedness and headaches. Followed by Neurology for chronic Migraines  Taking Namenda for mild cognitive impairment   Psychiatric/Behavioral: Negative for agitation, decreased concentration and sleep disturbance. The patient is not nervous/anxious. Depression/anxiety       Prior to Visit Medications    Medication Sig Taking?  Authorizing Provider   apixaban (ELIQUIS) 5 MG TABS tablet Take 1 tablet by mouth 2 times daily Yes Temo De MD   flecainide (TAMBOCOR) 100 MG tablet TAKE 1 TABLET BY MOUTH 2 TIMES A DAY Yes Bakari Queen MD   sertraline (ZOLOFT) 100 MG tablet TAKE ONE TABLET BY MOUTH ONCE NIGHTLY Yes Luis Beebe APRN - CNP   neomycin-bacitracin-polymyxin (NEOSPORIN) 400-5-5000 ointment Apply 1 each topically daily Apply right lower extremity wound daily Yes (1000 UT) CAPS Take 2 capsules by mouth daily Yes Historical Provider, MD   Biotin 1000 MCG TABS Take 1 tablet by mouth 2 times daily  Yes Historical Provider, MD   vitamin B-12 (CYANOCOBALAMIN) 1000 MCG tablet Take 500 mcg by mouth daily Indications: patient is taking 2 tablets 1,000 mcg in the morning  Yes Historical Provider, MD        Social History     Tobacco Use    Smoking status: Never Smoker    Smokeless tobacco: Never Used   Substance Use Topics    Alcohol use: No        Vitals:    03/09/21 1313   BP: 107/68   Pulse: 65   Temp: 97.9 °F (36.6 °C)   TempSrc: Temporal   SpO2: 98%   Weight: 175 lb 3.2 oz (79.5 kg)     Estimated body mass index is 33.1 kg/m² as calculated from the following:    Height as of 2/18/21: 5' 1\" (1.549 m). Weight as of this encounter: 175 lb 3.2 oz (79.5 kg). Physical Exam  Vitals signs reviewed. Constitutional:       Appearance: She is well-developed. HENT:      Head: Normocephalic. Right Ear: Hearing and external ear normal.      Left Ear: Hearing and external ear normal.      Nose: Nose normal.   Eyes:      General: Lids are normal.   Cardiovascular:      Rate and Rhythm: Normal rate. Rhythm irregular. Heart sounds: Normal heart sounds, S1 normal and S2 normal.   Pulmonary:      Effort: Pulmonary effort is normal.      Breath sounds: Normal breath sounds. Musculoskeletal: Normal range of motion. Skin:     General: Skin is warm and dry. Findings: No rash. Neurological:      Mental Status: She is alert and oriented to person, place, and time. GCS: GCS eye subscore is 4. GCS verbal subscore is 5. GCS motor subscore is 6. Psychiatric:         Speech: Speech normal.         Behavior: Behavior normal. Behavior is cooperative. ASSESSMENT/PLAN:  1. Essential hypertension  -Controlled  -Taking Metoprolol XL 50 mg  -Continue taking Lasix as prescribed.   -Advised to continue present potassium supplement, will recheck labs and adjust if needed. - POTASSIUM; Future    2. Acute blood loss anemia  -Restarted Eliquis today per recommendation of Cardiology  -Encouraged to continue medication and note any signs of bleeding  -Will recheck labs today  - CBC Auto Differential; Future    3. Need for hepatitis C screening test    - Hepatitis C Antibody; Future    4. Atrial fibrillation, unspecified type ([facility]  -Restarted Eliquis today  -Taking Tambocor as prescribed  -Taking Metoprolol as prescribed    5. Traumatic hematoma of right lower leg, subsequent encounter  -No swelling noted of lower extremity  -Lower extremity well healed  -Ambulating with steady gait using walker    6. Gastroesophageal reflux disease without esophagitis  -Continue medication as prescribed      Return in about 8 weeks (around 5/4/2021).         --MUSA Montgomery - CNP on 3/15/2021 at 8:05 AM

## 2021-03-15 ENCOUNTER — CARE COORDINATION (OUTPATIENT)
Dept: CASE MANAGEMENT | Age: 76
End: 2021-03-15

## 2021-03-15 ASSESSMENT — ENCOUNTER SYMPTOMS
SHORTNESS OF BREATH: 0
DIARRHEA: 0
ABDOMINAL PAIN: 0
CHEST TIGHTNESS: 0
CONSTIPATION: 0

## 2021-03-15 NOTE — CARE COORDINATION
[facility] Transitions Follow Up Call    3/15/2021    Patient: Charmaine Floyd  Patient : 1945   MRN: [id number]  Reason for Admission: traumatic hematoma right lower leg  Discharge Date: 21 RARS: Readmission Risk Score: 23    Attempted to reach patient via phone for post hospital transition call. VM left (on mobile phone) with my contact information requesting a return call. Unable to complete call to home phone, number could not be completed as dialed. Care Transitions Subsequent and Final Call    Subsequent and Final Calls  Care Transitions Interventions  Other Interventions:            Follow Up  Future Appointments   Date Time Provider Elysia Olguin   2021 12:30 PM MUSA Jasmine - [address] [facility]   2021 11:45 AM Nancy Marroquin MD [facility]   2021 11:45 AM Delmy Ramos MD PLASTICS/REC [facility]       Lisbeth Red RN

## 2021-03-16 ENCOUNTER — CARE COORDINATION (OUTPATIENT)
Dept: CASE MANAGEMENT | Age: 76
End: 2021-03-16

## 2021-03-16 NOTE — CARE COORDINATION
[facility] Transitions Follow Up Call    3/16/2021    Patient: Cookie Orellana  Patient : 1945   MRN: [id number]  Reason for Admission: traumatic hematoma right lower leg  Discharge Date: 21 RARS: Readmission Risk Score: 23      Needs to be reviewed by the provider   Additional needs identified to be addressed with provider No  none           Method of communication with provider : none    Discussed COVID-19 related testing which was available at this time. Test results were negative. Patient informed of results, if available? Yes    Care Transition Nurse (CTN) contacted the patient by telephone to follow up after admission on 2021 Verified name and  with patient as identifiers. Follow up appointment completed? Yes      Discussed appropriate site of care based on symptoms and resources available to patient including: PCP and Specialist. The patient agrees to contact the PCP office for questions related to their healthcare. Plan for follow-up call in 7-10 days based on severity of symptoms and risk factors. CTN provided contact information for future needs. Patient returned writer's call and left VM. Writer returned call. Patient stated she is doing \"good\" and her leg is \"pretty good\". She stated having very little pain and her activity level is good. She stated [facility] is coming two more times and then will end. Care Transitions Subsequent and Final Call    Subsequent and Final Calls  Care Transitions Interventions  Other Interventions:            Follow Up  Future Appointments   Date Time Provider Elysia Olguin   2021 12:30 PM Serge Alcantar APRN - CNP [address] RD [facility]   2021 11:45 AM Tonette Mcardle, MD [facility]   2021 11:45 AM Diana Arechiga MD PLASTICS/REC [facility]       Fitz Friend RN

## 2021-03-19 ENCOUNTER — TELEPHONE (OUTPATIENT)
Dept: ADMINISTRATIVE | Age: 76
End: 2021-03-19

## 2021-03-19 RX ORDER — POTASSIUM CHLORIDE 20 MEQ/1
20 TABLET, EXTENDED RELEASE ORAL 2 TIMES DAILY
Qty: 180 TABLET | Refills: 0 | Status: SHIPPED | OUTPATIENT
Start: 2021-03-19 | End: 2021-08-31

## 2021-03-19 NOTE — TELEPHONE ENCOUNTER
Our office was not able to fax anything out for quite some time. We are now catching up on everything that was sent to us previously.

## 2021-03-24 ENCOUNTER — CARE COORDINATION (OUTPATIENT)
Dept: CASE MANAGEMENT | Age: 76
End: 2021-03-24

## 2021-03-24 NOTE — TELEPHONE ENCOUNTER
Call Dr Alina Schmidt office back and tell them the dexa scan with fracture asst is not done at [facility] only [facility]. The patient has been notified of this information on VM and call if any questions. Physician Progress Note      PATIENT:               Jose Daniel Atkins  CSN #:                  [id number]  :                       1971  ADMIT DATE:       3/21/2021 6:36 AM  DISCH DATE:  RESPONDING  PROVIDER #:        Dinah Mcghee MD          QUERY TEXT:    Pt admitted with cellulitis. Pt noted to have WBC 12 and CRP 18.1 . If   possible, please document in the progress notes and discharge summary if you   are evaluating and /or treating any of the following: The medical record reflects the following:  Risk Factors: Cellulitis  Clinical Indicators: LABS on admit- WBC 12.0 CRP 18.1 Lactic 1.4 VS on admit   96.8, 92, 18, 112/76, 98%RA; per H&P  presenting with cellulitis; was given   oral Bactrim for LLE cellulitis- states he only took one pill then presented   for fevers and worsening leg pain  Treatment: IV antibiotics, PICC, ID Consult, Continued inpatient monitoring    Thank you  Chin MORINN, RN, CCDS  Clinical Documentation Improvement  Options provided:  -- Sepsis, present on admission  -- Sepsis, present on admission, now resolved  -- Sepsis, not present on admission  -- No Sepsis, cellulitis only  -- Other - I will add my own diagnosis  -- Disagree - Not applicable / Not valid  -- Disagree - Clinically unable to determine / Unknown  -- Refer to Clinical Documentation Reviewer    PROVIDER RESPONSE TEXT:    This patient has sepsis which was present on admission.     Query created by: Lidia Burton on  27:08 AM      Electronically signed by:  Dinah Mcghee MD 3/24/2021 12:08 PM

## 2021-03-24 NOTE — CARE COORDINATION
[facility] Transitions Follow Up Call    3/24/2021    Patient: Jimmy Cisneros  Patient : 1945   MRN: [id number]  Reason for Admission: traumatic hematoma right lower leg with infection  Discharge Date: 21 RARS: Readmission Risk Score: 23    Unable to reach patient for final outreach. VM left advising patient that if they have any questions/concerns at anytime, they can always call PCP/specialist.  No further CTN outreach will be made. Care Transitions Subsequent and Final Call    Subsequent and Final Calls  Care Transitions Interventions  Other Interventions:            Follow Up  Future Appointments   Date Time Provider Elysia Olguin   2021 12:30 PM Ileana Tompkins, APRN - CNP Bill Carballo RD [facility]   2021 11:45 AM Megan Green MD [facility]   2021 11:45 AM Carie Meeks MD PLASTICS/REC [facility]       Hailey Fung RN

## 2021-04-08 ENCOUNTER — TELEPHONE (OUTPATIENT)
Dept: PRIMARY CARE CLINIC | Age: 76
End: 2021-04-08

## 2021-04-08 NOTE — TELEPHONE ENCOUNTER
Caller:Rosaline @ [facility] care   following up on a plan of care dated 3/2/21. pls advise if received and send back to (fax) [phone]: [phone] ext [phone]   Thank you!

## 2021-04-13 ENCOUNTER — OFFICE VISIT (OUTPATIENT)
Dept: INTERNAL MEDICINE CLINIC | Age: 76
End: 2021-04-13
Payer: MEDICARE

## 2021-04-13 VITALS
HEIGHT: 61 IN | DIASTOLIC BLOOD PRESSURE: 71 MMHG | SYSTOLIC BLOOD PRESSURE: 111 MMHG | TEMPERATURE: 97.6 F | HEART RATE: 72 BPM | WEIGHT: 175.38 LBS | OXYGEN SATURATION: 96 % | BODY MASS INDEX: 33.11 KG/M2

## 2021-04-13 DIAGNOSIS — B37.81 ESOPHAGEAL CANDIDIASIS (HCC): ICD-10-CM

## 2021-04-13 DIAGNOSIS — R51.9 CHRONIC DAILY HEADACHE: ICD-10-CM

## 2021-04-13 DIAGNOSIS — I10 ESSENTIAL HYPERTENSION: ICD-10-CM

## 2021-04-13 DIAGNOSIS — Z76.89 ESTABLISHING CARE WITH NEW DOCTOR, ENCOUNTER FOR: ICD-10-CM

## 2021-04-13 DIAGNOSIS — Z98.84 HISTORY OF GASTRIC BYPASS: ICD-10-CM

## 2021-04-13 DIAGNOSIS — R68.81 EARLY SATIETY: ICD-10-CM

## 2021-04-13 DIAGNOSIS — C18.9 MALIGNANT NEOPLASM OF COLON, UNSPECIFIED PART OF COLON (HCC): ICD-10-CM

## 2021-04-13 DIAGNOSIS — Z85.038 HISTORY OF MALIGNANT NEOPLASM OF LARGE INTESTINE: ICD-10-CM

## 2021-04-13 DIAGNOSIS — R68.81 EARLY SATIETY: Primary | ICD-10-CM

## 2021-04-13 DIAGNOSIS — R60.0 PEDAL EDEMA: ICD-10-CM

## 2021-04-13 DIAGNOSIS — R19.8 BORBORYGMI: ICD-10-CM

## 2021-04-13 DIAGNOSIS — M79.7 FIBROMYALGIA: ICD-10-CM

## 2021-04-13 PROBLEM — S80.11XA TRAUMATIC HEMATOMA OF RIGHT LOWER LEG WITH INFECTION: Status: RESOLVED | Noted: 2021-02-11 | Resolved: 2021-04-13

## 2021-04-13 PROBLEM — L08.9 TRAUMATIC HEMATOMA OF RIGHT LOWER LEG WITH INFECTION: Status: RESOLVED | Noted: 2021-02-11 | Resolved: 2021-04-13

## 2021-04-13 PROBLEM — T14.8XXA HEMATOMA: Status: RESOLVED | Noted: 2021-01-28 | Resolved: 2021-04-13

## 2021-04-13 PROBLEM — S80.11XA TRAUMATIC HEMATOMA OF RIGHT LOWER LEG: Status: RESOLVED | Noted: 2021-01-28 | Resolved: 2021-04-13

## 2021-04-13 PROBLEM — R57.8 HEMORRHAGIC SHOCK (HCC): Status: RESOLVED | Noted: 2021-01-28 | Resolved: 2021-04-13

## 2021-04-13 PROBLEM — M79.651 PAIN OF RIGHT THIGH: Status: RESOLVED | Noted: 2020-07-31 | Resolved: 2021-04-13

## 2021-04-13 LAB
A/G RATIO: 1.9 (ref 1.1–2.2)
ALBUMIN SERPL-MCNC: 4.5 G/DL (ref 3.4–5)
ALP BLD-CCNC: 97 U/L (ref 40–129)
ALT SERPL-CCNC: 14 U/L (ref 10–40)
ANION GAP SERPL CALCULATED.3IONS-SCNC: 16 MMOL/L (ref 3–16)
AST SERPL-CCNC: 22 U/L (ref 15–37)
BASOPHILS ABSOLUTE: 0 K/UL (ref 0–0.2)
BASOPHILS RELATIVE PERCENT: 0.4 %
BILIRUB SERPL-MCNC: <0.2 MG/DL (ref 0–1)
BUN BLDV-MCNC: 22 MG/DL (ref 7–20)
CALCIUM SERPL-MCNC: 9.4 MG/DL (ref 8.3–10.6)
CHLORIDE BLD-SCNC: 102 MMOL/L (ref 99–110)
CO2: 24 MMOL/L (ref 21–32)
CREAT SERPL-MCNC: 0.8 MG/DL (ref 0.6–1.2)
EOSINOPHILS ABSOLUTE: 0.1 K/UL (ref 0–0.6)
EOSINOPHILS RELATIVE PERCENT: 1.6 %
GFR AFRICAN AMERICAN: >60
GFR NON-AFRICAN AMERICAN: >60
GLOBULIN: 2.4 G/DL
GLUCOSE BLD-MCNC: 123 MG/DL (ref 70–99)
HCT VFR BLD CALC: 37.6 % (ref 36–48)
HEMOGLOBIN: 12.3 G/DL (ref 12–16)
LYMPHOCYTES ABSOLUTE: 1.7 K/UL (ref 1–5.1)
LYMPHOCYTES RELATIVE PERCENT: 25.1 %
MCH RBC QN AUTO: 31.8 PG (ref 26–34)
MCHC RBC AUTO-ENTMCNC: 32.8 G/DL (ref 31–36)
MCV RBC AUTO: 97.2 FL (ref 80–100)
MONOCYTES ABSOLUTE: 0.5 K/UL (ref 0–1.3)
MONOCYTES RELATIVE PERCENT: 7.7 %
NEUTROPHILS ABSOLUTE: 4.3 K/UL (ref 1.7–7.7)
NEUTROPHILS RELATIVE PERCENT: 65.2 %
PDW BLD-RTO: 20.4 % (ref 12.4–15.4)
PLATELET # BLD: 227 K/UL (ref 135–450)
PMV BLD AUTO: 8.3 FL (ref 5–10.5)
POTASSIUM SERPL-SCNC: 4.5 MMOL/L (ref 3.5–5.1)
RBC # BLD: 3.87 M/UL (ref 4–5.2)
SODIUM BLD-SCNC: 142 MMOL/L (ref 136–145)
TOTAL PROTEIN: 6.9 G/DL (ref 6.4–8.2)
WBC # BLD: 6.6 K/UL (ref 4–11)

## 2021-04-13 PROCEDURE — 99215 OFFICE O/P EST HI 40 MIN: CPT | Performed by: INTERNAL MEDICINE

## 2021-04-13 RX ORDER — DICYCLOMINE HYDROCHLORIDE 10 MG/1
10 CAPSULE ORAL 2 TIMES DAILY PRN
Qty: 60 CAPSULE | Refills: 0 | Status: SHIPPED | OUTPATIENT
Start: 2021-04-13 | End: 2021-05-13

## 2021-04-13 ASSESSMENT — PATIENT HEALTH QUESTIONNAIRE - PHQ9
1. LITTLE INTEREST OR PLEASURE IN DOING THINGS: 0
SUM OF ALL RESPONSES TO PHQ QUESTIONS 1-9: 0
2. FEELING DOWN, DEPRESSED OR HOPELESS: 0

## 2021-04-13 NOTE — PROGRESS NOTES
Lab Results   Component Value Date    ALT 14 04/13/2021    ALT 11 01/28/2021    ALT 11 02/19/2020    AST 22 04/13/2021    AST 20 01/28/2021    AST 16 02/19/2020       Lab Results   Component Value Date    TSH 0.44 10/24/2018    TSH 0.38 01/14/2018       Lab Results   Component Value Date    WBC 6.6 04/13/2021    WBC 5.1 03/09/2021    WBC 4.1 02/22/2021    HGB 12.3 04/13/2021    HGB 11.4 (L) 03/09/2021    HGB 8.7 (L) 02/22/2021    HCT 37.6 04/13/2021    HCT 35.1 (L) 03/09/2021    HCT 27.7 (L) 02/22/2021    MCV 97.2 04/13/2021    MCV 94.4 03/09/2021    MCV 93.4 02/22/2021     04/13/2021     03/09/2021     02/22/2021       Lab Results   Component Value Date    INR 1.08 02/04/2021    INR 1.37 (H) 01/28/2021    INR 1.39 (H) 11/02/2018        No results found for: PSA     No results found for: LABURIC          /71 (Site: Left Upper Arm)   Pulse 72   Temp 97.6 °F (36.4 °C)   Ht 5' 1\" (1.549 m)   Wt 175 lb 6 oz (79.5 kg)   SpO2 96%   BMI 33.14 kg/m²   Body mass index is 33.14 kg/m². Physical Exam  Constitutional:       General: She is not in acute distress. HENT:      Head: Normocephalic and atraumatic. Nose: Nose normal.      Mouth/Throat:      Pharynx: No oropharyngeal exudate. Eyes:      General: No scleral icterus. Right eye: No discharge. Left eye: No discharge. Conjunctiva/sclera: Conjunctivae normal.      Pupils: Pupils are equal, round, and reactive to light. Neck:      Musculoskeletal: Neck supple. Thyroid: No thyromegaly. Vascular: No JVD. Trachea: No tracheal deviation. Cardiovascular:      Rate and Rhythm: Normal rate and regular rhythm. Heart sounds: Normal heart sounds. No murmur. No friction rub. No gallop. Pulmonary:      Effort: Pulmonary effort is normal. No respiratory distress. Breath sounds: Normal breath sounds. No wheezing or rales. Chest:      Chest wall: No tenderness.    Abdominal:      General: she needs to go back but advised she can call him at her discretion.   - CBC WITH AUTO DIFFERENTIAL; Future  - COMPREHENSIVE METABOLIC PANEL; Future    2. Borborygmi--recommended probiotics and will try on bentyl prn.    3. Establishing care with new doctor, encounter for    4. Essential hypertension    5. History of malignant neoplasm of large intestine    6. Fibromyalgia    7. Malignant neoplasm of colon, unspecified part of colon ([address]    8. Pedal edema    9. Chronic daily headache    10. Esophageal candidiasis (HCC)  - CBC WITH AUTO DIFFERENTIAL; Future  - COMPREHENSIVE METABOLIC PANEL; Future    11.  History of gastric bypass            Problem List     Essential hypertension    Malignant neoplasm of colon (HCC)    Relevant Medications    acetaminophen (TYLENOL) 500 MG tablet    Fibromyalgia    Relevant Medications    acetaminophen (TYLENOL) 500 MG tablet    mirtazapine (REMERON) 30 MG tablet    sertraline (ZOLOFT) 100 MG tablet    tiZANidine (ZANAFLEX) 2 MG tablet    Pedal edema    Chronic daily headache    Relevant Medications    acetaminophen (TYLENOL) 500 MG tablet    mirtazapine (REMERON) 30 MG tablet    metoprolol succinate (TOPROL XL) 50 MG extended release tablet    sertraline (ZOLOFT) 100 MG tablet    tiZANidine (ZANAFLEX) 2 MG tablet    Esophageal candidiasis (HCC)    Relevant Orders    CBC WITH AUTO DIFFERENTIAL (Completed)    COMPREHENSIVE METABOLIC PANEL (Completed)    History of gastric bypass    RESOLVED: History of malignant neoplasm of large intestine        Orders Placed This Encounter   Procedures    CBC WITH AUTO DIFFERENTIAL     Standing Status:   Future     Number of Occurrences:   1     Standing Expiration Date:   4/13/2022    COMPREHENSIVE METABOLIC PANEL     Standing Status:   Future     Number of Occurrences:   1     Standing Expiration Date:   4/13/2022       I have reconciled the medications in chart with what patient reports to be taking, andreviewed action/ sideeffects and how to take any new medications. Patient/caregiver understands purpose and side effects. A complete  list of medications was provided in their after-visit summary. Return in about 1 week (around 4/20/2021) for labs followup VV. Time basedbilling: I spent over  60  minutes with this patient, and as is the nature of primary care and typical for my extended visits, over 50 percent of this visit was spent on counseling and coordination ofcare.

## 2021-04-14 ENCOUNTER — TELEPHONE (OUTPATIENT)
Dept: ADMINISTRATIVE | Age: 76
End: 2021-04-14

## 2021-04-19 ENCOUNTER — TELEMEDICINE (OUTPATIENT)
Dept: INTERNAL MEDICINE CLINIC | Age: 76
End: 2021-04-19
Payer: MEDICARE

## 2021-04-19 DIAGNOSIS — I10 ESSENTIAL HYPERTENSION: Primary | ICD-10-CM

## 2021-04-19 DIAGNOSIS — C18.9 MALIGNANT NEOPLASM OF COLON, UNSPECIFIED PART OF COLON (HCC): ICD-10-CM

## 2021-04-19 DIAGNOSIS — Z98.84 HISTORY OF GASTRIC BYPASS: ICD-10-CM

## 2021-04-19 DIAGNOSIS — R73.03 PREDIABETES: ICD-10-CM

## 2021-04-19 PROCEDURE — 99213 OFFICE O/P EST LOW 20 MIN: CPT | Performed by: INTERNAL MEDICINE

## 2021-04-19 RX ORDER — MIRTAZAPINE 30 MG/1
TABLET, FILM COATED ORAL
Qty: 30 TABLET | Refills: 0 | Status: SHIPPED | OUTPATIENT
Start: 2021-04-19 | End: 2021-06-14

## 2021-04-19 NOTE — PROGRESS NOTES
Chief Complaint   Patient presents with    Hypertension     recheck       HPI: Virtual visit via Smart Checkout during covid-19 pandemic for htn followup and labs review, significant for glucose 123. Unfortunately, while she has a home blood pressure monitor, she is unable to operate it by herself and has not had her son help her so she has no readings to report today. She attempted to do it while we were on the visit but it reported in error. As regards her elevated blood glucose, we reviewed in her chart and saw her last A1c at 5.8 in December. She states that she has been told her blood sugars were elevated for many years but has not been told to do anything about it. Medications reviewed and reconciled with what patient reports to be taking. She also gave me additional information regarding her past colonic cancer resection and her gastric bypass to update her chart. There were no vitals taken for this visit. Physical Exam well-appearing    ASSESSMENT/PLAN: Pt received counseling and, if relevant, printed instructions for all symptoms listed in CC and HPI, as well as for all diagnoses listed below. 1. Essential hypertension--patient does not recall us asking her to check her blood pressures; I discussed that was the reason for today's visit and ask her to please have her son do some blood pressure checks for her and report back via phone call    2. Prediabetes--counseled on this diagnosis and her diet; offered her dietitian referral but she states she knows what to do and will try to make some changes    3. Malignant neoplasm of colon, unspecified part of colon ([address]    4. History of gastric bypass      Problem List Items Addressed This Visit     Essential hypertension - Primary    Malignant neoplasm of colon ([address]    History of gastric bypass    Prediabetes            Return in about 3 months (around 7/19/2021) for f/u mult med extended.     Dennie Gentle, was evaluated through a synchronous (real-time) audio-video encounter. The patient (or guardian if applicable) is aware that this is a billable service. Verbal consent to proceed has been obtained within the past 12 months. The visit was conducted pursuant to the emergency declaration under the [facility], [address] [place] [address] authority and the [employer] [place] [employer] Act. Patient identification was verified, and a caregiver was present when appropriate. The patient was located in a state where the provider was credentialed to provide care. Total time spent for this encounter: Not billed by time    --Rona Edward MD on 4/19/2021 at 1:33 PM    An electronic signature was used to authenticate this note.

## 2021-04-19 NOTE — TELEPHONE ENCOUNTER
Medication:   Requested Prescriptions     Pending Prescriptions Disp Refills    mirtazapine (REMERON) 30 MG tablet [Pharmacy Med Name: MIRTAZAPINE 30 MG TABLET] 30 tablet 0     Sig: TAKE ONE TABLET BY MOUTH ONCE NIGHTLY        Last Filled:      Patient Phone Number: [phone] (home)     Last appt:11/30/20  Next appt: Visit date not found    Last [place]:   RX Monitoring 1/20/2021   Periodic Controlled Substance Monitoring No signs of potential drug abuse or diversion identified.

## 2021-04-30 ENCOUNTER — TELEPHONE (OUTPATIENT)
Dept: INTERNAL MEDICINE CLINIC | Age: 76
End: 2021-04-30

## 2021-05-12 DIAGNOSIS — R19.8 BORBORYGMI: ICD-10-CM

## 2021-05-12 NOTE — TELEPHONE ENCOUNTER
Requested Prescriptions     Pending Prescriptions Disp Refills    dicyclomine (BENTYL) 10 MG capsule [Pharmacy Med Name: DICYCLOMINE 10 MG CAPSULE] 60 capsule 0     Sig: TAKE ONE CAPSULE BY MOUTH TWICE A DAY AS NEEDED FOR ABDOMINAL CRAMPING     Patient requesting a medication refill.     Ph[facility]acy: [facility]  Next office visit: 7/19/2021    Last regular office visit: 4/19/2021

## 2021-05-13 RX ORDER — DICYCLOMINE HYDROCHLORIDE 10 MG/1
CAPSULE ORAL
Qty: 60 CAPSULE | Refills: 0 | Status: SHIPPED | OUTPATIENT
Start: 2021-05-13 | End: 2021-06-14

## 2021-05-14 RX ORDER — PANTOPRAZOLE SODIUM 40 MG/1
40 TABLET, DELAYED RELEASE ORAL 2 TIMES DAILY
Qty: 180 TABLET | Refills: 1 | Status: SHIPPED | OUTPATIENT
Start: 2021-05-14 | End: 2021-10-28

## 2021-05-21 ENCOUNTER — OFFICE VISIT (OUTPATIENT)
Dept: INTERNAL MEDICINE CLINIC | Age: 76
End: 2021-05-21
Payer: MEDICARE

## 2021-05-21 DIAGNOSIS — R51.9 CHRONIC DAILY HEADACHE: ICD-10-CM

## 2021-05-21 DIAGNOSIS — R10.9 CHRONIC ABDOMINAL PAIN: ICD-10-CM

## 2021-05-21 DIAGNOSIS — G89.29 CHRONIC ABDOMINAL PAIN: ICD-10-CM

## 2021-05-21 DIAGNOSIS — M79.7 FIBROMYALGIA: ICD-10-CM

## 2021-05-21 DIAGNOSIS — M79.601 RIGHT ARM PAIN: Primary | ICD-10-CM

## 2021-05-21 PROCEDURE — 99214 OFFICE O/P EST MOD 30 MIN: CPT | Performed by: INTERNAL MEDICINE

## 2021-05-21 NOTE — PROGRESS NOTES
Chief Complaint   Patient presents with    Arm Pain     right    Headache    Abdominal Pain       HPI:  C/o pain in right arm/elbow/biceps that just started (no injury or overuse)--thinks she may have slept on it wrong. No swelling, warmth, discoloration or loss of functions. Also continues to c/o chronic headaches and abdominal pain, seeing specialists for both, althoguh hasn't been back to GI Dr Angelo Parker (and we don't have those records in her chart yet). Medications reviewed and reconciled with what patient reports to be taking. BP (!) 97/59 (Site: Left Upper Arm, Position: Sitting, Cuff Size: Large Adult)   Pulse 67   Ht 5' 1\" (1.549 m)   Wt 177 lb 4 oz (80.4 kg)   SpO2 96%   BMI 33.49 kg/m²     Physical Exam  Constitutional:       General: She is not in acute distress. HENT:      Head: Normocephalic and atraumatic. Nose: Nose normal.      Mouth/Throat:      Pharynx: No oropharyngeal exudate. Eyes:      General: No scleral icterus. Right eye: No discharge. Left eye: No discharge. Conjunctiva/sclera: Conjunctivae normal.      Pupils: Pupils are equal, round, and reactive to light. Neck:      Thyroid: No thyromegaly. Vascular: No JVD. Trachea: No tracheal deviation. Cardiovascular:      Rate and Rhythm: Normal rate and regular rhythm. Heart sounds: Normal heart sounds. No murmur heard. No friction rub. No gallop. Pulmonary:      Effort: Pulmonary effort is normal. No respiratory distress. Breath sounds: Normal breath sounds. No wheezing or rales. Chest:      Chest wall: No tenderness. Abdominal:      General: Bowel sounds are normal. There is no distension. Palpations: Abdomen is soft. There is no mass. Tenderness: Tenderness: diffuse. There is no guarding or rebound. Musculoskeletal:         General: No tenderness. Normal range of motion. Cervical back: Neck supple.    Lymphadenopathy:      Cervical: No cervical

## 2021-05-27 VITALS
HEIGHT: 61 IN | BODY MASS INDEX: 33.47 KG/M2 | OXYGEN SATURATION: 96 % | WEIGHT: 177.25 LBS | HEART RATE: 67 BPM | DIASTOLIC BLOOD PRESSURE: 60 MMHG | SYSTOLIC BLOOD PRESSURE: 102 MMHG

## 2021-06-04 RX ORDER — METOPROLOL SUCCINATE 50 MG/1
TABLET, EXTENDED RELEASE ORAL
Qty: 180 TABLET | Refills: 1 | Status: SHIPPED | OUTPATIENT
Start: 2021-06-04 | End: 2021-11-05

## 2021-06-12 DIAGNOSIS — R19.8 BORBORYGMI: ICD-10-CM

## 2021-06-14 RX ORDER — MIRTAZAPINE 30 MG/1
TABLET, FILM COATED ORAL
Qty: 30 TABLET | Refills: 0 | Status: SHIPPED | OUTPATIENT
Start: 2021-06-14 | End: 2021-06-21 | Stop reason: SDUPTHER

## 2021-06-14 RX ORDER — DICYCLOMINE HYDROCHLORIDE 10 MG/1
CAPSULE ORAL
Qty: 60 CAPSULE | Refills: 0 | Status: SHIPPED | OUTPATIENT
Start: 2021-06-14 | End: 2021-08-10

## 2021-06-14 NOTE — TELEPHONE ENCOUNTER
Medication:   Requested Prescriptions     Pending Prescriptions Disp Refills    mirtazapine (REMERON) 30 MG tablet [Pharmacy Med Name: MIRTAZAPINE 30 MG TABLET] 30 tablet 0     Sig: TAKE ONE TABLET BY MOUTH ONCE NIGHTLY        Last Filled:      Patient Phone Number: [phone] (home)     Last appt: 2/12/21  Next appt: 6/21/21  Last OARRS:   RX Monitoring 5/26/2021   Periodic Controlled Substance Monitoring No signs of potential drug abuse or diversion identified.

## 2021-06-16 NOTE — PROGRESS NOTES
[address]  Cardiology  Note      [facility]  1945, 67 y.o.      CC: \" I had surgery in January. \"                 Chuck Zuniga MD:      HPI:   This is a 67 y.o. female with history of AFIB and multiple falls who presented to [facility] 1/13/18 with confusion and generalized weakness. This may have been associated with multiple falls. She was found to be in AFIB with RVR and started on Cardizem gtt which was transitioned to po. ECHO performed was sub-optimal and showed EF of 55-60%. Continues to complain of palpitations and wore 30 day event monitor showing Afib with RVR. Patient was started on Flecainide 50 mg BID and Toprol XL 25 mg daily (8/2019). Presented to ER 9/1/19 after falling out of her wheelchair at home. Per ER report, she hit her head on the floor, but did not lose consciousness. CT of head was negative. Another ER visit on 9/3/19 with reports of hallucinations and confusion. Treated empirically with antibiotics and discharged home. In January 2021, patient developed a R lower extremity hematoma and underwent surgery by Dr. Justo Vences. She is s/p excisional debridement of RLE wound with application of split thickness skin graft. Eliquis was held for a while and she has since resumed it. Patient comes for routine f/u. She uses a walker for assistance. Social History     Tobacco Use    Smoking status: Never Smoker    Smokeless tobacco: Never Used   Vaping Use    Vaping Use: Never used   Substance Use Topics    Alcohol use: No    Drug use: Never     Allergies   Allergen Reactions    Demerol Hcl [Meperidine] Other (See Comments)     PATIENT STATES SHE GOT VERY ANXIOUS-LIKE SHE WAS CLIMBING THE WALLS    Pcn [Penicillins]      Patient reports she has not had since she was a child, thinks reaction was hives.      Adhesive Tape Rash         Review of Systems -   Constitutional: Negative for weight gain/loss; malaise, fever  Respiratory: Negative for Asthma;  cough and release tablet Take 1 tablet by mouth 2 times daily 180 tablet 0    apixaban (ELIQUIS) 5 MG TABS tablet Take 1 tablet by mouth 2 times daily 180 tablet 5    flecainide (TAMBOCOR) 100 MG tablet TAKE 1 TABLET BY MOUTH 2 TIMES A DAY 60 tablet 3    sertraline (ZOLOFT) 100 MG tablet TAKE ONE TABLET BY MOUTH ONCE NIGHTLY 90 tablet 0    furosemide (LASIX) 20 MG tablet Take 1 tablet by mouth daily 30 tablet 3    calcium carbonate (CALTRATE 600) 1500 (600 Ca) MG TABS tablet Take 600 mg by mouth daily      magnesium oxide (MAG-OX) 400 MG tablet Take 400 mg by mouth daily      co-enzyme Q-10 50 MG capsule Take 150 mg by mouth 2 times daily       acetaminophen (TYLENOL) 500 MG tablet Take 500 mg by mouth every 6 hours as needed for Pain      Biotin 1000 MCG TABS Take 1 tablet by mouth 2 times daily       vitamin B-12 (CYANOCOBALAMIN) 1000 MCG tablet Take 500 mcg by mouth daily Indications: patient is taking 2 tablets 1,000 mcg in the morning        EKG:   NSR     ECHO: 10/24/18  Normal LV size and systolic function. Estimated ejection fraction is 60%. Severe calcification of the posterior leaflet of the mitral valve. Mild mitral regurgitation is present. The left atrium is severely dilated. Mild aortic regurgitation is present. Mild-to-moderate tricuspid regurgitation  Systolic pulmonary artery pressure (SPAP) is estimated at 45-48 mm Hg consistent with moderate pulmonary hypertension       ASSESSMENT AND PLAN:    Fatigue  Chronic  Likely related to polypharmacy    Atrial Fibrillation  In SR today  Continue Flecainide, Toprol and Eliquis     LE Edema   Chronic  Continue Furosemide once daily. Follow up in 6 months       Thank you very much for allowing me to participate in the care of your patient. Please do not hesitate to contact me if you have any questions.         Sincerely,      Francy Dumont M.D  [address] [place] [address], [address] [place] [address]  Ph: [phone]  Fax: [phone]    This note was scribed in the presence of Dr. Vee Webster MD by Ross Stack  Physician Attestation:  The scribes documentation has been prepared under my direction and personally reviewed by me in its entirety. I confirm that the note above accurately reflects all work, treatment, procedures, and medical decision making performed by me.

## 2021-06-17 ENCOUNTER — OFFICE VISIT (OUTPATIENT)
Dept: CARDIOLOGY CLINIC | Age: 76
End: 2021-06-17
Payer: MEDICARE

## 2021-06-17 VITALS
DIASTOLIC BLOOD PRESSURE: 66 MMHG | SYSTOLIC BLOOD PRESSURE: 114 MMHG | HEART RATE: 96 BPM | OXYGEN SATURATION: 97 % | HEIGHT: 61 IN | WEIGHT: 181.2 LBS | BODY MASS INDEX: 34.21 KG/M2

## 2021-06-17 DIAGNOSIS — R60.0 LOWER LEG EDEMA: ICD-10-CM

## 2021-06-17 DIAGNOSIS — R53.82 CHRONIC FATIGUE: ICD-10-CM

## 2021-06-17 DIAGNOSIS — I48.0 PAF (PAROXYSMAL ATRIAL FIBRILLATION) (HCC): Primary | ICD-10-CM

## 2021-06-17 PROCEDURE — 93000 ELECTROCARDIOGRAM COMPLETE: CPT | Performed by: INTERNAL MEDICINE

## 2021-06-17 PROCEDURE — 99213 OFFICE O/P EST LOW 20 MIN: CPT | Performed by: INTERNAL MEDICINE

## 2021-06-17 NOTE — PATIENT INSTRUCTIONS
Patient Education        A Healthy Heart: Care Instructions  Your Care Instructions     Coronary artery disease, also called heart disease, occurs when a substance called plaque builds up in the vessels that supply oxygen-rich blood to your heart muscle. This can narrow the blood vessels and reduce blood flow. A heart attack happens when blood flow is completely blocked. A high-fat diet, smoking, and other factors increase the risk of heart disease. Your doctor has found that you have a chance of having heart disease. You can do lots of things to keep your heart healthy. It may not be easy, but you can change your diet, exercise more, and quit smoking. These steps really work to lower your chance of heart disease. Follow-up care is a key part of your treatment and safety. Be sure to make and go to all appointments, and call your doctor if you are having problems. It's also a good idea to know your test results and keep a list of the medicines you take. How can you care for yourself at home? Diet    · Use less salt when you cook and eat. This helps lower your blood pressure. Taste food before salting. Add only a little salt when you think you need it. With time, your taste buds will adjust to less salt.     · Eat fewer snack items, fast foods, canned soups, and other high-salt, high-fat, processed foods.     · Read food labels and try to avoid saturated and trans fats. They increase your risk of heart disease by raising cholesterol levels.     · Limit the amount of solid fat-butter, margarine, and shortening-you eat. Use olive, peanut, or canola oil when you cook. Bake, broil, and steam foods instead of frying them.     · Eat a variety of fruit and vegetables every day. Dark green, deep orange, red, or yellow fruits and vegetables are especially good for you.  Examples include spinach, carrots, peaches, and berries.     · Foods high in fiber can reduce your cholesterol and provide important vitamins and minerals. High-fiber foods include whole-grain cereals and breads, oatmeal, beans, brown rice, citrus fruits, and apples.     · Eat lean proteins. Heart-healthy proteins include seafood, lean meats and poultry, eggs, beans, peas, nuts, seeds, and soy products.     · Limit drinks and foods with added sugar. These include candy, desserts, and soda pop. Lifestyle changes    · If your doctor recommends it, get more exercise. Walking is a good choice. Bit by bit, increase the amount you walk every day. Try for at least 30 minutes on most days of the week. You also may want to swim, bike, or do other activities.     · Do not smoke. If you need help quitting, talk to your doctor about stop-smoking programs and medicines. These can increase your chances of quitting for good. Quitting smoking may be the most important step you can take to protect your heart. It is never too late to quit.     · Limit alcohol to 2 drinks a day for men and 1 drink a day for women. Too much alcohol can cause health problems.     · Manage other health problems such as diabetes, high blood pressure, and high cholesterol. If you think you may have a problem with alcohol or drug use, talk to your doctor. Medicines    · Take your medicines exactly as prescribed. Call your doctor if you think you are having a problem with your medicine.     · If your doctor recommends aspirin, take the amount directed each day. Make sure you take aspirin and not another kind of pain reliever, such as acetaminophen (Tylenol). When should you call for help? Call 911 if you have symptoms of a heart attack. These may include:    · Chest pain or pressure, or a strange feeling in the chest.     · Sweating.     · Shortness of breath.     · Pain, pressure, or a strange feeling in the back, neck, jaw, or upper belly or in one or both shoulders or arms.     · Lightheadedness or sudden weakness.     · A fast or irregular heartbeat.    After you call 911, the  may tell you to chew 1 adult-strength or 2 to 4 low-dose aspirin. Wait for an ambulance. Do not try to drive yourself. Watch closely for changes in your health, and be sure to contact your doctor if you have any problems. Where can you learn more? Go to https://chpepiceweb.[employer]. org and sign in to your [employer] account. Enter T458 in the [employer] box to learn more about \"A Healthy Heart: Care Instructions. \"     If you do not have an account, please click on the \"Sign Up Now\" link. Current as of: August 31, 2020               Content Version: 12.9  © 8424-5655 [employer]. Care instructions adapted under license by [facility] ([facility]). If you have questions about a medical condition or this instruction, always ask your healthcare professional. [address] any warranty or liability for your use of this information.

## 2021-06-21 ENCOUNTER — VIRTUAL VISIT (OUTPATIENT)
Dept: PSYCHIATRY | Age: 76
End: 2021-06-21
Payer: MEDICARE

## 2021-06-21 DIAGNOSIS — F41.1 GAD (GENERALIZED ANXIETY DISORDER): ICD-10-CM

## 2021-06-21 DIAGNOSIS — F33.1 MODERATE EPISODE OF RECURRENT MAJOR DEPRESSIVE DISORDER (HCC): Primary | ICD-10-CM

## 2021-06-21 PROCEDURE — 99214 OFFICE O/P EST MOD 30 MIN: CPT | Performed by: NURSE PRACTITIONER

## 2021-06-21 RX ORDER — POTASSIUM CHLORIDE 1500 MG/1
TABLET, EXTENDED RELEASE ORAL
Qty: 180 TABLET | Refills: 0 | OUTPATIENT
Start: 2021-06-21

## 2021-06-21 RX ORDER — MIRTAZAPINE 30 MG/1
TABLET, FILM COATED ORAL
Qty: 90 TABLET | Refills: 1 | Status: SHIPPED | OUTPATIENT
Start: 2021-06-21 | End: 2021-10-25

## 2021-06-21 RX ORDER — SERTRALINE HYDROCHLORIDE 100 MG/1
TABLET, FILM COATED ORAL
Qty: 90 TABLET | Refills: 1 | Status: SHIPPED | OUTPATIENT
Start: 2021-06-21 | End: 2021-10-25 | Stop reason: SDUPTHER

## 2021-06-21 RX ORDER — ALPRAZOLAM 0.5 MG/1
TABLET ORAL
Qty: 90 TABLET | Refills: 2 | Status: SHIPPED | OUTPATIENT
Start: 2021-06-23 | End: 2021-08-25 | Stop reason: SDUPTHER

## 2021-06-21 NOTE — PROGRESS NOTES
Never took that  Much, made me too tired, would break in half. Now simone gives me 0.5mg. And I break that in half. Can't do without it     Says xanax, fioricet and tizanidine help. Takes mirtazapine      Cardiology recently advised her to reduce zoloft to 50mg/day. Just started lower dose last night. Did ok     Hasn't driven in almost 2 years. Independent in self care, uses bench. Uses walker all the time for ambulation when out of house. Usually sits in manual wc at home, faster to get to bathroom      H/o depression for many years, predating husban'd death. Don't know why, don't have anything to be depressed about. Doesn't feel overly depressed or anxious at present with current meds    HPI:   Moe Chavez is a 67 y.o. female with h/o gastric bypass, colon cancer, fibromyalgia,  anxiety and depression who was contacted via telehealth for follow up. Due to the COVID-19 pandemic restrictions on close contact interactions as recommended by CDC and health authorities, the patient's visit was conducted via telehealth (phone call visit) in lieu of a face to face visit. Patient verbally consented and agreed to proceed. Since last completed f/u pc 11/13/20:    Per chart review excerpt of cardiology note dated 6/17/21:  \" In January 2021, patient developed a R lower extremity hematoma and underwent surgery by Dr. Lizet Fabian. She is s/p excisional debridement of RLE wound with application of split thickness skin graft\"    Additionally, pt established with new pcp in [facility]      Had been in hospital for month. Car door hit leg. Had to do 3 surgeries. Did skin graft before I left (self)    Using walker for ambulation    Oldest son, Derek Bernal living with me. Getting along well. Good health     Other son Monalisa Montgomery) not doing well at all. (Had rectal cancer. Now total colostomy bag. Had to close off his rectum.  had internal flesh eating bacteria. Had to remove part of bone and muscle from left leg).   At home in hospital bed. Lost 100# pounds. Looks awful. Were up there sat to visit. Can't hardly eat. Vomits. Large ulcer. On protonox. Had thrush in esophagus. Try to visit every 2 weeks if possible    Still driving. Carmela Gannon normally does grocery shopping. Only time out to store and doctor appts. Previously stated she sometimes runs out of medications and can't get refilled d/t limited finances. Therapy:  Not engaged. Doesn't feel needs    Taking:     Tylenol   Xanax 0.5mg 2-3x/day   eliquis 5mg bid   Biotin bid   Calcium carbonate, 2/day   Co enzymre q-10, 300mg/day per neuro   Bentyl 10mg bid prn abd cramping   Tambocor 100mg bid   Lasix 20mg, 2 tabs every am and 1 tab afternoon M/W/F   Magnesium 400mg/day   Metoprolol xl 50mg/day   Mirtazapine 30mg at hs (the new doctor asked if I actually needed to be this)   protonix 40mg bid   kcl 20mg bid   zoloft 100mg hs   Vit b12 1000mg/day        Subsntace:     Alcohol: denies              Illicits:  denies              Caffeine: denies   Tobacco:  Denies       Psych ROS:      Depression: \"pretty good\"  rates 8-10/10 (10 best);  occ tearful, not too bad.    sleep good, \"just right\"  Listens to body, sleeps when tired. Fair appetite, can't each much, early satiety; GI wants to do colonoscpy (two meals/day), but limited quantity d/t previous gastric bypass ,    pretty good energy,     DENIES issues with ADLs, does regularly without assistance.       infrequent napping;     +  Isolation, mostly only out to store/doctor appts    DENIES SI/SH/HI        Kenna: DENIES  insomnia with increased energy, rapid speech, easily distracted or decreased attention, irritability, racing thoughts, expansive mood, increase in energy and goal directed behavior, grandiosity, flight of ideas       Anxiety: rates 7-8/10 (10 worst); \"just nervousness\"  Intermittent; when have crying spells take an extra xanax and that helps    Worried about Claudia Blanca (son in poor health)     intermittent worry HISTORICALLY worry about \"anything, everything\" (kids, finances since only has her ssi and not 's), LESS sleep disruption ( (initial insomnia),     somatic complaints (+headaches, nausea), does see neuro, \"put me on some dementia medicine. New pcp told me to stop taking it so I did\"    DENIES restlessness, fatigue; fear of doing/saying wrong things, fear of judgement, avoidant of social situations     OCD:  DENIES Repetitive actions or rituals, excessive hand washing, contamination fears, need for symmetry, violent thoughts, hoarding, fear of being harmed, mental or verbal repetition of words or phrases and counting     Panic:  DENIES RECENT     Phobias:  DENIES      Psychosis: DENIES AVH, PARANOIA OR DELUSIONS                    Denies ongoing A/VH, paranoia, delusions             PTSD: DENIES nightmares, flashbacks, hypervigilance, easily startled, decreased sleep, reliving the event, avoiding situations that remind you of trauma, physical and mental paralysis when reminded of the experience, same despair, easily angry or irritable, trouble concentrating, fear for safety,  Numbness of emotions, feeling of detachment     Eating disorders:  DENIES         JENNIFER 7 SCORE 2/28/2020 12/20/2019 10/11/2019 9/13/2019   JENNIFER-7 Total Score 0 3 8 7     Interpretation of JENNIFER-7 score: 5-9 = mild anxiety, 10-14 = moderate anxiety,   15+ = severe anxiety. Recommend referral to behavioral health for scores 10 or greater.     No data recorded  PHQ-9 Total Score: 8 (12/20/2019 12:35 PM)  Thoughts that you would be better off dead, or of hurting yourself in some way: 0 (12/20/2019 12:35 PM)  PHQ-9 Total Score: 6 (9/13/2019 11:24 AM)  Thoughts that you would be better off dead, or of hurting yourself in some way: 0 (9/13/2019 11:24 AM)  Interpretation of PHQ-9 score:  1-4 = minimal depression, 5-9 = mild depression,   10-14 = moderate depression; 15-19 = moderately severe depression, 20-27 = severe depression      Past Psychiatric History:               Prior hospitalizations:denies              Prior diagnoses: anxiety, depression              Outpatient Treatment:                           Psychiatrist: denies                          Therapist: denies, uninterested              Suicide Attempts: denies              Hx SH:  denies     Past Psychopharmacologic Trials (including response/reactions):     1.  Just what on at present for many years     zoloft  Mirtazapine  Xanax     Past Medical/Surgical History:   Past Medical History:   Diagnosis Date    Acid reflux     Anxiety     Arthritis     [facility]     Closed fracture dislocation of right elbow     Depression     Fibromyalgia     Migraine     Rectal cancer ([facility]     Wears glasses     DRIVING     Past Surgical History:   Procedure Laterality Date    APPENDECTOMY       SECTION      x4    CHOLECYSTECTOMY      COLONOSCOPY      SEVERAL    COLONOSCOPY N/A 2019    COLONOSCOPY POLYPECTOMY SNARE/COLD BIOPSY performed by Maura Dela Cruz MD at [address] [place] [address] Right 2021    RIGHT LEG EVACUATION OF HEMATOMA performed by Sherry Jaeger MD at [place] [address] Right    [facility] W/O EXTENSION Right 10/25/2018    OPEN REDUCTION INTERNAL FIXATION RIGHT FEMUR SUPRACONDYLAR FEMORAL FRACTURE WITH C-ARM performed by Jaime Billy MD at [address] [facility] [address] SKIN GRAFT Right 2021    RIGHT LEG HEMATOMA EVAKUATION, DEBRIDEMENT, AND WOUND [place] performed by Guillermo Nicole MD at [facility] Right 2021    RIGHT LOWER EXTREMITY SKIN GRAFT performed by Guillermo Nicole MD at [facility] Left     UPPER GASTROINTESTINAL ENDOSCOPY N/A 2019    ESOPHAGOGASTRODUODENOSCOPY performed by Maura Dela Cruz MD at [facility]    UPPER GASTROINTESTINAL ENDOSCOPY N/A 8/18/2020    EGD BIOPSY performed by Bubba Cheung MD at [address] History   Problem Relation Age of Onset    COPD Mother          PCP: Nahun Wren MD      Allergies: Allergies   Allergen Reactions    Demerol Hcl [Meperidine] Other (See Comments)     PATIENT STATES SHE GOT VERY ANXIOUS-LIKE SHE WAS CLIMBING THE WALLS    Pcn [Penicillins]      Patient reports she has not had since she was a child, thinks reaction was hives.      Adhesive Tape Rash         Current Medications:   Current Outpatient Medications on File Prior to Visit   Medication Sig Dispense Refill    dicyclomine (BENTYL) 10 MG capsule TAKE ONE CAPSULE BY MOUTH TWICE A DAY AS NEEDED FOR ABDOMINAL CRAMPING 60 capsule 0    metoprolol succinate (TOPROL XL) 50 MG extended release tablet TAKE ONE TABLET BY MOUTH DAILY 180 tablet 1    pantoprazole (PROTONIX) 40 MG tablet Take 1 tablet by mouth 2 times daily 180 tablet 1    potassium chloride (KLOR-CON M20) 20 MEQ extended release tablet Take 1 tablet by mouth 2 times daily 180 tablet 0    apixaban (ELIQUIS) 5 MG TABS tablet Take 1 tablet by mouth 2 times daily 180 tablet 5    flecainide (TAMBOCOR) 100 MG tablet TAKE 1 TABLET BY MOUTH 2 TIMES A DAY 60 tablet 3    furosemide (LASIX) 20 MG tablet Take 1 tablet by mouth daily 30 tablet 3    calcium carbonate (CALTRATE 600) 1500 (600 Ca) MG TABS tablet Take 600 mg by mouth daily      magnesium oxide (MAG-OX) 400 MG tablet Take 400 mg by mouth daily      co-enzyme Q-10 50 MG capsule Take 150 mg by mouth 2 times daily       acetaminophen (TYLENOL) 500 MG tablet Take 500 mg by mouth every 6 hours as needed for Pain      Biotin 1000 MCG TABS Take 1 tablet by mouth 2 times daily       vitamin B-12 (CYANOCOBALAMIN) 1000 MCG tablet Take 500 mcg by mouth daily Indications: patient is taking 2 tablets 1,000 mcg in the morning        No current facility-administered [id number]   [facility] ([id number])   1  2.00 LME  [facility]   [place]   12/28/2019  1   12/20/2019  Alprazolam 0.5 MG Tablet  60.00  30 Ch Wet   [id number]   [facility]ro ([id number])   0  2.00 LME  [place]icare   [place]   11/27/2019  1   10/11/2019  Alprazolam 0.5 MG Tablet  60.00  30 Ch Wet   [id number]   Kro ([id number])   1  2.00 LME  [place]are   [place]   10/11/2019  1   10/11/2019  Alprazolam 0.5 MG Tablet  60.00  30 Ch Wet   [id number]   Kro ([id number])   0  2.00 LME  [place]are   [place]   09/04/2019  1   08/30/2019  Alprazolam 0.5 MG Tablet  60.00  30 Mi Michel   [id number]   [facility]ro ([id number])   0  2.00 LME  Med[place]are   [place]   08/04/2019  1   07/19/2019  Alprazolam 1 MG Tablet  60.00  30 Mi Michel   [id number]   Kro ([id number])   0  4.00 LME  Medicare   [place]   07/06/2019  1   03/08/2019  Alprazolam 2 MG Tablet  90.00  [facility]   [id number]   Kro (1893)   4  12.00 LME  Medicare   [place]     OBJECTIVE:  Vitals: Wt Readings from Last 3 Encounters:   06/17/21 181 lb 3.2 oz (82.2 kg)   05/21/21 177 lb 4 oz (80.4 kg)   04/13/21 175 lb 6 oz (79.5 kg)       There were no vitals filed for this visit. ROS: Denies trouble with fever, rash, headache, vision changes, chest pain, shortness of breath, nausea, extremity pain, weakness, dysuria.  Pruritis     Mental Status Exam:      Appearance    casually dressed  Muscle strength/tone:no abn movements noted  Gait/station: not assessed during VV  Speech    spontaneous, normal rate and normal volume  Mood    \"pretty good\", Anxious  Affect  Congruent to thought content and mood  Thought Content    preoccupations, no delusions voiced  Thought Process    coherent, linear  Associations    logical connections  Perceptions: denies AH/VH,   [address]  Orientation    oriented to person, place, time, and general circumstances  Memory    recent and remote memory intact  Attention/Concentration    intact  Ability to understand instructions Yes  Ability to respond meaningfully Yes  Language: [address] of knowledge/Intellect: Average  SI:   no suicidal ideation  HI: Denies HI    Labs:     Orders Only on 04/13/2021   Component Date Value    Sodium 04/13/2021 142     Potassium 04/13/2021 4.5     Chloride 04/13/2021 102     CO2 04/13/2021 24     Anion Gap 04/13/2021 16     Glucose 04/13/2021 123*    BUN 04/13/2021 22*    CREATININE 04/13/2021 0.8     GFR Non- 04/13/2021 >60     GFR  04/13/2021 >60     Calcium 04/13/2021 9.4     Total Protein 04/13/2021 6.9     Albumin 04/13/2021 4.5     Albumin/Globulin Ratio 04/13/2021 1.9     Total Bilirubin 04/13/2021 <0.2     Alkaline Phosphatase 04/13/2021 97     ALT 04/13/2021 14     AST 04/13/2021 22     Globulin 04/13/2021 2.4     WBC 04/13/2021 6.6     RBC 04/13/2021 3.87*    Hemoglobin 04/13/2021 12.3     Hematocrit 04/13/2021 37.6     MCV 04/13/2021 97.2     MCH 04/13/2021 31.8     MCHC 04/13/2021 32.8     RDW 04/13/2021 20.4*    Platelets 27/47/8481 227     MPV 04/13/2021 8.3     Neutrophils % 04/13/2021 65.2     Lymphocytes % 04/13/2021 25.1     Monocytes % 04/13/2021 7.7     Eosinophils % 04/13/2021 1.6     Basophils % 04/13/2021 0.4     Neutrophils Absolute 04/13/2021 4.3     Lymphocytes Absolute 04/13/2021 1.7     Monocytes Absolute 04/13/2021 0.5     Eosinophils Absolute 04/13/2021 0.1     Basophils Absolute 04/13/2021 0.0    Orders Only on 03/09/2021   Component Date Value    Potassium 03/09/2021 5.0     Vit D, 25-Hydroxy 03/09/2021 45.4     Hep C Ab Interp 03/09/2021 Non-reactive     WBC 03/09/2021 5.1     RBC 03/09/2021 3.72*    Hemoglobin 03/09/2021 11.4*    Hematocrit 03/09/2021 35.1*    MCV 03/09/2021 94.4     MCH 03/09/2021 30.5     MCHC 03/09/2021 32.4     RDW 03/09/2021 20.4*    Platelets 73/07/1678 253     MPV 03/09/2021 7.4     Neutrophils % 03/09/2021 64.2     Lymphocytes % 03/09/2021 23.2     Monocytes % 03/09/2021 11.1     Eosinophils % 03/09/2021 1.0     Basophils % 03/09/2021 0.5     Neutrophils Absolute 03/09/2021 3.3     Lymphocytes Absolute 03/09/2021 1.2     Monocytes Absolute 03/09/2021 0.6     Eosinophils Absolute 03/09/2021 0.1     Basophils Absolute 03/09/2021 0.0    Admission on 02/11/2021, Discharged on 02/23/2021   Component Date Value    Sodium 02/12/2021 139     Potassium reflex Magnesi* 02/12/2021 3.5     Chloride 02/12/2021 103     CO2 02/12/2021 28     Anion Gap 02/12/2021 8     Glucose 02/12/2021 91     BUN 02/12/2021 15     CREATININE 02/12/2021 <0.5*    GFR Non- 02/12/2021 >60     GFR  02/12/2021 >60     Calcium 02/12/2021 8.5     WBC 02/12/2021 4.9     RBC 02/12/2021 2.74*    Hemoglobin 02/12/2021 7.8*    Hematocrit 02/12/2021 24.9*    MCV 02/12/2021 91.0     MCH 02/12/2021 28.4     MCHC 02/12/2021 31.3     RDW 02/12/2021 17.7*    Platelets 48/86/9989 254     MPV 02/12/2021 7.5     Neutrophils % 02/12/2021 67.9     Lymphocytes % 02/12/2021 18.3     Monocytes % 02/12/2021 12.1     Eosinophils % 02/12/2021 1.3     Basophils % 02/12/2021 0.4     Neutrophils Absolute 02/12/2021 3.3     Lymphocytes Absolute 02/12/2021 0.9*    Monocytes Absolute 02/12/2021 0.6     Eosinophils Absolute 02/12/2021 0.1     Basophils Absolute 02/12/2021 0.0     Prealbumin 02/12/2021 12.6*    Magnesium 02/12/2021 1.90     Sodium 02/15/2021 138     Potassium 02/15/2021 3.7     Chloride 02/15/2021 102     CO2 02/15/2021 29     Anion Gap 02/15/2021 7     Glucose 02/15/2021 83     BUN 02/15/2021 17     CREATININE 02/15/2021 <0.5*    GFR Non- 02/15/2021 >60     GFR  02/15/2021 >60     Calcium 02/15/2021 8.9     WBC 02/15/2021 4.1     RBC 02/15/2021 2.87*    Hemoglobin 02/15/2021 8.2*    Hematocrit 02/15/2021 26.4*    MCV 02/15/2021 91.9     MCH 02/15/2021 28.8     MCHC 02/15/2021 31.3     RDW 02/15/2021 19.6*    Platelets 08/59/3149 239     MPV 02/15/2021 7.4     Neutrophils % 02/15/2021 61.1     Lymphocytes % 02/15/2021 26.3     Monocytes % 02/15/2021 9.8     Eosinophils % 02/15/2021 2.2     Basophils % 02/15/2021 0.6     Neutrophils Absolute 02/15/2021 2.5     Lymphocytes Absolute 02/15/2021 1.1     Monocytes Absolute 02/15/2021 0.4     Eosinophils Absolute 02/15/2021 0.1     Basophils Absolute 02/15/2021 0.0     Magnesium 02/15/2021 1.70*    SARS-CoV-2, NAAT 02/17/2021 Not Detected     Sodium 02/18/2021 137     Potassium 02/18/2021 3.9     Chloride 02/18/2021 101     CO2 02/18/2021 29     Anion Gap 02/18/2021 7     Glucose 02/18/2021 84     BUN 02/18/2021 13     CREATININE 02/18/2021 <0.5*    GFR Non- 02/18/2021 >60     GFR  02/18/2021 >60     Calcium 02/18/2021 8.4     WBC 02/18/2021 4.9     RBC 02/18/2021 2.72*    Hemoglobin 02/18/2021 7.9*    Hematocrit 02/18/2021 25.5*    MCV 02/18/2021 93.5     MCH 02/18/2021 29.0     MCHC 02/18/2021 31.0     RDW 02/18/2021 21.4*    Platelets 32/41/6664 196     MPV 02/18/2021 7.8     Neutrophils % 02/18/2021 66.2     Lymphocytes % 02/18/2021 23.8     Monocytes % 02/18/2021 8.9     Eosinophils % 02/18/2021 0.8     Basophils % 02/18/2021 0.3     Neutrophils Absolute 02/18/2021 3.3     Lymphocytes Absolute 02/18/2021 1.2     Monocytes Absolute 02/18/2021 0.4     Eosinophils Absolute 02/18/2021 0.0     Basophils Absolute 02/18/2021 0.0     Magnesium 02/18/2021 2.10     Sodium 02/22/2021 138     Potassium 02/22/2021 4.2     Chloride 02/22/2021 101     CO2 02/22/2021 29     Anion Gap 02/22/2021 8     Glucose 02/22/2021 96     BUN 02/22/2021 18     CREATININE 02/22/2021 0.5*    GFR Non- 02/22/2021 >60     GFR  02/22/2021 >60     Calcium 02/22/2021 8.8     WBC 02/22/2021 4.1     RBC 02/22/2021 2.96*    Hemoglobin 02/22/2021 8.7*    Hematocrit 02/22/2021 27.7*    MCV 02/22/2021 93.4     MCH 02/22/2021 29.4     MCHC 02/22/2021 31.4     RDW 02/22/2021 21.2*    Platelets  177     MPV 2021 8.2     Neutrophils % 2021 64.2     Lymphocytes % 2021 22.5     Monocytes % 2021 11.0     Eosinophils % 2021 1.8     Basophils % 2021 0.5     Neutrophils Absolute 2021 2.7     Lymphocytes Absolute 2021 0.9*    Monocytes Absolute 2021 0.5     Eosinophils Absolute 2021 0.1     Basophils Absolute 2021 0.0     Magnesium 2021 2.10    No results displayed because visit has over 200 results. Last Drug screen:  Lab Results   Component Value Date    LABAMPH Neg 2018    LABBENZ Neg 2018    COCAIMETSCRU Neg 2018    MDMA Not Detected 2019    LABMETH Neg 2018    OPIATESCREENURINE POSITIVE 2018    PHENCYCLIDINESCREENURINE Neg 2018         Imagin/1/19 CT head wo contrast:      Impression   No acute traumatic intracranial abnormality       Atrophy and small-vessel ischemic change       Trace paranasal sinus disease             ASSESSMENT AND PLAN     Diagnosis Orders   1. Moderate episode of recurrent major depressive disorder ([facility] [address]     2. JENNIFER (generalized anxiety disorder)  ALPRAZolam (XANAX) 0.5 MG tablet          1. Safety: NO Imminent risk of danger to/self/others based on the factors considered below. Appropriate for outpatient level of care. Safety plan includes: 911, PES, hotlines, and interventions discussed today.      Risk factors: Age <25 or >55,  social isolation,no outpatient services in place, and no collateral information to support safety.     Protective factors:, female gender,denies depression, denies suicidal ideation, does not have lethal plan, does not have access to guns or weapons, patient is erika for safety, no prior suicide attempts, no family h/o suicide, no substance abuse,compliant with recommended medications,and patient is future oriented.        2. Psychiatric  - Feels is doing relatively well overall.

## 2021-06-21 NOTE — TELEPHONE ENCOUNTER
Requested Prescriptions     Pending Prescriptions Disp Refills    KLOR-CON M20 20 MEQ extended release tablet [Pharmacy Med Name: KLOR-CON M20 TABLET] 180 tablet 0     Sig: TAKE ONE TABLET BY MOUTH TWICE A DAY   Patient requesting a medication refill.   Pharmacy: [facility]  Next office visit: Visit date not found  Last regular office visit: 8/12/2019

## 2021-07-02 DIAGNOSIS — I48.0 PAF (PAROXYSMAL ATRIAL FIBRILLATION) (HCC): ICD-10-CM

## 2021-07-02 RX ORDER — TIZANIDINE 2 MG/1
2 TABLET ORAL EVERY 6 HOURS PRN
COMMUNITY
End: 2021-07-12 | Stop reason: SDUPTHER

## 2021-07-02 RX ORDER — FLECAINIDE ACETATE 100 MG/1
TABLET ORAL
Qty: 180 TABLET | Refills: 3 | Status: SHIPPED | OUTPATIENT
Start: 2021-07-02 | End: 2022-01-26 | Stop reason: SDUPTHER

## 2021-07-02 RX ORDER — B-COMPLEX WITH VITAMIN C
1 TABLET ORAL 2 TIMES DAILY
COMMUNITY

## 2021-07-02 NOTE — TELEPHONE ENCOUNTER
.  Medication Refill    Medication needing refilled:flecainide (TAMBOCOR)    Dosage of the medication:  100 MG tablet       How are you taking this medication (QD, BID, TID, QID, PRN):  TAKE 1 TABLET BY MOUTH 2 TIMES A DAY    30 or 90 day supply called in:  90 days     When will you run out of your medication:  End of next week    Which Pharmacy are we sending the medication to?:  [facility], [address] [facility] [address] Burrows [phone]   [address] [place] [address]   Phone:  [phone]  Fax:  [phone]      Next appt with Fannie Nyhan  12/20/2021

## 2021-07-02 NOTE — PROGRESS NOTES
nail polish on your fingers or toes      For your safety, please do not wear any jewelry or body piercing's on the day of surgery. All jewelry must be removed. If you have dentures, they will be removed before going to operating room. For your convenience, we will provide you with a container. If you wear contact lenses or glasses, they will be removed, please bring a case for them. If you have a living will and a durable power of  for healthcare, please bring in a copy. As part of our patient safety program to minimize surgical site infections, we ask you to do the following:    · Please notify your surgeon if you develop any illness between         now and the  day of your surgery. · This includes a cough, cold, fever, sore throat, nausea,         or vomiting, and diarrhea, etc.  ·  Please notify your surgeon if you experience dizziness, shortness         of breath or blurred vision between now and the time of your surgery. Do not shave your operative site 96 hours prior to surgery. For face and neck surgery, men may use an electric razor 48 hours   prior to surgery. You may shower the night before surgery or the morning of   your surgery with an antibacterial soap. You will need to bring a photo ID and insurance card    [facility] has an onsite pharmacy, would you like to utilize our pharmacy     If you will be staying overnight and use a C-pap machine, please bring   your C-pap to hospital     Our goal is to provide you with excellent care, therefore, visitors will be limited to two(2) in the room at a time so that we may focus on providing this care for you. Please contact pre-admission testing if you have any further questions.                  [facility] phone number:  [phone] PAT fax number:  [phone]  Please note these are generalized instructions for all surgical cases, you may be provided with more specific instructions according to your surgery.

## 2021-07-02 NOTE — PROGRESS NOTES
Preoperative Screening for Elective Surgery/Invasive Procedures While COVID-19 present in the community     Have you tested positive or have been told to self-isolate for COVID-19 like symptoms within the past 28 days? NO   Do you currently have any of the following symptoms? o Fever >100.0 F or 99.9 F in immunocompromised patients? NO  o New onset cough, shortness of breath or difficulty breathing?  o New onset sore throat, myalgia (muscle aches and pains), hea NOdache, loss of taste/smell or diarrhea? NO   Have you had a potential exposure to COVID-19 within the past 14 days by:  o Close contact with a confirmed case? NO  o Close contact with a healthcare worker,  or essential infrastructure worker (grocery store, [employer], gas station, public utilities or transportation)? YES  o Do you reside in a congregate setting such as; skilled nursing facility, adult home, correctional facility, homeless shelter or other institutional setting? NO  o Have you had recent travel to a known COVID-19 hotspot? NO    Indicate if the patient has a positive screen by answering yes to one or more of the above questions. Patients who test positive or screen positive prior to surgery or on the day of surgery should be evaluated in conjunction with the surgeon/proceduralist/anesthesiologist to determine the urgency of the procedure.

## 2021-07-02 NOTE — TELEPHONE ENCOUNTER
Last OV: 06/17/2021  Next OV: 12/20/2021  Most recent Labs: cbc, cmp 04/13/2021  Last PT/INR (if needed):  Last EKG (if needed):06/17/2021

## 2021-07-06 ENCOUNTER — ANESTHESIA EVENT (OUTPATIENT)
Dept: ENDOSCOPY | Age: 76
End: 2021-07-06
Payer: MEDICARE

## 2021-07-07 ENCOUNTER — HOSPITAL ENCOUNTER (OUTPATIENT)
Age: 76
Setting detail: OUTPATIENT SURGERY
Discharge: HOME | End: 2021-07-07
Attending: INTERNAL MEDICINE | Admitting: INTERNAL MEDICINE
Payer: MEDICARE

## 2021-07-07 ENCOUNTER — ANESTHESIA (OUTPATIENT)
Dept: ENDOSCOPY | Age: 76
End: 2021-07-07
Payer: MEDICARE

## 2021-07-07 VITALS
HEIGHT: 61 IN | TEMPERATURE: 97.7 F | RESPIRATION RATE: 18 BRPM | DIASTOLIC BLOOD PRESSURE: 85 MMHG | BODY MASS INDEX: 34.17 KG/M2 | SYSTOLIC BLOOD PRESSURE: 152 MMHG | HEART RATE: 81 BPM | OXYGEN SATURATION: 95 % | WEIGHT: 181 LBS

## 2021-07-07 VITALS
SYSTOLIC BLOOD PRESSURE: 132 MMHG | RESPIRATION RATE: 12 BRPM | OXYGEN SATURATION: 100 % | DIASTOLIC BLOOD PRESSURE: 84 MMHG

## 2021-07-07 DIAGNOSIS — Z85.038 HISTORY OF COLON CANCER: ICD-10-CM

## 2021-07-07 DIAGNOSIS — R19.7 DIARRHEA, UNSPECIFIED TYPE: ICD-10-CM

## 2021-07-07 PROCEDURE — 2709999900 HC NON-CHARGEABLE SUPPLY: Performed by: INTERNAL MEDICINE

## 2021-07-07 PROCEDURE — 88305 TISSUE EXAM BY PATHOLOGIST: CPT

## 2021-07-07 PROCEDURE — 7100000010 HC PHASE II RECOVERY - FIRST 15 MIN: Performed by: INTERNAL MEDICINE

## 2021-07-07 PROCEDURE — 7100000011 HC PHASE II RECOVERY - ADDTL 15 MIN: Performed by: INTERNAL MEDICINE

## 2021-07-07 PROCEDURE — 6360000002 HC RX W HCPCS: Performed by: NURSE ANESTHETIST, CERTIFIED REGISTERED

## 2021-07-07 PROCEDURE — 3609010300 HC COLONOSCOPY W/BIOPSY SINGLE/MULTIPLE: Performed by: INTERNAL MEDICINE

## 2021-07-07 PROCEDURE — 7100000001 HC PACU RECOVERY - ADDTL 15 MIN: Performed by: INTERNAL MEDICINE

## 2021-07-07 PROCEDURE — 7100000000 HC PACU RECOVERY - FIRST 15 MIN: Performed by: INTERNAL MEDICINE

## 2021-07-07 PROCEDURE — 3700000000 HC ANESTHESIA ATTENDED CARE: Performed by: INTERNAL MEDICINE

## 2021-07-07 PROCEDURE — 3700000001 HC ADD 15 MINUTES (ANESTHESIA): Performed by: INTERNAL MEDICINE

## 2021-07-07 RX ORDER — PROMETHAZINE HYDROCHLORIDE 25 MG/ML
6.25 INJECTION, SOLUTION INTRAMUSCULAR; INTRAVENOUS
Status: DISCONTINUED | OUTPATIENT
Start: 2021-07-07 | End: 2021-07-07 | Stop reason: HOSPADM

## 2021-07-07 RX ORDER — SODIUM CHLORIDE 9 MG/ML
INJECTION, SOLUTION INTRAVENOUS CONTINUOUS
Status: DISCONTINUED | OUTPATIENT
Start: 2021-07-07 | End: 2021-07-07 | Stop reason: HOSPADM

## 2021-07-07 RX ORDER — SODIUM CHLORIDE 0.9 % (FLUSH) 0.9 %
10 SYRINGE (ML) INJECTION PRN
Status: DISCONTINUED | OUTPATIENT
Start: 2021-07-07 | End: 2021-07-07 | Stop reason: HOSPADM

## 2021-07-07 RX ORDER — PROPOFOL 10 MG/ML
INJECTION, EMULSION INTRAVENOUS CONTINUOUS PRN
Status: DISCONTINUED | OUTPATIENT
Start: 2021-07-07 | End: 2021-07-07 | Stop reason: SDUPTHER

## 2021-07-07 RX ORDER — PROPOFOL 10 MG/ML
INJECTION, EMULSION INTRAVENOUS PRN
Status: DISCONTINUED | OUTPATIENT
Start: 2021-07-07 | End: 2021-07-07 | Stop reason: SDUPTHER

## 2021-07-07 RX ORDER — ONDANSETRON 2 MG/ML
4 INJECTION INTRAMUSCULAR; INTRAVENOUS
Status: DISCONTINUED | OUTPATIENT
Start: 2021-07-07 | End: 2021-07-07 | Stop reason: HOSPADM

## 2021-07-07 RX ORDER — LABETALOL HYDROCHLORIDE 5 MG/ML
5 INJECTION, SOLUTION INTRAVENOUS EVERY 10 MIN PRN
Status: DISCONTINUED | OUTPATIENT
Start: 2021-07-07 | End: 2021-07-07 | Stop reason: HOSPADM

## 2021-07-07 RX ORDER — SODIUM CHLORIDE 9 MG/ML
25 INJECTION, SOLUTION INTRAVENOUS PRN
Status: DISCONTINUED | OUTPATIENT
Start: 2021-07-07 | End: 2021-07-07 | Stop reason: HOSPADM

## 2021-07-07 RX ORDER — SODIUM CHLORIDE 0.9 % (FLUSH) 0.9 %
10 SYRINGE (ML) INJECTION EVERY 12 HOURS SCHEDULED
Status: DISCONTINUED | OUTPATIENT
Start: 2021-07-07 | End: 2021-07-07 | Stop reason: HOSPADM

## 2021-07-07 RX ADMIN — PROPOFOL 30 MG: 10 INJECTION, EMULSION INTRAVENOUS at 11:27

## 2021-07-07 RX ADMIN — PROPOFOL 120 MCG/KG/MIN: 10 INJECTION, EMULSION INTRAVENOUS at 11:24

## 2021-07-07 RX ADMIN — PROPOFOL 30 MG: 10 INJECTION, EMULSION INTRAVENOUS at 11:34

## 2021-07-07 RX ADMIN — PROPOFOL 20 MG: 10 INJECTION, EMULSION INTRAVENOUS at 11:24

## 2021-07-07 ASSESSMENT — PAIN SCALES - GENERAL
PAINLEVEL_OUTOF10: 0

## 2021-07-07 ASSESSMENT — PULMONARY FUNCTION TESTS
PIF_VALUE: 0

## 2021-07-07 ASSESSMENT — PAIN - FUNCTIONAL ASSESSMENT: PAIN_FUNCTIONAL_ASSESSMENT: 0-10

## 2021-07-07 NOTE — OP NOTE
Operative Note      Patient: Maciel Cota  YOB: 1945  MRN: [id number]    Date of Procedure: 7/7/2021    Pre-Op Diagnosis: DIARRHEA, HISTORY COLON CANCER    Post-Op Diagnosis: Same       Procedure(s):  COLONOSCOPY WITH BIOPSY    Surgeon(s):  Kathryn Ayers MD    Assistant:   * No surgical staff found *    Anesthesia: Monitor Anesthesia Care    Estimated Blood Loss (mL): None    Complications: None    Specimens:   ID Type Source Tests Collected by Time Destination   A : a. random colon bx Tissue Tissue SURGICAL PATHOLOGY Kathryn Ayers MD 7/7/2021 1130        Implants:  * No implants in log *      Drains:   Negative Pressure Wound Therapy Leg Anterior; Lower;Right (Active)       Findings: See dictated report    Detailed Description of Procedure:   See dictated report    Electronically signed by Kathryn Ayers MD on 7/7/2021 at 11:41 AM

## 2021-07-07 NOTE — H&P
[facility]   Pre-operative History and Physical    Patient: Dorothy Small  : 1945  Acct#:         HISTORY OF PRESENT ILLNESS:    The patient is a 67 y.o. female  who presents with diarrhea; history of colon cancer  Past Medical History:        Diagnosis Date    Acid reflux     Anxiety     Arthritis     Atrial fibrillation ([facility]     Closed fracture dislocation of right elbow     Depression     Fibromyalgia     Migraine     WHITNEY (obstructive sleep apnea)     Diagnosed years ago, no CPAP    Rectal cancer ([facility]     Wears glasses     DRIVING     Past Surgical History:        Procedure Laterality Date    APPENDECTOMY      CERVICAL DISCECTOMY      ACDF     SECTION      x4    CHOLECYSTECTOMY      COLON SURGERY      Rectal cancer removed using  incision    COLONOSCOPY      SEVERAL    COLONOSCOPY N/A 2019    COLONOSCOPY POLYPECTOMY SNARE/COLD BIOPSY performed by Julia Perry MD at [address] Right 2021    RIGHT LEG EVACUATION OF HEMATOMA performed by Louisa Jeronimo MD at [facility] W/O EXTENSION Right 10/25/2018    OPEN REDUCTION INTERNAL FIXATION RIGHT FEMUR SUPRACONDYLAR FEMORAL FRACTURE WITH C-ARM performed by Daniela Warner MD at [address] [place] [address] Right 2021    RIGHT LEG HEMATOMA EVAKUATION, [address], AND WOUND VAC PLACEMENT performed by Isis Steele MD at [address] [place] [address] Right 2021    RIGHT LOWER EXTREMITY SKIN GRAFT performed by Isis Steele MD at [address] [place] [address] Left     UPPER GASTROINTESTINAL ENDOSCOPY N/A 2019    ESOPHAGOGASTRODUODENOSCOPY performed by Julia Perry MD at [facility] 2020    EGD BIOPSY performed by Julia Perry MD at [address] [place] [address]     Medications prior to admission:   Prior to Admission medications    Medication Sig Start Date [penicillins], and Adhesive tape  Social History:   Social History     Socioeconomic History    Marital status:      Spouse name: Not on file    Number of children: 3    Years of education: Not on file    Highest education level: High school graduate   Occupational History    Not on file   Tobacco Use    Smoking status: Never Smoker    Smokeless tobacco: Never Used   Vaping Use    Vaping Use: Never used   Substance and Sexual Activity    Alcohol use: No    Drug use: Never    Sexual activity: Not Currently     Partners: Male   Other Topics Concern    Not on file   Social History Narrative    Lives with son     Social Determinants of Health     Financial Resource Strain:     Difficulty of Paying Living Expenses:    Food Insecurity:     Worried About Running Out of Food in the Last Year:     [address] in the Last Year:    Transportation Needs:     Lack of Transportation (Medical):      Lack of Transportation (Non-Medical):    Physical Activity:     Days of Exercise per Week:     Minutes of Exercise per Session:    Stress:     Feeling of Stress :    Social Connections:     Frequency of Communication with Friends and Family:     Frequency of Social Gatherings with Friends and Family:     Attends Christian Services:     Active Member of Clubs or Organizations:     Attends Club or Organization Meetings:     Marital Status:    Intimate Partner Violence:     Fear of Current or Ex-Partner:     Emotionally Abused:     Physically Abused:     Sexually Abused:            Family History:   Family History   Problem Relation Age of Onset    COPD Mother          PHYSICAL EXAM:      BP (!) 162/75   Pulse 82   Temp 97 °F (36.1 °C) (Temporal)   Resp 18   Ht 5' 1\" (1.549 m)   Wt 181 lb (82.1 kg)   SpO2 92%   BMI 34.20 kg/m²  I        Heart: Normal    Lungs: Normal    Abdomen: Normal      ASA Grade: ASA 3 - Patient with moderate systemic disease with functional limitations    III (soft palate, base of uvula visible)  ASSESSMENT AND PLAN:    1. Patient is a 67 y.o. female here for Colonoscopy  2. Procedure options, risks and benefits reviewed with patient who expresses understanding.

## 2021-07-07 NOTE — BRIEF OP NOTE
Brief Postoperative Note    Barbra Client  YOB: 1945  [id number]      Pre-operative Diagnosis: Diarrhea; history of colon cancer    Previous Colonoscopy: Yes  When: 2019  Greater than 3 years? No      Post-operative Diagnosis: Same    Procedure: Colonoscopy    The preparation was good.     Anesthesia: MAC    Surgeons/Assistants: Conchita Payne MD    Estimated Blood Loss: None    Complications: None    Specimens: Was Obtained: Random colon    Findings: See dictated report    Electronically signed by Conchita Payne MD on 7/10/2017 at 7:45 AM

## 2021-07-07 NOTE — ANESTHESIA POSTPROCEDURE EVALUATION
[facility] Department of Anesthesiology  Post-Anesthesia Note       Name:  Krystal Nieto                                  Age:  67 y.o. MRN:  [id number]     Last Vitals & Oxygen Saturation: BP (!) 152/85   Pulse 81   Temp 97.7 °F (36.5 °C) (Temporal)   Resp 18   Ht 5' 1\" (1.549 m)   Wt 181 lb (82.1 kg)   SpO2 95%   BMI 34.20 kg/m²   Patient Vitals for the past 4 hrs:   BP Temp Temp src Pulse Resp SpO2 Height Weight   07/07/21 1240 (!) 152/85 -- -- 81 18 95 % -- --   07/07/21 1209 (!) 154/88 -- -- 76 16 96 % -- --   07/07/21 1205 -- -- -- 73 12 98 % -- --   07/07/21 1156 (!) 140/75 -- -- 70 14 98 % -- --   07/07/21 1152 126/66 -- -- 65 11 99 % -- --   07/07/21 1150 -- -- -- 64 11 99 % -- --   07/07/21 1148 -- -- -- 65 13 100 % -- --   07/07/21 1147 113/64 -- -- 68 10 100 % -- --   07/07/21 1141 (!) 113/53 97.7 °F (36.5 °C) Temporal 63 16 95 % -- --   07/07/21 1039 (!) 162/75 97 °F (36.1 °C) Temporal 82 18 92 % -- --   07/07/21 1021 -- -- -- -- -- -- 5' 1\" (1.549 m) 181 lb (82.1 kg)       Level of consciousness:  Awake, alert    Respiratory: Respirations easy, no distress. Stable. Cardiovascular: Hemodynamically stable. Hydration: Adequate. PONV: Adequately managed. Post-op pain: Adequately controlled. Post-op assessment: Tolerated anesthetic well without complication. Complications:  None.     Markos Holman MD  July 7, 2021   2:04 PM

## 2021-07-07 NOTE — PROCEDURES
[facility]           [address]                               COLONOSCOPY REPORT    PATIENT NAME: Michelle Meyer                   :        1945  MED REC NO:   [id number]                          ROOM:  ACCOUNT NO:   [de-identified]                           ADMIT DATE: 2021  PROVIDER:     Narda Pickard MD      DATE OF PROCEDURE:  2021    REFERRING PROVIDER:  Stacia Richardson MD    INSTRUMENT USED:  Olympus PCF-H190L. ANESTHESIA:  The patient was premedicated with Diprivan intravenously as  administered by the anesthesiology service. INDICATIONS:  The patient has a history of colon cancer and has  presented with chronic diarrhea. PROCEDURE:  The digital and anal exams were normal.  The colonoscope was  inserted to the cecum. The prep was alternately good to fair. Where  the prep was fair, lesions such as small polyps or vascular ectasias  could have been missed. However, no mucosal lesions were identified. The patient is status post anterior resection. The surgical anastomosis  was normal.  Random biopsies were obtained to evaluate for microscopic  colitis. ESTIMATED BLOOD LOSS:  None. IMPRESSION:  1. Status post anterior resection. 2.  Otherwise normal colonoscopy. 3.  Random colonic biopsies obtained. PLAN:  The patient will call the office for biopsy results and  recommendations. Health permitting, she should undergo a surveillance  colonoscopy in 3 years. NENITA White MD    D: 2021 11:55:22       T: 2021 12:03:07     [email or web address]  Job#: [id number]     Doc#: [id number]    CC:  Narda Geiger MD

## 2021-07-07 NOTE — ANESTHESIA PRE PROCEDURE
[facility] Department of Anesthesiology  Pre-Anesthesia Evaluation/Consultation       Name:  Maria Guadalupe Mendieta  : 1945  Age:  67 y.o. MRN:  [id number]  Date: 2021           Surgeon: Surgeon(s):  Farhana Dennison MD    Procedure: Procedure(s):  COLONOSCOPY     Allergies   Allergen Reactions    Demerol Hcl [Meperidine] Other (See Comments)     PATIENT STATES SHE GOT VERY ANXIOUS-LIKE SHE WAS CLIMBING THE WALLS    Pcn [Penicillins]      Patient reports she has not had since she was a child, thinks reaction was hives.      Adhesive Tape Rash     Patient Active Problem List   Diagnosis    Symptomatic anemia    A-fib (HCC)    Closed fracture of right femur ([address]    Aortic valve disorder    Arthropathy    Cervical spondylosis without myelopathy    DDD (degenerative disc disease), cervical    DDD (degenerative disc disease), lumbosacral    Displacement of cervical intervertebral disc without myelopathy    Essential hypertension    Malignant neoplasm of colon ([address]    Spinal stenosis in cervical region    Spinal stenosis, lumbar    Fibromyalgia    Gastroesophageal reflux disease without esophagitis    Lumbar radicular pain    Traumatic closed displaced supracondylar fracture of femur, right, with delayed healing, subsequent encounter    Osteopenia    Pedal edema    Chronic daily headache    Esophageal candidiasis ([address]    History of gastric bypass    Prediabetes     Past Medical History:   Diagnosis Date    Acid reflux     Anxiety     Arthritis     Atrial fibrillation (HCC)     Closed fracture dislocation of right elbow     Depression     Fibromyalgia     Migraine     WHITNEY (obstructive sleep apnea)     Diagnosed years ago, no CPAP    Rectal cancer (HCC)     Wears glasses     DRIVING     Past Surgical History:   Procedure Laterality Date    APPENDECTOMY      CERVICAL DISCECTOMY      ACDF     SECTION      x4    CHOLECYSTECTOMY      COLON SURGERY      Rectal cancer removed using  incision    COLONOSCOPY      SEVERAL    COLONOSCOPY N/A 2019    COLONOSCOPY POLYPECTOMY SNARE/COLD BIOPSY performed by Mamadou Villatoro MD at [address] [facility] [address] Right 2021    RIGHT LEG EVACUATION OF HEMATOMA performed by Zoe Orta MD at [facility] W/O EXTENSION Right 10/25/2018    OPEN REDUCTION INTERNAL FIXATION RIGHT FEMUR SUPRACONDYLAR FEMORAL FRACTURE WITH C-ARM performed by Olga Rivera MD at [facility] Right 2021    RIGHT LEG HEMATOMA EVAKUATION, DEBRIDEMENT, AND WOUND VAC PLACEMENT performed by Brian Ayers MD at [facility] Right 2021    RIGHT LOWER EXTREMITY SKIN GRAFT performed by Brian Ayers MD at [facility] Left     UPPER GASTROINTESTINAL ENDOSCOPY N/A 2019    ESOPHAGOGASTRODUODENOSCOPY performed by Mamadou Villatoro MD at [address] N/A 2020    EGD BIOPSY performed by Mamadou Villatoro MD at [facility] History     Tobacco Use    Smoking status: Never Smoker    Smokeless tobacco: Never Used   Vaping Use    Vaping Use: Never used   Substance Use Topics    Alcohol use: No    Drug use: Never     Medications  No current facility-administered medications on file prior to encounter.      Current Outpatient Medications on File Prior to Encounter   Medication Sig Dispense Refill    tiZANidine (ZANAFLEX) 2 MG tablet Take 2 mg by mouth every 6 hours as needed      Cyanocobalamin (VITAMIN B 12 PO) Take 2,500 mg by mouth daily      calcium carbonate-vitamin D (CALCIUM 600+D) 600-200 MG-UNIT TABS Take 1 tablet by mouth 2 times daily      metoprolol succinate (TOPROL XL) 50 MG extended release tablet TAKE ONE TABLET BY MOUTH DAILY 180 tablet 1    pantoprazole (PROTONIX) 40 MG tablet Take 1 Height as of this encounter: 5' 1\" (1.549 m). Weight as of this encounter: 181 lb (82.1 kg). CBC   Lab Results   Component Value Date    WBC 6.6 04/13/2021    RBC 3.87 04/13/2021    HGB 12.3 04/13/2021    HCT 37.6 04/13/2021    MCV 97.2 04/13/2021    RDW 20.4 04/13/2021     04/13/2021     CMP    Lab Results   Component Value Date     04/13/2021    K 4.5 04/13/2021    K 3.5 02/12/2021     04/13/2021    CO2 24 04/13/2021    BUN 22 04/13/2021    CREATININE 0.8 04/13/2021    GFRAA >60 04/13/2021    AGRATIO 1.9 04/13/2021    LABGLOM >60 04/13/2021    GLUCOSE 123 04/13/2021    PROT 6.9 04/13/2021    CALCIUM 9.4 04/13/2021    BILITOT <0.2 04/13/2021    ALKPHOS 97 04/13/2021    AST 22 04/13/2021    ALT 14 04/13/2021     BMP    Lab Results   Component Value Date     04/13/2021    K 4.5 04/13/2021    K 3.5 02/12/2021     04/13/2021    CO2 24 04/13/2021    BUN 22 04/13/2021    CREATININE 0.8 04/13/2021    CALCIUM 9.4 04/13/2021    GFRAA >60 04/13/2021    LABGLOM >60 04/13/2021    GLUCOSE 123 04/13/2021     POCGlucose  No results for input(s): GLUCOSE in the last 72 hours.    Coags    Lab Results   Component Value Date    PROTIME 12.5 02/04/2021    INR 1.08 02/04/2021    APTT 31.5 64/01/5745     HCG (If Applicable) No results found for: PREGTESTUR, PREGSERUM, HCG, HCGQUANT   ABGs No results found for: PHART, PO2ART, LSO4EQC, BAE1UQW, BEART, O6IDKXZC   Type & Screen (If Applicable)  No results found for: LABABO, LABRH                         BMI: Wt Readings from Last 3 Encounters:       NPO Status:   Date of last liquid consumption: 07/06/21   Time of last liquid consumption: 2300   Date of last solid food consumption: 07/05/21      Time of last solid consumption: 2000       Anesthesia Evaluation  Patient summary reviewed no history of anesthetic complications:   Airway: Mallampati: III  TM distance: >3 FB   Neck ROM: full   Dental:    (+) partials      Pulmonary:normal exam    (+) sleep apnea:                             Cardiovascular:  Exercise tolerance: good (>4 METS),   (+) hypertension (EF 65):, dysrhythmias: atrial fibrillation,       ECG reviewed  Rhythm: irregular  Rate: normal  Echocardiogram reviewed         Beta Blocker:  Dose within 24 Hrs         Neuro/Psych:   (+) neuromuscular disease:, headaches:, psychiatric history:depression/anxiety             GI/Hepatic/Renal:   (+) GERD:, bowel prep,           Endo/Other:    (+) DiabetesType II DM, , blood dyscrasia (eliquis 2 days ago): anticoagulation therapy:., .                 Abdominal:   (+) obese,           Vascular: negative vascular ROS. Other Findings:             Anesthesia Plan      MAC     ASA 3       Induction: intravenous. Anesthetic plan and risks discussed with patient. Plan discussed with CRNA. This pre-anesthesia assessment may be used as a history and physical.    DOS STAFF ADDENDUM:    Pt seen and examined, chart reviewed (including anesthesia, drug and allergy history). No interval changes to history and physical examination. Anesthetic plan, risks, benefits, alternatives, and personnel involved discussed with patient. Questions and concerns addressed. Patient(family) verbalized an understanding and agrees to proceed.       Liss Alvarado MD  July 7, 2021  10:57 AM

## 2021-07-12 NOTE — TELEPHONE ENCOUNTER
Requested Prescriptions     Pending Prescriptions Disp Refills    tiZANidine (ZANAFLEX) 2 MG tablet 60 tablet 3     Sig: Take 1 tablet by mouth every 6 hours as needed (muscle pain)   Patient requesting a medication refill.   [facility]  Next office visit: Visit date not found  Last regular office visit: 5/21/2021

## 2021-07-13 RX ORDER — TIZANIDINE 2 MG/1
2 TABLET ORAL EVERY 6 HOURS PRN
Qty: 60 TABLET | Refills: 3 | Status: SHIPPED | OUTPATIENT
Start: 2021-07-13 | End: 2021-09-09 | Stop reason: SDUPTHER

## 2021-07-26 DIAGNOSIS — R19.8 BORBORYGMI: ICD-10-CM

## 2021-07-26 RX ORDER — DICYCLOMINE HYDROCHLORIDE 10 MG/1
CAPSULE ORAL
Qty: 60 CAPSULE | Refills: 2 | OUTPATIENT
Start: 2021-07-26

## 2021-07-26 NOTE — TELEPHONE ENCOUNTER
Requested Prescriptions     Pending Prescriptions Disp Refills    dicyclomine (BENTYL) 10 MG capsule [Pharmacy Med Name: DICYCLOMINE 10 MG CAPSULE] 60 capsule 2     Sig: TAKE ONE CAPSULE BY MOUTH TWICE A DAY AS NEEDED FOR ABDOMINAL CRAMPING   Patient requesting a medication refill. Pharmacy: [facility]  Next office visit: Visit date not found  Last regular office visit: 5/21/2021    Patient needs an appointment.

## 2021-07-29 ENCOUNTER — TELEPHONE (OUTPATIENT)
Dept: SURGERY | Age: 76
End: 2021-07-29

## 2021-07-29 NOTE — TELEPHONE ENCOUNTER
Returned call to patient. Call went to Dion[facility] HUDSON for patient advising her she could put bacitracin over her graft site.

## 2021-07-29 NOTE — TELEPHONE ENCOUNTER
Patient called in stating that her graft is \"scaly\" and would like to know if she should put lotion on the graft    Patient also states that she still has 3 scabs but they are no longer leaking any fluid    Please contact the patient at [phone]

## 2021-08-08 DIAGNOSIS — R19.8 BORBORYGMI: ICD-10-CM

## 2021-08-10 RX ORDER — DICYCLOMINE HYDROCHLORIDE 10 MG/1
CAPSULE ORAL
Qty: 60 CAPSULE | Refills: 0 | Status: SHIPPED | OUTPATIENT
Start: 2021-08-10 | End: 2021-09-07

## 2021-08-10 NOTE — TELEPHONE ENCOUNTER
Requested Prescriptions     Pending Prescriptions Disp Refills    dicyclomine (BENTYL) 10 MG capsule [Pharmacy Med Name: DICYCLOMINE 10 MG CAPSULE] 60 capsule 0     Sig: TAKE ONE CAPSULE BY MOUTH TWICE A DAY AS NEEDED FOR ABDOMINAL CRAMPING     Patient requesting a medication refill.     Next office visit: Visit date not found    Last regular office visit: 5/21/2021

## 2021-08-25 ENCOUNTER — TELEPHONE (OUTPATIENT)
Dept: PRIMARY CARE CLINIC | Age: 76
End: 2021-08-25

## 2021-08-25 NOTE — TELEPHONE ENCOUNTER
Patient still needs something for her anxiety over the death of her son. Please call patient and advise when something is prescribed for her. She says the Xanax is not strong enough and is not helping.

## 2021-08-31 ENCOUNTER — VIRTUAL VISIT (OUTPATIENT)
Dept: INTERNAL MEDICINE CLINIC | Age: 76
End: 2021-08-31
Payer: MEDICARE

## 2021-08-31 ENCOUNTER — NURSE TRIAGE (OUTPATIENT)
Dept: OTHER | Facility: CLINIC | Age: 76
End: 2021-08-31

## 2021-08-31 DIAGNOSIS — R53.83 FATIGUE, UNSPECIFIED TYPE: ICD-10-CM

## 2021-08-31 DIAGNOSIS — F43.21 GRIEF: Primary | ICD-10-CM

## 2021-08-31 DIAGNOSIS — J06.9 VIRAL URI WITH COUGH: ICD-10-CM

## 2021-08-31 PROCEDURE — 99213 OFFICE O/P EST LOW 20 MIN: CPT | Performed by: INTERNAL MEDICINE

## 2021-08-31 RX ORDER — POTASSIUM CHLORIDE 1500 MG/1
TABLET, EXTENDED RELEASE ORAL
Qty: 180 TABLET | Refills: 0 | OUTPATIENT
Start: 2021-08-31

## 2021-08-31 RX ORDER — POTASSIUM CHLORIDE 20 MEQ/1
20 TABLET, EXTENDED RELEASE ORAL DAILY
Qty: 90 TABLET | Refills: 1 | Status: SHIPPED | OUTPATIENT
Start: 2021-08-31 | End: 2022-02-18 | Stop reason: SDUPTHER

## 2021-08-31 NOTE — TELEPHONE ENCOUNTER
Needs a Rx refill and extreme fatigue. Reason for Disposition   Fatigue (i.e., tires easily, decreased energy) and persists > 1 week    Answer Assessment - Initial Assessment Questions  1. DESCRIPTION: \"Describe how you are feeling. \"      States she is still able to do her daily activities; she just feels extreme fatigue and is sleeping more. 2. SEVERITY: \"How bad is it? \"  \"Can you stand and walk? \"    - MILD - Feels weak or tired, but does not interfere with work, school or normal activities    - [facility] to stand and walk; weakness interferes with work, school, or normal activities    - SEVERE - Unable to stand or walk      Mild     3. ONSET:  \"When did the weakness begin? \"      1 -2 weeks; extreme fatigue since her son's death 8/23. 4. CAUSE: \"What do you think is causing the weakness? \"      Her son's death possibly     5. MEDICINES: Gwynda Dinorak you recently started a new medicine or had a change in the amount of a medicine? \"      Increased in xanax 0.5-1mg; states she only takes 0.5 still. 6. OTHER SYMPTOMS: \"Do you have any other symptoms? \" (e.g., chest pain, fever, cough, SOB, vomiting, diarrhea, bleeding, other areas of pain)      Decrease in appetite and urinary frequency (states she takes a water pill), runny nose, cough. 7. PREGNANCY: \"Is there any chance you are pregnant? \" \"When was your last menstrual period? \"      N/a    Protocols used: WEAKNESS (GENERALIZED) AND FATIGUE-ADULT-[place]    Received call from Margaret Lopez at [facility] with Red Flag Complaint. Brief description of triage: extreme fatigue, mild cough and runny nose. Triage indicates for patient to be seen in the next 3 days. Care advice provided, patient verbalizes understanding; denies any other questions or concerns; instructed to call back for any new or worsening symptoms. Writer provided warm transfer to Franca Trevizo  at [facility] for appointment scheduling. Attention Provider:   Thank you for allowing me to participate in the care of your patient. The patient was connected to triage in response to information provided to the E[facility]. Please do not respond through this encounter as the response is not directed to a shared pool.

## 2021-08-31 NOTE — PROGRESS NOTES
Chief Complaint   Patient presents with    Depression     Loss of her son last week    Fatigue       HPI: Virtual visit via doxy. me during covid-19 pandemic for c/o extreme fatigue and depression since losing her son last week. States she is supported by friends and family but still having a hard time coping. See also triage note from earlier today, when she reported some cough and runny nose as well as decreased appetite and urinary frequency. Medications reviewed and reconciled with what patient reports to be taking. There were no vitals taken for this visit. PE: sad appearing but oriented x 4, insightful    ASSESSMENT/PLAN: Pt received counseling and, if relevant, printed instructions for all symptoms listed in CC and HPI, as well as for all diagnoses listed below. 1. Grief--pt has xanax ordered 8/25. Discussed grieving process and she should schedule appt it not progressing through and after #3 is addressed. 2. Fatigue, unspecified type--multifactorial, may need labs and in person exam after covid excluded    3. Viral URI with cough--recommended patient to go to urgent care for evaluation. She is covid vaccinated. Problem List Items Addressed This Visit     None      Visit Diagnoses     Grief    -  Primary    Fatigue, unspecified type        Viral URI with cough                No follow-ups on file. Tono Parra, was evaluated through a synchronous (real-time) audio-video encounter. The patient (or guardian if applicable) is aware that this is a billable service. Verbal consent to proceed has been obtained within the past 12 months. The visit was conducted pursuant to the emergency declaration under the [address] [place] [address] authority and the [facility] and [employer] Act. Patient identification was verified, and a caregiver was present when appropriate.  The patient was located in a state where the provider was credentialed to provide care. Total time spent for this encounter: Not billed by time    --Montell Heimlich, MD on 9/7/2021 at 12:25 PM    An electronic signature was used to authenticate this note.

## 2021-08-31 NOTE — TELEPHONE ENCOUNTER
Requested Prescriptions     Refused Prescriptions Disp Refills    KLOR-CON M20 20 MEQ extended release tablet [Pharmacy Med Name: KLOR-CON M20 TABLET] 180 tablet 0     Sig: TAKE ONE TABLET BY MOUTH TWICE A DAY     Refused By: Patrick Gage     Reason for Refusal: Patient no longer under prescriber care     Patient requesting a medication refill.     Next office visit: Visit date not found    Last regular office visit: 8/12/2019

## 2021-09-05 DIAGNOSIS — R19.8 BORBORYGMI: ICD-10-CM

## 2021-09-07 ENCOUNTER — TELEPHONE (OUTPATIENT)
Dept: INTERNAL MEDICINE CLINIC | Age: 76
End: 2021-09-07

## 2021-09-07 RX ORDER — DICYCLOMINE HYDROCHLORIDE 10 MG/1
CAPSULE ORAL
Qty: 60 CAPSULE | Refills: 0 | Status: SHIPPED | OUTPATIENT
Start: 2021-09-07 | End: 2021-09-09 | Stop reason: SDUPTHER

## 2021-09-07 NOTE — PROGRESS NOTES
GASTROINTESTINAL ENDOSCOPY N/A 11/26/2019    ESOPHAGOGASTRODUODENOSCOPY performed by Betsey Arroyo MD at [address] [facility] N/A 8/18/2020    EGD BIOPSY performed by Betsey Arroyo MD at [facility]:   Allergies   Allergen Reactions    Demerol Hcl [Meperidine] Other (See Comments)     PATIENT STATES SHE GOT VERY ANXIOUS-LIKE SHE WAS CLIMBING [place] [Penicillins]      Patient reports she has not had since she was a child, thinks reaction was hives.  Adhesive Tape Rash       SHx:   Social History     Socioeconomic History    Marital status:      Spouse name: Not on file    Number of children: 3    Years of education: Not on file    Highest education level: High school graduate   Occupational History    Not on file   Tobacco Use    Smoking status: Never Smoker    Smokeless tobacco: Never Used   Vaping Use    Vaping Use: Never used   Substance and Sexual Activity    Alcohol use: No    Drug use: Never    Sexual activity: Not Currently     Partners: Male   Other Topics Concern    Not on file   Social History Narrative    Lives with son     Social Determinants of Health     Financial Resource Strain:     Difficulty of Paying Living Expenses:    Food Insecurity:     Worried About Running Out of Food in the Last Year:     [address] in the Last Year:    Transportation Needs:     Lack of Transportation (Medical):      Lack of Transportation (Non-Medical):    Physical Activity:     Days of Exercise per Week:     Minutes of Exercise per Session:    Stress:     Feeling of Stress :    Social Connections:     Frequency of Communication with Friends and Family:     Frequency of Social Gatherings with Friends and Family:     Attends Mosque Services:     Active Member of Clubs or Organizations:     Attends Club or Organization Meetings:     Marital Status:    Intimate Partner Violence:     Fear of Current or Ex-Partner:     Emotionally Abused:     Physically Abused:     Sexually Abused:      FHx: Family history reviewed and is noncontributory    Meds:   Current Outpatient Medications   Medication Sig Dispense Refill    dicyclomine (BENTYL) 10 MG capsule TAKE ONE CAPSULE BY MOUTH TWICE A DAY AS NEEDED FOR ABDOMINAL CRAMPING 60 capsule 0    potassium chloride (KLOR-CON M20) 20 MEQ extended release tablet Take 1 tablet by mouth daily 90 tablet 1    ALPRAZolam (XANAX) 1 MG tablet TAKE ONE TABLET BY MOUTH THREE TIMES A DAY AS NEEDED FOR ANXIETY 90 tablet 1    tiZANidine (ZANAFLEX) 2 MG tablet Take 1 tablet by mouth every 6 hours as needed (muscle pain) 60 tablet 3    flecainide (TAMBOCOR) 100 MG tablet TAKE 1 TABLET BY MOUTH 2 TIMES A  tablet 3    Cyanocobalamin (VITAMIN B 12 PO) Take 2,500 mg by mouth daily      calcium carbonate-vitamin D (CALCIUM 600+D) 600-200 MG-UNIT TABS Take 1 tablet by mouth 2 times daily      mirtazapine (REMERON) 30 MG tablet TAKE ONE TABLET BY MOUTH ONCE NIGHTLY 90 tablet 1    sertraline (ZOLOFT) 100 MG tablet TAKE ONE TABLET BY MOUTH ONCE NIGHTLY 90 tablet 1    metoprolol succinate (TOPROL XL) 50 MG extended release tablet TAKE ONE TABLET BY MOUTH DAILY 180 tablet 1    pantoprazole (PROTONIX) 40 MG tablet Take 1 tablet by mouth 2 times daily 180 tablet 1    apixaban (ELIQUIS) 5 MG TABS tablet Take 1 tablet by mouth 2 times daily 180 tablet 5    furosemide (LASIX) 20 MG tablet Take 1 tablet by mouth daily 30 tablet 3    co-enzyme Q-10 50 MG capsule Take 150 mg by mouth 2 times daily       acetaminophen (TYLENOL) 500 MG tablet Take 500 mg by mouth every 6 hours as needed for Pain      Biotin 1000 MCG TABS Take 1 tablet by mouth 2 times daily        No current facility-administered medications for this visit. ROS   Constitutional: Negative for chills and fever. HENT: Negative for congestion, facial swelling, and voice change. Eyes: Negative for photophobia and visual disturbance. Respiratory: Negative for apnea, cough, chest tightness and shortness of breath. Cardiovascular: Negative for chest pain and palpitations. Gastrointestinal: Negative for dysphagia and early satiety. Genitourinary: Negative for difficulty urinating, dysuria, flank pain, frequency and hematuria. Musculoskeletal: Negative for new gait problem, joint swelling and myalgias. Skin: See HPI  Endocrine: negative for tremors, temperature intolerance or polydipsia. Allergic/Immunologic: Negative for new environmental or food allergies. Neurological: Negative for dizziness, seizures, speech difficulty, numbness. Hematological: Negative for adenopathy. Psychiatric/Behavioral: Negative for agitation and confusion. EXAM    BP (!) 175/82   Pulse 73   Ht 5' 1\" (1.549 m)   Wt 187 lb (84.8 kg)   SpO2 98%   BMI 35.33 kg/m²     GEN: NAD   EXT: STSG with excellent take  No evidence of infection  Donor site well healed      IMP: 67 y. o.female s/p STSG to LE  PLAN: No intervention required. She was advised to perform regular moisturizing and will follow-up PRN.      Rosalba Maldonado MD  [address] [place] [address] [address] Reconstructive Surgery  [phone]  09/08/21

## 2021-09-07 NOTE — TELEPHONE ENCOUNTER
----- Message from Denizmaco Grimeser sent at 9/7/2021  8:37 AM EDT -----  Subject: Message to Provider    QUESTIONS  Information for Provider? needs a follow up with  because she told her   to to get a covid test and she came back negative. so she just wants to   talk to her  ---------------------------------------------------------------------------  --------------  [address]  What is the best way for the office to contact you? OK to leave message on   voicemail  Preferred Call Back Phone Number? [phone]  ---------------------------------------------------------------------------  --------------  SCRIPT ANSWERS  Relationship to Patient?  Self

## 2021-09-07 NOTE — TELEPHONE ENCOUNTER
She may need to schedule in person visit to complete evaluation of complaints from recent virtual visit.

## 2021-09-08 ENCOUNTER — OFFICE VISIT (OUTPATIENT)
Dept: SURGERY | Age: 76
End: 2021-09-08
Payer: MEDICARE

## 2021-09-08 VITALS
BODY MASS INDEX: 35.3 KG/M2 | WEIGHT: 187 LBS | SYSTOLIC BLOOD PRESSURE: 175 MMHG | OXYGEN SATURATION: 98 % | DIASTOLIC BLOOD PRESSURE: 82 MMHG | HEART RATE: 73 BPM | HEIGHT: 61 IN

## 2021-09-08 DIAGNOSIS — Z09 POSTOP CHECK: Primary | ICD-10-CM

## 2021-09-08 PROCEDURE — 99213 OFFICE O/P EST LOW 20 MIN: CPT | Performed by: SURGERY

## 2021-09-09 ENCOUNTER — OFFICE VISIT (OUTPATIENT)
Dept: INTERNAL MEDICINE CLINIC | Age: 76
End: 2021-09-09
Payer: MEDICARE

## 2021-09-09 VITALS
HEIGHT: 61 IN | BODY MASS INDEX: 34.36 KG/M2 | OXYGEN SATURATION: 96 % | WEIGHT: 182 LBS | HEART RATE: 75 BPM | SYSTOLIC BLOOD PRESSURE: 122 MMHG | DIASTOLIC BLOOD PRESSURE: 68 MMHG

## 2021-09-09 DIAGNOSIS — R23.1 PALLOR: ICD-10-CM

## 2021-09-09 DIAGNOSIS — Z00.00 ROUTINE GENERAL MEDICAL EXAMINATION AT A HEALTH CARE FACILITY: Primary | ICD-10-CM

## 2021-09-09 DIAGNOSIS — G25.0 ESSENTIAL TREMOR: ICD-10-CM

## 2021-09-09 DIAGNOSIS — R53.82 CHRONIC FATIGUE: Primary | ICD-10-CM

## 2021-09-09 DIAGNOSIS — R73.03 PREDIABETES: ICD-10-CM

## 2021-09-09 DIAGNOSIS — M81.0 AGE-RELATED OSTEOPOROSIS WITHOUT CURRENT PATHOLOGICAL FRACTURE: ICD-10-CM

## 2021-09-09 DIAGNOSIS — I35.9 AORTIC VALVE DISORDER: ICD-10-CM

## 2021-09-09 DIAGNOSIS — R71.8 OTHER ABNORMALITY OF RED BLOOD CELLS: ICD-10-CM

## 2021-09-09 DIAGNOSIS — R19.8 BORBORYGMI: ICD-10-CM

## 2021-09-09 DIAGNOSIS — K58.0 IRRITABLE BOWEL SYNDROME WITH DIARRHEA: ICD-10-CM

## 2021-09-09 DIAGNOSIS — K21.9 GASTROESOPHAGEAL REFLUX DISEASE WITHOUT ESOPHAGITIS: ICD-10-CM

## 2021-09-09 PROCEDURE — G0439 PPPS, SUBSEQ VISIT: HCPCS | Performed by: INTERNAL MEDICINE

## 2021-09-09 PROCEDURE — G0008 ADMIN INFLUENZA VIRUS VAC: HCPCS | Performed by: INTERNAL MEDICINE

## 2021-09-09 PROCEDURE — 90694 VACC AIIV4 NO PRSRV 0.5ML IM: CPT | Performed by: INTERNAL MEDICINE

## 2021-09-09 PROCEDURE — 99215 OFFICE O/P EST HI 40 MIN: CPT | Performed by: INTERNAL MEDICINE

## 2021-09-09 RX ORDER — DICYCLOMINE HYDROCHLORIDE 10 MG/1
10 CAPSULE ORAL 4 TIMES DAILY PRN
Qty: 60 CAPSULE | Refills: 5 | Status: SHIPPED | OUTPATIENT
Start: 2021-09-09 | End: 2022-01-25 | Stop reason: SDUPTHER

## 2021-09-09 RX ORDER — TIZANIDINE 2 MG/1
2 TABLET ORAL EVERY 6 HOURS PRN
Qty: 60 TABLET | Refills: 11 | Status: SHIPPED | OUTPATIENT
Start: 2021-09-09 | End: 2022-02-18 | Stop reason: SDUPTHER

## 2021-09-09 ASSESSMENT — PATIENT HEALTH QUESTIONNAIRE - PHQ9
SUM OF ALL RESPONSES TO PHQ QUESTIONS 1-9: 1
1. LITTLE INTEREST OR PLEASURE IN DOING THINGS: 0
SUM OF ALL RESPONSES TO PHQ9 QUESTIONS 1 & 2: 1
SUM OF ALL RESPONSES TO PHQ QUESTIONS 1-9: 1
SUM OF ALL RESPONSES TO PHQ QUESTIONS 1-9: 1
2. FEELING DOWN, DEPRESSED OR HOPELESS: 1

## 2021-09-09 ASSESSMENT — LIFESTYLE VARIABLES: HOW OFTEN DO YOU HAVE A DRINK CONTAINING ALCOHOL: 0

## 2021-09-09 NOTE — PROGRESS NOTES
Medicare Annual Wellness Visit  Name: Neelima Lu Date: 2021   MRN: [id number] Sex: Female   Age: 67 y.o. Ethnicity: Non- / Non    : 1945 Race: White (non-)      Kelsea Carl is here for Medicare AWV    Screenings for behavioral, psychosocial and functional/safety risks, and cognitive dysfunction are all negative except as indicated below. These results, as well as other patient data from the [address] form, are documented in Flowsheets linked to this Encounter. Allergies   Allergen Reactions    Demerol Hcl [Meperidine] Other (See Comments)     PATIENT STATES SHE GOT VERY ANXIOUS-LIKE SHE WAS CLIMBING [place]    Pcn [Penicillins]      Patient reports she has not had since she was a child, thinks reaction was hives.  Adhesive Tape Rash       Prior to Visit Medications    Medication Sig Taking?  Authorizing Provider   dicyclomine (BENTYL) 10 MG capsule TAKE ONE CAPSULE BY MOUTH TWICE A DAY AS NEEDED FOR ABDOMINAL CRAMPING Yes Emmer Fabry, MD   potassium chloride (KLOR-CON M20) 20 MEQ extended release tablet Take 1 tablet by mouth daily Yes Emmer Fabry, MD   ALPRAZolam Katrin Swift) 1 MG tablet TAKE ONE TABLET BY MOUTH THREE TIMES A DAY AS NEEDED FOR ANXIETY Yes MUSA Howard CNP   tiZANidine (ZANAFLEX) 2 MG tablet Take 1 tablet by mouth every 6 hours as needed (muscle pain) Yes Emmer Fabry, MD   flecainide (TAMBOCOR) 100 MG tablet TAKE 1 TABLET BY MOUTH 2 TIMES A DAY Yes Cayetano Castleman, MD   Cyanocobalamin (VITAMIN B 12 PO) Take 2,500 mg by mouth daily Yes Historical Provider, MD   calcium carbonate-vitamin D (CALCIUM 600+D) 600-200 MG-UNIT TABS Take 1 tablet by mouth 2 times daily Yes Historical Provider, MD   mirtazapine (REMERON) 30 MG tablet TAKE ONE TABLET BY MOUTH ONCE NIGHTLY Yes MUSA Howard CNP   sertraline (ZOLOFT) 100 MG tablet TAKE ONE TABLET BY MOUTH ONCE NIGHTLY Yes MUSA Howard HEMATOMA EVAKUATION, DEBRIDEMENT, AND WOUND VAC PLACEMENT performed by Ilan Concepcion MD at [address] Right 2/17/2021    RIGHT LOWER EXTREMITY SKIN GRAFT performed by Ilan Concepcion MD at [address] [facility] Left     UPPER GASTROINTESTINAL ENDOSCOPY N/A 11/26/2019    ESOPHAGOGASTRODUODENOSCOPY performed by Radha Waggoner MD at [facility] N/A 8/18/2020    EGD BIOPSY performed by Radha Waggoner MD at [address] History   Problem Relation Age of Onset    COPD Mother        CareTeam (Including outside providers/suppliers regularly involved in providing care):   Patient Care Team:  Vilma Boateng MD as PCP - General (Internal Medicine)  Vilma Boateng MD as PCP - [facility]    Wt Readings from Last 3 Encounters:   09/09/21 182 lb (82.6 kg)   09/08/21 187 lb (84.8 kg)   07/07/21 181 lb (82.1 kg)     There were no vitals filed for this visit. There is no height or weight on file to calculate BMI. Based upon direct observation of the patient, evaluation of cognition reveals recent and remote memory intact. Patient's complete Health Risk Assessment and screening values have been reviewed and are found in Flowsheets. The following problems were reviewed today and where indicated follow up appointments were made and/or referrals ordered. Positive Risk Factor Screenings with Interventions:          General Health and ACP:  General  In general, how would you say your health is?: Fair  In the past 7 days, have you experienced any of the following?  New or Increased Pain, New or Increased Fatigue, Loneliness, Social Isolation, Stress or Anger?: (!) Stress, New or Increased Fatigue (discussed with Dr. Estrada Maldonado)  Do you get the social and emotional support that you need?: (!) No  Do you have a Living Will?: Yes  Advance Directives     Power of  Living Will ACP-Advance Directive ACP-Power of     Not on File Filed on 11/05/18 Filed Not on File      General Health Risk Interventions:  · Patient following up with Dr. Hai Carr today     Health Habits/Nutrition:  Health Habits/Nutrition  Do you exercise for at least 20 minutes 2-3 times per week?: (!) No (not interested)  Have you lost any weight without trying in the past 3 months?: No  Do you eat only one meal per day?: No  Have you seen the dentist within the past year?: Appointment is scheduled     Health Habits/Nutrition Interventions:  · Inadequate physical activity:  patient is not ready to increase his/her physical activity level at this time       Personalized Preventive Plan   Current Health Maintenance Status  Immunization History   Administered Date(s) Administered    COVID-19, J&J, PF, 0.5 mL 03/08/2021    Influenza Vaccine, unspecified formulation 10/21/2018    Influenza, High Dose (Fluzone 65 yrs and older) 12/09/2015, 10/27/2016, 09/22/2017, 10/21/2018    Influenza, Quadv, IM, PF (6 mo and older Fluzone, Flulaval, Fluarix, and 3 yrs and older Afluria) 02/19/2020    Influenza, Quadv, adjuvanted, 65 yrs +, IM, PF (Fluad) 09/03/2020    Pneumococcal Conjugate 13-valent ([id number]) 12/09/2015    Pneumococcal Conjugate Vaccine 10/21/2018    Pneumococcal Polysaccharide ([id number]) 10/21/2018    Tdap (Boostrix, Adacel) 12/09/2015        Health Maintenance   Topic Date Due    Shingles Vaccine (1 of 2) Never done   [employer] Visit (AWV)  Never done    Flu vaccine (1) 09/01/2021    Potassium monitoring  04/13/2022    Creatinine monitoring  04/13/2022    Pneumococcal 65+ years Vaccine (2 of 2 - PPSV23) 10/21/2023    DTaP/Tdap/Td vaccine (2 - Td or Tdap) 12/09/2025    DEXA (modify frequency per FRAX score)  Completed    COVID-19 Vaccine  Completed    Hepatitis C screen  Completed    Hepatitis A vaccine  Aged Out    Hepatitis B vaccine  Aged Out    Hib vaccine  Aged Out    Meningococcal (ACWY) vaccine  Aged Out     Recommendations for

## 2021-09-09 NOTE — PATIENT INSTRUCTIONS
Personalized Preventive Plan for Maciel Cota - 9/9/2021  Medicare offers a range of preventive health benefits. Some of the tests and screenings are paid in full while other may be subject to a deductible, co-insurance, and/or copay. Some of these benefits include a comprehensive review of your medical history including lifestyle, illnesses that may run in your family, and various assessments and screenings as appropriate. After reviewing your medical record and screening and assessments performed today your provider may have ordered immunizations, labs, imaging, and/or referrals for you. A list of these orders (if applicable) as well as your Preventive Care list are included within your After Visit Summary for your review. Other Preventive Recommendations:    · A preventive eye exam performed by an eye specialist is recommended every 1-2 years to screen for glaucoma; cataracts, macular degeneration, and other eye disorders. · A preventive dental visit is recommended every 6 months. · Try to get at least 150 minutes of exercise per week or 10,000 steps per day on a pedometer . · Order or download the FREE \"Exercise & Physical Activity: Your Everyday Guide\" from [employer]. Call 8-227.663.9127 or search [employer] online. · You need 6525-8580 mg of calcium and 4671-0969 IU of vitamin D per day. It is possible to meet your calcium requirement with diet alone, but a vitamin D supplement is usually necessary to meet this goal.  · When exposed to the sun, use a sunscreen that protects against both UVA and UVB radiation with an SPF of 30 or greater. Reapply every 2 to 3 hours or after sweating, drying off with a towel, or swimming. · Always wear a seat belt when traveling in a car. Always wear a helmet when riding a bicycle or motorcycle.

## 2021-09-09 NOTE — PROGRESS NOTES
Chief Complaint   Patient presents with    Other       HPI: f/u from virtual visit. Multiple issues, most pressingly c/o severe fatigue. Denies hematochezia and melena and had recent colonoscopy, just showed \"IBS' per patient. He states the dicyclomine that I prescribed has been helpful but the medication in a box (which I could not identify from her chart) that GI gave her is not helping and she has most of it left over. No abdominal pain or dyspepsia. Pt is also asking about reclast infusion, ordered by Dr Dorsey Denver last year and coming due. Medications reviewed and reconciled with what patient reports to be taking. /68 (Site: Right Upper Arm)   Pulse 75   Ht 5' 1\" (1.549 m)   Wt 182 lb (82.6 kg)   SpO2 96%   BMI 34.39 kg/m²     Physical Exam GENERAL: alert, oriented x4, appears pale and tired      Vitals reviewed from intake /68 (Site: Right Upper Arm)   Pulse 75   Ht 5' 1\" (1.549 m)   Wt 182 lb (82.6 kg)   SpO2 96%   BMI 34.39 kg/m²     HEENT: normocephalic atraumatic clear conj/nares/op Conjuncivae pallid    NECK: supple without lymphadenopathy or thyromegaly, no bruit    COR: RRR no  rubs or gallops, 2/6 GERARDO loudest at aortic listening area but not radiating into neck    LUNGS: clear to auscultation with normal work of breathing    ABDOMEN: soft, nontender, normal bowel sounds, no masses or organomegaly noted    EXTREMITIES: warm, dry, well-perfused, no edema    DERM: no suspicious lesions, no rashes    NEURO: cranial nerves intact, normal speech and gait    SPINE: straight, supple, nontender without swelling        ASSESSMENT/PLAN: Pt received counseling and, if relevant, printed instructions for all symptoms listed in CC and HPI, as well as for all diagnoses listed below. 1. Age-related osteoporosis without current pathological fracture--reclast order for infusion center completed. 2. Essential tremor--most likely essential tremor, observe.  Am concerned about high dose remeron and advised patient to discuss with her presciber. 3. Chronic fatigue  - CBC WITH AUTO DIFFERENTIAL; Future  - TSH WITH REFLEX TO FT4; Future  - COMPREHENSIVE METABOLIC PANEL; Future    4. Irritable bowel syndrome with diarrhea--patient to call back with information about the medication that was given and continue as needed dicyclomine. 5. Pallor  - CBC WITH AUTO DIFFERENTIAL; Future  - FERRITIN; Future  - IRON AND TIBC; Future    6. Other abnormality of red blood cells   - FERRITIN; Future  - IRON AND TIBC; Future    7. Gastroesophageal reflux disease without esophagitis    8. Aortic valve disorder    9. Prediabetes  - HEMOGLOBIN A1C; Future    10. Borborygmi  - dicyclomine (BENTYL) 10 MG capsule; Take 1 capsule by mouth 4 times daily as needed (cramping/diarrhea)  Dispense: 60 capsule; Refill: 5      Problem List Items Addressed This Visit     Aortic valve disorder    Gastroesophageal reflux disease without esophagitis    Relevant Medications    dicyclomine (BENTYL) 10 MG capsule    Prediabetes    Relevant Orders    HEMOGLOBIN A1C    Age-related osteoporosis without current pathological fracture    Essential tremor    Chronic fatigue - Primary    Relevant Orders    CBC WITH AUTO DIFFERENTIAL    TSH WITH REFLEX TO FT4    COMPREHENSIVE METABOLIC PANEL    Irritable bowel syndrome with diarrhea    Relevant Medications    dicyclomine (BENTYL) 10 MG capsule      Other Visit Diagnoses     Pallor        Relevant Orders    CBC WITH AUTO DIFFERENTIAL    FERRITIN    IRON AND TIBC    Other abnormality of red blood cells         Relevant Orders    FERRITIN    IRON AND TIBC    Borborygmi        Relevant Medications    dicyclomine (BENTYL) 10 MG capsule            No follow-ups on file. I have spent over 40  minutes face-to-face with this patient and/or guardian. Over 50% of this time was spent on counseling and care coordination.

## 2021-09-13 DIAGNOSIS — M81.0 SENILE OSTEOPOROSIS: ICD-10-CM

## 2021-09-13 RX ORDER — SODIUM CHLORIDE 9 MG/ML
INJECTION, SOLUTION INTRAVENOUS CONTINUOUS
Status: CANCELLED | OUTPATIENT
Start: 2021-09-13

## 2021-09-13 RX ORDER — METHYLPREDNISOLONE SODIUM SUCCINATE 125 MG/2ML
125 INJECTION, POWDER, LYOPHILIZED, FOR SOLUTION INTRAMUSCULAR; INTRAVENOUS ONCE
Status: CANCELLED | OUTPATIENT
Start: 2021-09-13 | End: 2021-09-13

## 2021-09-13 RX ORDER — DIPHENHYDRAMINE HYDROCHLORIDE 50 MG/ML
50 INJECTION INTRAMUSCULAR; INTRAVENOUS ONCE
Status: CANCELLED | OUTPATIENT
Start: 2021-09-13 | End: 2021-09-13

## 2021-09-13 RX ORDER — EPINEPHRINE 1 MG/ML
0.3 INJECTION, SOLUTION, CONCENTRATE INTRAVENOUS PRN
Status: CANCELLED | OUTPATIENT
Start: 2021-09-13

## 2021-09-13 RX ORDER — ZOLEDRONIC ACID 5 MG/100ML
5 INJECTION, SOLUTION INTRAVENOUS ONCE
Status: CANCELLED | OUTPATIENT
Start: 2021-09-13 | End: 2021-09-13

## 2021-09-13 RX ORDER — SODIUM CHLORIDE 0.9 % (FLUSH) 0.9 %
5-40 SYRINGE (ML) INJECTION PRN
Status: CANCELLED | OUTPATIENT
Start: 2021-09-13

## 2021-09-20 ENCOUNTER — VIRTUAL VISIT (OUTPATIENT)
Dept: PSYCHIATRY | Age: 76
End: 2021-09-20
Payer: MEDICARE

## 2021-09-20 ENCOUNTER — TELEPHONE (OUTPATIENT)
Dept: CARDIOLOGY CLINIC | Age: 76
End: 2021-09-20

## 2021-09-20 DIAGNOSIS — F41.1 GAD (GENERALIZED ANXIETY DISORDER): Primary | ICD-10-CM

## 2021-09-20 DIAGNOSIS — Z63.4 GRIEF AT LOSS OF CHILD: ICD-10-CM

## 2021-09-20 DIAGNOSIS — F43.21 GRIEF AT LOSS OF CHILD: ICD-10-CM

## 2021-09-20 DIAGNOSIS — F33.1 MODERATE EPISODE OF RECURRENT MAJOR DEPRESSIVE DISORDER (HCC): ICD-10-CM

## 2021-09-20 PROCEDURE — 99443 PR PHYS/QHP TELEPHONE EVALUATION 21-30 MIN: CPT | Performed by: NURSE PRACTITIONER

## 2021-09-20 SDOH — SOCIAL STABILITY - SOCIAL INSECURITY: DISSAPEARANCE AND DEATH OF FAMILY MEMBER: Z63.4

## 2021-09-20 NOTE — PROGRESS NOTES
PSYCHIATRY PROGRESS NOTE    Orlando KENDALL  1945 9/20/21    Face to Face time via doxy. me:  0 mins  Text/email invite sent:  220p      Phone call start time: 227p  Phone call end time: 257p        CC:   Chief Complaint   Patient presents with    Follow-up     Per excerpt of initial eval as completed by this provider on 9/13/19:    Son, Suhail Butcher drove her to today's appointent. Shauna Forbes is oldest son. Suhail Butcher is favorite child. Always been my favorite. My first girl is very jealous of apoorva being my favorite. Youngest daughter my favorite daughter.       Don't know why was asked to come see me. Thinks for anxiety and depression.       anxiety and depression     Lost  1/16/2018.  x 57 years. Just hard. Son marcelina moved in with me 12/2018 after I fell and broke my leg in 3 spots and my elbow. Had surgery and put a solomon in my leg. I fell over a rollator walker in the paiz. Doesn't recall if got dizzy or what.       Had broken shoulder the jason before when tripped over a cord. Had gotten up in a hurrt. Just fell and hit sholder     Feels like one thing after another     beatriz always an anxious person. Worry about any stupid thing. Try and relax as much as possible. Kids always on my mind. Even though they're grown, still worry. Youngest daughter not in good health, youngest son (apoorva) developed cancer 2 months ago. Was going to doctor for diarrhea. Didn't do colonoscopy. Then finally had blood in stool, went to hospital, tried to do colonoscopy, couldn't d/t mass in rectum. Started chemo/radiation. Now off that but still has diarrhea and radiation. To have colonoscopy on 23rd to see if can perform     Pt has h/o recal cancer. Treated by Dr. Hanley [facility] > 10 years ago. Oncologist wanted me to do chemo and radiation. Didn't want to because was working at that time.       Only takes xanax if has to. Thinks maybe 2-3 days/week, sometimes more, sometimes less. been on for years.   The other doc I had had me up to 2mg. Never took that  Much, made me too tired, would break in half. Now simone gives me 0.5mg. And I break that in half. Can't do without it     Says xanax, fioricet and tizanidine help. Takes mirtazapine      Cardiology recently advised her to reduce zoloft to 50mg/day. Just started lower dose last night. Did ok     Hasn't driven in almost 2 years. Independent in self care, uses bench. Uses walker all the time for ambulation when out of house. Usually sits in manual wc at home, faster to get to bathroom      H/o depression for many years, predating husban'd death. Don't know why, don't have anything to be depressed about. Doesn't feel overly depressed or anxious at present with current meds    HPI:   Leny Contreras is a 67 y.o. female with h/o gastric bypass, colon cancer, fibromyalgia,  anxiety and depression who was contacted via telehealth for follow up. Due to the COVID-19 pandemic restrictions on close contact interactions as recommended by CDC and health authorities, the patient's visit was conducted via telehealth (phone call visit) in lieu of a face to face visit. Patient verbally consented and agreed to proceed. Since last f/u pc 21    Son (apoorva)  august after long illnesses and gradual decline. (Had rectal cancer. Then total colostomy. Then had internal flesh eating bacteria. Had to remove part of bone and muscle from left leg)    Today, pt reports is \"Hanging in there\". Most of the times during day takes 1/2 of the 1mg xanax. Does take 1mg at night    New pcp noticed tremors. Wonders about remeron as cause. Going on x few months. Son notices fork shaking when trying to eat. Typical only when doing something    Very tired. At most sleep 3-4 hours. Awake x 1 hour. Not napping during day. Will lay down to rest    Don't want to blame it all on Lenoria Reusing being gone. But weird this all happening since he passed.     Bust out crying sometimes for no reason    Don't know if would be helpful to have therapist      Taking 9/20/21:     Tylenol prn   Xanax 0.5mg 2-3x/day + 1mg hs   eliquis 5mg bid, in donut hole and will be $405/month, has enough until end of week; will get samples from cardiology   Biotin bid otc   Calcium carbonate, 2/day   Co enzymre q-10, 300mg/day per neuro   Bentyl 10mg bid prn abd cramping, helps keep from being nauseated   Tambocor 100mg bid   Lasix 20mg, 2 tabs every am and 1 tab afternoon M/W/F, feet still really swollen   Magnesium 400mg/day   Metoprolol xl 50mg/day   Mirtazapine 30mg at hs (the new doctor asked if I actually needed to be this)   protonix 40mg bid   kcl 20mg daily   zoloft 100mg hs   Vit b12 1000mg/day   Tizanidine 2mg prn, mostly just at hs   Hydroxyzine 2-3x/day, denies confusion or altered mental status          Subsntace:     Alcohol: denies              Illicits:  denies              Caffeine: hot chocolate   Tobacco:  Denies       Psych ROS:      Depression: \"doing ok; am sad and depressed but don't want to kill myself\"  rates 7-8/10 (10 best);      occ tearful, not too bad.    sleep not great    low appetite, can't each much, hurts my stomach.  early satiety; Pt wanted EGD.   GI wanted to do colonoscpy; he said was just IBS    low energy,     DENIES issues with ADLs, does regularly without assistance.        +  Isolation, mostly only out to store/doctor appts    DENIES SI/SH/HI        Kenna: DENIES  insomnia with increased energy, rapid speech, easily distracted or decreased attention, irritability, racing thoughts, expansive mood, increase in energy and goal directed behavior, grandiosity, flight of ideas       Anxiety: rates 5-6/10 (10 worst); hard to control cause I don't want to feel like this    HISTORICALLY worry about \"anything, everything\" (kids, finances since only has her ssi and not 's, having car problems),  sleep disruption     somatic complaints (+headaches, nausea),   + fatigue;    DENIES restlessness,  fear of doing/saying wrong things, fear of judgement, avoidant of social situations     OCD:  \"always had certain ways of folding my towels and stuff\"  Not overly impairing   DENIES other Repetitive actions or rituals, excessive hand washing, contamination fears, need for symmetry, violent thoughts, hoarding, fear of being harmed, mental or verbal repetition of words or phrases and counting     Panic:  DENIES RECENT     Phobias:  DENIES      Psychosis: DENIES AVH, PARANOIA OR DELUSIONS                    Denies ongoing A/VH, paranoia, delusions             PTSD:  dreaming about friend that passed away (about same time as my  has been gone, about 3 years)   DENIES nightmares, flashbacks, hypervigilance, easily startled, decreased sleep, reliving the event, avoiding situations that remind you of trauma, physical and mental paralysis when reminded of the experience, same despair, easily angry or irritable, trouble concentrating, fear for safety,  Numbness of emotions, feeling of detachment     Eating disorders:  DENIES         JENNIFER 7 SCORE 2/28/2020 12/20/2019 10/11/2019 9/13/2019   JENNIFER-7 Total Score 0 3 8 7     Interpretation of JENNIFER-7 score: 5-9 = mild anxiety, 10-14 = moderate anxiety,   15+ = severe anxiety. Recommend referral to behavioral health for scores 10 or greater.     No data recorded  PHQ-9 Total Score: 8 (12/20/2019 12:35 PM)  Thoughts that you would be better off dead, or of hurting yourself in some way: 0 (12/20/2019 12:35 PM)  PHQ-9 Total Score: 6 (9/13/2019 11:24 AM)  Thoughts that you would be better off dead, or of hurting yourself in some way: 0 (9/13/2019 11:24 AM)  Interpretation of PHQ-9 score:  1-4 = minimal depression, 5-9 = mild depression,   10-14 = moderate depression; 15-19 = moderately severe depression, 20-27 = severe depression      Past Psychiatric History:               Prior hospitalizations:denies              Prior diagnoses: anxiety, depression              Outpatient Treatment:                           Psychiatrist: denies                          Therapist: denies, uninterested              Suicide Attempts: denies              Hx SH:  denies     Past Psychopharmacologic Trials (including response/reactions):     1.  Just what on at present for many years     zoloft  Mirtazapine  Xanax     Past Medical/Surgical History:   Past Medical History:   Diagnosis Date    Acid reflux     Anxiety     Arthritis     [facility] ([facility]     Closed fracture dislocation of right elbow     Depression     Fibromyalgia     Migraine     WHITNEY (obstructive sleep apnea)     Diagnosed years ago, no CPAP    Rectal cancer ([facility]     Wears glasses     DRIVING     Past Surgical History:   Procedure Laterality Date    APPENDECTOMY      CERVICAL DISCECTOMY      ACDF     SECTION      x4    CHOLECYSTECTOMY      COLON SURGERY      Rectal cancer removed using  incision    COLONOSCOPY      SEVERAL    COLONOSCOPY N/A 2019    COLONOSCOPY POLYPECTOMY SNARE/COLD BIOPSY performed by Ashby Gaucher, MD at [address] N/A 2021    COLONOSCOPY WITH BIOPSY performed by Malika Burris MD at [address] [place] [address] Right 2021    RIGHT LEG EVACUATION OF HEMATOMA performed by Hussain Zavala MD at [address] [place] [address] Right 10/25/2018    OPEN REDUCTION INTERNAL FIXATION RIGHT FEMUR SUPRACONDYLAR FEMORAL FRACTURE WITH C-ARM performed by Daquan Gregg MD at [address] [place] [address] Right 2021    RIGHT LEG HEMATOMA EVAKUATION, [address], AND WOUND [place] performed by Lj Crouch MD at [address] [place] [address] Right 2021    RIGHT LOWER EXTREMITY SKIN GRAFT performed by Lj Crouch MD at [address] [facility] Left     UPPER GASTROINTESTINAL ENDOSCOPY N/A 2019  Biotin 1000 MCG TABS Take 1 tablet by mouth 2 times daily  (Patient not taking: Reported on 10/25/2021)       No current facility-administered medications on file prior to visit. Controlled Substance Monitoring:      Acute and Chronic Pain Monitoring:     RX Monitoring 9/20/2021   Periodic Controlled Substance Monitoring No signs of potential drug abuse or diversion identified.      08/25/2021  1   08/25/2021  Alprazolam 1 MG Tablet  90.00  30 Ch Wet   [id number]   [place] ([id number])   0  6.00 LME  [place]   07/28/2021  1   06/21/2021  Alprazolam 0.5 MG Tablet  90.00  30 Ch Wet   [id number]   [place] ([id number])   1  3.00 LME  [place]   06/23/2021  1   06/21/2021  Alprazolam 0.5 MG Tablet  90.00  30 Ch Wet   [id number]   [place] ([id number])   0  3.00 L[place]   05/26/2021  1   05/26/2021  Alprazolam 0.5 MG Tablet  90.00  30 Ch Wet   [id number]   [place] (1801)   0  3.00 LME  [place]   03/19/2021  1   01/20/2021  Alprazolam 0.5 MG Tablet  90.00  30 Ch Wet   [id number]   [facility] (1801)   1  3.00 LME  [place]   02/23/2021  1   02/22/2021  Hydrocodone-Acetamin 5-325 MG  40.00  [address]   [id number]   [place] ([id number])   0  28.57 MME  Comm [facility]   [place]   01/20/2021  2   01/20/2021  Alprazolam 0.5 MG Tablet  90.00  30 Ch Wet   [id number]   [place] ([id number])   0  3.00 LME  [place]   12/01/2020  1   08/28/2020  Alprazolam 0.5 MG Tablet  90.00  30 Ch Wet   [id number]   [place]ro (1801)   2  3.00 LME  [place]   10/24/2020  1   08/28/2020  Alprazolam 0.5 MG Tablet  90.00  30 Ch Wet   [id number]   [facility]ro (1801)   1  3.00 LME  [place]   09/16/2020  1   08/28/2020  Alprazolam 0.5 MG Tablet  90.00  30 Ch Wet   [id number]   [facility]ro (1801)   0  3.00 LME  [place]   08/17/2020  1   05/29/2020  Alprazolam 0.5 MG Tablet  60.00  30 Ch Wet   [id number]   [facility]ro ([id number])   2  2.00 LME  [place]   [place]   07/13/2020  1   05/29/2020  Alprazolam 0.5 MG Tablet  60.00  30 Ch Wet   [id number]   [facility]ro (1801)   1  2.00 LME  [place]   06/15/2020  1   05/29/2020  Alprazolam 0.5 MG Tablet  60.00  30 Ch Wet   [id number]   [place]ro (1801)   0  2.00 LME  [place]   05/09/2020  1   05/08/2020  Alprazolam 0.5 MG Tablet  60.00  30 Ch Wet   [id number]   [facility]ro (1801)   0  2.00 LME  [place]   04/09/2020  1   04/08/2020  Alprazolam 0.5 MG Tablet  60.00  30 Ch Wet   [id number]   [facility]ro (1801)   0  2.00 LME  [place]   03/03/2020  1   12/20/2019  Alprazolam 0.5 MG Tablet  60.00  30 Ch Wet   [id number]   [facility]ro (1801)   0  2.00 LME  [place]   01/27/2020  1   12/20/2019  Alprazolam 0.5 MG Tablet  60.00  30 Ch Wet   [id number]   Kro (1893)   1  2.00 LME  [place]   12/28/2019  1   12/20/2019  Alprazolam 0.5 MG Tablet  60.00  30 Ch Wet   [id number]   Kro (1893)   0  2.00 LME  [place]   11/27/2019  1   10/11/2019  Alprazolam 0.5 MG Tablet  60.00  30 Ch Wet   [id number]   Kro ([id number])   1  2.00 LME  [place]   10/11/2019  1   10/11/2019  Alprazolam 0.5 MG Tablet  60.00  30 Ch Wet   [id number]   Kro ([id number])   0  2.00 LME  [place]     OBJECTIVE:  Vitals: Wt Readings from Last 3 Encounters:   09/09/21 182 lb (82.6 kg)   09/08/21 187 lb (84.8 kg)   07/07/21 181 lb (82.1 kg)       There were no vitals filed for this visit. ROS: Denies trouble with fever, rash, vision changes, chest pain, shortness of breath, nausea, extremity pain, weakness, dysuria. +headaches daily \"always had headaches\" saw neuro until he was insisting on botox.   Made me sick thinking about it, so cancelled botox and future fu        Mental Status Exam:      Appearance    unable to assess d/t f/u visit completed via pc  Muscle strength/tone: unable to assess d/t f/u visit completed via pc  Gait/station: unable to assess d/t f/u visit completed via pc  Speech    spontaneous, normal rate and normal volume  Mood    \"sad and depressed\", Anxious  Affect  Congruent to thought content and mood  Thought Content    preoccupations, no delusions voiced  Thought Process    coherent, linear  Associations    logical connections  Perceptions: denies AH/VH,   [address]  Orientation    oriented to person, place, time, and general circumstances  Memory    recent and remote memory intact  Attention/Concentration    intact  Ability to understand instructions Yes  Ability to respond meaningfully Yes  Language: [address] of knowledge/Intellect: Average  SI:   no suicidal ideation  HI: Denies HI    Labs:     Orders Only on 09/09/2021   Component Date Value    Hemoglobin A1C 09/09/2021 6.0     eAG 09/09/2021 125.5     Iron 09/09/2021 83     TIBC 09/09/2021 379     Iron Saturation 09/09/2021 22     Ferritin 09/09/2021 58.8     Sodium 09/09/2021 139     Potassium 09/09/2021 4.0     Chloride 09/09/2021 98*    CO2 09/09/2021 24     Anion Gap 09/09/2021 17*    Glucose 09/09/2021 125*    BUN 09/09/2021 22*    CREATININE 09/09/2021 1.0     GFR Non- 09/09/2021 54*    GFR  09/09/2021 >60     Calcium 09/09/2021 9.7     Total Protein 09/09/2021 6.9     Albumin 09/09/2021 4.3     Albumin/Globulin Ratio 09/09/2021 1.7     Total Bilirubin 09/09/2021 <0.2     Alkaline Phosphatase 09/09/2021 107     ALT 09/09/2021 14     AST 09/09/2021 24     Globulin 09/09/2021 2.6     TSH Reflex FT4 09/09/2021 1.06     WBC 09/09/2021 6.5     RBC 09/09/2021 3.56*    Hemoglobin 09/09/2021 10.8*    Hematocrit 09/09/2021 33.7*    MCV 09/09/2021 94.7     MCH 09/09/2021 30.2     MCHC 09/09/2021 31.9     RDW 09/09/2021 15.3     Platelets 20/35/3324 215     MPV 09/09/2021 8.3     Neutrophils % 09/09/2021 77.0     Lymphocytes % 09/09/2021 13.5     Monocytes % 09/09/2021 7.4     Eosinophils % 09/09/2021 1.5     Basophils % 09/09/2021 0.6     Neutrophils Absolute 09/09/2021 5.0     Lymphocytes Absolute 09/09/2021 0.9*    Monocytes Absolute 09/09/2021 0.5     Eosinophils Absolute 09/09/2021 0.1     Basophils Absolute 09/09/2021 0.0    Orders Only on 04/13/2021   Component Date Value    Sodium 2021 142     Potassium 2021 4.5     Chloride 2021 102     CO2 2021 24     Anion Gap 2021 16     Glucose 2021 123*    BUN 2021 22*    CREATININE 2021 0.8     GFR Non- 2021 >60     GFR  2021 >60     Calcium 2021 9.4     Total Protein 2021 6.9     Albumin 2021 4.5     Albumin/Globulin Ratio 2021 1.9     Total Bilirubin 2021 <0.2     Alkaline Phosphatase 2021 97     ALT 2021 14     AST 2021 22     Globulin 2021 2.4     WBC 2021 6.6     RBC 2021 3.87*    Hemoglobin 2021 12.3     Hematocrit 2021 37.6     MCV 2021 97.2     MCH 2021 31.8     MCHC 2021 32.8     RDW 2021 20.4*    Platelets  227     MPV 2021 8.3     Neutrophils % 2021 65.2     Lymphocytes % 2021 25.1     Monocytes % 2021 7.7     Eosinophils % 2021 1.6     Basophils % 2021 0.4     Neutrophils Absolute 2021 4.3     Lymphocytes Absolute 2021 1.7     Monocytes Absolute 2021 0.5     Eosinophils Absolute 2021 0.1     Basophils Absolute 2021 0.0        Last Drug screen:  Lab Results   Component Value Date    LABAMPH Neg 2018    LABBENZ Neg 2018    COCAIMETSCRU Neg 2018    MDMA Not Detected 2019    LABMETH Neg 2018    OPIATESCREENURINE POSITIVE 2018    PHENCYCLIDINESCREENURINE Neg 2018         Imagin/1/19 CT head wo contrast:      Impression   No acute traumatic intracranial abnormality       Atrophy and small-vessel ischemic change       Trace paranasal sinus disease             ASSESSMENT AND PLAN     Diagnosis Orders   1. JENNIFER (generalized anxiety disorder)     2. Moderate episode of recurrent [facility] ([facility]     3. Grief at loss of child            1.  Safety: NO Imminent risk of danger to/self/others based on the factors considered below. Appropriate for outpatient level of care. Safety plan includes: 911, PES, hotlines, and interventions discussed today.      Risk factors: Age <25 or >55,  social isolation,no outpatient services in place, and no collateral information to support safety.     Protective factors:, female gender,denies depression, denies suicidal ideation, does not have lethal plan, does not have access to guns or weapons, patient is erika for safety, no prior suicide attempts, no family h/o suicide, no substance abuse,compliant with recommended medications,and patient is future oriented.        2. Psychiatric    - continue zoloft 100mg/day. Noted Significant increase in depressive symptoms and tearfulness when dose briefly reduced by cardiology in past.  Consider increase vs switch to different ssri if needed in future    - continue xanax 0.5mg (written as tid to make prescription last longer, though reports doesn't take more than bid. Endorses some issues with getting med refills monthly d/t limited finances. Has been on xanax for many years. refill history cw regular use bid. Advised not long term strategy. Goal was to reduce/eliminate. Would go slowly d/t length of time has been on this. Dose was reduced from previous 2mg bid to 0.5mg bid. Due to recent increase in anxiety symptoms following son's death, do not feel is appropriate time to try and reduce. Will continue to write as 1mg tid. May need to temporarily increase to cover current symptoms      - reduce  Mirtazapine to 15mg nightly (was previously increased 12/2019). Notes this had helped with insomnia, mood and anxiety symptoms. New pcp noticed hand tremors. Questions if mirtazapine could be contributing       - continue hydroxyzine 25-50mg bid prn itching. SAYS HASN'T NEEDED THIS LATELY Caution with possible anticholinergic se's including confusion.   Denies this being issue     - med contract signed 9/13/19  - UDS 9/13/19      -Labs: reviewed in Epic     - uninterested in outpt therapy.     -OARRS reviewed, c/w history  -R/b/se/a d/w pt who consents.     3. Medical  -Following with Samy Hernández MD  - no longer following with neuro for ha's. Doesn't want to return or see anyone new     4. Substance   -No active issues.     5.  RTC - 4 weeks, call sooner if needed    Steph Ruano [place]  Psychiatric Nurse Practitioner

## 2021-09-20 NOTE — TELEPHONE ENCOUNTER
Called and spoke with patient letting her know that we do have Eliquis samples ready for here at our office, patient verbalized understanding and states that her appointment upstairs was cancelled so she will be in sometime this week to pick them up.

## 2021-09-20 NOTE — TELEPHONE ENCOUNTER
Medication Samples    Medication:  apixaban (ELIQUIS)     Dosage of the medication:  5 MG TABS tablet     How are you taking this medication (QD, BID, TID, QID, PRN):  Take 1 tablet by mouth 2 times daily     in the office or Mail to your home?  in office. She has an appointment at 11:00 somewhere upstairs and would like to pick them up after her apt.      You can reach Barbara Luis at #[phone]

## 2021-10-18 ENCOUNTER — HOSPITAL ENCOUNTER (OUTPATIENT)
Dept: INFUSION THERAPY | Age: 76
Setting detail: INFUSION SERIES
Discharge: HOME | End: 2021-10-18
Payer: MEDICARE

## 2021-10-18 VITALS
OXYGEN SATURATION: 95 % | DIASTOLIC BLOOD PRESSURE: 81 MMHG | SYSTOLIC BLOOD PRESSURE: 123 MMHG | HEART RATE: 71 BPM | TEMPERATURE: 97.2 F | RESPIRATION RATE: 17 BRPM

## 2021-10-18 DIAGNOSIS — M85.80 OSTEOPENIA, UNSPECIFIED LOCATION: ICD-10-CM

## 2021-10-18 DIAGNOSIS — M81.0 SENILE OSTEOPOROSIS: Primary | ICD-10-CM

## 2021-10-18 PROCEDURE — 6360000002 HC RX W HCPCS: Performed by: INTERNAL MEDICINE

## 2021-10-18 PROCEDURE — 96365 THER/PROPH/DIAG IV INF INIT: CPT

## 2021-10-18 PROCEDURE — 2580000003 HC RX 258: Performed by: INTERNAL MEDICINE

## 2021-10-18 RX ORDER — SODIUM CHLORIDE 0.9 % (FLUSH) 0.9 %
5-40 SYRINGE (ML) INJECTION PRN
Status: CANCELLED | OUTPATIENT
Start: 2021-10-18

## 2021-10-18 RX ORDER — SODIUM CHLORIDE 0.9 % (FLUSH) 0.9 %
5-40 SYRINGE (ML) INJECTION PRN
Status: DISCONTINUED | OUTPATIENT
Start: 2021-10-18 | End: 2021-10-19 | Stop reason: HOSPADM

## 2021-10-18 RX ORDER — ZOLEDRONIC ACID 5 MG/100ML
5 INJECTION, SOLUTION INTRAVENOUS ONCE
Status: CANCELLED | OUTPATIENT
Start: 2021-10-18 | End: 2021-10-18

## 2021-10-18 RX ORDER — EPINEPHRINE 1 MG/ML
0.3 INJECTION, SOLUTION, CONCENTRATE INTRAVENOUS PRN
Status: CANCELLED | OUTPATIENT
Start: 2021-10-18

## 2021-10-18 RX ORDER — SODIUM CHLORIDE 9 MG/ML
INJECTION, SOLUTION INTRAVENOUS CONTINUOUS
Status: CANCELLED | OUTPATIENT
Start: 2021-10-18

## 2021-10-18 RX ORDER — METHYLPREDNISOLONE SODIUM SUCCINATE 125 MG/2ML
125 INJECTION, POWDER, LYOPHILIZED, FOR SOLUTION INTRAMUSCULAR; INTRAVENOUS ONCE
Status: CANCELLED | OUTPATIENT
Start: 2021-10-18 | End: 2021-10-18

## 2021-10-18 RX ORDER — ZOLEDRONIC ACID 5 MG/100ML
5 INJECTION, SOLUTION INTRAVENOUS ONCE
Status: COMPLETED | OUTPATIENT
Start: 2021-10-18 | End: 2021-10-18

## 2021-10-18 RX ORDER — DIPHENHYDRAMINE HYDROCHLORIDE 50 MG/ML
50 INJECTION INTRAMUSCULAR; INTRAVENOUS ONCE
Status: CANCELLED | OUTPATIENT
Start: 2021-10-18 | End: 2021-10-18

## 2021-10-18 RX ADMIN — Medication 10 ML: at 11:43

## 2021-10-18 RX ADMIN — ZOLEDRONIC ACID 5 MG: 5 INJECTION, SOLUTION INTRAVENOUS at 11:07

## 2021-10-18 RX ADMIN — Medication 10 ML: at 11:06

## 2021-10-18 NOTE — PROGRESS NOTES
[facility]    Reclast Infusion    NAME:  Tiffanie Santillan  YOB: 1945  MEDICAL RECORD NUMBER:  [id number]  DATE:  10/18/2021    Patient arrived to [facility]   [x] per wheelchair   [] ambulatory     Is this the patient's first Reclast Infusion? No     Date of last Reclast Infusion? September 29, 2020. Did the patient experience any adverse reactions to Reclast? No    Any recent oral or dental surgery? No    Any recent active fever, infections and/or illnesses? No    Patient has history of pathological fracture? No    Patient has had a recent fracture due to trauma or injury? No    Patient has had a recent orthopedic surgery or procedure done? No    Approximate date of last Dexa scan? 8/17/2020      Patient has had at least 24 oz. of fluids today without caffeine? Yes      /81   Pulse 71   Temp 97.2 °F (36.2 °C) (Infrared)   Resp 17   SpO2 95%     Labs   BMP:   Lab Results   Component Value Date     09/09/2021    K 4.0 09/09/2021    K 3.5 02/12/2021    CL 98 09/09/2021    CO2 24 09/09/2021    BUN 22 09/09/2021    LABALBU 4.3 09/09/2021    CREATININE 1.0 09/09/2021    CALCIUM 9.7 09/09/2021    GFRAA >60 09/09/2021    LABGLOM 54 09/09/2021    GLUCOSE 125 09/09/2021       Administered Reclast via: [x] Peripheral access    [] PICC access    [] Port access    Reclast 5 mg given IVPB over 33 minutes. Response to treatment:  Well tolerated by patient.       Electronically signed by Henrietta Mathias RN on 10/18/2021 at 11:01 AM

## 2021-10-19 ENCOUNTER — TELEPHONE (OUTPATIENT)
Dept: CARDIOLOGY CLINIC | Age: 76
End: 2021-10-19

## 2021-10-19 NOTE — TELEPHONE ENCOUNTER
Medication Samples     Medication:  apixaban (ELIQUIS)      Dosage of the medication:  5 MG TABS tablet      How are you taking this medication (QD, BID, TID, QID, PRN):  Take 1 tablet by mouth 2 times daily      in the office or Mail to your home?      in office.  She has an appointment at 11:00 somewhere upstairs and would like to pick them up after her apt.      You can reach Ashley Conti at #[phone]

## 2021-10-20 NOTE — TELEPHONE ENCOUNTER
Prepared samples of Eliquis 5 mg tablets. Four cards with 14 tablets each total of 56 tablets. Patient will  samples today.

## 2021-10-25 ENCOUNTER — VIRTUAL VISIT (OUTPATIENT)
Dept: PSYCHIATRY | Age: 76
End: 2021-10-25
Payer: MEDICARE

## 2021-10-25 DIAGNOSIS — F41.1 GAD (GENERALIZED ANXIETY DISORDER): ICD-10-CM

## 2021-10-25 DIAGNOSIS — F33.1 MODERATE EPISODE OF RECURRENT MAJOR DEPRESSIVE DISORDER (HCC): Primary | ICD-10-CM

## 2021-10-25 DIAGNOSIS — Z63.4 GRIEF AT LOSS OF CHILD: ICD-10-CM

## 2021-10-25 DIAGNOSIS — F43.21 GRIEF AT LOSS OF CHILD: ICD-10-CM

## 2021-10-25 PROCEDURE — 99443 PR PHYS/QHP TELEPHONE EVALUATION 21-30 MIN: CPT | Performed by: NURSE PRACTITIONER

## 2021-10-25 RX ORDER — SERTRALINE HYDROCHLORIDE 100 MG/1
150 TABLET, FILM COATED ORAL DAILY
Qty: 90 TABLET | Refills: 1
Start: 2021-10-25 | End: 2022-01-17

## 2021-10-25 RX ORDER — ALPRAZOLAM 1 MG/1
TABLET ORAL
Qty: 90 TABLET | Refills: 1 | Status: SHIPPED | OUTPATIENT
Start: 2021-10-27 | End: 2021-12-23 | Stop reason: SDUPTHER

## 2021-10-25 SDOH — SOCIAL STABILITY - SOCIAL INSECURITY: DISSAPEARANCE AND DEATH OF FAMILY MEMBER: Z63.4

## 2021-10-25 NOTE — PROGRESS NOTES
PSYCHIATRY PROGRESS NOTE    Natasha KENDALL  1945  10/25/21      Phone call start time: 1236p  Phone call end time:110p        CC:   Chief Complaint   Patient presents with    Follow-up     Per excerpt of initial eval as completed by this provider on 9/13/19:    Son, Ryan Murillo drove her to today's appointent. Savanna Oconnell is oldest son. Ryan Murillo is favorite child. Always been my favorite. My first girl is very jealous of apoorva being my favorite. Youngest daughter my favorite daughter.       Don't know why was asked to come see me. Thinks for anxiety and depression.       anxiety and depression     Lost  1/16/2018.  x 57 years. Just hard. Son marcelina moved in with me 12/2018 after I fell and broke my leg in 3 spots and my elbow. Had surgery and put a solomon in my leg. I fell over a rollator walker in the paiz. Doesn't recall if got dizzy or what.       Had broken shoulder the jason before when tripped over a cord. Had gotten up in a hurrt. Just fell and hit sholder     Feels like one thing after another     beatriz always an anxious person. Worry about any stupid thing. Try and relax as much as possible. Kids always on my mind. Even though they're grown, still worry. Youngest daughter not in good health, youngest son (apoorva) developed cancer 2 months ago. Was going to doctor for diarrhea. Didn't do colonoscopy. Then finally had blood in stool, went to hospital, tried to do colonoscopy, couldn't d/t mass in rectum. Started chemo/radiation. Now off that but still has diarrhea and radiation. To have colonoscopy on 23rd to see if can perform     Pt has h/o recal cancer. Treated by Dr. Jani Lane > 10 years ago. Oncologist wanted me to do chemo and radiation. Didn't want to because was working at that time.       Only takes xanax if has to. Thinks maybe 2-3 days/week, sometimes more, sometimes less. been on for years. The other doc I had had me up to 2mg.   Never took that  Much, made me too tired, would break in half. Now simone gives me 0.5mg. And I break that in half. Can't do without it     Says xanax, fioricet and tizanidine help. Takes mirtazapine      Cardiology recently advised her to reduce zoloft to 50mg/day. Just started lower dose last night. Did ok     Hasn't driven in almost 2 years. Independent in self care, uses bench. Uses walker all the time for ambulation when out of house. Usually sits in manual wc at home, faster to get to bathroom      H/o depression for many years, predating husban'd death. Don't know why, don't have anything to be depressed about. Doesn't feel overly depressed or anxious at present with current meds    HPI:   Giovanni Solorzano is a 67 y.o. female with h/o gastric bypass, colon cancer, fibromyalgia,  anxiety and depression who was contacted via telehealth for follow up. Due to the COVID-19 pandemic restrictions on close contact interactions as recommended by CDC and health authorities, the patient's visit was conducted via telehealth (phone call visit) in lieu of a face to face visit. Patient verbally consented and agreed to proceed. SINCE LAST F/U 21:    Hanging in there. List of things to talk about      Stopped mirtazapine. Last week was first week off it. Having a lot of the shakiness, even inside my body feels like insides shaking. ? What's going on. Can be when holding something or not    Taking 1/2 xanax 3-4xday. Takes full one at night. Does calm me down a little    So tired. All I want to do is lay down. Don't sleep. Just lay there. Feel better laying down than do sitting up. Feel bored. Even [employer] TV doesn't hold my attention. Not like me    Can burst out crying for no reason. ? If still because of Dandy Hamilton like real sensitive. Don't want to talk to anyone. Text my kids. Talk to don when he's here. Haven't talked to MARIO since Slim's  in august.  Don't know what to say.       Don't want to eat hardly anything. Will eat couple bites. Then don't want anything else. Did order hoagie last night. Ate 1/2. Before that, for 3 days ate 2 cans green beans. Feel hungry. After couple bites stomach hurts and don't want anything else so stop. Had endoscopy and they say nothing's wrong except IBS. More constipated than diarrhea        Taking 10/25/21:     excedrin prn   Xanax 0.5mg 3-4x/day + 1mg hs   eliquis 5mg bid, from cardiology   Calcium carbonate, 2/day   Vit b 12 daily   Bentyl 10mg bid prn abd cramping, helps keep from being nauseated   Tambocor 100mg bid from cards   Lasix 20mg, 2 tabs every am and 1 tab afternoon M/W/F, feet still really swollen   Magnesium 400mg/day   Metoprolol xl 50mg/day   protonix 40mg bid   kcl 20mg daily   zoloft 100mg hs   Tizanidine 2mg prn, mostly just at hs   Hydroxyzine 2-3x/day, denies confusion or altered mental status      reclast infusion last Monday 10/18/21    Substance:     Alcohol: denies              Illicits:  denies              Caffeine: hot chocolate, not in a while   Tobacco:  Denies       Psych ROS:      Depression:  \"not good\"  Rates mood 4-5/10 (10 best);      tearful, can bust out at any time    Sleep, sometimes to bed 930p, sleep until 1am, up for couple hours, then back to bed and sleep until 7a.   Falls asleep easily with xanax and tizanidine but only at night    low appetite, can't each much, hurts my stomach.  early satiety;     low energy, no motivation    DENIES issues with ADLs, does regularly without assistance.        +  Isolation, mostly only out to store/doctor appts    DENIES SI/SH/HI        Kenna: DENIES  insomnia with increased energy, rapid speech, easily distracted or decreased attention, irritability, racing thoughts, expansive mood, increase in energy and goal directed behavior, grandiosity, flight of ideas   IMPULSIVITY  denies       Anxiety: rates 5-6/10 (10 worst); hard to control cause I don't want to feel like this    HISTORICALLY worry about \"anything, everything\" (kids, finances since only has her ssi and not 's, having car problems),  sleep disruption     somatic complaints (+headaches, nausea),   + fatigue;    DENIES restlessness,  fear of doing/saying wrong things, fear of judgement, avoidant of social situations     OCD:  \"always had certain ways of folding my towels and stuff\"  Not overly impairing   DENIES other Repetitive actions or rituals, excessive hand washing, contamination fears, need for symmetry, violent thoughts, hoarding, fear of being harmed, mental or verbal repetition of words or phrases and counting     Panic:  \"don't really know or know what that is, my daughter has them\"     Phobias:  DENIES      Psychosis: DENIES AVH, PARANOIA OR DELUSIONS           PTSD:  DENIES nightmares, flashbacks, hypervigilance, easily startled, decreased sleep, reliving the event, avoiding situations that remind you of trauma, physical and mental paralysis when reminded of the experience, same despair, easily angry or irritable, trouble concentrating, fear for safety,  Numbness of emotions, feeling of detachment     Eating disorders:  DENIES         JENNIFER 7 SCORE 2/28/2020 12/20/2019 10/11/2019 9/13/2019   JENNIFER-7 Total Score 0 3 8 7     Interpretation of JENNIFER-7 score: 5-9 = mild anxiety, 10-14 = moderate anxiety,   15+ = severe anxiety. Recommend referral to behavioral health for scores 10 or greater.     No data recorded  PHQ-9 Total Score: 8 (12/20/2019 12:35 PM)  Thoughts that you would be better off dead, or of hurting yourself in some way: 0 (12/20/2019 12:35 PM)  PHQ-9 Total Score: 6 (9/13/2019 11:24 AM)  Thoughts that you would be better off dead, or of hurting yourself in some way: 0 (9/13/2019 11:24 AM)  Interpretation of PHQ-9 score:  1-4 = minimal depression, 5-9 = mild depression,   10-14 = moderate depression; 15-19 = moderately severe depression, 20-27 = severe depression      Past Psychiatric History:               Prior hospitalizations:denies              Prior diagnoses: anxiety, depression              Outpatient Treatment:                           Psychiatrist: denies                          Therapist: denies, uninterested              Suicide Attempts: denies              Hx SH:  denies     Past Psychopharmacologic Trials (including response/reactions):     1.  Just what on at present for many years     zoloft  Mirtazapine  Xanax     Past Medical/Surgical History:   Past Medical History:   Diagnosis Date    Acid reflux     Anxiety     Arthritis     Atrial fibrillation (HCC)     Closed fracture dislocation of right elbow     Depression     Fibromyalgia     Migraine     WHITNEY (obstructive sleep apnea)     Diagnosed years ago, no CPAP    Rectal cancer ([facility] [address]     Wears glasses     DRIVING     Past Surgical History:   Procedure Laterality Date    APPENDECTOMY      CERVICAL DISCECTOMY      ACDF     SECTION      x4    CHOLECYSTECTOMY      COLON SURGERY      Rectal cancer removed using  incision    COLONOSCOPY      SEVERAL    COLONOSCOPY N/A 2019    COLONOSCOPY POLYPECTOMY SNARE/COLD BIOPSY performed by Kaushik Hoskins MD at [address] N/A 2021    COLONOSCOPY WITH BIOPSY performed by Christa Martinez MD at [address] [place] Right 2021    RIGHT LEG EVACUATION OF HEMATOMA performed by Ab Means MD at [address] 10/25/2018    OPEN REDUCTION INTERNAL FIXATION RIGHT FEMUR SUPRACONDYLAR FEMORAL FRACTURE WITH C-ARM performed by Caitlyn Marcelino MD at [address] [facility] Right 2021    RIGHT LEG HEMATOMA EVAKUATION, [address], AND WOUND [place] performed by Ester Uriostegui MD at [address] [facility] Right 2021    RIGHT LOWER EXTREMITY SKIN GRAFT performed by Ester Uriostegui MD at [address] [facility] ARTHROPLASTY Left     UPPER GASTROINTESTINAL ENDOSCOPY N/A 11/26/2019    ESOPHAGOGASTRODUODENOSCOPY performed by Oh Bailey MD at [address] [facility] N/A 8/18/2020    EGD BIOPSY performed by Oh Bailey MD at [address] [facility] History   Problem Relation Age of Onset    COPD Mother          PCP: Sandra Miranda MD      Allergies: Allergies   Allergen Reactions    Demerol Hcl [Meperidine] Other (See Comments)     PATIENT STATES SHE GOT VERY ANXIOUS-LIKE SHE WAS CLIMBING THE WALLS    Pcn [Penicillins]      Patient reports she has not had since she was a child, thinks reaction was hives.      Adhesive Tape Rash         Current Medications:   Current Outpatient Medications on File Prior to Visit   Medication Sig Dispense Refill    dicyclomine (BENTYL) 10 MG capsule Take 1 capsule by mouth 4 times daily as needed (cramping/diarrhea) 60 capsule 5    tiZANidine (ZANAFLEX) 2 MG tablet Take 1 tablet by mouth every 6 hours as needed (muscle pain) 60 tablet 11    potassium chloride (KLOR-CON M20) 20 MEQ extended release tablet Take 1 tablet by mouth daily 90 tablet 1    flecainide (TAMBOCOR) 100 MG tablet TAKE 1 TABLET BY MOUTH 2 TIMES A  tablet 3    Cyanocobalamin (VITAMIN B 12 PO) Take 2,500 mg by mouth daily      calcium carbonate-vitamin D (CALCIUM 600+D) 600-200 MG-UNIT TABS Take 1 tablet by mouth 2 times daily      metoprolol succinate (TOPROL XL) 50 MG extended release tablet TAKE ONE TABLET BY MOUTH DAILY 180 tablet 1    pantoprazole (PROTONIX) 40 MG tablet Take 1 tablet by mouth 2 times daily 180 tablet 1    apixaban (ELIQUIS) 5 MG TABS tablet Take 1 tablet by mouth 2 times daily 180 tablet 5    furosemide (LASIX) 20 MG tablet Take 1 tablet by mouth daily 30 tablet 3    co-enzyme Q-10 50 MG capsule Take 150 mg by mouth 2 times daily  (Patient not taking: Reported on 10/25/2021)      acetaminophen (TYLENOL) 500 MG tablet Take 500 mg by mouth every 6 hours as needed for Pain (Patient not taking: Reported on 10/25/2021)      Biotin 1000 MCG TABS Take 1 tablet by mouth 2 times daily  (Patient not taking: Reported on 10/25/2021)       No current facility-administered medications on file prior to visit. Controlled Substance Monitoring:      Acute and Chronic Pain Monitoring:   RX Monitoring 10/25/2021   Periodic Controlled Substance Monitoring No signs of potential drug abuse or diversion identified.      09/29/2021  1   08/25/2021  Alprazolam 1 MG Tablet  90.00  30 Ch Wet   [id number]   [facility]ro ([id number])   1  6.00 LME  [place]   08/25/2021  1   08/25/2021  Alprazolam 1 MG Tablet  90.00  30 Ch Wet   [id number]   [facility]ro (1801)   0  6.00 LME  [place]   07/28/2021  1   06/21/2021  Alprazolam 0.5 MG Tablet  90.00  30 Ch Wet   [id number]   [facility]ro (1801)   1  3.00 LME  [place]   06/23/2021  1   06/21/2021  Alprazolam 0.5 MG Tablet  90.00  30 Ch Wet   [id number]   [facility]ro (1801)   0  3.00 L[place]   05/26/2021  1   05/26/2021  Alprazolam 0.5 MG Tablet  90.00  30 Ch Wet   [id number]   [facility]ro (1801)   0  3.00 LME  [place]   03/19/2021  1   01/20/2021  Alprazolam 0.5 MG Tablet  90.00  30 Ch Wet   [id number]   [facility]ro (1801)   1  3.00 LME  [place]   02/23/2021  1   02/22/2021  Hydrocodone-Acetamin 5-325 MG  40.00  [address]   [id number]   [facility] ([id number])   0  28.57 MME  Comm [facility]   [place]   01/20/2021  2   01/20/2021  Alprazolam 0.5 MG Tablet  90.00  30 Ch Wet   [id number]   [facility]ro (1801)   0  3.00 LME  Comm [place]   [place]   12/01/2020  1   08/28/2020  Alprazolam 0.5 MG Tablet  90.00  30 Ch Wet   [id number]   [facility]ro (1801)   2  3.00 LME  [place]   10/24/2020  1   08/28/2020  Alprazolam 0.5 MG Tablet  90.00  30 Ch Wet   [id number]   [facility]ro (1801)   1  3.00 LME  [place]   09/16/2020  1   08/28/2020  Alprazolam 0.5 MG Tablet  90.00  30 Ch Wet   [id number]   [facility]ro (1801)   0  3.00 LME  [place]   08/17/2020  1   05/29/2020  Alprazolam 0.5 MG Tablet  60.00  30 Ch Wet   [id number]   [facility]ro (1801)   2  2.00 LME  [place]   07/13/2020  1   05/29/2020  Alprazolam 0.5 MG Tablet  60.00  30 Ch Wet   [id number]   [facility]ro (1801)   1  2.00 LME  [place]   06/15/2020  1   05/29/2020  Alprazolam 0.5 MG Tablet  60.00  30 Ch Wet   [id number]   [facility]ro (1801)   0  2.00 LME  [place]   05/09/2020  1   05/08/2020  Alprazolam 0.5 MG Tablet  60.00  30 Ch Wet   [id number]   [facility]ro (1801)   0  2.00 LME  [place]   [place]   04/09/2020  1   04/08/2020  Alprazolam 0.5 MG Tablet  60.00  30 Ch Wet   [id number]   [facility]ro (1801)   0  2.00 LME  [place]   03/03/2020  1   12/20/2019  Alprazolam 0.5 MG Tablet  60.00  30 Ch Wet   [id number]   Kro (1801)   0  2.00 LME  [place]   [place]   01/27/2020  1   12/20/2019  Alprazolam 0.5 MG Tablet  60.00  30 Ch Wet   [id number]   Kro (1893)   1  2.00 LME  [place]   12/28/2019  1   12/20/2019  Alprazolam 0.5 MG Tablet  60.00  30 Ch Wet   [id number]   Kro (1893)   0  2.00 LME  [place]   11/27/2019  1   10/11/2019  Alprazolam 0.5 MG Tablet  60.00  30 Ch Wet   [id number]   Kro ([id number])   1  2.00 LME  [place]     OBJECTIVE:  Vitals: Wt Readings from Last 3 Encounters:   09/09/21 182 lb (82.6 kg)   09/08/21 187 lb (84.8 kg)   07/07/21 181 lb (82.1 kg)       There were no vitals filed for this visit. ROS: Denies trouble with fever, rash, vision changes, chest pain, shortness of breath, nausea, extremity pain, weakness, dysuria. +headaches daily \"always had headaches\" saw neuro until he was insisting on botox.   Made me sick thinking about it, so cancelled botox and future fu   Dry cough x 1 month        Mental Status Exam:      Appearance    casually dressed  Muscle strength/tone:no abn movements noted  Gait/station: not assessed during VV  Speech    spontaneous, normal rate and normal volume  Mood    \"pretty good\", Anxious  Affect  Congruent to thought content and mood  Thought Content    preoccupations, no delusions voiced  Thought Process    coherent, linear  Associations    logical connections  Perceptions: denies AH/VH,   [address]  Orientation    oriented to person, place, time, and general circumstances  Memory    recent and remote memory intact  Attention/Concentration    intact  Ability to understand instructions Yes  Ability to respond meaningfully Yes  Language: [address] of knowledge/Intellect: Average  SI:   no suicidal ideation  HI: Denies HI    Labs:     Orders Only on 09/09/2021   Component Date Value    Hemoglobin A1C 09/09/2021 6.0     eAG 09/09/2021 125.5     Iron 09/09/2021 83     TIBC 09/09/2021 379     Iron Saturation 09/09/2021 22     Ferritin 09/09/2021 58.8     Sodium 09/09/2021 139     Potassium 09/09/2021 4.0     Chloride 09/09/2021 98*    CO2 09/09/2021 24     Anion Gap 09/09/2021 17*    Glucose 09/09/2021 125*    BUN 09/09/2021 22*    CREATININE 09/09/2021 1.0     GFR Non- 09/09/2021 54*    GFR  09/09/2021 >60     Calcium 09/09/2021 9.7     Total Protein 09/09/2021 6.9     Albumin 09/09/2021 4.3     Albumin/Globulin Ratio 09/09/2021 1.7     Total Bilirubin 09/09/2021 <0.2     Alkaline Phosphatase 09/09/2021 107     ALT 09/09/2021 14     AST 09/09/2021 24     Globulin 09/09/2021 2.6     TSH Reflex FT4 09/09/2021 1.06     WBC 09/09/2021 6.5     RBC 09/09/2021 3.56*    Hemoglobin 09/09/2021 10.8*    Hematocrit 09/09/2021 33.7*    MCV 09/09/2021 94.7     MCH 09/09/2021 30.2     MCHC 09/09/2021 31.9     RDW 09/09/2021 15.3     Platelets 43/79/0401 215     MPV 09/09/2021 8.3     Neutrophils % 09/09/2021 77.0     Lymphocytes % 09/09/2021 13.5     Monocytes % 09/09/2021 7.4     Eosinophils % 09/09/2021 1.5     Basophils % 09/09/2021 0.6     Neutrophils Absolute 09/09/2021 5.0     Lymphocytes Absolute 09/09/2021 0.9*    Monocytes Absolute 09/09/2021 0.5     Eosinophils Absolute 09/09/2021 0.1     Basophils Absolute 09/09/2021 0.0        Last Drug screen:  Lab Results   Component Value Date    LABAMPH Neg 2018    LABBENZ Neg 2018    COCAIMETSCRU Neg 2018    MDMA Not Detected 2019    LABMETH Neg 2018    OPIATESCREENURINE POSITIVE 2018    PHENCYCLIDINESCREENURINE Neg 2018         Imagin/1/19 CT head wo contrast:      Impression   No acute traumatic intracranial abnormality       Atrophy and small-vessel ischemic change       Trace paranasal sinus disease             ASSESSMENT AND PLAN     Diagnosis Orders   1. Moderate episode of recurrent major depressive disorder ([facility] [address]     2. JENNIFER (generalized anxiety disorder)  ALPRAZolam (XANAX) 1 MG tablet   3. Grief at loss of child         1. Safety: NO Imminent risk of danger to/self/others based on the factors considered below. Appropriate for outpatient level of care. Safety plan includes: 911, PES, hotlines, and interventions discussed today.      Risk factors: Age <25 or >55,  social isolation,no outpatient services in place, and no collateral information to support safety.     Protective factors:, female gender,denies depression, denies suicidal ideation, does not have lethal plan, does not have access to guns or weapons, patient is erika for safety, no prior suicide attempts, no family h/o suicide, no substance abuse,compliant with recommended medications,and patient is future oriented.        2. Psychiatric  - Feels is doing relatively well overall. stable depressive and anxiety symptoms. Declined need for med adjustments at this time    - increase zoloft to 150mg/day.   Noted Significant increase in depressive symptoms and tearfulness when dose briefly reduced by cardiology in past.  Now, since death of son (2021) and stopping mirtazapine, notes increase in depression and anxiety symptoms   Consider switch to different ssri if if no response to higher dose or has se's    - consider augmenting with SGA for mood/anxiety    - to see pcp for f/u 2021 for fatigue/tremors    - continue xanax 1mg tid prn  Has been on xanax for many years (prior to est with this provider)  Advised not long term strategy. Goal is to reduce/eliminate. Would go slowly d/t length of time has been on this. Dose was reduced by prior pcp from previous 2mg bid to 0.5mg bid. Due to degree of depression/anxiety 2/2 son's death, would not recommend trying to reduce at this time      - reduced, then stopped Mirtazapine. New pcp was concerned could be causing tremors. Pt reports no change in tremors since stopping so likley not contributing factor. - continue hydroxyzine 25-50mg bid prn itching. Caution with possible anticholinergic se's including confusion. Denies confusion or altered mental status     - med contract signed 19  - UDS 19      -Labs: reviewed in Epic     - uninterested in outpt therapy.     -OARRS reviewed, c/w history  -R/b/se/a d/w pt who consents.     3. Medical  -Following with Nain Dykes MD  - no longer following with neuro for ha's. Doesn't want to return or see anyone new     4. Substance   -No active issues.     5.  RTC - 4-6 weeks,  pc 1230p, Call sooner if needed        Alannah Alcala, [address]   Psychiatric Nurse Practitioner

## 2021-10-28 RX ORDER — PANTOPRAZOLE SODIUM 40 MG/1
TABLET, DELAYED RELEASE ORAL
Qty: 180 TABLET | Refills: 1 | Status: SHIPPED | OUTPATIENT
Start: 2021-10-28 | End: 2022-01-25 | Stop reason: SDUPTHER

## 2021-11-05 ENCOUNTER — OFFICE VISIT (OUTPATIENT)
Dept: INTERNAL MEDICINE CLINIC | Age: 76
End: 2021-11-05
Payer: MEDICARE

## 2021-11-05 VITALS
BODY MASS INDEX: 34.62 KG/M2 | OXYGEN SATURATION: 96 % | HEIGHT: 61 IN | DIASTOLIC BLOOD PRESSURE: 78 MMHG | WEIGHT: 183.38 LBS | SYSTOLIC BLOOD PRESSURE: 166 MMHG | HEART RATE: 81 BPM

## 2021-11-05 DIAGNOSIS — K59.00 CONSTIPATION, UNSPECIFIED CONSTIPATION TYPE: ICD-10-CM

## 2021-11-05 DIAGNOSIS — Z98.84 HISTORY OF GASTRIC BYPASS: ICD-10-CM

## 2021-11-05 DIAGNOSIS — R73.03 PREDIABETES: Primary | ICD-10-CM

## 2021-11-05 DIAGNOSIS — I10 ESSENTIAL HYPERTENSION: ICD-10-CM

## 2021-11-05 DIAGNOSIS — M79.7 FIBROMYALGIA: ICD-10-CM

## 2021-11-05 DIAGNOSIS — G89.29 CHRONIC INTRACTABLE HEADACHE, UNSPECIFIED HEADACHE TYPE: ICD-10-CM

## 2021-11-05 DIAGNOSIS — F41.9 ANXIETY: ICD-10-CM

## 2021-11-05 DIAGNOSIS — M81.0 AGE-RELATED OSTEOPOROSIS WITHOUT CURRENT PATHOLOGICAL FRACTURE: ICD-10-CM

## 2021-11-05 DIAGNOSIS — I48.91 ATRIAL FIBRILLATION, UNSPECIFIED TYPE (HCC): ICD-10-CM

## 2021-11-05 DIAGNOSIS — D64.9 SYMPTOMATIC ANEMIA: ICD-10-CM

## 2021-11-05 DIAGNOSIS — R19.8 BORBORYGMI: ICD-10-CM

## 2021-11-05 DIAGNOSIS — R51.9 CHRONIC INTRACTABLE HEADACHE, UNSPECIFIED HEADACHE TYPE: ICD-10-CM

## 2021-11-05 DIAGNOSIS — C18.9 MALIGNANT NEOPLASM OF COLON, UNSPECIFIED PART OF COLON (HCC): ICD-10-CM

## 2021-11-05 DIAGNOSIS — K58.0 IRRITABLE BOWEL SYNDROME WITH DIARRHEA: ICD-10-CM

## 2021-11-05 LAB
BASOPHILS ABSOLUTE: 0 K/UL (ref 0–0.2)
BASOPHILS RELATIVE PERCENT: 0.5 %
EOSINOPHILS ABSOLUTE: 0.1 K/UL (ref 0–0.6)
EOSINOPHILS RELATIVE PERCENT: 1.9 %
HCT VFR BLD CALC: 34.8 % (ref 36–48)
HEMOGLOBIN: 11.1 G/DL (ref 12–16)
LYMPHOCYTES ABSOLUTE: 1.1 K/UL (ref 1–5.1)
LYMPHOCYTES RELATIVE PERCENT: 15.5 %
MCH RBC QN AUTO: 29.3 PG (ref 26–34)
MCHC RBC AUTO-ENTMCNC: 31.9 G/DL (ref 31–36)
MCV RBC AUTO: 91.7 FL (ref 80–100)
MONOCYTES ABSOLUTE: 0.5 K/UL (ref 0–1.3)
MONOCYTES RELATIVE PERCENT: 7.9 %
NEUTROPHILS ABSOLUTE: 5 K/UL (ref 1.7–7.7)
NEUTROPHILS RELATIVE PERCENT: 74.2 %
PDW BLD-RTO: 17.7 % (ref 12.4–15.4)
PLATELET # BLD: 228 K/UL (ref 135–450)
PMV BLD AUTO: 8.4 FL (ref 5–10.5)
RBC # BLD: 3.79 M/UL (ref 4–5.2)
WBC # BLD: 6.8 K/UL (ref 4–11)

## 2021-11-05 PROCEDURE — 99215 OFFICE O/P EST HI 40 MIN: CPT | Performed by: INTERNAL MEDICINE

## 2021-11-05 RX ORDER — METOPROLOL SUCCINATE 100 MG/1
TABLET, EXTENDED RELEASE ORAL
Qty: 90 TABLET | Refills: 1 | Status: SHIPPED | OUTPATIENT
Start: 2021-11-05 | End: 2022-02-18 | Stop reason: SDUPTHER

## 2021-11-05 RX ORDER — METOCLOPRAMIDE 5 MG/1
TABLET ORAL
COMMUNITY
Start: 2021-10-18 | End: 2022-06-14

## 2021-11-05 NOTE — PROGRESS NOTES
Chief Complaint   Patient presents with    Other     labs f/u       HPI: Here for  Multiple med followup and management of multiple chronic conditions as per the active problems list on chart, which I reviewed and updated with the patient today. States doing well with no new concerns except if noted below. Continues with constant headache. Was seeing neuro Dr Gilson Calderon and he recommended botox, but she is afraid it could cause swelling and blood clots, and doesn't want to see him anymore. Also continues to feel fatigued, Note hgb drop in September, hasn't had followup as was recommended until now. No visible blood in stool, and diarrhea stopped, now having constipation and  Using more PEG to manage. Psych NP did wean her off risperidone, but she doesn't feel any different. Also has been discussing changing sertraline. She is taking xanax up to three times a day. I have reviewed the chart notes available from myself and other providers. I have reviewed and addressed all active problems and created or updated the problems list in detail, as needed. No problem-specific Assessment & Plan notes found for this encounter.       I have extensively reviewed and reconciled the medication list, discontinued medications not taking or no longer appropriate, and updated the active meds list      Lab Results   Component Value Date    LABA1C 6.0 09/09/2021    LABA1C 5.8 12/01/2020    LABA1C 6.0 07/19/2019    LABMICR Not Indicated 09/03/2019    LABMICR Not Indicated 08/12/2019    LABMICR YES 10/27/2018       Lab Results   Component Value Date     09/09/2021     04/13/2021     02/22/2021    K 4.0 09/09/2021    K 4.5 04/13/2021    K 5.0 03/09/2021    CL 98 (L) 09/09/2021     04/13/2021     02/22/2021    CO2 24 09/09/2021    CO2 24 04/13/2021    CO2 29 02/22/2021    BUN 22 (H) 09/09/2021    BUN 22 (H) 04/13/2021    BUN 18 02/22/2021    CREATININE 1.0 09/09/2021    CREATININE 0.8 04/13/2021 CREATININE 0.5 (L) 02/22/2021    GLUCOSE 125 (H) 09/09/2021    GLUCOSE 123 (H) 04/13/2021    GLUCOSE 96 02/22/2021    CALCIUM 9.7 09/09/2021    CALCIUM 9.4 04/13/2021    CALCIUM 8.8 02/22/2021       Lab Results   Component Value Date    CHOL 124 02/19/2020    TRIG 102 02/19/2020    HDL 66 (H) 02/19/2020    LDLCALC 38 02/19/2020       Lab Results   Component Value Date    ALT 14 09/09/2021    ALT 14 04/13/2021    ALT 11 01/28/2021    AST 24 09/09/2021    AST 22 04/13/2021    AST 20 01/28/2021       Lab Results   Component Value Date    TSH 0.44 10/24/2018    TSH 0.38 01/14/2018       Lab Results   Component Value Date    WBC 6.5 09/09/2021    WBC 6.6 04/13/2021    WBC 5.1 03/09/2021    HGB 10.8 (L) 09/09/2021    HGB 12.3 04/13/2021    HGB 11.4 (L) 03/09/2021    HCT 33.7 (L) 09/09/2021    HCT 37.6 04/13/2021    HCT 35.1 (L) 03/09/2021    MCV 94.7 09/09/2021    MCV 97.2 04/13/2021    MCV 94.4 03/09/2021     09/09/2021     04/13/2021     03/09/2021       Lab Results   Component Value Date    INR 1.08 02/04/2021    INR 1.37 (H) 01/28/2021    INR 1.39 (H) 11/02/2018        No results found for: PSA     No results found for: LABURIC          BP (!) 166/78 (Site: Right Upper Arm)   Pulse 81   Ht 5' 1\" (1.549 m)   Wt 183 lb 6 oz (83.2 kg)   SpO2 96%   BMI 34.65 kg/m²   Body mass index is 34.65 kg/m². Physical Exam  Constitutional:       General: She is not in acute distress. HENT:      Head: Normocephalic and atraumatic. Nose: Nose normal.      Mouth/Throat:      Pharynx: No oropharyngeal exudate. Eyes:      General: No scleral icterus. Right eye: No discharge. Left eye: No discharge. Conjunctiva/sclera: Conjunctivae normal.      Pupils: Pupils are equal, round, and reactive to light. Neck:      Thyroid: No thyromegaly. Vascular: No JVD. Trachea: No tracheal deviation. Cardiovascular:      Rate and Rhythm: Normal rate and regular rhythm.       Heart opportunity to have all questions answered. 1. Prediabetes    2. Essential hypertension--appears inadequately controlled, not monitoring at home. Will increase metoprolol 50 to 100 mg daily. Has followup in cardiology next month. 3. History of gastric bypass    4. Fibromyalgia    5. Symptomatic anemia  - CBC WITH AUTO DIFFERENTIAL; Future    6. Atrial fibrillation, unspecified type ([facility] [address]    7. Malignant neoplasm of colon, unspecified part of colon ([address]    8. Age-related osteoporosis without current pathological fracture    9. Irritable bowel syndrome with diarrhea    10. Borborygmi    11. Constipation, unspecified constipation type    12. Chronic intractable headache, unspecified headache type  - MARIANO Gresham MD, Neurology, [facility] [place]    13. Anxiety--managed by [facility] NP, recommend consideration of longer acting benzodiazepine if patient is going to use multiple daily doses for constant anxiety. Discussed essential tremor diagnosis with patient and that she should not attribute shakes to anxiety.          Problem List     Symptomatic anemia    Relevant Orders    CBC WITH AUTO DIFFERENTIAL    A-fib (HCC)    Essential hypertension    Malignant neoplasm of colon (HCC)    Relevant Medications    ALPRAZolam (XANAX) 1 MG tablet    Fibromyalgia    Relevant Medications    tiZANidine (ZANAFLEX) 2 MG tablet    sertraline (ZOLOFT) 100 MG tablet    Chronic intractable headache    Relevant Medications    metoprolol succinate (TOPROL XL) 50 MG extended release tablet    tiZANidine (ZANAFLEX) 2 MG tablet    sertraline (ZOLOFT) 100 MG tablet    Other Relevant Orders    MARIANO Gresham MD, Neurology, [facility] [place]    History of gastric bypass    Prediabetes - Primary    Age-related osteoporosis without current pathological fracture    Irritable bowel syndrome with diarrhea    Relevant Medications    dicyclomine (BENTYL) 10 MG capsule    pantoprazole (PROTONIX) 40 MG tablet    metoclopramide (REGLAN) 5 MG tablet    Borborygmi    Relevant Medications    dicyclomine (BENTYL) 10 MG capsule    Constipation    Anxiety    Relevant Medications    sertraline (ZOLOFT) 100 MG tablet    ALPRAZolam (XANAX) 1 MG tablet        Orders Placed This Encounter   Procedures    CBC WITH AUTO DIFFERENTIAL     Standing Status:   Future     Number of Occurrences:   1     Standing Expiration Date:   11/5/2022   Sinan Monroe MD, Neurology, [facility]     Referral Priority:   Routine     Referral Type:   Eval and Treat     Referral Reason:   Specialty Services Required     Referred to Provider:   Gio Burks MD     Requested Specialty:   Neurology     Number of Visits Requested:   1       I have reconciled the medications in chart with what patient reports to be taking, andreviewed action/ sideeffects and how to take any new medications. Patient/caregiver understands purpose and side effects. A complete  list of medications was provided in their after-visit summary. Return in about 6 months (around 5/5/2022) for f/u mult med extended. Time basedbilling: I spent over 40 minutes with this patient, and as is the nature of primary care and typical for my extended visits, over 50 percent of this visit was spent on counseling and coordination ofcare.

## 2021-11-11 ENCOUNTER — TELEPHONE (OUTPATIENT)
Dept: CARDIOLOGY CLINIC | Age: 76
End: 2021-11-11

## 2021-11-11 NOTE — TELEPHONE ENCOUNTER
Called patient to let her know samples are ready to be picked up. She understood and states she will come for them on the 19th.

## 2021-11-11 NOTE — TELEPHONE ENCOUNTER
Medication Samples    Medication:  Eliquis    Dosage of the medication:  5 mg tabs     How are you taking this medication (QD, BID, TID, QID, PRN):  Take 1 tablet by mouth 2 times daily     in the office or Mail to your home?      in office

## 2021-11-17 ENCOUNTER — TELEPHONE (OUTPATIENT)
Dept: PRIMARY CARE CLINIC | Age: 76
End: 2021-11-17

## 2021-11-17 DIAGNOSIS — R44.3 HALLUCINATIONS: Primary | ICD-10-CM

## 2021-11-17 DIAGNOSIS — R40.4 TRANSIENT ALTERATION OF AWARENESS: ICD-10-CM

## 2021-11-17 NOTE — TELEPHONE ENCOUNTER
[id number]: rt'd call to pt    \"not good or wouldn't have called you\"    It's weird. Don't know if that's the pill and a half (sertraline was increased at most recent f/u). Happened x 2 weeks. Wake up in the night. Swear someone standing there talking to me. Happened last night. A day or two ago thought I saw my son standing by the jason tree. He wouldn't do that because he was as tall as the jason tree. Making me feel like i'm going crazy. Only happening at night. Wakes often during night to go to bathroom    Last night was standing by my bed supposedly. Showing me his arm with all these tattoos. He would never do that. I went to touch him and he wasn't there    Beginning to bother me. Dmeian Puente asked why I was calling you. Didn't know who else to call. He said I did this before couple years ago. One time got up and packed my clothes thinking he was taking me to assisted living facility. Had been to ER multiple times when had similar experience with hallucinations. Was eventually found to have bladder infection. Halluc resolved once infection treated    Denies other altered mental status    Drinking water. Thinks drinking adequate amounts. Denies dysuria or hematuria    Go to bathroom every 10-15 mins. On diuretic     Sometimes takes 2 hydroxyine, never more than 2 in one day    Saw pcp last week. Increased metoprolol from 50-->100mg. She thought my bp was high. I didn't think so     pcp thinks I need to be on something different than xanax. Says too many highs and lows with this    Advised lets r/o UTI as underlying cause of new onset hallucinations. Doesn't have car at moment (son at work). Will go do UA/C&S in the morning. Will f/u with results.   Call if worsening symptoms

## 2021-12-19 NOTE — PROGRESS NOTES
Problem: At Risk for Falls  Goal: # Patient does not fall  12/19/2021 1742 by Rebecca Pabon RN  Outcome: Outcome Met, Continue evaluating goal progress toward completion  12/19/2021 1742 by Rebecca Pabon RN  Outcome: Outcome Met, Continue evaluating goal progress toward completion  Goal: # Takes action to control fall-related risks  12/19/2021 1742 by Rebecca Pabon RN  Outcome: Outcome Met, Continue evaluating goal progress toward completion  12/19/2021 1742 by Rebecca Pabon RN  Outcome: Outcome Met, Continue evaluating goal progress toward completion  Goal: # Verbalizes understanding of fall risk/precautions  Description: Document education using the patient education activity  12/19/2021 1742 by Rebecca Pabon RN  Outcome: Outcome Met, Continue evaluating goal progress toward completion  12/19/2021 1742 by Rebecca Pabon RN  Outcome: Outcome Met, Continue evaluating goal progress toward completion     Problem: Pressure Injury, Risk for  Goal: # Skin remains intact  12/19/2021 1742 by Rebecca Pabon RN  Outcome: Outcome Met, Continue evaluating goal progress toward completion  12/19/2021 1742 by Rebecca Pabon RN  Outcome: Outcome Met, Continue evaluating goal progress toward completion  Goal: No new pressure injury (PI) development  12/19/2021 1742 by Rebecca Pabon RN  Outcome: Outcome Met, Continue evaluating goal progress toward completion  12/19/2021 1742 by Rebecca Pabon RN  Outcome: Outcome Met, Continue evaluating goal progress toward completion  Goal: # Verbalizes understanding of PI risk factors and prevention strategies  Description: Document education using the patient education activity.   12/19/2021 1742 by Rebecca Pabon RN  Outcome: Outcome Met, Continue evaluating goal progress toward completion  12/19/2021 1742 by Rebecca Pabon RN  Outcome: Outcome Met, Continue evaluating goal progress toward completion     Problem: Pressure Injury Actual  Goal: # No deterioration  ORTHOPAEDIC PROGRESS NOTE    Chief Complaint   Patient presents with    Shoulder Injury     left shoulder injury       Shoulder Injury      8/21/2018  FU left shoulder  She reports it hurts intermittently  Pain is 8/10, but today is doing okay  Has trouble laying on left side  No new injury  Does report trying to lift something heavy few months ago  Denies N/T     4/12/2018  FU left shoulder  She is at home  Home therapy for another week  She describes mild anterior arm aches \"over the biceps\"  Denies shoulder pain  Using walker for ambulation without difficulty  Overall shoulder is doing well    2/8/2018  FU [facility] foot  [facility]  Shoulder 2/10 pain  Foot 0/10 pain  Still using sling  She reports passive ROM with therapy  No major issues    Review of Systems:   Denies N/T    No past medical history on file. No past surgical history on file. Social History     Social History    Marital status:      Spouse name: N/A    Number of children: N/A    Years of education: N/A     Occupational History    Not on file. Social History Main Topics    Smoking status: Never Smoker    Smokeless tobacco: Never Used    Alcohol use No    Drug use: No    Sexual activity: No     Other Topics Concern    Not on file     Social History Narrative    No narrative on file       Vitals:    08/21/18 1018   BP: 122/62   Pulse: 87   Resp: 16   Weight: 169 lb (76.7 kg)   Height: 5' 3\" (1.6 m)       Physical Exam:  SILT M/U/R/A nerve distributions; AIN/PIN/IO intact  left shoulder -     No TTP proximal humerus   No obvious crepitance with palpation or gross fracture mobility palpated   Active FE 90   Active ER 50   Active IR L2    Imaging:  Images were personally reviewed by myself and discussed with the patient  Left shoulder 4 views performed today in clinic - traction with 10 lb weight with AP and Grashey; There 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 female following up for   Diagnosis Orders   1. 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 Although rare, an infection is possible and may necessitate surgical treatment if it occurs in a joint or develops into an abscess. Finally, a rise in blood sugar levels is anticipated, particularly in diabetics. Diabetic patients were instructed to monitor their blood glucose levels after the injection and to adjust their insulin regimen as appropriate. The patient elected to proceed, and after verbal consent was obtained and drug allergies were reviewed, the injection site was prepped with alcohol and ChloraPrep. 40mg of Kenalog mixed with lidocaine and marcaine (no epinephrine) was injected into the left shoulder. There were no immediate complications after the procedure. The patient was advised to ice the area intermittently over the next 24-48 hours until the corticosteroid becomes effective.       Sherrie Printers in pressure injury (PI)  12/19/2021 1742 by Rebecca Pabon RN  Outcome: Outcome Met, Continue evaluating goal progress toward completion  12/19/2021 1742 by Rebecca Pabon RN  Outcome: Outcome Met, Continue evaluating goal progress toward completion  Goal: # Verbalizes pressure injury management  Description: Document education using the patient education activity.  12/19/2021 1742 by Rebecca Pabon RN  Outcome: Outcome Met, Continue evaluating goal progress toward completion  12/19/2021 1742 by Rebecca Pabon RN  Outcome: Outcome Met, Continue evaluating goal progress toward completion

## 2021-12-20 ENCOUNTER — OFFICE VISIT (OUTPATIENT)
Dept: CARDIOLOGY CLINIC | Age: 76
End: 2021-12-20
Payer: MEDICARE

## 2021-12-20 VITALS
BODY MASS INDEX: 35.3 KG/M2 | DIASTOLIC BLOOD PRESSURE: 70 MMHG | OXYGEN SATURATION: 97 % | HEART RATE: 87 BPM | HEIGHT: 61 IN | WEIGHT: 187 LBS | SYSTOLIC BLOOD PRESSURE: 126 MMHG

## 2021-12-20 DIAGNOSIS — R53.82 CHRONIC FATIGUE: Primary | ICD-10-CM

## 2021-12-20 DIAGNOSIS — R60.0 BILATERAL LOWER EXTREMITY EDEMA: ICD-10-CM

## 2021-12-20 DIAGNOSIS — I48.0 PAF (PAROXYSMAL ATRIAL FIBRILLATION) (HCC): ICD-10-CM

## 2021-12-20 PROCEDURE — 99213 OFFICE O/P EST LOW 20 MIN: CPT | Performed by: INTERNAL MEDICINE

## 2021-12-20 PROCEDURE — 93000 ELECTROCARDIOGRAM COMPLETE: CPT | Performed by: INTERNAL MEDICINE

## 2021-12-20 RX ORDER — FUROSEMIDE 20 MG/1
20 TABLET ORAL DAILY
Qty: 30 TABLET | Refills: 5 | Status: SHIPPED | OUTPATIENT
Start: 2021-12-20 | End: 2022-01-11 | Stop reason: SDUPTHER

## 2021-12-20 NOTE — PROGRESS NOTES
[address]  Cardiology  Note      Nickie Linares  1945, 67 y.o.      CC: \" fatigue and shortness of breath. \"                 Nain Dykes MD:      HPI:   This is a 67 y.o. female with history of AFIB and multiple falls who presented to [facility] with confusion and generalized weakness. This may have been associated with multiple falls. She was found to be in AFIB with RVR and started on Cardizem gtt which was transitioned to po. ECHO performed was sub-optimal and showed EF of 55-60%. Continues to complain of palpitations and wore 30 day event monitor showing Afib with RVR. Patient was started on Flecainide 50 mg BID and Toprol XL 25 mg daily (2019). Presented to ER 19 after falling out of her wheelchair at home. Per ER report, she hit her head on the floor, but did not lose consciousness. CT of head was negative. Another ER visit on 9/3/19 with reports of hallucinations and confusion. Treated empirically with antibiotics and discharged home. In 2021, patient developed a R lower extremity hematoma and underwent surgery by Dr. Jacky Emerson. She is s/p excisional debridement of RLE wound with application of split thickness skin graft. Eliquis was held for a while and she has since resumed it. Patient comes for routine f/u. She continues to use a walker for assistance. Says she has been very careful and has not had anymore falls. She continues to report fatigue and shortness of breath that has not increased. She takes all medication as prescribed with no reports of adverse reactions. She denies any abnormal bruising or bleeding issues.          Social History     Tobacco Use    Smoking status: Never Smoker    Smokeless tobacco: Never Used   Vaping Use    Vaping Use: Never used   Substance Use Topics    Alcohol use: No    Drug use: Never     Allergies   Allergen Reactions    Demerol Hcl [Meperidine] Other (See Comments)     PATIENT STATES SHE GOT VERY ANXIOUS-LIKE SHE WAS CLIMBING THE WALLS    Pcn [Penicillins]      Patient reports she has not had since she was a child, thinks reaction was hives.  Adhesive Tape Rash         Review of Systems -   Constitutional: Negative for weight gain/loss; malaise, fever  Respiratory: Negative for Asthma;  cough and hemoptysis  Cardiovascular: Negative for palpitations,dizziness   Gastrointestinal: Negative for abd.pain; constipation/diarrhea;    Genitourinary: Negative for stones; hematuria; frequency hesitancy  Integumentt: Negative for rash or pruritis  Hematologic/lymphatic: Negative for blood dyscrasia; leukemia/lymphoma  Musculoskeletal: Negative for Connective tissue disease  Neurological:  Negative for Seizure   Behavioral/Psych:Negative for Bipolar disorder, Schizophrenia; Dementia  Endocrine: negative for thyroid, parathyroid disease    Physical Examination:    /70 (Site: Left Upper Arm, Position: Sitting)   Pulse 87   Ht 5' 1\" (1.549 m)   Wt 187 lb (84.8 kg)   SpO2 97%   BMI 35.33 kg/m²    HEENT:  Face: Atraumatic, Conjunctiva: Pink; non icteric,  Mucous Memb:  Moist, No thyromegaly or Lymphadenopathy  Respiratory:  Resp Assessment: WNL, Resp Auscultation: clear  Cardiovascular: Auscultation: nl S1 & S2, Palpation:  Nl PMI;  No heaves or thrills, JVP:  normal  Abdomen: Soft, non-tender, Normal bowel sounds,  No organomegaly  Extremities: No Cyanosis or Clubbing, LE edema +2 bilaterally, R LE wound   Neurological: Oriented to time, place, and person, Non-anxious  Psychiatric: Normal mood and affect  Skin: Warm and dry,  No rash seen     Outpatient Medications Marked as Taking for the 12/20/21 encounter (Office Visit) with James Sanchez MD   Medication Sig Dispense Refill    metoclopramide (REGLAN) 5 MG tablet       metoprolol succinate (TOPROL XL) 100 MG extended release tablet TAKE ONE TABLET BY MOUTH DAILY 90 tablet 1    pantoprazole (PROTONIX) 40 MG tablet TAKE ONE TABLET BY MOUTH TWICE A  tablet 1    sertraline (ZOLOFT) 100 MG tablet Take 1.5 tablets by mouth daily TAKE ONE TABLET BY MOUTH ONCE NIGHTLY 90 tablet 1    ALPRAZolam (XANAX) 1 MG tablet TAKE ONE TABLET BY MOUTH THREE TIMES A DAY AS NEEDED FOR ANXIETY 90 tablet 1    dicyclomine (BENTYL) 10 MG capsule Take 1 capsule by mouth 4 times daily as needed (cramping/diarrhea) 60 capsule 5    tiZANidine (ZANAFLEX) 2 MG tablet Take 1 tablet by mouth every 6 hours as needed (muscle pain) 60 tablet 11    potassium chloride (KLOR-CON M20) 20 MEQ extended release tablet Take 1 tablet by mouth daily 90 tablet 1    flecainide (TAMBOCOR) 100 MG tablet TAKE 1 TABLET BY MOUTH 2 TIMES A  tablet 3    Cyanocobalamin (VITAMIN B 12 PO) Take 2,500 mg by mouth daily      calcium carbonate-vitamin D (CALCIUM 600+D) 600-200 MG-UNIT TABS Take 1 tablet by mouth 2 times daily      apixaban (ELIQUIS) 5 MG TABS tablet Take 1 tablet by mouth 2 times daily 180 tablet 5    furosemide (LASIX) 20 MG tablet Take 1 tablet by mouth daily 30 tablet 3     EK21, reviewed     ECHO: 10/24/18  Normal LV size and systolic function. Estimated ejection fraction is 60%. Severe calcification of the posterior leaflet of the mitral valve. Mild mitral regurgitation is present. The left atrium is severely dilated. Mild aortic regurgitation is present. Mild-to-moderate tricuspid regurgitation  Systolic pulmonary artery pressure (SPAP) is estimated at 45-48 mm Hg consistent with moderate pulmonary hypertension     ECHO: 21  Normal left ventricle size, wall thickness, and systolic function with an estimated ejection fraction of 65-70%. No regional wall motion abnormalities are seen. The right ventricle is normal in size and function. The left atrium is severely dilated. Mild mitral regurgitation. The aortic valve leaflets are not well visualized. Aortic sclerosis versus mild aortic stenosis (peak velocity of 2.67m/s and a mean pressure gradient of 16mmHg).  Mild aortic regurgitation. Mild tricuspid regurgitation. ASSESSMENT AND PLAN:    Fatigue  Chronic  Likely related to polypharmacy    Atrial Fibrillation  Continue Flecainide, Toprol and Eliquis   [id number] Score for Atrial Fibrillation Stroke Risk is at least 3 (AGE, Female)    LE Edema   Chronic  On Furosemide       Follow up in 6 months       Thank you very much for allowing me to participate in the care of your patient. Please do not hesitate to contact me if you have any questions. Sincerely,      Scott Lomeli M.D  [facility], [address] [place] [address]  Ph: [phone]  Fax: [phone]    This note was scribed in the presence of Dr. Scott Lomeli MD by [facility]  Physician Attestation:  The scribes documentation has been prepared under my direction and personally reviewed by me in its entirety. I confirm that the note above accurately reflects all work, treatment, procedures, and medical decision making performed by me.

## 2021-12-20 NOTE — PATIENT INSTRUCTIONS
Patient Education        apixaban  Pronunciation:  shelly PIX a ban  Brand:  Eliquis  What is the most important information I should know about apixaban? Apixaban increases your risk of severe or fatal bleeding, especially if you take certain medicines at the same time (including some over-the-counter medicines). It is very important to tell your doctor about all medicines you have recently used. Call your doctor at once if you have signs of bleeding such as: swelling, pain, feeling very weak or dizzy, bleeding gums, nosebleeds, heavy menstrual periods or abnormal vaginal bleeding, blood in your urine, bloody or tarry stools, coughing up blood or vomit that looks like coffee grounds, or any bleeding that will not stop. Apixaban can cause a very serious blood clot around your spinal cord if you undergo a spinal tap or receive spinal anesthesia (epidural), especially if you have a genetic spinal defect, if you have a spinal catheter in place, if you have a history of spinal surgery or repeated spinal taps, or if you are also using other drugs that can affect blood clotting. This type of blood clot can lead to long-term or permanent paralysis. Get emergency medical help if you have symptoms of a spinal cord blood clot such as back pain, numbness or muscle weakness in your lower body, or loss of bladder or bowel control. Do not stop taking apixaban unless your doctor tells you to. Stopping suddenly can increase your risk of blood clot or stroke. What is apixaban? Apixaban is used to lower the risk of stroke caused by a blood clot in people with a heart rhythm disorder called atrial fibrillation. Apixaban is also used after hip or knee replacement surgery to prevent a type of blood clot called deep vein thrombosis (DVT), which can lead to blood clots in the lungs (pulmonary embolism). Apixaban is also used to treat DVT or pulmonary embolism (PE), and to lower your risk of having a repeat DVT or PE.   Apixaban may also be used for purposes not listed in this medication guide. What should I discuss with my healthcare provider before taking apixaban? You should not take apixaban if you are allergic to it, or if you have active bleeding from a surgery, injury, or other cause. Apixaban may cause you to bleed more easily, especially if you have a bleeding disorder that is inherited or caused by disease. Tell your doctor if you have an artificial heart valve, or if you have ever had:  · liver or kidney disease;  · if you are older than 80; or  · if you weigh less than 132 pounds (60 kilograms). Apixaban can cause a very serious blood clot around your spinal cord if you undergo a spinal tap or receive spinal anesthesia (epidural). This type of blood clot could cause long-term paralysis, and may be more likely to occur if:    · you have a spinal catheter in place or if a catheter has been recently removed;  · you have a history of spinal surgery or repeated spinal taps;  · you have recently had a spinal tap or epidural anesthesia;  · you are taking an NSAID (nonsteroidal anti-inflammatory drug)--aspirin, ibuprofen (Advil, Motrin), naproxen (Aleve), diclofenac, indomethacin, meloxicam, and others; or  · you are using other medicines to treat or prevent blood clots. Taking apixaban may increase the risk of bleeding while you are pregnant or during your delivery. Tell your doctor if you are pregnant or plan to become pregnant. You should not breast-feed while using this medicine. How should I take apixaban? Follow all directions on your prescription label and read all medication guides or instruction sheets. Your doctor may occasionally change your dose. Use the medicine exactly as directed. You may take apixaban with or without food. If you cannot swallow a tablet whole, crush and mix it with water, apple juice, or a spoonful of applesauce. Swallow the mixture right away without chewing. Do not save it for later use.   A crushed tablet mixture may also be given through a nasogastric (NG) feeding tube. Read and carefully follow any Instructions for Use provided with your medicine. Ask your doctor or pharmacist if you do not understand these instructions. Apixaban can make it easier for you to bleed, even from a minor injury. Seek medical attention if you have bleeding that will not stop. If you need surgery or dental work, tell the doctor or dentist ahead of time if you have taken apixaban within the past 24 hours. You may need to stop taking apixaban for a short time. Do not stop taking apixaban unless your doctor tells you to. Stopping suddenly can increase your risk of blood clot or stroke. If you stop taking apixaban for any reason, your doctor may prescribe another medication to prevent blood clots until you start taking apixaban again. Store at room temperature away from moisture and heat. What happens if I miss a dose? Take the missed dose on the same day you remember it. Take your next dose at the regular time and stay on your twice-daily schedule. Do not take two doses at one time. Get your prescription refilled before you run out of medicine completely. What happens if I overdose? Seek emergency medical attention or call the Poison Help line at 1-767.765.3264. What should I avoid while taking apixaban? Avoid activities that may increase your risk of bleeding or injury. Use extra care to prevent bleeding while shaving or brushing your teeth. What are the possible side effects of apixaban? Get emergency medical help if you have signs of an allergic reaction: hives; chest pain, wheezing, difficult breathing; feeling light-headed; swelling of your face, lips, tongue, or throat. Also seek emergency medical attention if you have symptoms of a spinal blood clot: back pain, numbness or muscle weakness in your lower body, or loss of bladder or bowel control.   Call your doctor at once if you have:  · easy bruising, unusual bleeding (nose, mouth, vagina, or rectum), bleeding from wounds or needle injections, any bleeding that will not stop;  · heavy menstrual periods;  · headache, dizziness, weakness, feeling like you might pass out;  · urine that looks red, pink, or brown; or  · black or bloody stools, coughing up blood or vomit that looks like coffee grounds. This is not a complete list of side effects and others may occur. Call your doctor for medical advice about side effects. You may report side effects to FDA at 3-644-PJR-9398. What other drugs will affect apixaban? Sometimes it is not safe to use certain medications at the same time. Some drugs can affect your blood levels of other drugs you take, which may increase side effects or make the medications less effective. Many other drugs (including some over-the-counter medicines) can increase your risk of bleeding or blood clots, or your risk of developing blood clots around the brain or spinal cord during a spinal tap or epidural. It is very important to tell your doctor about all medicines you have recently used, especially:  · any other medicines to treat or prevent blood clots;  · a blood thinner such as heparin or warfarin (Coumadin, Jantoven);  · an antidepressant; or  · an NSAID (nonsteroidal anti-inflammatory drug) used long term. This list is not complete and many other drugs may affect apixaban. This includes prescription and over-the-counter medicines, vitamins, and herbal products. Not all possible drug interactions are listed here. Where can I get more information? Your pharmacist can provide more information about apixaban. Remember, keep this and all other medicines out of the reach of children, never share your medicines with others, and use this medication only for the indication prescribed.    Every effort has been made to ensure that the information provided by [address] [address] is accurate, up-to-date, and complete, but no guarantee is made to that effect. Drug information contained herein may be time sensitive. [employer] information has been compiled for use by healthcare practitioners and consumers in the [place] and therefore [employer] does not warrant that uses outside of the [place] are appropriate, unless specifically indicated otherwise. [facility]'s drug information does not endorse drugs, diagnose patients or recommend therapy. [facility] drug information is an informational resource designed to assist licensed healthcare practitioners in caring for their patients and/or to serve consumers viewing this service as a supplement to, and not a substitute for, the expertise, skill, knowledge and judgment of healthcare practitioners. The absence of a warning for a given drug or drug combination in no way should be construed to indicate that the drug or drug combination is safe, effective or appropriate for any given patient. [facility] does not assume any responsibility for any aspect of healthcare administered with the aid of information [facility] provides. The information contained herein is not intended to cover all possible uses, directions, precautions, warnings, drug interactions, allergic reactions, or adverse effects. If you have questions about the drugs you are taking, check with your doctor, nurse or pharmacist.  Copyright 4012-0581 [address]: 4.01. Revision date: 6/21/2019. Care instructions adapted under license by [facility] ([facility]). If you have questions about a medical condition or this instruction, always ask your healthcare professional. [place] [address] any warranty or liability for your use of this information.

## 2021-12-23 DIAGNOSIS — F41.1 GAD (GENERALIZED ANXIETY DISORDER): ICD-10-CM

## 2021-12-23 RX ORDER — ALPRAZOLAM 1 MG/1
TABLET ORAL
Qty: 90 TABLET | Refills: 1 | Status: SHIPPED | OUTPATIENT
Start: 2021-12-23 | End: 2022-02-16 | Stop reason: SDUPTHER

## 2021-12-23 RX ORDER — TIZANIDINE 2 MG/1
2 TABLET ORAL EVERY 6 HOURS PRN
Qty: 60 TABLET | Refills: 11 | Status: CANCELLED | OUTPATIENT
Start: 2021-12-23

## 2021-12-23 RX ORDER — HYDROXYZINE HYDROCHLORIDE 25 MG/1
25 TABLET, FILM COATED ORAL 3 TIMES DAILY PRN
Qty: 30 TABLET | Refills: 0 | Status: SHIPPED | OUTPATIENT
Start: 2021-12-23 | End: 2022-01-02

## 2021-12-23 RX ORDER — ESCITALOPRAM OXALATE 5 MG/1
5 TABLET ORAL DAILY
Qty: 30 TABLET | Refills: 0 | Status: SHIPPED | OUTPATIENT
Start: 2021-12-23 | End: 2022-01-17

## 2021-12-23 NOTE — TELEPHONE ENCOUNTER
Medication:   Requested Prescriptions     Pending Prescriptions Disp Refills    ALPRAZolam (XANAX) 1 MG tablet 90 tablet 1     Sig: TAKE ONE TABLET BY MOUTH THREE TIMES A DAY AS NEEDED FOR ANXIETY    tiZANidine (ZANAFLEX) 2 MG tablet 60 tablet 11     Sig: Take 1 tablet by mouth every 6 hours as needed (muscle pain)        Last Filled:      Patient Phone Number: [phone] (home)     Last appt: 11/5/2021   Next appt: 5/5/2022    Last OARRS:     Patient wants to know if she should go back on Zoloft.   Please advise

## 2021-12-23 NOTE — TELEPHONE ENCOUNTER
Pt. Called in distress stating that she needed to speak to someone or something bad was going to happen or if she could speak to a psychologist.

## 2021-12-23 NOTE — TELEPHONE ENCOUNTER
[id number]:  Returned call to pt    \"not too good\"    What I did and my daughter thinks I did wrong was I took myself off zoloft because I thought it was hurting my stomach    Has been off zoloft for 3-4 weeks    Started taking nighttime medicine after dinner. Seems like that helps to have food on stomach now. But didn't start taking the zoloft again, wanted to talk to you first    Lately is Crying all the time. Cries for no reason    Feels depressed and sad. Also anxious    Still taking xanax. More than probably should. Taking 1/2 tab 4-5 times/day + whole one at bedtime    Admits anxiety probably worse since stopping zoloft. Can be constant or intermittent. Not really anything in particular that triggers    Daughter just found out earlier this month she has colon cancer    Went to cardiology appt on Monday. Drove myself. Not driving often d/t not feeling like leaving the house    Son, Khoa De La Vega, goes to grocery for us. He's still living with me. He's doing well, has a fib. Appetite:  Eating ok, denies weight changes    Sleep:  Khoa De La Vega says I get too much sleep. Only sleep 2-3 hours at a time then i'm up. Up and down all day/night    + fatigue. Uses walker to ambulate. Denies recent falls    Hallucinations have decreased from prior pc with this provider in October. Didn't do UA as recommended since [email or web address] lab closed until after first of year  When hallucinations do happen, are maybe seeing ceiling on the floor. Nothing like it was    Denies SI/SH/HI    - no guns at home    Denies AMS. Son, Khoa De La Vega, in background. Also denies noted AMS or behavior changes    Need itching medicine too. Don't take it that often. Only 1-2x/week    Discussed adding antidepressant. Has only ever been on zoloft and mirtazapine in the past.  Had previously dc'd mirtazapine d/t pcp concern for possible se's    Discussed options. lexapro vs prozac. Would prefer prozac. However, is drug-drug interaction with metoprolol. Could increase serum concentration of metoprolol. Pt states pcp increased dose of metoprolol at recent visit in November. And also c/o fatigue. Advised I don't recommend prozac at this time d/t potential drug-drug interaction. Will try lexapro. Start 5mg/day. Titrate as tolerated    Also requests rf xanax. Have cautioned not to exceed daily dose > 3mg/day. Verbalized understanding of and agreement to this. Per oarrs, xanax Was last filled  11/26/2021  10/25/2021   1  Alprazolam 1 Mg Tablet         Pt already on schedule for f/u with this provider 1/17/22.   Call sooner if needed

## 2022-01-10 DIAGNOSIS — R60.0 BILATERAL LEG EDEMA: Primary | ICD-10-CM

## 2022-01-10 NOTE — TELEPHONE ENCOUNTER
Pt called in questioning her prescription for furosemide. Pt states she has been taking Furosemide 20 two tablets daily and takes 60mg on Monday, Wednesday and Friday. She said she has been taking it this way for months because that is what Dr. Johnny Morelos advised her to do. I cannot find in her chart any documentation that tells her to take her furosemide this way. The last couple of prescriptions we sent for her all say 20mg daily. How should she be taking the furosemide?

## 2022-01-11 NOTE — TELEPHONE ENCOUNTER
Please order renal panel to check her creatinine and potassium    Renew avoid she is saying right now that she is taking      Lasix 20 mg twice daily (4 days a week)  Lasix 20 mg 3 times daily Monday Wednesday and Friday

## 2022-01-11 NOTE — TELEPHONE ENCOUNTER
Dr. Nick Silver,     Furosemide dose has been changed multiple times in the past year. She was previously taking 20 mg bid as well as 20 mg three times weekly. Furosemide dose was decreased to 20 mg daily upon discharge from the hospital 2/23/21. However, Ms. De La Fuente informed me that she has continued taking Furosemide 40 mg in AM with an additional 20 mg in PM on MWF. She has difficulty with transportation and says she will get labs as soon as she can. She currently denies any SOB, weight gain or cough. She endorses LE edema that is chronic. She is fairly sedentary and sleeps in a hospital bed. Please advise what dose of Furosemide you would like her to take, thanks.

## 2022-01-11 NOTE — TELEPHONE ENCOUNTER
I have no way of determining the dose of Lasix over the phone    If what she is working well for her then stay on the same dose  Looks like the last renal panel she had on 21 December 2021 was fine

## 2022-01-12 RX ORDER — FUROSEMIDE 20 MG/1
20 TABLET ORAL 2 TIMES DAILY
Qty: 180 TABLET | Refills: 5 | Status: SHIPPED | OUTPATIENT
Start: 2022-01-12 | End: 2022-01-26 | Stop reason: SDUPTHER

## 2022-01-17 ENCOUNTER — VIRTUAL VISIT (OUTPATIENT)
Dept: PSYCHIATRY | Age: 77
End: 2022-01-17
Payer: MEDICARE

## 2022-01-17 DIAGNOSIS — F43.21 GRIEF AT LOSS OF CHILD: ICD-10-CM

## 2022-01-17 DIAGNOSIS — Z63.4 GRIEF AT LOSS OF CHILD: ICD-10-CM

## 2022-01-17 DIAGNOSIS — F33.1 MODERATE EPISODE OF RECURRENT MAJOR DEPRESSIVE DISORDER (HCC): ICD-10-CM

## 2022-01-17 DIAGNOSIS — F41.1 GAD (GENERALIZED ANXIETY DISORDER): Primary | ICD-10-CM

## 2022-01-17 PROCEDURE — 99443 PR PHYS/QHP TELEPHONE EVALUATION 21-30 MIN: CPT | Performed by: NURSE PRACTITIONER

## 2022-01-17 RX ORDER — ESCITALOPRAM OXALATE 10 MG/1
10 TABLET ORAL DAILY
Qty: 30 TABLET | Refills: 2 | Status: SHIPPED | OUTPATIENT
Start: 2022-01-17 | End: 2022-02-16 | Stop reason: SDUPTHER

## 2022-01-17 SDOH — SOCIAL STABILITY - SOCIAL INSECURITY: DISSAPEARANCE AND DEATH OF FAMILY MEMBER: Z63.4

## 2022-01-17 NOTE — PROGRESS NOTES
PSYCHIATRY PROGRESS NOTE    [facility]  1945 1/17/22      Phone call start time: [phone]  Phone call end time: 1203p        CC:   Chief Complaint   Patient presents with    Follow-up     Per excerpt of initial eval as completed by this provider on 9/13/19:    Son, Kvng Haider drove her to today's appointent. Krystal Thorne is oldest son. Kvng Haider is favorite child. Always been my favorite. My first girl is very jealous of apoorva being my favorite. Youngest daughter my favorite daughter.       Don't know why was asked to come see me. Thinks for anxiety and depression.       anxiety and depression     Lost  1/16/2018.  x 57 years. Just hard. Son marcelina moved in with me 12/2018 after I fell and broke my leg in 3 spots and my elbow. Had surgery and put a solomon in my leg. I fell over a rollator walker in the paiz. Doesn't recall if got dizzy or what.       Had broken shoulder the jason before when tripped over a cord. Had gotten up in a hurrt. Just fell and hit sholder     Feels like one thing after another     beatriz always an anxious person. Worry about any stupid thing. Try and relax as much as possible. Kids always on my mind. Even though they're grown, still worry. Youngest daughter not in good health, youngest son (apoorva) developed cancer 2 months ago. Was going to doctor for diarrhea. Didn't do colonoscopy. Then finally had blood in stool, went to hospital, tried to do colonoscopy, couldn't d/t mass in rectum. Started chemo/radiation. Now off that but still has diarrhea and radiation. To have colonoscopy on 23rd to see if can perform     Pt has h/o recal cancer. Treated by Dr. Fonda Merlin > 10 years ago. Oncologist wanted me to do chemo and radiation. Didn't want to because was working at that time.       Only takes xanax if has to. Thinks maybe 2-3 days/week, sometimes more, sometimes less. been on for years. The other doc I had had me up to 2mg.   Never took that  Much, made me too tired, would break in half. Now simone gives me 0.5mg. And I break that in half. Can't do without it     Says xanax, fioricet and tizanidine help. Takes mirtazapine      Cardiology recently advised her to reduce zoloft to 50mg/day. Just started lower dose last night. Did ok     Hasn't driven in almost 2 years. Independent in self care, uses bench. Uses walker all the time for ambulation when out of house. Usually sits in manual wc at home, faster to get to bathroom      H/o depression for many years, predating husban'd death. Don't know why, don't have anything to be depressed about. Doesn't feel overly depressed or anxious at present with current meds    HPI:   Ankit Cervantes is a 67 y.o. female with h/o gastric bypass, colon cancer, fibromyalgia,  anxiety and depression who was contacted via telehealth for follow up. Due to the COVID-19 pandemic restrictions on close contact interactions as recommended by CDC and health authorities, the patient's visit was conducted via telehealth (phone call visit) in lieu of a face to face visit. Patient verbally consented and agreed to proceed. SINCE LAST F/U 10/25/21:    Had called in nov reporting new onset infreq halluc. Was advised to r/o uti. Never completed labs    Doing ok. That new medicine kicked in finally and it really helps. Much less crying. Now occ.     Don't feel happy.  beatriz down, depressed      Taking 1/17/22:     Xanax 1mg, only 2/day over past week; 1 at night + 1-2 half tablets during day, depends on the day   eliquis 5mg bid, from cardiology   Calcium carbonate, 2/day   Vit b 12 daily   Bentyl 10mg bid prn abd cramping, helps keep from being nauseated   lexapro 5mg daily at hs last week, just moved back to am    Tambocor 100mg bid from cards   Lasix 20mg, for 2 weeks was only 1/day because they only Rx'd 30 tabs, was supposed to be bid then one extra on M/W/F   reglan prn   Magnesium 400mg/day      Metoprolol xl 100mg/day in am   protonix 40mg bid   kcl 20mg daily      Tizanidine 2mg prn, mostly just at hs   Hydroxyzine 2-3x/day prn itching but seldom, denies confusion or altered mental status      Substance:     Alcohol: denies              Illicits:  denies              Caffeine: occ hot chocolate   Tobacco:  Denies       Psych ROS:      Depression:  \"not terrible\"  Rates mood 6/10 (10 best); Irritability, son asked why I was grumpy yesterday but was because I finally spoke up to him    LESS tearful,    Sleep a lot yesterday. Slept sound, didn't have to take any pills. Can be up for couple hours, at least 2-3x/night    variable appetite, denies weight changes. At least 1 meal/day    Somewhat improved energy, fair motivation    DENIES issues with ADLs, does regularly without assistance. Variable Isolation, don't go out if really cold; my car still broke,  supposed to be getting out of shelter tomorrow. ? Why was in shelter.   Hopefully will be able to work on my car     DENIES SI/SH/HI        Kenna: DENIES  insomnia with increased energy, rapid speech, easily distracted or decreased attention, irritability, racing thoughts, expansive mood, increase in energy and goal directed behavior, grandiosity, flight of ideas   IMPULSIVITY  denies       Anxiety: rates 5/10 (10 worst); intermittent; yesterday daughter texted me said was crying all day yesterday (was 4 years since my  , I did ok)    HISTORICALLY worry about \"anything, everything\" (kids, finances since only has her ssi and not 's, having car problems),  sleep disruption     somatic complaints (+headaches, nausea),   + fatigue;    DENIES restlessness,  fear of doing/saying wrong things, fear of judgement, avoidant of social situations     OCD:  \"always had certain ways of folding my towels and stuff\"  Not overly impairing   DENIES other Repetitive actions or rituals, excessive hand washing, contamination fears, need for symmetry, violent thoughts, hoarding, fear of being harmed, mental or verbal repetition of words or phrases and counting     Panic:  Raul Jimenesde' say I really have any of those; don't really know or know what that is, my daughter has them\"     Phobias:  DENIES        Psychosis: DENIES AVH, PARANOIA OR DELUSIONS           PTSD:  DENIES nightmares, flashbacks, hypervigilance, easily startled, decreased sleep, reliving the event, avoiding situations that remind you of trauma, physical and mental paralysis when reminded of the experience, same despair, easily angry or irritable, trouble concentrating, fear for safety,  Numbness of emotions, feeling of detachment     Eating disorders:  DENIES         JENNIFER 7 SCORE 2/28/2020 12/20/2019 10/11/2019 9/13/2019   JENNIFER-7 Total Score 0 3 8 7     Interpretation of JENNIFER-7 score: 5-9 = mild anxiety, 10-14 = moderate anxiety,   15+ = severe anxiety. Recommend referral to behavioral health for scores 10 or greater.     No data recorded  PHQ-9 Total Score: 8 (12/20/2019 12:35 PM)  Thoughts that you would be better off dead, or of hurting yourself in some way: 0 (12/20/2019 12:35 PM)  PHQ-9 Total Score: 6 (9/13/2019 11:24 AM)  Thoughts that you would be better off dead, or of hurting yourself in some way: 0 (9/13/2019 11:24 AM)  Interpretation of PHQ-9 score:  1-4 = minimal depression, 5-9 = mild depression,   10-14 = moderate depression; 15-19 = moderately severe depression, 20-27 = severe depression      Past Psychiatric History:               Prior hospitalizations:denies              Prior diagnoses: anxiety, depression              Outpatient Treatment:                           Psychiatrist: denies                          Therapist: denies, uninterested              Suicide Attempts: denies              Hx SH:  denies     Past Psychopharmacologic Trials (including response/reactions):     1.  Just what on at present for many years     zoloft  Mirtazapine  Xanax     Past Medical/Surgical History: Past Medical History:   Diagnosis Date    Acid reflux     Anxiety     Arthritis     Atrial fibrillation (HCC)     Closed fracture dislocation of right elbow     Depression     Fibromyalgia     Migraine     WHITNEY (obstructive sleep apnea)     Diagnosed years ago, no CPAP    Rectal cancer (HCC)     Wears glasses     DRIVING     Past Surgical History:   Procedure Laterality Date    APPENDECTOMY      CERVICAL DISCECTOMY      ACDF     SECTION      x4    CHOLECYSTECTOMY      COLON SURGERY      Rectal cancer removed using  incision    COLONOSCOPY      SEVERAL    COLONOSCOPY N/A 2019    COLONOSCOPY POLYPECTOMY SNARE/COLD BIOPSY performed by Eileen Mujica MD at [address] COLONOSCOPY N/A 2021    COLONOSCOPY WITH BIOPSY performed by Jody Graham MD at [address] [place] [address] Right 2021    RIGHT LEG EVACUATION OF HEMATOMA performed by Perla Gorman MD at [facility] W/O EXTENSION Right 10/25/2018    OPEN REDUCTION INTERNAL FIXATION RIGHT FEMUR SUPRACONDYLAR FEMORAL FRACTURE WITH C-ARM performed by Mane Curtis MD at [address] [place] [address] Right 2021    RIGHT LEG HEMATOMA EVAKUATION, DEBRIDEMENT, AND WOUND VAC PLACEMENT performed by Roni Lyman MD at [address] [place] [address] Right 2021    RIGHT LOWER EXTREMITY SKIN GRAFT performed by Roni Lyman MD at [address] [facility]     UPPER GASTROINTESTINAL ENDOSCOPY N/A 2019    ESOPHAGOGASTRODUODENOSCOPY performed by Eileen Mujica MD at [address] [facility] ED N/A 2020    EGD BIOPSY performed by Eileen Mujica MD at [address] [facility] History   Problem Relation Age of Onset    COPD Mother          PCP: Evone Apgar, MD      Allergies:    Allergies   Allergen Reactions    Demerol Hcl [Meperidine] Other (See Comments)     PATIENT STATES SHE GOT VERY ANXIOUS-LIKE SHE WAS CLIMBING THE WALLS    Pcn [Penicillins]      Patient reports she has not had since she was a child, thinks reaction was hives.  Adhesive Tape Rash         Current Medications:   Current Outpatient Medications on File Prior to Visit   Medication Sig Dispense Refill    furosemide (LASIX) 20 MG tablet Take 1 tablet by mouth 2 times daily With an additional 20 mg on  tablet 5    ALPRAZolam (XANAX) 1 MG tablet TAKE ONE TABLET BY MOUTH THREE TIMES A DAY AS NEEDED FOR ANXIETY 90 tablet 1    metoclopramide (REGLAN) 5 MG tablet       metoprolol succinate (TOPROL XL) 100 MG extended release tablet TAKE ONE TABLET BY MOUTH DAILY 90 tablet 1    pantoprazole (PROTONIX) 40 MG tablet TAKE ONE TABLET BY MOUTH TWICE A  tablet 1    dicyclomine (BENTYL) 10 MG capsule Take 1 capsule by mouth 4 times daily as needed (cramping/diarrhea) 60 capsule 5    tiZANidine (ZANAFLEX) 2 MG tablet Take 1 tablet by mouth every 6 hours as needed (muscle pain) 60 tablet 11    potassium chloride (KLOR-CON M20) 20 MEQ extended release tablet Take 1 tablet by mouth daily 90 tablet 1    flecainide (TAMBOCOR) 100 MG tablet TAKE 1 TABLET BY MOUTH 2 TIMES A  tablet 3    Cyanocobalamin (VITAMIN B 12 PO) Take 2,500 mg by mouth daily      calcium carbonate-vitamin D (CALCIUM 600+D) 600-200 MG-UNIT TABS Take 1 tablet by mouth 2 times daily      apixaban (ELIQUIS) 5 MG TABS tablet Take 1 tablet by mouth 2 times daily 180 tablet 5     No current facility-administered medications on file prior to visit. Controlled Substance Monitoring:      Acute and Chronic Pain Monitoring:   RX Monitoring 1/17/2022   Periodic Controlled Substance Monitoring No signs of potential drug abuse or diversion identified.       12/24/2021 12/23/2021   1  Alprazolam 1 Mg Tablet   90.00  30  Ch Wet  [id number]  [place] ([id number])  0  6.00 E  Medicare [place]     11/26/2021  10/25/2021   1  Alprazolam 1 Mg Tablet   90.00  30  Ch Wet  [id number]  [place] ([id number])  0  6.00 LME  [place]are [place]     10/25/2021  10/25/2021   1  Alprazolam 1 Mg Tablet              09/29/2021  1   08/25/2021  Alprazolam 1 MG Tablet  90.00  30 Ch Wet   [id number]   [place] ([id number])   1  6.00 LME  [place]are [place]   08/25/2021  1   08/25/2021  Alprazolam 1 MG Tablet  90.00  30 Ch Wet   [id number]   [place] ([id number])   0  6.00 LME  [place]   07/28/2021  1   06/21/2021  Alprazolam 0.5 MG Tablet  90.00  30 Ch Wet   [id number]   [place] ([id number])   1  3.00 LME  [place]   06/23/2021  1   06/21/2021  Alprazolam 0.5 MG Tablet  90.00  30 Ch Wet   [id number]   [place] ([id number])   0  3.00 LME  [place]   05/26/2021  1   05/26/2021  Alprazolam 0.5 MG Tablet  90.00  30 Ch Wet   [id number]   [place] ([id number])   0  3.00 LME  [place]   03/19/2021  1   01/20/2021  Alprazolam 0.5 MG Tablet  90.00  30 Ch Wet   [id number]   [place] (1801)   1  3.00 LME  [place]   02/23/2021  1   02/22/2021  Hydrocodone-Acetamin 5-325 MG  40.00  [address]   [id number]   [place] ([id number])   0  28.57 MME  Comm [facility]   [place]   01/20/2021  2   01/20/2021  Alprazolam 0.5 MG Tablet  90.00  30 Ch Wet   [id number]   [place] ([id number])   0  3.00 LME  Comm [place]   [place]   12/01/2020  1   08/28/2020  Alprazolam 0.5 MG Tablet  90.00  30 Ch Wet   [id number]   [place] ([id number])   2  3.00 LME  [place]   10/24/2020  1   08/28/2020  Alprazolam 0.5 MG Tablet  90.00  30 Ch Wet   [id number]   [place] (1801)   1  3.00 LME  [place]   [place]   09/16/2020  1   08/28/2020  Alprazolam 0.5 MG Tablet  90.00  30 Ch Wet   [id number]   [place] (1801)   0  3.00 LME  [place]   [place]   08/17/2020  1   05/29/2020  Alprazolam 0.5 MG Tablet  60.00  30 Ch Wet   [id number]   [place] (1801)   2  2.00 LME  [place]   [place]   07/13/2020  1   05/29/2020  Alprazolam 0.5 MG Tablet  60.00  30 Ch Wet   [id number]   [place] (1801)   1  2.00 LME  [place]   [place]   06/15/2020  1   05/29/2020  Alprazolam 0.5 MG Tablet  60.00  30 Ch Wet   [id number]   [place] (1801)   0  2.00 LME  [place]   [place]   05/09/2020  1   05/08/2020  Alprazolam 0.5 MG Tablet  60.00  30 Ch Wet [id number]   [place] (1801)   0  2.00 LME  [place]   [place]   04/09/2020  1   04/08/2020  Alprazolam 0.5 MG Tablet  60.00  30 Ch Wet   [id number]   [place] (1801)   0  2.00 LME  [place]   [place]   03/03/2020  1   12/20/2019  Alprazolam 0.5 MG Tablet  60.00  30 Ch Wet   [id number]   [place] (1801)   0  2.00 LME  [place]   [place]   01/27/2020  1   12/20/2019  Alprazolam 0.5 MG Tablet  60.00  30 Ch Wet   [id number]   [place] ([id number])   1  2.00 LME  [place]   [place]   12/28/2019  1   12/20/2019  Alprazolam 0.5 MG Tablet  60.00  30 Ch Wet   [id number]   [place] ([id number])   0  2.00 LME  [place]are   [place]   11/27/2019  1   10/11/2019  Alprazolam 0.5 MG Tablet  60.00  30 Ch Wet   [id number]   [place] ([id number])   1  2.00 LME  [place]are   [place]     OBJECTIVE:  Vitals: Wt Readings from Last 3 Encounters:   12/20/21 187 lb (84.8 kg)   11/05/21 183 lb 6 oz (83.2 kg)   09/09/21 182 lb (82.6 kg)       There were no vitals filed for this visit. ROS: Denies trouble with fever, rash, vision changes, chest pain, shortness of breath, nausea, extremity pain, weakness, dysuria. +headaches daily \"always had headaches\" saw neuro until he was insisting on botox.   Made me sick thinking about it, so cancelled botox and future fu       Mental Status Exam:      Appearance    unable to assess d/t f/u visit completed via pc  Muscle strength/tone: unable to assess d/t f/u visit completed via pc  Gait/station: unable to assess d/t f/u visit completed via pc  Speech    spontaneous, normal rate and normal volume  Mood    \"a little better, still not great\", Anxious  Affect  Congruent to thought content and mood  Thought Content    preoccupations, no delusions voiced  Thought Process    coherent, linear  Associations    logical connections  Perceptions: denies AH/VH,   [address]  Orientation    oriented to person, place, time, and general circumstances  Memory    recent and remote memory intact  Attention/Concentration    intact  Ability to understand instructions Yes  Ability to respond meaningfully Yes  Language: [address] of knowledge/Intellect: Average  SI:   no suicidal ideation  HI: Denies HI    Labs:     Orders Only on 11/05/2021   Component Date Value    WBC 11/05/2021 6.8     RBC 11/05/2021 3.79*    Hemoglobin 11/05/2021 11.1*    Hematocrit 11/05/2021 34.8*    MCV 11/05/2021 91.7     MCH 11/05/2021 29.3     MCHC 11/05/2021 31.9     RDW 11/05/2021 17.7*    Platelets 52/38/0470 228     MPV 11/05/2021 8.4     Neutrophils % 11/05/2021 74.2     Lymphocytes % 11/05/2021 15.5     Monocytes % 11/05/2021 7.9     Eosinophils % 11/05/2021 1.9     Basophils % 11/05/2021 0.5     Neutrophils Absolute 11/05/2021 5.0     Lymphocytes Absolute 11/05/2021 1.1     Monocytes Absolute 11/05/2021 0.5     Eosinophils Absolute 11/05/2021 0.1     Basophils Absolute 11/05/2021 0.0    Orders Only on 09/09/2021   Component Date Value    Hemoglobin A1C 09/09/2021 6.0     eAG 09/09/2021 125.5     Iron 09/09/2021 83     TIBC 09/09/2021 379     Iron Saturation 09/09/2021 22     Ferritin 09/09/2021 58.8     Sodium 09/09/2021 139     Potassium 09/09/2021 4.0     Chloride 09/09/2021 98*    CO2 09/09/2021 24     Anion Gap 09/09/2021 17*    Glucose 09/09/2021 125*    BUN 09/09/2021 22*    CREATININE 09/09/2021 1.0     GFR Non- 09/09/2021 54*    GFR  09/09/2021 >60     Calcium 09/09/2021 9.7     Total Protein 09/09/2021 6.9     Albumin 09/09/2021 4.3     Albumin/Globulin Ratio 09/09/2021 1.7     Total Bilirubin 09/09/2021 <0.2     Alkaline Phosphatase 09/09/2021 107     ALT 09/09/2021 14     AST 09/09/2021 24     Globulin 09/09/2021 2.6     TSH Reflex FT4 09/09/2021 1.06     WBC 09/09/2021 6.5     RBC 09/09/2021 3.56*    Hemoglobin 09/09/2021 10.8*    Hematocrit 09/09/2021 33.7*    MCV 09/09/2021 94.7     MCH 09/09/2021 30.2     MCHC 09/09/2021 31.9     RDW 09/09/2021 15.3     Platelets 86/50/7208 215     MPV 09/09/2021 8.3     Neutrophils % 2021 77.0     Lymphocytes % 2021 13.5     Monocytes % 2021 7.4     Eosinophils % 2021 1.5     Basophils % 2021 0.6     Neutrophils Absolute 2021 5.0     Lymphocytes Absolute 2021 0.9*    Monocytes Absolute 2021 0.5     Eosinophils Absolute 2021 0.1     Basophils Absolute 2021 0.0        Last Drug screen:  Lab Results   Component Value Date    LABAMPH Neg 2018    LABBENZ Neg 2018    COCAIMETSCRU Neg 2018    MDMA Not Detected 2019    LABMETH Neg 2018    OPIATESCREENURINE POSITIVE 2018    PHENCYCLIDINESCREENURINE Neg 2018         Imagin/1/19 CT head wo contrast:      Impression   No acute traumatic intracranial abnormality       Atrophy and small-vessel ischemic change       Trace paranasal sinus disease             ASSESSMENT AND PLAN     Diagnosis Orders   1. JENNIFER (generalized anxiety disorder)     2. Moderate episode of recurrent major depressive disorder ([facility] [address]     3. Grief at loss of child         1. Safety: NO Imminent risk of danger to/self/others based on the factors considered below. Appropriate for outpatient level of care. Safety plan includes: 911, PES, hotlines, and interventions discussed today.      Risk factors: Age <25 or >55,  social isolation,no outpatient services in place, and no collateral information to support safety.     Protective factors:, female gender,denies depression, denies suicidal ideation, does not have lethal plan, does not have access to guns or weapons, patient is erika for safety, no prior suicide attempts, no family h/o suicide, no substance abuse,compliant with recommended medications,and patient is future oriented.        2.  Psychiatric  -  Stopped zoloft   Noted Significant increase in depressive symptoms and tearfulness when dose briefly reduced by cardiology in past.  since death of son (2021) and stopping mirtazapine, noted increase in depression and anxiety symptoms so opted to switch to [address] has had some + response to lexapro for depressive and anxiety symptoms. - increase lexapro 10mg/day    - consider augmenting with SGA for mood/anxiety    - continue xanax 1mg tid prn  Has been on xanax for many years (prior to est with this provider)  Advised not long term strategy. Goal is to reduce/eliminate. Would go slowly d/t length of time has been on this. Dose was reduced by prior pcp from previous 2mg bid to 0.5mg bid. Due to degree of depression/anxiety 2/2 son's death, would not recommend trying to reduce at this time      - reduced, then stopped Mirtazapine. New pcp was concerned could be causing tremors. Pt reports no change in tremors since stopping so likley not contributing factor. - continue hydroxyzine 25-50mg bid prn itching. Caution with possible anticholinergic se's including confusion. Denies confusion or altered mental status, says uses sparingly     - med contract signed 9/13/19  - UDS 9/13/19      -Labs: reviewed in Epic     - uninterested in outpt therapy.     -OARRS reviewed, c/w history  -R/b/se/a d/w pt who consents.     3. Medical  - Following with Marilee Dixon MD  - no longer following with neuro for ha's. Doesn't want to return or see anyone new     4. Substance   -No active issues.     5. RTC - Discussed provider transition.   D/t dose increase will f/u once more prior to provider leaving [facility], scheduled 4weeks, 2/16 1130a pc, Call sooner if needed        Carmen Morley [facility]  Psychiatric Nurse Practitioner

## 2022-01-24 ENCOUNTER — TELEPHONE (OUTPATIENT)
Dept: CARDIOLOGY CLINIC | Age: 77
End: 2022-01-24

## 2022-01-24 NOTE — TELEPHONE ENCOUNTER
Maye Montanez asks if we could please send samples to her house? She has no transportaion right now. She also asks if we can start patient assistance and see if she is eligible for that?     Medication Samples     Medication:  Eliquis     Dosage of the medication:  5 mg tabs      How are you taking this medication (QD, BID, TID, QID, PRN):  Take 1 tablet by mouth 2 times daily      in the office or Mail to your home?    Mail to home   [address] [facility] [address]

## 2022-01-24 NOTE — TELEPHONE ENCOUNTER
Samples prepared. Placed samples in a big envelope along with a patient assistance form. Attached a note with instructions on how to fill out paper work.

## 2022-01-25 DIAGNOSIS — R19.8 BORBORYGMI: ICD-10-CM

## 2022-01-25 RX ORDER — DICYCLOMINE HYDROCHLORIDE 10 MG/1
10 CAPSULE ORAL 4 TIMES DAILY PRN
Qty: 60 CAPSULE | Refills: 5 | Status: SHIPPED | OUTPATIENT
Start: 2022-01-25 | End: 2022-02-18 | Stop reason: SDUPTHER

## 2022-01-25 RX ORDER — PANTOPRAZOLE SODIUM 40 MG/1
TABLET, DELAYED RELEASE ORAL
Qty: 180 TABLET | Refills: 1 | Status: SHIPPED | OUTPATIENT
Start: 2022-01-25 | End: 2022-02-18 | Stop reason: SDUPTHER

## 2022-01-26 DIAGNOSIS — I48.0 PAF (PAROXYSMAL ATRIAL FIBRILLATION) (HCC): ICD-10-CM

## 2022-01-26 RX ORDER — FUROSEMIDE 20 MG/1
20 TABLET ORAL 2 TIMES DAILY
Qty: 180 TABLET | Refills: 3 | Status: ON HOLD | OUTPATIENT
Start: 2022-01-26 | End: 2022-08-04 | Stop reason: SDUPTHER

## 2022-01-27 RX ORDER — FLECAINIDE ACETATE 100 MG/1
TABLET ORAL
Qty: 180 TABLET | Refills: 3 | Status: SHIPPED | OUTPATIENT
Start: 2022-01-27

## 2022-02-07 ENCOUNTER — TELEPHONE (OUTPATIENT)
Dept: INTERNAL MEDICINE CLINIC | Age: 77
End: 2022-02-07

## 2022-02-07 NOTE — TELEPHONE ENCOUNTER
Patient wondered if we had received a fax from her mail pharmacy requesting that the prescriptions be faxed over for 90 days as opposed to 30 days, and she is also asking if Dr Jessica Aguero would fill the prescriptions that her psychiatrist gives her? The psychiatrist is closing their office and the one that she has been referred to is in [place] and that is to far for the patient, her insurance is giving her another list to chose from, but she will need her medication.  Please call her [phone]

## 2022-02-08 NOTE — TELEPHONE ENCOUNTER
Please investigate about the fax re: 90 day supplies. She will need an appointment to address temporarily (or possibly permanently) assuming responsibility for her psychiatric mediations.

## 2022-02-16 ENCOUNTER — TELEMEDICINE (OUTPATIENT)
Dept: PSYCHIATRY | Age: 77
End: 2022-02-16
Payer: MEDICARE

## 2022-02-16 DIAGNOSIS — F41.1 GAD (GENERALIZED ANXIETY DISORDER): ICD-10-CM

## 2022-02-16 DIAGNOSIS — F43.21 GRIEF AT LOSS OF CHILD: ICD-10-CM

## 2022-02-16 DIAGNOSIS — F33.1 MODERATE EPISODE OF RECURRENT MAJOR DEPRESSIVE DISORDER (HCC): Primary | ICD-10-CM

## 2022-02-16 DIAGNOSIS — Z63.4 GRIEF AT LOSS OF CHILD: ICD-10-CM

## 2022-02-16 PROCEDURE — 99443 PR PHYS/QHP TELEPHONE EVALUATION 21-30 MIN: CPT | Performed by: NURSE PRACTITIONER

## 2022-02-16 RX ORDER — ESCITALOPRAM OXALATE 10 MG/1
10 TABLET ORAL DAILY
Qty: 90 TABLET | Refills: 0 | Status: SHIPPED | OUTPATIENT
Start: 2022-02-16 | End: 2022-02-16 | Stop reason: SDUPTHER

## 2022-02-16 RX ORDER — ALPRAZOLAM 1 MG/1
TABLET ORAL
Qty: 90 TABLET | Refills: 2 | Status: SHIPPED | OUTPATIENT
Start: 2022-02-16 | End: 2022-06-14

## 2022-02-16 RX ORDER — ESCITALOPRAM OXALATE 10 MG/1
10 TABLET ORAL DAILY
Qty: 30 TABLET | Refills: 0 | Status: SHIPPED | OUTPATIENT
Start: 2022-02-16 | End: 2022-06-14 | Stop reason: SDUPTHER

## 2022-02-16 SDOH — SOCIAL STABILITY - SOCIAL INSECURITY: DISSAPEARANCE AND DEATH OF FAMILY MEMBER: Z63.4

## 2022-02-16 NOTE — PROGRESS NOTES
tired, would break in half. Now simone gives me 0.5mg. And I break that in half. Can't do without it     Says xanax, fioricet and tizanidine help. Takes mirtazapine      Cardiology recently advised her to reduce zoloft to 50mg/day. Just started lower dose last night. Did ok     Hasn't driven in almost 2 years. Independent in self care, uses bench. Uses walker all the time for ambulation when out of house. Usually sits in manual wc at home, faster to get to bathroom      H/o depression for many years, predating husban'd death. Don't know why, don't have anything to be depressed about. Doesn't feel overly depressed or anxious at present with current meds    HPI:   Chilo Jain is a 67 y.o. female with h/o gastric bypass, colon cancer, fibromyalgia,  anxiety and depression who was contacted via telehealth for follow up. Due to the COVID-19 pandemic restrictions on close contact interactions as recommended by CDC and health authorities, the patient's visit was conducted via telehealth (phone call visit) in lieu of a face to face visit. Patient verbally consented and agreed to proceed. SINCE LAST F/U 1/17/22:    \"not much\" since last visit    Doing well overall    New medicine really helping      Taking 2/16/22:     Xanax 1mg, 1/2 sometimes 3x/day + whole at night. Sometimes only 1/2 tab + whole at night    eliquis 5mg bid, from cardiology   Calcium carbonate, 2/day   Vit b 12 daily   Bentyl 10mg daily-bid prn abd cramping, helps keep from being nauseated   lexapro 10mg daily at hs last week, just moved back to am this week.  Tambocor 100mg bid from cards   Lasix 20mg, takes 2 every am and M/WF one in afternoon.      reglan prn   Magnesium 400mg hs      Metoprolol xl 100mg/day in am   protonix 40mg bid   kcl 20meq daily      Tizanidine 2mg prn, mostly just 1 at hs   Hydroxyzine 2-3x/day prn itching but seldom, denies confusion or altered mental status   Tylenol prn ha's      Substance:     Alcohol: denies, \"no, never\"              Illicits:  denies              Caffeine: denies   Tobacco:  Denies       Psych ROS:      Depression:   Rates mood 8/10 (10 best); Denies lability  Denies significant Irritability    Sleep variable. If tired, lay down. Sometimes nap, most times not. Sleep for 4 hours, then awake to bathroom, then up for hour. ? If pills wear off. Ok appetite, denies weight changes. At least 1-2 meal/day    LESS tearful, \"much much better\"    Low energy, fair motivation (sometimes watch TV)    DENIES issues with ADLs, does regularly without assistance in afternoons.       Variable Isolation, my car still broke,     DENIES SI/SH/HI        Kenna: DENIES  insomnia with increased energy, rapid speech, easily distracted or decreased attention, irritability, racing thoughts, expansive mood, increase in energy and goal directed behavior, grandiosity, flight of ideas   IMPULSIVITY  denies       Anxiety: \"not really\" at most rates 3-4/10 (10 worst); intermittent;     HISTORICALLY worry about \"anything, everything\" (kids, finances since only has her ssi and not 's, having car problems),  sleep disruption     somatic complaints (+headaches, nausea),     + fatigue;    DENIES restlessness,  fear of doing/saying wrong things, fear of judgement,     avoidant of social situations most d/t no working car     OCD:  \"always had certain ways of folding my towels and stuff\"  Not overly impairing   DENIES other Repetitive actions or rituals, excessive hand washing, contamination fears, need for symmetry, violent thoughts, hoarding, fear of being harmed, mental or verbal repetition of words or phrases and counting     Panic:  \"honestly  don't really know what those are, my daughter has them\"     Phobias:  DENIES        Psychosis: DENIES AVH, PARANOIA OR DELUSIONS           PTSD:  DENIES nightmares, flashbacks, hypervigilance, easily startled, decreased sleep, reliving the event, avoiding situations that remind you of trauma, physical and mental paralysis when reminded of the experience, same despair, easily angry or irritable, trouble concentrating, fear for safety,  Numbness of emotions, feeling of detachment     Eating disorders:  DENIES         JENNIFER 7 SCORE 2/28/2020 12/20/2019 10/11/2019 9/13/2019   JENNIFER-7 Total Score 0 3 8 7     Interpretation of JENNIFER-7 score: 5-9 = mild anxiety, 10-14 = moderate anxiety,   15+ = severe anxiety. Recommend referral to behavioral health for scores 10 or greater.     No data recorded  PHQ-9 Total Score: 8 (12/20/2019 12:35 PM)  Thoughts that you would be better off dead, or of hurting yourself in some way: 0 (12/20/2019 12:35 PM)  PHQ-9 Total Score: 6 (9/13/2019 11:24 AM)  Thoughts that you would be better off dead, or of hurting yourself in some way: 0 (9/13/2019 11:24 AM)  Interpretation of PHQ-9 score:  1-4 = minimal depression, 5-9 = mild depression,   10-14 = moderate depression; 15-19 = moderately severe depression, 20-27 = severe depression      Past Psychiatric History:               Prior hospitalizations:denies              Prior diagnoses: anxiety, depression              Outpatient Treatment:                           Psychiatrist: denies                          Therapist: denies, uninterested              Suicide Attempts: denies              Hx SH:  denies     Past Psychopharmacologic Trials (including response/reactions):     1.  Just what on at present for many years     zoloft  Mirtazapine  Xanax     Past Medical/Surgical History:   Past Medical History:   Diagnosis Date    Acid reflux     Anxiety     Arthritis     Atrial fibrillation (HCC)     Closed fracture dislocation of right elbow     Depression     Fibromyalgia     Migraine     WHITNEY (obstructive sleep apnea)     Diagnosed years ago, no CPAP    Rectal cancer ([facility] [address]     Wears glasses     DRIVING     Past Surgical History:   Procedure Laterality Date    APPENDECTOMY  CERVICAL DISCECTOMY      ACDF     SECTION      x4    CHOLECYSTECTOMY      COLON SURGERY      Rectal cancer removed using  incision    COLONOSCOPY      SEVERAL    COLONOSCOPY N/A 2019    COLONOSCOPY POLYPECTOMY SNARE/COLD BIOPSY performed by Justine Hernandez MD at [address] [facility] N/A 2021    COLONOSCOPY WITH BIOPSY performed by Laverne Al MD at [address] Right 2021    RIGHT LEG EVACUATION OF HEMATOMA performed by Darby Mcknight MD at [facility] W/O EXTENSION Right 10/25/2018    OPEN REDUCTION INTERNAL FIXATION RIGHT FEMUR SUPRACONDYLAR FEMORAL FRACTURE WITH C-ARM performed by Alvaro Ramos MD at [address] [facility] Right 2021    RIGHT LEG HEMATOMA EVAKUATION, DEBRIDEMENT, AND WOUND VAC PLACEMENT performed by Freddy Tracey MD at [address] [place] [address] Right 2021    RIGHT LOWER EXTREMITY SKIN GRAFT performed by Freddy Tracey MD at [facility]     UPPER GASTROINTESTINAL ENDOSCOPY N/A 2019    ESOPHAGOGASTRODUODENOSCOPY performed by Justine Hernandez MD at [address] [facility] N/A 2020    EGD BIOPSY performed by Justine Hernandez MD at [address] [facility] History   Problem Relation Age of Onset    COPD Mother          PCP: Yuliet Billings MD      Allergies: Allergies   Allergen Reactions    Demerol Hcl [Meperidine] Other (See Comments)     PATIENT STATES SHE GOT VERY ANXIOUS-LIKE SHE WAS CLIMBING THE WALLS    Pcn [Penicillins]      Patient reports she has not had since she was a child, thinks reaction was hives.      Adhesive Tape Rash         Current Medications:   Current Outpatient Medications on File Prior to Visit   Medication Sig Dispense Refill    flecainide (TAMBOCOR) 100 MG tablet TAKE 1 TABLET BY MOUTH 2 TIMES A  tablet 3    apixaban (ELIQUIS) 5 MG TABS tablet Take 1 tablet by mouth 2 times daily 180 tablet 3    furosemide (LASIX) 20 MG tablet Take 1 tablet by mouth 2 times daily With an additional 20 mg on  tablet 3    pantoprazole (PROTONIX) 40 MG tablet TAKE ONE TABLET BY MOUTH TWICE A  tablet 1    dicyclomine (BENTYL) 10 MG capsule Take 1 capsule by mouth 4 times daily as needed (cramping/diarrhea) 60 capsule 5    metoclopramide (REGLAN) 5 MG tablet       metoprolol succinate (TOPROL XL) 100 MG extended release tablet TAKE ONE TABLET BY MOUTH DAILY 90 tablet 1    tiZANidine (ZANAFLEX) 2 MG tablet Take 1 tablet by mouth every 6 hours as needed (muscle pain) 60 tablet 11    potassium chloride (KLOR-CON M20) 20 MEQ extended release tablet Take 1 tablet by mouth daily 90 tablet 1    Cyanocobalamin (VITAMIN B 12 PO) Take 2,500 mg by mouth daily      calcium carbonate-vitamin D (CALCIUM 600+D) 600-200 MG-UNIT TABS Take 1 tablet by mouth 2 times daily       No current facility-administered medications on file prior to visit. Controlled Substance Monitoring:      Acute and Chronic Pain Monitoring:   RX Monitoring 2/16/2022   Periodic Controlled Substance Monitoring No signs of potential drug abuse or diversion identified.       12/24/2021 12/23/2021   1  Alprazolam 1 Mg Tablet   90.00  30  Ch Wet  [id number]  [place] ([id number])  0  6.00 LME  Medicare [place]     11/26/2021  10/25/2021   1  Alprazolam 1 Mg Tablet   90.00  30  Ch Wet  [id number]  [place] ([id number])  0  6.00 LME  [place]are [place]     10/25/2021  10/25/2021   1  Alprazolam 1 Mg Tablet              09/29/2021  1   08/25/2021  Alprazolam 1 MG Tablet  90.00  30 Ch Wet   [id number]   [place] ([id number])   1  6.00 LME  Med[place]are [place]   08/25/2021  1   08/25/2021  Alprazolam 1 MG Tablet  90.00  30 Ch Wet   [id number]   [place] (1801)   0  6.00 LME  [place]   07/28/2021  1   06/21/2021  Alprazolam 0.5 MG Tablet  90.00  30 Ch Wet   [id number]   [place] (1801)   1  3.00 LME  [place]   06/23/2021  1 06/21/2021  Alprazolam 0.5 MG Tablet  90.00  30 Ch Wet   [id number]   [place] (1801)   0  3.00 LME  [place]   05/26/2021  1   05/26/2021  Alprazolam 0.5 MG Tablet  90.00  30 Ch Wet   [id number]   [place] (1801)   0  3.00 LME  [place]   03/19/2021  1   01/20/2021  Alprazolam 0.5 MG Tablet  90.00  30 Ch Wet   [id number]   [place] (1801)   1  3.00 LME  [place]   02/23/2021  1   02/22/2021  Hydrocodone-Acetamin 5-325 MG  40.00  [address]   [id number]   [place] ([id number])   0  28.57 MME  Comm [place]   [place]   01/20/2021  2   01/20/2021  Alprazolam 0.5 MG Tablet  90.00  30 Ch Wet   [id number]   [place] (1801)   0  3.00 LME  [place]   12/01/2020  1   08/28/2020  Alprazolam 0.5 MG Tablet  90.00  30 Ch Wet   [id number]   [place] (1801)   2  3.00 LME  [place]   10/24/2020  1   08/28/2020  Alprazolam 0.5 MG Tablet  90.00  30 Ch Wet   [id number]   [place] (1801)   1  3.00 LME  [place]   09/16/2020  1   08/28/2020  Alprazolam 0.5 MG Tablet  90.00  30 Ch Wet   [id number]   [place] (1801)   0  3.00 LME  [place]   08/17/2020  1   05/29/2020  Alprazolam 0.5 MG Tablet  60.00  30 Ch Wet   [id number]   [place] (1801)   2  2.00 LME  [place]   07/13/2020  1   05/29/2020  Alprazolam 0.5 MG Tablet  60.00  30 Ch Wet   [id number]   [place] (1801)   1  2.00 LME  [place]   06/15/2020  1   05/29/2020  Alprazolam 0.5 MG Tablet  60.00  30 Ch Wet   [id number]   [place] (1801)   0  2.00 LME  [place]   05/09/2020  1   05/08/2020  Alprazolam 0.5 MG Tablet  60.00  30 Ch Wet   [id number]   [place] (1801)   0  2.00 LME  [place]   04/09/2020  1   04/08/2020  Alprazolam 0.5 MG Tablet  60.00  30 Ch Wet   [id number]   [place] (1801)   0  2.00 LME  [place]   03/03/2020  1   12/20/2019  Alprazolam 0.5 MG Tablet  60.00  30 Ch Wet   [id number]   [place] (1801)   0  2.00 LME  [place]   01/27/2020  1   12/20/2019  Alprazolam 0.5 MG Tablet  60.00  30 Ch Wet   [id number]   [place] (1893)   1  2.00 LME  [place]   12/28/2019  1   12/20/2019  Alprazolam 0.5 MG Tablet  60.00  30 Ch Wet   [id number] [place] ([id number])   0  2.00 [facility]  Medicare   [place]   11/27/2019  1   10/11/2019  Alprazolam 0.5 MG Tablet  60.00  30 Ch Wet   [id number]   [place] ([id number])   1  2.00 E  [place]are   [place]     OBJECTIVE:  Vitals: Wt Readings from Last 3 Encounters:   12/20/21 187 lb (84.8 kg)   11/05/21 183 lb 6 oz (83.2 kg)   09/09/21 182 lb (82.6 kg)       There were no vitals filed for this visit. ROS: Denies trouble with fever, rash, vision changes, chest pain, shortness of breath, nausea, extremity pain, weakness, dysuria. +headaches daily \"always had headaches\" saw neuro until he was insisting on botox.   Made me sick thinking about it, so cancelled botox and future fu; + fatigue and generalized weakness       Mental Status Exam:      Appearance    unable to assess d/t f/u visit completed via pc  Muscle strength/tone: unable to assess d/t f/u visit completed via pc  Gait/station: unable to assess d/t f/u visit completed via pc  Speech    spontaneous, normal rate and normal volume  Mood    \"a little better, still not great\", Anxious  Affect  Congruent to thought content and mood  Thought Content    preoccupations, no delusions voiced  Thought Process    coherent, linear  Associations    logical connections  Perceptions: denies AH/VH,   [address]  Orientation    oriented to person, place, time, and general circumstances  Memory    recent and remote memory intact  Attention/Concentration    intact  Ability to understand instructions Yes  Ability to respond meaningfully Yes  Language: [id number] of knowledge/Intellect: Average  SI:   no suicidal ideation  HI: Denies HI    Labs:     Orders Only on 11/05/2021   Component Date Value    WBC 11/05/2021 6.8     RBC 11/05/2021 3.79*    Hemoglobin 11/05/2021 11.1*    Hematocrit 11/05/2021 34.8*    MCV 11/05/2021 91.7     MCH 11/05/2021 29.3     MCHC 11/05/2021 31.9     RDW 11/05/2021 17.7*    Platelets 27/91/1483 228     MPV 11/05/2021 8.4     Neutrophils % 11/05/2021 74.2     Lymphocytes % 11/05/2021 15.5     Monocytes % 11/05/2021 7.9     Eosinophils % 11/05/2021 1.9     Basophils % 11/05/2021 0.5     Neutrophils Absolute 11/05/2021 5.0     Lymphocytes Absolute 11/05/2021 1.1     Monocytes Absolute 11/05/2021 0.5     Eosinophils Absolute 11/05/2021 0.1     Basophils Absolute 11/05/2021 0.0    Orders Only on 09/09/2021   Component Date Value    Hemoglobin A1C 09/09/2021 6.0     eAG 09/09/2021 125.5     Iron 09/09/2021 83     TIBC 09/09/2021 379     Iron Saturation 09/09/2021 22     Ferritin 09/09/2021 58.8     Sodium 09/09/2021 139     Potassium 09/09/2021 4.0     Chloride 09/09/2021 98*    CO2 09/09/2021 24     Anion Gap 09/09/2021 17*    Glucose 09/09/2021 125*    BUN 09/09/2021 22*    CREATININE 09/09/2021 1.0     GFR Non- 09/09/2021 54*    GFR  09/09/2021 >60     Calcium 09/09/2021 9.7     Total Protein 09/09/2021 6.9     Albumin 09/09/2021 4.3     Albumin/Globulin Ratio 09/09/2021 1.7     Total Bilirubin 09/09/2021 <0.2     Alkaline Phosphatase 09/09/2021 107     ALT 09/09/2021 14     AST 09/09/2021 24     Globulin 09/09/2021 2.6     TSH Reflex FT4 09/09/2021 1.06     WBC 09/09/2021 6.5     RBC 09/09/2021 3.56*    Hemoglobin 09/09/2021 10.8*    Hematocrit 09/09/2021 33.7*    MCV 09/09/2021 94.7     MCH 09/09/2021 30.2     MCHC 09/09/2021 31.9     RDW 09/09/2021 15.3     Platelets 66/08/9295 215     MPV 09/09/2021 8.3     Neutrophils % 09/09/2021 77.0     Lymphocytes % 09/09/2021 13.5     Monocytes % 09/09/2021 7.4     Eosinophils % 09/09/2021 1.5     Basophils % 09/09/2021 0.6     Neutrophils Absolute 09/09/2021 5.0     Lymphocytes Absolute 09/09/2021 0.9*    Monocytes Absolute 09/09/2021 0.5     Eosinophils Absolute 09/09/2021 0.1     Basophils Absolute 09/09/2021 0.0        Last Drug screen:  Lab Results   Component Value Date    LABAMPH Neg 01/13/2018    LABBENZ Neg 01/13/2018 COCAIMETSCRU Neg 2018    MDMA Not Detected 2019    LABMETH Neg 2018    OPIATESCREENURINE POSITIVE 2018    PHENCYCLIDINESCREENURINE Neg 2018         Imagin/1/19 CT head wo contrast:      Impression   No acute traumatic intracranial abnormality       Atrophy and small-vessel ischemic change       Trace paranasal sinus disease             ASSESSMENT AND PLAN     Diagnosis Orders   1. Moderate episode of recurrent major depressive disorder ([address]     2. JENNIFER (generalized anxiety disorder)  ALPRAZolam (XANAX) 1 MG tablet   3. Grief at loss of child         1. Safety: NO Imminent risk of danger to/self/others based on the factors considered below. Appropriate for outpatient level of care. Safety plan includes: 911, PES, hotlines, and interventions discussed today.      Risk factors: Age <25 or >55,  social isolation,no outpatient services in place, and no collateral information to support safety.     Protective factors:, female gender,denies depression, denies suicidal ideation, does not have lethal plan, does not have access to guns or weapons, patient is erika for safety, no prior suicide attempts, no family h/o suicide, no substance abuse,compliant with recommended medications,and patient is future oriented.        2. Psychiatric  -  Stopped zoloft   Noted Significant increase in depressive symptoms and tearfulness when dose briefly reduced by cardiology in past.  Since death of son (2021) and stopping mirtazapine, noted increase in depression and anxiety symptoms so opted to switch to [address] has had + response to lexapro for depressive and anxiety symptoms. - continue lexapro 10mg/day    - consider augmenting with SGA for mood/anxiety if needed in future    - continue xanax 1mg tid prn  Has been on xanax for many years (prior to est with this provider)  Advised not long term strategy. Goal is to reduce/eliminate.   Would go slowly d/t length of time has been on this. Dose was reduced by prior pcp from previous 2mg bid to 0.5mg bid. Due to degree of depression/anxiety 2/2 son's death, would not recommend trying to reduce at this time      - reduced, then stopped Mirtazapine. New pcp was concerned could be causing tremors. Pt reports no change in tremors since stopping so likley not contributing factor. - continue hydroxyzine 25-50mg bid prn itching. Caution with possible anticholinergic se's including confusion. Denies confusion or altered mental status, says uses sparingly     - med contract signed 9/13/19  - UDS 9/13/19      -Labs: reviewed in Epic     - uninterested in outpt therapy.     -OARRS reviewed, c/w history  -R/b/se/a d/w pt who consents.     3. Medical  - Following with Rickey Uriostegui MD  - no longer following with neuro for ha's. Doesn't want to return or see anyone new     4. Substance   -No active issues.     5. RTC - Discussed provider transition. She is unable to establish with providers sent out in notification letter, are too far away from her. Has asked insurance to send her covered provider list and will see if anyone closer. Otherwise, has appt w/pcp in march to see if she would be willing to manage meds.   Have provided refills for up to 90-days of coverage until can establish w/new provider if necessary      Cecil Roberts  Psychiatric Nurse Practitioner

## 2022-02-18 ENCOUNTER — PATIENT MESSAGE (OUTPATIENT)
Dept: INTERNAL MEDICINE CLINIC | Age: 77
End: 2022-02-18

## 2022-02-18 DIAGNOSIS — R19.8 BORBORYGMI: ICD-10-CM

## 2022-02-18 RX ORDER — POTASSIUM CHLORIDE 20 MEQ/1
20 TABLET, EXTENDED RELEASE ORAL DAILY
Qty: 90 TABLET | Refills: 1 | Status: SHIPPED | OUTPATIENT
Start: 2022-02-18 | End: 2022-08-05

## 2022-02-18 RX ORDER — DICYCLOMINE HYDROCHLORIDE 10 MG/1
10 CAPSULE ORAL 4 TIMES DAILY PRN
Qty: 60 CAPSULE | Refills: 5 | Status: SHIPPED | OUTPATIENT
Start: 2022-02-18

## 2022-02-18 RX ORDER — TIZANIDINE 2 MG/1
2 TABLET ORAL EVERY 6 HOURS PRN
Qty: 60 TABLET | Refills: 11 | Status: SHIPPED | OUTPATIENT
Start: 2022-02-18

## 2022-02-18 RX ORDER — METOPROLOL SUCCINATE 100 MG/1
TABLET, EXTENDED RELEASE ORAL
Qty: 90 TABLET | Refills: 1 | Status: ON HOLD | OUTPATIENT
Start: 2022-02-18 | End: 2022-08-04 | Stop reason: HOSPADM

## 2022-02-18 RX ORDER — PANTOPRAZOLE SODIUM 40 MG/1
TABLET, DELAYED RELEASE ORAL
Qty: 180 TABLET | Refills: 1 | Status: SHIPPED | OUTPATIENT
Start: 2022-02-18 | End: 2022-09-16

## 2022-02-18 NOTE — TELEPHONE ENCOUNTER
Requested Prescriptions     Pending Prescriptions Disp Refills    tiZANidine (ZANAFLEX) 2 MG tablet 60 tablet 11     Sig: Take 1 tablet by mouth every 6 hours as needed (muscle pain)    metoprolol succinate (TOPROL XL) 100 MG extended release tablet 90 tablet 1     Sig: TAKE ONE TABLET BY MOUTH DAILY    pantoprazole (PROTONIX) 40 MG tablet 180 tablet 1     Sig: TAKE ONE TABLET BY MOUTH TWICE A DAY    potassium chloride (KLOR-CON M20) 20 MEQ extended release tablet 90 tablet 1     Sig: Take 1 tablet by mouth daily    dicyclomine (BENTYL) 10 MG capsule 60 capsule 5     Sig: Take 1 capsule by mouth 4 times daily as needed (cramping/diarrhea)   Patient requesting a medication refill. Pharmacy: [facility]  Next office visit: 3/24/2022  Last regular office visit: 11/5/2021    Patient is changing pharmacy's. not applicable (Male)

## 2022-02-18 NOTE — TELEPHONE ENCOUNTER
From: Fatimah De La Fuente  To: Dr. Nicole Rush: 2/18/2022 11:59 AM EST  Subject: [place] home medication delivery    Talked to medical assistant for seeing if she had received fax for the meds Dr rxs like tizadine protonic medotrol and meds Dr. Martinez Guess refills not physic meds. Just normal ones. Have not heard from my in about receiving the fax yet. Just wondering why since I spoke to you about this already. If there is a problem would like to know. Thank you [phone].

## 2022-06-14 ENCOUNTER — OFFICE VISIT (OUTPATIENT)
Dept: INTERNAL MEDICINE CLINIC | Age: 77
End: 2022-06-14
Payer: MEDICARE

## 2022-06-14 VITALS
OXYGEN SATURATION: 97 % | SYSTOLIC BLOOD PRESSURE: 138 MMHG | WEIGHT: 161 LBS | DIASTOLIC BLOOD PRESSURE: 73 MMHG | HEART RATE: 57 BPM | HEIGHT: 61 IN | BODY MASS INDEX: 30.4 KG/M2

## 2022-06-14 DIAGNOSIS — F41.9 ANXIETY: ICD-10-CM

## 2022-06-14 DIAGNOSIS — M79.7 FIBROMYALGIA: ICD-10-CM

## 2022-06-14 DIAGNOSIS — R54 AGE-RELATED PHYSICAL DEBILITY: ICD-10-CM

## 2022-06-14 DIAGNOSIS — M48.02 SPINAL STENOSIS IN CERVICAL REGION: ICD-10-CM

## 2022-06-14 DIAGNOSIS — R53.1 WEAKNESS: ICD-10-CM

## 2022-06-14 DIAGNOSIS — K21.9 GASTROESOPHAGEAL REFLUX DISEASE WITHOUT ESOPHAGITIS: ICD-10-CM

## 2022-06-14 DIAGNOSIS — I48.91 ATRIAL FIBRILLATION, UNSPECIFIED TYPE (HCC): ICD-10-CM

## 2022-06-14 DIAGNOSIS — I10 ESSENTIAL HYPERTENSION: Primary | ICD-10-CM

## 2022-06-14 DIAGNOSIS — M81.0 SENILE OSTEOPOROSIS: ICD-10-CM

## 2022-06-14 DIAGNOSIS — F41.1 GAD (GENERALIZED ANXIETY DISORDER): ICD-10-CM

## 2022-06-14 DIAGNOSIS — M48.061 SPINAL STENOSIS OF LUMBAR REGION WITHOUT NEUROGENIC CLAUDICATION: ICD-10-CM

## 2022-06-14 DIAGNOSIS — M81.0 AGE-RELATED OSTEOPOROSIS WITHOUT CURRENT PATHOLOGICAL FRACTURE: ICD-10-CM

## 2022-06-14 PROBLEM — S72.451G: Status: RESOLVED | Noted: 2020-08-02 | Resolved: 2022-06-14

## 2022-06-14 PROBLEM — S72.91XA CLOSED FRACTURE OF RIGHT FEMUR (HCC): Status: RESOLVED | Noted: 2018-10-24 | Resolved: 2022-06-14

## 2022-06-14 PROBLEM — M85.80 OSTEOPENIA: Status: RESOLVED | Noted: 2020-09-10 | Resolved: 2022-06-14

## 2022-06-14 PROCEDURE — 1123F ACP DISCUSS/DSCN MKR DOCD: CPT | Performed by: INTERNAL MEDICINE

## 2022-06-14 PROCEDURE — 99215 OFFICE O/P EST HI 40 MIN: CPT | Performed by: INTERNAL MEDICINE

## 2022-06-14 RX ORDER — ALPRAZOLAM 0.5 MG/1
TABLET ORAL
Qty: 90 TABLET | Refills: 2 | Status: SHIPPED | OUTPATIENT
Start: 2022-06-14 | End: 2022-06-14

## 2022-06-14 RX ORDER — ESCITALOPRAM OXALATE 10 MG/1
10 TABLET ORAL DAILY
Qty: 90 TABLET | Refills: 3 | Status: SHIPPED | OUTPATIENT
Start: 2022-06-14

## 2022-06-14 RX ORDER — ALPRAZOLAM 1 MG/1
TABLET ORAL
Qty: 90 TABLET | Refills: 2 | Status: ON HOLD | OUTPATIENT
Start: 2022-06-14 | End: 2022-07-30

## 2022-06-14 ASSESSMENT — PATIENT HEALTH QUESTIONNAIRE - PHQ9
2. FEELING DOWN, DEPRESSED OR HOPELESS: 2
SUM OF ALL RESPONSES TO PHQ QUESTIONS 1-9: 9
9. THOUGHTS THAT YOU WOULD BE BETTER OFF DEAD, OR OF HURTING YOURSELF: 0
SUM OF ALL RESPONSES TO PHQ QUESTIONS 1-9: 9
1. LITTLE INTEREST OR PLEASURE IN DOING THINGS: 1
SUM OF ALL RESPONSES TO PHQ9 QUESTIONS 1 & 2: 3
SUM OF ALL RESPONSES TO PHQ QUESTIONS 1-9: 9
5. POOR APPETITE OR OVEREATING: 2
8. MOVING OR SPEAKING SO SLOWLY THAT OTHER PEOPLE COULD HAVE NOTICED. OR THE OPPOSITE, BEING SO FIGETY OR RESTLESS THAT YOU HAVE BEEN MOVING AROUND A LOT MORE THAN USUAL: 1
3. TROUBLE FALLING OR STAYING ASLEEP: 1
SUM OF ALL RESPONSES TO PHQ QUESTIONS 1-9: 9
7. TROUBLE CONCENTRATING ON THINGS, SUCH AS READING THE NEWSPAPER OR WATCHING TELEVISION: 0
6. FEELING BAD ABOUT YOURSELF - OR THAT YOU ARE A FAILURE OR HAVE LET YOURSELF OR YOUR FAMILY DOWN: 0
4. FEELING TIRED OR HAVING LITTLE ENERGY: 2
10. IF YOU CHECKED OFF ANY PROBLEMS, HOW DIFFICULT HAVE THESE PROBLEMS MADE IT FOR YOU TO DO YOUR WORK, TAKE CARE OF THINGS AT HOME, OR GET ALONG WITH OTHER PEOPLE: 1

## 2022-06-14 NOTE — PROGRESS NOTES
Chief Complaint   Patient presents with    Follow-up       HPI: Here for htn followup and management of multiple chronic conditions as per the active problems list on chart, which I reviewed and updated with the patient today. States doing well with no new concerns except if noted below. She is weak and having more difficulty completing ADLs, family helping but requests home health assistance. Also continues with significant anxiety for which she wants to continue alprazolam, aware of risks. Discussed would be beneificial to try something to stabilize mood and anxiety and hopefully reduce reliance on benzodiazepine. I have reviewed the chart notes available from myself and other providers. I have reviewed and addressed all active problems and created or updated the problems list in detail, as needed. No problem-specific Assessment & Plan notes found for this encounter.       I have extensively reviewed and reconciled the medication list, discontinued medications not taking or no longer appropriate, and updated the active meds list      Lab Results   Component Value Date    LABA1C 6.0 09/09/2021    LABA1C 5.8 12/01/2020    LABA1C 6.0 07/19/2019    LABMICR Not Indicated 09/03/2019    LABMICR Not Indicated 08/12/2019    LABMICR YES 10/27/2018       Lab Results   Component Value Date     09/09/2021     04/13/2021     02/22/2021    K 4.0 09/09/2021    K 4.5 04/13/2021    K 5.0 03/09/2021    CL 98 (L) 09/09/2021     04/13/2021     02/22/2021    CO2 24 09/09/2021    CO2 24 04/13/2021    CO2 29 02/22/2021    BUN 22 (H) 09/09/2021    BUN 22 (H) 04/13/2021    BUN 18 02/22/2021    CREATININE 1.0 09/09/2021    CREATININE 0.8 04/13/2021    CREATININE 0.5 (L) 02/22/2021    GLUCOSE 125 (H) 09/09/2021    GLUCOSE 123 (H) 04/13/2021    GLUCOSE 96 02/22/2021    CALCIUM 9.7 09/09/2021    CALCIUM 9.4 04/13/2021    CALCIUM 8.8 02/22/2021       Lab Results   Component Value Date    CHOL 124 02/19/2020    TRIG 102 02/19/2020    HDL 66 (H) 02/19/2020    LDLCALC 38 02/19/2020       Lab Results   Component Value Date    ALT 14 09/09/2021    ALT 14 04/13/2021    ALT 11 01/28/2021    AST 24 09/09/2021    AST 22 04/13/2021    AST 20 01/28/2021       Lab Results   Component Value Date    TSH 0.44 10/24/2018    TSH 0.38 01/14/2018       Lab Results   Component Value Date    WBC 6.8 11/05/2021    WBC 6.5 09/09/2021    WBC 6.6 04/13/2021    HGB 11.1 (L) 11/05/2021    HGB 10.8 (L) 09/09/2021    HGB 12.3 04/13/2021    HCT 34.8 (L) 11/05/2021    HCT 33.7 (L) 09/09/2021    HCT 37.6 04/13/2021    MCV 91.7 11/05/2021    MCV 94.7 09/09/2021    MCV 97.2 04/13/2021     11/05/2021     09/09/2021     04/13/2021       Lab Results   Component Value Date    INR 1.08 02/04/2021    INR 1.37 (H) 01/28/2021    INR 1.39 (H) 11/02/2018        No results found for: PSA     No results found for: LABURIC          /73   Pulse 57   Ht 5' 1\" (1.549 m)   Wt 161 lb (73 kg)   SpO2 97%   BMI 30.42 kg/m²   Body mass index is 30.42 kg/m². Physical Exam  Constitutional:       General: She is not in acute distress. HENT:      Head: Normocephalic and atraumatic. Nose: Nose normal.      Mouth/Throat:      Pharynx: No oropharyngeal exudate. Eyes:      General: No scleral icterus. Right eye: No discharge. Left eye: No discharge. Conjunctiva/sclera: Conjunctivae normal.      Pupils: Pupils are equal, round, and reactive to light. Neck:      Thyroid: No thyromegaly. Vascular: No JVD. Trachea: No tracheal deviation. Cardiovascular:      Rate and Rhythm: Normal rate. Rhythm irregular. Heart sounds: Normal heart sounds. No murmur heard. No friction rub. No gallop. Pulmonary:      Effort: Pulmonary effort is normal. No respiratory distress. Breath sounds: Normal breath sounds. No wheezing or rales. Chest:      Chest wall: No tenderness.    Abdominal:      General: Anxiety  - escitalopram (LEXAPRO) 10 MG tablet; Take 1 tablet by mouth daily  Dispense: 90 tablet; Refill: 3    7. Fibromyalgia    8. Spinal stenosis in cervical region    9. Spinal stenosis of lumbar region without neurogenic claudication    10. JENNIFER (generalized anxiety disorder)  - ALPRAZolam (XANAX) 1 MG tablet; TAKE ONE TABLET BY MOUTH THREE TIMES A DAY AS NEEDED FOR ANXIETY  Dispense: 90 tablet; Refill: 2  OARRS checked    11. Weakness  - Amb External Referral To Home Health    12. Age-related physical debility  - Amb External Referral To Home Health            Problem List     A-fib [facility])    Relevant Medications    furosemide (LASIX) 20 MG tablet    apixaban (ELIQUIS) 5 MG TABS tablet    metoprolol succinate (TOPROL XL) 100 MG extended release tablet    Essential hypertension - Primary    Spinal stenosis in cervical region    Spinal stenosis, lumbar    Fibromyalgia    Relevant Medications    tiZANidine (ZANAFLEX) 2 MG tablet    escitalopram (LEXAPRO) 10 MG tablet    Gastroesophageal reflux disease without esophagitis    Relevant Medications    pantoprazole (PROTONIX) 40 MG tablet    dicyclomine (BENTYL) 10 MG capsule    Age-related osteoporosis without current pathological fracture    Anxiety    Relevant Medications    escitalopram (LEXAPRO) 10 MG tablet    ALPRAZolam (XANAX) 1 MG tablet    RESOLVED: Senile osteoporosis        Orders Placed This Encounter   Procedures    Amb External Referral To Home Health     Referral Priority:   Routine     Referral Type:   Consult for Advice and Opinion     Referral Reason:   Specialty Services Required     Referred to Provider:   [address] [place] [address] Drive     Requested Specialty:   Home Health Services     Number of Visits Requested:   1       I have reconciled the medications in chart with what patient reports to be taking, andreviewed action/ sideeffects and how to take any new medications. Patient/caregiver understands purpose and side effects.   A complete  list of medications was provided in their after-visit summary. Return in about 3 months (around 9/14/2022) for f/u mult med extended with CSM, FBW, AWV. Time basedbilling: I spent over 40 minutes with this patient, and as is the nature of primary care and typical for my extended visits, over 50 percent of this visit was spent on counseling and coordination ofcare.

## 2022-06-15 ENCOUNTER — TELEPHONE (OUTPATIENT)
Dept: INTERNAL MEDICINE CLINIC | Age: 77
End: 2022-06-15

## 2022-06-15 DIAGNOSIS — M50.30 DDD (DEGENERATIVE DISC DISEASE), CERVICAL: ICD-10-CM

## 2022-06-15 DIAGNOSIS — M51.37 DDD (DEGENERATIVE DISC DISEASE), LUMBOSACRAL: ICD-10-CM

## 2022-06-15 DIAGNOSIS — M47.812 CERVICAL SPONDYLOSIS WITHOUT MYELOPATHY: Primary | ICD-10-CM

## 2022-06-15 NOTE — TELEPHONE ENCOUNTER
Hayden Luna from [address] [place] [address] called and stated the referral sent over for patient is denied because medicare will not accept the 2 DX codes on the referral.  Medicare states they will not accept weakness or physical debility.      Please send new referral.

## 2022-06-17 NOTE — TELEPHONE ENCOUNTER
Called Eun from [employer]. She is going to check and make sure if I send the problem list they can pick the dx that medicare would pay them.

## 2022-06-22 PROBLEM — F41.1 GAD (GENERALIZED ANXIETY DISORDER): Status: ACTIVE | Noted: 2022-06-22

## 2022-06-22 PROBLEM — R53.1 WEAKNESS: Status: ACTIVE | Noted: 2022-06-22

## 2022-06-22 PROBLEM — R54 AGE-RELATED PHYSICAL DEBILITY: Status: ACTIVE | Noted: 2022-06-22

## 2022-06-23 ENCOUNTER — TELEPHONE (OUTPATIENT)
Dept: INTERNAL MEDICINE CLINIC | Age: 77
End: 2022-06-23

## 2022-06-23 NOTE — TELEPHONE ENCOUNTER
Mirna Morning called stating they were unable to get in touch with Ashley Mann for the PT order that was placed for her, gave her pts home phone number and daughters cell number.

## 2022-06-24 NOTE — PROGRESS NOTES
[address]  Cardiology  Note      Maciel Cota  1945, 67 y.o.      CC: \"                Shima Garcia MD:      HPI:   This is a 67 y.o. female with history of AFIB and multiple falls who presented to [facility] 1/13/18 with confusion and generalized weakness. This may have been associated with multiple falls. She was found to be in AFIB with RVR and started on Cardizem gtt which was transitioned to po. ECHO performed was sub-optimal and showed EF of 55-60%. Continues to complain of palpitations and wore 30 day event monitor showing Afib with RVR. Patient was started on Flecainide 50 mg BID and Toprol XL 25 mg daily (8/2019). Presented to ER 9/1/19 after falling out of her wheelchair at home. Per ER report, she hit her head on the floor, but did not lose consciousness. CT of head was negative. Another ER visit on 9/3/19 with reports of hallucinations and confusion. Treated empirically with antibiotics and discharged home. In January 2021, patient developed a R lower extremity hematoma and underwent surgery by Dr. Cesar Pizarro. She is s/p excisional debridement of RLE wound with application of split thickness skin graft. Eliquis was held for a while and she has since resumed it. She uses a walker for assistance. Social History     Tobacco Use    Smoking status: Never Smoker    Smokeless tobacco: Never Used   Vaping Use    Vaping Use: Never used   Substance Use Topics    Alcohol use: No    Drug use: Never     Allergies   Allergen Reactions    Demerol Hcl [Meperidine] Other (See Comments)     PATIENT STATES SHE GOT VERY ANXIOUS-LIKE SHE WAS CLIMBING THE WALLS    Pcn [Penicillins]      Patient reports she has not had since she was a child, thinks reaction was hives.      Adhesive Tape Rash         Review of Systems -   Constitutional: Negative for weight gain/loss; malaise, fever  Respiratory: Negative for Asthma;  cough and hemoptysis  Cardiovascular: Negative for palpitations,dizziness   Gastrointestinal: Negative for abd.pain; constipation/diarrhea;    Genitourinary: Negative for stones; hematuria; frequency hesitancy  Integumentt: Negative for rash or pruritis  Hematologic/lymphatic: Negative for blood dyscrasia; leukemia/lymphoma  Musculoskeletal: Negative for Connective tissue disease  Neurological:  Negative for Seizure   Behavioral/Psych:Negative for Bipolar disorder, Schizophrenia; Dementia  Endocrine: negative for thyroid, parathyroid disease    Physical Examination:    There were no vitals taken for this visit. HEENT:  Face: Atraumatic, Conjunctiva: Pink; non icteric,  Mucous Memb:  Moist, No thyromegaly or Lymphadenopathy  Respiratory:  Resp Assessment: WNL, Resp Auscultation: clear  Cardiovascular: Auscultation: nl S1 & S2, Palpation:  Nl PMI; No heaves or thrills, JVP:  normal  Abdomen: Soft, non-tender, Normal bowel sounds,  No organomegaly  Extremities: No Cyanosis or Clubbing, LE edema +2 bilaterally, R LE wound   Neurological: Oriented to time, place, and person, Non-anxious  Psychiatric: Normal mood and affect  Skin: Warm and dry,  No rash seen     No outpatient medications have been marked as taking for the 22 encounter (Appointment) with Sheryle Poplar, MD.     EK21, reviewed     ECHO: 10/24/18  Normal LV size and systolic function. Estimated ejection fraction is 60%. Severe calcification of the posterior leaflet of the mitral valve. Mild mitral regurgitation is present. The left atrium is severely dilated. Mild aortic regurgitation is present. Mild-to-moderate tricuspid regurgitation  Systolic pulmonary artery pressure (SPAP) is estimated at 45-48 mm Hg consistent with moderate pulmonary hypertension     ECHO: 21  Normal left ventricle size, wall thickness, and systolic function with an estimated ejection fraction of 65-70%. No regional wall motion abnormalities are seen. The right ventricle is normal in size and function.   The left atrium is severely dilated. Mild mitral regurgitation. The aortic valve leaflets are not well visualized. Aortic sclerosis versus mild aortic stenosis (peak velocity of 2.67m/s and a mean pressure gradient of 16mmHg). Mild aortic regurgitation. Mild tricuspid regurgitation. ASSESSMENT AND PLAN:    Fatigue  Chronic  Likely related to polypharmacy    Atrial Fibrillation  Continue Flecainide, Toprol and Eliquis   [id number] Score for Atrial Fibrillation Stroke Risk is at least 3 (AGE, Female)    LE Edema   Chronic  On Furosemide       Follow up in 6 months       Thank you very much for allowing me to participate in the care of your patient. Please do not hesitate to contact me if you have any questions.         Sincerely,      Izabela Brunner M.D  [facility],  [place] [address]  Ph: [phone]  Fax: [phone]

## 2022-06-27 ENCOUNTER — OFFICE VISIT (OUTPATIENT)
Dept: CARDIOLOGY CLINIC | Age: 77
End: 2022-06-27
Payer: MEDICARE

## 2022-06-27 VITALS
WEIGHT: 167.4 LBS | SYSTOLIC BLOOD PRESSURE: 132 MMHG | OXYGEN SATURATION: 95 % | BODY MASS INDEX: 31.6 KG/M2 | DIASTOLIC BLOOD PRESSURE: 84 MMHG | HEIGHT: 61 IN | HEART RATE: 64 BPM

## 2022-06-27 DIAGNOSIS — R60.0 BILATERAL LEG EDEMA: ICD-10-CM

## 2022-06-27 DIAGNOSIS — I48.0 PAF (PAROXYSMAL ATRIAL FIBRILLATION) (HCC): Primary | ICD-10-CM

## 2022-06-27 PROCEDURE — 99214 OFFICE O/P EST MOD 30 MIN: CPT | Performed by: INTERNAL MEDICINE

## 2022-06-27 PROCEDURE — 1123F ACP DISCUSS/DSCN MKR DOCD: CPT | Performed by: INTERNAL MEDICINE

## 2022-06-27 PROCEDURE — 93000 ELECTROCARDIOGRAM COMPLETE: CPT | Performed by: INTERNAL MEDICINE

## 2022-06-27 NOTE — PATIENT INSTRUCTIONS
Patient Education        A Healthy Heart: Care Instructions  Your Care Instructions     Coronary artery disease, also called heart disease, occurs when a substance called plaque builds up in the vessels that supply oxygen-rich blood to your heart muscle. This can narrow the blood vessels and reduce blood flow. A heart attack happens when blood flow is completely blocked. A high-fat diet, smoking,and other factors increase the risk of heart disease. Your doctor has found that you have a chance of having heart disease. You can do lots of things to keep your heart healthy. It may not be easy, but you can change your diet, exercise more, and quit smoking. These steps really work tolower your chance of heart disease. Follow-up care is a key part of your treatment and safety. Be sure to make and go to all appointments, and call your doctor if you are having problems. It's also a good idea to know your test results and keep alist of the medicines you take. How can you care for yourself at home? Diet     Use less salt when you cook and eat. This helps lower your blood pressure. Taste food before salting. Add only a little salt when you think you need it. With time, your taste buds will adjust to less salt.      Eat fewer snack items, fast foods, canned soups, and other high-salt, high-fat, processed foods.      Read food labels and try to avoid saturated and trans fats. They increase your risk of heart disease by raising cholesterol levels.      Limit the amount of solid fat-butter, margarine, and shortening-you eat. Use olive, peanut, or canola oil when you cook. Bake, broil, and steam foods instead of frying them.      Eat a variety of fruit and vegetables every day. Dark green, deep orange, red, or yellow fruits and vegetables are especially good for you. Examples include spinach, carrots, peaches, and berries.      Foods high in fiber can reduce your cholesterol and provide important vitamins and minerals. High-fiber foods include whole-grain cereals and breads, oatmeal, beans, brown rice, citrus fruits, and apples.      Eat lean proteins. Heart-healthy proteins include seafood, lean meats and poultry, eggs, beans, peas, nuts, seeds, and soy products.      Limit drinks and foods with added sugar. These include candy, desserts, and soda pop. Lifestyle changes     If your doctor recommends it, get more exercise. Walking is a good choice. Bit by bit, increase the amount you walk every day. Try for at least 30 minutes on most days of the week. You also may want to swim, bike, or do other activities.      Do not smoke. If you need help quitting, talk to your doctor about stop-smoking programs and medicines. These can increase your chances of quitting for good. Quitting smoking may be the most important step you can take to protect your heart. It is never too late to quit.      Limit alcohol to 2 drinks a day for men and 1 drink a day for women. Too much alcohol can cause health problems.      Manage other health problems such as diabetes, high blood pressure, and high cholesterol. If you think you may have a problem with alcohol or drug use, talk to your doctor. Medicines     Take your medicines exactly as prescribed. Call your doctor if you think you are having a problem with your medicine.      If your doctor recommends aspirin, take the amount directed each day. Make sure you take aspirin and not another kind of pain reliever, such as acetaminophen (Tylenol). When should you call for help? Call 911 if you have symptoms of a heart attack. These may include:     Chest pain or pressure, or a strange feeling in the chest.      Sweating.      Shortness of breath.      Pain, pressure, or a strange feeling in the back, neck, jaw, or upper belly or in one or both shoulders or arms.      Lightheadedness or sudden weakness.      A fast or irregular heartbeat.    After you call 911, the  may tell you to chew 1 adult-strength or 2 to 4 low-doseaspirin. Wait for an ambulance. Do not try to drive yourself. Watch closely for changes in your health, and be sure to contact your doctor ifyou have any problems. Where can you learn more? Go to https://chpepiceweb.[email or web address] org and sign in to your [employer] account. Enter K257 in the [employer] box to learn more about \"A Healthy Heart: Care Instructions. \"     If you do not have an account, please click on the \"Sign Up Now\" link. Current as of: January 10, 2022               Content Version: 13.3  © 2006-2022 Healthwise, [employer]. Care instructions adapted under license by [facility] [place] ([facility]). If you have questions about a medical condition or this instruction, always ask your healthcare professional. [address] any warranty or liability for your use of this information.

## 2022-06-27 NOTE — PROGRESS NOTES
[facility]  Cardiology  Note      Media Lesch  1945, 65 y.o.      CC: \" I fell out of the bed. \"                 Patricia Hoskins MD:      HPI:   This is a 65 y.o. female with history of AFIB and multiple falls who presented to [facility] 1/13/18 with confusion and generalized weakness. This may have been associated with multiple falls. She was found to be in AFIB with RVR and started on Cardizem gtt which was transitioned to po. ECHO performed was sub-optimal and showed EF of 55-60%. Continues to complain of palpitations and wore 30 day event monitor showing Afib with RVR. Patient was started on Flecainide 50 mg BID and Toprol XL 25 mg daily (8/2019). Presented to ER 9/1/19 after falling out of her wheelchair at home. Per ER report, she hit her head on the floor, but did not lose consciousness. CT of head was negative. Another ER visit on 9/3/19 with reports of hallucinations and confusion. Treated empirically with antibiotics and discharged home. In January 2021, patient developed a R lower extremity hematoma and underwent surgery by Dr. Tommy Lunsford. She is s/p excisional debridement of RLE wound with application of split thickness skin graft. Eliquis was held for a while and she has since resumed it. She uses a walker for assistance. Interval History: Patient returns in follow up and presents in a wheelchair. She is accompanied by her son. She denies any falls but her patient states she fell out of bed. She denies any new cardiac complaints. States she has not needed to use furosemide in a while. States her breathing is good. She is no longer using a walker at home, mostly uses wheelchair around the house. Son has been trying to get her to walk around the house and patient refuses. he states her PCP would like her to participate in PT however, she has not called to schedule.        Social History     Tobacco Use    Smoking status: Never Smoker    Smokeless tobacco: Never Used   Vaping Use    Vaping Use: Never used   Substance Use Topics    Alcohol use: No    Drug use: Never     Allergies   Allergen Reactions    Demerol Hcl [Meperidine] Other (See Comments)     PATIENT STATES SHE GOT VERY ANXIOUS-LIKE SHE WAS CLIMBING THE WALLS    Pcn [Penicillins]      Patient reports she has not had since she was a child, thinks reaction was hives.  Adhesive Tape Rash         Review of Systems -   Constitutional: Negative for weight gain/loss; malaise, fever  Respiratory: Negative for Asthma;  cough and hemoptysis  Cardiovascular: Negative for palpitations,dizziness   Gastrointestinal: Negative for abd.pain; constipation/diarrhea;    Genitourinary: Negative for stones; hematuria; frequency hesitancy  Integumentt: Negative for rash or pruritis  Hematologic/lymphatic: Negative for blood dyscrasia; leukemia/lymphoma  Musculoskeletal: Negative for Connective tissue disease  Neurological:  Negative for Seizure   Behavioral/Psych:Negative for Bipolar disorder, Schizophrenia; Dementia  Endocrine: negative for thyroid, parathyroid disease    Physical Examination:    /84   Pulse 64   Ht 5' 1\" (1.549 m)   Wt 167 lb 6.4 oz (75.9 kg)   SpO2 95%   BMI 31.63 kg/m²    HEENT:  Face: Atraumatic, Conjunctiva: Pink; non icteric,  Mucous Memb:  Moist, No thyromegaly or Lymphadenopathy  Respiratory:  Resp Assessment: WNL, Resp Auscultation: clear  Cardiovascular: Auscultation: nl S1 & S2, Palpation:  Nl PMI;  No heaves or thrills, JVP:  normal  Abdomen: Soft, non-tender, Normal bowel sounds,  No organomegaly  Extremities: No Cyanosis or Clubbing, trace LE edema bilaterally  Neurological: Oriented to time, place, and person, Non-anxious  Psychiatric: Normal mood and affect  Skin: Warm and dry,  No rash seen     Outpatient Medications Marked as Taking for the 6/27/22 encounter (Office Visit) with Catalina Mahoney MD   Medication Sig Dispense Refill    escitalopram (LEXAPRO) 10 MG tablet Take 1 tablet by mouth daily 90 tablet 3 for allowing me to participate in the care of your patient. Please do not hesitate to contact me if you have any questions. Sincerely,      Bro Dixon M.D  [facility], [address] [place] [address] [address] [facility]  Ph: [phone]  Fax: [phone]    This note was scribed in the presence of Dr. Bro Dixon MD by Cecilia Hernandez RN  Physician Attestation:  The scribes documentation has been prepared under my direction and personally reviewed by me in its entirety. I confirm that the note above accurately reflects all work, treatment, procedures, and medical decision making performed by me.

## 2022-07-07 ENCOUNTER — TELEPHONE (OUTPATIENT)
Dept: CARDIOLOGY CLINIC | Age: 77
End: 2022-07-07

## 2022-07-07 NOTE — TELEPHONE ENCOUNTER
Medication Samples    Medication:  apixaban (ELIQUIS      Dosage of the medication:  5 MG TABS tablet       How are you taking this medication (QD, BID, TID, QID, PRN):  Take 1 tablet by mouth 2 times daily     in the office or Mail to your home?       Mail  [address] [facility] [address]

## 2022-07-11 NOTE — TELEPHONE ENCOUNTER
We will get samples from [facility] ([facility] drug rep) tomorrow.

## 2022-07-12 NOTE — TELEPHONE ENCOUNTER
Samples prepared. Lvm for pt to let her know. Advised her to call office so we know she received our message.

## 2022-07-20 ENCOUNTER — TELEPHONE (OUTPATIENT)
Dept: CARDIOLOGY CLINIC | Age: 77
End: 2022-07-20

## 2022-07-20 NOTE — TELEPHONE ENCOUNTER
Poncho Shaw called in this afternoon, she states she doesn't have anyone that can pick the samples up for her, she states her son doesn't drive. She can be reached at [phone].

## 2022-07-20 NOTE — TELEPHONE ENCOUNTER
LM for pt to call us back we can not mail samples but someone can  samples for her and I do not see where pt has tried to apply for patient assistance. Talk to pt about applying for pt assistance.

## 2022-07-20 NOTE — TELEPHONE ENCOUNTER
Pt called and only has 1 week of samples left. She has not way to pick them up and is wondering if they can be mailed to her.        Jeane# [phone]

## 2022-07-20 NOTE — TELEPHONE ENCOUNTER
Checked with Annette PM.  We can not mail pharmaceutical samples. Called patient and relayed message. Discussed applying for financial assistance with patient. She that her income was too high and she is not eligible. She requested that we send Rx to mail order [facility]. Last OV  6/27/22  Next OV  12/14/22  CMP 9/9/21     Called patient to confirm that she wants Eliquis 5 mg tablets sent. Patient confirmed.

## 2022-07-23 ENCOUNTER — APPOINTMENT (OUTPATIENT)
Dept: CT IMAGING | Age: 77
End: 2022-07-23
Payer: MEDICARE

## 2022-07-23 ENCOUNTER — HOSPITAL ENCOUNTER (EMERGENCY)
Age: 77
Discharge: HOME | End: 2022-07-23
Attending: EMERGENCY MEDICINE | Admitting: EMERGENCY MEDICINE
Payer: MEDICARE

## 2022-07-23 ENCOUNTER — APPOINTMENT (OUTPATIENT)
Dept: GENERAL RADIOLOGY | Age: 77
End: 2022-07-23
Payer: MEDICARE

## 2022-07-23 VITALS
SYSTOLIC BLOOD PRESSURE: 179 MMHG | TEMPERATURE: 98.2 F | WEIGHT: 166.01 LBS | BODY MASS INDEX: 30.55 KG/M2 | RESPIRATION RATE: 18 BRPM | OXYGEN SATURATION: 97 % | DIASTOLIC BLOOD PRESSURE: 90 MMHG | HEART RATE: 76 BPM | HEIGHT: 62 IN

## 2022-07-23 DIAGNOSIS — S52.502A CLOSED FRACTURE OF DISTAL END OF LEFT RADIUS, UNSPECIFIED FRACTURE MORPHOLOGY, INITIAL ENCOUNTER: Primary | ICD-10-CM

## 2022-07-23 DIAGNOSIS — S60.221A CONTUSION OF RIGHT HAND, INITIAL ENCOUNTER: ICD-10-CM

## 2022-07-23 PROCEDURE — 29125 APPL SHORT ARM SPLINT STATIC: CPT

## 2022-07-23 PROCEDURE — 70450 CT HEAD/BRAIN W/O DYE: CPT

## 2022-07-23 PROCEDURE — 73110 X-RAY EXAM OF WRIST: CPT

## 2022-07-23 PROCEDURE — 99284 EMERGENCY DEPT VISIT MOD MDM: CPT

## 2022-07-23 PROCEDURE — 6370000000 HC RX 637 (ALT 250 FOR IP): Performed by: PHYSICIAN ASSISTANT

## 2022-07-23 PROCEDURE — 73130 X-RAY EXAM OF HAND: CPT

## 2022-07-23 RX ORDER — MORPHINE SULFATE 4 MG/ML
4 INJECTION, SOLUTION INTRAMUSCULAR; INTRAVENOUS ONCE
Status: DISCONTINUED | OUTPATIENT
Start: 2022-07-23 | End: 2022-07-23

## 2022-07-23 RX ORDER — HYDROCODONE BITARTRATE AND ACETAMINOPHEN 5; 325 MG/1; MG/1
1 TABLET ORAL ONCE
Status: COMPLETED | OUTPATIENT
Start: 2022-07-23 | End: 2022-07-23

## 2022-07-23 RX ORDER — HYDROCODONE BITARTRATE AND ACETAMINOPHEN 5; 325 MG/1; MG/1
1 TABLET ORAL EVERY 6 HOURS PRN
Qty: 6 TABLET | Refills: 0 | Status: SHIPPED | OUTPATIENT
Start: 2022-07-23 | End: 2022-07-26

## 2022-07-23 RX ADMIN — HYDROCODONE BITARTRATE AND ACETAMINOPHEN 1 TABLET: 5; 325 TABLET ORAL at 19:51

## 2022-07-23 ASSESSMENT — ENCOUNTER SYMPTOMS
EYE PAIN: 0
BACK PAIN: 0
VOMITING: 0
ABDOMINAL PAIN: 0
COUGH: 0
SORE THROAT: 0
SHORTNESS OF BREATH: 0
EYE REDNESS: 0
DIARRHEA: 0
NAUSEA: 0
CONSTIPATION: 0

## 2022-07-23 ASSESSMENT — PAIN DESCRIPTION - LOCATION
LOCATION: WRIST

## 2022-07-23 ASSESSMENT — PAIN SCALES - GENERAL
PAINLEVEL_OUTOF10: 5
PAINLEVEL_OUTOF10: 4
PAINLEVEL_OUTOF10: 4

## 2022-07-23 ASSESSMENT — PAIN DESCRIPTION - DESCRIPTORS: DESCRIPTORS: PATIENT UNABLE TO DESCRIBE

## 2022-07-23 ASSESSMENT — PAIN DESCRIPTION - ORIENTATION
ORIENTATION: LEFT

## 2022-07-23 ASSESSMENT — PAIN - FUNCTIONAL ASSESSMENT: PAIN_FUNCTIONAL_ASSESSMENT: 0-10

## 2022-07-23 NOTE — ED PROVIDER NOTES
[facility]        Pt Name: Sigrid Zhang  MRN: [id number]  [place] 1945  Date of evaluation: 7/23/2022  Provider: Khanh Mora PA-C  PCP: Reggie Mills MD  Note Started: 7:08 PM EDT       I have seen and evaluated this patient with my supervising physician Dominique Lockhart MD.      [address]       Chief Complaint   Patient presents with    Fall     Mechanical fall out of wheel chair. Did not hit head and no LOC. Takes eliquis. Wrist Pain     Left wrist pain 5/10. HISTORY OF PRESENT ILLNESS   (Location/Symptom, Timing/Onset, Context/Setting, Quality, Duration, Modifying Factors, Severity)  Note limiting factors. Chief Complaint: Follow, left wrist pain    Sigrid Zhang is a 65 y.o. female who presents emergency department with complaint of a mechanical fall earlier today. She states that she was leaning forward in her wheelchair to try and lock it when she fell forward. She states that she landed on both of her wrist.  Has not tried taking anything for the pain. She denies hitting her head. She denies any loss of consciousness, change in vision or nausea vomiting. Nursing Notes were all reviewed and agreed with or any disagreements were addressed in the HPI. REVIEW OF SYSTEMS    (2-9 systems for level 4, 10 or more for level 5)     Review of Systems   Constitutional:  Negative for chills and fever. HENT:  Negative for ear pain and sore throat. Eyes:  Negative for pain and redness. Respiratory:  Negative for cough and shortness of breath. Cardiovascular:  Negative for chest pain and leg swelling. Gastrointestinal:  Negative for abdominal pain, constipation, diarrhea, nausea and vomiting. Genitourinary:  Negative for dysuria and hematuria. Musculoskeletal:  Negative for back pain and neck pain. Skin:  Positive for rash and wound.    Neurological:  Negative for light-headedness and headaches.      PAST MEDICAL HISTORY     Past Medical History:   Diagnosis Date    Acid reflux     Anxiety     Arthritis     Atrial fibrillation (HCC)     Closed fracture dislocation of right elbow     Depression     Fibromyalgia     Migraine     WHITNEY (obstructive sleep apnea)     Diagnosed years ago, no CPAP    Rectal cancer ([facility] [address]     Wears glasses     DRIVING       SURGICAL HISTORY     Past Surgical History:   Procedure Laterality Date    APPENDECTOMY      CERVICAL DISCECTOMY      ACDF     SECTION      x4    CHOLECYSTECTOMY      COLON SURGERY      Rectal cancer removed using  incision    COLONOSCOPY      SEVERAL    COLONOSCOPY N/A 2019    COLONOSCOPY POLYPECTOMY SNARE/COLD BIOPSY performed by Adri Miranda MD at [address] [facility] N/A 2021    COLONOSCOPY WITH BIOPSY performed by Shaina Leone MD at [address] [facility] Right 2021    RIGHT LEG EVACUATION OF HEMATOMA performed by Kraig Camacho MD at [address] [facility] W/O EXTENSION Right 10/25/2018    OPEN REDUCTION INTERNAL FIXATION RIGHT FEMUR SUPRACONDYLAR FEMORAL FRACTURE WITH C-ARM performed by Sergei Hadley MD at [place] [address] Right 2021    RIGHT LEG HEMATOMA EVAKUATION, DEBRIDEMENT, AND WOUND VAC PLACEMENT performed by Oliva Argueta MD at [place] [address] Right 2021    RIGHT LOWER EXTREMITY SKIN GRAFT performed by Oliva Argueta MD at [address] [facility] Left     UPPER GASTROINTESTINAL ENDOSCOPY N/A 2019    ESOPHAGOGASTRODUODENOSCOPY performed by Adri Miranda MD at [place] [address] 2020    EGD BIOPSY performed by Adri Miranda MD at [facility]. [address]       Previous Medications    ALPRAZOLAM (XANAX) 1 MG TABLET    TAKE ONE TABLET BY MOUTH THREE TIMES A DAY AS NEEDED FOR ANXIETY    APIXABAN (ELIQUIS) 5 MG TABS TABLET    Take 1 tablet by mouth 2 times daily    CALCIUM CARBONATE-VITAMIN D (CALCIUM 600+D) 600-200 MG-UNIT TABS    Take 1 tablet by mouth 2 times daily    CYANOCOBALAMIN (VITAMIN B 12 PO)    Take 2,500 mg by mouth daily    DICYCLOMINE (BENTYL) 10 MG CAPSULE    Take 1 capsule by mouth 4 times daily as needed (cramping/diarrhea)    ESCITALOPRAM (LEXAPRO) 10 MG TABLET    Take 1 tablet by mouth daily    FLECAINIDE (TAMBOCOR) 100 MG TABLET    TAKE 1 TABLET BY MOUTH 2 TIMES A DAY    FUROSEMIDE (LASIX) 20 MG TABLET    Take 1 tablet by mouth 2 times daily With an additional 20 mg on MWF    METOPROLOL SUCCINATE (TOPROL XL) 100 MG EXTENDED RELEASE TABLET    TAKE ONE TABLET BY MOUTH DAILY    PANTOPRAZOLE (PROTONIX) 40 MG TABLET    TAKE ONE TABLET BY MOUTH TWICE A DAY    POTASSIUM CHLORIDE (KLOR-CON M20) 20 MEQ EXTENDED RELEASE TABLET    Take 1 tablet by mouth daily    TIZANIDINE (ZANAFLEX) 2 MG TABLET    Take 1 tablet by mouth every 6 hours as needed (muscle pain)       ALLERGIES     Demerol hcl [meperidine], Pcn [penicillins], and Adhesive tape    FAMILYHISTORY       Family History   Problem Relation Age of Onset    COPD Mother         SOCIAL HISTORY       Social History     Socioeconomic History    Marital status:       Spouse name: None    Number of children: 4    Years of education: None    Highest education level: High school graduate   Tobacco Use    Smoking status: Never    Smokeless tobacco: Never   Vaping Use    Vaping Use: Never used   Substance and Sexual Activity    Alcohol use: No    Drug use: Never    Sexual activity: Not Currently     Partners: Male   Social History Narrative    Lives with son       SCREENINGS    [place] Coma Scale  Eye Opening: Spontaneous  Best Verbal Response: Oriented  Best Motor Response: Obeys commands  [place] Coma Scale Score: 15        PHYSICAL EXAM    (up to 7 for level 4, 8 or more for level 5)     ED Triage Vitals [07/23/22 1855]   BP Temp Temp Source Heart Rate Resp SpO2 Height Weight   (!) 162/88 98.2 °F (36.8 °C) Oral 75 15 95 % 5' 2\" (1.575 m) 166 lb 0.1 oz (75.3 kg)       Physical Exam  Constitutional:       General: She is not in acute distress. Appearance: Normal appearance. She is not ill-appearing, toxic-appearing or diaphoretic. HENT:      Head: Normocephalic and atraumatic. Right Ear: External ear normal.      Left Ear: External ear normal.      Nose: Nose normal.   Eyes:      General:         Right eye: No discharge. Left eye: No discharge. Pulmonary:      Effort: Pulmonary effort is normal. No respiratory distress. Musculoskeletal:         General: Normal range of motion. Cervical back: Normal range of motion. Skin:     General: Skin is warm and dry. Comments: Patient has ecchymosis to the back of her right hand  She has edema to the left wrist, there is a normal active range of motion and strength against resistance in this area even with movement there is pain  Wound capillary refill within normal limits in bilateral hands  Normal active range of motion and strength against resistance in bilateral elbows, fingers  Bilateral radial pulses 2+, intact   Neurological:      General: No focal deficit present. Mental Status: She is alert and oriented to person, place, and time. Psychiatric:         Mood and Affect: Mood normal.         Behavior: Behavior normal.       DIAGNOSTIC RESULTS   LABS:    Labs Reviewed - No data to display    When ordered, only abnormal lab results are displayed. All other labs were within normal range or not returned as of this dictation. EKG: When ordered, EKG's are interpreted by the Emergency Department Physician in the absence of a cardiologist.  Please see their note for interpretation of EKG.       RADIOLOGY:   Non-plain film images such as CT, Ultrasound and MRI are read by the radiologist. Plain radiographic images are visualized andpreliminarily interpreted by the  ED Provider with the below findings:        Interpretation [facility] Radiologist below, if available at the time of this note:    XR WRIST LEFT (MIN 3 VIEWS)   Final Result   Nondisplaced fracture distal radial metaphysis         XR HAND RIGHT (MIN 3 VIEWS)   Final Result   No acute osseous abnormality. CT HEAD WO CONTRAST   Final Result   No acute intracranial abnormality. No results found. PROCEDURES   Unless otherwise noted below, none     Procedures    CRITICAL CARE TIME   N/A    CONSULTS:  None      EMERGENCY DEPARTMENT COURSE and DIFFERENTIAL DIAGNOSIS/MDM:   Vitals:    Vitals:    07/23/22 1855 07/23/22 1951   BP: (!) 162/88    Pulse: 75    Resp: 15 18   Temp: 98.2 °F (36.8 °C)    TempSrc: Oral    SpO2: 95%    Weight: 166 lb 0.1 oz (75.3 kg) 166 lb 0.1 oz (75.3 kg)   Height: 5' 2\" (1.575 m) 5' 2\" (1.575 m)       Patient was given thefollowing medications:  Medications   HYDROcodone-acetaminophen (NORCO) 5-325 MG per tablet 1 tablet (1 tablet Oral Given 7/23/22 1951)         Is this patient to be included in the SEP-1 Core Measure due to severe sepsis or septic shock? No   Exclusion criteria - the patient is NOT to be included for SEP-1 Core Measure due to: Infection is not suspected    This is a 68-year-old female with a PMH of A. jenny on [facility] who presents emergency department for mechanical fall out of her wheelchair prior to arrival.  He fell in bilateral wrists. She did not hit her head, or have any loss of consciousness. Vitals upon arrival show that she is mildly hypertensive, otherwise within normal limits. Physical exam as above showing good movement, and neurovascularly intact in bilateral hands. A couple of x-rays were performed and the x-ray of her left wrist shows a nondisplaced fracture distal radial metaphys. I did discuss this with Dr. Mumtaz Stark, orthopedics and we agreed on a sugar-tong splint of her left forearm and for her to follow-up with orthopedics for further evaluation.   I did give her a Norco here as her pain is a 4 out of 10. Additionally a prescription to go home with. Patient was then discharged in stable condition to follow-up with them for further evaluation. She was agreeable to this plan. We discussed reasons to return to the ED with any new worsening or more concerning symptoms. FINAL IMPRESSION      1. Closed fracture of distal end of left radius, unspecified fracture morphology, initial encounter    2. Contusion of right hand, initial encounter          DISPOSITION/PLAN   DISPOSITION Discharge - Pending Orders Complete 07/23/2022 08:02:34 PM      PATIENT REFERREDTO:  Valentine Hernandez MD  [address] [place] [address]  [phone]    Schedule an appointment as soon as possible for a visit in 2 days  for reevaluation    [facility] Emergency Department  [address] [address]  [phone]  Go to   As needed, If symptoms worsen    DISCHARGE MEDICATIONS:  New Prescriptions    HYDROCODONE-ACETAMINOPHEN (NORCO) 5-325 MG PER TABLET    Take 1 tablet by mouth every 6 hours as needed for Pain for up to 3 days. Intended supply: 3 days.  Take lowest dose possible to manage pain       DISCONTINUED MEDICATIONS:  Discontinued Medications    No medications on file              (Please note that portions ofthis note were completed with a voice recognition program.  Efforts were made to edit the dictations but occasionally words are mis-transcribed.)    Gerry Messer PA-C (electronically signed)              Gerry Messer PA-C  07/23/22 2025

## 2022-07-24 NOTE — ED NOTES
Sugar tong splint placed on lt arm , per  order , pt tolerated without any problems.       Shilpi Shaver RN  07/23/22 [id number]

## 2022-07-24 NOTE — DISCHARGE INSTRUCTIONS
Tylenol or Norco for pain relief. Do not take these together.   Follow-up with orthopedic physician as above  Return if any new worsening or more concerning symptoms present

## 2022-07-24 NOTE — ED PROVIDER NOTES
ACDF     SECTION      x4    CHOLECYSTECTOMY      COLON SURGERY      Rectal cancer removed using  incision    COLONOSCOPY      SEVERAL    COLONOSCOPY N/A 2019    COLONOSCOPY POLYPECTOMY SNARE/COLD BIOPSY performed by Hans Hayden MD at [address] [place] [address] N/A 2021    COLONOSCOPY WITH BIOPSY performed by Marissa Byrne MD at [address] [facility] Right 2021    RIGHT LEG EVACUATION OF HEMATOMA performed by Anam Treviño MD at [address] [facility] W/O EXTENSION Right 10/25/2018    OPEN REDUCTION INTERNAL FIXATION RIGHT FEMUR SUPRACONDYLAR FEMORAL FRACTURE WITH C-ARM performed by Stephanie Yuan MD at [place] [address] Right 2021    RIGHT LEG HEMATOMA EVAKUATION, DEBRIDEMENT, AND WOUND VAC PLACEMENT performed by Fly Mckeon MD at [place] [address] Right 2021    RIGHT LOWER EXTREMITY SKIN GRAFT performed by Fly Mckeon MD at [address] [facility]     UPPER GASTROINTESTINAL ENDOSCOPY N/A 2019    ESOPHAGOGASTRODUODENOSCOPY performed by Hans Hayden MD at [address] [facility] N/A 2020    EGD BIOPSY performed by Hans Hayden MD at [address] [facility] History   Problem Relation Age of Onset    COPD Mother        CURRENT MEDICATIONS       Discharge Medication List as of 2022  8:55 PM        CONTINUE these medications which have NOT CHANGED    Details   escitalopram (LEXAPRO) 10 MG tablet Take 1 tablet by mouth daily, Disp-90 tablet, R-3Normal      ALPRAZolam (XANAX) 1 MG tablet TAKE ONE TABLET BY MOUTH THREE TIMES A DAY AS NEEDED FOR ANXIETY, Disp-90 tablet, R-2Normal      tiZANidine (ZANAFLEX) 2 MG tablet Take 1 tablet by mouth every 6 hours as needed (muscle pain), Disp-60 tablet, R-11Normal      metoprolol succinate (TOPROL XL) 100 MG extended release tablet TAKE ONE TABLET BY MOUTH DAILY, Disp-90 18, SpO2: 97 %     I personally saw the patient and performed a substantive portion of the visit including all aspects of the medical decision making. Is this patient to be included in the SEP-1 Core Measure due to severe sepsis or septic shock? No   Exclusion criteria - the patient is NOT to be included for SEP-1 Core Measure due to: Infection is not suspected    My assessment reveals a frail appearing female who presents with a left wrist deformity. Vitals on arrival notable for elevated BP and otherwise HDS. She is neurovascularly intact distally. She is able to move her fingers. Her deformity is located mostly on the ulnar side of her wrist.  She does have tenderness palpation there. No tenderness at the snuffbox. X-ray imaging reveals a fracture. RONALD consulted with orthopedics who was in agreement for placing patient in a splint and having her follow-up. Discussed this with the patient as well as information for symptomatic treatment. She expressed understanding of all instructions, was in agreement with plan, and was discharged home in stable condition. FINAL IMPRESSION      1. Closed fracture of distal end of left radius, unspecified fracture morphology, initial encounter    2.  Contusion of right hand, initial encounter        DISPOSITION/PLAN   DISPOSITION Decision To Discharge 07/23/2022 08:33:13 PM      PATIENT REFERRED TO:  Crescencio Morris MD  [address] [place] [address]  [phone]    Schedule an appointment as soon as possible for a visit in 2 days  for reevaluation    [facility] Emergency Department  [id number] [phone]  Go to   As needed, If symptoms worsen      DISCHARGE MEDICATIONS:  Discharge Medication List as of 7/23/2022  8:55 PM        START taking these medications    Details   HYDROcodone-acetaminophen (NORCO) 5-325 MG per tablet Take 1 tablet by mouth every 6 hours as needed for Pain for up to 3 days. Intended supply: 3 days.  Take lowest dose possible to manage pain, Disp-6 tablet, R-0Print                  (Please note that portions of this note were completed with a voice recognition program. Efforts were made to edit the dictations but occasionally words are mis-transcribed.)    Suha Campos MD (electronically signed)  Attending Emergency Physician        Suha Campos MD  07/23/22 5947

## 2022-07-25 ENCOUNTER — TELEPHONE (OUTPATIENT)
Dept: ORTHOPEDIC SURGERY | Age: 77
End: 2022-07-25

## 2022-07-29 ENCOUNTER — HOSPITAL ENCOUNTER (INPATIENT)
Age: 77
LOS: 5 days | Discharge: NURSING, REHAB OR LONG-TERM CARE | DRG: 853 | End: 2022-08-04
Attending: EMERGENCY MEDICINE | Admitting: STUDENT IN AN ORGANIZED HEALTH CARE EDUCATION/TRAINING PROGRAM
Payer: MEDICARE

## 2022-07-29 ENCOUNTER — APPOINTMENT (OUTPATIENT)
Dept: CT IMAGING | Age: 77
DRG: 853 | End: 2022-07-29
Payer: MEDICARE

## 2022-07-29 ENCOUNTER — APPOINTMENT (OUTPATIENT)
Dept: GENERAL RADIOLOGY | Age: 77
DRG: 853 | End: 2022-07-29
Payer: MEDICARE

## 2022-07-29 DIAGNOSIS — I48.91 ATRIAL FIBRILLATION WITH RAPID VENTRICULAR RESPONSE (HCC): Primary | ICD-10-CM

## 2022-07-29 DIAGNOSIS — S52.502A CLOSED FRACTURE OF DISTAL END OF LEFT RADIUS, UNSPECIFIED FRACTURE MORPHOLOGY, INITIAL ENCOUNTER: ICD-10-CM

## 2022-07-29 DIAGNOSIS — J18.9 PNEUMONIA DUE TO INFECTIOUS ORGANISM, UNSPECIFIED LATERALITY, UNSPECIFIED PART OF LUNG: ICD-10-CM

## 2022-07-29 DIAGNOSIS — F41.9 ANXIETY: ICD-10-CM

## 2022-07-29 PROBLEM — E11.9 DM2 (DIABETES MELLITUS, TYPE 2) (HCC): Status: ACTIVE | Noted: 2022-07-29

## 2022-07-29 PROBLEM — R57.9 SHOCK (HCC): Status: ACTIVE | Noted: 2022-07-29

## 2022-07-29 LAB
ANION GAP SERPL CALCULATED.3IONS-SCNC: 18 MMOL/L (ref 3–16)
BACTERIA: ABNORMAL /HPF
BASOPHILS ABSOLUTE: 0 K/UL (ref 0–0.2)
BASOPHILS RELATIVE PERCENT: 0.2 %
BILIRUBIN URINE: NEGATIVE
BLOOD, URINE: ABNORMAL
BUN BLDV-MCNC: 50 MG/DL (ref 7–20)
CALCIUM SERPL-MCNC: 10 MG/DL (ref 8.3–10.6)
CHLORIDE BLD-SCNC: 99 MMOL/L (ref 99–110)
CLARITY: ABNORMAL
CO2: 21 MMOL/L (ref 21–32)
COLOR: ABNORMAL
CREAT SERPL-MCNC: 1.5 MG/DL (ref 0.6–1.2)
EOSINOPHILS ABSOLUTE: 0 K/UL (ref 0–0.6)
EOSINOPHILS RELATIVE PERCENT: 0 %
EPITHELIAL CELLS, UA: 1 /HPF (ref 0–5)
GFR AFRICAN AMERICAN: 41
GFR NON-AFRICAN AMERICAN: 34
GLUCOSE BLD-MCNC: 120 MG/DL (ref 70–99)
GLUCOSE URINE: NEGATIVE MG/DL
HCT VFR BLD CALC: 37.8 % (ref 36–48)
HEMOGLOBIN: 12.5 G/DL (ref 12–16)
HYALINE CASTS: ABNORMAL /LPF (ref 0–2)
KETONES, URINE: ABNORMAL MG/DL
LEUKOCYTE ESTERASE, URINE: ABNORMAL
LYMPHOCYTES ABSOLUTE: 0.4 K/UL (ref 1–5.1)
LYMPHOCYTES RELATIVE PERCENT: 3.6 %
MCH RBC QN AUTO: 35.2 PG (ref 26–34)
MCHC RBC AUTO-ENTMCNC: 33 G/DL (ref 31–36)
MCV RBC AUTO: 106.5 FL (ref 80–100)
MICROSCOPIC EXAMINATION: YES
MONOCYTES ABSOLUTE: 1.4 K/UL (ref 0–1.3)
MONOCYTES RELATIVE PERCENT: 11.8 %
NEUTROPHILS ABSOLUTE: 10.1 K/UL (ref 1.7–7.7)
NEUTROPHILS RELATIVE PERCENT: 84.4 %
NITRITE, URINE: NEGATIVE
PDW BLD-RTO: 17.2 % (ref 12.4–15.4)
PH UA: 5.5 (ref 5–8)
PLATELET # BLD: 234 K/UL (ref 135–450)
PMV BLD AUTO: 7.2 FL (ref 5–10.5)
POTASSIUM SERPL-SCNC: 4.7 MMOL/L (ref 3.5–5.1)
PRO-BNP: 7880 PG/ML (ref 0–449)
PROTEIN UA: 30 MG/DL
RBC # BLD: 3.55 M/UL (ref 4–5.2)
RBC UA: 2 /HPF (ref 0–4)
SODIUM BLD-SCNC: 138 MMOL/L (ref 136–145)
SPECIFIC GRAVITY UA: 1.02 (ref 1–1.03)
TROPONIN: 0.05 NG/ML
URINE REFLEX TO CULTURE: ABNORMAL
URINE TYPE: ABNORMAL
UROBILINOGEN, URINE: 1 E.U./DL
WBC # BLD: 12 K/UL (ref 4–11)
WBC UA: 3 /HPF (ref 0–5)

## 2022-07-29 PROCEDURE — 99285 EMERGENCY DEPT VISIT HI MDM: CPT

## 2022-07-29 PROCEDURE — 81001 URINALYSIS AUTO W/SCOPE: CPT

## 2022-07-29 PROCEDURE — 6360000002 HC RX W HCPCS: Performed by: EMERGENCY MEDICINE

## 2022-07-29 PROCEDURE — 93005 ELECTROCARDIOGRAM TRACING: CPT | Performed by: EMERGENCY MEDICINE

## 2022-07-29 PROCEDURE — 74176 CT ABD & PELVIS W/O CONTRAST: CPT

## 2022-07-29 PROCEDURE — 36556 INSERT NON-TUNNEL CV CATH: CPT

## 2022-07-29 PROCEDURE — 96365 THER/PROPH/DIAG IV INF INIT: CPT

## 2022-07-29 PROCEDURE — 80048 BASIC METABOLIC PNL TOTAL CA: CPT

## 2022-07-29 PROCEDURE — 85025 COMPLETE CBC W/AUTO DIFF WBC: CPT

## 2022-07-29 PROCEDURE — 70450 CT HEAD/BRAIN W/O DYE: CPT

## 2022-07-29 PROCEDURE — 96375 TX/PRO/DX INJ NEW DRUG ADDON: CPT

## 2022-07-29 PROCEDURE — 2500000003 HC RX 250 WO HCPCS: Performed by: EMERGENCY MEDICINE

## 2022-07-29 PROCEDURE — 2580000003 HC RX 258: Performed by: EMERGENCY MEDICINE

## 2022-07-29 PROCEDURE — 96376 TX/PRO/DX INJ SAME DRUG ADON: CPT

## 2022-07-29 PROCEDURE — 83880 ASSAY OF NATRIURETIC PEPTIDE: CPT

## 2022-07-29 PROCEDURE — 84484 ASSAY OF TROPONIN QUANT: CPT

## 2022-07-29 PROCEDURE — 71045 X-RAY EXAM CHEST 1 VIEW: CPT

## 2022-07-29 RX ORDER — SODIUM CHLORIDE 9 MG/ML
INJECTION, SOLUTION INTRAVENOUS CONTINUOUS
Status: ACTIVE | OUTPATIENT
Start: 2022-07-30 | End: 2022-07-30

## 2022-07-29 RX ORDER — 0.9 % SODIUM CHLORIDE 0.9 %
1000 INTRAVENOUS SOLUTION INTRAVENOUS ONCE
Status: COMPLETED | OUTPATIENT
Start: 2022-07-29 | End: 2022-07-30

## 2022-07-29 RX ORDER — METOPROLOL TARTRATE 5 MG/5ML
5 INJECTION INTRAVENOUS ONCE
Status: DISCONTINUED | OUTPATIENT
Start: 2022-07-29 | End: 2022-07-29

## 2022-07-29 RX ORDER — 0.9 % SODIUM CHLORIDE 0.9 %
500 INTRAVENOUS SOLUTION INTRAVENOUS ONCE
Status: COMPLETED | OUTPATIENT
Start: 2022-07-29 | End: 2022-07-30

## 2022-07-29 RX ORDER — DILTIAZEM HYDROCHLORIDE 5 MG/ML
10 INJECTION INTRAVENOUS ONCE
Status: COMPLETED | OUTPATIENT
Start: 2022-07-29 | End: 2022-07-29

## 2022-07-29 RX ADMIN — DILTIAZEM HYDROCHLORIDE 10 MG: 5 INJECTION, SOLUTION INTRAVENOUS at 22:37

## 2022-07-29 RX ADMIN — AMIODARONE HYDROCHLORIDE 1 MG/MIN: 50 INJECTION, SOLUTION INTRAVENOUS at 23:50

## 2022-07-29 RX ADMIN — SODIUM CHLORIDE 500 ML: 9 INJECTION, SOLUTION INTRAVENOUS at 22:23

## 2022-07-29 RX ADMIN — SODIUM CHLORIDE 1000 ML: 9 INJECTION, SOLUTION INTRAVENOUS at 23:00

## 2022-07-29 RX ADMIN — DILTIAZEM HYDROCHLORIDE 5 MG/HR: 5 INJECTION INTRAVENOUS at 22:26

## 2022-07-29 RX ADMIN — AMIODARONE HYDROCHLORIDE 150 MG: 1.5 INJECTION, SOLUTION INTRAVENOUS at 23:36

## 2022-07-29 RX ADMIN — DILTIAZEM HYDROCHLORIDE 10 MG: 5 INJECTION, SOLUTION INTRAVENOUS at 22:20

## 2022-07-30 PROBLEM — K92.2 GI BLEED: Status: ACTIVE | Noted: 2022-07-30

## 2022-07-30 PROBLEM — N17.9 AKI (ACUTE KIDNEY INJURY) (HCC): Status: ACTIVE | Noted: 2022-07-30

## 2022-07-30 PROBLEM — J18.9 PNEUMONIA: Status: ACTIVE | Noted: 2022-07-30

## 2022-07-30 LAB
ANION GAP SERPL CALCULATED.3IONS-SCNC: 13 MMOL/L (ref 3–16)
BASOPHILS ABSOLUTE: 0 K/UL (ref 0–0.2)
BASOPHILS RELATIVE PERCENT: 0 %
BASOPHILS RELATIVE PERCENT: 0.2 %
BASOPHILS RELATIVE PERCENT: 0.2 %
BUN BLDV-MCNC: 48 MG/DL (ref 7–20)
CALCIUM SERPL-MCNC: 8.8 MG/DL (ref 8.3–10.6)
CHLORIDE BLD-SCNC: 102 MMOL/L (ref 99–110)
CO2: 23 MMOL/L (ref 21–32)
CREAT SERPL-MCNC: 1.1 MG/DL (ref 0.6–1.2)
EKG DIAGNOSIS: NORMAL
EKG Q-T INTERVAL: 190 MS
EKG QRS DURATION: 78 MS
EKG QTC CALCULATION (BAZETT): 358 MS
EKG R AXIS: -36 DEGREES
EKG T AXIS: 178 DEGREES
EKG VENTRICULAR RATE: 214 BPM
EOSINOPHILS ABSOLUTE: 0 K/UL (ref 0–0.6)
EOSINOPHILS RELATIVE PERCENT: 0 %
EOSINOPHILS RELATIVE PERCENT: 0 %
EOSINOPHILS RELATIVE PERCENT: 0.1 %
GFR AFRICAN AMERICAN: 58
GFR NON-AFRICAN AMERICAN: 48
GLUCOSE BLD-MCNC: 146 MG/DL (ref 70–99)
GLUCOSE BLD-MCNC: 86 MG/DL (ref 70–99)
GLUCOSE BLD-MCNC: 89 MG/DL (ref 70–99)
HCT VFR BLD CALC: 30.3 % (ref 36–48)
HCT VFR BLD CALC: 31.2 % (ref 36–48)
HCT VFR BLD CALC: 31.8 % (ref 36–48)
HEMOGLOBIN: 10.2 G/DL (ref 12–16)
HEMOGLOBIN: 10.2 G/DL (ref 12–16)
HEMOGLOBIN: 10.6 G/DL (ref 12–16)
INR BLD: 1.31 (ref 0.87–1.14)
LACTIC ACID, SEPSIS: 2 MMOL/L (ref 0.4–1.9)
LV EF: 70 %
LVEF MODALITY: NORMAL
LYMPHOCYTES ABSOLUTE: 0.5 K/UL (ref 1–5.1)
LYMPHOCYTES ABSOLUTE: 0.5 K/UL (ref 1–5.1)
LYMPHOCYTES ABSOLUTE: 0.6 K/UL (ref 1–5.1)
LYMPHOCYTES RELATIVE PERCENT: 4.9 %
LYMPHOCYTES RELATIVE PERCENT: 5.3 %
LYMPHOCYTES RELATIVE PERCENT: 5.7 %
MAGNESIUM: 2.3 MG/DL (ref 1.8–2.4)
MAGNESIUM: 2.4 MG/DL (ref 1.8–2.4)
MCH RBC QN AUTO: 35.2 PG (ref 26–34)
MCH RBC QN AUTO: 35.4 PG (ref 26–34)
MCH RBC QN AUTO: 36 PG (ref 26–34)
MCHC RBC AUTO-ENTMCNC: 32.6 G/DL (ref 31–36)
MCHC RBC AUTO-ENTMCNC: 33.4 G/DL (ref 31–36)
MCHC RBC AUTO-ENTMCNC: 33.8 G/DL (ref 31–36)
MCV RBC AUTO: 106 FL (ref 80–100)
MCV RBC AUTO: 106.5 FL (ref 80–100)
MCV RBC AUTO: 107.8 FL (ref 80–100)
MONOCYTES ABSOLUTE: 1.2 K/UL (ref 0–1.3)
MONOCYTES ABSOLUTE: 1.4 K/UL (ref 0–1.3)
MONOCYTES ABSOLUTE: 1.5 K/UL (ref 0–1.3)
MONOCYTES RELATIVE PERCENT: 12.5 %
MONOCYTES RELATIVE PERCENT: 13.5 %
MONOCYTES RELATIVE PERCENT: 13.7 %
NEUTROPHILS ABSOLUTE: 7.1 K/UL (ref 1.7–7.7)
NEUTROPHILS ABSOLUTE: 8.2 K/UL (ref 1.7–7.7)
NEUTROPHILS ABSOLUTE: 9.9 K/UL (ref 1.7–7.7)
NEUTROPHILS RELATIVE PERCENT: 80.3 %
NEUTROPHILS RELATIVE PERCENT: 81 %
NEUTROPHILS RELATIVE PERCENT: 82.6 %
OCCULT BLOOD DIAGNOSTIC: ABNORMAL
PDW BLD-RTO: 16.9 % (ref 12.4–15.4)
PDW BLD-RTO: 17.4 % (ref 12.4–15.4)
PDW BLD-RTO: 17.7 % (ref 12.4–15.4)
PERFORMED ON: NORMAL
PERFORMED ON: NORMAL
PLATELET # BLD: 197 K/UL (ref 135–450)
PLATELET # BLD: 198 K/UL (ref 135–450)
PLATELET # BLD: 240 K/UL (ref 135–450)
PMV BLD AUTO: 7.1 FL (ref 5–10.5)
PMV BLD AUTO: 7.1 FL (ref 5–10.5)
PMV BLD AUTO: 7.2 FL (ref 5–10.5)
POTASSIUM SERPL-SCNC: 3.9 MMOL/L (ref 3.5–5.1)
PROCALCITONIN: 0.08 NG/ML (ref 0–0.15)
PROTHROMBIN TIME: 16.3 SEC (ref 11.7–14.5)
RBC # BLD: 2.84 M/UL (ref 4–5.2)
RBC # BLD: 2.89 M/UL (ref 4–5.2)
RBC # BLD: 3.01 M/UL (ref 4–5.2)
SODIUM BLD-SCNC: 138 MMOL/L (ref 136–145)
TROPONIN: 0.03 NG/ML
TROPONIN: 0.04 NG/ML
TSH REFLEX FT4: 0.3 UIU/ML (ref 0.27–4.2)
WBC # BLD: 10.1 K/UL (ref 4–11)
WBC # BLD: 12 K/UL (ref 4–11)
WBC # BLD: 8.8 K/UL (ref 4–11)

## 2022-07-30 PROCEDURE — 2500000003 HC RX 250 WO HCPCS: Performed by: INTERNAL MEDICINE

## 2022-07-30 PROCEDURE — 99222 1ST HOSP IP/OBS MODERATE 55: CPT | Performed by: INTERNAL MEDICINE

## 2022-07-30 PROCEDURE — 93010 ELECTROCARDIOGRAM REPORT: CPT | Performed by: INTERNAL MEDICINE

## 2022-07-30 PROCEDURE — 2580000003 HC RX 258: Performed by: EMERGENCY MEDICINE

## 2022-07-30 PROCEDURE — 83605 ASSAY OF LACTIC ACID: CPT

## 2022-07-30 PROCEDURE — 84484 ASSAY OF TROPONIN QUANT: CPT

## 2022-07-30 PROCEDURE — C9113 INJ PANTOPRAZOLE SODIUM, VIA: HCPCS | Performed by: STUDENT IN AN ORGANIZED HEALTH CARE EDUCATION/TRAINING PROGRAM

## 2022-07-30 PROCEDURE — 02HV33Z INSERTION OF INFUSION DEVICE INTO SUPERIOR VENA CAVA, PERCUTANEOUS APPROACH: ICD-10-PCS | Performed by: EMERGENCY MEDICINE

## 2022-07-30 PROCEDURE — 2580000003 HC RX 258: Performed by: STUDENT IN AN ORGANIZED HEALTH CARE EDUCATION/TRAINING PROGRAM

## 2022-07-30 PROCEDURE — 6360000002 HC RX W HCPCS: Performed by: EMERGENCY MEDICINE

## 2022-07-30 PROCEDURE — 2700000000 HC OXYGEN THERAPY PER DAY

## 2022-07-30 PROCEDURE — 82270 OCCULT BLOOD FECES: CPT

## 2022-07-30 PROCEDURE — 2000000000 HC ICU R&B

## 2022-07-30 PROCEDURE — 36415 COLL VENOUS BLD VENIPUNCTURE: CPT

## 2022-07-30 PROCEDURE — 6360000002 HC RX W HCPCS: Performed by: INTERNAL MEDICINE

## 2022-07-30 PROCEDURE — 80048 BASIC METABOLIC PNL TOTAL CA: CPT

## 2022-07-30 PROCEDURE — 84145 PROCALCITONIN (PCT): CPT

## 2022-07-30 PROCEDURE — 99291 CRITICAL CARE FIRST HOUR: CPT | Performed by: INTERNAL MEDICINE

## 2022-07-30 PROCEDURE — 94760 N-INVAS EAR/PLS OXIMETRY 1: CPT

## 2022-07-30 PROCEDURE — 85610 PROTHROMBIN TIME: CPT

## 2022-07-30 PROCEDURE — 93306 TTE W/DOPPLER COMPLETE: CPT

## 2022-07-30 PROCEDURE — 84443 ASSAY THYROID STIM HORMONE: CPT

## 2022-07-30 PROCEDURE — 2500000003 HC RX 250 WO HCPCS: Performed by: STUDENT IN AN ORGANIZED HEALTH CARE EDUCATION/TRAINING PROGRAM

## 2022-07-30 PROCEDURE — 87040 BLOOD CULTURE FOR BACTERIA: CPT

## 2022-07-30 PROCEDURE — 36592 COLLECT BLOOD FROM PICC: CPT

## 2022-07-30 PROCEDURE — 2580000003 HC RX 258: Performed by: INTERNAL MEDICINE

## 2022-07-30 PROCEDURE — 85025 COMPLETE CBC W/AUTO DIFF WBC: CPT

## 2022-07-30 PROCEDURE — 6360000002 HC RX W HCPCS: Performed by: STUDENT IN AN ORGANIZED HEALTH CARE EDUCATION/TRAINING PROGRAM

## 2022-07-30 PROCEDURE — 83735 ASSAY OF MAGNESIUM: CPT

## 2022-07-30 RX ORDER — ENOXAPARIN SODIUM 100 MG/ML
1 INJECTION SUBCUTANEOUS 2 TIMES DAILY
Status: DISCONTINUED | OUTPATIENT
Start: 2022-07-30 | End: 2022-07-30 | Stop reason: DRUGHIGH

## 2022-07-30 RX ORDER — ACETAMINOPHEN 325 MG/1
650 TABLET ORAL EVERY 6 HOURS PRN
Status: DISCONTINUED | OUTPATIENT
Start: 2022-07-30 | End: 2022-08-04 | Stop reason: HOSPADM

## 2022-07-30 RX ORDER — DEXTROSE MONOHYDRATE 100 MG/ML
INJECTION, SOLUTION INTRAVENOUS CONTINUOUS PRN
Status: DISCONTINUED | OUTPATIENT
Start: 2022-07-30 | End: 2022-08-04 | Stop reason: HOSPADM

## 2022-07-30 RX ORDER — DIGOXIN 0.25 MG/ML
125 INJECTION INTRAMUSCULAR; INTRAVENOUS DAILY
Status: DISCONTINUED | OUTPATIENT
Start: 2022-07-30 | End: 2022-07-31

## 2022-07-30 RX ORDER — SODIUM CHLORIDE 9 MG/ML
INJECTION, SOLUTION INTRAVENOUS PRN
Status: DISCONTINUED | OUTPATIENT
Start: 2022-07-30 | End: 2022-08-04 | Stop reason: HOSPADM

## 2022-07-30 RX ORDER — ONDANSETRON 4 MG/1
4 TABLET, ORALLY DISINTEGRATING ORAL EVERY 8 HOURS PRN
Status: DISCONTINUED | OUTPATIENT
Start: 2022-07-30 | End: 2022-08-04 | Stop reason: HOSPADM

## 2022-07-30 RX ORDER — ENOXAPARIN SODIUM 100 MG/ML
1 INJECTION SUBCUTANEOUS DAILY
Status: DISCONTINUED | OUTPATIENT
Start: 2022-07-30 | End: 2022-07-30

## 2022-07-30 RX ORDER — FLECAINIDE ACETATE 100 MG/1
100 TABLET ORAL EVERY 12 HOURS SCHEDULED
Status: DISCONTINUED | OUTPATIENT
Start: 2022-07-30 | End: 2022-07-31

## 2022-07-30 RX ORDER — DIGOXIN 0.25 MG/ML
125 INJECTION INTRAMUSCULAR; INTRAVENOUS ONCE
Status: COMPLETED | OUTPATIENT
Start: 2022-07-30 | End: 2022-07-30

## 2022-07-30 RX ORDER — ONDANSETRON 2 MG/ML
4 INJECTION INTRAMUSCULAR; INTRAVENOUS EVERY 6 HOURS PRN
Status: DISCONTINUED | OUTPATIENT
Start: 2022-07-30 | End: 2022-08-04 | Stop reason: HOSPADM

## 2022-07-30 RX ORDER — METOPROLOL SUCCINATE 25 MG/1
25 TABLET, EXTENDED RELEASE ORAL DAILY
Status: DISCONTINUED | OUTPATIENT
Start: 2022-07-30 | End: 2022-08-04 | Stop reason: HOSPADM

## 2022-07-30 RX ORDER — SODIUM CHLORIDE 0.9 % (FLUSH) 0.9 %
5-40 SYRINGE (ML) INJECTION PRN
Status: DISCONTINUED | OUTPATIENT
Start: 2022-07-30 | End: 2022-08-04 | Stop reason: HOSPADM

## 2022-07-30 RX ORDER — ALPRAZOLAM 0.5 MG/1
0.5 TABLET ORAL 3 TIMES DAILY PRN
Status: ON HOLD | COMMUNITY
End: 2022-08-04 | Stop reason: SDUPTHER

## 2022-07-30 RX ORDER — 0.9 % SODIUM CHLORIDE 0.9 %
1000 INTRAVENOUS SOLUTION INTRAVENOUS ONCE
Status: COMPLETED | OUTPATIENT
Start: 2022-07-30 | End: 2022-07-30

## 2022-07-30 RX ORDER — POLYETHYLENE GLYCOL 3350 17 G/17G
17 POWDER, FOR SOLUTION ORAL DAILY PRN
Status: DISCONTINUED | OUTPATIENT
Start: 2022-07-30 | End: 2022-08-04 | Stop reason: HOSPADM

## 2022-07-30 RX ORDER — ACETAMINOPHEN 650 MG/1
650 SUPPOSITORY RECTAL EVERY 6 HOURS PRN
Status: DISCONTINUED | OUTPATIENT
Start: 2022-07-30 | End: 2022-08-04 | Stop reason: HOSPADM

## 2022-07-30 RX ORDER — SODIUM CHLORIDE 0.9 % (FLUSH) 0.9 %
5-40 SYRINGE (ML) INJECTION EVERY 12 HOURS SCHEDULED
Status: DISCONTINUED | OUTPATIENT
Start: 2022-07-30 | End: 2022-08-04 | Stop reason: HOSPADM

## 2022-07-30 RX ADMIN — DIGOXIN 125 MCG: 0.25 INJECTION INTRAMUSCULAR; INTRAVENOUS at 02:30

## 2022-07-30 RX ADMIN — AMIODARONE HYDROCHLORIDE 0.5 MG/MIN: 50 INJECTION, SOLUTION INTRAVENOUS at 08:26

## 2022-07-30 RX ADMIN — DOXYCYCLINE 100 MG: 100 INJECTION, POWDER, LYOPHILIZED, FOR SOLUTION INTRAVENOUS at 20:40

## 2022-07-30 RX ADMIN — CEFEPIME 2000 MG: 2 INJECTION, POWDER, FOR SOLUTION INTRAVENOUS at 00:38

## 2022-07-30 RX ADMIN — SODIUM CHLORIDE 1000 ML: 9 INJECTION, SOLUTION INTRAVENOUS at 08:19

## 2022-07-30 RX ADMIN — Medication 40 MG: at 20:39

## 2022-07-30 RX ADMIN — VANCOMYCIN HYDROCHLORIDE 1000 MG: 1 INJECTION, POWDER, LYOPHILIZED, FOR SOLUTION INTRAVENOUS at 02:24

## 2022-07-30 RX ADMIN — SODIUM CHLORIDE, PRESERVATIVE FREE 10 ML: 5 INJECTION INTRAVENOUS at 20:39

## 2022-07-30 RX ADMIN — SODIUM CHLORIDE: 9 INJECTION, SOLUTION INTRAVENOUS at 01:32

## 2022-07-30 RX ADMIN — SODIUM CHLORIDE: 9 INJECTION, SOLUTION INTRAVENOUS at 09:23

## 2022-07-30 RX ADMIN — Medication 40 MG: at 08:23

## 2022-07-30 RX ADMIN — CEFTRIAXONE 2000 MG: 2 INJECTION, POWDER, FOR SOLUTION INTRAMUSCULAR; INTRAVENOUS at 08:25

## 2022-07-30 RX ADMIN — DIGOXIN 125 MCG: 0.25 INJECTION INTRAMUSCULAR; INTRAVENOUS at 11:37

## 2022-07-30 RX ADMIN — DILTIAZEM HYDROCHLORIDE 10 MG/HR: 5 INJECTION INTRAVENOUS at 17:06

## 2022-07-30 RX ADMIN — AMIODARONE HYDROCHLORIDE 0.5 MG/MIN: 50 INJECTION, SOLUTION INTRAVENOUS at 05:22

## 2022-07-30 RX ADMIN — PHENYLEPHRINE HYDROCHLORIDE 30 MCG/MIN: 10 INJECTION INTRAVENOUS at 00:36

## 2022-07-30 RX ADMIN — ENOXAPARIN SODIUM 70 MG: 100 INJECTION SUBCUTANEOUS at 02:24

## 2022-07-30 RX ADMIN — SODIUM CHLORIDE, PRESERVATIVE FREE 10 ML: 5 INJECTION INTRAVENOUS at 08:22

## 2022-07-30 RX ADMIN — DILTIAZEM HYDROCHLORIDE 15 MG/HR: 5 INJECTION INTRAVENOUS at 08:22

## 2022-07-30 RX ADMIN — DOXYCYCLINE 100 MG: 100 INJECTION, POWDER, LYOPHILIZED, FOR SOLUTION INTRAVENOUS at 09:26

## 2022-07-30 ASSESSMENT — PAIN SCALES - GENERAL
PAINLEVEL_OUTOF10: 0

## 2022-07-30 ASSESSMENT — PAIN SCALES - WONG BAKER: WONGBAKER_NUMERICALRESPONSE: 0

## 2022-07-30 NOTE — CONSULTS
Likely GI   GI Consult Note      Reason for Consult:  GI bleeding  Requesting Physician: Nelson    CHIEF COMPLAINT:  none    History Obtained From:  patient, electronic medical record    HISTORY OF PRESENT ILLNESS:                The patient is a 65 y.o. female with significant past medical history of atrial fibrillation. She is admitted with fatigue and failure to thrive. She is in the ICU with atrial fib/flutter. We have been asked to see her for gi bleeding. Her CT shows a stercoral ulcer or proctitis. She has a previous gastric bypass. She is in critical condition. She is not a candidate for endoscopic evaluation.     Past Medical History:        Diagnosis Date    Acid reflux     Anxiety     Arthritis     Atrial fibrillation (HCC)     Closed fracture dislocation of right elbow     Depression     Fibromyalgia     Migraine     WHITNEY (obstructive sleep apnea)     Diagnosed years ago, no CPAP    Rectal cancer ([facility] [address]     Wears glasses     DRIVING     Past Surgical History:        Procedure Laterality Date    APPENDECTOMY      CERVICAL DISCECTOMY      ACDF     SECTION      x4    CHOLECYSTECTOMY      COLON SURGERY      Rectal cancer removed using  incision    COLONOSCOPY      SEVERAL    COLONOSCOPY N/A 2019    COLONOSCOPY POLYPECTOMY SNARE/COLD BIOPSY performed by Ancelmo Price MD at [address] [place] [address] N/A 2021    COLONOSCOPY WITH BIOPSY performed by Sophy Boone MD at [address] [facility] Right 2021    RIGHT LEG EVACUATION OF HEMATOMA performed by Sayra Flores MD at [address] [facility] W/O EXTENSION Right 10/25/2018    OPEN REDUCTION INTERNAL FIXATION RIGHT FEMUR SUPRACONDYLAR FEMORAL FRACTURE WITH C-ARM performed by Linda Bishop MD at [place] [address] Right 2021    RIGHT LEG HEMATOMA EVAKUATION, DEBRIDEMENT, AND WOUND VAC PLACEMENT performed by Jose Galvan MD at [facility]    SKIN GRAFT Right 2/17/2021    RIGHT LOWER EXTREMITY SKIN GRAFT performed by Chandana Gilliam MD at [address] [place] [address] Left     UPPER GASTROINTESTINAL ENDOSCOPY N/A 11/26/2019    ESOPHAGOGASTRODUODENOSCOPY performed by Oh Bailey MD at [address] [place] N/A 8/18/2020    EGD BIOPSY performed by Oh Bailey MD at [address] [facility]     Current Medications:    Current Facility-Administered Medications: [Held by provider] metoprolol succinate (TOPROL XL) extended release tablet 25 mg, 25 mg, Oral, Daily  sodium chloride flush 0.9 % injection 5-40 mL, 5-40 mL, IntraVENous, 2 times per day  sodium chloride flush 0.9 % injection 5-40 mL, 5-40 mL, IntraVENous, PRN  0.9 % sodium chloride infusion, , IntraVENous, PRN  ondansetron (ZOFRAN-ODT) disintegrating tablet 4 mg, 4 mg, Oral, Q8H PRN **OR** ondansetron (ZOFRAN) injection 4 mg, 4 mg, IntraVENous, Q6H PRN  polyethylene glycol (GLYCOLAX) packet 17 g, 17 g, Oral, Daily PRN  acetaminophen (TYLENOL) tablet 650 mg, 650 mg, Oral, Q6H PRN **OR** acetaminophen (TYLENOL) suppository 650 mg, 650 mg, Rectal, Q6H PRN  glucose chewable tablet 16 g, 4 tablet, Oral, PRN  dextrose bolus 10% 125 mL, 125 mL, IntraVENous, PRN **OR** dextrose bolus 10% 250 mL, 250 mL, IntraVENous, PRN  glucagon (rDNA) injection 1 mg, 1 mg, SubCUTAneous, PRN  dextrose 10 % infusion, , IntraVENous, Continuous PRN  [Held by provider] flecainide (TAMBOCOR) tablet 100 mg, 100 mg, Oral, 2 times per day  pantoprazole (PROTONIX) 40 mg in sodium chloride (PF) 10 mL injection, 40 mg, IntraVENous, Q12H  dilTIAZem 125 mg in dextrose 5 % 125 mL infusion, 2.5-15 mg/hr, IntraVENous, Continuous  cefTRIAXone (ROCEPHIN) 2,000 mg in dextrose 5 % 50 mL IVPB mini-bag, 2,000 mg, IntraVENous, Q24H  doxycycline (VIBRAMYCIN) 100 mg in dextrose 5 % 100 mL IVPB, 100 mg, IntraVENous, Q12H  digoxin (LANOXIN) injection 125 mcg, 125 mcg, IntraVENous, Daily  [COMPLETED] amiodarone (NEXTERONE) 150 mg in dextrose 5% 100 ml, 150 mg, IntraVENous, Once **FOLLOWED BY** [] amiodarone (CORDARONE) 450 mg in dextrose 5 % 250 mL infusion, 1 mg/min, IntraVENous, Continuous **FOLLOWED BY** amiodarone (CORDARONE) 450 mg in dextrose 5 % 250 mL infusion, 0.5 mg/min, IntraVENous, Continuous  phenylephrine (RONIT-SYNEPHRINE) 50 mg in dextrose 5 % 250 mL infusion,  mcg/min, IntraVENous, Continuous  Allergies:  Demerol hcl [meperidine], Pcn [penicillins], and Adhesive tape    Social History:    Social History     Socioeconomic History    Marital status:       Spouse name: Not on file    Number of children: 4    Years of education: Not on file    Highest education level: High school graduate   Occupational History    Not on file   Tobacco Use    Smoking status: Never    Smokeless tobacco: Never   Vaping Use    Vaping Use: Never used   Substance and Sexual Activity    Alcohol use: No    Drug use: Never    Sexual activity: Not Currently     Partners: Male   Other Topics Concern    Not on file   Social History Narrative    Lives with son     Social Determinants of Health     Financial Resource Strain: Not on file   Food Insecurity: Not on file   Transportation Needs: Not on file   Physical Activity: Not on file   Stress: Not on file   Social Connections: Not on file   Intimate Partner Violence: Not on file   Housing Stability: Not on file       Family History:       Problem Relation Age of Onset    COPD Mother      REVIEW OF SYSTEMS:    CONSTITUTIONAL:  negative  EYES:  negative  HEENT:  negative  RESPIRATORY:  negative  CARDIOVASCULAR:  negative  GASTROINTESTINAL:  negative  INTEGUMENT/BREAST:  negative  HEMATOLOGIC/LYMPHATIC:  negative  ENDOCRINE:  negative  MUSCULOSKELETAL:  negative  NEUROLOGICAL:  negative  PHYSICAL EXAM:    General Appearance: alert and oriented to person, place and time, well developed and well- nourished, in no acute distress  Skin: warm and dry, no rash or erythema  Head: normocephalic and atraumatic  Eyes: pupils equal, round, and reactive to light, extraocular eye movements intact, conjunctivae normal  ENT: tympanic membrane, external ear and ear canal normal bilaterally, nose without deformity, nasal mucosa and turbinates normal without polyps  Neck: supple and non-tender without mass, no thyromegaly or thyroid nodules, no cervical lymphadenopathy  Pulmonary/Chest: clear to auscultation bilaterally- no wheezes, rales or rhonchi, normal air movement, no respiratory distress  Cardiovascular: normal rate, regular rhythm, normal S1 and S2, no murmurs, rubs, clicks, or gallops, distal pulses intact, no carotid bruits  Abdomen: soft, non-tender, non-distended, normal bowel sounds, no masses or organomegaly  Extremities: no cyanosis, clubbing or edema  Musculoskeletal: normal range of motion, no joint swelling, deformity or tenderness  Neurologic: reflexes normal and symmetric, no cranial nerve deficit, gait, coordination and speech normal  Vitals:    BP (!) 91/52   Pulse (!) 123   Temp 98.1 °F (36.7 °C) (Axillary)   Resp 14   Wt 149 lb 7.6 oz (67.8 kg)   SpO2 100%   BMI 27.34 kg/m²     DATA:     IMPRESSION/RECOMMENDATIONS:      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 Not a candidate for endoscopic evaluation.     Electronically signed by Reggie Barba MD on 7/30/2022 at 1:21 PM

## 2022-07-30 NOTE — PROGRESS NOTES
Call back received from Dr. Samantha Lance. Updates given. Heart rate remains elevated in 160's as high as 190's on the Amio and Cardizem drip. New order for Digoxin.

## 2022-07-30 NOTE — PROGRESS NOTES
Patient admitted to room 2105. Patient lethargic but does awaken to voice. Patient can state her name and birth date and that we are at a medical facility. Unable to answer other orientation questions at this time. Patient does stare off with delayed responses at times. A-fib on monitor, heart rate 169. Amio drip  currently infusing through right Fem CVC- site clean, dry and intact.

## 2022-07-30 NOTE — PROGRESS NOTES
Hospitalist Progress Note      PCP: Arsenio Cope MD    Date of Admission: 7/29/2022        Subjective: Feels very weak, no family member at bedside      Medications:  Reviewed    Infusion Medications    sodium chloride      dextrose      amiodarone 0.5 mg/min (07/30/22 4801)    phenylephrine (RONIT-SYNEPHRINE) 50mg/250mL infusion 10 mcg/min (07/30/22 9717)    dilTIAZem 15 mg/hr (07/30/22 5716)    sodium chloride 100 mL/hr at 07/30/22 2343     Scheduled Medications    [Held by provider] metoprolol succinate  25 mg Oral Daily    sodium chloride flush  5-40 mL IntraVENous 2 times per day    [Held by provider] flecainide  100 mg Oral 2 times per day    cefepime  1,000 mg IntraVENous Q12H    pantoprazole (PROTONIX) 40 mg injection  40 mg IntraVENous Q12H     PRN Meds: sodium chloride flush, sodium chloride, ondansetron **OR** ondansetron, polyethylene glycol, acetaminophen **OR** acetaminophen, glucose, dextrose bolus **OR** dextrose bolus, glucagon (rDNA), dextrose      Intake/Output Summary (Last 24 hours) at 7/30/2022 0737  Last data filed at 7/30/2022 0649  Gross per 24 hour   Intake 2546.81 ml   Output 1170 ml   Net 1376.81 ml       Physical Exam Performed:    /64   Pulse (!) 173   Temp 98 °F (36.7 °C) (Axillary)   Resp 14   Wt 149 lb 7.6 oz (67.8 kg)   SpO2 99%   BMI 27.34 kg/m²     General appearance: No apparent distress  Neck: Supple  Respiratory:  Normal respiratory effort. Clear to auscultation, bilaterally without Rales/Wheezes/Rhonchi. Cardiovascular: Irregularly irregular  Abdomen: Soft, non-tender,  Musculoskeletal: No clubbing, cyanosis   Skin: Skin color, texture, turgor normal.  No rashes or lesions.   Neurologic: Moving extremities  Psychiatric: Somnolent  Capillary Refill: Brisk,3 seconds, normal   Peripheral Pulses: +2 palpable, equal bilaterally       Labs:   Recent Labs     07/29/22  2200 07/30/22  0535   WBC 12.0* 12.0*   HGB 12.5 10.6*   HCT 37.8 31.8*    240 Recent Labs     07/29/22 2200 07/30/22  0535    138   K 4.7 3.9   CL 99 102   CO2 21 23   BUN 50* 48*   CREATININE 1.5* 1.1   CALCIUM 10.0 8.8     No results for input(s): AST, ALT, BILIDIR, BILITOT, ALKPHOS in the last 72 hours. Recent Labs     07/30/22  0535   INR 1.31*     Recent Labs     07/29/22 2200   TROPONINI 0.05*       Urinalysis:      Lab Results   Component Value Date/Time    NITRU Negative 07/29/2022 11:27 PM    [address] 07/29/2022 11:27 PM    BACTERIA 2+ 07/29/2022 11:27 PM    RBCUA 2 07/29/2022 11:27 PM    BLOODU TRACE 07/29/2022 11:27 PM    SPECGRAV 1.023 07/29/2022 11:27 PM    GLUCOSEU Negative 07/29/2022 11:27 PM       Radiology:  CT CHEST ABDOMEN PELVIS WO CONTRAST   Preliminary Result   Limited noncontrast examination. Left lower lobe opacity, which may reflect pneumonia. Follow-up imaging   resolution is recommended. Mild-to-moderate right hydroureteronephrosis, which may be due to distended   urinary bladder. Consider Kaplan catheter. Distended rectum filled with stool with mild surrounding inflammatory   changes, which may reflect underlying stercoral proctitis. Consider   disimpaction. CT HEAD WO CONTRAST   Final Result   Stable appearing CT scan of the head without acute intracranial abnormality. XR CHEST PORTABLE   Final Result   Scarring/atelectatic changes seen at the left lung base without confluent   airspace opacity seen.                  Assessment/Plan:    Active Hospital Problems    Diagnosis     GI bleed [K92.2]      Priority: Medium    MAX (acute kidney injury) ([facility] [address] [N17.9]      Priority: Medium    Pneumonia [J18.9]      Priority: Medium    Shock ([facility] [address] [R57.9]      Priority: Medium    Atrial fibrillation with rapid ventricular response (HCC) [I48.91]      Priority: Medium    DM2 (diabetes mellitus, type 2) ([facility] [address] [E11.9]      Priority: Medium     Septic shock due to pneumonia, started on cefepime, on pressors at this time, received IV fluid, at risk for life threatening complications, keep in ICU. Cultures pending  Pneumonia community-acquired pneumonia on cefepime at this time also added doxycycline  A. fib with RVR likely due to shock, amnio drip, cardiology consulted  Acute metabolic encephalopathy, multifactorial mainly due to sepsis. Continue to monitor  Apparently GI bleed, pantoprazole twice daily for now hold anticoagulation, GI consulted  Acute blood loss anemia, no immediate indication for transfusion at this time  MAX improving  Closed fracture of distal end of left radius. In splint  Minimally elevated troponin, due to rapid ventricular response and septic shock.   Critical care time including physical exam, ordering and following on critical labs, ordering critical IV medication in a patient with potential life-threatening complications and not including any procedure time 36 minutes        DVT Prophylaxis: SCD  Diet: Diet NPO  Code Status: Full Code        Dispo -critically ill in ICU    Zak Pro MD

## 2022-07-30 NOTE — ED PROVIDER NOTES
[facility]      Pt Name: Tia Johnson  MRN: [id number]  [place] 1945  Date of evaluation: 2022  Provider: Armida Murray MD    CHIEF COMPLAINT       Chief Complaint   Patient presents with    Atrial Fibrillation    Fall     One week ago    Altered Mental Status       HISTORY OF PRESENT ILLNESS    Tia Johnson is a 65 y.o. female who presents to the emergency department with RVR. Patient presents via EMS with atrial fibrillation rapid ventricular response in the 200s. Had a fall a week ago with a left arm fracture. Tonight found to be in rapid ventricular response in the 200s. Patient appears to be confused. She denies pain at this time. Denies any complaints. However history is limited as she has mental status changes. Nursing Notes were reviewed. Including nursing noted for FM, Surgical History, Past Medical History, Social History, vitals, and allergies; agree with all. REVIEW OF SYSTEMS       Review of Systems   Unable to perform ROS: Mental status change     Except as noted above the remainder of the review of systems was reviewed and negative.      PAST MEDICAL HISTORY     Past Medical History:   Diagnosis Date    Acid reflux     Anxiety     Arthritis     Atrial fibrillation (HCC)     Closed fracture dislocation of right elbow     Depression     Fibromyalgia     Migraine     WHITNEY (obstructive sleep apnea)     Diagnosed years ago, no CPAP    Rectal cancer (HCC)     Wears glasses     DRIVING       SURGICAL HISTORY       Past Surgical History:   Procedure Laterality Date    APPENDECTOMY      CERVICAL DISCECTOMY      ACDF     SECTION      x4    CHOLECYSTECTOMY      COLON SURGERY      Rectal cancer removed using  incision    COLONOSCOPY      SEVERAL    COLONOSCOPY N/A 2019    COLONOSCOPY POLYPECTOMY SNARE/COLD BIOPSY performed by Brandon Child MD at [address] [place] [address] N/A 2021 COLONOSCOPY WITH BIOPSY performed by Manny Mahoney MD at [address] Right 1/28/2021    RIGHT LEG EVACUATION OF HEMATOMA performed by Dipika Edgar MD at [address] [facility] W/O EXTENSION Right 10/25/2018    OPEN REDUCTION INTERNAL FIXATION RIGHT FEMUR SUPRACONDYLAR FEMORAL FRACTURE WITH C-ARM performed by Jamarcus Jon MD at [place] [address] Right 1/31/2021    RIGHT LEG HEMATOMA EVAKUATION, DEBRIDEMENT, AND WOUND VAC PLACEMENT performed by Chandana Gilliam MD at [place] [address] Right 2/17/2021    RIGHT LOWER EXTREMITY SKIN GRAFT performed by Chandana Gilliam MD at [facility] [address] Left     UPPER GASTROINTESTINAL ENDOSCOPY N/A 11/26/2019    ESOPHAGOGASTRODUODENOSCOPY performed by Oh Bailey MD at [address] [place] [address] 8/18/2020    EGD BIOPSY performed by Oh Bailey MD at [facility]. [address]       Previous Medications    ALPRAZOLAM (XANAX) 1 MG TABLET    TAKE ONE TABLET BY MOUTH THREE TIMES A DAY AS NEEDED FOR ANXIETY    APIXABAN (ELIQUIS) 5 MG TABS TABLET    Take 1 tablet by mouth in the morning and 1 tablet before bedtime.     CALCIUM CARBONATE-VITAMIN D (CALCIUM 600+D) 600-200 MG-UNIT TABS    Take 1 tablet by mouth 2 times daily    CYANOCOBALAMIN (VITAMIN B 12 PO)    Take 2,500 mg by mouth daily    DICYCLOMINE (BENTYL) 10 MG CAPSULE    Take 1 capsule by mouth 4 times daily as needed (cramping/diarrhea)    ESCITALOPRAM (LEXAPRO) 10 MG TABLET    Take 1 tablet by mouth daily    FLECAINIDE (TAMBOCOR) 100 MG TABLET    TAKE 1 TABLET BY MOUTH 2 TIMES A DAY    FUROSEMIDE (LASIX) 20 MG TABLET    Take 1 tablet by mouth 2 times daily With an additional 20 mg on MWF    METOPROLOL SUCCINATE (TOPROL XL) 100 MG EXTENDED RELEASE TABLET    TAKE ONE TABLET BY MOUTH DAILY    PANTOPRAZOLE (PROTONIX) 40 MG TABLET    TAKE ONE TABLET BY MOUTH TWICE A DAY POTASSIUM CHLORIDE (KLOR-CON M20) 20 MEQ EXTENDED RELEASE TABLET    Take 1 tablet by mouth daily    TIZANIDINE (ZANAFLEX) 2 MG TABLET    Take 1 tablet by mouth every 6 hours as needed (muscle pain)       ALLERGIES     Demerol hcl [meperidine], Pcn [penicillins], and Adhesive tape    FAMILY HISTORY        Family History   Problem Relation Age of Onset    COPD Mother        SOCIAL HISTORY       Social History     Socioeconomic History    Marital status:     Number of children: 4    Highest education level: High school graduate   Tobacco Use    Smoking status: Never    Smokeless tobacco: Never   Vaping Use    Vaping Use: Never used   Substance and Sexual Activity    Alcohol use: No    Drug use: Never    Sexual activity: Not Currently     Partners: Male   Social History Narrative    Lives with son       PHYSICAL EXAM       ED Triage Vitals   BP Temp Temp Source Heart Rate Resp SpO2 Height Weight   07/29/22 2137 07/29/22 2243 07/29/22 2243 07/29/22 2137 07/29/22 2137 07/29/22 2137 -- 07/29/22 2137   98/76 97.8 °F (36.6 °C) Oral (!) 221 29 95 %  150 lb 2.1 oz (68.1 kg)       Physical Exam  Vitals and nursing note reviewed. Constitutional:       General: She is not in acute distress. Appearance: She is well-developed. She is ill-appearing. She is not toxic-appearing or diaphoretic. HENT:      Head: Normocephalic and atraumatic. Right Ear: External ear normal.      Left Ear: External ear normal.   Eyes:      General:         Right eye: No discharge. Left eye: No discharge. Conjunctiva/sclera: Conjunctivae normal.      Pupils: Pupils are equal, round, and reactive to light. Cardiovascular:      Rate and Rhythm: Tachycardia present. Rhythm irregular. Heart sounds: No murmur heard. Pulmonary:      Effort: Pulmonary effort is normal. No respiratory distress. Breath sounds: Normal breath sounds. No wheezing or rales.    Abdominal:      General: Bowel sounds are normal. There is no distension. Palpations: Abdomen is soft. There is no mass. Tenderness: There is no abdominal tenderness. There is no guarding or rebound. Genitourinary:     Comments: Deferred  Musculoskeletal:         General: No deformity. Normal range of motion. Cervical back: Normal range of motion and neck supple. Skin:     General: Skin is warm. Findings: No erythema or rash. Neurological:      Mental Status: She is alert. She is disoriented. Cranial Nerves: No cranial nerve deficit. Motor: No atrophy or abnormal muscle tone. Coordination: Coordination normal.       DIAGNOSTIC RESULTS     EKG: All EKG's are interpreted by the Emergency Department Physician who either signs or Co-signs this chart in the absence of acardiologist.    EKG shows A. fib RVR nonspecific EKG changes no ectopy    RADIOLOGY:   Non-plain film images such as CT, Ultrasoundand MRI are read by the radiologist. Plain radiographic images are visualized and preliminarily interpreted by the emergency physician with the below findings:    CT  Impression   Limited noncontrast examination. Left lower lobe opacity, which may reflect pneumonia. Follow-up imaging   resolution is recommended. Mild-to-moderate right hydroureteronephrosis, which may be due to distended   urinary bladder. Consider Kaplan catheter. Distended rectum filled with stool with mild surrounding inflammatory   changes, which may reflect underlying stercoral proctitis. Consider   disimpaction.      CT haed reassuring    ED BEDSIDE ULTRASOUND:   Performed by ED Physician - none    LABS:  Labs Reviewed   CBC WITH AUTO DIFFERENTIAL - Abnormal; Notable for the following components:       Result Value    WBC 12.0 (*)     RBC 3.55 (*)     .5 (*)     MCH 35.2 (*)     RDW 17.2 (*)     Neutrophils Absolute 10.1 (*)     Lymphocytes Absolute 0.4 (*)     Monocytes Absolute 1.4 (*)     All other components within normal limits   BASIC METABOLIC PANEL - Abnormal; Notable for the following components:    Anion Gap 18 (*)     Glucose 120 (*)     BUN 50 (*)     Creatinine 1.5 (*)     GFR Non- 34 (*)     GFR  41 (*)     All other components within normal limits   TROPONIN - Abnormal; Notable for the following components:    Troponin 0.05 (*)     All other components within normal limits   BRAIN NATRIURETIC PEPTIDE - Abnormal; Notable for the following components:    Pro-BNP 7,880 (*)     All other components within normal limits   URINALYSIS WITH REFLEX TO CULTURE       All other labs were withinnormal range or not returned as of this dictation. EMERGENCY DEPARTMENT COURSE and DIFFERENTIAL DIAGNOSIS/MDM:     PMH, Surgical Hx, FH, Social Hx reviewed by myself (ETOH usage, Tobacco usage, Drug usage reviewed by myself, no pertinent Hx)- No Pertinent Hx     Old records were reviewed by me    25-year-old female with evidence of atrial fibrillation rapid ventricular response. Cardizem bolus and drip initiated. Amiodarone bolus and drip also initiated. This did not significantly help patient's heart rate. Therefore central line was placed with phenylephrine. Blood pressure remained stable in the emergency room. Discussed with patient about cardioversion at this time she really does not want that. She understands that if her blood pressure becomes unstable she will get cardioverted. We will continue to try to rate control. Antibiotics were initiated for patient's concern for pneumonia. Patient to be admitted to the ICU. The hospitalist was notified admission. CRITICAL CARE TIME   Total Critical Caretime was 99 minutes, excluding separately reportable procedures. There was a high probability of clinically significant/life threatening deterioration in the patient's condition which required my urgent intervention.         PROCEDURES:  Central Line    Date/Time: 7/30/2022 12:10 AM  Performed by: Christina Eduardo MD  Authorized by: Winifred Cantrell MD     Consent:     Consent obtained:  Verbal    Consent given by:  Patient    Risks, benefits, and alternatives were discussed: yes      Risks discussed:  Arterial puncture, bleeding, infection, incorrect placement, pneumothorax and nerve damage    Alternatives discussed:  No treatment  Universal protocol:     Procedure explained and questions answered to patient or proxy's satisfaction: yes      Relevant documents present and verified: yes      Test results available: yes      Patient identity confirmed:  Verbally with patient  Pre-procedure details:     Indication(s): central venous access      Hand hygiene: Hand hygiene performed prior to insertion      Sterile barrier technique: All elements of maximal sterile technique followed      Skin preparation:  Alcohol and chlorhexidine  Anesthesia:     Anesthesia method:  Local infiltration    Local anesthetic:  Lidocaine 1% w/o epi  Procedure details:     Location:  R femoral    Patient position:  Trendelenburg    Procedural supplies:  Triple lumen    Catheter size:  7 Fr    Landmarks identified: yes      Ultrasound guidance: yes      Ultrasound guidance timing: real time      Sterile ultrasound techniques: Sterile gel and sterile probe covers were used      Number of attempts:  1    Successful placement: yes    Post-procedure details:     Post-procedure:  Dressing applied    Assessment:  Blood return through all ports    Procedure completion:  Tolerated  Comments:      10 cc of blood loss      FINAL IMPRESSION      1. Atrial fibrillation with rapid ventricular response ([address]    2.  Pneumonia due to infectious organism, unspecified laterality, unspecified part of lung          DISPOSITION/PLAN   DISPOSITION      Admission    (Please note that portions ofthis note were completed with a voice recognition program.  Efforts were made to edit the dictations but occasionally words are mis-transcribed.)    Winifred Cantrell MD(electronically signed)  Attending Emergency Physician            Armida Murray MD  07/30/22 9037

## 2022-07-30 NOTE — CONSULTS
Cardiac Electrophysiology Consultation   Date: 2022  Admit Date:  2022  Reason for Consultation: atrial fibrillation with v-rates 190-200 bpm  Consult Requesting Physician: Anibal Fabian MD     Chief Complaint   Patient presents with    Atrial Fibrillation    Fall     One week ago    Altered Mental Status     HPI: Taye Galloway is a 65 y.o. F h/o WHITNEY presents with 3 days of decreased appetite, fatigue, and \"not being herself. \" Upon workup, noted to have new diagnosis of atrial fibrillation/atrial flutter with v-rates 190-210 bpm. Started on Amiodarone IV infusion and cardizem IV infusion with v-rates still 160's bpm. Added Digoxin 0.125mg IV x 1 overnight with v-rates 120-130's bpm.Further workup has revealed possible GI bleeding, PNA, UTI. Patient is poor historian and not able to give much information.       Past Medical History:   Diagnosis Date    Acid reflux     Anxiety     Arthritis     Atrial fibrillation (HCC)     Closed fracture dislocation of right elbow     Depression     Fibromyalgia     Migraine     WHITNEY (obstructive sleep apnea)     Diagnosed years ago, no CPAP    Rectal cancer ([facility]     Wears glasses     DRIVING        Past Surgical History:   Procedure Laterality Date    APPENDECTOMY      CERVICAL DISCECTOMY      ACDF     SECTION      x4    CHOLECYSTECTOMY      COLON SURGERY      Rectal cancer removed using  incision    COLONOSCOPY      SEVERAL    COLONOSCOPY N/A 2019    COLONOSCOPY POLYPECTOMY SNARE/COLD BIOPSY performed by Stephanie Friedman MD at [address] [facility] N/A 2021    COLONOSCOPY WITH BIOPSY performed by Georgina Angulo MD at [address] [facility] Right 2021    RIGHT LEG EVACUATION OF HEMATOMA performed by Sharon Mario MD at [address] [facility] W/O EXTENSION Right 10/25/2018    OPEN REDUCTION INTERNAL FIXATION RIGHT FEMUR SUPRACONDYLAR FEMORAL FRACTURE WITH C-ARM performed by James Rockwell MD at [facility] Right 1/31/2021    RIGHT LEG HEMATOMA EVAKUATION, DEBRIDEMENT, AND WOUND VAC PLACEMENT performed by Ashlee Suarez MD at [facility] Right 2/17/2021    RIGHT LOWER EXTREMITY SKIN GRAFT performed by Ashlee Suarez MD at [address] Left     UPPER GASTROINTESTINAL ENDOSCOPY N/A 11/26/2019    ESOPHAGOGASTRODUODENOSCOPY performed by Marcial Sheridan MD at [facility] N/A 8/18/2020    EGD BIOPSY performed by Marcial Sheridan MD at [facility]   Allergen Reactions    Demerol Hcl [Meperidine] Other (See Comments)     PATIENT STATES SHE GOT VERY ANXIOUS-LIKE [address] [Penicillins]      Patient reports she has not had since she was a child, thinks reaction was hives. Adhesive Tape Rash       Social History:  Reviewed. reports that she has never smoked. She has never used smokeless tobacco. She reports that she does not drink alcohol and does not use drugs. Family History:  Reviewed. family history includes COPD in her mother. No premature CAD. Review of System:  All other systems reviewed except for that noted above. Pertinent negatives and positives are:     General: negative for fever, chills   Ophthalmic ROS: negative for - eye pain or loss of vision  ENT ROS: negative for - headaches, sore throat   Respiratory: negative for - cough, sputum  Cardiovascular: Reviewed in HPI  Gastrointestinal: negative for - abdominal pain, diarrhea, N/V  Hematology: negative for - bleeding, blood clots, bruising or jaundice  Genito-Urinary:  negative for - Dysuria or incontinence  Musculoskeletal: negative for - Joint swelling, muscle pain  Neurological: negative for - confusion, dizziness, headaches   Psychiatric: No anxiety, no depression.   Dermatological: negative for - rash    Physical Examination:  Vitals:    07/30/22 0831   BP: Pulse: (!) 114   Resp: 17   Temp:    SpO2: 100%        Intake/Output Summary (Last 24 hours) at 2022 0858  Last data filed at 2022 0800  Gross per 24 hour   Intake 2546.81 ml   Output 1230 ml   Net 1316.81 ml     In: 2546.8 [I.V.:800.2]  Out: 1230    Wt Readings from Last 3 Encounters:   22 149 lb 7.6 oz (67.8 kg)   22 166 lb 0.1 oz (75.3 kg)   22 167 lb 6.4 oz (75.9 kg)     Temp  Av.9 °F (36.6 °C)  Min: 97.6 °F (36.4 °C)  Max: 98.1 °F (36.7 °C)  Pulse  Av.7  Min: 114  Max: 221  BP  Min: 79/56  Max: 163/147  SpO2  Av.5 %  Min: 92 %  Max: 100 %    Telemetry: atrial fibrillation/atrial flutter with 110-120 bpm  Constitutional: Alert. Oriented to person, place, and time. No distress. Head: Normocephalic and atraumatic. Mouth/Throat: Lips appear moist. Oropharynx is clear and moist.  Eyes: Conjunctivae normal. EOM are normal.   Neck: Neck supple. No lymphadenopathy. No rigidity. 11cm JVD present. Cardiovascular: tachycardic rate, irregular rhythm. Normal S1&S2. Carotid pulse 2+ bilaterally. Pulmonary/Chest: Bilateral respiratory sounds present. No respiratory accessory muscle use. No wheezes, No rhonchi. No bibasilar rales. Abdominal: Soft. Normal bowel sounds present. No distension, No tenderness. No splenomegaly. No hernia. Musculoskeletal: No tenderness. Full range of motion in bilateral upper and lower extremities. No pitting BLE edema . Lymphadenopathy: Has no cervical adenopathy. Neurological: Alert and oriented. Cranial nerve II-XII grossly intact, No gross deficit to touch. Skin: Skin is warm and dry. No rash, lesions, ulcerations noted. Psychiatric: No anxiety nor agitation. Labs:  Reviewed.    Recent Labs     22  2200 22  0535    138   K 4.7 3.9   CL 99 102   CO2 21 23   BUN 50* 48*   CREATININE 1.5* 1.1     Recent Labs     22  2200 22  0535   WBC 12.0* 12.0*   HGB 12.5 10.6*   HCT 37.8 31.8*   .5* 106.0*  240     Lab Results   Component Value Date/Time    CKTOTAL 49 01/28/2021 02:26 PM    TROPONINI 0.05 07/29/2022 10:00 PM     No results found for: BNP  Lab Results   Component Value Date/Time    PROTIME 16.3 07/30/2022 05:35 AM    PROTIME 12.5 02/04/2021 06:39 PM    PROTIME 16.0 01/28/2021 02:26 PM    INR 1.31 07/30/2022 05:35 AM    INR 1.08 02/04/2021 06:39 PM    INR 1.37 01/28/2021 02:26 PM     Lab Results   Component Value Date/Time    CHOL 124 02/19/2020 01:30 PM    HDL 66 02/19/2020 01:30 PM    TRIG 102 02/19/2020 01:30 PM       Diagnostic and imaging results personally reviewed and interpreted. ECG: Afib/aflutter with v-rates 214 bpm with T wave inversion laterally  Echo: 1/2021 Summary   Normal left ventricle size, wall thickness, and systolic function with an   estimated ejection fraction of 65-70%. No regional wall motion abnormalities   are seen. The right ventricle is normal in size and function. The left atrium is severely dilated. Mild mitral regurgitation. The aortic valve leaflets are not well visualized. Aortic sclerosis versus   mild aortic stenosis (peak velocity of 2.67m/s and a mean pressure gradient   of 16mmHg). Mild aortic regurgitation. Mild tricuspid regurgitation. Cath: n/a    I independently reviewed and interpreted the ECG, telemetry, serology, echocardiographic results.     Scheduled Meds:   [Held by provider] metoprolol succinate  25 mg Oral Daily    sodium chloride flush  5-40 mL IntraVENous 2 times per day    [Held by provider] flecainide  100 mg Oral 2 times per day    pantoprazole (PROTONIX) 40 mg injection  40 mg IntraVENous Q12H    cefTRIAXone (ROCEPHIN) IV  2,000 mg IntraVENous Q24H    doxycycline (VIBRAMYCIN) IV  100 mg IntraVENous Q12H    sodium chloride  1,000 mL IntraVENous Once     Continuous Infusions:   sodium chloride      dextrose      dilTIAZem 15 mg/hr (07/30/22 0822)    amiodarone 0.5 mg/min (07/30/22 0826)    phenylephrine (RONIT-SYNEPHRINE) 50mg/250mL infusion 10 mcg/min (07/30/22 8116)    sodium chloride 100 mL/hr at 07/30/22 0583     PRN Meds:.sodium chloride flush, sodium chloride, ondansetron **OR** ondansetron, polyethylene glycol, acetaminophen **OR** acetaminophen, glucose, dextrose bolus **OR** dextrose bolus, glucagon (rDNA), dextrose     Assessment:   Patient Active Problem List    Diagnosis Date Noted    GI bleed 07/30/2022    MAX (acute kidney injury) ([address] 07/30/2022    Pneumonia 07/30/2022    Shock ([address] 07/29/2022    Atrial fibrillation with rapid ventricular response ([address] 07/29/2022    DM2 (diabetes mellitus, type 2) ([address] 07/29/2022    Weakness 06/22/2022    Age-related physical debility 06/22/2022    JENNIFER (generalized anxiety disorder) 06/22/2022    Borborygmi 11/05/2021    Constipation 11/05/2021    Anxiety 11/05/2021    Age-related osteoporosis without current pathological fracture 09/09/2021    Essential tremor 09/09/2021    Chronic fatigue 09/09/2021    Irritable bowel syndrome with diarrhea 09/09/2021    Prediabetes 04/19/2021    Pedal edema 04/13/2021    Chronic intractable headache 04/13/2021    Esophageal candidiasis ([address] 04/13/2021    History of gastric bypass 04/13/2021    Lumbar radicular pain 07/31/2020    Fibromyalgia 09/20/2019    Gastroesophageal reflux disease without esophagitis 09/20/2019    Symptomatic anemia 01/13/2018    A-fib ([address] 01/13/2018    Cervical spondylosis without myelopathy 11/12/2013    DDD (degenerative disc disease), cervical 11/12/2013    Spinal stenosis in cervical region 11/12/2013    DDD (degenerative disc disease), lumbosacral 10/22/2013    Spinal stenosis, lumbar 10/22/2013    Aortic valve disorder 01/14/2011    Malignant neoplasm of colon ([address] 06/16/2009    Arthropathy 06/15/2009    Essential hypertension 06/15/2009      Active Hospital Problems    Diagnosis Date Noted    GI bleed [K92.2] 07/30/2022     Priority: Medium    MAX (acute kidney injury) ([facility] [N17.9] 07/30/2022     Priority: Medium    Pneumonia [J18.9] 07/30/2022     Priority: Medium    Shock ([facility] [address] [R57.9] 07/29/2022     Priority: Medium    Atrial fibrillation with rapid ventricular response ([address] [I48.91] 07/29/2022     Priority: Medium    DM2 (diabetes mellitus, type 2) ([address] [E11.9] 07/29/2022     Priority: Medium         Recommendation(s):    Atrial fibrillation/atrial flutter  -[id number] score of at least 4 (age, gender, HTN, DM) and warrants [address]. However, currently being worked up for GIB. Would recommend [address] once acute bleeding issues resolved. -currently v-rates 120-130's bpm while on amiodarone IV infusion, cardizem IV infusion. Added Digoxin IV overnight and will continue with such until the patient can take PO (currently altered mental status). May need to tolerate v-rates 100-130's bpm while at rest until acute issues are resolved. -no active signs of ischemia or CHF at this time. Troponin elevated in the midst of decreased clearance with Cr 1.5 on presentation. Will trend Roger to ensure no uptrends.  -would obtain 2-D echo to evaluate LVEF, wall motion abnormality, valvular disease. Will follow with you. Thank you for allowing me to participate in the care of Rhina Lucas . If you have any questions/comments, please do not hesitate to contact us.       Lul Quinteros MD, MS, [facility], [facility]  Cardiac Electrophysiology  [address]  [address] [facility], [address] [address]  [phone]

## 2022-07-30 NOTE — H&P
Hospital Medicine History & Physical      PCP: Tyson Millan MD    Date of Admission: 7/29/2022    Date of Service: Pt seen/examined on 7/29/2022 and admitted to inpatient    Chief Complaint: Generalized weakness, confusion, decreased appetite      History Of Present Illness: The patient is a 65 y.o. female with past medical history as below who presents to [facility] accompanied by family due to concern per the son who mainly stays with patient that she has been increasingly weak and having difficulty ambulating and using her wheelchair but was also notably having decreased appetite and overall not seeming herself mentally. He notes that this has been occurring for at least the last 3 days. Son denies that patient had any other concerning symptoms such as infectious symptoms but he just felt as though patient was not doing well and he did become increasingly frustrated that she was not able to tell him what was wrong. He noted that it was increasingly apparent that patient was having multiple episodes where she could not ambulate and was using her wheelchair and that she was not eating or drinking as much as she normally would. Felt as though she was just becoming more ill. Patient and son however deny that she has noted any other specific recent symptom chest pain, palpitations, fever, chills, dizziness, syncope, dysuria, blood in urine/stool/sputum, nausea or vomiting. She may have had some diarrhea recently but son notes that this is more likely just she has not been ambulating as much and she has just been sitting around a lot and not doing much.     Past Medical History:        Diagnosis Date    Acid reflux     Anxiety     Arthritis     Atrial fibrillation (HCC)     Closed fracture dislocation of right elbow     Depression     Fibromyalgia     Migraine WHITNEY (obstructive sleep apnea)     Diagnosed years ago, no CPAP    Rectal cancer ([address]     Wears glasses     DRIVING       Past Surgical History:        Procedure Laterality Date    APPENDECTOMY      CERVICAL DISCECTOMY      ACDF     SECTION      x4    CHOLECYSTECTOMY      COLON SURGERY      Rectal cancer removed using  incision    COLONOSCOPY      SEVERAL    COLONOSCOPY N/A 2019    COLONOSCOPY POLYPECTOMY SNARE/COLD BIOPSY performed by Oh Bailey MD at [address] [facility] N/A 2021    COLONOSCOPY WITH BIOPSY performed by Manny Mahoney MD at [address] [facility] Right 2021    RIGHT LEG EVACUATION OF HEMATOMA performed by Dipika Edgar MD at [address] [facility] W/O EXTENSION Right 10/25/2018    OPEN REDUCTION INTERNAL FIXATION RIGHT FEMUR SUPRACONDYLAR FEMORAL FRACTURE WITH C-ARM performed by Jamarcus Jon MD at [place] [address] Right 2021    RIGHT LEG HEMATOMA EVAKUATION, DEBRIDEMENT, AND WOUND VAC PLACEMENT performed by Chandana Gilliam MD at [place] [address] Right 2021    RIGHT LOWER EXTREMITY SKIN GRAFT performed by Chandana Gilliam MD at [facility]     UPPER GASTROINTESTINAL ENDOSCOPY N/A 2019    ESOPHAGOGASTRODUODENOSCOPY performed by Oh Bailey MD at [address] 2020    EGD BIOPSY performed by Oh Bailey MD at [address]       Medications Prior to Admission:    Prior to Admission medications    Medication Sig Start Date End Date Taking? Authorizing Provider   apixaban (ELIQUIS) 5 MG TABS tablet Take 1 tablet by mouth in the morning and 1 tablet before bedtime.  22   James Sanchez MD   escitalopram (LEXAPRO) 10 MG tablet Take 1 tablet by mouth daily 22   Shadia Yuan MD   ALPRAZolam Glendia Reeve) 1 MG tablet TAKE ONE TABLET BY MOUTH THREE TIMES A DAY AS NEEDED FOR ANXIETY 6/14/22 9/10/22  Lizette Sanchez MD   tiZANidine (ZANAFLEX) 2 MG tablet Take 1 tablet by mouth every 6 hours as needed (muscle pain) 2/18/22   Lizette Sanchez MD   metoprolol succinate (TOPROL XL) 100 MG extended release tablet TAKE ONE TABLET BY MOUTH DAILY 2/18/22   Lizette Sanchez MD   pantoprazole (PROTONIX) 40 MG tablet TAKE ONE TABLET BY MOUTH TWICE A DAY 2/18/22   Lizette Sanchez MD   potassium chloride (KLOR-CON M20) 20 MEQ extended release tablet Take 1 tablet by mouth daily 2/18/22   Lizette Sanchez MD   dicyclomine (BENTYL) 10 MG capsule Take 1 capsule by mouth 4 times daily as needed (cramping/diarrhea) 2/18/22   Lizette Sanchez MD   flecainide (TAMBOCOR) 100 MG tablet TAKE 1 TABLET BY MOUTH 2 TIMES A DAY 1/27/22   Sayra Hsu MD   furosemide (LASIX) 20 MG tablet Take 1 tablet by mouth 2 times daily With an additional 20 mg on MWF 1/26/22   Sayra Hsu MD   Cyanocobalamin (VITAMIN B 12 PO) Take 2,500 mg by mouth daily    Historical Provider, MD   calcium carbonate-vitamin D (CALCIUM 600+D) 600-200 MG-UNIT TABS Take 1 tablet by mouth 2 times daily    Historical Provider, MD       Allergies:  Demerol hcl [meperidine], Pcn [penicillins], and Adhesive tape    Social History:  The patient currently lives home    TOBACCO:   reports that she has never smoked. She has never used smokeless tobacco.  ETOH:   reports no history of alcohol use. Family History:  Reviewed in detail and negative for DM, Early CAD, Cancer, CVA. Positive as follows:        Problem Relation Age of Onset    COPD Mother        REVIEW OF SYSTEMS:    and as noted in the HPI. All other systems reviewed and negative.     PHYSICAL EXAM:    /64   Pulse (!) 173   Temp 98 °F (36.7 °C) (Axillary)   Resp 14   Wt 149 lb 7.6 oz (67.8 kg)   SpO2 99%   BMI 27.34 kg/m²     General appearance: Fatigued appearing, elderly appearing, no acute respiratory distress, alert but takes time to respond and seems disoriented at times  HEENT Normal cephalic, atraumatic without obvious deformity. PERRLA noted, EOMI noted, dry mucous membrane  Neck: Supple, no JVD  Lungs: Diminished breath sounds but  seems overall clear  Heart: Tachycardic, difficult to determine regularity, difficult to evaluate for murmurs  Abdomen: Soft, nontender, nondistended, active bowel sounds  Extremities: No edema  Skin: No rashes  Neurologic: Moves all extremities but difficult to fully evaluate  Mental status: Alert but does not seem entirely oriented  Capillary Refill: Acceptable  < 3 seconds  Peripheral Pulses: +3 Easily felt, not easily obliterated with pressure      CT chest abdomen pelvis without contrast:  Limited noncontrast examination. Left lower lobe opacity, which may reflect pneumonia. Follow-up imaging   resolution is recommended. Mild-to-moderate right hydroureteronephrosis, which may be due to distended   urinary bladder. Consider Kaplan catheter. Distended rectum filled with stool with mild surrounding inflammatory   changes, which may reflect underlying stercoral proctitis. Consider   disimpaction. 07/29/22 2343  CT HEAD WO CONTRAST   Performed: 07/29/22 2310  Final        Impression: Stable appearing CT scan of the head without acute intracranial abnormality. 07/29/22 2248  XR CHEST PORTABLE   Performed: 07/29/22 2217  Final        Impression: Scarring/atelectatic changes seen at the left lung base without confluent airspace opacity seen. CBC   Recent Labs     07/29/22 2200 07/30/22  0535   WBC 12.0* 12.0*   HGB 12.5 10.6*   HCT 37.8 31.8*    240      RENAL  Recent Labs     07/29/22 2200 07/30/22  0535    138   K 4.7 3.9   CL 99 102   CO2 21 23   BUN 50* 48*   CREATININE 1.5* 1.1     LFT'S  No results for input(s): AST, ALT, ALB, BILIDIR, BILITOT, ALKPHOS in the last 72 hours.   COAG  Recent Labs     07/30/22  0535   INR 1.31*     CARDIAC ENZYMES  Recent Labs     07/29/22 2200 TROPONINI 0.05*       U/A:    Lab Results   Component Value Date/Time    COLORU DARK YELLOW 07/29/2022 11:27 PM    [address] 07/29/2022 11:27 PM    RBCUA 2 07/29/2022 11:27 PM    BACTERIA 2+ 07/29/2022 11:27 PM    CLARITYU CLOUDY 07/29/2022 11:27 PM    SPECGRAV 1.023 07/29/2022 11:27 PM    LEUKOCYTESUR SMALL 07/29/2022 11:27 PM    BLOODU TRACE 07/29/2022 11:27 PM    GLUCOSEU Negative 07/29/2022 11:27 PM       ABG  No results found for: SPZ4MXL, BEART, Q0CNHKPN, PHART, THGBART, JDL7HSP, PO2ART, XBB5KJS        Active Hospital Problems    Diagnosis Date Noted    GI bleed [K92.2] 07/30/2022     Priority: Medium    MXA (acute kidney injury) ([facility] [N17.9] 07/30/2022     Priority: Medium    Shock ([facility] [R57.9] 07/29/2022     Priority: Medium    Atrial fibrillation with rapid ventricular response ([facility] [I48.91] 07/29/2022     Priority: Medium    DM2 (diabetes mellitus, type 2) ([facility] [E11.9] 07/29/2022     Priority: Medium         PHYSICIANS CERTIFICATION:    I certify that Fannie Friedman is expected to be hospitalized for greater than 2 midnights based on the following assessment and plan:      ASSESSMENT/PLAN:  A. fib with rapid ventricular response  Shock  MAX  GI bleed  Type 2 diabetes  Pneumonia    Plan:  Patient had central line placed in the ED, started patient on Nitin-Synephrine for hypotension  Continue patient on amiodarone IV infusion as well as Cardizem drip for A. fib RVR, was given 1 dose of digoxin per cardiology  Cardiology consult  After patient was admitted, she was noted to have bright red bloody mucus stool, stop anticoagulation  Started patient on Protonix IV twice daily  GI consult for GI bleed  Start patient on cefepime for broad-spectrum coverage of possible suspected pneumonia and/or UTI  Keep patient n.p.o. due to mental status  Repeat CBC every 6 hours, repeat daily BMP and magnesium      DVT Prophylaxis: SCDs  Diet: Diet NPO  Code Status: Full Code  PT/OT Eval Status: Uncertain    Dispo -pending clinical course for       Carlos Hernandez, DO    Thank you Amanda Castillo MD for the opportunity to be involved in this patient's care. If you have any questions or concerns please feel free to contact me at [phone].

## 2022-07-30 NOTE — CONSULTS
REASON FOR CONSULTATION/CC: a fib.  shock      Consult at request of Narda Chandra MD for     PCP: Chris Joya MD  Established Pulmonologist:  None    HISTORY OF PRESENT ILLNESS: Krysta Thompson is a 65y.o. year old female with a history of left wrist fracture who presents with     Seen in ER ~ 1 week ago secondary to fall with fracture now presented with confusion, a fib with RVR and opacity on CT consistent with pneumonia. Shock      This note may have been  transcribed using [id number] [employer]. Please disregard any translational errors. Assessment:         Plan:      Hospital Day 0     A fib with RVR  Cardiology consulted. Amiodarone  Dilt drip  Dig  Continues to have -175    Shock, septic  Nitin   Wean vasopressors to MAP 65   ~ 1.5 L bolus with IVF      CAP, pneumonia   Started Cefepime   Change to 2 gm Ctx and doxy. confusion  Sepsis? GI bleed  GI consult  AC off. MAX  Resolving    Left hand: closed fracture of distal end and left radius 7/23  In splint. Cool with decrease cap refill. Monitor closely with IVF. If does not improve, consider removal of cast.     Electrolytes  - K:   - Ca:  - Mg:  - Phos:    Prophylaxis  - GI -   protonix    - DVT - scd secondary to consider for GI bleeding       Nutrition  - Diet NPO    Mobility       Access       I spent 40 minutes of critical care time with this patient excluding any procedures. This note was transcribed using [id number] [employer]. Please disregard any translational errors.     Thank you for the consult    Brijesh Asif Pulmonary, Sleep [facility]  [phone]             Data:     PAST MEDICAL HISTORY:  Past Medical History:   Diagnosis Date    Acid reflux     Anxiety     Arthritis     Atrial fibrillation ([facility] [address]     Closed fracture dislocation of right elbow     Depression     Fibromyalgia     Migraine     WHITNEY (obstructive sleep apnea)     Diagnosed years ago, no CPAP Rectal cancer ([address]     Wears glasses     DRIVING       PAST SURGICAL HISTORY:  Past Surgical History:   Procedure Laterality Date    APPENDECTOMY      CERVICAL DISCECTOMY      ACDF     SECTION      x4    CHOLECYSTECTOMY      COLON SURGERY      Rectal cancer removed using  incision    COLONOSCOPY      SEVERAL    COLONOSCOPY N/A 2019    COLONOSCOPY POLYPECTOMY SNARE/COLD BIOPSY performed by Oh Bailey MD at [address] [facility] N/A 2021    COLONOSCOPY WITH BIOPSY performed by Manny Mahoney MD at [address] [facility] Right 2021    RIGHT LEG EVACUATION OF HEMATOMA performed by Dipika Edgar MD at [address] [facility] W/O EXTENSION Right 10/25/2018    OPEN REDUCTION INTERNAL FIXATION RIGHT FEMUR SUPRACONDYLAR FEMORAL FRACTURE WITH C-ARM performed by Jamarcus Jon MD at [place] [address] Right 2021    RIGHT LEG HEMATOMA EVAKUATION, DEBRIDEMENT, AND WOUND VAC PLACEMENT performed by Chandana Gilliam MD at [place] [address] Right 2021    RIGHT LOWER EXTREMITY SKIN GRAFT performed by Chandana Gilliam MD at [address] [facility] Left     UPPER GASTROINTESTINAL ENDOSCOPY N/A 2019    ESOPHAGOGASTRODUODENOSCOPY performed by Oh Bailey MD at [address] [place] [address] N/A 2020    EGD BIOPSY performed by Oh Bailey MD at [address]:  family history includes COPD in her mother. SOCIAL HISTORY:   reports that she has never smoked.  She has never used smokeless tobacco.    Scheduled Meds:   [Held by provider] metoprolol succinate  25 mg Oral Daily    sodium chloride flush  5-40 mL IntraVENous 2 times per day    [Held by provider] flecainide  100 mg Oral 2 times per day    cefepime  1,000 mg IntraVENous Q12H    pantoprazole (PROTONIX) 40 mg injection  40 mg IntraVENous Q12H       Continuous Infusions: in the last 72 hours. UA:  Recent Labs     07/29/22  2327   COLORU DARK YELLOW*   PHUR 5.5   WBCUA 3   RBCUA 2   BACTERIA 2+*   CLARITYU CLOUDY*   SPECGRAV 1.023   LEUKOCYTESUR SMALL*   UROBILINOGEN 1.0   BILIRUBINUR Negative   BLOODU TRACE*   GLUCOSEU Negative     No results for input(s): PHART, UPE9EDE, PO2ART in the last 72 hours. Radiology Review:  Pertinent images / reports were reviewed as a part of this visit. CT Chest w/ contrast: No results found for this or any previous visit. CT Chest w/o contrast: No results found for this or any previous visit. CTPA: No results found for this or any previous visit. CXR PA/LAT: Results for orders placed during the hospital encounter of 01/14/18    XR CHEST STANDARD (2 VW)    Narrative  EXAMINATION:  TWO VIEWS OF THE CHEST    1/13/2018 10:08 pm    COMPARISON:  December 29, 2010. HISTORY:  ORDERING PHYSICIAN PROVIDED HISTORY: altered mental status  TECHNOLOGIST PROVIDED HISTORY:  Technologist Provided Reason for Exam: ams  pt. cannot raise left arm due to injury  Acuity: Acute  Type of Encounter: Initial    FINDINGS:  Cardiac and mediastinal contours are enlarged but unchanged. No focal  consolidation. No pneumothorax. No pleural effusion. Multiple calcified  granuloma within right lung    Impression  Enlarged cardiomediastinal silhouette. No focal consolidation, pneumothorax, or sizable pleural effusion. CXR portable: Results for orders placed during the hospital encounter of 07/29/22    XR CHEST PORTABLE    Narrative  EXAMINATION:  ONE XRAY VIEW OF THE CHEST    7/29/2022 10:17 pm    COMPARISON:  02/11/2021    HISTORY:  ORDERING SYSTEM PROVIDED HISTORY: SOB  TECHNOLOGIST PROVIDED HISTORY:  Reason for exam:->SOB  Reason for Exam: a-fib, ams    FINDINGS:  The cardiac silhouette appears within normal limits. The aorta is uncoiled  and atherosclerotic. Scarring/atelectatic changes are seen at the left lung  base.   No confluent

## 2022-07-31 LAB
ANION GAP SERPL CALCULATED.3IONS-SCNC: 10 MMOL/L (ref 3–16)
BASOPHILS ABSOLUTE: 0 K/UL (ref 0–0.2)
BASOPHILS ABSOLUTE: 0 K/UL (ref 0–0.2)
BASOPHILS ABSOLUTE: 0.1 K/UL (ref 0–0.2)
BASOPHILS RELATIVE PERCENT: 0.3 %
BASOPHILS RELATIVE PERCENT: 0.6 %
BASOPHILS RELATIVE PERCENT: 0.9 %
BUN BLDV-MCNC: 23 MG/DL (ref 7–20)
CALCIUM SERPL-MCNC: 8.4 MG/DL (ref 8.3–10.6)
CHLORIDE BLD-SCNC: 103 MMOL/L (ref 99–110)
CO2: 25 MMOL/L (ref 21–32)
CREAT SERPL-MCNC: <0.5 MG/DL (ref 0.6–1.2)
EOSINOPHILS ABSOLUTE: 0 K/UL (ref 0–0.6)
EOSINOPHILS RELATIVE PERCENT: 0.1 %
EOSINOPHILS RELATIVE PERCENT: 0.1 %
EOSINOPHILS RELATIVE PERCENT: 0.2 %
GFR AFRICAN AMERICAN: >60
GFR NON-AFRICAN AMERICAN: >60
GLUCOSE BLD-MCNC: 72 MG/DL (ref 70–99)
GLUCOSE BLD-MCNC: 85 MG/DL (ref 70–99)
GLUCOSE BLD-MCNC: 97 MG/DL (ref 70–99)
HCT VFR BLD CALC: 29 % (ref 36–48)
HCT VFR BLD CALC: 30.4 % (ref 36–48)
HCT VFR BLD CALC: 30.4 % (ref 36–48)
HEMOGLOBIN: 10.1 G/DL (ref 12–16)
HEMOGLOBIN: 10.2 G/DL (ref 12–16)
HEMOGLOBIN: 9.7 G/DL (ref 12–16)
LYMPHOCYTES ABSOLUTE: 0.4 K/UL (ref 1–5.1)
LYMPHOCYTES RELATIVE PERCENT: 6.3 %
LYMPHOCYTES RELATIVE PERCENT: 6.6 %
LYMPHOCYTES RELATIVE PERCENT: 6.6 %
MAGNESIUM: 1.9 MG/DL (ref 1.8–2.4)
MCH RBC QN AUTO: 35.8 PG (ref 26–34)
MCH RBC QN AUTO: 35.9 PG (ref 26–34)
MCH RBC QN AUTO: 35.9 PG (ref 26–34)
MCHC RBC AUTO-ENTMCNC: 33.4 G/DL (ref 31–36)
MCHC RBC AUTO-ENTMCNC: 33.4 G/DL (ref 31–36)
MCHC RBC AUTO-ENTMCNC: 33.6 G/DL (ref 31–36)
MCV RBC AUTO: 106.9 FL (ref 80–100)
MCV RBC AUTO: 107.1 FL (ref 80–100)
MCV RBC AUTO: 107.4 FL (ref 80–100)
MONOCYTES ABSOLUTE: 0.7 K/UL (ref 0–1.3)
MONOCYTES ABSOLUTE: 0.7 K/UL (ref 0–1.3)
MONOCYTES ABSOLUTE: 0.8 K/UL (ref 0–1.3)
MONOCYTES RELATIVE PERCENT: 10.7 %
MONOCYTES RELATIVE PERCENT: 11.1 %
MONOCYTES RELATIVE PERCENT: 13 %
NEUTROPHILS ABSOLUTE: 5 K/UL (ref 1.7–7.7)
NEUTROPHILS ABSOLUTE: 5.1 K/UL (ref 1.7–7.7)
NEUTROPHILS ABSOLUTE: 5.1 K/UL (ref 1.7–7.7)
NEUTROPHILS RELATIVE PERCENT: 79.3 %
NEUTROPHILS RELATIVE PERCENT: 81.9 %
NEUTROPHILS RELATIVE PERCENT: 82.3 %
PDW BLD-RTO: 16 % (ref 12.4–15.4)
PDW BLD-RTO: 16.4 % (ref 12.4–15.4)
PDW BLD-RTO: 16.5 % (ref 12.4–15.4)
PERFORMED ON: NORMAL
PERFORMED ON: NORMAL
PLATELET # BLD: 179 K/UL (ref 135–450)
PLATELET # BLD: 180 K/UL (ref 135–450)
PLATELET # BLD: 183 K/UL (ref 135–450)
PMV BLD AUTO: 6.9 FL (ref 5–10.5)
PMV BLD AUTO: 7.1 FL (ref 5–10.5)
PMV BLD AUTO: 7.2 FL (ref 5–10.5)
POTASSIUM SERPL-SCNC: 3.6 MMOL/L (ref 3.5–5.1)
RBC # BLD: 2.71 M/UL (ref 4–5.2)
RBC # BLD: 2.83 M/UL (ref 4–5.2)
RBC # BLD: 2.84 M/UL (ref 4–5.2)
SODIUM BLD-SCNC: 138 MMOL/L (ref 136–145)
WBC # BLD: 6.2 K/UL (ref 4–11)
WBC # BLD: 6.2 K/UL (ref 4–11)
WBC # BLD: 6.3 K/UL (ref 4–11)

## 2022-07-31 PROCEDURE — 94760 N-INVAS EAR/PLS OXIMETRY 1: CPT

## 2022-07-31 PROCEDURE — 83735 ASSAY OF MAGNESIUM: CPT

## 2022-07-31 PROCEDURE — 85025 COMPLETE CBC W/AUTO DIFF WBC: CPT

## 2022-07-31 PROCEDURE — 2580000003 HC RX 258: Performed by: INTERNAL MEDICINE

## 2022-07-31 PROCEDURE — 6360000002 HC RX W HCPCS: Performed by: STUDENT IN AN ORGANIZED HEALTH CARE EDUCATION/TRAINING PROGRAM

## 2022-07-31 PROCEDURE — 99232 SBSQ HOSP IP/OBS MODERATE 35: CPT | Performed by: INTERNAL MEDICINE

## 2022-07-31 PROCEDURE — 99233 SBSQ HOSP IP/OBS HIGH 50: CPT | Performed by: INTERNAL MEDICINE

## 2022-07-31 PROCEDURE — C9113 INJ PANTOPRAZOLE SODIUM, VIA: HCPCS | Performed by: STUDENT IN AN ORGANIZED HEALTH CARE EDUCATION/TRAINING PROGRAM

## 2022-07-31 PROCEDURE — 6360000002 HC RX W HCPCS: Performed by: INTERNAL MEDICINE

## 2022-07-31 PROCEDURE — 6370000000 HC RX 637 (ALT 250 FOR IP): Performed by: STUDENT IN AN ORGANIZED HEALTH CARE EDUCATION/TRAINING PROGRAM

## 2022-07-31 PROCEDURE — 36415 COLL VENOUS BLD VENIPUNCTURE: CPT

## 2022-07-31 PROCEDURE — 80048 BASIC METABOLIC PNL TOTAL CA: CPT

## 2022-07-31 PROCEDURE — 2500000003 HC RX 250 WO HCPCS: Performed by: INTERNAL MEDICINE

## 2022-07-31 PROCEDURE — 6370000000 HC RX 637 (ALT 250 FOR IP): Performed by: INTERNAL MEDICINE

## 2022-07-31 PROCEDURE — 2580000003 HC RX 258: Performed by: STUDENT IN AN ORGANIZED HEALTH CARE EDUCATION/TRAINING PROGRAM

## 2022-07-31 PROCEDURE — 2060000000 HC ICU INTERMEDIATE R&B

## 2022-07-31 RX ORDER — POTASSIUM CHLORIDE 20 MEQ/1
40 TABLET, EXTENDED RELEASE ORAL PRN
Status: DISCONTINUED | OUTPATIENT
Start: 2022-07-31 | End: 2022-08-04 | Stop reason: HOSPADM

## 2022-07-31 RX ORDER — FLECAINIDE ACETATE 100 MG/1
150 TABLET ORAL EVERY 12 HOURS SCHEDULED
Status: DISCONTINUED | OUTPATIENT
Start: 2022-07-31 | End: 2022-08-04 | Stop reason: HOSPADM

## 2022-07-31 RX ORDER — FLECAINIDE ACETATE 100 MG/1
150 TABLET ORAL EVERY 12 HOURS SCHEDULED
Status: DISCONTINUED | OUTPATIENT
Start: 2022-07-31 | End: 2022-07-31

## 2022-07-31 RX ORDER — POTASSIUM CHLORIDE 7.45 MG/ML
10 INJECTION INTRAVENOUS PRN
Status: DISCONTINUED | OUTPATIENT
Start: 2022-07-31 | End: 2022-08-04 | Stop reason: HOSPADM

## 2022-07-31 RX ORDER — MAGNESIUM SULFATE 1 G/100ML
1000 INJECTION INTRAVENOUS PRN
Status: DISCONTINUED | OUTPATIENT
Start: 2022-07-31 | End: 2022-08-04 | Stop reason: HOSPADM

## 2022-07-31 RX ADMIN — SODIUM CHLORIDE, PRESERVATIVE FREE 10 ML: 5 INJECTION INTRAVENOUS at 20:21

## 2022-07-31 RX ADMIN — ACETAMINOPHEN 650 MG: 325 TABLET ORAL at 06:27

## 2022-07-31 RX ADMIN — CEFTRIAXONE 2000 MG: 2 INJECTION, POWDER, FOR SOLUTION INTRAMUSCULAR; INTRAVENOUS at 08:41

## 2022-07-31 RX ADMIN — DOXYCYCLINE 100 MG: 100 INJECTION, POWDER, LYOPHILIZED, FOR SOLUTION INTRAVENOUS at 09:15

## 2022-07-31 RX ADMIN — METOPROLOL SUCCINATE 25 MG: 25 TABLET, EXTENDED RELEASE ORAL at 09:29

## 2022-07-31 RX ADMIN — ACETAMINOPHEN 650 MG: 325 TABLET ORAL at 15:47

## 2022-07-31 RX ADMIN — FLECAINIDE ACETATE 150 MG: 50 TABLET ORAL at 21:03

## 2022-07-31 RX ADMIN — FLECAINIDE ACETATE 150 MG: 50 TABLET ORAL at 10:05

## 2022-07-31 RX ADMIN — SODIUM CHLORIDE: 9 INJECTION, SOLUTION INTRAVENOUS at 08:35

## 2022-07-31 RX ADMIN — DOXYCYCLINE 100 MG: 100 INJECTION, POWDER, LYOPHILIZED, FOR SOLUTION INTRAVENOUS at 20:23

## 2022-07-31 RX ADMIN — Medication 40 MG: at 08:44

## 2022-07-31 RX ADMIN — SODIUM CHLORIDE, PRESERVATIVE FREE 20 ML: 5 INJECTION INTRAVENOUS at 08:45

## 2022-07-31 RX ADMIN — Medication 40 MG: at 20:21

## 2022-07-31 RX ADMIN — DIGOXIN 125 MCG: 0.25 INJECTION INTRAMUSCULAR; INTRAVENOUS at 08:44

## 2022-07-31 ASSESSMENT — PAIN SCALES - GENERAL
PAINLEVEL_OUTOF10: 6
PAINLEVEL_OUTOF10: 5

## 2022-07-31 ASSESSMENT — PAIN DESCRIPTION - DESCRIPTORS: DESCRIPTORS: ACHING

## 2022-07-31 ASSESSMENT — PAIN DESCRIPTION - LOCATION: LOCATION: GENERALIZED

## 2022-07-31 NOTE — PROGRESS NOTES
Gastroenterology Note  Patient:   Elvia Garduno   :    1945   Facility:   [facility]   Date:     2022  Consultant:   Charan Higuera MD, MD      Subjective:     65 y.o. female admitted 2022 with Atrial fibrillation with rapid ventricular response ([address] [I48.91]  Pneumonia due to infectious organism, unspecified laterality, unspecified part of lung [J18.9] and seen for rectal bleeding. .    Present  Diet Order: Diet NPO      Current Medications include:   Scheduled Meds:   flecainide  150 mg Oral 2 times per day    metoprolol succinate  25 mg Oral Daily    sodium chloride flush  5-40 mL IntraVENous 2 times per day    pantoprazole (PROTONIX) 40 mg injection  40 mg IntraVENous Q12H    cefTRIAXone (ROCEPHIN) IV  2,000 mg IntraVENous Q24H    doxycycline (VIBRAMYCIN) IV  100 mg IntraVENous Q12H     Continuous Infusions:   sodium chloride 10 mL/hr at 22 1026    dextrose      phenylephrine (RONIT-SYNEPHRINE) 50mg/250mL infusion Stopped (22 0829)     PRN Meds:.sodium chloride flush, sodium chloride, ondansetron **OR** ondansetron, polyethylene glycol, acetaminophen **OR** acetaminophen, glucose, dextrose bolus **OR** dextrose bolus, glucagon (rDNA), dextrose    Allergies: Allergies   Allergen Reactions    Demerol Hcl [Meperidine] Other (See Comments)     PATIENT STATES SHE GOT VERY ANXIOUS-LIKE SHE WAS CLIMBING THE WALLS    Pcn [Penicillins]      Patient reports she has not had since she was a child, thinks reaction was hives.      Adhesive Tape Rash       Objective:   Vital Signs:  Temp (24hrs), Av.5 °F (36.9 °C), Min:98.1 °F (36.7 °C), Max:98.7 °F (31.0 °C)     Systolic (65GPW), JESSICA:457 , Min:71 , ZDB:545      Diastolic (59NVC), AFP:26, Min:49, Max:72     Pulse  Av  Min: 75  Max: 127  BP (!) 119/58   Pulse 82   Temp 98.1 °F (36.7 °C) (Oral)   Resp 19   Wt 153 lb (69.4 kg)   SpO2 100%   BMI 27.98 kg/m²      Physical Exam:   General appearance: alert and appears stated age  Lungs: clear to auscultation bilaterally  Heart: regular rate and rhythm, S1, S2 normal, no murmur, click, rub or gallop  Abdomen: soft, non-tender; bowel sounds normal; no masses,  no organomegaly    Lab and Imaging Review   Recent Labs     07/29/22  2200 07/30/22  0239 07/30/22  0535 07/30/22  1143 07/30/22  1714 07/31/22  0557   WBC 12.0*  --  12.0* 10.1 8.8 6.3   HGB 12.5  --  10.6* 10.2* 10.2* 10.2*   .5*  --  106.0* 106.5* 107.8* 107.4*     --  240 198 197 183   INR  --   --  1.31*  --   --   --      --  138  --   --  138   K 4.7  --  3.9  --   --  3.6   CL 99  --  102  --   --  103   CO2 21  --  23  --   --  25   BUN 50*  --  48*  --   --  23*   CREATININE 1.5*  --  1.1  --   --  <0.5*   GLUCOSE 120*  --  146*  --   --  85   CALCIUM 10.0  --  8.8  --   --  8.4   MG  --  2.40 2.30  --   --  1.90       Assessment:     Patient Active Problem List    Diagnosis Date Noted    GI bleed 07/30/2022    MAX (acute kidney injury) ([facility] [address] 07/30/2022    Pneumonia 07/30/2022    Shock ([facility] [address] 07/29/2022    Atrial fibrillation with rapid ventricular response ([facility] [address] 07/29/2022    DM2 (diabetes mellitus, type 2) ([facility] [address] 07/29/2022    Weakness 06/22/2022    Age-related physical debility 06/22/2022    JENNIFER (generalized anxiety disorder) 06/22/2022    Borborygmi 11/05/2021    Constipation 11/05/2021    Anxiety 11/05/2021    Age-related osteoporosis without current pathological fracture 09/09/2021    Essential tremor 09/09/2021    Chronic fatigue 09/09/2021    Irritable bowel syndrome with diarrhea 09/09/2021    Prediabetes 04/19/2021    Pedal edema 04/13/2021    Chronic intractable headache 04/13/2021    Esophageal candidiasis ([facility] 04/13/2021    History of gastric bypass 04/13/2021    Lumbar radicular pain 07/31/2020    Fibromyalgia 09/20/2019    Gastroesophageal reflux disease without esophagitis 09/20/2019    Symptomatic anemia 01/13/2018    A-fib ([facility] 01/13/2018 Cervical spondylosis without myelopathy 11/12/2013    DDD (degenerative disc disease), cervical 11/12/2013    Spinal stenosis in cervical region 11/12/2013    DDD (degenerative disc disease), lumbosacral 10/22/2013    Spinal stenosis, lumbar 10/22/2013    Aortic valve disorder 01/14/2011    Malignant neoplasm of colon ([facility] [address] 06/16/2009    Arthropathy 06/15/2009    Essential hypertension 06/15/2009       Plan:   No further rectal bleeding. Blood count is stable. Can increase diet from a GI standpoint. The colonoscopy in 7/7/21 by Dr Val Gavin was unremarkable.

## 2022-07-31 NOTE — PROGRESS NOTES
Patient slept well overnight with no complaints. Oriented X3 but easily reoriented. Updated daughter Marilu Hunger. No additional signs or symptoms of distress. No wants or needs.

## 2022-07-31 NOTE — PROGRESS NOTES
Pulmonary Progress Note    Date of Admission: 7/29/2022   LOS: 1 day       CC:  Chief Complaint   Patient presents with    Atrial Fibrillation    Fall     One week ago    Altered Mental Status        Subjective:  Improvement overnight. RVR improved. ROS:   No nausea  No Vomiting  No chest pain       Assessment:          Plan: This note may have been transcribed using [id number] [employer]. Please disregard any translational errors. Hospital Day: 1       A fib with RVR  Cardiology consulted. Now rate controlled     Shock, septic  Resolved with additional fluids. CAP, pneumonia   2 gm Ctx and doxy. confusion  Sepsis? Significant improvement. Not completely resolved. GI bleed  GI consult  AC off. MAX  Resolving     Left hand: closed fracture of distal end and left radius 7/23  In splint. Significant improvement in warmth and capillary refill       Transfer to PCU    Data:        PHYSICAL EXAM:   Blood pressure 114/62, pulse 86, temperature 98.1 °F (36.7 °C), temperature source Oral, resp. rate (!) 0, weight 153 lb (69.4 kg), SpO2 95 %, not currently breastfeeding.'  Body mass index is 27.98 kg/m². Gen: No distress. ENT:   Resp: No accessory muscle use. No crackles. No wheezes. No rhonchi. CV: Regular rate. Regular rhythm. No murmur or rub. No edema. Skin: Warm, dry, normal texture and turgor. No nodule on exposed extremities. M/S: No cyanosis. No clubbing. No joint deformity. Psych: Oriented x 3. No anxiety. Awake. Alert. Intact judgement and insight. Good Mood / Affect.   Memory appears in tact       Medications:    Scheduled Meds:   metoprolol succinate  25 mg Oral Daily    sodium chloride flush  5-40 mL IntraVENous 2 times per day    flecainide  100 mg Oral 2 times per day    pantoprazole (PROTONIX) 40 mg injection  40 mg IntraVENous Q12H    cefTRIAXone (ROCEPHIN) IV  2,000 mg IntraVENous Q24H    doxycycline (VIBRAMYCIN) IV  100 mg IntraVENous Q12H Continuous Infusions:   sodium chloride 10 mL/hr at 07/31/22 0835    dextrose      phenylephrine (RONIT-SYNEPHRINE) 50mg/250mL infusion Stopped (07/31/22 0829)       PRN Meds:  sodium chloride flush, sodium chloride, ondansetron **OR** ondansetron, polyethylene glycol, acetaminophen **OR** acetaminophen, glucose, dextrose bolus **OR** dextrose bolus, glucagon (rDNA), dextrose    Labs reviewed:  CBC:   Recent Labs     07/30/22  1143 07/30/22  1714 07/31/22  0557   WBC 10.1 8.8 6.3   HGB 10.2* 10.2* 10.2*   HCT 30.3* 31.2* 30.4*   .5* 107.8* 107.4*    197 183     BMP:   Recent Labs     07/29/22  2200 07/30/22  0535 07/31/22  0557    138 138   K 4.7 3.9 3.6   CL 99 102 103   CO2 21 23 25   BUN 50* 48* 23*   CREATININE 1.5* 1.1 <0.5*     LIVER PROFILE: No results for input(s): AST, ALT, LIPASE, BILIDIR, BILITOT, ALKPHOS in the last 72 hours. Invalid input(s): AMYLASE,  ALB  PT/INR:   Recent Labs     07/30/22  0535   PROTIME 16.3*   INR 1.31*     APTT: No results for input(s): APTT in the last 72 hours. UA:  Recent Labs     07/29/22  2327   COLORU DARK YELLOW*   PHUR 5.5   WBCUA 3   RBCUA 2   BACTERIA 2+*   CLARITYU CLOUDY*   SPECGRAV 1.023   LEUKOCYTESUR SMALL*   UROBILINOGEN 1.0   BILIRUBINUR Negative   BLOODU TRACE*   GLUCOSEU Negative     No results for input(s): PH, PCO2, PO2 in the last 72 hours. Cx:      Films:  Radiology Review:  Pertinent images / reports were reviewed as a part of this visit. CT Chest w/ contrast: No results found for this or any previous visit. CT Chest w/o contrast: No results found for this or any previous visit. CTPA: No results found for this or any previous visit. CXR PA/LAT: Results for orders placed during the hospital encounter of 01/14/18    XR CHEST STANDARD (2 VW)    Narrative  EXAMINATION:  TWO VIEWS OF THE CHEST    1/13/2018 10:08 pm    COMPARISON:  December 29, 2010.     HISTORY:  ORDERING PHYSICIAN PROVIDED HISTORY: altered mental status  TECHNOLOGIST PROVIDED HISTORY:  Technologist Provided Reason for Exam: ams  pt. cannot raise left arm due to injury  Acuity: Acute  Type of Encounter: Initial    FINDINGS:  Cardiac and mediastinal contours are enlarged but unchanged. No focal  consolidation. No pneumothorax. No pleural effusion. Multiple calcified  granuloma within right lung    Impression  Enlarged cardiomediastinal silhouette. No focal consolidation, pneumothorax, or sizable pleural effusion. CXR portable: Results for orders placed during the hospital encounter of 07/29/22    XR CHEST PORTABLE    Narrative  EXAMINATION:  ONE XRAY VIEW OF THE CHEST    7/29/2022 10:17 pm    COMPARISON:  02/11/2021    HISTORY:  ORDERING SYSTEM PROVIDED HISTORY: SOB  TECHNOLOGIST PROVIDED HISTORY:  Reason for exam:->SOB  Reason for Exam: a-fib, ams    FINDINGS:  The cardiac silhouette appears within normal limits. The aorta is uncoiled  and atherosclerotic. Scarring/atelectatic changes are seen at the left lung  base. No confluent airspace opacity, pleural effusion or pneumothorax is  seen. Changes of prior orthopedic fixation/fusion of the lower cervical  spine are seen. Chronic fracture deformity of left proximal humerus  redemonstrated. There are surgical clips overlying the left upper abdomen. Impression  Scarring/atelectatic changes seen at the left lung base without confluent  airspace opacity seen. This note was transcribed using [id number] [employer]. Please disregard any translational errors.       Brijesh Asif Pulmonary, Sleep and [facility] [phone]

## 2022-07-31 NOTE — PROGRESS NOTES
Cardiac Electrophysiology Progress Note     Admit Date: 7/29/2022     Reason for follow up: Afib/atrial flutter with v-rates 190-200 bpm    HPI:   Elmer Brunson is a 65 y.o. F h/o WHITNEY presents with 3 days of decreased appetite, fatigue, and \"not being herself. \" Upon workup, noted to have new diagnosis of atrial fibrillation/atrial flutter with v-rates 190-210 bpm. Started on Amiodarone IV infusion and cardizem IV infusion with v-rates still 160's bpm. Added Digoxin 0.125mg IV x 1 overnight with v-rates 120-130's bpm.Further workup has revealed possible GI bleeding, PNA, UTI. Patient is poor historian and not able to give much information. Interval History: Patient seen and examined. Clinical notes reviewed. Telemetry reviewed - SR 80's bpm (converted overnight). No new complaint today. No major events overnight. Denies having angina, shortness of breath, dyspnea on exertion, Orthopnea, PND at the time of this visit. Review of System:  All other systems reviewed except for that noted above. Pertinent negatives and positives are:     General: negative for fever, chills   Ophthalmic ROS: negative for - eye pain or loss of vision  ENT ROS: negative for - headaches, sore throat   Respiratory: negative for - cough, sputum  Cardiovascular: Reviewed in HPI  Gastrointestinal: negative for - abdominal pain, diarrhea, N/V  Hematology: negative for - bleeding, blood clots, bruising or jaundice  Genito-Urinary:  negative for - Dysuria or incontinence  Musculoskeletal: negative for - Joint swelling, muscle pain  Neurological: negative for - confusion, dizziness, headaches   Psychiatric: No anxiety, no depression.   Dermatological: negative for - rash      Physical Examination:  Vitals:    07/31/22 0832   BP:    Pulse: 85   Resp: 14   Temp:    SpO2: 96%        Intake/Output Summary (Last 24 hours) at 7/31/2022 0903  Last data filed at 7/31/2022 0843  Gross per 24 hour   Intake 1449.36 ml   Output 935 ml   Net 514.36 ml In: 1449.4 [I.V.:1199.4]  Out: 995    Wt Readings from Last 3 Encounters:   22 153 lb (69.4 kg)   22 166 lb 0.1 oz (75.3 kg)   22 167 lb 6.4 oz (75.9 kg)     Temp  Av.5 °F (36.9 °C)  Min: 98.1 °F (36.7 °C)  Max: 98.7 °F (37.1 °C)  Pulse  Av.6  Min: 76  Max: 135  BP  Min: 87/51  Max: 120/63  SpO2  Av.9 %  Min: 96 %  Max: 100 %    Telemetry: Sinus rhythm with v-rates 80's bpm (converted from Afib to SR overnight)  Constitutional: Alert. Oriented to person, place, and time. No distress. Head: Normocephalic and atraumatic. Mouth/Throat: Lips appear moist. Oropharynx is clear and moist.  Eyes: Conjunctivae normal. EOM are normal.   Neck: Neck supple. No lymphadenopathy. No rigidity. No JVD present. Cardiovascular: Normal rate, regular rhythm. Normal S1&S2. Carotid pulse 2+ bilaterally. Pulmonary/Chest: Bilateral respiratory sounds present. No respiratory accessory muscle use. No wheezes, No rhonchi. No bibasilar rales. Abdominal: Soft. Normal bowel sounds present. No distension, No tenderness. No splenomegaly. No hernia. Musculoskeletal: No tenderness. Full range of motion in bilateral upper and lower extremities. No pitting BLE edema    Lymphadenopathy: Has no cervical adenopathy. Neurological: Alert and oriented. Cranial nerve II-XII grossly intact, No gross deficit to touch. Skin: Skin is warm and dry. No rash, lesions, ulcerations noted. Psychiatric: No anxiety nor agitation. Telemetry, labs, diagnostic and imaging results personally reviewed and interpreted.      Recent Labs     22  2200 22  0535 22  0557    138 138   K 4.7 3.9 3.6   CL 99 102 103   CO2 21 23 25   BUN 50* 48* 23*   CREATININE 1.5* 1.1 <0.5*     Recent Labs     22  1143 22  1714 22  0557   WBC 10.1 8.8 6.3   HGB 10.2* 10.2* 10.2*   HCT 30.3* 31.2* 30.4*   .5* 107.8* 107.4*    197 183     Lab Results   Component Value Date/Time    CKTOTAL 49 01/28/2021 02:26 PM    TROPONINI 0.03 07/30/2022 08:42 PM     Estimated Creatinine Clearance: 85 mL/min (based on SCr of 0.5 mg/dL). No results found for: BNP  Lab Results   Component Value Date/Time    PROTIME 16.3 07/30/2022 05:35 AM    PROTIME 12.5 02/04/2021 06:39 PM    PROTIME 16.0 01/28/2021 02:26 PM    INR 1.31 07/30/2022 05:35 AM    INR 1.08 02/04/2021 06:39 PM    INR 1.37 01/28/2021 02:26 PM     Lab Results   Component Value Date/Time    CHOL 124 02/19/2020 01:30 PM    HDL 66 02/19/2020 01:30 PM    TRIG 102 02/19/2020 01:30 PM       I independently reviewed and interpreted the ECG, telemetry, and serology results.     Scheduled Meds:   [Held by provider] metoprolol succinate  25 mg Oral Daily    sodium chloride flush  5-40 mL IntraVENous 2 times per day    [Held by provider] flecainide  100 mg Oral 2 times per day    pantoprazole (PROTONIX) 40 mg injection  40 mg IntraVENous Q12H    cefTRIAXone (ROCEPHIN) IV  2,000 mg IntraVENous Q24H    doxycycline (VIBRAMYCIN) IV  100 mg IntraVENous Q12H    digoxin  125 mcg IntraVENous Daily     Continuous Infusions:   sodium chloride 10 mL/hr at 07/31/22 0835    dextrose      dilTIAZem 5 mg/hr (07/31/22 0832)    amiodarone 0.5 mg/min (07/31/22 0832)    phenylephrine (RONIT-SYNEPHRINE) 50mg/250mL infusion Stopped (07/31/22 0829)     PRN Meds:sodium chloride flush, sodium chloride, ondansetron **OR** ondansetron, polyethylene glycol, acetaminophen **OR** acetaminophen, glucose, dextrose bolus **OR** dextrose bolus, glucagon (rDNA), dextrose     Patient Active Problem List    Diagnosis Date Noted    GI bleed 07/30/2022    MAX (acute kidney injury) ([facility] [address] 07/30/2022    Pneumonia 07/30/2022    Shock ([facility] [address] 07/29/2022    Atrial fibrillation with rapid ventricular response ([facility] [address] 07/29/2022    DM2 (diabetes mellitus, type 2) ([facility] [address] 07/29/2022    Weakness 06/22/2022    Age-related physical debility 06/22/2022    JENNIFER (generalized anxiety disorder) 06/22/2022    [place] 11/05/2021    Constipation 11/05/2021    Anxiety 11/05/2021    Age-related osteoporosis without current pathological fracture 09/09/2021    Essential tremor 09/09/2021    Chronic fatigue 09/09/2021    Irritable bowel syndrome with diarrhea 09/09/2021    Prediabetes 04/19/2021    Pedal edema 04/13/2021    Chronic intractable headache 04/13/2021    Esophageal candidiasis ([address] 04/13/2021    History of gastric bypass 04/13/2021    Lumbar radicular pain 07/31/2020    Fibromyalgia 09/20/2019    Gastroesophageal reflux disease without esophagitis 09/20/2019    Symptomatic anemia 01/13/2018    A-fib ([address] 01/13/2018    Cervical spondylosis without myelopathy 11/12/2013    DDD (degenerative disc disease), cervical 11/12/2013    Spinal stenosis in cervical region 11/12/2013    DDD (degenerative disc disease), lumbosacral 10/22/2013    Spinal stenosis, lumbar 10/22/2013    Aortic valve disorder 01/14/2011    Malignant neoplasm of colon ([address] 06/16/2009    Arthropathy 06/15/2009    Essential hypertension 06/15/2009      Active Hospital Problems    Diagnosis Date Noted    GI bleed [K92.2] 07/30/2022     Priority: Medium    MAX (acute kidney injury) ([address] [N17.9] 07/30/2022     Priority: Medium    Pneumonia [J18.9] 07/30/2022     Priority: Medium    Shock ([address] [R57.9] 07/29/2022     Priority: Medium    Atrial fibrillation with rapid ventricular response ([address] [I48.91] 07/29/2022     Priority: Medium    DM2 (diabetes mellitus, type 2) ([address] [E11.9] 07/29/2022     Priority: Medium     Echo 7/30/2022:  Summary   Left ventricular cavity size is normal. There is mild concentric left   ventricular hypertrophy. Left ventricular function is hyperdynamic with   ejection fraction estimated at >70%. Regional wall motion abnormalities   cannot be excluded due to an arrhythmia. . Diastolic function cannot be   assessed due to an arrhythmia. The left atrium is severely dilated. Aortic valve appears sclerotic but opens adequately.  The aortic valve   appears trileaflet. Mild-to-moderate aortic regurgitation is present. No   evidence of aortic valve stenosis. The right ventricle is normal in size and function. TAPSE measures 1.71 cm. RV S velocity measures 16.2 cm/s. No pericardial effusion noted. IVC size is dilated (>2.1 cm) and collapses < 50% with respiration   consistent with elevated RA pressure (15 mmHg). Estimated pulmonary artery   systolic pressure is at 52 mmHg assuming a right atrial pressure of 15 mmHg. Assessment and Plan:     Afib/atrial flutter  -[id number] score of at least 4 and warrants [address], but currently being worked up for GIB. Would recommend DOAC after resolution  -will resume outpatient medications Flecainide but increased from 100mg to now 150mg po BID and Toprol for rhythm control. Will d/c amiodarone IV infusion, cardizem IV infusion, digoxin.  -would recommend that patient follow-up with EP service as outpatient to discuss treatment options for atrial fibrillation/atrial flutter, namely afib/atrial flutter ablation. Patient is interested in considering such. Roger increase  -elevated in the midst of decreased clearance with Cr 1.5.  -no obvious signs of ACS  -no further workup for coronary ischemia warranted at this time    Will follow from Noemi. If maintaining SR tomorrow, will sign off. Thank you for allowing me to participate in the care of this patient. If you have any questions, please do not hesitate to contact me.     Wilfred Foote MD, MS, [facility] - [facility]  Cardiac Electrophysiology  [address]  [address] [facility], [address] [place] [address] Carlos Solis [address]  [phone]

## 2022-07-31 NOTE — PROGRESS NOTES
Hospitalist Progress Note      PCP: Sanjuana Vazquez MD    Date of Admission: 7/29/2022        Hospital Course: 39-year-old female admitted to the hospital with shock, septic, found to have GI bleed, likely distal, GI consulted, cardiology following. Currently in ICU on IV antibiotics and pressors    Subjective: Feels better today, denies chest pain or shortness of breath, still having some cough. No nausea vomiting or abdominal pain. Denies any blurry or double vision      Medications:  Reviewed    Infusion Medications    sodium chloride      dextrose      dilTIAZem 7.5 mg/hr (07/30/22 1816)    amiodarone 0.5 mg/min (07/30/22 1600)    phenylephrine (RONIT-SYNEPHRINE) 50mg/250mL infusion 10 mcg/min (07/30/22 1600)     Scheduled Medications    [Held by provider] metoprolol succinate  25 mg Oral Daily    sodium chloride flush  5-40 mL IntraVENous 2 times per day    [Held by provider] flecainide  100 mg Oral 2 times per day    pantoprazole (PROTONIX) 40 mg injection  40 mg IntraVENous Q12H    cefTRIAXone (ROCEPHIN) IV  2,000 mg IntraVENous Q24H    doxycycline (VIBRAMYCIN) IV  100 mg IntraVENous Q12H    digoxin  125 mcg IntraVENous Daily     PRN Meds: sodium chloride flush, sodium chloride, ondansetron **OR** ondansetron, polyethylene glycol, acetaminophen **OR** acetaminophen, glucose, dextrose bolus **OR** dextrose bolus, glucagon (rDNA), dextrose      Intake/Output Summary (Last 24 hours) at 7/31/2022 0733  Last data filed at 7/31/2022 0600  Gross per 24 hour   Intake 1118.52 ml   Output 960 ml   Net 158.52 ml       Physical Exam Performed:    /67   Pulse 87   Temp 98.5 °F (36.9 °C) (Oral)   Resp 16   Wt 153 lb (69.4 kg)   SpO2 100%   BMI 27.98 kg/m²     General appearance: No apparent distress  Neck: Supple  Respiratory:  Normal respiratory effort. Clear to auscultation, bilaterally without Rales/Wheezes/Rhonchi.   Cardiovascular: Regular rate and rhythm with normal S1/S2 without murmurs, rubs or gallops. Abdomen: Soft, non-tender, non-distended   Musculoskeletal: No clubbing, cyanosis   Skin: Skin color, texture, turgor normal.  No rashes or lesions. Neurologic:  No focal weakness  Psychiatric: Alert and oriented  Capillary Refill: Brisk,3 seconds, normal   Peripheral Pulses: +2 palpable, equal bilaterally       Labs:   Recent Labs     07/30/22  1143 07/30/22  1714 07/31/22  0557   WBC 10.1 8.8 6.3   HGB 10.2* 10.2* 10.2*   HCT 30.3* 31.2* 30.4*    197 183     Recent Labs     07/29/22  2200 07/30/22  0535 07/31/22  0557    138 138   K 4.7 3.9 3.6   CL 99 102 103   CO2 21 23 25   BUN 50* 48* 23*   CREATININE 1.5* 1.1 <0.5*   CALCIUM 10.0 8.8 8.4     No results for input(s): AST, ALT, BILIDIR, BILITOT, ALKPHOS in the last 72 hours. Recent Labs     07/30/22  0535   INR 1.31*     Recent Labs     07/29/22  2200 07/30/22  1143 07/30/22  2042   TROPONINI 0.05* 0.04* 0.03*       Urinalysis:      Lab Results   Component Value Date/Time    NITRU Negative 07/29/2022 11:27 PM    [address] 07/29/2022 11:27 PM    BACTERIA 2+ 07/29/2022 11:27 PM    RBCUA 2 07/29/2022 11:27 PM    BLOODU TRACE 07/29/2022 11:27 PM    SPECGRAV 1.023 07/29/2022 11:27 PM    GLUCOSEU Negative 07/29/2022 11:27 PM       Radiology:  CT CHEST ABDOMEN PELVIS WO CONTRAST   Preliminary Result   Limited noncontrast examination. Left lower lobe opacity, which may reflect pneumonia. Follow-up imaging   resolution is recommended. Mild-to-moderate right hydroureteronephrosis, which may be due to distended   urinary bladder. Consider Kaplan catheter. Distended rectum filled with stool with mild surrounding inflammatory   changes, which may reflect underlying stercoral proctitis. Consider   disimpaction. CT HEAD WO CONTRAST   Final Result   Stable appearing CT scan of the head without acute intracranial abnormality.          XR CHEST PORTABLE   Final Result   Scarring/atelectatic changes seen at the left lung base without confluent   airspace opacity seen. Assessment/Plan:    Active Hospital Problems    Diagnosis     GI bleed [K92.2]      Priority: Medium    MAX (acute kidney injury) ([facility] [address] [N17.9]      Priority: Medium    Pneumonia [J18.9]      Priority: Medium    Shock ([facility] [address] [R57.9]      Priority: Medium    Atrial fibrillation with rapid ventricular response (HCC) [I48.91]      Priority: Medium    DM2 (diabetes mellitus, type 2) ([facility] [address] [E11.9]      Priority: Medium     59-year-old female admitted to the hospital with shock, septic, found to have GI bleed, likely distal, GI consulted, cardiology following. Currently in ICU on IV antibiotics and pressors      Septic shock due to pneumonia, started on cefepime, changed to ceftriaxone on pressors at this time, received IV fluid, keep in ICU. Cultures pending. Pneumonia community-acquired pneumonia on ceftriaxone at this time also added doxycycline  A. fib with RVR likely due to shock, amnio drip, cardiology consulted  Acute metabolic encephalopathy, multifactorial mainly due to sepsis. Continue to monitor  Apparently GI bleed, appears distal, likely due to colitis, pantoprazole twice daily for now hold anticoagulation, GI consulted  Acute blood loss anemia, still with no immediate indication for transfusion at this time  MAX improving  Closed fracture of distal end of left radius. In splint  Minimally elevated troponin, due to rapid ventricular response and septic shock.       Diet: Diet NPO  Code Status: Full Code      Rosa Rivas MD

## 2022-08-01 ENCOUNTER — APPOINTMENT (OUTPATIENT)
Dept: GENERAL RADIOLOGY | Age: 77
DRG: 853 | End: 2022-08-01
Payer: MEDICARE

## 2022-08-01 ENCOUNTER — APPOINTMENT (OUTPATIENT)
Dept: ULTRASOUND IMAGING | Age: 77
DRG: 853 | End: 2022-08-01
Payer: MEDICARE

## 2022-08-01 PROBLEM — R09.02 HYPOXIA: Status: ACTIVE | Noted: 2022-08-01

## 2022-08-01 PROBLEM — J18.9 PNEUMONIA DUE TO INFECTIOUS ORGANISM: Status: ACTIVE | Noted: 2022-08-01

## 2022-08-01 LAB
A/G RATIO: 1.3 (ref 1.1–2.2)
ALBUMIN SERPL-MCNC: 2.7 G/DL (ref 3.4–5)
ALP BLD-CCNC: 50 U/L (ref 40–129)
ALT SERPL-CCNC: 10 U/L (ref 10–40)
ANION GAP SERPL CALCULATED.3IONS-SCNC: 10 MMOL/L (ref 3–16)
AST SERPL-CCNC: 12 U/L (ref 15–37)
BASOPHILS ABSOLUTE: 0 K/UL (ref 0–0.2)
BASOPHILS RELATIVE PERCENT: 0.1 %
BILIRUB SERPL-MCNC: 0.4 MG/DL (ref 0–1)
BUN BLDV-MCNC: 15 MG/DL (ref 7–20)
CALCIUM SERPL-MCNC: 8.6 MG/DL (ref 8.3–10.6)
CHLORIDE BLD-SCNC: 102 MMOL/L (ref 99–110)
CO2: 26 MMOL/L (ref 21–32)
CREAT SERPL-MCNC: <0.5 MG/DL (ref 0.6–1.2)
EOSINOPHILS ABSOLUTE: 0 K/UL (ref 0–0.6)
EOSINOPHILS RELATIVE PERCENT: 0.2 %
FERRITIN: 105 NG/ML (ref 15–150)
FOLATE: 5.97 NG/ML (ref 4.78–24.2)
GFR AFRICAN AMERICAN: >60
GFR NON-AFRICAN AMERICAN: >60
GLUCOSE BLD-MCNC: 88 MG/DL (ref 70–99)
HCT VFR BLD CALC: 29.9 % (ref 36–48)
HCT VFR BLD CALC: 30.5 % (ref 36–48)
HEMOGLOBIN: 10.2 G/DL (ref 12–16)
IMMATURE RETIC FRACT: 0.54 (ref 0.21–0.37)
IRON SATURATION: 23 % (ref 15–50)
IRON: 38 UG/DL (ref 37–145)
LYMPHOCYTES ABSOLUTE: 0.5 K/UL (ref 1–5.1)
LYMPHOCYTES RELATIVE PERCENT: 7 %
MAGNESIUM: 1.8 MG/DL (ref 1.8–2.4)
MCH RBC QN AUTO: 35.4 PG (ref 26–34)
MCHC RBC AUTO-ENTMCNC: 33.6 G/DL (ref 31–36)
MCV RBC AUTO: 105.5 FL (ref 80–100)
MONOCYTES ABSOLUTE: 0.8 K/UL (ref 0–1.3)
MONOCYTES RELATIVE PERCENT: 11.7 %
NEUTROPHILS ABSOLUTE: 5.4 K/UL (ref 1.7–7.7)
NEUTROPHILS RELATIVE PERCENT: 81 %
PDW BLD-RTO: 16 % (ref 12.4–15.4)
PHOSPHORUS: 1.6 MG/DL (ref 2.5–4.9)
PLATELET # BLD: 177 K/UL (ref 135–450)
PMV BLD AUTO: 7.4 FL (ref 5–10.5)
POTASSIUM SERPL-SCNC: 3.5 MMOL/L (ref 3.5–5.1)
RBC # BLD: 2.89 M/UL (ref 4–5.2)
RETICULOCYTE ABSOLUTE COUNT: 0.07 M/UL (ref 0.02–0.1)
RETICULOCYTE COUNT PCT: 2.42 % (ref 0.5–2.18)
SODIUM BLD-SCNC: 138 MMOL/L (ref 136–145)
TOTAL IRON BINDING CAPACITY: 164 UG/DL (ref 260–445)
TOTAL PROTEIN: 4.8 G/DL (ref 6.4–8.2)
VITAMIN B-12: >2000 PG/ML (ref 211–911)
WBC # BLD: 6.6 K/UL (ref 4–11)

## 2022-08-01 PROCEDURE — 84100 ASSAY OF PHOSPHORUS: CPT

## 2022-08-01 PROCEDURE — 85045 AUTOMATED RETICULOCYTE COUNT: CPT

## 2022-08-01 PROCEDURE — 99232 SBSQ HOSP IP/OBS MODERATE 35: CPT | Performed by: INTERNAL MEDICINE

## 2022-08-01 PROCEDURE — 2580000003 HC RX 258: Performed by: NURSE PRACTITIONER

## 2022-08-01 PROCEDURE — 2580000003 HC RX 258: Performed by: INTERNAL MEDICINE

## 2022-08-01 PROCEDURE — 2580000003 HC RX 258: Performed by: STUDENT IN AN ORGANIZED HEALTH CARE EDUCATION/TRAINING PROGRAM

## 2022-08-01 PROCEDURE — 73110 X-RAY EXAM OF WRIST: CPT

## 2022-08-01 PROCEDURE — 97530 THERAPEUTIC ACTIVITIES: CPT

## 2022-08-01 PROCEDURE — 85025 COMPLETE CBC W/AUTO DIFF WBC: CPT

## 2022-08-01 PROCEDURE — 97166 OT EVAL MOD COMPLEX 45 MIN: CPT

## 2022-08-01 PROCEDURE — 97162 PT EVAL MOD COMPLEX 30 MIN: CPT

## 2022-08-01 PROCEDURE — 82607 VITAMIN B-12: CPT

## 2022-08-01 PROCEDURE — 6370000000 HC RX 637 (ALT 250 FOR IP): Performed by: INTERNAL MEDICINE

## 2022-08-01 PROCEDURE — C9113 INJ PANTOPRAZOLE SODIUM, VIA: HCPCS | Performed by: STUDENT IN AN ORGANIZED HEALTH CARE EDUCATION/TRAINING PROGRAM

## 2022-08-01 PROCEDURE — 82728 ASSAY OF FERRITIN: CPT

## 2022-08-01 PROCEDURE — 2500000003 HC RX 250 WO HCPCS: Performed by: INTERNAL MEDICINE

## 2022-08-01 PROCEDURE — 6360000002 HC RX W HCPCS: Performed by: STUDENT IN AN ORGANIZED HEALTH CARE EDUCATION/TRAINING PROGRAM

## 2022-08-01 PROCEDURE — 83735 ASSAY OF MAGNESIUM: CPT

## 2022-08-01 PROCEDURE — 6370000000 HC RX 637 (ALT 250 FOR IP): Performed by: STUDENT IN AN ORGANIZED HEALTH CARE EDUCATION/TRAINING PROGRAM

## 2022-08-01 PROCEDURE — 97110 THERAPEUTIC EXERCISES: CPT

## 2022-08-01 PROCEDURE — 80053 COMPREHEN METABOLIC PANEL: CPT

## 2022-08-01 PROCEDURE — APPNB15 APP NON BILLABLE TIME 0-15 MINS: Performed by: NURSE PRACTITIONER

## 2022-08-01 PROCEDURE — 82746 ASSAY OF FOLIC ACID SERUM: CPT

## 2022-08-01 PROCEDURE — 83540 ASSAY OF IRON: CPT

## 2022-08-01 PROCEDURE — 2060000000 HC ICU INTERMEDIATE R&B

## 2022-08-01 PROCEDURE — 2500000003 HC RX 250 WO HCPCS: Performed by: NURSE PRACTITIONER

## 2022-08-01 PROCEDURE — 6360000002 HC RX W HCPCS: Performed by: INTERNAL MEDICINE

## 2022-08-01 PROCEDURE — 83550 IRON BINDING TEST: CPT

## 2022-08-01 PROCEDURE — 76770 US EXAM ABDO BACK WALL COMP: CPT

## 2022-08-01 RX ORDER — CEFDINIR 300 MG/1
300 CAPSULE ORAL EVERY 12 HOURS SCHEDULED
Status: DISCONTINUED | OUTPATIENT
Start: 2022-08-02 | End: 2022-08-04 | Stop reason: HOSPADM

## 2022-08-01 RX ORDER — DOCUSATE SODIUM 100 MG/1
100 CAPSULE, LIQUID FILLED ORAL 2 TIMES DAILY
Status: DISCONTINUED | OUTPATIENT
Start: 2022-08-01 | End: 2022-08-04 | Stop reason: HOSPADM

## 2022-08-01 RX ORDER — DOXYCYCLINE HYCLATE 100 MG
100 TABLET ORAL EVERY 12 HOURS SCHEDULED
Status: DISCONTINUED | OUTPATIENT
Start: 2022-08-01 | End: 2022-08-04 | Stop reason: HOSPADM

## 2022-08-01 RX ORDER — SODIUM CHLORIDE 9 MG/ML
INJECTION, SOLUTION INTRAVENOUS CONTINUOUS
Status: DISCONTINUED | OUTPATIENT
Start: 2022-08-01 | End: 2022-08-04

## 2022-08-01 RX ADMIN — CEFTRIAXONE 2000 MG: 2 INJECTION, POWDER, FOR SOLUTION INTRAMUSCULAR; INTRAVENOUS at 08:48

## 2022-08-01 RX ADMIN — Medication 40 MG: at 08:46

## 2022-08-01 RX ADMIN — ACETAMINOPHEN 650 MG: 325 TABLET ORAL at 09:23

## 2022-08-01 RX ADMIN — SODIUM CHLORIDE, PRESERVATIVE FREE 10 ML: 5 INJECTION INTRAVENOUS at 08:49

## 2022-08-01 RX ADMIN — FLECAINIDE ACETATE 150 MG: 50 TABLET ORAL at 08:46

## 2022-08-01 RX ADMIN — DOXYCYCLINE HYCLATE 100 MG: 100 TABLET, FILM COATED ORAL at 21:22

## 2022-08-01 RX ADMIN — METOPROLOL SUCCINATE 25 MG: 25 TABLET, EXTENDED RELEASE ORAL at 08:46

## 2022-08-01 RX ADMIN — DOCUSATE SODIUM 100 MG: 100 CAPSULE, LIQUID FILLED ORAL at 21:22

## 2022-08-01 RX ADMIN — Medication 40 MG: at 21:22

## 2022-08-01 RX ADMIN — FLECAINIDE ACETATE 150 MG: 50 TABLET ORAL at 21:21

## 2022-08-01 RX ADMIN — POTASSIUM PHOSPHATE, MONOBASIC AND POTASSIUM PHOSPHATE, DIBASIC 20 MMOL: 224; 236 INJECTION, SOLUTION, CONCENTRATE INTRAVENOUS at 10:38

## 2022-08-01 RX ADMIN — SODIUM CHLORIDE: 9 INJECTION, SOLUTION INTRAVENOUS at 17:16

## 2022-08-01 RX ADMIN — DOXYCYCLINE 100 MG: 100 INJECTION, POWDER, LYOPHILIZED, FOR SOLUTION INTRAVENOUS at 09:22

## 2022-08-01 ASSESSMENT — PAIN DESCRIPTION - DESCRIPTORS: DESCRIPTORS: ACHING

## 2022-08-01 ASSESSMENT — PAIN SCALES - GENERAL
PAINLEVEL_OUTOF10: 3
PAINLEVEL_OUTOF10: 2
PAINLEVEL_OUTOF10: 0
PAINLEVEL_OUTOF10: 4
PAINLEVEL_OUTOF10: 6

## 2022-08-01 ASSESSMENT — PAIN DESCRIPTION - LOCATION
LOCATION: HEAD
LOCATION: HEAD

## 2022-08-01 ASSESSMENT — PAIN DESCRIPTION - ORIENTATION: ORIENTATION: RIGHT

## 2022-08-01 ASSESSMENT — PAIN DESCRIPTION - PAIN TYPE: TYPE: ACUTE PAIN

## 2022-08-01 NOTE — ACP (ADVANCE CARE PLANNING)
Advance Care Planning     Advance Care Planning Activator (Inpatient)  Conversation Note      Date of ACP Conversation: 8/1/2022     Conversation Conducted with: Patient with Decision Making Capacity    ACP Activator: [address] Decision Maker:     Current Designated Health Care Decision Maker:     Primary Decision Maker: Sinai Jaquez Child - [phone]    Secondary Decision Maker: Bobby Higginbotham - Child - [phone]  Click here to complete Healthcare Decision Makers including section of the Healthcare Decision Maker Relationship (ie \"Primary\")      Care Preferences    Ventilation: \"If you were in your present state of health and suddenly became very ill and were unable to breathe on your own, what would your preference be about the use of a ventilator (breathing machine) if it were available to you? \"      Would the patient desire the use of ventilator (breathing machine)?: yes    \"If your health worsens and it becomes clear that your chance of recovery is unlikely, what would your preference be about the use of a ventilator (breathing machine) if it were available to you? \"     Would the patient desire the use of ventilator (breathing machine)?: No      Resuscitation  \"CPR works best to restart the heart when there is a sudden event, like a heart attack, in someone who is otherwise healthy. Unfortunately, CPR does not typically restart the heart for people who have serious health conditions or who are very sick. \"    \"In the event your heart stopped as a result of an underlying serious health condition, would you want attempts to be made to restart your heart (answer \"yes\" for attempt to resuscitate) or would you prefer a natural death (answer \"no\" for do not attempt to resuscitate)? \" yes       [] Yes   [] No   Educated Patient / Paramjit Rios regarding differences between Advance Directives and portable DNR orders.     Length of ACP Conversation in minutes:      Julio Huber Outcomes:  [x] ACP discussion completed  [] Existing advance directive reviewed with patient; no changes to patient's previously recorded wishes  [] New Advance Directive completed  [] Portable Do Not Rescitate prepared for Provider review and signature  [] POLST/POST/MOLST/MOST prepared for Provider review and signature      Follow-up plan:    [] Schedule follow-up conversation to continue planning  [] Referred individual to Provider for additional questions/concerns   [] Advised patient/agent/surrogate to review completed ACP document and update if needed with changes in condition, patient preferences or care setting    [x] This note routed to one or more involved healthcare providers        Electronically signed by Brenda Mercedes on 8/1/2022 at 12:43 PM  #[phone]

## 2022-08-01 NOTE — PROGRESS NOTES
Physician Progress Note      Bradley Robison  CSN #:                  [id number]  :                       1945  ADMIT DATE:       2022 9:32 PM  DISCH DATE:  RESPONDING  PROVIDER #:        Barb Hoffman MD          QUERY TEXT:    Pt admitted with Septic shock. Pt noted to have Pneumonia. If possible,   please document in the progress notes and discharge summary if you are   evaluating and/or treating any of the following:    Note: CAP and HCAP indicate where the pneumonia was acquired, not a specific   type. The medical record reflects the following:  Risk Factors: Pneumonia  WHITNEY  Clinical Indicators: CT \"Apparently GI bleed, pantoprazole twice daily for now   hold anticoagulation, GI consulted  Acute blood loss anemia\"  Treatment: IV Cefepime, cultures and IV Vibramycin  Options provided:  -- Treating as Gram negative pneumonia  -- Other - I will add my own diagnosis  -- Disagree - Not applicable / Not valid  -- Disagree - Clinically unable to determine / Unknown  -- Refer to Clinical Documentation Reviewer    PROVIDER RESPONSE TEXT:    Review note later today.     Query created by: Joan Ruiz on 2022 3:29 PM      Electronically signed by:  Barb Hoffman MD 2022 8:13 AM

## 2022-08-01 NOTE — CARE COORDINATION
INITIAL CASE MANAGEMENT ASSESSMENT    Reviewed chart, met with patient to assess possible discharge needs. Explained Case Management role/services. Living Situation: lives at home with her son Jadyn Needs- they live in a house with no steps for entry--there is a ramp. All living space is on one level. ADLs: son assists-- patient has not been walking --has been using her w/c for mobility  Son does the cooking and assists her in getting in the shower     DME: shower chair - grab bars - RW(doesn't use)-manual w/c    PT/OT Recs:    Goyo Vitaly scored a 8/24 on the AM-PAC short mobility form. Current research shows that an AM-PAC score of 17 or less is typically not associated with a discharge to the patient's home setting. Based on the patient's AM-PAC score and their current functional mobility deficits, it is recommended that the patient have 3-5 sessions per week of Physical Therapy at d/c to increase the patient's independence    Active Services: none     Transportation: grandson assists w/ transport     Medications: no issues    PCP: Dr Siria Ray      HD/PD: n/a    PLAN/COMMENTS: spoke w/ patient and her son Malcolm Mayfield at bedside - we discussed PT/OT recommendation for continued therapy in a skilled setting-- they are  agreeable -- referrals made to [facility] and [facility]- will follow    The Plan for Transition of Care is related to the following treatment goals: skilled facility    The Patient and/or patient representative  was provided with a choice of provider and agrees   with the discharge plan. [x] Yes [] No    Freedom of choice list was provided with basic dialogue that supports the patient's individualized plan of care/goals, treatment preferences and shares the quality data associated with the providers. [x] Yes [] No    SW/CM provided contact information for patient or family to call with any questions. SW/CM will follow and assist as needed.     Electronically signed by Jessica Holguin Darya on 8/1/2022 at 12:57 PM  #[phone]

## 2022-08-01 NOTE — PROGRESS NOTES
Cardiac Electrophysiology Progress Note     Admit Date: 7/29/2022     Reason for follow up: Afib/atrial flutter with v-rates 190-200 bpm    HPI:   Marjan Orourke is a 65 y.o. F h/o WHITNEY presents with 3 days of decreased appetite, fatigue, and \"not being herself. \" Upon workup, noted to have new diagnosis of atrial fibrillation/atrial flutter with v-rates 190-210 bpm. Started on Amiodarone IV infusion and cardizem IV infusion with v-rates still 160's bpm. Added Digoxin 0.125mg IV x 1 overnight with v-rates 120-130's bpm.Further workup has revealed possible GI bleeding, PNA, UTI. Patient is poor historian and not able to give much information. Interval History: Patient seen and examined. Clinical notes reviewed. Telemetry reviewed - SR 70-80's bpm. No new complaint today. No major events overnight. Denies having angina, shortness of breath, dyspnea on exertion, Orthopnea, PND at the time of this visit. Review of System:  All other systems reviewed except for that noted above. Pertinent negatives and positives are:     General: negative for fever, chills   Ophthalmic ROS: negative for - eye pain or loss of vision  ENT ROS: negative for - headaches, sore throat   Respiratory: negative for - cough, sputum  Cardiovascular: Reviewed in HPI  Gastrointestinal: negative for - abdominal pain, diarrhea, N/V  Hematology: negative for - bleeding, blood clots, bruising or jaundice  Genito-Urinary:  negative for - Dysuria or incontinence  Musculoskeletal: negative for - Joint swelling, muscle pain  Neurological: negative for - confusion, dizziness, headaches   Psychiatric: No anxiety, no depression.   Dermatological: negative for - rash      Physical Examination:  Vitals:    08/01/22 0800   BP: (!) 131/58   Pulse: 95   Resp: 19   Temp: 98.2 °F (36.8 °C)   SpO2: 97%        Intake/Output Summary (Last 24 hours) at 8/1/2022 0956  Last data filed at 8/1/2022 0931  Gross per 24 hour   Intake 669.27 ml   Output 580 ml   Net 89.27 ml     In: 1000.1 [P.O.:120; I.V.:565.4]  Out: 615    Wt Readings from Last 3 Encounters:   22 155 lb 10.3 oz (70.6 kg)   22 166 lb 0.1 oz (75.3 kg)   22 167 lb 6.4 oz (75.9 kg)     Temp  Av.6 °F (37 °C)  Min: 98.1 °F (36.7 °C)  Max: 98.9 °F (37.2 °C)  Pulse  Av.7  Min: 75  Max: 101  BP  Min: 97/63  Max: 131/58  SpO2  Av.4 %  Min: 97 %  Max: 100 %    Telemetry: Sinus rhythm with v-rates 70-80's bpm  Constitutional: Alert. Oriented to person, place, and time. No distress. Head: Normocephalic and atraumatic. Mouth/Throat: Lips appear moist. Oropharynx is clear and moist.  Eyes: Conjunctivae normal. EOM are normal.   Neck: Neck supple. No lymphadenopathy. No rigidity. No JVD present. Cardiovascular: Normal rate, regular rhythm. Normal S1&S2. Carotid pulse 2+ bilaterally. Pulmonary/Chest: Bilateral respiratory sounds present. No respiratory accessory muscle use. No wheezes, No rhonchi. No bibasilar rales. Abdominal: Soft. Normal bowel sounds present. No distension, No tenderness. No splenomegaly. No hernia. Musculoskeletal: No tenderness. Full range of motion in bilateral upper and lower extremities. No pitting BLE edema    Lymphadenopathy: Has no cervical adenopathy. Neurological: Alert and oriented. Cranial nerve II-XII grossly intact, No gross deficit to touch. Skin: Skin is warm and dry. No rash, lesions, ulcerations noted. Psychiatric: No anxiety nor agitation. Telemetry, labs, diagnostic and imaging results personally reviewed and interpreted.      Recent Labs     22  0535 22  0557 22  0553    138 138   K 3.9 3.6 3.5    103 102   CO2 23 25 26   PHOS  --   --  1.6*   BUN 48* 23* 15   CREATININE 1.1 <0.5* <0.5*     Recent Labs     22  1304 22  1816 22  0553 22  0725   WBC 6.2 6.2 6.6  --    HGB 10.1* 9.7* 10.2*  --    HCT 30.4* 29.0* 30.5* 29.9*   .1* 106.9* 105.5*  --     180 177  --      Lab Results   Component Value Date/Time    CKTOTAL 49 01/28/2021 02:26 PM    TROPONINI 0.03 07/30/2022 08:42 PM     Estimated Creatinine Clearance: 85 mL/min (based on SCr of 0.5 mg/dL). No results found for: BNP  Lab Results   Component Value Date/Time    PROTIME 16.3 07/30/2022 05:35 AM    PROTIME 12.5 02/04/2021 06:39 PM    PROTIME 16.0 01/28/2021 02:26 PM    INR 1.31 07/30/2022 05:35 AM    INR 1.08 02/04/2021 06:39 PM    INR 1.37 01/28/2021 02:26 PM     Lab Results   Component Value Date/Time    CHOL 124 02/19/2020 01:30 PM    HDL 66 02/19/2020 01:30 PM    TRIG 102 02/19/2020 01:30 PM       I independently reviewed and interpreted the ECG, telemetry, and serology results.     Scheduled Meds:   docusate sodium  100 mg Oral BID    potassium phosphate IVPB  20 mmol IntraVENous Once    flecainide  150 mg Oral 2 times per day    metoprolol succinate  25 mg Oral Daily    sodium chloride flush  5-40 mL IntraVENous 2 times per day    pantoprazole (PROTONIX) 40 mg injection  40 mg IntraVENous Q12H    cefTRIAXone (ROCEPHIN) IV  2,000 mg IntraVENous Q24H    doxycycline (VIBRAMYCIN) IV  100 mg IntraVENous Q12H     Continuous Infusions:   sodium chloride Stopped (08/01/22 0922)    dextrose       PRN Meds:potassium chloride **OR** potassium alternative oral replacement **OR** potassium chloride, magnesium sulfate, sodium phosphate IVPB **OR** sodium phosphate IVPB, sodium chloride flush, sodium chloride, ondansetron **OR** ondansetron, polyethylene glycol, acetaminophen **OR** acetaminophen, glucose, dextrose bolus **OR** dextrose bolus, glucagon (rDNA), dextrose     Patient Active Problem List    Diagnosis Date Noted    GI bleed 07/30/2022    MAX (acute kidney injury) ([facility] 07/30/2022    Pneumonia 07/30/2022    Shock ([facility] 07/29/2022    Atrial fibrillation with rapid ventricular response ([facility] 07/29/2022    DM2 (diabetes mellitus, type 2) ([address] 07/29/2022    Weakness 06/22/2022    Age-related physical debility 06/22/2022    JENNIFER tolerate DOAC without GIB. Will sign off. Thank you for allowing me to participate in the care of this patient. If you have any questions, please do not hesitate to contact me.     Andrea Doll MD, MS, [facility]  Cardiac Electrophysiology  [address]  [address] [place], [address] [place] [address]  [phone]

## 2022-08-01 NOTE — PROGRESS NOTES
Pulmonary Progress Note    CC:  Follow up sepsis, pneumonia    Subjective:  Room air   Off pressors   Minimal SOB  Feeling better  Weak       Intake/Output Summary (Last 24 hours) at 8/1/2022 0752  Last data filed at 8/1/2022 0600  Gross per 24 hour   Intake 565.38 ml   Output 580 ml   Net -14.62 ml         PHYSICAL EXAM:  Blood pressure 131/81, pulse 90, temperature 98.7 °F (37.1 °C), temperature source Oral, resp. rate 25, weight 155 lb 10.3 oz (70.6 kg), SpO2 98 %, not currently breastfeeding.'  Gen: No distress. Flat affect   Eyes: PERRL. No sclera icterus. No conjunctival injection. ENT: No discharge. Pharynx clear. External appearance of ears and nose normal.  Neck: Trachea midline. No obvious mass. Resp: Diminished   CV: Regular rate. Regular rhythm. No murmur or rub. GI: Non-tender. Non-distended. No hernia. Skin: Left hand bruising   Lymph: No cervical LAD. No supraclavicular LAD. M/S: Left arm in soft cast  Neuro: Moves all four extremities. CN 2-12 tested, no defect noted.   Ext:   no edema    Medications:    Scheduled Meds:   docusate sodium  100 mg Oral BID    flecainide  150 mg Oral 2 times per day    metoprolol succinate  25 mg Oral Daily    sodium chloride flush  5-40 mL IntraVENous 2 times per day    pantoprazole (PROTONIX) 40 mg injection  40 mg IntraVENous Q12H    cefTRIAXone (ROCEPHIN) IV  2,000 mg IntraVENous Q24H    doxycycline (VIBRAMYCIN) IV  100 mg IntraVENous Q12H       Continuous Infusions:   sodium chloride 10 mL/hr at 07/31/22 1634    dextrose      phenylephrine (RONIT-SYNEPHRINE) 50mg/250mL infusion Stopped (07/31/22 0829)       PRN Meds:  potassium chloride **OR** potassium alternative oral replacement **OR** potassium chloride, magnesium sulfate, sodium phosphate IVPB **OR** sodium phosphate IVPB, sodium chloride flush, sodium chloride, ondansetron **OR** ondansetron, polyethylene glycol, acetaminophen **OR** acetaminophen, glucose, dextrose bolus **OR** dextrose bolus, glucagon (rDNA), dextrose    Labs:  CBC:   Recent Labs     07/31/22  1304 07/31/22  1816 08/01/22  0553 08/01/22  0725   WBC 6.2 6.2 6.6  --    HGB 10.1* 9.7* 10.2*  --    HCT 30.4* 29.0* 30.5* 29.9*   .1* 106.9* 105.5*  --     180 177  --      BMP:   Recent Labs     07/30/22  0535 07/31/22  0557 08/01/22  0553    138 138   K 3.9 3.6 3.5    103 102   CO2 23 25 26   PHOS  --   --  1.6*   BUN 48* 23* 15   CREATININE 1.1 <0.5* <0.5*     LIVER PROFILE:   Recent Labs     08/01/22  0553   AST 12*   ALT 10   BILITOT 0.4   ALKPHOS 50     PT/INR:   Recent Labs     07/30/22  0535   PROTIME 16.3*   INR 1.31*     APTT: No results for input(s): APTT in the last 72 hours. UA:  Recent Labs     07/29/22  2327   COLORU DARK YELLOW*   PHUR 5.5   WBCUA 3   RBCUA 2   BACTERIA 2+*   CLARITYU CLOUDY*   SPECGRAV 1.023   LEUKOCYTESUR SMALL*   UROBILINOGEN 1.0   BILIRUBINUR Negative   BLOODU TRACE*   GLUCOSEU Negative     No results for input(s): PH, PCO2, PO2 in the last 72 hours.         Films:  Chest imaging reports were reviewed and imaging was reviewed by me and showed LLL opacity     ABG:  None    Cultures:  Blood:  NGTD    I reviewed the labs and images listed above    Assessment/Plan:   Hypoxia, resolved  Increase activity as tolerated   Pulmonary toilet  LLL:  CAP  Change Rocephin to omnicef and finish course  Change doxycycline to oral and finish course  A Fib with RVR, resolved    Floor status       DVT prophylaxis  SCD      Jacinta Mays [facility] Pulmonary

## 2022-08-01 NOTE — PROGRESS NOTES
Occupational Therapy  Facility/Department: [address] ICU  Occupational Therapy Initial Assessment    Name: Courtney Winter  : 1945  MRN: [id number]  Date of Service: 2022    Discharge Recommendations:  Patient would benefit from continued therapy after discharge, 3-5 sessions per week  OT Equipment Recommendations  Other: defer to [address] scored a 10/24 on the AM-PAC ADL Inpatient form. Current research shows that an AM-PAC score of 17 or less is typically not associated with a discharge to the patient's home setting. Based on the patient's AM-PAC score and their current ADL deficits, it is recommended that the patient have 3-5 sessions per week of Occupational Therapy at d/c to increase the patient's independence. Please see assessment section for further patient specific details. If patient discharges prior to next session this note will serve as a discharge summary. Please see below for the latest assessment towards goals. Patient Diagnosis(es): The primary encounter diagnosis was Atrial fibrillation with rapid ventricular response ([address] A diagnosis of Pneumonia due to infectious organism, unspecified laterality, unspecified part of lung was also pertinent to this visit. Past Medical History:  has a past medical history of Acid reflux, Anxiety, Arthritis, Atrial fibrillation (HCC), Closed fracture dislocation of right elbow, Depression, Fibromyalgia, Migraine, WHITNEY (obstructive sleep apnea), Rectal cancer ([address] and Wears glasses. Past Surgical History:  has a past surgical history that includes  section; Total knee arthroplasty (Left); open tx femoral supracondylar fracture w/o extension (Right, 10/25/2018); Gastric bypass surgery; Cholecystectomy; Appendectomy; Colonoscopy; Upper gastrointestinal endoscopy (N/A, 2019); Colonoscopy (N/A, 2019); Upper gastrointestinal endoscopy (N/A, 2020); incision and drainage (Right, 2021);  Skin graft (Right, 1/31/2021); Skin graft (Right, 2/17/2021); Cervical discectomy; Colon surgery; and Colonoscopy (N/A, 7/7/2021). Assessment   Performance deficits / Impairments: Decreased functional mobility ; Decreased safe awareness;Decreased balance;Decreased ADL status; Decreased cognition;Decreased high-level IADLs;Decreased endurance;Decreased strength  Assessment: 64 y/o female admit 7/29/2022 with Septic Shock, GI Bleed, A-Fib with RVR. CT Head : negative. PMH as noted including Gastric Bypass, Rectal Ca, L TKR, R Femur Fx/ORIF (10/2018), R LE Traumatic Hematoma (1/2021 S/P Skin Graft 2/17/2022), Recent R Radius Fx 7/23/2022 (Spl[place]). PTA pt lives at home with spouse and was independent with ADLs and functional mobility. Today, pt completed ADL transfers with mod/max A to stedy from elevated bed. Pt with minimal initiation for tasks. Pt with decreased UE strength/ROM and anticipate will require up to max A for ADLs. Pt is functioning well below baseline and benefit from skilled therapy. Prognosis: Good  Decision Making: Medium Complexity  REQUIRES OT FOLLOW-UP: Yes  Activity Tolerance  Activity Tolerance: Patient limited by fatigue        Plan   Plan  Times per Week: 3-5  Current Treatment Recommendations: Strengthening, Balance training, Functional mobility training, Endurance training, Neuromuscular re-education, Return to work related activity, Gait training, Safety education & training     Restrictions  Restrictions/Precautions  Restrictions/Precautions: Fall Risk  Position Activity Restriction  Other position/activity restrictions: Diet : Clear Liquid. Subjective   General  Chart Reviewed: Yes  Patient assessed for rehabilitation services?: Yes  Additional Pertinent Hx: 64 y/o female admit 7/29/2022 with Septic Shock, GI Bleed, A-Fib with RVR. CT Head : negative.   PMH as noted including Gastric Bypass, Rectal Ca, L TKR, R Femur Fx/ORIF (10/2018), R LE Traumatic Hematoma (1/2021 S/P Skin scooted out and feet on ground. Use of stedy bed > chair      Transfers  Sit to stand: Moderate assistance;Maximum assistance;2 Person assistance  Stand to sit: Maximum assistance;2 Person assistance; Moderate assistance  Transfer Comments: pt stood from elevated bed to stedy to mod/max A x 2. Pt with minimal initiation for tasks    Vision  Vision: Within Functional Limits  Hearing  Hearing: Within functional limits    Cognition  Overall Cognitive Status: Exceptions  Arousal/Alertness: Appropriate responses to stimuli  Following Commands: Follows one step commands with increased time  Attention Span: Appears intact  Memory: Decreased recall of recent events  Safety Judgement: Decreased awareness of need for safety;Decreased awareness of need for assistance  Insights: Decreased awareness of deficits  Initiation: Requires cues for all  Orientation  Overall Orientation Status: Within Functional Limits                  Education Given To: Patient  Education Provided: Role of Therapy;Plan of Care;Transfer Training  Education Method: Demonstration;Verbal  Barriers to Learning: Cognition  Education Outcome: Continued education needed    LUE AROM (degrees)  LUE General AROM: L UE in cast- minimal strength to lift arm off pillow  Left Hand AROM (degrees)  Left Hand General AROM: not assessed d/t cast  RUE AROM (degrees)  RUE General AROM: generalized weaknes  Right Hand AROM (degrees)  Right Hand AROM: [facility] Score    AM-[facility] Inpatient Daily Activity Raw Score: 10 (08/01/22 1013)  AM-PAC Inpatient ADL T-Scale Score : 27.31 (08/01/22 1013)  ADL Inpatient CMS 0-100% Score: 74.7 (08/01/22 1013)  ADL Inpatient CMS G-Code Modifier : CL (08/01/22 1013)      Goals  Short Term Goals  Time Frame for Short term goals: Prior to DC:   Short Term Goal 1: Pt will complete ADL transfers with min A  Short Term Goal 2: Pt will tolerate standing > 2 min for standing components of ADLs  Short Term Goal 3: Pt will complete grooming tasks with set up  Short Term Goal 4: Pt will complete bed mobility in prep for transfer with mod A  Short Term Goal 5: Pt will complete functional mobility with mod A  Patient Goals   Patient goals : to get stronger and return home       Therapy Time   Individual Concurrent Group Co-treatment   Time In 0720         Time Out 0800         Minutes 40         Timed Code Treatment Minutes: 25 Minutes     This note to serve as OT d/c summary if pt is d/c-ed prior to next therapy session.     Raad Alvarez, OTR/L

## 2022-08-01 NOTE — PROGRESS NOTES
Hospital Medicine Progress Note     Date:  8/1/2022    PCP: Vicky Reid MD (Tel: [phone])    Date of Admission: 7/29/2022    Chief complaint:   Chief Complaint   Patient presents with    Atrial Fibrillation    Fall     One week ago    Altered Mental Status       Brief admission history: 79-year-old female with history of well-controlled diabetes type 2, macrocytic anemia, GERD, anxiety and depression, fibromyalgia, obstructive sleep apnea, history of rectal cancer, who was admitted on 7/29/2022 with septic shock (requiring pressor support) complicated by lower GI bleed and atrial fibrillation with rapid ventricular response. Assessment/plan:  Septic shock secondary to pneumonia, bacterial pneumonia (likely Gram positive bacterial pneumonia). Fluid resuscitated on admission. Continue antibiotics (currently on doxycycline and Rocephin). Weaned off pressor as of 8/1/2022. Critical care on board and assisting with management. Discontinue tello catheter. Atrial fibrillation with rapid ventricular response. Likely complicated by hemodynamic issues related to sepsis and septic shock. Continue beta-blocker. She was on amiodarone infusion which has since been changed to flecainide as of 7/21/2022. Cardiology on board and assisting with management. Currently not on full dose anticoagulation due to GI bleed. Acute lower GI bleed secondary to stercoral proctitis as noted on CT. Per GI recommendation, not a candidate for endoscopic evaluation at this time. Monitor hemoglobin closely. Acute blood loss anemia secondary to GI bleed. Currently does not require blood transfusion. Monitor hemoglobin closely. Acute metabolic encephalopathy, much improved. Etiology is multifactorial, likely secondary to underlying infectious process, sepsis. Continue to monitor mental status closely. Right hydroureteronephrosis, mild to moderate.   Noted on CT-abdomen/pelvis obtained on 7/29/2022 (result updated on 8/1/2022). Check renal ultrasound. Consult urology to assist with evaluation. Closed fracture of distal end of left radius, currently with splint in place. Nonzero troponin, not due to ACS. Likely secondary to atrial fibrillation with rapid ventricular response and septic shock  Acute kidney injury, present on admission. This has since resolved following fluid resuscitation  Disposition. Possible discharge in next 1 to 2 days following resolution of altered mental status, following weaning of pressor support. Diet: ADULT DIET; Clear Liquid    Code status: Full Code   ----------  Subjective  Headache this morning. No other complaints. Objective  Physical exam:  Vitals: BP (!) 110/59   Pulse 80   Temp 98.8 °F (37.1 °C) (Oral)   Resp 17   Wt 153 lb (69.4 kg)   SpO2 97%   BMI 27.98 kg/m²   Gen/overall appearance: Not in acute distress. Alert. Oriented X3. Appears generally weak. Head: Normocephalic, atraumatic  Eyes: EOMI, good acuity  ENT: Oral mucosa moist  Neck: No JVD, thyromegaly  CVS: Nml S1S2, no MRG, RRR  Pulm: Clear bilaterally. No crackles/wheezes  Gastrointestinal: Soft, NT/ND, +BS  Musculoskeletal: No edema. Warm  Neuro: No focal deficit. Moves extremity spontaneously. Psychiatry: Appropriate affect. Not agitated. Skin: Warm, dry with normal turgor. No rash  Capillary refill: Brisk,< 3 seconds   Peripheral Pulses: +2 palpable, equal bilaterally      24HR INTAKE/OUTPUT:    Intake/Output Summary (Last 24 hours) at 8/1/2022 0610  Last data filed at 7/31/2022 1904  Gross per 24 hour   Intake 565.38 ml   Output 230 ml   Net 335.38 ml     I/O last 3 completed shifts:   In: 1683.9 [I.V.:1283.9; IV Piggyback:400]  Out: 3378 [Urine:1130]  I/O this shift:  In: -   Out: 60 [Urine:60]  Meds:    docusate sodium  100 mg Oral BID    flecainide  150 mg Oral 2 times per day    metoprolol succinate  25 mg Oral Daily    sodium chloride flush  5-40 mL IntraVENous 2 times per day    pantoprazole (PROTONIX) 40 mg injection  40 mg IntraVENous Q12H    cefTRIAXone (ROCEPHIN) IV  2,000 mg IntraVENous Q24H    doxycycline (VIBRAMYCIN) IV  100 mg IntraVENous Q12H     Infusions:    sodium chloride 10 mL/hr at 07/31/22 1634    dextrose      phenylephrine (RONIT-SYNEPHRINE) 50mg/250mL infusion Stopped (07/31/22 0829)     PRN Meds: potassium chloride **OR** potassium alternative oral replacement **OR** potassium chloride, magnesium sulfate, sodium phosphate IVPB **OR** sodium phosphate IVPB, sodium chloride flush, sodium chloride, ondansetron **OR** ondansetron, polyethylene glycol, acetaminophen **OR** acetaminophen, glucose, dextrose bolus **OR** dextrose bolus, glucagon (rDNA), dextrose    Labs/imaging:  CBC:   Recent Labs     07/31/22  0557 07/31/22  1304 07/31/22  1816   WBC 6.3 6.2 6.2   HGB 10.2* 10.1* 9.7*    179 180     BMP:    Recent Labs     07/29/22  2200 07/30/22  0535 07/31/22  0557    138 138   K 4.7 3.9 3.6   CL 99 102 103   CO2 21 23 25   BUN 50* 48* 23*   CREATININE 1.5* 1.1 <0.5*   GLUCOSE 120* 146* 85         Cristy Castillo MD  -------------------------------  Rounding hospitalist

## 2022-08-01 NOTE — CONSULTS
Consulting Physician: Dr. Flynn Valverde    Reason for Consult: right hydronephrosis    History of Present Illness: Giovanni Solorzano is a 65 y.o. female with history of A-fib, anxiety, fibromyalgia, WHITNEY, rectal cancer who came to the hospital for increased weakness, poor appetite and altered mentation. We have been consulted for right hydronephrosis. CT scan with extremely distended bladder. Creatinine 1.5 on admission. Kaplan catheter was placed for 1100cc. Renal function normalized s/p catheter. BUDDY was already ordered for today to re-evaluate. Patient denies issues voiding, no significant history of UTI's. Only c/o nocturia 2x/night.      Past Medical History:   Past Medical History:   Diagnosis Date    Acid reflux     Anxiety     Arthritis     Atrial fibrillation (HCC)     Closed fracture dislocation of right elbow     Depression     Fibromyalgia     Migraine     WHITNEY (obstructive sleep apnea)     Diagnosed years ago, no CPAP    Rectal cancer ([facility] [address]     Wears glasses     DRIVING       Past Surgical History:  Past Surgical History:   Procedure Laterality Date    APPENDECTOMY      CERVICAL DISCECTOMY      ACDF     SECTION      x4    CHOLECYSTECTOMY      COLON SURGERY      Rectal cancer removed using  incision    COLONOSCOPY      SEVERAL    COLONOSCOPY N/A 2019    COLONOSCOPY POLYPECTOMY SNARE/COLD BIOPSY performed by Doroteo Aaron MD at [address] [place] [address] N/A 2021    COLONOSCOPY WITH BIOPSY performed by Lakshmi Lakhani MD at [address] [place] [address] Right 2021    RIGHT LEG EVACUATION OF HEMATOMA performed by Anselmo Ellis MD at [address] [facility] W/O EXTENSION Right 10/25/2018    OPEN REDUCTION INTERNAL FIXATION RIGHT FEMUR SUPRACONDYLAR FEMORAL FRACTURE WITH C-ARM performed by Tami Bardales MD at [place] [address] Right 2021    RIGHT LEG HEMATOMA EVAKUATION, DEBRIDEMENT, AND WOUND VAC PLACEMENT performed by Ana M Mclean MD at [facility] Right 2/17/2021    RIGHT LOWER EXTREMITY SKIN GRAFT performed by Ana M Mclean MD at [address] [facility] Left     UPPER GASTROINTESTINAL ENDOSCOPY N/A 11/26/2019    ESOPHAGOGASTRODUODENOSCOPY performed by Brenda Brown MD at [address] [facility] N/A 8/18/2020    EGD BIOPSY performed by Brenda Brown MD at [facility] History:  Social History     Socioeconomic History    Marital status:       Spouse name: Not on file    Number of children: 4    Years of education: Not on file    Highest education level: High school graduate   Occupational History    Not on file   Tobacco Use    Smoking status: Never    Smokeless tobacco: Never   Vaping Use    Vaping Use: Never used   Substance and Sexual Activity    Alcohol use: No    Drug use: Never    Sexual activity: Not Currently     Partners: Male   Other Topics Concern    Not on file   Social History Narrative    Lives with son     Social Determinants of Health     Financial Resource Strain: Not on file   Food Insecurity: Not on file   Transportation Needs: Not on file   Physical Activity: Not on file   Stress: Not on file   Social Connections: Not on file   Intimate Partner Violence: Not on file   Housing Stability: Not on file       Family History:  Family History   Problem Relation Age of Onset    COPD Mother        Meds:   Current Facility-Administered Medications: docusate sodium (COLACE) capsule 100 mg, 100 mg, Oral, BID  potassium phosphate 20 mmol in dextrose 5 % 250 mL IVPB, 20 mmol, IntraVENous, Once  flecainide (TAMBOCOR) tablet 150 mg, 150 mg, Oral, 2 times per day  potassium chloride (KLOR-CON M) extended release tablet 40 mEq, 40 mEq, Oral, PRN **OR** potassium bicarb-citric acid (EFFER-K) effervescent tablet 40 mEq, 40 mEq, Oral, PRN **OR** potassium chloride 10 mEq/100 mL IVPB (Peripheral Line), 10 mEq, IntraVENous, no distress, appears stated age  Head: Normocephalic, without obvious abnormality, atraumatic  Back: no CVA tenderness  Lungs: respirations unlabored, no wheezing  Heart: Regular rate and rhythm, no lower extremity edema noted  Abdomen: Soft, non-tender, non-distended, no masses  Skin: Skin color, texture, turgor normal, no rashes or lesions  Neurologic: no gross deficits  Female :   Bladder is non tender  No CVA tenderness  Kaplan catheter intact with yellow output  Pelvic Exam Not Indicated    Labs:  CBC   Lab Results   Component Value Date/Time    WBC 6.6 08/01/2022 05:53 AM    RBC 2.89 08/01/2022 05:53 AM    HGB 10.2 08/01/2022 05:53 AM    HCT 29.9 08/01/2022 07:25 AM    .5 08/01/2022 05:53 AM    MCH 35.4 08/01/2022 05:53 AM    MCHC 33.6 08/01/2022 05:53 AM    RDW 16.0 08/01/2022 05:53 AM     08/01/2022 05:53 AM    MPV 7.4 08/01/2022 05:53 AM     BMP   Lab Results   Component Value Date/Time     08/01/2022 05:53 AM    K 3.5 08/01/2022 05:53 AM    K 3.5 02/12/2021 05:55 AM     08/01/2022 05:53 AM    CO2 26 08/01/2022 05:53 AM    BUN 15 08/01/2022 05:53 AM    CREATININE <0.5 08/01/2022 05:53 AM    GLUCOSE 88 08/01/2022 05:53 AM    CALCIUM 8.6 08/01/2022 05:53 AM       Urinalysis:   Lab Results   Component Value Date/Time    COLORU DARK YELLOW 07/29/2022 11:27 PM    GLUCOSEU Negative 07/29/2022 11:27 PM    BLOODU TRACE 07/29/2022 11:27 PM    NITRU Negative 07/29/2022 11:27 PM    LEUKOCYTESUR SMALL 07/29/2022 11:27 PM       Imaging:Pertinent images and radiologist's report were reviewed independently  CT abdomen/pelvis 7/29/22  Impression   Limited noncontrast examination. Left lower lobe opacity, suspicious for pneumonia. Follow-up imaging   resolution is recommended. Mild-to-moderate right hydroureteronephrosis, which may be due to distended   urinary bladder. Consider Kaplan catheter.        Distended rectum filled with stool with mild surrounding inflammatory   changes, which may reflect underlying stercoral proctitis. Consider   disimpaction. Impression/Plan:   - 77y. o. female with AMS, weakness, sepsis, pneumonia. Consulted for right hydronephrosis. - BUDDY today  - Consider tello catheter removal for voiding trial pending BUDDY results. Giacomo RN that voiding trial first thing in AM would be best so will leave catheter in for today.        Brandon De León, MUSA - CNP 8/1/202212:08 PM

## 2022-08-02 ENCOUNTER — ANESTHESIA EVENT (OUTPATIENT)
Dept: OPERATING ROOM | Age: 77
DRG: 853 | End: 2022-08-02
Payer: MEDICARE

## 2022-08-02 LAB
A/G RATIO: 1.1 (ref 1.1–2.2)
ALBUMIN SERPL-MCNC: 2.6 G/DL (ref 3.4–5)
ALP BLD-CCNC: 54 U/L (ref 40–129)
ALT SERPL-CCNC: 9 U/L (ref 10–40)
ANION GAP SERPL CALCULATED.3IONS-SCNC: 10 MMOL/L (ref 3–16)
AST SERPL-CCNC: 11 U/L (ref 15–37)
BASOPHILS ABSOLUTE: 0 K/UL (ref 0–0.2)
BASOPHILS RELATIVE PERCENT: 0.1 %
BILIRUB SERPL-MCNC: 0.3 MG/DL (ref 0–1)
BUN BLDV-MCNC: 12 MG/DL (ref 7–20)
CALCIUM SERPL-MCNC: 8.2 MG/DL (ref 8.3–10.6)
CHLORIDE BLD-SCNC: 101 MMOL/L (ref 99–110)
CO2: 26 MMOL/L (ref 21–32)
CREAT SERPL-MCNC: <0.5 MG/DL (ref 0.6–1.2)
EOSINOPHILS ABSOLUTE: 0 K/UL (ref 0–0.6)
EOSINOPHILS RELATIVE PERCENT: 0.3 %
GFR AFRICAN AMERICAN: >60
GFR NON-AFRICAN AMERICAN: >60
GLUCOSE BLD-MCNC: 91 MG/DL (ref 70–99)
HCT VFR BLD CALC: 28.8 % (ref 36–48)
HEMOGLOBIN: 9.6 G/DL (ref 12–16)
LYMPHOCYTES ABSOLUTE: 0.5 K/UL (ref 1–5.1)
LYMPHOCYTES RELATIVE PERCENT: 7.6 %
MAGNESIUM: 1.5 MG/DL (ref 1.8–2.4)
MCH RBC QN AUTO: 35.4 PG (ref 26–34)
MCHC RBC AUTO-ENTMCNC: 33.4 G/DL (ref 31–36)
MCV RBC AUTO: 106.1 FL (ref 80–100)
MONOCYTES ABSOLUTE: 0.6 K/UL (ref 0–1.3)
MONOCYTES RELATIVE PERCENT: 9.3 %
NEUTROPHILS ABSOLUTE: 5.3 K/UL (ref 1.7–7.7)
NEUTROPHILS RELATIVE PERCENT: 82.7 %
PDW BLD-RTO: 15.9 % (ref 12.4–15.4)
PHOSPHORUS: 1.9 MG/DL (ref 2.5–4.9)
PLATELET # BLD: 173 K/UL (ref 135–450)
PMV BLD AUTO: 7.3 FL (ref 5–10.5)
POTASSIUM SERPL-SCNC: 3.7 MMOL/L (ref 3.5–5.1)
RBC # BLD: 2.71 M/UL (ref 4–5.2)
SODIUM BLD-SCNC: 137 MMOL/L (ref 136–145)
TOTAL PROTEIN: 4.9 G/DL (ref 6.4–8.2)
WBC # BLD: 6.4 K/UL (ref 4–11)

## 2022-08-02 PROCEDURE — 6370000000 HC RX 637 (ALT 250 FOR IP): Performed by: STUDENT IN AN ORGANIZED HEALTH CARE EDUCATION/TRAINING PROGRAM

## 2022-08-02 PROCEDURE — C9113 INJ PANTOPRAZOLE SODIUM, VIA: HCPCS | Performed by: STUDENT IN AN ORGANIZED HEALTH CARE EDUCATION/TRAINING PROGRAM

## 2022-08-02 PROCEDURE — 2060000000 HC ICU INTERMEDIATE R&B

## 2022-08-02 PROCEDURE — 80053 COMPREHEN METABOLIC PANEL: CPT

## 2022-08-02 PROCEDURE — 6360000002 HC RX W HCPCS: Performed by: STUDENT IN AN ORGANIZED HEALTH CARE EDUCATION/TRAINING PROGRAM

## 2022-08-02 PROCEDURE — 51798 US URINE CAPACITY MEASURE: CPT

## 2022-08-02 PROCEDURE — 6360000002 HC RX W HCPCS: Performed by: INTERNAL MEDICINE

## 2022-08-02 PROCEDURE — 6370000000 HC RX 637 (ALT 250 FOR IP): Performed by: INTERNAL MEDICINE

## 2022-08-02 PROCEDURE — 2580000003 HC RX 258: Performed by: STUDENT IN AN ORGANIZED HEALTH CARE EDUCATION/TRAINING PROGRAM

## 2022-08-02 PROCEDURE — 36415 COLL VENOUS BLD VENIPUNCTURE: CPT

## 2022-08-02 PROCEDURE — 84100 ASSAY OF PHOSPHORUS: CPT

## 2022-08-02 PROCEDURE — 2500000003 HC RX 250 WO HCPCS: Performed by: INTERNAL MEDICINE

## 2022-08-02 PROCEDURE — 85025 COMPLETE CBC W/AUTO DIFF WBC: CPT

## 2022-08-02 PROCEDURE — 2580000003 HC RX 258: Performed by: INTERNAL MEDICINE

## 2022-08-02 PROCEDURE — 94760 N-INVAS EAR/PLS OXIMETRY 1: CPT

## 2022-08-02 PROCEDURE — 83735 ASSAY OF MAGNESIUM: CPT

## 2022-08-02 RX ORDER — CASTOR OIL AND BALSAM, PERU 788; 87 MG/G; MG/G
OINTMENT TOPICAL 2 TIMES DAILY
Status: DISCONTINUED | OUTPATIENT
Start: 2022-08-02 | End: 2022-08-04 | Stop reason: HOSPADM

## 2022-08-02 RX ORDER — 0.9 % SODIUM CHLORIDE 0.9 %
500 INTRAVENOUS SOLUTION INTRAVENOUS ONCE
Status: COMPLETED | OUTPATIENT
Start: 2022-08-02 | End: 2022-08-02

## 2022-08-02 RX ADMIN — MAGNESIUM SULFATE HEPTAHYDRATE 1000 MG: 10 INJECTION, SOLUTION INTRAVENOUS at 06:03

## 2022-08-02 RX ADMIN — SODIUM CHLORIDE: 9 INJECTION, SOLUTION INTRAVENOUS at 05:59

## 2022-08-02 RX ADMIN — DOXYCYCLINE HYCLATE 100 MG: 100 TABLET, FILM COATED ORAL at 08:06

## 2022-08-02 RX ADMIN — SODIUM CHLORIDE, PRESERVATIVE FREE 10 ML: 5 INJECTION INTRAVENOUS at 20:35

## 2022-08-02 RX ADMIN — Medication 40 MG: at 20:34

## 2022-08-02 RX ADMIN — FLECAINIDE ACETATE 150 MG: 50 TABLET ORAL at 08:05

## 2022-08-02 RX ADMIN — DOXYCYCLINE HYCLATE 100 MG: 100 TABLET, FILM COATED ORAL at 20:34

## 2022-08-02 RX ADMIN — SODIUM PHOSPHATE, MONOBASIC, MONOHYDRATE 11.1 MMOL: 276; 142 INJECTION, SOLUTION INTRAVENOUS at 08:05

## 2022-08-02 RX ADMIN — FLECAINIDE ACETATE 150 MG: 50 TABLET ORAL at 20:34

## 2022-08-02 RX ADMIN — CASTOR OIL AND BALSAM, PERU: 788; 87 OINTMENT TOPICAL at 20:35

## 2022-08-02 RX ADMIN — SODIUM CHLORIDE 500 ML: 9 INJECTION, SOLUTION INTRAVENOUS at 08:21

## 2022-08-02 RX ADMIN — SODIUM CHLORIDE: 9 INJECTION, SOLUTION INTRAVENOUS at 18:46

## 2022-08-02 RX ADMIN — CASTOR OIL AND BALSAM, PERU: 788; 87 OINTMENT TOPICAL at 13:54

## 2022-08-02 RX ADMIN — CEFDINIR 300 MG: 300 CAPSULE ORAL at 08:05

## 2022-08-02 RX ADMIN — Medication 40 MG: at 08:06

## 2022-08-02 RX ADMIN — MAGNESIUM SULFATE HEPTAHYDRATE 3000 MG: 500 INJECTION, SOLUTION INTRAMUSCULAR; INTRAVENOUS at 09:49

## 2022-08-02 RX ADMIN — SODIUM CHLORIDE, PRESERVATIVE FREE 10 ML: 5 INJECTION INTRAVENOUS at 08:08

## 2022-08-02 RX ADMIN — CEFDINIR 300 MG: 300 CAPSULE ORAL at 20:34

## 2022-08-02 RX ADMIN — METOPROLOL SUCCINATE 25 MG: 25 TABLET, EXTENDED RELEASE ORAL at 08:06

## 2022-08-02 ASSESSMENT — PAIN SCALES - GENERAL
PAINLEVEL_OUTOF10: 0

## 2022-08-02 ASSESSMENT — PAIN SCALES - WONG BAKER
WONGBAKER_NUMERICALRESPONSE: 0

## 2022-08-02 NOTE — PLAN OF CARE
Plan for left distal radius ORIF tomorrow at 1145am per DR Keysha Hussein  I spoke to Raven, pts daughter, re plan for surgery and she agrees to proceed.

## 2022-08-02 NOTE — PROGRESS NOTES
Hospital Medicine Progress Note     Date:  8/2/2022    PCP: Karen López MD (Tel: [phone])    Date of Admission: 7/29/2022    Chief complaint:   Chief Complaint   Patient presents with    Atrial Fibrillation    Fall     One week ago    Altered Mental Status       Brief admission history: 74-year-old female with history of well-controlled diabetes type 2, macrocytic anemia, GERD, anxiety and depression, fibromyalgia, obstructive sleep apnea, history of rectal cancer, who was admitted on 7/29/2022 with septic shock (requiring pressor support) complicated by lower GI bleed and atrial fibrillation with rapid ventricular response. Assessment/plan:  Septic shock secondary to pneumonia, bacterial pneumonia (likely Gram positive bacterial pneumonia). Fluid resuscitated on admission. Continue antibiotics (currently on doxycycline and Rocephin). Weaned off pressor as of 8/1/2022. Critical care on board and assisting with management. Atrial fibrillation with rapid ventricular response. Likely complicated by hemodynamic issues related to sepsis and septic shock. Continue beta-blocker. She was on amiodarone infusion which has since been changed to flecainide as of 7/31/2022. Cardiology on board and assisting with management. Currently not on full dose anticoagulation due to GI bleed. Acute lower GI bleed secondary to stercoral proctitis as noted on CT. Per GI recommendation, not a candidate for endoscopic evaluation at this time. Monitor hemoglobin closely. Acute blood loss anemia secondary to GI bleed. Currently does not require blood transfusion. Monitor hemoglobin closely. Acute metabolic encephalopathy, much improved. Etiology is multifactorial, likely secondary to underlying infectious process, sepsis. Continue to monitor mental status closely. Nondisplaced fracture distal radial metaphysis, present on admission. Orthopedic surgery planning possible OR on 8/2/2022.   Right hydroureteronephrosis, mild to moderate. Noted on CT-abdomen/pelvis obtained on 7/29/2022 (result updated on 8/1/2022). Follow up renal ultrasound was unremarkable on 8/1/2022. She has been evaluated by urology service. Nonzero troponin, not due to ACS. Likely secondary to atrial fibrillation with rapid ventricular response and septic shock  Acute kidney injury, present on admission. This has since resolved following fluid resuscitation. Fluid, electrolyte and nutrition. Patient continues to have decreased urine output. Secondary to decreased oral intake. Will continue intravenous fluid for now. Electrolytes being replaced. Disposition. Will need SNF at discharge. Could be ready for discharge later this week. Diet: ADULT DIET; Regular  Diet NPO Exceptions are: Sips of Water with Meds    Code status: Full Code   ----------  Subjective  Feels generally weak. Still with decreased urine output. Possibly going to OR for left wrist ORIF today. Objective  Physical exam:  Vitals: /61   Pulse 78   Temp 97.4 °F (36.3 °C) (Oral)   Resp 15   Wt 157 lb 13.6 oz (71.6 kg)   SpO2 96%   BMI 28.87 kg/m²   Gen/overall appearance: Not in acute distress. Alert. Oriented X3. Appears generally weak. Head: Normocephalic, atraumatic  Eyes: EOMI, good acuity  ENT: Oral mucosa moist  Neck: No JVD, thyromegaly  CVS: Nml S1S2, no MRG, RRR  Pulm: Clear bilaterally. No crackles/wheezes  Gastrointestinal: Soft, NT/ND, +BS  Musculoskeletal: No edema. Warm  Neuro: No focal deficit. Moves extremity spontaneously. Psychiatry: Appropriate affect. Not agitated. Skin: Warm, dry with normal turgor. No rash  Capillary refill: Brisk,< 3 seconds   Peripheral Pulses: +2 palpable, equal bilaterally      24HR INTAKE/OUTPUT:    Intake/Output Summary (Last 24 hours) at 8/2/2022 0809  Last data filed at 8/2/2022 0553  Gross per 24 hour   Intake 1645.51 ml   Output 720 ml   Net 925.51 ml       I/O last 3 completed shifts:   In: 1645.5

## 2022-08-02 NOTE — ANESTHESIA PRE PROCEDURE
Department of Anesthesiology  Preprocedure Note       Name:  Dustin Hodgkins   Age:  65 y.o.  :  1945                                          MRN:  [id number]         Date:  8/3/2022      Surgeon: Megan Byrne):  Luis Gill MD    Procedure: Procedure(s):  OPEN REDUCTION INTERNAL FIXATION LEFT DISTAL RADIUS    Medications prior to admission:   Prior to Admission medications    Medication Sig Start Date End Date Taking? Authorizing Provider   ALPRAZolam Villegas Latus) 0.5 MG tablet Take 0.5 mg by mouth 3 times daily as needed for Sleep. Yes Historical Provider, MD   apixaban (ELIQUIS) 5 MG TABS tablet Take 1 tablet by mouth in the morning and 1 tablet before bedtime.  22   Geraldo Loredo MD   escitalopram (LEXAPRO) 10 MG tablet Take 1 tablet by mouth daily 22   Gali Saxena MD   tiZANidine (ZANAFLEX) 2 MG tablet Take 1 tablet by mouth every 6 hours as needed (muscle pain) 22   Gali Saxena MD   metoprolol succinate (TOPROL XL) 100 MG extended release tablet TAKE ONE TABLET BY MOUTH DAILY 22   Gali Saxena MD   pantoprazole (PROTONIX) 40 MG tablet TAKE ONE TABLET BY MOUTH TWICE A DAY 22   Gali Saxena MD   potassium chloride (KLOR-CON M20) 20 MEQ extended release tablet Take 1 tablet by mouth daily 22   Gali Saxena MD   dicyclomine (BENTYL) 10 MG capsule Take 1 capsule by mouth 4 times daily as needed (cramping/diarrhea) 22   Gali Saxena MD   flecainide (TAMBOCOR) 100 MG tablet TAKE 1 TABLET BY MOUTH 2 TIMES A DAY 22   Geraldo Loredo MD   furosemide (LASIX) 20 MG tablet Take 1 tablet by mouth 2 times daily With an additional 20 mg on MWF 22   Geraldo Loredo MD   Cyanocobalamin (VITAMIN B 12 PO) Take 2,500 mg by mouth daily    Historical Provider, MD   calcium carbonate-vitamin D (CALCIUM 600+D) 600-200 MG-UNIT TABS Take 1 tablet by mouth 2 times daily    Historical Provider, MD       Current medications:    Current Facility-Administered Medications   Medication Dose Route Frequency Provider Last Rate Last Admin    ceFAZolin (ANCEF) 2,000 mg in dextrose 5 % 50 mL IVPB (mini-bag)  2,000 mg IntraVENous On Call to MUSA Bowser CNP        potassium phosphate 20 mmol in dextrose 5 % 250 mL IVPB  20 mmol IntraVENous Once Wendie Argueta MD 62.5 mL/hr at 08/03/22 0910 20 mmol at 08/03/22 0910    sodium chloride flush 0.9 % injection 5-40 mL  5-40 mL IntraVENous 2 times per day Moreno Lo MD   10 mL at 08/03/22 1035    sodium chloride flush 0.9 % injection 5-40 mL  5-40 mL IntraVENous PRN Moreno Lo MD        0.9 % sodium chloride infusion   IntraVENous PRN Moreno Lo MD        fentaNYL (SUBLIMAZE) injection 25 mcg  25 mcg IntraVENous Q5 Min PRN Moreno Lo MD        fentaNYL (SUBLIMAZE) injection 50 mcg  50 mcg IntraVENous Q5 Min PRN Moreno Lo MD        [facility] 4 mg  4 mg IntraVENous Once PRN Moreno Lo MD        ipratropium-albuterol (DUONEB) nebulizer solution 1 ampule  1 ampule Inhalation Once PRN Moreno Lo MD        Venelex ointment   Topical BID Wendie Argueta MD   Given at 08/03/22 0911    docusate sodium (COLACE) capsule 100 mg  100 mg Oral BID Wendie Argueta MD   100 mg at 08/01/22 2122    cefdinir (OMNICEF) capsule 300 mg  300 mg Oral 2 times per day [place], DO   300 mg at 08/03/22 4465    doxycycline hyclate (VIBRA-TABS) tablet 100 mg  100 mg Oral 2 times per day O[place]phant Calvin, DO   100 mg at 08/03/22 0926    0.9 % sodium chloride infusion   IntraVENous Continuous Wendie Argueta  mL/hr at 08/03/22 0646 Rate Verify at 08/03/22 0646    flecainide (TAMBOCOR) tablet 150 mg  150 mg Oral 2 times per day Rickey Cervantes MD   150 mg at 08/03/22 0932    potassium chloride (KLOR-CON M) extended release tablet 40 mEq  40 mEq Oral PRN Wendie Argueta MD   40 mEq at 08/03/22 0644    Or    potassium bicarb-citric acid (EFFER-K) effervescent tablet 40 mEq  40 mEq Oral PRN Debby Allen MD        Or    potassium chloride 10 mEq/100 mL IVPB (Peripheral Line)  10 mEq IntraVENous PRN Debby Allen MD        magnesium sulfate 1000 mg in dextrose 5% 100 mL IVPB  1,000 mg IntraVENous PRN Debby Allen MD   Stopped at 08/02/22 0703    sodium phosphate 11.1 mmol in sodium chloride 0.9 % 250 mL IVPB  0.16 mmol/kg IntraVENous PRN Debby Allen MD 62.5 mL/hr at 08/03/22 0738 11.1 mmol at 08/03/22 0738    Or    sodium phosphate 22.2 mmol in sodium chloride 0.9 % 250 mL IVPB  0.32 mmol/kg IntraVENous PRN Debby Allen MD        metoprolol succinate (TOPROL XL) extended release tablet 25 mg  25 mg Oral Daily Ravinder  Tab, DO   25 mg at 08/03/22 0926    sodium chloride flush 0.9 % injection 5-40 mL  5-40 mL IntraVENous 2 times per day [facility]   10 mL at 08/02/22 2035    sodium chloride flush 0.9 % injection 5-40 mL  5-40 mL IntraVENous PRN [facility]        0.9 % sodium chloride infusion   IntraVENous PRN Ravinder Demarco Donovan, DO 5 mL/hr at 08/03/22 0646 Rate Verify at 08/03/22 0646    ondansetron (ZOFRAN-ODT) disintegrating tablet 4 mg  4 mg Oral Q8H PRN Ravinder  Tab, DO        Or    ondansetron (ZOFRAN) injection 4 mg  4 mg IntraVENous Q6H PRN Ravinder  Tab, DO        polyethylene glycol (GLYCOLAX) packet 17 g  17 g Oral Daily PRN Ravinder  Tab, DO        acetaminophen (TYLENOL) tablet 650 mg  650 mg Oral Q6H PRN Ravinder  Tab, DO   650 mg at 08/01/22 3673    Or    acetaminophen (TYLENOL) suppository 650 mg  650 mg Rectal Q6H PRN Ravinder  Tab, DO        glucose chewable tablet 16 g  4 tablet Oral PRN Ravinder Barth, DO        dextrose bolus 10% 125 mL  125 mL IntraVENous PRN Ravinder Barth DO        Or    dextrose bolus 10% 250 mL  250 mL IntraVENous PRN Ravinder Barth DO        glucagon (rDNA) injection 1 mg  1 mg SubCUTAneous PRN Ravinder M DO Tab        dextrose 10 % infusion   IntraVENous Continuous PRN Ravinder NEPTALI Barth DO        pantoprazole (PROTONIX) 40 mg in sodium chloride (PF) 10 mL injection  40 mg IntraVENous Q12H Ravinder NEPTALI Barth DO   40 mg at 08/03/22 1030     Facility-Administered Medications Ordered in Other Encounters   Medication Dose Route Frequency Provider Last Rate Last Admin    0.9 % sodium chloride infusion   IntraVENous Continuous PRN Angelita March, APRN - CRNA   New Bag at 08/03/22 1034       Allergies: Allergies   Allergen Reactions    Demerol Hcl [Meperidine] Other (See Comments)     PATIENT STATES SHE GOT VERY ANXIOUS-LIKE SHE WAS CLIMBING THE WALLS    Pcn [Penicillins]      Patient reports she has not had since she was a child, thinks reaction was hives.      Adhesive Tape Rash       Problem List:    Patient Active Problem List   Diagnosis Code    Symptomatic anemia D64.9    A-fib ([facility]) I48.91    Aortic valve disorder I35.9    Arthropathy M12.9    Cervical spondylosis without myelopathy M47.812    DDD (degenerative disc disease), cervical M50.30    DDD (degenerative disc disease), lumbosacral M51.37    Essential hypertension I10    Malignant neoplasm of colon ([facility] [address] C18.9    Spinal stenosis in cervical region M48.02    Spinal stenosis, lumbar M48.061    Fibromyalgia M79.7    Gastroesophageal reflux disease without esophagitis K21.9    Lumbar radicular pain M54.16    Pedal edema R60.0    Chronic intractable headache R51.9, G89.29    Esophageal candidiasis ([facility]) B37.81    History of gastric bypass Z98.84    Prediabetes R73.03    Age-related osteoporosis without current pathological fracture M81.0    Essential tremor G25.0    Chronic fatigue R53.82    Irritable bowel syndrome with diarrhea K58.0    Borborygmi R19.8    Constipation K59.00    Anxiety F41.9    Weakness R53.1    Age-related physical debility R48    JENNIFER (generalized anxiety disorder) F41.1    Shock (HCC) R57.9    Atrial fibrillation with rapid ventricular response (HCC) I48.91    DM2 (diabetes mellitus, type 2) (HCC) E11.9    GI bleed K92.2    MAX (acute kidney injury) ([facility] [address] N17.9    Community acquired pneumonia of left lower lobe of lung J18.9    Hypoxia R09.02    Pneumonia due to infectious organism J18.9    Closed fracture of left distal radius S52.502A       Past Medical History:        Diagnosis Date    Acid reflux     Anxiety     Arthritis     Atrial fibrillation (HCC)     Closed fracture dislocation of right elbow     Depression     Fibromyalgia     Migraine     WHITNEY (obstructive sleep apnea)     Diagnosed years ago, no CPAP    Rectal cancer (HCC)     Wears glasses     DRIVING       Past Surgical History:        Procedure Laterality Date    APPENDECTOMY      CERVICAL DISCECTOMY      ACDF     SECTION      x4    CHOLECYSTECTOMY      COLON SURGERY      Rectal cancer removed using  incision    COLONOSCOPY      SEVERAL    COLONOSCOPY N/A 2019    COLONOSCOPY POLYPECTOMY SNARE/COLD BIOPSY performed by Hans Hayden MD at [address] N/A 2021    COLONOSCOPY WITH BIOPSY performed by Marissa Byrne MD at [address] [place] [address] Right 2021    RIGHT LEG EVACUATION OF HEMATOMA performed by Anam Treviño MD at [address] [place] [address] Right 10/25/2018    OPEN REDUCTION INTERNAL FIXATION RIGHT FEMUR SUPRACONDYLAR FEMORAL FRACTURE WITH C-ARM performed by Stephanie Yuan MD at [address] [place] [address] Right 2021    RIGHT LEG HEMATOMA EVAKUATION, DEBRIDEMENT, AND WOUND VAC PLACEMENT performed by Fly Mckeon MD at [address] [place] [address] Right 2021    RIGHT LOWER EXTREMITY SKIN GRAFT performed by Fly Mckeon MD at [address] Left     UPPER GASTROINTESTINAL ENDOSCOPY N/A 2019    ESOPHAGOGASTRODUODENOSCOPY performed by Amaury James Mustapha Sousa MD at [address] N/A 8/18/2020    EGD BIOPSY performed by Estrella Francisco MD at [facility] History:    Social History     Tobacco Use    Smoking status: Never    Smokeless tobacco: Never   Substance Use Topics    Alcohol use: No                                Counseling given: Not Answered      Vital Signs (Current):   Vitals:    08/03/22 0800 08/03/22 0829 08/03/22 1024 08/03/22 1053   BP: 127/62   (!) 142/68   Pulse: 75 73 78 73   Resp: 15 15 14 20   Temp: 98.5 °F (36.9 °C)   97.1 °F (36.2 °C)   TempSrc: Oral   Temporal   SpO2: 94% 95% 100% 98%   Weight:                                                  BP Readings from Last 3 Encounters:   08/03/22 (!) 142/68   07/23/22 (!) 179/90   06/27/22 132/84       NPO Status: Time of last liquid consumption: 0800                        Time of last solid consumption: 2000                        Date of last liquid consumption: 08/03/22                        Date of last solid food consumption: 08/02/22    BMI:   Wt Readings from Last 3 Encounters:   08/03/22 157 lb 3 oz (71.3 kg)   07/23/22 166 lb 0.1 oz (75.3 kg)   06/27/22 167 lb 6.4 oz (75.9 kg)     Body mass index is 28.75 kg/m².     CBC:   Lab Results   Component Value Date/Time    WBC 6.6 08/03/2022 03:32 AM    RBC 2.63 08/03/2022 03:32 AM    HGB 9.5 08/03/2022 03:32 AM    HCT 27.7 08/03/2022 03:32 AM    .5 08/03/2022 03:32 AM    RDW 16.4 08/03/2022 03:32 AM     08/03/2022 03:32 AM       CMP:   Lab Results   Component Value Date/Time     08/03/2022 03:32 AM    K 3.3 08/03/2022 03:32 AM    K 3.5 02/12/2021 05:55 AM     08/03/2022 03:32 AM    CO2 24 08/03/2022 03:32 AM    BUN 7 08/03/2022 03:32 AM    CREATININE <0.5 08/03/2022 03:32 AM    GFRAA >60 08/03/2022 03:32 AM    AGRATIO 1.0 08/03/2022 03:32 AM    LABGLOM >60 08/03/2022 03:32 AM    GLUCOSE 87 08/03/2022 03:32 AM    PROT 4.7 08/03/2022 03:32 AM    CALCIUM 7.8 08/03/2022 03:32 AM    BILITOT 0.3 08/03/2022 03:32 AM    ALKPHOS 53 08/03/2022 03:32 AM    AST 12 08/03/2022 03:32 AM    ALT 10 08/03/2022 03:32 AM       POC Tests:   Recent Labs     07/31/22  1408   POCGLU 72       Coags:   Lab Results   Component Value Date/Time    PROTIME 16.3 07/30/2022 05:35 AM    INR 1.31 07/30/2022 05:35 AM    APTT 31.5 06/02/2018 11:29 PM       HCG (If Applicable): No results found for: PREGTESTUR, PREGSERUM, HCG, HCGQUANT     ABGs: No results found for: PHART, PO2ART, LJF4UVR, OXH0ZTR, BEART, A1NNMLZK     Type & Screen (If Applicable):  No results found for: LABABO, LABRH    Drug/Infectious Status (If Applicable):  No results found for: HIV, HEPCAB    COVID-19 Screening (If Applicable):   Lab Results   Component Value Date/Time    COVID19 Not Detected 02/17/2021 05:55 AM    COVID19 Not Detected 08/12/2020 12:39 PM           Anesthesia Evaluation  Patient summary reviewed and Nursing notes reviewed no history of anesthetic complications:   Airway: Mallampati: III  TM distance: >3 FB     Mouth opening: > = 3 FB   Dental:    (+) poor dentition  Comment: Denies loose teeth    Pulmonary:   (+) pneumonia (current admission, appears improving):  sleep apnea:  rhonchi:bilateral decreased breath sounds (unable to auscultate breath sounds on anteior lung fields)     (-) COPD, asthma and not a current smoker                           Cardiovascular:  Exercise tolerance: poor (<4 METS), Wheelchair prior to admission  (+) hypertension:, dysrhythmias (Afib with RVR, current admission -> amiodarone gtt): atrial fibrillation, CEDENO:, peripheral edema (pneumatic compression device in situ), pulmonary hypertension:, hyperlipidemia    (-) orthopnea        Rate: normal           Beta Blocker:  Dose within 24 Hrs (metoprolol)      ROS comment: EKG:  Atrial fibrillation with rapid ventricular response with premature ventricular or aberrantly conducted complexes   Left axis deviation   Nonspecific ST abnormality    Echocardiogram:   Summary:  Left ventricular cavity size is normal. There is mild concentric left ventricular hypertrophy. Left ventricular function is hyperdynamic with ejection fraction estimated at >70%. Regional wall motion abnormalities cannot be excluded due to an arrhythmia. . Diastolic function cannot be assessed due to an arrhythmia. The left atrium is severely dilated. Aortic valve appears sclerotic but opens adequately. The aortic valve   appears trileaflet. Mild-to-moderate aortic regurgitation is present. No evidence of aortic valve stenosis. The right ventricle is normal in size and function. TAPSE measures 1.71 cm. RV S velocity measures 16.2 cm/s. No pericardial effusion noted. IVC size is dilated (>2.1 cm) and collapses < 50% with respiration consistent with elevated RA pressure (15 mmHg). Estimated pulmonary artery systolic pressure is at 52 mmHg assuming a right atrial pressure of 15 mmHg. PE comment: Blood pressures stable in ICU, no pressors   Neuro/Psych:   (+) neuromuscular disease:, headaches: migraine headaches, depression/anxiety    (-) seizures, TIA, CVA and psychiatric history            ROS comment: Chronic pain: fibromyalgia  Chronic neck and back pain s/p ACDF GI/Hepatic/Renal:   (+) GERD:,      (-) liver disease, no renal disease and no morbid obesity (BMI: 28.8 s/p gastric bypass)       Endo/Other:    (+) DiabetesType II DM, , blood dyscrasia (Eliquis, macrocytic anemia (9.6/28.8)): anticoagulation therapy and anemia:., electrolyte abnormalities, . ROS comment: Generalized weakness prior to admission Abdominal:         (-) obese Abdomen: soft. Vascular:           ROS comment: LE Traumatic Hematoma s/p Skin Graft 2/17/2022. Other Findings: Duoneb ordered. Anesthesia Plan      general     ASA 3     (72 yo F with PMHx of afib, HTN, fragility presents for distal radius fracture repair. Poor historian at baseline. NPO overnight.  Boarding in ICU, recorded vitals stable. I discussed with the patient the risks and benefits to general anesthesia including possible anesthetic complications but not limited to: PONV, damage to the airway and surrounding structures (teeth, lips, gums, tongue, etc.), adverse reactions to medications, cardiac complications (MI, CHF, arrhythmias, etc.), respiratory complications (post-op ventilation, respiratory failure, etc.), neurologic complications (nerve damage, stroke, seizure), the potential for conversion to a general anesthetic, and death. The patient was given the opportunity to ask questions and all questions were answered to the patient's satisfaction.  )  Induction: intravenous. MIPS: Postoperative opioids intended and Prophylactic antiemetics administered. Anesthetic plan and risks discussed with patient. Use of blood products discussed with patient whom consented to blood products. Plan discussed with CRNA. This pre-anesthesia assessment may be used as a history and physical.    DOS STAFF ADDENDUM:    Pt seen and examined, chart reviewed (including anesthesia, drug and allergy history). No interval changes to history and physical examination. Anesthetic plan, risks, benefits, alternatives, and personnel involved discussed with patient. Patient verbalized an understanding and agrees to proceed.       Stefani Menjivar MD  August 3, 2022  11:11 AM

## 2022-08-02 NOTE — CARE COORDINATION
8/2 bandar / [facility] # [phone]-- they can accept patient at discharge - will follow.   Informed patient's son Aldair Keating #[phone] who is agreeable to the plan  Electronically signed by Brenda Mercedes on 8/2/2022 at 11:48 AM

## 2022-08-02 NOTE — PROGRESS NOTES
Pt xray left distal radius reviewed with DR Charbel Stephen > will require ORIF. Keep NPO for now.  Would need medical clearance for possible surgery today

## 2022-08-02 NOTE — CARE COORDINATION
[address] [address] Continence Nurse  Consult Note       NAME:  Faye KENDALL  MEDICAL RECORD NUMBER:  [id number]  AGE: 65 y.o.    GENDER: female  : 1945  TODAY'S DATE:  2022    Subjective   Reason for WOCN Evaluation and Assessment: DTI, wounds POA      Carmelita Bolton is a 65 y.o. female referred by:   [] Physician  [x] Nursing  [] Other:     Wound Identification:  Wound Type: pressure  Contributing Factors: chronic pressure and decreased mobility    Wound History: Wounds POA  Current Wound Care Treatment:  N/A    Patient Goal of Care:  [x] Wound Healing  [] Odor Control  [] Palliative Care  [] Pain Control   [] Other:         PAST MEDICAL HISTORY        Diagnosis Date    Acid reflux     Anxiety     Arthritis     Atrial fibrillation (HCC)     Closed fracture dislocation of right elbow     Depression     Fibromyalgia     Migraine     WHITNEY (obstructive sleep apnea)     Diagnosed years ago, no CPAP    [facility]     Wears glasses     DRIVING       PAST SURGICAL HISTORY    Past Surgical History:   Procedure Laterality Date    APPENDECTOMY      CERVICAL DISCECTOMY      ACDF     SECTION      x4    CHOLECYSTECTOMY      COLON SURGERY      Rectal cancer removed using  incision    COLONOSCOPY      SEVERAL    COLONOSCOPY N/A 2019    COLONOSCOPY POLYPECTOMY SNARE/COLD BIOPSY performed by Naga Cardenas MD at [address] [place] [address] N/A 2021    COLONOSCOPY WITH BIOPSY performed by Siobhan Arce MD at [address] [facility] Right 2021    RIGHT LEG EVACUATION OF HEMATOMA performed by Gisel Walker MD at [address] [facility] W/O EXTENSION Right 10/25/2018    OPEN REDUCTION INTERNAL FIXATION RIGHT FEMUR SUPRACONDYLAR FEMORAL FRACTURE WITH C-ARM performed by Monico Cordova MD at [place] [address] Right 2021    RIGHT LEG HEMATOMA EVAKUATION, DEBRIDEMENT, AND WOUND VAC PLACEMENT performed by Pat Bailey MD at [facility] Right 2/17/2021    RIGHT LOWER EXTREMITY SKIN GRAFT performed by Pat Bailey MD at [address] [facility] Left     UPPER GASTROINTESTINAL ENDOSCOPY N/A 11/26/2019    ESOPHAGOGASTRODUODENOSCOPY performed by Kvng Hoffman MD at [address] [place] [address] N/A 8/18/2020    EGD BIOPSY performed by Kvng Hoffman MD at [address] [facility]    Family History   Problem Relation Age of Onset    COPD Mother        SOCIAL HISTORY    Social History     Tobacco Use    Smoking status: Never    Smokeless tobacco: Never   Vaping Use    Vaping Use: Never used   Substance Use Topics    Alcohol use: No    Drug use: Never       ALLERGIES    Allergies   Allergen Reactions    Demerol Hcl [Meperidine] Other (See Comments)     PATIENT STATES SHE GOT VERY ANXIOUS-LIKE SHE WAS CLIMBING THE WALLS    Pcn [Penicillins]      Patient reports she has not had since she was a child, thinks reaction was hives. Adhesive Tape Rash       MEDICATIONS    No current facility-administered medications on file prior to encounter. Current Outpatient Medications on File Prior to Encounter   Medication Sig Dispense Refill    ALPRAZolam (XANAX) 0.5 MG tablet Take 0.5 mg by mouth 3 times daily as needed for Sleep.      apixaban (ELIQUIS) 5 MG TABS tablet Take 1 tablet by mouth in the morning and 1 tablet before bedtime.  180 tablet 3    escitalopram (LEXAPRO) 10 MG tablet Take 1 tablet by mouth daily 90 tablet 3    tiZANidine (ZANAFLEX) 2 MG tablet Take 1 tablet by mouth every 6 hours as needed (muscle pain) 60 tablet 11    metoprolol succinate (TOPROL XL) 100 MG extended release tablet TAKE ONE TABLET BY MOUTH DAILY 90 tablet 1    pantoprazole (PROTONIX) 40 MG tablet TAKE ONE TABLET BY MOUTH TWICE A  tablet 1    potassium chloride (KLOR-CON M20) 20 MEQ extended release tablet Take 1 tablet by mouth daily 90 tablet 1    dicyclomine (BENTYL) 10 MG capsule Take 1 capsule by mouth 4 times daily as needed (cramping/diarrhea) 60 capsule 5    flecainide (TAMBOCOR) 100 MG tablet TAKE 1 TABLET BY MOUTH 2 TIMES A  tablet 3    furosemide (LASIX) 20 MG tablet Take 1 tablet by mouth 2 times daily With an additional 20 mg on  tablet 3    Cyanocobalamin (VITAMIN B 12 PO) Take 2,500 mg by mouth daily      calcium carbonate-vitamin D (CALCIUM 600+D) 600-200 MG-UNIT TABS Take 1 tablet by mouth 2 times daily         Objective    /61   Pulse 81   Temp 97.4 °F (36.3 °C) (Oral)   Resp 13   Wt 157 lb 13.6 oz (71.6 kg)   SpO2 96%   BMI 28.87 kg/m²     LABS:  WBC:    Lab Results   Component Value Date/Time    WBC 6.4 08/02/2022 04:22 AM     H/H:    Lab Results   Component Value Date/Time    HGB 9.6 08/02/2022 04:22 AM    HCT 28.8 08/02/2022 04:22 AM     PTT:    Lab Results   Component Value Date/Time    APTT 31.5 06/02/2018 11:29 PM   [APTT}  PT/INR:    Lab Results   Component Value Date/Time    PROTIME 16.3 07/30/2022 05:35 AM    INR 1.31 07/30/2022 05:35 AM     HgBA1c:    Lab Results   Component Value Date/Time    LABA1C 6.0 09/09/2021 12:45 PM       Assessment   Sami Risk Score: Sami Scale Score: 14    Patient Active Problem List   Diagnosis Code    Symptomatic anemia D64.9    A-fib (HCC) I48.91    Aortic valve disorder I35.9    Arthropathy M12.9    Cervical spondylosis without myelopathy M47.812    DDD (degenerative disc disease), cervical M50.30    DDD (degenerative disc disease), lumbosacral M51.37    Essential hypertension I10    Malignant neoplasm of colon ([address] C18.9    Spinal stenosis in cervical region M48.02    Spinal stenosis, lumbar M48.061    Fibromyalgia M79.7    Gastroesophageal reflux disease without esophagitis K21.9    Lumbar radicular pain M54.16    Pedal edema R60.0    Chronic intractable headache R51.9, G89.29    Esophageal candidiasis (HCC) B37.81    History of gastric bypass Z98.84    Prediabetes R73.03 Age-related osteoporosis without current pathological fracture M81.0    Essential tremor G25.0    Chronic fatigue R53.82    Irritable bowel syndrome with diarrhea K58.0    Borborygmi R19.8    Constipation K59.00    Anxiety F41.9    Weakness R53.1    Age-related physical debility R54    JENNIFER (generalized anxiety disorder) F41.1    Shock (HCC) R57.9    Atrial fibrillation with rapid ventricular response (HCC) I48.91    DM2 (diabetes mellitus, type 2) (HCC) E11.9    GI bleed K92.2    MAX (acute kidney injury) ([facility] N17.9    Community acquired pneumonia of left lower lobe of lung J18.9    Hypoxia R09.02    Pneumonia due to infectious organism J18.9       Measurements:  Negative Pressure Wound Therapy Leg Anterior; Lower;Right (Active)   Number of days: 531       Wound 07/30/22 Heel Left (Active)   Wound Image   08/02/22 1126   Wound Etiology Deep tissue/Injury 08/02/22 1126   Dressing Status Clean;Dry; Intact 08/02/22 0800   Wound Cleansed Cleansed with saline 08/02/22 0800   Dressing/Treatment Barrier film 08/02/22 1126   Wound Length (cm) 0.5 cm 08/02/22 1126   Wound Width (cm) 0.5 cm 08/02/22 1126   Wound Depth (cm) 0 cm 08/02/22 1126   Wound Surface Area (cm^2) 0.25 cm^2 08/02/22 1126   Wound Volume (cm^3) 0 cm^3 08/02/22 1126   Wound Assessment Purple/maroon 08/02/22 1126   Drainage Amount None 08/02/22 1126   Odor None 08/02/22 1126   Nallely-wound Assessment Intact 08/02/22 1126   Number of days: 3       Wound 07/30/22 Heel Right (Active)   Wound Image   08/02/22 1126   Wound Etiology Deep tissue/Injury 08/02/22 1126   Dressing Status Clean;Dry; Intact 08/02/22 0800   Wound Cleansed Cleansed with saline 08/02/22 0800   Dressing/Treatment Barrier film 08/02/22 1126   Wound Length (cm) 1 cm 08/02/22 1126   Wound Width (cm) 1 cm 08/02/22 1126   Wound Depth (cm) 0 cm 08/02/22 1126   Wound Surface Area (cm^2) 1 cm^2 08/02/22 1126   Wound Volume (cm^3) 0 cm^3 08/02/22 1126   Wound Assessment Purple/maroon 08/02/22 1126 Drainage Amount None 08/02/22 0800   Odor None 08/02/22 0800   Nallely-wound Assessment Intact 08/02/22 1126   Number of days: 3       Wound 07/30/22 Sacrum Mid (Active)   Wound Image   08/02/22 1126   Wound Etiology Deep tissue/Injury 08/02/22 1126   Dressing Status Clean;Dry; Intact 08/02/22 0800   Wound Cleansed Wound cleanser 08/02/22 0800   Dressing/Treatment Zinc paste 08/02/22 0800   Wound Length (cm) 5 cm 08/02/22 1126   Wound Width (cm) 10.5 cm 08/02/22 1126   Wound Depth (cm) 0 cm 08/02/22 1126   Wound Surface Area (cm^2) 52.5 cm^2 08/02/22 1126   Wound Volume (cm^3) 0 cm^3 08/02/22 1126   Wound Assessment Purple/maroon;Pink/red 08/02/22 1126   Drainage Amount Scant 08/02/22 1126   Drainage Description Serosanguinous 08/02/22 1126   Odor None 08/02/22 1126   Nallely-wound Assessment Fragile;Denuded 08/02/22 1126   Number of days: 3     Incision 01/28/21 Pretibial Right;Lateral (Active)   Number of days: 550                 Pt currently in ICU on specialty bed. Pt has DTI to R and L heel. Large DTI to sacrum. Plan   Plan of Care:   Sacrum: venelex barrier 2x daily and PRN: BLUE BARRIER WIPES ONLY, NO BRIEFS  -turn and reposition every 2 hours  -chair cushion, encourage to reposition self frequently while in chair  -elevate heels, apply liquid barrier film twice daily; HEEL PROTECTORS    Specialty Bed Required : Yes   [x] Low Air Loss   [] Pressure Redistribution  [] Fluid Immersion  [] Bariatric  [] Total Pressure Relief  [] Other:     Current Diet: ADULT DIET;  Regular  Diet NPO Exceptions are: Sips of Water with Meds  Dietician consult:  Yes    Discharge Plan:  Placement for patient upon discharge: skilled nursing    Patient appropriate for Outpatient [address]: No    Referrals:  []   [] [address] [facility]  [] Supplies  [] Other    Patient/Caregiver Teaching:  Level of patient/caregiver understanding able to:   [] Indicates understanding       [] Needs reinforcement  [] Unsuccessful [x] Verbal Understanding  [] Demonstrated understanding       [] No evidence of learning  [] Refused teaching         [] N/A       Electronically signed by Fam Munoz RN, CWOCN on 8/2/2022 at 11:28 AM

## 2022-08-02 NOTE — PLAN OF CARE
[address] [facility] [address] Surgery      T  History Obtained From:  patient, electronic medical record    HISTORY OF PRESENT ILLNESS:    The patient is a 65 y.o. female who presents with left wrist pain after a fall about a week ago. Noted with left distal radius fx in ER on  and splinted and DC. Pt then developed Afib and confusion and was brought to ER She has been stabilized and ortho is asked to see pt regarding her left wrist fx as she was unable to keep her orthopedic appt with Dr Corwin Verduzco. Pain is described in left wrist  and with the intensity of moderate. Pain is described as aching. Discomfort is intermittent. She is alert and oriented. Family at bedside. Pt denies other complaints.      Past Medical History:        Diagnosis Date    Acid reflux     Anxiety     Arthritis     Atrial fibrillation (HCC)     Closed fracture dislocation of right elbow     Depression     Fibromyalgia     Migraine     WHITNEY (obstructive sleep apnea)     Diagnosed years ago, no CPAP    Rectal [facility] ([facility]     Wears glasses     DRIVING       Past Surgical History:        Procedure Laterality Date    APPENDECTOMY      CERVICAL DISCECTOMY      ACDF     SECTION      x4    CHOLECYSTECTOMY      COLON SURGERY      Rectal cancer removed using  incision    COLONOSCOPY      SEVERAL    COLONOSCOPY N/A 2019    COLONOSCOPY POLYPECTOMY SNARE/COLD BIOPSY performed by Naga Cardenas MD at [address] [place] [address] N/A 2021    COLONOSCOPY WITH BIOPSY performed by Siobhan Arce MD at [address] [facility] Right 2021    RIGHT LEG EVACUATION OF HEMATOMA performed by Gisel Walker MD at [address] [facility] W/O EXTENSION Right 10/25/2018    OPEN REDUCTION INTERNAL FIXATION RIGHT FEMUR SUPRACONDYLAR FEMORAL FRACTURE WITH C-ARM performed by Monico Cordova MD at [place] [address] Right 2021    RIGHT LEG HEMATOMA EVAKUATION, [address] [place] [address], AND WOUND VAC PLACEMENT performed by Chandana Gilliam MD at [facility] Right 2/17/2021    RIGHT LOWER EXTREMITY SKIN GRAFT performed by Chandana Gilliam MD at [address] [place] [address] 83 Left     UPPER GASTROINTESTINAL ENDOSCOPY N/A 11/26/2019    ESOPHAGOGASTRODUODENOSCOPY performed by Oh Bailey MD at [address] N/A 8/18/2020    EGD BIOPSY performed by Oh Bailey MD at [address] History     Tobacco Use    Smoking status: Never    Smokeless tobacco: Never   Substance Use Topics    Alcohol use: No       Family History   Problem Relation Age of Onset    COPD Mother            Current Medications:   Current Facility-Administered Medications: magnesium sulfate 3,000 mg in dextrose 5 % 100 mL IVPB, 3,000 mg, IntraVENous, Once  Venelex ointment, , Topical, BID  docusate sodium (COLACE) capsule 100 mg, 100 mg, Oral, BID  cefdinir (OMNICEF) capsule 300 mg, 300 mg, Oral, 2 times per day  doxycycline hyclate (VIBRA-TABS) tablet 100 mg, 100 mg, Oral, 2 times per day  0.9 % sodium chloride infusion, , IntraVENous, Continuous  flecainide (TAMBOCOR) tablet 150 mg, 150 mg, Oral, 2 times per day  potassium chloride (KLOR-CON M) extended release tablet 40 mEq, 40 mEq, Oral, PRN **OR** potassium bicarb-citric acid (EFFER-K) effervescent tablet 40 mEq, 40 mEq, Oral, PRN **OR** potassium chloride 10 mEq/100 mL IVPB (Peripheral Line), 10 mEq, IntraVENous, PRN  magnesium sulfate 1000 mg in dextrose 5% 100 mL IVPB, 1,000 mg, IntraVENous, PRN  sodium phosphate 11.1 mmol in sodium chloride 0.9 % 250 mL IVPB, 0.16 mmol/kg, IntraVENous, PRN **OR** sodium phosphate 22.2 mmol in sodium chloride 0.9 % 250 mL IVPB, 0.32 mmol/kg, IntraVENous, PRN  metoprolol succinate (TOPROL XL) extended release tablet 25 mg, 25 mg, Oral, Daily  sodium chloride flush 0.9 % injection 5-40 mL, 5-40 mL, IntraVENous, 2 times per day  sodium chloride flush 0.9 % injection 5-40 mL, 5-40 mL, IntraVENous, PRN  0.9 % sodium chloride infusion, , IntraVENous, PRN  ondansetron (ZOFRAN-ODT) disintegrating tablet 4 mg, 4 mg, Oral, Q8H PRN **OR** ondansetron (ZOFRAN) injection 4 mg, 4 mg, IntraVENous, Q6H PRN  polyethylene glycol (GLYCOLAX) packet 17 g, 17 g, Oral, Daily PRN  acetaminophen (TYLENOL) tablet 650 mg, 650 mg, Oral, Q6H PRN **OR** acetaminophen (TYLENOL) suppository 650 mg, 650 mg, Rectal, Q6H PRN  glucose chewable tablet 16 g, 4 tablet, Oral, PRN  dextrose bolus 10% 125 mL, 125 mL, IntraVENous, PRN **OR** dextrose bolus 10% 250 mL, 250 mL, IntraVENous, PRN  glucagon (rDNA) injection 1 mg, 1 mg, SubCUTAneous, PRN  dextrose 10 % infusion, , IntraVENous, Continuous PRN  pantoprazole (PROTONIX) 40 mg in sodium chloride (PF) 10 mL injection, 40 mg, IntraVENous, Q12H  Allergies:  ALLERGY@    REVIEW OF SYSTEMS:    CONSTITUTIONAL:  negative for  fevers, chills, and fatigue  MUSCULOSKELETAL:  positive for  myalgias, arthralgias, and pain  All other ROS reviewed in chart or with patient or family and are grossly negative. PHYSICAL EXAM:    VITALS:  /61   Pulse 81   Temp 97.4 °F (36.3 °C) (Oral)   Resp 13   Wt 157 lb 13.6 oz (71.6 kg)   SpO2 96%   BMI 28.87 kg/m²     MUSCULOSKELETAL:  Left fingers bruised and mildly swollen, warm to touch. Able to wiggle all left fingers and thumb. Brisk cap refill is noted,.    NEUROLOGIC:   Sensory:    Touch:                                      Left Upper Extremity:  normal                  Skin warm and dry  Resp deep and easy  Abdomen soft and round  Pulse is with regular rate and rhythm    DATA:    CBC:   Lab Results   Component Value Date/Time    WBC 6.4 08/02/2022 04:22 AM    RBC 2.71 08/02/2022 04:22 AM    HGB 9.6 08/02/2022 04:22 AM    HCT 28.8 08/02/2022 04:22 AM    .1 08/02/2022 04:22 AM    MCH 35.4 08/02/2022 04:22 AM    MCHC 33.4 08/02/2022 04:22 AM    RDW 15.9 08/02/2022 04:22 AM     08/02/2022 04:22 AM    MPV 7.3 08/02/2022 04:22 AM     WBC:    Lab Results   Component Value Date/Time    WBC 6.4 08/02/2022 04:22 AM     PT/INR:    Lab Results   Component Value Date/Time    PROTIME 16.3 07/30/2022 05:35 AM    INR 1.31 07/30/2022 05:35 AM     PTT:    Lab Results   Component Value Date/Time    APTT 31.5 06/02/2018 11:29 PM   [APTT  Radiology Review:    Narrative   EXAMINATION:   3 XRAY VIEWS OF THE LEFT WRIST       8/1/2022 4:50 pm       COMPARISON:   07/23/2022. HISTORY:   ORDERING SYSTEM PROVIDED HISTORY: post fx, eval   TECHNOLOGIST PROVIDED HISTORY:   Reason for exam:->post fx, eval   Reason for Exam: FX eval. Cast in place       FINDINGS:   There has been interval placement of the splint material overlying the left   wrist obscuring fine bony details. Compared to prior study, there appears to   be slight increased degree of impaction and lateral displacement of the   distal fracture fragment. The alignment of the carpal bones appear near   anatomic. The bones appear demineralized. Impression   Interval placement of the splint material overlying the left wrist obscuring   fine bony details. Suggestion of slight worsening lateral displacement and   impaction of the fracture fragments when compared to the prior study. The bones appear demineralized. IMPRESSION/RECOMMENDATIONS:    Fall one week ago from her bed  Left wrist pain   Left distal radius fx with displacement  Plan ORIF left distal  radius per Dr Jana Jones tomorrow  NPO after MN  Updated daughter by phone.    Discussed with Dr Clarice Alarcon, MUSA - CNP  8/2/2022  11:36 AM

## 2022-08-02 NOTE — PROGRESS NOTES
[facility]    Patient is having diarrheal stools, primarily brown today  Hgb stable  CT scan shows stercoral proctitis consistent with constipation/fecal impaction  To the OR tomorrow with Dr. Miguelito Rivas  After this, we can pursue a more aggressive bowel regimen  No plans for any endoscopic evaluation at this time

## 2022-08-03 ENCOUNTER — APPOINTMENT (OUTPATIENT)
Dept: GENERAL RADIOLOGY | Age: 77
DRG: 853 | End: 2022-08-03
Payer: MEDICARE

## 2022-08-03 ENCOUNTER — ANESTHESIA (OUTPATIENT)
Dept: OPERATING ROOM | Age: 77
DRG: 853 | End: 2022-08-03
Payer: MEDICARE

## 2022-08-03 PROBLEM — S52.502A CLOSED FRACTURE OF LEFT DISTAL RADIUS: Status: ACTIVE | Noted: 2022-08-03

## 2022-08-03 LAB
A/G RATIO: 1 (ref 1.1–2.2)
ALBUMIN SERPL-MCNC: 2.3 G/DL (ref 3.4–5)
ALP BLD-CCNC: 53 U/L (ref 40–129)
ALT SERPL-CCNC: 10 U/L (ref 10–40)
ANION GAP SERPL CALCULATED.3IONS-SCNC: 10 MMOL/L (ref 3–16)
AST SERPL-CCNC: 12 U/L (ref 15–37)
BASOPHILS ABSOLUTE: 0 K/UL (ref 0–0.2)
BASOPHILS RELATIVE PERCENT: 0.1 %
BILIRUB SERPL-MCNC: 0.3 MG/DL (ref 0–1)
BLOOD CULTURE, ROUTINE: NORMAL
BUN BLDV-MCNC: 7 MG/DL (ref 7–20)
CALCIUM SERPL-MCNC: 7.8 MG/DL (ref 8.3–10.6)
CHLORIDE BLD-SCNC: 104 MMOL/L (ref 99–110)
CO2: 24 MMOL/L (ref 21–32)
CREAT SERPL-MCNC: <0.5 MG/DL (ref 0.6–1.2)
CULTURE, BLOOD 2: NORMAL
EOSINOPHILS ABSOLUTE: 0.1 K/UL (ref 0–0.6)
EOSINOPHILS RELATIVE PERCENT: 0.9 %
GFR AFRICAN AMERICAN: >60
GFR NON-AFRICAN AMERICAN: >60
GLUCOSE BLD-MCNC: 87 MG/DL (ref 70–99)
HCT VFR BLD CALC: 27.7 % (ref 36–48)
HEMOGLOBIN: 9.5 G/DL (ref 12–16)
LYMPHOCYTES ABSOLUTE: 0.4 K/UL (ref 1–5.1)
LYMPHOCYTES RELATIVE PERCENT: 6.6 %
MAGNESIUM: 1.9 MG/DL (ref 1.8–2.4)
MCH RBC QN AUTO: 36.2 PG (ref 26–34)
MCHC RBC AUTO-ENTMCNC: 34.3 G/DL (ref 31–36)
MCV RBC AUTO: 105.5 FL (ref 80–100)
MONOCYTES ABSOLUTE: 0.6 K/UL (ref 0–1.3)
MONOCYTES RELATIVE PERCENT: 9.2 %
NEUTROPHILS ABSOLUTE: 5.5 K/UL (ref 1.7–7.7)
NEUTROPHILS RELATIVE PERCENT: 83.2 %
PDW BLD-RTO: 16.4 % (ref 12.4–15.4)
PHOSPHORUS: 2 MG/DL (ref 2.5–4.9)
PLATELET # BLD: 179 K/UL (ref 135–450)
PMV BLD AUTO: 7.3 FL (ref 5–10.5)
POTASSIUM SERPL-SCNC: 3.3 MMOL/L (ref 3.5–5.1)
RBC # BLD: 2.63 M/UL (ref 4–5.2)
SODIUM BLD-SCNC: 138 MMOL/L (ref 136–145)
TOTAL PROTEIN: 4.7 G/DL (ref 6.4–8.2)
WBC # BLD: 6.6 K/UL (ref 4–11)

## 2022-08-03 PROCEDURE — 2580000003 HC RX 258: Performed by: ORTHOPAEDIC SURGERY

## 2022-08-03 PROCEDURE — 0PSJ04Z REPOSITION LEFT RADIUS WITH INTERNAL FIXATION DEVICE, OPEN APPROACH: ICD-10-PCS | Performed by: ORTHOPAEDIC SURGERY

## 2022-08-03 PROCEDURE — 6360000002 HC RX W HCPCS: Performed by: ORTHOPAEDIC SURGERY

## 2022-08-03 PROCEDURE — 6370000000 HC RX 637 (ALT 250 FOR IP): Performed by: ORTHOPAEDIC SURGERY

## 2022-08-03 PROCEDURE — 94640 AIRWAY INHALATION TREATMENT: CPT

## 2022-08-03 PROCEDURE — 6370000000 HC RX 637 (ALT 250 FOR IP): Performed by: STUDENT IN AN ORGANIZED HEALTH CARE EDUCATION/TRAINING PROGRAM

## 2022-08-03 PROCEDURE — 2580000003 HC RX 258: Performed by: STUDENT IN AN ORGANIZED HEALTH CARE EDUCATION/TRAINING PROGRAM

## 2022-08-03 PROCEDURE — 2709999900 HC NON-CHARGEABLE SUPPLY: Performed by: ORTHOPAEDIC SURGERY

## 2022-08-03 PROCEDURE — 85025 COMPLETE CBC W/AUTO DIFF WBC: CPT

## 2022-08-03 PROCEDURE — A4216 STERILE WATER/SALINE, 10 ML: HCPCS | Performed by: ORTHOPAEDIC SURGERY

## 2022-08-03 PROCEDURE — 25608 OPTX DST RD XART FX/EPI SEP2: CPT | Performed by: NURSE PRACTITIONER

## 2022-08-03 PROCEDURE — C1769 GUIDE WIRE: HCPCS | Performed by: ORTHOPAEDIC SURGERY

## 2022-08-03 PROCEDURE — 2500000003 HC RX 250 WO HCPCS: Performed by: ORTHOPAEDIC SURGERY

## 2022-08-03 PROCEDURE — 84100 ASSAY OF PHOSPHORUS: CPT

## 2022-08-03 PROCEDURE — 94760 N-INVAS EAR/PLS OXIMETRY 1: CPT

## 2022-08-03 PROCEDURE — A4217 STERILE WATER/SALINE, 500 ML: HCPCS | Performed by: ORTHOPAEDIC SURGERY

## 2022-08-03 PROCEDURE — 6360000002 HC RX W HCPCS: Performed by: NURSE ANESTHETIST, CERTIFIED REGISTERED

## 2022-08-03 PROCEDURE — 83735 ASSAY OF MAGNESIUM: CPT

## 2022-08-03 PROCEDURE — 3209999900 FLUORO FOR SURGICAL PROCEDURES

## 2022-08-03 PROCEDURE — 6370000000 HC RX 637 (ALT 250 FOR IP): Performed by: INTERNAL MEDICINE

## 2022-08-03 PROCEDURE — 2580000003 HC RX 258: Performed by: INTERNAL MEDICINE

## 2022-08-03 PROCEDURE — 6360000002 HC RX W HCPCS: Performed by: STUDENT IN AN ORGANIZED HEALTH CARE EDUCATION/TRAINING PROGRAM

## 2022-08-03 PROCEDURE — 2720000010 HC SURG SUPPLY STERILE: Performed by: ORTHOPAEDIC SURGERY

## 2022-08-03 PROCEDURE — 2500000003 HC RX 250 WO HCPCS: Performed by: NURSE ANESTHETIST, CERTIFIED REGISTERED

## 2022-08-03 PROCEDURE — 25608 OPTX DST RD XART FX/EPI SEP2: CPT | Performed by: ORTHOPAEDIC SURGERY

## 2022-08-03 PROCEDURE — 73100 X-RAY EXAM OF WRIST: CPT

## 2022-08-03 PROCEDURE — 7100000001 HC PACU RECOVERY - ADDTL 15 MIN: Performed by: ORTHOPAEDIC SURGERY

## 2022-08-03 PROCEDURE — 2060000000 HC ICU INTERMEDIATE R&B

## 2022-08-03 PROCEDURE — 36415 COLL VENOUS BLD VENIPUNCTURE: CPT

## 2022-08-03 PROCEDURE — 7100000000 HC PACU RECOVERY - FIRST 15 MIN: Performed by: ORTHOPAEDIC SURGERY

## 2022-08-03 PROCEDURE — 94150 VITAL CAPACITY TEST: CPT

## 2022-08-03 PROCEDURE — 80053 COMPREHEN METABOLIC PANEL: CPT

## 2022-08-03 PROCEDURE — 6360000002 HC RX W HCPCS: Performed by: NURSE PRACTITIONER

## 2022-08-03 PROCEDURE — 2500000003 HC RX 250 WO HCPCS: Performed by: INTERNAL MEDICINE

## 2022-08-03 PROCEDURE — 3600000015 HC SURGERY LEVEL 5 ADDTL 15MIN: Performed by: ORTHOPAEDIC SURGERY

## 2022-08-03 PROCEDURE — 9990000010 HC NO CHARGE VISIT

## 2022-08-03 PROCEDURE — 3700000001 HC ADD 15 MINUTES (ANESTHESIA): Performed by: ORTHOPAEDIC SURGERY

## 2022-08-03 PROCEDURE — 3700000000 HC ANESTHESIA ATTENDED CARE: Performed by: ORTHOPAEDIC SURGERY

## 2022-08-03 PROCEDURE — C9113 INJ PANTOPRAZOLE SODIUM, VIA: HCPCS | Performed by: ORTHOPAEDIC SURGERY

## 2022-08-03 PROCEDURE — 99222 1ST HOSP IP/OBS MODERATE 55: CPT | Performed by: ORTHOPAEDIC SURGERY

## 2022-08-03 PROCEDURE — 2580000003 HC RX 258: Performed by: NURSE PRACTITIONER

## 2022-08-03 PROCEDURE — 2580000003 HC RX 258: Performed by: NURSE ANESTHETIST, CERTIFIED REGISTERED

## 2022-08-03 PROCEDURE — C1713 ANCHOR/SCREW BN/BN,TIS/BN: HCPCS | Performed by: ORTHOPAEDIC SURGERY

## 2022-08-03 PROCEDURE — C9113 INJ PANTOPRAZOLE SODIUM, VIA: HCPCS | Performed by: STUDENT IN AN ORGANIZED HEALTH CARE EDUCATION/TRAINING PROGRAM

## 2022-08-03 PROCEDURE — 3600000005 HC SURGERY LEVEL 5 BASE: Performed by: ORTHOPAEDIC SURGERY

## 2022-08-03 DEVICE — LOCKING SCREW, FULLY THREADED,T8
Type: IMPLANTABLE DEVICE | Site: WRIST | Status: FUNCTIONAL
Brand: VARIAX

## 2022-08-03 DEVICE — VOLAR PLATE INTERMEDIATE LEFT, X-SHORT
Type: IMPLANTABLE DEVICE | Site: WRIST | Status: FUNCTIONAL
Brand: VARIAX

## 2022-08-03 RX ORDER — BUPIVACAINE HYDROCHLORIDE 5 MG/ML
INJECTION, SOLUTION EPIDURAL; INTRACAUDAL
Status: COMPLETED | OUTPATIENT
Start: 2022-08-03 | End: 2022-08-03

## 2022-08-03 RX ORDER — PROPOFOL 10 MG/ML
INJECTION, EMULSION INTRAVENOUS PRN
Status: DISCONTINUED | OUTPATIENT
Start: 2022-08-03 | End: 2022-08-03 | Stop reason: SDUPTHER

## 2022-08-03 RX ORDER — SODIUM CHLORIDE 9 MG/ML
INJECTION, SOLUTION INTRAVENOUS PRN
Status: DISCONTINUED | OUTPATIENT
Start: 2022-08-03 | End: 2022-08-03 | Stop reason: HOSPADM

## 2022-08-03 RX ORDER — FENTANYL CITRATE 50 UG/ML
25 INJECTION, SOLUTION INTRAMUSCULAR; INTRAVENOUS EVERY 5 MIN PRN
Status: DISCONTINUED | OUTPATIENT
Start: 2022-08-03 | End: 2022-08-03 | Stop reason: HOSPADM

## 2022-08-03 RX ORDER — MAGNESIUM HYDROXIDE 1200 MG/15ML
LIQUID ORAL CONTINUOUS PRN
Status: DISCONTINUED | OUTPATIENT
Start: 2022-08-03 | End: 2022-08-03 | Stop reason: HOSPADM

## 2022-08-03 RX ORDER — SODIUM CHLORIDE 0.9 % (FLUSH) 0.9 %
5-40 SYRINGE (ML) INJECTION EVERY 12 HOURS SCHEDULED
Status: DISCONTINUED | OUTPATIENT
Start: 2022-08-03 | End: 2022-08-03 | Stop reason: HOSPADM

## 2022-08-03 RX ORDER — FENTANYL CITRATE 50 UG/ML
INJECTION, SOLUTION INTRAMUSCULAR; INTRAVENOUS PRN
Status: DISCONTINUED | OUTPATIENT
Start: 2022-08-03 | End: 2022-08-03 | Stop reason: SDUPTHER

## 2022-08-03 RX ORDER — ONDANSETRON 2 MG/ML
INJECTION INTRAMUSCULAR; INTRAVENOUS PRN
Status: DISCONTINUED | OUTPATIENT
Start: 2022-08-03 | End: 2022-08-03 | Stop reason: SDUPTHER

## 2022-08-03 RX ORDER — SODIUM CHLORIDE 0.9 % (FLUSH) 0.9 %
5-40 SYRINGE (ML) INJECTION EVERY 12 HOURS SCHEDULED
Status: DISCONTINUED | OUTPATIENT
Start: 2022-08-03 | End: 2022-08-04 | Stop reason: HOSPADM

## 2022-08-03 RX ORDER — ONDANSETRON 2 MG/ML
4 INJECTION INTRAMUSCULAR; INTRAVENOUS
Status: DISCONTINUED | OUTPATIENT
Start: 2022-08-03 | End: 2022-08-03 | Stop reason: HOSPADM

## 2022-08-03 RX ORDER — IPRATROPIUM BROMIDE AND ALBUTEROL SULFATE 2.5; .5 MG/3ML; MG/3ML
1 SOLUTION RESPIRATORY (INHALATION) ONCE
Status: COMPLETED | OUTPATIENT
Start: 2022-08-03 | End: 2022-08-03

## 2022-08-03 RX ORDER — SODIUM CHLORIDE 450 MG/100ML
INJECTION, SOLUTION INTRAVENOUS CONTINUOUS
Status: DISCONTINUED | OUTPATIENT
Start: 2022-08-03 | End: 2022-08-04

## 2022-08-03 RX ORDER — SODIUM CHLORIDE 0.9 % (FLUSH) 0.9 %
5-40 SYRINGE (ML) INJECTION PRN
Status: DISCONTINUED | OUTPATIENT
Start: 2022-08-03 | End: 2022-08-03 | Stop reason: HOSPADM

## 2022-08-03 RX ORDER — FENTANYL CITRATE 50 UG/ML
50 INJECTION, SOLUTION INTRAMUSCULAR; INTRAVENOUS EVERY 5 MIN PRN
Status: DISCONTINUED | OUTPATIENT
Start: 2022-08-03 | End: 2022-08-03 | Stop reason: HOSPADM

## 2022-08-03 RX ORDER — LIDOCAINE HYDROCHLORIDE 20 MG/ML
INJECTION, SOLUTION EPIDURAL; INFILTRATION; INTRACAUDAL; PERINEURAL PRN
Status: DISCONTINUED | OUTPATIENT
Start: 2022-08-03 | End: 2022-08-03 | Stop reason: SDUPTHER

## 2022-08-03 RX ORDER — HYDROCODONE BITARTRATE AND ACETAMINOPHEN 5; 325 MG/1; MG/1
1 TABLET ORAL EVERY 4 HOURS PRN
Status: DISCONTINUED | OUTPATIENT
Start: 2022-08-03 | End: 2022-08-04 | Stop reason: HOSPADM

## 2022-08-03 RX ORDER — OXYCODONE HYDROCHLORIDE 5 MG/1
5 TABLET ORAL
Status: DISCONTINUED | OUTPATIENT
Start: 2022-08-03 | End: 2022-08-03 | Stop reason: HOSPADM

## 2022-08-03 RX ORDER — DROPERIDOL 2.5 MG/ML
0.62 INJECTION, SOLUTION INTRAMUSCULAR; INTRAVENOUS
Status: DISCONTINUED | OUTPATIENT
Start: 2022-08-03 | End: 2022-08-03 | Stop reason: HOSPADM

## 2022-08-03 RX ORDER — SODIUM CHLORIDE 9 MG/ML
INJECTION, SOLUTION INTRAVENOUS PRN
Status: DISCONTINUED | OUTPATIENT
Start: 2022-08-03 | End: 2022-08-04 | Stop reason: HOSPADM

## 2022-08-03 RX ORDER — SODIUM CHLORIDE 9 MG/ML
INJECTION, SOLUTION INTRAVENOUS CONTINUOUS PRN
Status: DISCONTINUED | OUTPATIENT
Start: 2022-08-03 | End: 2022-08-03 | Stop reason: SDUPTHER

## 2022-08-03 RX ORDER — SODIUM CHLORIDE 0.9 % (FLUSH) 0.9 %
5-40 SYRINGE (ML) INJECTION PRN
Status: DISCONTINUED | OUTPATIENT
Start: 2022-08-03 | End: 2022-08-04 | Stop reason: HOSPADM

## 2022-08-03 RX ORDER — IPRATROPIUM BROMIDE AND ALBUTEROL SULFATE 2.5; .5 MG/3ML; MG/3ML
1 SOLUTION RESPIRATORY (INHALATION)
Status: DISCONTINUED | OUTPATIENT
Start: 2022-08-03 | End: 2022-08-03 | Stop reason: HOSPADM

## 2022-08-03 RX ORDER — FLECAINIDE ACETATE 50 MG/1
150 TABLET ORAL ONCE
Status: COMPLETED | OUTPATIENT
Start: 2022-08-03 | End: 2022-08-03

## 2022-08-03 RX ORDER — DEXAMETHASONE SODIUM PHOSPHATE 4 MG/ML
INJECTION, SOLUTION INTRA-ARTICULAR; INTRALESIONAL; INTRAMUSCULAR; INTRAVENOUS; SOFT TISSUE PRN
Status: DISCONTINUED | OUTPATIENT
Start: 2022-08-03 | End: 2022-08-03 | Stop reason: SDUPTHER

## 2022-08-03 RX ADMIN — HYDROCODONE BITARTRATE AND ACETAMINOPHEN 1 TABLET: 5; 325 TABLET ORAL at 18:06

## 2022-08-03 RX ADMIN — SODIUM CHLORIDE: 9 INJECTION, SOLUTION INTRAVENOUS at 10:34

## 2022-08-03 RX ADMIN — DOXYCYCLINE HYCLATE 100 MG: 100 TABLET, FILM COATED ORAL at 21:46

## 2022-08-03 RX ADMIN — LIDOCAINE HYDROCHLORIDE 60 MG: 20 INJECTION, SOLUTION EPIDURAL; INFILTRATION; INTRACAUDAL; PERINEURAL at 11:23

## 2022-08-03 RX ADMIN — PROPOFOL 100 MG: 10 INJECTION, EMULSION INTRAVENOUS at 11:23

## 2022-08-03 RX ADMIN — SODIUM CHLORIDE: 9 INJECTION, SOLUTION INTRAVENOUS at 05:02

## 2022-08-03 RX ADMIN — FLECAINIDE ACETATE 150 MG: 50 TABLET ORAL at 09:32

## 2022-08-03 RX ADMIN — ONDANSETRON 4 MG: 2 INJECTION INTRAMUSCULAR; INTRAVENOUS at 11:30

## 2022-08-03 RX ADMIN — FENTANYL CITRATE 50 MCG: 50 INJECTION INTRAMUSCULAR; INTRAVENOUS at 11:42

## 2022-08-03 RX ADMIN — CEFDINIR 300 MG: 300 CAPSULE ORAL at 09:26

## 2022-08-03 RX ADMIN — SODIUM PHOSPHATE, MONOBASIC, MONOHYDRATE 11.1 MMOL: 276; 142 INJECTION, SOLUTION INTRAVENOUS at 07:38

## 2022-08-03 RX ADMIN — CEFAZOLIN 2000 MG: 2 INJECTION, POWDER, FOR SOLUTION INTRAMUSCULAR; INTRAVENOUS at 21:49

## 2022-08-03 RX ADMIN — Medication 40 MG: at 21:47

## 2022-08-03 RX ADMIN — FLECAINIDE ACETATE 150 MG: 50 TABLET ORAL at 09:26

## 2022-08-03 RX ADMIN — POTASSIUM CHLORIDE 40 MEQ: 1500 TABLET, EXTENDED RELEASE ORAL at 06:44

## 2022-08-03 RX ADMIN — SODIUM CHLORIDE: 4.5 INJECTION, SOLUTION INTRAVENOUS at 14:08

## 2022-08-03 RX ADMIN — DEXAMETHASONE SODIUM PHOSPHATE 4 MG: 4 INJECTION, SOLUTION INTRAMUSCULAR; INTRAVENOUS at 11:30

## 2022-08-03 RX ADMIN — CEFDINIR 300 MG: 300 CAPSULE ORAL at 21:46

## 2022-08-03 RX ADMIN — CASTOR OIL AND BALSAM, PERU: 788; 87 OINTMENT TOPICAL at 21:47

## 2022-08-03 RX ADMIN — ACETAMINOPHEN 650 MG: 325 TABLET ORAL at 14:00

## 2022-08-03 RX ADMIN — SODIUM CHLORIDE, PRESERVATIVE FREE 10 ML: 5 INJECTION INTRAVENOUS at 10:35

## 2022-08-03 RX ADMIN — FENTANYL CITRATE 50 MCG: 50 INJECTION INTRAMUSCULAR; INTRAVENOUS at 11:28

## 2022-08-03 RX ADMIN — DOXYCYCLINE HYCLATE 100 MG: 100 TABLET, FILM COATED ORAL at 09:26

## 2022-08-03 RX ADMIN — IPRATROPIUM BROMIDE AND ALBUTEROL SULFATE 1 AMPULE: .5; 3 SOLUTION RESPIRATORY (INHALATION) at 10:23

## 2022-08-03 RX ADMIN — POTASSIUM PHOSPHATE, MONOBASIC AND POTASSIUM PHOSPHATE, DIBASIC 20 MMOL: 224; 236 INJECTION, SOLUTION, CONCENTRATE INTRAVENOUS at 09:10

## 2022-08-03 RX ADMIN — CASTOR OIL AND BALSAM, PERU: 788; 87 OINTMENT TOPICAL at 09:11

## 2022-08-03 RX ADMIN — METOPROLOL SUCCINATE 25 MG: 25 TABLET, EXTENDED RELEASE ORAL at 09:26

## 2022-08-03 RX ADMIN — FLECAINIDE ACETATE 150 MG: 50 TABLET ORAL at 21:46

## 2022-08-03 RX ADMIN — Medication 40 MG: at 10:30

## 2022-08-03 RX ADMIN — CEFAZOLIN 2000 MG: 2 INJECTION, POWDER, FOR SOLUTION INTRAMUSCULAR; INTRAVENOUS at 11:28

## 2022-08-03 ASSESSMENT — PAIN SCALES - GENERAL
PAINLEVEL_OUTOF10: 0
PAINLEVEL_OUTOF10: 7
PAINLEVEL_OUTOF10: 0
PAINLEVEL_OUTOF10: 0
PAINLEVEL_OUTOF10: 4
PAINLEVEL_OUTOF10: 0

## 2022-08-03 ASSESSMENT — LIFESTYLE VARIABLES: SMOKING_STATUS: 0

## 2022-08-03 ASSESSMENT — PAIN SCALES - WONG BAKER
WONGBAKER_NUMERICALRESPONSE: 0

## 2022-08-03 ASSESSMENT — PAIN - FUNCTIONAL ASSESSMENT: PAIN_FUNCTIONAL_ASSESSMENT: PREVENTS OR INTERFERES SOME ACTIVE ACTIVITIES AND ADLS

## 2022-08-03 ASSESSMENT — PAIN DESCRIPTION - DESCRIPTORS: DESCRIPTORS: ACHING

## 2022-08-03 ASSESSMENT — PAIN DESCRIPTION - LOCATION: LOCATION: ARM

## 2022-08-03 ASSESSMENT — PAIN DESCRIPTION - ORIENTATION: ORIENTATION: LEFT

## 2022-08-03 NOTE — PROGRESS NOTES
Stable for transfer to floor. Report called. Patient continues to deny pain/nausea. Fingers numb per patient.

## 2022-08-03 NOTE — PLAN OF CARE
Problem: Discharge Planning  Goal: Discharge to home or other facility with appropriate resources  8/3/2022 1558 by Meggan Mao RN  Outcome: Progressing  Flowsheets (Taken 8/3/2022 1541)  Discharge to home or other facility with appropriate resources: Identify barriers to discharge with patient and caregiver  8/3/2022 0416 by Kevin Key RN  Outcome: Progressing  Flowsheets (Taken 8/2/2022 1936)  Discharge to home or other facility with appropriate resources:   Identify barriers to discharge with patient and caregiver   Arrange for needed discharge resources and transportation as appropriate     Problem: Pain  Goal: Verbalizes/displays adequate comfort level or baseline comfort level  8/3/2022 1558 by Meggan Mao RN  Outcome: Progressing  8/3/2022 0416 by Kevin Key RN  Outcome: Progressing  Flowsheets  Taken 8/3/2022 0000  Verbalizes/displays adequate comfort level or baseline comfort level: Encourage patient to monitor pain and request assistance  Taken 8/2/2022 1936  Verbalizes/displays adequate comfort level or baseline comfort level:   Encourage patient to monitor pain and request assistance   Assess pain using appropriate pain scale     Problem: Skin/Tissue Integrity  Goal: Absence of new skin breakdown  Description: 1. Monitor for areas of redness and/or skin breakdown  2. Assess vascular access sites hourly  3. Every 4-6 hours minimum:  Change oxygen saturation probe site  4. Every 4-6 hours:  If on nasal continuous positive airway pressure, respiratory therapy assess nares and determine need for appliance change or resting period.   8/3/2022 1558 by Meggan Mao RN  Outcome: Progressing  8/3/2022 0416 by Kevin Key RN  Outcome: Progressing     Problem: Safety - Adult  Goal: Free from fall injury  8/3/2022 1558 by Meggan Mao RN  Outcome: Progressing  Flowsheets (Taken 8/3/2022 1552)  Free From Fall Injury: Instruct family/caregiver on patient safety  8/3/2022 0416 by Angela Copeland RN  Outcome: Progressing  Flowsheets  Taken 8/3/2022 7311 by Angela Copeland RN  Free From Fall Injury: Instruct family/caregiver on patient safety  Taken 8/2/2022 1431 by Ira Orosco RN  Free From Fall Injury: Instruct family/caregiver on patient safety     Problem: ABCDS Injury Assessment  Goal: Absence of physical injury  8/3/2022 1558 by Carina Chandler RN  Outcome: Progressing  Flowsheets (Taken 8/3/2022 1552)  Absence of Physical Injury: Implement safety measures based on patient assessment  8/3/2022 0416 by Angela Copeland RN  Outcome: Progressing  Flowsheets  Taken 8/3/2022 0416 by Angela Copeland RN  Absence of Physical Injury: Implement safety measures based on patient assessment  Taken 8/2/2022 1431 by Ira Orosco RN  Absence of Physical Injury: Implement safety measures based on patient assessment     Problem: Chronic Conditions and Co-morbidities  Goal: Patient's chronic conditions and co-morbidity symptoms are monitored and maintained or improved  8/3/2022 1558 by Carina Chandler RN  Outcome: Progressing  Flowsheets (Taken 8/3/2022 1541)  Care Plan - Patient's Chronic Conditions and Co-Morbidity Symptoms are Monitored and Maintained or Improved: Monitor and assess patient's chronic conditions and comorbid symptoms for stability, deterioration, or improvement  8/3/2022 0416 by Angela Copeland RN  Outcome: Progressing  Flowsheets (Taken 8/2/2022 1936)  Care Plan - Patient's Chronic Conditions and Co-Morbidity Symptoms are Monitored and Maintained or Improved: Monitor and assess patient's chronic conditions and comorbid symptoms for stability, deterioration, or improvement

## 2022-08-03 NOTE — BRIEF OP NOTE
Brief Postoperative Note      Patient: Edwin Day  YOB: 1945  MRN: [id number]    Date of Procedure: 8/3/2022    Pre-Op Diagnosis: LEFT DISTAL RADIUS FRACTURE    Post-Op Diagnosis: Same       Procedure(s):  OPEN REDUCTION INTERNAL FIXATION LEFT DISTAL RADIUS    Surgeon(s):  Jerrell Gray MD    Assistant:  Surgical Assistant: Richard Romero    Anesthesia: General    Estimated Blood Loss (mL): Minimal    Complications: None    Specimens:   * No specimens in log *    Implants:  Implant Name Type Inv. Item Serial No.  Lot No. LRB No. Used Action   PLATE BNE [id number] 10 H XSH L DST RAD VOLAR RICKY TI - [id number]  PLATE BNE [id number] 10 H XSH L DST RAD VOLAR RICKY TI  [employer] [facility]  Left 1 Implanted   SCREW BNE L12MM OD27MM ST DONNIE FULL THRD T8 DR - [id number]  SCREW BNE L12MM OD27MM ST DONNIE FULL THRD T8 [facility]  [facility]  Left 1 Implanted   SCREW BNE L12MM OD27MM ST DONNIE FULL THRD T8 DR - [id number]  SCREW BNE L12MM OD27MM ST DONNIE FULL THRD T8 [facility]  [facility]  Left 1 Implanted   SCREW BNE L14MM OD27MM ST DONNIE FULL THRD T8 DR - [id number]  SCREW BNE L14MM OD27MM ST DONNIE FULL THRD T8 [facility]  [facility]  Left 1 Implanted   SCREW BNE L16MM OD27MM ST DONNIE FULL THRD T8 DR - [id number]  SCREW BNE L16MM OD27MM ST DONNIE FULL THRD T8 [facility]  [employer] [facility]  Left 1 Implanted   SCREW BNE L18MM OD27MM ST DONNIE FULL THRD T8 DR - [id number]  SCREW BNE L18MM OD27MM ST DONNIE FULL THRD T8 [facility]  [facility]  Left 4 Implanted   SCREW BNE L20MM OD27MM ST DONNIE FULL THRD T8 DR - [id number]  SCREW BNE L20MM OD27MM ST DONNIE FULL THRD T8 [facility]  [facility]  Left 2 Implanted         Drains:   Negative Pressure Wound Therapy Leg Anterior; Lower;Right (Active)       Urinary Catheter Kaplan (Active)   Catheter Indications Urinary retention (acute or chronic), continuous bladder irrigation or bladder outlet obstruction;Perioperative use for selected surgical procedures;Assist in healing of open sacral or perineal wounds (Stage III, IV, or unstageable documented by a provider or enterostomal therapy) and/or full thickness perineal and lower extremity burns in incontinent patients;Prolonged immobilization (e.g. unstable thoracic or lumbar spine, multiple traumatic injuries such as pelvic fractures) 08/03/22 0800   Site Assessment No urethral drainage 08/03/22 0800   Urine Color Yellow 08/03/22 1222   Urine Appearance Cloudy 08/03/22 1222   Collection Container Standard 08/03/22 0800   Securement Method Securing device (Describe) 08/03/22 0800   Catheter Care Completed Yes 08/03/22 0800   Catheter Best Practices  Drainage tube clipped to bed;Catheter secured to thigh; Tamper seal intact; Bag below bladder;Bag not on floor; Lack of dependent loop in tubing;Drainage bag less than half full 08/03/22 0800   Status Draining;Patent 08/03/22 0800   Output (mL) 125 mL 08/03/22 0800       [REMOVED] Urinary Catheter (Removed)   Catheter Indications Need for fluid volume management of the critically ill patient in a critical care setting 08/02/22 0800   Site Assessment No urethral drainage 08/02/22 0800   Urine Color Yellow 08/02/22 0800   Urine Appearance Clear 08/02/22 0800   Collection Container Standard 08/02/22 0800   Securement Method Securing device (Describe) 08/02/22 0800   Catheter Care Completed Yes 07/31/22 0843   Catheter Best Practices  Catheter secured to thigh;Lack of dependent loop in tubing;Drainage bag less than half full;Bag not on floor; Bag below bladder; Tamper seal intact;Drainage tube clipped to bed 08/02/22 0800   Status Draining;Patent 08/02/22 0800   Output (mL) 5 mL 08/02/22 0800       [REMOVED] External Urinary Catheter (Removed)   Site Assessment Clean,dry & intact 08/02/22 1200   Placement Initiated 08/02/22 1100   Perineal Care Yes 08/02/22 1100       Findings: Same    Electronically signed by Amaury Cueva MD on 8/3/2022 at 12:44 PM

## 2022-08-03 NOTE — PROGRESS NOTES
Pt arrived to floor from PACU at 1310. Pt oriented to room, call light, policies and procedures, the menu and ordering. Call light within reach. Bed in lowest position, bed alarm on, and wheels locked. Pt verbalized understanding. No complaints, questions or concerns at this time.   Electronically signed by Meggan Mao RN on 8/3/2022 at 1:18 PM

## 2022-08-03 NOTE — CONSULTS
[facility] Orthopedic Surgery  Consult Note         This patient is seen in consultation at the request of Dr Shin Correa MD    Reason for Consult:  Left wrist pain/ moderately displaced distal radius fracture. CHIEF COMPLAINT:  Left wrist pain/ moderately displaced distal radius fracture. History Obtained From:  patient, electronic medical record    HISTORY OF PRESENT ILLNESS:    Ms. Meme Gilliam is a 65 y.o.  female right handed who seen today at [place] for consultation and evaluation of a left wrist injury. The patient reports that this injury occurred when she fell a week ago. She was first seen and evaluated in [place], when she was x-rayed and splinted, and asked to f/u with Orthopedics. She then got admitted to [place] for A-Fib, and I was consulted. The patient denies any other injuries. Rates pain a 8/10 VAS moderate, sharp, constant and show no change. Movement makes the pain worse, the splint and resting makes the pain better. Alleviating factors elevation and rest. No numbness or tingling sensation.       Past Medical History:        Diagnosis Date    Acid reflux     Anxiety     Arthritis     Atrial fibrillation (HCC)     Closed fracture dislocation of right elbow     Depression     Fibromyalgia     Migraine     WHITNEY (obstructive sleep apnea)     Diagnosed years ago, no CPAP    Rectal cancer ([facility] [address]     Wears glasses     DRIVING       Past Surgical History:        Procedure Laterality Date    APPENDECTOMY      CERVICAL DISCECTOMY      ACDF     SECTION      x4    CHOLECYSTECTOMY      COLON SURGERY      Rectal cancer removed using  incision    COLONOSCOPY      SEVERAL    COLONOSCOPY N/A 2019    COLONOSCOPY POLYPECTOMY SNARE/COLD BIOPSY performed by Doroteo Aaron MD at [address] [facility] 2021    COLONOSCOPY WITH BIOPSY performed by Lakshmi Lakhani MD at [facility] Right 2021    RIGHT LEG EVACUATION OF HEMATOMA performed by Army Ubaldo MD at [address] [facility] W/O EXTENSION Right 10/25/2018    OPEN REDUCTION INTERNAL FIXATION RIGHT FEMUR SUPRACONDYLAR FEMORAL FRACTURE WITH C-ARM performed by Andrade Browning MD at [place] [address] Right 1/31/2021    RIGHT LEG HEMATOMA EVAKUATION, DEBRIDEMENT, AND WOUND VAC PLACEMENT performed by Natalie Greene MD at [place] [address] Right 2/17/2021    RIGHT LOWER EXTREMITY SKIN GRAFT performed by Natalie Greene MD at [address] [place] [address]     UPPER GASTROINTESTINAL ENDOSCOPY N/A 11/26/2019    ESOPHAGOGASTRODUODENOSCOPY performed by Daniel Coles MD at [address] 8/18/2020    EGD BIOPSY performed by Daniel Coles MD at [facility]       Medications prior to admission:   Prior to Admission medications    Medication Sig Start Date End Date Taking? Authorizing Provider   ALPRAZolam Marcellina Bullion) 0.5 MG tablet Take 0.5 mg by mouth 3 times daily as needed for Sleep. Yes Historical Provider, MD   apixaban (ELIQUIS) 5 MG TABS tablet Take 1 tablet by mouth in the morning and 1 tablet before bedtime.  7/27/22   Surjit Godinez MD   escitalopram (LEXAPRO) 10 MG tablet Take 1 tablet by mouth daily 6/14/22   Nelli Lara MD   tiZANidine (ZANAFLEX) 2 MG tablet Take 1 tablet by mouth every 6 hours as needed (muscle pain) 2/18/22   Nelli Lara MD   metoprolol succinate (TOPROL XL) 100 MG extended release tablet TAKE ONE TABLET BY MOUTH DAILY 2/18/22   Nelli Lara MD   pantoprazole (PROTONIX) 40 MG tablet TAKE ONE TABLET BY MOUTH TWICE A DAY 2/18/22   Nelli Lara MD   potassium chloride (KLOR-CON M20) 20 MEQ extended release tablet Take 1 tablet by mouth daily 2/18/22   Nelli Lara MD   dicyclomine (BENTYL) 10 MG capsule Take 1 capsule by mouth 4 times daily as needed (cramping/diarrhea) 2/18/22   Nelli Lara MD   flecainide (TAMBOCOR) 100 MG tablet TAKE 1 TABLET BY MOUTH 2 TIMES A DAY 1/27/22   Margot Vivar MD   furosemide (LASIX) 20 MG tablet Take 1 tablet by mouth 2 times daily With an additional 20 mg on MWF 1/26/22   Margot Vivar MD   Cyanocobalamin (VITAMIN B 12 PO) Take 2,500 mg by mouth daily    Historical Provider, MD   calcium carbonate-vitamin D (CALCIUM 600+D) 600-200 MG-UNIT TABS Take 1 tablet by mouth 2 times daily    Historical Provider, MD       Current Medications:   Current Facility-Administered Medications: Venelex ointment, , Topical, BID  docusate sodium (COLACE) capsule 100 mg, 100 mg, Oral, BID  cefdinir (OMNICEF) capsule 300 mg, 300 mg, Oral, 2 times per day  doxycycline hyclate (VIBRA-TABS) tablet 100 mg, 100 mg, Oral, 2 times per day  0.9 % sodium chloride infusion, , IntraVENous, Continuous  flecainide (TAMBOCOR) tablet 150 mg, 150 mg, Oral, 2 times per day  potassium chloride (KLOR-CON M) extended release tablet 40 mEq, 40 mEq, Oral, PRN **OR** potassium bicarb-citric acid (EFFER-K) effervescent tablet 40 mEq, 40 mEq, Oral, PRN **OR** potassium chloride 10 mEq/100 mL IVPB (Peripheral Line), 10 mEq, IntraVENous, PRN  magnesium sulfate 1000 mg in dextrose 5% 100 mL IVPB, 1,000 mg, IntraVENous, PRN  sodium phosphate 11.1 mmol in sodium chloride 0.9 % 250 mL IVPB, 0.16 mmol/kg, IntraVENous, PRN **OR** sodium phosphate 22.2 mmol in sodium chloride 0.9 % 250 mL IVPB, 0.32 mmol/kg, IntraVENous, PRN  metoprolol succinate (TOPROL XL) extended release tablet 25 mg, 25 mg, Oral, Daily  sodium chloride flush 0.9 % injection 5-40 mL, 5-40 mL, IntraVENous, 2 times per day  sodium chloride flush 0.9 % injection 5-40 mL, 5-40 mL, IntraVENous, PRN  0.9 % sodium chloride infusion, , IntraVENous, PRN  ondansetron (ZOFRAN-ODT) disintegrating tablet 4 mg, 4 mg, Oral, Q8H PRN **OR** ondansetron (ZOFRAN) injection 4 mg, 4 mg, IntraVENous, Q6H PRN  polyethylene glycol (GLYCOLAX) packet 17 g, 17 g, Oral, Daily PRN  acetaminophen (TYLENOL) tablet 650 mg, 650 mg, Oral, Q6H PRN **OR** acetaminophen (TYLENOL) suppository 650 mg, 650 mg, Rectal, Q6H PRN  glucose chewable tablet 16 g, 4 tablet, Oral, PRN  dextrose bolus 10% 125 mL, 125 mL, IntraVENous, PRN **OR** dextrose bolus 10% 250 mL, 250 mL, IntraVENous, PRN  glucagon (rDNA) injection 1 mg, 1 mg, SubCUTAneous, PRN  dextrose 10 % infusion, , IntraVENous, Continuous PRN  pantoprazole (PROTONIX) 40 mg in sodium chloride (PF) 10 mL injection, 40 mg, IntraVENous, Q12H    Allergies:  Demerol hcl [meperidine], Pcn [penicillins], and Adhesive tape    Social History     Socioeconomic History    Marital status:       Spouse name: Not on file    Number of children: 4    Years of education: Not on file    Highest education level: High school graduate   Occupational History    Not on file   Tobacco Use    Smoking status: Never    Smokeless tobacco: Never   Vaping Use    Vaping Use: Never used   Substance and Sexual Activity    Alcohol use: No    Drug use: Never    Sexual activity: Not Currently     Partners: Male   Other Topics Concern    Not on file   Social History Narrative    Lives with son     Social Determinants of Health     Financial Resource Strain: Not on file   Food Insecurity: Not on file   Transportation Needs: Not on file   Physical Activity: Not on file   Stress: Not on file   Social Connections: Not on file   Intimate Partner Violence: Not on file   Housing Stability: Not on file       Family History:  Family History   Problem Relation Age of Onset    COPD Mother        REVIEW OF SYSTEMS:   CONSTITUTIONAL: Denies unexplained weight loss, fevers, chills or fatigue  NEUROLOGICAL: Denies unsteady gait or progressive weakness    PSYCHOLOGICAL: Denies anxiety, depression   SKIN: Denies skin changes, delayed healing, rash, itching   HEMATOLOGIC: Denies easy bleeding or bruising  ENDOCRINE: Denies excessive thirst, urination, heat/cold  RESPIRATORY: Denies current dyspnea, cough  CARDIOVASCULAR: Negative for chest pain at this time. EYES: Negative for photophobia and visual disturbance. ENT:  Negative for rhinorrhea, epistaxis, sore throat, or hearing loss. GI: Denies nausea, vomiting, diarrhea   : Denies bowel or bladder issues   MUSCULOSKELETAL: Left wrist pain. All other ROS reviewed in chart or with patient or family and are grossly negative. PHYSICAL EXAMINATION:  Ms. Marlon Lindquist is a very pleasant 65 y.o. female who seen today in no acute distress, awake, alert, and oriented. She is well nourished and groomed. Patient with normal affect. Body mass index is 28.75 kg/m². . Skin warm and dry. Resting respiratory rate is 16. Resp deep and easy. Pulse is with regular rate and rhythm    /63   Pulse 75   Temp 99.6 °F (37.6 °C) (Axillary)   Resp 16   Wt 157 lb 3 oz (71.3 kg)   SpO2 95%   BMI 28.75 kg/m²        Airway is intact  Chest: chest clear, no wheezing, rales, normal symmetric air entry, no tachypnea, retractions or cyanosis  Heart: regular rate and rhythm ; heart sounds normal   Hearing intact, pupil equal and reactive bilateral  Lymphatics; No groin or axillary enlarged lymph nodes. Neck; No swelling  Abdomen; soft, non distended. MUSCULOSKELETAL:   On evaluation of her left upper extremity, there is moderate deformity. There is moderate swelling and moderate ecchymosis. She is tender to palpation over the distal radius, and otherwise nontender over the remainder of the extremity. Range of motion is decreased secondary to pain over the left wrist, but no mechanical block. The skin overlying the left wrist is intact without evidence of lesion, laceration or abrasion. Distal pulses are 2+ and symmetric bilaterally. Sensation is grossly intact to light touch and symmetric bilaterally.     NEUROLOGIC:   Sensory:    Touch:                     Right Upper Extremity:  normal                   Left Upper Extremity:  normal                  Right Lower Extremity:  normal                  Left Lower Extremity:  normal        DATA:    CBC:   Lab Results   Component Value Date/Time    WBC 6.6 08/03/2022 03:32 AM    RBC 2.63 08/03/2022 03:32 AM    HGB 9.5 08/03/2022 03:32 AM    HCT 27.7 08/03/2022 03:32 AM    .5 08/03/2022 03:32 AM    MCH 36.2 08/03/2022 03:32 AM    MCHC 34.3 08/03/2022 03:32 AM    RDW 16.4 08/03/2022 03:32 AM     08/03/2022 03:32 AM    MPV 7.3 08/03/2022 03:32 AM     WBC:    Lab Results   Component Value Date/Time    WBC 6.6 08/03/2022 03:32 AM     PT/INR:    Lab Results   Component Value Date/Time    PROTIME 16.3 07/30/2022 05:35 AM    INR 1.31 07/30/2022 05:35 AM     PTT:    Lab Results   Component Value Date/Time    APTT 31.5 06/02/2018 11:29 PM   [APTT    IMAGING: Xrays dated 8/1/2022, 3 views of left wrist were reviewed, and showed moderately displaced distal radius fracture. IMPRESSION: Left wrist pain/ moderately displaced distal radius fracture. PLAN:  I discussed with Keaton Jara the overall alignment of the fracture and treatment options including both surgical and non-surgical treatment, and that my recommendation is an open reduction and internal fixation given the amount of displacement and comminution of the fracture. I discussed the risks and benefits of surgery with the patient, including but not limited to infection, bleeding, pain, injury to nerves or blood vessels failure of the surgery and need for additional surgery. All the patient's questions were answered. We discussed an expected post-operative course. She  is understanding of this and wishes to proceed. Thank you very much for the kind consultation and allowing me to participate in this patient's care. I will continue to keep you apprised of her progress.         Denise Hamilton MD   8/3/2022  7:17 AM

## 2022-08-03 NOTE — PLAN OF CARE
Problem: Discharge Planning  Goal: Discharge to home or other facility with appropriate resources  Outcome: Progressing  Flowsheets (Taken 8/2/2022 1936)  Discharge to home or other facility with appropriate resources:   Identify barriers to discharge with patient and caregiver   Arrange for needed discharge resources and transportation as appropriate     Problem: Pain  Goal: Verbalizes/displays adequate comfort level or baseline comfort level  Outcome: Progressing  Flowsheets  Taken 8/3/2022 0000  Verbalizes/displays adequate comfort level or baseline comfort level: Encourage patient to monitor pain and request assistance  Taken 8/2/2022 1936  Verbalizes/displays adequate comfort level or baseline comfort level:   Encourage patient to monitor pain and request assistance   Assess pain using appropriate pain scale     Problem: Skin/Tissue Integrity  Goal: Absence of new skin breakdown  Description: 1. Monitor for areas of redness and/or skin breakdown  2. Assess vascular access sites hourly  3. Every 4-6 hours minimum:  Change oxygen saturation probe site  4. Every 4-6 hours:  If on nasal continuous positive airway pressure, respiratory therapy assess nares and determine need for appliance change or resting period.   Outcome: Progressing     Problem: Safety - Adult  Goal: Free from fall injury  Outcome: Progressing  Flowsheets  Taken 8/3/2022 0416 by Eloy Hernandez RN  Free From Fall Injury: Instruct family/caregiver on patient safety  Taken 8/2/2022 1431 by Celestino Mckeon RN  Free From Fall Injury: Instruct family/caregiver on patient safety     Problem: ABCDS Injury Assessment  Goal: Absence of physical injury  Outcome: Progressing  Flowsheets  Taken 8/3/2022 0416 by Eloy Hernandez RN  Absence of Physical Injury: Implement safety measures based on patient assessment  Taken 8/2/2022 1431 by Celestino Mckeon RN  Absence of Physical Injury: Implement safety measures based on patient assessment     Problem: Chronic Conditions and Co-morbidities  Goal: Patient's chronic conditions and co-morbidity symptoms are monitored and maintained or improved  Outcome: Progressing  Flowsheets (Taken 8/2/2022 1936)  Care Plan - Patient's Chronic Conditions and Co-Morbidity Symptoms are Monitored and Maintained or Improved: Monitor and assess patient's chronic conditions and comorbid symptoms for stability, deterioration, or improvement

## 2022-08-03 NOTE — PROGRESS NOTES
Physical Therapy    Date: 8/3/2022  Patient Name: Krysta Thompson  MRN: [id number]  : 1945  TIME: 10:50 am     Attempt Note    Patient is currently off the floor in OR for Left wrist fx repair. Will wait for new therapy orders to see patient.      Gilles Santos   Electronically signed by Gilles Santos on 8/3/2022 at 10:50 AM  I attest that I was present for and made a skilled & mindful clinical judgement during the evaluation and/or treatment of this patient on 8/3/2022  Electronically signed by Oni Smith, [address] [place] [address] [phone])  on 8/3/2022 at 11:37 AM

## 2022-08-03 NOTE — PROGRESS NOTES
Hospital Medicine Progress Note     Date:  8/3/2022    PCP: Hayde Lloyd MD (Tel: [phone])    Date of Admission: 7/29/2022    Chief complaint:   Chief Complaint   Patient presents with    Atrial Fibrillation    Fall     One week ago    Altered Mental Status       Brief admission history: 48-year-old female with history of well-controlled diabetes type 2, macrocytic anemia, GERD, anxiety and depression, fibromyalgia, obstructive sleep apnea, history of rectal cancer, who was admitted on 7/29/2022 with septic shock (requiring pressor support) complicated by lower GI bleed and atrial fibrillation with rapid ventricular response. Assessment/plan:  Septic shock secondary to pneumonia, bacterial pneumonia (likely Gram positive bacterial pneumonia). , resolved  Fluid resuscitated on admission. She was on IV antibiotics which have since been changed to oral cefidinir and doxycycline. She was weaned off pressor as of 8/1/2022. Atrial fibrillation with rapid ventricular response. Likely complicated by hemodynamic issues related to sepsis and septic shock. Rate is now well controlled and she has been in sinus rhythm. Continue beta-blocker. She was on amiodarone infusion which has since been changed to flecainide as of 7/31/2022. Currently not on full dose anticoagulation due to GI bleed. Acute lower GI bleed secondary to stercoral proctitis as noted on CT. Per GI recommendation, not a candidate for endoscopic evaluation at this time. Monitor hemoglobin closely. Recommend CBC within 1 week following discharge. Acute blood loss anemia secondary to GI bleed. Currently does not require blood transfusion. Monitor hemoglobin as noted above. Acute metabolic encephalopathy, resolved. Etiology is multifactorial, likely secondary to underlying infectious process, sepsis. Nondisplaced fracture distal radial metaphysis, present on admission.  Orthopedic surgery planning possible OR on 8/3/2022. Right hydroureteronephrosis, mild to moderate. Noted on CT-abdomen/pelvis obtained on 7/29/2022 (result updated on 8/1/2022). Follow up renal ultrasound was unremarkable on 8/1/2022. She has been evaluated by urology service. Nonzero troponin, not due to ACS. Likely secondary to atrial fibrillation with rapid ventricular response and septic shock  Acute kidney injury, present on admission. This has since resolved following fluid resuscitation. Continue ongoing electrolyte replacement as needed. Disposition. Will need SNF at discharge. Discharge in 1-2 days. OK to transfer to [facility] today. Diet: Diet NPO Exceptions are: Sips of Water with Meds    Code status: Full Code   ----------  Subjective  She is feeling well, pending surgery today. Denies any discomfort. Objective  Physical exam:  Vitals: /62   Pulse 73   Temp 98.5 °F (36.9 °C) (Oral)   Resp 15   Wt 157 lb 3 oz (71.3 kg)   SpO2 95%   BMI 28.75 kg/m²   Gen/overall appearance: Not in acute distress. Alert. Oriented X3. Head: Normocephalic, atraumatic  Eyes: EOMI, good acuity  ENT: Oral mucosa moist  Neck: No JVD, thyromegaly  CVS: Nml S1S2, no MRG, RRR  Pulm: Clear bilaterally. No crackles/wheezes  Gastrointestinal: Soft, NT/ND, +BS  Musculoskeletal: No edema. Warm  Neuro: No focal deficit. Moves extremity spontaneously. Psychiatry: Appropriate affect. Not agitated. Skin: Warm, dry with normal turgor. No rash  Capillary refill: Brisk,< 3 seconds   Peripheral Pulses: +2 palpable, equal bilaterally      24HR INTAKE/OUTPUT:    Intake/Output Summary (Last 24 hours) at 8/3/2022 0938  Last data filed at 8/3/2022 0646  Gross per 24 hour   Intake 1687.49 ml   Output 842 ml   Net 845.49 ml       I/O last 3 completed shifts: In: 2886.6 [P.O.:103.5; I.V.:2431.8; IV Piggyback:351.3]  Out: 4567 [Urine:1317]  No intake/output data recorded.   Meds:    potassium phosphate IVPB  20 mmol IntraVENous Once    Venelex   Topical BID docusate sodium  100 mg Oral BID    cefdinir  300 mg Oral 2 times per day    doxycycline hyclate  100 mg Oral 2 times per day    flecainide  150 mg Oral 2 times per day    metoprolol succinate  25 mg Oral Daily    sodium chloride flush  5-40 mL IntraVENous 2 times per day    pantoprazole (PROTONIX) 40 mg injection  40 mg IntraVENous Q12H     Infusions:    sodium chloride 100 mL/hr at 08/03/22 0646    sodium chloride 5 mL/hr at 08/03/22 0646    dextrose       PRN Meds: potassium chloride **OR** potassium alternative oral replacement **OR** potassium chloride, magnesium sulfate, sodium phosphate IVPB **OR** sodium phosphate IVPB, sodium chloride flush, sodium chloride, ondansetron **OR** ondansetron, polyethylene glycol, acetaminophen **OR** acetaminophen, glucose, dextrose bolus **OR** dextrose bolus, glucagon (rDNA), dextrose    Labs/imaging:  CBC:   Recent Labs     08/01/22  0553 08/02/22  0422 08/03/22  0332   WBC 6.6 6.4 6.6   HGB 10.2* 9.6* 9.5*    173 179       BMP:    Recent Labs     08/01/22  0553 08/02/22  0422 08/03/22  0332    137 138   K 3.5 3.7 3.3*    101 104   CO2 26 26 24   BUN 15 12 7   CREATININE <0.5* <0.5* <0.5*   GLUCOSE 88 91 87           Fredo Meier MD  -------------------------------  Rounding hospitalist

## 2022-08-03 NOTE — PROGRESS NOTES
Report called and given to [facility], RN. Pt currently in surgery, will be transferred to 3118 after.

## 2022-08-03 NOTE — PROGRESS NOTES
Occupational Therapy    Attempted to see pt for OT tx. Pt in OR for wrist surgery. Please re-order therapy following surgery when pt stable to participate.     Electronically signed by Emma Muniz OT on 8/3/2022 at 10:49 AM

## 2022-08-04 VITALS
WEIGHT: 160.05 LBS | SYSTOLIC BLOOD PRESSURE: 139 MMHG | HEIGHT: 62 IN | BODY MASS INDEX: 29.45 KG/M2 | DIASTOLIC BLOOD PRESSURE: 79 MMHG | OXYGEN SATURATION: 97 % | RESPIRATION RATE: 14 BRPM | TEMPERATURE: 97.9 F | HEART RATE: 78 BPM

## 2022-08-04 LAB
A/G RATIO: 0.9 (ref 1.1–2.2)
ALBUMIN SERPL-MCNC: 2.4 G/DL (ref 3.4–5)
ALP BLD-CCNC: 58 U/L (ref 40–129)
ALT SERPL-CCNC: 8 U/L (ref 10–40)
ANION GAP SERPL CALCULATED.3IONS-SCNC: 9 MMOL/L (ref 3–16)
AST SERPL-CCNC: 12 U/L (ref 15–37)
BASOPHILS ABSOLUTE: 0 K/UL (ref 0–0.2)
BASOPHILS RELATIVE PERCENT: 0.1 %
BILIRUB SERPL-MCNC: 0.3 MG/DL (ref 0–1)
BUN BLDV-MCNC: 6 MG/DL (ref 7–20)
CALCIUM SERPL-MCNC: 8.2 MG/DL (ref 8.3–10.6)
CHLORIDE BLD-SCNC: 101 MMOL/L (ref 99–110)
CO2: 24 MMOL/L (ref 21–32)
CREAT SERPL-MCNC: 0.6 MG/DL (ref 0.6–1.2)
EOSINOPHILS ABSOLUTE: 0 K/UL (ref 0–0.6)
EOSINOPHILS RELATIVE PERCENT: 0.1 %
GFR AFRICAN AMERICAN: >60
GFR NON-AFRICAN AMERICAN: >60
GLUCOSE BLD-MCNC: 118 MG/DL (ref 70–99)
HCT VFR BLD CALC: 28.9 % (ref 36–48)
HEMOGLOBIN: 9.8 G/DL (ref 12–16)
LYMPHOCYTES ABSOLUTE: 0.4 K/UL (ref 1–5.1)
LYMPHOCYTES RELATIVE PERCENT: 5.9 %
MAGNESIUM: 1.7 MG/DL (ref 1.8–2.4)
MCH RBC QN AUTO: 36 PG (ref 26–34)
MCHC RBC AUTO-ENTMCNC: 33.7 G/DL (ref 31–36)
MCV RBC AUTO: 106.8 FL (ref 80–100)
MONOCYTES ABSOLUTE: 0.7 K/UL (ref 0–1.3)
MONOCYTES RELATIVE PERCENT: 11 %
NEUTROPHILS ABSOLUTE: 5 K/UL (ref 1.7–7.7)
NEUTROPHILS RELATIVE PERCENT: 82.9 %
PDW BLD-RTO: 16 % (ref 12.4–15.4)
PHOSPHORUS: 2.1 MG/DL (ref 2.5–4.9)
PLATELET # BLD: 201 K/UL (ref 135–450)
PMV BLD AUTO: 7.2 FL (ref 5–10.5)
POTASSIUM SERPL-SCNC: 3.9 MMOL/L (ref 3.5–5.1)
RBC # BLD: 2.71 M/UL (ref 4–5.2)
SARS-COV-2, NAAT: NOT DETECTED
SODIUM BLD-SCNC: 134 MMOL/L (ref 136–145)
TOTAL PROTEIN: 5 G/DL (ref 6.4–8.2)
WBC # BLD: 6.1 K/UL (ref 4–11)

## 2022-08-04 PROCEDURE — 6360000002 HC RX W HCPCS: Performed by: ORTHOPAEDIC SURGERY

## 2022-08-04 PROCEDURE — 6370000000 HC RX 637 (ALT 250 FOR IP): Performed by: ORTHOPAEDIC SURGERY

## 2022-08-04 PROCEDURE — 97535 SELF CARE MNGMENT TRAINING: CPT

## 2022-08-04 PROCEDURE — 83735 ASSAY OF MAGNESIUM: CPT

## 2022-08-04 PROCEDURE — 36415 COLL VENOUS BLD VENIPUNCTURE: CPT

## 2022-08-04 PROCEDURE — 80053 COMPREHEN METABOLIC PANEL: CPT

## 2022-08-04 PROCEDURE — 2500000003 HC RX 250 WO HCPCS: Performed by: INTERNAL MEDICINE

## 2022-08-04 PROCEDURE — 51702 INSERT TEMP BLADDER CATH: CPT

## 2022-08-04 PROCEDURE — 2580000003 HC RX 258: Performed by: INTERNAL MEDICINE

## 2022-08-04 PROCEDURE — 6360000002 HC RX W HCPCS: Performed by: INTERNAL MEDICINE

## 2022-08-04 PROCEDURE — 84100 ASSAY OF PHOSPHORUS: CPT

## 2022-08-04 PROCEDURE — 85025 COMPLETE CBC W/AUTO DIFF WBC: CPT

## 2022-08-04 PROCEDURE — 2580000003 HC RX 258: Performed by: ORTHOPAEDIC SURGERY

## 2022-08-04 PROCEDURE — 97530 THERAPEUTIC ACTIVITIES: CPT

## 2022-08-04 PROCEDURE — C9113 INJ PANTOPRAZOLE SODIUM, VIA: HCPCS | Performed by: ORTHOPAEDIC SURGERY

## 2022-08-04 PROCEDURE — 94760 N-INVAS EAR/PLS OXIMETRY 1: CPT

## 2022-08-04 PROCEDURE — 87635 SARS-COV-2 COVID-19 AMP PRB: CPT

## 2022-08-04 RX ORDER — HYDROCODONE BITARTRATE AND ACETAMINOPHEN 5; 325 MG/1; MG/1
1 TABLET ORAL EVERY 6 HOURS PRN
Qty: 20 TABLET | Refills: 0 | Status: SHIPPED | OUTPATIENT
Start: 2022-08-04 | End: 2022-08-09

## 2022-08-04 RX ORDER — ALPRAZOLAM 0.5 MG/1
0.5 TABLET ORAL 3 TIMES DAILY PRN
Qty: 9 TABLET | Refills: 0 | Status: SHIPPED | OUTPATIENT
Start: 2022-08-04 | End: 2022-08-07

## 2022-08-04 RX ORDER — CEFDINIR 300 MG/1
300 CAPSULE ORAL EVERY 12 HOURS SCHEDULED
Qty: 3 CAPSULE | Refills: 0 | Status: SHIPPED | OUTPATIENT
Start: 2022-08-04 | End: 2022-08-06

## 2022-08-04 RX ORDER — FUROSEMIDE 20 MG/1
20 TABLET ORAL DAILY
Qty: 30 TABLET | Refills: 0 | Status: ON HOLD
Start: 2022-08-04 | End: 2022-08-31 | Stop reason: HOSPADM

## 2022-08-04 RX ORDER — METOPROLOL SUCCINATE 25 MG/1
25 TABLET, EXTENDED RELEASE ORAL DAILY
Qty: 30 TABLET | Refills: 3 | Status: ON HOLD | OUTPATIENT
Start: 2022-08-05 | End: 2022-08-31 | Stop reason: HOSPADM

## 2022-08-04 RX ORDER — PSEUDOEPHEDRINE HCL 30 MG
100 TABLET ORAL 2 TIMES DAILY PRN
Qty: 60 CAPSULE | Refills: 0 | Status: SHIPPED | OUTPATIENT
Start: 2022-08-04

## 2022-08-04 RX ORDER — HYDROCODONE BITARTRATE AND ACETAMINOPHEN 5; 325 MG/1; MG/1
1 TABLET ORAL EVERY 6 HOURS PRN
Qty: 20 TABLET | Refills: 0 | Status: SHIPPED | OUTPATIENT
Start: 2022-08-04 | End: 2022-08-04 | Stop reason: SDUPTHER

## 2022-08-04 RX ORDER — MAGNESIUM SULFATE 1 G/100ML
1000 INJECTION INTRAVENOUS ONCE
Status: COMPLETED | OUTPATIENT
Start: 2022-08-04 | End: 2022-08-04

## 2022-08-04 RX ORDER — DOXYCYCLINE HYCLATE 100 MG
100 TABLET ORAL EVERY 12 HOURS SCHEDULED
Qty: 3 TABLET | Refills: 0 | Status: SHIPPED | OUTPATIENT
Start: 2022-08-04 | End: 2022-08-06

## 2022-08-04 RX ORDER — POLYETHYLENE GLYCOL 3350 17 G/17G
17 POWDER, FOR SOLUTION ORAL DAILY PRN
Qty: 527 G | Refills: 1 | Status: SHIPPED | OUTPATIENT
Start: 2022-08-04 | End: 2022-09-03

## 2022-08-04 RX ADMIN — HYDROCODONE BITARTRATE AND ACETAMINOPHEN 1 TABLET: 5; 325 TABLET ORAL at 08:52

## 2022-08-04 RX ADMIN — HYDROCODONE BITARTRATE AND ACETAMINOPHEN 1 TABLET: 5; 325 TABLET ORAL at 19:49

## 2022-08-04 RX ADMIN — CEFDINIR 300 MG: 300 CAPSULE ORAL at 08:33

## 2022-08-04 RX ADMIN — SODIUM CHLORIDE: 4.5 INJECTION, SOLUTION INTRAVENOUS at 05:26

## 2022-08-04 RX ADMIN — SODIUM CHLORIDE: 4.5 INJECTION, SOLUTION INTRAVENOUS at 01:27

## 2022-08-04 RX ADMIN — DOXYCYCLINE HYCLATE 100 MG: 100 TABLET, FILM COATED ORAL at 08:34

## 2022-08-04 RX ADMIN — HYDROCODONE BITARTRATE AND ACETAMINOPHEN 1 TABLET: 5; 325 TABLET ORAL at 13:02

## 2022-08-04 RX ADMIN — CEFAZOLIN 2000 MG: 2 INJECTION, POWDER, FOR SOLUTION INTRAMUSCULAR; INTRAVENOUS at 05:31

## 2022-08-04 RX ADMIN — SODIUM CHLORIDE, PRESERVATIVE FREE 10 ML: 5 INJECTION INTRAVENOUS at 08:35

## 2022-08-04 RX ADMIN — MAGNESIUM SULFATE HEPTAHYDRATE 1000 MG: 1 INJECTION, SOLUTION INTRAVENOUS at 16:34

## 2022-08-04 RX ADMIN — CASTOR OIL AND BALSAM, PERU: 788; 87 OINTMENT TOPICAL at 08:35

## 2022-08-04 RX ADMIN — METOPROLOL SUCCINATE 25 MG: 25 TABLET, EXTENDED RELEASE ORAL at 08:34

## 2022-08-04 RX ADMIN — Medication 40 MG: at 08:34

## 2022-08-04 RX ADMIN — SODIUM PHOSPHATE, MONOBASIC, MONOHYDRATE AND SODIUM PHOSPHATE, DIBASIC, ANHYDROUS 20 MMOL: 276; 142 INJECTION, SOLUTION INTRAVENOUS at 11:46

## 2022-08-04 RX ADMIN — FLECAINIDE ACETATE 150 MG: 50 TABLET ORAL at 08:34

## 2022-08-04 ASSESSMENT — PAIN DESCRIPTION - ORIENTATION
ORIENTATION: LEFT

## 2022-08-04 ASSESSMENT — PAIN DESCRIPTION - LOCATION
LOCATION: ARM

## 2022-08-04 ASSESSMENT — PAIN - FUNCTIONAL ASSESSMENT
PAIN_FUNCTIONAL_ASSESSMENT: PREVENTS OR INTERFERES WITH MANY ACTIVE NOT PASSIVE ACTIVITIES
PAIN_FUNCTIONAL_ASSESSMENT: PREVENTS OR INTERFERES SOME ACTIVE ACTIVITIES AND ADLS
PAIN_FUNCTIONAL_ASSESSMENT: PREVENTS OR INTERFERES SOME ACTIVE ACTIVITIES AND ADLS

## 2022-08-04 ASSESSMENT — PAIN SCALES - GENERAL
PAINLEVEL_OUTOF10: 0
PAINLEVEL_OUTOF10: 7
PAINLEVEL_OUTOF10: 0
PAINLEVEL_OUTOF10: 6
PAINLEVEL_OUTOF10: 0
PAINLEVEL_OUTOF10: 0
PAINLEVEL_OUTOF10: 6

## 2022-08-04 ASSESSMENT — PAIN DESCRIPTION - DESCRIPTORS
DESCRIPTORS: ACHING

## 2022-08-04 ASSESSMENT — PAIN SCALES - WONG BAKER: WONGBAKER_NUMERICALRESPONSE: 0

## 2022-08-04 NOTE — PROGRESS NOTES
meds, outpatient follow up with ortho. Right hydroureteronephrosis, mild to moderate. Noted on CT-abdomen/pelvis obtained on 7/29/2022 (result updated on 8/1/2022). Follow up renal ultrasound was unremarkable on 8/1/2022. She has been evaluated by urology service. Nonzero troponin, not due to ACS. Likely secondary to atrial fibrillation with rapid ventricular response and septic shock  Acute kidney injury, present on admission. This has since resolved following fluid resuscitation. Continue ongoing electrolyte replacement as needed. Disposition. Stable for DC to SNF today. Diet: ADULT DIET; Regular  ADULT ORAL NUTRITION SUPPLEMENT; Breakfast, Dinner; Diabetic Oral Supplement    Code status: Full Code   ----------  Subjective  No complaints. Denies any needs. Objective  Physical exam:  Vitals: /79   Pulse 78   Temp 97.9 °F (36.6 °C) (Oral)   Resp 16   Ht 5' 2\" (1.575 m)   Wt 160 lb 0.9 oz (72.6 kg)   SpO2 98%   BMI 29.27 kg/m²   Gen/overall appearance: Not in acute distress. Alert. Oriented X3. Head: Normocephalic, atraumatic  Eyes: EOMI, good acuity  ENT: Oral mucosa moist  Neck: No JVD, thyromegaly  CVS: Nml S1S2, no MRG, RRR  Pulm: Clear bilaterally. No crackles/wheezes  Gastrointestinal: Soft, NT/ND, +BS  Musculoskeletal: Left wrist ACE wrap. No edema. Warm  Neuro: No focal deficit. Moves extremity spontaneously. Psychiatry: Appropriate affect. Not agitated. Skin: Warm, dry with normal turgor. No rash  Capillary refill: Brisk,< 3 seconds   Peripheral Pulses: +2 palpable, equal bilaterally      24HR INTAKE/OUTPUT:    Intake/Output Summary (Last 24 hours) at 8/4/2022 1119  Last data filed at 8/4/2022 0644  Gross per 24 hour   Intake 1588.73 ml   Output 1950 ml   Net -361.27 ml       I/O last 3 completed shifts: In: 2033.1 [P.O.:540; I.V.:1493.1]  Out: 2936 [Urine:2617; Blood:25]  No intake/output data recorded.   Meds:    sodium phosphate IVPB  20 mmol IntraVENous Once    magnesium sulfate  1,000 mg IntraVENous Once    sodium chloride flush  5-40 mL IntraVENous 2 times per day    Venelex   Topical BID    docusate sodium  100 mg Oral BID    cefdinir  300 mg Oral 2 times per day    doxycycline hyclate  100 mg Oral 2 times per day    flecainide  150 mg Oral 2 times per day    metoprolol succinate  25 mg Oral Daily    sodium chloride flush  5-40 mL IntraVENous 2 times per day    pantoprazole (PROTONIX) 40 mg injection  40 mg IntraVENous Q12H     Infusions:    sodium chloride      sodium chloride 5 mL/hr at 08/03/22 0646    dextrose       PRN Meds: sodium chloride flush, sodium chloride, HYDROcodone 5 mg - acetaminophen, potassium chloride **OR** potassium alternative oral replacement **OR** potassium chloride, magnesium sulfate, sodium phosphate IVPB **OR** sodium phosphate IVPB, sodium chloride flush, sodium chloride, ondansetron **OR** ondansetron, polyethylene glycol, acetaminophen **OR** acetaminophen, glucose, dextrose bolus **OR** dextrose bolus, glucagon (rDNA), dextrose    Labs/imaging:  CBC:   Recent Labs     08/02/22 0422 08/03/22 0332 08/04/22 0622   WBC 6.4 6.6 6.1   HGB 9.6* 9.5* 9.8*    179 201       BMP:    Recent Labs     08/02/22 0422 08/03/22 0332 08/04/22 0622    138 134*   K 3.7 3.3* 3.9    104 101   CO2 26 24 24   BUN 12 7 6*   CREATININE <0.5* <0.5* 0.6   GLUCOSE 91 87 118*           Jacky Falk MD  -------------------------------  [place] hospitalist

## 2022-08-04 NOTE — PROGRESS NOTES
Comprehensive Nutrition Assessment    Type and Reason for Visit:  Initial    Nutrition Recommendations/Plan:   Regular diet, monitor need for carb restriction when intakes improve  Glucerna BID     Malnutrition Assessment:  Malnutrition Status: At risk for malnutrition (Comment) (08/04/22 1104)    Context:  Acute Illness     Findings of the 6 clinical characteristics of malnutrition:  Energy Intake:  75% or less of estimated energy requirements for 7 or more days  Weight Loss:  No significant weight loss     Body Fat Loss:  No significant body fat loss     Muscle Mass Loss:  No significant muscle mass loss    Fluid Accumulation:  No significant fluid accumulation     Strength:  Not Performed    Nutrition Assessment:    LOS. Pt with hx of diabetes type 2, anemia, GERD, anxiety and depression, fibromyalgia, history of rectal cancer, who was admitted on with septic shock (requiring pressor support) complicated by lower GI bleed and a fib. Now on floor. Pt also with DTI to heels, sacrum. Pt also with left radius fracture, s/p \"Open treatment of left distal radius two-part  intra-articular fracture with open reduction and internal fixation\". On Regular diet, intakes are varied and inadequate this admission. No significant wt loss per our records. Will trial ONS. Nutrition Related Findings:    Reviewed labs Wound Type: Deep Tissue Injury, Multiple, Surgical Incision       Current Nutrition Intake & Therapies:    Average Meal Intake: 51-75%     ADULT DIET; Regular    Anthropometric Measures:  Height: 5' 2\" (157.5 cm)  Ideal Body Weight (IBW): 110 lbs (50 kg)    Admission Body Weight: 150 lb (68 kg)  Current Body Weight: 160 lb (72.6 kg), 145.5 % IBW. Current BMI (kg/m2): 29.3                          BMI Categories: Overweight (BMI 25.0-29. 9)    Estimated Daily Nutrient Needs:  Energy Requirements Based On: Kcal/kg  Weight Used for Energy Requirements: Current  Energy (kcal/day): 1825 kcal (25 kcal/kg ABW)  Weight Used for Protein Requirements: Current  Protein (g/day): 102 gm (1.4 gm/kg ABW)  Method Used for Fluid Requirements: 1 ml/kcal  Fluid (ml/day):      Nutrition Diagnosis:   Increased nutrient needs related to increase demand for energy/nutrients as evidenced by wounds    Nutrition Interventions:   Food and/or Nutrient Delivery: Continue Current Diet, Start Oral Nutrition Supplement  Nutrition Education/Counseling: No recommendation at this time  Coordination of Nutrition Care: Continue to monitor while inpatient       Goals:     Goals: PO intake 50% or greater       Nutrition Monitoring and Evaluation:   Behavioral-Environmental Outcomes: None Identified  Food/Nutrient Intake Outcomes: Food and Nutrient Intake, Supplement Intake  Physical Signs/Symptoms Outcomes: Biochemical Data, Nutrition Focused Physical Findings, Skin, Weight    Discharge Planning:     Too soon to determine     Rafael Grijalva, [address] [place] [address],   Contact: [phone]

## 2022-08-04 NOTE — CARE COORDINATION
DISCHARGE SUMMARY     DATE OF DISCHARGE: 8/4/2022      DISCHARGE DESTINATION: skilled     FACILITY:   Discharging to Facility/ Agency   Name: [facility]  Address:  [address]   Phone:  [phone]  Fax:  [phone] OBTAINED: [facility] floor to snf     RADHA/TOMÁS COMPLETED: yes    COVID RESULT: negative 8/4/2022      TRANSPORTATION:     Company Name:  [employer] Time: 4pm    Phone Number: [phone]      COMMENTS: Spoke with patient regarding discharge plan and transport time. She is agreeable to the plan. Spoke with son, Gilmar Romero. He is agreeable to the plan. Spoke with [facility]--they can accept patient. Rn aware and will complete covid test.   Report number [phone]. Electronically signed by HOLLY Sharif LISW, Case Management on 8/4/2022 at 2:16 PM  Alexi [id number]    2:53 PM   Received call from daughter Juan Bedoya ([phone]) regarding patient's discharge today and transport to follow up appointments. Informed her that the snf may arrange transport to outpatient appointments.      Electronically signed by HOLLY Sharif LISW, Case Management on 8/4/2022 at 2:58 PM  [place] [id number]

## 2022-08-04 NOTE — CARE COORDINATION
08/04/22 1419   IMM Letter   IMM Letter given to Patient/Family/Significant other/Guardian/POA/by: second letter given to patient  hs   IMM Letter date given: 08/04/22   IMM Letter time given: 4604     Education provided to patient. Patient reported no questions and verbalized understanding. Patient made aware that he/she has 4 hours prior to discharging from hospital to decide if he/she wishes to pursue the Medicare appeal process.

## 2022-08-04 NOTE — PROGRESS NOTES
Occupational Therapy  Facility/Department: [facility]  Occupational Therapy Re-evaluation    Name: Johnson Cueva  : 1945  MRN: [id number]  Date of Service: 2022    Discharge Recommendations:  Patient would benefit from continued therapy after discharge, 3-5 sessions per week        Johnson Cueva scored a 10/24 on the AM-PAC ADL Inpatient form. Current research shows that an AM-PAC score of 17 or less is typically not associated with a discharge to the patient's home setting. Based on the patient's AM-PAC score and their current ADL deficits, it is recommended that the patient have 3-5 sessions per week of Occupational Therapy at d/c to increase the patient's independence. Please see assessment section for further patient specific details. If patient discharges prior to next session this note will serve as a discharge summary. Please see below for the latest assessment towards goals. Patient Diagnosis(es): The primary encounter diagnosis was Atrial fibrillation with rapid ventricular response ([address] Diagnoses of Pneumonia due to infectious organism, unspecified laterality, unspecified part of lung, Closed fracture of distal end of left radius, unspecified fracture morphology, initial encounter, and Anxiety were also pertinent to this visit. Past Medical History:  has a past medical history of Acid reflux, Anxiety, Arthritis, Atrial fibrillation (HCC), Closed fracture dislocation of right elbow, Depression, Fibromyalgia, Migraine, WHITNEY (obstructive sleep apnea), Rectal cancer ([address] and Wears glasses. Past Surgical History:  has a past surgical history that includes  section; Total knee arthroplasty (Left); open tx femoral supracondylar fracture w/o extension (Right, 10/25/2018); Gastric bypass surgery; Cholecystectomy; Appendectomy; Colonoscopy; Upper gastrointestinal endoscopy (N/A, 2019); Colonoscopy (N/A, 2019);  Upper gastrointestinal endoscopy (N/A, 8/18/2020); incision and drainage (Right, 1/28/2021); Skin graft (Right, 1/31/2021); Skin graft (Right, 2/17/2021); Cervical discectomy; Colon surgery; Colonoscopy (N/A, 7/7/2021); and Forearm surgery (Left, 8/3/2022). Assessment   Performance deficits / Impairments: Decreased functional mobility ; Decreased safe awareness;Decreased balance;Decreased ADL status; Decreased cognition;Decreased high-level IADLs;Decreased endurance;Decreased strength  Assessment: 64 y/o female admit 7/29 with Septic Shock, GI Bleed, A-Fib with RVR. Recent L Radius Fx 7/23/2022 now S/P 8/3/22 R radius ORIF with splint and NWB. PMH: Gastric Bypass, Rectal Ca, L TKR, R Femur Fx/ORIF (10/2018), R LE Traumatic Hematoma (1/2021 S/P Skin Graft 2/17/2022). PTA, pt lives at home with spouse and was IND with ADLs and functional mobility per pt (per dtr to RN, pt hardly able to stand up at home- unsure how recent? ?). Today, on re-evaluation, pt continues to be functioning well below baseline most limited by decreased initiation, strength, endurance, and balance. Pt requires Mod/Max A x2 bed mobility, Total A toileting, Mod A grooming, and Total A sit><stand within stedy from elevated EOB (2-3 A Mod-Max A each). Pt with significant LUE digit swelling- completes AROM with cueing. Cont acute OT to address above deficits.  Rec OT 3-5x/week d/t high fall risk and low AMPAC score  Prognosis: Fair  REQUIRES OT FOLLOW-UP: Yes  Activity Tolerance  Activity Tolerance: Patient limited by fatigue        Plan   Plan  Times per Week: 3-5  Current Treatment Recommendations: Strengthening, Balance training, Functional mobility training, Endurance training, Neuromuscular re-education, Return to work related activity, Gait training, Safety education & training     Restrictions  Restrictions/Precautions  Restrictions/Precautions: Fall Risk, ROM Restrictions, Weight Bearing  Required Braces or Orthoses?: Yes  Upper Extremity Weight Bearing Restrictions  Left Upper for assistance  Insights: Decreased awareness of deficits  Initiation: Requires cues for all  Sequencing: Requires cues for some  Cognition Comment: Increase processing time, difficulty with decision making  Orientation  Overall Orientation Status: Within Functional Limits  Orientation Level: Oriented to place;Oriented to situation;Oriented to person               A/AROM Exercises: seated LUE digit flexion/extension 5 reps x2 (difficulty getting to full flexion) requires cues for effort  Education Given To: Patient  Education Provided: Role of Therapy;Plan of Care;Transfer Training  Education Method: Demonstration;Verbal  Barriers to Learning: Cognition  Education Outcome: Continued education needed                      AM-PAC Score        AM-PAC Inpatient Daily Activity Raw Score: 10 (08/04/22 1300)  AM-PAC Inpatient ADL T-Scale Score : 27.31 (08/04/22 1300)  ADL Inpatient CMS 0-100% Score: 74.7 (08/04/22 1300)  ADL Inpatient CMS G-Code Modifier : CL (08/04/22 1300)    Goals  Short Term Goals  Time Frame for Short term goals: Prior to DC:   Short Term Goal 1: Pt will complete ADL transfers with min A  Short Term Goal 2: Pt will tolerate standing > 2 min for standing components of ADLs  Short Term Goal 3: Pt will complete grooming tasks with set up  Short Term Goal 4: Pt will complete bed mobility in prep for transfer with mod A  Short Term Goal 5: Pt will complete functional mobility with mod A  Patient Goals   Patient goals : to get stronger and return home       Therapy Time   Individual Concurrent Group Co-treatment   Time In 1045         Time Out 1130         Minutes 45         Timed Code Treatment Minutes: 45 Minutes (25 TA 20 ADL)       JESÚS Pichardo, OTR/L

## 2022-08-04 NOTE — PLAN OF CARE
Problem: Discharge Planning  Goal: Discharge to home or other facility with appropriate resources  8/3/2022 2251 by Georgina Alcantara RN  Outcome: Progressing     Problem: Pain  Goal: Verbalizes/displays adequate comfort level or baseline comfort level  8/3/2022 2251 by Georgina Alcantara RN  Outcome: Progressing     Problem: Skin/Tissue Integrity  Goal: Absence of new skin breakdown  Description: 1. Monitor for areas of redness and/or skin breakdown  2. Assess vascular access sites hourly  3. Every 4-6 hours minimum:  Change oxygen saturation probe site  4. Every 4-6 hours:  If on nasal continuous positive airway pressure, respiratory therapy assess nares and determine need for appliance change or resting period. 8/3/2022 2251 by Georgina Alcantara RN  Outcome: Progressing     Problem: Safety - Adult  Goal: Free from fall injury  8/3/2022 2251 by Georgina Alcantara RN  Outcome: Progressing  Flowsheets (Taken 8/3/2022 2239)  Free From Fall Injury: Gómez Arvizu family/caregiver on patient safety     Problem: ABCDS Injury Assessment  Goal: Absence of physical injury  8/3/2022 2251 by Georgina Alcantara RN  Outcome: Progressing  Flowsheets (Taken 8/3/2022 2239)  Absence of Physical Injury: Implement safety measures based on patient assessment     Problem: Chronic Conditions and Co-morbidities  Goal: Patient's chronic conditions and co-morbidity symptoms are monitored and maintained or improved  8/3/2022 2251 by Georgina Alcantara RN  Outcome: Progressing     Continue with plan of care.

## 2022-08-04 NOTE — PLAN OF CARE
Problem: Discharge Planning  Goal: Discharge to home or other facility with appropriate resources  8/4/2022 0558 by Namita Lei RN  Outcome: Progressing  Flowsheets (Taken 8/3/2022 1541 by Acacia Agarwal RN)  Discharge to home or other facility with appropriate resources: Identify barriers to discharge with patient and caregiver  8/3/2022 2251 by Nikita Hoyos RN  Outcome: Progressing  8/3/2022 1558 by Acacia Agarwal RN  Outcome: Progressing  Flowsheets (Taken 8/3/2022 1541)  Discharge to home or other facility with appropriate resources: Identify barriers to discharge with patient and caregiver     Problem: Pain  Goal: Verbalizes/displays adequate comfort level or baseline comfort level  8/4/2022 0558 by Namita Lei RN  Outcome: Progressing  Flowsheets (Taken 8/3/2022 0000 by Alber Brandt RN)  Verbalizes/displays adequate comfort level or baseline comfort level: Encourage patient to monitor pain and request assistance  8/3/2022 2251 by Nikita Hoyos RN  Outcome: Progressing  8/3/2022 1558 by Acacia Agarwal RN  Outcome: Progressing     Problem: Skin/Tissue Integrity  Goal: Absence of new skin breakdown  Description: 1. Monitor for areas of redness and/or skin breakdown  2. Assess vascular access sites hourly  3. Every 4-6 hours minimum:  Change oxygen saturation probe site  4. Every 4-6 hours:  If on nasal continuous positive airway pressure, respiratory therapy assess nares and determine need for appliance change or resting period. 8/4/2022 0558 by Namita Lei RN  Outcome: Progressing  Note: Will monitor skin and mucous members. Will turn patient every 2 hours, monitor for friction and sheering, and change dressings as needed. Will preform skin assessment every shift.       8/3/2022 2251 by Nikita Hoyos RN  Outcome: Progressing  8/3/2022 1558 by Acacia Agarwal RN  Outcome: Progressing     Problem: Safety - Adult  Goal: Free from fall injury  8/4/2022 0558 by Dorrie Olszewski Batsheva Puckett RN  Outcome: Progressing  Flowsheets (Taken 8/4/2022 0558)  Free From Fall Injury: Instruct family/caregiver on patient safety  8/3/2022 2251 by Markos Higginbotham RN  Outcome: Progressing  Flowsheets (Taken 8/3/2022 2239)  Free From Fall Injury: Instruct family/caregiver on patient safety  8/3/2022 1558 by Nathan Narayan RN  Outcome: Progressing  [address] (Taken 8/3/2022 1552)  Free From Fall Injury: Instruct family/caregiver on patient safety     Problem: ABCDS Injury Assessment  Goal: Absence of physical injury  8/4/2022 0558 by Melissa Linn RN  Outcome: Progressing  Flowsheets (Taken 8/3/2022 2239 by Markos Higginbotham RN)  Absence of Physical Injury: Implement safety measures based on patient assessment  8/3/2022 2251 by Markos Higginbotham RN  Outcome: Progressing  Flowsheets (Taken 8/3/2022 2239)  Absence of Physical Injury: Implement safety measures based on patient assessment  8/3/2022 1558 by Nathan Narayan RN  Outcome: Progressing  Flowsheets (Taken 8/3/2022 1552)  Absence of Physical Injury: Implement safety measures based on patient assessment     Problem: Chronic Conditions and Co-morbidities  Goal: Patient's chronic conditions and co-morbidity symptoms are monitored and maintained or improved  8/4/2022 0558 by Melissa Linn RN  Outcome: Progressing  Flowsheets (Taken 8/3/2022 1541 by Nathan Narayan RN)  Care Plan - Patient's Chronic Conditions and Co-Morbidity Symptoms are Monitored and Maintained or Improved: Monitor and assess patient's chronic conditions and comorbid symptoms for stability, deterioration, or improvement  8/3/2022 2251 by Markos Higginbotham RN  Outcome: Progressing  8/3/2022 1558 by Nathan Narayan RN  Outcome: Progressing  Flowsheets (Taken 8/3/2022 1541)  Care Plan - Patient's Chronic Conditions and Co-Morbidity Symptoms are Monitored and Maintained or Improved: Monitor and assess patient's chronic conditions and comorbid symptoms for stability, deterioration, or improvement

## 2022-08-04 NOTE — DISCHARGE INSTR - COC
[facility] Continuity of Care Form    Patient Name:  Rhina Lucas  : 1945    MRN:  [id number]    Admit date:  2022  Discharge date:  ***    Code Status Order: Full Code  Advance Directives: {YES OR NO:}    Admitting Physician: Stephanie Euceda DO  PCP: Vicky Padilla MD    Discharging Nurse: [facility] Unit/Room#: R7B-7400/3118-01  Discharging Unit Phone Number: ***    Emergency Contact:        Past Surgical History:  Past Surgical History:   Procedure Laterality Date    APPENDECTOMY      CERVICAL DISCECTOMY      ACDF     SECTION      x4    CHOLECYSTECTOMY      COLON SURGERY      Rectal cancer removed using  incision    COLONOSCOPY      SEVERAL    COLONOSCOPY N/A 2019    COLONOSCOPY POLYPECTOMY SNARE/COLD BIOPSY performed by Estrella Francisco MD at [address] N/A 2021    COLONOSCOPY WITH BIOPSY performed by Lizet Valdes MD at [address] [place] [address] Drive Left 8/3/2022    OPEN REDUCTION INTERNAL FIXATION LEFT DISTAL RADIUS performed by Tonya Bishop MD at [facility] Right 2021    RIGHT LEG EVACUATION OF HEMATOMA performed by Gabriele Hinds MD at [address] W/O EXTENSION Right 10/25/2018    OPEN REDUCTION INTERNAL FIXATION RIGHT FEMUR SUPRACONDYLAR FEMORAL FRACTURE WITH C-ARM performed by Poncho Murphy MD at [address] [place] [address] Right 2021    RIGHT LEG HEMATOMA EVAKUATION, DEBRIDEMENT, AND WOUND VAC PLACEMENT performed by Josee Mix MD at [address] [place] [address] [facility] Right 2021    RIGHT LOWER EXTREMITY SKIN GRAFT performed by Josee Mix MD at [address] [facility] Left     UPPER GASTROINTESTINAL ENDOSCOPY N/A 2019    ESOPHAGOGASTRODUODENOSCOPY performed by Estrella Francisco MD at [place] [address] 2020    EGD BIOPSY performed by Estrella Francisco MD at [facility] ENDOSCOPY       Immunization History:   Immunization History   Administered Date(s) Administered    COVID-19, J&J, (age 18y+), IM, 0.5 mL 03/08/2021, 10/31/2021    Influenza Vaccine, unspecified formulation 10/21/2018    Influenza, High Dose (Fluzone 65 yrs and older) 12/09/2015, 10/27/2016, 09/22/2017, 10/21/2018    Influenza, Lemon [place], IM, PF (6 mo and older Fluzone, Flulaval, Fluarix, and 3 yrs and older Afluria) 02/19/2020    Influenza, Quadv, adjuvanted, 65 yrs +, IM, PF (Fluad) 09/03/2020, 09/09/2021    Pneumococcal Conjugate 13-valent ([id number]) 12/09/2015    Pneumococcal Conjugate Vaccine 10/21/2018    Pneumococcal Polysaccharide ([id number]) 10/21/2018    Tdap (Boostrix, Adacel) 12/09/2015       Active Problems:  Active Problems:    Shock ([facility]    Atrial fibrillation with rapid ventricular response (HCC)    DM2 (diabetes mellitus, type 2) ([facility])    GI bleed    MAX (acute kidney injury) ([facility]    Community acquired pneumonia of left lower lobe of lung    Hypoxia    Pneumonia due to infectious organism    Closed fracture of left distal radius  Resolved Problems:    * No resolved hospital problems.  *      Isolation/Infection:       Nurse Assessment:  Last Vital Signs:/79   Pulse 78   Temp 97.9 °F (36.6 °C) (Oral)   Resp 16   Wt 160 lb 0.9 oz (72.6 kg)   SpO2 98%   BMI 29.27 kg/m²   Last documented pain score (0-10 scale): Pain Level: 0  Last Weight:   Wt Readings from Last 1 Encounters:   08/04/22 160 lb 0.9 oz (72.6 kg)     Mental Status:  {IP PT MENTAL STATUS:20030}     IV Access:  {[facility] IV ACCESS:633646318}    Nursing Mobility/ADLs:  Walking   {CHP DME ZKNV:532995076}  Transfer  {CHP DME ATZC:206598952}  Bathing  {CHP DME RSQQ:988510898}  Dressing  {CHP DME MBIL:517885552}  Toileting  {CHP DME KIAF:214315042}  Feeding  {CHP DME UVQL:521941932}  Med Admin  {[facility] WIRO:565884343}  Med Delivery   {[facility] MED Delivery:743633775}    Wound Care Documentation and Therapy:  Keep left wrist splint and ACE clean and dry and intact. DO NOT remove  Negative Pressure Wound Therapy Leg Anterior; Lower;Right (Active)   Number of days: 532       Wound 07/30/22 Heel Left (Active)   Wound Image   08/02/22 1126   Wound Etiology Deep tissue/Injury 08/04/22 0541   Dressing Status Other (Comment) 08/04/22 0541   Wound Cleansed Other (Comment) 08/04/22 0541   Dressing/Treatment Barrier film 08/04/22 0541   Wound Length (cm) 0.5 cm 08/02/22 1126   Wound Width (cm) 0.5 cm 08/02/22 1126   Wound Depth (cm) 0 cm 08/02/22 1126   Wound Surface Area (cm^2) 0.25 cm^2 08/02/22 1126   Wound Volume (cm^3) 0 cm^3 08/02/22 1126   Wound Assessment Purple/maroon 08/04/22 0541   Drainage Amount None 08/04/22 0541   Odor None 08/04/22 0541   Nallely-wound Assessment Intact 08/04/22 0541   Margins Attached edges 08/04/22 0541   Number of days: 5       Wound 07/30/22 Heel Right (Active)   Wound Image   08/02/22 1126   Wound Etiology Deep tissue/Injury 08/04/22 0541   Dressing Status Other (Comment) 08/04/22 0541   Wound Cleansed Other (Comment) 08/04/22 0541   Dressing/Treatment Barrier film 08/04/22 0541   Wound Length (cm) 1 cm 08/02/22 1126   Wound Width (cm) 1 cm 08/02/22 1126   Wound Depth (cm) 0 cm 08/02/22 1126   Wound Surface Area (cm^2) 1 cm^2 08/02/22 1126   Wound Volume (cm^3) 0 cm^3 08/02/22 1126   Wound Assessment Purple/maroon 08/04/22 0541   Drainage Amount None 08/04/22 0541   Odor None 08/04/22 0541   Nallely-wound Assessment Intact 08/04/22 0541   Margins Attached edges 08/04/22 0541   Number of days: 5       Wound 07/30/22 Sacrum Mid (Active)   Wound Image   08/02/22 1126   Wound Etiology Deep tissue/Injury 08/04/22 0541   Dressing Status Clean;Dry; Intact 08/04/22 0541   Wound Cleansed Other (Comment) 08/04/22 0541   Dressing/Treatment Pharmaceutical agent (see MAR) 08/04/22 0541   Wound Length (cm) 5 cm 08/02/22 1126   Wound Width (cm) 10.5 cm 08/02/22 1126   Wound Depth (cm) 0 cm 08/02/22 1126   Wound Surface Area (cm^2) 52.5 cm^2 08/02/22 1126   Wound Volume (cm^3) 0 cm^3 08/02/22 1126   Wound Assessment Purple/maroon;Pink/red 08/04/22 0541   Drainage Amount Small 08/04/22 0541   Drainage Description Serosanguinous 08/04/22 0541   Odor None 08/04/22 0541   Nallely-wound Assessment Denuded; Maceration;Fragile 08/04/22 0541   Margins Unattached edges 08/04/22 0541   Number of days: 5        Elimination:  Urinary Catheter: {Urinary Catheter:749252430}   Colostomy/Ileostomy: {YES / QT:65247}  Continence: Bowel: {YES / ZS:31690}  Bladder: {YES / KS:04638}  Date of Last BM: ***    Intake/Output Summary (Last 24 hours)     Intake/Output Summary (Last 24 hours) at 8/4/2022 0852  Last data filed at 8/4/2022 6599  Gross per 24 hour   Intake 1588.73 ml   Output 1950 ml   Net -361.27 ml     Safety Concerns:     [address] Safety Concerns:888226892}    Impairments/Disabilities:      [address] Impairments/Disabilities:174411969}    Nutrition Therapy:  Current Nutrition Therapy: ADULT DIET;  Regular  Routes of Feeding: {P DME Other Feedings:839893888}  Liquids: {Slp liquid thickness:28859}  Daily Fluid Restriction: {CHP DME Yes amt example:386138173}  Last Modified Barium Swallow with Video (Video Swallowing Test): {Done Not Done ZOGR:825732901}    Treatments at the Time of Hospital Discharge:   Respiratory Treatments: ***  Oxygen Therapy:  {Therapy; copd oxygen:02487}  Ventilator:    {MH CC Vent JSAU:676379011}    Lab orders for discharge:        Rehab Therapies: Physical Therapy, Occupational Therapy and nursing care  Weight Bearing Status/Restrictions: Nonweightbearing on left wrist or hand May use platformed walker  Other Medical Equipment (for information only, NOT a DME order): walker   Other Treatments: ***    Patient's personal belongings (please select all that are sent with patient):  {P DME Belongings:738066466}    RN SIGNATURE:  {Esignature:317713300}    PHYSICIAN SECTION    Prognosis: Good    Condition at Discharge: Stable    Rehab Potential (if

## 2022-08-04 NOTE — OP NOTE
[address] [place] [address] Breanne Hawk 16                                OPERATIVE REPORT    PATIENT NAME: [place]                   :        1945  MED REC NO:   [id number]                          ROOM:       3118  ACCOUNT NO:   [de-identified]                           ADMIT DATE: 2022  PROVIDER:     Tricia Marie MD      DATE OF PROCEDURE:  2022    PRIMARY CARE PROVIDER:  Airam Klein MD    PREOPERATIVE DIAGNOSIS:  Left distal radius two-part intra-articular  displaced fracture. POSTOPERATIVE DIAGNOSIS:  Left distal radius two-part intra-articular  displaced fracture. OPERATION PERFORMED:  Open treatment of left distal radius two-part  intra-articular fracture with open reduction and internal fixation. SURGEON:  Tricia Marie MD    ASSISTANT:  Bee Conde CNP    ANESTHESIA:  General anesthesia. ESTIMATED BLOOD LOSS:  Minimal.    COMPLICATIONS:  None. TOURNIQUET:  Left upper arm, 250 mmHg. IMPLANT USED:  Widen titanium intermediate volar locking plate with a  total of seven distal 2.7 locking screws and three proximal screws. INDICATIONS:  This is a 21-year-old white female, right-hand dominant,  who sustained a fall with left wrist injury. She was admitted to [facility] \[facility]\"" AT [place] after she went home after the injury and found to have an  AFib. She is stable for surgery at this point. All risks, benefits and  alternatives were discussed with the patient and her family, and they  agreed to proceed with surgical fixation. OPERATIVE PROCEDURE:  The patient's left wrist was marked. She received  2 gm of Ancef IV preoperatively. The patient was then brought to the  operating room, underwent general anesthesia. A well-padded tourniquet  was placed, left upper arm. The left upper extremity was then prepped  and draped in regular sterile routine fashion.   A time-out was called  confirming start range of motion of the wrist in two weeks.         Real Win MD    D: 08/03/2022 12:49:45       T: 08/03/2022 13:56:02     /[email or web address]  Job#: [id number]     Doc#: [id number]    CC:  Sarah Liu MD

## 2022-08-04 NOTE — PROGRESS NOTES
Physical Therapy  Facility/Department: [address] ORTHOPEDICS  Physical Therapy Initial Assessment  Co Treat with OT  Name: Susan Hurt  : 1945  MRN: [id number]  Date of Service: 2022    Discharge Recommendations:  Continue to assess pending progress, Patient would benefit from continued therapy after discharge, Therapy recommended at discharge   Susan Hurt scored a 8/24 on the AM-PAC short mobility form. Current research shows that an AM-PAC score of 17 or less is typically not associated with a discharge to the patient's home setting. Based on the patient's AM-PAC score and their current functional mobility deficits, it is recommended that the patient have 3-5 sessions per week of Physical Therapy at d/c to increase the patient's independence. Please see assessment section for further patient specific details. PT Equipment Recommendations  Other: Will monitor for potential equipt needs. Patient Diagnosis(es): The primary encounter diagnosis was Atrial fibrillation with rapid ventricular response ([address] Diagnoses of Pneumonia due to infectious organism, unspecified laterality, unspecified part of lung, Closed fracture of distal end of left radius, unspecified fracture morphology, initial encounter, and Anxiety were also pertinent to this visit. Past Medical History:  has a past medical history of Acid reflux, Anxiety, Arthritis, Atrial fibrillation (HCC), Closed fracture dislocation of right elbow, Depression, Fibromyalgia, Migraine, WHITNEY (obstructive sleep apnea), Rectal cancer ([address] and Wears glasses. Past Surgical History:  has a past surgical history that includes  section; Total knee arthroplasty (Left); open tx femoral supracondylar fracture w/o extension (Right, 10/25/2018); Gastric bypass surgery; Cholecystectomy; Appendectomy; Colonoscopy; Upper gastrointestinal endoscopy (N/A, 2019); Colonoscopy (N/A, 2019);  Upper gastrointestinal endoscopy (N/A, 8/18/2020); incision and drainage (Right, 1/28/2021); Skin graft (Right, 1/31/2021); Skin graft (Right, 2/17/2021); Cervical discectomy; Colon surgery; Colonoscopy (N/A, 7/7/2021); and Forearm surgery (Left, 8/3/2022). Assessment   Body Structures, Functions, Activity Limitations Requiring Skilled Therapeutic Intervention: Decreased functional mobility ; Decreased strength;Decreased endurance; Increased pain  Assessment: 66 y/o female admit 7/29/2022 with Septic Shock, GI Bleed, A-Fib with RVR. CT Head : negative. On 8-3-22 Dr. Xin Acuna performs ORIF L distal radius fracture. NWBng. PMH as noted including Gastric Bypass, Rectal Ca, L TKR, R Femur Fx/ORIF (10/2018), R LE Traumatic Hematoma (1/2021 S/P Skin Graft 2/17/2022), Recent R Radius Fx 7/23/2022. Status 8/4/22: Pt is reporting some kevin in her L UE post sx. She is very quiet and has a hard time making decisions. Bed mobility was mod/maxAx2 to EOB, little initiation by patient. Transfers to [facility] was maxAx2 with couple attempts to stand. Bed elevated to aid with stance, pt unable to stand fully and leans heavily on STEDY bar with R UE. Max Ax2 to stand before sitting into recliner at end of session. Pt is currenlty functioning well below baseline. Recommend continued therapy of mod intensity to improve strength anf fucntional capabilities. Will continue to monitor and assess while in hospital.  Therapy Prognosis: Fair  Decision Making: Medium Complexity  History: as noted. Clinical Presentation: evolving. Requires PT Follow-Up: Yes  Activity Tolerance  Activity Tolerance: Patient tolerated evaluation without incident;Patient limited by pain; Patient limited by endurance     Plan   Plan  Plan: 3-5 times per week  Current Treatment Recommendations: Strengthening, Functional mobility training, Transfer training, Safety education & training, Patient/Caregiver education & training, Endurance training, Therapeutic activities  Safety Devices  Type of Devices: Call light within reach, Chair alarm in place, Left in chair, Nurse notified, Gait belt, Heels elevated for pressure relief     Restrictions  Restrictions/Precautions  Restrictions/Precautions: Fall Risk, ROM Restrictions  Required Braces or Orthoses?: Yes  Required Braces or Orthoses  Right Upper Extremity Brace/Splint: Splint, no ROM for ~2 wks  Position Activity Restriction  Other position/activity restrictions: IV     Subjective   General  Chart Reviewed: Yes  Patient assessed for rehabilitation services?: Yes  Additional Pertinent Hx: 64 y/o female admit 7/29/2022 with Septic Shock, GI Bleed, A-Fib with RVR. CT Head : negative. On 8-3-22 Dr. Mary De La O performs ORIF L distal radius fracture. NWBng.  PMH as noted including Gastric Bypass, Rectal Ca, L TKR, R Femur Fx/ORIF (10/2018), R LE Traumatic Hematoma (1/2021 S/P Skin Graft 2/17/2022), Recent R Radius Fx 7/23/2022. Response To Previous Treatment: Not applicable  Family / Caregiver Present: No  Referring Practitioner: Jose Carlos Del Cid MD  Referral Date : 08/03/22  Follows Commands: Within Functional Limits  Subjective  Subjective: Pt in bed and agreeable to PT Eval. Quiet, some pain reported in L UE. Social/Functional History  Social/Functional History  Lives With: Son (Son (Does not work). 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     Vision/Hearing  Vision  Vision: Within Functional Limits  Hearing  Hearing: Within functional limits      Cognition   Orientation  Overall Orientation Status: Within Functional Limits  Orientation Level: Oriented to place;Oriented to situation;Oriented to person  Cognition  Overall Cognitive Status: Exceptions  Arousal/Alertness: Appropriate responses to stimuli  Following Commands: Follows one step commands with increased time  Attention Span: Appears intact  Memory: Decreased recall of recent events  Safety Judgement: Decreased awareness of need for safety;Decreased awareness of need for assistance  Insights: Decreased awareness of deficits  Initiation: Requires cues for all  Sequencing: Requires cues for some  Cognition Comment: Increase processing time, hard time making descisions     Objective   Observation/Palpation  Posture: Fair  Observation: Splint/Wrap L Distal UE. Gross Assessment  AROM: Generally decreased, functional  Strength: Grossly decreased, non-functional (extreme difficulty standing to STEDY)  Sensation: Impaired (reports of numbness/tingling in feet and LEs)   AROM RLE (degrees)  RLE General AROM: DF to neutral, lacks ROM in LEs  AROM LLE (degrees)  LLE General AROM: DF to neutral, lacks ROM in LEs  Bed mobility  Supine to Sit: 2 Person assistance;Maximum assistance; Moderate assistance (HOB elevated, minimal initiation from patient)  Sit to Supine: Unable to assess (pt in chair at end of session)  Bed Mobility Comments: Some initiation of LEs to EOB but directly by PT throughout time. Transfers  Sit to Stand: Maximum Assistance;2 Person Assistance ([address]  Stand to sit: 2 Person Assistance;Maximum Assistance; Moderate Assistance ([address]  Bed to Chair: Dependent/Total ([address]  Comment: NWB L UE due to recent sx 8/3/22. Balance  Posture: Fair  Sitting - Static: Fair;-  Sitting - Dynamic: Fair;-  Standing - Static: Poor  Comments: Intially unable to hold self up at EOB, once holding railing, able to maintain seated position. AM-PAC Score  AM-PAC Inpatient Mobility Raw Score : 8 (08/04/22 1225)  AM-PAC Inpatient T-Scale Score : 28.52 (08/04/22 1225)  Mobility Inpatient CMS 0-100% Score: 86.62 (08/04/22 1225)  Mobility Inpatient CMS G-Code Modifier : CM (08/04/22 1225)    Goals  Short Term Goals  Time Frame for Short term goals: Upon d/c acute care setting. 8/4/22: all goal ongoing. Short term goal 1: Bed Mob Mod assist.  Short term goal 2: Transfers via [employer] assist x 2. Short term goal 3: Attempt Transfers/Amb as approp (?platform walker at that time). Short term goal 4: Pt participating in approp Strength Exs. Patient Goals   Patient goals : Return home.      Education  Patient Education  Education Given To: Patient  Education Provided: Role of Therapy;Plan of Care;Transfer Training;Equipment  Education Method: Verbal  Education Outcome: Continued education needed    Therapy Time   Individual Concurrent Group Co-treatment   Time In 3391         Time Out 1130         Minutes 43         Timed Code Treatment Minutes: 35 Minutes   Meri 8, TA [facility]   Electronically signed by Kimberly Doran on 8/4/2022 at 12:27 PM  I attest that I was present for and made a skilled & mindful clinical judgement during the evaluation and/or treatment of this patient on 8/4/2022  Electronically signed by Kaitlynn Mena, [address] [place] [address] Drive [phone])  on 8/4/2022 at 1:48 PM

## 2022-08-04 NOTE — DISCHARGE SUMMARY
Hospital Discharge Summary    Patient's PCP: Dell Peres MD  Admit Date: 7/29/2022   Discharge Date: 8/4/2022    Admitting Physician: Dr. Ty DO  Discharge Physician: Dr. Demario De Leon MD     Consults:   IP CONSULT TO CARDIOLOGY  IP CONSULT TO GI  IP CONSULT TO UROLOGY    Brief HPI: Patient admitted with septic shock. Brief hospital course: 74-year-old female with history of well-controlled diabetes type 2, macrocytic anemia, GERD, anxiety and depression, fibromyalgia, obstructive sleep apnea, history of rectal cancer, who was admitted on 7/29/2022 with septic shock (requiring pressor support) complicated by lower GI bleed and atrial fibrillation with rapid ventricular response. Assessment/plan:  Septic shock secondary to pneumonia, bacterial pneumonia (likely Gram positive bacterial pneumonia). , resolved  Fluid resuscitated on admission. She was on IV antibiotics which have since been changed to oral cefidinir and doxycycline. She was weaned off pressor as of 8/1/2022. Atrial fibrillation with rapid ventricular response. Likely complicated by hemodynamic issues related to sepsis and septic shock. Rate is now well controlled and she has been in sinus rhythm. Continue beta-blocker. She was on amiodarone infusion which has since been changed to flecainide as of 7/31/2022. Currently not on full dose anticoagulation due to GI bleed. Acute lower GI bleed secondary to stercoral proctitis as noted on CT. Per GI recommendation, not a candidate for endoscopic evaluation at this time. Hb has been stable. Recommend CBC within 1 week following discharge. Acute blood loss anemia secondary to GI bleed. Currently does not require blood transfusion. Monitor hemoglobin as noted above. Acute metabolic encephalopathy, resolved. Etiology is multifactorial, likely secondary to underlying infectious process, sepsis.      Nondisplaced fracture distal radial metaphysis, present on admission. Status post ORIF on 8/3/2022. Continue postoperative pain meds, outpatient follow up with ortho. Right hydroureteronephrosis, mild to moderate. Noted on CT-abdomen/pelvis obtained on 7/29/2022 (result updated on 8/1/2022). Follow up renal ultrasound was unremarkable on 8/1/2022. She has been evaluated by urology service. Nonzero troponin, not due to ACS. Likely secondary to atrial fibrillation with rapid ventricular response and septic shock  Acute kidney injury, present on admission. This has since resolved following fluid resuscitation. Stable for discharge to SNF. Follow up with consultants and PCP within 1 week. Invasive procedures:  ORIF left wrist 8/3/2022. Discharge Diagnoses: Active Problems:    Shock ([address]    Atrial fibrillation with rapid ventricular response (HCC)    DM2 (diabetes mellitus, type 2) ([facility]    GI bleed    MAX (acute kidney injury) ([facility]    Community acquired pneumonia of left lower lobe of lung    Hypoxia    Pneumonia due to infectious organism    Closed fracture of left distal radius  Resolved Problems:    * No resolved hospital problems. *  -- See above. Physical Exam: /79   Pulse 78   Temp 97.9 °F (36.6 °C) (Oral)   Resp 16   Ht 5' 2\" (1.575 m)   Wt 160 lb 0.9 oz (72.6 kg)   SpO2 98%   BMI 29.27 kg/m²   Gen/overall appearance: Not in acute distress. Alert. Oriented X3  Head: Normocephalic, atraumatic  Eyes: EOMI, good acuity  ENT: Oral mucosa moist  Neck: No JVD, thyromegaly  CVS: Nml S1S2, no MRG, RRR  Pulm: Clear bilaterally. No crackles/wheezes  Gastrointestinal: Soft, NT/ND, +BS  Musculoskeletal: Left wrist ACE wrap. No edema. Warm  Neuro: No focal deficit. Moves extremity spontaneously. Psychiatry: Appropriate affect. Not agitated. Skin: Warm, dry with normal turgor.  No rash  Capillary refill: Brisk,< 3 seconds   Peripheral Pulses: +2 palpable, equal bilaterally     Significant diagnostic studies that may require follow up:  ECHO Complete 2D W Doppler W Color    Result Date: 7/30/2022  Transthoracic Echocardiography Report (TTE)  Demographics   Patient Name      Carmen Bai   Date of Study     07/30/2022          Gender              Female   Patient Number    [id number]          Date of Birth       1945   Visit Number      [id number]           Age                 65 year(s)   Accession Number  [id number]          Room Number         2105   Corporate ID      [id number]             Sonographer         ELENA Crowder, RVT, RDCS   Ordering          Susannah Hogan MD  Interpreting        Reid Christensen.  Physician                             Physician           Petey Gotti MD  Procedure Type of Study   TTE procedure:ECHOCARDIOGRAM COMPLETE 2D W DOPPLER W COLOR. Procedure Date Date: 07/30/2022 Start: 10:15 AM Study Location: [facility] - Echo Lab Technical Quality: Limited visualization due to patient immobility. Indications:Atrial fibrillation. Additional Indications:LVEF, wall motion abnormality, valvular disease. Patient Status: Routine Height: 62 inches Weight: 149 pounds BSA: 1.69 m2 BMI: 27.25 kg/m2 BP: 101/77 mmHg  Conclusions   Summary  Left ventricular cavity size is normal. There is mild concentric left  ventricular hypertrophy. Left ventricular function is hyperdynamic with  ejection fraction estimated at >70%. Regional wall motion abnormalities  cannot be excluded due to an arrhythmia. . Diastolic function cannot be  assessed due to an arrhythmia. The left atrium is severely dilated. Aortic valve appears sclerotic but opens adequately. The aortic valve  appears trileaflet. Mild-to-moderate aortic regurgitation is present. No  evidence of aortic valve stenosis. The right ventricle is normal in size and function. TAPSE measures 1.71 cm. RV S velocity measures 16.2 cm/s.   No pericardial effusion noted. IVC size is dilated (>2.1 cm) and collapses < 50% with respiration  consistent with elevated RA pressure (15 mmHg). Estimated pulmonary artery  systolic pressure is at 52 mmHg assuming a right atrial pressure of 15 mmHg. Signature   ------------------------------------------------------------------  Electronically signed by Beryl Andres MD  (Interpreting physician) on 07/30/2022 at 12:51 PM  ------------------------------------------------------------------   Findings   Left Ventricle  Left ventricular cavity size is normal. There is mild concentric left  ventricular hypertrophy. Left ventricular function is hyperdynamic with  ejection fraction estimated at >70%. Regional wall motion abnormalities  cannot be excluded due to an arrhythmia. . Diastolic function cannot be  assessed due to an arrhythmia. Mitral Valve  Mitral valve leaflets are structurally normal. Mitral annular calcification  is present. Mild mitral regurgitation is present. No evidence of mitral  stenosis. Left Atrium  The left atrium is severely dilated. Aortic Valve  Aortic valve appears sclerotic but opens adequately. The aortic valve  appears trileaflet. Mild-to-moderate aortic regurgitation is present. No  evidence of aortic valve stenosis. Aorta  The aortic root is normal in size. Right Ventricle  The right ventricle is normal in size and function. TAPSE measures 1.71 cm. RV S velocity measures 16.2 cm/s. Tricuspid Valve  Tricuspid valve is structurally normal. Mild tricuspid regurgitation. No  evidence of tricuspid stenosis. Right Atrium  The right atrium is mildly dilated. Pulmonic Valve  The pulmonic valve is not well visualized. No evidence of pulmonic valve  regurgitation. No evidence of pulmonic valve stenosis. Pericardial Effusion  No pericardial effusion noted. Pleural Effusion  No pleural effusion.    Miscellaneous  IVC size is dilated (>2.1 cm) and collapses < 50% with respiration consistent with elevated RA pressure (15 mmHg). Estimated pulmonary artery  systolic pressure is at 52 mmHg assuming a right atrial pressure of 15 mmHg. M-Mode/2D Measurements (cm)   LV Diastolic Dimension: 1.98 cm LV Systolic Dimension: 4.26 cm  LV Septum Diastolic: 4.54 cm  LV PW Diastolic: 4.00 cm        AO Root Dimension: 2.61 cm                                  LA Dimension: 3.9 cm                                  LA Area: 27.8 cm2  LVOT: 2 cm                      LA volume/Index: 85.2 ml /50 ml/m2  Doppler Measurements   AV Peak Velocity: 227 cm/s     MV Peak E-Wave: 134 cm/s  AV Peak Gradient: 20.61 mmHg  AV Mean Gradient: 12 mmHg  LVOT Peak Velocity: 120 cm/s  AV Area (Continuity):1.61 cm2   TR Velocity:303 cm/s           MV Deceleration Time: 99 msec  TR Gradient:36.72 mmHg  Estimated RAP:15 mmHg  Estimated RVSP: 52 mmHg  E' Septal Velocity: 6.92 cm/s  E' Lateral Velocity: 6.08 cm/s  E/E' ratio: 21.1  PV Peak Velocity: 136 cm/s  PV Peak Gradient: 7.4 mmHg   Aortic Valve   Peak Velocity: 227 cm/s     Mean Velocity: 156 cm/s  Peak Gradient: 20.61 mmHg   Mean Gradient: 12 mmHg  Area (continuity): 1.61 cm2  AV VTI: 31.2 cm  AI P1/2t: 375 msec  Aorta   Aortic Root: 2.61 cm  LVOT Diameter: 2 cm      XR WRIST LEFT (2 VIEWS)    Result Date: 8/3/2022  EXAMINATION: SPOT FLUOROSCOPIC IMAGES 8/3/2022 12:15 pm TECHNIQUE: Fluoroscopy was provided by the radiology department for procedure. Radiologist was not present during examination. FLUOROSCOPY DOSE AND TYPE OR TIME AND EXPOSURES: 4.64 seconds fluoroscopy time was utilized for the study with a total of 2 exposures obtained. COMPARISON: 08/01/2022 HISTORY: ORDERING SYSTEM PROVIDED HISTORY: ORIF left distal radius TECHNOLOGIST PROVIDED HISTORY: Reason for exam:->ORIF left distal radius Reason for Exam: ORIF left distal radius Intraprocedural imaging. FINDINGS: 2 spot images of the left wrist were obtained.  The images demonstrate that the patient is status post open reduction and internal fixation of a distal left radius fracture, reduced and stabilized by a volar buttress plate and anchoring screws. Intraprocedural fluoroscopic spot images as above. See separate procedure report for more information. XR WRIST LEFT (MIN 3 VIEWS)    Result Date: 8/1/2022  EXAMINATION: 3 XRAY VIEWS OF THE LEFT WRIST 8/1/2022 4:50 pm COMPARISON: 07/23/2022. HISTORY: ORDERING SYSTEM PROVIDED HISTORY: post fx, eval TECHNOLOGIST PROVIDED HISTORY: Reason for exam:->post fx, eval Reason for Exam: FX eval. Cast in place FINDINGS: There has been interval placement of the splint material overlying the left wrist obscuring fine bony details. Compared to prior study, there appears to be slight increased degree of impaction and lateral displacement of the distal fracture fragment. The alignment of the carpal bones appear near anatomic. The bones appear demineralized. Interval placement of the splint material overlying the left wrist obscuring fine bony details. Suggestion of slight worsening lateral displacement and impaction of the fracture fragments when compared to the prior study. The bones appear demineralized. XR WRIST LEFT (MIN 3 VIEWS)    Result Date: 7/23/2022  EXAMINATION: 3 XRAY VIEWS OF THE LEFT WRIST 7/23/2022 7:14 pm COMPARISON: 06/11/2019 HISTORY: ORDERING SYSTEM PROVIDED HISTORY: fall, pain TECHNOLOGIST PROVIDED HISTORY: Reason for exam:->fall, pain Reason for Exam: fall, pain FINDINGS: Nondisplaced fracture distal radial metaphysis. No dislocation. Osteoarthritic changes. No other fractures. Nondisplaced fracture distal radial metaphysis     XR HAND RIGHT (MIN 3 VIEWS)    Result Date: 7/23/2022  EXAMINATION: THREE XRAY VIEWS OF THE RIGHT HAND 7/23/2022 7:14 pm COMPARISON: None.  HISTORY: ORDERING SYSTEM PROVIDED HISTORY: fall,  pain, bruising TECHNOLOGIST PROVIDED HISTORY: Reason for exam:->fall,  pain, bruising Reason for Exam: fall,  pain, bruising FINDINGS: There is no evidence of acute fracture. There is normal alignment. No acute joint abnormality. No focal osseous lesion. No focal soft tissue abnormality. Osteoarthritic changes. No acute osseous abnormality. CT HEAD WO CONTRAST    Result Date: 7/29/2022  EXAMINATION: CT OF THE HEAD WITHOUT CONTRAST  7/29/2022 10:55 pm TECHNIQUE: CT of the head was performed without the administration of intravenous contrast. Automated exposure control, iterative reconstruction, and/or weight based adjustment of the mA/kV was utilized to reduce the radiation dose to as low as reasonably achievable. COMPARISON: 07/23/2022 HISTORY: ORDERING SYSTEM PROVIDED HISTORY: Fall TECHNOLOGIST PROVIDED HISTORY: Has a \"code stroke\" or \"stroke alert\" been called? ->No Reason for exam:->Fall Decision Support Exception - unselect if not a suspected or confirmed emergency medical condition->Emergency Medical Condition (MA) Reason for Exam: ams FINDINGS: BRAIN/VENTRICLES: There is no acute intracranial hemorrhage, mass effect or midline shift. No abnormal extra-axial fluid collection. The gray-white differentiation is maintained without evidence of an acute infarct. There is no evidence of hydrocephalus. Intracranial atherosclerosis. Generalized cortical atrophy. Chronic microvascular ischemic change suspected with patchy white matter low attenuation similar to the prior examination. No wedge-shaped area of acute ischemia. ORBITS: The visualized portion of the orbits demonstrate no acute abnormality. SINUSES: The visualized paranasal sinuses and mastoid air cells demonstrate no acute abnormality. SOFT TISSUES/SKULL:  No acute abnormality of the visualized skull or soft tissues. Stable appearing CT scan of the head without acute intracranial abnormality.      CT HEAD WO CONTRAST    Result Date: 7/23/2022  EXAMINATION: CT OF THE HEAD WITHOUT CONTRAST  7/23/2022 7:07 pm TECHNIQUE: CT of the head was performed without the administration of intravenous contrast. Automated exposure control, iterative reconstruction, and/or weight based adjustment of the mA/kV was utilized to reduce the radiation dose to as low as reasonably achievable. COMPARISON: 09/01/2019 HISTORY: ORDERING SYSTEM PROVIDED HISTORY: fall, on eliquis TECHNOLOGIST PROVIDED HISTORY: Reason for exam:->fall, on eliquis Has a \"code stroke\" or \"stroke alert\" been called? ->No Decision Support Exception - unselect if not a suspected or confirmed emergency medical condition->Emergency Medical Condition (MA) Reason for Exam: fall, on eliquis FINDINGS: BRAIN/VENTRICLES: There is mild generalized atrophy and mild patchy frontoparietal periventricular and subcortical white matter low attenuation that is nonspecific but most consistent with chronic small vessel ischemia. There is no acute intracranial hemorrhage, mass effect or midline shift. No abnormal extra-axial fluid collection. The gray-white differentiation is maintained without evidence of an acute infarct. ORBITS: The visualized portion of the orbits demonstrate no acute abnormality. SINUSES: The visualized paranasal sinuses and mastoid air cells demonstrate no acute abnormality. SOFT TISSUES/SKULL:  No acute abnormality of the visualized skull or soft tissues. No acute intracranial abnormality. US RENAL COMPLETE    Result Date: 8/1/2022  EXAMINATION: RETROPERITONEAL ULTRASOUND OF THE KIDNEYS AND URINARY BLADDER 8/1/2022 COMPARISON: None HISTORY: ORDERING SYSTEM PROVIDED HISTORY: right hydronephrosis TECHNOLOGIST PROVIDED HISTORY: Reason for exam:->right hydronephrosis FINDINGS: Kidneys: The right kidney measures 9.2 cm in length and the left kidney measures 10.3 cm in length. Kidneys demonstrate normal cortical echogenicity. No evidence of hydronephrosis or intrarenal stones. Bladder: Nonvisualized. No sonographic abnormality.  RECOMMENDATIONS: Unavailable     XR CHEST PORTABLE    Result Date: 7/29/2022  EXAMINATION: ONE XRAY VIEW OF THE CHEST 7/29/2022 10:17 pm COMPARISON: 02/11/2021 HISTORY: ORDERING SYSTEM PROVIDED HISTORY: SOB TECHNOLOGIST PROVIDED HISTORY: Reason for exam:->SOB Reason for Exam: a-fib, ams FINDINGS: The cardiac silhouette appears within normal limits. The aorta is uncoiled and atherosclerotic. Scarring/atelectatic changes are seen at the left lung base. No confluent airspace opacity, pleural effusion or pneumothorax is seen. Changes of prior orthopedic fixation/fusion of the lower cervical spine are seen. Chronic fracture deformity of left proximal humerus redemonstrated. There are surgical clips overlying the left upper abdomen. Scarring/atelectatic changes seen at the left lung base without confluent airspace opacity seen. FLUORO FOR SURGICAL PROCEDURES    Result Date: 8/3/2022  Radiology exam is complete. No Radiologist dictation. Please follow up with ordering provider. CT CHEST ABDOMEN PELVIS WO CONTRAST    Result Date: 8/1/2022  EXAMINATION: CT OF THE CHEST, ABDOMEN, AND PELVIS WITHOUT CONTRAST 7/29/2022 10:55 pm TECHNIQUE: CT of the chest, abdomen and pelvis was performed without the administration of intravenous contrast. Multiplanar reformatted images are provided for review. Automated exposure control, iterative reconstruction, and/or weight based adjustment of the mA/kV was utilized to reduce the radiation dose to as low as reasonably achievable. COMPARISON: 08/11/2019 HISTORY: ORDERING SYSTEM PROVIDED HISTORY: Fall TECHNOLOGIST PROVIDED HISTORY: Reason for exam:->Fall Additional Contrast?->None Reason for Exam: Atrial Fibrillation; Fall; Altered Mental Status FINDINGS: Evaluation is partially limited due to patient motion artifact and streak artifact arising from the patient's arms. Chest: Mediastinum: No evidence of enlarged mediastinal lymph node is seen. There are calcified mediastinal and hilar lymph nodes reflecting prior granulomatous disease.   The main pulmonary artery appears normal in caliber. Vascular calcification changes of the thoracic aorta are seen. No significant pericardial effusion is seen. Lungs/pleura: The central airway appears patent. There is opacity of medial and dependent portions of the left lower lobe, which may reflect pneumonia. Follow-up imaging resolution is recommended. There appears to be trace left pleural effusion. No evidence of pneumothorax is seen. Soft Tissues/Bones: There is chronic appearing fracture deformity of the left proximal humerus. Changes of prior orthopedic fixation of the cervical spine, partially visualized. Abdomen/Pelvis: Organs: Evaluation of visceral organs is limited without the benefit of intravenous contrast.  There are calcified granulomas of the liver and the spleen. The gallbladder appears absent. The pancreas appears atrophic without evidence of pancreatic ductal dilatation seen. There is thickening of the adrenal glands, which may reflect underlying hyperplasia. There is mild-to-moderate right hydroureteronephrosis without discrete ureteral stone appreciated. This may be due to distended urinary bladder. No evidence of left nephrolithiasis or hydronephrosis seen. GI/Bowel: Evaluation of the GI tract is limited without the benefit of enteric contrast.  The rectum is mildly distended filled with stool measuring 7.0 x 8.1 cm with mild surrounding inflammatory changes. This may reflect underlying stercoral proctitis. There is redemonstration of changes of partial colectomy with anastomosis seen within the sigmoid colon. The appendix is not visualized without secondary signs of acute appendicitis appreciated. The patient is again noted to be status post Shanice-en-Y gastric bypass surgery. There is redemonstration of diverticulum arising from duodenum at the level of the pancreatic head. No evidence of bowel obstruction seen. Pelvis: The urinary bladder is distended. The uterus appears atrophic. Peritoneum/Retroperitoneum: No evidence of intra-abdominal free air is seen. No evidence of ascites is seen. Moderate vascular calcification changes are seen. Bones/Soft Tissues: Multilevel facet joint osteoarthritis affects the lumbar spine. Limited noncontrast examination. Left lower lobe opacity, suspicious for pneumonia. Follow-up imaging resolution is recommended. Mild-to-moderate right hydroureteronephrosis, which may be due to distended urinary bladder. Consider Kaplan catheter. Distended rectum filled with stool with mild surrounding inflammatory changes, which may reflect underlying stercoral proctitis. Consider disimpaction. Treatments: As above. Discharge Medications:     Medication List        START taking these medications      cefdinir 300 MG capsule  Commonly known as: OMNICEF  Take 1 capsule by mouth in the morning and 1 capsule before bedtime. Do all this for 3 doses. docusate 100 MG Caps  Commonly known as: COLACE, DULCOLAX  Take 100 mg by mouth 2 times daily as needed for Constipation     doxycycline hyclate 100 MG tablet  Commonly known as: VIBRA-TABS  Take 1 tablet by mouth in the morning and 1 tablet before bedtime. Do all this for 3 doses. HYDROcodone-acetaminophen 5-325 MG per tablet  Commonly known as: NORCO  Take 1 tablet by mouth every 6 hours as needed for Pain for up to 5 days. polyethylene glycol 17 g packet  Commonly known as: GLYCOLAX  Take 17 g by mouth daily as needed for Constipation            CHANGE how you take these medications      furosemide 20 MG tablet  Commonly known as: LASIX  Take 1 tablet by mouth in the morning. With an additional 20 mg on MWF. What changed: when to take this     metoprolol succinate 25 MG extended release tablet  Commonly known as: TOPROL XL  Take 1 tablet by mouth in the morning.   Start taking on: August 5, 2022  What changed:   medication strength  how much to take  how to take this  when to take this  additional instructions            CONTINUE taking these medications      ALPRAZolam 0.5 MG tablet  Commonly known as: XANAX  Take 1 tablet by mouth 3 times daily as needed for Sleep for up to 3 days. apixaban 5 MG Tabs tablet  Commonly known as: Eliquis  Take 1 tablet by mouth in the morning and 1 tablet before bedtime. calcium carbonate-vitamin D 600-200 MG-UNIT Tabs     dicyclomine 10 MG capsule  Commonly known as: BENTYL  Take 1 capsule by mouth 4 times daily as needed (cramping/diarrhea)     escitalopram 10 MG tablet  Commonly known as: LEXAPRO  Take 1 tablet by mouth daily     flecainide 100 MG tablet  Commonly known as: TAMBOCOR  TAKE 1 TABLET BY MOUTH 2 TIMES A DAY     pantoprazole 40 MG tablet  Commonly known as: PROTONIX  TAKE ONE TABLET BY MOUTH TWICE A DAY     potassium chloride 20 MEQ extended release tablet  Commonly known as: Klor-Con M20  Take 1 tablet by mouth daily     tiZANidine 2 MG tablet  Commonly known as: ZANAFLEX  Take 1 tablet by mouth every 6 hours as needed (muscle pain)     VITAMIN B 12 PO               Where to Get Your Medications        These medications were sent to [address] [place] [address], [facility] [id number]      Phone: [phone]   cefdinir 300 MG capsule  docusate 100 MG Caps  doxycycline hyclate 100 MG tablet  metoprolol succinate 25 MG extended release tablet  polyethylene glycol 17 g packet       You can get these medications from any pharmacy    Bring a paper prescription for each of these medications  ALPRAZolam 0.5 MG tablet  HYDROcodone-acetaminophen 5-325 MG per tablet       Information about where to get these medications is not yet available    Ask your nurse or doctor about these medications  furosemide 20 MG tablet         Activity: activity as tolerated  Diet: ADULT DIET;  Regular  ADULT ORAL NUTRITION SUPPLEMENT; Breakfast, Dinner; Diabetic Oral Supplement      Disposition: SNF  Discharged Condition: Stable  Follow Up:   Cornelio Mac MD  [address] [facility] [address] [facility] [place] [address]  [phone]    Schedule an appointment as soon as possible for a visit in 1 week(s)      Jerrell Yuenankur, [address]  [address] [facility] [address]  [phone]    Schedule an appointment as soon as possible for a visit in 1 week(s)        Code status:  Full Code     Total time spent on discharge, finalizing medications, referrals and arranging outpatient follow up was more than 30 minutes      Thank you Dr. Taiwo Delvalle MD for the opportunity to be involved in this patients care.

## 2022-08-04 NOTE — PROGRESS NOTES
[facility] Orthopedic Surgery   Progress Note      S/P :  SUBJECTIVE  in bed. Alert and oriented to self and hospital . Pain is not  described in left wrist at this time. .       OBJECTIVE              Physical                      VITALS:  /79   Pulse 78   Temp 97.9 °F (36.6 °C) (Oral)   Resp 16   Ht 5' 2\" (1.575 m)   Wt 160 lb 0.9 oz (72.6 kg)   SpO2 98%   BMI 29.27 kg/m²                     MUSCULOSKELETAL: Left fingers and thumb with intact flex/ext . Left fingers warm and pink with brisk cap refill noted. Minimal left finger swelling, Notable bruising left dorsum of fingers. NEUROLOGIC:                                  Sensory:  Touch:  Left Upper Extremity:  normal                                                 Surgical wound appears clean and dry with left wrist splint and ACE. Hand elevated on pillows with ice pack.      Data       CBC:   Lab Results   Component Value Date/Time    WBC 6.1 08/04/2022 06:22 AM    RBC 2.71 08/04/2022 06:22 AM    HGB 9.8 08/04/2022 06:22 AM    HCT 28.9 08/04/2022 06:22 AM    .8 08/04/2022 06:22 AM    MCH 36.0 08/04/2022 06:22 AM    MCHC 33.7 08/04/2022 06:22 AM    RDW 16.0 08/04/2022 06:22 AM     08/04/2022 06:22 AM    MPV 7.2 08/04/2022 06:22 AM        WBC:    Lab Results   Component Value Date/Time    WBC 6.1 08/04/2022 06:22 AM        Hemoglobin/Hematocrit:    Lab Results   Component Value Date/Time    HGB 9.8 08/04/2022 06:22 AM    HCT 28.9 08/04/2022 06:22 AM        PT/INR:    Lab Results   Component Value Date/Time    PROTIME 16.3 07/30/2022 05:35 AM    INR 1.31 07/30/2022 05:35 AM              Current Inpatient Medications             Current Facility-Administered Medications: sodium phosphate 20 mmol in sodium chloride 0.9 % 250 mL IVPB, 20 mmol, IntraVENous, Once  magnesium sulfate 1000 mg in dextrose 5% 100 mL IVPB, 1,000 mg, IntraVENous, Once  sodium chloride flush 0.9 % injection 5-40 mL, 5-40 mL, IntraVENous, 2 times per day  sodium chloride flush 0.9 % injection 5-40 mL, 5-40 mL, IntraVENous, PRN  0.9 % sodium chloride infusion, , IntraVENous, PRN  HYDROcodone-acetaminophen (NORCO) 5-325 MG per tablet 1 tablet, 1 tablet, Oral, Q4H PRN  Venelex ointment, , Topical, BID  docusate sodium (COLACE) capsule 100 mg, 100 mg, Oral, BID  cefdinir (OMNICEF) capsule 300 mg, 300 mg, Oral, 2 times per day  doxycycline hyclate (VIBRA-TABS) tablet 100 mg, 100 mg, Oral, 2 times per day  flecainide (TAMBOCOR) tablet 150 mg, 150 mg, Oral, 2 times per day  potassium chloride (KLOR-CON M) extended release tablet 40 mEq, 40 mEq, Oral, PRN **OR** potassium bicarb-citric acid (EFFER-K) effervescent tablet 40 mEq, 40 mEq, Oral, PRN **OR** potassium chloride 10 mEq/100 mL IVPB (Peripheral Line), 10 mEq, IntraVENous, PRN  magnesium sulfate 1000 mg in dextrose 5% 100 mL IVPB, 1,000 mg, IntraVENous, PRN  sodium phosphate 11.1 mmol in sodium chloride 0.9 % 250 mL IVPB, 0.16 mmol/kg, IntraVENous, PRN **OR** sodium phosphate 22.2 mmol in sodium chloride 0.9 % 250 mL IVPB, 0.32 mmol/kg, IntraVENous, PRN  metoprolol succinate (TOPROL XL) extended release tablet 25 mg, 25 mg, Oral, Daily  sodium chloride flush 0.9 % injection 5-40 mL, 5-40 mL, IntraVENous, 2 times per day  sodium chloride flush 0.9 % injection 5-40 mL, 5-40 mL, IntraVENous, PRN  0.9 % sodium chloride infusion, , IntraVENous, PRN  ondansetron (ZOFRAN-ODT) disintegrating tablet 4 mg, 4 mg, Oral, Q8H PRN **OR** ondansetron (ZOFRAN) injection 4 mg, 4 mg, IntraVENous, Q6H PRN  polyethylene glycol (GLYCOLAX) packet 17 g, 17 g, Oral, Daily PRN  acetaminophen (TYLENOL) tablet 650 mg, 650 mg, Oral, Q6H PRN **OR** acetaminophen (TYLENOL) suppository 650 mg, 650 mg, Rectal, Q6H PRN  glucose chewable tablet 16 g, 4 tablet, Oral, PRN  dextrose bolus 10% 125 mL, 125 mL, IntraVENous, PRN **OR** dextrose bolus 10% 250 mL, 250 mL, IntraVENous, PRN  glucagon (rDNA) injection 1 mg, 1 mg, SubCUTAneous, PRN  dextrose 10 % infusion, , IntraVENous, Continuous PRN  pantoprazole (PROTONIX) 40 mg in sodium chloride (PF) 10 mL injection, 40 mg, IntraVENous, Q12H    ASSESSMENT AND PLAN    Fall one week ago from her bed  Left wrist pain  Left distal radius fx with displacement  Post ORIF left distal  radius per Dr Cahrles Meza, stable exam  Plan DC to ECF when stable  PT OT ordered. NWB left hand. May use platformed walker.        Kaitlynn Chavez, MUSA - CNP  8/4/2022  10:54 AM

## 2022-08-05 RX ORDER — POTASSIUM CHLORIDE 20 MEQ/1
TABLET, EXTENDED RELEASE ORAL
Qty: 90 TABLET | Refills: 1 | Status: ON HOLD | OUTPATIENT
Start: 2022-08-05 | End: 2022-08-31 | Stop reason: HOSPADM

## 2022-08-08 ENCOUNTER — TELEPHONE (OUTPATIENT)
Dept: ORTHOPEDIC SURGERY | Age: 77
End: 2022-08-08

## 2022-08-08 NOTE — TELEPHONE ENCOUNTER
[facility] [employer] [facility] get [facility] up [facility] for this patient. Her number is [phone]. They will need a couple of days for transportation. Was ok with next week. Please call.

## 2022-08-08 NOTE — TELEPHONE ENCOUNTER
Attempt to call [facility] [phone], however no identifier on voicemail and facility name does not match.      Will attempt to call back later today to offer patient next Wednesday 8/17/2022 at 2:00 pm WEST

## 2022-08-10 ENCOUNTER — HOSPITAL ENCOUNTER (INPATIENT)
Age: 77
LOS: 8 days | Discharge: NURSING, REHAB OR LONG-TERM CARE | DRG: 264 | End: 2022-08-18
Attending: EMERGENCY MEDICINE | Admitting: STUDENT IN AN ORGANIZED HEALTH CARE EDUCATION/TRAINING PROGRAM
Payer: MEDICARE

## 2022-08-10 DIAGNOSIS — K62.5 HEMORRHAGE OF RECTUM AND ANUS: Primary | ICD-10-CM

## 2022-08-10 PROBLEM — K92.2 LOWER GI BLEED: Status: ACTIVE | Noted: 2022-08-10

## 2022-08-10 LAB
A/G RATIO: 1 (ref 1.1–2.2)
ABO/RH: NORMAL
ALBUMIN SERPL-MCNC: 2.7 G/DL (ref 3.4–5)
ALP BLD-CCNC: 96 U/L (ref 40–129)
ALT SERPL-CCNC: 12 U/L (ref 10–40)
ANION GAP SERPL CALCULATED.3IONS-SCNC: 7 MMOL/L (ref 3–16)
ANTIBODY SCREEN: NORMAL
APTT: 27.4 SEC (ref 23–34.3)
AST SERPL-CCNC: 18 U/L (ref 15–37)
BASOPHILS ABSOLUTE: 0 K/UL (ref 0–0.2)
BASOPHILS RELATIVE PERCENT: 0.3 %
BILIRUB SERPL-MCNC: 0.3 MG/DL (ref 0–1)
BUN BLDV-MCNC: 20 MG/DL (ref 7–20)
CALCIUM SERPL-MCNC: 9 MG/DL (ref 8.3–10.6)
CHLORIDE BLD-SCNC: 95 MMOL/L (ref 99–110)
CO2: 35 MMOL/L (ref 21–32)
CREAT SERPL-MCNC: 0.7 MG/DL (ref 0.6–1.2)
EOSINOPHILS ABSOLUTE: 0 K/UL (ref 0–0.6)
EOSINOPHILS RELATIVE PERCENT: 0.2 %
GFR AFRICAN AMERICAN: >60
GFR NON-AFRICAN AMERICAN: >60
GLUCOSE BLD-MCNC: 85 MG/DL (ref 70–99)
GLUCOSE BLD-MCNC: 87 MG/DL (ref 70–99)
HCT VFR BLD CALC: 30.1 % (ref 36–48)
HEMOGLOBIN: 10.1 G/DL (ref 12–16)
INR BLD: 1.2 (ref 0.87–1.14)
LACTIC ACID: 0.8 MMOL/L (ref 0.4–2)
LYMPHOCYTES ABSOLUTE: 0.5 K/UL (ref 1–5.1)
LYMPHOCYTES RELATIVE PERCENT: 9.7 %
MCH RBC QN AUTO: 35 PG (ref 26–34)
MCHC RBC AUTO-ENTMCNC: 33.7 G/DL (ref 31–36)
MCV RBC AUTO: 103.8 FL (ref 80–100)
MONOCYTES ABSOLUTE: 0.5 K/UL (ref 0–1.3)
MONOCYTES RELATIVE PERCENT: 9 %
NEUTROPHILS ABSOLUTE: 4.5 K/UL (ref 1.7–7.7)
NEUTROPHILS RELATIVE PERCENT: 80.8 %
PDW BLD-RTO: 15.7 % (ref 12.4–15.4)
PERFORMED ON: NORMAL
PLATELET # BLD: 164 K/UL (ref 135–450)
PMV BLD AUTO: 8 FL (ref 5–10.5)
POTASSIUM REFLEX MAGNESIUM: 4.3 MMOL/L (ref 3.5–5.1)
PROTHROMBIN TIME: 15.1 SEC (ref 11.7–14.5)
RBC # BLD: 2.9 M/UL (ref 4–5.2)
SODIUM BLD-SCNC: 137 MMOL/L (ref 136–145)
TOTAL PROTEIN: 5.4 G/DL (ref 6.4–8.2)
WBC # BLD: 5.5 K/UL (ref 4–11)

## 2022-08-10 PROCEDURE — 86900 BLOOD TYPING SEROLOGIC ABO: CPT

## 2022-08-10 PROCEDURE — 86850 RBC ANTIBODY SCREEN: CPT

## 2022-08-10 PROCEDURE — 6370000000 HC RX 637 (ALT 250 FOR IP): Performed by: NURSE PRACTITIONER

## 2022-08-10 PROCEDURE — 80053 COMPREHEN METABOLIC PANEL: CPT

## 2022-08-10 PROCEDURE — 85025 COMPLETE CBC W/AUTO DIFF WBC: CPT

## 2022-08-10 PROCEDURE — 99285 EMERGENCY DEPT VISIT HI MDM: CPT

## 2022-08-10 PROCEDURE — 1200000000 HC SEMI PRIVATE

## 2022-08-10 PROCEDURE — 83605 ASSAY OF LACTIC ACID: CPT

## 2022-08-10 PROCEDURE — 86901 BLOOD TYPING SEROLOGIC RH(D): CPT

## 2022-08-10 PROCEDURE — 85730 THROMBOPLASTIN TIME PARTIAL: CPT

## 2022-08-10 PROCEDURE — 85610 PROTHROMBIN TIME: CPT

## 2022-08-10 RX ORDER — ESCITALOPRAM OXALATE 10 MG/1
10 TABLET ORAL NIGHTLY
Status: DISCONTINUED | OUTPATIENT
Start: 2022-08-10 | End: 2022-08-18 | Stop reason: HOSPADM

## 2022-08-10 RX ORDER — ACETAMINOPHEN 325 MG/1
650 TABLET ORAL EVERY 6 HOURS PRN
COMMUNITY

## 2022-08-10 RX ORDER — HYDROCODONE BITARTRATE AND ACETAMINOPHEN 5; 325 MG/1; MG/1
1 TABLET ORAL EVERY 6 HOURS PRN
Status: ON HOLD | COMMUNITY
End: 2022-08-27

## 2022-08-10 RX ORDER — SODIUM CHLORIDE 0.9 % (FLUSH) 0.9 %
5-40 SYRINGE (ML) INJECTION EVERY 12 HOURS SCHEDULED
Status: DISCONTINUED | OUTPATIENT
Start: 2022-08-11 | End: 2022-08-18 | Stop reason: HOSPADM

## 2022-08-10 RX ORDER — ONDANSETRON 2 MG/ML
4 INJECTION INTRAMUSCULAR; INTRAVENOUS EVERY 6 HOURS PRN
Status: DISCONTINUED | OUTPATIENT
Start: 2022-08-10 | End: 2022-08-18 | Stop reason: HOSPADM

## 2022-08-10 RX ORDER — ACETAMINOPHEN 650 MG/1
650 SUPPOSITORY RECTAL EVERY 6 HOURS PRN
Status: DISCONTINUED | OUTPATIENT
Start: 2022-08-10 | End: 2022-08-18 | Stop reason: HOSPADM

## 2022-08-10 RX ORDER — DEXTROSE MONOHYDRATE 100 MG/ML
INJECTION, SOLUTION INTRAVENOUS CONTINUOUS PRN
Status: DISCONTINUED | OUTPATIENT
Start: 2022-08-10 | End: 2022-08-18 | Stop reason: HOSPADM

## 2022-08-10 RX ORDER — DICYCLOMINE HYDROCHLORIDE 10 MG/1
20 CAPSULE ORAL ONCE
Status: COMPLETED | OUTPATIENT
Start: 2022-08-10 | End: 2022-08-10

## 2022-08-10 RX ORDER — DICYCLOMINE HYDROCHLORIDE 10 MG/1
10 CAPSULE ORAL 4 TIMES DAILY PRN
Status: DISCONTINUED | OUTPATIENT
Start: 2022-08-10 | End: 2022-08-18 | Stop reason: HOSPADM

## 2022-08-10 RX ORDER — ALPRAZOLAM 0.5 MG/1
0.5 TABLET ORAL EVERY 8 HOURS PRN
Status: ON HOLD | COMMUNITY
End: 2022-08-31 | Stop reason: SDUPTHER

## 2022-08-10 RX ORDER — PANTOPRAZOLE SODIUM 40 MG/1
40 TABLET, DELAYED RELEASE ORAL 2 TIMES DAILY
Status: DISCONTINUED | OUTPATIENT
Start: 2022-08-10 | End: 2022-08-18 | Stop reason: HOSPADM

## 2022-08-10 RX ORDER — FLECAINIDE ACETATE 100 MG/1
100 TABLET ORAL EVERY 12 HOURS SCHEDULED
Status: DISCONTINUED | OUTPATIENT
Start: 2022-08-10 | End: 2022-08-18 | Stop reason: HOSPADM

## 2022-08-10 RX ORDER — METOPROLOL SUCCINATE 25 MG/1
25 TABLET, EXTENDED RELEASE ORAL DAILY
Status: DISCONTINUED | OUTPATIENT
Start: 2022-08-11 | End: 2022-08-18 | Stop reason: HOSPADM

## 2022-08-10 RX ORDER — SODIUM CHLORIDE 9 MG/ML
INJECTION, SOLUTION INTRAVENOUS PRN
Status: DISCONTINUED | OUTPATIENT
Start: 2022-08-10 | End: 2022-08-18 | Stop reason: HOSPADM

## 2022-08-10 RX ORDER — SODIUM CHLORIDE 0.9 % (FLUSH) 0.9 %
5-40 SYRINGE (ML) INJECTION PRN
Status: DISCONTINUED | OUTPATIENT
Start: 2022-08-10 | End: 2022-08-18 | Stop reason: HOSPADM

## 2022-08-10 RX ORDER — ACETAMINOPHEN 325 MG/1
650 TABLET ORAL EVERY 6 HOURS PRN
Status: DISCONTINUED | OUTPATIENT
Start: 2022-08-10 | End: 2022-08-18 | Stop reason: HOSPADM

## 2022-08-10 RX ORDER — ALPRAZOLAM 0.5 MG/1
0.5 TABLET ORAL EVERY 8 HOURS PRN
Status: DISCONTINUED | OUTPATIENT
Start: 2022-08-10 | End: 2022-08-13

## 2022-08-10 RX ORDER — BISACODYL 10 MG
10 SUPPOSITORY, RECTAL RECTAL DAILY PRN
Status: ON HOLD | COMMUNITY
End: 2022-08-31 | Stop reason: HOSPADM

## 2022-08-10 RX ORDER — ACETAMINOPHEN 325 MG/1
325 TABLET ORAL ONCE
Status: COMPLETED | OUTPATIENT
Start: 2022-08-10 | End: 2022-08-10

## 2022-08-10 RX ORDER — ONDANSETRON 4 MG/1
4 TABLET, ORALLY DISINTEGRATING ORAL EVERY 8 HOURS PRN
Status: DISCONTINUED | OUTPATIENT
Start: 2022-08-10 | End: 2022-08-18 | Stop reason: HOSPADM

## 2022-08-10 RX ORDER — FUROSEMIDE 20 MG/1
20 TABLET ORAL DAILY
Status: DISCONTINUED | OUTPATIENT
Start: 2022-08-11 | End: 2022-08-18 | Stop reason: HOSPADM

## 2022-08-10 RX ORDER — LANOLIN ALCOHOL/MO/W.PET/CERES
2500 CREAM (GRAM) TOPICAL NIGHTLY
Status: DISCONTINUED | OUTPATIENT
Start: 2022-08-10 | End: 2022-08-18 | Stop reason: HOSPADM

## 2022-08-10 RX ADMIN — DICYCLOMINE HYDROCHLORIDE 20 MG: 10 CAPSULE ORAL at 22:46

## 2022-08-10 RX ADMIN — ESCITALOPRAM OXALATE 10 MG: 10 TABLET ORAL at 22:46

## 2022-08-10 RX ADMIN — PANTOPRAZOLE SODIUM 40 MG: 40 TABLET, DELAYED RELEASE ORAL at 22:47

## 2022-08-10 RX ADMIN — ALPRAZOLAM 0.5 MG: 0.5 TABLET ORAL at 22:46

## 2022-08-10 RX ADMIN — FLECAINIDE ACETATE 100 MG: 100 TABLET ORAL at 22:46

## 2022-08-10 RX ADMIN — CYANOCOBALAMIN TAB 1000 MCG 2500 MCG: 1000 TAB at 22:47

## 2022-08-10 RX ADMIN — Medication 1 TABLET: at 22:48

## 2022-08-10 RX ADMIN — ACETAMINOPHEN 325 MG: 325 TABLET ORAL at 22:47

## 2022-08-10 ASSESSMENT — PAIN SCALES - GENERAL
PAINLEVEL_OUTOF10: 4
PAINLEVEL_OUTOF10: 6

## 2022-08-10 ASSESSMENT — PAIN DESCRIPTION - LOCATION: LOCATION: ABDOMEN

## 2022-08-10 ASSESSMENT — PAIN - FUNCTIONAL ASSESSMENT: PAIN_FUNCTIONAL_ASSESSMENT: 0-10

## 2022-08-10 ASSESSMENT — PAIN DESCRIPTION - DESCRIPTORS: DESCRIPTORS: CRAMPING

## 2022-08-10 NOTE — PROGRESS NOTES
Medication Reconciliation    List of medications for Fannie Friedman is currently taking is complete. Source of Information:   Epic records  Conversation with nursing home/facility [facility], [phone]) at bedside       Notes Regarding Home Medications:   Patient  received some  of their home medications prior to arrival to the emergency department. Maintenance medications not received PTA include          -Vitamin B          -Escitalopram          -Potassium  2.  Per nurse at facility, pt did receive furosemide 40mg this AM PTA    Ender Cardona, Pharmacy Intern  8/10/2022 7:36 PM

## 2022-08-10 NOTE — H&P
Hospital Medicine History & Physical      PCP: Arsenio Cope MD    Date of Admission: 8/10/2022    Date of Service: Pt seen/examined on 8/10/2022 and Admitted to Inpatient   Chief Complaint:  melanotic stool      History Of Present Illness: The patient is a 65 y.o. female who [facility] with PMHx: A. fib, gastric reflux, closed left wrist fracture, fibromyalgia, migraine, WHITNEY, rectal cancer, gastric bypass, type II DM, macrocytic anemia, anxiety, depression. Lives at [address] [place] [address]: Full  H POA: Daughter Nestor Golden: [phone]  Anticoagulation therapy: Eliquis    Recent hospitalization:7/29 -> 8/04: Septic shock secondary to gram-positive pneumonia, GI bleed w/ stercoral proctitis, A. fib with RVR. Did require transfusion during hospital course. Was also seen by GI during hospital course but there was no EGD or colonoscopy performed. Patient was sent in by the [facility] today for melanotic stools for 2 days and BRRB today. Associated symptoms mild nausea no vomiting, generalized lower abdominal cramping and weakness. ED workup: Vital signs are stable without evidence of hypotension or tachycardia. H&H 10.1/30. ABO/Rh:O+ w/ negative antibody. Lactic acid 0.8. INR 1.20. Metabolic panel unremarkable. On my exam: Patient appears frail, chronically ill. She is alert and oriented. Skin is warm and dry and very pale. Chest is clear heart tones unremarkable. Abdomen is soft no rebound or guarding. Generalized bowel sounds throughout. She has mild pedal and ankle edema pitting 1+. She has multiple areas of bruising. She has a left forearm wrist cast in place from a fracture identified on the last hospitalization.     Past Medical History:        Diagnosis Date    Acid reflux     Anxiety     Arthritis     Atrial fibrillation (HCC)     Closed fracture dislocation of right elbow     Depression     Fibromyalgia     Migraine     WHITNEY (obstructive sleep apnea)     Diagnosed years ago, no CPAP    Rectal cancer ([address]     Wears glasses     DRIVING       Past Surgical History:        Procedure Laterality Date    APPENDECTOMY      CERVICAL DISCECTOMY      ACDF     SECTION      x4    CHOLECYSTECTOMY      COLON SURGERY      Rectal cancer removed using  incision    COLONOSCOPY      SEVERAL    COLONOSCOPY N/A 2019    COLONOSCOPY POLYPECTOMY SNARE/COLD BIOPSY performed by Brandon Child MD at [address] [facility] N/A 2021    COLONOSCOPY WITH BIOPSY performed by Renita De Oliveira MD at [address] Left 8/3/2022    OPEN REDUCTION INTERNAL FIXATION LEFT DISTAL RADIUS performed by Pat Ham MD at [address] [facility] Right 2021    RIGHT LEG EVACUATION OF HEMATOMA performed by Gardenia Goncalves MD at [address] [facility] W/O EXTENSION Right 10/25/2018    OPEN REDUCTION INTERNAL FIXATION RIGHT FEMUR SUPRACONDYLAR FEMORAL FRACTURE WITH C-ARM performed by Estrellita Rivera MD at [place] [address] Right 2021    RIGHT LEG HEMATOMA EVAKUATION, DEBRIDEMENT, AND WOUND VAC PLACEMENT performed by Erica Barber MD at [place] [address] Right 2021    RIGHT LOWER EXTREMITY SKIN GRAFT performed by Erica Barber MD at [address] [place] [address] 83 Left     UPPER GASTROINTESTINAL ENDOSCOPY N/A 2019    ESOPHAGOGASTRODUODENOSCOPY performed by Brandon Child MD at [address] [facility] 2020    EGD BIOPSY performed by Brandon Child MD at [address] [place] [address]       Medications Prior to Admission:    Prior to Admission medications    Medication Sig Start Date End Date Taking?  Authorizing Provider   HYDROcodone-acetaminophen (NORCO) 5-325 MG per tablet Take 1 tablet by mouth every 6 hours as needed for Pain. Yes Historical Provider, MD   ALPRAZolam Dianna Putnam) 0.5 MG tablet Take 0.5 mg by mouth every 8 hours as needed for Anxiety. Yes Historical Provider, MD   acetaminophen (TYLENOL) 325 MG tablet Take 650 mg by mouth every 6 hours as needed for Pain   Yes Historical Provider, MD   bisacodyl (DULCOLAX) 10 MG suppository Place 10 mg rectally daily as needed for Constipation   Yes Historical Provider, MD   potassium chloride (KLOR-CON M) 20 MEQ extended release tablet TAKE 1 TABLET DAILY 8/5/22   Duy Barillas MD   polyethylene glycol (GLYCOLAX) 17 g packet Take 17 g by mouth daily as needed for Constipation 8/4/22 9/3/22  Millie Torres MD   docusate sodium (COLACE, DULCOLAX) 100 MG CAPS Take 100 mg by mouth 2 times daily as needed for Constipation 8/4/22   Millie Torres MD   metoprolol succinate (TOPROL XL) 25 MG extended release tablet Take 1 tablet by mouth in the morning. 8/5/22   Millie Torres MD   furosemide (LASIX) 20 MG tablet Take 1 tablet by mouth in the morning. With an additional 20 mg on MWF. 8/4/22   Millie Torres MD   apixaban (ELIQUIS) 5 MG TABS tablet Take 1 tablet by mouth in the morning and 1 tablet before bedtime.  7/27/22   Yajaira Adams MD   escitalopram (LEXAPRO) 10 MG tablet Take 1 tablet by mouth daily 6/14/22   Duy Barillas MD   tiZANidine (ZANAFLEX) 2 MG tablet Take 1 tablet by mouth every 6 hours as needed (muscle pain) 2/18/22   Duy Barillas MD   pantoprazole (PROTONIX) 40 MG tablet TAKE ONE TABLET BY MOUTH TWICE A DAY 2/18/22   Duy Barillas MD   dicyclomine (BENTYL) 10 MG capsule Take 1 capsule by mouth 4 times daily as needed (cramping/diarrhea) 2/18/22   Duy Barillas MD   flecainide (TAMBOCOR) 100 MG tablet TAKE 1 TABLET BY MOUTH 2 TIMES A DAY 1/27/22   Yajaira Adams MD   Cyanocobalamin (VITAMIN B 12 PO) Take 2,500 mg by mouth daily    Historical Provider, MD   calcium carbonate-vitamin D 600-200 MG-UNIT TABS Take 1 tablet by mouth 2 times daily Historical Provider, MD       Allergies:  Demerol hcl [meperidine], Pcn [penicillins], and Adhesive tape    Social History:  The patient currently lives ECF. TOBACCO:   reports that she has never smoked. She has never used smokeless tobacco.  ETOH:   reports no history of alcohol use. Family History:  Reviewed in detail and negative for DM, Early CAD, Cancer, CVA. Positive as follows:        Problem Relation Age of Onset    COPD Mother        REVIEW OF SYSTEMS:    as noted in the HPI. All other systems reviewed and negative. PHYSICAL EXAM:    BP (!) 148/74   Pulse 69   Temp 98.1 °F (36.7 °C) (Oral)   Resp 18   SpO2 93%     General appearance: No apparent distress appears stated age and cooperative. Chronically ill and debilitated appearing  HEENT Normal cephalic, atraumatic without obvious deformity. Pupils equal, round, and reactive to light. Extra ocular muscles intact. Conjunctivae/corneas clear and pale. Clubbing  Neck: Supple, No jugular venous distention/bruits. Trachea midline without thyromegaly or adenopathy with full range of motion. Lungs: Clear to auscultation, bilaterally without Rales/Wheezes/Rhonchi with good respiratory effort. Heart: Regular rate and rhythm with Normal S1/S2 without murmurs, rubs or gallops, point of maximum impulse non-displaced  Abdomen: Soft, non-tender or non-distended without rigidity or guarding and positive bowel sounds all four quadrants. Extremities: No clubbing, cyanosis, 1+ bilateral pedal and ankle pitting edema. full range of motion without deformity and normal gait intact. Left forearm wrist cast in place. Skin: Skin color: Pale, texture, turgor normal.  No rashes or lesions. Multiple areas of bruising. Neurologic: Alert and oriented X 3, neurovascularly intact with sensory/motor intact upper extremities/lower extremities, bilaterally.   Cranial nerves: II-XII intact, grossly non-focal.  Mental status: Alert, oriented, thought content appropriate. Capillary Refill: Acceptable  < 3 seconds  Peripheral Pulses: +3 Easily felt, not easily obliterated with pressure      CXR:  I have reviewed the CXR with the following interpretation: N/A  EKG:  I have reviewed the EKG with the following interpretation: N/A    CBC   Recent Labs     08/10/22  1700   WBC 5.5   HGB 10.1*   HCT 30.1*         RENAL  Recent Labs     08/10/22  1700      K 4.3   CL 95*   CO2 35*   BUN 20   CREATININE 0.7     LFT'S  Recent Labs     08/10/22  1700   AST 18   ALT 12   BILITOT 0.3   ALKPHOS 96     COAG  Recent Labs     08/10/22  1700   INR 1.20*     CARDIAC ENZYMES  No results for input(s): CKTOTAL, CKMB, CKMBINDEX, TROPONINI in the last 72 hours. U/A:    Lab Results   Component Value Date/Time    COLORU DARK YELLOW 07/29/2022 11:27 PM    [address] 07/29/2022 11:27 PM    RBCUA 2 07/29/2022 11:27 PM    BACTERIA 2+ 07/29/2022 11:27 PM    CLARITYU CLOUDY 07/29/2022 11:27 PM    SPECGRAV 1.023 07/29/2022 11:27 PM    LEUKOCYTESUR SMALL 07/29/2022 11:27 PM    BLOODU TRACE 07/29/2022 11:27 PM    GLUCOSEU Negative 07/29/2022 11:27 PM       ABG  No results found for: NJS1MBP, BEART, O0UYDBHA, PHART, THGBART, MIU8KYF, PO2ART, FYG1OHC        Active Hospital Problems    Diagnosis Date Noted    Lower GI bleed [K92.2] 08/10/2022     Priority: Medium         PHYSICIANS CERTIFICATION:    I certify that Chandana Sood is expected to be hospitalized for more than 2 midnights based on the following assessment and plan:      ASSESSMENT/PLAN:    Lower GI bleed: Melanotic stool x2 days with BRRB at [facility]. Vital signs stable: No tachycardia no hypotension  H&H: 10.1/30-> will trend  Type and screen completed: ABO/Rh: O+ w/ negative antibody  Hold anticoagulation  Continue PPI  GI consulted: Dr. Nixon Poster  N.p.o. except oral meds with sips.   Hold ECF bowel regimen for now  Bentyl and Tylenol for cramping    History of A. fib: Holding Eliquis due to GI bleed  Continuing metoprolol and flecainide per ECF regimen. DVT Prophylaxis: SCD  Diet: Diet NPO Exceptions are: Ice Chips  Code Status: Full Code  PT/OT Eval Status: very debilitated: PT/OT consulted    Dispo - admit, inpt       Molina Rodriguez, APRN - CNP    Thank you Vicky Reid MD for the opportunity to be involved in this patient's care. If you have any questions or concerns please feel free to contact me at [phone]. This dictation was performed with a verbal recognition program (DRAGON) and it was checked for errors. It is possible that there are still dictated errors within this office note. If so, please bring any errors to my attention for an addendum. All efforts were made to ensure that this office note is accurate.

## 2022-08-10 NOTE — ED NOTES
Pt has a wrap on the left arm, pt reports falling x1 week ago and states she fractured her left wrist and arm, pt has bruising to the left hand. Pt also has multiple bruises to bilateral arms and legs. Pt also has a tello in place, arrived with this.       Te Crawford RN  08/10/22 [id number]

## 2022-08-10 NOTE — ED PROVIDER NOTES
[address] EMERGENCY DEPARTMENT    CHIEF COMPLAINT  Melena (Per ems from [facility], with c/o black tarry stools x2 days. Pt denies cp, sob. Pt reports cramping to the left side of abdomen, pt reports nausea. Pt reports she used to take eliquis, states \"I think I stopped taking it a couple days ago,\" pt unsure of how many days. Pt reports she has a hx of afib. )       HISTORY OF PRESENT ILLNESS  Dominga Ochoa is a 65 y.o. female with past medical history of DM 2, macrocytic anemia, GERD, anxiety, depression, fibromyalgia, WHITNEY, rectal cancer who presents to the ED with gastrointestinal bleeding. Of note, patient was discharged from this hospital 2022 after admission for septic shock secondary to pneumonia. During the admission she was also noted to have A. fib with RVR and an acute lower GI bleed secondary to stercoral proctitis. She did not require transfusion during the admission. GI indicated patient was not a candidate for endoscopic evaluation at that time. The patient reports developing dark tarry stools 2 days ago. She denies fever, chest pain, or shortness of breath. She does complain of lightheadedness. Complains of nausea but no vomiting or diarrhea. She was previously anticoagulated for atrial fibrillation however this was discontinued during the last hospitalization. No other complaints, modifying factors or associated symptoms.      I have reviewed the following from the nursing documentation:    Past Medical History:   Diagnosis Date    Acid reflux     Anxiety     Arthritis     Atrial fibrillation (HCC)     Closed fracture dislocation of right elbow     Depression     Fibromyalgia     Migraine     WHITNEY (obstructive sleep apnea)     Diagnosed years ago, no CPAP    Rectal cancer ([facility] [address]     Wears glasses     DRIVING     Past Surgical History:   Procedure Laterality Date    APPENDECTOMY      CERVICAL DISCECTOMY      ACDF     SECTION      x4    CHOLECYSTECTOMY COLON SURGERY      Rectal cancer removed using  incision    COLONOSCOPY      SEVERAL    COLONOSCOPY N/A 2019    COLONOSCOPY POLYPECTOMY SNARE/COLD BIOPSY performed by Romulo Christopher MD at [address] [facility] N/A 2021    COLONOSCOPY WITH BIOPSY performed by Joanna Ballesteros MD at [address] [facility] [address] Left 8/3/2022    OPEN REDUCTION INTERNAL FIXATION LEFT DISTAL RADIUS performed by Ellen Dean MD at [address] [facility] Right 2021    RIGHT LEG EVACUATION OF HEMATOMA performed by Jaleel Gannon MD at [address] [facility] W/O EXTENSION Right 10/25/2018    OPEN REDUCTION INTERNAL FIXATION RIGHT FEMUR SUPRACONDYLAR FEMORAL FRACTURE WITH C-ARM performed by Isaias Vuong MD at [place] [address] Right 2021    RIGHT LEG HEMATOMA EVAKUATION, DEBRIDEMENT, AND WOUND VAC PLACEMENT performed by Samantha Rocha MD at [place] [address] Right 2021    RIGHT LOWER EXTREMITY SKIN GRAFT performed by Samantha Rocha MD at [address] [facility] Left     UPPER GASTROINTESTINAL ENDOSCOPY N/A 2019    ESOPHAGOGASTRODUODENOSCOPY performed by Romulo Christopher MD at [place] [address] N/A 2020    EGD BIOPSY performed by Romulo Christopher MD at [address] [place] [address] History   Problem Relation Age of Onset    COPD Mother      Social History     Socioeconomic History    Marital status:       Spouse name: Not on file    Number of children: 4    Years of education: Not on file    Highest education level: High school graduate   Occupational History    Not on file   Tobacco Use    Smoking status: Never    Smokeless tobacco: Never   Vaping Use    Vaping Use: Never used   Substance and Sexual Activity    Alcohol use: No    Drug use: Never    Sexual activity: Not Currently     Partners: Male   Other Topics Concern    Not on file   Social History 0.5 (L) 1.0 - 5.1 K/uL    Monocytes Absolute 0.5 0.0 - 1.3 K/uL    Eosinophils Absolute 0.0 0.0 - 0.6 K/uL    Basophils Absolute 0.0 0.0 - 0.2 K/uL   Comprehensive Metabolic Panel w/ Reflex to MG   Result Value Ref Range    Sodium 137 136 - 145 mmol/L    Potassium reflex Magnesium 4.3 3.5 - 5.1 mmol/L    Chloride 95 (L) 99 - 110 mmol/L    CO2 35 (H) 21 - 32 mmol/L    Anion Gap 7 3 - 16    Glucose 87 70 - 99 mg/dL    BUN 20 7 - 20 mg/dL    Creatinine 0.7 0.6 - 1.2 mg/dL    GFR Non-African American >60 >60    GFR African American >60 >60    Calcium 9.0 8.3 - 10.6 mg/dL    Total Protein 5.4 (L) 6.4 - 8.2 g/dL    Albumin 2.7 (L) 3.4 - 5.0 g/dL    Albumin/Globulin Ratio 1.0 (L) 1.1 - 2.2    Total Bilirubin 0.3 0.0 - 1.0 mg/dL    Alkaline Phosphatase 96 40 - 129 U/L    ALT 12 10 - 40 U/L    AST 18 15 - 37 U/L   Protime-INR   Result Value Ref Range    Protime 15.1 (H) 11.7 - 14.5 sec    INR 1.20 (H) 0.87 - 1.14   APTT   Result Value Ref Range    aPTT 27.4 23.0 - 34.3 sec   Lactic Acid   Result Value Ref Range    Lactic Acid 0.8 0.4 - 2.0 mmol/L   TYPE AND SCREEN   Result Value Ref Range    ABO/Rh O POS     Antibody Screen NEG        RADIOLOGY  I have reviewed all radiographic studies for this visit. No orders to display        ED COURSE/MDM  Patient seen and evaluated. Old records reviewed. Labs and imaging reviewed and results discussed with patient/family to extent possible. 26-year-old female presents with complaint of melanic stool. On arrival the patient is afebrile and nontoxic in appearance with otherwise reassuring vital signs. Her abdominal exam is benign. Rectal exam notable for dark stool with bright red blood. Renal panel no elevation of BUN or creatinine. CBC hemoglobin 10.1, stable from prior. Patient remains with active gastrointestinal bleeding and as she is symptomatic with respect to such we will admit to hospital medicine. Would benefit from GI evaluation.      Is this patient to be included in the SEP-1 Core Measure? No   Exclusion criteria - the patient is NOT to be included for SEP-1 Core Measure due to: Infection is not suspected    IWilmar MD, am the primary clinician of record. During the patient's ED course, the patient was given:  Medications - No data to display     All questions were answered and the patient/family expressed understanding and agreement with the plan. PROCEDURES  None    CRITICAL CARE  N/A    CLINICAL IMPRESSION  1. Hemorrhage of rectum and anus        DISPOSITION   admit    Wilmar Rojo MD    Note: This chart was created using voice recognition dictation software. Efforts were made by me to ensure accuracy, however some errors may be present due to limitations of this technology and occasionally words are not transcribed correctly.         Wilmar Rojo MD  08/10/22 [id number]

## 2022-08-11 ENCOUNTER — ANESTHESIA EVENT (OUTPATIENT)
Dept: ENDOSCOPY | Age: 77
DRG: 264 | End: 2022-08-11
Payer: MEDICARE

## 2022-08-11 LAB
APTT: 25.4 SEC (ref 23–34.3)
FIBRINOGEN: 557 MG/DL (ref 207–509)
GLUCOSE BLD-MCNC: 112 MG/DL (ref 70–99)
GLUCOSE BLD-MCNC: 86 MG/DL (ref 70–99)
GLUCOSE BLD-MCNC: 92 MG/DL (ref 70–99)
HCT VFR BLD CALC: 27.8 % (ref 36–48)
HCT VFR BLD CALC: 28.2 % (ref 36–48)
HCT VFR BLD CALC: 29.1 % (ref 36–48)
HCT VFR BLD CALC: 31.1 % (ref 36–48)
HEMOGLOBIN: 10 G/DL (ref 12–16)
HEMOGLOBIN: 10.4 G/DL (ref 12–16)
HEMOGLOBIN: 9.4 G/DL (ref 12–16)
HEMOGLOBIN: 9.4 G/DL (ref 12–16)
INR BLD: 1.1 (ref 0.87–1.14)
IRON SATURATION: 21 % (ref 15–50)
IRON: 42 UG/DL (ref 37–145)
PERFORMED ON: ABNORMAL
PERFORMED ON: NORMAL
PERFORMED ON: NORMAL
PROTHROMBIN TIME: 14.1 SEC (ref 11.7–14.5)
TOTAL IRON BINDING CAPACITY: 197 UG/DL (ref 260–445)

## 2022-08-11 PROCEDURE — 94760 N-INVAS EAR/PLS OXIMETRY 1: CPT

## 2022-08-11 PROCEDURE — 83550 IRON BINDING TEST: CPT

## 2022-08-11 PROCEDURE — 2580000003 HC RX 258: Performed by: NURSE PRACTITIONER

## 2022-08-11 PROCEDURE — 85014 HEMATOCRIT: CPT

## 2022-08-11 PROCEDURE — 6370000000 HC RX 637 (ALT 250 FOR IP): Performed by: INTERNAL MEDICINE

## 2022-08-11 PROCEDURE — 85018 HEMOGLOBIN: CPT

## 2022-08-11 PROCEDURE — 85610 PROTHROMBIN TIME: CPT

## 2022-08-11 PROCEDURE — 85730 THROMBOPLASTIN TIME PARTIAL: CPT

## 2022-08-11 PROCEDURE — 85384 FIBRINOGEN ACTIVITY: CPT

## 2022-08-11 PROCEDURE — 83540 ASSAY OF IRON: CPT

## 2022-08-11 PROCEDURE — 36415 COLL VENOUS BLD VENIPUNCTURE: CPT

## 2022-08-11 PROCEDURE — 6370000000 HC RX 637 (ALT 250 FOR IP): Performed by: NURSE PRACTITIONER

## 2022-08-11 PROCEDURE — 1200000000 HC SEMI PRIVATE

## 2022-08-11 RX ORDER — TIZANIDINE 4 MG/1
2 TABLET ORAL EVERY 6 HOURS PRN
Status: DISCONTINUED | OUTPATIENT
Start: 2022-08-11 | End: 2022-08-13

## 2022-08-11 RX ADMIN — Medication: at 17:44

## 2022-08-11 RX ADMIN — ALPRAZOLAM 0.5 MG: 0.5 TABLET ORAL at 10:26

## 2022-08-11 RX ADMIN — ALPRAZOLAM 0.5 MG: 0.5 TABLET ORAL at 20:33

## 2022-08-11 RX ADMIN — PANTOPRAZOLE SODIUM 40 MG: 40 TABLET, DELAYED RELEASE ORAL at 10:09

## 2022-08-11 RX ADMIN — ACETAMINOPHEN 650 MG: 325 TABLET, FILM COATED ORAL at 10:26

## 2022-08-11 RX ADMIN — BISACODYL 10 MG: 5 TABLET, COATED ORAL at 17:44

## 2022-08-11 RX ADMIN — FLECAINIDE ACETATE 100 MG: 100 TABLET ORAL at 20:33

## 2022-08-11 RX ADMIN — CYANOCOBALAMIN TAB 1000 MCG 2500 MCG: 1000 TAB at 20:33

## 2022-08-11 RX ADMIN — TIZANIDINE 2 MG: 4 TABLET ORAL at 20:33

## 2022-08-11 RX ADMIN — METOPROLOL SUCCINATE 25 MG: 25 TABLET, EXTENDED RELEASE ORAL at 10:09

## 2022-08-11 RX ADMIN — SODIUM CHLORIDE, PRESERVATIVE FREE 10 ML: 5 INJECTION INTRAVENOUS at 10:09

## 2022-08-11 RX ADMIN — BISACODYL 10 MG: 5 TABLET, COATED ORAL at 20:33

## 2022-08-11 RX ADMIN — FLECAINIDE ACETATE 100 MG: 100 TABLET ORAL at 10:09

## 2022-08-11 RX ADMIN — Medication 1 TABLET: at 20:33

## 2022-08-11 RX ADMIN — PANTOPRAZOLE SODIUM 40 MG: 40 TABLET, DELAYED RELEASE ORAL at 20:33

## 2022-08-11 RX ADMIN — ESCITALOPRAM OXALATE 10 MG: 10 TABLET ORAL at 20:33

## 2022-08-11 RX ADMIN — MAGNESIUM CITRATE 296 ML: 1.75 LIQUID ORAL at 20:32

## 2022-08-11 RX ADMIN — MAGNESIUM CITRATE 296 ML: 1.75 LIQUID ORAL at 17:44

## 2022-08-11 RX ADMIN — FUROSEMIDE 20 MG: 20 TABLET ORAL at 10:09

## 2022-08-11 RX ADMIN — Medication 1 TABLET: at 10:09

## 2022-08-11 ASSESSMENT — PAIN DESCRIPTION - DESCRIPTORS: DESCRIPTORS: ACHING

## 2022-08-11 ASSESSMENT — PAIN SCALES - GENERAL: PAINLEVEL_OUTOF10: 5

## 2022-08-11 ASSESSMENT — PAIN - FUNCTIONAL ASSESSMENT: PAIN_FUNCTIONAL_ASSESSMENT: ACTIVITIES ARE NOT PREVENTED

## 2022-08-11 ASSESSMENT — PAIN DESCRIPTION - ORIENTATION: ORIENTATION: MID

## 2022-08-11 ASSESSMENT — PAIN DESCRIPTION - LOCATION: LOCATION: ABDOMEN

## 2022-08-11 NOTE — CARE COORDINATION
[address] [address] Continence Nurse  Consult Note       NAME:  Orlando KENDALL  MEDICAL RECORD NUMBER:  [id number]  AGE: 65 y.o. GENDER: female  : 1945  TODAY'S DATE:  2022    Subjective   Reason for WOCN Evaluation and Assessment: Wounds POA      Courtney Winter is a 65 y.o. female referred by:   [] Physician  [x] Nursing  [] Other:     Wound Identification:  Wound Type: pressure  Contributing Factors: chronic pressure, decreased mobility, incontinence of stool, and incontinence of urine    Wound History: From [facility]. Wounds POA.   Current Wound Care Treatment:  N/A    Patient Goal of Care:  [x] Wound Healing  [] Odor Control  [] Palliative Care  [] Pain Control   [] Other:         PAST MEDICAL HISTORY        Diagnosis Date    Acid reflux     Anxiety     Arthritis     Atrial fibrillation (HCC)     Closed fracture dislocation of right elbow     Depression     Fibromyalgia     Migraine     WHITNEY (obstructive sleep apnea)     Diagnosed years ago, no CPAP    Rectal cancer (HCC)     Wears glasses     DRIVING       PAST SURGICAL HISTORY    Past Surgical History:   Procedure Laterality Date    APPENDECTOMY      CERVICAL DISCECTOMY      ACDF     SECTION      x4    CHOLECYSTECTOMY      COLON SURGERY      Rectal cancer removed using  incision    COLONOSCOPY      SEVERAL    COLONOSCOPY N/A 2019    COLONOSCOPY POLYPECTOMY SNARE/COLD BIOPSY performed by Yohannes Manley MD at [address] [facility] N/A 2021    COLONOSCOPY WITH BIOPSY performed by Ronald Morrison MD at [address] [facility] [address] Left 8/3/2022    OPEN REDUCTION INTERNAL FIXATION LEFT DISTAL RADIUS performed by Sinan Ibarra MD at [address] [facility] Right 2021    RIGHT LEG EVACUATION OF HEMATOMA performed by Alicia Rhodes MD at [address] [facility] W/O EXTENSION Right 10/25/2018    OPEN REDUCTION INTERNAL FIXATION RIGHT FEMUR SUPRACONDYLAR FEMORAL FRACTURE WITH C-ARM performed by Kaiden Josue MD at [facility] [address] Right 1/31/2021    RIGHT LEG HEMATOMA EVAKUATION, DEBRIDEMENT, AND WOUND VAC PLACEMENT performed by Dandy Lira MD at [facility] [address] Right 2/17/2021    RIGHT LOWER EXTREMITY SKIN GRAFT performed by Dandy Lira MD at [facility] [address] Left     UPPER GASTROINTESTINAL ENDOSCOPY N/A 11/26/2019    ESOPHAGOGASTRODUODENOSCOPY performed by Miguel Norris MD at [place] [address] N/A 8/18/2020    EGD BIOPSY performed by Migule Norris MD at [address] [facility]    Family History   Problem Relation Age of Onset    COPD Mother        SOCIAL HISTORY    Social History     Tobacco Use    Smoking status: Never    Smokeless tobacco: Never   Vaping Use    Vaping Use: Never used   Substance Use Topics    Alcohol use: No    Drug use: Never       ALLERGIES    Allergies   Allergen Reactions    Demerol Hcl [Meperidine] Other (See Comments)     PATIENT STATES SHE GOT VERY ANXIOUS-LIKE SHE WAS CLIMBING THE WALLS    Pcn [Penicillins]      Patient reports she has not had since she was a child, thinks reaction was hives. Adhesive Tape Rash       MEDICATIONS    No current facility-administered medications on file prior to encounter. Current Outpatient Medications on File Prior to Encounter   Medication Sig Dispense Refill    HYDROcodone-acetaminophen (NORCO) 5-325 MG per tablet Take 1 tablet by mouth every 6 hours as needed for Pain. ALPRAZolam (XANAX) 0.5 MG tablet Take 0.5 mg by mouth every 8 hours as needed for Anxiety.       acetaminophen (TYLENOL) 325 MG tablet Take 650 mg by mouth every 6 hours as needed for Pain      bisacodyl (DULCOLAX) 10 MG suppository Place 10 mg rectally daily as needed for Constipation      potassium chloride (KLOR-CON M) 20 MEQ extended release tablet TAKE 1 TABLET DAILY 90 tablet 1    polyethylene glycol (GLYCOLAX) 17 g packet Take 17 g by mouth daily as needed for Constipation 527 g 1    docusate sodium (COLACE, DULCOLAX) 100 MG CAPS Take 100 mg by mouth 2 times daily as needed for Constipation 60 capsule 0    metoprolol succinate (TOPROL XL) 25 MG extended release tablet Take 1 tablet by mouth in the morning. 30 tablet 3    furosemide (LASIX) 20 MG tablet Take 1 tablet by mouth in the morning. With an additional 20 mg on MWF. 30 tablet 0    apixaban (ELIQUIS) 5 MG TABS tablet Take 1 tablet by mouth in the morning and 1 tablet before bedtime.  180 tablet 3    escitalopram (LEXAPRO) 10 MG tablet Take 1 tablet by mouth daily 90 tablet 3    tiZANidine (ZANAFLEX) 2 MG tablet Take 1 tablet by mouth every 6 hours as needed (muscle pain) 60 tablet 11    pantoprazole (PROTONIX) 40 MG tablet TAKE ONE TABLET BY MOUTH TWICE A  tablet 1    dicyclomine (BENTYL) 10 MG capsule Take 1 capsule by mouth 4 times daily as needed (cramping/diarrhea) 60 capsule 5    flecainide (TAMBOCOR) 100 MG tablet TAKE 1 TABLET BY MOUTH 2 TIMES A  tablet 3    Cyanocobalamin (VITAMIN B 12 PO) Take 2,500 mg by mouth daily      calcium carbonate-vitamin D 600-200 MG-UNIT TABS Take 1 tablet by mouth 2 times daily         Objective    /84   Pulse 69   Temp 97.6 °F (36.4 °C) (Oral)   Resp 18   Ht 5' 2\" (1.575 m)   Wt 166 lb 7.2 oz (75.5 kg)   SpO2 93%   BMI 30.44 kg/m²     LABS:  WBC:    Lab Results   Component Value Date/Time    WBC 5.5 08/10/2022 05:00 PM     H/H:    Lab Results   Component Value Date/Time    HGB 9.4 08/11/2022 08:05 AM    HCT 28.2 08/11/2022 08:05 AM     PTT:    Lab Results   Component Value Date/Time    APTT 25.4 08/11/2022 06:21 AM   [APTT}  PT/INR:    Lab Results   Component Value Date/Time    PROTIME 14.1 08/11/2022 06:21 AM    INR 1.10 08/11/2022 06:21 AM     HgBA1c:    Lab Results   Component Value Date/Time    LABA1C 6.0 09/09/2021 12:45 PM       Assessment   Sami Risk Score:      Patient Active Problem List Diagnosis Code    Symptomatic anemia D64.9    A-fib ([facility]) I48.91    Aortic valve disorder I35.9    Arthropathy M12.9    Cervical spondylosis without myelopathy M47.812    DDD (degenerative disc disease), cervical M50.30    DDD (degenerative disc disease), lumbosacral M51.37    Essential hypertension I10    Malignant neoplasm of colon ([facility] C18.9    Spinal stenosis in cervical region M48.02    Spinal stenosis, lumbar M48.061    Fibromyalgia M79.7    Gastroesophageal reflux disease without esophagitis K21.9    Lumbar radicular pain M54.16    Pedal edema R60.0    Chronic intractable headache R51.9, G89.29    Esophageal candidiasis ([facility]) B37.81    History of gastric bypass Z98.84    Prediabetes R73.03    Age-related osteoporosis without current pathological fracture M81.0    Essential tremor G25.0    Chronic fatigue R53.82    Irritable bowel syndrome with diarrhea K58.0    Borborygmi R19.8    Constipation K59.00    Anxiety F41.9    Weakness R53.1    Age-related physical debility R54    JENNIFER (generalized anxiety disorder) F41.1    Shock ([facility] R57.9    Atrial fibrillation with rapid ventricular response ([facility]) I48.91    DM2 (diabetes mellitus, type 2) ([facility]) E11.9    GI bleed K92.2    MAX (acute kidney injury) ([facility] N17.9    Community acquired pneumonia of left lower lobe of lung J18.9    Hypoxia R09.02    Pneumonia due to infectious organism J18.9    Closed fracture of left distal radius S52.502A    Lower GI bleed K92.2       Measurements:  Negative Pressure Wound Therapy Leg Anterior; Lower;Right (Active)   Number of days: 540       Wound 07/30/22 Heel Left (Active)   Wound Image   08/11/22 1322   Wound Etiology Deep tissue/Injury 08/11/22 1322   Dressing Status Other (Comment) 08/04/22 0916   Wound Cleansed Other (Comment) 08/04/22 0916   Dressing/Treatment Barrier film 08/11/22 1322   Wound Length (cm) 0.5 cm 08/11/22 1322   Wound Width (cm) 0.5 cm 08/11/22 1322   Wound Depth (cm) 0 cm 08/02/22 1126   Wound Surface Area (cm^2) 0.25 cm^2 08/11/22 1322   Change in Wound Size % (l*w) 0 08/11/22 1322   Wound Volume (cm^3) 0 cm^3 08/02/22 1126   Wound Assessment Purple/maroon 08/11/22 1322   Drainage Amount None 08/04/22 0916   Odor None 08/04/22 0916   Nallely-wound Assessment Intact 08/04/22 0916   Margins Attached edges 08/04/22 0916   Number of days: 12       Wound 07/30/22 Heel Right (Active)   Wound Image   08/11/22 1322   Wound Etiology Deep tissue/Injury 08/11/22 1322   Dressing Status Other (Comment) 08/04/22 0916   Wound Cleansed Other (Comment) 08/04/22 0916   Dressing/Treatment Barrier film 08/04/22 0916   Wound Length (cm) 1.5 cm 08/11/22 1322   Wound Width (cm) 1.5 cm 08/11/22 1322   Wound Depth (cm) 0 cm 08/02/22 1126   Wound Surface Area (cm^2) 2.25 cm^2 08/11/22 1322   Change in Wound Size % (l*w) -125 08/11/22 1322   Wound Volume (cm^3) 0 cm^3 08/02/22 1126   Wound Assessment Purple/maroon 08/11/22 1322   Drainage Amount None 08/04/22 0916   Odor None 08/04/22 0916   Nallely-wound Assessment Non-blanchable erythema; Intact 08/11/22 1322   Margins Attached edges 08/04/22 0916   Number of days: 12       Wound 07/30/22 Sacrum Mid (Active)   Wound Image   08/11/22 1322   Wound Etiology Pressure Unstageable 08/11/22 1322   Dressing Status Clean;Dry; Intact 08/04/22 0916   Wound Cleansed Other (Comment) 08/04/22 0916   Dressing/Treatment Pharmaceutical agent (see MAR) 08/04/22 0916   Wound Length (cm) 7 cm 08/11/22 1322   Wound Width (cm) 9.5 cm 08/11/22 1322   Wound Depth (cm) 0.6 cm 08/11/22 0400   Wound Surface Area (cm^2) 66.5 cm^2 08/11/22 1322   Change in Wound Size % (l*w) -26.67 08/11/22 1322   Wound Volume (cm^3) 28.8 cm^3 08/11/22 0400   Wound Assessment Slough 08/11/22 1322   Drainage Amount Small 08/11/22 1322   Drainage Description Serosanguinous 08/11/22 1322   Odor None 08/04/22 0916   Nallely-wound Assessment Denuded;Non-blanchable erythema;Fragile 08/11/22 1322   Margins Unattached edges 08/04/22 0916   Number of days: 12     Incision 08/03/22 Radial Distal;Left (Active)   Dressing Status Clean;Dry; Intact 08/04/22 0916   Incision Cleansed Not Cleansed 08/04/22 0916   Dressing/Treatment Ace wrap;Splint 08/04/22 0916   Closure Other (Comment) 08/04/22 0916   Margins Other (Comment) 08/04/22 0916   Incision Assessment Other (Comment) 08/04/22 0916   Drainage Amount None 08/04/22 0916   Odor None 08/04/22 0916   Nallely-incision Assessment Other (Comment) 08/03/22 2041   Number of days: 8       Incision 01/28/21 Pretibial Right;Lateral (Active)   Number of days: 559     Pt awake and alert in bed. Pt has unstageable wound to coccyx with slough. DTI to bilateral heels. Pt already on specialty bed. If sacral wound becomes worse may recommend surgical consult for debridement. Plan   Plan of Care:   Buttock wound: santyl; dampened guaze, abd; CHANGE DAILY OR PRN IF SOILED  Bilateral heels: barrier film; heel protectors  -moisture barrier to buttocks  -sacral border to sacral area, peel back each shift to assess skin, change every 3 days and as needed  -turn and reposition every 2 hours  -chair cushion, encourage to reposition self frequently while in chair  -elevate heels, apply liquid barrier film twice daily  Will continue to follow; may need general surgery consult for debridement. Specialty Bed Required : Yes   [x] Low Air Loss   [] Pressure Redistribution  [] Fluid Immersion  [] Bariatric  [] Total Pressure Relief  [] Other:     Current Diet: ADULT DIET;  Clear Liquid  Diet NPO Exceptions are: Ice Chips  Dietician consult:  Yes    Discharge Plan:  Placement for patient upon discharge: skilled nursing    Patient appropriate for Outpatient [address]: No    Referrals:  []   [] [address] [employer] [facility]  [] Supplies  [] Other    Patient/Caregiver Teaching:  Level of patient/caregiver understanding able to:   [] Indicates understanding       [] Needs reinforcement  [] Unsuccessful      [x] Verbal Understanding  [] Demonstrated understanding       [] No evidence of learning  [] Refused teaching         [] N/A       Electronically signed by Sabi Saucedo RN, CWOCN on 8/11/2022 at 1:25 PM

## 2022-08-11 NOTE — CARE COORDINATION
Patient came from [facility] prior to arrival.  Call to Faith Woodall, [phone], at [facility] who confirmed the patient is:  [] [address], no Precert required for return.  [] [address] will be required for return. [] Skilled Nursing Care, no Precert required for return. [x] [address] required for return. [x] Is fully vaccinated for Covid. [] Has started Covid vaccination, but is not complete. [] Is not vaccinated for Covid. [] No Covid Test needed for return. [x] Rapid Covid test needed before return within: [x] 48 hours, [] 72 hours, [] 5 days, [] other   [] PCR Covid test needed before return. [x] Confirmed with the patient or family that the plan is to return to this facility at D/C. [] Patient and/or designated decision maker does not plan for the patient to return to this facility at D/C.      Electronically signed by Shawnee Naqvi RN on 8/11/2022 at 3:35 PM

## 2022-08-11 NOTE — ACP (ADVANCE CARE PLANNING)
Advance Care Planning     Advance Care Planning Activator (Inpatient)  Conversation Note      Date of ACP Conversation: 8/11/2022     Conversation Conducted with: Patient with Decision Making Capacity    ACP Activator: Ashely Huitron, [address] [facility] Decision Maker:     Current Designated Health Care Decision Maker:     Primary Decision Maker: Mukund Ledesma Child - [phone]    Secondary Decision Maker: Vaughn Rollins Child - [phone]    Care Preferences    Ventilation: \"If you were in your present state of health and suddenly became very ill and were unable to breathe on your own, what would your preference be about the use of a ventilator (breathing machine) if it were available to you? \"      Would the patient desire the use of ventilator (breathing machine)?: yes    \"If your health worsens and it becomes clear that your chance of recovery is unlikely, what would your preference be about the use of a ventilator (breathing machine) if it were available to you? \"     Would the patient desire the use of ventilator (breathing machine)?: No      Resuscitation  \"CPR works best to restart the heart when there is a sudden event, like a heart attack, in someone who is otherwise healthy. Unfortunately, CPR does not typically restart the heart for people who have serious health conditions or who are very sick. \"    \"In the event your heart stopped as a result of an underlying serious health condition, would you want attempts to be made to restart your heart (answer \"yes\" for attempt to resuscitate) or would you prefer a natural death (answer \"no\" for do not attempt to resuscitate)? \" yes       [] Yes   [x] No   Educated Patient / Mortimer Hoar regarding differences between Advance Directives and portable DNR orders.     Length of ACP Conversation in minutes:  3    Conversation Outcomes:  [x] ACP discussion completed  [] Existing advance directive reviewed with patient; no changes to patient's previously recorded wishes  [] New Advance Directive completed  [] Portable Do Not Rescitate prepared for Provider review and signature  [] POLST/POST/MOLST/MOST prepared for Provider review and signature      Follow-up plan:    [] Schedule follow-up conversation to continue planning  [] Referred individual to Provider for additional questions/concerns   [] Advised patient/agent/surrogate to review completed ACP document and update if needed with changes in condition, patient preferences or care setting    [] This note routed to one or more involved healthcare providers

## 2022-08-11 NOTE — DISCHARGE INSTR - COC
Continuity of Care Form    Patient Name: Susan Hurt   :  1945  MRN:  [id number]    Admit date:  8/10/2022  Discharge date:  22    Code Status Order: Full Code   Advance Directives:     Admitting Physician:  Rachel Mann DO  PCP: Reji Denise MD    Discharging Nurse: [facility] Unit/Room#: H2A-5118/0036-87  Discharging Unit Phone Number: ***    Emergency Contact:   Extended Emergency Contact Information  Primary Emergency Contact: Raven Friend  Address: [facility] [address] [place] [address], [address] [place] [address],[address] Phone: [phone]  Mobile Phone: [phone]  Relation: Child  Secondary Emergency Contact: Jose De La Fuente  Address: [place], [address] [place] [address],[address] Phone: [phone]  Mobile Phone: [phone]  Relation: Child  Preferred language: English   needed?  No    Past Surgical History:  Past Surgical History:   Procedure Laterality Date    APPENDECTOMY      CERVICAL DISCECTOMY      ACDF     SECTION      x4    CHOLECYSTECTOMY      COLON SURGERY      Rectal cancer removed using  incision    COLONOSCOPY      SEVERAL    COLONOSCOPY N/A 2019    COLONOSCOPY POLYPECTOMY SNARE/COLD BIOPSY performed by Layla Sandoval MD at [address] [place] [address] N/A 2021    COLONOSCOPY WITH BIOPSY performed by Oziel Patel MD at [address] [place] [address] Drive Left 8/3/2022    OPEN REDUCTION INTERNAL FIXATION LEFT DISTAL RADIUS performed by Hema Calderon MD at [facility] Right 2021    RIGHT LEG EVACUATION OF HEMATOMA performed by Pablo Chavez MD at [facility] W/O EXTENSION Right 10/25/2018    OPEN REDUCTION INTERNAL FIXATION RIGHT FEMUR SUPRACONDYLAR FEMORAL FRACTURE WITH C-ARM performed by Nisreen Rodriguez MD at [place] [address] Right 2021    RIGHT LEG HEMATOMA Alison Peals, AND WOUND VAC PLACEMENT performed by Fabricio Farah MD at [facility] Right 2/17/2021    RIGHT LOWER EXTREMITY SKIN GRAFT performed by Fabricio Farah MD at [facility] Left     UPPER GASTROINTESTINAL ENDOSCOPY N/A 11/26/2019    ESOPHAGOGASTRODUODENOSCOPY performed by Liliana France MD at [facility] N/A 8/18/2020    EGD BIOPSY performed by Liliana France MD at [address] [facility] ENDOSCOPY       Immunization History:   Immunization History   Administered Date(s) Administered    COVID-19, J&J, (age 18y+), IM, 0.5 mL 03/08/2021, 10/31/2021    Influenza Vaccine, unspecified formulation 10/21/2018    Influenza, High Dose (Fluzone 65 yrs and older) 12/09/2015, 10/27/2016, 09/22/2017, 10/21/2018    Influenza, [place], IM, PF (6 mo and older Fluzone, Flulaval, Fluarix, and 3 yrs and older Afluria) 02/19/2020    Influenza, Quadv, adjuvanted, 65 yrs +, IM, PF (Fluad) 09/03/2020, 09/09/2021    Pneumococcal Conjugate 13-valent ([id number]) 12/09/2015    Pneumococcal Conjugate Vaccine 10/21/2018    Pneumococcal Polysaccharide ([id number]) 10/21/2018    Tdap (Boostrix, Adacel) 12/09/2015       Active Problems:  Patient Active Problem List   Diagnosis Code    Symptomatic anemia D64.9    A-fib ([facility] I48.91    Aortic valve disorder I35.9    Arthropathy M12.9    Cervical spondylosis without myelopathy M47.812    DDD (degenerative disc disease), cervical M50.30    DDD (degenerative disc disease), lumbosacral M51.37    Essential hypertension I10    Malignant neoplasm of colon ([facility] C18.9    Spinal stenosis in cervical region M48.02    Spinal stenosis, lumbar M48.061    Fibromyalgia M79.7    Gastroesophageal reflux disease without esophagitis K21.9    Lumbar radicular pain M54.16    Pedal edema R60.0    Chronic intractable headache R51.9, G89.29    Esophageal candidiasis (HCC) B37.81    History of gastric bypass Z98.84    Prediabetes R73.03    Age-related osteoporosis without current pathological fracture M81.0    Essential tremor G25.0    Chronic fatigue R53.82    Irritable bowel syndrome with diarrhea K58.0    Borborygmi R19.8    Constipation K59.00    Anxiety F41.9    Weakness R53.1    Age-related physical debility R54    JENNIFER (generalized anxiety disorder) F41.1    Shock (HCC) R57.9    Atrial fibrillation with rapid ventricular response (HCC) I48.91    DM2 (diabetes mellitus, type 2) (HCC) E11.9    GI bleed K92.2    MAX (acute kidney injury) ([facility] N17.9    Community acquired pneumonia of left lower lobe of lung J18.9    Hypoxia R09.02    Pneumonia due to infectious organism J18.9    Closed fracture of left distal radius S52.502A    Lower GI bleed K92.2       Isolation/Infection:   Isolation            No Isolation          Patient Infection Status       Infection Onset Added Last Indicated Last Indicated By Review Planned Expiration Resolved Resolved By    None active    Resolved    COVID-19 (Rule Out) 02/17/21 02/17/21 02/17/21 COVID-19, Rapid (Ordered)   02/17/21 Rule-Out Test Resulted    COVID-19 (Rule Out) 01/28/21 01/28/21 01/28/21 COVID-19 (Ordered)   01/28/21 Rule-Out Test Resulted            Nurse Assessment:  Last Vital Signs: /84   Pulse 69   Temp 97.6 °F (36.4 °C) (Oral)   Resp 18   Ht 5' 2\" (1.575 m)   Wt 166 lb 7.2 oz (75.5 kg)   SpO2 93%   BMI 30.44 kg/m²     Last documented pain score (0-10 scale): Pain Level: 5  Last Weight:   Wt Readings from Last 1 Encounters:   08/11/22 166 lb 7.2 oz (75.5 kg)     Mental Status:  oriented and alert    IV Access:  - None    Nursing Mobility/ADLs:  Walking   Dependent  Transfer  Dependent  Bathing  Dependent  Dressing  Dependent  Toileting  Dependent  Feeding  Assisted  Med Admin  Assisted  Med Delivery   whole    Wound Care Documentation and Therapy:  Negative Pressure Wound Therapy Leg Anterior; Lower;Right (Active)   Number of days: 540       Wound 07/30/22 Heel Left (Active)   Wound Image 08/11/22 1322   Wound Etiology Deep tissue/Injury 08/11/22 1322   Dressing Status Other (Comment) 08/04/22 0916   Wound Cleansed Other (Comment) 08/04/22 0916   Dressing/Treatment Barrier film 08/11/22 1322   Wound Length (cm) 0.5 cm 08/11/22 1322   Wound Width (cm) 0.5 cm 08/11/22 1322   Wound Depth (cm) 0 cm 08/02/22 1126   Wound Surface Area (cm^2) 0.25 cm^2 08/11/22 1322   Change in Wound Size % (l*w) 0 08/11/22 1322   Wound Volume (cm^3) 0 cm^3 08/02/22 1126   Wound Assessment Purple/maroon 08/11/22 1322   Drainage Amount None 08/04/22 0916   Odor None 08/04/22 0916   Nallely-wound Assessment Intact 08/04/22 0916   Margins Attached edges 08/04/22 0916   Number of days: 12       Wound 07/30/22 Heel Right (Active)   Wound Image   08/11/22 1322   Wound Etiology Deep tissue/Injury 08/11/22 1322   Dressing Status Other (Comment) 08/04/22 0916   Wound Cleansed Other (Comment) 08/04/22 0916   Dressing/Treatment Barrier film 08/04/22 0916   Wound Length (cm) 1.5 cm 08/11/22 1322   Wound Width (cm) 1.5 cm 08/11/22 1322   Wound Depth (cm) 0 cm 08/02/22 1126   Wound Surface Area (cm^2) 2.25 cm^2 08/11/22 1322   Change in Wound Size % (l*w) -125 08/11/22 1322   Wound Volume (cm^3) 0 cm^3 08/02/22 1126   Wound Assessment Purple/maroon 08/11/22 1322   Drainage Amount None 08/04/22 0916   Odor None 08/04/22 0916   Nallely-wound Assessment Non-blanchable erythema; Intact 08/11/22 1322   Margins Attached edges 08/04/22 0916   Number of days: 12       Wound 07/30/22 Sacrum Mid (Active)   Wound Image   08/11/22 1322   Wound Etiology Pressure Unstageable 08/11/22 1322   Dressing Status Clean;Dry; Intact 08/04/22 0916   Wound Cleansed Other (Comment) 08/04/22 0916   Dressing/Treatment Pharmaceutical agent (see MAR) 08/04/22 0916   Wound Length (cm) 7 cm 08/11/22 1322   Wound Width (cm) 9.5 cm 08/11/22 1322   Wound Depth (cm) 0.6 cm 08/11/22 0400   Wound Surface Area (cm^2) 66.5 cm^2 08/11/22 1322   Change in Wound Size % (l*w) -26.67 08/11/22 1322   Wound Volume (cm^3) 28.8 cm^3 08/11/22 0400   Wound Assessment Slough 08/11/22 1322   Drainage Amount Small 08/11/22 1322   Drainage Description Serosanguinous 08/11/22 1322   Odor None 08/04/22 0916   Nallely-wound Assessment Denuded;Non-blanchable erythema;Fragile 08/11/22 1322   Margins Unattached edges 08/04/22 0916   Number of days: 12       Incision 08/03/22 Radial Distal;Left (Active)   Dressing Status Clean;Dry; Intact 08/04/22 0916   Incision Cleansed Not Cleansed 08/04/22 0916   Dressing/Treatment Ace wrap;Splint 08/04/22 0916   Closure Other (Comment) 08/04/22 0916   Margins Other (Comment) 08/04/22 0916   Incision Assessment Other (Comment) 08/04/22 0916   Drainage Amount None 08/04/22 0916   Odor None 08/04/22 0916   Nallely-incision Assessment Other (Comment) 08/03/22 2041   Number of days: 8       Incision 01/28/21 Pretibial Right;Lateral (Active)   Number of days: 559        Elimination:  Continence: Bowel: No  Bladder: No  Urinary Catheter: Indication for Use of Catheter: Stage III or IV perineal and sacral wound OR full thickness perineal/lower extremity burns in continent patients   Colostomy/Ileostomy/Ileal Conduit: No       Date of Last BM: 8/18/22    Intake/Output Summary (Last 24 hours) at 8/11/2022 1541  Last data filed at 8/11/2022 0600  Gross per 24 hour   Intake 240 ml   Output --   Net 240 ml     I/O last 3 completed shifts: In: 240 [P.O.:240]  Out: -     Safety Concerns: At Risk for Falls    Impairments/Disabilities:      None    Nutrition Therapy:  Current Nutrition Therapy:   - Oral Diet:  General    Routes of Feeding: Oral  Liquids: No Restrictions  Daily Fluid Restriction: no  Last Modified Barium Swallow with Video (Video Swallowing Test): not done    Treatments at the Time of Hospital Discharge:   Respiratory Treatments: ***  Oxygen Therapy:  is not on home oxygen therapy.   Ventilator:    - No ventilator support    Rehab Therapies: Physical Therapy and Occupational Therapy  Weight Bearing Status/Restrictions: No weight bearing restrictions  Other Medical Equipment (for information only, NOT a DME order):  {EQUIPMENT:[id number]}  Other Treatments: ***    Patient's personal belongings (please select all that are sent with patient):  {[facility] DME Belongings:[id number]}    RN SIGNATURE:  Electronically signed by Jahaira Bruno RN on 8/18/22 at 11:52 AM EDT    CASE MANAGEMENT/SOCIAL WORK SECTION    Inpatient Status Date: 8/10/22    Readmission Risk Assessment Score:  Readmission Risk              Risk of Unplanned Readmission:  [id number]           Discharging to Facility/ Agency   Name: Discharging to Facility/ Agency   Name: [facility]  Address:  [address] [place] [address]   Phone:  [phone]  Fax:  [phone]       Dialysis Facility (if applicable)   Name:  Address:  Dialysis Schedule:  Phone:  Fax:    / signature: Electronically signed by Shawnee Naqvi RN on 8/17/2022 at 12:32 PM      PHYSICIAN SECTION    Prognosis: Good    Condition at Discharge: Stable    Rehab Potential (if transferring to Rehab): Good    Recommended Labs or Other Treatments After Discharge: PT/OT/RN  Sacral decubitus ulcer: stage III  -Needs ongoing wound care. Wet to dry dressing changes BID with santyl or medihoney. Wound care team to evaluate and treat, recommend wound vac with three times weekly dressing changes. Offload pressure. Nutritional supplements . Physician Certification: I certify the above information and transfer of Taye Galloway  is necessary for the continuing treatment of the diagnosis listed and that she requires [facility] for less 30 days.      Update Admission H&P: No change in H&P    PHYSICIAN SIGNATURE:  Electronically signed by MUSA Madera CNP on 8/15/22 at 10:55 AM EDT/ Dr. Vangie Jorge

## 2022-08-11 NOTE — ED NOTES
Report called to [facility] [place] MAUDE SYKES. Patient going to [address] [place] [address] 56 room 4128. Will request inhouse transport.       Rebecca Brito RN  08/10/22 2002

## 2022-08-11 NOTE — PROGRESS NOTES
Hospitalist Progress Note      PCP: Hayde Lloyd MD    Date of Admission: 8/10/2022    Chief Complaint:   [id number] [facility] Course: The patient is a 65 y.o. female with PMHx: A. fib, gastric reflux, closed left wrist fracture, fibromyalgia, migraine, WHITNEY, rectal cancer, gastric bypass, type II DM, macrocytic anemia, anxiety, depression. Lives at [place], Anticoagulated with Eliquis   Had GI bleed w/ stercoral proctitis, A. fib with RVR. and required transfusion during hospital course. Was also seen by GI during hospital course but there was no EGD or colonoscopy performed. She was admitted for a GI bleed with melena for 2 days and bright red blood per rectum with symptoms of nausea but no vomiting, generalized weakness and crampy abdominal pain,. She was evaluated in the ED and found to have an hemoglobin of 10.   She is being admitted for further work-upSubjective:   No new symptoms to complain of      Medications:  Reviewed    Infusion Medications    sodium chloride      dextrose       Scheduled Medications    sodium chloride flush  5-40 mL IntraVENous 2 times per day    [Held by provider] apixaban  5 mg Oral BID    calcium-cholecalciferol  1 tablet Oral BID    vitamin B-12  2,500 mcg Oral Nightly    escitalopram  10 mg Oral Nightly    flecainide  100 mg Oral 2 times per day    furosemide  20 mg Oral Daily    metoprolol succinate  25 mg Oral Daily    pantoprazole  40 mg Oral BID     PRN Meds: sodium chloride flush, sodium chloride, ondansetron **OR** ondansetron, acetaminophen **OR** acetaminophen, glucose, dextrose bolus **OR** dextrose bolus, glucagon (rDNA), dextrose, ALPRAZolam, dicyclomine      Intake/Output Summary (Last 24 hours) at 8/11/2022 1108  Last data filed at 8/11/2022 0600  Gross per 24 hour   Intake 240 ml   Output --   Net 240 ml       Physical Exam Performed:    /84   Pulse 69   Temp 97.6 °F (36.4 °C) (Oral)   Resp 18   Ht 5' 2\" (1.575 m)   Wt 166 lb 7.2 oz (75.5 kg) SpO2 93%   BMI 30.44 kg/m²     General appearance: No apparent distress, appears stated age and cooperative. HEENT: Pupils equal, round, and reactive to light. Conjunctivae/corneas clear. Neck: Supple, with full range of motion. No jugular venous distention. Trachea midline. Respiratory:  Normal respiratory effort. Clear to auscultation, bilaterally without Rales/Wheezes/Rhonchi. Cardiovascular: Regular rate and rhythm with normal S1/S2 without murmurs, rubs or gallops. Abdomen: Soft, non-tender, non-distended with normal bowel sounds. Musculoskeletal: No clubbing, cyanosis or edema bilaterally. Full range of motion without deformity. Skin: Skin color, texture, turgor normal.  No rashes or lesions. Neurologic:  Neurovascularly intact without any focal sensory/motor deficits. Cranial nerves: II-XII intact, grossly non-focal.  Psychiatric: Alert and oriented, thought content appropriate, normal insight  Capillary Refill: Brisk,3 seconds, normal   Peripheral Pulses: +2 palpable, equal bilaterally       Labs:   Recent Labs     08/10/22  1700 08/11/22  0207 08/11/22  0805   WBC 5.5  --   --    HGB 10.1* 9.4* 9.4*   HCT 30.1* 27.8* 28.2*     --   --      Recent Labs     08/10/22  1700      K 4.3   CL 95*   CO2 35*   BUN 20   CREATININE 0.7   CALCIUM 9.0     Recent Labs     08/10/22  1700   AST 18   ALT 12   BILITOT 0.3   ALKPHOS 96     Recent Labs     08/10/22  1700 08/11/22  0621   INR 1.20* 1.10     No results for input(s): Ashely Juarez in the last 72 hours.     Urinalysis:      Lab Results   Component Value Date/Time    NITRU Negative 07/29/2022 11:27 PM    [address] 07/29/2022 11:27 PM    BACTERIA 2+ 07/29/2022 11:27 PM    RBCUA 2 07/29/2022 11:27 PM    BLOODU TRACE 07/29/2022 11:27 PM    SPECGRAV 1.023 07/29/2022 11:27 PM    GLUCOSEU Negative 07/29/2022 11:27 PM       Radiology:  No orders to display           Assessment/Plan:    ASSESSMENT/PLAN:     Lower GI bleed: Melanotic stool x2 days with [facility] at [facility]. Vital signs stable: No tachycardia no hypotension  H&H: 10.1/30-> will trend  Type and screen completed: ABO/Rh: O+ w/ negative antibody  Transfuse 1 unit packed red cells if hemoglobin drops below 7  Hold anticoagulation  Continue PPI  GI consulted: Dr. Nixon Poster  N.p.o. except oral meds with sips. Hold ECF bowel regimen for now  Bentyl and Tylenol for cramping     History of A. fib: Holding Eliquis due to GI bleed  Continuing metoprolol and flecainide per ECF regimen.               DVT Prophylaxis: SCD  Diet: Diet NPO Exceptions are: Ice Chips  Code Status: Full Code  PT/OT Eval Status: very debilitated: PT/OT consulted     Dispo - admit, Rebecca Martínez MD

## 2022-08-11 NOTE — PROGRESS NOTES
Pt alert and oriented x4. Admitted from the ED to room 4128 via stretcher. Assist x3 to transfer to the bed. Respirations easy and unlabored on room air. Vital signs stable. Denies nausea, vomiting, numbness, tingling, headache, dizziness, blurred vision, shortness of breath or chest pain. When repositioning pt found to have been incontinent of stool. Stool was soft and brown. Blood only noted from sacral wound when cleaning pt with bath wipes. Kaplan patent, draining clear yellow urine. New securement device placed to right thigh. Left arm remains in ace/ splint from an old injury. Generalized bruising noted. Pt states it was because of her previous fall. No signs or symptoms of distress noted at this time. Call light within reach. Will continue to monitor.

## 2022-08-11 NOTE — PLAN OF CARE
Problem: Discharge Planning  Goal: Discharge to home or other facility with appropriate resources  8/11/2022 1203 by Андрей Grider RN  Outcome: Progressing  8/11/2022 0416 by Lyn Greene RN  Outcome: Progressing  Flowsheets (Taken 8/10/2022 2252)  Discharge to home or other facility with appropriate resources:   Identify barriers to discharge with patient and caregiver   Arrange for needed discharge resources and transportation as appropriate   Identify discharge learning needs (meds, wound care, etc)   Refer to discharge planning if patient needs post-hospital services based on physician order or complex needs related to functional status, cognitive ability or social support system     Problem: Pain  Goal: Verbalizes/displays adequate comfort level or baseline comfort level  8/11/2022 1203 by Андрей Grider RN  Outcome: Progressing  8/11/2022 0416 by Lyn Greene RN  Outcome: Progressing     Problem: Skin/Tissue Integrity - Adult  Goal: Skin integrity remains intact  8/11/2022 1203 by Андрей Grider RN  Outcome: Progressing  8/11/2022 0416 by Lyn Greene RN  Outcome: Progressing  Flowsheets (Taken 8/10/2022 2252)  Skin Integrity Remains Intact:   Monitor for areas of redness and/or skin breakdown   Assess vascular access sites hourly  Goal: Incisions, wounds, or drain sites healing without S/S of infection  8/11/2022 1203 by Андрей Grider RN  Outcome: Progressing  8/11/2022 0416 by Lyn Greene RN  Outcome: Progressing  Flowsheets (Taken 8/10/2022 2252)  Incisions, Wounds, or Drain Sites Healing Without Sign and Symptoms of Infection:   ADMISSION and DAILY: Assess and document risk factors for pressure ulcer development   TWICE DAILY: Assess and document skin integrity   Initiate pressure ulcer prevention bundle as indicated     Problem: Musculoskeletal - Adult  Goal: Return mobility to safest level of function  8/11/2022 1203 by Андрей Grider RN  Outcome: Progressing  8/11/2022 0416 by Tsering Beasley Noman Casas RN  Outcome: Progressing  Flowsheets (Taken 8/10/2022 2252)  Return Mobility to Safest Level of Function:   Assess patient stability and activity tolerance for standing, transferring and ambulating with or without assistive devices   Assist with transfers and ambulation using safe patient handling equipment as needed   Ensure adequate protection for wounds/incisions during mobilization   Obtain physical therapy/occupational therapy consults as needed  Goal: Maintain proper alignment of affected body part  8/11/2022 1203 by Gloria Peña RN  Outcome: Progressing  8/11/2022 0416 by Leighton Lutz RN  Outcome: Progressing  Flowsheets (Taken 8/10/2022 2252)  Maintain proper alignment of affected body part: Support and protect limb and body alignment per provider's orders  Goal: Return ADL status to a safe level of function  8/11/2022 1203 by Gloria Peña RN  Outcome: Progressing  8/11/2022 0416 by Leighton Lutz RN  Outcome: Progressing     Problem: Gastrointestinal - Adult  Goal: Maintains or returns to baseline bowel function  8/11/2022 1203 by Gloria Peña RN  Outcome: Progressing  8/11/2022 0416 by Leighton Lutz RN  Outcome: Progressing  Flowsheets (Taken 8/10/2022 2252)  Maintains or returns to baseline bowel function: Assess bowel function  Goal: Maintains adequate nutritional intake  8/11/2022 1203 by Gloria Peña RN  Outcome: Progressing  8/11/2022 0416 by Leighton Lutz RN  Outcome: Progressing  Flowsheets (Taken 8/10/2022 2252)  Maintains adequate nutritional intake:   Monitor percentage of each meal consumed   Identify factors contributing to decreased intake, treat as appropriate   Assist with meals as needed   Monitor intake and output, weight and lab values     Problem: Genitourinary - Adult  Goal: Urinary catheter remains patent  8/11/2022 1203 by Gloria Peña RN  Outcome: Progressing  8/11/2022 0416 by Leighton Lutz RN  Outcome: Progressing  Flowsheets (Taken 8/10/2022 2252)  Urinary catheter remains patent: Assess patency of urinary catheter     Problem: Chronic Conditions and Co-morbidities  Goal: Patient's chronic conditions and co-morbidity symptoms are monitored and maintained or improved  Outcome: Progressing  Flowsheets (Taken 8/10/2022 2252 by Leighton Lutz RN)  Care Plan - Patient's Chronic Conditions and Co-Morbidity Symptoms are Monitored and Maintained or Improved:   Monitor and assess patient's chronic conditions and comorbid symptoms for stability, deterioration, or improvement   Collaborate with multidisciplinary team to address chronic and comorbid conditions and prevent exacerbation or deterioration     Problem: Nutrition Deficit:  Goal: Optimize nutritional status  8/11/2022 1203 by Gloria Peña RN  Outcome: Progressing  8/11/2022 1006 by Leah Rajan RD, LD  Outcome: Progressing

## 2022-08-11 NOTE — PROGRESS NOTES
Comprehensive Nutrition Assessment    Type and Reason for Visit:  Initial, Wound    Nutrition Recommendations/Plan:   Advance diet as appropriate  Monitor intakes with diet advancement for possible need of ONS     Malnutrition Assessment:  Malnutrition Status: At risk for malnutrition (Comment) (08/11/22 0002)    Context:  Acute Illness       Nutrition Assessment:    RD triggered for wounds. Pt with DTI to sacrum. Admitted with lower GI bleed. Pt has lost some weight in the last eight months per EMR although insignificant. Currently NPO. Will monitor intakes when diet advances for possible need of ONS. Nutrition Related Findings:    +BM 8/10. Nonpitting generalized edema. +1 nonpitting BLE edema. +1 nonpitting LUE edema. Wound Type: Deep Tissue Injury       Current Nutrition Intake & Therapies:    Average Meal Intake: NPO  Average Supplements Intake: NPO  Diet NPO Exceptions are: Ice Chips    Anthropometric Measures:  Height: 5' 2\" (157.5 cm)  Ideal Body Weight (IBW): 110 lbs (50 kg)    Current Body Weight: 166 lb (75.3 kg), 150.9 % IBW. Weight Source: Bed Scale  Current BMI (kg/m2): 30.4  Weight Adjustment For: No Adjustment  BMI Categories: Obese Class 1 (BMI 30.0-34. 9)    Estimated Daily Nutrient Needs:  Energy Requirements Based On: Kcal/kg  Weight Used for Energy Requirements: Current  Energy (kcal/day): 1280-9677 (15-18kcal/76kg)  Weight Used for Protein Requirements: Ideal  Protein (g/day): 60-70 (1.2-1.4g/50kg)  Method Used for Fluid Requirements: 1 ml/kcal  Fluid (ml/day): 1 ml/kcal    Nutrition Diagnosis:   Inadequate oral intake related to altered GI structure as evidenced by NPO or clear liquid status due to medical condition    Nutrition Interventions:   Food and/or Nutrient Delivery:  (Advance diet as appropriate)  Nutrition Education/Counseling: Education not indicated  Coordination of Nutrition Care: Continue to monitor while inpatient     Goals:  Goals: PO intake 50% or greater    Nutrition Monitoring and Evaluation:   Behavioral-Environmental Outcomes: None Identified  Food/Nutrient Intake Outcomes: Diet Advancement/Tolerance, Food and Nutrient Intake  Physical Signs/Symptoms Outcomes: Biochemical Data, GI Status, Skin, Weight    Discharge Planning:     Too soon to determine     Alicia Score, [address], [place]  Contact: [phone]

## 2022-08-11 NOTE — PROGRESS NOTES
Pt with small bowel movement. Stool soft and brown in appearance.     Electronically signed by Yaa Mckinley RN on 8/11/22 at 10:51 AM EDT

## 2022-08-11 NOTE — PROGRESS NOTES
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

## 2022-08-11 NOTE — PLAN OF CARE
Problem: Discharge Planning  Goal: Discharge to home or other facility with appropriate resources  Outcome: Progressing  Flowsheets (Taken 8/10/2022 2252)  Discharge to home or other facility with appropriate resources:   Identify barriers to discharge with patient and caregiver   Arrange for needed discharge resources and transportation as appropriate   Identify discharge learning needs (meds, wound care, etc)   Refer to discharge planning if patient needs post-hospital services based on physician order or complex needs related to functional status, cognitive ability or social support system     Problem: Pain  Goal: Verbalizes/displays adequate comfort level or baseline comfort level  Outcome: Progressing     Problem: Skin/Tissue Integrity - Adult  Goal: Skin integrity remains intact  Outcome: Progressing  Flowsheets (Taken 8/10/2022 2252)  Skin Integrity Remains Intact:   Monitor for areas of redness and/or skin breakdown   Assess vascular access sites hourly  Goal: Incisions, wounds, or drain sites healing without S/S of infection  Outcome: Progressing  Flowsheets (Taken 8/10/2022 2252)  Incisions, Wounds, or Drain Sites Healing Without Sign and Symptoms of Infection:   ADMISSION and DAILY: Assess and document risk factors for pressure ulcer development   TWICE DAILY: Assess and document skin integrity   Initiate pressure ulcer prevention bundle as indicated     Problem: Musculoskeletal - Adult  Goal: Return mobility to safest level of function  Outcome: Progressing  Flowsheets (Taken 8/10/2022 2252)  Return Mobility to Safest Level of Function:   Assess patient stability and activity tolerance for standing, transferring and ambulating with or without assistive devices   Assist with transfers and ambulation using safe patient handling equipment as needed   Ensure adequate protection for wounds/incisions during mobilization   Obtain physical therapy/occupational therapy consults as needed  Goal: Maintain proper alignment of affected body part  Outcome: Progressing  Flowsheets (Taken 8/10/2022 2252)  Maintain proper alignment of affected body part: Support and protect limb and body alignment per provider's orders  Goal: Return ADL status to a safe level of function  Outcome: Progressing     Problem: Gastrointestinal - Adult  Goal: Maintains or returns to baseline bowel function  Outcome: Progressing  Flowsheets (Taken 8/10/2022 2252)  Maintains or returns to baseline bowel function: Assess bowel function  Goal: Maintains adequate nutritional intake  Outcome: Progressing  Flowsheets (Taken 8/10/2022 2252)  Maintains adequate nutritional intake:   Monitor percentage of each meal consumed   Identify factors contributing to decreased intake, treat as appropriate   Assist with meals as needed   Monitor intake and output, weight and lab values     Problem: Genitourinary - Adult  Goal: Urinary catheter remains patent  Outcome: Progressing  Flowsheets (Taken 8/10/2022 2252)  Urinary catheter remains patent: Assess patency of urinary catheter

## 2022-08-12 ENCOUNTER — ANESTHESIA (OUTPATIENT)
Dept: ENDOSCOPY | Age: 77
DRG: 264 | End: 2022-08-12
Payer: MEDICARE

## 2022-08-12 LAB
GLUCOSE BLD-MCNC: 105 MG/DL (ref 70–99)
GLUCOSE BLD-MCNC: 119 MG/DL (ref 70–99)
GLUCOSE BLD-MCNC: 125 MG/DL (ref 70–99)
GLUCOSE BLD-MCNC: 143 MG/DL (ref 70–99)
HCT VFR BLD CALC: 26.5 % (ref 36–48)
HCT VFR BLD CALC: 35.3 % (ref 36–48)
HEMOGLOBIN: 11 G/DL (ref 12–16)
HEMOGLOBIN: 8.9 G/DL (ref 12–16)
PERFORMED ON: ABNORMAL

## 2022-08-12 PROCEDURE — 7100000000 HC PACU RECOVERY - FIRST 15 MIN: Performed by: INTERNAL MEDICINE

## 2022-08-12 PROCEDURE — 3609027000 HC COLONOSCOPY: Performed by: INTERNAL MEDICINE

## 2022-08-12 PROCEDURE — 2580000003 HC RX 258: Performed by: INTERNAL MEDICINE

## 2022-08-12 PROCEDURE — 85014 HEMATOCRIT: CPT

## 2022-08-12 PROCEDURE — 2709999900 HC NON-CHARGEABLE SUPPLY: Performed by: INTERNAL MEDICINE

## 2022-08-12 PROCEDURE — 3700000001 HC ADD 15 MINUTES (ANESTHESIA): Performed by: INTERNAL MEDICINE

## 2022-08-12 PROCEDURE — 2500000003 HC RX 250 WO HCPCS: Performed by: NURSE ANESTHETIST, CERTIFIED REGISTERED

## 2022-08-12 PROCEDURE — 1200000000 HC SEMI PRIVATE

## 2022-08-12 PROCEDURE — 7100000001 HC PACU RECOVERY - ADDTL 15 MIN: Performed by: INTERNAL MEDICINE

## 2022-08-12 PROCEDURE — 94760 N-INVAS EAR/PLS OXIMETRY 1: CPT

## 2022-08-12 PROCEDURE — 85018 HEMOGLOBIN: CPT

## 2022-08-12 PROCEDURE — 6370000000 HC RX 637 (ALT 250 FOR IP): Performed by: NURSE PRACTITIONER

## 2022-08-12 PROCEDURE — 0DJD8ZZ INSPECTION OF LOWER INTESTINAL TRACT, VIA NATURAL OR ARTIFICIAL OPENING ENDOSCOPIC: ICD-10-PCS | Performed by: INTERNAL MEDICINE

## 2022-08-12 PROCEDURE — 3700000000 HC ANESTHESIA ATTENDED CARE: Performed by: INTERNAL MEDICINE

## 2022-08-12 PROCEDURE — 6360000002 HC RX W HCPCS: Performed by: NURSE ANESTHETIST, CERTIFIED REGISTERED

## 2022-08-12 PROCEDURE — 6370000000 HC RX 637 (ALT 250 FOR IP): Performed by: INTERNAL MEDICINE

## 2022-08-12 PROCEDURE — 2580000003 HC RX 258: Performed by: NURSE ANESTHETIST, CERTIFIED REGISTERED

## 2022-08-12 PROCEDURE — 2580000003 HC RX 258: Performed by: ANESTHESIOLOGY

## 2022-08-12 PROCEDURE — 36415 COLL VENOUS BLD VENIPUNCTURE: CPT

## 2022-08-12 RX ORDER — SODIUM CHLORIDE 9 MG/ML
INJECTION, SOLUTION INTRAVENOUS CONTINUOUS
Status: DISCONTINUED | OUTPATIENT
Start: 2022-08-12 | End: 2022-08-12 | Stop reason: HOSPADM

## 2022-08-12 RX ORDER — LIDOCAINE HYDROCHLORIDE 10 MG/ML
5 INJECTION, SOLUTION EPIDURAL; INFILTRATION; INTRACAUDAL; PERINEURAL ONCE
Status: DISCONTINUED | OUTPATIENT
Start: 2022-08-12 | End: 2022-08-18 | Stop reason: HOSPADM

## 2022-08-12 RX ORDER — GLYCOPYRROLATE 0.2 MG/ML
INJECTION INTRAMUSCULAR; INTRAVENOUS PRN
Status: DISCONTINUED | OUTPATIENT
Start: 2022-08-12 | End: 2022-08-12 | Stop reason: SDUPTHER

## 2022-08-12 RX ORDER — SODIUM CHLORIDE 0.9 % (FLUSH) 0.9 %
5-40 SYRINGE (ML) INJECTION EVERY 12 HOURS SCHEDULED
Status: DISCONTINUED | OUTPATIENT
Start: 2022-08-12 | End: 2022-08-18 | Stop reason: HOSPADM

## 2022-08-12 RX ORDER — LIDOCAINE HYDROCHLORIDE 20 MG/ML
INJECTION, SOLUTION EPIDURAL; INFILTRATION; INTRACAUDAL; PERINEURAL PRN
Status: DISCONTINUED | OUTPATIENT
Start: 2022-08-12 | End: 2022-08-12 | Stop reason: SDUPTHER

## 2022-08-12 RX ORDER — PROPOFOL 10 MG/ML
INJECTION, EMULSION INTRAVENOUS PRN
Status: DISCONTINUED | OUTPATIENT
Start: 2022-08-12 | End: 2022-08-12 | Stop reason: SDUPTHER

## 2022-08-12 RX ORDER — SODIUM CHLORIDE 0.9 % (FLUSH) 0.9 %
5-40 SYRINGE (ML) INJECTION PRN
Status: DISCONTINUED | OUTPATIENT
Start: 2022-08-12 | End: 2022-08-18 | Stop reason: HOSPADM

## 2022-08-12 RX ORDER — SODIUM CHLORIDE 0.9 % (FLUSH) 0.9 %
5-40 SYRINGE (ML) INJECTION EVERY 12 HOURS SCHEDULED
Status: DISCONTINUED | OUTPATIENT
Start: 2022-08-12 | End: 2022-08-12 | Stop reason: HOSPADM

## 2022-08-12 RX ORDER — SODIUM CHLORIDE 9 MG/ML
INJECTION, SOLUTION INTRAVENOUS PRN
Status: DISCONTINUED | OUTPATIENT
Start: 2022-08-12 | End: 2022-08-12 | Stop reason: HOSPADM

## 2022-08-12 RX ORDER — SODIUM CHLORIDE 0.9 % (FLUSH) 0.9 %
5-40 SYRINGE (ML) INJECTION PRN
Status: DISCONTINUED | OUTPATIENT
Start: 2022-08-12 | End: 2022-08-12 | Stop reason: HOSPADM

## 2022-08-12 RX ORDER — SODIUM CHLORIDE 9 MG/ML
INJECTION, SOLUTION INTRAVENOUS CONTINUOUS PRN
Status: DISCONTINUED | OUTPATIENT
Start: 2022-08-12 | End: 2022-08-12 | Stop reason: SDUPTHER

## 2022-08-12 RX ORDER — EPHEDRINE SULFATE 50 MG/ML
INJECTION INTRAVENOUS PRN
Status: DISCONTINUED | OUTPATIENT
Start: 2022-08-12 | End: 2022-08-12 | Stop reason: SDUPTHER

## 2022-08-12 RX ORDER — SODIUM CHLORIDE 9 MG/ML
25 INJECTION, SOLUTION INTRAVENOUS PRN
Status: DISCONTINUED | OUTPATIENT
Start: 2022-08-12 | End: 2022-08-18 | Stop reason: HOSPADM

## 2022-08-12 RX ADMIN — LIDOCAINE HYDROCHLORIDE 60 MG: 20 INJECTION, SOLUTION EPIDURAL; INFILTRATION; INTRACAUDAL; PERINEURAL at 14:56

## 2022-08-12 RX ADMIN — SODIUM CHLORIDE: 9 INJECTION, SOLUTION INTRAVENOUS at 14:53

## 2022-08-12 RX ADMIN — Medication 10 ML: at 09:45

## 2022-08-12 RX ADMIN — ACETAMINOPHEN 650 MG: 325 TABLET, FILM COATED ORAL at 21:30

## 2022-08-12 RX ADMIN — ACETAMINOPHEN 650 MG: 325 TABLET, FILM COATED ORAL at 11:39

## 2022-08-12 RX ADMIN — EPHEDRINE SULFATE 20 MG: 50 INJECTION INTRAVENOUS at 15:11

## 2022-08-12 RX ADMIN — FLECAINIDE ACETATE 100 MG: 100 TABLET ORAL at 09:44

## 2022-08-12 RX ADMIN — ESCITALOPRAM OXALATE 10 MG: 10 TABLET ORAL at 21:37

## 2022-08-12 RX ADMIN — CYANOCOBALAMIN TAB 1000 MCG 2500 MCG: 1000 TAB at 21:29

## 2022-08-12 RX ADMIN — METOPROLOL SUCCINATE 25 MG: 25 TABLET, EXTENDED RELEASE ORAL at 09:44

## 2022-08-12 RX ADMIN — SODIUM CHLORIDE, PRESERVATIVE FREE 10 ML: 5 INJECTION INTRAVENOUS at 22:56

## 2022-08-12 RX ADMIN — GLYCOPYRROLATE 0.2 MG: 0.2 INJECTION, SOLUTION INTRAMUSCULAR; INTRAVENOUS at 14:56

## 2022-08-12 RX ADMIN — Medication 1 TABLET: at 21:30

## 2022-08-12 RX ADMIN — FLECAINIDE ACETATE 100 MG: 100 TABLET ORAL at 21:29

## 2022-08-12 RX ADMIN — ALPRAZOLAM 0.5 MG: 0.5 TABLET ORAL at 21:35

## 2022-08-12 RX ADMIN — EPHEDRINE SULFATE 20 MG: 50 INJECTION INTRAVENOUS at 15:10

## 2022-08-12 RX ADMIN — Medication: at 09:47

## 2022-08-12 RX ADMIN — PANTOPRAZOLE SODIUM 40 MG: 40 TABLET, DELAYED RELEASE ORAL at 21:37

## 2022-08-12 RX ADMIN — SODIUM CHLORIDE, PRESERVATIVE FREE 10 ML: 5 INJECTION INTRAVENOUS at 22:57

## 2022-08-12 RX ADMIN — PROPOFOL 100 MG: 10 INJECTION, EMULSION INTRAVENOUS at 14:56

## 2022-08-12 RX ADMIN — PROPOFOL 30 MG: 10 INJECTION, EMULSION INTRAVENOUS at 15:03

## 2022-08-12 RX ADMIN — SODIUM CHLORIDE: 9 INJECTION, SOLUTION INTRAVENOUS at 10:14

## 2022-08-12 ASSESSMENT — PAIN SCALES - GENERAL
PAINLEVEL_OUTOF10: 6
PAINLEVEL_OUTOF10: 6

## 2022-08-12 ASSESSMENT — PAIN - FUNCTIONAL ASSESSMENT
PAIN_FUNCTIONAL_ASSESSMENT: 0-10
PAIN_FUNCTIONAL_ASSESSMENT: ACTIVITIES ARE NOT PREVENTED

## 2022-08-12 ASSESSMENT — PAIN DESCRIPTION - ORIENTATION: ORIENTATION: MID

## 2022-08-12 ASSESSMENT — PAIN DESCRIPTION - LOCATION
LOCATION: HEAD
LOCATION: HEAD

## 2022-08-12 ASSESSMENT — PAIN DESCRIPTION - DESCRIPTORS
DESCRIPTORS: ACHING
DESCRIPTORS: ACHING

## 2022-08-12 ASSESSMENT — LIFESTYLE VARIABLES: SMOKING_STATUS: 0

## 2022-08-12 NOTE — PROGRESS NOTES
Pt without IV access. Ultrasound IV attempted by two nurses without success. Order for midline placed. Call placed for midline access.     Electronically signed by Tomas Walker RN on 8/12/22 at 1:12 PM EDT

## 2022-08-12 NOTE — H&P
[facility]   Pre-operative History and Physical    Patient: Kiley Hernandez  : 1945  Acct#:         HISTORY OF PRESENT ILLNESS:    The patient is a 65 y.o. female  who presents with rectal bleeding  Past Medical History:        Diagnosis Date    Acid reflux     Anxiety     Arthritis     Atrial fibrillation ([facility]     Closed fracture dislocation of right elbow     Depression     Fibromyalgia     Migraine     WHITNEY (obstructive sleep apnea)     Diagnosed years ago, no CPAP    Rectal cancer ([facility]     Wears glasses     DRIVING     Past Surgical History:        Procedure Laterality Date    APPENDECTOMY      CERVICAL DISCECTOMY      ACDF     SECTION      x4    CHOLECYSTECTOMY      COLON SURGERY      Rectal cancer removed using  incision    COLONOSCOPY      SEVERAL    COLONOSCOPY N/A 2019    COLONOSCOPY POLYPECTOMY SNARE/COLD BIOPSY performed by Oh Bailey MD at [address] [facility] N/A 2021    COLONOSCOPY WITH BIOPSY performed by Manny Mahoney MD at [address] [place] [address] Drive Left 8/3/2022    OPEN REDUCTION INTERNAL FIXATION LEFT DISTAL RADIUS performed by Christie Garcia MD at [address] [facility] Right 2021    RIGHT LEG EVACUATION OF HEMATOMA performed by Dipika Edgar MD at [address] [facility] Right 10/25/2018    OPEN REDUCTION INTERNAL FIXATION RIGHT FEMUR SUPRACONDYLAR FEMORAL FRACTURE WITH C-ARM performed by Jamarcus Jon MD at [place] [address] Right 2021    RIGHT LEG HEMATOMA EVAKUATION, [address], AND WOUND VAC PLACEMENT performed by Chandana Gilliam MD at [place] [address] Right 2021    RIGHT LOWER EXTREMITY SKIN GRAFT performed by Chandana Gilliam MD at [address] [place] [address] Av Left     UPPER GASTROINTESTINAL ENDOSCOPY N/A 2019    ESOPHAGOGASTRODUODENOSCOPY performed by Oh Bailey MD at [place] [address] GASTROINTESTINAL ENDOSCOPY N/A 8/18/2020    EGD BIOPSY performed by Dayan Ramirez MD at [facility]     Medications prior to admission:   Prior to Admission medications    Medication Sig Start Date End Date Taking? Authorizing Provider   HYDROcodone-acetaminophen (NORCO) 5-325 MG per tablet Take 1 tablet by mouth every 6 hours as needed for Pain. Yes Historical Provider, MD   ALPRAZolam Kumar Roller) 0.5 MG tablet Take 0.5 mg by mouth every 8 hours as needed for Anxiety. Yes Historical Provider, MD   acetaminophen (TYLENOL) 325 MG tablet Take 650 mg by mouth every 6 hours as needed for Pain   Yes Historical Provider, MD   bisacodyl (DULCOLAX) 10 MG suppository Place 10 mg rectally daily as needed for Constipation   Yes Historical Provider, MD   potassium chloride (KLOR-CON M) 20 MEQ extended release tablet TAKE 1 TABLET DAILY 8/5/22   Mc Dutta MD   polyethylene glycol (GLYCOLAX) 17 g packet Take 17 g by mouth daily as needed for Constipation 8/4/22 9/3/22  Daniel Nino MD   docusate sodium (COLACE, DULCOLAX) 100 MG CAPS Take 100 mg by mouth 2 times daily as needed for Constipation 8/4/22   Daniel Nino MD   metoprolol succinate (TOPROL XL) 25 MG extended release tablet Take 1 tablet by mouth in the morning. 8/5/22   Daniel Nino MD   furosemide (LASIX) 20 MG tablet Take 1 tablet by mouth in the morning. With an additional 20 mg on MWF. 8/4/22   Daniel Nino MD   apixaban (ELIQUIS) 5 MG TABS tablet Take 1 tablet by mouth in the morning and 1 tablet before bedtime.  7/27/22   Gilson Ruffin MD   escitalopram (LEXAPRO) 10 MG tablet Take 1 tablet by mouth daily 6/14/22   Mc Dutta MD   tiZANidine (ZANAFLEX) 2 MG tablet Take 1 tablet by mouth every 6 hours as needed (muscle pain) 2/18/22   Mc Dutta MD   pantoprazole (PROTONIX) 40 MG tablet TAKE ONE TABLET BY MOUTH TWICE A DAY 2/18/22   Mc Dutta MD   dicyclomine (BENTYL) 10 MG capsule Take 1 capsule by mouth 4 times daily as needed (cramping/diarrhea) 2/18/22   Deep Armas MD   flecainide (TAMBOCOR) 100 MG tablet TAKE 1 TABLET BY MOUTH 2 TIMES A DAY 1/27/22   Carlos Alberto Boo MD   Cyanocobalamin (VITAMIN B 12 PO) Take 2,500 mg by mouth daily    Historical Provider, MD   calcium carbonate-vitamin D 600-200 MG-UNIT TABS Take 1 tablet by mouth 2 times daily    Historical Provider, MD     Allergies:    Demerol hcl [meperidine], Pcn [penicillins], and Adhesive tape  Social History:   Social History     Socioeconomic History    Marital status:      Spouse name: Not on file    Number of children: 4    Years of education: Not on file    Highest education level: High school graduate   Occupational History    Not on file   Tobacco Use    Smoking status: Never    Smokeless tobacco: Never   Vaping Use    Vaping Use: Never used   Substance and Sexual Activity    Alcohol use: No    Drug use: Never    Sexual activity: Not Currently     Partners: Male   Other Topics Concern    Not on file   Social History Narrative    Lives with son     Social Determinants of Health     Financial Resource Strain: Not on file   Food Insecurity: Not on file   Transportation Needs: Not on file   Physical Activity: Not on file   Stress: Not on file   Social Connections: Not on file   Intimate Partner Violence: Not on file   Housing Stability: Not on file           Family History:   Family History   Problem Relation Age of Onset    COPD Mother          PHYSICAL EXAM:      BP (!) 155/61   Pulse 65   Temp 97 °F (36.1 °C) (Temporal)   Resp 18   Ht 5' 2\" (1.575 m)   Wt 160 lb 9.6 oz (72.8 kg)   SpO2 93%   BMI 29.37 kg/m²  I        Heart: Normal    Lungs: Normal    Abdomen: Normal      ASA Grade: ASA 3 - Patient with moderate systemic disease with functional limitations    III (soft palate, base of uvula visible)  ASSESSMENT AND PLAN:    1. Patient is a 65 y.o. female here for Colonoscopy  2.   Procedure options, risks and benefits reviewed with patient who expresses understanding.

## 2022-08-12 NOTE — PROGRESS NOTES
Assessed PT for midline placement, did not find suitable veins for midline placed in bilateral UE, gave handoff to Endo team,   anesthesia provider to evaluate access   options at this time

## 2022-08-12 NOTE — BRIEF OP NOTE
Brief Postoperative Note    Johnson Cueva  YOB: 1945  [id number]      Pre-operative Diagnosis: Rectal bleeding    Previous Colonoscopy: No  When: unknown  Greater than 3 years? No      Post-operative Diagnosis: Same    Procedure: Colonoscopy    The preparation was poor.     Anesthesia: [facility]    Surgeons/Assistants: Bry Gay MD    Estimated Blood Loss: None    Complications: None    Specimens: Was Not Obtained    Findings: See dictated report    Electronically signed by Bry Gay MD on 7/10/2017 at 7:45 AM

## 2022-08-12 NOTE — PROGRESS NOTES
Hospitalist Progress Note      PCP: Livan George MD    Date of Admission: 8/10/2022    Chief Complaint:   [id number] [facility] [address] Course: The patient is a 65 y.o. female with PMHx: A. fib, gastric reflux, closed left wrist fracture, fibromyalgia, migraine, WHITNEY, rectal cancer, gastric bypass, type II DM, macrocytic anemia, anxiety, depression. Lives at [place], Anticoagulated with Eliquis   Had GI bleed w/ stercoral proctitis, A. fib with RVR. and required transfusion during hospital course. Was also seen by GI during hospital course but there was no EGD or colonoscopy performed. She was admitted for a GI bleed with melena for 2 days and bright red blood per rectum with symptoms of nausea but no vomiting, generalized weakness and crampy abdominal pain,. She was evaluated in the ED and found to have an hemoglobin of 10.   She is being admitted for further work-up      Subjective:   No new symptoms to complain of      Medications:  Reviewed    Infusion Medications    sodium chloride 125 mL/hr at 08/12/22 1014    sodium chloride      sodium chloride      dextrose       Scheduled Medications    sodium chloride flush  5-40 mL IntraVENous 2 times per day    collagenase   Topical Daily    sodium chloride flush  5-40 mL IntraVENous 2 times per day    [Held by provider] apixaban  5 mg Oral BID    calcium-cholecalciferol  1 tablet Oral BID    vitamin B-12  2,500 mcg Oral Nightly    escitalopram  10 mg Oral Nightly    flecainide  100 mg Oral 2 times per day    furosemide  20 mg Oral Daily    metoprolol succinate  25 mg Oral Daily    pantoprazole  40 mg Oral BID     PRN Meds: sodium chloride flush, sodium chloride, tiZANidine, sodium chloride flush, sodium chloride, ondansetron **OR** ondansetron, acetaminophen **OR** acetaminophen, glucose, dextrose bolus **OR** dextrose bolus, glucagon (rDNA), dextrose, ALPRAZolam, dicyclomine      Intake/Output Summary (Last 24 hours) at 8/12/2022 1110  Last data filed at 8/12/2022 1713  Gross per 24 hour   Intake --   Output 1300 ml   Net -1300 ml         Physical Exam Performed:    /61   Pulse 65   Temp 97.4 °F (36.3 °C) (Oral)   Resp 18   Ht 5' 2\" (1.575 m)   Wt 160 lb 9.6 oz (72.8 kg)   SpO2 95%   BMI 29.37 kg/m²     General appearance: No apparent distress, appears stated age and cooperative. HEENT: Pupils equal, round, and reactive to light. Conjunctivae/corneas clear. Neck: Supple, with full range of motion. No jugular venous distention. Trachea midline. Respiratory:  Normal respiratory effort. Clear to auscultation, bilaterally without Rales/Wheezes/Rhonchi. Cardiovascular: Regular rate and rhythm with normal S1/S2 without murmurs, rubs or gallops. Abdomen: Soft, non-tender, non-distended with normal bowel sounds. Musculoskeletal: No clubbing, cyanosis or edema bilaterally. Full range of motion without deformity. Skin: Skin color, texture, turgor normal.  No rashes or lesions. Neurologic:  Neurovascularly intact without any focal sensory/motor deficits. Cranial nerves: II-XII intact, grossly non-focal.  Psychiatric: Alert and oriented, thought content appropriate, normal insight  Capillary Refill: Brisk,3 seconds, normal   Peripheral Pulses: +2 palpable, equal bilaterally       Labs:   Recent Labs     08/10/22  1700 08/11/22  0207 08/11/22  1349 08/11/22  1911 08/12/22  0232   WBC 5.5  --   --   --   --    HGB 10.1*   < > 10.0* 10.4* 8.9*   HCT 30.1*   < > 29.1* 31.1* 26.5*     --   --   --   --     < > = values in this interval not displayed. Recent Labs     08/10/22  1700      K 4.3   CL 95*   CO2 35*   BUN 20   CREATININE 0.7   CALCIUM 9.0       Recent Labs     08/10/22  1700   AST 18   ALT 12   BILITOT 0.3   ALKPHOS 96       Recent Labs     08/10/22  1700 08/11/22  0621   INR 1.20* 1.10       No results for input(s): [place] in the last 72 hours.     Urinalysis:      Lab Results   Component Value Date/Time    NITRU Negative

## 2022-08-12 NOTE — ANESTHESIA POSTPROCEDURE EVALUATION
Department of Anesthesiology  Postprocedure Note    Patient: Krysta Thompson  MRN: [id number]  YOB: 1945  Date of evaluation: 8/12/2022      Procedure Summary     Date: 08/12/22 Room / Location: [address] [place] [address]    Anesthesia Start: 8013 Anesthesia Stop: 7808    Procedure: INCOMPLETE COLONOSCOPY D/T POOR PREP Diagnosis:       Lower gastrointestinal bleed      (LOWER GASTROINTESTINAL BLEED)    Surgeons: Marissa Byrne MD Responsible Provider: Moreno Lo MD    Anesthesia Type: MAC ASA Status: 3          Anesthesia Type: MAC    Becky Phase I: Becky Score: 8    Becky Phase II:        Anesthesia Post Evaluation    Patient location during evaluation: PACU  Patient participation: complete - patient participated  Level of consciousness: sleepy but conscious and awake  Airway patency: patent  Nausea & Vomiting: no nausea and no vomiting  Complications: no  Cardiovascular status: hemodynamically stable  Respiratory status: spontaneous ventilation, nonlabored ventilation and room air  Hydration status: stable  Comments: Blood pressures soft intraoperatively. Ms. Ivanna Herzog was seen resting comfortably upon arrival to PACU. Answers questions appropriately. No treatment for hypotension required. Will allow patient to become more alert before anticipated transfer to floor. No concerns at this time.

## 2022-08-12 NOTE — ANESTHESIA PRE PROCEDURE
Department of Anesthesiology  Preprocedure Note       Name:  Brianne Shipley   Age:  65 y.o.  :  1945                                          MRN:  [id number]         Date:  2022      Surgeon: Osito Bautista):  Wynema Snellen, MD    Procedure: Procedure(s):  COLONOSCOPY    Medications prior to admission:   Prior to Admission medications    Medication Sig Start Date End Date Taking? Authorizing Provider   HYDROcodone-acetaminophen (NORCO) 5-325 MG per tablet Take 1 tablet by mouth every 6 hours as needed for Pain. Yes Historical Provider, MD   ALPRAZolam Daryel Parker) 0.5 MG tablet Take 0.5 mg by mouth every 8 hours as needed for Anxiety. Yes Historical Provider, MD   acetaminophen (TYLENOL) 325 MG tablet Take 650 mg by mouth every 6 hours as needed for Pain   Yes Historical Provider, MD   bisacodyl (DULCOLAX) 10 MG suppository Place 10 mg rectally daily as needed for Constipation   Yes Historical Provider, MD   potassium chloride (KLOR-CON M) 20 MEQ extended release tablet TAKE 1 TABLET DAILY 22   Nohemy Cho MD   polyethylene glycol (GLYCOLAX) 17 g packet Take 17 g by mouth daily as needed for Constipation 8/4/22 9/3/22  Leisa De MD   docusate sodium (COLACE, DULCOLAX) 100 MG CAPS Take 100 mg by mouth 2 times daily as needed for Constipation 22   Leisa De MD   metoprolol succinate (TOPROL XL) 25 MG extended release tablet Take 1 tablet by mouth in the morning. 22   Leisa De MD   furosemide (LASIX) 20 MG tablet Take 1 tablet by mouth in the morning. With an additional 20 mg on MWF. 22   Leisa De MD   apixaban (ELIQUIS) 5 MG TABS tablet Take 1 tablet by mouth in the morning and 1 tablet before bedtime.  22   Pierce Mccoy MD   escitalopram (LEXAPRO) 10 MG tablet Take 1 tablet by mouth daily 22   Nohemy Cho MD   tiZANidine (ZANAFLEX) 2 MG tablet Take 1 tablet by mouth every 6 hours as needed (muscle pain) 22   Nohemy Cho MD pantoprazole (PROTONIX) 40 MG tablet TAKE ONE TABLET BY MOUTH TWICE A DAY 2/18/22   Brooklyn Mcmullen MD   dicyclomine (BENTYL) 10 MG capsule Take 1 capsule by mouth 4 times daily as needed (cramping/diarrhea) 2/18/22   Brooklyn Mcmullen MD   flecainide (TAMBOCOR) 100 MG tablet TAKE 1 TABLET BY MOUTH 2 TIMES A DAY 1/27/22   Tray Escudero MD   Cyanocobalamin (VITAMIN B 12 PO) Take 2,500 mg by mouth daily    Historical Provider, MD   calcium carbonate-vitamin D 600-200 MG-UNIT TABS Take 1 tablet by mouth 2 times daily    Historical Provider, MD       Current medications:    Current Facility-Administered Medications   Medication Dose Route Frequency Provider Last Rate Last Admin    0.9 % sodium chloride infusion   IntraVENous Continuous Alon Barcenas  mL/hr at 08/12/22 1014 New Bag at 08/12/22 1014    sodium chloride flush 0.9 % injection 5-40 mL  5-40 mL IntraVENous 2 times per day Alon Barcenas MD   10 mL at 08/12/22 0945    sodium chloride flush 0.9 % injection 5-40 mL  5-40 mL IntraVENous PRN Alon Barcenas MD        0.9 % sodium chloride infusion   IntraVENous PRN Alon Barcenas MD        lidocaine PF 1 % injection 5 mL  5 mL IntraDERmal Once Ar Mancuso MD        sodium chloride flush 0.9 % injection 5-40 mL  5-40 mL IntraVENous 2 times per day Ar Mancuso MD        sodium chloride flush 0.9 % injection 5-40 mL  5-40 mL IntraVENous PRN Ar Mancuso MD        0.9 % sodium chloride infusion  25 mL IntraVENous PRN Ar Mancuso MD        collagenase ointment   Topical Daily Ar Mancuso MD   Given at 08/12/22 0947    tiZANidine (ZANAFLEX) tablet 2 mg  2 mg Oral Q6H PRN Ar Mancuso MD   2 mg at 08/11/22 2033    sodium chloride flush 0.9 % injection 5-40 mL  5-40 mL IntraVENous 2 times per day MUSA Mccarthy - CNP   10 mL at 08/11/22 1009    sodium chloride flush 0.9 % injection 5-40 mL  5-40 mL IntraVENous PRN Lynn oRdriguez APRN - CNP        0.9 % sodium chloride infusion   IntraVENous PRN Lynn S Puthoff, APRN - CNP        ondansetron (ZOFRAN-ODT) disintegrating tablet 4 mg  4 mg Oral Q8H PRN Lynn S Puthoff, APRN - CNP        Or    ondansetron (ZOFRAN) injection 4 mg  4 mg IntraVENous Q6H PRN Lynn S Puthoff, APRN - CNP        acetaminophen (TYLENOL) tablet 650 mg  650 mg Oral Q6H PRN Reggie Dewey APRN - CNP   650 mg at 08/12/22 1139    Or    acetaminophen (TYLENOL) suppository 650 mg  650 mg Rectal Q6H PRN Lynn S Puthoff, APRN - CNP        glucose chewable tablet 16 g  4 tablet Oral PRN Lynn S Puthoff, APRN - CNP        dextrose bolus 10% 125 mL  125 mL IntraVENous PRN Lynn S Puthoff, APRN - CNP        Or    dextrose bolus 10% 250 mL  250 mL IntraVENous PRN Lynn S Puthoff, APRN - CNP        glucagon (rDNA) injection 1 mg  1 mg SubCUTAneous PRN Lynn S Puthoff, APRN - CNP        dextrose 10 % infusion   IntraVENous Continuous PRN Lynn AYALA Putdarlynff, APRN - CNP        ALPRAZolam (XANAX) tablet 0.5 mg  0.5 mg Oral Q8H PRN Reggie Dewey APRN - CNP   0.5 mg at 08/11/22 2033    [Held by provider] apixaban (ELIQUIS) tablet 5 mg  5 mg Oral BID Lynn S Puthoff, APRN - CNP        calcium-cholecalciferol 500-200 MG-UNIT per tablet 1 tablet  1 tablet Oral BID Lynn S Puthoff, APRN - CNP   1 tablet at 08/11/22 2033    vitamin B-12 (CYANOCOBALAMIN) tablet 2,500 mcg  2,500 mcg Oral Nightly Lynn S Puthoff, APRN - CNP   2,500 mcg at 08/11/22 2033    dicyclomine (BENTYL) capsule 10 mg  10 mg Oral 4x Daily PRN Lynn S Puthoff, APRN - CNP        escitalopram (LEXAPRO) tablet 10 mg  10 mg Oral Nightly Lynn S Puthoff, APRN - CNP   10 mg at 08/11/22 2033    flecainide (TAMBOCOR) tablet 100 mg  100 mg Oral 2 times per day MUSA Schmid CNP   100 mg at 08/12/22 0944    furosemide (LASIX) tablet 20 mg  20 mg Oral Daily MUSA Ward CNP   20 mg at 08/11/22 1009    metoprolol succinate (TOPROL XL) extended release tablet 25 mg  25 mg Oral Daily Lynn Rodriguez, APRN - CNP   25 mg at 08/12/22 0944    pantoprazole (PROTONIX) tablet 40 mg  40 mg Oral BID Jarocho Rodriguez, APRN - CNP   40 mg at 08/11/22 2033       Allergies: Allergies   Allergen Reactions    Demerol Hcl [Meperidine] Other (See Comments)     PATIENT STATES SHE GOT VERY ANXIOUS-LIKE SHE WAS CLIMBING THE WALLS    Pcn [Penicillins]      Patient reports she has not had since she was a child, thinks reaction was hives.      Adhesive Tape Rash       Problem List:    Patient Active Problem List   Diagnosis Code    Symptomatic anemia D64.9    A-fib ([facility]) I48.91    Aortic valve disorder I35.9    Arthropathy M12.9    Cervical spondylosis without myelopathy M47.812    DDD (degenerative disc disease), cervical M50.30    DDD (degenerative disc disease), lumbosacral M51.37    Essential hypertension I10    Malignant neoplasm of colon ([facility] [address] C18.9    Spinal stenosis in cervical region M48.02    Spinal stenosis, lumbar M48.061    Fibromyalgia M79.7    Gastroesophageal reflux disease without esophagitis K21.9    Lumbar radicular pain M54.16    Pedal edema R60.0    Chronic intractable headache R51.9, G89.29    Esophageal candidiasis ([facility]) B37.81    History of gastric bypass Z98.84    Prediabetes R73.03    Age-related osteoporosis without current pathological fracture M81.0    Essential tremor G25.0    Chronic fatigue R53.82    Irritable bowel syndrome with diarrhea K58.0    Borborygmi R19.8    Constipation K59.00    Anxiety F41.9    Weakness R53.1    Age-related physical debility R48    JENNIFER (generalized anxiety disorder) F41.1    Shock ([facility]) R57.9    Atrial fibrillation with rapid ventricular response ([facility]) I48.91    DM2 (diabetes mellitus, type 2) ([facility]) E11.9    GI bleed K92.2    MAX (acute kidney injury) ([facility] [address] N17.9    Community acquired pneumonia of left lower lobe of lung J18.9    Hypoxia R09.02 performed by Kaushik Hoskins MD at [facility] History:    Social History     Tobacco Use    Smoking status: Never    Smokeless tobacco: Never   Substance Use Topics    Alcohol use: No                                Counseling given: Not Answered      Vital Signs (Current):   Vitals:    08/12/22 0741 08/12/22 0934 08/12/22 1345 08/12/22 1400   BP: 106/61   (!) 155/61   Pulse: 65   65   Resp: 18   18   Temp: 97.4 °F (36.3 °C)  97 °F (36.1 °C) 97 °F (36.1 °C)   TempSrc: Oral  Temporal Temporal   SpO2: 92% 95%  93%   Weight:       Height:                                                  BP Readings from Last 3 Encounters:   08/12/22 (!) 155/61   08/04/22 139/79   07/23/22 (!) 179/90       NPO Status: Time of last liquid consumption: 0944                        Time of last solid consumption: 2300                        Date of last liquid consumption: 08/12/22                        Date of last solid food consumption: 08/11/22    BMI:   Wt Readings from Last 3 Encounters:   08/12/22 160 lb 9.6 oz (72.8 kg)   08/04/22 160 lb 0.9 oz (72.6 kg)   07/23/22 166 lb 0.1 oz (75.3 kg)     Body mass index is 29.37 kg/m².     CBC:   Lab Results   Component Value Date/Time    WBC 5.5 08/10/2022 05:00 PM    RBC 2.90 08/10/2022 05:00 PM    HGB 11.0 08/12/2022 10:45 AM    HCT 35.3 08/12/2022 10:45 AM    .8 08/10/2022 05:00 PM    RDW 15.7 08/10/2022 05:00 PM     08/10/2022 05:00 PM       CMP:   Lab Results   Component Value Date/Time     08/10/2022 05:00 PM    K 4.3 08/10/2022 05:00 PM    CL 95 08/10/2022 05:00 PM    CO2 35 08/10/2022 05:00 PM    BUN 20 08/10/2022 05:00 PM    CREATININE 0.7 08/10/2022 05:00 PM    GFRAA >60 08/10/2022 05:00 PM    AGRATIO 1.0 08/10/2022 05:00 PM    LABGLOM >60 08/10/2022 05:00 PM    GLUCOSE 87 08/10/2022 05:00 PM    PROT 5.4 08/10/2022 05:00 PM    CALCIUM 9.0 08/10/2022 05:00 PM    BILITOT 0.3 08/10/2022 05:00 PM    ALKPHOS 96 08/10/2022 05:00 PM    AST 18 08/10/2022 05:00 PM    ALT 12 08/10/2022 05:00 PM       POC Tests:   Recent Labs     08/12/22  1138   POCGLU 119*       Coags:   Lab Results   Component Value Date/Time    PROTIME 14.1 08/11/2022 06:21 AM    INR 1.10 08/11/2022 06:21 AM    APTT 25.4 08/11/2022 06:21 AM       HCG (If Applicable): No results found for: PREGTESTUR, PREGSERUM, HCG, HCGQUANT     ABGs: No results found for: PHART, PO2ART, EPX8LNT, CDW9HFU, BEART, X6HGIEDK     Type & Screen (If Applicable):  No results found for: LABABO, LABRH    Drug/Infectious Status (If Applicable):  No results found for: HIV, HEPCAB    COVID-19 Screening (If Applicable):   Lab Results   Component Value Date/Time    COVID19 Not Detected 08/04/2022 02:30 PM    COVID19 Not Detected 08/12/2020 12:39 PM           Anesthesia Evaluation   no history of anesthetic complications:   Airway: Mallampati: III  TM distance: >3 FB     Mouth opening: > = 3 FB   Dental:    (+) poor dentition  Comment: Fair dentition. Ms. Shaq Valdes denies loose teeth    Pulmonary:   (+) pneumonia (current admission, continues to improve):  sleep apnea:  decreased breath sounds     (-) COPD, asthma, wheezes, rales and not a current smoker                           Cardiovascular:  Exercise tolerance: poor (<4 METS), Wheelchair prior to admission  (+) hypertension:, dysrhythmias (Afib with RVR, current admission): atrial fibrillation, CEDENO:, peripheral edema (pneumatic compression device in situ), pulmonary hypertension:, hyperlipidemia    (-) orthopnea        Rate: normal           Beta Blocker:  Dose within 24 Hrs (metoprolol)      ROS comment: EKG:  Atrial fibrillation with rapid ventricular response with premature ventricular or aberrantly conducted complexes   Left axis deviation   Nonspecific ST abnormality    Echocardiogram:   Summary:  Left ventricular cavity size is normal. There is mild concentric left ventricular hypertrophy. Left ventricular function is hyperdynamic with ejection fraction estimated at >70%. Regional wall motion abnormalities cannot be excluded due to an arrhythmia. . Diastolic function cannot be assessed due to an arrhythmia. The left atrium is severely dilated. Aortic valve appears sclerotic but opens adequately. The aortic valve appears trileaflet. Mild-to-moderate aortic regurgitation is present. No evidence of aortic valve stenosis. The right ventricle is normal in size and function. TAPSE measures 1.71 cm. RV S velocity measures 16.2 cm/s. No pericardial effusion noted. IVC size is dilated (>2.1 cm) and collapses < 50% with respiration consistent with elevated RA pressure (15 mmHg). Estimated pulmonary artery systolic pressure is at 52 mmHg assuming a right atrial pressure of 15 mmHg. Neuro/Psych:   (+) headaches: migraine headaches, depression/anxiety    (-) seizures, TIA, CVA and psychiatric history            ROS comment: Chronic pain: fibromyalgia  Chronic neck and back pain s/p ACDF GI/Hepatic/Renal:   (+) GERD:,      (-) liver disease, no renal disease and no morbid obesity (BMI: 28.8 s/p gastric bypass)       Endo/Other:    (+) DiabetesType II DM, , blood dyscrasia (Eliquis, macrocytic anemia (9.6/28.8)): anticoagulation therapy and anemia:., electrolyte abnormalities, . ROS comment: Generalized weakness prior to admission Abdominal:         (-) obese Abdomen: soft. Vascular:           ROS comment: LE Traumatic Hematoma s/p Skin Graft 2/17/2022. Other Findings: Left upper extremity wrapped / splinted. Appears deconditioned. Very difficult IV access. Seen by PICC team, unable to place midline. Arrived to preop without IV access. Small superficial vein cannulated with ultrasound in right upper arm. Anesthesia Plan      MAC     ASA 3     (NPO appropriate. Denies active nausea / reflux.)  Induction: intravenous. Anesthetic plan and risks discussed with patient. Plan discussed with CRNA.             This pre-anesthesia assessment may be used as a history and physical.    DOS STAFF ADDENDUM:    Pt seen and examined, chart reviewed (including anesthesia, drug and allergy history). No interval changes to history and physical examination. Anesthetic plan, risks, benefits, alternatives, and personnel involved discussed with patient. Patient verbalized an understanding and agrees to proceed.       Anita Muro MD  August 12, 2022  2:34 PM

## 2022-08-12 NOTE — PLAN OF CARE
Problem: Discharge Planning  Goal: Discharge to home or other facility with appropriate resources  8/12/2022 0021 by Venus Pickard RN  Outcome: Progressing  8/11/2022 1203 by Tomas Walker RN  Outcome: Progressing     Problem: Pain  Goal: Verbalizes/displays adequate comfort level or baseline comfort level  8/12/2022 0021 by Venus Pickard RN  Outcome: Progressing  8/11/2022 1203 by Tomas Walker RN  Outcome: Progressing     Problem: Skin/Tissue Integrity - Adult  Goal: Skin integrity remains intact  8/12/2022 0021 by Venus Pickard RN  Outcome: Progressing  8/11/2022 1203 by Tomas Walker RN  Outcome: Progressing  Goal: Incisions, wounds, or drain sites healing without S/S of infection  8/12/2022 0021 by Venus Pickard RN  Outcome: Progressing  8/11/2022 1203 by Tomas Walker RN  Outcome: Progressing     Problem: Musculoskeletal - Adult  Goal: Return mobility to safest level of function  8/12/2022 0021 by Venus Pickard RN  Outcome: Progressing  8/11/2022 1203 by Tomas Walker RN  Outcome: Progressing  Goal: Maintain proper alignment of affected body part  8/12/2022 0021 by Venus Pickard RN  Outcome: Progressing  8/11/2022 1203 by Tomas Walker RN  Outcome: Progressing  Goal: Return ADL status to a safe level of function  8/12/2022 0021 by Venus Pickard RN  Outcome: Progressing  8/11/2022 1203 by Tomas Walker RN  Outcome: Progressing     Problem: Gastrointestinal - Adult  Goal: Maintains or returns to baseline bowel function  8/12/2022 0021 by Venus Pickard RN  Outcome: Progressing  8/11/2022 1203 by Tomas Walker RN  Outcome: Progressing  Goal: Maintains adequate nutritional intake  8/12/2022 0021 by Venus Pickard RN  Outcome: Progressing  8/11/2022 1203 by Tomas Walker RN  Outcome: Progressing     Problem: Genitourinary - Adult  Goal: Urinary catheter remains patent  8/12/2022 0021 by Venus Pickard RN  Outcome: Progressing  8/11/2022 1203 by Tomas Walker RN  Outcome: Progressing     Problem: Chronic Conditions and Co-morbidities  Goal: Patient's chronic conditions and co-morbidity symptoms are monitored and maintained or improved  8/12/2022 0021 by Og Gray RN  Outcome: Progressing  8/11/2022 1203 by Jose Gil RN  Outcome: Progressing  Flowsheets (Taken 8/10/2022 2252 by Mayco Santana RN)  Care Plan - Patient's Chronic Conditions and Co-Morbidity Symptoms are Monitored and Maintained or Improved:   Monitor and assess patient's chronic conditions and comorbid symptoms for stability, deterioration, or improvement   Collaborate with multidisciplinary team to address chronic and comorbid conditions and prevent exacerbation or deterioration     Problem: Nutrition Deficit:  Goal: Optimize nutritional status  8/12/2022 0021 by Og Gray RN  Outcome: Progressing  8/11/2022 1203 by Jose Gil RN  Outcome: Progressing     Problem: Skin/Tissue Integrity  Goal: Absence of new skin breakdown  Description: 1. Monitor for areas of redness and/or skin breakdown  2. Assess vascular access sites hourly  3. Every 4-6 hours minimum:  Change oxygen saturation probe site  4. Every 4-6 hours:  If on nasal continuous positive airway pressure, respiratory therapy assess nares and determine need for appliance change or resting period.   Outcome: Progressing     Problem: Safety - Adult  Goal: Free from fall injury  Outcome: Progressing     Problem: ABCDS Injury Assessment  Goal: Absence of physical injury  Outcome: Progressing

## 2022-08-12 NOTE — PROGRESS NOTES
To pacu from Endo. Pt awake, denies pain. Abd soft. IV infusing. Monitor in sinus tachycardia with IVCD.

## 2022-08-12 NOTE — PROGRESS NOTES
[facility]    Colonoscopy - failed  Large stool burden/impaction in the rectum - manually disimpacted; the stool was extremely soft  Examination of the rectum revealed formed stool throughout the proximal rectum and beyond; the procedure was aborted  There was no blood seen, only brown stool  I suspect that the \"rectal bleeding\" may be coming from the patient's huge, deep sacral decubitus  I doubt that any further attempts at colonoscopy will be successful  I would only proceed further if there is clear-cut rectal bleeding with significant decrease in Hgb and/or hemodynamic compromise  This would necessitate at least a 2-day prep and even then, the patient may not be able to tolerate that

## 2022-08-12 NOTE — OP NOTE
Operative Note      Patient: Susan Hurt  YOB: 1945  MRN: [id number]    Date of Procedure: 8/12/2022    Pre-Op Diagnosis: LOWER GASTROINTESTINAL BLEED    Post-Op Diagnosis: Same       Procedure(s):  INCOMPLETE COLONOSCOPY D/T POOR PREP    Surgeon(s):  Oziel Patel MD    Assistant:   * No surgical staff found *    Anesthesia: Monitor Anesthesia Care    Estimated Blood Loss (mL): None    Complications: None    Specimens:   * No specimens in log *    Implants:  * No implants in log *      Drains:   Negative Pressure Wound Therapy Leg Anterior; Lower;Right (Active)       Urinary Catheter (Active)   Catheter Indications Assist in healing of open sacral or perineal wounds (Stage III, IV, or unstageable documented by a provider or enterostomal therapy) and/or full thickness perineal and lower extremity burns in incontinent patients 08/11/22 2000   Site Assessment No urethral drainage 08/11/22 2000   Urine Color Rachele 08/12/22 1525   Urine Appearance Clear 08/12/22 1525   Collection Container Standard 08/11/22 2000   Securement Method Securing device (Describe) 08/11/22 2000   Catheter Care Completed Yes 08/11/22 1034   Catheter Best Practices  Drainage tube clipped to bed;Catheter secured to thigh; Bag below bladder;Bag not on floor; Lack of dependent loop in tubing;Drainage bag less than half full 08/11/22 2000   Status Patent;Draining 08/11/22 2000   Output (mL) 200 mL 08/12/22 0512       [REMOVED] Urinary Catheter (Removed)   Catheter Indications Need for fluid volume management of the critically ill patient in a critical care setting 08/02/22 0800   Site Assessment No urethral drainage 08/02/22 0800   Urine Color Yellow 08/02/22 0800   Urine Appearance Clear 08/02/22 0800   Collection Container Standard 08/02/22 0800   Securement Method Securing device (Describe) 08/02/22 0800   Catheter Care Completed Yes 07/31/22 0843   Catheter Best Practices  Catheter secured to thigh;Lack of dependent loop in tubing;Drainage bag less than half full;Bag not on floor; Bag below bladder; Tamper seal intact;Drainage tube clipped to bed 08/02/22 0800   Status Draining;Patent 08/02/22 0800   Output (mL) 5 mL 08/02/22 0800       [REMOVED] Urinary Catheter Kaplan (Removed)   $ Urethral catheter insertion $ Not inserted for procedure 08/04/22 0541   Catheter Indications Urinary retention (acute or chronic), continuous bladder irrigation or bladder outlet obstruction;Perioperative use for selected surgical procedures;Assist in healing of open sacral or perineal wounds (Stage III, IV, or unstageable documented by a provider or enterostomal therapy) and/or full thickness perineal and lower extremity burns in incontinent patients;Prolonged immobilization (e.g. unstable thoracic or lumbar spine, multiple traumatic injuries such as pelvic fractures) 08/04/22 0916   Site Assessment No urethral drainage 08/04/22 0916   Urine Color Yellow 08/04/22 0916   Urine Appearance Clear 08/04/22 0916   Urine Odor Other (Comment) 08/04/22 0916   Collection Container Standard 08/04/22 0916   Securement Method Securing device (Describe) 08/04/22 0916   Catheter Care Completed Yes 08/04/22 0541   Catheter Best Practices  Drainage tube clipped to bed;Catheter secured to thigh; Tamper seal intact; Bag below bladder;Bag not on floor; Lack of dependent loop in tubing;Drainage bag less than half full 08/04/22 0916   Status Patent;Draining 08/04/22 0916   Manual Irrigation Volume Input (mL) 0 mL 08/04/22 0916   Output (mL) 300 mL 08/04/22 0644   Discontinuation Reason Per provider order 08/04/22 0916       [REMOVED] External Urinary Catheter (Removed)   Site Assessment Clean,dry & intact 08/02/22 1200   Placement Initiated 08/02/22 1100   Perineal Care Yes 08/02/22 1100       Findings: See dictated report    Detailed Description of Procedure:   See dictated report    Electronically signed by Puneet Solano MD on 8/12/2022 at 3:29 PM

## 2022-08-12 NOTE — CARE COORDINATION
8/12 Patient will need a pre-cert to return to SNF. Chandra Cousin Dr Toi Rockwell regarding need for therapy evaluations after Colonoscopy today to apply for precert to return to SNF. Electronically signed by Dex Owen RN on 8/12/2022 at 1:43 PM

## 2022-08-13 LAB
GLUCOSE BLD-MCNC: 109 MG/DL (ref 70–99)
GLUCOSE BLD-MCNC: 112 MG/DL (ref 70–99)
GLUCOSE BLD-MCNC: 83 MG/DL (ref 70–99)
GLUCOSE BLD-MCNC: 84 MG/DL (ref 70–99)
GLUCOSE BLD-MCNC: 96 MG/DL (ref 70–99)
HCT VFR BLD CALC: 24.4 % (ref 36–48)
HEMOGLOBIN: 8.3 G/DL (ref 12–16)
PERFORMED ON: ABNORMAL
PERFORMED ON: ABNORMAL
PERFORMED ON: NORMAL

## 2022-08-13 PROCEDURE — 6370000000 HC RX 637 (ALT 250 FOR IP): Performed by: INTERNAL MEDICINE

## 2022-08-13 PROCEDURE — 36415 COLL VENOUS BLD VENIPUNCTURE: CPT

## 2022-08-13 PROCEDURE — 85018 HEMOGLOBIN: CPT

## 2022-08-13 PROCEDURE — 97530 THERAPEUTIC ACTIVITIES: CPT

## 2022-08-13 PROCEDURE — 2580000003 HC RX 258: Performed by: INTERNAL MEDICINE

## 2022-08-13 PROCEDURE — 85014 HEMATOCRIT: CPT

## 2022-08-13 PROCEDURE — 6360000002 HC RX W HCPCS: Performed by: INTERNAL MEDICINE

## 2022-08-13 PROCEDURE — 2580000003 HC RX 258: Performed by: STUDENT IN AN ORGANIZED HEALTH CARE EDUCATION/TRAINING PROGRAM

## 2022-08-13 PROCEDURE — 97162 PT EVAL MOD COMPLEX 30 MIN: CPT

## 2022-08-13 PROCEDURE — 97535 SELF CARE MNGMENT TRAINING: CPT

## 2022-08-13 PROCEDURE — 1200000000 HC SEMI PRIVATE

## 2022-08-13 PROCEDURE — 94760 N-INVAS EAR/PLS OXIMETRY 1: CPT

## 2022-08-13 PROCEDURE — 97167 OT EVAL HIGH COMPLEX 60 MIN: CPT

## 2022-08-13 RX ORDER — 0.9 % SODIUM CHLORIDE 0.9 %
1000 INTRAVENOUS SOLUTION INTRAVENOUS ONCE
Status: COMPLETED | OUTPATIENT
Start: 2022-08-13 | End: 2022-08-13

## 2022-08-13 RX ADMIN — SODIUM CHLORIDE 1000 ML: 9 INJECTION, SOLUTION INTRAVENOUS at 03:55

## 2022-08-13 RX ADMIN — CYANOCOBALAMIN TAB 1000 MCG 2500 MCG: 1000 TAB at 22:21

## 2022-08-13 RX ADMIN — PANTOPRAZOLE SODIUM 40 MG: 40 TABLET, DELAYED RELEASE ORAL at 22:21

## 2022-08-13 RX ADMIN — SODIUM CHLORIDE, PRESERVATIVE FREE 10 ML: 5 INJECTION INTRAVENOUS at 08:23

## 2022-08-13 RX ADMIN — ESCITALOPRAM OXALATE 10 MG: 10 TABLET ORAL at 22:21

## 2022-08-13 RX ADMIN — FLECAINIDE ACETATE 100 MG: 100 TABLET ORAL at 07:57

## 2022-08-13 RX ADMIN — Medication: at 08:11

## 2022-08-13 RX ADMIN — TIZANIDINE 2 MG: 4 TABLET ORAL at 01:22

## 2022-08-13 RX ADMIN — Medication 1 TABLET: at 22:18

## 2022-08-13 RX ADMIN — Medication 1 TABLET: at 08:06

## 2022-08-13 RX ADMIN — FUROSEMIDE 20 MG: 20 TABLET ORAL at 07:57

## 2022-08-13 RX ADMIN — ACETAMINOPHEN 650 MG: 325 TABLET, FILM COATED ORAL at 07:57

## 2022-08-13 RX ADMIN — PANTOPRAZOLE SODIUM 40 MG: 40 TABLET, DELAYED RELEASE ORAL at 07:58

## 2022-08-13 RX ADMIN — DICYCLOMINE HYDROCHLORIDE 10 MG: 10 CAPSULE ORAL at 15:26

## 2022-08-13 RX ADMIN — ONDANSETRON 4 MG: 2 INJECTION INTRAMUSCULAR; INTRAVENOUS at 07:57

## 2022-08-13 RX ADMIN — FLECAINIDE ACETATE 100 MG: 100 TABLET ORAL at 22:21

## 2022-08-13 RX ADMIN — ACETAMINOPHEN 650 MG: 325 TABLET, FILM COATED ORAL at 22:21

## 2022-08-13 RX ADMIN — ACETAMINOPHEN 650 MG: 325 TABLET, FILM COATED ORAL at 15:26

## 2022-08-13 RX ADMIN — DICYCLOMINE HYDROCHLORIDE 10 MG: 10 CAPSULE ORAL at 08:06

## 2022-08-13 ASSESSMENT — PAIN SCALES - GENERAL
PAINLEVEL_OUTOF10: 7
PAINLEVEL_OUTOF10: 0
PAINLEVEL_OUTOF10: 5
PAINLEVEL_OUTOF10: 0

## 2022-08-13 ASSESSMENT — PAIN DESCRIPTION - ORIENTATION
ORIENTATION: MID;LOWER
ORIENTATION: RIGHT;LEFT

## 2022-08-13 ASSESSMENT — PAIN DESCRIPTION - DESCRIPTORS
DESCRIPTORS: CRAMPING;ACHING
DESCRIPTORS: ACHING

## 2022-08-13 ASSESSMENT — PAIN DESCRIPTION - LOCATION
LOCATION: ABDOMEN
LOCATION: ABDOMEN

## 2022-08-13 NOTE — PROCEDURES
[address] [facility]                               COLONOSCOPY REPORT    PATIENT NAME: Roxana Al                   :        1945  MED REC NO:   [id number]                          ROOM:       4128  ACCOUNT NO:   [de-identified]                           ADMIT DATE: 08/10/2022  PROVIDER:     Nonnie Kocher A. Ceola Cobbs, MD      DATE OF PROCEDURE:  2022    REFERRING PROVIDERS:  Iraida Hastings MD and Irma Ennis MD    INSTRUMENT USED:  Olympus PCF-H190L. ANESTHESIA:  The patient was premedicated with Diprivan intravenously as  administered by the anesthesiology service. INDICATIONS:  The patient has presented with rectal bleeding. PROCEDURE:  The digital and anal exams were remarkable for a huge fecal  impaction. The stool was extremely soft. As much stool as possible was  manually disimpacted. Examination with the colonoscope into the rectal  vault revealed formed stool in the proximal rectum and beyond. The  procedure was aborted. The visualized rectal mucosa was normal.  There  was no blood noted in the rectum, only the brown stool. ESTIMATED BLOOD LOSS:  None. IMPRESSION:  A failed colonoscopy. There was a large fecal impaction in  the rectum which was manually disimpacted. The proximal rectum and  visualized sigmoid colon contained formed stool throughout and the  procedure was aborted. PLAN:  No blood was seen, only brown stool. I suspect that the \"rectal  bleeding\" may be coming from the patient's huge, deep sacral decubitus. I doubt that any further attempts at colonoscopy will be successful. I  would only attempt further if there would be clear-cut rectal bleeding  with a significant decrease in the hemoglobin and/or hemodynamic  compromise. This would necessitate at least a two-day prep and even  then, the patient may not be able to tolerate that. NENITA AMAYA Marty Titus MD    D: 08/12/2022 15:45:51       T: 08/12/2022 15:49:26     [id number]  Job#: [id number]     Doc#: [id number]    CC:  Carl aCrpenter MD

## 2022-08-13 NOTE — PLAN OF CARE
Problem: Discharge Planning  Goal: Discharge to home or other facility with appropriate resources  8/13/2022 0724 by Kerrie Cordova RN  Outcome: Progressing  8/13/2022 0650 by Severa Pulse, RN  Outcome: Progressing  8/12/2022 1750 by Kerrie Cordova RN  Outcome: Progressing     Problem: Pain  Goal: Verbalizes/displays adequate comfort level or baseline comfort level  8/13/2022 0724 by Kerrie Cordova RN  Outcome: Progressing  8/13/2022 0650 by Severa Pulse, RN  Outcome: Progressing  8/12/2022 1750 by Kerrie Cordova RN  Outcome: Progressing     Problem: Skin/Tissue Integrity - Adult  Goal: Skin integrity remains intact  8/13/2022 0724 by Kerrie Cordova RN  Outcome: Progressing  8/13/2022 0650 by Severa Pulse, RN  Outcome: Progressing  8/12/2022 1750 by Kerrie Cordova RN  Outcome: Progressing  Goal: Incisions, wounds, or drain sites healing without S/S of infection  8/13/2022 0724 by Kerrie Cordova RN  Outcome: Progressing  8/13/2022 0650 by Severa Pulse, RN  Outcome: Progressing  8/12/2022 1750 by Kerrie Cordova RN  Outcome: Progressing     Problem: Musculoskeletal - Adult  Goal: Return mobility to safest level of function  8/13/2022 0724 by Kerrie Cordova RN  Outcome: Progressing  8/13/2022 0650 by Severa Pulse, RN  Outcome: Progressing  8/12/2022 1750 by Kerrie Cordova RN  Outcome: Progressing  Goal: Maintain proper alignment of affected body part  8/13/2022 0724 by Kerrie Cordova RN  Outcome: Progressing  8/13/2022 0650 by Severa Pulse, RN  Outcome: Progressing  8/12/2022 1750 by Kerrie Cordova RN  Outcome: Progressing  Goal: Return ADL status to a safe level of function  8/13/2022 0724 by Kerrie Cordova RN  Outcome: Progressing  8/13/2022 0650 by Severa Pulse, RN  Outcome: Progressing  8/12/2022 1750 by Kerrie Cordova RN  Outcome: Progressing     Problem: Gastrointestinal - Adult  Goal: Maintains or returns to baseline bowel function  8/13/2022 0724 by Kerrie Cordova RN  Outcome: Progressing  8/13/2022 0650 by Merly Quarles RN  Outcome: Progressing  8/12/2022 1750 by Farhat Ford RN  Outcome: Progressing  Goal: Maintains adequate nutritional intake  8/13/2022 0724 by Farhat Ford RN  Outcome: Progressing  8/13/2022 0650 by Merly Quarles RN  Outcome: Progressing  8/12/2022 1750 by Farhat Ford RN  Outcome: Progressing     Problem: Genitourinary - Adult  Goal: Urinary catheter remains patent  8/13/2022 0724 by Farhat Ford RN  Outcome: Progressing  8/13/2022 0650 by Merly Quarles RN  Outcome: Progressing  8/12/2022 1750 by Farhat Ford RN  Outcome: Progressing     Problem: Chronic Conditions and Co-morbidities  Goal: Patient's chronic conditions and co-morbidity symptoms are monitored and maintained or improved  8/13/2022 0724 by Farhat Ford RN  Outcome: Progressing  8/13/2022 0650 by Merly Quarles RN  Outcome: Progressing  8/12/2022 1750 by Farhat Ford RN  Outcome: Progressing     Problem: Nutrition Deficit:  Goal: Optimize nutritional status  8/13/2022 0724 by Farhat Ford RN  Outcome: Progressing  8/13/2022 0650 by Merly Quarles RN  Outcome: Progressing  8/12/2022 1750 by Farhat Ford RN  Outcome: Progressing     Problem: Skin/Tissue Integrity  Goal: Absence of new skin breakdown  Description: 1. Monitor for areas of redness and/or skin breakdown  2. Assess vascular access sites hourly  3. Every 4-6 hours minimum:  Change oxygen saturation probe site  4. Every 4-6 hours:  If on nasal continuous positive airway pressure, respiratory therapy assess nares and determine need for appliance change or resting period.   8/13/2022 0724 by Farhat Ford RN  Outcome: Progressing  8/13/2022 0650 by Merly Quarles RN  Outcome: Progressing  8/12/2022 1750 by Farhat Ford RN  Outcome: Progressing     Problem: Safety - Adult  Goal: Free from fall injury  8/13/2022 0724 by Farhat Ford RN  Outcome: Progressing  8/13/2022 0650 by Merly Quarles

## 2022-08-13 NOTE — PLAN OF CARE
Problem: Discharge Planning  Goal: Discharge to home or other facility with appropriate resources  8/13/2022 0650 by Christopher Waggoner RN  Outcome: Progressing     Problem: Pain  Goal: Verbalizes/displays adequate comfort level or baseline comfort level  8/13/2022 0650 by Christopher Waggoner RN  Outcome: Progressing     Problem: Skin/Tissue Integrity - Adult  Goal: Skin integrity remains intact  8/13/2022 0650 by Christopher Waggoner RN  Outcome: Progressing     Problem: Skin/Tissue Integrity - Adult  Goal: Incisions, wounds, or drain sites healing without S/S of infection  8/13/2022 0650 by Christopher Waggoner RN  Outcome: Progressing     Problem: Musculoskeletal - Adult  Goal: Return mobility to safest level of function  8/13/2022 0650 by Christopher Waggoner RN  Outcome: Progressing     Problem: Musculoskeletal - Adult  Goal: Maintain proper alignment of affected body part  8/13/2022 0650 by Christopher Waggoner RN  Outcome: Progressing     Problem: Musculoskeletal - Adult  Goal: Return ADL status to a safe level of function  8/13/2022 0650 by Christopher Waggoner RN  Outcome: Progressing     Problem: Gastrointestinal - Adult  Goal: Maintains or returns to baseline bowel function  8/13/2022 0650 by Christopher Waggoner RN  Outcome: Progressing     Problem: Gastrointestinal - Adult  Goal: Maintains adequate nutritional intake  8/13/2022 0650 by Christopher Waggoner RN  Outcome: Progressing     Problem: Genitourinary - Adult  Goal: Urinary catheter remains patent  8/13/2022 0650 by Christopher Waggoner RN  Outcome: Progressing     Problem: Chronic Conditions and Co-morbidities  Goal: Patient's chronic conditions and co-morbidity symptoms are monitored and maintained or improved  8/13/2022 0650 by Christopher Waggoner RN  Outcome: Progressing     Problem: Nutrition Deficit:  Goal: Optimize nutritional status  8/13/2022 0650 by Christopher Waggoner RN  Outcome: Progressing     Problem: Skin/Tissue Integrity  Goal: Absence of new skin breakdown  Description: 1. Monitor for areas of redness and/or skin breakdown  2. Assess vascular access sites hourly  3. Every 4-6 hours minimum:  Change oxygen saturation probe site  4. Every 4-6 hours:  If on nasal continuous positive airway pressure, respiratory therapy assess nares and determine need for appliance change or resting period.   8/13/2022 0650 by Eric Kimbrough RN  Outcome: Progressing     Problem: Safety - Adult  Goal: Free from fall injury  8/13/2022 0650 by Eric Kimbrough RN  Outcome: Progressing     Problem: ABCDS Injury Assessment  Goal: Absence of physical injury  8/13/2022 0650 by Eric Kimbrough RN  Outcome: Progressing

## 2022-08-13 NOTE — PROGRESS NOTES
Physical Therapy  Facility/Department: [address] MED SURG  Physical Therapy Initial Assessment  (Co-eval)  If patient discharges prior to next session this note will serve as a discharge summary. Please see below for the latest assessment towards goals. Name: Sigrid Zhang  : 1945  MRN: [id number]  Date of Service: 2022    Discharge Recommendations:  Patient would benefit from continued therapy after discharge (3-5sessions/week)   Sigrid Zhang scored a  on the AM-PAC short mobility form. Current research shows that an AM-PAC score of 17 or less is typically not associated with a discharge to the patient's home setting. Based on the patient's AM-PAC score and their current functional mobility deficits, it is recommended that the patient have 3-5 sessions per week of Physical Therapy at d/c to increase the patient's independence. Please see assessment section for further patient specific details. PT Equipment Recommendations  Equipment Needed: No  Other: defer to next facility      Patient Diagnosis(es): The encounter diagnosis was Hemorrhage of rectum and anus. Past Medical History:  has a past medical history of Acid reflux, Anxiety, Arthritis, Atrial fibrillation (HCC), Closed fracture dislocation of right elbow, Depression, Fibromyalgia, Migraine, WHITNEY (obstructive sleep apnea), Rectal cancer ([facility] [address] and Wears glasses. Past Surgical History:  has a past surgical history that includes  section; Total knee arthroplasty (Left); open tx femoral supracondylar fracture w/o extension (Right, 10/25/2018); Gastric bypass surgery; Cholecystectomy; Appendectomy; Colonoscopy; Upper gastrointestinal endoscopy (N/A, 2019); Colonoscopy (N/A, 2019); Upper gastrointestinal endoscopy (N/A, 2020); incision and drainage (Right, 2021); Skin graft (Right, 2021); Skin graft (Right, 2021); Cervical discectomy; Colon surgery; Colonoscopy (N/A, 2021);  Forearm surgery (Left, 8/3/2022); and Colonoscopy (N/A, 8/12/2022). Assessment   Assessment: The pt is a 66 yo female who presented to the ED from her skilled facility with melanotic stools and BRBPR. The pt was in the hospital 7-29>8-4 with septic shock due to pna, GI bleed, a-fib w/RVR. The pt had a colonoscopy performed on 8-12 and her Hgb dropped from 11 to 8.3 today. The pt is a poor historian but she has been at [facility] at least since last admission for rehab. Due to sacral decub it is doubtful the pt has been participating much in therapy and doubtful she hs walked in awhile. Antiicpate that she has been needing assist of 2 and/or LIFT equipment. PMHx: anxiety, arth, a-fib, R elbow fx, depression, fibromyalgia, migraine, WHITNEY, rectal Ca, cerv disectomy, ORIF L wrist, gastric by-pass, R femoral fx, L TKR       Today, the pt demonstrated that she is needing 2 people for all mobility and the stedy to get safely to a chair. The pt is max A of 2 for all bed mobility and max A of 2 for sit <> stands and was only able to brieflt  the stedy with max A of 2. The pt does not initiate any mobility at this time. Anticipate that she will con't to need skilled PT at d/c as she is needing more assist than family could provide in a home setting. Will con't to follow. Specific Instructions for Next Treatment: cotx  Therapy Prognosis: Guarded; Fair  Decision Making: Medium Complexity  Barriers to Learning: cog, memory  Requires PT Follow-Up: Yes  Activity Tolerance  Activity Tolerance: Patient limited by fatigue;Patient limited by pain; Patient limited by endurance     Plan   Plan  Plan: 3-5 times per week  Specific Instructions for Next Treatment: cotx  Current Treatment Recommendations: Strengthening, ROM, Balance training, Functional mobility training, Transfer training, Therapeutic activities, Safety education & training, Patient/Caregiver education & training  Safety Devices  Type of Devices: Call light within reach, Chair alarm in place, Gait belt, Patient at risk for falls, Left in chair, Nurse notified (seated on waffle cushion)     Restrictions  Restrictions/Precautions  Restrictions/Precautions: Fall Risk, Weight Bearing  Upper Extremity Weight Bearing Restrictions  Left Upper Extremity Weight Bearing: Non Weight Bearing  Position Activity Restriction  Other position/activity restrictions: IV right upper arm, soft cast left wrist, tello catheter     Subjective   General  Chart Reviewed: Yes  Additional Pertinent Hx: Per Luisito Cates, MUSA - CNP H&P on 8-: The pt is a 66 yo female who presented to the ED from her skilled facility with melanotic stools and BRBPR. The pt was in the hospital 7-29>8-4 with septic shock due to pna, GI bleed, a-fib w/RVR. The pt had a colonoscopy performed on 8-12 and her Hgb dropped from 11 to 8.3 today.       PMHx: anxiety, arth, a-fib, R elbow fx, depression, fibromyalgia, migraine, WHITNEY, rectal Ca, cerv disectomy, ORIF L wrist, gastric by-pass, R femoral fx, L TKR  Response To Previous Treatment: Not applicable  Family / Caregiver Present: No  Referring Practitioner: Irma Ennis MD  Referral Date : 08/12/22  Diagnosis: Lower GI bleed  Follows Commands: Within Functional Limits  Subjective  Subjective: the pt was found to be in the bed; sitting upright and attempting to eat breakfast; the pt is very soft spoken and agreeable to therapy         Social/Functional History  Social/Functional History  Lives With: Son (Does not work))  Type of Home: [address]: One level  Home Access: Ramped entrance  Bathroom Shower/Tub: Tub/Shower unit  [employer]: [id number] [place] [address] 83: Grab bars in shower, Hand-held shower, 3-in-1 commode, Grab bars around toilet  Bathroom Accessibility: Accessible  Home Equipment: [address]  ADL Assistance: Independent  Homemaking Assistance: Needs assistance (Son takes care of homemaking needs.)  Ambulation Assistance: Independent (RW)  Transfer Assistance: Independent  Occupation: Retired  Type of Occupation: medical assistant, pt representative, billing  Additional Comments: Pt from [facility] recently - pt stated she has been home and to [facility] since her wrist fracture, but poor historian  Vision/Hearing  Vision  Vision: Impaired  Vision Exceptions: Wears glasses for reading  Hearing  Hearing: Within functional limits    Cognition   Orientation  Overall Orientation Status: Impaired  Orientation Level: Disoriented to situation;Oriented to time;Oriented to place;Oriented to person  Cognition  Overall Cognitive Status: Exceptions  Memory: Decreased recall of recent events;Decreased short term memory  Safety Judgement: Decreased awareness of need for assistance;Decreased awareness of need for safety  Problem Solving: Assistance required to generate solutions;Assistance required to implement solutions  Insights: Decreased awareness of deficits  Initiation: Requires cues for some  Sequencing: Requires cues for some  Cognition Comment: Pt is poor historian regarding recent therapy, limitations     Objective        Observation/Palpation  Observation: edema left UE, soft cast L wrist; large bruise medial and posterior L calf; large wound noted on the pt's sacrum  Gross Assessment  AROM: Generally decreased, functional  Strength: Grossly decreased, non-functional     Bed mobility  Rolling to Left: Maximum assistance;2 Person assistance  Rolling to Right: Maximum assistance;2 Person assistance  Supine to Sit: Maximum assistance;2 Person assistance  Sit to Supine: Maximum assistance;2 Person assistance  Transfers  Sit to Stand: Maximum Assistance;2 Person Assistance  Stand to sit: Maximum Assistance;2 Person Assistance  Bed to Chair: Dependent/Total (with stedy)  Ambulation  WB Status: the pt is non-ambulatory at this time     Balance  Comments: partial static standing in the stedy for <5

## 2022-08-13 NOTE — PROGRESS NOTES
Me overnight that patient this morning seem to have low blood pressure with the 20M to 70C systolic even on manual pressure cuff. I took a look at the patient and she was oriented for me and was alert easily. She does seem to be initially a little somnolent from what I can gather. I decided to give patient a fluid bolus but also check a stat hemoglobin which is still pending. I decided to stop patient's medications including Xanax and Zanaflex which I do not understand why they were ordered and these can both cause hypotension especially when given with beta-blockers for which patient is on a beta-blocker. I held the beta-blocker. Told nursing continue to monitor patient's mental status and her blood pressure at this time.

## 2022-08-13 NOTE — PROGRESS NOTES
Occupational Therapy  Facility/Department: [facility]  Occupational Therapy Initial Assessment    Name: Susan Hurt  : 1945  MRN: [id number]  Date of Service: 2022    Discharge Recommendations:  3-5 sessions per week, Continue to assess pending progress, Patient would benefit from continued therapy after discharge   Susan Hurt scored a  on the AM-PAC ADL Inpatient form. Current research shows that an AM-PAC score of 17 or less is typically not associated with a discharge to the patient's home setting. Based on the patient's AM-PAC score and their current ADL deficits, it is recommended that the patient have 3-5 sessions per week of Occupational Therapy at d/c to increase the patient's independence. Please see assessment section for further patient specific details. If patient discharges prior to next session this note will serve as a discharge summary. Please see below for the latest assessment towards goals. Patient Diagnosis(es): The encounter diagnosis was Hemorrhage of rectum and anus. Past Medical History:  has a past medical history of Acid reflux, Anxiety, Arthritis, Atrial fibrillation (HCC), Closed fracture dislocation of right elbow, Depression, Fibromyalgia, Migraine, WHITNEY (obstructive sleep apnea), Rectal cancer ([facility] [address] and Wears glasses. Past Surgical History:  has a past surgical history that includes  section; Total knee arthroplasty (Left); open tx femoral supracondylar fracture w/o extension (Right, 10/25/2018); Gastric bypass surgery; Cholecystectomy; Appendectomy; Colonoscopy; Upper gastrointestinal endoscopy (N/A, 2019); Colonoscopy (N/A, 2019); Upper gastrointestinal endoscopy (N/A, 2020); incision and drainage (Right, 2021); Skin graft (Right, 2021); Skin graft (Right, 2021); Cervical discectomy; Colon surgery; Colonoscopy (N/A, 2021);  Forearm surgery (Left, 8/3/2022); and Colonoscopy (N/A, 8/12/2022). Assessment   Performance deficits / Impairments: Decreased functional mobility ; Decreased safe awareness;Decreased balance;Decreased ADL status; Decreased ROM; Decreased endurance;Decreased strength  Assessment: Pt is a 66 yo female admitted from [facility] with GI bleed. She is a poor historian, has limited insight into her abilities, stated she had been using a wheelchair when she was at home, wasnt sure what she was using at [facility]. Pt is currently very limited in her self care and mobility. Anticipate she will require max of 2 for LB bathing and dressing, Max of 2 for trnafer using blanka stedy, she is unable to ambulate at this time. She requires cues to restrict use of left UE due to recent radial fracture, still in splint with edema throughout the left UE. Supported left arm on blankets t end of session. Anticipate  pt will require 3-5 sessions per week with cont OT to maximize indep.   She is unsafe to return home at this time  Prognosis: Fair  Decision Making: High Complexity  Activity Tolerance  Activity Tolerance: Patient limited by fatigue;Treatment limited secondary to decreased cognition        Plan   Plan  Times per Week: 3-5  Times per Day: Daily  Current Treatment Recommendations: Strengthening, Balance training, Functional mobility training, Endurance training, Safety education & training, Equipment evaluation, education, & procurement, Self-Care / ADL, Patient/Caregiver education & training     Restrictions  Restrictions/Precautions  Restrictions/Precautions: Fall Risk, Weight Bearing  Upper Extremity Weight Bearing Restrictions  Left Upper Extremity Weight Bearing: Non Weight Bearing  Position Activity Restriction  Other position/activity restrictions: IV right upper arm, soft cast left wrist, tello catheter    Subjective   General  Chart Reviewed: Yes, Orders, Progress Notes, History and Physical, Previous Admission  Additional Pertinent Hx: The patient is a 65 y.o. female who presents to [facility] with PMHx: A. fib, gastric reflux, closed left wrist fracture, fibromyalgia, migraine, WHITNEY, rectal cancer, gastric bypass, type II DM, macrocytic anemia, anxiety, depression.(copied per note YESICA Bynum 8/10/22)  Referring Practitioner: Kalin Short  Diagnosis: lower GI bleed  Subjective  Subjective: Pt met bedside, agreeable to OT/PT     Social/Functional History  Social/Functional History  Lives With: Son (Does not work))  Type of Home: House  Home Layout: One level  Home Access: Ramped entrance  Bathroom Shower/Tub: Tub/Shower unit  Bathroom Toilet: [address] [place] [address] [address]: Grab bars in shower, Hand-held shower, 3-in-1 commode, Grab bars around toilet  Bathroom Accessibility: [facility]: Yolanda Palumbo, Hadley Arias, [facility]  ADL Assistance: Independent  Homemaking Assistance: Needs assistance (Son takes care of homemaking needs.)  Ambulation Assistance: Independent (RW)  Transfer Assistance: Independent  Occupation: Retired  Type of Occupation: medical assistant, pt representative, billing  Additional Comments: Pt from [employer] recently - pt stated she has been home and to [facility] since her wrist fracture, but poor historian       Objective             Observation/Palpation  Observation: edema left UE, soft cast L wrist; large bruise medial and posterior L calf; large wound noted on the pt's sacrum  Safety Devices  Type of Devices: Call light within reach; Chair alarm in place;Gait belt;Patient at risk for falls; Left in chair;Nurse notified  Bed Mobility Training  Bed Mobility Training: Yes  Overall Level of Assistance: Assist X2;Maximum assistance  Rolling: Assist X2;Maximum assistance  Supine to Sit: Assist X2;Maximum assistance  Sit to Supine: Assist X2;Maximum assistance  Balance  Sitting: With support (CGA  once positioned with feet under her)  Wheelchair Bed Transfers  Equipment Used:  Other;Bed (recliner)  Level of Asssistance: 2 Person assistance;Maximum assistance;Dependent/Total  Wheelchair Transfers Comments: blanka gusman, unable to come to full stand even from seat of stedy  AROM: Grossly decreased, non-functional (RUE functional to 90 deg shoulder flex, left 45 deg shoulder func, wrist in soft splint, good finger flex/ext within splint to MCP)  Strength: Generally decreased, functional (right functional for light task, left restricted due to splint, generally left in her lap or on bed)  Tone: Normal  ADL  Feeding: Setup  Grooming: Maximum assistance  Grooming Skilled Clinical Factors: assisted to comb hair, did not complete oral care at this time. wiped face with cloth using right hand  LE Bathing Skilled Clinical Factors: noted min smear of stool on buttocks, drainage from wound buttocks  LE Dressing Skilled Clinical Factors: did not place brief on due to wound buttocks     Activity Tolerance  Activity Tolerance: Patient limited by fatigue;Patient limited by pain; Patient limited by endurance  Bed mobility  Rolling to Left: 2 Person assistance;Maximum assistance  Rolling to Right: 2 Person assistance;Maximum assistance  Supine to Sit: 2 Person assistance;Maximum assistance  Sit to Supine: 2 Person assistance;Maximum assistance  Bed Mobility Comments: cues to use bed rail with right arm, restrict use of LUE  Transfers  Sit to stand: 2 Person assistance;Maximum assistance  Stand to sit: 2 Person assistance;Maximum assistance  Transfer Comments: blanka gusman bed to recliner  Vision  Vision: Impaired  Vision Exceptions: Wears glasses for reading  Hearing  Hearing: Within functional limits  Cognition  Overall Cognitive Status: Exceptions  Memory: Decreased recall of recent events;Decreased short term memory  Safety Judgement: Decreased awareness of need for assistance;Decreased awareness of need for safety  Problem Solving: Assistance required to generate solutions;Assistance required to implement solutions  Insights: Decreased awareness of deficits  Initiation: Requires cues for some  Sequencing: Requires cues for some  Cognition Comment: Pt is poor historian regarding recent therapy, limitations  Orientation  Overall Orientation Status: Impaired  Orientation Level: Disoriented to situation;Oriented to time;Oriented to place;Oriented to person                  Education Given To: Patient  Education Provided: Role of Therapy;Plan of Care;Transfer Training                                                                       AM-PAC Score        AM-PAC Inpatient Daily Activity Raw Score: 11 (08/13/22 0926)  AM-PAC Inpatient ADL T-Scale Score : 29.04 (08/13/22 0926)  ADL Inpatient CMS 0-100% Score: 70.42 (08/13/22 0926)  ADL Inpatient CMS G-Code Modifier : CL (08/13/22 0926)    Tinneti Score       Goals  Short Term Goals  Time Frame for Short term goals: by discharge  Short Term Goal 1: bathe UB with mod assist, LB assist of 2  Short Term Goal 2: dress UB with mod assist  Short Term Goal 3: groom with set up  Short Term Goal 4: bed mobility with mod of 2  Short Term Goal 5: transfer wiht mod assist of 2 using blanka gusman  Long Term Goals  Time Frame for Long term goals : same as stg  Patient Goals   Patient goals : pt stated she felt she needed more therapy to get stronger at end of session.   At beginning, stated she wasn't sure if she could go home or not yet       Therapy Time   Individual Concurrent Group Co-treatment   Time In 0834         Time Out 0925         Minutes 51         Timed Code Treatment Minutes: 36 Minutes (+15 min eval)       Emily Buchanan, OTR/L 770

## 2022-08-13 NOTE — PROGRESS NOTES
Hospitalist Progress Note      PCP: Dell Peres MD    Date of Admission: 8/10/2022    Chief Complaint:   [id number] [address] Course: The patient is a 65 y.o. female with PMHx: A. fib, gastric reflux, closed left wrist fracture, fibromyalgia, migraine, WIHTNEY, rectal cancer, gastric bypass, type II DM, macrocytic anemia, anxiety, depression. Lives at [facility] [place], Anticoagulated with Eliquis   Had GI bleed w/ stercoral proctitis, A. fib with RVR. and required transfusion during hospital course. Was also seen by GI during hospital course but there was no EGD or colonoscopy performed. She was admitted for a GI bleed with melena for 2 days and bright red blood per rectum with symptoms of nausea but no vomiting, generalized weakness and crampy abdominal pain,. She was evaluated in the ED and found to have an hemoglobin of 10. She is being admitted for further work-up. A colonoscopy was aborted due to poor prep and high stool burden. The rectal bleed was felt to come from the deep sacral decubitus ulcers.       Subjective:   No new symptoms to complain of      Medications:  Reviewed    Infusion Medications    sodium chloride      sodium chloride      dextrose       Scheduled Medications    lidocaine 1 % injection  5 mL IntraDERmal Once    sodium chloride flush  5-40 mL IntraVENous 2 times per day    collagenase   Topical Daily    sodium chloride flush  5-40 mL IntraVENous 2 times per day    [Held by provider] apixaban  5 mg Oral BID    calcium-cholecalciferol  1 tablet Oral BID    vitamin B-12  2,500 mcg Oral Nightly    escitalopram  10 mg Oral Nightly    flecainide  100 mg Oral 2 times per day    furosemide  20 mg Oral Daily    [Held by provider] metoprolol succinate  25 mg Oral Daily    pantoprazole  40 mg Oral BID     PRN Meds: sodium chloride flush, sodium chloride, benzocaine-menthol, sodium chloride flush, sodium chloride, ondansetron **OR** ondansetron, acetaminophen **OR** acetaminophen, glucose, dextrose bolus **OR** dextrose bolus, glucagon (rDNA), dextrose, dicyclomine      Intake/Output Summary (Last 24 hours) at 8/13/2022 1507  Last data filed at 8/12/2022 1514  Gross per 24 hour   Intake 200 ml   Output --   Net 200 ml         Physical Exam Performed:    BP 99/61   Pulse 72   Temp 98.4 °F (36.9 °C) (Oral)   Resp 16   Ht 5' 2\" (1.575 m)   Wt 164 lb 6.4 oz (74.6 kg)   SpO2 95%   BMI 30.07 kg/m²     General appearance: No apparent distress, appears stated age and cooperative. HEENT: Pupils equal, round, and reactive to light. Conjunctivae/corneas clear. Neck: Supple, with full range of motion. No jugular venous distention. Trachea midline. Respiratory:  Normal respiratory effort. Clear to auscultation, bilaterally without Rales/Wheezes/Rhonchi. Cardiovascular: Regular rate and rhythm with normal S1/S2 without murmurs, rubs or gallops. Abdomen: Soft, non-tender, non-distended with normal bowel sounds. Musculoskeletal: No clubbing, cyanosis or edema bilaterally. Full range of motion without deformity. Skin: Skin color, texture, turgor normal.  No rashes or lesions. Neurologic:  Neurovascularly intact without any focal sensory/motor deficits. Cranial nerves: II-XII intact, grossly non-focal.  Psychiatric: Alert and oriented, thought content appropriate, normal insight  Capillary Refill: Brisk,3 seconds, normal   Peripheral Pulses: +2 palpable, equal bilaterally       Labs:   Recent Labs     08/10/22  1700 08/11/22  0207 08/12/22  0232 08/12/22  1045 08/13/22  0403   WBC 5.5  --   --   --   --    HGB 10.1*   < > 8.9* 11.0* 8.3*   HCT 30.1*   < > 26.5* 35.3* 24.4*     --   --   --   --     < > = values in this interval not displayed.        Recent Labs     08/10/22  1700      K 4.3   CL 95*   CO2 35*   BUN 20   CREATININE 0.7   CALCIUM 9.0       Recent Labs     08/10/22  1700   AST 18   ALT 12   BILITOT 0.3   ALKPHOS 96       Recent Labs     08/10/22  1700 08/11/22  0621   INR 1.20* 1.10 No results for input(s): Valerie Kras in the last 72 hours. Urinalysis:      Lab Results   Component Value Date/Time    NITRU Negative 07/29/2022 11:27 PM    [address] 3 07/29/2022 11:27 PM    BACTERIA 2+ 07/29/2022 11:27 PM    RBCUA 2 07/29/2022 11:27 PM    BLOODU TRACE 07/29/2022 11:27 PM    SPECGRAV 1.023 07/29/2022 11:27 PM    GLUCOSEU Negative 07/29/2022 11:27 PM       Radiology:  No orders to display               ASSESSMENT/PLAN:     Lower GI bleed: Melanotic stool x2 days with BRRB at [facility]. H/o rectal bleed and CT in the past showed probable stercoral colitis  -Transfused 1 unit packed red cells if hemoglobin drops below 7  -Hold anticoagulation  -Continue PPI  -GI Dcd colono due to poor prep and high stool burden  -Bentyl and Tylenol for cramping     Afib on Eliquis, on hold due to GI bleed  Continuing metoprolol and flecainide per ECF regimen.               DVT Prophylaxis: SCD  Diet: Diet NPO Exceptions are: Ice Chips  Code Status: Full Code  PT/OT Eval Status: very debilitated: PT/OT consulted     Dispo - admit, Janel Ca MD

## 2022-08-14 LAB
A/G RATIO: 0.8 (ref 1.1–2.2)
ALBUMIN SERPL-MCNC: 2.3 G/DL (ref 3.4–5)
ALP BLD-CCNC: 93 U/L (ref 40–129)
ALT SERPL-CCNC: 11 U/L (ref 10–40)
ANION GAP SERPL CALCULATED.3IONS-SCNC: 10 MMOL/L (ref 3–16)
AST SERPL-CCNC: 13 U/L (ref 15–37)
BASOPHILS ABSOLUTE: 0 K/UL (ref 0–0.2)
BASOPHILS RELATIVE PERCENT: 0.1 %
BILIRUB SERPL-MCNC: 0.3 MG/DL (ref 0–1)
BUN BLDV-MCNC: 12 MG/DL (ref 7–20)
CALCIUM SERPL-MCNC: 8.1 MG/DL (ref 8.3–10.6)
CHLORIDE BLD-SCNC: 94 MMOL/L (ref 99–110)
CO2: 29 MMOL/L (ref 21–32)
CREAT SERPL-MCNC: <0.5 MG/DL (ref 0.6–1.2)
EOSINOPHILS ABSOLUTE: 0 K/UL (ref 0–0.6)
EOSINOPHILS RELATIVE PERCENT: 0.2 %
GFR AFRICAN AMERICAN: >60
GFR NON-AFRICAN AMERICAN: >60
GLUCOSE BLD-MCNC: 106 MG/DL (ref 70–99)
GLUCOSE BLD-MCNC: 114 MG/DL (ref 70–99)
GLUCOSE BLD-MCNC: 169 MG/DL (ref 70–99)
GLUCOSE BLD-MCNC: 89 MG/DL (ref 70–99)
GLUCOSE BLD-MCNC: 91 MG/DL (ref 70–99)
HCT VFR BLD CALC: 26.5 % (ref 36–48)
HEMOGLOBIN: 8.9 G/DL (ref 12–16)
LYMPHOCYTES ABSOLUTE: 0.6 K/UL (ref 1–5.1)
LYMPHOCYTES RELATIVE PERCENT: 9.3 %
MCH RBC QN AUTO: 35.3 PG (ref 26–34)
MCHC RBC AUTO-ENTMCNC: 33.5 G/DL (ref 31–36)
MCV RBC AUTO: 105.5 FL (ref 80–100)
MONOCYTES ABSOLUTE: 0.6 K/UL (ref 0–1.3)
MONOCYTES RELATIVE PERCENT: 9.2 %
NEUTROPHILS ABSOLUTE: 4.9 K/UL (ref 1.7–7.7)
NEUTROPHILS RELATIVE PERCENT: 81.2 %
PDW BLD-RTO: 15.7 % (ref 12.4–15.4)
PERFORMED ON: ABNORMAL
PERFORMED ON: NORMAL
PLATELET # BLD: 164 K/UL (ref 135–450)
PMV BLD AUTO: 7.3 FL (ref 5–10.5)
POTASSIUM SERPL-SCNC: 3.3 MMOL/L (ref 3.5–5.1)
RBC # BLD: 2.51 M/UL (ref 4–5.2)
SODIUM BLD-SCNC: 133 MMOL/L (ref 136–145)
TOTAL PROTEIN: 5.2 G/DL (ref 6.4–8.2)
WBC # BLD: 6 K/UL (ref 4–11)

## 2022-08-14 PROCEDURE — 80053 COMPREHEN METABOLIC PANEL: CPT

## 2022-08-14 PROCEDURE — 6370000000 HC RX 637 (ALT 250 FOR IP): Performed by: INTERNAL MEDICINE

## 2022-08-14 PROCEDURE — 85025 COMPLETE CBC W/AUTO DIFF WBC: CPT

## 2022-08-14 PROCEDURE — 1200000000 HC SEMI PRIVATE

## 2022-08-14 PROCEDURE — 2580000003 HC RX 258: Performed by: INTERNAL MEDICINE

## 2022-08-14 PROCEDURE — 6360000002 HC RX W HCPCS: Performed by: INTERNAL MEDICINE

## 2022-08-14 PROCEDURE — 36415 COLL VENOUS BLD VENIPUNCTURE: CPT

## 2022-08-14 RX ADMIN — FUROSEMIDE 20 MG: 20 TABLET ORAL at 08:18

## 2022-08-14 RX ADMIN — Medication 1 TABLET: at 20:10

## 2022-08-14 RX ADMIN — PANTOPRAZOLE SODIUM 40 MG: 40 TABLET, DELAYED RELEASE ORAL at 20:10

## 2022-08-14 RX ADMIN — SODIUM CHLORIDE, PRESERVATIVE FREE 10 ML: 5 INJECTION INTRAVENOUS at 11:28

## 2022-08-14 RX ADMIN — SODIUM CHLORIDE, PRESERVATIVE FREE 10 ML: 5 INJECTION INTRAVENOUS at 11:27

## 2022-08-14 RX ADMIN — FLECAINIDE ACETATE 100 MG: 100 TABLET ORAL at 20:10

## 2022-08-14 RX ADMIN — SODIUM CHLORIDE, PRESERVATIVE FREE 10 ML: 5 INJECTION INTRAVENOUS at 02:25

## 2022-08-14 RX ADMIN — SODIUM CHLORIDE, PRESERVATIVE FREE 10 ML: 5 INJECTION INTRAVENOUS at 20:10

## 2022-08-14 RX ADMIN — ACETAMINOPHEN 650 MG: 325 TABLET, FILM COATED ORAL at 16:35

## 2022-08-14 RX ADMIN — SODIUM CHLORIDE, PRESERVATIVE FREE 10 ML: 5 INJECTION INTRAVENOUS at 22:44

## 2022-08-14 RX ADMIN — PANTOPRAZOLE SODIUM 40 MG: 40 TABLET, DELAYED RELEASE ORAL at 08:18

## 2022-08-14 RX ADMIN — CYANOCOBALAMIN TAB 1000 MCG 2500 MCG: 1000 TAB at 20:10

## 2022-08-14 RX ADMIN — ONDANSETRON 4 MG: 2 INJECTION INTRAMUSCULAR; INTRAVENOUS at 09:53

## 2022-08-14 RX ADMIN — ACETAMINOPHEN 650 MG: 325 TABLET, FILM COATED ORAL at 09:53

## 2022-08-14 RX ADMIN — Medication: at 08:19

## 2022-08-14 RX ADMIN — ONDANSETRON 4 MG: 2 INJECTION INTRAMUSCULAR; INTRAVENOUS at 16:35

## 2022-08-14 RX ADMIN — Medication 1 TABLET: at 08:18

## 2022-08-14 RX ADMIN — ESCITALOPRAM OXALATE 10 MG: 10 TABLET ORAL at 20:10

## 2022-08-14 RX ADMIN — SODIUM CHLORIDE, PRESERVATIVE FREE 10 ML: 5 INJECTION INTRAVENOUS at 02:26

## 2022-08-14 RX ADMIN — FLECAINIDE ACETATE 100 MG: 100 TABLET ORAL at 08:18

## 2022-08-14 ASSESSMENT — PAIN SCALES - GENERAL
PAINLEVEL_OUTOF10: 6
PAINLEVEL_OUTOF10: 9

## 2022-08-14 ASSESSMENT — PAIN - FUNCTIONAL ASSESSMENT: PAIN_FUNCTIONAL_ASSESSMENT: ACTIVITIES ARE NOT PREVENTED

## 2022-08-14 NOTE — PLAN OF CARE
Problem: Discharge Planning  Goal: Discharge to home or other facility with appropriate resources  Outcome: Progressing     Problem: Pain  Goal: Verbalizes/displays adequate comfort level or baseline comfort level  Outcome: Progressing     Problem: Skin/Tissue Integrity - Adult  Goal: Skin integrity remains intact  Outcome: Progressing  Goal: Incisions, wounds, or drain sites healing without S/S of infection  Outcome: Progressing     Problem: Musculoskeletal - Adult  Goal: Return mobility to safest level of function  Outcome: Progressing  Goal: Maintain proper alignment of affected body part  Outcome: Progressing  Goal: Return ADL status to a safe level of function  Outcome: Progressing     Problem: Gastrointestinal - Adult  Goal: Maintains or returns to baseline bowel function  Outcome: Progressing  Goal: Maintains adequate nutritional intake  Outcome: Progressing     Problem: Genitourinary - Adult  Goal: Urinary catheter remains patent  Outcome: Progressing     Problem: Chronic Conditions and Co-morbidities  Goal: Patient's chronic conditions and co-morbidity symptoms are monitored and maintained or improved  Outcome: Progressing     Problem: Nutrition Deficit:  Goal: Optimize nutritional status  Outcome: Progressing     Problem: Skin/Tissue Integrity  Goal: Absence of new skin breakdown  Description: 1. Monitor for areas of redness and/or skin breakdown  2. Assess vascular access sites hourly  3. Every 4-6 hours minimum:  Change oxygen saturation probe site  4. Every 4-6 hours:  If on nasal continuous positive airway pressure, respiratory therapy assess nares and determine need for appliance change or resting period.   Outcome: Progressing     Problem: Safety - Adult  Goal: Free from fall injury  Outcome: Progressing     Problem: ABCDS Injury Assessment  Goal: Absence of physical injury  Outcome: Progressing

## 2022-08-14 NOTE — PROGRESS NOTES
Hospitalist Progress Note      PCP: Reanna Nice MD    Date of Admission: 8/10/2022    Chief Complaint:   [id number] [address] [address] Course: The patient is a 65 y.o. female with PMHx: A. fib, gastric reflux, closed left wrist fracture, fibromyalgia, migraine, WHITNEY, rectal cancer, gastric bypass, type II DM, macrocytic anemia, anxiety, depression. Lives at [facility], Anticoagulated with Eliquis   Had GI bleed w/ stercoral proctitis, A. fib with RVR. and required transfusion during hospital course. Was also seen by GI during hospital course but there was no EGD or colonoscopy performed. She was admitted for a GI bleed with melena for 2 days and bright red blood per rectum with symptoms of nausea but no vomiting, generalized weakness and crampy abdominal pain,. She was evaluated in the ED and found to have an hemoglobin of 10. She is being admitted for further work-up. A colonoscopy was aborted due to poor prep and high stool burden. The rectal bleed was felt to come from the deep sacral decubitus ulcers.       Subjective:   No new symptoms to complain of      Medications:  Reviewed    Infusion Medications    sodium chloride      sodium chloride      dextrose       Scheduled Medications    lidocaine 1 % injection  5 mL IntraDERmal Once    sodium chloride flush  5-40 mL IntraVENous 2 times per day    collagenase   Topical Daily    sodium chloride flush  5-40 mL IntraVENous 2 times per day    [Held by provider] apixaban  5 mg Oral BID    calcium-cholecalciferol  1 tablet Oral BID    vitamin B-12  2,500 mcg Oral Nightly    escitalopram  10 mg Oral Nightly    flecainide  100 mg Oral 2 times per day    furosemide  20 mg Oral Daily    [Held by provider] metoprolol succinate  25 mg Oral Daily    pantoprazole  40 mg Oral BID     PRN Meds: sodium chloride flush, sodium chloride, benzocaine-menthol, sodium chloride flush, sodium chloride, ondansetron **OR** ondansetron, acetaminophen **OR** acetaminophen, glucose, dextrose bolus **OR** dextrose bolus, glucagon (rDNA), dextrose, dicyclomine      Intake/Output Summary (Last 24 hours) at 8/14/2022 1346  Last data filed at 8/14/2022 0529  Gross per 24 hour   Intake --   Output 1050 ml   Net -1050 ml         Physical Exam Performed:    BP (!) 142/83   Pulse (!) 111   Temp 98.7 °F (37.1 °C) (Oral)   Resp 16   Ht 5' 2\" (1.575 m)   Wt 166 lb 1.6 oz (75.3 kg)   SpO2 95%   BMI 30.38 kg/m²     General appearance: No apparent distress, appears stated age and cooperative. HEENT: Pupils equal, round, and reactive to light. Conjunctivae/corneas clear. Neck: Supple, with full range of motion. No jugular venous distention. Trachea midline. Respiratory:  Normal respiratory effort. Clear to auscultation, bilaterally without Rales/Wheezes/Rhonchi. Cardiovascular: Regular rate and rhythm with normal S1/S2 without murmurs, rubs or gallops. Abdomen: Soft, non-tender, non-distended with normal bowel sounds. Musculoskeletal: No clubbing, cyanosis or edema bilaterally. Full range of motion without deformity. Skin: Skin color, texture, turgor normal.  No rashes or lesions. Neurologic:  Neurovascularly intact without any focal sensory/motor deficits. Cranial nerves: II-XII intact, grossly non-focal.  Psychiatric: Alert and oriented, thought content appropriate, normal insight  Capillary Refill: Brisk,3 seconds, normal   Peripheral Pulses: +2 palpable, equal bilaterally       Labs:   Recent Labs     08/12/22  0232 08/12/22  1045 08/13/22  0403   HGB 8.9* 11.0* 8.3*   HCT 26.5* 35.3* 24.4*       No results for input(s): NA, K, CL, CO2, BUN, CREATININE, CALCIUM, PHOS in the last 72 hours. Invalid input(s): MAGNES    No results for input(s): AST, ALT, BILIDIR, BILITOT, ALKPHOS in the last 72 hours. No results for input(s): INR in the last 72 hours. No results for input(s): Ashely Gazella in the last 72 hours.     Urinalysis:      Lab Results   Component Value Date/Time    NITRU Negative 07/29/2022 11:27 PM    WBCUA 3 07/29/2022 11:27 PM    BACTERIA 2+ 07/29/2022 11:27 PM    RBCUA 2 07/29/2022 11:27 PM    BLOODU TRACE 07/29/2022 11:27 PM    SPECGRAV 1.023 07/29/2022 11:27 PM    GLUCOSEU Negative 07/29/2022 11:27 PM       Radiology:  No orders to display               ASSESSMENT/PLAN:     Lower GI bleed: Melanotic stool x2 days with BRRB at [facility]. H/o rectal bleed and CT in the past showed probable stercoral colitis  -Transfused 1 unit packed red cells if hemoglobin drops below 7  -Hold anticoagulation  -Continue PPI  -GI Dcd colono due to poor prep and high stool burden  -Bentyl and Tylenol for cramping     Chronic Afib on Eliquis, on hold due to GI bleed  Continuing metoprolol and flecainide per ECF regimen.               DVT Prophylaxis: SCD  Diet: Diet NPO Exceptions are: Ice Chips  Code Status: Full Code  PT/OT Eval Status: very debilitated: PT/OT consulted     Rima Rivera MD

## 2022-08-14 NOTE — PROGRESS NOTES
Physician Progress Note      Ruby Vickers  CSN #:                  [id number]  :                       1945  ADMIT DATE:       8/10/2022 4:36 PM  [address] [place] DATE:  RESPONDING  PROVIDER #:        Jethro Cheung MD          QUERY TEXT:    Dear Dr. Jaquan Alvarez,  Patient admitted with Melena, noted to have atrial fibrillation. If possible,   please document in progress notes and discharge summary further specificity   regarding the type of atrial fibrillation: The medical record reflects the following:  Risk Factors: Hx A-fib  Clinical Indicators: 8/10 ED for black tarry stools x 2 days, ED notes a   recent previous admission and pt had A-fib with RVR at that time, noted Hx   A-fib and on that previous admission Eliquis was stopped. H and P notes   \"A-fib\" HR=63-70  Treatment: holding Eliquis cont metoprolol and flecainide  Thank you,  Fariha Bansal RN, CDS  [email or web address]    Chronic: nonspecific term that could be referring to paroxysmal, persistent,   or permanent  Longstanding persistent: persistent and continuous, lasting > 1 year. Paroxysmal - self-terminating or intermittent; resolves with or without   intervention within 7 days of onset; may recur with various frequency. Persistent - Fails to terminate within 7 days; Often requires meds or   cardioversion to restore to NSR. Permanent - longstanding & persistent; Medication has been ineffective in   restoring NSR &/or cardioversion is contraindicated    Definitions per MS-DRG Training Guide and Quick Reference Guide, Vashti Mcdonald 112 5   Diseases and Disorders of the Circulatory System; 2019; [employer]. Software content   from the [employer]?  Advanced CDI Transformation Program  Options provided:  -- Chronic Atrial Fibrillation, unspecified  -- Longstanding Persistent Atrial Fibrillation  -- Permanent Atrial Fibrillation  -- Persistent Atrial Fibrillation  -- Paroxysmal Atrial Fibrillation  -- Other - I will add my own diagnosis  -- Disagree - Not applicable / Not valid  -- Disagree - Clinically unable to determine / Unknown  -- Refer to Clinical Documentation Reviewer    PROVIDER RESPONSE TEXT:    This patient has unspecified chronic atrial fibrillation. Query created by: [id number] [place] [address] Micheal Marroquin on 8/11/2022 12:38 PM      QUERY TEXT:    Dear Dr. Branden Simpson,  Pt admitted with Lower Gi bleed. Noted documentation of DTI R anterior lower   leg  and bilateral heels and Unstageable pressure ulcer mid sacrum POA on   8/11/22 by ordered WOCN consultant. If possible, please document in progress   notes and discharge summary:    The medical record reflects the following:  Risk Factors: chronic pressure, decreased mobility, incontinence of stool, and   incontinence of urine  Clinical Indicators: 8/11 WOCN notes \"DTI R anterior lower leg  and bilateral   heels and Unstageable pressure ulcer mid sacrum POA \"  Treatment: Buttock wound: santyl; dampened guaze, abd; CHANGE DAILY OR PRN IF   SOILED, Bilateral heels: barrier film; heel protectors  -moisture barrier to buttocks-sacral border to sacral area, turn and   reposition every 2 hours, 'If sacral wound becomes worse may recommend   surgical consult for debridement. \"  Thank you,  Ricarda Ho RN, CDS  [email or web address]  Options provided:  -- DTI R anterior lower leg and bilateral heels and unstageable pressure ulcer   sacrum confirmed present on admission  -- Other - I will add my own diagnosis  -- Disagree - Not applicable / Not valid  -- Disagree - Clinically unable to determine / Unknown  -- Refer to Clinical Documentation Reviewer    PROVIDER RESPONSE TEXT:    The diagnosis of DTI R anterior lower leg and bilateral heels and unstageable   pressure ulcer sacrum was confirmed as present on admission. Query created by:  [id number] on 8/12/2022 3:58 PM      Electronically signed by:  Haim Sanches MD 8/14/2022 12:19 PM

## 2022-08-15 LAB
GLUCOSE BLD-MCNC: 100 MG/DL (ref 70–99)
PERFORMED ON: ABNORMAL
SARS-COV-2, NAAT: NOT DETECTED

## 2022-08-15 PROCEDURE — 97535 SELF CARE MNGMENT TRAINING: CPT

## 2022-08-15 PROCEDURE — 97110 THERAPEUTIC EXERCISES: CPT

## 2022-08-15 PROCEDURE — 94760 N-INVAS EAR/PLS OXIMETRY 1: CPT

## 2022-08-15 PROCEDURE — 1200000000 HC SEMI PRIVATE

## 2022-08-15 PROCEDURE — 97530 THERAPEUTIC ACTIVITIES: CPT

## 2022-08-15 PROCEDURE — 2580000003 HC RX 258: Performed by: INTERNAL MEDICINE

## 2022-08-15 PROCEDURE — 6370000000 HC RX 637 (ALT 250 FOR IP): Performed by: INTERNAL MEDICINE

## 2022-08-15 PROCEDURE — 87635 SARS-COV-2 COVID-19 AMP PRB: CPT

## 2022-08-15 PROCEDURE — 6360000002 HC RX W HCPCS: Performed by: INTERNAL MEDICINE

## 2022-08-15 RX ADMIN — Medication: at 08:12

## 2022-08-15 RX ADMIN — PANTOPRAZOLE SODIUM 40 MG: 40 TABLET, DELAYED RELEASE ORAL at 21:46

## 2022-08-15 RX ADMIN — SODIUM CHLORIDE, PRESERVATIVE FREE 10 ML: 5 INJECTION INTRAVENOUS at 21:46

## 2022-08-15 RX ADMIN — Medication 1 TABLET: at 21:46

## 2022-08-15 RX ADMIN — FLECAINIDE ACETATE 100 MG: 100 TABLET ORAL at 21:46

## 2022-08-15 RX ADMIN — FLECAINIDE ACETATE 100 MG: 100 TABLET ORAL at 08:11

## 2022-08-15 RX ADMIN — SODIUM CHLORIDE, PRESERVATIVE FREE 10 ML: 5 INJECTION INTRAVENOUS at 08:12

## 2022-08-15 RX ADMIN — CYANOCOBALAMIN TAB 1000 MCG 2500 MCG: 1000 TAB at 21:46

## 2022-08-15 RX ADMIN — ONDANSETRON 4 MG: 2 INJECTION INTRAMUSCULAR; INTRAVENOUS at 22:39

## 2022-08-15 RX ADMIN — Medication 1 TABLET: at 08:11

## 2022-08-15 RX ADMIN — FUROSEMIDE 20 MG: 20 TABLET ORAL at 08:11

## 2022-08-15 RX ADMIN — ACETAMINOPHEN 650 MG: 325 TABLET, FILM COATED ORAL at 14:17

## 2022-08-15 RX ADMIN — PANTOPRAZOLE SODIUM 40 MG: 40 TABLET, DELAYED RELEASE ORAL at 08:11

## 2022-08-15 RX ADMIN — ESCITALOPRAM OXALATE 10 MG: 10 TABLET ORAL at 21:46

## 2022-08-15 ASSESSMENT — PAIN SCALES - GENERAL: PAINLEVEL_OUTOF10: 6

## 2022-08-15 ASSESSMENT — PAIN DESCRIPTION - LOCATION: LOCATION: HEAD

## 2022-08-15 ASSESSMENT — PAIN - FUNCTIONAL ASSESSMENT: PAIN_FUNCTIONAL_ASSESSMENT: ACTIVITIES ARE NOT PREVENTED

## 2022-08-15 ASSESSMENT — PAIN DESCRIPTION - DESCRIPTORS: DESCRIPTORS: ACHING;DISCOMFORT

## 2022-08-15 NOTE — PROGRESS NOTES
Occupational Therapy  [facility]  Occupational Therapy Daily Treatment note    Name: Frances Fierro  : 1945  MRN: [id number]  Date of Service: 8/15/2022    Discharge Recommendations:  3-5 sessions per week, Continue to assess pending progress, Patient would benefit from continued therapy after discharge        Frances Fierro scored a  on the AM-PAC ADL Inpatient form. Current research shows that an AM-PAC score of 17 or less is typically not associated with a discharge to the patient's home setting. Based on the patient's AM-PAC score and their current ADL deficits, it is recommended that the patient have 3-5 sessions per week of Occupational Therapy at d/c to increase the patient's independence. Please see assessment section for further patient specific details. If patient discharges prior to next session this note will serve as a discharge summary. Please see below for the latest assessment towards goals. Patient Diagnosis(es): The encounter diagnosis was Hemorrhage of rectum and anus. Past Medical History:  has a past medical history of Acid reflux, Anxiety, Arthritis, Atrial fibrillation (HCC), Closed fracture dislocation of right elbow, Depression, Fibromyalgia, Migraine, WHITNEY (obstructive sleep apnea), Rectal cancer ([facility] [address] and Wears glasses. Past Surgical History:  has a past surgical history that includes  section; Total knee arthroplasty (Left); open tx femoral supracondylar fracture w/o extension (Right, 10/25/2018); Gastric bypass surgery; Cholecystectomy; Appendectomy; Colonoscopy; Upper gastrointestinal endoscopy (N/A, 2019); Colonoscopy (N/A, 2019); Upper gastrointestinal endoscopy (N/A, 2020); incision and drainage (Right, 2021); Skin graft (Right, 2021); Skin graft (Right, 2021); Cervical discectomy; Colon surgery; Colonoscopy (N/A, 2021);  Forearm surgery (Left, 8/3/2022); and Colonoscopy (N/A, Caregiver Present: No  Referring Practitioner: Nicol  Diagnosis: lower GI bleed  Subjective  Subjective: Pt met b/s for OT cotx with PT. Pt in bed on arrival, agreeable to participate in therapy. Pt with flat affect. Pt c/o headache. Social/Functional History  Social/Functional History  Lives With: Son (Does not work))  Type of Home: House  Home Layout: One level  Home Access: Ramped entrance  Bathroom Shower/Tub: Tub/Shower unit  [employer]: [address] [place] [address] [address]: Grab bars in shower, Hand-held shower, 3-in-1 commode, Grab bars around toilet  Bathroom Accessibility: [facility]: [employer], [place] [address]  ADL Assistance: Independent  Homemaking Assistance: Needs assistance (Son takes care of homemaking needs.)  Ambulation Assistance: Independent (RW)  Transfer Assistance: Independent  Occupation: Retired  Type of Occupation: medical assistant, pt representative, billing  Additional Comments: Pt from [facility] recently - pt stated she has been home and to [facility] since her wrist fracture, but poor historian       Objective     Observation/Palpation  Observation: edema left UE, soft cast L wrist; large bruise medial and posterior L calf; large wound noted on the pt's sacrum    Safety Devices  Type of Devices: Call light within reach; Chair alarm in place;Gait belt;Patient at risk for falls; Left in chair;Nurse notified    Balance  Sitting: Impaired (seated EOB with min A)  Standing: With support (max A x2 in [facility])    ADL  Grooming: Maximum assistance  Grooming Skilled Clinical Factors: pt initiated combing hair, but then reports unable  Toileting: Dependent/Total  Toileting Skilled Clinical Factors: catheter, pt incontinent of stool. Total A to manage hygiene in bed. RN in to change sacral dressing and provide wound care.     Activity Tolerance  Activity Tolerance: Patient limited by fatigue;Patient limited by endurance    Bed mobility  Rolling to Right: dress UB with mod assist  Short Term Goal 3: groom with set up  Short Term Goal 4: bed mobility with mod of 2  Short Term Goal 5: transfer wiht mod assist of 2 using blanka gusman  Long Term Goals  Time Frame for Long term goals : same as stg  Patient Goals   Patient goals : pt stated she felt she needed more therapy to get stronger at end of session.   At beginning, stated she wasn't sure if she could go home or not yet       Therapy Time   Individual Concurrent Group Co-treatment   Time In 1309         Time Out 1349         Minutes [address] [place] [address], OTR/L 2586

## 2022-08-15 NOTE — CARE COORDINATION
9/40 Need precert to return to [facility]. Need Updated PT/OT notes for facility to start p/c. Will need COVID prior to return.  Electronically signed by Chelsy Lane RN on 8/15/2022 at 12:31 PM

## 2022-08-15 NOTE — PROGRESS NOTES
Wound dressing changed. Wounds cleansed. Barrier wipes used, collegenase cream administered. Dampened gauze placed over top, ABD pad on top of damp gauze. Mepilex placed to hold in place.     Electronically signed by Marty Kohler RN on 8/15/22 at 1:39 PM EDT

## 2022-08-15 NOTE — PLAN OF CARE
Problem: Discharge Planning  Goal: Discharge to home or other facility with appropriate resources  8/15/2022 0759 by Tomas Walker RN  Outcome: Progressing  8/15/2022 0046 by Regina Trivedi RN  Outcome: Progressing     Problem: Pain  Goal: Verbalizes/displays adequate comfort level or baseline comfort level  8/15/2022 0759 by Tomas Walker RN  Outcome: Progressing  8/15/2022 0046 by Regina Trivedi RN  Outcome: Progressing     Problem: Skin/Tissue Integrity - Adult  Goal: Skin integrity remains intact  8/15/2022 0759 by Tomas Walker RN  Outcome: Progressing  8/15/2022 0046 by Regina Trivedi RN  Outcome: Progressing  Goal: Incisions, wounds, or drain sites healing without S/S of infection  8/15/2022 0759 by Tomas Walker RN  Outcome: Progressing  8/15/2022 0046 by Regina Trivedi RN  Outcome: Progressing     Problem: Musculoskeletal - Adult  Goal: Return mobility to safest level of function  8/15/2022 0759 by Tomas Walker RN  Outcome: Progressing  8/15/2022 0046 by Regina Trivedi RN  Outcome: Progressing  Goal: Maintain proper alignment of affected body part  8/15/2022 0759 by Tomas Walker RN  Outcome: Progressing  8/15/2022 0046 by Regina Trivedi RN  Outcome: Progressing  Goal: Return ADL status to a safe level of function  8/15/2022 0759 by Tomas Walker RN  Outcome: Progressing  8/15/2022 0046 by Regina Trivedi RN  Outcome: Progressing     Problem: Gastrointestinal - Adult  Goal: Maintains or returns to baseline bowel function  8/15/2022 0759 by Tomas Walker RN  Outcome: Progressing  8/15/2022 0046 by Regina Trivedi RN  Outcome: Progressing  Goal: Maintains adequate nutritional intake  8/15/2022 0759 by Tomas Walker RN  Outcome: Progressing  8/15/2022 0046 by Regina Trivedi RN  Outcome: Progressing     Problem: Genitourinary - Adult  Goal: Urinary catheter remains patent  8/15/2022 0759 by Tomas Walker RN  Outcome: Progressing  8/15/2022 0046 by Regina Trivedi RN  Outcome: Progressing     Problem: Chronic Conditions and Co-morbidities  Goal: Patient's chronic conditions and co-morbidity symptoms are monitored and maintained or improved  8/15/2022 0759 by Miriam Chavez RN  Outcome: Progressing  8/15/2022 0046 by Nichelle Zhao RN  Outcome: Progressing     Problem: Nutrition Deficit:  Goal: Optimize nutritional status  8/15/2022 0759 by Miriam Chavez RN  Outcome: Progressing  8/15/2022 0046 by Nichelle Zhao RN  Outcome: Progressing     Problem: Skin/Tissue Integrity  Goal: Absence of new skin breakdown  Description: 1. Monitor for areas of redness and/or skin breakdown  2. Assess vascular access sites hourly  3. Every 4-6 hours minimum:  Change oxygen saturation probe site  4. Every 4-6 hours:  If on nasal continuous positive airway pressure, respiratory therapy assess nares and determine need for appliance change or resting period.   8/15/2022 0759 by Miriam Chavez RN  Outcome: Progressing  8/15/2022 0046 by Nichelle Zhao RN  Outcome: Progressing     Problem: Safety - Adult  Goal: Free from fall injury  8/15/2022 0759 by Miriam Chavez RN  Outcome: Progressing  8/15/2022 0046 by Nichelle Zhao RN  Outcome: Progressing     Problem: ABCDS Injury Assessment  Goal: Absence of physical injury  8/15/2022 0759 by Miriam Chavez RN  Outcome: Progressing  8/15/2022 0046 by Nichelle Zhao RN  Outcome: Progressing

## 2022-08-15 NOTE — PLAN OF CARE
Nutrition Problem #1: Inadequate oral intake  Intervention: Food and/or Nutrient Delivery: Continue Current Diet, Start Oral Nutrition Supplement

## 2022-08-15 NOTE — DISCHARGE SUMMARY
Hospital Medicine Discharge Summary    Patient ID: Frances Fierro      Patient's PCP: Neto Samson MD    Admit Date: 8/10/2022     Discharge Date:   8/18/22    Admitting Physician: Serena Sandhoff, DO     Discharge Physician: MUSA Castillo - CNP       Discharge Diagnoses: Active Hospital Problems    Diagnosis Date Noted    Lower GI bleed [K92.2] 08/10/2022     Priority: Medium       The patient was seen and examined on day of discharge and this discharge summary is in conjunction with any daily progress note from day of discharge. Disposition:  [] Home  [] Home with home health [] Rehab [] Psych [] SNF  [] LTAC  [] Long term nursing home or group home [] Transfer to ICU  [] Transfer to PCU [] Other:    Hospital Course: The patient is a 65 y.o. female with PMHx: A. fib, gastric reflux, closed left wrist fracture, fibromyalgia, migraine, WHITNEY, rectal cancer, gastric bypass, type II DM, macrocytic anemia, anxiety, depression. Lives at [facility], Anticoagulated with Eliquis   Had GI bleed w/ stercoral proctitis, A. fib with RVR. and required transfusion during hospital course. Was also seen by GI during hospital course but there was no EGD or colonoscopy performed. She was admitted for a GI bleed with melena for 2 days and bright red blood per rectum with symptoms of nausea but no vomiting, generalized weakness and crampy abdominal pain,. She was evaluated in the ED and found to have an hemoglobin of 10. She is being admitted for further work-up. A colonoscopy was aborted due to poor prep and high stool burden. The rectal bleed was felt to come from the deep sacral decubitus ulcers. She was seen by wound care and had debridement at the bedside with gen surg. Wound vac to be placed in nursing facility after discharge. Cont to monitor wound in nursing home for further bleeding after eliquis is restarted.  She had no further bleeding in the hospital and hgb remained stable Assessment/plan:   Lower GI bleed: Melanotic stool x2 days with [facility] at [facility]. H/o rectal bleed and CT in the past showed probable stercoral colitis  -Transfused 1 unit packed red cells if hemoglobin drops below 7. Hgb remained stable and 8.9 on d/c   -Hold anticoagulation  -Continue PPI  -GI Dcd colonoscopy due to poor prep and high stool burden  - GI note: \"No blood was seen, only brown stool. I suspect that the \"rectal bleeding\" may be coming from the patient's huge, deep sacral decubitus. I doubt that any further attempts at colonoscopy will be successful. I would only attempt further if there would be clear-cut rectal bleeding  with a significant decrease in the hemoglobin and/or hemodynamic compromise. This would necessitate at least a two-day prep and even then, the patient may not be able to tolerate that. - GI thinks blood is coming from deep decubitus . Monitor for bleeding after restarting eliquis - no further bleeding while in-pt  -Bentyl and Tylenol for cramping     Chronic Afib on Eliquis, on hold due to GI bleed  Continuing metoprolol and flecainide per ECF regimen.  - ok to resume anticoagulation on d/c. F/u with ortho as indicated post ORIF left distal radius by Dr. Petr Handy     unstageable sacral decubitus ulcer  Overlying necrotic tissue but bone palpable so presumably stage IV. Eliquis on hold since admission 8/10, continue to hold   bedside debridement 8/17  Needs wound vac. Unable to be placed 8/17. Plan for placement 8/18. Exam:     BP (!) 141/89   Pulse 93   Temp 98 °F (36.7 °C) (Oral)   Resp 16   Ht 5' 2\" (1.575 m)   Wt 169 lb 4.8 oz (76.8 kg)   SpO2 94%   BMI 30.97 kg/m²   General appearance: No apparent distress, appears stated age and cooperative. HEENT: Pupils equal, round, and reactive to light. Conjunctivae/corneas clear. Neck: Supple, with full range of motion. No jugular venous distention. Trachea midline. Respiratory:  Normal respiratory effort.  Clear to auscultation, bilaterally without Rales/Wheezes/Rhonchi. Cardiovascular: Regular rate and rhythm with normal S1/S2 without murmurs, rubs or gallops. Abdomen: Soft, non-tender, non-distended with normal bowel sounds. Musculoskelatal: No clubbing, cyanosis or edema bilaterally. Full range of motion without deformity. Skin: Skin color, texture, turgor normal.  No rashes or lesions. Neurologic:  Neurovascularly intact without any focal sensory/motor deficits. Cranial nerves: II-XII intact, grossly non-focal.  Psychiatric: Alert and oriented, thought content appropriate, normal insight      Consults:     IP CONSULT TO HOSPITALIST  IP CONSULT TO GI    Diagnostic tests: n/a        Labs: For convenience and continuity at follow-up the following most recent labs are provided:      CBC:    Lab Results   Component Value Date/Time    WBC 6.0 08/14/2022 02:07 PM    HGB 8.9 08/14/2022 02:07 PM    HCT 26.5 08/14/2022 02:07 PM     08/14/2022 02:07 PM       Renal:    Lab Results   Component Value Date/Time     08/14/2022 02:07 PM    K 3.3 08/14/2022 02:07 PM    K 4.3 08/10/2022 05:00 PM    CL 94 08/14/2022 02:07 PM    CO2 29 08/14/2022 02:07 PM    BUN 12 08/14/2022 02:07 PM    CREATININE <0.5 08/14/2022 02:07 PM    CALCIUM 8.1 08/14/2022 02:07 PM    PHOS 2.1 08/04/2022 06:22 AM           Discharge Instructions/Follow-up:  follow up with hgb level tomorrow and the next couple of days to ensure remains stable and no further rectal bleeding. Would need 2 day prep for colonoscopy if shows signs of obvious rectal bleeding. GI felt her bleeding was likely due to decubitus wound. The colonoscopy here was with large stool burden and non-diagnositic. Monitor H/H     F/u with ortho, Dr. Franklin Hook when needed  F/U with urology when needed     PCP/SNF to follow up: ok to resume anticoagulation. Monitor decubitus wound for bleeding after resumption of eliquis. Placement of wound vac at facility when available as instructed in [place] from wound care     F/u Ortho f/u Dr. Jett Pascual as indicated for wrist       D/C condition: stable    Code status: full        Diet: Diet: ADULT DIET; Regular  ADULT ORAL NUTRITION SUPPLEMENT; Lunch, Dinner, Breakfast; Standard High Calorie/High Protein Oral Supplement      Discharge Medications:     Current Discharge Medication List             Details   collagenase 250 UNIT/GM ointment Apply topically daily. Qty: 5 g, Refills: 0                Details   HYDROcodone-acetaminophen (NORCO) 5-325 MG per tablet Take 1 tablet by mouth every 6 hours as needed for Pain. ALPRAZolam (XANAX) 0.5 MG tablet Take 0.5 mg by mouth every 8 hours as needed for Anxiety. acetaminophen (TYLENOL) 325 MG tablet Take 650 mg by mouth every 6 hours as needed for Pain      bisacodyl (DULCOLAX) 10 MG suppository Place 10 mg rectally daily as needed for Constipation      potassium chloride (KLOR-CON M) 20 MEQ extended release tablet TAKE 1 TABLET DAILY  Qty: 90 tablet, Refills: 1      polyethylene glycol (GLYCOLAX) 17 g packet Take 17 g by mouth daily as needed for Constipation  Qty: 527 g, Refills: 1      docusate sodium (COLACE, DULCOLAX) 100 MG CAPS Take 100 mg by mouth 2 times daily as needed for Constipation  Qty: 60 capsule, Refills: 0      metoprolol succinate (TOPROL XL) 25 MG extended release tablet Take 1 tablet by mouth in the morning. Qty: 30 tablet, Refills: 3      furosemide (LASIX) 20 MG tablet Take 1 tablet by mouth in the morning. With an additional 20 mg on MWF. Qty: 30 tablet, Refills: 0      apixaban (ELIQUIS) 5 MG TABS tablet Take 1 tablet by mouth in the morning and 1 tablet before bedtime.   Qty: 180 tablet, Refills: 3      escitalopram (LEXAPRO) 10 MG tablet Take 1 tablet by mouth daily  Qty: 90 tablet, Refills: 3    Associated Diagnoses: Anxiety; JENNIFER (generalized anxiety disorder)      tiZANidine (ZANAFLEX) 2 MG tablet Take 1 tablet by mouth every 6 hours as needed (muscle pain)  Qty: 60 tablet, Refills: 11      pantoprazole (PROTONIX) 40 MG tablet TAKE ONE TABLET BY MOUTH TWICE A DAY  Qty: 180 tablet, Refills: 1      dicyclomine (BENTYL) 10 MG capsule Take 1 capsule by mouth 4 times daily as needed (cramping/diarrhea)  Qty: 60 capsule, Refills: 5    Associated Diagnoses: Borborygmi      flecainide (TAMBOCOR) 100 MG tablet TAKE 1 TABLET BY MOUTH 2 TIMES A DAY  Qty: 180 tablet, Refills: 3    Associated Diagnoses: PAF (paroxysmal atrial fibrillation) (HCC)      Cyanocobalamin (VITAMIN B 12 PO) Take 2,500 mg by mouth daily      calcium carbonate-vitamin D 600-200 MG-UNIT TABS Take 1 tablet by mouth 2 times daily             Time Spent on discharge is more than 30 minutes in the examination, evaluation, counseling and review of medications and discharge plan. Signed:    MUSA Mitchell - CNP   8/15/2022      Thank you Arsenio Cope MD for the opportunity to be involved in this patient's care. If you have any questions or concerns please feel free to contact me at [phone].

## 2022-08-15 NOTE — PROGRESS NOTES
Physical Therapy  Facility/Department: [address] MED SURG  Physical Therapy Daily Note  (Cotx)  If patient discharges prior to next session this note will serve as a discharge summary. Please see below for the latest assessment towards goals. Name: Nkechi Turcios  : 1945  MRN: [id number]  Date of Service: 8/15/2022    Discharge Recommendations:  Patient would benefit from continued therapy after discharge (3-5sessions/week)  Nkechi Turcios scored a  on the AM-PAC short mobility form. Current research shows that an AM-PAC score of 17 or less is typically not associated with a discharge to the patient's home setting. Based on the patient's AM-PAC score and their current functional mobility deficits, it is recommended that the patient have 3-5 sessions per week of Physical Therapy at d/c to increase the patient's independence. Please see assessment section for further patient specific details. PT Equipment Recommendations  Equipment Needed: No  Other: defer to next facility      Patient Diagnosis(es): The encounter diagnosis was Hemorrhage of rectum and anus. Past Medical History:  has a past medical history of Acid reflux, Anxiety, Arthritis, Atrial fibrillation (HCC), Closed fracture dislocation of right elbow, Depression, Fibromyalgia, Migraine, WHITNEY (obstructive sleep apnea), Rectal cancer ([facility] [address] and Wears glasses. Past Surgical History:  has a past surgical history that includes  section; Total knee arthroplasty (Left); open tx femoral supracondylar fracture w/o extension (Right, 10/25/2018); Gastric bypass surgery; Cholecystectomy; Appendectomy; Colonoscopy; Upper gastrointestinal endoscopy (N/A, 2019); Colonoscopy (N/A, 2019); Upper gastrointestinal endoscopy (N/A, 2020); incision and drainage (Right, 2021); Skin graft (Right, 2021); Skin graft (Right, 2021); Cervical discectomy; Colon surgery; Colonoscopy (N/A, 2021);  Forearm surgery (Left, 8/3/2022); and Colonoscopy (N/A, 8/12/2022). Assessment   Assessment: Today, the pt demonstrated that she con't to function below her baseline and is needing at least max A of 2 for transfers, max A of 1 for supine > sit and min A for sitting EOB. She is limited by her sacral wound, decreased strength, decreased activity tolerance and need for 2 and LIFT equipment for mobility. Anticipate that the pt will need further slow > moderate intensity skilled PT to address her symptoms and to maximize her functional potential. Will con't to follow. Specific Instructions for Next Treatment: cotx  Therapy Prognosis: Fair;Guarded  Barriers to Learning: cog, memory  Requires PT Follow-Up: Yes  Activity Tolerance  Activity Tolerance: Patient limited by fatigue;Patient limited by endurance     Plan   Plan  Plan: 3-5 times per week  Specific Instructions for Next Treatment: cotx  Current Treatment Recommendations: Strengthening, ROM, Balance training, Functional mobility training, Transfer training, Therapeutic activities, Safety education & training, Patient/Caregiver education & training  Safety Devices  Type of Devices: Call light within reach, Chair alarm in place, Gait belt, Patient at risk for falls, Left in chair, Nurse notified     Restrictions  Restrictions/Precautions  Restrictions/Precautions: Fall Risk, Weight Bearing  Upper Extremity Weight Bearing Restrictions  Left Upper Extremity Weight Bearing: Non Weight Bearing  Position Activity Restriction  Other position/activity restrictions: soft cast left wrist, tello catheter; sacral wound     Subjective   General  Chart Reviewed: Yes  Additional Pertinent Hx: Graham Duffy, APRN - CNP H&P on 8-: The pt is a 66 yo female who presented to the ED from her skilled facility with melanotic stools and BRBPR. The pt was in the hospital 7-29>8-4 with septic shock due to pna, GI bleed, a-fib w/RVR.  The pt had a colonoscopy performed on 8-12 and her Hgb dropped from 11 to 8.3 today. PMHx: anxiety, arth, a-fib, R elbow fx, depression, fibromyalgia, migraine, WHITNEY, rectal Ca, cerv disectomy, ORIF L wrist, gastric by-pass, R femoral fx, L TKR  Response To Previous Treatment: Patient with no complaints from previous session.   Family / Caregiver Present: No  Referring Practitioner: Claudine Norwood MD  Referral Date : 08/12/22  Diagnosis: Lower GI bleed  Follows Commands: Within Functional Limits  General Comment  Comments: the pt was found to have beed incontinent of small amount of stool and was dependent for clean-up; RN in the room to change sacral dressing  Subjective  Subjective: the pt was found to be in the bed; the pt is very soft spoken and agreeable to therapy         Social/Functional History  Social/Functional History  Lives With: Son (Does not work))  Type of Home: [address] [place], [place] [address],[address]: One level  Home Access: Ramped entrance  Bathroom Shower/Tub: Tub/Shower unit  [employer]: Standard  Bathroom Equipment: Grab bars in shower, Hand-held shower, 3-in-1 commode, Grab bars around toilet  Bathroom Accessibility: [facility]: [place]  ADL Assistance: Independent  Homemaking Assistance: Needs assistance (Son takes care of homemaking needs.)  Ambulation Assistance: Independent (RW)  Transfer Assistance: Independent  Occupation: Retired  Type of Occupation: medical assistant, pt representative, billing  Additional Comments: Pt from [facility] recently - pt stated she has been home and to [facility] since her wrist fracture, but poor historian    Objective        Observation/Palpation  Observation: edema left UE, soft cast L wrist; large bruise medial and posterior L calf; large wound noted on the pt's sacrum        Bed mobility  Rolling to Right: Maximum assistance;2 Person assistance  Supine to Sit: Maximum assistance  Sit to Supine: Unable to assess  Scooting: Dependent/Total;2 Person assistance (back in the chair)  Transfers  Sit to Stand: Maximum Assistance;2 Person Assistance  Stand to sit: Maximum Assistance;2 Person Assistance  Bed to Chair: Dependent/Total (with stedy)  Ambulation  WB Status: the pt is non-ambulatory at this time     Balance  Comments: seated EOB with a R lean requiring at least min A to maintain midline    A/AROM Exercises: 10 reps LAQ and ankle pumps B LE's    AM-PAC Score  AM-PAC Inpatient Mobility Raw Score : 9 (08/15/22 1335)  AM-PAC Inpatient T-Scale Score : 30.55 (08/15/22 1335)  Mobility Inpatient CMS 0-100% Score: 81.38 (08/15/22 1335)  Mobility Inpatient CMS G-Code Modifier : CM (08/15/22 1335)        Goals  Short Term Goals  Time Frame for Short term goals: upon d/c  Short term goal 1: Bed mobility with mod A of 1-2. (not met)  Short term goal 2: Static sitting EOB with SBA. (not met)  Short term goal 3: Sit <> stand with mod A of 2. (not met)  Short term goal 4: Static standing in the stedy x 30 seconds with mod A of 1-2. (not met)  Patient Goals   Patient goals : none stated       Education  Patient Education  Education Given To: Patient; Family  Education Provided: Role of Therapy;Plan of Care;Orientation  Education Method: Demonstration;Verbal  Barriers to Learning: Cognition  Education Outcome: Continued education needed      Therapy Time   Individual Concurrent Group Co-treatment   Time In 1309         Time Out 1349         Minutes 40               Electronically signed by Jag Alva, PT [id number] on 8/15/2022 at 1:55 PM

## 2022-08-15 NOTE — PROGRESS NOTES
Comprehensive Nutrition Assessment    Type and Reason for Visit:  Reassess    Nutrition Recommendations/Plan:   Continue regular diet  Add Ensure Enlive tid to start     Malnutrition Assessment:  Malnutrition Status: At risk for malnutrition (Comment) (08/11/22 9959)    Context:  Acute Illness         Nutrition Assessment:    Follow-up. Diet adv to regular and pt reported improved po intake. With increased healing needs, pt agreed to adding Ensure Enlive tid to start. Noted plan to return to SNF at discharge. Will continue to monitor progress. Nutrition Related Findings:    BM 8/15. Noted generalized non-pitting edema. Labs reviewed. Wound Type: Unstageable, Deep Tissue Injury (DTI to right and left heels and unstageable area on sacrum. Tx continues.)       Current Nutrition Intake & Therapies:    Average Meal Intake: 26-50%, 51-75% (improving)  Average Supplements Intake: NPO  ADULT DIET; Regular  ADULT ORAL NUTRITION SUPPLEMENT; Lunch, Dinner; Standard High Calorie/High Protein Oral Supplement    Anthropometric Measures:  Height: 5' 2\" (157.5 cm)  Ideal Body Weight (IBW): 110 lbs (50 kg)    Admission Body Weight: 166 lb (75.3 kg)  Current Body Weight: 169 lb (76.7 kg), 153.6 % IBW. Weight Source: Bed Scale  Current BMI (kg/m2): 30.9        Weight Adjustment For: No Adjustment                 BMI Categories: Obese Class 1 (BMI 30.0-34. 9)    Estimated Daily Nutrient Needs:  Energy Requirements Based On: Kcal/kg  Weight Used for Energy Requirements: Current  Energy (kcal/day): 4640-9166 (15-18kcal/76kg)  Weight Used for Protein Requirements: Ideal  Protein (g/day): 60-70 (1.2-1.4g/50kg)  Method Used for Fluid Requirements: 1 ml/kcal  Fluid (ml/day): 1 ml/kcal    Nutrition Diagnosis:   Inadequate oral intake related to altered GI structure as evidenced by NPO or clear liquid status due to medical condition    Nutrition Interventions:   Food and/or Nutrient Delivery: Continue Current Diet, Start Oral Nutrition Supplement  Nutrition Education/Counseling: Education not indicated  Coordination of Nutrition Care: Continue to monitor while inpatient       Goals:  Previous Goal Met: Progressing toward Goal(s)  Goals: PO intake 50% or greater       Nutrition Monitoring and Evaluation:   Behavioral-Environmental Outcomes: None Identified  Food/Nutrient Intake Outcomes: Food and Nutrient Intake, Supplement Intake  Physical Signs/Symptoms Outcomes: Biochemical Data, Constipation, Diarrhea, Fluid Status or Edema, Skin, Weight    Discharge Planning:     Too soon to determine     Diamante Garza, [address], LD  Contact: [phone]

## 2022-08-15 NOTE — PROGRESS NOTES
Hospital Medicine Progress Note      Admit Date: 8/10/2022       CC: F/U for melena    HPI:   The patient is a 65 y.o. female with PMHx: A. fib, gastric reflux, closed left wrist fracture, fibromyalgia, migraine, WHITNEY, rectal cancer, gastric bypass, type II DM, macrocytic anemia, anxiety, depression. Lives at [facility], Anticoagulated with Eliquis   Had GI bleed w/ stercoral proctitis, A. fib with RVR. and required transfusion during hospital course. Was also seen by GI during hospital course but there was no EGD or colonoscopy performed. She was admitted for a GI bleed with melena for 2 days and bright red blood per rectum with symptoms of nausea but no vomiting, generalized weakness and crampy abdominal pain,. She was evaluated in the ED and found to have an hemoglobin of 10. She is being admitted for further work-up. A colonoscopy was aborted due to poor prep and high stool burden. The rectal bleed was felt to come from the deep sacral decubitus ulcers. Interval History/Subjective: GI to see for BRBPR. Holding anticoagulants. Hgb stable @ 8.9  Having \"a little bit of abd pain today. \"   No further blood in stool. Pain is in LLQ. Would like tylenol. Will see when she needs to f/u with urology. She has tello in now but states she does not usually have one in permanently    Review of Systems:       The patient denied headaches, visual changes, LOC, SOB, CP, ABD pain, N/V/D, skin changes, new or worsening weakness or neuromuscular deficits. Comprehensive ROS negative except as mentioned above.     Past Medical History:        Diagnosis Date    Acid reflux     Anxiety     Arthritis     Atrial fibrillation (HCC)     Closed fracture dislocation of right elbow     Depression     Fibromyalgia     Migraine     WHITNEY (obstructive sleep apnea)     Diagnosed years ago, no CPAP    Rectal cancer ([facility] [address]     Wears glasses     DRIVING       Past Surgical History:        Procedure Laterality Date    APPENDECTOMY CERVICAL DISCECTOMY      ACDF     SECTION      x4    CHOLECYSTECTOMY      COLON SURGERY      Rectal cancer removed using  incision    COLONOSCOPY      SEVERAL    COLONOSCOPY N/A 2019    COLONOSCOPY POLYPECTOMY SNARE/COLD BIOPSY performed by Davian Walters MD at [address] [facility] N/A 2021    COLONOSCOPY WITH BIOPSY performed by Albert Rodríguez MD at [address] [facility] N/A 2022    INCOMPLETE COLONOSCOPY D/T POOR PREP performed by Albert Rodríguez MD at [address] Left 8/3/2022    OPEN REDUCTION INTERNAL FIXATION LEFT DISTAL RADIUS performed by Yonathan Nciholas MD at [address] [facility] Right 2021    RIGHT LEG EVACUATION OF HEMATOMA performed by Abbe Mcclendon MD at [address] [facility] W/O EXTENSION Right 10/25/2018    OPEN REDUCTION INTERNAL FIXATION RIGHT FEMUR SUPRACONDYLAR FEMORAL FRACTURE WITH C-ARM performed by Miladys Bunn MD at [place] [address] Right 2021    RIGHT LEG HEMATOMA EVAKUATION, [address], AND WOUND VAC PLACEMENT performed by Susan Orozco MD at [place] [address] Right 2021    RIGHT LOWER EXTREMITY SKIN GRAFT performed by Susan Orozco MD at [address] [facility] Left     UPPER GASTROINTESTINAL ENDOSCOPY N/A 2019    ESOPHAGOGASTRODUODENOSCOPY performed by Davian Walters MD at [address] N/A 2020    EGD BIOPSY performed by Davian Walters MD at [facility]:  Demerol hcl [meperidine], Pcn [penicillins], and Adhesive tape    Past medical and surgical history reviewed. Any changes have been noted.      PHYSICAL EXAM:  BP (!) 141/89   Pulse 93   Temp 98 °F (36.7 °C) (Oral)   Resp 16   Ht 5' 2\" (1.575 m)   Wt 169 lb 4.8 oz (76.8 kg)   SpO2 94%   BMI 30.97 kg/m²       Intake/Output Summary (Last 24 hours) at 8/15/2022 0930  Last data filed orders to display       Scheduled and prn Medications:    Scheduled Meds:   lidocaine 1 % injection  5 mL IntraDERmal Once    sodium chloride flush  5-40 mL IntraVENous 2 times per day    collagenase   Topical Daily    sodium chloride flush  5-40 mL IntraVENous 2 times per day    [Held by provider] apixaban  5 mg Oral BID    calcium-cholecalciferol  1 tablet Oral BID    vitamin B-12  2,500 mcg Oral Nightly    escitalopram  10 mg Oral Nightly    flecainide  100 mg Oral 2 times per day    furosemide  20 mg Oral Daily    [Held by provider] metoprolol succinate  25 mg Oral Daily    pantoprazole  40 mg Oral BID     Continuous Infusions:   sodium chloride      sodium chloride      dextrose       PRN Meds:.sodium chloride flush, sodium chloride, benzocaine-menthol, sodium chloride flush, sodium chloride, ondansetron **OR** ondansetron, acetaminophen **OR** acetaminophen, glucose, dextrose bolus **OR** dextrose bolus, glucagon (rDNA), dextrose, dicyclomine    Assessment & Plan:          Lower GI bleed: Melanotic stool x2 days with BRRB at [facility]. H/o rectal bleed and CT in the past showed probable stercoral colitis  -Transfused 1 unit packed red cells if hemoglobin drops below 7  -Hold anticoagulation  -Continue PPI  -GI Dcd colono due to poor prep and high stool burden  - GI thinks blood is coming from deep decubitus   -Bentyl and Tylenol for cramping     Chronic Afib on Eliquis, on hold due to GI bleed  Continuing metoprolol and flecainide per ECF regimen. F/u with ortho as indicated post ORIF left distal radius by Dr. Alex Marquez current regimen/therapies. Monitor. Adjust medical regimen as appropriate. Body mass index is 30.97 kg/m². The patient and / or the family were informed of the results of any tests, a time was given to answer questions, a plan was proposed and they agreed with plan. DVT ppx: SCDs  GI ppx: PPI    Diet: ADULT DIET;  Regular    Consults:  IP CONSULT TO HOSPITALIST  IP CONSULT TO GI    DISPO/placement plan: pending    Code Status: Full Code      Tex Clement, MUSA - JOHN  08/15/22

## 2022-08-16 LAB
GLUCOSE BLD-MCNC: 111 MG/DL (ref 70–99)
GLUCOSE BLD-MCNC: 121 MG/DL (ref 70–99)
GLUCOSE BLD-MCNC: 143 MG/DL (ref 70–99)
PERFORMED ON: ABNORMAL

## 2022-08-16 PROCEDURE — APPSS15 APP SPLIT SHARED TIME 0-15 MINUTES: Performed by: NURSE PRACTITIONER

## 2022-08-16 PROCEDURE — 1200000000 HC SEMI PRIVATE

## 2022-08-16 PROCEDURE — APPNB15 APP NON BILLABLE TIME 0-15 MINS: Performed by: NURSE PRACTITIONER

## 2022-08-16 PROCEDURE — 6360000002 HC RX W HCPCS: Performed by: INTERNAL MEDICINE

## 2022-08-16 PROCEDURE — 97110 THERAPEUTIC EXERCISES: CPT

## 2022-08-16 PROCEDURE — 94760 N-INVAS EAR/PLS OXIMETRY 1: CPT

## 2022-08-16 PROCEDURE — 6370000000 HC RX 637 (ALT 250 FOR IP): Performed by: INTERNAL MEDICINE

## 2022-08-16 PROCEDURE — 97530 THERAPEUTIC ACTIVITIES: CPT

## 2022-08-16 PROCEDURE — 2580000003 HC RX 258: Performed by: INTERNAL MEDICINE

## 2022-08-16 RX ADMIN — SODIUM CHLORIDE, PRESERVATIVE FREE 10 ML: 5 INJECTION INTRAVENOUS at 22:30

## 2022-08-16 RX ADMIN — CYANOCOBALAMIN TAB 1000 MCG 2500 MCG: 1000 TAB at 22:29

## 2022-08-16 RX ADMIN — Medication: at 09:21

## 2022-08-16 RX ADMIN — ONDANSETRON 4 MG: 2 INJECTION INTRAMUSCULAR; INTRAVENOUS at 11:11

## 2022-08-16 RX ADMIN — Medication 1 TABLET: at 22:29

## 2022-08-16 RX ADMIN — ACETAMINOPHEN 650 MG: 325 TABLET, FILM COATED ORAL at 05:14

## 2022-08-16 RX ADMIN — FLECAINIDE ACETATE 100 MG: 100 TABLET ORAL at 22:29

## 2022-08-16 RX ADMIN — SODIUM CHLORIDE, PRESERVATIVE FREE 10 ML: 5 INJECTION INTRAVENOUS at 09:22

## 2022-08-16 RX ADMIN — FUROSEMIDE 20 MG: 20 TABLET ORAL at 09:20

## 2022-08-16 RX ADMIN — FLECAINIDE ACETATE 100 MG: 100 TABLET ORAL at 09:20

## 2022-08-16 RX ADMIN — ACETAMINOPHEN 650 MG: 325 TABLET, FILM COATED ORAL at 16:17

## 2022-08-16 RX ADMIN — ACETAMINOPHEN 650 MG: 325 TABLET, FILM COATED ORAL at 11:11

## 2022-08-16 RX ADMIN — PANTOPRAZOLE SODIUM 40 MG: 40 TABLET, DELAYED RELEASE ORAL at 22:29

## 2022-08-16 RX ADMIN — PANTOPRAZOLE SODIUM 40 MG: 40 TABLET, DELAYED RELEASE ORAL at 09:20

## 2022-08-16 RX ADMIN — Medication 1 TABLET: at 09:20

## 2022-08-16 RX ADMIN — ESCITALOPRAM OXALATE 10 MG: 10 TABLET ORAL at 22:29

## 2022-08-16 ASSESSMENT — PAIN SCALES - GENERAL
PAINLEVEL_OUTOF10: 5
PAINLEVEL_OUTOF10: 3
PAINLEVEL_OUTOF10: 6
PAINLEVEL_OUTOF10: 0
PAINLEVEL_OUTOF10: 4

## 2022-08-16 ASSESSMENT — PAIN DESCRIPTION - LOCATION
LOCATION: HEAD

## 2022-08-16 ASSESSMENT — PAIN DESCRIPTION - DESCRIPTORS
DESCRIPTORS: ACHING
DESCRIPTORS: DISCOMFORT

## 2022-08-16 ASSESSMENT — PAIN DESCRIPTION - ORIENTATION: ORIENTATION: MID

## 2022-08-16 ASSESSMENT — PAIN - FUNCTIONAL ASSESSMENT: PAIN_FUNCTIONAL_ASSESSMENT: ACTIVITIES ARE NOT PREVENTED

## 2022-08-16 NOTE — PROGRESS NOTES
Occupational Therapy  Facility/Department: [facility]  Occupational Therapy Daily Treatment Note    Name: Mary Hernandez  : 1945  MRN: [id number]  Date of Service: 2022    Discharge Recommendations:  3-5 sessions per week, Continue to assess pending progress, Patient would benefit from continued therapy after discharge    Mary Hernandez scored a  on the AM-PAC ADL Inpatient form. Current research shows that an AM-PAC score of 17 or less is typically not associated with a discharge to the patient's home setting. Based on the patient's AM-PAC score and their current ADL deficits, it is recommended that the patient have 3-5 sessions per week of Occupational Therapy at d/c to increase the patient's independence. Please see assessment section for further patient specific details. If patient discharges prior to next session this note will serve as a discharge summary. Please see below for the latest assessment towards goals. Patient Diagnosis(es): The encounter diagnosis was Hemorrhage of rectum and anus. Past Medical History:  has a past medical history of Acid reflux, Anxiety, Arthritis, Atrial fibrillation (HCC), Closed fracture dislocation of right elbow, Depression, Fibromyalgia, Migraine, WHITNEY (obstructive sleep apnea), Rectal cancer ([facility] [address] and Wears glasses. Past Surgical History:  has a past surgical history that includes  section; Total knee arthroplasty (Left); open tx femoral supracondylar fracture w/o extension (Right, 10/25/2018); Gastric bypass surgery; Cholecystectomy; Appendectomy; Colonoscopy; Upper gastrointestinal endoscopy (N/A, 2019); Colonoscopy (N/A, 2019); Upper gastrointestinal endoscopy (N/A, 2020); incision and drainage (Right, 2021); Skin graft (Right, 2021); Skin graft (Right, 2021); Cervical discectomy; Colon surgery; Colonoscopy (N/A, 2021);  Forearm surgery (Left, 8/3/2022); and Colonoscopy (N/A, 8/12/2022). Assessment   Performance deficits / Impairments: Decreased functional mobility ; Decreased safe awareness;Decreased balance;Decreased ADL status; Decreased ROM; Decreased endurance;Decreased strength  Assessment: Pt tolerated treatment fair/poor. Pt declining OOB today, but with encouragement was agreeable to participate in therex seated EOB. Pt required max A x1-2 for bed mobility, up to min A for sitting balance at EOB, max A grooming, and total A toileting hygiene. Anticipate pt would require overall max A/total A for ADLs. Pt continues to be limited by the above performance deficits, and demonstrates need for ongoing skilled OT at d/c to maximize pt's safety and independence. Prognosis: Fair  REQUIRES OT FOLLOW-UP: Yes  Activity Tolerance  Activity Tolerance: Patient limited by fatigue;Treatment limited secondary to decreased cognition        Plan   Plan  Times per Week: 3-5  Times per Day: Daily  Current Treatment Recommendations: Strengthening, Balance training, Functional mobility training, Endurance training, Safety education & training, Equipment evaluation, education, & procurement, Self-Care / ADL, Patient/Caregiver education & training     Restrictions  Restrictions/Precautions  Restrictions/Precautions: Fall Risk, Weight Bearing  Upper Extremity Weight Bearing Restrictions  Left Upper Extremity Weight Bearing: Non Weight Bearing  Position Activity Restriction  Other position/activity restrictions: soft cast left wrist, tello catheter; sacral wound    Subjective   General  Chart Reviewed:  Yes  Additional Pertinent Hx: The patient is a 65 y.o. female who presents to [facility] with PMHx: A. fib, gastric reflux, closed left wrist fracture, fibromyalgia, migraine, WHITNEY, rectal cancer, gastric bypass, type II DM, macrocytic anemia, anxiety, depression.(copied per note YESICA Bynum 8/10/22)  Family / Caregiver Present: No  Referring Practitioner: Ary Mathews  Diagnosis: lower GI bleed  Subjective  Subjective: Pt met b/s for OT cotx with PT. Pt in bed on arrival, agreeable to participate in therapy. Pt c/o fatigue and requesting to not get into chair today. Pt reports headache, did not rate. Social/Functional History  Social/Functional History  Lives With: Son (Does not work))  Type of Home: House  Home Layout: One level  Home Access: Ramped entrance  Bathroom Shower/Tub: Tub/Shower unit  Bathroom Toilet: [address] [place] [address] [address]: Grab bars in shower, Hand-held shower, 3-in-1 commode, Grab bars around toilet  Bathroom Accessibility: [facility]: Ada Brito, [place], [place] [address]  ADL Assistance: Independent  Homemaking Assistance: Needs assistance (Son takes care of homemaking needs.)  Ambulation Assistance: Independent (RW)  Transfer Assistance: Independent  Occupation: Retired  Type of Occupation: medical assistant, pt representative, billing  Additional Comments: Pt from [facility] recently - pt stated she has been home and to [facility] since her wrist fracture, but poor historian       Objective     Safety Devices  Type of Devices: Call light within reach; Left in bed    Balance  Sitting: Impaired (seated EOB x7 minutes with min A initially d/t posterior and L lean progressing to SBA)  Sitting - Static: Fair (occasional)    ADL  Grooming: Maximum assistance  Grooming Skilled Clinical Factors: seated EOB encouraged pt to complete grooming while working on sitting balance, pt initiated but reports too fatigued/weak for task and stopped. Toileting: Dependent/Total  Toileting Skilled Clinical Factors: catheter, pt incontinent of small amount of loose stool.  Total A for hygiene sidelying in bed      Bed mobility  Rolling to Left: Maximum assistance;2 Person assistance  Rolling to Right: Maximum assistance;2 Person assistance  Supine to Sit: Maximum assistance  Sit to Supine: Maximum assistance;2 Person assistance  Scooting: Dependent/Total;2 Person assistance    Transfers  Transfer Comments: pt declined to attempt today    Cognition  Overall Cognitive Status: Exceptions  Arousal/Alertness: Delayed responses to stimuli  Following Commands: Follows one step commands with increased time; Follows one step commands with repetition  Memory: Decreased recall of recent events;Decreased short term memory  Problem Solving: Assistance required to generate solutions;Assistance required to implement solutions;Decreased awareness of errors;Assistance required to identify errors made;Assistance required to correct errors made  Insights: Decreased awareness of deficits  Initiation: Requires cues for some  Sequencing: Requires cues for some    A/AROM Exercises: seated EOB pt completed scapular squeezes x5, shoulder shrugs x5, and sidebend onto L elbow x5 with CGA to achieve midline, modified sit-up x5    Education Given To: Patient  Education Provided: Role of Therapy;Plan of Care;ADL Adaptive Strategies; Home Exercise Program  Barriers to Learning: Cognition  Education Outcome: Continued education needed                                   AM-PAC Score        AM-[facility] Inpatient Daily Activity Raw Score: 8 (08/16/22 1544)  AM-PAC Inpatient ADL T-Scale Score : 22.86 (08/16/22 1544)  ADL Inpatient CMS 0-100% Score: 85.69 (08/16/22 1544)  ADL Inpatient CMS G-Code Modifier : CM (08/16/22 1544)        Goals  Short Term Goals  Time Frame for Short term goals: by discharge: status goals 8/16: all goals ongoing  Short Term Goal 1: bathe UB with mod assist, LB assist of 2  Short Term Goal 2: dress UB with mod assist  Short Term Goal 3: groom with set up  Short Term Goal 4: bed mobility with mod of 2  Short Term Goal 5: transfer wiht mod assist of 2 using blanka gusman  Long Term Goals  Time Frame for Long term goals : same as stg  Patient Goals   Patient goals : pt stated she felt she needed more therapy to get stronger at end of session.   At beginning, stated she wasn't sure if she could go home or not yet       Therapy Time   Individual Concurrent Group Co-treatment   Time In 1513         Time Out 1545         Minutes [address], [place] [address]

## 2022-08-16 NOTE — CARE COORDINATION
8/16 Call placed to [facility] to check status of pre-cert. Remains in Pending status. Electronically signed by Tera Choudhury RN on 8/16/2022 at 3:25 PM

## 2022-08-16 NOTE — CONSULTS
General Surgery Consult         Dustin Hodgkins   : 1945 MRN: [id number]  Date of Admission: 8/10/2022  Admitting Physician:Ravinder Avendano DO  Primary Care Physician: Hayde Lloyd MD  Consulting Physician:     Reason for Consult: Unstageable sacral decubitus ulcer    Chief complaint: Sacral wound    Diagnosis Present on Admission:   Lower GI bleed      History of Present Illness  Dustin Hodgkins is a 65 y.o. female past medical history of A. fib on Eliquis last dose 8/10/2022, being seen in general surgical consultation for sacral decubitus ulcer in need of debridement. She presented to the ER this admission on 8/10/2022 for GI bleeding. She was just admitted prior to that for septic shock secondary to pneumonia. During that admission she was also noted to have A. fib with RVR and acute lower GI bleed secondary to stercoral proctitis. She underwent a colonoscopy Dr. Maddy Gusman on 2022 with poor prep. Large stool burden with impaction in the rectum, it was manually disimpacted the stool was soft. Examination of the rectum revealed formed stool throughout the proximal rectum and beyond, the procedure was aborted there was no blood seen only brown stool. He suspected that the rectal bleeding was coming from the patient's huge deep sacral decubitus ulcer. He recommended at least a 2-day prep if a colonoscopy was needed in the future and it was certain that the bleeding was coming from the rectum. Wound care team ordered Santyl on 2022 and this has provided little benefit over the past week. We are asked to see her today to evaluate for sharp debridement.     Past Medical History:   Diagnosis Date    Acid reflux     Anxiety     Arthritis     Atrial fibrillation (HCC)     Closed fracture dislocation of right elbow     Depression     Fibromyalgia     Migraine     WHITNEY (obstructive sleep apnea)     Diagnosed years ago, no CPAP    Rectal cancer (HCC)     Wears glasses     DRIVING needed for Constipation 8/4/22   Jose Hardy MD   metoprolol succinate (TOPROL XL) 25 MG extended release tablet Take 1 tablet by mouth in the morning. 8/5/22   Jose Hardy MD   furosemide (LASIX) 20 MG tablet Take 1 tablet by mouth in the morning. With an additional 20 mg on MWF. 8/4/22   Jose Hardy MD   apixaban (ELIQUIS) 5 MG TABS tablet Take 1 tablet by mouth in the morning and 1 tablet before bedtime. 7/27/22   Sayra Hsu MD   escitalopram (LEXAPRO) 10 MG tablet Take 1 tablet by mouth daily 6/14/22   Lizette Sanchez MD   tiZANidine (ZANAFLEX) 2 MG tablet Take 1 tablet by mouth every 6 hours as needed (muscle pain) 2/18/22   Lizette Sanchez MD   pantoprazole (PROTONIX) 40 MG tablet TAKE ONE TABLET BY MOUTH TWICE A DAY 2/18/22   Lizette Sanchez MD   dicyclomine (BENTYL) 10 MG capsule Take 1 capsule by mouth 4 times daily as needed (cramping/diarrhea) 2/18/22   Lizette Sanchez MD   flecainide (TAMBOCOR) 100 MG tablet TAKE 1 TABLET BY MOUTH 2 TIMES A DAY 1/27/22   Sayra Hsu MD   Cyanocobalamin (VITAMIN B 12 PO) Take 2,500 mg by mouth daily    Historical Provider, MD   calcium carbonate-vitamin D 600-200 MG-UNIT TABS Take 1 tablet by mouth 2 times daily    Historical Provider, MD       Review of Systems  12 point review of systems was reviewed and negative except for that listed in HPI    Physical Exam  Vitals:    08/16/22 0359 08/16/22 0400 08/16/22 0844 08/16/22 0936   BP: 126/75  (!) 155/85    Pulse: 96  81    Resp: 17  17    Temp: 99.5 °F (37.5 °C)  98.1 °F (36.7 °C)    TempSrc: Oral  Oral    SpO2: 92%  94% 94%   Weight:  168 lb 8 oz (76.4 kg)     Height:             Intake/Output Summary (Last 24 hours) at 8/16/2022 1458  Last data filed at 8/16/2022 0920  Gross per 24 hour   Intake 360 ml   Output 250 ml   Net 110 ml       Body mass index is 30.82 kg/m². General Appearance: chronically ill appearing   HEENT: NCAT, PERRLA, Conjunctiva non injected, no scleral icterus.   External ears and nares normal bilaterally. Mucous membranes pink and moist.   Neck: Neck is symmetrical. Trachea appears midline. Lung: Clear to auscultation bilaterally, normal rate and effort   Cardiac: Regular rate and rhythm, S1S2 present, without murmur   Abdomen: Soft, nontender, nondistended  Neuro: No gross motor or sensory deficits. Skin: No open wounds or rashes. MSK: normal ROM, no edema  Psych: normal insight, judgment  Sacral decubitus ulcer with overlying necrotic yellow-black nonviable tissue, slightly malodorous. Incontinent of stool at time of exam.  Labs  Recent Labs     08/14/22  1407   WBC 6.0   HGB 8.9*   HCT 26.5*        Recent Labs     08/14/22  1407   *   K 3.3*   CL 94*   CO2 29   BUN 12   GFRAA >60     Recent Labs     08/14/22  1407   AST 13*   ALT 11     No results for input(s): UOSM in the last 72 hours. Invalid input(s): Jose G Swann, [place], [place], [facility]    Imaging  No orders to display          Assessment  Frances Fierro is a 65 y.o. female being seen in general surgical consultation for unstageable sacral decubitus ulcer    Plan    1. unstageable sacral decubitus ulcer  Overlying necrotic tissue but bone palpable so presumably stage IV.    Eliquis on hold since admission 8/10, continue to hold  Plan for bedside debridement tomorrow       Electronically signed by MUSA Pearson CNP on 8/16/22 at 2:58 PM EDT

## 2022-08-16 NOTE — PROGRESS NOTES
Physical Therapy  Facility/Department: [address] MED SURG  Physical Therapy Daily Note  ([facility]ot[facility])  If patient discharges prior to next session this note will serve as a discharge summary. Please see below for the latest assessment towards goals. Name: Glenis Chand  : 1945  MRN: [id number]  Date of Service: 2022    Discharge Recommendations:  Patient would benefit from continued therapy after discharge (3-5sessions/week)   Glenis Chand scored a / on the AM-PAC short mobility form. Current research shows that an AM-PAC score of 17 or less is typically not associated with a discharge to the patient's home setting. Based on the patient's AM-PAC score and their current functional mobility deficits, it is recommended that the patient have 3-5 sessions per week of Physical Therapy at d/c to increase the patient's independence. Please see assessment section for further patient specific details. PT Equipment Recommendations  Equipment Needed: No  Other: defer to next facility      Patient Diagnosis(es): The encounter diagnosis was Hemorrhage of rectum and anus. Past Medical History:  has a past medical history of Acid reflux, Anxiety, Arthritis, Atrial fibrillation (HCC), Closed fracture dislocation of right elbow, Depression, Fibromyalgia, Migraine, WHITNEY (obstructive sleep apnea), Rectal cancer ([facility] [address] and Wears glasses. Past Surgical History:  has a past surgical history that includes  section; Total knee arthroplasty (Left); open tx femoral supracondylar fracture w/o extension (Right, 10/25/2018); Gastric bypass surgery; Cholecystectomy; Appendectomy; Colonoscopy; Upper gastrointestinal endoscopy (N/A, 2019); Colonoscopy (N/A, 2019); Upper gastrointestinal endoscopy (N/A, 2020); incision and drainage (Right, 2021); Skin graft (Right, 2021); Skin graft (Right, 2021); Cervical discectomy; Colon surgery; Colonoscopy (N/A, 2021);  Forearm surgery (Left, 8/3/2022); and Colonoscopy (N/A, 8/12/2022). Assessment   Assessment: Today, the pt demonstrated that she con't to function below her baseline and is needing at least max A of 1-2 for supine > sit and min A/close SBA for sitting EOB. She declined getting to the cahir today and sat on the EOB x 7 minutes and completed exercises. She is limited by her sacral wound, decreased strength, and decreased activity tolerance. Anticipate that the pt will need further slow > moderate intensity skilled PT to address her symptoms and to maximize her functional potential. Will con't to follow. Specific Instructions for Next Treatment: cotx  Therapy Prognosis: Guarded  Barriers to Learning: cog, memory  Requires PT Follow-Up: Yes     Plan   Plan  Plan: 3-5 times per week  Specific Instructions for Next Treatment: cotx  Current Treatment Recommendations: Strengthening, ROM, Balance training, Functional mobility training, Transfer training, Therapeutic activities, Safety education & training, Patient/Caregiver education & training  Safety Devices  Type of Devices: Call light within reach, Left in bed     Restrictions  Restrictions/Precautions  Restrictions/Precautions: Fall Risk, Weight Bearing  Upper Extremity Weight Bearing Restrictions  Left Upper Extremity Weight Bearing: Non Weight Bearing  Position Activity Restriction  Other position/activity restrictions: soft cast left wrist, tello catheter; sacral wound     Subjective   General  Chart Reviewed: Yes  Additional Pertinent Hx: Per MUSA Michelle CNP H&P on 8-: The pt is a 64 yo female who presented to the ED from her skilled facility with melanotic stools and BRBPR. The pt was in the hospital 7-29>8-4 with septic shock due to pna, GI bleed, a-fib w/RVR. The pt had a colonoscopy performed on 8-12 and her Hgb dropped from 11 to 8.3 today.       PMHx: anxiety, arth, a-fib, R elbow fx, depression, fibromyalgia, migraine, WHITNEY, rectal Ca, cerv disectomy, ORIF L wrist, gastric by-pass, R femoral fx, L TKR  Response To Previous Treatment: Patient with no complaints from previous session. Family / Caregiver Present: No  Referring Practitioner: Philomena Aguilera MD  Referral Date : 08/12/22  Diagnosis: Lower GI bleed  Follows Commands: Within Functional Limits  Subjective  Subjective: the pt was found to be laying on her L side in the bed upon arrival; she was reluctantly agreeable to therapy and once seated EOB she declined getting to the chair; the pt reproting having a HA again today         Social/Functional History  Social/Functional History  Lives With: Son (Does not work))  Type of Home: [address] [place] [address],[address]: One level  Home Access: Ramped entrance  Bathroom Shower/Tub: Tub/Shower unit  [employer]: [address] [facility] [address]: Grab bars in shower, Hand-held shower, 3-in-1 commode, Grab bars around toilet  Bathroom Accessibility: [facility] [address]  ADL Assistance: Independent  Homemaking Assistance: Needs assistance (Son takes care of homemaking needs.)  Ambulation Assistance: Independent (RW)  Transfer Assistance: Independent  Occupation: Retired  Type of Occupation: medical assistant, pt representative, billing  Additional Comments: Pt from [facility] recently - pt stated she has been home and to [facility] since her wrist fracture, but poor historian    Cognition   Cognition  Overall Cognitive Status: Exceptions  Arousal/Alertness: Delayed responses to stimuli  Following Commands: Follows one step commands with increased time; Follows one step commands with repetition  Memory: Decreased recall of recent events;Decreased short term memory  Problem Solving: Assistance required to generate solutions;Assistance required to implement solutions;Decreased awareness of errors;Assistance required to identify errors made;Assistance required to correct errors made  Insights: Decreased awareness of deficits  Initiation: Requires cues for some  Sequencing: Requires cues for some     Objective       Bed mobility  Rolling to Left: Maximum assistance;2 Person assistance  Rolling to Right: Maximum assistance;2 Person assistance  Supine to Sit: Maximum assistance  Sit to Supine: Maximum assistance;2 Person assistance  Scooting: Dependent/Total;2 Person assistance  Transfers  Sit to Stand: Unable to assess  Stand to sit: Unable to assess  Bed to Chair: Unable to assess  Comment: the pt declined standing and getting to the chair today  Ambulation  WB Status: the pt is non-ambulatory at this time     Balance  Comments: seated EOB x 7 minutes with initially min A and progressing to close SBA; when leaning on R elbow she was able to gain midline again with SBA and increased effort  Exercise Treatment: 2 reps leaning to the R on elbow; LAQ, AP, scapular squeezes, shoulder shrugs; modified sit ups (x 5reps)          -PAC Score  -PAC Inpatient Mobility Raw Score : 9 (08/16/22 1540)  AM-PAC Inpatient T-Scale Score : 30.55 (08/16/22 1540)  Mobility Inpatient CMS 0-100% Score: 81.38 (08/16/22 1540)  Mobility Inpatient CMS G-Code Modifier : CM (08/16/22 1540)         Goals  Short Term Goals  Time Frame for Short term goals: upon d/c  Short term goal 1: Bed mobility with mod A of 1-2. (not met)  Short term goal 2: Static sitting EOB with SBA. (not met)  Short term goal 3: Sit <> stand with mod A of 2. (not met)  Short term goal 4: Static standing in the stedy x 30 seconds with mod A of 1-2.   (not met)  Patient Goals   Patient goals : none stated       Education  Patient Education  Education Given To: Patient  Education Provided: Role of Therapy;Plan of Care  Education Method: Demonstration;Verbal  Barriers to Learning: Cognition  Education Outcome: Continued education needed      Therapy Time   Individual Concurrent Group Co-treatment   Time In 2652         Time Out 1545         Minutes 32             Electronically signed by Stan Patel, PT [id number] on 8/16/2022 at 3:48 PM

## 2022-08-16 NOTE — PLAN OF CARE
Problem: Discharge Planning  Goal: Discharge to home or other facility with appropriate resources  8/16/2022 1051 by Senia Peace RN  Outcome: Progressing     Problem: Pain  Goal: Verbalizes/displays adequate comfort level or baseline comfort level  8/16/2022 1051 by Senia Peace RN  Outcome: Progressing     Problem: Skin/Tissue Integrity - Adult  Goal: Skin integrity remains intact  8/16/2022 1051 by Senia Peace RN  Outcome: Progressing     Problem: Skin/Tissue Integrity - Adult  Goal: Incisions, wounds, or drain sites healing without S/S of infection  8/16/2022 1051 by Senia Peace RN  Outcome: Progressing     Problem: Musculoskeletal - Adult  Goal: Return mobility to safest level of function  8/16/2022 1051 by Senia Peace RN  Outcome: Progressing     Problem: Musculoskeletal - Adult  Goal: Maintain proper alignment of affected body part  8/16/2022 1051 by Senia Peace RN  Outcome: Progressing     Problem: Musculoskeletal - Adult  Goal: Return ADL status to a safe level of function  8/16/2022 1051 by Senia Peace RN  Outcome: Progressing     Problem: Gastrointestinal - Adult  Goal: Maintains or returns to baseline bowel function  8/16/2022 1051 by Senia Peace RN  Outcome: Progressing     Problem: Gastrointestinal - Adult  Goal: Maintains adequate nutritional intake  8/16/2022 1051 by Senia Peace RN  Outcome: Progressing     Problem: Genitourinary - Adult  Goal: Urinary catheter remains patent  8/16/2022 1051 by Senia Peace RN  Outcome: Progressing     Problem: Neurosensory - Adult  Goal: Achieves stable or improved neurological status  Outcome: Progressing     Problem: Respiratory - Adult  Goal: Achieves optimal ventilation and oxygenation  Outcome: Progressing     Problem: Cardiovascular - Adult  Goal: Maintains optimal cardiac output and hemodynamic stability  Outcome: Progressing     Problem: Chronic Conditions and Co-morbidities  Goal: Patient's chronic conditions and co-morbidity symptoms are monitored and maintained or improved  8/16/2022 1051 by Clair Moss RN  Outcome: Progressing     Problem: Nutrition Deficit:  Goal: Optimize nutritional status  8/16/2022 1051 by Clair Moss RN  Outcome: Progressing     Problem: Skin/Tissue Integrity  Goal: Absence of new skin breakdown  Description: 1. Monitor for areas of redness and/or skin breakdown  2. Assess vascular access sites hourly  3. Every 4-6 hours minimum:  Change oxygen saturation probe site  4. Every 4-6 hours:  If on nasal continuous positive airway pressure, respiratory therapy assess nares and determine need for appliance change or resting period.   8/16/2022 1051 by Clair Moss RN  Outcome: Progressing     Problem: Safety - Adult  Goal: Free from fall injury  8/16/2022 1051 by Clair Moss RN  Outcome: Progressing     Problem: ABCDS Injury Assessment  Goal: Absence of physical injury  8/16/2022 1051 by Clair Moss RN  Outcome: Progressing

## 2022-08-16 NOTE — PLAN OF CARE
Problem: Discharge Planning  Goal: Discharge to home or other facility with appropriate resources  Outcome: Progressing     Problem: Pain  Goal: Verbalizes/displays adequate comfort level or baseline comfort level  Outcome: Progressing     Problem: Skin/Tissue Integrity - Adult  Goal: Skin integrity remains intact  Outcome: Progressing  Goal: Incisions, wounds, or drain sites healing without S/S of infection  Outcome: Progressing     Problem: Musculoskeletal - Adult  Goal: Return mobility to safest level of function  Outcome: Progressing  Goal: Maintain proper alignment of affected body part  Outcome: Progressing  Goal: Return ADL status to a safe level of function  Outcome: Progressing     Problem: Gastrointestinal - Adult  Goal: Maintains or returns to baseline bowel function  Outcome: Progressing  Goal: Maintains adequate nutritional intake  Outcome: Progressing     Problem: Genitourinary - Adult  Goal: Urinary catheter remains patent  Outcome: Progressing     Problem: Chronic Conditions and Co-morbidities  Goal: Patient's chronic conditions and co-morbidity symptoms are monitored and maintained or improved  Outcome: Progressing     Problem: Nutrition Deficit:  Goal: Optimize nutritional status  8/16/2022 0203 by Sim Duncan RN  Outcome: Progressing  8/15/2022 1444 by Neta Gitelman, RD, LD  Outcome: Progressing     Problem: Skin/Tissue Integrity  Goal: Absence of new skin breakdown  Description: 1. Monitor for areas of redness and/or skin breakdown  2. Assess vascular access sites hourly  3. Every 4-6 hours minimum:  Change oxygen saturation probe site  4. Every 4-6 hours:  If on nasal continuous positive airway pressure, respiratory therapy assess nares and determine need for appliance change or resting period.   Outcome: Progressing     Problem: Safety - Adult  Goal: Free from fall injury  Outcome: Progressing     Problem: ABCDS Injury Assessment  Goal: Absence of physical injury  Outcome: Progressing

## 2022-08-17 ENCOUNTER — TELEPHONE (OUTPATIENT)
Dept: CARDIOLOGY CLINIC | Age: 77
End: 2022-08-17

## 2022-08-17 LAB
GLUCOSE BLD-MCNC: 104 MG/DL (ref 70–99)
GLUCOSE BLD-MCNC: 106 MG/DL (ref 70–99)
GLUCOSE BLD-MCNC: 110 MG/DL (ref 70–99)
GLUCOSE BLD-MCNC: 132 MG/DL (ref 70–99)
PERFORMED ON: ABNORMAL

## 2022-08-17 PROCEDURE — 94760 N-INVAS EAR/PLS OXIMETRY 1: CPT

## 2022-08-17 PROCEDURE — 6360000002 HC RX W HCPCS: Performed by: INTERNAL MEDICINE

## 2022-08-17 PROCEDURE — 0JB70ZZ EXCISION OF BACK SUBCUTANEOUS TISSUE AND FASCIA, OPEN APPROACH: ICD-10-PCS | Performed by: FAMILY MEDICINE

## 2022-08-17 PROCEDURE — APPSS45 APP SPLIT SHARED TIME 31-45 MINUTES: Performed by: PHYSICIAN ASSISTANT

## 2022-08-17 PROCEDURE — APPNB45 APP NON BILLABLE 31-45 MINUTES: Performed by: PHYSICIAN ASSISTANT

## 2022-08-17 PROCEDURE — 1200000000 HC SEMI PRIVATE

## 2022-08-17 PROCEDURE — 2580000003 HC RX 258: Performed by: INTERNAL MEDICINE

## 2022-08-17 PROCEDURE — 6370000000 HC RX 637 (ALT 250 FOR IP): Performed by: INTERNAL MEDICINE

## 2022-08-17 PROCEDURE — APPNB30 APP NON BILLABLE TIME 0-30 MINS: Performed by: NURSE PRACTITIONER

## 2022-08-17 RX ADMIN — FUROSEMIDE 20 MG: 20 TABLET ORAL at 08:58

## 2022-08-17 RX ADMIN — ESCITALOPRAM OXALATE 10 MG: 10 TABLET ORAL at 20:01

## 2022-08-17 RX ADMIN — Medication: at 09:02

## 2022-08-17 RX ADMIN — ACETAMINOPHEN 650 MG: 325 TABLET, FILM COATED ORAL at 20:00

## 2022-08-17 RX ADMIN — SODIUM CHLORIDE, PRESERVATIVE FREE 10 ML: 5 INJECTION INTRAVENOUS at 20:01

## 2022-08-17 RX ADMIN — FLECAINIDE ACETATE 100 MG: 100 TABLET ORAL at 20:00

## 2022-08-17 RX ADMIN — SODIUM CHLORIDE, PRESERVATIVE FREE 10 ML: 5 INJECTION INTRAVENOUS at 09:02

## 2022-08-17 RX ADMIN — CYANOCOBALAMIN TAB 1000 MCG 2500 MCG: 1000 TAB at 20:00

## 2022-08-17 RX ADMIN — ACETAMINOPHEN 650 MG: 325 TABLET, FILM COATED ORAL at 06:50

## 2022-08-17 RX ADMIN — Medication 1 TABLET: at 08:59

## 2022-08-17 RX ADMIN — FLECAINIDE ACETATE 100 MG: 100 TABLET ORAL at 08:58

## 2022-08-17 RX ADMIN — Medication 1 TABLET: at 20:00

## 2022-08-17 RX ADMIN — PANTOPRAZOLE SODIUM 40 MG: 40 TABLET, DELAYED RELEASE ORAL at 08:58

## 2022-08-17 RX ADMIN — ONDANSETRON 4 MG: 2 INJECTION INTRAMUSCULAR; INTRAVENOUS at 00:06

## 2022-08-17 RX ADMIN — PANTOPRAZOLE SODIUM 40 MG: 40 TABLET, DELAYED RELEASE ORAL at 20:01

## 2022-08-17 ASSESSMENT — PAIN DESCRIPTION - DESCRIPTORS: DESCRIPTORS: ACHING

## 2022-08-17 ASSESSMENT — PAIN DESCRIPTION - ORIENTATION: ORIENTATION: MID

## 2022-08-17 ASSESSMENT — PAIN DESCRIPTION - LOCATION: LOCATION: HEAD

## 2022-08-17 ASSESSMENT — PAIN SCALES - GENERAL
PAINLEVEL_OUTOF10: 7
PAINLEVEL_OUTOF10: 0
PAINLEVEL_OUTOF10: 3

## 2022-08-17 ASSESSMENT — PAIN - FUNCTIONAL ASSESSMENT: PAIN_FUNCTIONAL_ASSESSMENT: ACTIVITIES ARE NOT PREVENTED

## 2022-08-17 NOTE — TELEPHONE ENCOUNTER
Spoke with son Pranay Levi and he would like to move after further review. Scheduled 9/15/22 11:30 a.m. at [facility]. No q/c at this time.

## 2022-08-17 NOTE — TELEPHONE ENCOUNTER
JOHN Patel     Okay to push out HFU or keep as scheduled 8/25/22 based on below update? Family calling since patient was readmitted 8/10/22 secondary to GIB with Eliquis remains on hold, (decubitus ulcer debridement) requesting EP see during this readmission since she will DC to ECF. EP saw during prior admission 7/29-8/4/22 for sepsis/PNA, AFIB with RVR, GIB OAC on hold as well.

## 2022-08-17 NOTE — TELEPHONE ENCOUNTER
Jeane's son Mitali Small called in this afternoon, he states his mom is in [address] [place] [address] she has been there since Saturday he thinks, she is there for blood in stool and blood pressure,she has an appointment with Dr. Geoffrey Borjas on 8/25 at [facility], he would like to know if ts possible that Dr. Geoffrey Borjas could visit his mom while she is in the hospital because he's not sure how long she will be there or if she will be going to a nursing home, she is in a wheelchair and cant walk and its difficult to transport. He can be reached at [phone].

## 2022-08-17 NOTE — PLAN OF CARE
Problem: Discharge Planning  Goal: Discharge to home or other facility with appropriate resources  8/17/2022 0117 by Sim Duncan RN  Outcome: Progressing     Problem: Pain  Goal: Verbalizes/displays adequate comfort level or baseline comfort level  8/17/2022 1155 by Kendal Cifuentes RN  Outcome: Progressing  8/17/2022 0117 by Sim Duncan RN  Outcome: Progressing     Problem: Skin/Tissue Integrity - Adult  Goal: Skin integrity remains intact  8/17/2022 1155 by Kendal Cifuentes RN  Outcome: Progressing  8/17/2022 0117 by Sim Duncan RN  Outcome: Progressing  Goal: Incisions, wounds, or drain sites healing without S/S of infection  8/17/2022 1155 by Kendal Cifuentes RN  Outcome: Progressing  8/17/2022 0117 by Sim Duncan RN  Outcome: Progressing     Problem: Musculoskeletal - Adult  Goal: Return mobility to safest level of function  8/17/2022 0117 by Sim Duncan RN  Outcome: Progressing  Goal: Maintain proper alignment of affected body part  8/17/2022 0117 by Sim Duncan RN  Outcome: Progressing  Goal: Return ADL status to a safe level of function  8/17/2022 0117 by Sim Duncan RN  Outcome: Progressing

## 2022-08-17 NOTE — CARE COORDINATION
8/17 Discharge order canceled. Patient requiring further wound care and possible wound vac therapy. [employer] notified. Transport canceled.  Electronically signed by Cecilio Stockton RN on 8/17/2022 at 3:56 PM

## 2022-08-17 NOTE — PROGRESS NOTES
General and Vascular Surgery                                                           Daily Progress Note                                                             Ilana Salcedo PA-C     Pt Name: Jake Chong  Medical Record Number: [id number]  Date of Birth 1945   Today's Date: 8/17/2022    ASSESSMENT/PLAN  1. unstageable sacral decubitus ulcer  Debridement of skin and subcutaneous tissue bedside today. Still with some slough in wound. Will apply santyl to help further debride and perform excision debridements as needed  Overlying necrotic tissue but bone palpable so presumably stage IV. Eliquis on hold since admission 8/10, continue to hold  Keep pressure off ulcer as much as possible    EDUCATION  Patient educated about their illness/diagnosis, stated above, and all questions answered. We discussed the importance of nutrition, medications they are taking, and healthy lifestyle. Savilla Abdiaziz is unchanged from yesterday. Pain is well controlled. She has no nausea and no vomiting. Tolerated bedside debridement well-no pain noted during procedure. OBJECTIVE  VITALS:  height is 5' 2\" (1.575 m) and weight is 168 lb 8 oz (76.4 kg). Her oral temperature is 98.2 °F (36.8 °C). Her blood pressure is 143/87 (abnormal) and her pulse is 96. Her respiration is 16 and oxygen saturation is 94%. VITALS:  BP (!) 143/87   Pulse 96   Temp 98.2 °F (36.8 °C) (Oral)   Resp 16   Ht 5' 2\" (1.575 m)   Wt 168 lb 8 oz (76.4 kg)   SpO2 94%   BMI 30.82 kg/m²   GENERAL: alert, no distress  ABDOMEN: soft, non-tender and non-distended  EXTREMITY/BUTTOCK: Unstageable sacral ulcer. Necrotic tissue debrided. Some slough in ulcer. No pain noted  I/O last 3 completed shifts:   In: 360 [P.O.:360]  Out: 1300 [Urine:1300]  I/O this shift:  In: 300 [P.O.:300]  Out: 400 [Urine:400]    LABS  Recent Labs     08/14/22  1407   WBC 6.0   HGB 8.9*   HCT 26.5*

## 2022-08-17 NOTE — CARE COORDINATION
Wound care aware of consult. Would recommend veraflo wound vac. Will plan to place tomorrow for [facility], Monday schedule.    Electronically signed by Olamide Berry RN [email or web address] on 8/17/2022 at 2:38 PM

## 2022-08-17 NOTE — TELEPHONE ENCOUNTER
We cannot see while in the hospital without a consult order. It looks like she has been staying in sinus so less pressing to get in for quick follow up. Okay to push out office visit.

## 2022-08-17 NOTE — CARE COORDINATION
DISCHARGE SUMMARY     DATE OF DISCHARGE: 8/17/22    DISCHARGE DESTINATION: [facility]  Discharging to [facility]   Name: [facility]  Address:  [address]   Phone:  [phone]  Fax:  [phone]     FACILITY    Level of Care: Skilled    Report Number: [phone]    Fax Number:  [phone]    Precert Obtained: yes    Hens Completed: N/A    PASARR: N/A    Notified: RN, Family, and Facility/Agency    Prescriptions Faxed:no    HEMODIALYSIS: No    TRANSPORTATION: [place] [address] Name:  [facility]     Time: 4:30PM    Phone Number: [phone]    NEW DME ORDERED: no    Electronically signed by Jermaine Quispe RN on 8/17/2022 at 12:31 PM'

## 2022-08-17 NOTE — PROGRESS NOTES
no CPAP    Rectal cancer (HCC)     Wears glasses     DRIVING       Past Surgical History:        Procedure Laterality Date    APPENDECTOMY      CERVICAL DISCECTOMY      ACDF     SECTION      x4    CHOLECYSTECTOMY      COLON SURGERY      Rectal cancer removed using  incision    COLONOSCOPY      SEVERAL    COLONOSCOPY N/A 2019    COLONOSCOPY POLYPECTOMY SNARE/COLD BIOPSY performed by Bakari Lomeli MD at [address] [facility] N/A 2021    COLONOSCOPY WITH BIOPSY performed by Birdie Price MD at [address] [facility] N/A 2022    INCOMPLETE COLONOSCOPY D/T POOR PREP performed by Birdie Price MD at [address] Left 8/3/2022    OPEN REDUCTION INTERNAL FIXATION LEFT DISTAL RADIUS performed by Malick Fontana MD at [address] [facility] Right 2021    RIGHT LEG EVACUATION OF HEMATOMA performed by Nader Valadez MD at [address] [facility] W/O EXTENSION Right 10/25/2018    OPEN REDUCTION INTERNAL FIXATION RIGHT FEMUR SUPRACONDYLAR FEMORAL FRACTURE WITH C-ARM performed by Michell Jaffe MD at [place] [address] Right 2021    RIGHT LEG HEMATOMA EVAKUATION, [address], AND WOUND VAC PLACEMENT performed by yMkel Hinds MD at [place] [address] Right 2021    RIGHT LOWER EXTREMITY SKIN GRAFT performed by Mykel Hinds MD at [address] [facility] Left     UPPER GASTROINTESTINAL ENDOSCOPY N/A 2019    ESOPHAGOGASTRODUODENOSCOPY performed by Bakari Lomeli MD at [address] [place] [address] N/A 2020    EGD BIOPSY performed by Bakari Lomeli MD at [facility]:  Demerol hcl [meperidine], Pcn [penicillins], and Adhesive tape    Past medical and surgical history reviewed. Any changes have been noted.      PHYSICAL EXAM:  BP (!) 143/87   Pulse 96   Temp 98.2 °F (36.8 °C) (Oral)   Resp 16   Ht 5' 2\" (1.575 m)   Wt 168 lb 8 oz (76.4 kg)   SpO2 94%   BMI 30.82 kg/m²       Intake/Output Summary (Last 24 hours) at 8/17/2022 1553  Last data filed at 8/17/2022 1331  Gross per 24 hour   Intake 300 ml   Output 2650 ml   Net -2350 ml        General appearance:   No apparent distress, appears stated age. Cooperative. HEENT:  Normocephalic, atraumatic. PERRLA. EOMi. Conjunctivae/corneas clear, no icterus, non-injected. Neck: Supple, with full range of motion. No jugular venous distention. Trachea midline. Respiratory:  Normal respiratory effort. Clear to auscultation, bilaterally without Rales/Wheezes/Rhonchi. Cardiovascular:  Regular rate and rhythm without murmurs, rubs or gallops. Abdomen: Soft, non-tender, non-distended, without rebound or guarding. Normal bowel sounds. Musculoskeletal:  No clubbing, cyanosis or edema bilaterally. Full range of motion without deformity. Skin: Skin color, texture, turgor normal.  No rashes or lesions. Neurologic:  Neurovascularly intact without any focal sensory/motor deficits. Cranial nerves: II-XII intact, grossly intact. No facial asymmetry, tongue midline.    Psychiatric:  Alert and oriented, thought content appropriate  Capillary Refill: Brisk,< 3 seconds   Peripheral Pulses: +2 palpable, equal bilaterally       LABS:    Lab Results   Component Value Date    WBC 6.0 08/14/2022    HGB 8.9 (L) 08/14/2022    HCT 26.5 (L) 08/14/2022    .5 (H) 08/14/2022     08/14/2022    LYMPHOPCT 9.3 08/14/2022    RBC 2.51 (L) 08/14/2022    MCH 35.3 (H) 08/14/2022    MCHC 33.5 08/14/2022    RDW 15.7 (H) 08/14/2022       Lab Results   Component Value Date    CREATININE <0.5 (L) 08/14/2022    BUN 12 08/14/2022     (L) 08/14/2022    K 3.3 (L) 08/14/2022    CL 94 (L) 08/14/2022    CO2 29 08/14/2022       Lab Results   Component Value Date/Time    MG 1.70 08/04/2022 06:22 AM       Lab Results   Component Value Date    ALT 11 08/14/2022    AST 13 (L) 08/14/2022    ALKPHOS 93 08/14/2022    BILITOT 0.3 08/14/2022        No flowsheet data found. Lab Results   Component Value Date    LABA1C 6.0 09/09/2021       Imaging:  No orders to display       Scheduled and prn Medications:    Scheduled Meds:   lidocaine 1 % injection  5 mL IntraDERmal Once    sodium chloride flush  5-40 mL IntraVENous 2 times per day    collagenase   Topical Daily    sodium chloride flush  5-40 mL IntraVENous 2 times per day    [Held by provider] apixaban  5 mg Oral BID    calcium-cholecalciferol  1 tablet Oral BID    vitamin B-12  2,500 mcg Oral Nightly    escitalopram  10 mg Oral Nightly    flecainide  100 mg Oral 2 times per day    furosemide  20 mg Oral Daily    [Held by provider] metoprolol succinate  25 mg Oral Daily    pantoprazole  40 mg Oral BID     Continuous Infusions:   sodium chloride      sodium chloride      dextrose       PRN Meds:.sodium chloride flush, sodium chloride, benzocaine-menthol, sodium chloride flush, sodium chloride, ondansetron **OR** ondansetron, acetaminophen **OR** acetaminophen, glucose, dextrose bolus **OR** dextrose bolus, glucagon (rDNA), dextrose, dicyclomine    Assessment & Plan:             Lower GI bleed: Melanotic stool x2 days with [facility] at [facility]. H/o rectal bleed and CT in the past showed probable stercoral colitis  -Transfused 1 unit packed red cells if hemoglobin drops below 7  -Hold anticoagulation  -Continue PPI  -GI Dcd colonoscopy due to poor prep and high stool burden  - GI thinks blood is coming from deep decubitus   -Bentyl and Tylenol for cramping     Chronic Afib on Eliquis, on hold due to GI bleed  Continuing metoprolol and flecainide per ECF regimen. - need orders on when to resume. F/u with ortho as indicated post ORIF left distal radius by Dr. Vamshi Boyer    unstageable sacral decubitus ulcer  Overlying necrotic tissue but bone palpable so presumably stage IV.   Eliquis on hold since admission 8/10, continue to hold   bedside debridement 8/17  Needs wound vac. Unable to be placed 8/17. Plan for placement 8/18. Continue current regimen/therapies. Monitor. Adjust medical regimen as appropriate. Body mass index is 30.82 kg/m². The patient and / or the family were informed of the results of any tests, a time was given to answer questions, a plan was proposed and they agreed with plan. DVT ppx: scds      Diet: ADULT DIET;  Regular  ADULT ORAL NUTRITION SUPPLEMENT; Lunch, Dinner, Breakfast; Standard High Calorie/High Protein Oral Supplement    Consults:  IP CONSULT TO HOSPITALIST  IP CONSULT TO GI  IP CONSULT TO GENERAL SURGERY    DISPO/placement plan: to SNF tomorrow possibly    Code Status: Full Code      MUSA Valenzuela CNP  08/17/22

## 2022-08-17 NOTE — PLAN OF CARE
Problem: Discharge Planning  Goal: Discharge to home or other facility with appropriate resources  Outcome: Progressing     Problem: Pain  Goal: Verbalizes/displays adequate comfort level or baseline comfort level  Outcome: Progressing     Problem: Skin/Tissue Integrity - Adult  Goal: Skin integrity remains intact  Outcome: Progressing  Goal: Incisions, wounds, or drain sites healing without S/S of infection  Outcome: Progressing     Problem: Musculoskeletal - Adult  Goal: Return mobility to safest level of function  Outcome: Progressing  Goal: Maintain proper alignment of affected body part  Outcome: Progressing  Goal: Return ADL status to a safe level of function  Outcome: Progressing     Problem: Gastrointestinal - Adult  Goal: Maintains or returns to baseline bowel function  Outcome: Progressing  Goal: Maintains adequate nutritional intake  Outcome: Progressing     Problem: Genitourinary - Adult  Goal: Urinary catheter remains patent  Outcome: Progressing     Problem: Chronic Conditions and Co-morbidities  Goal: Patient's chronic conditions and co-morbidity symptoms are monitored and maintained or improved  Outcome: Progressing     Problem: Nutrition Deficit:  Goal: Optimize nutritional status  Outcome: Progressing     Problem: Skin/Tissue Integrity  Goal: Absence of new skin breakdown  Description: 1. Monitor for areas of redness and/or skin breakdown  2. Assess vascular access sites hourly  3. Every 4-6 hours minimum:  Change oxygen saturation probe site  4. Every 4-6 hours:  If on nasal continuous positive airway pressure, respiratory therapy assess nares and determine need for appliance change or resting period.   Outcome: Progressing     Problem: Safety - Adult  Goal: Free from fall injury  Outcome: Progressing     Problem: ABCDS Injury Assessment  Goal: Absence of physical injury  Outcome: Progressing     Problem: Neurosensory - Adult  Goal: Achieves stable or improved neurological status  Outcome: Progressing     Problem: Respiratory - Adult  Goal: Achieves optimal ventilation and oxygenation  Outcome: Progressing     Problem: Cardiovascular - Adult  Goal: Maintains optimal cardiac output and hemodynamic stability  Outcome: Progressing

## 2022-08-18 VITALS
RESPIRATION RATE: 18 BRPM | DIASTOLIC BLOOD PRESSURE: 76 MMHG | OXYGEN SATURATION: 98 % | BODY MASS INDEX: 30.91 KG/M2 | HEIGHT: 62 IN | WEIGHT: 168 LBS | SYSTOLIC BLOOD PRESSURE: 145 MMHG | TEMPERATURE: 98.2 F | HEART RATE: 91 BPM

## 2022-08-18 LAB
GLUCOSE BLD-MCNC: 89 MG/DL (ref 70–99)
GLUCOSE BLD-MCNC: 93 MG/DL (ref 70–99)
PERFORMED ON: NORMAL
PERFORMED ON: NORMAL

## 2022-08-18 PROCEDURE — APPNB15 APP NON BILLABLE TIME 0-15 MINS: Performed by: NURSE PRACTITIONER

## 2022-08-18 PROCEDURE — 6370000000 HC RX 637 (ALT 250 FOR IP): Performed by: INTERNAL MEDICINE

## 2022-08-18 PROCEDURE — 97530 THERAPEUTIC ACTIVITIES: CPT

## 2022-08-18 PROCEDURE — 2580000003 HC RX 258: Performed by: INTERNAL MEDICINE

## 2022-08-18 PROCEDURE — APPSS15 APP SPLIT SHARED TIME 0-15 MINUTES: Performed by: NURSE PRACTITIONER

## 2022-08-18 RX ADMIN — Medication 1 TABLET: at 08:29

## 2022-08-18 RX ADMIN — PANTOPRAZOLE SODIUM 40 MG: 40 TABLET, DELAYED RELEASE ORAL at 08:29

## 2022-08-18 RX ADMIN — SODIUM CHLORIDE, PRESERVATIVE FREE 10 ML: 5 INJECTION INTRAVENOUS at 08:38

## 2022-08-18 RX ADMIN — Medication: at 08:37

## 2022-08-18 RX ADMIN — FLECAINIDE ACETATE 100 MG: 100 TABLET ORAL at 08:29

## 2022-08-18 RX ADMIN — FUROSEMIDE 20 MG: 20 TABLET ORAL at 08:29

## 2022-08-18 RX ADMIN — ACETAMINOPHEN 650 MG: 325 TABLET, FILM COATED ORAL at 08:29

## 2022-08-18 ASSESSMENT — PAIN DESCRIPTION - DESCRIPTORS: DESCRIPTORS: ACHING

## 2022-08-18 ASSESSMENT — PAIN DESCRIPTION - FREQUENCY: FREQUENCY: CONTINUOUS

## 2022-08-18 ASSESSMENT — PAIN DESCRIPTION - LOCATION: LOCATION: HEAD

## 2022-08-18 ASSESSMENT — PAIN - FUNCTIONAL ASSESSMENT: PAIN_FUNCTIONAL_ASSESSMENT: PREVENTS OR INTERFERES SOME ACTIVE ACTIVITIES AND ADLS

## 2022-08-18 ASSESSMENT — PAIN DESCRIPTION - ORIENTATION: ORIENTATION: MID

## 2022-08-18 ASSESSMENT — PAIN DESCRIPTION - ONSET: ONSET: ON-GOING

## 2022-08-18 ASSESSMENT — PAIN DESCRIPTION - PAIN TYPE: TYPE: ACUTE PAIN

## 2022-08-18 ASSESSMENT — PAIN SCALES - GENERAL
PAINLEVEL_OUTOF10: 0
PAINLEVEL_OUTOF10: 3

## 2022-08-18 NOTE — PROGRESS NOTES
ALT, BILITOT, BILIDIR, NITRU, COLORU, BACTERIA in the last 72 hours.     Invalid input(s): PT, WBCU, RBCU, LEUKOCYTESUA  CBC:   Lab Results   Component Value Date/Time    WBC 6.0 08/14/2022 02:07 PM    RBC 2.51 08/14/2022 02:07 PM    HGB 8.9 08/14/2022 02:07 PM    HCT 26.5 08/14/2022 02:07 PM    .5 08/14/2022 02:07 PM    MCH 35.3 08/14/2022 02:07 PM    MCHC 33.5 08/14/2022 02:07 PM    RDW 15.7 08/14/2022 02:07 PM     08/14/2022 02:07 PM    MPV 7.3 08/14/2022 02:07 PM     CMP:    Lab Results   Component Value Date/Time     08/14/2022 02:07 PM    K 3.3 08/14/2022 02:07 PM    K 4.3 08/10/2022 05:00 PM    CL 94 08/14/2022 02:07 PM    CO2 29 08/14/2022 02:07 PM    BUN 12 08/14/2022 02:07 PM    CREATININE <0.5 08/14/2022 02:07 PM    GFRAA >60 08/14/2022 02:07 PM    AGRATIO 0.8 08/14/2022 02:07 PM    LABGLOM >60 08/14/2022 02:07 PM    GLUCOSE 91 08/14/2022 02:07 PM    PROT 5.2 08/14/2022 02:07 PM    LABALBU 2.3 08/14/2022 02:07 PM    CALCIUM 8.1 08/14/2022 02:07 PM    BILITOT 0.3 08/14/2022 02:07 PM    ALKPHOS 93 08/14/2022 02:07 PM    AST 13 08/14/2022 02:07 PM    ALT 11 08/14/2022 02:07 PM         Hartford Steve, APRN - CNP-C  Electronically signed 8/18/2022 at 9:16 AM

## 2022-08-18 NOTE — PLAN OF CARE
Problem: Pain  Goal: Verbalizes/displays adequate comfort level or baseline comfort level  8/17/2022 2322 by Ivania Walter RN  Outcome: Progressing  8/17/2022 1155 by Africa Clay RN  Outcome: Progressing

## 2022-08-18 NOTE — PROGRESS NOTES
Occupational Therapy  Facility/Department: [facility]  Occupational Therapy Daily Treatment Note    Name: Laxmi Oconnor  : 1945  MRN: [id number]  Date of Service: 2022    Discharge Recommendations:  3-5 sessions per week, Continue to assess pending progress, Patient would benefit from continued therapy after discharge      Laxmi Oconnor scored a  on the AM-PAC ADL Inpatient form. Current research shows that an AM-PAC score of 17 or less is typically not associated with a discharge to the patient's home setting. Based on the patient's AM-PAC score and their current ADL deficits, it is recommended that the patient have 3-5 sessions per week of Occupational Therapy at d/c to increase the patient's independence. Please see assessment section for further patient specific details. If patient discharges prior to next session this note will serve as a discharge summary. Please see below for the latest assessment towards goals. Patient Diagnosis(es): The encounter diagnosis was Hemorrhage of rectum and anus. Past Medical History:  has a past medical history of Acid reflux, Anxiety, Arthritis, Atrial fibrillation (HCC), Closed fracture dislocation of right elbow, Depression, Fibromyalgia, Migraine, WHITNEY (obstructive sleep apnea), Rectal cancer ([facility] [address] and Wears glasses. Past Surgical History:  has a past surgical history that includes  section; Total knee arthroplasty (Left); open tx femoral supracondylar fracture w/o extension (Right, 10/25/2018); Gastric bypass surgery; Cholecystectomy; Appendectomy; Colonoscopy; Upper gastrointestinal endoscopy (N/A, 2019); Colonoscopy (N/A, 2019); Upper gastrointestinal endoscopy (N/A, 2020); incision and drainage (Right, 2021); Skin graft (Right, 2021); Skin graft (Right, 2021); Cervical discectomy; Colon surgery; Colonoscopy (N/A, 2021);  Forearm surgery (Left, 8/3/2022); and Colonoscopy (N/A, 8/12/2022). Assessment   Performance deficits / Impairments: Decreased functional mobility ; Decreased safe awareness;Decreased balance;Decreased ADL status; Decreased ROM; Decreased endurance;Decreased strength  Assessment: Pt continues to be limited by the above performance deficits, requiring assist x2 for bed mobility and fxl transfers/standing activity with stedy lift. Pt required up to mod A for sitting balance at EOB. Pt declined to complete ADLs d/t fatigue, but anticipate pt would require overall max A for ADLs. Pt demonstrates need for ongoing skilled OT at d/c to maximize pt's safety and independence.   Prognosis: Fair  REQUIRES OT FOLLOW-UP: Yes  Activity Tolerance  Activity Tolerance: Patient limited by fatigue;Treatment limited secondary to decreased cognition        Plan   Plan  Times per Week: 3-5  Times per Day: Daily  Current Treatment Recommendations: Strengthening, Balance training, Functional mobility training, Endurance training, Safety education & training, Equipment evaluation, education, & procurement, Self-Care / ADL, Patient/Caregiver education & training     Restrictions  Restrictions/Precautions  Restrictions/Precautions: Fall Risk, Weight Bearing  Upper Extremity Weight Bearing Restrictions  Left Upper Extremity Weight Bearing: Non Weight Bearing  Position Activity Restriction  Other position/activity restrictions: soft cast left wrist, tello catheter; sacral wound    Subjective   General  Chart Reviewed: Yes  Patient assessed for rehabilitation services?: Yes  Additional Pertinent Hx: The patient is a 65 y.o. female who presents to [facility] with PMHx: A. fib, gastric reflux, closed left wrist fracture, fibromyalgia, migraine, WHITNEY, rectal cancer, gastric bypass, type II DM, macrocytic anemia, anxiety, depression.(copied per note Lynn Rodriguez, YESICA 8/10/22)  Family / Caregiver Present: No  Referring Practitioner: Renata Grijalva  Diagnosis: lower GI bleed  Subjective  Subjective: Pt met b/s for OT cotx with PT. Pt in bed on arrival, agreeable to participate in therapy. Pt reports buttocks feels \"sore,\" but did no rate pain. Social/Functional History  Social/Functional History  Lives With: Son (Does not work))  Type of Home: House  Home Layout: One level  Home Access: Ramped entrance  Bathroom Shower/Tub: Tub/Shower unit  Bathroom Toilet: [address] [place] [address] [address]: Grab bars in shower, Hand-held shower, 3-in-1 commode, Grab bars around toilet  Bathroom Accessibility: [facility]: Phan Russell, [facility], [facility]  ADL Assistance: Independent  Homemaking Assistance: Needs assistance (Son takes care of homemaking needs.)  Ambulation Assistance: Independent (RW)  Transfer Assistance: Independent  Occupation: Retired  Type of Occupation: medical assistant, pt representative, billing  Additional Comments: Pt from [facility] recently - pt stated she has been home and to [facility] since her wrist fracture, but poor historian       Objective     Safety Devices  Type of Devices: Call light within reach; Left in bed;Bed alarm in place;Gait belt;Nurse notified  Restraints  Restraints Initially in Place: No    Balance  Sitting: Impaired (seated EOB ~5 minutes with variable assist from CGA to mod A d/t posterior LOB)  Sitting - Static: Fair (occasional) (CGA/min A)  Sitting - Dynamic: Poor (constant support) (mod A d/t posterior LOB)  Standing: With support (max A x2 for standing in stedy x2 trials less than 10 seconds d/t pt fatigue and weakness)    Activity Tolerance  Activity Tolerance: Patient limited by fatigue;Patient limited by endurance; Patient limited by pain    Bed mobility  Rolling to Left: Maximum assistance;2 Person assistance  Rolling to Right: Maximum assistance;2 Person assistance  Supine to Sit: Maximum assistance;2 Person assistance  Sit to Supine: Maximum assistance;2 Person assistance  Scooting: Dependent/Total;2 Person assistance (to scoot up in bed with bed in trendelenburg)  Bed Mobility Comments: cues to use bed rail with right arm, restrict use of LUE    Transfers  Sit to stand: Moderate assistance;Maximum assistance;2 Person assistance (EOB>stedy x2 trials with max A x2 on first trial, progressing to mod A x2 on second trial)  Stand to sit: Moderate assistance;Maximum assistance;2 Person assistance  Transfer Comments: deferred OOB to chair d/t pt having wound vac placed later, but would have required use of blanka stedy for bed>chair    Cognition  Overall Cognitive Status: Exceptions  Arousal/Alertness: Delayed responses to stimuli  Following Commands: Follows one step commands with increased time; Follows one step commands with repetition  Memory: Decreased recall of recent events;Decreased short term memory  Problem Solving: Assistance required to generate solutions;Assistance required to implement solutions;Decreased awareness of errors;Assistance required to identify errors made;Assistance required to correct errors made  Insights: Decreased awareness of deficits  Initiation: Requires cues for some  Sequencing: Requires cues for some    Education Given To: Patient  Education Provided: Role of Therapy;Plan of Care;Transfer Training  Barriers to Learning: Cognition  Education Outcome: Continued education needed                                    AM-PAC Score        AM-[facility] Inpatient Daily Activity Raw Score: 8 (08/18/22 1037)  AM-PAC Inpatient ADL T-Scale Score : 22.86 (08/18/22 1037)  ADL Inpatient CMS 0-100% Score: 85.69 (08/18/22 1037)  ADL Inpatient CMS G-Code Modifier : CM (08/18/22 1037)           Goals  Short Term Goals  Time Frame for Short term goals: by discharge: status goals 8/18: all goals ongoing  Short Term Goal 1: bathe UB with mod assist, LB assist of 2  Short Term Goal 2: dress UB with mod assist  Short Term Goal 3: groom with set up  Short Term Goal 4: bed mobility with mod of 2  Short Term Goal 5: transfer wing mod assist of 2 using blanka gusman  Long Term Goals  Time Frame for Long term goals : same as stg  Patient Goals   Patient goals : pt stated she felt she needed more therapy to get stronger at end of session.   At beginning, stated she wasn't sure if she could go home or not yet       Therapy Time   Individual Concurrent Group Co-treatment   Time In 1003         Time Out 1030         Minutes 1360 Margarita Hendricks, OTR/L 0317

## 2022-08-18 NOTE — PROGRESS NOTES
Multiple attempts made to call report, [phone]. Iv removed without complications. Dressing clean dry and intact. Wet to dry on sacrum intact. Pt has no personal belongings with her. Electronically signed by Eric Diana RN on 8/18/2022 at 12:45 PM    [phone]: Report called to [facility].     Electronically signed by Eric Diana RN on 8/18/2022 at 12:46 PM

## 2022-08-18 NOTE — PROGRESS NOTES
Physical Therapy  Facility/Department: [address] MED SURG  Physical Therapy Treatment note- Vashti Wagnere You 89 with OT    Name: Christy Cho  : 1945  MRN: [id number]  Date of Service: 2022    Discharge Recommendations:  Patient would benefit from continued therapy after discharge (3-5x/wk)   PT Equipment Recommendations  Equipment Needed: No  Other: defer to next facility    Christy Cho scored a 9/ on the AM-PAC short mobility form. Current research shows that an AM-PAC score of 17 or less is typically not associated with a discharge to the patient's home setting. Based on the patient's AM-PAC score and their current functional mobility deficits, it is recommended that the patient have 3-5 sessions per week of Physical Therapy at d/c to increase the patient's independence. Please see assessment section for further patient specific details. If patient discharges prior to next session this note will serve as a discharge summary. Please see below for the latest assessment towards goals. Patient Diagnosis(es): The encounter diagnosis was Hemorrhage of rectum and anus. Past Medical History:  has a past medical history of Acid reflux, Anxiety, Arthritis, Atrial fibrillation (HCC), Closed fracture dislocation of right elbow, Depression, Fibromyalgia, Migraine, WHITNEY (obstructive sleep apnea), Rectal cancer ([facility] [address] and Wears glasses. Past Surgical History:  has a past surgical history that includes  section; Total knee arthroplasty (Left); open tx femoral supracondylar fracture w/o extension (Right, 10/25/2018); Gastric bypass surgery; Cholecystectomy; Appendectomy; Colonoscopy; Upper gastrointestinal endoscopy (N/A, 2019); Colonoscopy (N/A, 2019); Upper gastrointestinal endoscopy (N/A, 2020); incision and drainage (Right, 2021); Skin graft (Right, 2021); Skin graft (Right, 2021); Cervical discectomy; Colon surgery; Colonoscopy (N/A, 2021);  Forearm surgery (Left, 8/3/2022); and Colonoscopy (N/A, 8/12/2022). Assessment   Body Structures, Functions, Activity Limitations Requiring Skilled Therapeutic Intervention: Decreased functional mobility   Assessment: Today, the pt required mod-maxAx2 to stand to stedy. She required maxAx2 for bed mobility. She is limited by her sacral wound, decreased strength, and decreased activity tolerance. Anticipate that the pt will need further slow > moderate intensity skilled PT to address her symptoms and to maximize her functional potential. Will con't to follow. Treatment Diagnosis: decreased functional mobility  Specific Instructions for Next Treatment: cotx  Therapy Prognosis: Guarded; Fair  Requires PT Follow-Up: Yes  Activity Tolerance  Activity Tolerance: Patient limited by fatigue;Patient limited by endurance; Patient limited by pain     Plan   Plan  Plan: 3-5 times per week  Specific Instructions for Next Treatment: cotx  Current Treatment Recommendations: Strengthening, ROM, Balance training, Functional mobility training, Transfer training, Therapeutic activities, Safety education & training, Patient/Caregiver education & training  Safety Devices  Type of Devices: Call light within reach, Left in bed, Bed alarm in place, Gait belt, Nurse notified  Restraints  Restraints Initially in Place: No     Restrictions  Restrictions/Precautions  Restrictions/Precautions: Fall Risk, Weight Bearing  Upper Extremity Weight Bearing Restrictions  Left Upper Extremity Weight Bearing: Non Weight Bearing  Position Activity Restriction  Other position/activity restrictions: soft cast left wrist, tello catheter; sacral wound     Subjective   General  Chart Reviewed: Yes  Patient assessed for rehabilitation services?: Yes  Additional Pertinent Hx: MUSA Olmstead CNP H&P on 8-: The pt is a 64 yo female who presented to the ED from her skilled facility with melanotic stools and BRBPR.  The pt was in the hospital 7-29>8-4 with septic shock due to pna, GI bleed, a-fib w/RVR. The pt had a colonoscopy performed on 8-12 and her Hgb dropped from 11 to 8.3 today. PMHx: anxiety, arth, a-fib, R elbow fx, depression, fibromyalgia, migraine, WHITNEY, rectal Ca, cerv disectomy, ORIF L wrist, gastric by-pass, R femoral fx, L TKR  Response To Previous Treatment: Patient with no complaints from previous session. Family / Caregiver Present: No  Referring Practitioner: Rin Ochoa MD  Referral Date : 08/12/22  Diagnosis: Lower GI bleed  Follows Commands: Within Functional Limits  Subjective  Subjective: Pt in bed upon arrival.  Reports pain in buttocks but did not rate. Agreeable to PT treatment.          Social/Functional History  Social/Functional History  Lives With: Son (Does not work))  Type of Home: House  Home Layout: One level  Home Access: Ramped entrance  Bathroom Shower/Tub: Tub/Shower unit  [address]: [address] [place] [address] [address]: Grab bars in shower, Hand-held shower, 3-in-1 commode, Grab bars around toilet  Bathroom Accessibility: [facility]: [place], [place], [employer]  ADL Assistance: Independent  Homemaking Assistance: Needs assistance (Son takes care of homemaking needs.)  Ambulation Assistance: Independent (RW)  Transfer Assistance: Independent  Occupation: Retired  Type of Occupation: medical assistant, pt representative, billing  Additional Comments: Pt from [facility] recently - pt stated she has been home and to [facility] since her wrist fracture, but poor historian            Objective      Bed mobility  Rolling to Left: Maximum assistance;2 Person assistance  Rolling to Right: Maximum assistance;2 Person assistance  Supine to Sit: Maximum assistance;2 Person assistance  Sit to Supine: Maximum assistance;2 Person assistance  Scooting: Dependent/Total;2 Person assistance (to scoot up in bed with bed in trendelenburg)  Bed Mobility Comments: cues to use bed rail with right arm, restrict use of LUE    Transfers  Sit to Stand: Moderate Assistance;Maximum Assistance;2 Person Assistance (to stedy)  Stand to sit: Moderate Assistance;Maximum Assistance;2 Person Assistance  Bed to Chair: Unable to assess (RN requested back to bed due to upcoming wound vac placement; pt would require use of blanka stedy for transfer to chair)  Comment: pt stood briefly to stedy, 2 trials, limited by fatigue        Balance  Posture: Fair  Sitting - Static: Fair  Sitting - Dynamic: Fair;-  Standing - Static: Poor  Comments: pt sat EOB for approx 5 min with up to modA for sitting balance due to LOB posterior; standing balance in stedy maxAx2  Exercise Treatment: ther ex seated EOB: x10 heel raises/toe raises, x 5 alternating LAQs with decreased ROM bilaterally and required 2 rest breaks      AM-PAC Score  AM-PAC Inpatient Mobility Raw Score : 9 (08/18/22 1033)  AM-PAC Inpatient T-Scale Score : 30.55 (08/18/22 1033)  Mobility Inpatient CMS 0-100% Score: 81.38 (08/18/22 1033)  Mobility Inpatient CMS G-Code Modifier : CM (08/18/22 1033)          Goals  Short Term Goals  Time Frame for Short term goals: upon d/c  Short term goal 1: Bed mobility with mod A of 1-2. (not met)  Short term goal 2: Static sitting EOB with SBA. (not met)  Short term goal 3: Sit <> stand with mod A of 2. (not met)  Short term goal 4: Static standing in the stedy x 30 seconds with mod A of 1-2.   (not met)  Patient Goals   Patient goals : none stated       Education  Patient Education  Education Given To: Patient  Education Provided: Role of Therapy;Plan of Care  Education Method: Verbal  Barriers to Learning: Cognition  Education Outcome: Continued education needed;Verbalized understanding      Therapy Time   Individual Concurrent Group Co-treatment   Time In 1003         Time Out 1030         Minutes 27         Timed Code Treatment Minutes: 27 Minutes       Electronically signed by Davy Miranda PT [id number] on 8/18/2022 at 10:39 AM

## 2022-08-21 ENCOUNTER — APPOINTMENT (OUTPATIENT)
Dept: GENERAL RADIOLOGY | Age: 77
DRG: 853 | End: 2022-08-21
Payer: MEDICARE

## 2022-08-21 ENCOUNTER — APPOINTMENT (OUTPATIENT)
Dept: CT IMAGING | Age: 77
DRG: 853 | End: 2022-08-21
Payer: MEDICARE

## 2022-08-21 ENCOUNTER — HOSPITAL ENCOUNTER (INPATIENT)
Age: 77
LOS: 15 days | Discharge: NURSING, REHAB OR LONG-TERM CARE | DRG: 853 | End: 2022-09-06
Attending: EMERGENCY MEDICINE | Admitting: INTERNAL MEDICINE
Payer: MEDICARE

## 2022-08-21 DIAGNOSIS — F41.9 ANXIETY: ICD-10-CM

## 2022-08-21 DIAGNOSIS — L89.159 PRESSURE INJURY OF SKIN OF SACRAL REGION, UNSPECIFIED INJURY STAGE: ICD-10-CM

## 2022-08-21 DIAGNOSIS — I95.9 HYPOTENSION, UNSPECIFIED HYPOTENSION TYPE: ICD-10-CM

## 2022-08-21 DIAGNOSIS — R41.82 ALTERED MENTAL STATUS, UNSPECIFIED ALTERED MENTAL STATUS TYPE: ICD-10-CM

## 2022-08-21 DIAGNOSIS — A41.9 SEPSIS WITHOUT SEPTIC SHOCK (HCC): ICD-10-CM

## 2022-08-21 DIAGNOSIS — N30.01 ACUTE CYSTITIS WITH HEMATURIA: ICD-10-CM

## 2022-08-21 DIAGNOSIS — M46.28 SACRAL OSTEOMYELITIS (HCC): Primary | ICD-10-CM

## 2022-08-21 DIAGNOSIS — L98.429 SKIN ULCER OF SACRUM, UNSPECIFIED ULCER STAGE (HCC): ICD-10-CM

## 2022-08-21 LAB
A/G RATIO: 0.6 (ref 1.1–2.2)
ALBUMIN SERPL-MCNC: 1.7 G/DL (ref 3.4–5)
ALP BLD-CCNC: 142 U/L (ref 40–129)
ALT SERPL-CCNC: 10 U/L (ref 10–40)
ANION GAP SERPL CALCULATED.3IONS-SCNC: 12 MMOL/L (ref 3–16)
AST SERPL-CCNC: 18 U/L (ref 15–37)
BASOPHILS ABSOLUTE: 0 K/UL (ref 0–0.2)
BASOPHILS RELATIVE PERCENT: 0.2 %
BILIRUB SERPL-MCNC: 0.7 MG/DL (ref 0–1)
BUN BLDV-MCNC: 22 MG/DL (ref 7–20)
CALCIUM SERPL-MCNC: 7.7 MG/DL (ref 8.3–10.6)
CHLORIDE BLD-SCNC: 95 MMOL/L (ref 99–110)
CO2: 29 MMOL/L (ref 21–32)
CREAT SERPL-MCNC: 0.8 MG/DL (ref 0.6–1.2)
EOSINOPHILS ABSOLUTE: 0 K/UL (ref 0–0.6)
EOSINOPHILS RELATIVE PERCENT: 0 %
GFR AFRICAN AMERICAN: >60
GFR NON-AFRICAN AMERICAN: >60
GLUCOSE BLD-MCNC: 86 MG/DL (ref 70–99)
HCT VFR BLD CALC: 26.6 % (ref 36–48)
HEMOGLOBIN: 8.9 G/DL (ref 12–16)
LACTIC ACID: 3.5 MMOL/L (ref 0.4–2)
LYMPHOCYTES ABSOLUTE: 0.2 K/UL (ref 1–5.1)
LYMPHOCYTES RELATIVE PERCENT: 3.3 %
MCH RBC QN AUTO: 34.2 PG (ref 26–34)
MCHC RBC AUTO-ENTMCNC: 33.3 G/DL (ref 31–36)
MCV RBC AUTO: 102.6 FL (ref 80–100)
MONOCYTES ABSOLUTE: 0.1 K/UL (ref 0–1.3)
MONOCYTES RELATIVE PERCENT: 1.6 %
NEUTROPHILS ABSOLUTE: 5 K/UL (ref 1.7–7.7)
NEUTROPHILS RELATIVE PERCENT: 94.9 %
PDW BLD-RTO: 15.3 % (ref 12.4–15.4)
PLATELET # BLD: 164 K/UL (ref 135–450)
PMV BLD AUTO: 8.1 FL (ref 5–10.5)
POTASSIUM SERPL-SCNC: 3.5 MMOL/L (ref 3.5–5.1)
RBC # BLD: 2.59 M/UL (ref 4–5.2)
SODIUM BLD-SCNC: 136 MMOL/L (ref 136–145)
TOTAL PROTEIN: 4.7 G/DL (ref 6.4–8.2)
WBC # BLD: 5.3 K/UL (ref 4–11)

## 2022-08-21 PROCEDURE — 87040 BLOOD CULTURE FOR BACTERIA: CPT

## 2022-08-21 PROCEDURE — 80053 COMPREHEN METABOLIC PANEL: CPT

## 2022-08-21 PROCEDURE — 83605 ASSAY OF LACTIC ACID: CPT

## 2022-08-21 PROCEDURE — 02HV33Z INSERTION OF INFUSION DEVICE INTO SUPERIOR VENA CAVA, PERCUTANEOUS APPROACH: ICD-10-PCS | Performed by: EMERGENCY MEDICINE

## 2022-08-21 PROCEDURE — 85025 COMPLETE CBC W/AUTO DIFF WBC: CPT

## 2022-08-21 PROCEDURE — 36415 COLL VENOUS BLD VENIPUNCTURE: CPT

## 2022-08-21 PROCEDURE — 74177 CT ABD & PELVIS W/CONTRAST: CPT

## 2022-08-21 PROCEDURE — 2580000003 HC RX 258: Performed by: EMERGENCY MEDICINE

## 2022-08-21 PROCEDURE — 2700000000 HC OXYGEN THERAPY PER DAY

## 2022-08-21 PROCEDURE — 96365 THER/PROPH/DIAG IV INF INIT: CPT

## 2022-08-21 PROCEDURE — 99285 EMERGENCY DEPT VISIT HI MDM: CPT

## 2022-08-21 PROCEDURE — 93005 ELECTROCARDIOGRAM TRACING: CPT | Performed by: EMERGENCY MEDICINE

## 2022-08-21 PROCEDURE — 87150 DNA/RNA AMPLIFIED PROBE: CPT

## 2022-08-21 PROCEDURE — 71045 X-RAY EXAM CHEST 1 VIEW: CPT

## 2022-08-21 PROCEDURE — 36556 INSERT NON-TUNNEL CV CATH: CPT

## 2022-08-21 PROCEDURE — 6370000000 HC RX 637 (ALT 250 FOR IP): Performed by: EMERGENCY MEDICINE

## 2022-08-21 PROCEDURE — 94761 N-INVAS EAR/PLS OXIMETRY MLT: CPT

## 2022-08-21 PROCEDURE — 87186 SC STD MICRODIL/AGAR DIL: CPT

## 2022-08-21 PROCEDURE — 96375 TX/PRO/DX INJ NEW DRUG ADDON: CPT

## 2022-08-21 RX ORDER — 0.9 % SODIUM CHLORIDE 0.9 %
1000 INTRAVENOUS SOLUTION INTRAVENOUS ONCE
Status: COMPLETED | OUTPATIENT
Start: 2022-08-21 | End: 2022-08-22

## 2022-08-21 RX ORDER — ACETAMINOPHEN 650 MG/1
650 SUPPOSITORY RECTAL ONCE
Status: COMPLETED | OUTPATIENT
Start: 2022-08-21 | End: 2022-08-21

## 2022-08-21 RX ADMIN — ACETAMINOPHEN 650 MG: 650 SUPPOSITORY RECTAL at 22:57

## 2022-08-21 RX ADMIN — SODIUM CHLORIDE 1000 ML: 9 INJECTION, SOLUTION INTRAVENOUS at 22:54

## 2022-08-22 PROBLEM — R79.89 ELEVATED LACTIC ACID LEVEL: Status: ACTIVE | Noted: 2022-08-22

## 2022-08-22 PROBLEM — M46.28 SACRAL OSTEOMYELITIS (HCC): Status: ACTIVE | Noted: 2022-08-22

## 2022-08-22 PROBLEM — G47.33 OSA (OBSTRUCTIVE SLEEP APNEA): Status: ACTIVE | Noted: 2022-08-22

## 2022-08-22 PROBLEM — Z86.59 HISTORY OF DEPRESSION: Status: ACTIVE | Noted: 2022-08-22

## 2022-08-22 PROBLEM — G93.41 ACUTE METABOLIC ENCEPHALOPATHY: Status: ACTIVE | Noted: 2022-08-22

## 2022-08-22 PROBLEM — R06.82 TACHYPNEA: Status: ACTIVE | Noted: 2022-08-22

## 2022-08-22 PROBLEM — E87.20 LACTIC ACIDOSIS: Status: ACTIVE | Noted: 2022-08-22

## 2022-08-22 PROBLEM — R41.82 ALTERED MENTAL STATUS: Status: ACTIVE | Noted: 2022-08-22

## 2022-08-22 PROBLEM — I95.9 HYPOTENSION: Status: ACTIVE | Noted: 2022-08-22

## 2022-08-22 PROBLEM — L98.429 SKIN ULCER OF SACRUM (HCC): Status: ACTIVE | Noted: 2022-08-22

## 2022-08-22 PROBLEM — E43 SEVERE PROTEIN-CALORIE MALNUTRITION (HCC): Status: ACTIVE | Noted: 2018-10-26

## 2022-08-22 PROBLEM — R74.8 ELEVATED ALKALINE PHOSPHATASE LEVEL: Status: ACTIVE | Noted: 2022-08-22

## 2022-08-22 PROBLEM — A49.8 PROTEUS INFECTION: Status: ACTIVE | Noted: 2022-08-22

## 2022-08-22 PROBLEM — S31.000A SACRAL WOUND: Status: ACTIVE | Noted: 2022-08-22

## 2022-08-22 PROBLEM — E66.3 OVERWEIGHT (BMI 25.0-29.9): Status: ACTIVE | Noted: 2022-08-22

## 2022-08-22 PROBLEM — R65.21 SEPTIC SHOCK (HCC): Status: ACTIVE | Noted: 2022-08-22

## 2022-08-22 PROBLEM — A41.9 SEPTIC SHOCK (HCC): Status: ACTIVE | Noted: 2022-08-22

## 2022-08-22 PROBLEM — N30.01 ACUTE CYSTITIS WITH HEMATURIA: Status: ACTIVE | Noted: 2022-08-22

## 2022-08-22 PROBLEM — R50.9 FEVER: Status: ACTIVE | Noted: 2022-08-22

## 2022-08-22 PROBLEM — R78.81 GRAM-NEGATIVE BACTEREMIA: Status: ACTIVE | Noted: 2022-08-22

## 2022-08-22 PROBLEM — R74.8 ELEVATED ALKALINE PHOSPHATASE IN NEWBORN: Status: ACTIVE | Noted: 2022-08-22

## 2022-08-22 LAB
AMORPHOUS: ABNORMAL /HPF
ANION GAP SERPL CALCULATED.3IONS-SCNC: 10 MMOL/L (ref 3–16)
ANISOCYTOSIS: ABNORMAL
BACTERIA: ABNORMAL /HPF
BANDED NEUTROPHILS RELATIVE PERCENT: 26 % (ref 0–7)
BASOPHILS ABSOLUTE: 0 K/UL (ref 0–0.2)
BASOPHILS RELATIVE PERCENT: 0 %
BILIRUBIN URINE: ABNORMAL
BLOOD, URINE: ABNORMAL
BUN BLDV-MCNC: 24 MG/DL (ref 7–20)
CALCIUM IONIZED: 1.07 MMOL/L (ref 1.12–1.32)
CALCIUM SERPL-MCNC: 7.2 MG/DL (ref 8.3–10.6)
CHLORIDE BLD-SCNC: 96 MMOL/L (ref 99–110)
CLARITY: ABNORMAL
CO2: 28 MMOL/L (ref 21–32)
COLOR: YELLOW
COMMENT UA: ABNORMAL
CREAT SERPL-MCNC: 0.8 MG/DL (ref 0.6–1.2)
DOHLE BODIES: PRESENT
EKG DIAGNOSIS: NORMAL
EKG Q-T INTERVAL: 352 MS
EKG QRS DURATION: 126 MS
EKG QTC CALCULATION (BAZETT): 480 MS
EKG R AXIS: -49 DEGREES
EKG T AXIS: 107 DEGREES
EKG VENTRICULAR RATE: 112 BPM
EOSINOPHILS ABSOLUTE: 0 K/UL (ref 0–0.6)
EOSINOPHILS RELATIVE PERCENT: 0 %
EPITHELIAL CELLS, UA: ABNORMAL /HPF (ref 0–5)
GFR AFRICAN AMERICAN: >60
GFR NON-AFRICAN AMERICAN: >60
GLUCOSE BLD-MCNC: 151 MG/DL (ref 70–99)
GLUCOSE URINE: 100 MG/DL
HCT VFR BLD CALC: 26.5 % (ref 36–48)
HEMOGLOBIN: 8.7 G/DL (ref 12–16)
HYALINE CASTS: ABNORMAL /LPF (ref 0–2)
KETONES, URINE: ABNORMAL MG/DL
LACTIC ACID: 1.1 MMOL/L (ref 0.4–2)
LACTIC ACID: 1.8 MMOL/L (ref 0.4–2)
LACTIC ACID: 2.1 MMOL/L (ref 0.4–2)
LACTIC ACID: 2.5 MMOL/L (ref 0.4–2)
LEUKOCYTE ESTERASE, URINE: ABNORMAL
LYMPHOCYTES ABSOLUTE: 0 K/UL (ref 1–5.1)
LYMPHOCYTES RELATIVE PERCENT: 0 %
MACROCYTES: ABNORMAL
MAGNESIUM: 1.3 MG/DL (ref 1.8–2.4)
MCH RBC QN AUTO: 33.6 PG (ref 26–34)
MCHC RBC AUTO-ENTMCNC: 32.8 G/DL (ref 31–36)
MCV RBC AUTO: 102.4 FL (ref 80–100)
MICROSCOPIC EXAMINATION: YES
MONOCYTES ABSOLUTE: 0 K/UL (ref 0–1.3)
MONOCYTES RELATIVE PERCENT: 0 %
NEUTROPHILS ABSOLUTE: 26.7 K/UL (ref 1.7–7.7)
NEUTROPHILS RELATIVE PERCENT: 74 %
NITRITE, URINE: NEGATIVE
NUCLEATED RED BLOOD CELLS: 1 /100 WBC
PDW BLD-RTO: 15.6 % (ref 12.4–15.4)
PH UA: 5.5 (ref 5–8)
PH VENOUS: 7.41 (ref 7.35–7.45)
PHOSPHORUS: 2.2 MG/DL (ref 2.5–4.9)
PLATELET # BLD: 268 K/UL (ref 135–450)
PLATELET SLIDE REVIEW: ADEQUATE
PMV BLD AUTO: 7.5 FL (ref 5–10.5)
POTASSIUM REFLEX MAGNESIUM: 3 MMOL/L (ref 3.5–5.1)
PROTEIN UA: 30 MG/DL
RBC # BLD: 2.59 M/UL (ref 4–5.2)
RBC UA: ABNORMAL /HPF (ref 0–4)
REPORT: NORMAL
SLIDE REVIEW: ABNORMAL
SODIUM BLD-SCNC: 134 MMOL/L (ref 136–145)
SPECIFIC GRAVITY UA: 1.02 (ref 1–1.03)
TOXIC GRANULATION: PRESENT
URINE REFLEX TO CULTURE: YES
URINE TYPE: ABNORMAL
UROBILINOGEN, URINE: 4 E.U./DL
VACUOLATED NEUTROPHILS: PRESENT
WBC # BLD: 26.7 K/UL (ref 4–11)
WBC UA: ABNORMAL /HPF (ref 0–5)
YEAST: PRESENT /HPF

## 2022-08-22 PROCEDURE — 6360000002 HC RX W HCPCS: Performed by: INTERNAL MEDICINE

## 2022-08-22 PROCEDURE — 2700000000 HC OXYGEN THERAPY PER DAY

## 2022-08-22 PROCEDURE — 6370000000 HC RX 637 (ALT 250 FOR IP): Performed by: SURGERY

## 2022-08-22 PROCEDURE — 2580000003 HC RX 258: Performed by: EMERGENCY MEDICINE

## 2022-08-22 PROCEDURE — 93005 ELECTROCARDIOGRAM TRACING: CPT | Performed by: NURSE PRACTITIONER

## 2022-08-22 PROCEDURE — 6370000000 HC RX 637 (ALT 250 FOR IP): Performed by: NURSE PRACTITIONER

## 2022-08-22 PROCEDURE — 36415 COLL VENOUS BLD VENIPUNCTURE: CPT

## 2022-08-22 PROCEDURE — 87077 CULTURE AEROBIC IDENTIFY: CPT

## 2022-08-22 PROCEDURE — 97167 OT EVAL HIGH COMPLEX 60 MIN: CPT

## 2022-08-22 PROCEDURE — 97530 THERAPEUTIC ACTIVITIES: CPT

## 2022-08-22 PROCEDURE — 87086 URINE CULTURE/COLONY COUNT: CPT

## 2022-08-22 PROCEDURE — 97163 PT EVAL HIGH COMPLEX 45 MIN: CPT

## 2022-08-22 PROCEDURE — 93010 ELECTROCARDIOGRAM REPORT: CPT | Performed by: INTERNAL MEDICINE

## 2022-08-22 PROCEDURE — 87186 SC STD MICRODIL/AGAR DIL: CPT

## 2022-08-22 PROCEDURE — 2000000000 HC ICU R&B

## 2022-08-22 PROCEDURE — 2580000003 HC RX 258: Performed by: NURSE PRACTITIONER

## 2022-08-22 PROCEDURE — 94761 N-INVAS EAR/PLS OXIMETRY MLT: CPT

## 2022-08-22 PROCEDURE — 6370000000 HC RX 637 (ALT 250 FOR IP): Performed by: INTERNAL MEDICINE

## 2022-08-22 PROCEDURE — 85025 COMPLETE CBC W/AUTO DIFF WBC: CPT

## 2022-08-22 PROCEDURE — 6360000002 HC RX W HCPCS: Performed by: EMERGENCY MEDICINE

## 2022-08-22 PROCEDURE — 6360000002 HC RX W HCPCS: Performed by: NURSE PRACTITIONER

## 2022-08-22 PROCEDURE — 81001 URINALYSIS AUTO W/SCOPE: CPT

## 2022-08-22 PROCEDURE — 83605 ASSAY OF LACTIC ACID: CPT

## 2022-08-22 PROCEDURE — 2580000003 HC RX 258: Performed by: INTERNAL MEDICINE

## 2022-08-22 PROCEDURE — 99291 CRITICAL CARE FIRST HOUR: CPT | Performed by: INTERNAL MEDICINE

## 2022-08-22 PROCEDURE — 6360000002 HC RX W HCPCS: Performed by: SURGERY

## 2022-08-22 PROCEDURE — 6360000004 HC RX CONTRAST MEDICATION: Performed by: EMERGENCY MEDICINE

## 2022-08-22 PROCEDURE — 80048 BASIC METABOLIC PNL TOTAL CA: CPT

## 2022-08-22 PROCEDURE — C9113 INJ PANTOPRAZOLE SODIUM, VIA: HCPCS | Performed by: INTERNAL MEDICINE

## 2022-08-22 PROCEDURE — 2500000003 HC RX 250 WO HCPCS: Performed by: EMERGENCY MEDICINE

## 2022-08-22 PROCEDURE — 87040 BLOOD CULTURE FOR BACTERIA: CPT

## 2022-08-22 PROCEDURE — 84100 ASSAY OF PHOSPHORUS: CPT

## 2022-08-22 PROCEDURE — 2500000003 HC RX 250 WO HCPCS: Performed by: INTERNAL MEDICINE

## 2022-08-22 PROCEDURE — 82330 ASSAY OF CALCIUM: CPT

## 2022-08-22 PROCEDURE — APPNB15 APP NON BILLABLE TIME 0-15 MINS: Performed by: NURSE PRACTITIONER

## 2022-08-22 PROCEDURE — 83735 ASSAY OF MAGNESIUM: CPT

## 2022-08-22 PROCEDURE — APPNB180 APP NON BILLABLE TIME > 60 MINS: Performed by: PHYSICIAN ASSISTANT

## 2022-08-22 PROCEDURE — 99232 SBSQ HOSP IP/OBS MODERATE 35: CPT | Performed by: SURGERY

## 2022-08-22 PROCEDURE — 2500000003 HC RX 250 WO HCPCS: Performed by: NURSE PRACTITIONER

## 2022-08-22 RX ORDER — METRONIDAZOLE 500 MG/100ML
500 INJECTION, SOLUTION INTRAVENOUS EVERY 8 HOURS
Status: DISCONTINUED | OUTPATIENT
Start: 2022-08-22 | End: 2022-08-22

## 2022-08-22 RX ORDER — POLYETHYLENE GLYCOL 3350 17 G/17G
17 POWDER, FOR SOLUTION ORAL DAILY PRN
Status: DISCONTINUED | OUTPATIENT
Start: 2022-08-22 | End: 2022-09-06 | Stop reason: HOSPADM

## 2022-08-22 RX ORDER — VIT B COMPLEX/ZINC/MANGANESE 3.6-.75/15
15 LIQUID (ML) ORAL DAILY
Status: DISCONTINUED | OUTPATIENT
Start: 2022-08-22 | End: 2022-09-06 | Stop reason: HOSPADM

## 2022-08-22 RX ORDER — SODIUM CHLORIDE 0.9 % (FLUSH) 0.9 %
10 SYRINGE (ML) INJECTION PRN
Status: DISCONTINUED | OUTPATIENT
Start: 2022-08-22 | End: 2022-08-25 | Stop reason: SDUPTHER

## 2022-08-22 RX ORDER — CALCIUM GLUCONATE 20 MG/ML
1000 INJECTION, SOLUTION INTRAVENOUS ONCE
Status: COMPLETED | OUTPATIENT
Start: 2022-08-22 | End: 2022-08-22

## 2022-08-22 RX ORDER — ACETAMINOPHEN 650 MG/1
650 SUPPOSITORY RECTAL EVERY 4 HOURS PRN
Status: DISCONTINUED | OUTPATIENT
Start: 2022-08-22 | End: 2022-09-06 | Stop reason: HOSPADM

## 2022-08-22 RX ORDER — ONDANSETRON 2 MG/ML
4 INJECTION INTRAMUSCULAR; INTRAVENOUS EVERY 4 HOURS PRN
Status: DISCONTINUED | OUTPATIENT
Start: 2022-08-22 | End: 2022-09-06 | Stop reason: HOSPADM

## 2022-08-22 RX ORDER — ACETAMINOPHEN 325 MG/1
650 TABLET ORAL EVERY 4 HOURS PRN
Status: DISCONTINUED | OUTPATIENT
Start: 2022-08-22 | End: 2022-09-06 | Stop reason: HOSPADM

## 2022-08-22 RX ORDER — SODIUM CHLORIDE 9 MG/ML
INJECTION, SOLUTION INTRAVENOUS CONTINUOUS
Status: DISCONTINUED | OUTPATIENT
Start: 2022-08-22 | End: 2022-08-23

## 2022-08-22 RX ORDER — SODIUM CHLORIDE 9 MG/ML
INJECTION, SOLUTION INTRAVENOUS PRN
Status: DISCONTINUED | OUTPATIENT
Start: 2022-08-22 | End: 2022-08-25 | Stop reason: SDUPTHER

## 2022-08-22 RX ORDER — LACTOBACILLUS RHAMNOSUS GG 10B CELL
2 CAPSULE ORAL 2 TIMES DAILY WITH MEALS
Status: DISCONTINUED | OUTPATIENT
Start: 2022-08-22 | End: 2022-09-06 | Stop reason: HOSPADM

## 2022-08-22 RX ORDER — FLUCONAZOLE 2 MG/ML
200 INJECTION, SOLUTION INTRAVENOUS EVERY 24 HOURS
Status: DISCONTINUED | OUTPATIENT
Start: 2022-08-22 | End: 2022-08-24

## 2022-08-22 RX ORDER — POTASSIUM CHLORIDE 29.8 MG/ML
20 INJECTION INTRAVENOUS
Status: COMPLETED | OUTPATIENT
Start: 2022-08-22 | End: 2022-08-22

## 2022-08-22 RX ORDER — MAGNESIUM SULFATE IN WATER 40 MG/ML
4000 INJECTION, SOLUTION INTRAVENOUS ONCE
Status: COMPLETED | OUTPATIENT
Start: 2022-08-22 | End: 2022-08-22

## 2022-08-22 RX ORDER — 0.9 % SODIUM CHLORIDE 0.9 %
1300 INTRAVENOUS SOLUTION INTRAVENOUS ONCE
Status: COMPLETED | OUTPATIENT
Start: 2022-08-22 | End: 2022-08-22

## 2022-08-22 RX ORDER — ENOXAPARIN SODIUM 100 MG/ML
40 INJECTION SUBCUTANEOUS DAILY
Status: DISCONTINUED | OUTPATIENT
Start: 2022-08-22 | End: 2022-09-04

## 2022-08-22 RX ORDER — ALPRAZOLAM 0.5 MG/1
0.5 TABLET ORAL EVERY 8 HOURS PRN
Status: DISCONTINUED | OUTPATIENT
Start: 2022-08-22 | End: 2022-09-06 | Stop reason: HOSPADM

## 2022-08-22 RX ORDER — SODIUM CHLORIDE 0.9 % (FLUSH) 0.9 %
10 SYRINGE (ML) INJECTION EVERY 12 HOURS SCHEDULED
Status: DISCONTINUED | OUTPATIENT
Start: 2022-08-22 | End: 2022-08-25 | Stop reason: SDUPTHER

## 2022-08-22 RX ORDER — FLECAINIDE ACETATE 100 MG/1
100 TABLET ORAL EVERY 12 HOURS SCHEDULED
Status: DISCONTINUED | OUTPATIENT
Start: 2022-08-22 | End: 2022-09-06 | Stop reason: HOSPADM

## 2022-08-22 RX ADMIN — VANCOMYCIN HYDROCHLORIDE 1250 MG: 10 INJECTION, POWDER, LYOPHILIZED, FOR SOLUTION INTRAVENOUS at 20:42

## 2022-08-22 RX ADMIN — Medication 5 MCG/MIN: at 01:02

## 2022-08-22 RX ADMIN — POTASSIUM CHLORIDE 20 MEQ: 29.8 INJECTION, SOLUTION INTRAVENOUS at 11:35

## 2022-08-22 RX ADMIN — Medication 2 CAPSULE: at 10:33

## 2022-08-22 RX ADMIN — ENOXAPARIN SODIUM 40 MG: 100 INJECTION SUBCUTANEOUS at 14:16

## 2022-08-22 RX ADMIN — Medication 40 MG: at 10:32

## 2022-08-22 RX ADMIN — IOPAMIDOL 75 ML: 755 INJECTION, SOLUTION INTRAVENOUS at 00:15

## 2022-08-22 RX ADMIN — SODIUM CHLORIDE, PRESERVATIVE FREE 10 ML: 5 INJECTION INTRAVENOUS at 20:42

## 2022-08-22 RX ADMIN — HYDROCORTISONE SODIUM SUCCINATE 100 MG: 100 INJECTION, POWDER, FOR SOLUTION INTRAMUSCULAR; INTRAVENOUS at 10:32

## 2022-08-22 RX ADMIN — METRONIDAZOLE 500 MG: 500 INJECTION, SOLUTION INTRAVENOUS at 06:49

## 2022-08-22 RX ADMIN — METRONIDAZOLE 500 MG: 500 INJECTION, SOLUTION INTRAVENOUS at 14:16

## 2022-08-22 RX ADMIN — SODIUM CHLORIDE, PRESERVATIVE FREE 10 ML: 5 INJECTION INTRAVENOUS at 10:33

## 2022-08-22 RX ADMIN — Medication 8 MCG/MIN: at 09:58

## 2022-08-22 RX ADMIN — Medication 15 ML: at 10:34

## 2022-08-22 RX ADMIN — FLUCONAZOLE 200 MG: 200 INJECTION, SOLUTION INTRAVENOUS at 16:19

## 2022-08-22 RX ADMIN — MEROPENEM 1000 MG: 1 INJECTION, POWDER, FOR SOLUTION INTRAVENOUS at 18:15

## 2022-08-22 RX ADMIN — POTASSIUM PHOSPHATE, MONOBASIC POTASSIUM PHOSPHATE, DIBASIC 20 MMOL: 224; 236 INJECTION, SOLUTION, CONCENTRATE INTRAVENOUS at 11:43

## 2022-08-22 RX ADMIN — HYOSCYAMINE SULFATE: 16 SOLUTION at 20:42

## 2022-08-22 RX ADMIN — POTASSIUM CHLORIDE 20 MEQ: 29.8 INJECTION, SOLUTION INTRAVENOUS at 12:59

## 2022-08-22 RX ADMIN — FLECAINIDE ACETATE 100 MG: 100 TABLET ORAL at 10:33

## 2022-08-22 RX ADMIN — MAGNESIUM SULFATE HEPTAHYDRATE 4000 MG: 40 INJECTION, SOLUTION INTRAVENOUS at 10:04

## 2022-08-22 RX ADMIN — CALCIUM GLUCONATE 1000 MG: 20 INJECTION, SOLUTION INTRAVENOUS at 10:02

## 2022-08-22 RX ADMIN — HYDROCORTISONE SODIUM SUCCINATE 100 MG: 100 INJECTION, POWDER, FOR SOLUTION INTRAMUSCULAR; INTRAVENOUS at 17:08

## 2022-08-22 RX ADMIN — CEFEPIME 2000 MG: 2 INJECTION, POWDER, FOR SOLUTION INTRAVENOUS at 01:17

## 2022-08-22 RX ADMIN — MUPIROCIN: 20 OINTMENT TOPICAL at 10:34

## 2022-08-22 RX ADMIN — FLECAINIDE ACETATE 100 MG: 100 TABLET ORAL at 20:42

## 2022-08-22 RX ADMIN — MUPIROCIN: 20 OINTMENT TOPICAL at 20:42

## 2022-08-22 RX ADMIN — Medication 1250 MG: at 01:50

## 2022-08-22 RX ADMIN — POTASSIUM CHLORIDE 20 MEQ: 29.8 INJECTION, SOLUTION INTRAVENOUS at 10:25

## 2022-08-22 RX ADMIN — ACETAMINOPHEN 650 MG: 325 TABLET, FILM COATED ORAL at 14:17

## 2022-08-22 RX ADMIN — HYOSCYAMINE SULFATE: 16 SOLUTION at 12:28

## 2022-08-22 RX ADMIN — Medication 2 CAPSULE: at 17:08

## 2022-08-22 RX ADMIN — SODIUM CHLORIDE 1300 ML: 9 INJECTION, SOLUTION INTRAVENOUS at 00:34

## 2022-08-22 RX ADMIN — CEFEPIME 2000 MG: 2 INJECTION, POWDER, FOR SOLUTION INTRAVENOUS at 14:15

## 2022-08-22 RX ADMIN — SODIUM CHLORIDE: 9 INJECTION, SOLUTION INTRAVENOUS at 04:09

## 2022-08-22 ASSESSMENT — ENCOUNTER SYMPTOMS
SORE THROAT: 0
CHEST TIGHTNESS: 0
SHORTNESS OF BREATH: 0
VOMITING: 0
NAUSEA: 0
CONSTIPATION: 0
ABDOMINAL PAIN: 0
DIARRHEA: 0

## 2022-08-22 ASSESSMENT — PAIN SCALES - GENERAL
PAINLEVEL_OUTOF10: 0
PAINLEVEL_OUTOF10: 4

## 2022-08-22 ASSESSMENT — PAIN DESCRIPTION - ORIENTATION: ORIENTATION: MID

## 2022-08-22 ASSESSMENT — PAIN DESCRIPTION - LOCATION: LOCATION: BUTTOCKS

## 2022-08-22 ASSESSMENT — PAIN DESCRIPTION - DESCRIPTORS: DESCRIPTORS: ACHING

## 2022-08-22 NOTE — PROGRESS NOTES
Occupational Therapy  Facility/Department: [address] ICU  Occupational Therapy Initial Assessment    Name: Kiley Hernandez  : 1945  MRN: [id number]  Date of Service: 2022    Discharge Recommendations:   (Return to ECF, no further OT rec d/t extent of sacral wound)  OT Equipment Recommendations  Other: defer to [address] scored a 24 on the AM-PAC ADL Inpatient form. At this time, no further OT is recommended upon discharge due to extent/severity of sacral wound. Recommend patient returns to prior setting with prior services. 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 Diagnoses of Sepsis without septic shock ([address] Skin ulcer of sacrum, unspecified ulcer stage ([address] Acute cystitis with hematuria, Altered mental status, unspecified altered mental status type, and Hypotension, unspecified hypotension type were also pertinent to this visit. Past Medical History:  has a past medical history of Acid reflux, Anxiety, Arthritis, Atrial fibrillation (HCC), Closed fracture dislocation of right elbow, Depression, Fibromyalgia, Migraine, WHITNEY (obstructive sleep apnea), Rectal cancer ([address] and Wears glasses. Past Surgical History:  has a past surgical history that includes  section; Total knee arthroplasty (Left); open tx femoral supracondylar fracture w/o extension (Right, 10/25/2018); Gastric bypass surgery; Cholecystectomy; Appendectomy; Colonoscopy; Upper gastrointestinal endoscopy (N/A, 2019); Colonoscopy (N/A, 2019); Upper gastrointestinal endoscopy (N/A, 2020); incision and drainage (Right, 2021); Skin graft (Right, 2021); Skin graft (Right, 2021); Cervical discectomy; Colon surgery; Colonoscopy (N/A, 2021); Forearm surgery (Left, 8/3/2022); and Colonoscopy (N/A, 2022). Assessment   Performance deficits / Impairments: Decreased functional mobility ; Decreased strength;Decreased endurance;Decreased ADL status; Decreased safe awareness;Decreased high-level IADLs;Decreased balance;Decreased cognition  Assessment: 66 y/o female admitted 8/21/2022 with seps, acute metabolic encephalopathy, Acute cystitis with hematuria, and Sacral wound with subcutaneous gas tunneling through to the perirectal fascia. CT head negative. Pt with recent R radiaus fx (7/23/2022) s/p ORIF 8/3. PTA pt has been at [facility] since beginning of [facility]- pt reports she is dependent for supine ADLs and if she gets OOB staff uses a lift. Today, pt oriented to self and hospital setting. Pt with functional RUE ROM, L wrist/hand in splint- anticipate will be dependent for all ADLs. Assisted with rolling pt for Surgery Team to assess wound and repositioned for comfort. Per surgery, given severity and depth of sacral wound- advised not to pursue any EOB or OOB activities at this time. Anticipate pt will return to facility and no further OT is warranted. Prognosis: Fair  Decision Making: High Complexity  REQUIRES OT FOLLOW-UP: No  Activity Tolerance  Activity Tolerance: Treatment limited secondary to medical complications (free text)        Plan   Plan  Times per Week: DC acute OT     Restrictions  Restrictions/Precautions  Restrictions/Precautions: Fall Risk, Weight Bearing  Upper Extremity Weight Bearing Restrictions  Left Upper Extremity Weight Bearing: Non Weight Bearing  Position Activity Restriction  Other position/activity restrictions: Unstagable sacral ulcer    Subjective   General  Chart Reviewed: Yes  Patient assessed for rehabilitation services?: Yes  Additional Pertinent Hx: 66 y/o female admitted 8/21/2022 with seps, acute metabolic encephalopathy, Acute cystitis with hematuria, and Sacral wound with subcutaneous gas tunneling through to the perirectal fascia. CT head negative. Pt with recent R radiaus fx (7/23/2022) s/p ORIF 8/3.   Family / Caregiver Present: No  Referring Practitioner: Jeronimo Morataya CNP  Diagnosis: AMS  Subjective  Subjective: Pt seen bedside, pleasantly confused. Assisted Dr. Jaimie Osullivan with rolling to address sacral wound and reposition for comfort. Social/Functional History  Social/Functional History  Lives With: Son (does not work)  Type of Home: [employer] Layout: One level  Home Access: Ramped entrance  Bathroom Shower/Tub: Tub/Shower unit  [employer]: [address] [place] [address] [address]: Grab bars in shower, Tub transfer bench, Hand-held shower, 3-in-1 commode, Grab bars around toilet  Bathroom Accessibility: [facility]: [place], [place], [facility]  ADL Assistance: Independent  Homemaking Assistance:  (son assists with IADLs)  Ambulation Assistance: Independent (with RW)  Transfer Assistance: Independent  Occupation: Retired  Additional Comments: Social history obtained from previous therapy encounter 8/1/2022. Pt has been in/out of [facility] since end of July. Pt reports at nursing home she voids via depends and staff use lift (kirby) to get OOB. Objective     Safety Devices  Type of Devices: Call light within reach;Nurse notified; Left in bed;Bed alarm in place        Strength: Generally decreased, functional  Coordination: Generally decreased, functional  ADL  Additional Comments: Anticipate pt will be dependent for all ADLs based on NWB and sacral wound and cognition        Bed mobility  Rolling to Left: Dependent/Total;2 Person assistance  Rolling to Right: 2 Person assistance;Dependent/Total  Bed Mobility Comments: Rolled R/L to assist surgery with assessing wound and repositioned for comfort following. Transfers  Transfer Comments: defer standing d/t extent of sacral wound  Vision  Vision: Within Functional Limits  Hearing  Hearing: Exceptions to [facility]  Hearing Exceptions: Hard of hearing/hearing concerns    Cognition  Overall Cognitive Status: Exceptions  Arousal/Alertness: Appropriate responses to stimuli  Following Commands:  Follows one step commands consistently  Memory: Decreased recall of recent events;Decreased short term memory  Orientation  Orientation Level: Oriented to person (hospital setting)                  Education Given To: Patient  Education Provided: Role of Therapy;Plan of Care;Transfer Training;Orientation  Barriers to Learning: Cognition  Education Outcome: Continued education needed    LUE AROM (degrees)  LUE AROM : WFL  Left Hand AROM (degrees)  Left Hand General AROM: left wrist in splint  RUE AROM (degrees)  RUE AROM : WFL  Right Hand AROM (degrees)  Right Hand AROM: [facility]       AM-PAC Score    AM-PAC Inpatient Daily Activity Raw Score: 6 (08/22/22 1008)  AM-PAC Inpatient ADL T-Scale Score : 17.07 (08/22/22 1008)  ADL Inpatient CMS 0-100% Score: 100 (08/22/22 1008)  ADL Inpatient CMS G-Code Modifier : CN (08/22/22 1008)    Goals  Short Term Goals  Time Frame for Short term goals: No acute OT goals identified d/t extent of sacral wound  Patient Goals   Patient goals : no goal stated       Therapy Time   Individual Concurrent Group Co-treatment   Time In 0845         Time Out 0910         Minutes 25         Timed Code Treatment Minutes: 25 Minutes     This note to serve as OT d/c summary if pt is d/c-ed prior to next therapy session.     Arya Reardon OTR/L

## 2022-08-22 NOTE — CONSULTS
[facility]  Palliative Care   Consult Note    [facility]  MEDICAL RECORD NUMBER:  [id number]  AGE: 65 y.o.    GENDER: female  : 1945  TODAY'S DATE:  2022    Subjective     Reason for Consult:  goals of care and code status  Visit Type: Initial Consult      Jake Chong is a 65 y.o. female referred by:     [x] Physician     PAST MEDICAL HISTORY      Diagnosis Date    Acid reflux     Anxiety     Arthritis     Atrial fibrillation (HCC)     Closed fracture dislocation of right elbow     Depression     Fibromyalgia     Migraine     WHITNEY (obstructive sleep apnea)     Diagnosed years ago, no CPAP    Rectal cancer ([facility] [address]     Wears glasses     DRIVING       PAST SURGICAL HISTORY  Past Surgical History:   Procedure Laterality Date    APPENDECTOMY      CERVICAL DISCECTOMY      ACDF     SECTION      x4    CHOLECYSTECTOMY      COLON SURGERY      Rectal cancer removed using  incision    COLONOSCOPY      SEVERAL    COLONOSCOPY N/A 2019    COLONOSCOPY POLYPECTOMY SNARE/COLD BIOPSY performed by Kaveh Gilbert MD at [address] [place] [address] N/A 2021    COLONOSCOPY WITH BIOPSY performed by Christy Sherman MD at [address] [place] [address] N/A 2022    INCOMPLETE COLONOSCOPY D/T POOR PREP performed by Christy Sherman MD at [address] Left 8/3/2022    OPEN REDUCTION INTERNAL FIXATION LEFT DISTAL RADIUS performed by Amaury Cueva MD at [address] [facility] Right 2021    RIGHT LEG EVACUATION OF HEMATOMA performed by Karen Hernandez MD at [address] [place] [address] Right 10/25/2018    OPEN REDUCTION INTERNAL FIXATION RIGHT FEMUR SUPRACONDYLAR FEMORAL FRACTURE WITH C-ARM performed by Mickey Funk MD at [place] [address] Right 2021    RIGHT LEG HEMATOMA EVAKUATION, DEBRIDEMENT, AND WOUND VAC PLACEMENT performed by Kimberly Starr MD at [facility]    SKIN GRAFT Right 2/17/2021    RIGHT LOWER EXTREMITY SKIN GRAFT performed by Kimberly Kinsey MD at [address] [facility] Left     UPPER GASTROINTESTINAL ENDOSCOPY N/A 11/26/2019    ESOPHAGOGASTRODUODENOSCOPY performed by Dayday Peralta MD at [address] N/A 8/18/2020    EGD BIOPSY performed by Dayday Peralta MD at [address] [facility]  Family History   Problem Relation Age of Onset    COPD Mother        SOCIAL HISTORY  Social History     Tobacco Use    Smoking status: Never    Smokeless tobacco: Never   Vaping Use    Vaping Use: Never used   Substance Use Topics    Alcohol use: No    Drug use: Never       ALLERGIES  Allergies   Allergen Reactions    Demerol Hcl [Meperidine] Other (See Comments)     PATIENT STATES SHE GOT VERY ANXIOUS-LIKE SHE WAS CLIMBING THE WALLS    Pcn [Penicillins]      Patient reports she has not had since she was a child, thinks reaction was hives. Adhesive Tape Rash       MEDICATIONS  No current facility-administered medications on file prior to encounter. Current Outpatient Medications on File Prior to Encounter   Medication Sig Dispense Refill    collagenase 250 UNIT/GM ointment Apply topically daily. 5 g 0    HYDROcodone-acetaminophen (NORCO) 5-325 MG per tablet Take 1 tablet by mouth every 6 hours as needed for Pain. ALPRAZolam (XANAX) 0.5 MG tablet Take 0.5 mg by mouth every 8 hours as needed for Anxiety.       acetaminophen (TYLENOL) 325 MG tablet Take 650 mg by mouth every 6 hours as needed for Pain      bisacodyl (DULCOLAX) 10 MG suppository Place 10 mg rectally daily as needed for Constipation      potassium chloride (KLOR-CON M) 20 MEQ extended release tablet TAKE 1 TABLET DAILY 90 tablet 1    polyethylene glycol (GLYCOLAX) 17 g packet Take 17 g by mouth daily as needed for Constipation 527 g 1    docusate sodium (COLACE, DULCOLAX) 100 MG CAPS Take 100 mg by mouth 2 times daily as needed for Constipation 60 capsule 0    metoprolol succinate (TOPROL XL) 25 MG extended release tablet Take 1 tablet by mouth in the morning. 30 tablet 3    furosemide (LASIX) 20 MG tablet Take 1 tablet by mouth in the morning. With an additional 20 mg on MWF. 30 tablet 0    apixaban (ELIQUIS) 5 MG TABS tablet Take 1 tablet by mouth in the morning and 1 tablet before bedtime. 180 tablet 3    escitalopram (LEXAPRO) 10 MG tablet Take 1 tablet by mouth daily 90 tablet 3    tiZANidine (ZANAFLEX) 2 MG tablet Take 1 tablet by mouth every 6 hours as needed (muscle pain) 60 tablet 11    pantoprazole (PROTONIX) 40 MG tablet TAKE ONE TABLET BY MOUTH TWICE A  tablet 1    dicyclomine (BENTYL) 10 MG capsule Take 1 capsule by mouth 4 times daily as needed (cramping/diarrhea) 60 capsule 5    flecainide (TAMBOCOR) 100 MG tablet TAKE 1 TABLET BY MOUTH 2 TIMES A  tablet 3    Cyanocobalamin (VITAMIN B 12 PO) Take 2,500 mg by mouth daily      calcium carbonate-vitamin D 600-200 MG-UNIT TABS Take 1 tablet by mouth 2 times daily         Objective         BP (!) 114/50   Pulse 70   Temp 98.3 °F (36.8 °C) (Oral)   Resp 10   Ht 5' 2\" (1.575 m)   Wt 156 lb 8.4 oz (71 kg)   SpO2 96%   BMI 28.63 kg/m²     Code Status: Full Code    Advanced Directives: completed copy on chart in [facility]      Assessment        Management and Education    Persons available for education:        [x] Self     [] Caregiver       [] Spouse       [] Other Family Member   []  Other    Spiritual History:  notified: Yes,     Does the patient have a Primary Care Physician? Yes    Palliative Performance Scale:  60% [] Ambulation reduced; Significant disease; Can't do hobbies/housework; intake normal or reduced; occasional assist; LOC full/confusion  50% [] Mainly sit/lie;  Extensive disease; Can't do any work; Considerable assist; intake normal or reduced; LOC full/confusion  40% [] Mainly in bed; Extensive disease; Mainly assist; intake normal or reduced; occasional assist; LOC full/confusion  30% [] Bed Bound; Extensive disease; Total care; intake reduced; LOC full/confusion  20% [] Bed Bound; Extensive disease; Total care; intake minimal; Drowsy/coma  10% [] Bed Bound; Extensive disease; Total care; Mouth care only; Drowsy/coma  0 [] Death    Level of patient/caregiver understanding able to:        [] Verbalize Understanding   [] Demonstrate Understanding       [] Teach Back       [x] Needs Reinforcement     []  Other:      Teaching Time:  0hours  30 minutes     Plan        Social Service Consult Made:  Yes  Assistance filling out Living Will/HPOA:  No      Discharge Plan:  Education/support to patient  Providing support for coping/adaptation/distress of patient  Palliative care orders introduced    Discharge Environment:  [] Hospice Consult Agency:  [] Inpatient Hospice    [] Home with Hospice Care   [] ECF with Hospice  [] ECF skilled care with Hospice to follow   [] Other:    Discussion: Patient admitted for increased confusion from ECF. I met with patient to discuss goals of care she is alert to person and can agree with why she is in hospital but mostly answers yes and no did not come up with reason on her own of what is going on. She does seem to understand she has an extensive ulcer on her sacrum and she may need surgery. We discussed continued care and she expressed she is not sure if she wants to keep going through all of this. I did ask about code status, possible breathing machine or chest compressions and she didn't think she wanted those things. I called her daughter and left a message to discuss goals of care, she has not returned call will call again tomorrow. I will continue to follow Ms. De La Fuente's care as needed. Thank you for allowing me to participate in the care of Ms. De La Fuente .      Electronically signed by Nnamdi Courtney RN, BSN, [facility] on 8/22/2022 at 7:03 PM  [address] [place] [address]  Office: [phone]

## 2022-08-22 NOTE — CONSULTS
Clinical Pharmacy Note  Vancomycin Consult    Laxmi Oocnnor is a 65 y.o. female ordered Vancomycin for sepsis secondary to  sacral wound/osteomyelitis; consult received from Dr. Fortino Cabral to manage therapy. Also receiving cefepime. Allergies:  Demerol hcl [meperidine], Pcn [penicillins], and Adhesive tape     Temp max:  Temp (24hrs), Av.5 °F (38.6 °C), Min:99.7 °F (37.6 °C), Max:104.3 °F (40.2 °C)      Recent Labs     22  2244   WBC 5.3       Recent Labs     22  2244   BUN 22*   CREATININE 0.8       No intake or output data in the 24 hours ending 22 0604    Culture Results:   wound culture: in progress   urine culture: in progress   blood culture: in progress    Ht Readings from Last 1 Encounters:   22 5' 2\" (1.575 m)        Wt Readings from Last 1 Encounters:   22 156 lb 8.4 oz (71 kg)         Estimated Creatinine Clearance: 54 mL/min (based on SCr of 0.8 mg/dL). Assessment  -66 y/o F presented to ED with AMS-found to be in septic shock due to sacral wound that was being treated with wound vac. -In ER found to have sacral wound with subcutaneous gas  -Currently in ICU on vancomycin, cefepime, and flagyl    Plan:  -Vancomycin 1250 mg IV every 18 hours ordered.   -Regimen projects a trough level of 17.8 mg/L and AUC. -Level ordered for 0900 on  (after 2nd dose). Thank you for the consult.    Alejandra Cleaning, PharmD  2022 6:12 AM

## 2022-08-22 NOTE — PLAN OF CARE
Problem: Discharge Planning  Goal: Discharge to home or other facility with appropriate resources  Outcome: Progressing  Flowsheets (Taken 8/22/2022 0400)  Discharge to home or other facility with appropriate resources: Identify barriers to discharge with patient and caregiver     Problem: Pain  Goal: Verbalizes/displays adequate comfort level or baseline comfort level  Outcome: Progressing  Note: Continuing to monitor pain and discomfort. Monitoring pain level on scale of 0-10. Non- pharmacological measures encouraged to reduce discomfort/pain. PRN pain meds administeration continues when/if applicable as ordered by physician. Problem: Safety - Adult  Goal: Free from fall injury  Outcome: Progressing  Note: Falling star program remains in place. Call light and personal belongings within reach. Frequent visual monitoring continues. Toileting program inplace. Patient assisted in turning/repositioning at least once every 2 hours, and on a prn basis. Problem: Skin/Tissue Integrity  Goal: Absence of new skin breakdown  Description: 1. Monitor for areas of redness and/or skin breakdown  2. Assess vascular access sites hourly  3. Every 4-6 hours minimum:  Change oxygen saturation probe site  4. Every 4-6 hours:  If on nasal continuous positive airway pressure, respiratory therapy assess nares and determine need for appliance change or resting period. Outcome: Progressing  Note: Monitoring patient skin integrity for skin breakdown, turning and repositioning q2h per protocol.

## 2022-08-22 NOTE — CONSULTS
REASON FOR CONSULTATION/CC: sepsis       Consult at request of Noa Sierra MD for sepsis    PCP: Monnie Runner, MD  Established Pulmonologist:  None    HISTORY OF PRESENT ILLNESS: Jake Chong is a 65y.o. year old female with a history of  who presents with     Patient was recently admitted June 30 for septic shock secondary to community-acquired pneumonia with complications of atrial fibrillation with RVR and GI bleeding with acute kidney injury requiring ICU admission for pressors. .    Patient has a sacral ulcer with wound VAC who resides in nursing home. She presented to the emergency room last night secondary to decreased mental status along with noted shortness of breath was found to have sepsis without shock secondary to his sacral osteomyelitis with sacral wound and acute cystitis. She was admitted to the ICU secondary to septic shock      This note may have been  transcribed using [id number] [employer]. Please disregard any translational errors. Assessment:        Sacral wound  Atrial fibrillation  Recent GI bleed, June 2022. 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 Stress dose steroids. Bilateral pleural effusion  Very small. Too small for intervention  With aortic regurgitation and left atrial enlargement, diastolic dysfunction likely. Consider diuresis when able from a septic shock standpoint      Macrocytic anemia  Chronic and present on admission. B12 and folate both within normal ranges. Test copper. Start multivitamin for general malnutrition. UTI     Yeast in urine.   Retest urine after replacing chronic tello. Has chronic tello      Protein malnutrition  Albumin 1.7 on admission. Electrolytes  -Hypokalemia replace  -Hypocalcemia replace. Also has very low albumin. Follow ionized calcium  -Hypomagnesemia replace  -Hypophosphatemia replace    Prophylaxis  - GI -    - DVT - Eliquis  - VAP -    - C. Diff - culturelle   - Nasal Decolonization - Bactroban    Nutrition  - Diet NPO Exceptions are: Ice Chips, Sips of Water with Meds    Mobility  PT OT     Access  Fem. Replace tomorrow. I spent 40 minutes of critical care time with this patient excluding any procedures. This note was transcribed using [id number] [employer]. Please disregard any translational errors.     Thank you for the consult    Mariuszsamina Asif [facility]  [phone]             Data:     PAST MEDICAL HISTORY:  Past Medical History:   Diagnosis Date    Acid reflux     Anxiety     Arthritis     Atrial fibrillation ([address]     Closed fracture dislocation of right elbow     Depression     Fibromyalgia     Migraine     WHITNEY (obstructive sleep apnea)     Diagnosed years ago, no CPAP    Rectal cancer (HCC)     Wears glasses     DRIVING       PAST SURGICAL HISTORY:  Past Surgical History:   Procedure Laterality Date    APPENDECTOMY      CERVICAL DISCECTOMY      ACDF     SECTION      x4    CHOLECYSTECTOMY      COLON SURGERY      Rectal cancer removed using  incision    COLONOSCOPY      SEVERAL    COLONOSCOPY N/A 2019    COLONOSCOPY POLYPECTOMY SNARE/COLD BIOPSY performed by Marcial Sheridan MD at [address] [facility] N/A 2021    COLONOSCOPY WITH BIOPSY performed by Roxane Juarez MD at [address] [facility] N/A 2022    INCOMPLETE COLONOSCOPY D/T POOR PREP performed by Roxane Juarez MD at [address] [facility] [address] Left 8/3/2022    OPEN REDUCTION INTERNAL FIXATION LEFT DISTAL RADIUS performed by Rut Olson MD at [address] [address] INCISION AND DRAINAGE Right 1/28/2021    RIGHT LEG EVACUATION OF HEMATOMA performed by Maria Ines Turcios MD at [address] [facility] W/O EXTENSION Right 10/25/2018    OPEN REDUCTION INTERNAL FIXATION RIGHT FEMUR SUPRACONDYLAR FEMORAL FRACTURE WITH C-ARM performed by Josef Santoro MD at [place] [address] Right 1/31/2021    RIGHT LEG HEMATOMA EVAKUATION, DEBRIDEMENT, AND WOUND VAC PLACEMENT performed by Modesto Godinez MD at [place] [address] Right 2/17/2021    RIGHT LOWER EXTREMITY SKIN GRAFT performed by Modesto Godinez MD at [address] [facility] Left     UPPER GASTROINTESTINAL ENDOSCOPY N/A 11/26/2019    ESOPHAGOGASTRODUODENOSCOPY performed by Sofie Borges MD at [address] [facility] N/A 8/18/2020    EGD BIOPSY performed by Sofie Borges MD at [address]:  family history includes COPD in her mother. SOCIAL HISTORY:   reports that she has never smoked. She has never used smokeless tobacco.    Scheduled Meds:   pantoprazole (PROTONIX) 40 mg injection  40 mg IntraVENous Daily    flecainide  100 mg Oral 2 times per day    apixaban  5 mg Oral BID    sodium chloride flush  10 mL IntraVENous 2 times per day    cefepime  2,000 mg IntraVENous Q12H    vancomycin  1,250 mg IntraVENous Q18H    vancomycin (VANCOCIN) intermittent dosing (placeholder)   Other RX Placeholder    metroNIDAZOLE  500 mg IntraVENous Q8H       Continuous Infusions:   norepinephrine 25 mcg/min (08/22/22 0431)    sodium chloride      sodium chloride 75 mL/hr at 08/22/22 0409       PRN Meds:  ALPRAZolam, sodium chloride flush, sodium chloride, ondansetron, polyethylene glycol, acetaminophen **OR** acetaminophen    ALLERGIES:  Patient is allergic to demerol hcl [meperidine], pcn [penicillins], and adhesive tape.     REVIEW OF SYSTEMS:       Objective:   PHYSICAL EXAM:  Blood pressure 137/60, pulse 89, temperature 99.7 °F (37.6 °C), temperature source Oral, resp. rate 16, height 5' 2\" (1.575 m), weight 156 lb 8.4 oz (71 kg), SpO2 100 %, not currently breastfeeding.'  Gen: No distress. Eyes:   ENT: No discharge. Pharynx clear. External appearance of ears and nose normal.  Neck: Trachea midline. No obvious mass. Resp: No accessory muscle use. No crackles. No wheezes. No rhonchi. CV: Regular rate. Regular rhythm. No murmur or rub. No edema. GI: Non-tender. Non-distended. No hernia. Skin: Warm, dry, normal texture and turgor. No nodule on exposed extremities. Lymph: No cervical LAD. No supraclavicular LAD. M/S: No cyanosis. No clubbing. No joint deformity. Neuro:   Psych: sleeping       Data Reviewed:   LABS:  CBC:   Recent Labs     08/21/22 2244 08/22/22  0535   WBC 5.3 26.7*   HGB 8.9* 8.7*   HCT 26.6* 26.5*   .6* 102.4*    268     BMP:   Recent Labs     08/21/22 2244 08/22/22  0535    134*   K 3.5 3.0*   CL 95* 96*   CO2 29 28   PHOS  --  2.2*   BUN 22* 24*   CREATININE 0.8 0.8     LIVER PROFILE:   Recent Labs     08/21/22 2244   AST 18   ALT 10   BILITOT 0.7   ALKPHOS 142*     PT/INR: No results for input(s): PROTIME, INR in the last 72 hours. APTT: No results for input(s): APTT in the last 72 hours. UA:  Recent Labs     08/22/22  0052   COLORU Yellow   PHUR 5.5   WBCUA 21-50*   RBCUA 3-4   YEAST Present*   BACTERIA 1+*   CLARITYU SL CLOUDY*   SPECGRAV 1.025   LEUKOCYTESUR MODERATE*   UROBILINOGEN 4.0*   BILIRUBINUR MODERATE*   BLOODU MODERATE*   GLUCOSEU 100*   AMORPHOUS Rare     No results for input(s): PHART, IPL6SOV, PO2ART in the last 72 hours. Radiology Review:  Pertinent images / reports were reviewed as a part of this visit. CT Chest w/ contrast: No results found for this or any previous visit. CT Chest w/o contrast: No results found for this or any previous visit. CTPA: No results found for this or any previous visit.       CXR PA/LAT: Results for orders placed during the hospital encounter of 01/14/18    XR CHEST STANDARD (2 VW)    Narrative  EXAMINATION:  TWO VIEWS OF THE CHEST    1/13/2018 10:08 pm    COMPARISON:  December 29, 2010. HISTORY:  ORDERING PHYSICIAN PROVIDED HISTORY: altered mental status  TECHNOLOGIST PROVIDED HISTORY:  Technologist Provided Reason for Exam: ams  pt. cannot raise left arm due to injury  Acuity: Acute  Type of Encounter: Initial    FINDINGS:  Cardiac and mediastinal contours are enlarged but unchanged. No focal  consolidation. No pneumothorax. No pleural effusion. Multiple calcified  granuloma within right lung    Impression  Enlarged cardiomediastinal silhouette. No focal consolidation, pneumothorax, or sizable pleural effusion. CXR portable: Results for orders placed during the hospital encounter of 08/21/22    XR CHEST PORTABLE    Narrative  EXAMINATION:  ONE XRAY VIEW OF THE CHEST    8/21/2022 10:27 pm    COMPARISON:  07/29/2022    HISTORY:  ORDERING SYSTEM PROVIDED HISTORY: Altered Mental Status  TECHNOLOGIST PROVIDED HISTORY:  Reason for exam:->Altered Mental Status  Reason for Exam: Altered Mental Status    FINDINGS:  Degenerative changes identified in the shoulders bilaterally. Postoperative  surgical plate and screws seen in the cervical spine. Dextroscoliotic  deformity of the thoracic spine. Multilevel degenerative changes are seen  throughout the thoracic and lumbar spine, without acute osseous fracture. No  parenchymal consolidation or pleural effusion is identified. No pneumothorax  is seen. No acute osseous abnormality is identified. Aortic  atherosclerosis. Mitral annular calcifications are identified. Impression  Clear chest without acute cardiopulmonary process.

## 2022-08-22 NOTE — PROGRESS NOTES
Spoke with Analilia Winter RN with wound care. Sacral wound to be handled by surgery. For bilateral heels, apply barrier film and heel protectors.

## 2022-08-22 NOTE — PLAN OF CARE
Problem: Chronic Conditions and Co-morbidities  Goal: Patient's chronic conditions and co-morbidity symptoms are monitored and maintained or improved  Outcome: Not Progressing     Problem: Nutrition Deficit:  Goal: Optimize nutritional status  8/22/2022 1958 by Kailyn Cerda RN  Outcome: Not Progressing    Problem: Pain  Goal: Verbalizes/displays adequate comfort level or baseline comfort level  Outcome: Progressing     Problem: Safety - Adult  Goal: Free from fall injury  Outcome: Progressing  Flowsheets (Taken 8/22/2022 1600 by Haroon Coates RN)  Free From Fall Injury: Instruct family/caregiver on patient safety     Problem: Skin/Tissue Integrity  Goal: Absence of new skin breakdown  Description: 1. Monitor for areas of redness and/or skin breakdown  2. Assess vascular access sites hourly  3. Every 4-6 hours minimum:  Change oxygen saturation probe site  4. Every 4-6 hours:  If on nasal continuous positive airway pressure, respiratory therapy assess nares and determine need for appliance change or resting period.   Outcome: Progressing     Problem: Chronic Conditions and Co-morbidities  Goal: Patient's chronic conditions and co-morbidity symptoms are monitored and maintained or improved  Outcome: Not Progressing     Problem: Nutrition Deficit:  Goal: Optimize nutritional status  8/22/2022 1958 by Kailyn Cerda RN  Outcome: Not Progressing  8/22/2022 0853 by Shay Tai RD, LD  Outcome: Not Progressing

## 2022-08-22 NOTE — ED NOTES
Wound vac dressing removed from patient, okay per Md Zimmerman.       Coral Tellez RN  08/22/22 7517

## 2022-08-22 NOTE — H&P
Hospital Medicine  History and Physical    PCP: Arsenio Cope MD  Patient Name: Shoshana Gates    Date of Service: Pt seen/examined on 22 and admitted to Inpatient with expected LOS greater than two midnights due to medical therapy    CHIEF COMPLAINT:  Pt sent from her nursing home for evaluation after she developed increased confusion  HISTORY OF PRESENT ILLNESS: Patient is a poor historian at this time, and information for this report is derived from conversation with the emergency room physician and review of the records. Pt is an 65y.o. year-old female with a history of hypertension, DMII, A fib, fibromyalgia, colon cancer and a sacral wound which is being treated with a wound vac. Per her nursing home, she is normally A&O x 4. Today she was found to be confused and was oriented to self only. In the ER she was found to have a Sacral wound with subcutaneous gas tunneling through to the perirectal fascia with associated septic shock. Surgery was consulted in the ER and asked that her wound vac be removed. She was started on antibiotics and vasopressors and will be admitted to the ICU for further evaluation and treatment.   Pt unable to provide associated signs and symptoms at this time due to altered mental status        Past Medical History:        Diagnosis Date    Acid reflux     Anxiety     Arthritis     Atrial fibrillation (HCC)     Closed fracture dislocation of right elbow     Depression     Fibromyalgia     Migraine     WHITNEY (obstructive sleep apnea)     Diagnosed years ago, no CPAP    Rectal cancer (HCC)     Wears glasses     DRIVING       Past Surgical History:        Procedure Laterality Date    APPENDECTOMY      CERVICAL DISCECTOMY      ACDF     SECTION      x4    CHOLECYSTECTOMY      COLON SURGERY      Rectal cancer removed using  incision    COLONOSCOPY      SEVERAL    COLONOSCOPY N/A 2019    COLONOSCOPY POLYPECTOMY SNARE/COLD BIOPSY performed by Dayan Ramirez MD at [facility]    COLONOSCOPY N/A 7/7/2021    COLONOSCOPY WITH BIOPSY performed by Christa Martinez MD at [address] [facility] N/A 8/12/2022    INCOMPLETE COLONOSCOPY D/T POOR PREP performed by Christa Martinez MD at [address] Left 8/3/2022    OPEN REDUCTION INTERNAL FIXATION LEFT DISTAL RADIUS performed by Denise Hamilton MD at [address] [facility] Right 1/28/2021    RIGHT LEG EVACUATION OF HEMATOMA performed by Ab Means MD at [address] [facility] W/O EXTENSION Right 10/25/2018    OPEN REDUCTION INTERNAL FIXATION RIGHT FEMUR SUPRACONDYLAR FEMORAL FRACTURE WITH C-ARM performed by Caitlyn Marcelino MD at [place] [address] Right 1/31/2021    RIGHT LEG HEMATOMA EVAKUATION, DEBRIDEMENT, AND WOUND VAC PLACEMENT performed by Ester Uriostegui MD at [place] [address] Right 2/17/2021    RIGHT LOWER EXTREMITY SKIN GRAFT performed by Ester Uriostegui MD at [address] [place] [address] Left     UPPER GASTROINTESTINAL ENDOSCOPY N/A 11/26/2019    ESOPHAGOGASTRODUODENOSCOPY performed by Kaushik Hoskins MD at [place] [address] N/A 8/18/2020    EGD BIOPSY performed by Kaushik Hoskins MD at [facility]:  Demerol hcl [meperidine], Pcn [penicillins], and Adhesive tape    Medications Prior to Admission:    Prior to Admission medications    Medication Sig Start Date End Date Taking? Authorizing Provider   collagenase 250 UNIT/GM ointment Apply topically daily. 8/16/22 8/25/22  MUSA Valenzuela - CNP   HYDROcodone-acetaminophen (NORCO) 5-325 MG per tablet Take 1 tablet by mouth every 6 hours as needed for Pain. Historical Provider, MD   ALPRAZolam Marilyn Close) 0.5 MG tablet Take 0.5 mg by mouth every 8 hours as needed for Anxiety.     Historical Provider, MD   acetaminophen (TYLENOL) 325 MG tablet Take 650 mg by mouth every 6 hours as needed for Pain    Historical Date/Time    LEUKOCYTESUR MODERATE 08/22/2022 12:52 AM    RBCUA 3-4 08/22/2022 12:52 AM    SPECGRAV 1.025 08/22/2022 12:52 AM    UROBILINOGEN 4.0 08/22/2022 12:52 AM    BILIRUBINUR MODERATE 08/22/2022 12:52 AM    BLOODU MODERATE 08/22/2022 12:52 AM    GLUCOSEU 100 08/22/2022 12:52 AM    PROTEINU 30 08/22/2022 12:52 AM         RAD:   I have independently reviewed and interpreted the imaging studies below and based my recommendations to the patient on those findings. ECHO Complete 2D W Doppler W Color    Result Date: 7/30/2022  Transthoracic Echocardiography Report (TTE)  Demographics   Patient Name      Porter Plane   Date of Study     07/30/2022          Gender              Female   Patient Number    [id number]          Date of Birth       1945   Visit Number      [id number]           Age                 65 year(s)   Accession Number  [id number]          Room Number         2105   Corporate ID      [id number]             Sonographer         ELENA Hinkle, RVT, RDCS   Ordering          Jay Christian MD  Interpreting        [address] [place] [address].  Physician                             Physician           Quiana Judd MD  Procedure Type of Study   TTE procedure:ECHOCARDIOGRAM COMPLETE 2D W DOPPLER W COLOR. Procedure Date Date: 07/30/2022 Start: 10:15 AM Study Location: [facility] Technical Quality: Limited visualization due to patient immobility. Indications:Atrial fibrillation. Additional Indications:LVEF, wall motion abnormality, valvular disease. Patient Status: Routine Height: 62 inches Weight: 149 pounds BSA: 1.69 m2 BMI: 27.25 kg/m2 BP: 101/77 mmHg  Conclusions   Summary  Left ventricular cavity size is normal. There is mild concentric left  ventricular hypertrophy. Left ventricular function is hyperdynamic with  ejection fraction estimated at >70%. Tricuspid valve is structurally normal. Mild tricuspid regurgitation. No  evidence of tricuspid stenosis. Right Atrium  The right atrium is mildly dilated. Pulmonic Valve  The pulmonic valve is not well visualized. No evidence of pulmonic valve  regurgitation. No evidence of pulmonic valve stenosis. Pericardial Effusion  No pericardial effusion noted. Pleural Effusion  No pleural effusion. Miscellaneous  IVC size is dilated (>2.1 cm) and collapses < 50% with respiration  consistent with elevated RA pressure (15 mmHg). Estimated pulmonary artery  systolic pressure is at 52 mmHg assuming a right atrial pressure of 15 mmHg. M-Mode/2D Measurements (cm)   LV Diastolic Dimension: 9.04 cm LV Systolic Dimension: 2.03 cm  LV Septum Diastolic: 3.57 cm  LV PW Diastolic: 3.68 cm        AO Root Dimension: 2.61 cm                                  LA Dimension: 3.9 cm                                  LA Area: 27.8 cm2  LVOT: 2 cm                      LA volume/Index: 85.2 ml /50 ml/m2  Doppler Measurements   AV Peak Velocity: 227 cm/s     MV Peak E-Wave: 134 cm/s  AV Peak Gradient: 20.61 mmHg  AV Mean Gradient: 12 mmHg  LVOT Peak Velocity: 120 cm/s  AV Area (Continuity):1.61 cm2   TR Velocity:303 cm/s           MV Deceleration Time: 99 msec  TR Gradient:36.72 mmHg  Estimated RAP:15 mmHg  Estimated RVSP: 52 mmHg  E' Septal Velocity: 6.92 cm/s  E' Lateral Velocity: 6.08 cm/s  E/E' ratio: 21.1  PV Peak Velocity: 136 cm/s  PV Peak Gradient: 7.4 mmHg   Aortic Valve   Peak Velocity: 227 cm/s     Mean Velocity: 156 cm/s  Peak Gradient: 20.61 mmHg   Mean Gradient: 12 mmHg  Area (continuity): 1.61 cm2  AV VTI: 31.2 cm  AI P1/2t: 375 msec  Aorta   Aortic Root: 2.61 cm  LVOT Diameter: 2 cm      XR WRIST LEFT (2 VIEWS)    Result Date: 8/3/2022  EXAMINATION: SPOT FLUOROSCOPIC IMAGES 8/3/2022 12:15 pm TECHNIQUE: Fluoroscopy was provided by the radiology department for procedure. Radiologist was not present during examination. Nondisplaced fracture distal radial metaphysis. No dislocation. Osteoarthritic changes. No other fractures. Nondisplaced fracture distal radial metaphysis     XR HAND RIGHT (MIN 3 VIEWS)    Result Date: 7/23/2022  EXAMINATION: THREE XRAY VIEWS OF THE RIGHT HAND 7/23/2022 7:14 pm COMPARISON: None. HISTORY: ORDERING SYSTEM PROVIDED HISTORY: fall,  pain, bruising TECHNOLOGIST PROVIDED HISTORY: Reason for exam:->fall,  pain, bruising Reason for Exam: fall,  pain, bruising FINDINGS: There is no evidence of acute fracture. There is normal alignment. No acute joint abnormality. No focal osseous lesion. No focal soft tissue abnormality. Osteoarthritic changes. No acute osseous abnormality. CT HEAD WO CONTRAST    Result Date: 7/29/2022  EXAMINATION: CT OF THE HEAD WITHOUT CONTRAST  7/29/2022 10:55 pm TECHNIQUE: CT of the head was performed without the administration of intravenous contrast. Automated exposure control, iterative reconstruction, and/or weight based adjustment of the mA/kV was utilized to reduce the radiation dose to as low as reasonably achievable. COMPARISON: 07/23/2022 HISTORY: ORDERING SYSTEM PROVIDED HISTORY: Fall TECHNOLOGIST PROVIDED HISTORY: Has a \"code stroke\" or \"stroke alert\" been called? ->No Reason for exam:->Fall Decision Support Exception - unselect if not a suspected or confirmed emergency medical condition->Emergency Medical Condition (MA) Reason for Exam: ams FINDINGS: BRAIN/VENTRICLES: There is no acute intracranial hemorrhage, mass effect or midline shift. No abnormal extra-axial fluid collection. The gray-white differentiation is maintained without evidence of an acute infarct. There is no evidence of hydrocephalus. Intracranial atherosclerosis. Generalized cortical atrophy. Chronic microvascular ischemic change suspected with patchy white matter low attenuation similar to the prior examination. No wedge-shaped area of acute ischemia.  ORBITS: The visualized portion of the orbits demonstrate no acute abnormality. SINUSES: The visualized paranasal sinuses and mastoid air cells demonstrate no acute abnormality. SOFT TISSUES/SKULL:  No acute abnormality of the visualized skull or soft tissues. Stable appearing CT scan of the head without acute intracranial abnormality. CT HEAD WO CONTRAST    Result Date: 7/23/2022  EXAMINATION: CT OF THE HEAD WITHOUT CONTRAST  7/23/2022 7:07 pm TECHNIQUE: CT of the head was performed without the administration of intravenous contrast. Automated exposure control, iterative reconstruction, and/or weight based adjustment of the mA/kV was utilized to reduce the radiation dose to as low as reasonably achievable. COMPARISON: 09/01/2019 HISTORY: ORDERING SYSTEM PROVIDED HISTORY: fall, on eliquis TECHNOLOGIST PROVIDED HISTORY: Reason for exam:->fall, on eliquis Has a \"code stroke\" or \"stroke alert\" been called? ->No Decision Support Exception - unselect if not a suspected or confirmed emergency medical condition->Emergency Medical Condition (MA) Reason for Exam: fall, on eliquis FINDINGS: BRAIN/VENTRICLES: There is mild generalized atrophy and mild patchy frontoparietal periventricular and subcortical white matter low attenuation that is nonspecific but most consistent with chronic small vessel ischemia. There is no acute intracranial hemorrhage, mass effect or midline shift. No abnormal extra-axial fluid collection. The gray-white differentiation is maintained without evidence of an acute infarct. ORBITS: The visualized portion of the orbits demonstrate no acute abnormality. SINUSES: The visualized paranasal sinuses and mastoid air cells demonstrate no acute abnormality. SOFT TISSUES/SKULL:  No acute abnormality of the visualized skull or soft tissues. No acute intracranial abnormality.      US RENAL COMPLETE    Result Date: 8/1/2022  EXAMINATION: RETROPERITONEAL ULTRASOUND OF THE KIDNEYS AND URINARY BLADDER 8/1/2022 COMPARISON: None HISTORY: ORDERING SYSTEM PROVIDED HISTORY: right hydronephrosis TECHNOLOGIST PROVIDED HISTORY: Reason for exam:->right hydronephrosis FINDINGS: Kidneys: The right kidney measures 9.2 cm in length and the left kidney measures 10.3 cm in length. Kidneys demonstrate normal cortical echogenicity. No evidence of hydronephrosis or intrarenal stones. Bladder: Nonvisualized. No sonographic abnormality. RECOMMENDATIONS: Unavailable     CT ABDOMEN PELVIS W IV CONTRAST Additional Contrast? None    Result Date: 8/22/2022  EXAMINATION: CT OF THE ABDOMEN AND PELVIS WITH CONTRAST 8/21/2022 11:43 pm TECHNIQUE: CT of the abdomen and pelvis was performed with the administration of intravenous contrast. Multiplanar reformatted images are provided for review. Automated exposure control, iterative reconstruction, and/or weight based adjustment of the mA/kV was utilized to reduce the radiation dose to as low as reasonably achievable. COMPARISON: July 29, 2022 HISTORY: ORDERING SYSTEM PROVIDED HISTORY: wound vac on sacrum, odor, AMS TECHNOLOGIST PROVIDED HISTORY: Reason for exam:->wound vac on sacrum, odor, AMS Additional Contrast?->None Decision Support Exception - unselect if not a suspected or confirmed emergency medical condition->Emergency Medical Condition (MA) Reason for Exam: wound vac on sacrum, odor, AMS FINDINGS: Lower Chest: Small bilateral pleural effusions. Mild bibasilar atelectasis. The heart is mildly enlarged. Difficult to exclude superimposed infection at the lung bases. Organs: Liver: Tiny right hepatic cyst suspected. No acute findings of the liver. Mild intrahepatic bile duct dilatation, likely in keeping with prior cholecystectomy. Gallbladder: Biliary ductal dilitation in keeping with prior cholecystectomy. Spleen: Unremarkable spleen. Pancreas: No peripancreatic inflammatory changes. Adrenal Glands: Nonspecific bilateral adrenal gland thickening wihtout focal abnormality.   Nodularity of left adrenal gland is unchanged from prior study. Difficult to exclude underlying adenoma. Kidneys: No hydronephrosis. GI/Bowel: Stomach: The stomach is nondistended. Small bowel: No evidence of small bowel obstruction. Small duodenal diverticulum. Colon: Thickening of the rectum. Soft tissue edema and gas near the gluteal soft tissues posterior to the rectum and anus. This is adjacent to the sacrum and coccyx. Postoperative sutures at the rectum. Moderate stool in the remainder of the colon. Appendix: Appendix not visualized, but there are no secondary findings of acute appendicitis. Pelvis: Thickened rectum. Subcutaneous gas and soft tissue inflammatory changes in the [place] sacral soft tissues. No free fluid in the pelvis otherwise. Kaplan catheter in the urinary bladder. Peritoneum/Retroperitoneum: Atherosclerosis of the abdominal aorta. No retroperitoneal lymphadenopathy by CT criteria. Bones/Soft Tissues: Global muscle atrophy. Spondylosis of the spine. Partially seen fixation hardware in the right proximal femur. Diffuse soft tissue gas posterior and adjacent to the sacrum and coccyx. No loculated fluid collections. The gas extends to the bone. Underlying osteomyelitis is possible where there is increased sclerosis. This is increased from prior study. Osteoporotic compression fracture of T12 is unchanged. Large sacral decubitus ulcer with subcutaneous gas extending to the coccyx and perirectal fascia. No loculated fluid collections. Increased sclerosis of the adjacent coccyx with soft tissue gas extending to the bone. Findings are compatible with osteomyelitis. Adjacent rectal wall thickening may be reactive. Small bilateral pleural effusions with bibasilar atelectasis. Difficult to exclude superimposed pneumonia in the lung bases.      XR CHEST PORTABLE    Result Date: 8/21/2022  EXAMINATION: ONE XRAY VIEW OF THE CHEST 8/21/2022 10:27 pm COMPARISON: 07/29/2022 HISTORY: ORDERING SYSTEM PROVIDED HISTORY: Altered Mental Status TECHNOLOGIST PROVIDED HISTORY: Reason for exam:->Altered Mental Status Reason for Exam: Altered Mental Status FINDINGS: Degenerative changes identified in the shoulders bilaterally. Postoperative surgical plate and screws seen in the cervical spine. Dextroscoliotic deformity of the thoracic spine. Multilevel degenerative changes are seen throughout the thoracic and lumbar spine, without acute osseous fracture. No parenchymal consolidation or pleural effusion is identified. No pneumothorax is seen. No acute osseous abnormality is identified. Aortic atherosclerosis. Mitral annular calcifications are identified. Clear chest without acute cardiopulmonary process. XR CHEST PORTABLE    Result Date: 7/29/2022  EXAMINATION: ONE XRAY VIEW OF THE CHEST 7/29/2022 10:17 pm COMPARISON: 02/11/2021 HISTORY: ORDERING SYSTEM PROVIDED HISTORY: SOB TECHNOLOGIST PROVIDED HISTORY: Reason for exam:->SOB Reason for Exam: a-fib, ams FINDINGS: The cardiac silhouette appears within normal limits. The aorta is uncoiled and atherosclerotic. Scarring/atelectatic changes are seen at the left lung base. No confluent airspace opacity, pleural effusion or pneumothorax is seen. Changes of prior orthopedic fixation/fusion of the lower cervical spine are seen. Chronic fracture deformity of left proximal humerus redemonstrated. There are surgical clips overlying the left upper abdomen. Scarring/atelectatic changes seen at the left lung base without confluent airspace opacity seen. FLUORO FOR SURGICAL PROCEDURES    Result Date: 8/3/2022  Radiology exam is complete. No Radiologist dictation. Please follow up with ordering provider.      CT CHEST ABDOMEN PELVIS WO CONTRAST    Result Date: 8/1/2022  EXAMINATION: CT OF THE CHEST, ABDOMEN, AND PELVIS WITHOUT CONTRAST 7/29/2022 10:55 pm TECHNIQUE: CT of the chest, abdomen and pelvis was performed without the administration of intravenous contrast. Multiplanar reformatted images are provided for review. Automated exposure control, iterative reconstruction, and/or weight based adjustment of the mA/kV was utilized to reduce the radiation dose to as low as reasonably achievable. COMPARISON: 08/11/2019 HISTORY: ORDERING SYSTEM PROVIDED HISTORY: Fall TECHNOLOGIST PROVIDED HISTORY: Reason for exam:->Fall Additional Contrast?->None Reason for Exam: Atrial Fibrillation; Fall; Altered Mental Status FINDINGS: Evaluation is partially limited due to patient motion artifact and streak artifact arising from the patient's arms. Chest: Mediastinum: No evidence of enlarged mediastinal lymph node is seen. There are calcified mediastinal and hilar lymph nodes reflecting prior granulomatous disease. The main pulmonary artery appears normal in caliber. Vascular calcification changes of the thoracic aorta are seen. No significant pericardial effusion is seen. Lungs/pleura: The central airway appears patent. There is opacity of medial and dependent portions of the left lower lobe, which may reflect pneumonia. Follow-up imaging resolution is recommended. There appears to be trace left pleural effusion. No evidence of pneumothorax is seen. Soft Tissues/Bones: There is chronic appearing fracture deformity of the left proximal humerus. Changes of prior orthopedic fixation of the cervical spine, partially visualized. Abdomen/Pelvis: Organs: Evaluation of visceral organs is limited without the benefit of intravenous contrast.  There are calcified granulomas of the liver and the spleen. The gallbladder appears absent. The pancreas appears atrophic without evidence of pancreatic ductal dilatation seen. There is thickening of the adrenal glands, which may reflect underlying hyperplasia. There is mild-to-moderate right hydroureteronephrosis without discrete ureteral stone appreciated. This may be due to distended urinary bladder. No evidence of left nephrolithiasis or hydronephrosis seen. GI/Bowel: Evaluation of the GI tract is limited without the benefit of enteric contrast.  The rectum is mildly distended filled with stool measuring 7.0 x 8.1 cm with mild surrounding inflammatory changes. This may reflect underlying stercoral proctitis. There is redemonstration of changes of partial colectomy with anastomosis seen within the sigmoid colon. The appendix is not visualized without secondary signs of acute appendicitis appreciated. The patient is again noted to be status post Shanice-en-Y gastric bypass surgery. There is redemonstration of diverticulum arising from duodenum at the level of the pancreatic head. No evidence of bowel obstruction seen. Pelvis: The urinary bladder is distended. The uterus appears atrophic. Peritoneum/Retroperitoneum: No evidence of intra-abdominal free air is seen. No evidence of ascites is seen. Moderate vascular calcification changes are seen. Bones/Soft Tissues: Multilevel facet joint osteoarthritis affects the lumbar spine. Limited noncontrast examination. Left lower lobe opacity, suspicious for pneumonia. Follow-up imaging resolution is recommended. Mild-to-moderate right hydroureteronephrosis, which may be due to distended urinary bladder. Consider Kaplan catheter. Distended rectum filled with stool with mild surrounding inflammatory changes, which may reflect underlying stercoral proctitis. Consider disimpaction.      Colonoscopy    Result Date: 8/12/2022  No dictation         EKG:   Read by ER in the absence of a Cardiologist shows A. fib with RVR and a rate of 112, left axis,  and QTc 480, no ST elevations, nonspecific ST changes, when compared EKG from 7/29/2022 rate has decreased by 102 bpm otherwise no acute changes      Assessment:   Principal Problem:    Septic shock (HCC)  Active Problems:    DM2 (diabetes mellitus, type 2) ([facility] [address]    Acute cystitis with hematuria    Acute metabolic encephalopathy    Sacral wound    Elevated lactic acid level Fever    Tachypnea    Atrial fibrillation (HCC)    Tachycardia    Severe protein-calorie malnutrition (HCC)    Essential hypertension    Malignant neoplasm of colon (HCC)    Fibromyalgia  Resolved Problems:    * No resolved hospital problems. *      Plan:       Severe sepsis with septic shock due to her sacral wound and UTI with fever, tachycardia, tachypnea and the following signs of organ hypoperfusion or organ dysfunction poor peripheral pulses +1 not easily felt and easily obliterated with pressures and mottled skin. Initial Lactic Acid was elevated. - Pt will be resuscitated with 30 cc/Kg IV Fluids and blood pressure will be monitored closely  - A central line has been placed and Vasopressors have been started  - Cefepime and Vancomycin have been ordered  - We will cycle serial lactic acid levels until lactic acid has normalized  - Blood cultures x 2 have been ordered  - A sacral wound culture has been ordered  - ID has been consulted  - Critical care has been consulted    Sacral wound with subcutaneous gas tunneling through to the perirectal fascia - Surgery asked that the wound vac be removed. She has been started on Cefepime and Vancomycin    Acute cystitis with hematuria - patient was started on Cefepime and Vancomycin empirically. Urine culture and sensitivities have been ordered, and antibiotic therapy will be adjusted as necessary based upon those results. Acute metabolic encephalopathy - due to severe illness, sepsis. Provide supportive care    Atrial fibrillation ([address] - currently with low level RVR. Holding Metoprolol due to hypotension.  Continue Tambocor and Eliquis    Severe protein-calorie malnutrition ([address] - will consult the dietitian to guide nutritional support     Malignant neoplasm of colon ([address] - per outpatient management    Fibromyalgia - provide symptomatic treatment as needed    Diabetes mellitus II - Controlled without need for aggressive monitoring or intervention    Essential (primary) hypertension - BP running low. Hold Metoprolol and monitor blood pressure      Due to the high probability of clinically significant life threating deterioration of the patient's condition that required my urgent intervention, a total critical care time 55minutes was used. This includes but not limited to examining patient, collaborating with other physicians, monitoring vital signs, telemetry, continuous pulse oximety, and clinical response to IV medications; documentation time, review and interpretation of laboratory and radiological data, review of nursing notes and old record review. This time excludes any time that may have been spent performing procedures for life threatening organ failure. Code Status: Full Code  Diet: Diet NPO Exceptions are: Ice Chips, Sips of Water with Meds  DVT Prophylaxis: Eliquis    (Advanced care planning has been discussed with patient and/or responsible family member and is reflected in the code status.  Further orders associated with this have been entered if appropriate)    Disposition: Anticipate that patient will remain in the hospital for 2 or more midnights depending on further evaluation and clinical course     Please note that over 50 minutes was spent in evaluating the patient, review of records and results, discussion with staff/family, etc.      Igor Bello MD

## 2022-08-22 NOTE — CONSULTS
Infectious Diseases   Consult Note        Admission Date: 8/21/2022  Hospital Day: Hospital Day: 2   Attending: Jadyn Osullivan MD  Date of service: 8/22/22     Reason for admission: Acute cystitis with hematuria [N30.01]  Sacral osteomyelitis ([facility] [address] [M46.28]  Septic shock ([address] [A41.9, R65.21]  Hypotension, unspecified hypotension type [I95.9]  Altered mental status, unspecified altered mental status type [R41.82]  Skin ulcer of sacrum, unspecified ulcer stage ([address] [L98.429]  Sepsis without septic shock ([address] [A41.9]    Chief complaint/ Reason for consult: Septic shock, gram-negative bacteremia    Microbiology:        I have reviewed allavailable micro lab data and cultures    Blood culture (1/2) - collected on 8/21/2022: Proteus species        Antibiotics and immunizations:       Current antibiotics: All antibiotics and their doses were reviewed by me    Recent Abx Admin                     fluconazole (DIFLUCAN) in 0.9 % sodium chloride IVPB 200 mg (mg) 200 mg New Bag 08/22/22 1619    metronidazole (FLAGYL) 500 mg in 0.9% NaCl 100 mL IVPB premix (mg) 500 mg New Bag 08/22/22 1416     500 mg New Bag  0649    cefepime (MAXIPIME) 2000 mg IVPB minibag (mg) 2,000 mg New Bag 08/22/22 1415    vancomycin (VANCOCIN) 1250 mg in dextrose 5 % 250 mL IVPB (mg) 1,250 mg New Bag 08/22/22 0150    cefepime (MAXIPIME) 2000 mg IVPB minibag (mg) 2,000 mg New Bag 08/22/22 0117                      Immunization History: All immunization history was reviewed by me today.     Immunization History   Administered Date(s) Administered    COVID-19, J&J, (age 18y+), IM, 0.5 mL 03/08/2021, 10/31/2021    Influenza Vaccine, unspecified formulation 10/21/2018    Influenza, FLUAD, (age 72 y+), Adjuvanted 09/03/2020, 09/09/2021    Influenza, FLUARIX, FLULAVAL, (age 10 mo+) AND AFLURIA, FLUZONE (age 1 y+), PF 02/19/2020    Influenza, High Dose (Fluzone 65 yrs and older) 12/09/2015, 10/27/2016, 09/22/2017, 10/21/2018    Pneumococcal Conjugate 13-valent ([id number]) 12/09/2015    Pneumococcal Conjugate Vaccine 10/21/2018    Pneumococcal Polysaccharide ([id number]) 10/21/2018    Tdap (Boostrix, Adacel) 12/09/2015       Known drug allergies: All allergies were reviewed and updated    Allergies   Allergen Reactions    Demerol Hcl [Meperidine] Other (See Comments)     PATIENT STATES SHE GOT VERY ANXIOUS-LIKE SHE WAS CLIMBING THE WALLS    Pcn [Penicillins]      Patient reports she has not had since she was a child, thinks reaction was hives. Adhesive Tape Rash       Social history:     Social History:  All social andepidemiologic history was reviewed and updated by me today as needed. Tobacco use:   reports that she has never smoked. She has never used smokeless tobacco.  Alcohol use:   reports no history of alcohol use. [facility] in: [facility]   reports no history of drug use. COVID VACCINATION AND LAB RESULT RECORDS:     Internal Administration   First Dose COVID-19, J&J, (age 18y+), IM, 0.5 mL  03/08/2021   Second Dose COVID-19, J&J, (age 18y+), IM, 0.5 mL   10/31/2021       Last COVID Lab SARS-CoV-2 (no units)   Date Value   08/12/2020 Not Detected     SARS-CoV-2, NAAT (no units)   Date Value   08/15/2022 Not Detected            Assessment:     The patient is a 65 y.o. old female who  has a past medical history of Acid reflux, Anxiety, Arthritis, Atrial fibrillation (HCC), Closed fracture dislocation of right elbow, Depression, Fibromyalgia, Migraine, WHITNEY (obstructive sleep apnea), Rectal cancer ([facility] [address] and Wears glasses.  with following problems:    Septic shock with high-grade fever, leukocytosis, metabolic encephalopathy, hypotension requiring vasopressors and elevated lactic acid level of 2.5  Gram-negative bacteremia with Proteus  Infected sacral decubitus ulcer  Nursing home resident  Elevated alkaline phosphatase of 142  History of depression  Fibromyalgia  Obstructive sleep apnea  Overweight due to excess calorie intake : Body mass index is 28.63 kg/m². Discussion:      The patient was admitted with septic shock with high-grade fever, worsening white cell count, hypotension requiring vasopressors. Blood culture is growing Proteus. Sensitivities are awaited. The patient has received IV vancomycin, cefepime, Flagyl and fluconazole      Plan:     Diagnostic Workup:    Follow-up on sensitivities of Proteus isolated from the blood culture  Make sure sacral wound culture has been sent  Plan to repeat blood cultures tomorrow  Continue to follow fever curve, WBC count and blood cultures  Follow up on liverand renal functions closely    Antimicrobials:    I will stop IV cefepime and Flagyl today  Will order IV meropenem 1 g every 8 hours  Will leave her on empiric IV vancomycin for now until sacral decubitus wound cultures are in process to rule out MRSA  Check Vancomycin trough before the 5th dose. Target vancomycin trough level of 15-20  mg/L or vancomycin area under curve (AUC) of 400-600 mg*h/L by Bayesian modeling method. If dosing vancomycin based on trough levels, keep vancomycin trough below 20 at all times. Avoid increasing the dose of vancomycin above a total of 4 grams in a 24-hour period in patients younger than 45 years and above 3 grams in a 24-hour period in a patient of age 39 years or older. Continue to monitor serum creatinine and Vanco levels closely, while the patient is on I/v Vancomycin. Okay to continue empiric IV fluconazole 200 mg every 24 hour for now for empiric antifungal coverage  Recommend holding off on PICC line until blood cultures clear for at least 48 and preferably 72 hours. Midline or regular central venous line okay for now, if needed for IV access. Vasopressor support as needed to maintain mean arterial pressure greater than 65 due to mercury  We will follow up on the culture results and clinical progress and will make further recommendations accordingly.   Continue close vitals monitoring. Maintain good glycemic control. Fall precautions. Aspiration precautions. Continue to watch for new fever or diarrhea. DVT prophylaxis. Critically ill patient. High risk of morbidity and mortality  Continue close monitoring in ICU setting  Discussed in detail with ICU RN      Drug Monitoring:    Continue serial monitoring for antibiotic toxicity as follows: Vancomycin trough, CBC, CMP  Continue to watch for following: new or worsening fever, hypotension, hives, lip swelling and redness or purulence at vascular access sites. I/v access Management:    Continue to monitor i.v access sites for erythema, induration, discharge or tenderness. As always, continue efforts to minimizetubes/lines/drains as clinically appropriate to reduce chances of line associated infections. Current isolation precautions: There are no current isolations documented for this patient. Risk of Complications/Morbidity/Mortality: High     Patient is critically ill and has a potentially life threatening infection that poses threat to life/bodily function. There is potential for worsening infection/ sudden clinically significant or life-threatening deterioration in the patient's condition without appropriate antimicrobial therapy. Complex medical decision making process was involved to select appropriate antimicrobial agents to reverse the cause of patient's severe infection/ illness. Antimicrobial therapy requires intensive monitoring for toxicity and frequent dose adjustments to prevent toxicity and permanent end-organ dysfunction. Critical care time:  33 minutes      Thank you for involving me in the care of your patient. I will continue to follow. If you have any additional questions, please do not hesitate to contact me.     Subjective:     Presenting complaint in ER:     Chief Complaint   Patient presents with    Altered Mental Status     Arrived from [facility] with complaints of altered mental status. Usually alert and oriented. Pt. Was found not responding as normal. Alert and oriented x1        HPI: Foreign Khan is a 65 y.o. female patient, who was seen at the request of Dr. Nehemias Zhang MD.    History was obtained from chart review and RN as the patient is encephalopathic    The patient was admitted on 2022. I have been consulted to see the patient for above mentioned reason(s). The patient has multiple medical comorbidities, and presented to the ER for Altered mental status. The patient is a nursing home resident and was found to be confused yesterday. EMS was called and patient was brought to the ER. The patient has a chronic sacral decubitus ulcer and has a wound VAC in place. In the ER, patient was noted to have a high fever 104.3 and white cell count it went up to 26,700. She was admitted for severe sepsis. Blood cultures are growing Proteus    The patient was hypotensive and was admitted to the ICU for septic shock    I have been asked for my opinion for management for this patient. Past Medical History: All past medical history reviewed today. Past Medical History:   Diagnosis Date    Acid reflux     Anxiety     Arthritis     Atrial fibrillation (HCC)     Closed fracture dislocation of right elbow     Depression     Fibromyalgia     Migraine     WHITNEY (obstructive sleep apnea)     Diagnosed years ago, no CPAP    Rectal cancer ([facility] [address]     Wears glasses     DRIVING         Past Surgical History: All pastsurgical history was reviewed today.     Past Surgical History:   Procedure Laterality Date    APPENDECTOMY      CERVICAL DISCECTOMY      ACDF     SECTION      x4    CHOLECYSTECTOMY      COLON SURGERY      Rectal cancer removed using  incision    COLONOSCOPY      SEVERAL    COLONOSCOPY N/A 2019    COLONOSCOPY POLYPECTOMY SNARE/COLD BIOPSY performed by Ninfa Dee MD at [address] [place] [address] 2021    COLONOSCOPY WITH BIOPSY performed by Brittney Diop MD at [address] [facility] N/A 8/12/2022    INCOMPLETE COLONOSCOPY D/T POOR PREP performed by Brittney Diop MD at [address] Left 8/3/2022    OPEN REDUCTION INTERNAL FIXATION LEFT DISTAL RADIUS performed by Adonay Cho MD at [address] [facility] Right 1/28/2021    RIGHT LEG EVACUATION OF HEMATOMA performed by Erick Patrick MD at [address] [facility] W/O EXTENSION Right 10/25/2018    OPEN REDUCTION INTERNAL FIXATION RIGHT FEMUR SUPRACONDYLAR FEMORAL FRACTURE WITH C-ARM performed by Chaka Yousif MD at [address] [facility] Right 1/31/2021    RIGHT LEG HEMATOMA EVAKUATION, DEBRIDEMENT, AND WOUND VAC PLACEMENT performed by Henry Andres MD at [address] [facility] Right 2/17/2021    RIGHT LOWER EXTREMITY SKIN GRAFT performed by Henry Andres MD at [address] [place] [address] Left     UPPER GASTROINTESTINAL ENDOSCOPY N/A 11/26/2019    ESOPHAGOGASTRODUODENOSCOPY performed by Hannah Thompson MD at [id number] [place] [address] N/A 8/18/2020    EGD BIOPSY performed by Hannah Thompson MD at [address] [facility] History: All family history was reviewed today. Problem Relation Age of Onset    COPD Mother          Medications: All current and past medications were reviewed. Medications Prior to Admission: collagenase 250 UNIT/GM ointment, Apply topically daily. HYDROcodone-acetaminophen (NORCO) 5-325 MG per tablet, Take 1 tablet by mouth every 6 hours as needed for Pain. ALPRAZolam (XANAX) 0.5 MG tablet, Take 0.5 mg by mouth every 8 hours as needed for Anxiety.   acetaminophen (TYLENOL) 325 MG tablet, Take 650 mg by mouth every 6 hours as needed for Pain  bisacodyl (DULCOLAX) 10 MG suppository, Place 10 mg rectally daily as needed for Constipation  potassium chloride (KLOR-CON M) 20 MEQ extended release tablet, TAKE 1 TABLET DAILY  polyethylene glycol (GLYCOLAX) 17 g packet, Take 17 g by mouth daily as needed for Constipation  docusate sodium (COLACE, DULCOLAX) 100 MG CAPS, Take 100 mg by mouth 2 times daily as needed for Constipation  metoprolol succinate (TOPROL XL) 25 MG extended release tablet, Take 1 tablet by mouth in the morning. furosemide (LASIX) 20 MG tablet, Take 1 tablet by mouth in the morning. With an additional 20 mg on MWF.  apixaban (ELIQUIS) 5 MG TABS tablet, Take 1 tablet by mouth in the morning and 1 tablet before bedtime.   escitalopram (LEXAPRO) 10 MG tablet, Take 1 tablet by mouth daily  tiZANidine (ZANAFLEX) 2 MG tablet, Take 1 tablet by mouth every 6 hours as needed (muscle pain)  pantoprazole (PROTONIX) 40 MG tablet, TAKE ONE TABLET BY MOUTH TWICE A DAY  dicyclomine (BENTYL) 10 MG capsule, Take 1 capsule by mouth 4 times daily as needed (cramping/diarrhea)  flecainide (TAMBOCOR) 100 MG tablet, TAKE 1 TABLET BY MOUTH 2 TIMES A DAY  Cyanocobalamin (VITAMIN B 12 PO), Take 2,500 mg by mouth daily  calcium carbonate-vitamin D 600-200 MG-UNIT TABS, Take 1 tablet by mouth 2 times daily     pantoprazole (PROTONIX) 40 mg injection  40 mg IntraVENous Daily    flecainide  100 mg Oral 2 times per day    [Held by provider] apixaban  5 mg Oral BID    sodium chloride flush  10 mL IntraVENous 2 times per day    cefepime  2,000 mg IntraVENous Q12H    vancomycin  1,250 mg IntraVENous Q18H    vancomycin (VANCOCIN) intermittent dosing (placeholder)   Other RX Placeholder    metroNIDAZOLE  500 mg IntraVENous Q8H    eldertonic  15 mL Oral Daily    hydrocortisone sodium succinate PF  100 mg IntraVENous Q8H    mupirocin   Nasal BID    lactobacillus  2 capsule Oral BID WC    Sodium Hypochlorite   Irrigation BID    enoxaparin  40 mg SubCUTAneous Daily    fluconazole  200 mg IntraVENous Q24H          REVIEW OF SYSTEMS:       Review of Systems   Unable to perform ROS: Mental status change        Objective:       PHYSICAL EXAM: Vitals:   Vitals:    08/22/22 1230 08/22/22 1245 08/22/22 1600 08/22/22 1721   BP: (!) 91/54 (!) 99/45 (!) 93/50    Pulse: 75 85 70 76   Resp: 10 13 11 15   Temp:   98.3 °F (36.8 °C)    TempSrc:   Oral    SpO2: 100% 100% 100% 100%   Weight:       Height:           Physical Exam      General: Encephalopathic but protecting the airway so far, hypotensive, requiring vasopressors  HEENT: normocephalic, atraumatic, sclera clear, pupils equal, light reflex preserved bilaterally  Cardiovascular: RRR, no murmurs/rubs/gallops detected  Pulmonary: CTABL, no rhonchi/rales   Abdomen/GI: soft, no organomegaly, bowel sounds positive  Neuro: Encephalopathic, pupils are equal and reactive to light, moves all extremities  Skin: no rash, Large sacral decubitus ulcer noted  Musculoskeletal:  No obvious joint swelling, redness. No limitation of range of passive motion  Genitourinary: Kaplan's catheter in place   Psych: could not assess  Lymphatic/Immunologic: No obvious bruising, no cervical lymphadenopathy    Lines: All vascular access sites are healthy with no local erythema, discharge or tenderness       Intake and output:     I/O last 3 completed shifts:  In: -   Out: 60 [Urine:60]    Lab Data:   All available labs were reviewed by me today.      CBC:   Recent Labs     08/21/22 2244 08/22/22  0535   WBC 5.3 26.7*   RBC 2.59* 2.59*   HGB 8.9* 8.7*   HCT 26.6* 26.5*    268   .6* 102.4*   MCH 34.2* 33.6   MCHC 33.3 32.8   RDW 15.3 15.6*   NRBC  --  1*   BANDSPCT  --  26*        BMP:  Recent Labs     08/21/22 2244 08/22/22  0535    134*   K 3.5 3.0*   CL 95* 96*   CO2 29 28   BUN 22* 24*   CREATININE 0.8 0.8   CALCIUM 7.7* 7.2*   GLUCOSE 86 151*        Hepatic FunctionPanel:   Lab Results   Component Value Date/Time    ALKPHOS 142 08/21/2022 10:44 PM    ALT 10 08/21/2022 10:44 PM    AST 18 08/21/2022 10:44 PM    PROT 4.7 08/21/2022 10:44 PM    BILITOT 0.7 08/21/2022 10:44 PM    LABALBU 1.7 08/21/2022 10:44 PM CPK:   Lab Results   Component Value Date    CKTOTAL 49 01/28/2021     ESR: No results found for: SEDRATE  CRP: No results found for: CRP      Imaging: All pertinent images and reports for the current visit were reviewed by me during this visit. I reviewed the chest x-ray/CT scan/MRI images and independently interpreted the findings and results today. CT ABDOMEN PELVIS W IV CONTRAST Additional Contrast? None   Final Result   Large sacral decubitus ulcer with subcutaneous gas extending to the coccyx   and perirectal fascia. No loculated fluid collections. Increased sclerosis   of the adjacent coccyx with soft tissue gas extending to the bone. Findings   are compatible with osteomyelitis. Adjacent rectal wall thickening may be reactive. Small bilateral pleural effusions with bibasilar atelectasis. Difficult to   exclude superimposed pneumonia in the lung bases. XR CHEST PORTABLE   Final Result   Clear chest without acute cardiopulmonary process. Outside records:    Labs, Microbiology, Radiology and pertinent results from Care everywhere, if available, were reviewed as a part ofthe consultation.       Problem list:       Patient Active Problem List   Diagnosis Code    Symptomatic anemia D64.9    Atrial fibrillation (HCC) I48.91    Tachycardia R00.0    Severe protein-calorie malnutrition ([address] E43    Aortic valve disorder I35.9    Arthropathy M12.9    Cervical spondylosis without myelopathy M47.812    DDD (degenerative disc disease), cervical M50.30    DDD (degenerative disc disease), lumbosacral M51.37    Essential hypertension I10    Malignant neoplasm of colon ([address] C18.9    Spinal stenosis in cervical region M48.02    Spinal stenosis, lumbar M48.061    Fibromyalgia M79.7    Gastroesophageal reflux disease without esophagitis K21.9    Lumbar radicular pain M54.16    Pedal edema R60.0    Chronic intractable headache R51.9, G89.29    Esophageal candidiasis ([address] B37.81 History of gastric bypass Z98.84    Prediabetes R73.03    Age-related osteoporosis without current pathological fracture M81.0    Essential tremor G25.0    Chronic fatigue R53.82    Irritable bowel syndrome with diarrhea K58.0    Borborygmi R19.8    Constipation K59.00    Anxiety F41.9    Weakness R53.1    Age-related physical debility R54    JENNIFER (generalized anxiety disorder) F41.1    Shock (HCC) R57.9    Atrial fibrillation with rapid ventricular response ([facility]) I48.91    DM2 (diabetes mellitus, type 2) ([facility]) E11.9    GI bleed K92.2    MAX (acute kidney injury) ([facility] [address] N17.9    Community acquired pneumonia of left lower lobe of lung J18.9    Hypoxia R09.02    Pneumonia due to infectious organism J18.9    Closed fracture of left distal radius S52.502A    Lower GI bleed K92.2    Sepsis without septic shock ([facility]) A41.9    Acute cystitis with hematuria J14.68    Acute metabolic encephalopathy L04.03    Sacral wound S31.000A    Elevated lactic acid level R79.89    Fever R50.9    Tachypnea R06.82    Sacral osteomyelitis ([facility]) M46.28    WHITNEY (obstructive sleep apnea) G47.33    Overweight (BMI 25.0-29. 9) E66.3    Elevated alkaline phosphatase level R74.8    Elevated alkaline phosphatase in  P09.8, R74.8    History of depression Z86.59    Nursing home resident Z59.3    Proteus infection A49.8    Gram-negative bacteremia R78.81    Lactic acidosis E87.2    Altered mental status R41.82    Hypotension I95.9    Skin ulcer of sacrum ([facility] [address] L98.429         Please note that this chart was generated using Dragon dictation software. Although every effort was made to ensure the accuracy of this automated transcription, some errors in transcription may have occurred inadvertently. If you may need any clarification, please do not hesitate to contact me through EPIC or at the phone number provided below with my electronic signature.   Any pictures or media included in this note were obtained after taking informed verbal consent from the patient and with their approval to include those in the patient's medical record. Lupe Valentine MD, MPH, [address]  8/22/2022, 5:25 PM  [facility] Infectious Disease   [address] [place] [address], [address] [place] [address]  Office: [phone]  Fax: [phone]  In-person Clinic days:  Tuesday & Thursday a.m. Virtual clinic days: Monday, Wednesday & Friday a.m.

## 2022-08-22 NOTE — ED PROVIDER NOTES
nausea and vomiting. Genitourinary:  Negative for decreased urine volume, difficulty urinating, dysuria, frequency and urgency. Musculoskeletal:  Negative for neck pain. Skin:  Positive for wound. Negative for rash. Neurological:  Negative for weakness and numbness. Psychiatric/Behavioral:  Positive for confusion. Negative for agitation. Except as noted above the remainder of the review of systems was reviewed and negative.        PAST MEDICAL HISTORY     Past Medical History:   Diagnosis Date    Acid reflux     Anxiety     Arthritis     Atrial fibrillation (HCC)     Closed fracture dislocation of right elbow     Depression     Fibromyalgia     Migraine     WHITNEY (obstructive sleep apnea)     Diagnosed years ago, no CPAP    Rectal cancer (HCC)     Wears glasses     DRIVING         SURGICALHISTORY       Past Surgical History:   Procedure Laterality Date    APPENDECTOMY      CERVICAL DISCECTOMY      ACDF     SECTION      x4    CHOLECYSTECTOMY      COLON SURGERY      Rectal cancer removed using  incision    COLONOSCOPY      SEVERAL    COLONOSCOPY N/A 2019    COLONOSCOPY POLYPECTOMY SNARE/COLD BIOPSY performed by Sruthi Baugh MD at [address] COLONOSCOPY N/A 2021    COLONOSCOPY WITH BIOPSY performed by Hans Mak MD at [address] [facility] [address] N/A 2022    INCOMPLETE COLONOSCOPY D/T POOR PREP performed by Hans Mak MD at [address] Left 8/3/2022    OPEN REDUCTION INTERNAL FIXATION LEFT DISTAL RADIUS performed by Cherelle Elkins MD at [facility] [facility] Right 2021    RIGHT LEG EVACUATION OF HEMATOMA performed by Kathleen Olivares MD at [facility] W/O EXTENSION Right 10/25/2018    OPEN REDUCTION INTERNAL FIXATION RIGHT FEMUR SUPRACONDYLAR FEMORAL FRACTURE WITH C-ARM performed by Bandar Murcia MD at [address] [facility] GRAFT Right 1/31/2021    RIGHT LEG HEMATOMA EVAKUATION, DEBRIDEMENT, AND WOUND VAC PLACEMENT performed by Fabricio Farah MD at [facility] Right 2/17/2021    RIGHT LOWER EXTREMITY SKIN GRAFT performed by Fabricio Farah MD at [address] [place] [address] Left     UPPER GASTROINTESTINAL ENDOSCOPY N/A 11/26/2019    ESOPHAGOGASTRODUODENOSCOPY performed by Liliana France MD at [place] [address] 8/18/2020    EGD BIOPSY performed by Liliana France MD at [address] [place] [address]       Previous Medications    ACETAMINOPHEN (TYLENOL) 325 MG TABLET    Take 650 mg by mouth every 6 hours as needed for Pain    ALPRAZOLAM (XANAX) 0.5 MG TABLET    Take 0.5 mg by mouth every 8 hours as needed for Anxiety. APIXABAN (ELIQUIS) 5 MG TABS TABLET    Take 1 tablet by mouth in the morning and 1 tablet before bedtime. BISACODYL (DULCOLAX) 10 MG SUPPOSITORY    Place 10 mg rectally daily as needed for Constipation    CALCIUM CARBONATE-VITAMIN D 600-200 MG-UNIT TABS    Take 1 tablet by mouth 2 times daily    COLLAGENASE 250 UNIT/GM OINTMENT    Apply topically daily. CYANOCOBALAMIN (VITAMIN B 12 PO)    Take 2,500 mg by mouth daily    DICYCLOMINE (BENTYL) 10 MG CAPSULE    Take 1 capsule by mouth 4 times daily as needed (cramping/diarrhea)    DOCUSATE SODIUM (COLACE, DULCOLAX) 100 MG CAPS    Take 100 mg by mouth 2 times daily as needed for Constipation    ESCITALOPRAM (LEXAPRO) 10 MG TABLET    Take 1 tablet by mouth daily    FLECAINIDE (TAMBOCOR) 100 MG TABLET    TAKE 1 TABLET BY MOUTH 2 TIMES A DAY    FUROSEMIDE (LASIX) 20 MG TABLET    Take 1 tablet by mouth in the morning. With an additional 20 mg on MWF. HYDROCODONE-ACETAMINOPHEN (NORCO) 5-325 MG PER TABLET    Take 1 tablet by mouth every 6 hours as needed for Pain. METOPROLOL SUCCINATE (TOPROL XL) 25 MG EXTENDED RELEASE TABLET    Take 1 tablet by mouth in the morning.     PANTOPRAZOLE (PROTONIX) 40 MG TABLET    TAKE ONE TABLET BY MOUTH TWICE A DAY    POLYETHYLENE GLYCOL (GLYCOLAX) 17 G PACKET    Take 17 g by mouth daily as needed for Constipation    POTASSIUM CHLORIDE (KLOR-CON M) 20 MEQ EXTENDED RELEASE TABLET    TAKE 1 TABLET DAILY    TIZANIDINE (ZANAFLEX) 2 MG TABLET    Take 1 tablet by mouth every 6 hours as needed (muscle pain)            Demerol hcl [meperidine], Pcn [penicillins], and Adhesive tape    FAMILY HISTORY       Family History   Problem Relation Age of Onset    COPD Mother           SOCIAL HISTORY       Social History     Socioeconomic History    Marital status:      Spouse name: None    Number of children: 4    Years of education: None    Highest education level: High school graduate   Tobacco Use    Smoking status: Never    Smokeless tobacco: Never   Vaping Use    Vaping Use: Never used   Substance and Sexual Activity    Alcohol use: No    Drug use: Never    Sexual activity: Not Currently     Partners: Male   Social History Narrative    Lives with son       SCREENINGS    Rhett Coma Scale  Eye Opening: Spontaneous  Best Verbal Response: Inappropriate words  Best Motor Response: Withdraws from pain  Rhett Coma Scale Score: 11        PHYSICAL EXAM    (up to 7 for level 4, 8 or more for level 5)     Vitals:    08/22/22 0215 08/22/22 0230 08/22/22 0235 08/22/22 0240   BP: (!) 80/51 (!) 101/49 (!) 94/51 (!) 98/46   Pulse: 89 88 87 89   Resp: 12 13 13 13   Temp: (!) 100.6 °F (38.1 °C)      TempSrc: Oral      SpO2: 97% 98% 98% 99%         Physical Exam  Vitals and nursing note reviewed. Constitutional:       Appearance: She is well-developed. She is obese. She is ill-appearing. She is not diaphoretic. HENT:      Head: Normocephalic and atraumatic. Eyes:      Extraocular Movements: Extraocular movements intact. Conjunctiva/sclera: Conjunctivae normal.      Pupils: Pupils are equal, round, and reactive to light.    Cardiovascular:      Rate and Rhythm: Normal rate and regular rhythm. Heart sounds: Normal heart sounds. Pulmonary:      Effort: Pulmonary effort is normal. No respiratory distress. Breath sounds: Normal breath sounds. No wheezing or rales. Abdominal:      General: Bowel sounds are normal. There is no distension. Palpations: Abdomen is soft. Tenderness: There is no abdominal tenderness. There is no rebound. Musculoskeletal:         General: No tenderness. Normal range of motion. Cervical back: Normal range of motion and neck supple. Legs:    Skin:     General: Skin is warm and dry. Capillary Refill: Capillary refill takes 2 to 3 seconds. Neurological:      Mental Status: She is alert. She is confused. GCS: GCS eye subscore is 4. GCS verbal subscore is 5. GCS motor subscore is 6. Cranial Nerves: No cranial nerve deficit or facial asymmetry. Sensory: No sensory deficit. Motor: Weakness (generalized) present. Deep Tendon Reflexes: Reflexes are normal and symmetric. Comments: Alert to person but not place or time   Psychiatric:         Thought Content:  Thought content normal.         Judgment: Judgment normal.       RESULTS     EKG: All EKG's are interpreted by the Emergency Department Physician who either signs or Co-signsthis chart in the absence of a cardiologist.    A. fib with RVR and a rate of 112, left axis,  and QTc 480, no ST elevations, nonspecific ST changes, when compared EKG from 7/29/2022 rate has decreased by 102 bpm otherwise no acute changes    RADIOLOGY:   Non-plain filmimages such as CT, Ultrasound and MRI are read by the radiologist. Plain radiographic images are visualized and preliminarily interpreted by the emergency physician with the below findings:    Interpretation per the Radiologist below, if available at the time ofthis note:    CT ABDOMEN PELVIS W IV CONTRAST Additional Contrast? None   Final Result   Large sacral decubitus ulcer with subcutaneous gas extending to the coccyx   and perirectal fascia. No loculated fluid collections. Increased sclerosis   of the adjacent coccyx with soft tissue gas extending to the bone. Findings   are compatible with osteomyelitis. Adjacent rectal wall thickening may be reactive. Small bilateral pleural effusions with bibasilar atelectasis. Difficult to   exclude superimposed pneumonia in the lung bases. XR CHEST PORTABLE   Final Result   Clear chest without acute cardiopulmonary process.                ED BEDSIDE ULTRASOUND:   Performed by ED Physician - none    LABS:  Labs Reviewed   CBC WITH AUTO DIFFERENTIAL - Abnormal; Notable for the following components:       Result Value    RBC 2.59 (*)     Hemoglobin 8.9 (*)     Hematocrit 26.6 (*)     .6 (*)     MCH 34.2 (*)     Lymphocytes Absolute 0.2 (*)     All other components within normal limits   COMPREHENSIVE METABOLIC PANEL - Abnormal; Notable for the following components:    Chloride 95 (*)     BUN 22 (*)     Calcium 7.7 (*)     Total Protein 4.7 (*)     Albumin 1.7 (*)     Albumin/Globulin Ratio 0.6 (*)     Alkaline Phosphatase 142 (*)     All other components within normal limits   LACTIC ACID - Abnormal; Notable for the following components:    Lactic Acid 3.5 (*)     All other components within normal limits   URINALYSIS WITH REFLEX TO CULTURE - Abnormal; Notable for the following components:    Clarity, UA SL CLOUDY (*)     Glucose, Ur 100 (*)     Bilirubin Urine MODERATE (*)     Ketones, Urine TRACE (*)     Blood, Urine MODERATE (*)     Protein, UA 30 (*)     Urobilinogen, Urine 4.0 (*)     Leukocyte Esterase, Urine MODERATE (*)     All other components within normal limits   MICROSCOPIC URINALYSIS - Abnormal; Notable for the following components:    Hyaline Casts, UA 3-5 (*)     WBC, UA 21-50 (*)     Bacteria, UA 1+ (*)     Yeast, UA Present (*)     All other components within normal limits   CULTURE, BLOOD 2   CULTURE, BLOOD 1   CULTURE, URINE LACTIC ACID   LACTIC ACID   POCT GLUCOSE       All other labs were within normal range or not returned as of this dictation. EMERGENCY DEPARTMENT COURSE and DIFFERENTIAL DIAGNOSIS/MDM:   Vitals:    Vitals:    08/22/22 0215 08/22/22 0230 08/22/22 0235 08/22/22 0240   BP: (!) 80/51 (!) 101/49 (!) 94/51 (!) 98/46   Pulse: 89 88 87 89   Resp: 12 13 13 13   Temp: (!) 100.6 °F (38.1 °C)      TempSrc: Oral      SpO2: 97% 98% 98% 99%       MDM  Number of Diagnoses or Management Options  Acute cystitis with hematuria: new and requires workup  Altered mental status, unspecified altered mental status type  Hypotension, unspecified hypotension type  Sacral osteomyelitis ([facility] [address]): new and requires workup  Sepsis without septic shock [facility]): new and requires workup  Skin ulcer of sacrum, unspecified ulcer stage ([facility] [address] [address]): established and worsening  Diagnosis management comments: Ingestion, infectious, trauma, seizure, AMS, electrolytes, encephalopathy, insulin, opiates, uremia, toxins, tumor, thyrotoxicosis, psychiatric, sepsis, stroke, N/V, seizure, headache, elderly age, alcohol / drugs / toxidromes, signs of trauma    Patient seen and evaluated. History and physical as above. Patient presents ill-appearing with temperature of 104.3 °F.  Also tachycardic at 111. Hypotensive on arrival.  IV fluid started on patient arrival.  Concern is for sepsis. Septic labs ordered on initial evaluation. We will proceed with routine labs, blood cultures, lactic acid, CT abdomen pelvis secondary to patient's sacral ulcer with wound VAC in place, chest x-ray. Patient will require admission.        Amount and/or Complexity of Data Reviewed  Clinical lab tests: ordered and reviewed  Tests in the radiology section of CPT®: ordered and reviewed  Tests in the medicine section of CPT®: ordered and reviewed  Review and summarize past medical records: yes  Independent visualization of images, tracings, or specimens: yes    Risk of Complications, Morbidity, and/or Mortality  Presenting problems: high  Diagnostic procedures: high  Management options: high    Critical Care  Total time providing critical care: 30-74 minutes          REASSESSMENT     ED Course as of 08/22/22 0300   Sun Aug 21, 2022   2315 Notified by RN that the patient's blood pressure has dropped and occasionally has systolics in the 17R. Concern for septic shock. Plan for central line. [DS]   Mon Aug 22, 2022   0007 Right femoral central line placed. Patient has normal white count of 5.3 with hemoglobin 8.9. Does have elevated lactic acid 3.5. Creatinine 0.8 with BUN of 22. [DS]   5682 Levophed ordered for patient's persistent hypotension. Cefepime and vancomycin ordered as well. Patient CT abdomen pelvis does show soft tissue gas around her sacral wound mass tracking near the perirectal fascia. Consult placed to general surgery. [DS]   2605 Spoke with Dr. Raul Conner, general surgery, discussed patient's infected sacral ulcer with subcu gas. He recommends taking out the patient's wound VAC to allow it to drain and recommends wet-to-dry dressings. Wound VAC removed. [DS]   4415 Consult placed to hospitalist for admission. Admission accepted by Dr. Sumeet Sandoval. [DS]      ED Course User Index  [DS] Darryle Fuller, MD         Is this patient to be included in the SEP-1 Core Measure due to severe sepsis or septic shock? Yes   SEP-1 CORE MEASURE DATA      Sepsis Criteria   Severe Sepsis Criteria   Septic Shock Criteria     Must be confirmed or suspected to move forward with diagnosis of sepsis. Must meet 2:    [x] Temperature > 100.9 F (38.3 C)        or < 96.8 F (36 C)  [x] HR > 90  [] RR > 20  [] WBC > 12 or < 4 or 10% bands      AND:      [x] Infection Confirmed or        Suspected.      Must meet 1:    [x] Lactate > 2       or   [] Signs of Organ Dysfunction:    - SBP < 90 or MAP < 65  - Altered mental status  - Creatinine > 2 or increased from      baseline  - Urine Output < 0.5 ml/kg/hr  - Bilirubin > 2  - INR > 1.5 (not anticoagulated)  - Platelets < 425,945  - Acute Respiratory Failure as     evidenced by new need for NIPPV     or mechanical ventilation      [] No criteria met for Severe Sepsis. Must meet 1:    [] Lactate > 4        or   [x] SBP < 90 or MAP < 65 for at        least two readings in the first        hour after fluid bolus        administration      [x] Vasopressors initiated (if hypotension persists after fluid resuscitation)        [] No criteria met for Septic Shock. Patient Vitals for the past 6 hrs:   BP Temp Pulse Resp SpO2   08/21/22 2208 (!) 71/44 -- (!) 110 28 92 %   08/21/22 2218 -- (!) 104.3 °F (40.2 °C) -- -- --   08/21/22 2230 (!) 73/58 -- (!) 110 20 98 %   08/21/22 2243 -- -- (!) 104 16 95 %   08/21/22 2315 (!) 90/42 -- (!) 111 14 --   08/21/22 2330 80/67 -- (!) 113 20 --   08/21/22 2345 (!) 82/59 -- (!) 105 15 --   08/22/22 0000 (!) 72/41 -- 97 14 100 %   08/22/22 0015 (!) 68/36 -- (!) 103 18 96 %   08/22/22 0030 (!) 65/23 -- 89 14 99 %   08/22/22 0045 (!) 65/36 -- 86 14 99 %   08/22/22 0100 (!) 60/38 -- 84 13 98 %   08/22/22 0115 (!) 76/45 -- 88 12 96 %   08/22/22 0130 (!) 78/46 -- 85 13 96 %   08/22/22 0145 (!) 86/46 -- 89 13 96 %   08/22/22 0200 (!) 78/50 -- 88 13 96 %   08/22/22 0215 (!) 80/51 (!) 100.6 °F (38.1 °C) 89 12 97 %   08/22/22 0230 (!) 101/49 -- 88 13 98 %   08/22/22 0235 (!) 94/51 -- 87 13 98 %   08/22/22 0240 (!) 98/46 -- 89 13 99 %      Recent Labs     08/21/22  2244   WBC 5.3   LACTA 3.5*   CREATININE 0.8   BILITOT 0.7            Time Severe Sepsis Identified: [id number]    Fluid Resuscitation Rational: at least 30mL/kg based on entered actual weight at time of triage      Repeat lactate level: ordered and pending at this time    Reassessment Exam: Patient more alert than on arrival.  Blood pressure coming up with most recent BP at 98/46. Temperature 100.6 °F.  Patient on 2 L nasal cannula and tolerating well.         CRITICAL CARE TIME   Total Critical Care time was 42 minutes, excluding separatelyreportable procedures. There was a high probability ofclinically significant/life threatening deterioration in the patient's condition which required my urgent intervention. Severe sepsis with septic shock    CONSULTS:  IP CONSULT TO GENERAL SURGERY  IP CONSULT TO CRITICAL CARE  IP CONSULT TO INFECTIOUS DISEASES    PROCEDURES:  Unless otherwise noted below, none     Central Line    Date/Time: 8/22/2022 2:59 AM  Performed by: Sophie Dye MD  Authorized by: Sophie Dye MD     Consent:     Consent obtained:  Emergent situation  Universal protocol:     Procedure explained and questions answered to patient or proxy's satisfaction: yes      Relevant documents present and verified: yes      Test results available: yes      Imaging studies available: yes      Required blood products, implants, devices, and special equipment available: yes      Site/side marked: yes      Patient identity confirmed:  Arm band  Pre-procedure details:     Indication(s): central venous access      Hand hygiene: Hand hygiene performed prior to insertion      Sterile barrier technique:  All elements of maximal sterile technique followed      Skin preparation:  Chlorhexidine    Skin preparation agent: Skin preparation agent completely dried prior to procedure    Sedation:     Sedation type:  None  Anesthesia:     Anesthesia method:  Local infiltration    Local anesthetic:  Lidocaine 1% w/o epi  Procedure details:     Location:  R femoral    Site selection rationale:  Poor neck mobility    Patient position:  Supine    Catheter size:  7 Fr    Landmarks identified: yes      Ultrasound guidance: yes      Sterile ultrasound techniques: Sterile gel and sterile probe covers were used      Number of attempts:  1    Successful placement: yes    Post-procedure details:     Post-procedure:  Dressing applied and line sutured    Assessment:  Blood return through all ports and free fluid flow    Procedure completion:  Tolerated well, no immediate complications    Complications:  None        FINAL IMPRESSION      1. Sacral osteomyelitis ([address]    2. Sepsis without septic shock ([address]    3. Skin ulcer of sacrum, unspecified ulcer stage ([address]    4. Acute cystitis with hematuria    5. Altered mental status, unspecified altered mental status type    6. Hypotension, unspecified hypotension type          DISPOSITION/PLAN   DISPOSITION Admitted 08/22/2022 02:06:48 AM      PATIENT REFERREDTO:  No follow-up provider specified.     DISCHARGEMEDICATIONS:  New Prescriptions    No medications on file          (Please note that portions of this note were completed with a voice recognition program.  Efforts were made to edit the dictations but occasionally words are mis-transcribed.)    Nas Recinos MD (electronically signed)  Attending Emergency Physician          Nas Recinos MD  08/22/22 Zulma Cavazos MD  08/22/22 3260 Yes

## 2022-08-22 NOTE — CONSULTS
Comprehensive Nutrition Assessment    Type and Reason for Visit:  Consult (oral nutrition supplement)    Nutrition Recommendations/Plan:   Monitor for start of oral diet. Start appropriate ONS when diet advances (benefit from Jolanta Levy if applicable). Malnutrition Assessment:  Malnutrition Status: Moderate malnutrition (08/22/22 0843)    Context:  Acute Illness     Findings of the 6 clinical characteristics of malnutrition:  Energy Intake:  Unable to assess  Weight Loss:  No significant weight loss     Body Fat Loss:  Mild body fat loss Orbital, Buccal region   Muscle Mass Loss:  Mild muscle mass loss Temples (temporalis)  Fluid Accumulation:  No significant fluid accumulation     Strength:  Not Performed    Nutrition Assessment:    Consult \"oral nutrition supplements\". Pt with PMH of DM and HTN who presented with AMS from NH. Pt with severe sepsis d/t sacral wound. Pt sleeping at time of visit. Currently on levo and IVF running. UBW= 155-160# over last 2 months. Unknown about PO intakes at home. Pt currently NPO. Pt with increased nutrition needs r/t multiple wound healing. Will start ONS when diet advances. Nutrition Related Findings:    trace BLE, BUE, and generalized edema; Phos 2.2, K+ 3.0, Glu 151 Wound Type: Multiple, Pressure Injury, Unstageable (heels and sacral wound)       Current Nutrition Intake & Therapies:    Average Meal Intake: NPO  Average Supplements Intake: NPO  Diet NPO Exceptions are: Ice Chips, Sips of Water with Meds    Anthropometric Measures:  Height: 5' 2\" (157.5 cm)  Ideal Body Weight (IBW): 110 lbs (50 kg)       Current Body Weight: 156 lb (70.8 kg), 141.8 % IBW. Weight Source: Bed Scale  Current BMI (kg/m2): 28.5                          BMI Categories: Overweight (BMI 25.0-29. 9)    Estimated Daily Nutrient Needs:        Energy (kcal/day): 5686-4682  kcal (15-18 kcal/kg 71 kg CBW)     Protein (g/day):  gm (1.5-2 gm /kg IBW)     Fluid (ml/day): per provider while in ICU    Nutrition Diagnosis:   Moderate malnutrition related to inadequate protein-energy intake as evidenced by mild muscle loss, mild loss of subcutaneous fat    Nutrition Interventions:   Food and/or Nutrient Delivery:  (Monitor for start of oral diet, add ONS)  Nutrition Education/Counseling: No recommendation at this time  Coordination of Nutrition Care: Continue to monitor while inpatient       Goals:     Goals: Initiate PO diet, within 2 days       Nutrition Monitoring and Evaluation:      Food/Nutrient Intake Outcomes: Diet Advancement/Tolerance  Physical Signs/Symptoms Outcomes: Biochemical Data, Weight, Skin, Nutrition Focused Physical Findings    Discharge Planning:     Too soon to determine     Renetta Jenkins RD, LD  Contact: [phone]

## 2022-08-22 NOTE — PROGRESS NOTES
Pt admitted to ICU room 2124; levo running at 21 mcg/min. Pt is oriented to self only ; following commands appropriately. Sacral wound vac has been removed in ER; New wet to dry dressing with ABD pad. Pt also noted to have unstable wound on bilateral Heels. Boats applied. Pt is resting with eyes closed and VSS. Will continue to monitor.

## 2022-08-22 NOTE — CARE COORDINATION
Wound care consulted for unstageable wound to sacrum. General surgery consulted and managing care. Please refer to surgery for wound care orders and management. Please re-consult if needed. Will not continue to follow.   Electronically signed by Yola Strange RN CWOCN on 8/22/2022 at 11:16 AM

## 2022-08-22 NOTE — ED TRIAGE NOTES
Pt arrived to ED via EMS with AMS per SNF. States pt is usually alert and oriented but they found her laying in bed not responding like she normally does. Pt is awake but unable to follow commands or answer any questions besides yes or no. Pt has wound vac dressing on R side of lower back but no vac was attached per EMS.

## 2022-08-22 NOTE — ED NOTES
Spoke with pts son Brenda Wei and updated him on patients status and plan of care.       Coral Landers RN  08/22/22 9388

## 2022-08-22 NOTE — PROGRESS NOTES
in place, Call light within reach, Left in bed, Nurse notified     Restrictions  Restrictions/Precautions  Restrictions/Precautions: Fall Risk  Upper Extremity Weight Bearing Restrictions  Left Upper Extremity Weight Bearing: Non Weight Bearing  Position Activity Restriction  Other position/activity restrictions: H/o Recent Wrist Fx : immob; NWB. Unstageable Sacral Ulcer. Spoke with General Surg : given severity and depth of Sacral Wound; advised not to pursue any EOB or OOB activities at this time. Subjective   General  Chart Reviewed: Yes  Patient assessed for rehabilitation services?: Yes  Additional Pertinent Hx: 64 y/o female admit from nursing facility 8/21/2022 with Acute Met Encephalopathy, Acute Cystitis with Hematuria, Sacral Wound with Subcutaneious Gas Tunneling through to the Perirectal Fascia, Septic Shock. CT Head : negative. PMH including A-Fib, Colon Ca, Wrist Fx S/P ORIF 8/3/2022. Family / Caregiver Present: No  Referring Practitioner: Annemarie Moreno NP  Other (Comment): Pt follows simple commands; delayed at times. General Comment  Comments: Assist Surg MD with Rolling to address Sacral Wound and Repositioning for comfort. Subjective  Subjective: Pt confused. Social/Functional History  Social/Functional History  Lives With: Son (Son (does not work). )  Type of Home: House  Home Layout: One level  Home Access: Ramped entrance  Bathroom Shower/Tub: Tub/Shower unit  Bathroom Toilet: Standard  Bathroom Equipment: Grab bars in shower, Tub transfer bench, Hand-held shower, 3-in-1 commode, Grab bars around toilet  Bathroom Accessibility: Accessible  Home Equipment: [address]  ADL Assistance: [facility] [address]:  (son assists with IADLs)  Ambulation Assistance: Independent (With [address]  Transfer Assistance: Independent  Occupation: Retired  Additional Comments: Social history obtained from previous PT  encounter 8/1/2022.  Pt has been in/out of [facility] since end of July. Pt reports at nursing home she voids via depends and staff use lift (kirby) to get OOB. Vision/Hearing  Vision  Vision: Within Functional Limits  Hearing  Hearing: Exceptions to [facility]  Hearing Exceptions: Hard of hearing/hearing concerns    Cognition   Orientation  Orientation Level: Oriented to place (Aware of \"hospital\" setting.)  Cognition  Overall Cognitive Status: Exceptions  Arousal/Alertness: Appropriate responses to stimuli  Following Commands: Follows one step commands with increased time  Memory: Decreased recall of recent events;Decreased short term memory     Objective        Observation/Palpation  Observation: R Wrist/Hand immob. Unstageable Sacral Wound: deep/foul odor. Gross Assessment  PROM: Generally decreased, functional  Strength:  (Pt unable to sherri any formal strength assess; appears grossly 2+ 3-/5.)                    Bed mobility  Rolling to Left: Dependent/Total;2 Person assistance  Rolling to Right: Dependent/Total;2 Person assistance  Bed Mobility Comments: Rolled R/L to assist Surgery with assessing wound and repositioned for comfort following. Transfers  Bed to Chair: Unable to assess            -PAC Score  -PAC Inpatient Mobility Raw Score : 6 (08/22/22 1358)  -PAC Inpatient T-Scale Score : 23.55 (08/22/22 1358)  Mobility Inpatient CMS 0-100% Score: 100 (08/22/22 1358)  Mobility Inpatient CMS G-Code Modifier : CN (08/22/22 [facility])        Goals  Short Term Goals  Time Frame for Short term goals: No Acute Care PT Goals Identified. Patient Goals   Patient goals : None stated. Education  Patient Education  Education Given To: Patient  Education Provided Comments: Role of PT,  POC, Need to call for assist.  Education Method: Verbal  Barriers to Learning: Cognition; Hearing  Education Outcome:  (Limited due to cognition.)      Therapy Time   Individual Concurrent Group Co-treatment   Time In 0845         Time Out 0910         Minutes 25

## 2022-08-23 ENCOUNTER — TELEPHONE (OUTPATIENT)
Dept: ORTHOPEDIC SURGERY | Age: 77
End: 2022-08-23

## 2022-08-23 ENCOUNTER — APPOINTMENT (OUTPATIENT)
Dept: GENERAL RADIOLOGY | Age: 77
DRG: 853 | End: 2022-08-23
Payer: MEDICARE

## 2022-08-23 PROBLEM — N39.0 COMPLICATED UTI (URINARY TRACT INFECTION): Status: ACTIVE | Noted: 2022-08-23

## 2022-08-23 PROBLEM — R65.21 SEPTIC SHOCK (HCC): Status: ACTIVE | Noted: 2022-07-29

## 2022-08-23 PROBLEM — A41.9 SEPTIC SHOCK (HCC): Status: ACTIVE | Noted: 2022-07-29

## 2022-08-23 PROBLEM — N30.00 ACUTE CYSTITIS WITHOUT HEMATURIA: Status: ACTIVE | Noted: 2022-08-23

## 2022-08-23 LAB
ANION GAP SERPL CALCULATED.3IONS-SCNC: 9 MMOL/L (ref 3–16)
BASOPHILS ABSOLUTE: 0 K/UL (ref 0–0.2)
BASOPHILS RELATIVE PERCENT: 0.1 %
BUN BLDV-MCNC: 18 MG/DL (ref 7–20)
CALCIUM SERPL-MCNC: 6.9 MG/DL (ref 8.3–10.6)
CHLORIDE BLD-SCNC: 99 MMOL/L (ref 99–110)
CO2: 26 MMOL/L (ref 21–32)
CREAT SERPL-MCNC: <0.5 MG/DL (ref 0.6–1.2)
EKG ATRIAL RATE: 74 BPM
EKG DIAGNOSIS: NORMAL
EKG P AXIS: 75 DEGREES
EKG P-R INTERVAL: 228 MS
EKG Q-T INTERVAL: 448 MS
EKG QRS DURATION: 100 MS
EKG QTC CALCULATION (BAZETT): 497 MS
EKG R AXIS: -18 DEGREES
EKG T AXIS: -30 DEGREES
EKG VENTRICULAR RATE: 74 BPM
EOSINOPHILS ABSOLUTE: 0 K/UL (ref 0–0.6)
EOSINOPHILS RELATIVE PERCENT: 0 %
GFR AFRICAN AMERICAN: >60
GFR NON-AFRICAN AMERICAN: >60
GLUCOSE BLD-MCNC: 105 MG/DL (ref 70–99)
HCT VFR BLD CALC: 22.7 % (ref 36–48)
HEMOGLOBIN: 7.4 G/DL (ref 12–16)
LYMPHOCYTES ABSOLUTE: 0.6 K/UL (ref 1–5.1)
LYMPHOCYTES RELATIVE PERCENT: 5.4 %
MAGNESIUM: 2.1 MG/DL (ref 1.8–2.4)
MCH RBC QN AUTO: 33.5 PG (ref 26–34)
MCHC RBC AUTO-ENTMCNC: 32.8 G/DL (ref 31–36)
MCV RBC AUTO: 102.1 FL (ref 80–100)
MONOCYTES ABSOLUTE: 0.5 K/UL (ref 0–1.3)
MONOCYTES RELATIVE PERCENT: 4 %
NEUTROPHILS ABSOLUTE: 10.4 K/UL (ref 1.7–7.7)
NEUTROPHILS RELATIVE PERCENT: 90.5 %
PDW BLD-RTO: 15.3 % (ref 12.4–15.4)
PHOSPHORUS: 2.2 MG/DL (ref 2.5–4.9)
PLATELET # BLD: 210 K/UL (ref 135–450)
PMV BLD AUTO: 7.5 FL (ref 5–10.5)
POTASSIUM REFLEX MAGNESIUM: 3.9 MMOL/L (ref 3.5–5.1)
PREALBUMIN: 4 MG/DL (ref 20–40)
RBC # BLD: 2.22 M/UL (ref 4–5.2)
SODIUM BLD-SCNC: 134 MMOL/L (ref 136–145)
VANCOMYCIN RANDOM: 15.2 UG/ML
WBC # BLD: 11.5 K/UL (ref 4–11)

## 2022-08-23 PROCEDURE — 93010 ELECTROCARDIOGRAM REPORT: CPT | Performed by: INTERNAL MEDICINE

## 2022-08-23 PROCEDURE — 6370000000 HC RX 637 (ALT 250 FOR IP): Performed by: NURSE PRACTITIONER

## 2022-08-23 PROCEDURE — 99233 SBSQ HOSP IP/OBS HIGH 50: CPT | Performed by: INTERNAL MEDICINE

## 2022-08-23 PROCEDURE — 6370000000 HC RX 637 (ALT 250 FOR IP): Performed by: INTERNAL MEDICINE

## 2022-08-23 PROCEDURE — 99291 CRITICAL CARE FIRST HOUR: CPT | Performed by: INTERNAL MEDICINE

## 2022-08-23 PROCEDURE — 85025 COMPLETE CBC W/AUTO DIFF WBC: CPT

## 2022-08-23 PROCEDURE — 6360000002 HC RX W HCPCS: Performed by: NURSE PRACTITIONER

## 2022-08-23 PROCEDURE — 73110 X-RAY EXAM OF WRIST: CPT

## 2022-08-23 PROCEDURE — 2000000000 HC ICU R&B

## 2022-08-23 PROCEDURE — 82525 ASSAY OF COPPER: CPT

## 2022-08-23 PROCEDURE — 2580000003 HC RX 258: Performed by: NURSE PRACTITIONER

## 2022-08-23 PROCEDURE — APPNB15 APP NON BILLABLE TIME 0-15 MINS: Performed by: NURSE PRACTITIONER

## 2022-08-23 PROCEDURE — 6360000002 HC RX W HCPCS: Performed by: INTERNAL MEDICINE

## 2022-08-23 PROCEDURE — 2700000000 HC OXYGEN THERAPY PER DAY

## 2022-08-23 PROCEDURE — 99232 SBSQ HOSP IP/OBS MODERATE 35: CPT | Performed by: SURGERY

## 2022-08-23 PROCEDURE — 84100 ASSAY OF PHOSPHORUS: CPT

## 2022-08-23 PROCEDURE — 80202 ASSAY OF VANCOMYCIN: CPT

## 2022-08-23 PROCEDURE — 2580000003 HC RX 258: Performed by: INTERNAL MEDICINE

## 2022-08-23 PROCEDURE — APPSS15 APP SPLIT SHARED TIME 0-15 MINUTES: Performed by: PHYSICIAN ASSISTANT

## 2022-08-23 PROCEDURE — APPNB30 APP NON BILLABLE TIME 0-30 MINS: Performed by: PHYSICIAN ASSISTANT

## 2022-08-23 PROCEDURE — 83735 ASSAY OF MAGNESIUM: CPT

## 2022-08-23 PROCEDURE — 94760 N-INVAS EAR/PLS OXIMETRY 1: CPT

## 2022-08-23 PROCEDURE — 6360000002 HC RX W HCPCS: Performed by: SURGERY

## 2022-08-23 PROCEDURE — 80048 BASIC METABOLIC PNL TOTAL CA: CPT

## 2022-08-23 PROCEDURE — 2500000003 HC RX 250 WO HCPCS: Performed by: NURSE PRACTITIONER

## 2022-08-23 PROCEDURE — 84134 ASSAY OF PREALBUMIN: CPT

## 2022-08-23 PROCEDURE — C9113 INJ PANTOPRAZOLE SODIUM, VIA: HCPCS | Performed by: INTERNAL MEDICINE

## 2022-08-23 RX ORDER — CALCIUM GLUCONATE 20 MG/ML
1000 INJECTION, SOLUTION INTRAVENOUS ONCE
Status: COMPLETED | OUTPATIENT
Start: 2022-08-23 | End: 2022-08-23

## 2022-08-23 RX ORDER — MORPHINE SULFATE 2 MG/ML
2 INJECTION, SOLUTION INTRAMUSCULAR; INTRAVENOUS
Status: DISCONTINUED | OUTPATIENT
Start: 2022-08-23 | End: 2022-09-06 | Stop reason: HOSPADM

## 2022-08-23 RX ORDER — MORPHINE SULFATE 4 MG/ML
4 INJECTION, SOLUTION INTRAMUSCULAR; INTRAVENOUS
Status: DISCONTINUED | OUTPATIENT
Start: 2022-08-23 | End: 2022-09-06 | Stop reason: HOSPADM

## 2022-08-23 RX ADMIN — HYDROCORTISONE SODIUM SUCCINATE 100 MG: 100 INJECTION, POWDER, FOR SOLUTION INTRAMUSCULAR; INTRAVENOUS at 01:14

## 2022-08-23 RX ADMIN — MEROPENEM 1000 MG: 1 INJECTION, POWDER, FOR SOLUTION INTRAVENOUS at 23:49

## 2022-08-23 RX ADMIN — MEROPENEM 1000 MG: 1 INJECTION, POWDER, FOR SOLUTION INTRAVENOUS at 00:12

## 2022-08-23 RX ADMIN — ACETAMINOPHEN 650 MG: 325 TABLET, FILM COATED ORAL at 00:09

## 2022-08-23 RX ADMIN — ENOXAPARIN SODIUM 40 MG: 100 INJECTION SUBCUTANEOUS at 08:53

## 2022-08-23 RX ADMIN — ACETAMINOPHEN 650 MG: 325 TABLET, FILM COATED ORAL at 06:14

## 2022-08-23 RX ADMIN — Medication 2 MCG/MIN: at 06:26

## 2022-08-23 RX ADMIN — HYOSCYAMINE SULFATE: 16 SOLUTION at 09:05

## 2022-08-23 RX ADMIN — FLUCONAZOLE 200 MG: 200 INJECTION, SOLUTION INTRAVENOUS at 17:47

## 2022-08-23 RX ADMIN — Medication 40 MG: at 08:55

## 2022-08-23 RX ADMIN — MORPHINE SULFATE 2 MG: 2 INJECTION, SOLUTION INTRAMUSCULAR; INTRAVENOUS at 09:38

## 2022-08-23 RX ADMIN — FLECAINIDE ACETATE 100 MG: 100 TABLET ORAL at 20:56

## 2022-08-23 RX ADMIN — HYDROCORTISONE SODIUM SUCCINATE 50 MG: 100 INJECTION, POWDER, FOR SOLUTION INTRAMUSCULAR; INTRAVENOUS at 20:56

## 2022-08-23 RX ADMIN — Medication 2 CAPSULE: at 08:55

## 2022-08-23 RX ADMIN — CALCIUM GLUCONATE 1000 MG: 20 INJECTION, SOLUTION INTRAVENOUS at 12:12

## 2022-08-23 RX ADMIN — FLECAINIDE ACETATE 100 MG: 100 TABLET ORAL at 08:55

## 2022-08-23 RX ADMIN — HYOSCYAMINE SULFATE: 16 SOLUTION at 20:57

## 2022-08-23 RX ADMIN — MORPHINE SULFATE 2 MG: 2 INJECTION, SOLUTION INTRAMUSCULAR; INTRAVENOUS at 17:07

## 2022-08-23 RX ADMIN — HYDROCORTISONE SODIUM SUCCINATE 100 MG: 100 INJECTION, POWDER, FOR SOLUTION INTRAMUSCULAR; INTRAVENOUS at 08:55

## 2022-08-23 RX ADMIN — MEROPENEM 1000 MG: 1 INJECTION, POWDER, FOR SOLUTION INTRAVENOUS at 09:12

## 2022-08-23 RX ADMIN — MUPIROCIN: 20 OINTMENT TOPICAL at 08:56

## 2022-08-23 RX ADMIN — VANCOMYCIN HYDROCHLORIDE 1250 MG: 10 INJECTION, POWDER, LYOPHILIZED, FOR SOLUTION INTRAVENOUS at 15:12

## 2022-08-23 RX ADMIN — MUPIROCIN: 20 OINTMENT TOPICAL at 20:57

## 2022-08-23 RX ADMIN — MORPHINE SULFATE 2 MG: 2 INJECTION, SOLUTION INTRAMUSCULAR; INTRAVENOUS at 12:16

## 2022-08-23 RX ADMIN — SODIUM CHLORIDE, PRESERVATIVE FREE 10 ML: 5 INJECTION INTRAVENOUS at 20:57

## 2022-08-23 RX ADMIN — Medication 2 CAPSULE: at 16:58

## 2022-08-23 RX ADMIN — SODIUM CHLORIDE, PRESERVATIVE FREE 10 ML: 5 INJECTION INTRAVENOUS at 09:13

## 2022-08-23 RX ADMIN — MEROPENEM 1000 MG: 1 INJECTION, POWDER, FOR SOLUTION INTRAVENOUS at 16:55

## 2022-08-23 RX ADMIN — Medication 15 ML: at 08:59

## 2022-08-23 RX ADMIN — SODIUM CHLORIDE: 9 INJECTION, SOLUTION INTRAVENOUS at 00:13

## 2022-08-23 RX ADMIN — POTASSIUM PHOSPHATE, MONOBASIC AND POTASSIUM PHOSPHATE, DIBASIC 20 MMOL: 224; 236 INJECTION, SOLUTION, CONCENTRATE INTRAVENOUS at 13:26

## 2022-08-23 ASSESSMENT — PAIN DESCRIPTION - ONSET
ONSET: ON-GOING

## 2022-08-23 ASSESSMENT — PAIN DESCRIPTION - ORIENTATION
ORIENTATION: MID
ORIENTATION: RIGHT;LEFT
ORIENTATION: MID
ORIENTATION: RIGHT;LEFT

## 2022-08-23 ASSESSMENT — PAIN SCALES - GENERAL
PAINLEVEL_OUTOF10: 3
PAINLEVEL_OUTOF10: 8
PAINLEVEL_OUTOF10: 7
PAINLEVEL_OUTOF10: 5
PAINLEVEL_OUTOF10: 0
PAINLEVEL_OUTOF10: 8
PAINLEVEL_OUTOF10: 3
PAINLEVEL_OUTOF10: 0
PAINLEVEL_OUTOF10: 4
PAINLEVEL_OUTOF10: 5
PAINLEVEL_OUTOF10: 0
PAINLEVEL_OUTOF10: 7

## 2022-08-23 ASSESSMENT — PAIN DESCRIPTION - FREQUENCY
FREQUENCY: CONTINUOUS

## 2022-08-23 ASSESSMENT — ENCOUNTER SYMPTOMS
SINUS PAIN: 0
SHORTNESS OF BREATH: 0
EYE REDNESS: 0
SORE THROAT: 0
DIARRHEA: 0
COUGH: 0
RHINORRHEA: 0
ABDOMINAL PAIN: 0
CONSTIPATION: 0
EYE DISCHARGE: 0
WHEEZING: 0
NAUSEA: 0
BACK PAIN: 0
SINUS PRESSURE: 0

## 2022-08-23 ASSESSMENT — PAIN - FUNCTIONAL ASSESSMENT
PAIN_FUNCTIONAL_ASSESSMENT: PREVENTS OR INTERFERES SOME ACTIVE ACTIVITIES AND ADLS

## 2022-08-23 ASSESSMENT — PAIN DESCRIPTION - LOCATION
LOCATION: LEG
LOCATION: BUTTOCKS
LOCATION: FOOT
LOCATION: BUTTOCKS

## 2022-08-23 ASSESSMENT — PAIN DESCRIPTION - DESCRIPTORS
DESCRIPTORS: ACHING
DESCRIPTORS: ACHING
DESCRIPTORS: CRAMPING;DISCOMFORT
DESCRIPTORS: ACHING

## 2022-08-23 ASSESSMENT — PAIN DESCRIPTION - PAIN TYPE
TYPE: ACUTE PAIN

## 2022-08-23 NOTE — PROGRESS NOTES
Infectious Diseases   Progress Note      Admission Date: 8/21/2022  Hospital Day: Hospital Day: 3   Attending: Nehemias Zhang MD  Date of service: 8/23/2022     Chief complaint/ Reason for consult:     Septic shock with high-grade fever, leukocytosis, metabolic encephalopathy, hypotension requiring vasopressors and elevated lactic acid level of 2.5  Gram-negative bacteremia with Proteus  Infected sacral decubitus ulcer  Nursing home resident    Microbiology:        I have reviewed allavailable micro lab data and cultures    Blood culture (2/2) - collected on 8/21/2022: Proteus mirabilis    Urine culture  - collected on 8/21/2022: Greater than 100,000 CFU per mL of E. coli and Klebsiella      Antibiotics and immunizations:       Current antibiotics: All antibiotics and their doses were reviewed by me    Recent Abx Admin                     fluconazole (DIFLUCAN) in 0.9 % sodium chloride IVPB 200 mg (mg) 200 mg New Bag 08/23/22 1747    meropenem (MERREM) 1,000 mg in sodium chloride 0.9 % 100 mL IVPB (mini-bag) (mg) 1,000 mg New Bag 08/23/22 1655     1,000 mg New Bag  0912     1,000 mg New Bag  0012    vancomycin (VANCOCIN) 1250 mg in dextrose 5 % 250 mL IVPB (mg) 1,250 mg New Bag 08/23/22 1512     1,250 mg New Bag 08/22/22 2042                      Immunization History: All immunization history was reviewed by me today.     Immunization History   Administered Date(s) Administered    COVID-19, J&J, (age 18y+), IM, 0.5 mL 03/08/2021, 10/31/2021    Influenza Vaccine, unspecified formulation 10/21/2018    Influenza, FLUAD, (age 72 y+), Adjuvanted 09/03/2020, 09/09/2021    Influenza, FLUARIX, FLULAVAL, (age 10 mo+) AND AFLURIA, FLUZONE (age 1 y+), PF 02/19/2020    Influenza, High Dose (Fluzone 65 yrs and older) 12/09/2015, 10/27/2016, 09/22/2017, 10/21/2018    Pneumococcal Conjugate 13-valent ([id number]) 12/09/2015    Pneumococcal Conjugate Vaccine 10/21/2018    Pneumococcal Polysaccharide ([id number]) 10/21/2018 calorie intake : Body mass index is 28.63 kg/m²      Discussion:      I had switched patient's antibiotics to IV meropenem, vancomycin and fluconazole yesterday. She is tolerating antibiotics okay. Her blood culture has grown Proteus and urine culture is growing greater than 100,000 CFU per mL of E. coli and Klebsiella. Sensitivities are awaited. Serum creatinine 0.5 today. She is still hypotensive and is requiring vasopressors      Plan:     Diagnostic Workup:    Plans for sacral wound debridement noted. Will ask for surgical cultures  Follow-up on sensitivities of gram-negative organisms isolated from the blood culture and urine culture  Continue to follow  fever curve, WBC count and blood cultures. Continue to monitor blood counts, liver and renal function. Repeat 2 sets of blood cultures today    Antimicrobials:    No evidence of MRSA so far. I will discontinue IV vancomycin  Will continue empiric IV meropenem 1 g every 8 hour  Continue empiric IV fluconazole 200 mg every 24 hours  We will follow up on the culture results and clinical progress and will make further recommendations accordingly. Vasopressor support as needed to maintain mean artery pressure greater than 65 mmHg  Continue close vitals monitoring. Maintain good glycemic control. Fall precautions. Aspiration precautions. Continue to watch for new fever or diarrhea. DVT prophylaxis. The patient remains critically ill. Remains at high risk of morbidity and mortality  Continue close monitoring in ICU setting      Drug Monitoring:    Continue monitoring for antibiotic toxicity as follows: CBC, CMP   Continue to watch for following: new or worsening fever, new hypotension, hives, lip swelling and redness or purulence at vascular access sites. I/v access Management:    Continue to monitor i.v access sites for erythema, induration, discharge or tenderness.    As always, continue efforts to minimize tubes/lines/drains as clinically appropriate to reduce chances of line associated infections. Risk of Complications/Morbidity/Mortality: High     Patient is critically ill and has a potentially life threatening infection that poses threat to life/bodily function. There is potential for worsening infection/ sudden clinically significant or life-threatening deterioration in the patient's condition without appropriate antimicrobial therapy. Complex medical decision making process was involved to select appropriate antimicrobial agents to reverse the cause of patient's severe infection/ illness. Antimicrobial therapy requires intensive monitoring for toxicity and frequent dose adjustments to prevent toxicity and permanent end-organ dysfunction. Critical care time: 33  minutes      Thank you for involving me in the care of your patient. I will continue to follow. If you have anyadditional questions, please do not hesitate to contact me. Subjective: Interval history: Interval history was obtained from chart review and patient/ RN. The patient remains hypotensive. She has been requiring vasopressors. Fever curve is coming down. Encephalopathy has improved     REVIEW OF SYSTEMS:      Review of Systems   Constitutional:  Positive for fatigue. Negative for chills, diaphoresis and fever. HENT:  Negative for ear discharge, ear pain, postnasal drip, rhinorrhea, sinus pressure, sinus pain and sore throat. Eyes:  Negative for discharge and redness. Respiratory:  Negative for cough, shortness of breath and wheezing. Cardiovascular:  Negative for chest pain and leg swelling. Gastrointestinal:  Negative for abdominal pain, constipation, diarrhea and nausea. Endocrine: Negative for cold intolerance, heat intolerance and polydipsia. Genitourinary:  Negative for dysuria, flank pain, frequency, hematuria and urgency. Musculoskeletal:  Negative for back pain and myalgias. Skin:  Negative for rash.    Allergic/Immunologic: Negative for immunocompromised state. Neurological:  Negative for dizziness, seizures and headaches. Hematological:  Does not bruise/bleed easily. Psychiatric/Behavioral:  Negative for agitation, hallucinations and suicidal ideas. The patient is not nervous/anxious. All other systems reviewed and are negative. Past Medical History: All past medical history reviewed today. Past Medical History:   Diagnosis Date    Acid reflux     Anxiety     Arthritis     Atrial fibrillation (HCC)     Closed fracture dislocation of right elbow     Depression     Fibromyalgia     Migraine     WHITNEY (obstructive sleep apnea)     Diagnosed years ago, no CPAP    Rectal cancer ([facility] [address]     Wears glasses     DRIVING       Past Surgical History: All past surgical history was reviewed today.     Past Surgical History:   Procedure Laterality Date    APPENDECTOMY      CERVICAL DISCECTOMY      ACDF     SECTION      x4    CHOLECYSTECTOMY      COLON SURGERY      Rectal cancer removed using  incision    COLONOSCOPY      SEVERAL    COLONOSCOPY N/A 2019    COLONOSCOPY POLYPECTOMY SNARE/COLD BIOPSY performed by Casey Roblero MD at [address] [place] [address] N/A 2021    COLONOSCOPY WITH BIOPSY performed by Maia Mejias MD at [address] [place] [address] N/A 2022    INCOMPLETE COLONOSCOPY D/T POOR PREP performed by Maia Mejias MD at [address] Left 8/3/2022    OPEN REDUCTION INTERNAL FIXATION LEFT DISTAL RADIUS performed by Shawn Hollis MD at [address] [facility] Right 2021    RIGHT LEG EVACUATION OF HEMATOMA performed by Nava Woods MD at [facility] W/O EXTENSION Right 10/25/2018    OPEN REDUCTION INTERNAL FIXATION RIGHT FEMUR SUPRACONDYLAR FEMORAL FRACTURE WITH C-ARM performed by Salina Taylor MD at [place] [address] Right 2021    RIGHT LEG HEMATOMA EVAKUATION, DEBRIDEMENT, AND WOUND VAC PLACEMENT performed by Kimberly Kinsey MD at [facility] Right 2/17/2021    RIGHT LOWER EXTREMITY SKIN GRAFT performed by Kimberly Kinsey MD at [address] Left     UPPER GASTROINTESTINAL ENDOSCOPY N/A 11/26/2019    ESOPHAGOGASTRODUODENOSCOPY performed by Dayday Peralta MD at [address] N/A 8/18/2020    EGD BIOPSY performed by Dayday Peralta MD at [address] History: All family history was reviewed today. Problem Relation Age of Onset    COPD Mother        Objective:       PHYSICAL EXAM:      Vitals:   Vitals:    08/23/22 1300 08/23/22 1400 08/23/22 1650 08/23/22 1700   BP: (!) 97/53 (!) 95/48 115/64 123/69   Pulse: 94 95 89 91   Resp: 22 19 29 16   Temp:   98.5 °F (36.9 °C)    TempSrc:   Oral    SpO2: 99% 98% 100% 99%   Weight:       Height:           Physical Exam  Vitals and nursing note reviewed. Constitutional:       Appearance: She is well-developed. She is not diaphoretic. Comments: The patient was seen earlier today. She is fully awake and alert today   HENT:      Head: Normocephalic and atraumatic. Right Ear: External ear normal. There is no impacted cerumen. Left Ear: External ear normal. There is no impacted cerumen. Nose: Nose normal.      Mouth/Throat:      Mouth: Mucous membranes are moist.      Pharynx: Oropharynx is clear. No oropharyngeal exudate. Eyes:      General: No scleral icterus. Right eye: No discharge. Left eye: No discharge. Conjunctiva/sclera: Conjunctivae normal.      Pupils: Pupils are equal, round, and reactive to light. Neck:      Thyroid: No thyromegaly. Cardiovascular:      Rate and Rhythm: Normal rate and regular rhythm. Heart sounds: Normal heart sounds. No murmur heard. No friction rub. Pulmonary:      Effort: No respiratory distress. Breath sounds: No stridor. No wheezing or rales.    Abdominal: General: Bowel sounds are normal.      Palpations: Abdomen is soft. Tenderness: There is no abdominal tenderness. There is no guarding or rebound. Musculoskeletal:         General: No swelling, tenderness or deformity. Normal range of motion. Cervical back: Normal range of motion and neck supple. Right lower leg: No edema. Left lower leg: No edema. Lymphadenopathy:      Cervical: No cervical adenopathy. Skin:     General: Skin is warm and dry. Coloration: Skin is not jaundiced. Findings: No bruising, erythema or rash. Comments: Large sacral decubitus ulcer noted again   Neurological:      General: No focal deficit present. Mental Status: She is alert and oriented to person, place, and time. Mental status is at baseline. Motor: No abnormal muscle tone. Psychiatric:         Mood and Affect: Mood normal.         Behavior: Behavior normal.          Lines and drains: All vascular access sites are healthy with no local erythema, discharge or tenderness. Intake and output:    I/O last 3 completed shifts: In: 3130.2 [P.O.:240; I.V.:1240; IV Piggyback:1650.2]  Out: 46 [Urine:971]    Lab Data:   All available labs and old records have been reviewed by me.     CBC:  Recent Labs     08/21/22 2244 08/22/22  0535 08/23/22  0435   WBC 5.3 26.7* 11.5*   RBC 2.59* 2.59* 2.22*   HGB 8.9* 8.7* 7.4*   HCT 26.6* 26.5* 22.7*    268 210   .6* 102.4* 102.1*   MCH 34.2* 33.6 33.5   MCHC 33.3 32.8 32.8   RDW 15.3 15.6* 15.3   NRBC  --  1*  --    BANDSPCT  --  26*  --         BMP:  Recent Labs     08/21/22 2244 08/22/22  0535 08/23/22  0435    134* 134*   K 3.5 3.0* 3.9   CL 95* 96* 99   CO2 29 28 26   BUN 22* 24* 18   CREATININE 0.8 0.8 <0.5*   CALCIUM 7.7* 7.2* 6.9*   GLUCOSE 86 151* 105*        Hepatic Function Panel:   Lab Results   Component Value Date/Time    ALKPHOS 142 08/21/2022 10:44 PM    ALT 10 08/21/2022 10:44 PM    AST 18 08/21/2022 10:44 PM morphine, ALPRAZolam, sodium chloride flush, sodium chloride, ondansetron, polyethylene glycol, acetaminophen **OR** acetaminophen      Problem list:       Patient Active Problem List   Diagnosis Code    Symptomatic anemia D64.9    Atrial fibrillation (HCC) I48.91    Tachycardia R00.0    Severe protein-calorie malnutrition ([facility] [address] E43    Aortic valve disorder I35.9    Arthropathy M12.9    Cervical spondylosis without myelopathy M47.812    DDD (degenerative disc disease), cervical M50.30    DDD (degenerative disc disease), lumbosacral M51.37    Essential hypertension I10    [facility]align[facility] C18.9    Spinal stenosis in cervical region M48.02    Spinal stenosis, lumbar M48.061    Fibromyalgia M79.7    Gastroesophageal reflux disease without esophagitis K21.9    Lumbar radicular pain M54.16    Pedal edema R60.0    Chronic intractable headache R51.9, G89.29    Esophageal candidiasis ([facility]) B37.81    History of gastric bypass Z98.84    Prediabetes R73.03    Age-related osteoporosis without current pathological fracture M81.0    Essential tremor G25.0    Chronic fatigue R53.82    Irritable bowel syndrome with diarrhea K58.0    Borborygmi R19.8    Constipation K59.00    Anxiety F41.9    Weakness R53.1    Age-related physical debility R54    JENNIFER (generalized anxiety disorder) F41.1    Septic shock (HCC) A41.9, R65.21    Atrial fibrillation with rapid ventricular response (HCC) I48.91    DM2 (diabetes mellitus, type 2) ([facility]) E11.9    GI bleed K92.2    MAX (acute kidney injury) ([facility] N17.9    Community acquired pneumonia of left lower lobe of lung J18.9    Hypoxia R09.02    Pneumonia due to infectious organism J18.9    Closed fracture of left distal radius S52.502A    Lower GI bleed K92.2    Sepsis without septic shock (HCC) A41.9    Acute cystitis with hematuria I39.67    Acute metabolic encephalopathy Z24.72    Sacral wound S31.000A    Elevated lactic acid level R79.89    Fever R50.9    Tachypnea R06.82 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 9) E66.3    Elevated alkaline phosphatase level R74.8    Elevated alkaline phosphatase in  P09.8, R74.8    History of depression Z86.59    Nursing home resident Z59.3    Proteus infection A49.8    Gram-negative bacteremia R78.81    Lactic acidosis E87.2    Altered mental status R41.82    Hypotension I95.9    Skin ulcer of sacrum (HCC) L98.429    Acute cystitis without hematuria N30.00       Please note that this chart was generated using Dragon dictation software. Although every effort was made to ensure the accuracy of this automated transcription, some errors in transcription may have occurred inadvertently. If you may need any clarification, please do not hesitate to contact me through EPIC or at the phone number provided below with my electronic signature. Any pictures or media included in this note were obtained after taking informed verbal consent from the patient and with their approval to include those in the patient's medical record. Alvaro Beltran MD, MPH, [facility]  2022, 6:22 PM  [facility] Infectious Disease   [facility] [address] [facility], [address] [place] [address]  Office: [phone]  Fax: [phone]  In-person Clinic days:  Tuesday & Thursday a.m. Virtual clinic days: Monday, Wednesday & Friday a.m.

## 2022-08-23 NOTE — PROGRESS NOTES
Rounds with ICU team and Dr. Gorden Hodgkin. Discussed surgery tomorrow. Notified levophed off. It was restarted overnight due to low diastolic. Change pt's BP goal to SBP >90. Discussed pt on 1L O2 as she desats to 89-88% when sleeping. Dr. Gorden Hodgkin did want to replace phosphorus and calcium since pt may have surgery tomorrow. No other new orders.

## 2022-08-23 NOTE — PROGRESS NOTES
Rounds with Dr. Bharat Vega. Pt currently on OR schedule for colostomy, PEG tube placement, and debridement of sacral wound tomorrow at 1030. Discussed palliative care note and consult. Dr. Bharat Vega plans to discuss with pt's family plan for surgery. Also discussed need for pain medication. Pain medication was ordered.

## 2022-08-23 NOTE — CARE COORDINATION
08/23/22 1310   IMM Letter   IMM Letter given to Patient/Family/Significant other/Guardian/POA/by: given as initial IMM to pt's daughter Carolann Sierra over the phone & [email or web address]   IMM Letter date given: 08/23/22   IMM Letter time given: 1036     Vitaly Mueller RN, BSN,   [phone]  Electronically signed by Vitaly Mueller RN on 8/23/2022 at 1:11 PM

## 2022-08-23 NOTE — PLAN OF CARE
Problem: Pain  Goal: Verbalizes/displays adequate comfort level or baseline comfort level  Outcome: Not Progressing  Flowsheets (Taken 8/23/2022 0853 by Jose Buchanan RN)  Verbalizes/displays adequate comfort level or baseline comfort level: Encourage patient to monitor pain and request assistance     Problem: Chronic Conditions and Co-morbidities  Goal: Patient's chronic conditions and co-morbidity symptoms are monitored and maintained or improved  Outcome: Not Progressing  Flowsheets (Taken 8/23/2022 0853 by Jose Buchanan RN)  Care Plan - Patient's Chronic Conditions and Co-Morbidity Symptoms are Monitored and Maintained or Improved:   Monitor and assess patient's chronic conditions and comorbid symptoms for stability, deterioration, or improvement   Collaborate with multidisciplinary team to address chronic and comorbid conditions and prevent exacerbation or deterioration   Update acute care plan with appropriate goals if chronic or comorbid symptoms are exacerbated and prevent overall improvement and discharge     Problem: Nutrition Deficit:  Goal: Optimize nutritional status  Outcome: Not Progressing     Problem: Safety - Adult  Goal: Free from fall injury  Outcome: Progressing  Flowsheets (Taken 8/23/2022 0853 by Jose Buchanan RN)  Free From Fall Injury: Instruct family/caregiver on patient safety     Problem: Skin/Tissue Integrity  Goal: Absence of new skin breakdown  Description: 1. Monitor for areas of redness and/or skin breakdown  2. Assess vascular access sites hourly  3. Every 4-6 hours minimum:  Change oxygen saturation probe site  4. Every 4-6 hours:  If on nasal continuous positive airway pressure, respiratory therapy assess nares and determine need for appliance change or resting period.   Outcome: Progressing     Problem: ABCDS Injury Assessment  Goal: Absence of physical injury  Outcome: Progressing  Flowsheets (Taken 8/23/2022 0853 by Jose Buchanan RN)  Absence of Physical Injury: Implement safety measures based on patient assessment     Problem: Discharge Planning  Goal: Discharge to home or other facility with appropriate resources  Recent Flowsheet Documentation  Taken 8/23/2022 0853 by Katherin Flores RN  Discharge to home or other facility with appropriate resources:   Identify barriers to discharge with patient and caregiver   Identify discharge learning needs (meds, wound care, etc)   Arrange for needed discharge resources and transportation as appropriate   Refer to discharge planning if patient needs post-hospital services based on physician order or complex needs related to functional status, cognitive ability or social support system     Problem: Pain  Goal: Verbalizes/displays adequate comfort level or baseline comfort level  Outcome: Not Progressing  Flowsheets (Taken 8/23/2022 0853 by Katherin Flores RN)  Verbalizes/displays adequate comfort level or baseline comfort level: Encourage patient to monitor pain and request assistance     Problem: Chronic Conditions and Co-morbidities  Goal: Patient's chronic conditions and co-morbidity symptoms are monitored and maintained or improved  Outcome: Not Progressing  Flowsheets (Taken 8/23/2022 0853 by Katherin Flores RN)  Care Plan - Patient's Chronic Conditions and Co-Morbidity Symptoms are Monitored and Maintained or Improved:   Monitor and assess patient's chronic conditions and comorbid symptoms for stability, deterioration, or improvement   Collaborate with multidisciplinary team to address chronic and comorbid conditions and prevent exacerbation or deterioration   Update acute care plan with appropriate goals if chronic or comorbid symptoms are exacerbated and prevent overall improvement and discharge     Problem: Nutrition Deficit:  Goal: Optimize nutritional status  Outcome: Not Progressing

## 2022-08-23 NOTE — TELEPHONE ENCOUNTER
[address] [facility]   [phone]  Called and wanted to know about the patient surgical dressing  L wrist it has been 20 days since her surgery she wants to know if the dressing needs to be changed

## 2022-08-23 NOTE — PROGRESS NOTES
Clinical Pharmacy Note  Vancomycin Consult    Nickie Linares is a 65 y.o. female ordered Vancomycin for sacral decubitus ulcer; consult received from Dr. Lo Sierra to manage therapy. Also receiving meropenem and fluconazole. Allergies:  Demerol hcl [meperidine], Pcn [penicillins], and Adhesive tape     Temp max:  Temp (24hrs), Av.2 °F (36.8 °C), Min:98.1 °F (36.7 °C), Max:98.4 °F (36.9 °C)      Recent Labs     22  0535 22  0435   WBC 5.3 26.7* 11.5*       Recent Labs     22  0535 22  0435   BUN 22* 24* 18   CREATININE 0.8 0.8 <0.5*         Intake/Output Summary (Last 24 hours) at 2022 1644  Last data filed at 2022 1403  Gross per 24 hour   Intake 2915.48 ml   Output 1130 ml   Net 1785.48 ml       Culture Results:  22 Blood: Proteus  22 Urine: Klebsiella, E Coli    Ht Readings from Last 1 Encounters:   22 5' 2\" (1.575 m)        Wt Readings from Last 1 Encounters:   22 161 lb 6 oz (73.2 kg)         Estimated Creatinine Clearance: 87 mL/min (based on SCr of 0.5 mg/dL). Assessment:  Day # 2 of vancomycin. Current regimen: 1250 mg every 18 hours  Vancomycin level: 15.2 mg/L  Predicted AUC: 398    Plan:  Change regimen to 1000 mg every 12 hours. Regimen predicts a trough of 14.2 and . Thank you for the consult.       Joseph Wade, [facility], PharmD, BCCCP

## 2022-08-23 NOTE — PROGRESS NOTES
Hospitalist Progress Note      PCP: Iwona Mesa MD    Date of Admission: 8/21/2022    Chief Complaint: [address] Course: 11T admitted from NH where she was noted to have AMS, found to be septic with progression of sacral decub with underlying osteo as source    Subjective: patient denies sob, abd pain, uncontrolled pain      Medications:  Reviewed    Infusion Medications    sodium chloride       Scheduled Medications    potassium phosphate IVPB  20 mmol IntraVENous Once    hydrocortisone sodium succinate PF  50 mg IntraVENous BID    pantoprazole (PROTONIX) 40 mg injection  40 mg IntraVENous Daily    flecainide  100 mg Oral 2 times per day    [Held by provider] apixaban  5 mg Oral BID    sodium chloride flush  10 mL IntraVENous 2 times per day    vancomycin  1,250 mg IntraVENous Q18H    vancomycin (VANCOCIN) intermittent dosing (placeholder)   Other RX Placeholder    eldertonic  15 mL Oral Daily    mupirocin   Nasal BID    lactobacillus  2 capsule Oral BID WC    Sodium Hypochlorite   Irrigation BID    enoxaparin  40 mg SubCUTAneous Daily    fluconazole  200 mg IntraVENous Q24H    meropenem  1,000 mg IntraVENous Q8H     PRN Meds: morphine **OR** morphine, ALPRAZolam, sodium chloride flush, sodium chloride, ondansetron, polyethylene glycol, acetaminophen **OR** acetaminophen      Intake/Output Summary (Last 24 hours) at 8/23/2022 1610  Last data filed at 8/23/2022 1403  Gross per 24 hour   Intake 3187.77 ml   Output 1130 ml   Net 2057.77 ml       Physical Exam Performed:    BP (!) 95/48   Pulse 95   Temp 98.2 °F (36.8 °C) (Oral)   Resp 19   Ht 5' 2\" (1.575 m)   Wt 161 lb 6 oz (73.2 kg)   SpO2 98%   BMI 29.52 kg/m²     General appearance: No apparent distress, appears stated age and cooperative. HEENT: Pupils equal, round, and reactive to light. Conjunctivae/corneas clear. Neck: Supple, with full range of motion. No jugular venous distention. Trachea midline.   Respiratory:  Normal respiratory effort. Clear to auscultation, bilaterally without Rales/Wheezes/Rhonchi. Cardiovascular: Regular rate and rhythm with normal S1/S2 without murmurs, rubs or gallops. Abdomen: Soft, non-tender, non-distended with normal bowel sounds. Musculoskeletal: No clubbing, cyanosis or edema bilaterally. Full range of motion without deformity. Skin: Skin color, texture, turgor normal.  No rashes or lesions. Neurologic:  Neurovascularly intact without any focal sensory/motor deficits. Cranial nerves: II-XII intact, grossly non-focal.  Psychiatric: Alert and oriented, thought content appropriate, normal insight  Capillary Refill: Brisk,< 3 seconds   Peripheral Pulses: +2 palpable, equal bilaterally       Labs:   Recent Labs     08/21/22 2244 08/22/22  0535 08/23/22  0435   WBC 5.3 26.7* 11.5*   HGB 8.9* 8.7* 7.4*   HCT 26.6* 26.5* 22.7*    268 210     Recent Labs     08/21/22 2244 08/22/22  0535 08/23/22  0435    134* 134*   K 3.5 3.0* 3.9   CL 95* 96* 99   CO2 29 28 26   BUN 22* 24* 18   CREATININE 0.8 0.8 <0.5*   CALCIUM 7.7* 7.2* 6.9*   PHOS  --  2.2* 2.2*     Recent Labs     08/21/22 2244   AST 18   ALT 10   BILITOT 0.7   ALKPHOS 142*     No results for input(s): INR in the last 72 hours. No results for input(s): Patrisha Meigs in the last 72 hours. Urinalysis:      Lab Results   Component Value Date/Time    NITRU Negative 08/22/2022 12:52 AM    WBCUA 21-50 08/22/2022 12:52 AM    BACTERIA 1+ 08/22/2022 12:52 AM    RBCUA 3-4 08/22/2022 12:52 AM    BLOODU MODERATE 08/22/2022 12:52 AM    SPECGRAV 1.025 08/22/2022 12:52 AM    GLUCOSEU 100 08/22/2022 12:52 AM       Radiology:  CT ABDOMEN PELVIS W IV CONTRAST Additional Contrast? None   Final Result   Large sacral decubitus ulcer with subcutaneous gas extending to the coccyx   and perirectal fascia. No loculated fluid collections. Increased sclerosis   of the adjacent coccyx with soft tissue gas extending to the bone.   Findings   are compatible with osteomyelitis. Adjacent rectal wall thickening may be reactive. Small bilateral pleural effusions with bibasilar atelectasis. Difficult to   exclude superimposed pneumonia in the lung bases. XR CHEST PORTABLE   Final Result   Clear chest without acute cardiopulmonary process. Assessment/Plan:    Active Hospital Problems    Diagnosis Date Noted    Acute cystitis without hematuria [N30.00] 2022     Priority: Medium    Sepsis without septic shock ([facility] [A41.9] 2022     Priority: Medium    Acute cystitis with hematuria [N30.01] 2022     Priority: Medium    Acute metabolic encephalopathy [W43.30] 2022     Priority: Medium    Sacral wound [S31.000A] 2022     Priority: Medium    Elevated lactic acid level [R79.89] 2022     Priority: Medium    Fever [R50.9] 2022     Priority: Medium    Tachypnea [R06.82] 2022     Priority: Medium    Sacral osteomyelitis ([facility] [M46.28] 2022     Priority: Medium    WHITNEY (obstructive sleep apnea) [G47.33] 2022     Priority: Medium    Overweight (BMI 25.0-29. 9) [E66.3] 2022     Priority: Medium    Elevated alkaline phosphatase level [R74.8] 2022     Priority: Medium    Elevated alkaline phosphatase in  [P09.8, R74.8] 2022     Priority: Medium    History of depression [Z86.59] 2022     Priority: Medium    Nursing home resident [Z59.3] 2022     Priority: Medium    Proteus infection [A49.8] 2022     Priority: Medium    Gram-negative bacteremia [R78.81] 2022     Priority: Medium    Lactic acidosis [E87.2] 2022     Priority: Medium    Altered mental status [R41.82] 2022     Priority: Medium    Hypotension [I95.9] 2022     Priority: Medium    Skin ulcer of sacrum [facility]) [place] Callow 2022     Priority: Medium    DM2 (diabetes mellitus, type 2) ([facility] [E11.9] 2022     Priority: Medium    Septic shock ([facility] [A41.9, R65.21] 2022

## 2022-08-23 NOTE — PROGRESS NOTES
General and Vascular Surgery                                                           Daily Progress Note                                                             Lisbeth Macias PA-C     Pt Name: Susan Hurt  Medical Record Number: [id number]  Date of Birth 1945   Today's Date: 8/23/2022    ASSESSMENT/PLAN  Stage IV sacral ulcer - patient just debrided at bedside last week, which was limited by eliquis use. CT showed worsening tissue necrosis and now with extension towards the rectum. Discussed CT findings with patient. Plan for formal debridement in the OR on Wednesday, since her eliquis is just being held now. She would also benefit from a laparoscopic sigmoid colostomy for fecal diversion given the close proximity of the sacral wound to the anus, and placement of a G-tube   Continue IV antibiotics and local wound care. Dakins for BID dressing changes. Moderate protein calorie malnutrition - albumin 1.7.  monitor po intake. NPO after midnight  Treatment consent   Dina Hadley has improved from yesterday. Pain is well controlled. She has no nausea and no vomiting. OBJECTIVE  VITALS:  height is 5' 2\" (1.575 m) and weight is 161 lb 6 oz (73.2 kg). Her oral temperature is 98.1 °F (36.7 °C). Her blood pressure is 105/48 (abnormal) and her pulse is 88. Her respiration is 21 and oxygen saturation is 98%. VITALS:  BP (!) 105/48   Pulse 88   Temp 98.1 °F (36.7 °C) (Oral)   Resp 21   Ht 5' 2\" (1.575 m)   Wt 161 lb 6 oz (73.2 kg)   SpO2 98%   BMI 29.52 kg/m²   GENERAL: alert, no distress  ABDOMEN: soft, non-tender and non-distended  EXTREMITY/BACK: Stage IV sacral ulcer with slough and necrotic skin. Malodorous   I/O last 3 completed shifts: In: 3130.2 [P.O.:240; I.V.:1240; IV Piggyback:1650.2]  Out: 971 [Urine:971]  I/O this shift:   In: 866.5 [I.V.:852.5; IV Piggyback:14]  Out: [address]  08/21/22  2244 08/22/22  0052 08/22/22  0535 08/23/22  0435   WBC 5.3  --    < > 11.5*   HGB 8.9*  --    < > 7.4*   HCT 26.6*  --    < > 22.7*     --    < > 210     --    < > 134*   K 3.5  --    < > 3.9   CL 95*  --    < > 99   CO2 29  --    < > 26   BUN 22*  --    < > 18   CREATININE 0.8  --    < > <0.5*   MG  --   --    < > 2.10   PHOS  --   --    < > 2.2*   CALCIUM 7.7*  --    < > 6.9*   AST 18  --   --   --    ALT 10  --   --   --    BILITOT 0.7  --   --   --    NITRU  --  Negative  --   --    COLORU  --  Yellow  --   --    BACTERIA  --  1+*  --   --     < > = values in this interval not displayed. CBC:   Lab Results   Component Value Date/Time    WBC 11.5 08/23/2022 04:35 AM    RBC 2.22 08/23/2022 04:35 AM    HGB 7.4 08/23/2022 04:35 AM    HCT 22.7 08/23/2022 04:35 AM    .1 08/23/2022 04:35 AM    MCH 33.5 08/23/2022 04:35 AM    MCHC 32.8 08/23/2022 04:35 AM    RDW 15.3 08/23/2022 04:35 AM     08/23/2022 04:35 AM    MPV 7.5 08/23/2022 04:35 AM     CMP:    Lab Results   Component Value Date/Time     08/23/2022 04:35 AM    K 3.9 08/23/2022 04:35 AM    CL 99 08/23/2022 04:35 AM    CO2 26 08/23/2022 04:35 AM    BUN 18 08/23/2022 04:35 AM    CREATININE <0.5 08/23/2022 04:35 AM    GFRAA >60 08/23/2022 04:35 AM    AGRATIO 0.6 08/21/2022 10:44 PM    LABGLOM >60 08/23/2022 04:35 AM    GLUCOSE 105 08/23/2022 04:35 AM    PROT 4.7 08/21/2022 10:44 PM    LABALBU 1.7 08/21/2022 10:44 PM    CALCIUM 6.9 08/23/2022 04:35 AM    BILITOT 0.7 08/21/2022 10:44 PM    ALKPHOS 142 08/21/2022 10:44 PM    AST 18 08/21/2022 10:44 PM    ALT 10 08/21/2022 10:44 PM         Mendel Comber, PA-C  Electronically signed 8/23/2022 at 10:03 AM    Agree with above note. The patient was personally seen and examined. Pawan Penaloza is doing OK. Still with sacral pain. Tolerating some diet, 480 ml po yesterday, BM x 1.     NAD, alert and interactive  Normal respiratory effort, no accessory muscle use  RRR  Abd soft, NT, ND  Sacral wound with slough as above  Ext no cyanosis or clubbing    WBC down to 11.5  Cr <0.5  Prealbumin 4    A/P: 66 yo female with stage IV sacral ulcer, severe protein calorite malnurtrition, UTI    Continue IV abx  Continue local wound care  Discussed with both the patient and her daughter Kaden Laura. I recommended excisional debridement of her sacral ulcer in the OR, but also a laparoscopic gastrostomy tube for tube feels to improve her malnutrition along with a diverting sigmoid colostomy. The risks, benefits, and alternatives were discussed with the patient and they are willing to consent for the operation. Hopefully will be able to get a wound vac on within a day or two after surgery.   NPO after midnight    Alyson Harrison MD

## 2022-08-23 NOTE — PLAN OF CARE
Problem: Discharge Planning  Goal: Discharge to home or other facility with appropriate resources  Outcome: Progressing  Flowsheets (Taken 8/22/2022 2000)  Discharge to home or other facility with appropriate resources: Identify barriers to discharge with patient and caregiver     Problem: Chronic Conditions and Co-morbidities  Goal: Patient's chronic conditions and co-morbidity symptoms are monitored and maintained or improved  8/23/2022 0054 by Georgette Tafoya RN  Outcome: Progressing  Flowsheets (Taken 8/22/2022 2000)  Care Plan - Patient's Chronic Conditions and Co-Morbidity Symptoms are Monitored and Maintained or Improved: Monitor and assess patient's chronic conditions and comorbid symptoms for stability, deterioration,      Problem: Nutrition Deficit:  Goal: Optimize nutritional status  8/23/2022 0054 by Georgette Tafoya RN

## 2022-08-23 NOTE — PROGRESS NOTES
Called Dr. Sabi Figueredo office back. Dr. Ana Felton confirmed okay to remove splint. Pt should have xray of left wrist 3 views.

## 2022-08-23 NOTE — PROGRESS NOTES
Pulmonary Progress Note    Date of Admission: 8/21/2022   LOS: 1 day       CC:  Chief Complaint   Patient presents with    Altered Mental Status     Arrived from SNF with complaints of altered mental status. Usually alert and oriented. Pt. Was found not responding as normal. Alert and oriented x1        Subjective:  Considering surgery          ROS:   No nausea  No Vomiting  No chest pain       Assessment:     Sacral wound  Atrial fibrillation  Recent GI bleed, June 2022. Sepsis secondary to sacral osteomyelitis  Sleep apnea does not use CPAP  Left atrium is severely dilated mild to moderate aortic regurgitation       Plan: This note may have been transcribed using [id number]. Please disregard any translational errors. Hospital Day: 1     Sepsis secondary to osteomyelitisFrom sacral wound with lactic acidosis    Infectious disease and general surgery consulted  Concerning prognosis with albumin 1.7 and recurrent admissions. Weaned stress dose steroids. Bilateral pleural effusion  Very small. Too small for intervention  With aortic regurgitation and left atrial enlargement, diastolic dysfunction likely. Consider diuresis when able from a septic shock standpoint  Monitor         Macrocytic anemia  Chronic and present on admission. B12 and folate both within normal ranges. Test copper. multivitamin for general malnutrition. UTI     Yeast in urine. Retest urine after replacing chronic tello. culture positive for Klebsiella and E coli. Has chronic tello        Protein malnutrition  Albumin 1.7 on admission. The patient and family are considering surgery versus not. Will follow at a distance and reengage if has surgery. Data:        PHYSICAL EXAM:   Blood pressure (!) 92/54, pulse 84, temperature 98.1 °F (36.7 °C), temperature source Oral, resp.  rate 19, height 5' 2\" (1.575 m), weight 161 lb 6 oz (73.2 kg), SpO2 99 %, not currently breastfeeding.'  Body mass index is 29.52 kg/m². Gen: No distress. ENT:   Resp: No accessory muscle use. No crackles. No wheezes. No rhonchi. CV: Regular rate. Regular rhythm. No murmur or rub. No edema. Skin: Warm, dry, normal texture and turgor. No nodule on exposed extremities. M/S: No cyanosis. No clubbing. No joint deformity. Psych:  No anxiety. Awake. Alert. Intact judgement and insight. Good Mood / Affect. Memory appears in tact       Medications:    Scheduled Meds:   pantoprazole (PROTONIX) 40 mg injection  40 mg IntraVENous Daily    flecainide  100 mg Oral 2 times per day    [Held by provider] apixaban  5 mg Oral BID    sodium chloride flush  10 mL IntraVENous 2 times per day    vancomycin  1,250 mg IntraVENous Q18H    vancomycin (VANCOCIN) intermittent dosing (placeholder)   Other RX Placeholder    eldertonic  15 mL Oral Daily    hydrocortisone sodium succinate PF  100 mg IntraVENous Q8H    mupirocin   Nasal BID    lactobacillus  2 capsule Oral BID WC    Sodium Hypochlorite   Irrigation BID    enoxaparin  40 mg SubCUTAneous Daily    fluconazole  200 mg IntraVENous Q24H    meropenem  1,000 mg IntraVENous Q8H       Continuous Infusions:   sodium chloride         PRN Meds:  morphine **OR** morphine, ALPRAZolam, sodium chloride flush, sodium chloride, ondansetron, polyethylene glycol, acetaminophen **OR** acetaminophen    Labs reviewed:  CBC:   Recent Labs     08/21/22 2244 08/22/22  0535 08/23/22  0435   WBC 5.3 26.7* 11.5*   HGB 8.9* 8.7* 7.4*   HCT 26.6* 26.5* 22.7*   .6* 102.4* 102.1*    268 210     BMP:   Recent Labs     08/21/22 2244 08/22/22  0535 08/23/22  0435    134* 134*   K 3.5 3.0* 3.9   CL 95* 96* 99   CO2 29 28 26   PHOS  --  2.2* 2.2*   BUN 22* 24* 18   CREATININE 0.8 0.8 <0.5*     LIVER PROFILE:   Recent Labs     08/21/22 2244   AST 18   ALT 10   BILITOT 0.7   ALKPHOS 142*     PT/INR: No results for input(s): PROTIME, INR in the last 72 hours. APTT: No results for input(s):  APTT in the last 72 hours. UA:  Recent Labs     08/22/22  0052   COLORU Yellow   PHUR 5.5   WBCUA 21-50*   RBCUA 3-4   YEAST Present*   BACTERIA 1+*   CLARITYU SL CLOUDY*   SPECGRAV 1.025   LEUKOCYTESUR MODERATE*   UROBILINOGEN 4.0*   BILIRUBINUR MODERATE*   BLOODU MODERATE*   GLUCOSEU 100*   AMORPHOUS Rare     No results for input(s): PH, PCO2, PO2 in the last 72 hours. Cx:      Films: This note was transcribed using [id number]. Please disregard any translational errors.       Brijesh Asif Pulmonary, [phone]

## 2022-08-23 NOTE — TELEPHONE ENCOUNTER
[facility] was notified. He advised that the splint may be removed and he would like xrays taken of the left wrist when out of the splint.

## 2022-08-23 NOTE — CARE COORDINATION
08/23/22 1246   Readmission Assessment   Number of Days since last admission? 1-7 days   Previous Disposition SNF   Who is being Isis Goodwin  (daughter)   What was the patient's/caregiver's perception as to why they think they needed to return back to the hospital? Other (Comment)  (i don't feel she was ready for dc & now she's back septic)   Did you visit your Primary Care Physician after you left the hospital, before you returned this time? No   Why weren't you able to visit your PCP? Did not have an appointment   Did you see a specialist, such as Cardiac, Pulmonary, Orthopedic Physician, etc. after you left the hospital? No   Who advised the patient to return to the hospital? Skilled Unit   Does the patient report anything that got in the way of taking their medications? No   In our efforts to provide the best possible care to you and others like you, can you think of anything that we could have done to help you after you left the hospital the first time, so that you might not have needed to return so soon?  Other (Comment)  (they should have kept her longer but insurance said she had to go)     Mandie Mulligan RN, BSN,   [phone]  Electronically signed by Mandie Mulligan RN on 8/23/2022 at 12:47 PM

## 2022-08-24 ENCOUNTER — ANESTHESIA EVENT (OUTPATIENT)
Dept: OPERATING ROOM | Age: 77
DRG: 853 | End: 2022-08-24
Payer: MEDICARE

## 2022-08-24 ENCOUNTER — ANESTHESIA (OUTPATIENT)
Dept: OPERATING ROOM | Age: 77
DRG: 853 | End: 2022-08-24
Payer: MEDICARE

## 2022-08-24 LAB
ANION GAP SERPL CALCULATED.3IONS-SCNC: 8 MMOL/L (ref 3–16)
BASOPHILS ABSOLUTE: 0.1 K/UL (ref 0–0.2)
BASOPHILS RELATIVE PERCENT: 0.4 %
BUN BLDV-MCNC: 13 MG/DL (ref 7–20)
CALCIUM SERPL-MCNC: 7 MG/DL (ref 8.3–10.6)
CHLORIDE BLD-SCNC: 98 MMOL/L (ref 99–110)
CO2: 26 MMOL/L (ref 21–32)
CREAT SERPL-MCNC: <0.5 MG/DL (ref 0.6–1.2)
EOSINOPHILS ABSOLUTE: 0 K/UL (ref 0–0.6)
EOSINOPHILS RELATIVE PERCENT: 0 %
GFR AFRICAN AMERICAN: >60
GFR NON-AFRICAN AMERICAN: >60
GLUCOSE BLD-MCNC: 259 MG/DL (ref 70–99)
GLUCOSE BLD-MCNC: 82 MG/DL (ref 70–99)
GLUCOSE BLD-MCNC: 99 MG/DL (ref 70–99)
HCT VFR BLD CALC: 23.1 % (ref 36–48)
HEMOGLOBIN: 7.8 G/DL (ref 12–16)
LYMPHOCYTES ABSOLUTE: 0.9 K/UL (ref 1–5.1)
LYMPHOCYTES RELATIVE PERCENT: 6.6 %
MAGNESIUM: 1.8 MG/DL (ref 1.8–2.4)
MCH RBC QN AUTO: 33.9 PG (ref 26–34)
MCHC RBC AUTO-ENTMCNC: 33.6 G/DL (ref 31–36)
MCV RBC AUTO: 100.7 FL (ref 80–100)
MONOCYTES ABSOLUTE: 0.4 K/UL (ref 0–1.3)
MONOCYTES RELATIVE PERCENT: 2.7 %
NEUTROPHILS ABSOLUTE: 12.2 K/UL (ref 1.7–7.7)
NEUTROPHILS RELATIVE PERCENT: 90.3 %
ORGANISM: ABNORMAL
ORGANISM: ABNORMAL
PDW BLD-RTO: 15.4 % (ref 12.4–15.4)
PERFORMED ON: ABNORMAL
PERFORMED ON: NORMAL
PHOSPHORUS: 2.1 MG/DL (ref 2.5–4.9)
PLATELET # BLD: 238 K/UL (ref 135–450)
PMV BLD AUTO: 7.6 FL (ref 5–10.5)
POTASSIUM REFLEX MAGNESIUM: 4.2 MMOL/L (ref 3.5–5.1)
RBC # BLD: 2.29 M/UL (ref 4–5.2)
SODIUM BLD-SCNC: 132 MMOL/L (ref 136–145)
URINE CULTURE, ROUTINE: ABNORMAL
URINE CULTURE, ROUTINE: ABNORMAL
WBC # BLD: 13.5 K/UL (ref 4–11)

## 2022-08-24 PROCEDURE — C9290 INJ, BUPIVACAINE LIPOSOME: HCPCS | Performed by: SURGERY

## 2022-08-24 PROCEDURE — 6360000002 HC RX W HCPCS: Performed by: SURGERY

## 2022-08-24 PROCEDURE — 99233 SBSQ HOSP IP/OBS HIGH 50: CPT | Performed by: INTERNAL MEDICINE

## 2022-08-24 PROCEDURE — A4217 STERILE WATER/SALINE, 500 ML: HCPCS | Performed by: SURGERY

## 2022-08-24 PROCEDURE — 6370000000 HC RX 637 (ALT 250 FOR IP): Performed by: INTERNAL MEDICINE

## 2022-08-24 PROCEDURE — 6360000002 HC RX W HCPCS: Performed by: INTERNAL MEDICINE

## 2022-08-24 PROCEDURE — 94760 N-INVAS EAR/PLS OXIMETRY 1: CPT

## 2022-08-24 PROCEDURE — 2580000003 HC RX 258

## 2022-08-24 PROCEDURE — C9113 INJ PANTOPRAZOLE SODIUM, VIA: HCPCS | Performed by: INTERNAL MEDICINE

## 2022-08-24 PROCEDURE — 11047 DBRDMT BONE EACH ADDL: CPT | Performed by: SURGERY

## 2022-08-24 PROCEDURE — 2580000003 HC RX 258: Performed by: SURGERY

## 2022-08-24 PROCEDURE — 3600000014 HC SURGERY LEVEL 4 ADDTL 15MIN: Performed by: SURGERY

## 2022-08-24 PROCEDURE — 94761 N-INVAS EAR/PLS OXIMETRY MLT: CPT

## 2022-08-24 PROCEDURE — 7100000001 HC PACU RECOVERY - ADDTL 15 MIN

## 2022-08-24 PROCEDURE — 3700000001 HC ADD 15 MINUTES (ANESTHESIA): Performed by: SURGERY

## 2022-08-24 PROCEDURE — 2580000003 HC RX 258: Performed by: INTERNAL MEDICINE

## 2022-08-24 PROCEDURE — 87186 SC STD MICRODIL/AGAR DIL: CPT

## 2022-08-24 PROCEDURE — 6360000002 HC RX W HCPCS: Performed by: NURSE PRACTITIONER

## 2022-08-24 PROCEDURE — 2709999900 HC NON-CHARGEABLE SUPPLY: Performed by: SURGERY

## 2022-08-24 PROCEDURE — 2720000010 HC SURG SUPPLY STERILE: Performed by: SURGERY

## 2022-08-24 PROCEDURE — 3E0G76Z INTRODUCTION OF NUTRITIONAL SUBSTANCE INTO UPPER GI, VIA NATURAL OR ARTIFICIAL OPENING: ICD-10-PCS | Performed by: SURGERY

## 2022-08-24 PROCEDURE — 2700000000 HC OXYGEN THERAPY PER DAY

## 2022-08-24 PROCEDURE — 87077 CULTURE AEROBIC IDENTIFY: CPT

## 2022-08-24 PROCEDURE — 2000000000 HC ICU R&B

## 2022-08-24 PROCEDURE — 3700000000 HC ANESTHESIA ATTENDED CARE: Performed by: SURGERY

## 2022-08-24 PROCEDURE — 84100 ASSAY OF PHOSPHORUS: CPT

## 2022-08-24 PROCEDURE — 88304 TISSUE EXAM BY PATHOLOGIST: CPT

## 2022-08-24 PROCEDURE — 6370000000 HC RX 637 (ALT 250 FOR IP): Performed by: SURGERY

## 2022-08-24 PROCEDURE — 80048 BASIC METABOLIC PNL TOTAL CA: CPT

## 2022-08-24 PROCEDURE — 6360000002 HC RX W HCPCS

## 2022-08-24 PROCEDURE — 3600000004 HC SURGERY LEVEL 4 BASE: Performed by: SURGERY

## 2022-08-24 PROCEDURE — 87205 SMEAR GRAM STAIN: CPT

## 2022-08-24 PROCEDURE — 83735 ASSAY OF MAGNESIUM: CPT

## 2022-08-24 PROCEDURE — 6360000002 HC RX W HCPCS: Performed by: ANESTHESIOLOGY

## 2022-08-24 PROCEDURE — 2500000003 HC RX 250 WO HCPCS

## 2022-08-24 PROCEDURE — 85025 COMPLETE CBC W/AUTO DIFF WBC: CPT

## 2022-08-24 PROCEDURE — 7100000000 HC PACU RECOVERY - FIRST 15 MIN

## 2022-08-24 PROCEDURE — 87076 CULTURE ANAEROBE IDENT EACH: CPT

## 2022-08-24 PROCEDURE — 0DH60UZ INSERTION OF FEEDING DEVICE INTO STOMACH, OPEN APPROACH: ICD-10-PCS | Performed by: SURGERY

## 2022-08-24 PROCEDURE — 36592 COLLECT BLOOD FROM PICC: CPT

## 2022-08-24 PROCEDURE — 2580000003 HC RX 258: Performed by: ANESTHESIOLOGY

## 2022-08-24 PROCEDURE — 0D1N4Z4 BYPASS SIGMOID COLON TO CUTANEOUS, PERCUTANEOUS ENDOSCOPIC APPROACH: ICD-10-PCS | Performed by: SURGERY

## 2022-08-24 PROCEDURE — 44188 LAP COLOSTOMY: CPT | Performed by: SURGERY

## 2022-08-24 PROCEDURE — 0QB10ZZ EXCISION OF SACRUM, OPEN APPROACH: ICD-10-PCS | Performed by: SURGERY

## 2022-08-24 PROCEDURE — P9045 ALBUMIN (HUMAN), 5%, 250 ML: HCPCS

## 2022-08-24 PROCEDURE — 11044 DBRDMT BONE 1ST 20 SQ CM/<: CPT | Performed by: SURGERY

## 2022-08-24 PROCEDURE — 2500000003 HC RX 250 WO HCPCS: Performed by: ANESTHESIOLOGY

## 2022-08-24 PROCEDURE — 6370000000 HC RX 637 (ALT 250 FOR IP): Performed by: NURSE PRACTITIONER

## 2022-08-24 PROCEDURE — 87070 CULTURE OTHR SPECIMN AEROBIC: CPT

## 2022-08-24 RX ORDER — ONDANSETRON 2 MG/ML
INJECTION INTRAMUSCULAR; INTRAVENOUS PRN
Status: DISCONTINUED | OUTPATIENT
Start: 2022-08-24 | End: 2022-08-24 | Stop reason: SDUPTHER

## 2022-08-24 RX ORDER — SODIUM CHLORIDE 0.9 % (FLUSH) 0.9 %
5-40 SYRINGE (ML) INJECTION PRN
Status: DISCONTINUED | OUTPATIENT
Start: 2022-08-24 | End: 2022-08-25 | Stop reason: SDUPTHER

## 2022-08-24 RX ORDER — ALBUMIN, HUMAN INJ 5% 5 %
SOLUTION INTRAVENOUS PRN
Status: DISCONTINUED | OUTPATIENT
Start: 2022-08-24 | End: 2022-08-24 | Stop reason: SDUPTHER

## 2022-08-24 RX ORDER — LIDOCAINE HYDROCHLORIDE 20 MG/ML
INJECTION, SOLUTION EPIDURAL; INFILTRATION; INTRACAUDAL; PERINEURAL PRN
Status: DISCONTINUED | OUTPATIENT
Start: 2022-08-24 | End: 2022-08-24 | Stop reason: SDUPTHER

## 2022-08-24 RX ORDER — SODIUM CHLORIDE 9 MG/ML
INJECTION, SOLUTION INTRAVENOUS CONTINUOUS PRN
Status: DISCONTINUED | OUTPATIENT
Start: 2022-08-24 | End: 2022-08-24 | Stop reason: SDUPTHER

## 2022-08-24 RX ORDER — DROPERIDOL 2.5 MG/ML
0.62 INJECTION, SOLUTION INTRAMUSCULAR; INTRAVENOUS
Status: ACTIVE | OUTPATIENT
Start: 2022-08-24 | End: 2022-08-24

## 2022-08-24 RX ORDER — OXYCODONE HYDROCHLORIDE 5 MG/1
5 TABLET ORAL
Status: ACTIVE | OUTPATIENT
Start: 2022-08-24 | End: 2022-08-24

## 2022-08-24 RX ORDER — SUCCINYLCHOLINE/SOD CL,ISO/PF 200MG/10ML
SYRINGE (ML) INTRAVENOUS PRN
Status: DISCONTINUED | OUTPATIENT
Start: 2022-08-24 | End: 2022-08-24 | Stop reason: SDUPTHER

## 2022-08-24 RX ORDER — ROCURONIUM BROMIDE 10 MG/ML
INJECTION, SOLUTION INTRAVENOUS PRN
Status: DISCONTINUED | OUTPATIENT
Start: 2022-08-24 | End: 2022-08-24 | Stop reason: SDUPTHER

## 2022-08-24 RX ORDER — METRONIDAZOLE 500 MG/1
500 TABLET ORAL EVERY 8 HOURS SCHEDULED
Status: DISCONTINUED | OUTPATIENT
Start: 2022-08-24 | End: 2022-09-06 | Stop reason: HOSPADM

## 2022-08-24 RX ORDER — MAGNESIUM HYDROXIDE 1200 MG/15ML
LIQUID ORAL CONTINUOUS PRN
Status: COMPLETED | OUTPATIENT
Start: 2022-08-24 | End: 2022-08-24

## 2022-08-24 RX ORDER — DEXAMETHASONE SODIUM PHOSPHATE 4 MG/ML
INJECTION, SOLUTION INTRA-ARTICULAR; INTRALESIONAL; INTRAMUSCULAR; INTRAVENOUS; SOFT TISSUE PRN
Status: DISCONTINUED | OUTPATIENT
Start: 2022-08-24 | End: 2022-08-24 | Stop reason: SDUPTHER

## 2022-08-24 RX ORDER — FENTANYL CITRATE 50 UG/ML
25 INJECTION, SOLUTION INTRAMUSCULAR; INTRAVENOUS EVERY 5 MIN PRN
Status: DISCONTINUED | OUTPATIENT
Start: 2022-08-24 | End: 2022-09-06

## 2022-08-24 RX ORDER — PHENYLEPHRINE HCL IN 0.9% NACL 1 MG/10 ML
SYRINGE (ML) INTRAVENOUS PRN
Status: DISCONTINUED | OUTPATIENT
Start: 2022-08-24 | End: 2022-08-24 | Stop reason: SDUPTHER

## 2022-08-24 RX ORDER — SODIUM CHLORIDE 0.9 % (FLUSH) 0.9 %
5-40 SYRINGE (ML) INJECTION EVERY 12 HOURS SCHEDULED
Status: DISCONTINUED | OUTPATIENT
Start: 2022-08-24 | End: 2022-08-25 | Stop reason: SDUPTHER

## 2022-08-24 RX ORDER — SODIUM CHLORIDE 9 MG/ML
INJECTION, SOLUTION INTRAVENOUS PRN
Status: DISCONTINUED | OUTPATIENT
Start: 2022-08-24 | End: 2022-08-25 | Stop reason: SDUPTHER

## 2022-08-24 RX ORDER — PROPOFOL 10 MG/ML
INJECTION, EMULSION INTRAVENOUS PRN
Status: DISCONTINUED | OUTPATIENT
Start: 2022-08-24 | End: 2022-08-24 | Stop reason: SDUPTHER

## 2022-08-24 RX ORDER — KETAMINE HCL IN NACL, ISO-OSM 100MG/10ML
SYRINGE (ML) INJECTION PRN
Status: DISCONTINUED | OUTPATIENT
Start: 2022-08-24 | End: 2022-08-24 | Stop reason: SDUPTHER

## 2022-08-24 RX ORDER — FENTANYL CITRATE 50 UG/ML
INJECTION, SOLUTION INTRAMUSCULAR; INTRAVENOUS PRN
Status: DISCONTINUED | OUTPATIENT
Start: 2022-08-24 | End: 2022-08-24 | Stop reason: SDUPTHER

## 2022-08-24 RX ADMIN — MEROPENEM 1000 MG: 1 INJECTION, POWDER, FOR SOLUTION INTRAVENOUS at 16:16

## 2022-08-24 RX ADMIN — SODIUM CHLORIDE, PRESERVATIVE FREE 10 ML: 5 INJECTION INTRAVENOUS at 21:12

## 2022-08-24 RX ADMIN — ONDANSETRON 4 MG: 2 INJECTION INTRAMUSCULAR; INTRAVENOUS at 15:46

## 2022-08-24 RX ADMIN — MUPIROCIN: 20 OINTMENT TOPICAL at 09:12

## 2022-08-24 RX ADMIN — FENTANYL CITRATE 50 MCG: 50 INJECTION INTRAMUSCULAR; INTRAVENOUS at 11:20

## 2022-08-24 RX ADMIN — Medication 30 MG: at 11:20

## 2022-08-24 RX ADMIN — SODIUM CHLORIDE, PRESERVATIVE FREE 10 ML: 5 INJECTION INTRAVENOUS at 09:13

## 2022-08-24 RX ADMIN — SODIUM CHLORIDE: 9 INJECTION, SOLUTION INTRAVENOUS at 11:05

## 2022-08-24 RX ADMIN — Medication 200 MCG: at 11:31

## 2022-08-24 RX ADMIN — ENOXAPARIN SODIUM 40 MG: 100 INJECTION SUBCUTANEOUS at 09:11

## 2022-08-24 RX ADMIN — LIDOCAINE HYDROCHLORIDE 60 MG: 20 INJECTION, SOLUTION EPIDURAL; INFILTRATION; INTRACAUDAL; PERINEURAL at 11:20

## 2022-08-24 RX ADMIN — Medication 2 CAPSULE: at 09:12

## 2022-08-24 RX ADMIN — METRONIDAZOLE 500 MG: 500 TABLET ORAL at 21:11

## 2022-08-24 RX ADMIN — MORPHINE SULFATE 2 MG: 2 INJECTION, SOLUTION INTRAMUSCULAR; INTRAVENOUS at 02:11

## 2022-08-24 RX ADMIN — MORPHINE SULFATE 2 MG: 2 INJECTION, SOLUTION INTRAMUSCULAR; INTRAVENOUS at 21:16

## 2022-08-24 RX ADMIN — Medication 2 CAPSULE: at 16:49

## 2022-08-24 RX ADMIN — FENTANYL CITRATE 25 MCG: 50 INJECTION INTRAMUSCULAR; INTRAVENOUS at 12:29

## 2022-08-24 RX ADMIN — CEFTRIAXONE 2000 MG: 2 INJECTION, POWDER, FOR SOLUTION INTRAMUSCULAR; INTRAVENOUS at 19:21

## 2022-08-24 RX ADMIN — SODIUM CHLORIDE: 9 INJECTION, SOLUTION INTRAVENOUS at 16:14

## 2022-08-24 RX ADMIN — ROCURONIUM BROMIDE 50 MG: 10 INJECTION INTRAVENOUS at 11:25

## 2022-08-24 RX ADMIN — Medication 100 MCG: at 11:39

## 2022-08-24 RX ADMIN — ALBUMIN (HUMAN) 250 ML: 12.5 SOLUTION INTRAVENOUS at 11:39

## 2022-08-24 RX ADMIN — HYDROMORPHONE HYDROCHLORIDE 0.5 MG: 1 INJECTION, SOLUTION INTRAMUSCULAR; INTRAVENOUS; SUBCUTANEOUS at 13:03

## 2022-08-24 RX ADMIN — DEXAMETHASONE SODIUM PHOSPHATE 8 MG: 4 INJECTION, SOLUTION INTRAMUSCULAR; INTRAVENOUS at 11:30

## 2022-08-24 RX ADMIN — HYDROCORTISONE SODIUM SUCCINATE 50 MG: 100 INJECTION, POWDER, FOR SOLUTION INTRAMUSCULAR; INTRAVENOUS at 09:12

## 2022-08-24 RX ADMIN — SUGAMMADEX 200 MG: 100 INJECTION, SOLUTION INTRAVENOUS at 14:50

## 2022-08-24 RX ADMIN — Medication 15 ML: at 09:13

## 2022-08-24 RX ADMIN — ONDANSETRON 4 MG: 2 INJECTION INTRAMUSCULAR; INTRAVENOUS at 14:36

## 2022-08-24 RX ADMIN — MUPIROCIN: 20 OINTMENT TOPICAL at 21:14

## 2022-08-24 RX ADMIN — HYDROCORTISONE SODIUM SUCCINATE 50 MG: 100 INJECTION, POWDER, FOR SOLUTION INTRAMUSCULAR; INTRAVENOUS at 21:11

## 2022-08-24 RX ADMIN — FLUCONAZOLE 200 MG: 200 INJECTION, SOLUTION INTRAVENOUS at 16:18

## 2022-08-24 RX ADMIN — FLECAINIDE ACETATE 100 MG: 100 TABLET ORAL at 21:11

## 2022-08-24 RX ADMIN — Medication 40 MG: at 09:12

## 2022-08-24 RX ADMIN — SODIUM CHLORIDE: 9 INJECTION, SOLUTION INTRAVENOUS at 16:17

## 2022-08-24 RX ADMIN — MEROPENEM 1000 MG: 1 INJECTION, POWDER, FOR SOLUTION INTRAVENOUS at 09:29

## 2022-08-24 RX ADMIN — FENTANYL CITRATE 25 MCG: 50 INJECTION INTRAMUSCULAR; INTRAVENOUS at 12:08

## 2022-08-24 RX ADMIN — SODIUM CHLORIDE: 9 INJECTION, SOLUTION INTRAVENOUS at 14:16

## 2022-08-24 RX ADMIN — Medication 100 MCG: at 11:42

## 2022-08-24 RX ADMIN — ROCURONIUM BROMIDE 20 MG: 10 INJECTION INTRAVENOUS at 12:59

## 2022-08-24 RX ADMIN — FLECAINIDE ACETATE 100 MG: 100 TABLET ORAL at 09:12

## 2022-08-24 RX ADMIN — PROPOFOL 80 MG: 10 INJECTION, EMULSION INTRAVENOUS at 11:20

## 2022-08-24 RX ADMIN — MORPHINE SULFATE 2 MG: 2 INJECTION, SOLUTION INTRAMUSCULAR; INTRAVENOUS at 16:44

## 2022-08-24 RX ADMIN — Medication 80 MG: at 11:21

## 2022-08-24 ASSESSMENT — PAIN DESCRIPTION - ORIENTATION
ORIENTATION: RIGHT;MID
ORIENTATION: MID

## 2022-08-24 ASSESSMENT — ENCOUNTER SYMPTOMS
EYE REDNESS: 0
DIARRHEA: 0
SINUS PAIN: 0
EYE DISCHARGE: 0
WHEEZING: 0
SORE THROAT: 0
NAUSEA: 0
BACK PAIN: 0
SINUS PRESSURE: 0
CONSTIPATION: 0
ABDOMINAL PAIN: 0
RHINORRHEA: 0
SHORTNESS OF BREATH: 0
COUGH: 0

## 2022-08-24 ASSESSMENT — PAIN DESCRIPTION - DESCRIPTORS
DESCRIPTORS: ACHING
DESCRIPTORS: ACHING
DESCRIPTORS: ACHING;DISCOMFORT;SORE
DESCRIPTORS: DISCOMFORT;ACHING;SORE
DESCRIPTORS: ACHING
DESCRIPTORS: ACHING

## 2022-08-24 ASSESSMENT — PAIN SCALES - GENERAL
PAINLEVEL_OUTOF10: 6
PAINLEVEL_OUTOF10: 0
PAINLEVEL_OUTOF10: 5
PAINLEVEL_OUTOF10: 6
PAINLEVEL_OUTOF10: 0
PAINLEVEL_OUTOF10: 5
PAINLEVEL_OUTOF10: 5
PAINLEVEL_OUTOF10: 2
PAINLEVEL_OUTOF10: 2
PAINLEVEL_OUTOF10: 1

## 2022-08-24 ASSESSMENT — PAIN DESCRIPTION - PAIN TYPE
TYPE: SURGICAL PAIN

## 2022-08-24 ASSESSMENT — PAIN DESCRIPTION - LOCATION
LOCATION: ABDOMEN
LOCATION: ABDOMEN;INCISION
LOCATION: BACK;COCCYX;SACRUM
LOCATION: ABDOMEN

## 2022-08-24 ASSESSMENT — PAIN DESCRIPTION - ONSET
ONSET: ON-GOING
ONSET: ON-GOING

## 2022-08-24 ASSESSMENT — PAIN - FUNCTIONAL ASSESSMENT
PAIN_FUNCTIONAL_ASSESSMENT: PREVENTS OR INTERFERES SOME ACTIVE ACTIVITIES AND ADLS

## 2022-08-24 ASSESSMENT — PAIN DESCRIPTION - FREQUENCY
FREQUENCY: CONTINUOUS
FREQUENCY: INTERMITTENT

## 2022-08-24 NOTE — PROGRESS NOTES
Call back from [facility] and consent is able to be obtained for patients surgery at 1030. This RN and ONEL Garza hear over the phone consent and consent is then placed in patients paper chart. Raven requests to be called and updated after surgery.

## 2022-08-24 NOTE — PLAN OF CARE
Problem: Discharge Planning  Goal: Discharge to home or other facility with appropriate resources  Outcome: Progressing  Flowsheets (Taken 8/23/2022 2045)  Discharge to home or other facility with appropriate resources: Identify barriers to discharge with patient and caregiver     Problem: Pain  Goal: Verbalizes/displays adequate comfort level or baseline comfort level  8/24/2022 0131 by Zehra Butcher RN  Outcome: Progressing  Flowsheets (Taken 8/24/2022 0000)  Verbalizes/displays adequate comfort level or baseline comfort level: Encourage patient to monitor pain and request assistance  8/23/2022 1831 by Ang Head RN  Outcome: Not Progressing  Flowsheets (Taken 8/23/2022 0853 by Alex Soni, ONEL)  Verbalizes/displays adequate comfort level or baseline comfort level: Encourage patient to monitor pain and request assistance     Problem: Safety - Adult  Goal: Free from fall injury  8/24/2022 0131 by Zehra Butcher RN  Outcome: Progressing  8/23/2022 1831 by Ang Head RN  Outcome: Progressing  Flowsheets (Taken 8/23/2022 3861 by Alex Soni RN)  Free From Fall Injury: Instruct family/caregiver on patient safety     Problem: Skin/Tissue Integrity  Goal: Absence of new skin breakdown  Description: 1. Monitor for areas of redness and/or skin breakdown  2. Assess vascular access sites hourly  3. Every 4-6 hours minimum:  Change oxygen saturation probe site  4. Every 4-6 hours:  If on nasal continuous positive airway pressure, respiratory therapy assess nares and determine need for appliance change or resting period.   8/24/2022 0131 by Zehra Butcher RN  Outcome: Progressing  8/23/2022 1831 by Ang Head RN  Outcome: Progressing     Problem: Chronic Conditions and Co-morbidities  Goal: Patient's chronic conditions and co-morbidity symptoms are monitored and maintained or improved  8/24/2022 0131 by Zehra Butcher RN  Outcome: Progressing  Flowsheets (Taken 8/23/2022 2045)  Care Plan - Patient's Chronic Conditions and Co-Morbidity Symptoms are Monitored and Maintained or Improved: Monitor and assess patient's chronic conditions and comorbid symptoms for stability, deterioration, or improvement  8/23/2022 1831 by Corey Tellez RN  Outcome: Not Progressing  Flowsheets (Taken 8/23/2022 2511 by Airam Sher RN)  Care Plan - Patient's Chronic Conditions and Co-Morbidity Symptoms are Monitored and Maintained or Improved:   Monitor and assess patient's chronic conditions and comorbid symptoms for stability, deterioration, or improvement   Collaborate with multidisciplinary team to address chronic and comorbid conditions and prevent exacerbation or deterioration   Update acute care plan with appropriate goals if chronic or comorbid symptoms are exacerbated and prevent overall improvement and discharge     Problem: Nutrition Deficit:  Goal: Optimize nutritional status  8/24/2022 0131 by Carri Acharya RN  Outcome: Progressing  8/23/2022 1831 by Corey Tellez RN  Outcome: Not Progressing     Problem: ABCDS Injury Assessment  Goal: Absence of physical injury  8/24/2022 0131 by Carri Acharya RN  Outcome: Progressing  8/23/2022 1831 by Corey Tellez RN  Outcome: Progressing  Flowsheets (Taken 8/23/2022 3677 by Airam Sher RN)  Absence of Physical Injury: Implement safety measures based on patient assessment     Problem: Pain  Goal: Verbalizes/displays adequate comfort level or baseline comfort level  8/24/2022 0131 by Carri Acharya RN  Outcome: Progressing  Flowsheets (Taken 8/24/2022 0000)  Verbalizes/displays adequate comfort level or baseline comfort level: Encourage patient to monitor pain and request assistance  8/23/2022 1831 by Corey Tellez RN  Outcome: Not Progressing  Flowsheets (Taken 8/23/2022 0853 by Airam Sher RN)  Verbalizes/displays adequate comfort level or baseline comfort level: Encourage patient to monitor pain and request assistance     Problem: Chronic

## 2022-08-24 NOTE — BRIEF OP NOTE
Brief Postoperative Note      Patient: Laxmi Oconnor  YOB: 1945  MRN: [id number]    Date of Procedure: 8/24/2022    Pre-Op Diagnosis: SACRAL ULCER    Post-Op Diagnosis: Same       Procedure(s):  LAPAROSCOPIC SIGMOID COLOSTOMY WITH EXCISIONAL DEBRIDEMENT OF SACRAL ULCER, LAPAROSCOPIC GASTROSTOMY TUBE PLACEMENT    Surgeon(s):  Shira Powers MD    Assistant:  Surgical Assistant: Yohannes Francisco    Anesthesia: General    Estimated Blood Loss (mL): 498 ml    Complications: None    Specimens:   ID Type Source Tests Collected by Time Destination   1 : 1) SACRAL ULCER Tissue Tissue CULTURE, TISSUE Shira Powers MD 8/24/2022 1418    A : A) coccyx Specimen Coccyx SURGICAL PATHOLOGY Shira Powers MD 8/24/2022 1431        Implants:  * No implants in log *      Drains:   Negative Pressure Wound Therapy Leg Anterior; Lower;Right (Active)       Gastrostomy/Enterostomy/Jejunostomy Tube Gastrostomy 1 20 fr (Active)       Colostomy LLQ (Active)       Urinary Catheter 08/21/22 Kaplan (Active)   Catheter Indications Need for fluid volume management of the critically ill patient in a critical care setting 08/24/22 0800   Site Assessment No urethral drainage 08/24/22 0800   Urine Color Straw 08/24/22 0800   Urine Appearance Cloudy 08/24/22 0800   Collection Container Standard 08/24/22 0800   Securement Method Leg strap 08/24/22 0800   Catheter Care Completed Yes 08/24/22 0800   Catheter Best Practices  Drainage tube clipped to bed;Catheter secured to thigh; Tamper seal intact; Bag below bladder;Bag not on floor; Lack of dependent loop in tubing;Drainage bag less than half full 08/24/22 0800   Status Draining;Patent 08/24/22 0800   Output (mL) 45 mL 08/24/22 1000       [REMOVED] Urinary Catheter (Removed)   Catheter Indications Need for fluid volume management of the critically ill patient in a critical care setting 08/02/22 0800   Site Assessment No urethral drainage 08/02/22 0800 Urine Color Yellow 08/02/22 0800   Urine Appearance Clear 08/02/22 0800   Collection Container Standard 08/02/22 0800   Securement Method Securing device (Describe) 08/02/22 0800   Catheter Care Completed Yes 07/31/22 0843   Catheter Best Practices  Catheter secured to thigh;Lack of dependent loop in tubing;Drainage bag less than half full;Bag not on floor; Bag below bladder; Tamper seal intact;Drainage tube clipped to bed 08/02/22 0800   Status Draining;Patent 08/02/22 0800   Output (mL) 5 mL 08/02/22 0800       [REMOVED] Urinary Catheter Kaplan (Removed)   $ Urethral catheter insertion $ Not inserted for procedure 08/04/22 0541   Catheter Indications Urinary retention (acute or chronic), continuous bladder irrigation or bladder outlet obstruction;Perioperative use for selected surgical procedures;Assist in healing of open sacral or perineal wounds (Stage III, IV, or unstageable documented by a provider or enterostomal therapy) and/or full thickness perineal and lower extremity burns in incontinent patients;Prolonged immobilization (e.g. unstable thoracic or lumbar spine, multiple traumatic injuries such as pelvic fractures) 08/04/22 0916   Site Assessment No urethral drainage 08/04/22 0916   Urine Color Yellow 08/04/22 0916   Urine Appearance Clear 08/04/22 0916   Urine Odor Other (Comment) 08/04/22 0916   Collection Container Standard 08/04/22 0916   Securement Method Securing device (Describe) 08/04/22 0916   Catheter Care Completed Yes 08/04/22 0541   Catheter Best Practices  Drainage tube clipped to bed;Catheter secured to thigh; Tamper seal intact; Bag below bladder;Bag not on floor; Lack of dependent loop in tubing;Drainage bag less than half full 08/04/22 0916   Status Patent;Draining 08/04/22 0916   Manual Irrigation Volume Input (mL) 0 mL 08/04/22 0916   Output (mL) 300 mL 08/04/22 0644   Discontinuation Reason Per provider order 08/04/22 0916       [REMOVED] Urinary Catheter 08/10/22 (Removed)   Catheter Indications Assist in healing of open sacral or perineal wounds (Stage III, IV, or unstageable documented by a provider or enterostomal therapy) and/or full thickness perineal and lower extremity burns in incontinent patients 08/18/22 0810   Site Assessment No urethral drainage 08/18/22 0810   Urine Color Rachele;Yellow 08/18/22 1125   Urine Appearance Clear 08/18/22 1125   Urine Odor Malodorous 08/17/22 1331   Collection Container Standard 08/18/22 1125   Securement Method Securing device (Describe) 08/18/22 1125   Catheter Care Completed Yes 08/18/22 1125   Catheter Best Practices  Drainage tube clipped to bed;Catheter secured to thigh; Tamper seal intact; Bag below bladder;Bag not on floor; Lack of dependent loop in tubing;Drainage bag less than half full 08/18/22 1125   Status Draining 08/18/22 1125   Output (mL) 400 mL 08/18/22 1125       [REMOVED] External Urinary Catheter (Removed)   Site Assessment Clean,dry & intact 08/02/22 1200   Placement Initiated 08/02/22 1100   Perineal Care Yes 08/02/22 1100       Findings: omental adhesions to anterior abdominal wall, sacral ulcer with necrotic tissue and bone measuring 11 x 13 cm    Electronically signed by Davidson Howard MD on 8/24/2022 at 3:04 PM

## 2022-08-24 NOTE — PROGRESS NOTES
Hospitalist Progress Note      PCP: Sunil Ugarte MD    Date of Admission: 8/21/2022    Chief Complaint: [address] Course: 82S admitted from NH where she was noted to have AMS, found to be septic with progression of sacral decub with underlying osteo as source    Subjective: patient denies sob, abd pain, uncontrolled pain, appears to be in no resp distress on 1 lpm NC O2      Medications:  Reviewed    Infusion Medications    sodium chloride       Scheduled Medications    hydrocortisone sodium succinate PF  50 mg IntraVENous BID    pantoprazole (PROTONIX) 40 mg injection  40 mg IntraVENous Daily    flecainide  100 mg Oral 2 times per day    [Held by provider] apixaban  5 mg Oral BID    sodium chloride flush  10 mL IntraVENous 2 times per day    eldertonic  15 mL Oral Daily    mupirocin   Nasal BID    lactobacillus  2 capsule Oral BID WC    Sodium Hypochlorite   Irrigation BID    enoxaparin  40 mg SubCUTAneous Daily    fluconazole  200 mg IntraVENous Q24H    meropenem  1,000 mg IntraVENous Q8H     PRN Meds: morphine **OR** morphine, ALPRAZolam, sodium chloride flush, sodium chloride, ondansetron, polyethylene glycol, acetaminophen **OR** acetaminophen      Intake/Output Summary (Last 24 hours) at 8/24/2022 1042  Last data filed at 8/24/2022 0400  Gross per 24 hour   Intake 1055.44 ml   Output 695 ml   Net 360.44 ml         Physical Exam Performed:    /60   Pulse 85   Temp 98 °F (36.7 °C) (Oral)   Resp 21   Ht 5' 2\" (1.575 m)   Wt 162 lb 4.1 oz (73.6 kg)   SpO2 97%   BMI 29.68 kg/m²     General appearance: No apparent distress, appears stated age and cooperative. HEENT: Pupils equal, round, Conjunctivae/corneas clear. Neck: Supple, with full range of motion. No jugular venous distention. Trachea midline. Respiratory:  Normal respiratory effort. Clear to auscultation, bilaterally without Rales/Wheezes/Rhonchi.   Cardiovascular: Regular rate and rhythm    Abdomen: Soft, non-tender, non-distended    Musculoskeletal: No clubbing, cyanosis or edema bilaterally. Skin: Skin color, texture, turgor normal.  No rashes or lesions. Neurologic:   grossly non-focal.  Psychiatric: Alert and disoriented,       Labs:   Recent Labs     08/22/22  0535 08/23/22  0435 08/24/22  0530   WBC 26.7* 11.5* 13.5*   HGB 8.7* 7.4* 7.8*   HCT 26.5* 22.7* 23.1*    210 238       Recent Labs     08/22/22  0535 08/23/22  0435 08/24/22  0530   * 134* 132*   K 3.0* 3.9 4.2   CL 96* 99 98*   CO2 28 26 26   BUN 24* 18 13   CREATININE 0.8 <0.5* <0.5*   CALCIUM 7.2* 6.9* 7.0*   PHOS 2.2* 2.2* 2.1*       Recent Labs     08/21/22  2244   AST 18   ALT 10   BILITOT 0.7   ALKPHOS 142*       No results for input(s): INR in the last 72 hours. No results for input(s): Milagro Ellison in the last 72 hours. Urinalysis:      Lab Results   Component Value Date/Time    NITRU Negative 08/22/2022 12:52 AM    WBCUA 21-50 08/22/2022 12:52 AM    BACTERIA 1+ 08/22/2022 12:52 AM    RBCUA 3-4 08/22/2022 12:52 AM    BLOODU MODERATE 08/22/2022 12:52 AM    SPECGRAV 1.025 08/22/2022 12:52 AM    GLUCOSEU 100 08/22/2022 12:52 AM       Radiology:  XR WRIST LEFT (MIN 3 VIEWS)   Final Result   Patient is status post ORIF of distal radial fracture. CT ABDOMEN PELVIS W IV CONTRAST Additional Contrast? None   Final Result   Large sacral decubitus ulcer with subcutaneous gas extending to the coccyx   and perirectal fascia. No loculated fluid collections. Increased sclerosis   of the adjacent coccyx with soft tissue gas extending to the bone. Findings   are compatible with osteomyelitis. Adjacent rectal wall thickening may be reactive. Small bilateral pleural effusions with bibasilar atelectasis. Difficult to   exclude superimposed pneumonia in the lung bases. XR CHEST PORTABLE   Final Result   Clear chest without acute cardiopulmonary process.                  Assessment/Plan:    Active Hospital Problems 06/15/2009     Sepsis  - sec to inf decub    Decub / underlying osteo  - surg debride w diverting colostomy, poss palliation  - continue IV abx as epr ID    Atrial fib  - holding anticoag, continue tambocor,   Anemia  - trend  7.4 - > 7.8    Diet: Diet NPO Exceptions are: Sips of Water with Meds  Code Status: Full Code       Matt Michael MD

## 2022-08-24 NOTE — ANESTHESIA POSTPROCEDURE EVALUATION
Department of Anesthesiology  Postprocedure Note    Patient: Mary Hernandez  MRN: [id number]  YOB: 1945  Date of evaluation: 8/24/2022      Procedure Summary     Date: 08/24/22 Room / Location: [address]    Anesthesia Start: 3198 Anesthesia Stop: 1236    Procedure: LAPAROSCOPIC SIGMOID COLOSTOMY WITH EXCISIONAL DEBRIDEMENT OF SACRAL ULCER, LAPAROSCOPIC GASTROSTOMY TUBE PLACEMENT (Abdomen) Diagnosis:       Pressure injury of skin of sacral region, unspecified injury stage      (SACRAL ULCER)    Surgeons: Carlos Eduardo Aponte MD Responsible Provider: Cristobal Call MD    Anesthesia Type: general ASA Status: 4          Anesthesia Type: No value filed.     Becky Phase I:      Becky Phase II:        Anesthesia Post Evaluation    Patient location during evaluation: PACU  Level of consciousness: awake and alert  Airway patency: patent  Nausea & Vomiting: no nausea and no vomiting  Complications: no  Cardiovascular status: blood pressure returned to baseline  Respiratory status: acceptable  Hydration status: euvolemic  Comments: Postoperative Anesthesia Note    Name:    Mary Hernandez  MRN:      [id number]    Patient Vitals in the past 12 hrs:  08/24/22 1643, Resp:16  08/24/22 1635, Pulse:92, Resp:16, SpO2:100 %  08/24/22 1601, BP:(!) 140/66, Pulse:90, Resp:13, SpO2:100 %  08/24/22 1600, BP:(!) 140/66, Pulse:89, Resp:11, SpO2:100 %  08/24/22 1545, BP:137/70, Pulse:89, Resp:(!) 9, SpO2:99 %  08/24/22 1530, BP:131/74, Pulse:84, Resp:10, SpO2:100 %  08/24/22 1525, BP:(!) 141/70, Pulse:88, Resp:(!) 9, SpO2:99 %  08/24/22 1516, BP:(!) 143/72, Temp:97.3 °F (36.3 °C), Temp src:Oral, Pulse:92, Resp:18, SpO2:97 %  08/24/22 1100, BP:121/69, Pulse:93, Resp:16, SpO2:97 %  08/24/22 1000, BP:(!) 120/58, Pulse:99, Resp:24, SpO2:98 %  08/24/22 0900, BP:137/61, Pulse:92, Resp:18, SpO2:98 %  08/24/22 0853, SpO2:97 %  08/24/22 0800, BP:128/64, Temp:98.2 °F (36.8 °C), Temp src:Oral, Pulse:84, Resp:12, SpO2:98 %  08/24/22 0600, BP:132/60, Pulse:85, Resp:21  08/24/22 0500, BP:139/79, Pulse:90, Resp:16, SpO2:100 %, Weight:162 lb 4.1 oz (73.6 kg)     LABS:    CBC  Lab Results       Component                Value               Date/Time                  WBC                      13.5 (H)            08/24/2022 05:30 AM        HGB                      7.8 (L)             08/24/2022 05:30 AM        HCT                      23.1 (L)            08/24/2022 05:30 AM        PLT                      238                 08/24/2022 05:30 AM   RENAL  Lab Results       Component                Value               Date/Time                  NA                       132 (L)             08/24/2022 05:30 AM        K                        4.2                 08/24/2022 05:30 AM        CL                       98 (L)              08/24/2022 05:30 AM        CO2                      26                  08/24/2022 05:30 AM        BUN                      13                  08/24/2022 05:30 AM        CREATININE               <0.5 (L)            08/24/2022 05:30 AM        GLUCOSE                  82                  08/24/2022 05:30 AM   COAGS  Lab Results       Component                Value               Date/Time                  PROTIME                  14.1                08/11/2022 06:21 AM        INR                      1. 10                08/11/2022 06:21 AM        APTT                     25.4                08/11/2022 06:21 AM     Intake & Output:  @[id number]@    Nausea & Vomiting:  No    Level of Consciousness:  Awake    Pain Assessment:  Adequate analgesia    Anesthesia Complications:  No apparent anesthetic complications    SUMMARY      Vital signs stable  OK to discharge from Stage I post anesthesia care.   Care transferred from Anesthesiology department on discharge from perioperative area

## 2022-08-24 NOTE — PROGRESS NOTES
Pt arrived to room 2124 from OR. Patient arrived on oral airway, NC oxygen at 1L. Pt alert from [facility]. RR WNL. Pt alert. Kaplan catheter intact. Patient reporting pain 2/10. Orientation questions asked to patient, wnl. Patient able to move all extremities freely. After 30 mins of recovery, pt able to answer orientation questions, wnl. Becky 9/10. Bedside handoff performed with ICU RN to assume care.

## 2022-08-24 NOTE — PROGRESS NOTES
This RN looked into patients paper chart and no consent has been signed for filled out for patients upcoming surgery with Dr. Gay Moody at 56 AM. This RN has called Raven, patients daughter and POA 3x and left a voicemail to call back in order to consent to surgery. This RN then called Melissa Trivedi, [address] [place] [address] with general surgery to notify that consent has not yet been signed. This RN then calls Alanna Rocha, patients sonDiony Garcia answers the phone. This RN updates Alanna Rocha of the plan for the day and the need for Raven to call back in order for patient to have the surgery. Alanna Rocha asks if he can consent and this RN explains it has to be Raven to consent since she is the patients POA. Alanna Rocha to try and get a hold of Raven.

## 2022-08-24 NOTE — ANESTHESIA PRE PROCEDURE
Department of Anesthesiology  Preprocedure Note       Name:  Edwin Day   Age:  65 y.o.  :  1945                                          MRN:  [id number]         Date:  2022      Surgeon: Lenka Riddle):  Lillian Clay MD    Procedure: Procedure(s):  LAPAROSCOPIC SIGMOID COLOSTOMY WITH EXCISIONAL DEBRIDEMENT OF SACRAL ULCER, LAPAROSCOPIC GASTROSTOMY TUBE PLACEMENT, POSSIBLE OPEN    Medications prior to admission:   Prior to Admission medications    Medication Sig Start Date End Date Taking? Authorizing Provider   collagenase 250 UNIT/GM ointment Apply topically daily. 22  Minda Records, APRN - CNP   HYDROcodone-acetaminophen (NORCO) 5-325 MG per tablet Take 1 tablet by mouth every 6 hours as needed for Pain. Historical Provider, MD   ALPRAZolam Jennett Dubin) 0.5 MG tablet Take 0.5 mg by mouth every 8 hours as needed for Anxiety. Historical Provider, MD   acetaminophen (TYLENOL) 325 MG tablet Take 650 mg by mouth every 6 hours as needed for Pain    Historical Provider, MD   bisacodyl (DULCOLAX) 10 MG suppository Place 10 mg rectally daily as needed for Constipation    Historical Provider, MD   potassium chloride (KLOR-CON M) 20 MEQ extended release tablet TAKE 1 TABLET DAILY 22   Cornelio Mac MD   polyethylene glycol (GLYCOLAX) 17 g packet Take 17 g by mouth daily as needed for Constipation 8/4/22 9/3/22  Valarie Shipmna MD   docusate sodium (COLACE, DULCOLAX) 100 MG CAPS Take 100 mg by mouth 2 times daily as needed for Constipation 22   Valarie Shipman MD   metoprolol succinate (TOPROL XL) 25 MG extended release tablet Take 1 tablet by mouth in the morning. 22   Valarie Shipman MD   furosemide (LASIX) 20 MG tablet Take 1 tablet by mouth in the morning. With an additional 20 mg on MWF. 22   Valarie Shipman MD   apixaban (ELIQUIS) 5 MG TABS tablet Take 1 tablet by mouth in the morning and 1 tablet before bedtime.  22   Radha Busby MD escitalopram (LEXAPRO) 10 MG tablet Take 1 tablet by mouth daily 6/14/22   Nelli Lara MD   tiZANidine (ZANAFLEX) 2 MG tablet Take 1 tablet by mouth every 6 hours as needed (muscle pain) 2/18/22   Nelli Lara MD   pantoprazole (PROTONIX) 40 MG tablet TAKE ONE TABLET BY MOUTH TWICE A DAY 2/18/22   Nelli Lara MD   dicyclomine (BENTYL) 10 MG capsule Take 1 capsule by mouth 4 times daily as needed (cramping/diarrhea) 2/18/22   Nelli Lara MD   flecainide (TAMBOCOR) 100 MG tablet TAKE 1 TABLET BY MOUTH 2 TIMES A DAY 1/27/22   Surjit Godinez MD   Cyanocobalamin (VITAMIN B 12 PO) Take 2,500 mg by mouth daily    Historical Provider, MD   calcium carbonate-vitamin D 600-200 MG-UNIT TABS Take 1 tablet by mouth 2 times daily    Historical Provider, MD       Current medications:    Current Facility-Administered Medications   Medication Dose Route Frequency Provider Last Rate Last Admin    morphine (PF) injection 2 mg  2 mg IntraVENous Q2H PRN Andrés Loop, APRN - CNP   2 mg at 08/24/22 0211    Or    morphine (PF) injection 4 mg  4 mg IntraVENous Q2H PRN Andrés Loop, APRN - CNP        hydrocortisone sodium succinate PF (SOLU-CORTEF) injection 50 mg  50 mg IntraVENous BID Andres Guardado MD   50 mg at 08/24/22 0912    pantoprazole (PROTONIX) 40 mg in sodium chloride (PF) 10 mL injection  40 mg IntraVENous Daily Igor Bello MD   40 mg at 08/24/22 0912    flecainide (TAMBOCOR) tablet 100 mg  100 mg Oral 2 times per day Igor Bello MD   100 mg at 08/24/22 0912    [Held by provider] apixaban (ELIQUIS) tablet 5 mg  5 mg Oral BID Igor Bello MD        ALPRAZolam Marcellina Bullion) tablet 0.5 mg  0.5 mg Oral Q8H PRN Igor Bello MD        sodium chloride flush 0.9 % injection 10 mL  10 mL IntraVENous 2 times per day Igor Bello MD   10 mL at 08/24/22 0913    sodium chloride flush 0.9 % injection 10 mL  10 mL IntraVENous PRN Igor Bello MD        0.9 % sodium chloride infusion   IntraVENous PRN Estefanía Swann MD        [facility]ron [facility]) injection 4 mg  4 mg IntraVENous Q4H PRN Estefanía Swann MD        polyethylene glycol [facility]) packet 17 g  17 g Oral Daily PRN Estefanía Swann MD        acetaminophen (TYLENOL) tablet 650 mg  650 mg Oral Q4H PRN Estefanía Swann MD   650 mg at 08/23/22 8513    Or    acetaminophen (TYLENOL) suppository 650 mg  650 mg Rectal Q4H PRN Estefanía Swann MD        eldertonic liquid 15 mL  15 mL Oral Daily Ruth Walker MD   15 mL at 08/24/22 0913    mupirocin (BACTROBAN) 2 % ointment   Nasal BID MUSA Leonard CNP   Given at 08/24/22 0912    lactobacillus (CULTURELLE) capsule 2 capsule  2 capsule Oral BID  MUSA Leonard CNP   2 capsule at 08/24/22 0912    Sodium Hypochlorite (DAKINS) 0.25 % external solution   Irrigation BID Royann Epley, MD   Given at 08/23/22 2057    enoxaparin (LOVENOX) injection 40 mg  40 mg SubCUTAneous Daily Royann Epley, MD   40 mg at 08/24/22 0911    fluconazole (DIFLUCAN) in 0.9 % sodium chloride IVPB 200 mg  200 mg IntraVENous Q24H MUSA Leonard  mL/hr at 08/23/22 1818 Rate Verify at 08/23/22 1818    meropenem (MERREM) 1,000 mg in sodium chloride 0.9 % 100 mL IVPB (mini-bag)  1,000 mg IntraVENous Q8H Simone Barros MD 33.3 mL/hr at 08/24/22 0929 1,000 mg at 08/24/22 3175       Allergies: Allergies   Allergen Reactions    Demerol Hcl [Meperidine] Other (See Comments)     PATIENT STATES SHE GOT VERY ANXIOUS-LIKE SHE WAS CLIMBING THE WALLS    Pcn [Penicillins]      Patient reports she has not had since she was a child, thinks reaction was hives.      Adhesive Tape Rash       Problem List:    Patient Active Problem List   Diagnosis Code    Symptomatic anemia D64.9    Atrial fibrillation (HCC) I48.91    Tachycardia R00.0    Severe protein-calorie malnutrition ([address] E43    Aortic valve disorder I35.9    Arthropathy M12.9    Cervical spondylosis without myelopathy M47.812    DDD (degenerative disc disease), cervical M50.30    DDD (degenerative disc disease), lumbosacral M51.37    Essential hypertension I10    Malignant neoplasm of colon ([address] C18.9    Spinal stenosis in cervical region M48.02    Spinal stenosis, lumbar M48.061    Fibromyalgia M79.7    Gastroesophageal reflux disease without esophagitis K21.9    Lumbar radicular pain M54.16    Pedal edema R60.0    Chronic intractable headache R51.9, G89.29    Esophageal candidiasis (HCC) B37.81    History of gastric bypass Z98.84    Prediabetes R73.03    Age-related osteoporosis without current pathological fracture M81.0    Essential tremor G25.0    Chronic fatigue R53.82    Irritable bowel syndrome with diarrhea K58.0    Borborygmi R19.8    Constipation K59.00    Anxiety F41.9    Weakness R53.1    Age-related physical debility R48    JENNIFER (generalized anxiety disorder) F41.1    Septic shock ([facility]) A41.9, R65.21    Atrial fibrillation with rapid ventricular response ([facility]) I48.91    DM2 (diabetes mellitus, type 2) ([facility]) E11.9    GI bleed K92.2    MAX (acute kidney injury) ([address] N17.9    Community acquired pneumonia of left lower lobe of lung J18.9    Hypoxia R09.02    Pneumonia due to infectious organism J18.9    Closed fracture of left distal radius S52.502A    Lower GI bleed K92.2    Sepsis without septic shock ([facility]) A41.9    Acute cystitis with hematuria N30.01    Acute metabolic encephalopathy H69.69    Sacral wound S31.000A    Elevated lactic acid level R79.89    Fever R50.9    Tachypnea R06.82    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 9) E66.3    Elevated alkaline phosphatase level R74.8    Elevated alkaline phosphatase in  P09.8, R74.8    History of depression Z86.59    Nursing home resident Z59.3    Proteus infection A49.8    Gram-negative bacteremia R78.81    Lactic acidosis E87.2    Altered mental status R41.82    Hypotension I95.9    Skin ulcer of sacrum (HCC) L98.429    Acute cystitis without hematuria L98.08    Complicated UTI (urinary tract infection) N39.0       Past Medical History:        Diagnosis Date    Acid reflux     Anxiety     Arthritis     Atrial fibrillation (HCC)     Closed fracture dislocation of right elbow     Depression     Fibromyalgia     Migraine     WHITNEY (obstructive sleep apnea)     Diagnosed years ago, no CPAP    Rectal cancer (HCC)     Wears glasses     DRIVING       Past Surgical History:        Procedure Laterality Date    APPENDECTOMY      CERVICAL DISCECTOMY      ACDF     SECTION      x4    CHOLECYSTECTOMY      COLON SURGERY      Rectal cancer removed using  incision    COLONOSCOPY      SEVERAL    COLONOSCOPY N/A 2019    COLONOSCOPY POLYPECTOMY SNARE/COLD BIOPSY performed by Yohannes Manley MD at [address] COLONOSCOPY N/A 2021    COLONOSCOPY WITH BIOPSY performed by Ronald Morrison MD at [address] N/A 2022    INCOMPLETE COLONOSCOPY D/T POOR PREP performed by Ronald Morrison MD at [address] [address] Left 8/3/2022    OPEN REDUCTION INTERNAL FIXATION LEFT DISTAL RADIUS performed by Sinan Ibarra MD at [address] [facility] Right 2021    RIGHT LEG EVACUATION OF HEMATOMA performed by Alicia Rhodes MD at [address] Right 10/25/2018    OPEN REDUCTION INTERNAL FIXATION RIGHT FEMUR SUPRACONDYLAR FEMORAL FRACTURE WITH C-ARM performed by Johann Kelsey MD at [address] [address] Right 2021    RIGHT LEG HEMATOMA EVAKUATION, DEBRIDEMENT, AND WOUND VAC PLACEMENT performed by Aime Fontaine MD at [address] [address] Right 2021    RIGHT LOWER EXTREMITY SKIN GRAFT performed by Aime Fontaine MD at [address] [facility]  UPPER GASTROINTESTINAL ENDOSCOPY N/A 11/26/2019    ESOPHAGOGASTRODUODENOSCOPY performed by Dayday Peralta MD at [address] [place] [address] N/A 8/18/2020    EGD BIOPSY performed by Dayday Peralta MD at [address] [facility] History:    Social History     Tobacco Use    Smoking status: Never    Smokeless tobacco: Never   Substance Use Topics    Alcohol use: No                                Counseling given: Not Answered      Vital Signs (Current):   Vitals:    08/24/22 0400 08/24/22 0500 08/24/22 0600 08/24/22 0853   BP: 118/66 139/79 132/60    Pulse: 84 90 85    Resp: 21 16 21    Temp: 98 °F (36.7 °C)      TempSrc: Oral      SpO2: 99% 100%  97%   Weight:  162 lb 4.1 oz (73.6 kg)     Height:                                                  BP Readings from Last 3 Encounters:   08/24/22 132/60   08/18/22 (!) 145/76   08/04/22 139/79       NPO Status:                                                                                 BMI:   Wt Readings from Last 3 Encounters:   08/24/22 162 lb 4.1 oz (73.6 kg)   08/18/22 168 lb (76.2 kg)   08/04/22 160 lb 0.9 oz (72.6 kg)     Body mass index is 29.68 kg/m².     CBC:   Lab Results   Component Value Date/Time    WBC 13.5 08/24/2022 05:30 AM    RBC 2.29 08/24/2022 05:30 AM    HGB 7.8 08/24/2022 05:30 AM    HCT 23.1 08/24/2022 05:30 AM    .7 08/24/2022 05:30 AM    RDW 15.4 08/24/2022 05:30 AM     08/24/2022 05:30 AM       CMP:   Lab Results   Component Value Date/Time     08/24/2022 05:30 AM    K 4.2 08/24/2022 05:30 AM    CL 98 08/24/2022 05:30 AM    CO2 26 08/24/2022 05:30 AM    BUN 13 08/24/2022 05:30 AM    CREATININE <0.5 08/24/2022 05:30 AM    GFRAA >60 08/24/2022 05:30 AM    AGRATIO 0.6 08/21/2022 10:44 PM    LABGLOM >60 08/24/2022 05:30 AM    GLUCOSE 82 08/24/2022 05:30 AM    PROT 4.7 08/21/2022 10:44 PM    CALCIUM 7.0 08/24/2022 05:30 AM    BILITOT 0.7 08/21/2022 10:44 PM    ALKPHOS 142 08/21/2022 10:44 PM    AST 18 08/21/2022 10:44 PM    ALT 10 08/21/2022 10:44 PM       POC Tests:   Recent Labs     08/24/22  0929   POCGLU 259*       Coags:   Lab Results   Component Value Date/Time    PROTIME 14.1 08/11/2022 06:21 AM    INR 1.10 08/11/2022 06:21 AM    APTT 25.4 08/11/2022 06:21 AM       HCG (If Applicable): No results found for: PREGTESTUR, PREGSERUM, HCG, HCGQUANT     ABGs: No results found for: PHART, PO2ART, UND2DHS, RMH4PDX, BEART, [id number]     Type & Screen (If Applicable):  No results found for: LABABO, LABRH    Drug/Infectious Status (If Applicable):  No results found for: HIV, HEPCAB    COVID-19 Screening (If Applicable):   Lab Results   Component Value Date/Time    COVID19 Not Detected 08/15/2022 01:57 PM    COVID19 Not Detected 08/12/2020 12:39 PM           Anesthesia Evaluation  Patient summary reviewed  Airway: Mallampati: Unable to assess / NA  TM distance: >3 FB   Neck ROM: full  Mouth opening: > = 3 FB   Dental:          Pulmonary:   (+) sleep apnea:                             Cardiovascular:    (+) hypertension:, valvular problems/murmurs: AI, pulmonary hypertension:,                ROS comment: ECHO 7/22:  Summary   Left ventricular cavity size is normal. There is mild concentric left   ventricular hypertrophy. Left ventricular function is hyperdynamic with   ejection fraction estimated at >70%. Regional wall motion abnormalities   cannot be excluded due to an arrhythmia. . Diastolic function cannot be   assessed due to an arrhythmia. The left atrium is severely dilated. Aortic valve appears sclerotic but opens adequately. The aortic valve   appears trileaflet. Mild-to-moderate aortic regurgitation is present. No   evidence of aortic valve stenosis. The right ventricle is normal in size and function. TAPSE measures 1.71 cm. RV S velocity measures 16.2 cm/s. No pericardial effusion noted.    IVC size is dilated (>2.1 cm) and collapses < 50% with respiration   consistent with elevated RA pressure (15 mmHg). Estimated pulmonary artery   systolic pressure is at 52 mmHg assuming a right atrial pressure of 15 mmHg. Neuro/Psych:      (-) seizures and CVA            ROS comment: Altered Mental status GI/Hepatic/Renal:   (+) GERD:,           Endo/Other:    (+) Diabetes, blood dyscrasia (Hgb 7.8): anemia:., .                  ROS comment: Sepsis 2/2 sacral decubitus ulcer and osteomyelitis Abdominal:             Vascular: Other Findings:           Anesthesia Plan      general     ASA 4       Induction: intravenous. MIPS: Postoperative opioids intended and Prophylactic antiemetics administered. Anesthetic plan and risks discussed with patient and healthcare power of . Plan discussed with CRNA. This pre-anesthesia assessment may be used as a history and physical.    DOS STAFF ADDENDUM:    Pt seen and examined, chart reviewed (including anesthesia, drug and allergy history). No interval changes to history and physical examination. Anesthetic plan, risks, benefits, alternatives, and personnel involved discussed with patient. Patient verbalized an understanding and agrees to proceed.       Patricia Gr MD  August 24, 2022  11:13 AM

## 2022-08-24 NOTE — PROGRESS NOTES
General and Vascular Surgery                                                           Daily Progress Note                                                               Pt Name: Foreign Khan  Medical Record Number: [id number]  Date of Birth 1945   Today's Date: 8/24/2022    ASSESSMENT/PLAN  Stage IV sacral ulcer - will proceed with laparoscopic gastrostomy tube placement and sigmoid colostomy, possible open, with excisional debridement of sacral ulcer. On therapeutic antibiotics. Bilateral SCDs. Goal is to place wound vac over the next day or two  Moderate protein calorie malnutrition - albumin 1.7.  monitor po intake. will plan to start trophic tube feeds tomorrow    Desmond Walker is unchanged from yesterday. Pain is well controlled. She has no nausea and no vomiting. OBJECTIVE  VITALS:  height is 5' 2\" (1.575 m) and weight is 162 lb 4.1 oz (73.6 kg). Her oral temperature is 98 °F (36.7 °C). Her blood pressure is 132/60 and her pulse is 85. Her respiration is 21 and oxygen saturation is 97%. VITALS:  /60   Pulse 85   Temp 98 °F (36.7 °C) (Oral)   Resp 21   Ht 5' 2\" (1.575 m)   Wt 162 lb 4.1 oz (73.6 kg)   SpO2 97%   BMI 29.68 kg/m²   GENERAL: alert, no distress  ABDOMEN: soft, non-tender and non-distended  EXTREMITY/BACK: Stage IV sacral ulcer with slough and necrotic skin. Malodorous   I/O last 3 completed shifts: In: 3260.8 [P.O.:720; I.V.:1304.4; IV Piggyback:1236.4]  Out: 1490 [Urine:1490]  No intake/output data recorded.     LABS  Recent Labs     08/21/22  2244 08/22/22  0052 08/22/22  0535 08/24/22  0530   WBC 5.3  --    < > 13.5*   HGB 8.9*  --    < > 7.8*   HCT 26.6*  --    < > 23.1*     --    < > 238     --    < > 132*   K 3.5  --    < > 4.2   CL 95*  --    < > 98*   CO2 29  --    < > 26   BUN 22*  --    < > 13   CREATININE 0.8  --    < > <0.5*   MG  --   --    < > 1.80   PHOS  --   --    < > 2.1* CALCIUM 7.7*  --    < > 7.0*   AST 18  --   --   --    ALT 10  --   --   --    BILITOT 0.7  --   --   --    NITRU  --  Negative  --   --    COLORU  --  Yellow  --   --    BACTERIA  --  1+*  --   --     < > = values in this interval not displayed.      CBC:   Lab Results   Component Value Date/Time    WBC 13.5 08/24/2022 05:30 AM    RBC 2.29 08/24/2022 05:30 AM    HGB 7.8 08/24/2022 05:30 AM    HCT 23.1 08/24/2022 05:30 AM    .7 08/24/2022 05:30 AM    MCH 33.9 08/24/2022 05:30 AM    MCHC 33.6 08/24/2022 05:30 AM    RDW 15.4 08/24/2022 05:30 AM     08/24/2022 05:30 AM    MPV 7.6 08/24/2022 05:30 AM     CMP:    Lab Results   Component Value Date/Time     08/24/2022 05:30 AM    K 4.2 08/24/2022 05:30 AM    CL 98 08/24/2022 05:30 AM    CO2 26 08/24/2022 05:30 AM    BUN 13 08/24/2022 05:30 AM    CREATININE <0.5 08/24/2022 05:30 AM    GFRAA >60 08/24/2022 05:30 AM    AGRATIO 0.6 08/21/2022 10:44 PM    LABGLOM >60 08/24/2022 05:30 AM    GLUCOSE 82 08/24/2022 05:30 AM    PROT 4.7 08/21/2022 10:44 PM    LABALBU 1.7 08/21/2022 10:44 PM    CALCIUM 7.0 08/24/2022 05:30 AM    BILITOT 0.7 08/21/2022 10:44 PM    ALKPHOS 142 08/21/2022 10:44 PM    AST 18 08/21/2022 10:44 PM    ALT 10 08/21/2022 10:44 PM         Teena Huynh MD  Electronically signed 8/24/2022 at 10:52 AM

## 2022-08-24 NOTE — PROGRESS NOTES
Infectious Diseases   Progress Note      Admission Date: 8/21/2022  Hospital Day: Hospital Day: 4   Attending: Jamison Cavanaugh MD  Date of service: 8/24/2022     Chief complaint/ Reason for consult:     Septic shock with high-grade fever, leukocytosis, metabolic encephalopathy, hypotension requiring vasopressors and elevated lactic acid level of 2.5  Gram-negative bacteremia with Proteus  Infected sacral decubitus ulcer  Nursing home resident    Microbiology:        I have reviewed allavailable micro lab data and cultures    Blood culture (2/2) - collected on 8/21/2022: Proteus mirabilis    Susceptibility    Proteus mirabilis (2)    Antibiotic Interpretation Microscan  Method Status    ampicillin Sensitive <=8 mcg/mL BACTERIAL SUSCEPTIBILITY PANEL BY ADWOA     ampicillin-sulbactam Sensitive <=8/4 mcg/mL BACTERIAL SUSCEPTIBILITY PANEL BY ADWOA     ceFAZolin Sensitive <=2 mcg/mL BACTERIAL SUSCEPTIBILITY PANEL BY ADWOA     cefepime Sensitive <=2 mcg/mL BACTERIAL SUSCEPTIBILITY PANEL BY ADWOA     cefTRIAXone Sensitive <=1 mcg/mL BACTERIAL SUSCEPTIBILITY PANEL BY ADWOA     cefuroxime Sensitive <=4 mcg/mL BACTERIAL SUSCEPTIBILITY PANEL BY ADWOA     ciprofloxacin Sensitive <=1 mcg/mL BACTERIAL SUSCEPTIBILITY PANEL BY ADWOA     ertapenem Sensitive <=0.5 mcg/mL BACTERIAL SUSCEPTIBILITY PANEL BY ADWOA     gentamicin Sensitive <=4 mcg/mL BACTERIAL SUSCEPTIBILITY PANEL BY ADWOA     meropenem Sensitive <=1 mcg/mL BACTERIAL SUSCEPTIBILITY PANEL BY ADWOA     piperacillin-tazobactam Sensitive <=16 mcg/mL BACTERIAL SUSCEPTIBILITY PANEL BY ADWOA     trimethoprim-sulfamethoxazole Sensitive <=2/38 mcg/mL BACTERIAL SUSCEPTIBILITY PANEL BY ADWOA         Urine culture  - collected on 8/21/2022: Greater than 100,000 CFU per mL of E. coli and Klebsiella      Antibiotics and immunizations:     Susceptibility    Klebsiella oxytoca (1)    Antibiotic Interpretation Microscan  Method Status    amoxicillin-clavulanate Sensitive <=8/4 mcg/mL BACTERIAL SUSCEPTIBILITY PANEL BY ADWOA     ampicillin Resistant >16 mcg/mL BACTERIAL SUSCEPTIBILITY PANEL BY ADWOA     ampicillin-sulbactam Sensitive <=8/4 mcg/mL BACTERIAL SUSCEPTIBILITY PANEL BY ADWOA     ceFAZolin Resistant 8 mcg/mL BACTERIAL SUSCEPTIBILITY PANEL BY ADWOA     cefepime Sensitive <=2 mcg/mL BACTERIAL SUSCEPTIBILITY PANEL BY ADWOA     cefTRIAXone Sensitive <=1 mcg/mL BACTERIAL SUSCEPTIBILITY PANEL BY ADWOA     cefuroxime Sensitive <=4 mcg/mL BACTERIAL SUSCEPTIBILITY PANEL BY ADWOA     ciprofloxacin Sensitive <=1 mcg/mL BACTERIAL SUSCEPTIBILITY PANEL BY ADWOA     ertapenem Sensitive <=0.5 mcg/mL BACTERIAL SUSCEPTIBILITY PANEL BY ADWOA     gentamicin Sensitive <=4 mcg/mL BACTERIAL SUSCEPTIBILITY PANEL BY ADWOA     meropenem Sensitive <=1 mcg/mL BACTERIAL SUSCEPTIBILITY PANEL BY ADWOA     nitrofurantoin Sensitive <=32 mcg/mL BACTERIAL SUSCEPTIBILITY PANEL BY ADWOA     piperacillin-tazobactam Sensitive <=16 mcg/mL BACTERIAL SUSCEPTIBILITY PANEL BY ADWOA     trimethoprim-sulfamethoxazole Sensitive <=2/38 mcg/mL BACTERIAL SUSCEPTIBILITY PANEL BY ADWOA       Escherichia coli (2)    Antibiotic Interpretation Microscan  Method Status    ampicillin Sensitive <=8 mcg/mL BACTERIAL SUSCEPTIBILITY PANEL BY ADWOA     ampicillin-sulbactam Sensitive <=8/4 mcg/mL BACTERIAL SUSCEPTIBILITY PANEL BY ADWOA     ceFAZolin Sensitive <=2 mcg/mL BACTERIAL SUSCEPTIBILITY PANEL BY ADWOA      NOTE: Cefazolin should only be used for uncomplicated UTI         for E.coli or Klebsiella pneumoniae.         cefepime Sensitive <=2 mcg/mL BACTERIAL SUSCEPTIBILITY PANEL BY ADWOA     cefTRIAXone Sensitive <=1 mcg/mL BACTERIAL SUSCEPTIBILITY PANEL BY ADWOA     cefuroxime Sensitive <=4 mcg/mL BACTERIAL SUSCEPTIBILITY PANEL BY ADWOA     ciprofloxacin Resistant >2 mcg/mL BACTERIAL SUSCEPTIBILITY PANEL BY ADWOA     ertapenem Sensitive <=0.5 mcg/mL BACTERIAL SUSCEPTIBILITY PANEL BY ADWOA     gentamicin Sensitive <=4 mcg/mL BACTERIAL SUSCEPTIBILITY PANEL BY ADWOA     meropenem Sensitive <=1 mcg/mL BACTERIAL SUSCEPTIBILITY PANEL BY ADWOA     nitrofurantoin Sensitive <=32 mcg/mL BACTERIAL SUSCEPTIBILITY PANEL BY ADWOA     piperacillin-tazobactam Sensitive <=16 mcg/mL BACTERIAL SUSCEPTIBILITY PANEL BY ADWOA     trimethoprim-sulfamethoxazole Sensitive <=2/38 mcg/mL BACTERIAL SUSCEPTIBILITY PANEL BY ADWOA       Current antibiotics: All antibiotics and their doses were reviewed by me    Recent Abx Admin                     fluconazole (DIFLUCAN) in 0.9 % sodium chloride IVPB 200 mg (mg) 200 mg New Bag 08/24/22 1618    meropenem (MERREM) 1,000 mg in sodium chloride 0.9 % 100 mL IVPB (mini-bag) (mg) 1,000 mg New Bag 08/24/22 1616     1,000 mg New Bag  0929     1,000 mg New Bag 08/23/22 2349                      Immunization History: All immunization history was reviewed by me today. Immunization History   Administered Date(s) Administered    COVID-19, J&J, (age 18y+), IM, 0.5 mL 03/08/2021, 10/31/2021    Influenza Vaccine, unspecified formulation 10/21/2018    Influenza, FLUAD, (age 72 y+), Adjuvanted 09/03/2020, 09/09/2021    Influenza, FLUARIX, FLULAVAL, (age 10 mo+) AND AFLURIA, FLUZONE (age 1 y+), PF 02/19/2020    Influenza, High Dose (Fluzone 65 yrs and older) 12/09/2015, 10/27/2016, 09/22/2017, 10/21/2018    Pneumococcal Conjugate 13-valent ([id number]) 12/09/2015    Pneumococcal Conjugate Vaccine 10/21/2018    Pneumococcal Polysaccharide ([id number]) 10/21/2018    Tdap (Boostrix, Adacel) 12/09/2015       Known drug allergies: All allergies were reviewed and updated    Allergies   Allergen Reactions    Demerol Hcl [Meperidine] Other (See Comments)     PATIENT STATES SHE GOT VERY ANXIOUS-LIKE SHE WAS CLIMBING THE WALLS    Pcn [Penicillins]      Patient reports she has not had since she was a child, thinks reaction was hives. Adhesive Tape Rash       Social history:     Social History:  All social andepidemiologic history was reviewed and updated by me today as needed.      Tobacco use:   reports that she has never smoked. She has never used smokeless tobacco.  Alcohol use:   reports no history of alcohol use. [facility] in: [facility]   reports no history of drug use. COVID VACCINATION AND LAB RESULT RECORDS:     Internal Administration   First Dose COVID-19, J&J, (age 18y+), IM, 0.5 mL  03/08/2021   Second Dose COVID-19, J&J, (age 18y+), IM, 0.5 mL   10/31/2021       Last COVID Lab SARS-CoV-2 (no units)   Date Value   08/12/2020 Not Detected     SARS-CoV-2, NAAT (no units)   Date Value   08/15/2022 Not Detected            Assessment:     The patient is a 65 y.o. old female who  has a past medical history of Acid reflux, Anxiety, Arthritis, Atrial fibrillation (HCC), Closed fracture dislocation of right elbow, Depression, Fibromyalgia, Migraine, WHITNEY (obstructive sleep apnea), Rectal cancer ([address] and Wears glasses. with following problems:    Septic shock with high-grade fever, leukocytosis, metabolic encephalopathy, hypotension requiring vasopressors and elevated lactic acid level of 2.5-improving  Gram-negative bacteremia with Proteus-sensitivities unavailable and reviewed  Infected sacral decubitus ulcer-s/p surgical debridement on 3/00/1056  Complicated urinary tract infection with E. coli and Klebsiella-sensitivities reviewed  Nursing home resident  Elevated alkaline phosphatase of 142  History of depression  Fibromyalgia  Obstructive sleep apnea  Overweight due to excess calorie intake : Body mass index is 28.63 kg/m²      Discussion:      The patient is afebrile. She remains on IV meropenem and IV fluconazole. The patient has undergone excisional debridement of the sacral ulcer, laparoscopic gastrostomy tube placement and laparoscopic diverting sigmoid colostomy. Her serum creatinine is 0.5 today. White cell count is 13,500 today. Hemoglobin is 7.8 and platelet count is 945,166. Repeat blood cultures from August 22 remain negative    All susceptibilities reviewed.   All gram-negative organisms isolated from her cultures so far are susceptible to extended generation cephalosporins    Plan:     Diagnostic Workup:    Follow-up on surgical cultures and pathology  Continue to follow  fever curve, WBC count and blood cultures. Continue to monitor blood counts, liver and renal function. Antimicrobials: Will stop IV meropenem today   Will order IV ceftriaxone 2 g every 24 hour for gram-negative coverage  Will stop IV fluconazole today  Will order p.o. Flagyl 500 mg every 8 hours for empiric anaerobic coverage  We will follow up on the culture results and clinical progress and will make further recommendations accordingly. Surgical site care  G-tube and colostomy site care  Continue close vitals monitoring. Maintain good glycemic control. Fall precautions. Aspiration precautions. Continue to watch for new fever or diarrhea. DVT prophylaxis. Discussed all above with patient and RN. Drug Monitoring:    Continue monitoring for antibiotic toxicity as follows: CBC, CMP   Continue to watch for following: new or worsening fever, new hypotension, hives, lip swelling and redness or purulence at vascular access sites. I/v access Management:    Continue to monitor i.v access sites for erythema, induration, discharge or tenderness. As always, continue efforts to minimize tubes/lines/drains as clinically appropriate to reduce chances of line associated infections. Patient education and counseling: The patient was educated in detail about the side-effects of various antibiotics and things to watch for like new rashes, lip swelling, severe reaction, worsening diarrhea, break through fever etc.  Discussed patient's condition and what to expect. All of the patient's questions were addressed in a satisfactory manner and patient verbalized understanding all instructions.       Level of complexity of visit and medical decision making: High     Risk of Complications/Morbidity: High Illness(es)/ Infection present that pose threat to  bodily function. There is potential for severe exacerbation of infection/side effects of treatment. Therapy requires intensive monitoring for antimicrobial agent toxicity. TIME SPENT TODAY:     - Spent over  36 minutes on visit (including interval history, physical exam, review of data including labs, cultures, imaging, development and implementation of treatment plan and coordination of complex care). More than 50 percent of this includes face-to-face time spent with the patient for counseling and coordination of care. Thank you for involving me in the care of your patient. I will continue to follow. If you have anyadditional questions, please do not hesitate to contact me. Subjective: Interval history: Interval history was obtained from chart review and patient/ RN. She is undergoing no surgical I&D of the sacral wound today. She is tolerating antibiotics okay. No diarrhea. REVIEW OF SYSTEMS:      Review of Systems   Constitutional:  Negative for chills, diaphoresis and fever. HENT:  Negative for ear discharge, ear pain, postnasal drip, rhinorrhea, sinus pressure, sinus pain and sore throat. Eyes:  Negative for discharge and redness. Respiratory:  Negative for cough, shortness of breath and wheezing. Cardiovascular:  Negative for chest pain and leg swelling. Gastrointestinal:  Negative for abdominal pain, constipation, diarrhea and nausea. Endocrine: Negative for cold intolerance, heat intolerance and polydipsia. Genitourinary:  Negative for dysuria, flank pain, frequency, hematuria and urgency. Musculoskeletal:  Negative for back pain and myalgias. Skin:  Negative for rash. Allergic/Immunologic: Negative for immunocompromised state. Neurological:  Negative for dizziness, seizures and headaches. Hematological:  Does not bruise/bleed easily.    Psychiatric/Behavioral:  Negative for agitation, hallucinations and suicidal ideas. The patient is not nervous/anxious. All other systems reviewed and are negative. Past Medical History: All past medical history reviewed today. Past Medical History:   Diagnosis Date    Acid reflux     Anxiety     Arthritis     Atrial fibrillation (HCC)     Closed fracture dislocation of right elbow     Depression     Fibromyalgia     Migraine     WHITNEY (obstructive sleep apnea)     Diagnosed years ago, no CPAP    [address]al cancer ([facility] [address]     Wears glasses     DRIVING       Past Surgical History: All past surgical history was reviewed today.     Past Surgical History:   Procedure Laterality Date    APPENDECTOMY      CERVICAL DISCECTOMY      ACDF     SECTION      x4    CHOLECYSTECTOMY      COLON SURGERY      Rectal cancer removed using  incision    COLONOSCOPY      SEVERAL    COLONOSCOPY N/A 2019    COLONOSCOPY POLYPECTOMY SNARE/COLD BIOPSY performed by Adri Miranda MD at [address] [place] [address] N/A 2021    COLONOSCOPY WITH BIOPSY performed by Shaina Leone MD at [address] [place] [address] N/A 2022    INCOMPLETE COLONOSCOPY D/T POOR PREP performed by Shaina Leone MD at [address] Left 8/3/2022    OPEN REDUCTION INTERNAL FIXATION LEFT DISTAL RADIUS performed by Marcus Osman MD at [address] [facility] Right 2021    RIGHT LEG EVACUATION OF HEMATOMA performed by Kraig Camacho MD at [address] [facility] W/O EXTENSION Right 10/25/2018    OPEN REDUCTION INTERNAL FIXATION RIGHT FEMUR SUPRACONDYLAR FEMORAL FRACTURE WITH C-ARM performed by Sergei Hadley MD at [place] [address] Right 2021    RIGHT LEG HEMATOMA EVAKUATION, [address], AND WOUND VAC PLACEMENT performed by Oliva Argueta MD at [place] [address] Right 2021    RIGHT LOWER EXTREMITY SKIN GRAFT performed by Oliva Argueta MD at [address] [facility] Left UPPER GASTROINTESTINAL ENDOSCOPY N/A 11/26/2019    ESOPHAGOGASTRODUODENOSCOPY performed by Estrella Francisco MD at [address] [place] [address] N/A 8/18/2020    EGD BIOPSY performed by Estrella Francisco MD at [address] [facility] History: All family history was reviewed today. Problem Relation Age of Onset    COPD Mother        Objective:       PHYSICAL EXAM:      Vitals:   Vitals:    08/24/22 1601 08/24/22 1635 08/24/22 1643 08/24/22 1700   BP: (!) 140/66   138/65   Pulse: 90 92  86   Resp: 13 16 16 10   Temp:       TempSrc:       SpO2: 100% 100%  100%   Weight:       Height:           Physical Exam  Vitals and nursing note reviewed. Constitutional:       Appearance: She is well-developed. She is not diaphoretic. Comments: The patient was seen earlier today. HENT:      Head: Normocephalic and atraumatic. Right Ear: External ear normal. There is no impacted cerumen. Left Ear: External ear normal. There is no impacted cerumen. Nose: Nose normal.      Mouth/Throat:      Mouth: Mucous membranes are moist.      Pharynx: Oropharynx is clear. No oropharyngeal exudate. Eyes:      General: No scleral icterus. Right eye: No discharge. Left eye: No discharge. Conjunctiva/sclera: Conjunctivae normal.      Pupils: Pupils are equal, round, and reactive to light. Neck:      Thyroid: No thyromegaly. Cardiovascular:      Rate and Rhythm: Normal rate and regular rhythm. Heart sounds: Normal heart sounds. No murmur heard. No friction rub. Pulmonary:      Effort: No respiratory distress. Breath sounds: No stridor. No wheezing or rales. Abdominal:      General: Bowel sounds are normal.      Palpations: Abdomen is soft. Tenderness: There is no abdominal tenderness. There is no guarding or rebound. Comments: ostomy site okay    Musculoskeletal:         General: No swelling, tenderness or deformity. Normal range of motion. Cervical back: Normal range of motion and neck supple. Right lower leg: No edema. Left lower leg: No edema. Lymphadenopathy:      Cervical: No cervical adenopathy. Skin:     General: Skin is warm and dry. Coloration: Skin is not jaundiced. Findings: No bruising, erythema or rash. Neurological:      General: No focal deficit present. Mental Status: She is alert and oriented to person, place, and time. Mental status is at baseline. Motor: No abnormal muscle tone. Psychiatric:         Mood and Affect: Mood normal.         Behavior: Behavior normal.          Lines and drains: All vascular access sites are healthy with no local erythema, discharge or tenderness. Intake and output:    I/O last 3 completed shifts: In: 3260.8 [P.O.:720; I.V.:1304.4; IV Piggyback:1236.4]  Out: 1490 [Urine:1490]    Lab Data:   All available labs and old records have been reviewed by me. CBC:  Recent Labs     08/22/22 0535 08/23/22 0435 08/24/22  0530   WBC 26.7* 11.5* 13.5*   RBC 2.59* 2.22* 2.29*   HGB 8.7* 7.4* 7.8*   HCT 26.5* 22.7* 23.1*    210 238   .4* 102.1* 100.7*   MCH 33.6 33.5 33.9   MCHC 32.8 32.8 33.6   RDW 15.6* 15.3 15.4   NRBC 1*  --   --    BANDSPCT 26*  --   --           BMP:  Recent Labs     08/22/22 0535 08/23/22  0435 08/24/22  0530   * 134* 132*   K 3.0* 3.9 4.2   CL 96* 99 98*   CO2 28 26 26   BUN 24* 18 13   CREATININE 0.8 <0.5* <0.5*   CALCIUM 7.2* 6.9* 7.0*   GLUCOSE 151* 105* 82          Hepatic Function Panel:   Lab Results   Component Value Date/Time    ALKPHOS 142 08/21/2022 10:44 PM    ALT 10 08/21/2022 10:44 PM    AST 18 08/21/2022 10:44 PM    PROT 4.7 08/21/2022 10:44 PM    BILITOT 0.7 08/21/2022 10:44 PM    LABALBU 1.7 08/21/2022 10:44 PM       CPK:   Lab Results   Component Value Date    CKTOTAL 49 01/28/2021     ESR: No results found for: SEDRATE  CRP: No results found for: CRP        Imaging:     All pertinent images and reports for the current visit were reviewed by me during this visit. I reviewed the chest x-ray/CT scan/MRI images and independently interpreted the findings and results today. XR WRIST LEFT (MIN 3 VIEWS)   Final Result   Patient is status post ORIF of distal radial fracture. CT ABDOMEN PELVIS W IV CONTRAST Additional Contrast? None   Final Result   Large sacral decubitus ulcer with subcutaneous gas extending to the coccyx   and perirectal fascia. No loculated fluid collections. Increased sclerosis   of the adjacent coccyx with soft tissue gas extending to the bone. Findings   are compatible with osteomyelitis. Adjacent rectal wall thickening may be reactive. Small bilateral pleural effusions with bibasilar atelectasis. Difficult to   exclude superimposed pneumonia in the lung bases. XR CHEST PORTABLE   Final Result   Clear chest without acute cardiopulmonary process. Medications: All current and past medications were reviewed.      sodium chloride flush  5-40 mL IntraVENous 2 times per day    hydrocortisone sodium succinate PF  50 mg IntraVENous BID    pantoprazole (PROTONIX) 40 mg injection  40 mg IntraVENous Daily    flecainide  100 mg Oral 2 times per day    [Held by provider] apixaban  5 mg Oral BID    sodium chloride flush  10 mL IntraVENous 2 times per day    eldertonic  15 mL Oral Daily    mupirocin   Nasal BID    lactobacillus  2 capsule Oral BID WC    Sodium Hypochlorite   Irrigation BID    enoxaparin  40 mg SubCUTAneous Daily    fluconazole  200 mg IntraVENous Q24H    meropenem  1,000 mg IntraVENous Q8H        sodium chloride 5 mL/hr at 08/24/22 1617    sodium chloride 5 mL/hr at 08/24/22 1614       sodium chloride flush, sodium chloride, fentanNYL, HYDROmorphone, oxyCODONE, droperidol, droperidol, promethazine, morphine **OR** morphine, ALPRAZolam, sodium chloride flush, sodium chloride, ondansetron, polyethylene glycol, acetaminophen **OR** acetaminophen      Problem list: Patient Active Problem List   Diagnosis Code    Symptomatic anemia D64.9    Atrial fibrillation (HCC) I48.91    Tachycardia R00.0    Severe protein-calorie malnutrition ([facility] E43    Aortic valve disorder I35.9    Arthropathy M12.9    Cervical spondylosis without myelopathy M47.812    DDD (degenerative disc disease), cervical M50.30    DDD (degenerative disc disease), lumbosacral M51.37    Essential hypertension I10    Malignant neoplasm of colon ([facility] C18.9    Spinal stenosis in cervical region M48.02    Spinal stenosis, lumbar M48.061    Fibromyalgia M79.7    Gastroesophageal reflux disease without esophagitis K21.9    Lumbar radicular pain M54.16    Pedal edema R60.0    Chronic intractable headache R51.9, G89.29    Esophageal candidiasis ([facility]) B37.81    History of gastric bypass Z98.84    Prediabetes R73.03    Age-related osteoporosis without current pathological fracture M81.0    Essential tremor G25.0    Chronic fatigue R53.82    Irritable bowel syndrome with diarrhea K58.0    Borborygmi R19.8    Constipation K59.00    Anxiety F41.9    Weakness R53.1    Age-related physical debility R54    JENNIFER (generalized anxiety disorder) F41.1    Septic shock (HCC) A41.9, R65.21    Atrial fibrillation with rapid ventricular response (HCC) I48.91    DM2 (diabetes mellitus, type 2) ([facility]) E11.9    GI bleed K92.2    MAX (acute kidney injury) ([facility] N17.9    Community acquired pneumonia of left lower lobe of lung J18.9    Hypoxia R09.02    Pneumonia due to infectious organism J18.9    Closed fracture of left distal radius S52.502A    Lower GI bleed K92.2    Sepsis without septic shock (HCC) A41.9    Acute cystitis with hematuria J36.43    Acute metabolic encephalopathy C27.35    Sacral wound S31.000A    Elevated lactic acid level R79.89    Fever R50.9    Tachypnea R06.82    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 9) E66.3    Elevated alkaline phosphatase level R74.8 Elevated alkaline phosphatase in  P09.8, R74.8    History of depression Z86.59    Nursing home resident Z59.3    Proteus infection A49.8    Gram-negative bacteremia R78.81    Lactic acidosis E87.2    Altered mental status R41.82    Hypotension I95.9    Skin ulcer of sacrum (HCC) L98.429    Acute cystitis without hematuria A21.73    Complicated UTI (urinary tract infection) N39.0       Please note that this chart was generated using Dragon dictation software. Although every effort was made to ensure the accuracy of this automated transcription, some errors in transcription may have occurred inadvertently. If you may need any clarification, please do not hesitate to contact me through EPIC or at the phone number provided below with my electronic signature. Any pictures or media included in this note were obtained after taking informed verbal consent from the patient and with their approval to include those in the patient's medical record. Marisol Marin MD, MPH, [address] [place] [address]  2022, 5:55 PM  [facility] Infectious Disease   [address] [place] [address], [address] [place] [address]  Office: [phone]  Fax: [phone]  In-person Clinic days:  Tuesday & Thursday a.m. Virtual clinic days: Monday, Wednesday & Friday a.m.

## 2022-08-25 ENCOUNTER — TELEPHONE (OUTPATIENT)
Dept: ORTHOPEDIC SURGERY | Age: 77
End: 2022-08-25

## 2022-08-25 PROBLEM — Z79.2 RECEIVING INTRAVENOUS ANTIBIOTIC TREATMENT AS OUTPATIENT: Status: ACTIVE | Noted: 2022-08-25

## 2022-08-25 PROBLEM — Z71.89 COMPLEX CARE COORDINATION: Status: ACTIVE | Noted: 2022-08-25

## 2022-08-25 LAB
ANION GAP SERPL CALCULATED.3IONS-SCNC: 7 MMOL/L (ref 3–16)
BASOPHILS ABSOLUTE: 0 K/UL (ref 0–0.2)
BASOPHILS RELATIVE PERCENT: 0.3 %
BLOOD CULTURE, ROUTINE: ABNORMAL
BLOOD CULTURE, ROUTINE: ABNORMAL
BUN BLDV-MCNC: 12 MG/DL (ref 7–20)
CALCIUM SERPL-MCNC: 6.9 MG/DL (ref 8.3–10.6)
CHLORIDE BLD-SCNC: 99 MMOL/L (ref 99–110)
CO2: 25 MMOL/L (ref 21–32)
COPPER: 56 UG/DL (ref 80–155)
CREAT SERPL-MCNC: <0.5 MG/DL (ref 0.6–1.2)
EOSINOPHILS ABSOLUTE: 0 K/UL (ref 0–0.6)
EOSINOPHILS RELATIVE PERCENT: 0 %
GFR AFRICAN AMERICAN: >60
GFR NON-AFRICAN AMERICAN: >60
GLUCOSE BLD-MCNC: 85 MG/DL (ref 70–99)
HCT VFR BLD CALC: 21.5 % (ref 36–48)
HEMOGLOBIN: 7.3 G/DL (ref 12–16)
LYMPHOCYTES ABSOLUTE: 0.8 K/UL (ref 1–5.1)
LYMPHOCYTES RELATIVE PERCENT: 8.6 %
MAGNESIUM: 1.7 MG/DL (ref 1.8–2.4)
MCH RBC QN AUTO: 33.9 PG (ref 26–34)
MCHC RBC AUTO-ENTMCNC: 33.7 G/DL (ref 31–36)
MCV RBC AUTO: 100.5 FL (ref 80–100)
MONOCYTES ABSOLUTE: 0.3 K/UL (ref 0–1.3)
MONOCYTES RELATIVE PERCENT: 3.1 %
NEUTROPHILS ABSOLUTE: 8.5 K/UL (ref 1.7–7.7)
NEUTROPHILS RELATIVE PERCENT: 88 %
ORGANISM: ABNORMAL
ORGANISM: ABNORMAL
PDW BLD-RTO: 15.2 % (ref 12.4–15.4)
PHOSPHORUS: 2.1 MG/DL (ref 2.5–4.9)
PLATELET # BLD: 240 K/UL (ref 135–450)
PMV BLD AUTO: 7.4 FL (ref 5–10.5)
POTASSIUM SERPL-SCNC: 4.3 MMOL/L (ref 3.5–5.1)
RBC # BLD: 2.14 M/UL (ref 4–5.2)
SODIUM BLD-SCNC: 131 MMOL/L (ref 136–145)
WBC # BLD: 9.7 K/UL (ref 4–11)

## 2022-08-25 PROCEDURE — 2700000000 HC OXYGEN THERAPY PER DAY

## 2022-08-25 PROCEDURE — 6370000000 HC RX 637 (ALT 250 FOR IP): Performed by: INTERNAL MEDICINE

## 2022-08-25 PROCEDURE — C1751 CATH, INF, PER/CENT/MIDLINE: HCPCS

## 2022-08-25 PROCEDURE — 6360000002 HC RX W HCPCS: Performed by: SURGERY

## 2022-08-25 PROCEDURE — 6370000000 HC RX 637 (ALT 250 FOR IP): Performed by: NURSE PRACTITIONER

## 2022-08-25 PROCEDURE — 2580000003 HC RX 258: Performed by: INTERNAL MEDICINE

## 2022-08-25 PROCEDURE — 2500000003 HC RX 250 WO HCPCS: Performed by: NURSE PRACTITIONER

## 2022-08-25 PROCEDURE — 2580000003 HC RX 258: Performed by: ANESTHESIOLOGY

## 2022-08-25 PROCEDURE — 99024 POSTOP FOLLOW-UP VISIT: CPT | Performed by: SURGERY

## 2022-08-25 PROCEDURE — 6360000002 HC RX W HCPCS: Performed by: NURSE PRACTITIONER

## 2022-08-25 PROCEDURE — 2580000003 HC RX 258: Performed by: NURSE PRACTITIONER

## 2022-08-25 PROCEDURE — 94761 N-INVAS EAR/PLS OXIMETRY MLT: CPT

## 2022-08-25 PROCEDURE — 6370000000 HC RX 637 (ALT 250 FOR IP): Performed by: SURGERY

## 2022-08-25 PROCEDURE — C9113 INJ PANTOPRAZOLE SODIUM, VIA: HCPCS | Performed by: SURGERY

## 2022-08-25 PROCEDURE — 99233 SBSQ HOSP IP/OBS HIGH 50: CPT | Performed by: INTERNAL MEDICINE

## 2022-08-25 PROCEDURE — 84100 ASSAY OF PHOSPHORUS: CPT

## 2022-08-25 PROCEDURE — 99024 POSTOP FOLLOW-UP VISIT: CPT | Performed by: NURSE PRACTITIONER

## 2022-08-25 PROCEDURE — 83735 ASSAY OF MAGNESIUM: CPT

## 2022-08-25 PROCEDURE — 36569 INSJ PICC 5 YR+ W/O IMAGING: CPT

## 2022-08-25 PROCEDURE — 85025 COMPLETE CBC W/AUTO DIFF WBC: CPT

## 2022-08-25 PROCEDURE — 2580000003 HC RX 258: Performed by: SURGERY

## 2022-08-25 PROCEDURE — APPNB15 APP NON BILLABLE TIME 0-15 MINS: Performed by: NURSE PRACTITIONER

## 2022-08-25 PROCEDURE — 76937 US GUIDE VASCULAR ACCESS: CPT

## 2022-08-25 PROCEDURE — 80048 BASIC METABOLIC PNL TOTAL CA: CPT

## 2022-08-25 PROCEDURE — 2000000000 HC ICU R&B

## 2022-08-25 PROCEDURE — 94760 N-INVAS EAR/PLS OXIMETRY 1: CPT

## 2022-08-25 PROCEDURE — 6360000002 HC RX W HCPCS: Performed by: INTERNAL MEDICINE

## 2022-08-25 PROCEDURE — APPSS15 APP SPLIT SHARED TIME 0-15 MINUTES: Performed by: NURSE PRACTITIONER

## 2022-08-25 RX ORDER — SODIUM CHLORIDE 0.9 % (FLUSH) 0.9 %
5-40 SYRINGE (ML) INJECTION EVERY 12 HOURS SCHEDULED
Status: DISCONTINUED | OUTPATIENT
Start: 2022-08-25 | End: 2022-09-06 | Stop reason: HOSPADM

## 2022-08-25 RX ORDER — SODIUM CHLORIDE 0.9 % (FLUSH) 0.9 %
5-40 SYRINGE (ML) INJECTION PRN
Status: DISCONTINUED | OUTPATIENT
Start: 2022-08-25 | End: 2022-09-06 | Stop reason: HOSPADM

## 2022-08-25 RX ORDER — MAGNESIUM SULFATE 1 G/100ML
1000 INJECTION INTRAVENOUS ONCE
Status: COMPLETED | OUTPATIENT
Start: 2022-08-25 | End: 2022-08-25

## 2022-08-25 RX ORDER — TRAMADOL HYDROCHLORIDE 50 MG/1
100 TABLET ORAL EVERY 6 HOURS PRN
Status: DISCONTINUED | OUTPATIENT
Start: 2022-08-25 | End: 2022-09-06 | Stop reason: HOSPADM

## 2022-08-25 RX ORDER — MAGNESIUM SULFATE IN WATER 40 MG/ML
2000 INJECTION, SOLUTION INTRAVENOUS PRN
Status: DISCONTINUED | OUTPATIENT
Start: 2022-08-25 | End: 2022-09-06 | Stop reason: HOSPADM

## 2022-08-25 RX ORDER — LIDOCAINE HYDROCHLORIDE 10 MG/ML
5 INJECTION, SOLUTION EPIDURAL; INFILTRATION; INTRACAUDAL; PERINEURAL ONCE
Status: COMPLETED | OUTPATIENT
Start: 2022-08-25 | End: 2022-08-25

## 2022-08-25 RX ORDER — FLUCONAZOLE 100 MG/1
200 TABLET ORAL DAILY
Status: DISCONTINUED | OUTPATIENT
Start: 2022-08-25 | End: 2022-09-06 | Stop reason: HOSPADM

## 2022-08-25 RX ORDER — SODIUM CHLORIDE 9 MG/ML
25 INJECTION, SOLUTION INTRAVENOUS PRN
Status: DISCONTINUED | OUTPATIENT
Start: 2022-08-25 | End: 2022-09-06 | Stop reason: HOSPADM

## 2022-08-25 RX ORDER — ACETAMINOPHEN 325 MG/1
650 TABLET ORAL EVERY 8 HOURS SCHEDULED
Status: DISCONTINUED | OUTPATIENT
Start: 2022-08-25 | End: 2022-09-06 | Stop reason: HOSPADM

## 2022-08-25 RX ORDER — TRAMADOL HYDROCHLORIDE 50 MG/1
50 TABLET ORAL EVERY 6 HOURS PRN
Status: DISCONTINUED | OUTPATIENT
Start: 2022-08-25 | End: 2022-09-06 | Stop reason: HOSPADM

## 2022-08-25 RX ADMIN — ENOXAPARIN SODIUM 40 MG: 100 INJECTION SUBCUTANEOUS at 08:18

## 2022-08-25 RX ADMIN — SODIUM PHOSPHATE, MONOBASIC, MONOHYDRATE 11.79 MMOL: 276; 142 INJECTION, SOLUTION INTRAVENOUS at 10:05

## 2022-08-25 RX ADMIN — CEFTRIAXONE 2000 MG: 2 INJECTION, POWDER, FOR SOLUTION INTRAMUSCULAR; INTRAVENOUS at 18:18

## 2022-08-25 RX ADMIN — ACETAMINOPHEN 650 MG: 325 TABLET ORAL at 09:07

## 2022-08-25 RX ADMIN — Medication 2 CAPSULE: at 17:16

## 2022-08-25 RX ADMIN — METRONIDAZOLE 500 MG: 500 TABLET ORAL at 06:07

## 2022-08-25 RX ADMIN — FLECAINIDE ACETATE 100 MG: 100 TABLET ORAL at 07:58

## 2022-08-25 RX ADMIN — ONDANSETRON 4 MG: 2 INJECTION INTRAMUSCULAR; INTRAVENOUS at 04:46

## 2022-08-25 RX ADMIN — Medication 40 MG: at 07:59

## 2022-08-25 RX ADMIN — MUPIROCIN: 20 OINTMENT TOPICAL at 21:29

## 2022-08-25 RX ADMIN — ACETAMINOPHEN 650 MG: 325 TABLET ORAL at 15:30

## 2022-08-25 RX ADMIN — ONDANSETRON 4 MG: 2 INJECTION INTRAMUSCULAR; INTRAVENOUS at 17:20

## 2022-08-25 RX ADMIN — ACETAMINOPHEN 650 MG: 325 TABLET ORAL at 22:45

## 2022-08-25 RX ADMIN — MORPHINE SULFATE 2 MG: 2 INJECTION, SOLUTION INTRAMUSCULAR; INTRAVENOUS at 08:09

## 2022-08-25 RX ADMIN — SODIUM CHLORIDE, PRESERVATIVE FREE 10 ML: 5 INJECTION INTRAVENOUS at 08:19

## 2022-08-25 RX ADMIN — MUPIROCIN: 20 OINTMENT TOPICAL at 08:09

## 2022-08-25 RX ADMIN — MORPHINE SULFATE 2 MG: 2 INJECTION, SOLUTION INTRAMUSCULAR; INTRAVENOUS at 14:42

## 2022-08-25 RX ADMIN — HYDROCORTISONE SODIUM SUCCINATE 50 MG: 100 INJECTION, POWDER, FOR SOLUTION INTRAMUSCULAR; INTRAVENOUS at 21:27

## 2022-08-25 RX ADMIN — SODIUM CHLORIDE, PRESERVATIVE FREE 10 ML: 5 INJECTION INTRAVENOUS at 13:16

## 2022-08-25 RX ADMIN — LIDOCAINE HYDROCHLORIDE 5 ML: 10 INJECTION, SOLUTION EPIDURAL; INFILTRATION; INTRACAUDAL; PERINEURAL at 15:42

## 2022-08-25 RX ADMIN — TRAMADOL HYDROCHLORIDE 50 MG: 50 TABLET, COATED ORAL at 12:07

## 2022-08-25 RX ADMIN — MAGNESIUM SULFATE HEPTAHYDRATE 1000 MG: 10 INJECTION, SOLUTION INTRAVENOUS at 09:55

## 2022-08-25 RX ADMIN — Medication 2 CAPSULE: at 07:58

## 2022-08-25 RX ADMIN — FLUCONAZOLE 200 MG: 100 TABLET ORAL at 18:15

## 2022-08-25 RX ADMIN — FLECAINIDE ACETATE 100 MG: 100 TABLET ORAL at 21:26

## 2022-08-25 RX ADMIN — METRONIDAZOLE 500 MG: 500 TABLET ORAL at 13:35

## 2022-08-25 RX ADMIN — SODIUM CHLORIDE, PRESERVATIVE FREE 10 ML: 5 INJECTION INTRAVENOUS at 21:28

## 2022-08-25 RX ADMIN — Medication 15 ML: at 07:59

## 2022-08-25 RX ADMIN — SODIUM CHLORIDE, PRESERVATIVE FREE 10 ML: 5 INJECTION INTRAVENOUS at 08:20

## 2022-08-25 RX ADMIN — HYDROCORTISONE SODIUM SUCCINATE 50 MG: 100 INJECTION, POWDER, FOR SOLUTION INTRAMUSCULAR; INTRAVENOUS at 07:59

## 2022-08-25 RX ADMIN — MORPHINE SULFATE 2 MG: 2 INJECTION, SOLUTION INTRAMUSCULAR; INTRAVENOUS at 04:36

## 2022-08-25 RX ADMIN — METRONIDAZOLE 500 MG: 500 TABLET ORAL at 21:28

## 2022-08-25 ASSESSMENT — PAIN SCALES - GENERAL
PAINLEVEL_OUTOF10: 5
PAINLEVEL_OUTOF10: 6
PAINLEVEL_OUTOF10: 7
PAINLEVEL_OUTOF10: 0
PAINLEVEL_OUTOF10: 6
PAINLEVEL_OUTOF10: 6
PAINLEVEL_OUTOF10: 5

## 2022-08-25 ASSESSMENT — ENCOUNTER SYMPTOMS
NAUSEA: 0
EYE REDNESS: 0
WHEEZING: 0
SHORTNESS OF BREATH: 0
CONSTIPATION: 0
COUGH: 0
ABDOMINAL PAIN: 0
SINUS PRESSURE: 0
BACK PAIN: 0
DIARRHEA: 0
SINUS PAIN: 0
SORE THROAT: 0
RHINORRHEA: 0
EYE DISCHARGE: 0

## 2022-08-25 ASSESSMENT — PAIN DESCRIPTION - LOCATION
LOCATION: ABDOMEN;INCISION
LOCATION: ABDOMEN

## 2022-08-25 ASSESSMENT — PAIN DESCRIPTION - ORIENTATION
ORIENTATION: MID
ORIENTATION: RIGHT;MID

## 2022-08-25 ASSESSMENT — PAIN DESCRIPTION - DESCRIPTORS
DESCRIPTORS: SHARP
DESCRIPTORS: ACHING;DISCOMFORT

## 2022-08-25 ASSESSMENT — PAIN - FUNCTIONAL ASSESSMENT: PAIN_FUNCTIONAL_ASSESSMENT: PREVENTS OR INTERFERES SOME ACTIVE ACTIVITIES AND ADLS

## 2022-08-25 NOTE — TELEPHONE ENCOUNTER
Returned call. Eliu Huitron wanted to make sure the appointment on 9/2/22 was cancelled. I informed him that Dr Franklin Hook is aware of the hospitalization and that he instructed for the splint to be removed and for post operative xrays to be taken. Since her discharge date is unknown, I asked him to have the family stay in touch with us so that we can determine the best option moving forward in regards to rescheduling appointments.

## 2022-08-25 NOTE — OP NOTE
[address] [place] [address]                                OPERATIVE REPORT    PATIENT NAME: Niko Jurado                   :        1945  MED REC NO:   [id number]                          ROOM:       2124  ACCOUNT NO:   [de-identified]                           ADMIT DATE: 2022  PROVIDER:     Harmony Szymanski MD    DATE OF PROCEDURE:  2022    PREOPERATIVE DIAGNOSIS:  Stage IV sacral ulcer. POSTOPERATIVE DIAGNOSIS:  Stage IV sacral ulcer. OPERATION PERFORMED:  1. Laparoscopic gastrostomy tube placement with ADWOA 20-Beninese  gastrostomy tube. 2.  Laparoscopic sigmoid colostomy. 3.  Excisional debridement of sacral ulcer with skin, subcutaneous  tissue, fascia and bone, measuring 11 x 13 cm. SURGEON:  Harmony Szymanski MD    ANESTHESIA:  General endotracheal.    ASA CLASS:  IV.    DVT PROPHYLAXIS:  The patient was wearing bilateral SCDs. ANTIBIOTICS:  The patient is on therapeutic meropenem and Diflucan IV. INDICATIONS:  The patient is a 41-year-old female who was recently  admitted and found to have a large sacral ulcer. This was debrided at  the bedside and she was discharged back to her nursing facility with  wound VAC. She progressively worsened at the nursing facility and had  altered mental status. She was brought back to the hospital, was found  to have UTI along with worsening sacral ulcer. CT imaging showed  extensive necrotic tissue of her sacral ulcer extending into her  presacral space, adjacent to the rectum. Due to these findings, we held  her Eliquis for two days and then proceeded to the operating room for  the aforementioned procedure. This was done after the risks, benefits,  and alternatives were explained to the patient along with her daughter,  and they were willing to consent for the procedure.     OPERATIVE PROCEDURE:  After informed consent was obtained, the patient  was brought to the operating room and placed in the supine position. General anesthesia was induced. The patient was then prepped and draped  in the usual sterile fashion. A time-out was then performed. We first started with a supraumbilical incision. Penetrating towel clip  was used to elevate the abdominal wall and a Veress needle was inserted  in the abdominal cavity. The abdomen was then insufflated with carbon  dioxide gas to a pressure of 15 mmHg. A 5-mm trocar was then placed  followed by a camera. There was no evidence of injury with her initial  trocar placement; however, we did pass through the omentum which was  adhesed to the anterior abdominal wall. From here, we placed two  additional trocars in the right abdominal wall, a 5-mm trocar laterally  along with a 12-mm trocar medially. From here, we began to perform an  adhesiolysis of the anterior abdominal wall. This took approximately 20  to 25 minutes. Using LigaSure, we were able to free the omentum back  down, we were able to see where our trocar was coming through the  omentum. There was no evidence of bleeding and no injury of any  surrounding small intestine or colon. We then continued our  adhesiolysis superiorly towards the remnant stomach. 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 These were taken down using a LigaSure,  and once isolated, we tested the mobility of the remnant stomach and  were able to bring it up to the anterior abdominal wall. We then  created a gastrotomy using electrocautery. A 20-Romanian ADWOA gastrostomy  tube was then brought in through the epigastrium and into the stomach. Two separate 2-0 Surgidac pursestring sutures were then placed using an  Endo stitch. This brought the edges of the stomach around the tube  tightly without any leak of gastric contents.   We then secured the  stomach to the anterior abdominal wall in a Carli gastrostomy fashion  using 0 Vicryl sutures. A Scivantage suture passer was used to go in and  grasp the sutures placed around the stomach and tied it to the abdominal  wall fascia. These were placed in the cardinal direction points and the  stomach came up without any issues. We then focused on our sigmoid  colostomy after the omentum was fully taken down. We evaluated the left  colon as it transitioned to the sigmoid colon. The patient did have a  previous history of rectal cancer. Therefore, the distal sigmoid colon  was tethered down towards the pelvis; however, the mid to proximal  portion of the sigmoid colon was fairly mobile. At this point, we went  ahead and created a mesenteric window using a LigaSure and an Endo KYLIE  stapler with blue load was used to fire across the colon. We then took  a few additional bites of the mesentery with LigaSure in order to be  able to get the appropriate mobility to bring it through her abdominal  wall. There was some oozing from the distal staple line of the colon  that was controlled with electrocautery. We then went ahead and created  a colostomy site at the abdominal wall. The skin was elevated and a  circular incision was made. Electrocautery was used to divide through  the subcutaneous tissue and expose the fascia. A cruciate incision was  then made in the fascia and the muscle was splayed apart. The posterior  fascia was divided and the tract was dilated to three fingerbreadths to  be able to allow easy passage of the colon through her abdominal wall. The colon was passed out through the abdominal wall. We went ahead and  looked back laparoscopically and there was no evidence of any ongoing  bleeding or tension. We then used a Doppler in order to listen to this  arterial signal at the colostomy site and there was a strong arterial  signal all the way out to the end of the staple line.   We then went  ahead and closed the 12-mm trocar site fascia using a Ranfac suture  passer and 0 Vicryl suture. This reapproximated the fascial edges well. The abdomen was then allowed to deflate. The three incisions were  closed with 4-0 Vicryl sutures and Dermabond was placed over the top. We then matured our colostomy site using 3-0 Vicryl sutures. After we  removed the staple line, these were placed in the cardinal direction  points. Then we filled in with additional 3-0 Vicryl sutures  circumferentially. An ostomy appliance was then placed over top of the  colostomy. The colostomy was pink and viable. We then rotated the  patient in the right lateral decubitus position in order to be able to  address her sacral ulcer. There was a large amount of necrotic tissue  at the wound base. This was sharply debrided using scissors along with  a 4-mm curette back to healthy viable tissue. With the last portion of  the sacrum as it transitioned to the coccyx, the coccyx was within a  mobile necrotic tissue bed; therefore, it was circumferentially  dissected free and sent off to Pathology for further evaluation. The  tissue was then debrided inferiorly. There was tunneling pass where the  coccyx previously was down into the presacral space. This tunneled  approximately 5 cm. A rectal exam was performed at the same time and  there was no evidence of communication with the rectum. Once we  completed our excisional debridement, the wound measured 11 x 13 cm with  approximately 4 cm deep and then had 5 cm of tunneling at the right  inferior site to the presacral space. Electrocautery was then used for  hemostasis and a Betadine-soaked Kerlix was placed on the wound followed  by a dry dressing over the top. Overall, the patient tolerated the  procedure well and was taken to the recovery room in stable condition.     FINDINGS:  Omental adhesions to the patient's anterior abdominal wall  along with necrotic tissue of her sacrum down to and including the  sacrum and coccyx. ESTIMATED BLOOD LOSS:  100 mL. COMPLICATIONS:  None. SPECIMEN:  Coccyx for permanent evaluation.         Ebony Forrest MD    D: 08/24/2022 16:39:54       T: 08/25/2022 4:51:33     [email or web address]  Job#: [id number]     Doc#: [id number]    CC:

## 2022-08-25 NOTE — CONSULTS
Comprehensive Nutrition Assessment    Type and Reason for Visit:  Reassess, Consult    Nutrition Recommendations/Plan:   Continue Clear liquid diet. Start Trophic feeds, Jevity 1.5 @ 15 mL/hr. Add Meir BID (mix with 4oz water and bolus). Flush per provider. Goal TF Rate: Jevity 1.5 @ 40 mL/hr +1 proteinex daily. Add Meir BID. Flush per provider. Malnutrition Assessment:  Malnutrition Status: Moderate malnutrition (08/22/22 0843)    Context:  Acute Illness     Findings of the 6 clinical characteristics of malnutrition:  Energy Intake:  Unable to assess  Weight Loss:  No significant weight loss     Body Fat Loss:  Mild body fat loss Orbital, Buccal region   Muscle Mass Loss:  Mild muscle mass loss Temples (temporalis)  Fluid Accumulation:  No significant fluid accumulation     Strength:  Not Performed    Nutrition Assessment:    Consult \"TF ordering and management\". Pt with CT showed worsening necrosis and pt now s/p sigmoid colostomy for fecal diversion and G-tube placement yesterday. Surgery wanting to start trophic TF. Jevity 1.5 @ 15 mL/hr ordered currently. Will continue and provide goal rate when able to advance. Nutrition Related Findings:    +1 BLE and +1/+2 BUE edema; BM 8/24 Wound Type: Multiple, Pressure Injury, Unstageable (heels and sacral wound)       Current Nutrition Intake & Therapies:    Average Meal Intake: NPO  Average Supplements Intake: NPO  ADULT DIET; Clear Liquid  ADULT TUBE FEEDING; Gastrostomy; Standard with Fiber; Continuous; 15; No; 30; Q 4 hours; Wound Healing; with water flush  ADULT ORAL NUTRITION SUPPLEMENT; Breakfast; Wound Healing Oral Supplement  Current Tube Feeding (TF) Orders:  Goal TF & Flush Orders Provides: Recommend trophic TF, Jevity 1.5 @ 15 mL/hr. Flush per provider. Provides: 300 mL TV, 450 kcal, 19 gm pro, 228  mL free water. Add Meir BID (mix with 4 oz of water and bolus). Goal TF rate would be Jevity 1.5 @ 40 mL/hr +1 proteinex daily. Add Meir BID. Flush per provider. Provides: 800 mL TV, 1304 kcal, 77 gm pro, 608 mL free water (includes 1 proteinex). Anthropometric Measures:  Height: 5' 2\" (157.5 cm)  Ideal Body Weight (IBW): 110 lbs (50 kg)       Current Body Weight: 162 lb (73.5 kg), 147.3 % IBW. Weight Source: Bed Scale  Current BMI (kg/m2): 29.6                          BMI Categories: Overweight (BMI 25.0-29. 9)    Estimated Daily Nutrient Needs:        Energy (kcal/day): 6436-6460  kcal (15-18 kcal/kg 71 kg CBW)     Protein (g/day):  gm (1.5-2 gm /kg IBW)     Fluid (ml/day): per provider while in ICU    Nutrition Diagnosis:   Moderate malnutrition related to inadequate protein-energy intake as evidenced by mild muscle loss, mild loss of subcutaneous fat    Nutrition Interventions:   Food and/or Nutrient Delivery: Continue Current Tube Feeding  Nutrition Education/Counseling: No recommendation at this time  Coordination of Nutrition Care: Continue to monitor while inpatient       Goals:     Goals: Tolerate nutrition support at goal rate, by next RD assessment       Nutrition Monitoring and Evaluation:      Food/Nutrient Intake Outcomes: Enteral Nutrition Intake/Tolerance  Physical Signs/Symptoms Outcomes: Biochemical Data, Weight, Skin, Nutrition Focused Physical Findings    Discharge Planning:     Too soon to determine     Donne Libman, RD, LD  Contact: [phone]

## 2022-08-25 NOTE — PROGRESS NOTES
Infectious Diseases   Progress Note      Admission Date: 8/21/2022  Hospital Day: Hospital Day: 5   Attending: Dennis Harvey MD  Date of service: 8/25/2022     Chief complaint/ Reason for consult:     Septic shock with high-grade fever, leukocytosis, metabolic encephalopathy, hypotension requiring vasopressors and elevated lactic acid level of 2.5  Gram-negative bacteremia with Proteus  Infected sacral decubitus ulcer  Nursing home resident    Microbiology:        I have reviewed allavailable micro lab data and cultures    Blood culture (2/2) - collected on 8/21/2022: Proteus mirabilis    Susceptibility    Proteus mirabilis (2)    Antibiotic Interpretation Microscan  Method Status    ampicillin Sensitive <=8 mcg/mL BACTERIAL SUSCEPTIBILITY PANEL BY [facility]WOA     ampicillin-sulbactam Sensitive <=8/4 mcg/mL BACTERIAL SUSCEPTIBILITY PANEL BY [facility]WOA     ceFAZolin Sensitive <=2 mcg/mL BACTERIAL SUSCEPTIBILITY PANEL BY [facility]WOA     cefepime Sensitive <=2 mcg/mL BACTERIAL SUSCEPTIBILITY PANEL BY [facility]     cefTRIAXone Sensitive <=1 mcg/mL BACTERIAL SUSCEPTIBILITY PANEL BY [facility]     cefuroxime Sensitive <=4 mcg/mL BACTERIAL SUSCEPTIBILITY PANEL BY [facility]     ciprofloxacin Sensitive <=1 mcg/mL BACTERIAL SUSCEPTIBILITY PANEL BY [facility]     ertapenem Sensitive <=0.5 mcg/mL BACTERIAL SUSCEPTIBILITY PANEL BY [facility]     gentamicin Sensitive <=4 mcg/mL BACTERIAL SUSCEPTIBILITY PANEL BY [facility]     meropenem Sensitive <=1 mcg/mL BACTERIAL SUSCEPTIBILITY PANEL BY [facility]     piperacillin-tazobactam Sensitive <=16 mcg/mL BACTERIAL SUSCEPTIBILITY PANEL BY [facility]     trimethoprim-sulfamethoxazole Sensitive <=2/38 mcg/mL BACTERIAL SUSCEPTIBILITY PANEL BY [facility]         Urine culture  - collected on 8/21/2022: Greater than 100,000 CFU per mL of E. coli and Klebsiella      Antibiotics and immunizations:     Susceptibility    Klebsiella oxytoca (1)    Antibiotic Interpretation Microscan  Method Status    amoxicillin-clavulanate Sensitive <=8/4 mcg/mL BACTERIAL SUSCEPTIBILITY PANEL BY [facility]     ampicillin Resistant >16 mcg/mL BACTERIAL SUSCEPTIBILITY PANEL BY [facility]     ampicillin-sulbactam Sensitive <=8/4 mcg/mL BACTERIAL SUSCEPTIBILITY PANEL BY ADWOA     ceFAZolin Resistant 8 mcg/mL BACTERIAL SUSCEPTIBILITY PANEL BY ADWOA     cefepime Sensitive <=2 mcg/mL BACTERIAL SUSCEPTIBILITY PANEL BY [facility]WOA     cefTRIAXone Sensitive <=1 mcg/mL BACTERIAL SUSCEPTIBILITY PANEL BY [facility]     cefuroxime Sensitive <=4 mcg/mL BACTERIAL SUSCEPTIBILITY PANEL BY [facility]WO[facility]     ciprofloxacin Sensitive <=1 mcg/mL BACTERIAL SUSCEPTIBILITY PANEL BY [facility]     ertapenem Sensitive <=0.5 mcg/mL BACTERIAL SUSCEPTIBILITY PANEL BY [facility]     gentamicin Sensitive <=4 mcg/mL BACTERIAL SUSCEPTIBILITY PANEL BY [facility]     meropenem Sensitive <=1 mcg/mL BACTERIAL SUSCEPTIBILITY PANEL BY [facility]     nitrofurantoin Sensitive <=32 mcg/mL BACTERIAL SUSCEPTIBILITY PANEL BY [facility]     piperacillin-tazobactam Sensitive <=16 mcg/mL BACTERIAL SUSCEPTIBILITY PANEL BY [facility]     trimethoprim-sulfamethoxazole Sensitive <=2/38 mcg/mL BACTERIAL SUSCEPTIBILITY PANEL BY [facility]       Escherichia coli (2)    Antibiotic Interpretation Microscan  Method Status    ampicillin Sensitive <=8 mcg/mL BACTERIAL SUSCEPTIBILITY PANEL BY [facility]     ampicillin-sulbactam Sensitive <=8/4 mcg/mL BACTERIAL SUSCEPTIBILITY PANEL BY [facility]     ceFAZolin Sensitive <=2 mcg/mL BACTERIAL SUSCEPTIBILITY PANEL BY [facility]      NOTE: Cefazolin should only be used for uncomplicated UTI         for E.coli or Klebsiella pneumoniae.         cefepime Sensitive <=2 mcg/mL BACTERIAL SUSCEPTIBILITY PANEL BY [facility]WOA     cefTRIAXone Sensitive <=1 mcg/mL BACTERIAL SUSCEPTIBILITY PANEL BY ADWOA     cefuroxime Sensitive <=4 mcg/mL BACTERIAL SUSCEPTIBILITY PANEL BY ADWOA     ciprofloxacin Resistant >2 mcg/mL BACTERIAL SUSCEPTIBILITY PANEL BY ADWOA     ertapenem Sensitive <=0.5 mcg/mL BACTERIAL SUSCEPTIBILITY PANEL BY [facility]WOA     gentamicin Sensitive <=4 mcg/mL BACTERIAL SUSCEPTIBILITY PANEL BY ADWOA     meropenem Sensitive <=1 mcg/mL BACTERIAL SUSCEPTIBILITY PANEL BY ADWOA     nitrofurantoin Sensitive <=32 mcg/mL BACTERIAL SUSCEPTIBILITY PANEL BY [facility]WOA     piperacillin-tazobactam Sensitive <=16 mcg/mL BACTERIAL SUSCEPTIBILITY PANEL BY [facility]WOA     trimethoprim-sulfamethoxazole Sensitive <=2/38 mcg/mL BACTERIAL SUSCEPTIBILITY PANEL BY ADWOA       Current antibiotics: All antibiotics and their doses were reviewed by me    Recent Abx Admin                     metroNIDAZOLE (FLAGYL) tablet 500 mg (mg) 500 mg Given 08/25/22 1335     500 mg Given  0607     500 mg Given 08/24/22 2111    cefTRIAXone (ROCEPHIN) 2,000 mg in dextrose 5 % 50 mL IVPB mini-bag (mg) 2,000 mg New Bag 08/24/22 1921                      Immunization History: All immunization history was reviewed by me today. Immunization History   Administered Date(s) Administered    COVID-19, J&J, (age 18y+), IM, 0.5 mL 03/08/2021, 10/31/2021    Influenza Vaccine, unspecified formulation 10/21/2018    Influenza, FLUAD, (age 72 y+), Adjuvanted 09/03/2020, 09/09/2021    Influenza, FLUARIX, FLULAVAL, (age 10 mo+) AND AFLURIA, FLUZONE (age 1 y+), PF 02/19/2020    Influenza, High Dose (Fluzone 65 yrs and older) 12/09/2015, 10/27/2016, 09/22/2017, 10/21/2018    Pneumococcal Conjugate 13-valent ([id number]) 12/09/2015    Pneumococcal Conjugate Vaccine 10/21/2018    Pneumococcal Polysaccharide ([id number]) 10/21/2018    Tdap (Boostrix, Adacel) 12/09/2015       Known drug allergies: All allergies were reviewed and updated    Allergies   Allergen Reactions    Demerol Hcl [Meperidine] Other (See Comments)     PATIENT STATES SHE GOT VERY ANXIOUS-LIKE SHE WAS CLIMBING THE WALLS    Pcn [Penicillins]      Patient reports she has not had since she was a child, thinks reaction was hives. Adhesive Tape Rash       Social history:     Social History:  All social andepidemiologic history was reviewed and updated by me today as needed. Tobacco use:   reports that she has never smoked.  She has never used smokeless tobacco.  Alcohol use:   reports no history of alcohol use. Currently lives in: [facility]   reports no history of drug use. COVID VACCINATION AND LAB RESULT RECORDS:     Internal Administration   First Dose COVID-19, J&J, (age 18y+), IM, 0.5 mL  03/08/2021   Second Dose COVID-19, J&J, (age 18y+), IM, 0.5 mL   10/31/2021       Last COVID Lab SARS-CoV-2 (no units)   Date Value   08/12/2020 Not Detected     SARS-CoV-2, NAAT (no units)   Date Value   08/15/2022 Not Detected            Assessment:     The patient is a 65 y.o. old female who  has a past medical history of Acid reflux, Anxiety, Arthritis, Atrial fibrillation (HCC), Closed fracture dislocation of right elbow, Depression, Fibromyalgia, Migraine, WHITNEY (obstructive sleep apnea), Rectal cancer ([address] and Wears glasses. with following problems:    Septic shock with high-grade fever, leukocytosis, metabolic encephalopathy, hypotension requiring vasopressors and elevated lactic acid level of 2.5-resolved  Gram-negative bacteremia with Proteus-sensitivities reviewed  Infected sacral decubitus ulcer-s/p surgical debridement on 7/27/7740  Complicated urinary tract infection with E. coli and Klebsiella-sensitivities reviewed  Nursing home resident  Elevated alkaline phosphatase of 142  History of depression  Fibromyalgia  Obstructive sleep apnea  Overweight due to excess calorie intake : Body mass index is 28.63 kg/m²      Discussion:      The patient is afebrile. I had de-escalated her antibiotics to IV ceftriaxone and oral Flagyl yesterday. She is tolerating the antibiotics okay. Sacral tissue culture is also growing Proteus mirabilis, gram stain did show budding yeast.    Serum creatinine 0.5 today. Plan:     Diagnostic Workup: Will order 2 sets of follow-up blood cultures today  Follow-up on sensitive Proteus isolated from the sacral wound culture culture  Continue to follow  fever curve, WBC count and blood cultures.   Continue to visit with Dr. Elma Vargas in 4 weeks. Call ID office at [phone] to make an appointment. Physician Signature:  Electronically signed by Vale Gibbons MD on                                                      8/25/22 at 5:30 PM EDT            Drug Monitoring:    Continue monitoring for antibiotic toxicity as follows: CBC, CMP, QTc interval  Continue to watch for following: new or worsening fever, new hypotension, hives, lip swelling and redness or purulence at vascular access sites. I/v access Management:    Continue to monitor i.v access sites for erythema, induration, discharge or tenderness. As always, continue efforts to minimize tubes/lines/drains as clinically appropriate to reduce chances of line associated infections. Patient education and counseling: The patient was educated in detail about the side-effects of various antibiotics and things to watch for like new rashes, lip swelling, severe reaction, worsening diarrhea, break through fever etc.  Discussed patient's condition and what to expect. All of the patient's questions were addressed in a satisfactory manner and patient verbalized understanding all instructions. Level of complexity of visit and medical decision making: High     TIME SPENT TODAY:     - Spent over  36 minutes on visit (including interval history, physical exam, review of data including labs, cultures, imaging, development and implementation of treatment plan and coordination of complex care). More than 50 percent of this includes face-to-face time spent with the patient for counseling and coordination of care. Thank you for involving me in the care of your patient. I will continue to follow. If you have anyadditional questions, please do not hesitate to contact me. Subjective: Interval history: Interval history was obtained from chart review and patient/ RN. She is afebrile. Seems to be tolerating antibiotics okay.   Colostomy in place     REVIEW OF SYSTEMS:      Review of Systems   Constitutional:  Negative for chills, diaphoresis and fever. HENT:  Negative for ear discharge, ear pain, postnasal drip, rhinorrhea, sinus pressure, sinus pain and sore throat. Eyes:  Negative for discharge and redness. Respiratory:  Negative for cough, shortness of breath and wheezing. Cardiovascular:  Negative for chest pain and leg swelling. Gastrointestinal:  Negative for abdominal pain, constipation, diarrhea and nausea. Endocrine: Negative for cold intolerance, heat intolerance and polydipsia. Genitourinary:  Negative for dysuria, flank pain, frequency, hematuria and urgency. Musculoskeletal:  Negative for back pain and myalgias. Skin:  Negative for rash. Allergic/Immunologic: Negative for immunocompromised state. Neurological:  Negative for dizziness, seizures and headaches. Hematological:  Does not bruise/bleed easily. Psychiatric/Behavioral:  Negative for agitation, hallucinations and suicidal ideas. The patient is not nervous/anxious. All other systems reviewed and are negative. Past Medical History: All past medical history reviewed today. Past Medical History:   Diagnosis Date    Acid reflux     Anxiety     Arthritis     Atrial fibrillation (HCC)     Closed fracture dislocation of right elbow     Depression     Fibromyalgia     Migraine     WHITNEY (obstructive sleep apnea)     Diagnosed years ago, no CPAP    [address]al cancer ([facility] [address]     Wears glasses     DRIVING       Past Surgical History: All past surgical history was reviewed today.     Past Surgical History:   Procedure Laterality Date    APPENDECTOMY      CERVICAL DISCECTOMY      ACDF     SECTION      x4    CHOLECYSTECTOMY      COLECTOMY N/A 2022    LAPAROSCOPIC SIGMOID COLOSTOMY WITH EXCISIONAL DEBRIDEMENT OF SACRAL ULCER, LAPAROSCOPIC GASTROSTOMY TUBE PLACEMENT performed by Saskia Lechuga MD at [address] [facility]      Rectal cancer removed using  incision    COLONOSCOPY      SEVERAL    COLONOSCOPY N/A 2019    COLONOSCOPY POLYPECTOMY SNARE/COLD BIOPSY performed by Casey Roblero MD at [address] [place] [address] N/A 2021    COLONOSCOPY WITH BIOPSY performed by Maia Mejias MD at [address] [place] [address] N/A 2022    INCOMPLETE COLONOSCOPY D/T POOR PREP performed by Maia Mejias MD at [address] [facility] [address] Left 8/3/2022    OPEN REDUCTION INTERNAL FIXATION LEFT DISTAL RADIUS performed by Shawn Hollis MD at [address] [facility] Right 2021    RIGHT LEG EVACUATION OF HEMATOMA performed by Nava Woods MD at [address] [facility] W/O EXTENSION Right 10/25/2018    OPEN REDUCTION INTERNAL FIXATION RIGHT FEMUR SUPRACONDYLAR FEMORAL FRACTURE WITH C-ARM performed by Salina Taylor MD at [place] [address] Right 2021    RIGHT LEG HEMATOMA EVAKUATION, DEBRIDEMENT, AND WOUND VAC PLACEMENT performed by Edinson Treviño MD at [place] [address] Right 2021    RIGHT LOWER EXTREMITY SKIN GRAFT performed by Edinson Treviño MD at [address] [facility]     UPPER GASTROINTESTINAL ENDOSCOPY N/A 2019    ESOPHAGOGASTRODUODENOSCOPY performed by Casey Roblero MD at [place] [address] N/A 2020    EGD BIOPSY performed by Casey Roblero MD at [address] [place] [address] History: All family history was reviewed today. Problem Relation Age of Onset    COPD Mother        Objective:       PHYSICAL EXAM:      Vitals:   Vitals:    22 1400 22 1442 22 1600 22 1700   BP: (!) 108/59  126/65 122/62   Pulse: 94  90 85   Resp:    Temp:   98.2 °F (36.8 °C)    TempSrc:       SpO2: 97% 99% 99% 99%   Weight:       Height:           Physical Exam  Vitals and nursing note reviewed. Constitutional:       Appearance: She is well-developed.  She is not diaphoretic. Comments: The patient was seen earlier today. HENT:      Head: Normocephalic and atraumatic. Right Ear: External ear normal. There is no impacted cerumen. Left Ear: External ear normal. There is no impacted cerumen. Nose: Nose normal.      Mouth/Throat:      Mouth: Mucous membranes are moist.      Pharynx: Oropharynx is clear. No oropharyngeal exudate. Eyes:      General: No scleral icterus. Right eye: No discharge. Left eye: No discharge. Conjunctiva/sclera: Conjunctivae normal.      Pupils: Pupils are equal, round, and reactive to light. Neck:      Thyroid: No thyromegaly. Cardiovascular:      Rate and Rhythm: Normal rate and regular rhythm. Heart sounds: Normal heart sounds. No murmur heard. No friction rub. Pulmonary:      Effort: No respiratory distress. Breath sounds: No stridor. No wheezing or rales. Abdominal:      General: Bowel sounds are normal.      Palpations: Abdomen is soft. Tenderness: There is no abdominal tenderness. There is no guarding or rebound. Comments: Ostomy site okay,, sacral wound debridement site okay   Musculoskeletal:         General: No swelling, tenderness or deformity. Normal range of motion. Cervical back: Normal range of motion and neck supple. Right lower leg: No edema. Left lower leg: No edema. Lymphadenopathy:      Cervical: No cervical adenopathy. Skin:     General: Skin is warm and dry. Coloration: Skin is not jaundiced. Findings: No bruising, erythema or rash. Neurological:      General: No focal deficit present. Mental Status: She is alert and oriented to person, place, and time. Mental status is at baseline. Motor: No abnormal muscle tone.    Psychiatric:         Mood and Affect: Mood normal.         Behavior: Behavior normal.     *    Lines and drains: All vascular access sites are healthy with no local erythema, discharge or tenderness. Intake and output:    I/O last 3 completed shifts: In: 2213.9 [I.V.:1458.5; IV Piggyback:755.4]  Out: 1180 [Urine:1080; Blood:100]    Lab Data:   All available labs and old records have been reviewed by me. CBC:  Recent Labs     08/23/22  0435 08/24/22  0530 08/25/22  0430   WBC 11.5* 13.5* 9.7   RBC 2.22* 2.29* 2.14*   HGB 7.4* 7.8* 7.3*   HCT 22.7* 23.1* 21.5*    238 240   .1* 100.7* 100.5*   MCH 33.5 33.9 33.9   MCHC 32.8 33.6 33.7   RDW 15.3 15.4 15.2          BMP:  Recent Labs     08/23/22  0435 08/24/22  0530 08/25/22  0430   * 132* 131*   K 3.9 4.2 4.3   CL 99 98* 99   CO2 26 26 25   BUN 18 13 12   CREATININE <0.5* <0.5* <0.5*   CALCIUM 6.9* 7.0* 6.9*   GLUCOSE 105* 82 85          Hepatic Function Panel:   Lab Results   Component Value Date/Time    ALKPHOS 142 08/21/2022 10:44 PM    ALT 10 08/21/2022 10:44 PM    AST 18 08/21/2022 10:44 PM    PROT 4.7 08/21/2022 10:44 PM    BILITOT 0.7 08/21/2022 10:44 PM    LABALBU 1.7 08/21/2022 10:44 PM       CPK:   Lab Results   Component Value Date    CKTOTAL 49 01/28/2021     ESR: No results found for: SEDRATE  CRP: No results found for: CRP        Imaging: All pertinent images and reports for the current visit were reviewed by me during this visit. I reviewed the chest x-ray/CT scan/MRI images and independently interpreted the findings and results today. XR WRIST LEFT (MIN 3 VIEWS)   Final Result   Patient is status post ORIF of distal radial fracture. CT ABDOMEN PELVIS W IV CONTRAST Additional Contrast? None   Final Result   Large sacral decubitus ulcer with subcutaneous gas extending to the coccyx   and perirectal fascia. No loculated fluid collections. Increased sclerosis   of the adjacent coccyx with soft tissue gas extending to the bone. Findings   are compatible with osteomyelitis. Adjacent rectal wall thickening may be reactive. Small bilateral pleural effusions with bibasilar atelectasis. Difficult to   exclude superimposed pneumonia in the lung bases. XR CHEST PORTABLE   Final Result   Clear chest without acute cardiopulmonary process. Medications: All current and past medications were reviewed.      acetaminophen  650 mg Oral 3 times per day    sodium chloride flush  5-40 mL IntraVENous 2 times per day    cefTRIAXone (ROCEPHIN) IV  2,000 mg IntraVENous Q24H    metroNIDAZOLE  500 mg Oral 3 times per day    hydrocortisone sodium succinate PF  50 mg IntraVENous BID    pantoprazole (PROTONIX) 40 mg injection  40 mg IntraVENous Daily    flecainide  100 mg Oral 2 times per day    [Held by provider] apixaban  5 mg Oral BID    eldertonic  15 mL Oral Daily    mupirocin   Nasal BID    lactobacillus  2 capsule Oral BID WC    enoxaparin  40 mg SubCUTAneous Daily        sodium chloride         sodium phosphate IVPB **OR** sodium phosphate IVPB, magnesium sulfate, sodium chloride flush, sodium chloride, traMADol **OR** traMADol, fentanNYL, HYDROmorphone, promethazine, morphine **OR** morphine, ALPRAZolam, ondansetron, polyethylene glycol, acetaminophen **OR** acetaminophen      Problem list:       Patient Active Problem List   Diagnosis Code    Symptomatic anemia D64.9    Atrial fibrillation (HCC) I48.91    Tachycardia R00.0    Severe protein-calorie malnutrition ([address] E43    Aortic valve disorder I35.9    Arthropathy M12.9    Cervical spondylosis without myelopathy M47.812    DDD (degenerative disc disease), cervical M50.30    DDD (degenerative disc disease), lumbosacral M51.37    Essential hypertension I10    Malignant neoplasm of colon ([address] C18.9    Spinal stenosis in cervical region M48.02    Spinal stenosis, lumbar M48.061    Fibromyalgia M79.7    Gastroesophageal reflux disease without esophagitis K21.9    Lumbar radicular pain M54.16    Pedal edema R60.0    Chronic intractable headache R51.9, G89.29    Esophageal candidiasis (HCC) B37.81    History of gastric bypass Z98.84 Prediabetes R73.03    Age-related osteoporosis without current pathological fracture M81.0    Essential tremor G25.0    Chronic fatigue R53.82    Irritable bowel syndrome with diarrhea K58.0    Borborygmi R19.8    Constipation K59.00    Anxiety F41.9    Weakness R53.1    Age-related physical debility R54    JENNIFER (generalized anxiety disorder) F41.1    Septic shock ([facility]) A41.9, R65.21    Atrial fibrillation with rapid ventricular response ([facility]) I48.91    DM2 (diabetes mellitus, type 2) ([facility]) E11.9    GI bleed K92.2    MAX (acute kidney injury) ([facility] [address] N17.9    Community acquired pneumonia of left lower lobe of lung J18.9    Hypoxia R09.02    Pneumonia due to infectious organism J18.9    Closed fracture of left distal radius S52.502A    Lower GI bleed K92.2    Sepsis without septic shock ([facility]) A41.9    Acute cystitis with hematuria S04.55    Acute metabolic encephalopathy R26.13    Sacral wound S31.000A    Elevated lactic acid level R79.89    Fever R50.9    Tachypnea R06.82    Sacral osteomyelitis ([facility]) M46.28    WHITNEY (obstructive sleep apnea) G47.33    Overweight (BMI 25.0-29. 9) E66.3    Elevated alkaline phosphatase level R74.8    Elevated alkaline phosphatase in  P09.8, R74.8    History of depression Z86.59    Nursing home resident Z59.3    Proteus infection A49.8    Gram-negative bacteremia R78.81    Lactic acidosis E87.2    Altered mental status R41.82    Hypotension I95.9    Skin ulcer of sacrum ([facility]) L98.429    Acute cystitis without hematuria M02.34    Complicated UTI (urinary tract infection) N39.0       Please note that this chart was generated using Dragon dictation software. Although every effort was made to ensure the accuracy of this automated transcription, some errors in transcription may have occurred inadvertently. If you may need any clarification, please do not hesitate to contact me through EPIC or at the phone number provided below with my electronic signature.   Any pictures or media included in this note were obtained after taking informed verbal consent from the patient and with their approval to include those in the patient's medical record. Ailin Rob MD, MPH, [facility]unch  8/25/2022, 5:28 PM  [facility] Infectious Disease   [facility]  Office: [phone]  Fax: [phone]  In-person Clinic days:  Tuesday & Thursday a.m. Virtual clinic days: Monday, Wednesday & Friday a.m.

## 2022-08-25 NOTE — PROGRESS NOTES
PICC PLACEMENT:     Upon arrival to place PICC line assessed chart for issues related to picc placement, check for consent, and did time out with ONEL Bryant. Pt. Tolerated PICC placement well, no difficulty accessing right basilic vein and 3CG technology used to verify PICC tip placement. Positive P wave with no negative deflection. Printed wave form and placed In chart. Reported off to ONEL meek to use line.

## 2022-08-25 NOTE — DISCHARGE INSTR - COC
Continuity of Care Form    Patient Name: Nkechi Turcios   :  1945  MRN:  [id number]    Admit date:  2022  Discharge date:  2022    Code Status Order: Full Code   Advance Directives:     Admitting Physician:  Susannah Julio MD  PCP: Ellyn Sawant MD    Discharging Nurse: Odell Dietz 7010 Unit/Room#: G6P-4428/2124-01  Discharging Unit Phone Number: [phone]    Emergency Contact:   Extended Emergency Contact Information  Primary Emergency Contact: Raven Friend  Address: Demi Levy, [address] [place] [address],[address] Phone: [phone]  Mobile Phone: [phone]  Relation: Child  Secondary Emergency Contact: SudhakarJose  [facility] [address] [place] [address],[address] Phone: [phone]  Mobile Phone: [phone]  Relation: Child  Preferred language: English   needed?  No    Past Surgical History:  Past Surgical History:   Procedure Laterality Date    APPENDECTOMY      CERVICAL DISCECTOMY      ACDF     SECTION      x4    CHOLECYSTECTOMY      COLECTOMY N/A 2022    LAPAROSCOPIC SIGMOID COLOSTOMY WITH EXCISIONAL DEBRIDEMENT OF SACRAL ULCER, LAPAROSCOPIC GASTROSTOMY TUBE PLACEMENT performed by Clayton Rodriguez MD at [address] [facility]      Rectal cancer removed using  incision    COLONOSCOPY      SEVERAL    COLONOSCOPY N/A 2019    COLONOSCOPY POLYPECTOMY SNARE/COLD BIOPSY performed by Adri Miranda MD at [address] N/A 2021    COLONOSCOPY WITH BIOPSY performed by Shaina Leone MD at [address] N/A 2022    INCOMPLETE COLONOSCOPY D/T POOR PREP performed by Shaina Leone MD at [address] Left 8/3/2022    OPEN REDUCTION INTERNAL FIXATION LEFT DISTAL RADIUS performed by Marcus Osman MD at [address] [place] [address] Right 2021    RIGHT LEG EVACUATION OF HEMATOMA performed by Gardenia Goncalves MD at [address] [facility] W/O EXTENSION Right 10/25/2018    OPEN REDUCTION INTERNAL FIXATION RIGHT FEMUR SUPRACONDYLAR FEMORAL FRACTURE WITH C-ARM performed by Estrellita Rivera MD at [place] [address] Right 1/31/2021    RIGHT LEG HEMATOMA EVAKUATION, DEBRIDEMENT, AND WOUND VAC PLACEMENT performed by Erica Barber MD at [place] [address] Right 2/17/2021    RIGHT LOWER EXTREMITY SKIN GRAFT performed by Erica Barber MD at [address] Left     UPPER GASTROINTESTINAL ENDOSCOPY N/A 11/26/2019    ESOPHAGOGASTRODUODENOSCOPY performed by Brandon Child MD at [facility] N/A 8/18/2020    EGD BIOPSY performed by Brandon Child MD at [facility] ENDOSCOPY       Immunization History:   Immunization History   Administered Date(s) Administered    COVID-19, J&J, (age 18y+), IM, 0.5 mL 03/08/2021, 10/31/2021    Influenza Vaccine, unspecified formulation 10/21/2018    Influenza, FLUAD, (age 72 y+), Adjuvanted 09/03/2020, 09/09/2021    Influenza, FLUARIX, FLULAVAL, (age 10 mo+) AND AFLURIA, FLUZONE (age 1 y+), PF 02/19/2020    Influenza, High Dose (Fluzone 65 yrs and older) 12/09/2015, 10/27/2016, 09/22/2017, 10/21/2018    Pneumococcal Conjugate 13-valent ([id number]) 12/09/2015    Pneumococcal Conjugate Vaccine 10/21/2018    Pneumococcal Polysaccharide ([id number]) 10/21/2018    Tdap (Boostrix, Adacel) 12/09/2015       Active Problems:  Patient Active Problem List   Diagnosis Code    Symptomatic anemia D64.9    Atrial fibrillation (HCC) I48.91    Tachycardia R00.0    Severe protein-calorie malnutrition ([facility] E43    Aortic valve disorder I35.9    Arthropathy M12.9    Cervical spondylosis without myelopathy M47.812    DDD (degenerative disc disease), cervical M50.30    DDD (degenerative disc disease), lumbosacral M51.37    Essential hypertension I10    Malignant neoplasm of colon ([facility] C18.9    Spinal stenosis in cervical region M48.02    Spinal stenosis, lumbar M48.061    Fibromyalgia M79.7    Gastroesophageal reflux disease without esophagitis K21.9    Lumbar radicular pain M54.16    Pedal edema R60.0    Chronic intractable headache R51.9, G89.29    Esophageal candidiasis ([facility]) B37.81    History of gastric bypass Z98.84    Prediabetes R73.03    Age-related osteoporosis without current pathological fracture M81.0    Essential tremor G25.0    Chronic fatigue R53.82    Irritable bowel syndrome with diarrhea K58.0    Borborygmi R19.8    Constipation K59.00    Anxiety F41.9    Weakness R53.1    Age-related physical debility R54    JENNIFER (generalized anxiety disorder) F41.1    Septic shock ([facility]) A41.9, R65.21    Atrial fibrillation with rapid ventricular response ([facility]) I48.91    DM2 (diabetes mellitus, type 2) ([facility]) E11.9    GI bleed K92.2    MAX (acute kidney injury) ([facility] N17.9    Community acquired pneumonia of left lower lobe of lung J18.9    Hypoxia R09.02    Pneumonia due to infectious organism J18.9    Closed fracture of left distal radius S52.502A    Lower GI bleed K92.2    Sepsis without septic shock ([facility]) A41.9    Acute cystitis with hematuria P85.46    Acute metabolic encephalopathy W29.11    Sacral wound S31.000A    Elevated lactic acid level R79.89    Fever R50.9    Tachypnea R06.82    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 9) E66.3    Elevated alkaline phosphatase level R74.8    Elevated alkaline phosphatase in  P09.8, R74.8    History of depression Z86.59    Nursing home resident Z59.3    Proteus infection A49.8    Gram-negative bacteremia R78.81    Lactic acidosis E87.2    Altered mental status R41.82    Hypotension I95.9    Skin ulcer of sacrum ([facility]) L98.429    Acute cystitis without hematuria M32.48    Complicated UTI (urinary tract infection) N39.0       Isolation/Infection:   Isolation            No Isolation Patient Infection Status       Infection Onset Added Last Indicated Last Indicated By Review Planned Expiration Resolved Resolved By    None active    Resolved    COVID-19 (Rule Out) 02/17/21 02/17/21 02/17/21 COVID-19, Rapid (Ordered)   02/17/21 Rule-Out Test Resulted    COVID-19 (Rule Out) 01/28/21 01/28/21 01/28/21 COVID-19 (Ordered)   01/28/21 Rule-Out Test Resulted            Nurse Assessment:  Last Vital Signs: /62   Pulse 85   Temp 98.2 °F (36.8 °C)   Resp 16   Ht 5' 2\" (1.575 m)   Wt 162 lb 4.1 oz (73.6 kg)   SpO2 99%   BMI 29.68 kg/m²     Last documented pain score (0-10 scale): Pain Level: 6  Last Weight:   Wt Readings from Last 1 Encounters:   08/24/22 162 lb 4.1 oz (73.6 kg)     Mental Status:  oriented, alert, coherent, logical, thought processes intact, and able to concentrate and follow conversation    IV Access:  - PICC - site  R Upper Arm, insertion date: 8/25/2022  - can be removed once IV antibiotics are complete    Nursing Mobility/ADLs:  Walking   Dependent  Transfer  Dependent  Bathing  Dependent  Dressing  Dependent  Toileting  Dependent  Feeding  Assisted  Med Admin  Assisted  Med Delivery   whole    Wound Care Documentation and Therapy:  Negative Pressure Wound Therapy Leg Anterior; Lower;Right (Active)   Number of days: 554       Wound 07/30/22 Heel Left (Active)   Wound Image   08/11/22 1322   Wound Etiology Pressure Unstageable 08/25/22 0730   Dressing Status Clean;Dry; Intact 08/25/22 0730   Wound Cleansed Not Cleansed; Betadine/povidone iodine 08/23/22 0400   Dressing/Treatment ABD 08/24/22 1600   Wound Length (cm) 0.5 cm 08/11/22 1322   Wound Width (cm) 0.5 cm 08/11/22 1322   Wound Depth (cm) 0 cm 08/02/22 1126   Wound Surface Area (cm^2) 0.25 cm^2 08/11/22 1322   Change in Wound Size % (l*w) 0 08/11/22 1322   Wound Volume (cm^3) 0 cm^3 08/02/22 1126   Wound Assessment Purple/maroon;Eschar dry 08/25/22 0730   Drainage Amount None 08/25/22 0730   Odor None 08/25/22 0730 Nallely-wound Assessment Intact 08/25/22 0730   Margins Attached edges 08/25/22 0730   Number of days: 26       Wound 07/30/22 Heel Right (Active)   Wound Image   08/11/22 1322   Wound Etiology Pressure Unstageable 08/25/22 0730   Dressing Status Clean;Dry 08/25/22 0730   Wound Cleansed Betadine/povidone iodine 08/23/22 0400   Dressing/Treatment Other (comment); Barrier film 08/24/22 0400   Wound Length (cm) 1.5 cm 08/11/22 1322   Wound Width (cm) 1.5 cm 08/11/22 1322   Wound Depth (cm) 0 cm 08/02/22 1126   Wound Surface Area (cm^2) 2.25 cm^2 08/11/22 1322   Change in Wound Size % (l*w) -125 08/11/22 1322   Wound Volume (cm^3) 0 cm^3 08/02/22 1126   Wound Assessment Purple/maroon;Eschar dry 08/25/22 0730   Drainage Amount None 08/25/22 0730   Odor None 08/25/22 0730   Nallely-wound Assessment Intact 08/25/22 0730   Margins Attached edges 08/25/22 0730   Number of days: 26       Wound 07/30/22 Sacrum Mid (Active)   Wound Image   08/16/22 1329   Wound Etiology Pressure Unstageable 08/25/22 0730   Dressing Status Other (Comment) 08/25/22 1434   Wound Cleansed Betadine/povidone iodine 08/24/22 1508   Dressing/Treatment Other (comment) 08/25/22 1434   Wound Length (cm) 7 cm 08/16/22 1329   Wound Width (cm) 9 cm 08/16/22 1329   Wound Depth (cm) 3 cm 08/16/22 1329   Wound Surface Area (cm^2) 63 cm^2 08/16/22 1329   Change in Wound Size % (l*w) -20 08/16/22 1329   Wound Volume (cm^3) 189 cm^3 08/16/22 1329   Wound Assessment Other (Comment) 08/25/22 0730   Drainage Amount None 08/25/22 1434   Drainage Description Sanguinous;Brown 08/25/22 0730   Odor Malodorous/putrid 08/24/22 0800   Nallely-wound Assessment Non-blanchable erythema;Ecchymosis 08/24/22 0800   Margins Unattached edges 08/24/22 0400   Wound Thickness Description not for Pressure Injury Full thickness 08/24/22 0400   Number of days: 26       Incision 08/03/22 Radial Distal;Left (Active)   Dressing Status Clean;Dry; Intact 08/25/22 0730   Incision Cleansed Not Cleansed 08/25/22 0730   Dressing/Treatment Steri-strips 08/25/22 0730   Closure Steri-Strips 08/25/22 0730   Margins Approximated 08/25/22 0730   Incision Assessment Other (Comment) 08/25/22 0730   Drainage Amount None 08/25/22 0730   Drainage Description Oli Mcdonald 08/23/22 0853   Odor None 08/24/22 1600   Nallely-incision Assessment Other (Comment) 08/03/22 2041   Number of days: 22       Incision 08/24/22 Abdomen Lower;Medial (Active)   Dressing Status Clean; Intact;Dry 08/25/22 0730   Dressing/Treatment Skin glue 08/25/22 0730   Closure Open to air;Surgical glue 08/25/22 0730   Margins Approximated 08/25/22 0730   Incision Assessment Dry 08/25/22 0730   Drainage Amount None 08/25/22 0730   Odor None 08/25/22 0400   Number of days: 1       Incision 08/24/22 Abdomen Lower;Right (Active)   Dressing Status Clean;Dry; Intact 08/25/22 0730   Dressing/Treatment Skin glue 08/25/22 0730   Closure Surgical glue; Open to air 08/25/22 0730   Margins Approximated 08/25/22 0730   Incision Assessment Dry 08/25/22 0730   Drainage Amount None 08/25/22 0730   Odor None 08/25/22 0400   Number of days: 1       Incision 08/24/22 Abdomen Lower;Right;Medial (Active)   Dressing Status Clean;Dry; Intact 08/25/22 0730   Dressing/Treatment Skin glue 08/25/22 0730   Closure Open to air;Surgical glue 08/25/22 0730   Margins Approximated 08/25/22 0730   Incision Assessment Dry 08/25/22 0730   Drainage Amount None 08/25/22 0730   Odor None 08/25/22 0400   Number of days: 1       Incision 01/28/21 Pretibial Right;Lateral (Active)   Number of days: 573        Elimination:  Continence:    Bowel: Colostomy  Bladder: Chronic tello  Urinary Catheter: Indication for Use of Catheter: Need for fluid management in critally ill patients in a critical care setting not able to be managed by other means such as BSC with hat, bedpan, urinal, condom catheter, or short term intermittent urethral catheterization - chronic tello  Colostomy/Ileostomy/Ileal Conduit: Yes - LLQ  Colostomy LLQ-Stomal Appliance: 1 piece, Clean, dry & intact  Colostomy LLQ-Stoma  Assessment: Pink  Colostomy LLQ-Peristomal Assessment: Clean, dry & intact  Colostomy LLQ-Output (mL): 0 ml    Date of Last BM: 9/6/2022 - from ostomy    Intake/Output Summary (Last 24 hours) at 8/25/2022 1732  Last data filed at 8/25/2022 1725  Gross per 24 hour   Intake 1718.53 ml   Output 743 ml   Net 975.53 ml     I/O last 3 completed shifts: In: 2213.9 [I.V.:1458.5; IV Piggyback:755.4]  Out: 1180 [Urine:1080; Blood:100]    Safety Concerns: At Risk for Falls    Impairments/Disabilities:      Bedrest    Nutrition Therapy:  Current Nutrition Therapy:   - Tube Feedings:  Standard with fiber - goal rate: 40mL/hr, Q4 30mL flush  - Regular Diet    Routes of Feeding: Oral and PEG tube  Liquids: No Restrictions  Daily Fluid Restriction: no  Last Modified Barium Swallow with Video (Video Swallowing Test): not done    Treatments at the Time of Hospital Discharge:   Respiratory Treatments: N/A  Oxygen Therapy:  is not on home oxygen therapy. Ventilator:    - No ventilator support    Rehab Therapies: Physical Therapy, Occupational Therapy, and nursing  Weight Bearing Status/Restrictions: NWB left hand, gentle left hand and wrist ROM bid. Can leave left wrist splint off due to swelling.    Other Medical Equipment (for information only, NOT a DME order):  N/A  Other Treatments: N/A    Patient's personal belongings (please select all that are sent with patient):  None    RN SIGNATURE:  Electronically signed by Saba Burns RN on 9/6/22 at 1:23 PM EDT    CASE MANAGEMENT/SOCIAL WORK SECTION    Inpatient Status Date: 8/22/22    Readmission Risk Assessment Score:  Readmission Risk              Risk of Unplanned Readmission:  36         Discharging to Facility/ Agency   Name: [facility]  Address:  [address]   Phone:  [phone]  Fax:  [phone]    / signature: Electronically signed by Rolando Jennings on 9/2/22 at 11:22 AM EDT    PHYSICIAN SECTION    Prognosis: Fair    Condition at Discharge: Stable    Rehab Potential (if transferring to Rehab): Fair    Recommended Labs or Other Treatments After Discharge:                           Out-patient antibiotic therapy (OPAT)/ Antibiotic Infusion orders          Diagnosis: Sacral decubitus wound culture with osteomyelitis, Wound culture positive for E. coli and Proteus and nocardia, , Proteus bacteremia       Organism/ culture: See above     Name and dose of Antimicrobial: IV ceftriaxone 2 g every 24 hour, p.o. Flagyl 500 mg every 8 hour, p.o. fluconazole 100 mg every 24 hour, p.o. minocycline 100 mg every 12 hours     Antimicrobial start date: calculated from 8/26/2022     Antimicrobial completion date planned: 9/23/2022     Lab monitoring: CBC, Chem 12, ESR, CRP once a week, to  be       collected every Monday or Tuesday morning until the patient is off IV  antibiotics. Fax weekly lab results to Bea Conklin MD's office at        [phone]. Please maintain PICC line until the patient is on IV antibiotics. Ok to administer PICC line normal saline flushes as needed. The patient has given verbal informed consent for [id number] [facility] ID pharmacist, Micah Metzger to manage above antibiotic therapy for the patient after the patient's discharge from the hospital.       ** Please notify Bea Conklin MD's office with any change in patient's        status, transfer out of facility or to hospital by calling [phone]           Outpatient Follow up: Plan for a follow-up in-person or virtual video visit with Dr. Nadir Thompson in 4 weeks. Call ID office at [phone] to make an appointment.              Physician Signature:  Electronically signed by Bea Conklin MD on                                                      8/25/22 at 5:30 PM EDT            Physician Certification: I certify the above information and transfer of Nkechi Turcios is necessary for the continuing treatment of the diagnosis listed and that she requires [place] for less 30 days.      Update Admission H&P: No change in H&P    PHYSICIAN SIGNATURE:  Electronically signed by Britt Small MD on 9/6/2022 at 10:03 AM      Followup Dr Xin Acuna in office in 2-3 weeks [place] Counseling: I discussed with the patient the risks of guselkumab including but not limited to immunosuppression, serious infections, worsening of inflammatory bowel disease and drug reactions.  The patient understands that monitoring is required including a PPD at baseline and must alert us or the primary physician if symptoms of infection or other concerning signs are noted.

## 2022-08-25 NOTE — TELEPHONE ENCOUNTER
General Question     Subject: PATIENT IS [facility]   Patient and /or Facility Request: Jose De La Fuente  Contact Number: [phone]    PATIENTS SON IS CALLING IN REGARDS OF CANCELING HIS MOMS POST OP APPOINTMENT DUE TO HER BEING IN THE ER HE WOULD ALSO LIKE THE OFFICE TO KNOW THAT THE SPLINT WAS REMOVED BEFORE PATIENTS SX YESTERDAY JOSE CAN BE REACHED AT THE NUMBER LISTED ABOVE

## 2022-08-25 NOTE — PROGRESS NOTES
Hospitalist Progress Note      PCP: Jaimee Adamson MD    Date of Admission: 8/21/2022    Chief Complaint: [address] Course: 16L admitted from NH where she was noted to have AMS, found to be septic with progression of sacral decub with underlying osteo as source    Subjective: patient denies sob, no distress on room air, complains of pain control duration lacking      Medications:  Reviewed    Infusion Medications    sodium chloride       Scheduled Medications    acetaminophen  650 mg Oral 3 times per day    lidocaine 1 % injection  5 mL IntraDERmal Once    sodium chloride flush  5-40 mL IntraVENous 2 times per day    magnesium sulfate  1,000 mg IntraVENous Once    cefTRIAXone (ROCEPHIN) IV  2,000 mg IntraVENous Q24H    metroNIDAZOLE  500 mg Oral 3 times per day    hydrocortisone sodium succinate PF  50 mg IntraVENous BID    pantoprazole (PROTONIX) 40 mg injection  40 mg IntraVENous Daily    flecainide  100 mg Oral 2 times per day    [Held by provider] apixaban  5 mg Oral BID    eldertonic  15 mL Oral Daily    mupirocin   Nasal BID    lactobacillus  2 capsule Oral BID WC    enoxaparin  40 mg SubCUTAneous Daily     PRN Meds: sodium phosphate IVPB **OR** sodium phosphate IVPB, magnesium sulfate, sodium chloride flush, sodium chloride, fentanNYL, HYDROmorphone, promethazine, morphine **OR** morphine, ALPRAZolam, ondansetron, polyethylene glycol, acetaminophen **OR** acetaminophen      Intake/Output Summary (Last 24 hours) at 8/25/2022 1039  Last data filed at 8/25/2022 1000  Gross per 24 hour   Intake 2453.94 ml   Output 823 ml   Net 1630.94 ml         Physical Exam Performed:    BP (!) 107/57   Pulse 87   Temp 97.4 °F (36.3 °C) (Oral)   Resp 11   Ht 5' 2\" (1.575 m)   Wt 162 lb 4.1 oz (73.6 kg)   SpO2 96%   BMI 29.68 kg/m²     General appearance: No apparent distress, appears stated age and cooperative. HEENT: Pupils equal, round, Conjunctivae/corneas clear.   Neck: Supple, with full range of atelectasis. Difficult to   exclude superimposed pneumonia in the lung bases. XR CHEST PORTABLE   Final Result   Clear chest without acute cardiopulmonary process. Assessment/Plan:    Active Hospital Problems    Diagnosis Date Noted    Acute cystitis without hematuria [N30.00] 2022     Priority: Medium    Complicated UTI (urinary tract infection) [N39.0] 2022     Priority: Medium    Sepsis without septic shock ([address] [A41.9] 2022     Priority: Medium    Acute cystitis with hematuria [N30.01] 2022     Priority: Medium    Acute metabolic encephalopathy [G98.66] 2022     Priority: Medium    Sacral wound [S31.000A] 2022     Priority: Medium    Elevated lactic acid level [R79.89] 2022     Priority: Medium    Fever [R50.9] 2022     Priority: Medium    Tachypnea [R06.82] 2022     Priority: Medium    Sacral osteomyelitis ([facility] [M46.28] 2022     Priority: Medium    WHITNEY (obstructive sleep apnea) [G47.33] 2022     Priority: Medium    Overweight (BMI 25.0-29. 9) [E66.3] 2022     Priority: Medium    Elevated alkaline phosphatase level [R74.8] 2022     Priority: Medium    Elevated alkaline phosphatase in  [P09.8, R74.8] 2022     Priority: Medium    History of depression [Z86.59] 2022     Priority: Medium    Nursing home resident [Z59.3] 2022     Priority: Medium    Proteus infection [A49.8] 2022     Priority: Medium    Gram-negative bacteremia [R78.81] 2022     Priority: Medium    Lactic acidosis [E87.2] 2022     Priority: Medium    Altered mental status [R41.82] 2022     Priority: Medium    Hypotension [I95.9] 2022     Priority: Medium    [facility] of [facility] Juan Francisco Shlomo 2022     Priority: Medium    DM2 (diabetes mellitus, type 2) ([facility] [E11.9] 2022     Priority: Medium    Septic shock ([facility] [A41.9, R65.21] 2022     Priority: Medium    Fibromyalgia [M79.7] 09/20/2019    Severe protein-calorie malnutrition ([facility] [E43] 10/26/2018    Tachycardia [R00.0]     Atrial fibrillation ([facility] [I48.91] 01/13/2018    Malignant neoplasm of colon ([facility] [C18.9] 06/16/2009    Essential hypertension [I10] 06/15/2009     Sepsis  - sec to inf decub    Decub / underlying osteo  - surg debride w diverting colostomy, poss palliation  - continue IV abx as epr ID    Atrial fib  - holding anticoag, continue tambocor,   Anemia  - trend  7.4 - > 7.8->7.3    Diet: ADULT ORAL NUTRITION SUPPLEMENT; Breakfast; Wound Healing Oral Supplement  ADULT TUBE FEEDING; Gastrostomy; Standard with Fiber; Continuous; 15; No; 30; Q 4 hours; Wound Healing; mix with 4 oz of water and bolus  ADULT DIET;  Regular  Code Status: Full Code       Marietta Chery MD

## 2022-08-25 NOTE — PROGRESS NOTES
Pt just had wound vac placed and is asking for oxygen. Oxygen saturation on room air is 98%, respiratory rate increased from 12-18bpm after turning for wound vac application. Pt placed on 1L via nasal cannula, pt states \"it is hard to breathe but it feels better with the oxygen on\". Pt oxygen saturation is 99% on 1L of oxygen. Pt does not show signs of distress and is resting comfortably. Pt states   Her breathing feels better now with the oxygen on. Will continue to monitor.

## 2022-08-25 NOTE — PROGRESS NOTES
Physical Therapy  PT referral received and chart reviewed. Spoke with nursing, request hold PT Eval today. Await Wound Vac dressing change. Will follow-up tomorrow.   Klever Hernandez, PT

## 2022-08-25 NOTE — PROGRESS NOTES
[facility]                                   Daily Progress Note                                                         Pt Name: Barbara Wang  Medical Record Number: [id number]  Date of Birth 1945   Today's Date: 8/25/2022      ASSESSMENT/PLAN  Stage IV sacral ulcer  -Postop day 1 laparoscopic gastrostomy tube, 21 [address], lap sigmoid colostomy, excisional debridement of sacral ulcer, final wound measurements 11 x 13 cm  -We will place wound VAC today  -Start tube feeds via G-tube. Okay to continue p.o. as tolerated, regular diet  -Electrolyte replacement as needed  -PT OT, okay to get out of bed, sit in chair. No activity restrictions      SUBJECTIVE  Vishal Del Cid is resting in bed. Asking appropriate questions. []Pain []nausea  []vomiting  [x]passing flatus   [x]BM      OBJECTIVE  VITALS:  height is 5' 2\" (1.575 m) and weight is 162 lb 4.1 oz (73.6 kg). Her oral temperature is 97.1 °F (36.2 °C). Her blood pressure is 113/63 and her pulse is 83. Her respiration is 15 and oxygen saturation is 96%. INTAKE/OUTPUT:    Intake/Output Summary (Last 24 hours) at 8/25/2022 1221  Last data filed at 8/25/2022 1200  Gross per 24 hour   Intake 2203.94 ml   Output 870 ml   Net 1333.94 ml     GENERAL: alert, cooperative, no distress  LUNGS: clear to ausculation, without wheezes, rales or rhonci  HEART: normal rate and regular rhythm  ABDOMEN: Soft, appropriately tender, non distended. G-tube present. Incisions clean dry and intact. Ostomy pink, flatus and small amount of stool in bag. EXTREMITY: no cyanosis, clubbing or edema    I/O last 3 completed shifts:   In: 2213.9 [I.V.:1458.5; IV Piggyback:755.4]  Out: 1180 [Urine:1080; Blood:100]      LABS  Recent Labs     08/25/22  0430   WBC 9.7   HGB 7.3*   HCT 21.5*      *   K 4.3   CL 99   CO2 25   BUN 12   CREATININE <0.5*   MG 1.70*   PHOS 2.1*   CALCIUM 6.9*       EDUCATION  Patient educated about Disease Process, Medications, Smoking Cessation, Oxygenation, Incentive Spirometry and Deep Breath and Cough, Diabetes, Hyperlipidemia, Smoking Cessation, Nutrition, Exercise and Hypertension    Electronically signed by MUSA Avila CNP on 8/25/2022 at [address] [place] [address] and Vascular Surgery   [phone] Office  [phone] Fax     Agree with above note. The patient was personally seen and examined. Dustin Hodgkins is doing well. No overnight events. Pain present but controlled. No ostomy output yet. Abd soft, ND, incisons c/d/I, no erythema or induration, colostomy pink    WBC 9.7    A/P: 64 yo female with stage IV sacral ulcer s/p excisional debridement of ulcer, lap G tube and colostomy creation POD #1    Doing well  Continue IV abx, antifungals  Start trophic TF. Regular diet with nutritional supplements  PT/OT  PICC line to d/c right groin TLC.     Abril Boland MD

## 2022-08-25 NOTE — PLAN OF CARE
Problem: Discharge Planning  Goal: Discharge to home or other facility with appropriate resources  Outcome: Progressing     Problem: Pain  Goal: Verbalizes/displays adequate comfort level or baseline comfort level  Outcome: Progressing     Problem: Safety - Adult  Goal: Free from fall injury  Outcome: Progressing     Problem: Skin/Tissue Integrity  Goal: Absence of new skin breakdown  Description: 1. Monitor for areas of redness and/or skin breakdown  2. Assess vascular access sites hourly  3. Every 4-6 hours minimum:  Change oxygen saturation probe site  4. Every 4-6 hours:  If on nasal continuous positive airway pressure, respiratory therapy assess nares and determine need for appliance change or resting period.   Outcome: Progressing     Problem: Chronic Conditions and Co-morbidities  Goal: Patient's chronic conditions and co-morbidity symptoms are monitored and maintained or improved  Outcome: Progressing     Problem: Nutrition Deficit:  Goal: Optimize nutritional status  8/25/2022 1654 by Maximo Davila RN  Outcome: Progressing  8/25/2022 0923 by Luis Elliott RD, LD  Outcome: Progressing     Problem: ABCDS Injury Assessment  Goal: Absence of physical injury  Outcome: Progressing TELEPHONE VISIT   Due to COVID-19 action plan, the patient's office visit was converted to a telephone visit.    Patient verbally consents to telephone visit.  Patient states they are Monae Ortez and that they are speaking to me from their home  Time spent to complete telephone encounter: 9 minutes.    CC:   Chief Complaint   Patient presents with   • Medication Management       HPI:    Medication follow-up.  Feels this dose of Celexa is working for her anxiety and depression symptoms.  Has had life stress the end of last year.  Feels is coping ok.  No suicidal ideation.    Has year round allergies.  Uses prescription nasal spray as needed.  And eye drops.  Needs Singulair refilled.  Allergies are reasonably controlled.    REVIEW OF SYSTEMS:    GENERAL: No fever  All other systems reviewed and negative.     PAST HISTORIES:  Past Medical History:   Diagnosis Date   • Allergic rhinitis    • Anxiety and depression       Allergies, Medications, Medical history, Surgical history, Social history and Family history were reviewed and updated.    OBJECTIVE:  Patient sounded well on the telephone.  Patient communicated clearly.  Thoughts were well ordered.  Speech tone was normal.  Attention was normal.  No audible signs of respiratory distress.    ASSESSMENT:  1. Anxiety and depression    2. Non-seasonal allergic rhinitis due to other allergic trigger        PLAN:  Orders Placed This Encounter   • Multiple Vitamin (MULTI-VITAMIN PO)   • citalopram (CELEXA) 20 MG tablet   • montelukast (SINGULAIR) 10 MG tablet     Declines increase in Celexa at this time (to 40mg).  Consider trial of Accolate instead of Singulair if allergies not controlled this Spring.  Continue prescription Singulair, prescription Patanase nasal spray and prescription Patanol eye drops.    All medication side effects were discussed with the patient who verbalized understanding. If any concerns develop over tolerance of the medication they are to contact our  office immediately or seek care if appropriate.    Return in about 3 months (around 7/2/2020).

## 2022-08-26 PROBLEM — A43.9 NOCARDIA INFECTION: Status: ACTIVE | Noted: 2022-08-26

## 2022-08-26 LAB
ABO/RH: NORMAL
ANION GAP SERPL CALCULATED.3IONS-SCNC: 3 MMOL/L (ref 3–16)
ANION GAP SERPL CALCULATED.3IONS-SCNC: 4 MMOL/L (ref 3–16)
ANISOCYTOSIS: ABNORMAL
ANTIBODY SCREEN: NORMAL
BASOPHILS ABSOLUTE: 0 K/UL (ref 0–0.2)
BASOPHILS RELATIVE PERCENT: 0 %
BLOOD BANK DISPENSE STATUS: NORMAL
BLOOD BANK PRODUCT CODE: NORMAL
BPU ID: NORMAL
BUN BLDV-MCNC: 12 MG/DL (ref 7–20)
BUN BLDV-MCNC: 12 MG/DL (ref 7–20)
CALCIUM SERPL-MCNC: 7 MG/DL (ref 8.3–10.6)
CALCIUM SERPL-MCNC: 7.2 MG/DL (ref 8.3–10.6)
CHLORIDE BLD-SCNC: 103 MMOL/L (ref 99–110)
CHLORIDE BLD-SCNC: 99 MMOL/L (ref 99–110)
CO2: 27 MMOL/L (ref 21–32)
CO2: 28 MMOL/L (ref 21–32)
CREAT SERPL-MCNC: <0.5 MG/DL (ref 0.6–1.2)
CREAT SERPL-MCNC: <0.5 MG/DL (ref 0.6–1.2)
CULTURE, BLOOD 2: NORMAL
DESCRIPTION BLOOD BANK: NORMAL
EOSINOPHILS ABSOLUTE: 0 K/UL (ref 0–0.6)
EOSINOPHILS RELATIVE PERCENT: 0 %
GFR AFRICAN AMERICAN: >60
GFR AFRICAN AMERICAN: >60
GFR NON-AFRICAN AMERICAN: >60
GFR NON-AFRICAN AMERICAN: >60
GLUCOSE BLD-MCNC: 132 MG/DL (ref 70–99)
GLUCOSE BLD-MCNC: 165 MG/DL (ref 70–99)
HCT VFR BLD CALC: 18.7 % (ref 36–48)
HCT VFR BLD CALC: 27.5 % (ref 36–48)
HEMOGLOBIN: 6.4 G/DL (ref 12–16)
HEMOGLOBIN: 9.4 G/DL (ref 12–16)
LYMPHOCYTES ABSOLUTE: 0.6 K/UL (ref 1–5.1)
LYMPHOCYTES RELATIVE PERCENT: 9 %
MACROCYTES: ABNORMAL
MAGNESIUM: 1.7 MG/DL (ref 1.8–2.4)
MCH RBC QN AUTO: 34.5 PG (ref 26–34)
MCHC RBC AUTO-ENTMCNC: 34.5 G/DL (ref 31–36)
MCV RBC AUTO: 100.1 FL (ref 80–100)
MONOCYTES ABSOLUTE: 0 K/UL (ref 0–1.3)
MONOCYTES RELATIVE PERCENT: 0 %
NEUTROPHILS ABSOLUTE: 6.2 K/UL (ref 1.7–7.7)
NEUTROPHILS RELATIVE PERCENT: 91 %
PDW BLD-RTO: 15.2 % (ref 12.4–15.4)
PHOSPHORUS: 1.8 MG/DL (ref 2.5–4.9)
PLATELET # BLD: 213 K/UL (ref 135–450)
PLATELET SLIDE REVIEW: ADEQUATE
PMV BLD AUTO: 7 FL (ref 5–10.5)
POTASSIUM SERPL-SCNC: 3.6 MMOL/L (ref 3.5–5.1)
POTASSIUM SERPL-SCNC: 4 MMOL/L (ref 3.5–5.1)
RBC # BLD: 1.86 M/UL (ref 4–5.2)
SLIDE REVIEW: ABNORMAL
SODIUM BLD-SCNC: 129 MMOL/L (ref 136–145)
SODIUM BLD-SCNC: 135 MMOL/L (ref 136–145)
WBC # BLD: 6.8 K/UL (ref 4–11)

## 2022-08-26 PROCEDURE — 2500000003 HC RX 250 WO HCPCS: Performed by: NURSE PRACTITIONER

## 2022-08-26 PROCEDURE — 36415 COLL VENOUS BLD VENIPUNCTURE: CPT

## 2022-08-26 PROCEDURE — 85018 HEMOGLOBIN: CPT

## 2022-08-26 PROCEDURE — 99233 SBSQ HOSP IP/OBS HIGH 50: CPT | Performed by: INTERNAL MEDICINE

## 2022-08-26 PROCEDURE — 85025 COMPLETE CBC W/AUTO DIFF WBC: CPT

## 2022-08-26 PROCEDURE — 6370000000 HC RX 637 (ALT 250 FOR IP): Performed by: INTERNAL MEDICINE

## 2022-08-26 PROCEDURE — 2580000003 HC RX 258: Performed by: SURGERY

## 2022-08-26 PROCEDURE — 2580000003 HC RX 258: Performed by: NURSE PRACTITIONER

## 2022-08-26 PROCEDURE — 6370000000 HC RX 637 (ALT 250 FOR IP): Performed by: SURGERY

## 2022-08-26 PROCEDURE — 2580000003 HC RX 258: Performed by: HOSPITALIST

## 2022-08-26 PROCEDURE — 6360000002 HC RX W HCPCS: Performed by: SURGERY

## 2022-08-26 PROCEDURE — 83735 ASSAY OF MAGNESIUM: CPT

## 2022-08-26 PROCEDURE — 94761 N-INVAS EAR/PLS OXIMETRY MLT: CPT

## 2022-08-26 PROCEDURE — 86901 BLOOD TYPING SEROLOGIC RH(D): CPT

## 2022-08-26 PROCEDURE — 97530 THERAPEUTIC ACTIVITIES: CPT

## 2022-08-26 PROCEDURE — C9113 INJ PANTOPRAZOLE SODIUM, VIA: HCPCS | Performed by: HOSPITALIST

## 2022-08-26 PROCEDURE — C9113 INJ PANTOPRAZOLE SODIUM, VIA: HCPCS | Performed by: SURGERY

## 2022-08-26 PROCEDURE — 2700000000 HC OXYGEN THERAPY PER DAY

## 2022-08-26 PROCEDURE — 84100 ASSAY OF PHOSPHORUS: CPT

## 2022-08-26 PROCEDURE — 1200000000 HC SEMI PRIVATE

## 2022-08-26 PROCEDURE — 36592 COLLECT BLOOD FROM PICC: CPT

## 2022-08-26 PROCEDURE — 6360000002 HC RX W HCPCS: Performed by: INTERNAL MEDICINE

## 2022-08-26 PROCEDURE — 99024 POSTOP FOLLOW-UP VISIT: CPT | Performed by: NURSE PRACTITIONER

## 2022-08-26 PROCEDURE — 85014 HEMATOCRIT: CPT

## 2022-08-26 PROCEDURE — 86923 COMPATIBILITY TEST ELECTRIC: CPT

## 2022-08-26 PROCEDURE — 86900 BLOOD TYPING SEROLOGIC ABO: CPT

## 2022-08-26 PROCEDURE — 6360000002 HC RX W HCPCS: Performed by: HOSPITALIST

## 2022-08-26 PROCEDURE — 36430 TRANSFUSION BLD/BLD COMPNT: CPT

## 2022-08-26 PROCEDURE — 2580000003 HC RX 258: Performed by: INTERNAL MEDICINE

## 2022-08-26 PROCEDURE — 80048 BASIC METABOLIC PNL TOTAL CA: CPT

## 2022-08-26 PROCEDURE — 6370000000 HC RX 637 (ALT 250 FOR IP): Performed by: NURSE PRACTITIONER

## 2022-08-26 PROCEDURE — 86850 RBC ANTIBODY SCREEN: CPT

## 2022-08-26 PROCEDURE — APPNB15 APP NON BILLABLE TIME 0-15 MINS: Performed by: NURSE PRACTITIONER

## 2022-08-26 PROCEDURE — P9016 RBC LEUKOCYTES REDUCED: HCPCS

## 2022-08-26 PROCEDURE — 99024 POSTOP FOLLOW-UP VISIT: CPT | Performed by: SURGERY

## 2022-08-26 RX ORDER — MINOCYCLINE HYDROCHLORIDE 50 MG/1
100 CAPSULE ORAL 2 TIMES DAILY
Status: DISCONTINUED | OUTPATIENT
Start: 2022-08-26 | End: 2022-09-06 | Stop reason: HOSPADM

## 2022-08-26 RX ORDER — SODIUM CHLORIDE 9 MG/ML
INJECTION, SOLUTION INTRAVENOUS PRN
Status: COMPLETED | OUTPATIENT
Start: 2022-08-26 | End: 2022-08-29

## 2022-08-26 RX ADMIN — Medication 15 ML: at 08:29

## 2022-08-26 RX ADMIN — FLECAINIDE ACETATE 100 MG: 100 TABLET ORAL at 08:28

## 2022-08-26 RX ADMIN — SODIUM CHLORIDE, PRESERVATIVE FREE 10 ML: 5 INJECTION INTRAVENOUS at 21:53

## 2022-08-26 RX ADMIN — TRAMADOL HYDROCHLORIDE 50 MG: 50 TABLET, COATED ORAL at 16:46

## 2022-08-26 RX ADMIN — ACETAMINOPHEN 650 MG: 325 TABLET ORAL at 14:20

## 2022-08-26 RX ADMIN — FLUCONAZOLE 200 MG: 100 TABLET ORAL at 08:28

## 2022-08-26 RX ADMIN — SODIUM PHOSPHATE, MONOBASIC, MONOHYDRATE 11.79 MMOL: 276; 142 INJECTION, SOLUTION INTRAVENOUS at 08:50

## 2022-08-26 RX ADMIN — ONDANSETRON 4 MG: 2 INJECTION INTRAMUSCULAR; INTRAVENOUS at 11:53

## 2022-08-26 RX ADMIN — MUPIROCIN: 20 OINTMENT TOPICAL at 08:29

## 2022-08-26 RX ADMIN — CEFTRIAXONE 2000 MG: 2 INJECTION, POWDER, FOR SOLUTION INTRAMUSCULAR; INTRAVENOUS at 18:43

## 2022-08-26 RX ADMIN — METRONIDAZOLE 500 MG: 500 TABLET ORAL at 06:00

## 2022-08-26 RX ADMIN — SODIUM CHLORIDE, PRESERVATIVE FREE 40 MG: 5 INJECTION INTRAVENOUS at 21:38

## 2022-08-26 RX ADMIN — HYDROCORTISONE SODIUM SUCCINATE 50 MG: 100 INJECTION, POWDER, FOR SOLUTION INTRAMUSCULAR; INTRAVENOUS at 08:28

## 2022-08-26 RX ADMIN — SODIUM CHLORIDE, PRESERVATIVE FREE 10 ML: 5 INJECTION INTRAVENOUS at 08:30

## 2022-08-26 RX ADMIN — Medication 2 CAPSULE: at 18:40

## 2022-08-26 RX ADMIN — FLECAINIDE ACETATE 100 MG: 100 TABLET ORAL at 21:37

## 2022-08-26 RX ADMIN — ACETAMINOPHEN 650 MG: 325 TABLET ORAL at 06:00

## 2022-08-26 RX ADMIN — METRONIDAZOLE 500 MG: 500 TABLET ORAL at 21:37

## 2022-08-26 RX ADMIN — TRAMADOL HYDROCHLORIDE 50 MG: 50 TABLET, COATED ORAL at 10:36

## 2022-08-26 RX ADMIN — MINOCYCLINE HYDROCHLORIDE 100 MG: 50 CAPSULE ORAL at 21:38

## 2022-08-26 RX ADMIN — Medication 40 MG: at 08:29

## 2022-08-26 RX ADMIN — Medication 2 CAPSULE: at 08:28

## 2022-08-26 RX ADMIN — HYDROCORTISONE SODIUM SUCCINATE 25 MG: 100 INJECTION, POWDER, FOR SOLUTION INTRAMUSCULAR; INTRAVENOUS at 21:42

## 2022-08-26 RX ADMIN — ENOXAPARIN SODIUM 40 MG: 100 INJECTION SUBCUTANEOUS at 08:28

## 2022-08-26 RX ADMIN — METRONIDAZOLE 500 MG: 500 TABLET ORAL at 14:20

## 2022-08-26 ASSESSMENT — PAIN DESCRIPTION - LOCATION
LOCATION: ABDOMEN

## 2022-08-26 ASSESSMENT — ENCOUNTER SYMPTOMS
EYE REDNESS: 0
NAUSEA: 0
BACK PAIN: 0
RHINORRHEA: 0
DIARRHEA: 0
COUGH: 0
ABDOMINAL PAIN: 0
SORE THROAT: 0
SHORTNESS OF BREATH: 0
WHEEZING: 0
CONSTIPATION: 0
EYE DISCHARGE: 0
SINUS PAIN: 0
SINUS PRESSURE: 0

## 2022-08-26 ASSESSMENT — PAIN SCALES - GENERAL
PAINLEVEL_OUTOF10: 6
PAINLEVEL_OUTOF10: 6
PAINLEVEL_OUTOF10: 4

## 2022-08-26 NOTE — PROGRESS NOTES
Occupational Therapy  Facility/Department: [address] ICU  Occupational Therapy Re-evaluation, Treatment, and Tentative D/C      Name: Laxmi Oconnor  : 1945  MRN: [id number]  Date of Service: 2022    Discharge Recommendations: Laxmi Oconnor scored a / on the AM-PAC ADL Inpatient form. Current research shows that an AM-PAC score of 17 or less is typically not associated with a discharge to the patient's home setting. Based on the patient's AM-PAC score and their current ADL deficits, it is recommended that the patient have 3-5 sessions per week of Occupational Therapy at d/c to increase the patient's independence. Please see assessment section for further patient specific details. If patient discharges prior to next session this note will serve as a discharge summary. Please see below for the latest assessment towards goals. 3-5 sessions per week, Continue to assess pending progress  OT Equipment Recommendations  Equipment Needed: No       Patient Diagnosis(es): The primary encounter diagnosis was Sacral osteomyelitis ([address] Diagnoses of Sepsis without septic shock ([address] Skin ulcer of sacrum, unspecified ulcer stage ([address] Acute cystitis with hematuria, Altered mental status, unspecified altered mental status type, Hypotension, unspecified hypotension type, and Pressure injury of skin of sacral region, unspecified injury stage were also pertinent to this visit. Past Medical History:  has a past medical history of Acid reflux, Anxiety, Arthritis, Atrial fibrillation (HCC), Closed fracture dislocation of right elbow, Depression, Fibromyalgia, Migraine, WHITNEY (obstructive sleep apnea), Rectal cancer ([address] and Wears glasses. Past Surgical History:  has a past surgical history that includes  section; Total knee arthroplasty (Left); open tx femoral supracondylar fracture w/o extension (Right, 10/25/2018); Gastric bypass surgery; Cholecystectomy; Appendectomy; Colonoscopy;  Upper gastrointestinal endoscopy (N/A, 11/26/2019); Colonoscopy (N/A, 11/26/2019); Upper gastrointestinal endoscopy (N/A, 8/18/2020); incision and drainage (Right, 1/28/2021); Skin graft (Right, 1/31/2021); Skin graft (Right, 2/17/2021); Cervical discectomy; Colon surgery; Colonoscopy (N/A, 7/7/2021); Forearm surgery (Left, 8/3/2022); Colonoscopy (N/A, 8/12/2022); and colectomy (N/A, 8/24/2022). Assessment   Performance deficits / Impairments: Decreased functional mobility ; Decreased strength;Decreased endurance;Decreased ADL status; Decreased safe awareness;Decreased high-level IADLs;Decreased balance;Decreased cognition  Assessment: Pt functioning significantly below baseline requiring Total A and use of lift for transfers. Due to significant noted weakness and no clarification on L UE WB status at this time, therapists deferred attempt to stand with stedy this date. Anticipate pt needing up to Max/total A for ADLs. Pt will benefit from skilled OT services at this time. Anticipate pt with need for ongoing OT at time of D/C. Prognosis: Fair  REQUIRES OT FOLLOW-UP: Yes  Activity Tolerance  Activity Tolerance: Treatment limited secondary to medical complications (free text); Patient limited by fatigue  Activity Tolerance Comments: no updated WB status for L UE        Plan   Plan  Times per Week: 2-3x  Current Treatment Recommendations: Strengthening, Balance training, Functional mobility training, Endurance training, Patient/Caregiver education & training, Safety education & training, Self-Care / ADL     Restrictions  Restrictions/Precautions  Restrictions/Precautions: Fall Risk, Weight Bearing  Upper Extremity Weight Bearing Restrictions  Left Upper Extremity Weight Bearing: Non Weight Bearing  Position Activity Restriction  Other position/activity restrictions: H/o Recent Wrist Fx : immob; NWB.   Unstageable Sacral Ulcer; wound vac; ok for mobility    Subjective   General  Chart Reviewed: Yes  Patient assessed for rehabilitation services?: Yes  Additional Pertinent Hx: 64 y/o female admitted 8/21/2022 with seps, acute metabolic encephalopathy, Acute cystitis with hematuria, and Sacral wound with subcutaneous gas tunneling through to the perirectal fascia. CT head negative. Pt with recent R radiaus fx (7/23/2022) s/p ORIF 8/3. Family / Caregiver Present: No  Referring Practitioner: MUSA Davalos CNP  Diagnosis: AMS  Subjective  Subjective: Pt agreeable to OT evaluation. Pt reports pain in abdomen with no number stated. No brace applied to L wrist noted. No updated WB status. General Comment  Comments: okay for therapy per RN. Social/Functional History  Social/Functional History  Lives With: Son (Son (does not work). )  Type of Home: House  Home Layout: One level  Home Access: Ramped entrance  Bathroom Shower/Tub: Tub/Shower unit  Bathroom Toilet: Standard  Bathroom Equipment: Grab bars in shower, Tub transfer bench, Hand-held shower, 3-in-1 commode, Grab bars around toilet  Bathroom Accessibility: Accessible  Home Equipment: Carmencita Miladis, [place], [employer]  ADL Assistance: [address]:  (son assists with IADLs)  Ambulation Assistance: Independent (With [facility] [facility]  Transfer Assistance: Independent  Occupation: Retired  Additional Comments: Social history obtained from previous PT  encounter 8/1/2022. Pt has been in/out of [facility] since end of July. Pt reports at nursing home she voids via depends and staff use lift (kirby) to get OOB. Objective   Heart Rate: 86  Heart Rate Source: Monitor  BP: 136/75  BP Location: Left Arm  BP Method: Automatic  Patient Position: Semi fowlers  MAP (Calculated): 95.33  Resp: 17  SpO2: 98 %  O2 Device: None (Room air)             Safety Devices  Type of Devices: Call light within reach;Nurse notified; Chair alarm in place; Left in chair  Restraints  Restraints Initially in Place: No  Bed Mobility Training  Bed Mobility Training: No  Balance  Sitting: Impaired  Standing:  (JENNIFER)  Transfer Training  Transfer Training: Yes (use of lift this date due to significant noted fatigue/weakness; awaiting clarification of L UE weight bearing status for functional use)  Overall Level of Assistance: Total assistance (lift)  Bed to Chair: Total assistance (lift)     AROM: Generally decreased, functional (decreased shoulder AROM)  Strength: Generally decreased, functional  Coordination: Generally decreased, functional  ADL  LE Dressing: Dependent/Total;Maximum assistance (assist to don bilateral socks)  Toileting: Dependent/Total (tello)  Additional Comments: Anticipate pt needing up to Max/total A for ADLs based on ROM, strength, and balance              Vision  Vision: Within Functional Limits  Hearing  Hearing: Exceptions to [facility]  Hearing Exceptions: Hard of hearing/hearing concerns  Cognition  Overall Cognitive Status: Exceptions  Arousal/Alertness: Appropriate responses to stimuli  Following Commands:  Follows one step commands with increased time  Memory: Decreased recall of recent events;Decreased short term memory  Initiation: Requires cues for some  Sequencing: Requires cues for some  Orientation  Overall Orientation Status: Within Functional Limits  Orientation Level: Oriented to person;Oriented to place (Aware of \"hospital\" setting.)                  Education Given To: Patient  Education Provided: Role of Therapy;Plan of Care;Transfer Training  Education Method: Demonstration;Verbal  Barriers to Learning: Cognition  Education Outcome: Continued education needed        AM-PAC Score        AM-PAC Inpatient Daily Activity Raw Score: 9 (08/26/22 0940)  AM-PAC Inpatient ADL T-Scale Score : 25.33 (08/26/22 0940)  ADL Inpatient CMS 0-100% Score: 79.59 (08/26/22 0940)  ADL Inpatient CMS G-Code Modifier : CL (08/26/22 0940)    Tinneti Score       Goals  Short Term Goals  Time Frame for Short term goals: prior to D/C  Short Term Goal 1: complete bed mobility with Mod A  Short Term Goal 2: complete transfers with Mod A and use of stedy  Short Term Goal 3: complete grooming with setup  Short Term Goal 4: tolerate B UE exercises x10 reps for increased strength and endurance with ADLs  Short Term Goal 5: complete bathing and dressing with Mod A  Long Term Goals  Time Frame for Long term goals : STG=LTG  Patient Goals   Patient goals : no stated goals       Therapy Time   Individual Concurrent Group Co-treatment   Time In 0902         Time Out 0940         Minutes 38         Timed Code Treatment Minutes: Myranda 83, OTR/L

## 2022-08-26 NOTE — PLAN OF CARE
Problem: Discharge Planning  Goal: Discharge to home or other facility with appropriate resources  8/26/2022 0436 by Rafael James RN  Outcome: Progressing  8/25/2022 1654 by Radha Barros RN  Outcome: Progressing     Problem: Pain  Goal: Verbalizes/displays adequate comfort level or baseline comfort level  8/26/2022 0436 by Rafael James RN  Outcome: Progressing  8/25/2022 1654 by Radha Barros RN  Outcome: Progressing     Problem: Safety - Adult  Goal: Free from fall injury  8/26/2022 0436 by Rafael James RN  Outcome: Progressing  Flowsheets (Taken 8/26/2022 0007)  Free From Fall Injury: Instruct family/caregiver on patient safety  8/25/2022 1654 by Radha Barros RN  Outcome: Progressing     Problem: Skin/Tissue Integrity  Goal: Absence of new skin breakdown  Description: 1. Monitor for areas of redness and/or skin breakdown  2. Assess vascular access sites hourly  3. Every 4-6 hours minimum:  Change oxygen saturation probe site  4. Every 4-6 hours:  If on nasal continuous positive airway pressure, respiratory therapy assess nares and determine need for appliance change or resting period.   8/26/2022 0436 by Rafael James RN  Outcome: Progressing  8/25/2022 1654 by Radha Barros RN  Outcome: Progressing     Problem: Chronic Conditions and Co-morbidities  Goal: Patient's chronic conditions and co-morbidity symptoms are monitored and maintained or improved  8/26/2022 0436 by Rafael James RN  Outcome: Progressing  8/25/2022 1654 by Radha Barros RN  Outcome: Progressing     Problem: Nutrition Deficit:  Goal: Optimize nutritional status  8/26/2022 0436 by Rafael James RN  Outcome: Progressing  8/25/2022 1654 by Radha Barros RN  Outcome: Progressing     Problem: ABCDS Injury Assessment  Goal: Absence of physical injury  8/26/2022 0436 by Rafael James RN  Outcome: Progressing  8/25/2022 1654 by Radha Barros RN  Outcome: Progressing

## 2022-08-26 NOTE — CARE COORDINATION
Updated Phyllis Packer at [facility] re: need for IVAB at [facility] until 9/23 along with PO abx & updated on surgery's performed. Will updated when pre-cert needs to be initiated, verified will need covid prior to dc also.     Jose Hollins RN, BSN,   [phone]  Electronically signed by Jose Hollins RN on 8/26/2022 at 10:01 AM

## 2022-08-26 NOTE — PROGRESS NOTES
Infectious Diseases   Progress Note      Admission Date: 8/21/2022  Hospital Day: Hospital Day: 6   Attending: Lindsey Sanchez MD  Date of service: 8/26/2022     Chief complaint/ Reason for consult:     Septic shock with high-grade fever, leukocytosis, metabolic encephalopathy, hypotension requiring vasopressors and elevated lactic acid level of 2.5  Gram-negative bacteremia with Proteus  Infected sacral decubitus ulcer  Nursing home resident    Microbiology:        I have reviewed allavailable micro lab data and cultures    Blood culture (2/2) - collected on 8/21/2022: Proteus mirabilis    Susceptibility    Proteus mirabilis (2)    Antibiotic Interpretation Microscan  Method Status    ampicillin Sensitive <=8 mcg/mL BACTERIAL SUSCEPTIBILITY PANEL BY ADWOA     ampicillin-sulbactam Sensitive <=8/4 mcg/mL BACTERIAL SUSCEPTIBILITY PANEL BY ADWOA     ceFAZolin Sensitive <=2 mcg/mL BACTERIAL SUSCEPTIBILITY PANEL BY ADWOA     cefepime Sensitive <=2 mcg/mL BACTERIAL SUSCEPTIBILITY PANEL BY ADWOA     cefTRIAXone Sensitive <=1 mcg/mL BACTERIAL SUSCEPTIBILITY PANEL BY ADWOA     cefuroxime Sensitive <=4 mcg/mL BACTERIAL SUSCEPTIBILITY PANEL BY ADWOA     ciprofloxacin Sensitive <=1 mcg/mL BACTERIAL SUSCEPTIBILITY PANEL BY ADWOA     ertapenem Sensitive <=0.5 mcg/mL BACTERIAL SUSCEPTIBILITY PANEL BY ADWOA     gentamicin Sensitive <=4 mcg/mL BACTERIAL SUSCEPTIBILITY PANEL BY ADWOA     meropenem Sensitive <=1 mcg/mL BACTERIAL SUSCEPTIBILITY PANEL BY ADWOA     piperacillin-tazobactam Sensitive <=16 mcg/mL BACTERIAL SUSCEPTIBILITY PANEL BY ADWOA     trimethoprim-sulfamethoxazole Sensitive <=2/38 mcg/mL BACTERIAL SUSCEPTIBILITY PANEL BY ADWOA         Urine culture  - collected on 8/21/2022: Greater than 100,000 CFU per mL of E. coli and Klebsiella      Antibiotics and immunizations:     Susceptibility    Klebsiella oxytoca (1)    Antibiotic Interpretation Microscan  Method Status    amoxicillin-clavulanate Sensitive <=8/4 mcg/mL BACTERIAL SUSCEPTIBILITY SUSCEPTIBILITY PANEL BY ADWOA     nitrofurantoin Sensitive <=32 mcg/mL BACTERIAL SUSCEPTIBILITY PANEL BY ADWOA     piperacillin-tazobactam Sensitive <=16 mcg/mL BACTERIAL SUSCEPTIBILITY PANEL BY ADWOA     trimethoprim-sulfamethoxazole Sensitive <=2/38 mcg/mL BACTERIAL SUSCEPTIBILITY PANEL BY ADWOA       Current antibiotics: All antibiotics and their doses were reviewed by me    Recent Abx Admin                     metroNIDAZOLE (FLAGYL) tablet 500 mg (mg) 500 mg Given 08/26/22 1420     500 mg Given  0600     500 mg Given 08/25/22 2128    fluconazole (DIFLUCAN) tablet 200 mg (mg) 200 mg Given 08/26/22 7955                      Immunization History: All immunization history was reviewed by me today. Immunization History   Administered Date(s) Administered    COVID-19, J&J, (age 18y+), IM, 0.5 mL 03/08/2021, 10/31/2021    Influenza Vaccine, unspecified formulation 10/21/2018    Influenza, FLUAD, (age 72 y+), Adjuvanted 09/03/2020, 09/09/2021    Influenza, FLUARIX, FLULAVAL, (age 10 mo+) AND AFLURIA, FLUZONE (age 1 y+), PF 02/19/2020    Influenza, High Dose (Fluzone 65 yrs and older) 12/09/2015, 10/27/2016, 09/22/2017, 10/21/2018    Pneumococcal Conjugate 13-valent ([id number]) 12/09/2015    Pneumococcal Conjugate Vaccine 10/21/2018    Pneumococcal Polysaccharide ([id number]) 10/21/2018    Tdap (Boostrix, Adacel) 12/09/2015       Known drug allergies: All allergies were reviewed and updated    Allergies   Allergen Reactions    Demerol Hcl [Meperidine] Other (See Comments)     PATIENT STATES SHE GOT VERY ANXIOUS-LIKE SHE WAS CLIMBING THE WALLS    Pcn [Penicillins]      Patient reports she has not had since she was a child, thinks reaction was hives. Adhesive Tape Rash       Social history:     Social History:  All social andepidemiologic history was reviewed and updated by me today as needed. Tobacco use:   reports that she has never smoked.  She has never used smokeless tobacco.  Alcohol use:   reports no history of alcohol use. Currently lives in: [address] [place] [address] Drive   reports no history of drug use. COVID VACCINATION AND LAB RESULT RECORDS:     Internal Administration   First Dose COVID-19, J&J, (age 18y+), IM, 0.5 mL  03/08/2021   Second Dose COVID-19, J&J, (age 18y+), IM, 0.5 mL   10/31/2021       Last COVID Lab SARS-CoV-2 (no units)   Date Value   08/12/2020 Not Detected     SARS-CoV-2, NAAT (no units)   Date Value   08/15/2022 Not Detected            Assessment:     The patient is a 65 y.o. old female who  has a past medical history of Acid reflux, Anxiety, Arthritis, Atrial fibrillation (HCC), Closed fracture dislocation of right elbow, Depression, Fibromyalgia, Migraine, WHITNEY (obstructive sleep apnea), Rectal cancer ([address] and Wears glasses. with following problems:    Septic shock with high-grade fever, leukocytosis, metabolic encephalopathy, hypotension requiring vasopressors and elevated lactic acid level of 2.5-this is resolved  Gram-negative bacteremia with Proteus-sensitivities reviewed  Infected sacral decubitus ulcer-s/p surgical debridement on 8/24/2022-culture grew E. coli, Proteus, nocardia  Complicated urinary tract infection with E. coli and Klebsiella-sensitivities reviewed  Nursing home resident  Elevated alkaline phosphatase of 142  History of depression  Fibromyalgia  Obstructive sleep apnea-stable  Overweight due to excess calorie intake : Body mass index is 28.63 kg/m²      Discussion:      The patient is on IV ceftriaxone and oral Flagyl and oral fluconazole. She is tolerating antibiotics okay. Serum creatinine 0.5 today. White cell count 6800. Interestingly, her sacral surgical wound culture from 8/24/2022 has grown Nocardia in addition to E. coli and Proteus. Plan:       Diagnostic Workup:      Continue to follow  fever curve, WBC count and blood cultures. Continue to monitor blood counts, liver and renal function.     Antimicrobials:    Ceftriaxone will cover for nocardia in addition to E. coli and Proteus  Typically dual antibiotic therapy is recommended for severe nocardia infections for initial few weeks  Given her advanced age, I do not think she will be able to tolerate high-dose Bactrim due to renal and bone marrow toxicity issues with high doses of Bactrim for nocardia  Will order minocycline 100 mg every 12 hour as second coverage for nocardia  Continue oral Flagyl and oral Diflucan   Planned stop date for oral antibiotics will be September 23, 2022  Recommend a follow-up with Dr. Fernandez  as an outpatient in 4 to 6 weeks  Patient ulcer care and prevention precautions  Ostomy site care  Continue close vitals monitoring. Maintain good glycemic control. Fall precautions. Aspiration precautions. Continue to watch for new fever or diarrhea. DVT prophylaxis. Discussed all above with patient and RN. Drug Monitoring:    Continue monitoring for antibiotic toxicity as follows: CBC, CMP   Continue to watch for following: new or worsening fever, new hypotension, hives, lip swelling and redness or purulence at vascular access sites. I/v access Management:    Continue to monitor i.v access sites for erythema, induration, discharge or tenderness. As always, continue efforts to minimize tubes/lines/drains as clinically appropriate to reduce chances of line associated infections. Patient education and counseling: The patient was educated in detail about the side-effects of various antibiotics and things to watch for like new rashes, lip swelling, severe reaction, worsening diarrhea, break through fever etc.  Discussed patient's condition and what to expect. All of the patient's questions were addressed in a satisfactory manner and patient verbalized understanding all instructions. Doxycyline/minocycline related instructions:      Take Doxycyline/minocycline with a full glass of water and stay upright or sitting up after taking it for 30 minutes as it can cause food pipe irritation (pill esophagitis). Use sunscreen when going in bright sun while on Doxycycline/minocycline as it can cause photosensitivity. Take 1 hour before or 2 hour after dairy, calcium, iron, magnesium, aluminum or zinc.     Level of complexity of visit and medical decision making: High     TIME SPENT TODAY:     - Spent over  36 minutes on visit (including interval history, physical exam, review of data including labs, cultures, imaging, development and implementation of treatment plan and coordination of complex care). More than 50 percent of this includes face-to-face time spent with the patient for counseling and coordination of care. Thanks for allowing me to participate in your patient's care. I will sign off today, but will be available to answer any further questions or concerns that may arise during patient's stay in the hospital.    Subjective: Interval history: Interval history was obtained from chart review and patient/ RN. The she is afebrile. She seems to be tolerating antibiotics okay. Has colostomy in place     REVIEW OF SYSTEMS:      Review of Systems   Constitutional:  Positive for fatigue. Negative for chills, diaphoresis and fever. HENT:  Negative for ear discharge, ear pain, postnasal drip, rhinorrhea, sinus pressure, sinus pain and sore throat. Eyes:  Negative for discharge and redness. Respiratory:  Negative for cough, shortness of breath and wheezing. Cardiovascular:  Negative for chest pain and leg swelling. Gastrointestinal:  Negative for abdominal pain, constipation, diarrhea and nausea. Endocrine: Negative for cold intolerance, heat intolerance and polydipsia. Genitourinary:  Negative for dysuria, flank pain, frequency, hematuria and urgency. Musculoskeletal:  Negative for back pain and myalgias. Skin:  Negative for rash. Allergic/Immunologic: Negative for immunocompromised state. Neurological:  Negative for dizziness, seizures and headaches. Gaby Sierra MD at [address] [facility] 1/31/2021    RIGHT LEG HEMATOMA EVAKUATION, DEBRIDEMENT, AND WOUND VAC PLACEMENT performed by Samantha Rocha MD at [address] [facility] 2/17/2021    RIGHT LOWER EXTREMITY SKIN GRAFT performed by Samantha Rocha MD at [address]     UPPER GASTROINTESTINAL ENDOSCOPY N/A 11/26/2019    ESOPHAGOGASTRODUODENOSCOPY performed by Romulo Christopher MD at [facility] N/A 8/18/2020    EGD BIOPSY performed by Romulo Christopher MD at [facility] History: All family history was reviewed today. Problem Relation Age of Onset    COPD Mother        Objective:       PHYSICAL EXAM:      Vitals:   Vitals:    08/26/22 1100 08/26/22 1210 08/26/22 1646 08/26/22 1817   BP: (!) 154/78 (!) 149/61  (!) 140/84   Pulse: 88 86  95   Resp: 21 18 16 19   Temp:  98.4 °F (36.9 °C)  98.3 °F (36.8 °C)   TempSrc:  Oral  Oral   SpO2: 97% 95%  94%   Weight:       Height:           Physical Exam  Vitals and nursing note reviewed. Constitutional:       Appearance: She is well-developed. She is not diaphoretic. Comments: The patient was seen earlier today. HENT:      Head: Normocephalic and atraumatic. Right Ear: External ear normal. There is no impacted cerumen. Left Ear: External ear normal. There is no impacted cerumen. Nose: Nose normal.      Mouth/Throat:      Mouth: Mucous membranes are moist.      Pharynx: Oropharynx is clear. No oropharyngeal exudate. Eyes:      General: No scleral icterus. Right eye: No discharge. Left eye: No discharge. Conjunctiva/sclera: Conjunctivae normal.      Pupils: Pupils are equal, round, and reactive to light. Neck:      Thyroid: No thyromegaly. Cardiovascular:      Rate and Rhythm: Normal rate and regular rhythm. Heart sounds: Normal heart sounds. No murmur heard. No friction rub.    Pulmonary:      Effort: No respiratory distress. Breath sounds: No stridor. No wheezing or rales. Abdominal:      General: Bowel sounds are normal.      Palpations: Abdomen is soft. Tenderness: There is no abdominal tenderness. There is no guarding or rebound. Musculoskeletal:         General: No swelling, tenderness or deformity. Normal range of motion. Cervical back: Normal range of motion and neck supple. Right lower leg: No edema. Left lower leg: No edema. Lymphadenopathy:      Cervical: No cervical adenopathy. Skin:     General: Skin is warm and dry. Coloration: Skin is not jaundiced. Findings: No bruising, erythema or rash. Comments: Surgical dressing at the sacral wound debridement site noted   Neurological:      General: No focal deficit present. Mental Status: She is alert and oriented to person, place, and time. Mental status is at baseline. Motor: No abnormal muscle tone. Psychiatric:         Mood and Affect: Mood normal.         Behavior: Behavior normal.    *    Lines and drains: All vascular access sites are healthy with no local erythema, discharge or tenderness. Intake and output:    I/O last 3 completed shifts: In: 2239.8 [P.O.:600; I.V.:561.9; NG/GT:262; IV Piggyback:815.9]  Out: 1193 [Urine:1193]    Lab Data:   All available labs and old records have been reviewed by me.     CBC:  Recent Labs     08/24/22  0530 08/25/22  0430 08/26/22  0500 08/26/22  1036   WBC 13.5* 9.7 6.8  --    RBC 2.29* 2.14* 1.86*  --    HGB 7.8* 7.3* 6.4* 9.4*   HCT 23.1* 21.5* 18.7* 27.5*    240 213  --    .7* 100.5* 100.1*  --    MCH 33.9 33.9 34.5*  --    MCHC 33.6 33.7 34.5  --    RDW 15.4 15.2 15.2  --           BMP:  Recent Labs     08/25/22  0430 08/26/22  0500 08/26/22  1400   * 129* 135*   K 4.3 4.0 3.6   CL 99 99 103   CO2 25 27 28   BUN 12 12 12   CREATININE <0.5* <0.5* <0.5*   CALCIUM 6.9* 7.0* 7.2*   GLUCOSE 85 165* 132*          Hepatic Function Panel:   Lab Results   Component Value Date/Time    ALKPHOS 142 08/21/2022 10:44 PM    ALT 10 08/21/2022 10:44 PM    AST 18 08/21/2022 10:44 PM    PROT 4.7 08/21/2022 10:44 PM    BILITOT 0.7 08/21/2022 10:44 PM    LABALBU 1.7 08/21/2022 10:44 PM       CPK:   Lab Results   Component Value Date    CKTOTAL 49 01/28/2021     ESR: No results found for: SEDRATE  CRP: No results found for: CRP        Imaging: All pertinent images and reports for the current visit were reviewed by me during this visit. I reviewed the chest x-ray/CT scan/MRI images and independently interpreted the findings and results today. XR WRIST LEFT (MIN 3 VIEWS)   Final Result   Patient is status post ORIF of distal radial fracture. CT ABDOMEN PELVIS W IV CONTRAST Additional Contrast? None   Final Result   Large sacral decubitus ulcer with subcutaneous gas extending to the coccyx   and perirectal fascia. No loculated fluid collections. Increased sclerosis   of the adjacent coccyx with soft tissue gas extending to the bone. Findings   are compatible with osteomyelitis. Adjacent rectal wall thickening may be reactive. Small bilateral pleural effusions with bibasilar atelectasis. Difficult to   exclude superimposed pneumonia in the lung bases. XR CHEST PORTABLE   Final Result   Clear chest without acute cardiopulmonary process. Medications: All current and past medications were reviewed.      pantoprazole (PROTONIX) 40 mg injection  40 mg IntraVENous Q12H    hydrocortisone sodium succinate PF  25 mg IntraVENous BID    acetaminophen  650 mg Oral 3 times per day    sodium chloride flush  5-40 mL IntraVENous 2 times per day    fluconazole  200 mg Oral Daily    cefTRIAXone (ROCEPHIN) IV  2,000 mg IntraVENous Q24H    metroNIDAZOLE  500 mg Oral 3 times per day    flecainide  100 mg Oral 2 times per day    [Held by provider] apixaban  5 mg Oral BID    eldertonic  15 mL Oral Daily    mupirocin   Nasal BID    lactobacillus  2 capsule Oral BID WC    enoxaparin  40 mg SubCUTAneous Daily        sodium chloride      sodium chloride         sodium chloride, sodium phosphate IVPB **OR** sodium phosphate IVPB, magnesium sulfate, sodium chloride flush, sodium chloride, traMADol **OR** traMADol, fentanNYL, HYDROmorphone, promethazine, morphine **OR** morphine, ALPRAZolam, ondansetron, polyethylene glycol, acetaminophen **OR** acetaminophen      Problem list:       Patient Active Problem List   Diagnosis Code    Symptomatic anemia D64.9    Atrial fibrillation (HCC) I48.91    Tachycardia R00.0    Severe protein-calorie malnutrition ([facility] E43    Aortic valve disorder I35.9    Arthropathy M12.9    Cervical spondylosis without myelopathy M47.812    DDD (degenerative disc disease), cervical M50.30    DDD (degenerative disc disease), lumbosacral M51.37    Essential hypertension I10    Malignant neoplasm of colon ([facility] C18.9    Spinal stenosis in cervical region M48.02    Spinal stenosis, lumbar M48.061    Fibromyalgia M79.7    Gastroesophageal reflux disease without esophagitis K21.9    Lumbar radicular pain M54.16    Pedal edema R60.0    Chronic intractable headache R51.9, G89.29    Esophageal candidiasis (HCC) B37.81    History of gastric bypass Z98.84    Prediabetes R73.03    Age-related osteoporosis without current pathological fracture M81.0    Essential tremor G25.0    Chronic fatigue R53.82    Irritable bowel syndrome with diarrhea K58.0    Borborygmi R19.8    Constipation K59.00    Anxiety F41.9    Weakness R53.1    Age-related physical debility R54    JENNIFER (generalized anxiety disorder) F41.1    Septic shock (HCC) A41.9, R65.21    Atrial fibrillation with rapid ventricular response (HCC) I48.91    DM2 (diabetes mellitus, type 2) ([facility]) E11.9    GI bleed K92.2    MAX (acute kidney injury) ([facility] N17.9    Community acquired pneumonia of left lower lobe of lung J18.9    Hypoxia R09.02    Pneumonia due to infectious organism J18.9    Closed fracture of left distal radius S52.502A    Lower GI bleed K92.2    Sepsis without septic shock (HCC) A41.9    Acute cystitis with hematuria C99.26    Acute metabolic encephalopathy B81.90    Sacral wound S31.000A    Elevated lactic acid level R79.89    Fever R50.9    Tachypnea R06.82    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 9) E66.3    Elevated alkaline phosphatase level R74.8    Elevated alkaline phosphatase in  P09.8, R74.8    History of depression Z86.59    Nursing home resident Z59.3    Proteus infection A49.8    Gram-negative bacteremia R78.81    Lactic acidosis E87.2    Altered mental status R41.82    Hypotension I95.9    Skin ulcer of sacrum (HCC) L98.429    Acute cystitis without hematuria C90.13    Complicated UTI (urinary tract infection) N39.0    Complex care coordination Z71.89    Receiving intravenous antibiotic treatment as outpatient Z79.2       Please note that this chart was generated using Dragon dictation software. Although every effort was made to ensure the accuracy of this automated transcription, some errors in transcription may have occurred inadvertently. If you may need any clarification, please do not hesitate to contact me through EPIC or at the phone number provided below with my electronic signature. Any pictures or media included in this note were obtained after taking informed verbal consent from the patient and with their approval to include those in the patient's medical record. Abhishek Cole MD, MPH, [address] [place] [address]  2022, 6:20 PM  [facility] Infectious Disease   [address] [place] [address], [address] [place] [address]  Office: [phone]  Fax: [phone]  In-person Clinic days:  Tuesday & Thursday a.m. Virtual clinic days: Monday, Wednesday & Friday a.m.

## 2022-08-26 NOTE — CARE COORDINATION
Wound/Ostomy Referral Progress Note      NAME:  Lolly Flores RECORD NUMBER:  [id number]  AGE: 65 y.o. GENDER:  female  :  1945  TODAY'S DATE:  2022    Subjective      Pt S/P lap sigmoid colostomy and debridement of sacral ulcer () with VAC application . RN called earlier to report leaking ostomy. Advised to place in 1 pc with ring and I'd see pt later.        Objective    BP (!) 149/61   Pulse 86   Temp 98.4 °F (36.9 °C) (Oral)   Resp 18   Ht 5' 2\" (1.575 m)   Wt 163 lb 5.8 oz (74.1 kg)   SpO2 95%   BMI 29.88 kg/m²     Sami Risk Score Sami Scale Score: 11    Patient Active Problem List   Diagnosis Code    Symptomatic anemia D64.9    Atrial fibrillation (HCC) I48.91    Tachycardia R00.0    Severe protein-calorie malnutrition ([address] E43    Aortic valve disorder I35.9    Arthropathy M12.9    Cervical spondylosis without myelopathy M47.812    DDD (degenerative disc disease), cervical M50.30    DDD (degenerative disc disease), lumbosacral M51.37    Essential hypertension I10    Malignant neoplasm of colon ([address] C18.9    Spinal stenosis in cervical region M48.02    Spinal stenosis, lumbar M48.061    Fibromyalgia M79.7    Gastroesophageal reflux disease without esophagitis K21.9    Lumbar radicular pain M54.16    Pedal edema R60.0    Chronic intractable headache R51.9, G89.29    Esophageal candidiasis (HCC) B37.81    History of gastric bypass Z98.84    Prediabetes R73.03    Age-related osteoporosis without current pathological fracture M81.0    Essential tremor G25.0    Chronic fatigue R53.82    Irritable bowel syndrome with diarrhea K58.0    Borborygmi R19.8    Constipation K59.00    Anxiety F41.9    Weakness R53.1    Age-related physical debility R54    JENNIFER (generalized anxiety disorder) F41.1    Septic shock (HCC) A41.9, R65.21    Atrial fibrillation with rapid ventricular response (HCC) I48.91    DM2 (diabetes mellitus, type 2) ([facility]) E11.9    GI bleed K92.2    MAX (acute kidney injury) ([facility] N17.9    Community acquired pneumonia of left lower lobe of lung J18.9    Hypoxia R09.02    Pneumonia due to infectious organism J18.9    Closed fracture of left distal radius S52.502A    Lower GI bleed K92.2    Sepsis without septic shock ([facility]) A41.9    Acute cystitis with hematuria P62.08    Acute metabolic encephalopathy P49.10    Sacral wound S31.000A    Elevated lactic acid level R79.89    Fever R50.9    Tachypnea R06.82    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 9) E66.3    Elevated alkaline phosphatase level R74.8    Elevated alkaline phosphatase in  P09.8, R74.8    History of depression Z86.59    Nursing home resident Z59.3    Proteus infection A49.8    Gram-negative bacteremia R78.81    Lactic acidosis E87.2    Altered mental status R41.82    Hypotension I95.9    Skin ulcer of sacrum ([facility]) L98.429    Acute cystitis without hematuria N64.15    Complicated UTI (urinary tract infection) N39.0    Complex care coordination Z71.89    Receiving intravenous antibiotic treatment as outpatient Z79.2       Assessment         Colostomy LLQ (Active)   Stomal Appliance 1 piece;Clean, dry & intact 22 1059   Stoma  Assessment Tangerine 22 1059   Peristomal Assessment Clean, dry & intact 22 1059   Treatment Bag change; Liquid skin barrier;Site care 22 1059   Stool Appearance Formed; Loose 22 1059   Stool Color Brown 22 1059   Stool Amount Large 22 1059   Output (mL) 375 ml 22 1000   Number of days: 2         Met with pt. Current pouch is intact, stoma is pink and moist. Small amount of liquid effluent.        Intake/Output Summary (Last 24 hours) at 2022 1537  Last data filed at 2022 1500  Gross per 24 hour   Intake 1988.42 ml   Output 1143 ml   Net 845.42 ml         Plan   Plan for Ostomy Care:      Routine ostomy care - Change pouch every 3 days & PRN. Empty when 1/3 to 1/2 full of stool or gas. Supplies:  Coloplast SenSura Ritesh flat 1 pc #[id number]  Linsey Alan protective Seal thin #[id number]    Directions:   Remove pouch, cleanse skin with warm water, dry thoroughly. Measure stoma & cut wafer to fit snuggly around base of stoma Place 1 pc pouch with barrier ring. Place hand over pouch 2-3 minutes to warm. Ostomy Plan of Care  [x] Supplies/Instructions left in room  [] Patient using home supplies  [x] Brand/supplies at bedside Coloplast  [] Current pouching system     Current Diet: ADULT DIET;  Regular  ADULT TUBE FEEDING; Gastrostomy; Standard with Fiber; Continuous; 15; Yes; 10; Q 4 hours; 40; 30; Q 4 hours; Protein; 1 proteinx daily per dietitian recommendations  Dietician consult:  Yes    Discharge Plan:  Placement for patient upon discharge: TBD; pt tells me she is going home  Outpatient visit plan No  Supplies given No   Samples requested No    Referrals:  [x]   [] [facility]  [] Supplies  [] Other      Patient/Caregiver Teaching:  Written Instructions given to patient/family  Teaching provided:  [] Reviewed GI and A&P        [] Supplies  [] Pouch emptying      [] Manipulate closure  [] Routine Care         [x] Comment-plan for pouch change/education starting Monday  [] Pouch maintenance           Level of patient/caregiver understanding able to:  [] Indicates understanding       [x] Needs reinforcement  [] Unsuccessful      [] Verbal Understanding  [] Demonstrated understanding       [] No evidence of learning  [] Refused teaching         [] N/A    Electronically signed by Ana Suresh on 8/26/2022 at 3:37 PM

## 2022-08-26 NOTE — PROGRESS NOTES
Patient has arrived to the unit with transport. Fall precautions are in place. Bed alarm on. Bed is in lowest position. Call light, telephone and bedside table are within reach. Will continue to monitor patient per unit protocols.  Electronically signed by Dorita Habermann, RN on 8/26/2022 at 6:27 PM

## 2022-08-26 NOTE — PROGRESS NOTES
Physical Therapy  Facility/Department: [address] ICU  Physical Therapy Re-Evaluation    Name: Jake Chong  : 1945  MRN: [id number]  Date of Service: 2022    Discharge Recommendations:  Continue to assess pending progress (Possible cont PT SNF setting; unsure sherri/progress at this time.)   PT Equipment Recommendations  Other: Defer to next level of care. Am-Pac . Assessment   Body Structures, Functions, Activity Limitations Requiring Skilled Therapeutic Intervention: Decreased functional mobility ; Decreased strength;Decreased safe awareness;Decreased cognition;Decreased endurance  Assessment: 64 y/o female admit from nursing facility 2022 with Acute Met Encephalopathy, Acute Cystitis with Hematuria, Sacral Wound with Subcutaneous Gas Tunneling through to the Perirectal Fascia, Septic Shock. CT Head : negative. 2022 S/P Lap Sigmoid Colostomy with Excisional Debride of Sacral Ulcer, Lap G-Tube Placement. 2022 Sacral Wound Vac Placed. PMH including A-Fib, Colon Ca, Wrist Fx S/P ORIF 8/3/2022. Currently, Per surg : Unstageable Sacral Ulcer with recent place of Wound Vac; ok for oob in chair and no activity restrictions. Need to clarify wgt bear restrictions L UE (recent ORIF 8/3/2022; has been NWB). Bed Mob dependent. OOB via Maxi-Tomas. At this time will monitor pt's progress for potential cont PT (3-5) upon d/c. ?? progress/sherri functional activities. Therapy Prognosis: Guarded; Fair  Decision Making: High Complexity  History: 64 y/o female admit from nursing facility 2022 with Acute Met Encephalopathy, Acute Cystitis with Hematuria, Sacral Wound with Subcutaneous Gas Tunneling through to the Perirectal Fascia, Septic Shock. CT Head : negative. 2022 S/P Lap Sigmoid Colostomy with Excisional Debride of Sacral Ulcer, Lap G-Tube Placement. 2022 Sacral Wound Vac Placed. PMH including A-Fib, Colon Ca, Wrist Fx S/P ORIF 8/3/2022.   Exam: See above.  Clinical Presentation: See above. Barriers to Learning: Cognitive, [place]. Requires PT Follow-Up: Yes  Activity Tolerance  Activity Tolerance Comments: Per surg : Unstageable Sacral Ulcer with recent place of Wound Vac; ok for oob in chair and no activity restrictions. Need to clarify wgt bear restrictions L UE (recent ORIF 8/3/2022; has been NWB). Bed Mob dependent. OOB via Maxi-Tomas. Plan   Plan  Plan: 2-3 times per week  Current Treatment Recommendations: Strengthening, Functional mobility training, Transfer training, Balance training, Safety education & training, Patient/Caregiver education & training  Safety Devices  Type of Devices: Call light within reach, Chair alarm in place, Left in chair, Nurse notified  Restraints  Restraints Initially in Place: No     Restrictions  Restrictions/Precautions  Restrictions/Precautions: Fall Risk, Weight Bearing  Upper Extremity Weight Bearing Restrictions  Left Upper Extremity Weight Bearing: Non Weight Bearing  Position Activity Restriction  Other position/activity restrictions: H/o Recent Wrist Fx : immob; NWB. Unstageable Sacral Ulcer; Wound Vac. Per Surg : ok for activity, oob to chair; no restrictions. Subjective   General  Chart Reviewed: Yes  Patient assessed for rehabilitation services?: Yes  Additional Pertinent Hx: 64 y/o female admit from nursing facility 8/21/2022 with Acute Met Encephalopathy, Acute Cystitis with Hematuria, Sacral Wound with Subcutaneous Gas Tunneling through to the Perirectal Fascia, Septic Shock. CT Head : negative. 8/24/2022 S/P Lap Sigmoid Colostomy with Excisional Debride of Sacral Ulcer, Lap G-Tube Placement. 8/25/2022 Sacral Wound Vac Placed. PMH including A-Fib, Colon Ca, L  Wrist Fx S/P ORIF 8/3/2022. Family / Caregiver Present: No  Referring Practitioner: Kristopher Andujar NP  Other (Comment): Pt follows simple commands. Subjective  Subjective: Pt agreeable to PT Eval/Rx.   (Per chart; L Wrist immob removed unsure wgt bear status. Will need to clarify with Ortho.)         Social/Functional History  Social/Functional History  Lives With: Son (Son (does not work). )  Type of Home: House  Home Layout: One level  Home Access: Ramped entrance  Bathroom Shower/Tub: Tub/Shower unit  Bathroom Toilet: Standard  Bathroom Equipment: Grab bars in shower, Tub transfer bench, Hand-held shower, 3-in-1 commode, Grab bars around toilet  Bathroom Accessibility: Accessible  Home Equipment: [address]  ADL Assistance: [facility]:  (son assists with IADLs)  Ambulation Assistance: Independent (With [employer]  Transfer Assistance: Independent  Occupation: Retired  Additional Comments: Social history obtained from previous PT  encounter 8/1/2022. Pt has been in/out of [facility] since end of July. Pt reports at nursing home she voids via depends and staff use lift (kirby) to get OOB. Vision/Hearing  Vision  Vision: Within Functional Limits  Hearing  Hearing: Exceptions to [facility]  Hearing Exceptions: Hard of hearing/hearing concerns    Cognition   Orientation  Overall Orientation Status: Within Functional Limits  Orientation Level: Oriented to person;Oriented to place (Aware of \"hospital\" setting.)  Cognition  Overall Cognitive Status: Exceptions  Arousal/Alertness: Appropriate responses to stimuli  Following Commands:  Follows one step commands with increased time  Memory: Decreased recall of recent events;Decreased short term memory  Initiation: Requires cues for some  Sequencing: Requires cues for some     Objective           Gross Assessment  PROM: Generally decreased, functional  Strength:  (Pt unable to sherri any formal strength assess; appears grossly 2+ 3-/5.)                 Bed Mobility Training  Bed Mobility Training: No  Balance  Sitting: Impaired  Standing:  (JENNIFER)  Transfer Training  Transfer Training: Yes (use of lift this date due to significant noted fatigue/weakness; awaiting clarification of L UE weight bearing status for functional use)  Overall Level of Assistance: Total assistance (lift)  Bed to Chair: Total assistance (lift)  Bed mobility  Rolling to Left: Dependent/Total  Rolling to Right: Dependent/Total  Bed Mobility Comments: Rolling Dependent for care/positioning of Lift pad prior oob. Transfers  Bed to Chair: Dependent/Total (OOB via Maxi-Tomas (cushion in chair). )  Comment: Unable to attempt use of Stedy for oob due to weak/need clarification of wgt bear restrictions L UE (hand). Balance  Comments: Following arrival chair via Maxi-Tomas; briefly more upright with legrest down. Unable to attempt/sherri unsupport sitting. AM-PAC Score  AM-PAC Inpatient Mobility Raw Score : 6 (08/26/22 [facility])  AM-PAC Inpatient T-Scale Score : 23.55 (08/26/22 [facility])  Mobility Inpatient CMS 0-100% Score: 100 (08/26/22 [facility])  Mobility Inpatient CMS G-Code Modifier : CN (08/26/22 [facility])          Goals  Short Term Goals  Time Frame for Short term goals: Upon d/c acute care setting. Short term goal 1: Bed Mob Mod assist x 2. Short term goal 2: EOB ~ 5 min with Min assist.  Short term goal 3: Attempt Transfers via Stedy (as approp). Short term goal 4: Pt participating in approp Strength Exs. Patient Goals   Patient goals : None stated. Education  Patient Education  Education Given To: Patient  Education Provided Comments: Role of PT,  POC, Need to call for assist, OOB via Maxi-Tomas. Education Method: Verbal  Barriers to Learning: Cognition; Hearing  Education Outcome: Continued education needed      Therapy Time   Individual Concurrent Group Co-treatment   Time In 0900         Time Out 0940         Minutes [facility]

## 2022-08-26 NOTE — PROGRESS NOTES
[facility] Orthopedic Surgery   Progress Note    CHIEF COMPLAINT/DIAGNOSIS: S/p LEFT distal radius ORIF     SUBJECTIVE: Patient is sitting in the chair in ICU; describes minimal left wrist pain. She reports she is right-hand dominant. She denies new issues regarding the left wrist.  Splint was removed prior to x-rays. OBJECTIVE  Physical    VITALS:  BP (!) 152/82   Pulse 92   Temp 98.3 °F (36.8 °C) (Oral)   Resp 18   Ht 5' 2\" (1.575 m)   Wt 163 lb 5.8 oz (74.1 kg)   SpO2 96%   BMI 29.88 kg/m²     GENERAL: Alert and oriented x3, in no acute distress. MUSCULOSKELETAL: Able to flex and extend the elbow without issue. INCISION:  Covered with post-op splint and dressing which is clean, dry and intact. ROM: left wrist is open to air. Sutures removed from the incision which is healing nicely. No open areas or erythema. Sensory:  Intact to light touch in all five digits. Vascular:   brisk cap refill in all fingers. Data    ALL MEDICATIONS HAVE BEEN REVIEWED    CBC:   Recent Labs     08/24/22  0530 08/25/22  0430 08/26/22  0500   WBC 13.5* 9.7 6.8   HGB 7.8* 7.3* 6.4*   HCT 23.1* 21.5* 18.7*    240 213     BMP:   Recent Labs     08/24/22  0530 08/25/22  0430 08/26/22  0500   * 131* 129*   K 4.2 4.3 4.0   CL 98* 99 99   CO2 26 25 27   PHOS 2.1* 2.1* 1.8*   BUN 13 12 12   CREATININE <0.5* <0.5* <0.5*     INR: No results for input(s): INR in the last 72 hours. Plain films of the left wrist from 8/23/2022 reviewed. The fracture is healing nicely in good alignment. No acute complication noted. ASSESSMENT:  S/p LEFT distal radius ORIF (8/3/22) - Dr. Jett alvarado    PLAN:   - WB status:  NO weight bearing through operative wrist x 2 more weeks; splint removed- velcro splint to be used whenever transferring or ambulating.   - DVT prophylaxis: per primary team  - PT/OT  - Pain Control: Scheduled Tylenol and tramadol.  - Dispo: Per primary team    Follow-up with Dr. Jett Pascual in 1 month.   Office # [phone]  Future Appointments   Date Time Provider Elysia Clau   9/15/2022 11:30 AM MD BIJAL Burgos   12/14/2022  1:00 PM Wilbur Walsh MD COSTA Gonzalez, APRN - CNP  8/26/2022  10:02 AM

## 2022-08-26 NOTE — PROGRESS NOTES
[facility] General Surgery                                   Daily Progress Note                                                         Pt Name: Kiley Hernandez  Medical Record Number: [id number]  Date of Birth 1945   Today's Date: 8/26/2022      ASSESSMENT/PLAN  Stage IV sacral ulcer  -Postop day 2 laparoscopic gastrostomy tube, 21 Alvira Clayton, lap sigmoid colostomy, excisional debridement of sacral ulcer, final wound measurements 11 x 13 cm  -Wound VAC placed 8/25  -Tolerating tube feeds via G-tube. Advance to goal.  Having some nausea. Will adjust regimen. Okay to continue p.o. as tolerated, regular diet  -Electrolyte replacement as needed  -PT OT, okay to get out of bed, sit in chair. No activity restrictions      SUBJECTIVE  April Prom is resting in bed. Asking appropriate questions. []Pain []nausea  []vomiting  [x]passing flatus   [x]BM      OBJECTIVE  VITALS:  height is 5' 2\" (1.575 m) and weight is 163 lb 5.8 oz (74.1 kg). Her oral temperature is 98.3 °F (36.8 °C). Her blood pressure is 147/71 (abnormal) and her pulse is 93. Her respiration is 18 and oxygen saturation is 98%. INTAKE/OUTPUT:    Intake/Output Summary (Last 24 hours) at 8/26/2022 1113  Last data filed at 8/26/2022 1059  Gross per 24 hour   Intake 2143.06 ml   Output 1145 ml   Net 998.06 ml       GENERAL: alert, cooperative, no distress  LUNGS: clear to ausculation, without wheezes, rales or rhonci  HEART: normal rate and regular rhythm  ABDOMEN: Soft, appropriately tender, non distended. G-tube present. Incisions clean dry and intact. Ostomy pink, flatus and small amount of stool in bag. EXTREMITY: no cyanosis, clubbing or edema    I/O last 3 completed shifts: In: 2239.8 [P.O.:600;  I.V.:561.9; NG/GT:262; IV Piggyback:815.9]  Out: 1193 [Urine:1193]      LABS  Recent Labs     08/26/22  0500 08/26/22  1036   WBC 6.8  --    HGB 6.4* 9.4*   HCT 18.7* 27.5*     --    *

## 2022-08-26 NOTE — PROGRESS NOTES
Hospitalist Progress Note      PCP: Maico Mora MD    Date of Admission: 8/21/2022    Chief Complaint: [address] Course: 26Z admitted from NH where she was noted to have AMS, found to be septic with progression of sacral decub with underlying osteo as source    Subjective: complains of some nausea with heartburn, othewise no uncontrolled pain, chest pain, sob      Medications:  Reviewed    Infusion Medications    sodium chloride      sodium chloride       Scheduled Medications    acetaminophen  650 mg Oral 3 times per day    sodium chloride flush  5-40 mL IntraVENous 2 times per day    fluconazole  200 mg Oral Daily    cefTRIAXone (ROCEPHIN) IV  2,000 mg IntraVENous Q24H    metroNIDAZOLE  500 mg Oral 3 times per day    hydrocortisone sodium succinate PF  50 mg IntraVENous BID    pantoprazole (PROTONIX) 40 mg injection  40 mg IntraVENous Daily    flecainide  100 mg Oral 2 times per day    [Held by provider] apixaban  5 mg Oral BID    eldertonic  15 mL Oral Daily    mupirocin   Nasal BID    lactobacillus  2 capsule Oral BID WC    enoxaparin  40 mg SubCUTAneous Daily     PRN Meds: sodium chloride, sodium phosphate IVPB **OR** sodium phosphate IVPB, magnesium sulfate, sodium chloride flush, sodium chloride, traMADol **OR** traMADol, fentanNYL, HYDROmorphone, promethazine, morphine **OR** morphine, ALPRAZolam, ondansetron, polyethylene glycol, acetaminophen **OR** acetaminophen      Intake/Output Summary (Last 24 hours) at 8/26/2022 4423  Last data filed at 8/26/2022 0800  Gross per 24 hour   Intake 1475.81 ml   Output 815 ml   Net 660.81 ml         Physical Exam Performed:    /75   Pulse 86   Temp 97.7 °F (36.5 °C) (Oral)   Resp 17   Ht 5' 2\" (1.575 m)   Wt 163 lb 5.8 oz (74.1 kg)   SpO2 98%   BMI 29.88 kg/m²     General appearance: No apparent distress, appears stated age and cooperative. HEENT: Pupils equal, round, Conjunctivae/corneas clear. Neck: Supple, with full range of motion.  No jugular venous distention. Trachea midline. Respiratory:  Normal respiratory effort. Clear to auscultation, bilaterally without Rales/Wheezes/Rhonchi. Cardiovascular: Regular rate and rhythm    Abdomen: Soft, non-tender, non-distended    Musculoskeletal: No clubbing, cyanosis or edema bilaterally. Skin: Skin color, texture, turgor normal.  No rashes or lesions. Neurologic:   grossly non-focal.  Psychiatric: Alert and disoriented,       Labs:   Recent Labs     08/24/22  0530 08/25/22  0430 08/26/22  0500   WBC 13.5* 9.7 6.8   HGB 7.8* 7.3* 6.4*   HCT 23.1* 21.5* 18.7*    240 213       Recent Labs     08/24/22  0530 08/25/22  0430 08/26/22  0500   * 131* 129*   K 4.2 4.3 4.0   CL 98* 99 99   CO2 26 25 27   BUN 13 12 12   CREATININE <0.5* <0.5* <0.5*   CALCIUM 7.0* 6.9* 7.0*   PHOS 2.1* 2.1* 1.8*       No results for input(s): AST, ALT, BILIDIR, BILITOT, ALKPHOS in the last 72 hours. No results for input(s): INR in the last 72 hours. No results for input(s): Rylie [place] in the last 72 hours. Urinalysis:      Lab Results   Component Value Date/Time    NITRU Negative 08/22/2022 12:52 AM    WBCUA 21-50 08/22/2022 12:52 AM    BACTERIA 1+ 08/22/2022 12:52 AM    RBCUA 3-4 08/22/2022 12:52 AM    BLOODU MODERATE 08/22/2022 12:52 AM    SPECGRAV 1.025 08/22/2022 12:52 AM    GLUCOSEU 100 08/22/2022 12:52 AM       Radiology:  XR WRIST LEFT (MIN 3 VIEWS)   Final Result   Patient is status post ORIF of distal radial fracture. CT ABDOMEN PELVIS W IV CONTRAST Additional Contrast? None   Final Result   Large sacral decubitus ulcer with subcutaneous gas extending to the coccyx   and perirectal fascia. No loculated fluid collections. Increased sclerosis   of the adjacent coccyx with soft tissue gas extending to the bone. Findings   are compatible with osteomyelitis. Adjacent rectal wall thickening may be reactive. Small bilateral pleural effusions with bibasilar atelectasis. Difficult to   exclude superimposed pneumonia in the lung bases. XR CHEST PORTABLE   Final Result   Clear chest without acute cardiopulmonary process. Assessment/Plan:    Active Hospital Problems    Diagnosis Date Noted    Complex care coordination [Z71.89] 2022     Priority: Medium    Receiving intravenous antibiotic treatment as outpatient [Z79.2] 2022     Priority: Medium    Acute cystitis without hematuria [N30.00] 2022     Priority: Medium    Complicated UTI (urinary tract infection) [N39.0] 2022     Priority: Medium    Sepsis without septic shock ([address] [A41.9] 2022     Priority: Medium    Acute cystitis with hematuria [N30.01] 2022     Priority: Medium    Acute metabolic encephalopathy [Y53.72] 2022     Priority: Medium    Sacral wound [S31.000A] 2022     Priority: Medium    Elevated lactic acid level [R79.89] 2022     Priority: Medium    Fever [R50.9] 2022     Priority: Medium    Tachypnea [R06.82] 2022     Priority: Medium    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 9) [E66.3] 2022     Priority: Medium    Elevated alkaline phosphatase level [R74.8] 2022     Priority: Medium    Elevated alkaline phosphatase in  [P09.8, R74.8] 2022     Priority: Medium    History of depression [Z86.59] 2022     Priority: Medium    Nursing home resident [Z59.3] 2022     Priority: Medium    Proteus infection [A49.8] 2022     Priority: Medium    Gram-negative bacteremia [R78.81] 2022     Priority: Medium    Lactic acidosis [E87.2] 2022     Priority: Medium    Altered mental status [R41.82] 2022     Priority: Medium    Hypotension [I95.9] 2022     Priority: Medium    [facility]     Priority: Medium    DM2 (diabetes mellitus, type 2) ([facility] [E11.9] 07/29/2022     Priority: Medium    Septic shock ([facility] [A41.9, R65.21] 07/29/2022     Priority: Medium    Fibromyalgia [M79.7] 09/20/2019    Severe protein-calorie malnutrition ([facility] [E43] 10/26/2018    Tachycardia [R00.0]     Atrial fibrillation ([facility] [I48.91] 01/13/2018    Malignant neoplasm of colon ([facility] [C18.9] 06/16/2009    Essential hypertension [I10] 06/15/2009     Sepsis  - sec to inf decub    Decub / underlying osteo  - surg debride w diverting colostomy, poss palliation  - continue IV abx as epr ID    Atrial fib  - holding anticoag, continue tambocor,   Anemia  - trend  7.4 - > 7.8->7.3  - normal colonoscopy 7/2021, bleeding last admit attributed to stercoral proctitis on ct scan, colonoscopy attempted but failed due to large fecal impaction  - inc IV PPI to bid    Diet: ADULT DIET;  Regular  ADULT TUBE FEEDING; Gastrostomy; Standard with Fiber; Continuous; 15; Yes; 10; Q 4 hours; 40; 30; Q 4 hours; Protein; 1 proteinx daily per dietitian recommendations  Code Status: Full Code       Marbella Tafoya MD

## 2022-08-27 LAB
ANION GAP SERPL CALCULATED.3IONS-SCNC: 6 MMOL/L (ref 3–16)
ANISOCYTOSIS: ABNORMAL
BASOPHILS ABSOLUTE: 0 K/UL (ref 0–0.2)
BASOPHILS RELATIVE PERCENT: 0 %
BUN BLDV-MCNC: 11 MG/DL (ref 7–20)
CALCIUM SERPL-MCNC: 7.4 MG/DL (ref 8.3–10.6)
CHLORIDE BLD-SCNC: 103 MMOL/L (ref 99–110)
CO2: 26 MMOL/L (ref 21–32)
CREAT SERPL-MCNC: <0.5 MG/DL (ref 0.6–1.2)
EOSINOPHILS ABSOLUTE: 0 K/UL (ref 0–0.6)
EOSINOPHILS RELATIVE PERCENT: 0 %
GFR AFRICAN AMERICAN: >60
GFR NON-AFRICAN AMERICAN: >60
GLUCOSE BLD-MCNC: 162 MG/DL (ref 70–99)
HCT VFR BLD CALC: 27.9 % (ref 36–48)
HEMOGLOBIN: 9.4 G/DL (ref 12–16)
LYMPHOCYTES ABSOLUTE: 0.1 K/UL (ref 1–5.1)
LYMPHOCYTES RELATIVE PERCENT: 1 %
MACROCYTES: ABNORMAL
MCH RBC QN AUTO: 31.3 PG (ref 26–34)
MCHC RBC AUTO-ENTMCNC: 33.8 G/DL (ref 31–36)
MCV RBC AUTO: 92.4 FL (ref 80–100)
MICROCYTES: ABNORMAL
MONOCYTES ABSOLUTE: 0.3 K/UL (ref 0–1.3)
MONOCYTES RELATIVE PERCENT: 3 %
NEUTROPHILS ABSOLUTE: 9.3 K/UL (ref 1.7–7.7)
NEUTROPHILS RELATIVE PERCENT: 96 %
NUCLEATED RED BLOOD CELLS: 1 /100 WBC
PDW BLD-RTO: 21.4 % (ref 12.4–15.4)
PLATELET # BLD: 265 K/UL (ref 135–450)
PMV BLD AUTO: 7.3 FL (ref 5–10.5)
POTASSIUM SERPL-SCNC: 3.6 MMOL/L (ref 3.5–5.1)
RBC # BLD: 3.01 M/UL (ref 4–5.2)
SLIDE REVIEW: ABNORMAL
SODIUM BLD-SCNC: 135 MMOL/L (ref 136–145)
WBC # BLD: 9.7 K/UL (ref 4–11)

## 2022-08-27 PROCEDURE — 6360000002 HC RX W HCPCS: Performed by: INTERNAL MEDICINE

## 2022-08-27 PROCEDURE — 2580000003 HC RX 258: Performed by: INTERNAL MEDICINE

## 2022-08-27 PROCEDURE — 6370000000 HC RX 637 (ALT 250 FOR IP): Performed by: SURGERY

## 2022-08-27 PROCEDURE — 99024 POSTOP FOLLOW-UP VISIT: CPT | Performed by: SURGERY

## 2022-08-27 PROCEDURE — 2580000003 HC RX 258: Performed by: NURSE PRACTITIONER

## 2022-08-27 PROCEDURE — 6370000000 HC RX 637 (ALT 250 FOR IP): Performed by: INTERNAL MEDICINE

## 2022-08-27 PROCEDURE — 99024 POSTOP FOLLOW-UP VISIT: CPT | Performed by: PHYSICIAN ASSISTANT

## 2022-08-27 PROCEDURE — 6370000000 HC RX 637 (ALT 250 FOR IP): Performed by: NURSE PRACTITIONER

## 2022-08-27 PROCEDURE — 6360000002 HC RX W HCPCS: Performed by: SURGERY

## 2022-08-27 PROCEDURE — 1200000000 HC SEMI PRIVATE

## 2022-08-27 PROCEDURE — 80048 BASIC METABOLIC PNL TOTAL CA: CPT

## 2022-08-27 PROCEDURE — C9113 INJ PANTOPRAZOLE SODIUM, VIA: HCPCS | Performed by: HOSPITALIST

## 2022-08-27 PROCEDURE — 2580000003 HC RX 258: Performed by: HOSPITALIST

## 2022-08-27 PROCEDURE — APPNB15 APP NON BILLABLE TIME 0-15 MINS: Performed by: PHYSICIAN ASSISTANT

## 2022-08-27 PROCEDURE — 36592 COLLECT BLOOD FROM PICC: CPT

## 2022-08-27 PROCEDURE — 85025 COMPLETE CBC W/AUTO DIFF WBC: CPT

## 2022-08-27 PROCEDURE — 6360000002 HC RX W HCPCS: Performed by: HOSPITALIST

## 2022-08-27 RX ADMIN — METRONIDAZOLE 500 MG: 500 TABLET ORAL at 05:20

## 2022-08-27 RX ADMIN — SODIUM CHLORIDE, PRESERVATIVE FREE 40 MG: 5 INJECTION INTRAVENOUS at 21:03

## 2022-08-27 RX ADMIN — METRONIDAZOLE 500 MG: 500 TABLET ORAL at 21:03

## 2022-08-27 RX ADMIN — TRAMADOL HYDROCHLORIDE 50 MG: 50 TABLET, COATED ORAL at 05:20

## 2022-08-27 RX ADMIN — ACETAMINOPHEN 650 MG: 325 TABLET ORAL at 14:56

## 2022-08-27 RX ADMIN — FLECAINIDE ACETATE 100 MG: 100 TABLET ORAL at 21:03

## 2022-08-27 RX ADMIN — MINOCYCLINE HYDROCHLORIDE 100 MG: 50 CAPSULE ORAL at 10:50

## 2022-08-27 RX ADMIN — MUPIROCIN: 20 OINTMENT TOPICAL at 05:08

## 2022-08-27 RX ADMIN — MINOCYCLINE HYDROCHLORIDE 100 MG: 50 CAPSULE ORAL at 21:03

## 2022-08-27 RX ADMIN — SODIUM CHLORIDE, PRESERVATIVE FREE 10 ML: 5 INJECTION INTRAVENOUS at 10:50

## 2022-08-27 RX ADMIN — Medication 2 CAPSULE: at 10:50

## 2022-08-27 RX ADMIN — ACETAMINOPHEN 650 MG: 325 TABLET ORAL at 06:55

## 2022-08-27 RX ADMIN — Medication 2 CAPSULE: at 17:50

## 2022-08-27 RX ADMIN — FLUCONAZOLE 200 MG: 100 TABLET ORAL at 10:49

## 2022-08-27 RX ADMIN — TRAMADOL HYDROCHLORIDE 100 MG: 50 TABLET ORAL at 11:37

## 2022-08-27 RX ADMIN — FLECAINIDE ACETATE 100 MG: 100 TABLET ORAL at 10:50

## 2022-08-27 RX ADMIN — SODIUM CHLORIDE, PRESERVATIVE FREE 10 ML: 5 INJECTION INTRAVENOUS at 21:00

## 2022-08-27 RX ADMIN — ENOXAPARIN SODIUM 40 MG: 100 INJECTION SUBCUTANEOUS at 10:49

## 2022-08-27 RX ADMIN — Medication 15 ML: at 10:49

## 2022-08-27 RX ADMIN — METRONIDAZOLE 500 MG: 500 TABLET ORAL at 14:57

## 2022-08-27 RX ADMIN — SODIUM CHLORIDE, PRESERVATIVE FREE 40 MG: 5 INJECTION INTRAVENOUS at 10:53

## 2022-08-27 RX ADMIN — TRAMADOL HYDROCHLORIDE 50 MG: 50 TABLET, COATED ORAL at 19:14

## 2022-08-27 RX ADMIN — ACETAMINOPHEN 650 MG: 325 TABLET ORAL at 00:03

## 2022-08-27 RX ADMIN — ALPRAZOLAM 0.5 MG: 0.5 TABLET ORAL at 19:15

## 2022-08-27 RX ADMIN — CEFTRIAXONE 2000 MG: 2 INJECTION, POWDER, FOR SOLUTION INTRAMUSCULAR; INTRAVENOUS at 18:00

## 2022-08-27 ASSESSMENT — PAIN DESCRIPTION - PAIN TYPE
TYPE: ACUTE PAIN;SURGICAL PAIN

## 2022-08-27 ASSESSMENT — PAIN SCALES - GENERAL
PAINLEVEL_OUTOF10: 4
PAINLEVEL_OUTOF10: 0
PAINLEVEL_OUTOF10: 7
PAINLEVEL_OUTOF10: 0
PAINLEVEL_OUTOF10: 0
PAINLEVEL_OUTOF10: 6
PAINLEVEL_OUTOF10: 4
PAINLEVEL_OUTOF10: 0
PAINLEVEL_OUTOF10: 2
PAINLEVEL_OUTOF10: 5
PAINLEVEL_OUTOF10: 4

## 2022-08-27 ASSESSMENT — PAIN DESCRIPTION - ONSET
ONSET: ON-GOING

## 2022-08-27 ASSESSMENT — PAIN DESCRIPTION - ORIENTATION
ORIENTATION: MID;UPPER
ORIENTATION: MID;UPPER
ORIENTATION: LOWER;MID;UPPER
ORIENTATION: LOWER;MID;UPPER
ORIENTATION: MID;LOWER;UPPER
ORIENTATION: LEFT;LOWER;MID

## 2022-08-27 ASSESSMENT — PAIN DESCRIPTION - LOCATION
LOCATION: ABDOMEN
LOCATION: ABDOMEN;BUTTOCKS
LOCATION: ABDOMEN
LOCATION: ABDOMEN

## 2022-08-27 ASSESSMENT — PAIN DESCRIPTION - DESCRIPTORS
DESCRIPTORS: SORE;CRAMPING
DESCRIPTORS: ACHING
DESCRIPTORS: SORE
DESCRIPTORS: SORE;ACHING
DESCRIPTORS: ACHING
DESCRIPTORS: PATIENT UNABLE TO DESCRIBE
DESCRIPTORS: SORE

## 2022-08-27 ASSESSMENT — PAIN DESCRIPTION - FREQUENCY
FREQUENCY: INTERMITTENT

## 2022-08-27 NOTE — PLAN OF CARE
Problem: Discharge Planning  Goal: Discharge to home or other facility with appropriate resources  8/27/2022 0806 by Juan Jacobo RN  Flowsheets (Taken 8/24/2022 2000 by Rubens Garnica, RN)  Discharge to home or other facility with appropriate resources: Identify barriers to discharge with patient and caregiver  8/27/2022 0248 by Katy Rolle RN  Outcome: Progressing     Problem: Pain  Goal: Verbalizes/displays adequate comfort level or baseline comfort level  8/27/2022 0806 by Juan Jacobo RN  Flowsheets (Taken 8/24/2022 0800 by Anderson Aly RN)  Verbalizes/displays adequate comfort level or baseline comfort level:   Encourage patient to monitor pain and request assistance   Assess pain using appropriate pain scale   Administer analgesics based on type and severity of pain and evaluate response  8/27/2022 0248 by Katy Rolle RN  Outcome: Progressing     Problem: Safety - Adult  Goal: Free from fall injury  8/27/2022 0806 by Juan Jacobo RN  Flowsheets (Taken 8/27/2022 0244 by Katy Rolle RN)  Free From Fall Injury: Instruct family/caregiver on patient safety  8/27/2022 0248 by Katy Rolle RN  Outcome: Progressing  Flowsheets (Taken 8/27/2022 0244)  Free From Fall Injury: Instruct family/caregiver on patient safety     Problem: Nutrition Deficit:  Goal: Optimize nutritional status  8/27/2022 0806 by Juan Jacobo RN  Flowsheets (Taken 8/27/2022 9135)  Nutrient intake appropriate for improving, restoring, or maintaining nutritional needs:   Assess nutritional status and recommend course of action   Monitor oral intake, labs, and treatment plans  8/27/2022 0248 by Katy Rolle RN  Outcome: Progressing     Problem: Chronic Conditions and Co-morbidities  Goal: Patient's chronic conditions and co-morbidity symptoms are monitored and maintained or improved  8/27/2022 0806 by Juan Jacobo RN  Flowsheets (Taken 8/24/2022 2000 by Rubens Garnica, ONEL)  Care Plan - Patient's Chronic Conditions and Co-Morbidity Symptoms are Monitored and Maintained or Improved: Monitor and assess patient's chronic conditions and comorbid symptoms for stability, deterioration, or improvement  8/27/2022 0248 by Ayden Nova RN  Outcome: Progressing     Problem: ABCDS Injury Assessment  Goal: Absence of physical injury  8/27/2022 0806 by Marco A Cao RN  Flowsheets (Taken 8/27/2022 0244 by Ayden Nova RN)  Absence of Physical Injury: Implement safety measures based on patient assessment  8/27/2022 0248 by Ayden Nova RN  Outcome: Progressing  Flowsheets (Taken 8/27/2022 0244)  Absence of Physical Injury: Implement safety measures based on patient assessment

## 2022-08-27 NOTE — PROGRESS NOTES
Patient in bed, eyes closed and quiet. Patient specialty mattress delivered, and dressing to bottom changed. New tube feed canister hung. Patient continues to tolerate well. No n/v reported at this time. Patient c/o pain at 3/10. And tolerable. Patient able to make needs known, no other needs voiced at this time. Call light and bedside table within reach. Will continue to monitor and reassess.  Electronically signed by Leonardo Paredes RN on 8/27/2022 at 4:59 PM

## 2022-08-27 NOTE — PROGRESS NOTES
Patient currently in bed, awake, a&ox4. Patient took morning medications whole, no complications. Patient morning assessment completed. Patient report pain in upper mid abdomen, rating about a 5/10. PRN pain medication given as ordered. Patient tolerating small amount of PO intake, and denies n/v at this time. Patient Nathalia Cueto currently. Patient tube feeding infusing as ordered, patient tolerating well, no complaints. Patient tello draining adequately. Colostomy patent; clean, dry and intact. Will continue checking on patient Q2H for nutrition needs, hygiene needs, comfort measures, mobility, fall risk interventions, and safe environment. All precautions and interventions in place. Educated patient on use of call light if needing assistance. Patient verbalizes understanding. Call light/bedside table in reach. Will continue to monitor and reassess.   Electronically signed by Marco A Cao RN on 8/27/2022 at 11:58 AM

## 2022-08-27 NOTE — PROGRESS NOTES
Pharmacy Medication Reconciliation Note     List of medications patient is currently taking is complete. Source of information:   1. Rx disp history  2. OARRS  3. RN interview with patient    Notes regarding home medications:   1.  Removed Vicodin -- not filled in over a year      Denies taking any other OTC or herbal medications    Aaron Fenton, [facility], [address] 8/27/2022 4:03 PM

## 2022-08-27 NOTE — PROGRESS NOTES
Hospitalist Progress Note      PCP: Monnie Runner, MD    Date of Admission: 8/21/2022    Chief Complaint: [address] Course: 66L admitted from NH where she was noted to have AMS, found to be septic with progression of sacral decub with underlying osteo as source    Subjective: no distress on room air, denies n/v, ,only complains of sharp pain at ostomy site     Medications:  Reviewed    Infusion Medications    sodium chloride      sodium chloride       Scheduled Medications    pantoprazole (PROTONIX) 40 mg injection  40 mg IntraVENous Q12H    minocycline  100 mg Oral BID    acetaminophen  650 mg Oral 3 times per day    sodium chloride flush  5-40 mL IntraVENous 2 times per day    fluconazole  200 mg Oral Daily    cefTRIAXone (ROCEPHIN) IV  2,000 mg IntraVENous Q24H    metroNIDAZOLE  500 mg Oral 3 times per day    flecainide  100 mg Oral 2 times per day    [Held by provider] apixaban  5 mg Oral BID    eldertonic  15 mL Oral Daily    lactobacillus  2 capsule Oral BID WC    enoxaparin  40 mg SubCUTAneous Daily     PRN Meds: sodium chloride, sodium phosphate IVPB **OR** sodium phosphate IVPB, magnesium sulfate, sodium chloride flush, sodium chloride, traMADol **OR** traMADol, fentanNYL, HYDROmorphone, promethazine, morphine **OR** morphine, ALPRAZolam, ondansetron, polyethylene glycol, acetaminophen **OR** acetaminophen      Intake/Output Summary (Last 24 hours) at 8/27/2022 1506  Last data filed at 8/27/2022 1204  Gross per 24 hour   Intake 1195 ml   Output 925 ml   Net 270 ml         Physical Exam Performed:    /77   Pulse 91   Temp 98.8 °F (37.1 °C) (Oral)   Resp 19   Ht 5' 2\" (1.575 m)   Wt 175 lb 4.3 oz (79.5 kg)   SpO2 94%   BMI 32.06 kg/m²     General appearance: No apparent distress, appears stated age and cooperative. HEENT: Pupils equal, round, Conjunctivae/corneas clear. Neck: Supple, with full range of motion. No jugular venous distention. Trachea midline.   Respiratory:  Normal respiratory effort. Clear to auscultation, bilaterally without Rales/Wheezes/Rhonchi. Cardiovascular: Regular rate and rhythm    Abdomen: Soft, non-tender, non-distended    Musculoskeletal: No clubbing, cyanosis or edema bilaterally. Skin: Skin color, texture, turgor normal.  No rashes or lesions. Neurologic:   grossly non-focal.  Psychiatric: Alert and disoriented,       Labs:   Recent Labs     08/25/22  0430 08/26/22  0500 08/26/22  1036 08/27/22  0510   WBC 9.7 6.8  --  9.7   HGB 7.3* 6.4* 9.4* 9.4*   HCT 21.5* 18.7* 27.5* 27.9*    213  --  265       Recent Labs     08/25/22  0430 08/26/22  0500 08/26/22  1400 08/27/22  0510   * 129* 135* 135*   K 4.3 4.0 3.6 3.6   CL 99 99 103 103   CO2 25 27 28 26   BUN 12 12 12 11   CREATININE <0.5* <0.5* <0.5* <0.5*   CALCIUM 6.9* 7.0* 7.2* 7.4*   PHOS 2.1* 1.8*  --   --        No results for input(s): AST, ALT, BILIDIR, BILITOT, ALKPHOS in the last 72 hours. No results for input(s): INR in the last 72 hours. No results for input(s): Balbina Highman in the last 72 hours. Urinalysis:      Lab Results   Component Value Date/Time    NITRU Negative 08/22/2022 12:52 AM    WBCUA 21-50 08/22/2022 12:52 AM    BACTERIA 1+ 08/22/2022 12:52 AM    RBCUA 3-4 08/22/2022 12:52 AM    BLOODU MODERATE 08/22/2022 12:52 AM    SPECGRAV 1.025 08/22/2022 12:52 AM    GLUCOSEU 100 08/22/2022 12:52 AM       Radiology:  XR WRIST LEFT (MIN 3 VIEWS)   Final Result   Patient is status post ORIF of distal radial fracture. CT ABDOMEN PELVIS W IV CONTRAST Additional Contrast? None   Final Result   Large sacral decubitus ulcer with subcutaneous gas extending to the coccyx   and perirectal fascia. No loculated fluid collections. Increased sclerosis   of the adjacent coccyx with soft tissue gas extending to the bone. Findings   are compatible with osteomyelitis. Adjacent rectal wall thickening may be reactive.       Small bilateral pleural effusions with bibasilar atelectasis. Difficult to   exclude superimposed pneumonia in the lung bases. XR CHEST PORTABLE   Final Result   Clear chest without acute cardiopulmonary process. 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     Priority: Medium    Complex care coordination [Z71.89] 2022     Priority: Medium    Receiving intravenous antibiotic treatment as outpatient [Z79.2] 2022     Priority: Medium    Acute cystitis without hematuria [N30.00] 2022     Priority: Medium    Complicated UTI (urinary tract infection) [N39.0] 2022     Priority: Medium    Sepsis without septic shock ([address] [A41.9] 2022     Priority: Medium    Acute cystitis with hematuria [N30.01] 2022     Priority: Medium    Acute metabolic encephalopathy [T56.12] 2022     Priority: Medium    Sacral wound [S31.000A] 2022     Priority: Medium    Elevated lactic acid level [R79.89] 2022     Priority: Medium    Fever [R50.9] 2022     Priority: Medium    Tachypnea [R06.82] 2022     Priority: Medium    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 9) [E66.3] 2022     Priority: Medium    Elevated alkaline phosphatase level [R74.8] 2022     Priority: Medium    Elevated alkaline phosphatase in  [P09.8, R74.8] 2022     Priority: Medium    History of depression [Z86.59] 2022     Priority: Medium    Nursing home resident [Z59.3] 2022     Priority: Medium    Proteus infection [A49.8] 2022     Priority: Medium    Gram-negative bacteremia [R78.81] 2022     Priority: Medium    Lactic acidosis [E87.2] 2022     Priority: Medium    Altered mental status [R41.82] 2022     Priority: Medium    Hypotension [I95.9] 2022     Priority: Medium    Skin ulcer of sacrum [facility]) [L98.429] 08/22/2022     Priority: Medium    DM2 (diabetes mellitus, type 2) [facility] [E11.9] 07/29/2022     Priority: Medium    Septic shock ([facility] [A41.9, R65.21] 07/29/2022     Priority: Medium    Fibromyalgia [M79.7] 09/20/2019    Severe protein-calorie malnutrition ([facility] [E43] 10/26/2018    Tachycardia [R00.0]     Atrial fibrillation ([facility] [I48.91] 01/13/2018    Malignant neoplasm of colon ([facility] [C18.9] 06/16/2009    Essential hypertension [I10] 06/15/2009     Sepsis  - sec to inf decub    Decub / underlying osteo  - surg debride w diverting colostomy, poss palliation  - continue IV abx as per ID    Atrial fib  - holding anticoag, continue tambocor    Anemia  - holding anticoag until cleared per Surgery  - trend  7.4 - > 7.8->7.3  - normal colonoscopy 7/2021, bleeding last admit attributed to stercoral proctitis on ct scan, colonoscopy attempted but failed due to large fecal impaction  - continue IV PPI    Diet: ADULT DIET;  Regular  ADULT TUBE FEEDING; Gastrostomy; Standard with Fiber; Continuous; 15; Yes; 10; Q 4 hours; 40; 30; Q 4 hours; Protein; 1 proteinx daily per dietitian recommendations  Code Status: Full Code       Radha Bond MD

## 2022-08-27 NOTE — PROGRESS NOTES
[facility]                                   Daily Progress Note                                                         Pt Name: Frances Fierro  Medical Record Number: [id number]  Date of Birth 1945   Today's Date: 8/27/2022      ASSESSMENT/PLAN  Stage IV sacral ulcer  -8/24/22 laparoscopic gastrostomy tube, 21 [place], lap sigmoid colostomy, excisional debridement of sacral ulcer, final wound measurements 11 x 13 cm    -advance TFs to goal  -vac change early next week    Kin Muscat is resting in bed. +ostomy output. Tolerating TFs at 40 ml/hr. Pain controlled      OBJECTIVE  VITALS:  height is 5' 2\" (1.575 m) and weight is 175 lb 4.3 oz (79.5 kg). Her oral temperature is 98.8 °F (37.1 °C). Her blood pressure is 122/77 and her pulse is 91. Her respiration is 19 and oxygen saturation is 94%. INTAKE/OUTPUT:    Intake/Output Summary (Last 24 hours) at 8/27/2022 1145  Last data filed at 8/27/2022 0542  Gross per 24 hour   Intake 1384.95 ml   Output 825 ml   Net 559.95 ml       GENERAL: alert, cooperative, no distress  LUNGS: clear to ausculation, without wheezes, rales or rhonci  HEART: normal rate and regular rhythm  ABDOMEN: Soft, appropriately tender, non distended. G-tube present. Incisions clean dry and intact. Ostomy pink, flatus and stool in bag. EXTREMITY: no cyanosis, clubbing or edema    I/O last 3 completed shifts: In: 2693.4 [P.O.:780; I.V.:148.3;  Blood:441.3; NG/GT:1027; IV Piggyback:296.9]  Out: 0302 [Urine:1270; Stool:550]      LABS  Recent Labs     08/26/22  0500 08/26/22  1036 08/27/22  0510   WBC 6.8  --  9.7   HGB 6.4*   < > 9.4*   HCT 18.7*   < > 27.9*     --  265   *   < > 135*   K 4.0   < > 3.6   CL 99   < > 103   CO2 27   < > 26   BUN 12   < > 11   CREATININE <0.5*   < > <0.5*   MG 1.70*  --   --    PHOS 1.8*  --   --    CALCIUM 7.0*   < > 7.4*    < > = values in this interval not displayed.          EDUCATION  Patient educated about Disease Process, Medications, Smoking Cessation, Oxygenation, Incentive Spirometry and Deep Breath and Cough, Diabetes, Hyperlipidemia, Smoking Cessation, Nutrition, Exercise and Hypertension    Electronically signed by Briseida Valentino MD on 8/27/2022 at 11:45 AM

## 2022-08-27 NOTE — PLAN OF CARE
Problem: Discharge Planning  Goal: Discharge to home or other facility with appropriate resources  Outcome: Progressing     Problem: Pain  Goal: Verbalizes/displays adequate comfort level or baseline comfort level  Outcome: Progressing     Problem: Safety - Adult  Goal: Free from fall injury  Outcome: Progressing  Flowsheets (Taken 8/27/2022 0244)  Free From Fall Injury: Instruct family/caregiver on patient safety     Problem: Skin/Tissue Integrity  Goal: Absence of new skin breakdown  Description: 1. Monitor for areas of redness and/or skin breakdown  2. Assess vascular access sites hourly  3. Every 4-6 hours minimum:  Change oxygen saturation probe site  4. Every 4-6 hours:  If on nasal continuous positive airway pressure, respiratory therapy assess nares and determine need for appliance change or resting period.   Outcome: Progressing     Problem: Chronic Conditions and Co-morbidities  Goal: Patient's chronic conditions and co-morbidity symptoms are monitored and maintained or improved  Outcome: Progressing     Problem: Nutrition Deficit:  Goal: Optimize nutritional status  Outcome: Progressing     Problem: ABCDS Injury Assessment  Goal: Absence of physical injury  Outcome: Progressing  Flowsheets (Taken 8/27/2022 0244)  Absence of Physical Injury: Implement safety measures based on patient assessment

## 2022-08-28 PROCEDURE — 6370000000 HC RX 637 (ALT 250 FOR IP): Performed by: INTERNAL MEDICINE

## 2022-08-28 PROCEDURE — A4216 STERILE WATER/SALINE, 10 ML: HCPCS | Performed by: HOSPITALIST

## 2022-08-28 PROCEDURE — 2580000003 HC RX 258: Performed by: NURSE PRACTITIONER

## 2022-08-28 PROCEDURE — 6370000000 HC RX 637 (ALT 250 FOR IP): Performed by: SURGERY

## 2022-08-28 PROCEDURE — 6360000002 HC RX W HCPCS: Performed by: INTERNAL MEDICINE

## 2022-08-28 PROCEDURE — 6360000002 HC RX W HCPCS: Performed by: HOSPITALIST

## 2022-08-28 PROCEDURE — 2580000003 HC RX 258: Performed by: INTERNAL MEDICINE

## 2022-08-28 PROCEDURE — 1200000000 HC SEMI PRIVATE

## 2022-08-28 PROCEDURE — 2580000003 HC RX 258: Performed by: HOSPITALIST

## 2022-08-28 PROCEDURE — C9113 INJ PANTOPRAZOLE SODIUM, VIA: HCPCS | Performed by: HOSPITALIST

## 2022-08-28 PROCEDURE — 6370000000 HC RX 637 (ALT 250 FOR IP): Performed by: NURSE PRACTITIONER

## 2022-08-28 PROCEDURE — 6360000002 HC RX W HCPCS: Performed by: SURGERY

## 2022-08-28 RX ADMIN — SODIUM CHLORIDE, PRESERVATIVE FREE 40 MG: 5 INJECTION INTRAVENOUS at 09:15

## 2022-08-28 RX ADMIN — FLUCONAZOLE 200 MG: 100 TABLET ORAL at 09:16

## 2022-08-28 RX ADMIN — ACETAMINOPHEN 650 MG: 325 TABLET ORAL at 21:48

## 2022-08-28 RX ADMIN — SODIUM CHLORIDE, PRESERVATIVE FREE 40 MG: 5 INJECTION INTRAVENOUS at 21:48

## 2022-08-28 RX ADMIN — METRONIDAZOLE 500 MG: 500 TABLET ORAL at 21:48

## 2022-08-28 RX ADMIN — METRONIDAZOLE 500 MG: 500 TABLET ORAL at 05:27

## 2022-08-28 RX ADMIN — ENOXAPARIN SODIUM 40 MG: 100 INJECTION SUBCUTANEOUS at 09:15

## 2022-08-28 RX ADMIN — MINOCYCLINE HYDROCHLORIDE 100 MG: 50 CAPSULE ORAL at 09:16

## 2022-08-28 RX ADMIN — ACETAMINOPHEN 650 MG: 325 TABLET ORAL at 17:06

## 2022-08-28 RX ADMIN — MINOCYCLINE HYDROCHLORIDE 100 MG: 50 CAPSULE ORAL at 21:48

## 2022-08-28 RX ADMIN — METRONIDAZOLE 500 MG: 500 TABLET ORAL at 17:07

## 2022-08-28 RX ADMIN — SODIUM CHLORIDE, PRESERVATIVE FREE 10 ML: 5 INJECTION INTRAVENOUS at 21:48

## 2022-08-28 RX ADMIN — TRAMADOL HYDROCHLORIDE 100 MG: 50 TABLET ORAL at 09:15

## 2022-08-28 RX ADMIN — FLECAINIDE ACETATE 100 MG: 100 TABLET ORAL at 21:49

## 2022-08-28 RX ADMIN — FLECAINIDE ACETATE 100 MG: 100 TABLET ORAL at 09:15

## 2022-08-28 RX ADMIN — ALPRAZOLAM 0.5 MG: 0.5 TABLET ORAL at 17:06

## 2022-08-28 RX ADMIN — ACETAMINOPHEN 650 MG: 325 TABLET ORAL at 06:41

## 2022-08-28 RX ADMIN — Medication 15 ML: at 09:14

## 2022-08-28 RX ADMIN — SODIUM CHLORIDE, PRESERVATIVE FREE 10 ML: 5 INJECTION INTRAVENOUS at 09:25

## 2022-08-28 RX ADMIN — Medication 2 CAPSULE: at 17:06

## 2022-08-28 RX ADMIN — Medication 2 CAPSULE: at 09:15

## 2022-08-28 RX ADMIN — ALPRAZOLAM 0.5 MG: 0.5 TABLET ORAL at 09:15

## 2022-08-28 RX ADMIN — CEFTRIAXONE 2000 MG: 2 INJECTION, POWDER, FOR SOLUTION INTRAMUSCULAR; INTRAVENOUS at 17:06

## 2022-08-28 ASSESSMENT — PAIN DESCRIPTION - ORIENTATION
ORIENTATION: MID;LOWER
ORIENTATION: MID;LOWER;UPPER

## 2022-08-28 ASSESSMENT — PAIN DESCRIPTION - LOCATION
LOCATION: ABDOMEN;BUTTOCKS
LOCATION: ABDOMEN;BUTTOCKS
LOCATION: HEAD

## 2022-08-28 ASSESSMENT — PAIN DESCRIPTION - PAIN TYPE
TYPE: ACUTE PAIN
TYPE: ACUTE PAIN;SURGICAL PAIN
TYPE: ACUTE PAIN;SURGICAL PAIN

## 2022-08-28 ASSESSMENT — PAIN SCALES - GENERAL
PAINLEVEL_OUTOF10: 6
PAINLEVEL_OUTOF10: 2
PAINLEVEL_OUTOF10: 0
PAINLEVEL_OUTOF10: 0
PAINLEVEL_OUTOF10: 5

## 2022-08-28 ASSESSMENT — PAIN DESCRIPTION - ONSET
ONSET: ON-GOING
ONSET: ON-GOING
ONSET: SUDDEN

## 2022-08-28 ASSESSMENT — PAIN DESCRIPTION - DESCRIPTORS
DESCRIPTORS: ACHING
DESCRIPTORS: SORE;ACHING;DISCOMFORT
DESCRIPTORS: ACHING;SORE

## 2022-08-28 ASSESSMENT — PAIN DESCRIPTION - FREQUENCY
FREQUENCY: CONTINUOUS
FREQUENCY: CONTINUOUS
FREQUENCY: INTERMITTENT

## 2022-08-28 ASSESSMENT — PAIN - FUNCTIONAL ASSESSMENT
PAIN_FUNCTIONAL_ASSESSMENT: ACTIVITIES ARE NOT PREVENTED
PAIN_FUNCTIONAL_ASSESSMENT: PREVENTS OR INTERFERES SOME ACTIVE ACTIVITIES AND ADLS
PAIN_FUNCTIONAL_ASSESSMENT: PREVENTS OR INTERFERES SOME ACTIVE ACTIVITIES AND ADLS

## 2022-08-28 NOTE — PROGRESS NOTES
Patient in bed, woke up patient to take morning medication and completed assessment. Patient alert and oriented x4. Patient took medications whole as prescribed, no complications. Patient c/o pain 5/10 in abdomen and buttocks. Patient tello, JAZMINE drain, and wound vac all patent and functioning well, no issues. Patient colostomy area cleaned and functioning. Patient PICC flushed and capped. Patient tolerating tube feeds and PO intake, denies n/v at this time. Patient able to make needs known. No needs voiced at this time. Call light and bedside table within reach. Will continue to monitor and reassess.   Electronically signed by Eleazar Vega RN on 8/28/2022 at 11:30 AM

## 2022-08-28 NOTE — PROGRESS NOTES
Hospitalist Progress Note      PCP: Jack Osuna MD    Date of Admission: 8/21/2022    Chief Complaint: [address] Course: 23S admitted from NH where she was noted to have AMS, found to be septic with progression of sacral decub with underlying osteo as source, underwent diverting colostomy, G tube placement, excisional debridement 8/25. Subjective: no distress on room air, denies n/v, ,only complains of sharp pain at ostomy site     Medications:  Reviewed    Infusion Medications    sodium chloride      sodium chloride       Scheduled Medications    pantoprazole (PROTONIX) 40 mg injection  40 mg IntraVENous Q12H    minocycline  100 mg Oral BID    acetaminophen  650 mg Oral 3 times per day    sodium chloride flush  5-40 mL IntraVENous 2 times per day    fluconazole  200 mg Oral Daily    cefTRIAXone (ROCEPHIN) IV  2,000 mg IntraVENous Q24H    metroNIDAZOLE  500 mg Oral 3 times per day    flecainide  100 mg Oral 2 times per day    [Held by provider] apixaban  5 mg Oral BID    eldertonic  15 mL Oral Daily    lactobacillus  2 capsule Oral BID WC    enoxaparin  40 mg SubCUTAneous Daily     PRN Meds: sodium chloride, sodium phosphate IVPB **OR** sodium phosphate IVPB, magnesium sulfate, sodium chloride flush, sodium chloride, traMADol **OR** traMADol, fentanNYL, HYDROmorphone, promethazine, morphine **OR** morphine, ALPRAZolam, ondansetron, polyethylene glycol, acetaminophen **OR** acetaminophen      Intake/Output Summary (Last 24 hours) at 8/28/2022 1630  Last data filed at 8/28/2022 1409  Gross per 24 hour   Intake 385 ml   Output 305 ml   Net 80 ml         Physical Exam Performed:    /80   Pulse 85   Temp 98.2 °F (36.8 °C) (Oral)   Resp 16   Ht 5' 2\" (1.575 m)   Wt 197 lb 4.8 oz (89.5 kg)   SpO2 95%   BMI 36.09 kg/m²     General appearance: No apparent distress, appears stated age and cooperative. HEENT: Pupils equal, round, Conjunctivae/corneas clear.   Neck: Supple, with full range of motion. No jugular venous distention. Trachea midline. Respiratory:  Normal respiratory effort. Clear to auscultation, bilaterally without Rales/Wheezes/Rhonchi. Cardiovascular: Regular rate and rhythm    Abdomen: Soft, non-tender, non-distended    Musculoskeletal: No clubbing, cyanosis or edema bilaterally. Skin: Skin color, texture, turgor normal.  No rashes or lesions. Neurologic:   grossly non-focal.  Psychiatric: Alert and disoriented,       Labs:   Recent Labs     08/26/22  0500 08/26/22  1036 08/27/22  0510   WBC 6.8  --  9.7   HGB 6.4* 9.4* 9.4*   HCT 18.7* 27.5* 27.9*     --  265       Recent Labs     08/26/22  0500 08/26/22  1400 08/27/22  0510   * 135* 135*   K 4.0 3.6 3.6   CL 99 103 103   CO2 27 28 26   BUN 12 12 11   CREATININE <0.5* <0.5* <0.5*   CALCIUM 7.0* 7.2* 7.4*   PHOS 1.8*  --   --        No results for input(s): AST, ALT, BILIDIR, BILITOT, ALKPHOS in the last 72 hours. No results for input(s): INR in the last 72 hours. No results for input(s): [place] Ariel in the last 72 hours. Urinalysis:      Lab Results   Component Value Date/Time    NITRU Negative 08/22/2022 12:52 AM    WBCUA 21-50 08/22/2022 12:52 AM    BACTERIA 1+ 08/22/2022 12:52 AM    RBCUA 3-4 08/22/2022 12:52 AM    BLOODU MODERATE 08/22/2022 12:52 AM    SPECGRAV 1.025 08/22/2022 12:52 AM    GLUCOSEU 100 08/22/2022 12:52 AM       Radiology:  XR WRIST LEFT (MIN 3 VIEWS)   Final Result   Patient is status post ORIF of distal radial fracture. CT ABDOMEN PELVIS W IV CONTRAST Additional Contrast? None   Final Result   Large sacral decubitus ulcer with subcutaneous gas extending to the coccyx   and perirectal fascia. No loculated fluid collections. Increased sclerosis   of the adjacent coccyx with soft tissue gas extending to the bone. Findings   are compatible with osteomyelitis. Adjacent rectal wall thickening may be reactive.       Small bilateral pleural effusions with bibasilar atelectasis. Difficult to   exclude superimposed pneumonia in the lung bases. XR CHEST PORTABLE   Final Result   Clear chest without acute cardiopulmonary process. 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     Priority: Medium    Complex care coordination [Z71.89] 2022     Priority: Medium    Receiving intravenous antibiotic treatment as outpatient [Z79.2] 2022     Priority: Medium    Acute cystitis without hematuria [N30.00] 2022     Priority: Medium    Complicated UTI (urinary tract infection) [N39.0] 2022     Priority: Medium    Sepsis without septic shock ([address] [A41.9] 2022     Priority: Medium    Acute cystitis with hematuria [N30.01] 2022     Priority: Medium    Acute metabolic encephalopathy [S25.55] 2022     Priority: Medium    Sacral wound [S31.000A] 2022     Priority: Medium    Elevated lactic acid level [R79.89] 2022     Priority: Medium    Fever [R50.9] 2022     Priority: Medium    Tachypnea [R06.82] 2022     Priority: Medium    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 9) [E66.3] 2022     Priority: Medium    Elevated alkaline phosphatase level [R74.8] 2022     Priority: Medium    Elevated alkaline phosphatase in  [P09.8, R74.8] 2022     Priority: Medium    History of depression [Z86.59] 2022     Priority: Medium    Nursing home resident [Z59.3] 2022     Priority: Medium    Proteus infection [A49.8] 2022     Priority: Medium    Gram-negative bacteremia [R78.81] 2022     Priority: Medium    Lactic acidosis [E87.2] 2022     Priority: Medium    Altered mental status [R41.82] 2022     Priority: Medium    Hypotension [I95.9] 2022     Priority: Medium    Skin ulcer of sacrum [facility]) [L98.429] 08/22/2022     Priority: Medium    DM2 (diabetes mellitus, type 2) ([facility] [E11.9] 07/29/2022     Priority: Medium    Septic shock ([facility] [A41.9, R65.21] 07/29/2022     Priority: Medium    Fibromyalgia [M79.7] 09/20/2019    Severe protein-calorie malnutrition ([facility] [E43] 10/26/2018    Tachycardia [R00.0]     Atrial fibrillation ([facility] [I48.91] 01/13/2018    Malignant neoplasm of colon ([facility] [C18.9] 06/16/2009    Essential hypertension [I10] 06/15/2009     Sepsis  - sec to inf decub    Decub / underlying osteo  - surg debride w diverting colostomy, poss palliation  - continue IV abx as per ID    Atrial fib  - holding anticoag, continue tambocor    Anemia  - holding anticoag until cleared per Surgery  - trend  7.4 - > 7.8->7.3 - >6.4 - >9.4  - normal colonoscopy 7/2021, bleeding last admit attributed to stercoral proctitis on ct scan, colonoscopy attempted but failed due to large fecal impaction  - continue IV PPI    Diet: ADULT DIET;  Regular  ADULT TUBE FEEDING; Gastrostomy; Standard with Fiber; Continuous; 15; Yes; 10; Q 4 hours; 40; 30; Q 4 hours; Protein; 1 proteinx daily per dietitian recommendations  Code Status: Full Code       Carl Blanchard MD

## 2022-08-28 NOTE — PLAN OF CARE
Problem: Discharge Planning  Goal: Discharge to home or other facility with appropriate resources  Flowsheets (Taken 8/24/2022 2000 by Rubens Garnica RN)  Discharge to home or other facility with appropriate resources: Identify barriers to discharge with patient and caregiver     Problem: Pain  Goal: Verbalizes/displays adequate comfort level or baseline comfort level  Flowsheets (Taken 8/24/2022 0800 by Anderson Aly RN)  Verbalizes/displays adequate comfort level or baseline comfort level:   Encourage patient to monitor pain and request assistance   Assess pain using appropriate pain scale   Administer analgesics based on type and severity of pain and evaluate response     Problem: Safety - Adult  Goal: Free from fall injury  Flowsheets (Taken 8/28/2022 0213 by Trell Quevedo)  Free From Fall Injury: Instruct family/caregiver on patient safety     Problem: Chronic Conditions and Co-morbidities  Goal: Patient's chronic conditions and co-morbidity symptoms are monitored and maintained or improved  Flowsheets (Taken 8/24/2022 2000 by Rubens Garnica RN)  Care Plan - Patient's Chronic Conditions and Co-Morbidity Symptoms are Monitored and Maintained or Improved: Monitor and assess patient's chronic conditions and comorbid symptoms for stability, deterioration, or improvement     Problem: Nutrition Deficit:  Goal: Optimize nutritional status  Flowsheets (Taken 8/27/2022 0806)  Nutrient intake appropriate for improving, restoring, or maintaining nutritional needs:   Assess nutritional status and recommend course of action   Monitor oral intake, labs, and treatment plans     Problem: ABCDS Injury Assessment  Goal: Absence of physical injury  Flowsheets (Taken 8/28/2022 0213 by Trell Quevedo)  Absence of Physical Injury: Implement safety measures based on patient assessment

## 2022-08-28 NOTE — PROGRESS NOTES
Patient is resting in bed. Alert and oriented X 4. Complaining of pain, PRN pain medication given per order (see MAR). PICC line patent and flushed. Assessment complete. [place] continues to run at 40mL/hr. Wound vac in place. All patient needs are met at this time. Fall precautions are in place. Call light is in reach.

## 2022-08-28 NOTE — PROGRESS NOTES
Patient in bed, eyes closed and quiet, waken to name. Patient appetite poor today. Patient stated she did not have an appetite today. Patient colostomy emptied and charted. Patient was c/o lower abdominal and butt pain. PRN pain medication given as ordered. Patient tube feeding currently infusing as ordered. Patient tolerating, no c/o of n/v. Patient wound vac had no drainage out today. Will continue to checking on patient Q2H for nutrition needs, hygiene needs, comfort measures, mobility, fall risk interventions, and safe environment. All precautions and interventions in place. Call light and bedside table within reach. Will continue to monitor and reassess.   Electronically signed by Ari Zambrano RN on 8/28/2022 at 6:23 PM

## 2022-08-29 LAB
ANAEROBIC CULTURE: ABNORMAL
ANION GAP SERPL CALCULATED.3IONS-SCNC: 6 MMOL/L (ref 3–16)
BUN BLDV-MCNC: 13 MG/DL (ref 7–20)
CALCIUM SERPL-MCNC: 7.8 MG/DL (ref 8.3–10.6)
CHLORIDE BLD-SCNC: 101 MMOL/L (ref 99–110)
CO2: 29 MMOL/L (ref 21–32)
CREAT SERPL-MCNC: <0.5 MG/DL (ref 0.6–1.2)
GFR AFRICAN AMERICAN: >60
GFR NON-AFRICAN AMERICAN: >60
GLUCOSE BLD-MCNC: 102 MG/DL (ref 70–99)
GRAM STAIN RESULT: ABNORMAL
ORGANISM: ABNORMAL
PHOSPHORUS: 2.5 MG/DL (ref 2.5–4.9)
POTASSIUM REFLEX MAGNESIUM: 3.6 MMOL/L (ref 3.5–5.1)
SODIUM BLD-SCNC: 136 MMOL/L (ref 136–145)
WOUND/ABSCESS: ABNORMAL

## 2022-08-29 PROCEDURE — 6370000000 HC RX 637 (ALT 250 FOR IP): Performed by: INTERNAL MEDICINE

## 2022-08-29 PROCEDURE — 2580000003 HC RX 258: Performed by: STUDENT IN AN ORGANIZED HEALTH CARE EDUCATION/TRAINING PROGRAM

## 2022-08-29 PROCEDURE — 99024 POSTOP FOLLOW-UP VISIT: CPT | Performed by: PHYSICIAN ASSISTANT

## 2022-08-29 PROCEDURE — 87077 CULTURE AEROBIC IDENTIFY: CPT

## 2022-08-29 PROCEDURE — 2580000003 HC RX 258: Performed by: INTERNAL MEDICINE

## 2022-08-29 PROCEDURE — 93971 EXTREMITY STUDY: CPT

## 2022-08-29 PROCEDURE — 6370000000 HC RX 637 (ALT 250 FOR IP): Performed by: NURSE PRACTITIONER

## 2022-08-29 PROCEDURE — 84100 ASSAY OF PHOSPHORUS: CPT

## 2022-08-29 PROCEDURE — 6360000002 HC RX W HCPCS: Performed by: INTERNAL MEDICINE

## 2022-08-29 PROCEDURE — 2580000003 HC RX 258: Performed by: NURSE PRACTITIONER

## 2022-08-29 PROCEDURE — APPNB15 APP NON BILLABLE TIME 0-15 MINS: Performed by: PHYSICIAN ASSISTANT

## 2022-08-29 PROCEDURE — APPSS15 APP SPLIT SHARED TIME 0-15 MINUTES: Performed by: PHYSICIAN ASSISTANT

## 2022-08-29 PROCEDURE — 6360000002 HC RX W HCPCS: Performed by: SURGERY

## 2022-08-29 PROCEDURE — 99024 POSTOP FOLLOW-UP VISIT: CPT | Performed by: SURGERY

## 2022-08-29 PROCEDURE — 36592 COLLECT BLOOD FROM PICC: CPT

## 2022-08-29 PROCEDURE — 80048 BASIC METABOLIC PNL TOTAL CA: CPT

## 2022-08-29 PROCEDURE — 6370000000 HC RX 637 (ALT 250 FOR IP): Performed by: SURGERY

## 2022-08-29 PROCEDURE — 1200000000 HC SEMI PRIVATE

## 2022-08-29 PROCEDURE — 6360000002 HC RX W HCPCS: Performed by: HOSPITALIST

## 2022-08-29 PROCEDURE — 2580000003 HC RX 258: Performed by: HOSPITALIST

## 2022-08-29 PROCEDURE — 87070 CULTURE OTHR SPECIMN AEROBIC: CPT

## 2022-08-29 PROCEDURE — 87205 SMEAR GRAM STAIN: CPT

## 2022-08-29 PROCEDURE — 94760 N-INVAS EAR/PLS OXIMETRY 1: CPT

## 2022-08-29 PROCEDURE — C9113 INJ PANTOPRAZOLE SODIUM, VIA: HCPCS | Performed by: HOSPITALIST

## 2022-08-29 PROCEDURE — 87186 SC STD MICRODIL/AGAR DIL: CPT

## 2022-08-29 RX ORDER — SODIUM CHLOR/HYPOCHLOROUS ACID 0.033 %
SOLUTION, IRRIGATION IRRIGATION CONTINUOUS PRN
Status: DISCONTINUED | OUTPATIENT
Start: 2022-08-29 | End: 2022-09-06 | Stop reason: HOSPADM

## 2022-08-29 RX ADMIN — MORPHINE SULFATE 4 MG: 4 INJECTION, SOLUTION INTRAMUSCULAR; INTRAVENOUS at 12:04

## 2022-08-29 RX ADMIN — MINOCYCLINE HYDROCHLORIDE 100 MG: 50 CAPSULE ORAL at 09:23

## 2022-08-29 RX ADMIN — ACETAMINOPHEN 650 MG: 325 TABLET ORAL at 06:09

## 2022-08-29 RX ADMIN — SODIUM CHLORIDE, PRESERVATIVE FREE 10 ML: 5 INJECTION INTRAVENOUS at 09:24

## 2022-08-29 RX ADMIN — ACETAMINOPHEN 650 MG: 325 TABLET ORAL at 21:02

## 2022-08-29 RX ADMIN — SODIUM CHLORIDE: 9 INJECTION, SOLUTION INTRAVENOUS at 18:45

## 2022-08-29 RX ADMIN — METRONIDAZOLE 500 MG: 500 TABLET ORAL at 06:09

## 2022-08-29 RX ADMIN — Medication 2 CAPSULE: at 09:23

## 2022-08-29 RX ADMIN — TRAMADOL HYDROCHLORIDE 100 MG: 50 TABLET ORAL at 09:23

## 2022-08-29 RX ADMIN — SODIUM CHLORIDE, PRESERVATIVE FREE 40 MG: 5 INJECTION INTRAVENOUS at 09:22

## 2022-08-29 RX ADMIN — METRONIDAZOLE 500 MG: 500 TABLET ORAL at 15:50

## 2022-08-29 RX ADMIN — MINOCYCLINE HYDROCHLORIDE 100 MG: 50 CAPSULE ORAL at 21:03

## 2022-08-29 RX ADMIN — ONDANSETRON 4 MG: 2 INJECTION INTRAMUSCULAR; INTRAVENOUS at 18:51

## 2022-08-29 RX ADMIN — TRAMADOL HYDROCHLORIDE 50 MG: 50 TABLET, COATED ORAL at 18:51

## 2022-08-29 RX ADMIN — SODIUM CHLORIDE, PRESERVATIVE FREE 10 ML: 5 INJECTION INTRAVENOUS at 20:50

## 2022-08-29 RX ADMIN — FLECAINIDE ACETATE 100 MG: 100 TABLET ORAL at 09:23

## 2022-08-29 RX ADMIN — ENOXAPARIN SODIUM 40 MG: 100 INJECTION SUBCUTANEOUS at 09:24

## 2022-08-29 RX ADMIN — METRONIDAZOLE 500 MG: 500 TABLET ORAL at 21:03

## 2022-08-29 RX ADMIN — FLUCONAZOLE 200 MG: 100 TABLET ORAL at 09:23

## 2022-08-29 RX ADMIN — Medication 2 CAPSULE: at 18:44

## 2022-08-29 RX ADMIN — ACETAMINOPHEN 650 MG: 325 TABLET ORAL at 15:49

## 2022-08-29 RX ADMIN — FLECAINIDE ACETATE 100 MG: 100 TABLET ORAL at 21:03

## 2022-08-29 RX ADMIN — CEFTRIAXONE 2000 MG: 2 INJECTION, POWDER, FOR SOLUTION INTRAMUSCULAR; INTRAVENOUS at 18:46

## 2022-08-29 RX ADMIN — SODIUM CHLORIDE, PRESERVATIVE FREE 40 MG: 5 INJECTION INTRAVENOUS at 21:03

## 2022-08-29 ASSESSMENT — PAIN DESCRIPTION - FREQUENCY
FREQUENCY: CONTINUOUS

## 2022-08-29 ASSESSMENT — PAIN DESCRIPTION - ORIENTATION
ORIENTATION: MID;LOWER
ORIENTATION: LOWER
ORIENTATION: LOWER;MID
ORIENTATION: MID;LOWER

## 2022-08-29 ASSESSMENT — PAIN DESCRIPTION - PAIN TYPE
TYPE: ACUTE PAIN

## 2022-08-29 ASSESSMENT — PAIN - FUNCTIONAL ASSESSMENT
PAIN_FUNCTIONAL_ASSESSMENT: PREVENTS OR INTERFERES SOME ACTIVE ACTIVITIES AND ADLS

## 2022-08-29 ASSESSMENT — PAIN SCALES - GENERAL
PAINLEVEL_OUTOF10: 0
PAINLEVEL_OUTOF10: 7
PAINLEVEL_OUTOF10: 7
PAINLEVEL_OUTOF10: 0
PAINLEVEL_OUTOF10: 0
PAINLEVEL_OUTOF10: 6
PAINLEVEL_OUTOF10: 5

## 2022-08-29 ASSESSMENT — PAIN DESCRIPTION - LOCATION
LOCATION: BUTTOCKS

## 2022-08-29 ASSESSMENT — PAIN DESCRIPTION - ONSET
ONSET: ON-GOING
ONSET: GRADUAL
ONSET: ON-GOING
ONSET: ON-GOING

## 2022-08-29 ASSESSMENT — PAIN DESCRIPTION - DESCRIPTORS
DESCRIPTORS: ACHING

## 2022-08-29 NOTE — PLAN OF CARE
Problem: Discharge Planning  Goal: Discharge to home or other facility with appropriate resources  Outcome: Progressing  Flowsheets (Taken 8/29/2022 1539)  Discharge to home or other facility with appropriate resources:   Identify barriers to discharge with patient and caregiver   Identify discharge learning needs (meds, wound care, etc)   Arrange for needed discharge resources and transportation as appropriate     Problem: Pain  Goal: Verbalizes/displays adequate comfort level or baseline comfort level  Outcome: Progressing  Flowsheets (Taken 8/29/2022 1539)  Verbalizes/displays adequate comfort level or baseline comfort level:   Encourage patient to monitor pain and request assistance   Administer analgesics based on type and severity of pain and evaluate response   Assess pain using appropriate pain scale     Problem: Safety - Adult  Goal: Free from fall injury  Outcome: Progressing  Flowsheets  Taken 8/29/2022 1539 by Qian Culp RN  Free From Fall Injury: Instruct family/caregiver on patient safety  Taken 8/29/2022 0232 by Saima Crockett  Free From Fall Injury: Instruct family/caregiver on patient safety     Problem: Skin/Tissue Integrity  Goal: Absence of new skin breakdown  Description: 1. Monitor for areas of redness and/or skin breakdown  2. Assess vascular access sites hourly  3. Every 4-6 hours minimum:  Change oxygen saturation probe site  4. Every 4-6 hours:  If on nasal continuous positive airway pressure, respiratory therapy assess nares and determine need for appliance change or resting period.   Outcome: Progressing     Problem: Chronic Conditions and Co-morbidities  Goal: Patient's chronic conditions and co-morbidity symptoms are monitored and maintained or improved  Outcome: Progressing  Flowsheets (Taken 8/29/2022 1539)  Care Plan - Patient's Chronic Conditions and Co-Morbidity Symptoms are Monitored and Maintained or Improved:   Monitor and assess patient's chronic conditions and comorbid symptoms for stability, deterioration, or improvement   Collaborate with multidisciplinary team to address chronic and comorbid conditions and prevent exacerbation or deterioration     Problem: Nutrition Deficit:  Goal: Optimize nutritional status  8/29/2022 1539 by Geovanny Mercedes RN  Outcome: Progressing     Problem: ABCDS Injury Assessment  Goal: Absence of physical injury  Outcome: Progressing  Flowsheets  Taken 8/29/2022 1539 by Geovanny Mercedes RN  Absence of Physical Injury: Implement safety measures based on patient assessment  Taken 8/29/2022 0232 by Sammie Yen  Absence of Physical Injury: Implement safety measures based on patient assessment

## 2022-08-29 NOTE — PROGRESS NOTES
Physical Therapy  PT attempt  Eve De La Fuente  Attempted PT treatment this PM.  Pt at vascular. Will attempt again as schedule permits.     Electronically signed by Rudy Pacheco, [place] [id number] on 8/29/2022 at 2:19 PM

## 2022-08-29 NOTE — PROGRESS NOTES
[facility] General Surgery                                   Daily Progress Note                                                         Pt Name: Courtney Winter  Medical Record Number: [id number]  Date of Birth 1945   Today's Date: 8/29/2022      ASSESSMENT/PLAN  Stage IV sacral ulcer  -8/24/22 laparoscopic gastrostomy tube, 21 Jay Blood, lap sigmoid colostomy, excisional debridement of sacral ulcer, final wound measurements 11 x 13 cm    -advance TFs to goal. TF at 40 cc and being tolerated  -vac change. Wound Care to see/eval  -Pain controlled  -+ostomy function    SUBJECTIVE  Brook Mediate is resting in bed. +ostomy output. Tolerating TFs at 40 ml/hr. Pain controlled      OBJECTIVE  VITALS:  height is 5' 2\" (1.575 m) and weight is 197 lb (89.4 kg). Her oral temperature is 98.4 °F (36.9 °C). Her blood pressure is 111/70 and her pulse is 88. Her respiration is 16 and oxygen saturation is 94%. INTAKE/OUTPUT:    Intake/Output Summary (Last 24 hours) at 8/29/2022 1353  Last data filed at 8/29/2022 1205  Gross per 24 hour   Intake 480 ml   Output 1350 ml   Net -870 ml       GENERAL: alert, cooperative, no distress  LUNGS: clear to ausculation, without wheezes, rales or rhonci  HEART: normal rate and regular rhythm  ABDOMEN: Soft, appropriately tender, non distended. G-tube present. Incisions clean dry and intact. Ostomy pink, flatus and stool in bag. EXTREMITY: no cyanosis, clubbing or edema    I/O last 3 completed shifts:   In: 745 [P.O.:685; NG/GT:60]  Out: 1050 [Urine:650; Drains:50; Stool:350]      LABS  Recent Labs     08/27/22  0510 08/29/22  0455   WBC 9.7  --    HGB 9.4*  --    HCT 27.9*  --      --    * 136   K 3.6 3.6    101   CO2 26 29   BUN 11 13   CREATININE <0.5* <0.5*   PHOS  --  2.5   CALCIUM 7.4* 7.8*         EDUCATION  Patient educated about Disease Process, Medications, Smoking Cessation, Oxygenation, Incentive Spirometry and Deep Breath and Cough, Diabetes, Hyperlipidemia, Smoking Cessation, Nutrition, Exercise and Hypertension    Electronically signed by Mendel Emery, PA-C on 8/29/2022 at 1:53 PM      Agree with above note. The patient was personally seen and examined. Edwin Day is doing well. Tolerating tube feeds at goal.  Nausea improving. Colostomy working. Continue wound vac, with change per wound care team.  Hopefully will be able to discharge soon.     Lillian Clay MD

## 2022-08-29 NOTE — PROGRESS NOTES
Hospital Medicine Progress Note     Date:  8/29/2022    PCP: Maico Mora MD (Tel: [phone])    Date of Admission: 8/21/2022    Chief complaint:   Chief Complaint   Patient presents with    Altered Mental Status     Arrived from SNF with complaints of altered mental status. Usually alert and oriented. Pt. Was found not responding as normal. Alert and oriented x1       Brief admission history: 60-year-old female with history of diabetes type 2, history of colon cancer, sacral wound, GERD, anxiety disorder, osteoarthritis, atrial fibrillation, depression, fibromyalgia, migraine headache, obstructive sleep apnea, who was admitted from SNF on 8/22/2022 after she presented to the emergency room with confusion. In the emergency room, she was found to have sacral wound with subcutaneous gas to the left through to the perirectal fascia. She was diagnosed with sepsis with septic shock, requiring pressor support, multiple antibiotics and admitted to ICU. Assessment/plan:  Sepsis with septic shock, secondary to infected decubitus ulcer and underlying osteomyelitis. Status postsurgical debridement with diverting colostomy. Continue intravenous antibiotics per ID recommendation. Stage IV sacral decubitus ulcer. Status post laparoscopic gastrostomy tube, laparoscopic sigmoid colostomy and excisional debridement of sacral ulcer on 8/24/2022, final wound measurement of 11 x 13 cm. Wound VAC in place. Gram-negative bacteremia/Proteus. Likely secondary to #2. Continue antibiotics per infectious disease recommendation. Complicated urinary tract infection. Urine cultures grew E. coli and Klebsiella. As noted above, patient already on antibiotics. Acute metabolic encephalopathy. Secondary to underlying infectious process. Overall, mental status improving. Continue treatment of underlying etiologies. Multiple electrolyte abnormalities, including hypophosphatemia, hyponatremia.   Continue replacement protocol. Acute on chronic macrocytic anemia. Etiology is multifactorial, including lower GI bleed, sepsis sepsis, multiple blood draw, hemodilutional effect. Status post transfusion of 1 unit of pRBC on 8/26/2022. Monitor hemoglobin closely. Patient underwent colonoscopy on 8/12/2022 that had to be aborted due to poor prep. If hemoglobin continues to trend down, we will consult GI to assist with evaluation. Diffuse anasarca. Likely third spacing from hypoalbuminemia. Asymmetric left forearm swelling. Obtain  venous ultrasound to rule out DVT. Other comorbidities: history of diabetes type 2, history of colon cancer, sacral wound, GERD, anxiety disorder, osteoarthritis, atrial fibrillation, depression, fibromyalgia, migraine headache, obstructive sleep apnea, severe protein caloric malnutrition, obesity with BMI of 36 kg/m². Diet: ADULT DIET; Regular  ADULT TUBE FEEDING; Gastrostomy; Standard with Fiber; Continuous; 15; Yes; 10; Q 4 hours; 40; 30; Q 4 hours; Protein; 1 proteinx daily per dietitian recommendations    Code status: Full Code   ----------  Subjective  Reports tailbone pain. Objective  Physical exam:  Vitals: /62   Pulse 90   Temp 98.2 °F (36.8 °C) (Oral)   Resp 16   Ht 5' 2\" (1.575 m)   Wt 197 lb (89.4 kg)   SpO2 96%   BMI 36.03 kg/m²   Gen/overall appearance: Not in acute distress. Alert. Oriented X3. Appears chronically-ill. Head: Normocephalic, atraumatic  Eyes: EOMI, good acuity  ENT: Oral mucosa moist  Neck: No JVD, thyromegaly  CVS: Nml S1S2, no MRG, RRR  Pulm: Clear bilaterally. No crackles/wheezes  Gastrointestinal: Soft, NT/ND, +BS  Musculoskeletal: Diffuse anasarca. Asymmetric left forearm swelling. Warm  Neuro: No focal deficit. Moves extremity spontaneously. Psychiatry: Appropriate affect. Not agitated. Skin: Warm, dry with normal turgor.  No rash  Capillary refill: Brisk,< 3 seconds   Peripheral Pulses: +2 palpable, equal bilaterally      24HR INTAKE/OUTPUT: Intake/Output Summary (Last 24 hours) at 8/29/2022 0846  Last data filed at 8/29/2022 0456  Gross per 24 hour   Intake 570 ml   Output 850 ml   Net -280 ml     I/O last 3 completed shifts: In: 26 [P.O.:685; NG/GT:60]  Out: 1050 [Urine:650; Drains:50; Stool:350]  No intake/output data recorded. Meds:    pantoprazole (PROTONIX) 40 mg injection  40 mg IntraVENous Q12H    minocycline  100 mg Oral BID    acetaminophen  650 mg Oral 3 times per day    sodium chloride flush  5-40 mL IntraVENous 2 times per day    fluconazole  200 mg Oral Daily    cefTRIAXone (ROCEPHIN) IV  2,000 mg IntraVENous Q24H    metroNIDAZOLE  500 mg Oral 3 times per day    flecainide  100 mg Oral 2 times per day    [Held by provider] apixaban  5 mg Oral BID    eldertonic  15 mL Oral Daily    lactobacillus  2 capsule Oral BID WC    enoxaparin  40 mg SubCUTAneous Daily     Infusions:    sodium chloride      sodium chloride       PRN Meds: sodium chloride, sodium phosphate IVPB **OR** sodium phosphate IVPB, magnesium sulfate, sodium chloride flush, sodium chloride, traMADol **OR** traMADol, fentanNYL, HYDROmorphone, promethazine, morphine **OR** morphine, ALPRAZolam, ondansetron, polyethylene glycol, acetaminophen **OR** acetaminophen    Labs/imaging:  CBC:   Recent Labs     08/26/22  1036 08/27/22  0510   WBC  --  9.7   HGB 9.4* 9.4*   PLT  --  265     BMP:    Recent Labs     08/26/22  1400 08/27/22  0510 08/29/22  0455   * 135* 136   K 3.6 3.6 3.6    103 101   CO2 28 26 29   BUN 12 11 13   CREATININE <0.5* <0.5* <0.5*   GLUCOSE 132* 162* 102*     Hepatic: No results for input(s): AST, ALT, ALB, BILITOT, ALKPHOS in the last 72 hours.     Hemalatha Avelar MD  -------------------------------  Rounding hospitalist

## 2022-08-29 NOTE — PROGRESS NOTES
Comprehensive Nutrition Assessment    Type and Reason for Visit:  Reassess    Nutrition Recommendations/Plan:   Continue regular diet  Continue TF regimen per feeding tube of Jevity 1.5 formula at goal rate of 40 ml per hour  Continue flush per provider at 30 ml every 4 hours  Continue 1 bottle of Proteinex per feeding tube per day. Do Not mix directly with TF  Add 1 pkt Meir (Wound Healing Modular) mixed with water per feeding tube bid. DO NOT mix directly with TF     Malnutrition Assessment:  Malnutrition Status: Moderate malnutrition (08/22/22 0843)    Context:  Acute Illness     Findings of the 6 clinical characteristics of malnutrition:  Energy Intake:  Unable to assess  Weight Loss:  No significant weight loss     Body Fat Loss:  Mild body fat loss Orbital, Buccal region   Muscle Mass Loss:  Mild muscle mass loss Temples (temporalis)  Fluid Accumulation:  No significant fluid accumulation     Strength:  Not Performed    Nutrition Assessment:    Follow-up. TF started using Jevity 1.5 fiber containing formula, per g-tube and is currently tolerated at goal rate of 40 ml per hour. Water flush of 30 ml every 4 hours noted. Protein modular (proteinex) added per feeding tube daily to help with wound healing. Diet adv to regular with po intake noted at 25-50%. With variable po intake, will continue current enteral feeding regimen to ensure nutrition for healing and maintenance. Will also add to TF orders, 1 packet of Meir (wound healing modular) bid. Wound Vac to sacrum continues. Noted wt up to 197 lb from adm wt of 156 lbs 8.4 oz. Will monitor trend. Nutrition Related Findings:    +1 BLE and +1/+2 BUE edema; BM 8/24 Wound Type: Multiple, Pressure Injury, Unstageable (heels and sacrum.)       Current Nutrition Intake & Therapies:    Average Meal Intake: 26-50%, 0%  Average Supplements Intake: NPO  ADULT DIET;  Regular  ADULT TUBE FEEDING; Gastrostomy; Standard with Fiber; Continuous; 15; Yes; 10; Q 4 hours; 40; 30; Q 4 hours; Protein, Wound Healing; 1 proteinx daily per dietitian recommendations (DO NOT mix directly with TF); mix 1 packet of Meir in 4-6 ounces . .. Current Tube Feeding (TF) Orders:  Feeding Route: Gastrostomy  Formula: Standard with Fiber (Jevity 1.5 fiber containing formula)  Schedule: Continuous  Feeding Regimen: Jevity 1.5 formula goal rate of 40 ml per hour (rate adj for routine Nursing care (~20 hour run). Additives/Modulars: Protein, Wound Healing (1 bottle of Proteinex per feeding tube per day (DO NOT mix directly with TF) + 1 pkt of Meir mixed with water per feeding tube bid (DO NOT mix directly with TF))  Water Flushes: per provider  Current TF & Flush Orders Provides: 800 ml TV / 1484 (1200 (TF) + 104 (Proteinex) + 180 (Meir) kcals / 82 gms (51(TF) + 26 (Proteinex) + 5 (Meir) Protein / 788 ml free water (608 (TF) + 180 ((flush) per day. Anthropometric Measures:  Height: 5' 2\" (157.5 cm)  Ideal Body Weight (IBW): 110 lbs (50 kg)    Admission Body Weight: 157 lb (71.2 kg)  Current Body Weight: 197 lb (89.4 kg), 179.1 % IBW. Weight Source: Bed Scale  Current BMI (kg/m2): 36                          BMI Categories: Obese Class 2 (BMI 35.0 -39.9)    Estimated Daily Nutrient Needs:        Energy (kcal/day): 4417-2024  kcal (15-18 kcal/kg 71 kg CBW)     Protein (g/day):  gm (1.5-2 gm /kg IBW)     Fluid (ml/day): per provider while in ICU    Nutrition Diagnosis:   Moderate malnutrition related to inadequate protein-energy intake as evidenced by mild muscle loss, mild loss of subcutaneous fat    Nutrition Interventions:   Food and/or Nutrient Delivery: Continue Current Diet, Continue Current Tube Feeding, Modify Oral Nutrition Supplement  Nutrition Education/Counseling: No recommendation at this time  Coordination of Nutrition Care: Continue to monitor while inpatient       Goals:  Previous Goal Met: Goal(s) Achieved  Goals:  Tolerate nutrition support at goal rate, by next RD assessment       Nutrition Monitoring and Evaluation:   Behavioral-Environmental Outcomes: None Identified  Food/Nutrient Intake Outcomes: Food and Nutrient Intake, Enteral Nutrition Intake/Tolerance, Supplement Intake  Physical Signs/Symptoms Outcomes: Biochemical Data, Constipation, Diarrhea, Nausea or Vomiting, Fluid Status or Edema, Skin, Weight    Discharge Planning:     Too soon to determine     Kenney Guardado, [address] [place] [address],   Contact: [phone]

## 2022-08-29 NOTE — PROGRESS NOTES
Patient resting quietly in bed. Alert and oriented. Tramadol given for pain. Morning medications given without difficulty. Tube feed infusing at 40 ml/hr. Wound vac in place. 100 ml noted from wound vac. VSS. Bed alarm on. Call light and belongings within reach. Will continue to monitor.      Electronically signed by Davy Rodriguez RN on 8/29/2022 at 4:23 PM

## 2022-08-29 NOTE — CARE COORDINATION
[facility] Wound Ostomy Continence Nurse  Follow-up Progress Note       NAME:  Carri KENDALL  MEDICAL RECORD NUMBER:  [id number]  AGE:  65 y.o. GENDER:  female  :  1945  TODAY'S DATE:  2022    Subjective:   Wound vac placement to sacral wound. Previously unstageable now stage 4 with exposed bone. Slough. Vac changed to veraflo due to slough and odor. Next dressing change will be Thursday. Objective:    /70   Pulse 88   Temp 98.4 °F (36.9 °C) (Oral)   Resp 16   Ht 5' 2\" (1.575 m)   Wt 197 lb (89.4 kg)   SpO2 94%   BMI 36.03 kg/m²   Sami Risk Score: Sami Scale Score: 13  Assessment:   Measurements:  Negative Pressure Wound Therapy Leg Anterior; Lower;Right (Active)   Number of days: 558       Negative Pressure Wound Therapy Sacrum (Active)   $ Standard NPWT >50 sq cm PER TX $ Yes 22 1230   Wound Type Pressure ulcer: Stage IV 22 1230   Unit Type Ulta/Veraflow [id number] 22 1230   Dressing Type Other (Comment) 22 1230   Number of pieces used 4 22 1230   Number of pieces removed 1 22 1230   Cycle Continuous 22 1230   Target Pressure (mmHg) 125 22 1230   Intensity 5 22 1230   Irrigation Solution Vashe 22 1230   Instillation Volume  26 mL 22 1230   Soak Time  2 minutes 22 1230   Vac Frequency 2 hours 22 1230   Dressing Status New dressing applied 22 1230   Dressing Changed Changed/New 22 1230   Drainage Amount Small 22 1230   Drainage Description Serosanguinous;Brown 22 1230   Dressing Change Due 22 1230   Output (ml) 100 ml 22 0925   Wound Assessment Other (Comment) 22 213   Number of days: 3       Wound 22 Heel Right (Active)   Wound Image   22 1230   Wound Etiology Pressure Unstageable 22   Dressing Status Dry 22   Wound Cleansed Betadine/povidone iodine 22 0400   Dressing/Treatment Barrier film;Other (comment) 08/26/22 0800   Wound Length (cm) 1 cm 08/29/22 1230   Wound Width (cm) 1 cm 08/29/22 1230   Wound Depth (cm) 0 cm 08/02/22 1126   Wound Surface Area (cm^2) 1 cm^2 08/29/22 1230   Change in Wound Size % (l*w) 0 08/29/22 1230   Wound Volume (cm^3) 0 cm^3 08/02/22 1126   Wound Assessment Eschar moist 08/29/22 1230   Drainage Amount Scant 08/29/22 1230   Drainage Description Purulent 08/29/22 1230   Odor None 08/29/22 1230   Nallely-wound Assessment Intact 08/26/22 0400   Margins Attached edges 08/26/22 0400   Number of days: 30       Wound 07/30/22 Sacrum Mid (Active)   Wound Image   08/29/22 1230   Wound Etiology Pressure Stage 4 08/29/22 1230   Dressing Status New dressing applied 08/29/22 1230   Wound Cleansed Cleansed with saline 08/29/22 1230   Dressing/Treatment Negative pressure wound therapy 08/29/22 1230   Dressing Change Due 09/01/22 08/29/22 1230   Wound Length (cm) 9.5 cm 08/29/22 1230   Wound Width (cm) 13 cm 08/29/22 1230   Wound Depth (cm) 4 cm 08/29/22 1230   Wound Surface Area (cm^2) 123.5 cm^2 08/29/22 1230   Change in Wound Size % (l*w) -135.24 08/29/22 1230   Wound Volume (cm^3) 494 cm^3 08/29/22 1230   Distance Tunneling (cm) 5 cm 08/29/22 1230   Tunneling Position ___ O'Clock 5 08/29/22 1230   Wound Assessment Exposed structure bone;Slough;Pink/red 08/29/22 1230   Drainage Amount Small 08/29/22 1230   Drainage Description Serosanguinous 08/29/22 1230   Odor Mild 08/29/22 1230   Nallely-wound Assessment Fragile;Denuded 08/29/22 1230   Margins Unattached edges 08/24/22 0400   Wound Thickness Description not for Pressure Injury Full thickness 08/24/22 0400   Number of days: 30       Pt awake and alert in bed. Pre-medicated by bedside RN prior to dressing change. Pt wound with slough, odor, tunneled area in center of wound 5cm depth. Exposed bone. Wound cleansed with saline. Veraflo complete 4 pieces packed to wound bed (1 into tunneled area). Pt secreting mucus from rectum.  Barrier ring applied to wound edges toward rectum. Good seal obtained. Periwound skin denuded with eschar, fragile with purple maroon areas. 26ml of vashe instilled, q2 hours, 2 min. 125mmhg. Pt ostomy pouch intact with 400cc of loose stool. Pt has TF. Will plan for pouch change tomorrow. Pain Assessment:  Premedicated: Yes  Plan:   Plan of Care:   Veraflo vac therapy, dressing change Monday, Thursday. Ostomy care:   1-piece [id number]. Change every 3-5 days. Specialty Bed Required : Yes   [x] Low Air Loss   [] Pressure Redistribution  [] Fluid Immersion  [] Bariatric  [] Total Pressure Relief  [] Other:     Current Diet: ADULT DIET; Regular  ADULT TUBE FEEDING; Gastrostomy; Standard with Fiber; Continuous; 15; Yes; 10; Q 4 hours; 40; 30; Q 4 hours; Protein, Wound Healing; 1 proteinx daily per dietitian recommendations (DO NOT mix directly with TF); mix 1 packet of Meir in 4-6 ounces . ..   Dietician consult:  Yes    Discharge Plan:  Placement for patient upon discharge: skilled nursing   Patient appropriate for Outpatient [address] [place] [address] Road: No    Referrals:  []   [] [address] [facility]  [] Supplies  [] Other    Patient/Caregiver Teaching:  Level of patient/caregiver understanding able to:   [] Indicates understanding       [] Needs reinforcement  [] Unsuccessful      [x] Verbal Understanding  [] Demonstrated understanding       [] No evidence of learning  [] Refused teaching         [] N/A       Electronically signed by Christi Saenz RN, CWOCN on 8/29/2022 at 2:08 PM

## 2022-08-29 NOTE — PLAN OF CARE
Nutrition Problem #1: Moderate malnutrition  Intervention: Food and/or Nutrient Delivery: Continue Current Diet, Continue Current Tube Feeding, Modify Oral Nutrition Supplement

## 2022-08-29 NOTE — PROGRESS NOTES
Patient is resting in bed. Alert and oriented X 4. Complaining of pain, PRN pain medication given per order (see MAR). PICC line patent and flushed. Assessment complete. Duayne Rosen continues to run at 40mL/hr. New Jevity and tubing replaced 8/28 at 2200. Wound vac in place, 50mL output this shift. All patient needs are met at this time. Fall precautions are in place. Call light is in reach.

## 2022-08-29 NOTE — CARE COORDINATION
Spoke with Geri Kowalski at [facility] on current care needs: tube feedings, wound vac and IV antibiotics. [facility] plans to take patient back at discharge.      Electronically signed by HOLLY Lovett, CE, Case Management on 8/29/2022 at 4:07 PM  [place] [id number]

## 2022-08-30 LAB
A/G RATIO: 0.7 (ref 1.1–2.2)
ALBUMIN SERPL-MCNC: 1.9 G/DL (ref 3.4–5)
ALP BLD-CCNC: 107 U/L (ref 40–129)
ALT SERPL-CCNC: 11 U/L (ref 10–40)
ANION GAP SERPL CALCULATED.3IONS-SCNC: 6 MMOL/L (ref 3–16)
AST SERPL-CCNC: 16 U/L (ref 15–37)
BASOPHILS ABSOLUTE: 0 K/UL (ref 0–0.2)
BASOPHILS RELATIVE PERCENT: 0.5 %
BILIRUB SERPL-MCNC: <0.2 MG/DL (ref 0–1)
BUN BLDV-MCNC: 11 MG/DL (ref 7–20)
CALCIUM SERPL-MCNC: 8 MG/DL (ref 8.3–10.6)
CHLORIDE BLD-SCNC: 99 MMOL/L (ref 99–110)
CO2: 26 MMOL/L (ref 21–32)
CREAT SERPL-MCNC: <0.5 MG/DL (ref 0.6–1.2)
EOSINOPHILS ABSOLUTE: 0 K/UL (ref 0–0.6)
EOSINOPHILS RELATIVE PERCENT: 0.2 %
GFR AFRICAN AMERICAN: >60
GFR NON-AFRICAN AMERICAN: >60
GLUCOSE BLD-MCNC: 176 MG/DL (ref 70–99)
HCT VFR BLD CALC: 30.2 % (ref 36–48)
HEMOGLOBIN: 9.9 G/DL (ref 12–16)
LYMPHOCYTES ABSOLUTE: 1.8 K/UL (ref 1–5.1)
LYMPHOCYTES RELATIVE PERCENT: 20.3 %
MAGNESIUM: 1.7 MG/DL (ref 1.8–2.4)
MCH RBC QN AUTO: 31 PG (ref 26–34)
MCHC RBC AUTO-ENTMCNC: 32.9 G/DL (ref 31–36)
MCV RBC AUTO: 94.2 FL (ref 80–100)
MONOCYTES ABSOLUTE: 0.2 K/UL (ref 0–1.3)
MONOCYTES RELATIVE PERCENT: 2.6 %
NEUTROPHILS ABSOLUTE: 6.7 K/UL (ref 1.7–7.7)
NEUTROPHILS RELATIVE PERCENT: 76.4 %
PDW BLD-RTO: 21.5 % (ref 12.4–15.4)
PHOSPHORUS: 2.2 MG/DL (ref 2.5–4.9)
PLATELET # BLD: 225 K/UL (ref 135–450)
PMV BLD AUTO: 7.9 FL (ref 5–10.5)
POTASSIUM SERPL-SCNC: 4.2 MMOL/L (ref 3.5–5.1)
RBC # BLD: 3.21 M/UL (ref 4–5.2)
SODIUM BLD-SCNC: 131 MMOL/L (ref 136–145)
TOTAL PROTEIN: 4.5 G/DL (ref 6.4–8.2)
WBC # BLD: 8.7 K/UL (ref 4–11)

## 2022-08-30 PROCEDURE — 6370000000 HC RX 637 (ALT 250 FOR IP): Performed by: SURGERY

## 2022-08-30 PROCEDURE — 6360000002 HC RX W HCPCS: Performed by: INTERNAL MEDICINE

## 2022-08-30 PROCEDURE — 51702 INSERT TEMP BLADDER CATH: CPT

## 2022-08-30 PROCEDURE — 84100 ASSAY OF PHOSPHORUS: CPT

## 2022-08-30 PROCEDURE — APPNB15 APP NON BILLABLE TIME 0-15 MINS: Performed by: NURSE PRACTITIONER

## 2022-08-30 PROCEDURE — 83735 ASSAY OF MAGNESIUM: CPT

## 2022-08-30 PROCEDURE — 2580000003 HC RX 258: Performed by: HOSPITALIST

## 2022-08-30 PROCEDURE — 94760 N-INVAS EAR/PLS OXIMETRY 1: CPT

## 2022-08-30 PROCEDURE — 99024 POSTOP FOLLOW-UP VISIT: CPT | Performed by: NURSE PRACTITIONER

## 2022-08-30 PROCEDURE — 6360000002 HC RX W HCPCS: Performed by: SURGERY

## 2022-08-30 PROCEDURE — C9113 INJ PANTOPRAZOLE SODIUM, VIA: HCPCS | Performed by: HOSPITALIST

## 2022-08-30 PROCEDURE — 85025 COMPLETE CBC W/AUTO DIFF WBC: CPT

## 2022-08-30 PROCEDURE — APPNB15 APP NON BILLABLE TIME 0-15 MINS: Performed by: PHYSICIAN ASSISTANT

## 2022-08-30 PROCEDURE — APPSS15 APP SPLIT SHARED TIME 0-15 MINUTES: Performed by: NURSE PRACTITIONER

## 2022-08-30 PROCEDURE — 99024 POSTOP FOLLOW-UP VISIT: CPT | Performed by: PHYSICIAN ASSISTANT

## 2022-08-30 PROCEDURE — 6370000000 HC RX 637 (ALT 250 FOR IP): Performed by: INTERNAL MEDICINE

## 2022-08-30 PROCEDURE — 2580000003 HC RX 258: Performed by: INTERNAL MEDICINE

## 2022-08-30 PROCEDURE — 6360000002 HC RX W HCPCS: Performed by: HOSPITALIST

## 2022-08-30 PROCEDURE — 2500000003 HC RX 250 WO HCPCS: Performed by: INTERNAL MEDICINE

## 2022-08-30 PROCEDURE — 80053 COMPREHEN METABOLIC PANEL: CPT

## 2022-08-30 PROCEDURE — 6370000000 HC RX 637 (ALT 250 FOR IP): Performed by: NURSE PRACTITIONER

## 2022-08-30 PROCEDURE — 1200000000 HC SEMI PRIVATE

## 2022-08-30 PROCEDURE — 2580000003 HC RX 258: Performed by: NURSE PRACTITIONER

## 2022-08-30 RX ORDER — MAGNESIUM SULFATE IN WATER 40 MG/ML
2000 INJECTION, SOLUTION INTRAVENOUS ONCE
Status: COMPLETED | OUTPATIENT
Start: 2022-08-30 | End: 2022-08-30

## 2022-08-30 RX ADMIN — ACETAMINOPHEN 650 MG: 325 TABLET ORAL at 23:07

## 2022-08-30 RX ADMIN — FLECAINIDE ACETATE 100 MG: 100 TABLET ORAL at 23:23

## 2022-08-30 RX ADMIN — FLECAINIDE ACETATE 100 MG: 100 TABLET ORAL at 08:28

## 2022-08-30 RX ADMIN — ALPRAZOLAM 0.5 MG: 0.5 TABLET ORAL at 08:27

## 2022-08-30 RX ADMIN — SODIUM CHLORIDE, PRESERVATIVE FREE 10 ML: 5 INJECTION INTRAVENOUS at 09:01

## 2022-08-30 RX ADMIN — FLUCONAZOLE 200 MG: 100 TABLET ORAL at 08:27

## 2022-08-30 RX ADMIN — ACETAMINOPHEN 650 MG: 325 TABLET ORAL at 14:17

## 2022-08-30 RX ADMIN — ACETAMINOPHEN 650 MG: 325 TABLET ORAL at 04:09

## 2022-08-30 RX ADMIN — SODIUM CHLORIDE, PRESERVATIVE FREE 40 MG: 5 INJECTION INTRAVENOUS at 08:28

## 2022-08-30 RX ADMIN — TRAMADOL HYDROCHLORIDE 100 MG: 50 TABLET ORAL at 08:27

## 2022-08-30 RX ADMIN — MINOCYCLINE HYDROCHLORIDE 100 MG: 50 CAPSULE ORAL at 08:27

## 2022-08-30 RX ADMIN — METRONIDAZOLE 500 MG: 500 TABLET ORAL at 04:09

## 2022-08-30 RX ADMIN — SODIUM CHLORIDE 25 ML: 9 INJECTION, SOLUTION INTRAVENOUS at 12:25

## 2022-08-30 RX ADMIN — Medication 2 CAPSULE: at 08:27

## 2022-08-30 RX ADMIN — TRAMADOL HYDROCHLORIDE 100 MG: 50 TABLET ORAL at 14:46

## 2022-08-30 RX ADMIN — MAGNESIUM SULFATE HEPTAHYDRATE 2000 MG: 40 INJECTION, SOLUTION INTRAVENOUS at 12:27

## 2022-08-30 RX ADMIN — SODIUM CHLORIDE, PRESERVATIVE FREE 40 MG: 5 INJECTION INTRAVENOUS at 23:07

## 2022-08-30 RX ADMIN — ENOXAPARIN SODIUM 40 MG: 100 INJECTION SUBCUTANEOUS at 08:28

## 2022-08-30 RX ADMIN — Medication 15 ML: at 08:28

## 2022-08-30 RX ADMIN — CEFTRIAXONE 2000 MG: 2 INJECTION, POWDER, FOR SOLUTION INTRAMUSCULAR; INTRAVENOUS at 18:09

## 2022-08-30 RX ADMIN — ACETAMINOPHEN 650 MG: 325 TABLET, FILM COATED ORAL at 18:33

## 2022-08-30 RX ADMIN — METRONIDAZOLE 500 MG: 500 TABLET ORAL at 14:18

## 2022-08-30 RX ADMIN — MORPHINE SULFATE 4 MG: 4 INJECTION, SOLUTION INTRAMUSCULAR; INTRAVENOUS at 23:08

## 2022-08-30 RX ADMIN — METRONIDAZOLE 500 MG: 500 TABLET ORAL at 23:24

## 2022-08-30 RX ADMIN — ONDANSETRON 4 MG: 2 INJECTION INTRAMUSCULAR; INTRAVENOUS at 08:29

## 2022-08-30 RX ADMIN — MINOCYCLINE HYDROCHLORIDE 100 MG: 50 CAPSULE ORAL at 23:06

## 2022-08-30 RX ADMIN — SODIUM PHOSPHATE, MONOBASIC, MONOHYDRATE AND SODIUM PHOSPHATE, DIBASIC, ANHYDROUS 30 MMOL: 276; 142 INJECTION, SOLUTION INTRAVENOUS at 13:44

## 2022-08-30 RX ADMIN — Medication 2 CAPSULE: at 17:13

## 2022-08-30 RX ADMIN — ONDANSETRON 4 MG: 2 INJECTION INTRAMUSCULAR; INTRAVENOUS at 14:47

## 2022-08-30 RX ADMIN — SODIUM CHLORIDE, PRESERVATIVE FREE 5 ML: 5 INJECTION INTRAVENOUS at 23:23

## 2022-08-30 RX ADMIN — ALPRAZOLAM 0.5 MG: 0.5 TABLET ORAL at 23:23

## 2022-08-30 ASSESSMENT — PAIN DESCRIPTION - ORIENTATION: ORIENTATION: RIGHT;LEFT

## 2022-08-30 ASSESSMENT — PAIN DESCRIPTION - DESCRIPTORS
DESCRIPTORS: ACHING
DESCRIPTORS: ACHING
DESCRIPTORS: DISCOMFORT

## 2022-08-30 ASSESSMENT — PAIN SCALES - GENERAL
PAINLEVEL_OUTOF10: 9
PAINLEVEL_OUTOF10: 8
PAINLEVEL_OUTOF10: 0
PAINLEVEL_OUTOF10: 6
PAINLEVEL_OUTOF10: 7

## 2022-08-30 ASSESSMENT — PAIN DESCRIPTION - LOCATION
LOCATION: HEAD
LOCATION: ABDOMEN
LOCATION: ABDOMEN
LOCATION: HEAD

## 2022-08-30 ASSESSMENT — PAIN DESCRIPTION - FREQUENCY: FREQUENCY: CONTINUOUS

## 2022-08-30 ASSESSMENT — PAIN SCALES - WONG BAKER
WONGBAKER_NUMERICALRESPONSE: 0

## 2022-08-30 NOTE — PROGRESS NOTES
Hospital Medicine Progress Note     Date:  8/30/2022    PCP: Neto Samson MD (Tel: [phone])    Date of Admission: 8/21/2022    Chief complaint:   Chief Complaint   Patient presents with    Altered Mental Status     Arrived from SNF with complaints of altered mental status. Usually alert and oriented. Pt. Was found not responding as normal. Alert and oriented x1       Brief admission history: 65-year-old female with history of diabetes type 2, history of colon cancer, sacral wound, GERD, anxiety disorder, osteoarthritis, atrial fibrillation, depression, fibromyalgia, migraine headache, obstructive sleep apnea, who was admitted from SNF on 8/22/2022 after she presented to the emergency room with confusion. In the emergency room, she was found to have sacral wound with subcutaneous gas to the left through to the perirectal fascia. She was diagnosed with sepsis with septic shock, requiring pressor support, multiple antibiotics and admitted to ICU. Assessment/plan:  Sepsis with septic shock, secondary to infected decubitus ulcer and underlying osteomyelitis. Status postsurgical debridement with diverting colostomy. Continue antibiotics per ID recommendation. Stage IV sacral decubitus ulcer. Status post laparoscopic gastrostomy tube, laparoscopic sigmoid colostomy and excisional debridement of sacral ulcer on 8/24/2022, final wound measurement of 11 x 13 cm. Wound VAC in place. Gram-negative bacteremia/Proteus. Likely secondary to #2. Continue antibiotics per infectious disease recommendation. Complicated urinary tract infection. Urine cultures grew E. coli and Klebsiella. As noted above, patient already on antibiotics. Acute metabolic encephalopathy, resolved. Secondary to underlying infectious process. Continue treatment of underlying etiologies. Multiple electrolyte abnormalities, including hypophosphatemia, hyponatremia. Continue replacement protocol. Rechecking CBC.  CMP, magnesium and phosphorus levels today. Acute on chronic macrocytic anemia. Etiology is multifactorial, including lower GI bleed, sepsis sepsis, multiple blood draw, hemodilutional effect. Status post transfusion of 1 unit of pRBC on 8/26/2022. CBC ordered and pending for today. Patient underwent colonoscopy on 8/12/2022 that had to be aborted due to poor prep. If hemoglobin continues to trend down, we will consult GI to assist with evaluation. Diffuse anasarca. Likely third spacing from hypoalbuminemia. Asymmetric left forearm swelling. Venous ultrasound of left upper extremity obtained on 8/29/2022 was negative for DVT. Other comorbidities: history of diabetes type 2, history of colon cancer, sacral wound, GERD, anxiety disorder, osteoarthritis, atrial fibrillation, depression, fibromyalgia, migraine headache, obstructive sleep apnea, severe protein caloric malnutrition, obesity with BMI of 36 kg/m². Diet: ADULT DIET; Regular  ADULT TUBE FEEDING; Gastrostomy; Standard with Fiber; Continuous; 15; Yes; 10; Q 4 hours; 40; 30; Q 4 hours; Protein, Wound Healing; 1 proteinx daily per dietitian recommendations (DO NOT mix directly with TF); mix 1 packet of Meir in 4-6 ounces . .. Code status: Full Code   ----------  Subjective  No complaints. Denies any needs. Objective  Physical exam:  Vitals: /85   Pulse 88   Temp 98.5 °F (36.9 °C) (Oral)   Resp 14   Ht 5' 2\" (1.575 m)   Wt 199 lb 9.6 oz (90.5 kg)   SpO2 96%   BMI 36.51 kg/m²   Gen/overall appearance: Not in acute distress. Alert. Oriented X3. Appears chronically-ill. Head: Normocephalic, atraumatic  Eyes: EOMI, good acuity  ENT: Oral mucosa moist  Neck: No JVD, thyromegaly  CVS: Nml S1S2, no MRG, RRR  Pulm: Clear bilaterally. No crackles/wheezes  Gastrointestinal: Soft, NT/ND, +BS  Musculoskeletal: Diffuse anasarca. Asymmetric left forearm swelling. Warm  Neuro: No focal deficit. Moves extremity spontaneously.   Psychiatry: Appropriate

## 2022-08-30 NOTE — PLAN OF CARE
Problem: Discharge Planning  Goal: Discharge to home or other facility with appropriate resources  8/30/2022 0330 by Flako Nash RN  Outcome: Progressing  Flowsheets (Taken 8/29/2022 1539 by Jama Vicente RN)  Discharge to home or other facility with appropriate resources:   Identify barriers to discharge with patient and caregiver   Identify discharge learning needs (meds, wound care, etc)   Arrange for needed discharge resources and transportation as appropriate  8/29/2022 1539 by Jama Vicente RN  Outcome: Progressing  Flowsheets (Taken 8/29/2022 1539)  Discharge to home or other facility with appropriate resources:   Identify barriers to discharge with patient and caregiver   Identify discharge learning needs (meds, wound care, etc)   Arrange for needed discharge resources and transportation as appropriate     Problem: Pain  Goal: Verbalizes/displays adequate comfort level or baseline comfort level  8/30/2022 0330 by Flako Nash RN  Outcome: Progressing  Flowsheets (Taken 8/29/2022 1539 by Jama Vicente RN)  Verbalizes/displays adequate comfort level or baseline comfort level:   Encourage patient to monitor pain and request assistance   Administer analgesics based on type and severity of pain and evaluate response   Assess pain using appropriate pain scale  8/29/2022 1539 by Jama Vicente RN  Outcome: Progressing  Flowsheets (Taken 8/29/2022 1539)  Verbalizes/displays adequate comfort level or baseline comfort level:   Encourage patient to monitor pain and request assistance   Administer analgesics based on type and severity of pain and evaluate response   Assess pain using appropriate pain scale     Problem: Safety - Adult  Goal: Free from fall injury  8/30/2022 0330 by Flako Nash RN  Outcome: Progressing  Flowsheets (Taken 8/29/2022 1539 by Jama Vicente RN)  Free From Fall Injury: Instruct family/caregiver on patient safety  8/29/2022 1539 by Jama Vicente RN  Outcome: Progressing  Flowsheets  Taken 8/29/2022 1539 by Casey Kumari RN  Free From Fall Injury: Instruct family/caregiver on patient safety  Taken 8/29/2022 0232 by Yaritza Aguilar  Free From Fall Injury: Instruct family/caregiver on patient safety     Problem: Skin/Tissue Integrity  Goal: Absence of new skin breakdown  Description: 1. Monitor for areas of redness and/or skin breakdown  2. Assess vascular access sites hourly  3. Every 4-6 hours minimum:  Change oxygen saturation probe site  4. Every 4-6 hours:  If on nasal continuous positive airway pressure, respiratory therapy assess nares and determine need for appliance change or resting period.   8/30/2022 0330 by Kameron Nguyễn RN  Outcome: Progressing  8/29/2022 1539 by Casey Kumari RN  Outcome: Progressing     Problem: Chronic Conditions and Co-morbidities  Goal: Patient's chronic conditions and co-morbidity symptoms are monitored and maintained or improved  8/30/2022 0330 by Kameron Nguyễn RN  Outcome: Progressing  Flowsheets (Taken 8/29/2022 1539 by Casey Kumari RN)  Care Plan - Patient's Chronic Conditions and Co-Morbidity Symptoms are Monitored and Maintained or Improved:   Monitor and assess patient's chronic conditions and comorbid symptoms for stability, deterioration, or improvement   Collaborate with multidisciplinary team to address chronic and comorbid conditions and prevent exacerbation or deterioration  8/29/2022 1539 by Casey Kumari RN  Outcome: Progressing  Flowsheets (Taken 8/29/2022 1539)  Care Plan - Patient's Chronic Conditions and Co-Morbidity Symptoms are Monitored and Maintained or Improved:   Monitor and assess patient's chronic conditions and comorbid symptoms for stability, deterioration, or improvement   Collaborate with multidisciplinary team to address chronic and comorbid conditions and prevent exacerbation or deterioration     Problem: Nutrition Deficit:  Goal: Optimize nutritional status  8/30/2022 0330 by Kirstie Munoz RN  Outcome: Progressing  Flowsheets (Taken 8/27/2022 0806 by Otis Santos, RN)  Nutrient intake appropriate for improving, restoring, or maintaining nutritional needs:   Assess nutritional status and recommend course of action   Monitor oral intake, labs, and treatment plans  8/29/2022 1539 by Ellen Bell RN  Outcome: Progressing     Problem: ABCDS Injury Assessment  Goal: Absence of physical injury  8/30/2022 0330 by Kirstie Munoz RN  Outcome: Progressing  Flowsheets (Taken 8/29/2022 1539 by Ellen Bell RN)  Absence of Physical Injury: Implement safety measures based on patient assessment  8/29/2022 1539 by Ellen Bell RN  Outcome: Progressing  Flowsheets  Taken 8/29/2022 1539 by Ellen Bell RN  Absence of Physical Injury: Implement safety measures based on patient assessment  Taken 8/29/2022 0232 by Bolivar Pinto  Absence of Physical Injury: Implement safety measures based on patient assessment

## 2022-08-30 NOTE — CARE COORDINATION
[facility] cannot hold patient's bed but will take patient back if they have a bed available. Left message for patient's daughter, Maryam Gaitan ([phone]), to return call so can inform her of above. Electronically signed by HOLLY Hong, CE, Case Management on 8/30/2022 at 3:02 PM  [place] [id number]    4:28 PM  Updated patient's daughter, Maryam Gaitan, regarding above.     Electronically signed by HOLLY Hong, CE, Case Management on 8/30/2022 at 4:29 PM  [place] [id number]

## 2022-08-30 NOTE — PROGRESS NOTES
[facility] General Surgery                                   Daily Progress Note                                                         Pt Name: Mary Hernandez  Medical Record Number: [id number]  Date of Birth 1945   Today's Date: 8/30/2022      ASSESSMENT/PLAN  Stage IV sacral ulcer  -8/24/22 laparoscopic gastrostomy tube, 21 [place], lap sigmoid colostomy, excisional debridement of sacral ulcer, final wound measurements 11 x 13 cm    -continue TF via G tube, tolerating at goal 40 ml/hr  -wound vac per wound care team  -Pain controlled  -+ostomy function    SUBJECTIVE  Daune Estimable is resting in bed. +ostomy output. Tolerating TFs at goal 40 ml/hr. Pain controlled. OBJECTIVE  VITALS:  height is 5' 2\" (1.575 m) and weight is 199 lb 9.6 oz (90.5 kg). Her oral temperature is 98.6 °F (37 °C). Her blood pressure is 119/75 and her pulse is 91. Her respiration is 16 and oxygen saturation is 95%. INTAKE/OUTPUT:    Intake/Output Summary (Last 24 hours) at 8/30/2022 1201  Last data filed at 8/30/2022 0841  Gross per 24 hour   Intake 990 ml   Output 1440 ml   Net -450 ml       GENERAL: alert, cooperative, no distress  LUNGS: clear to ausculation, without wheezes, rales or rhonci  HEART: normal rate and regular rhythm  ABDOMEN: Soft, appropriately tender, non distended. G-tube present. Incisions clean dry and intact. Ostomy pink, flatus and stool in bag. EXTREMITY: no cyanosis, clubbing or edema. Wound vac functioning. I/O last 3 completed shifts:   In: 1350 [P.O.:1320; NG/GT:30]  Out: 1790 [Urine:950; Drains:150; Stool:690]      LABS  Recent Labs     08/30/22  0845   WBC 8.7   HGB 9.9*   HCT 30.2*      *   K 4.2   CL 99   CO2 26   BUN 11   CREATININE <0.5*   MG 1.70*   PHOS 2.2*   CALCIUM 8.0*   AST 16   ALT 11   BILITOT <0.2         EDUCATION  Patient educated about Disease Process, Medications, Smoking Cessation, Oxygenation, Incentive Spirometry and Deep Breath and Cough, Diabetes, Hyperlipidemia, Smoking Cessation, Nutrition, Exercise and Hypertension    Electronically signed by MUSA Peraza CNP on 8/30/2022 at 12:01 PM

## 2022-08-31 LAB
GLUCOSE BLD-MCNC: 115 MG/DL (ref 70–99)
GLUCOSE BLD-MCNC: 99 MG/DL (ref 70–99)
GRAM STAIN RESULT: ABNORMAL
ORGANISM: ABNORMAL
ORGANISM: ABNORMAL
PERFORMED ON: ABNORMAL
PERFORMED ON: NORMAL
SARS-COV-2, NAAT: NOT DETECTED
WOUND/ABSCESS: ABNORMAL
WOUND/ABSCESS: ABNORMAL

## 2022-08-31 PROCEDURE — 1200000000 HC SEMI PRIVATE

## 2022-08-31 PROCEDURE — 6360000002 HC RX W HCPCS: Performed by: SURGERY

## 2022-08-31 PROCEDURE — 6370000000 HC RX 637 (ALT 250 FOR IP): Performed by: NURSE PRACTITIONER

## 2022-08-31 PROCEDURE — 94760 N-INVAS EAR/PLS OXIMETRY 1: CPT

## 2022-08-31 PROCEDURE — 6370000000 HC RX 637 (ALT 250 FOR IP): Performed by: INTERNAL MEDICINE

## 2022-08-31 PROCEDURE — C9113 INJ PANTOPRAZOLE SODIUM, VIA: HCPCS | Performed by: HOSPITALIST

## 2022-08-31 PROCEDURE — 6360000002 HC RX W HCPCS: Performed by: HOSPITALIST

## 2022-08-31 PROCEDURE — APPSS15 APP SPLIT SHARED TIME 0-15 MINUTES: Performed by: NURSE PRACTITIONER

## 2022-08-31 PROCEDURE — 87635 SARS-COV-2 COVID-19 AMP PRB: CPT

## 2022-08-31 PROCEDURE — 97530 THERAPEUTIC ACTIVITIES: CPT

## 2022-08-31 PROCEDURE — 99024 POSTOP FOLLOW-UP VISIT: CPT | Performed by: SURGERY

## 2022-08-31 PROCEDURE — 6360000002 HC RX W HCPCS: Performed by: INTERNAL MEDICINE

## 2022-08-31 PROCEDURE — 6370000000 HC RX 637 (ALT 250 FOR IP): Performed by: SURGERY

## 2022-08-31 PROCEDURE — 2580000003 HC RX 258: Performed by: HOSPITALIST

## 2022-08-31 PROCEDURE — 99024 POSTOP FOLLOW-UP VISIT: CPT | Performed by: NURSE PRACTITIONER

## 2022-08-31 PROCEDURE — 2580000003 HC RX 258: Performed by: INTERNAL MEDICINE

## 2022-08-31 PROCEDURE — 51702 INSERT TEMP BLADDER CATH: CPT

## 2022-08-31 PROCEDURE — APPNB15 APP NON BILLABLE TIME 0-15 MINS: Performed by: NURSE PRACTITIONER

## 2022-08-31 PROCEDURE — 2580000003 HC RX 258: Performed by: NURSE PRACTITIONER

## 2022-08-31 PROCEDURE — 97535 SELF CARE MNGMENT TRAINING: CPT

## 2022-08-31 RX ORDER — METRONIDAZOLE 500 MG/1
500 TABLET ORAL EVERY 8 HOURS SCHEDULED
Qty: 69 TABLET | Refills: 0 | Status: SHIPPED | OUTPATIENT
Start: 2022-08-31 | End: 2022-09-06 | Stop reason: SDUPTHER

## 2022-08-31 RX ORDER — LACTOBACILLUS RHAMNOSUS GG 10B CELL
2 CAPSULE ORAL 2 TIMES DAILY WITH MEALS
Qty: 240 CAPSULE | Refills: 0 | Status: SHIPPED | OUTPATIENT
Start: 2022-08-31 | End: 2022-10-30

## 2022-08-31 RX ORDER — ASPIRIN 81 MG/1
81 TABLET ORAL DAILY
Qty: 30 TABLET | Refills: 0
Start: 2022-08-31 | End: 2022-09-06 | Stop reason: HOSPADM

## 2022-08-31 RX ORDER — FLUCONAZOLE 200 MG/1
200 TABLET ORAL DAILY
Qty: 22 TABLET | Refills: 0 | Status: SHIPPED | OUTPATIENT
Start: 2022-09-01 | End: 2022-09-06 | Stop reason: SDUPTHER

## 2022-08-31 RX ORDER — TRAMADOL HYDROCHLORIDE 50 MG/1
50 TABLET ORAL EVERY 6 HOURS PRN
Qty: 15 TABLET | Refills: 0 | Status: SHIPPED | OUTPATIENT
Start: 2022-08-31 | End: 2022-09-06 | Stop reason: SDUPTHER

## 2022-08-31 RX ORDER — ALPRAZOLAM 0.5 MG/1
0.5 TABLET ORAL EVERY 8 HOURS PRN
Qty: 15 TABLET | Refills: 0 | Status: SHIPPED | OUTPATIENT
Start: 2022-08-31 | End: 2022-09-05

## 2022-08-31 RX ORDER — SODIUM CHLOR/HYPOCHLOROUS ACID 0.033 %
SOLUTION, IRRIGATION IRRIGATION
Qty: 1 EACH | Refills: 0
Start: 2022-08-31

## 2022-08-31 RX ORDER — MINOCYCLINE HYDROCHLORIDE 100 MG/1
100 CAPSULE ORAL 2 TIMES DAILY
Qty: 46 CAPSULE | Refills: 0 | Status: SHIPPED | OUTPATIENT
Start: 2022-08-31 | End: 2022-09-06 | Stop reason: SDUPTHER

## 2022-08-31 RX ORDER — VIT B COMPLEX/ZINC/MANGANESE 3.6-.75/15
15 LIQUID (ML) ORAL DAILY
Qty: 450 ML | Refills: 0
Start: 2022-09-01 | End: 2022-10-01

## 2022-08-31 RX ADMIN — SODIUM CHLORIDE, PRESERVATIVE FREE 40 MG: 5 INJECTION INTRAVENOUS at 09:56

## 2022-08-31 RX ADMIN — SODIUM CHLORIDE 500 ML: 9 INJECTION, SOLUTION INTRAVENOUS at 17:37

## 2022-08-31 RX ADMIN — Medication 2 CAPSULE: at 09:56

## 2022-08-31 RX ADMIN — SODIUM CHLORIDE, PRESERVATIVE FREE 40 MG: 5 INJECTION INTRAVENOUS at 21:26

## 2022-08-31 RX ADMIN — MORPHINE SULFATE 2 MG: 2 INJECTION, SOLUTION INTRAMUSCULAR; INTRAVENOUS at 17:28

## 2022-08-31 RX ADMIN — METRONIDAZOLE 500 MG: 500 TABLET ORAL at 21:26

## 2022-08-31 RX ADMIN — FLECAINIDE ACETATE 100 MG: 100 TABLET ORAL at 09:56

## 2022-08-31 RX ADMIN — MINOCYCLINE HYDROCHLORIDE 100 MG: 50 CAPSULE ORAL at 21:26

## 2022-08-31 RX ADMIN — METRONIDAZOLE 500 MG: 500 TABLET ORAL at 08:04

## 2022-08-31 RX ADMIN — METRONIDAZOLE 500 MG: 500 TABLET ORAL at 15:09

## 2022-08-31 RX ADMIN — SODIUM CHLORIDE, PRESERVATIVE FREE 10 ML: 5 INJECTION INTRAVENOUS at 21:32

## 2022-08-31 RX ADMIN — CEFTRIAXONE 2000 MG: 2 INJECTION, POWDER, FOR SOLUTION INTRAMUSCULAR; INTRAVENOUS at 17:39

## 2022-08-31 RX ADMIN — MORPHINE SULFATE 4 MG: 4 INJECTION, SOLUTION INTRAMUSCULAR; INTRAVENOUS at 09:55

## 2022-08-31 RX ADMIN — ENOXAPARIN SODIUM 40 MG: 100 INJECTION SUBCUTANEOUS at 09:55

## 2022-08-31 RX ADMIN — SODIUM CHLORIDE, PRESERVATIVE FREE 10 ML: 5 INJECTION INTRAVENOUS at 09:55

## 2022-08-31 RX ADMIN — MINOCYCLINE HYDROCHLORIDE 100 MG: 50 CAPSULE ORAL at 09:56

## 2022-08-31 RX ADMIN — ACETAMINOPHEN 650 MG: 325 TABLET ORAL at 08:05

## 2022-08-31 RX ADMIN — FLECAINIDE ACETATE 100 MG: 100 TABLET ORAL at 21:26

## 2022-08-31 RX ADMIN — ACETAMINOPHEN 650 MG: 325 TABLET ORAL at 21:31

## 2022-08-31 RX ADMIN — ACETAMINOPHEN 650 MG: 325 TABLET ORAL at 15:09

## 2022-08-31 RX ADMIN — ONDANSETRON 4 MG: 2 INJECTION INTRAMUSCULAR; INTRAVENOUS at 18:46

## 2022-08-31 RX ADMIN — Medication 2 CAPSULE: at 17:29

## 2022-08-31 RX ADMIN — FLUCONAZOLE 200 MG: 100 TABLET ORAL at 09:56

## 2022-08-31 ASSESSMENT — PAIN DESCRIPTION - PAIN TYPE
TYPE: ACUTE PAIN
TYPE: ACUTE PAIN

## 2022-08-31 ASSESSMENT — PAIN DESCRIPTION - FREQUENCY
FREQUENCY: CONTINUOUS
FREQUENCY: INTERMITTENT

## 2022-08-31 ASSESSMENT — PAIN DESCRIPTION - LOCATION
LOCATION: ABDOMEN;BUTTOCKS

## 2022-08-31 ASSESSMENT — PAIN SCALES - WONG BAKER
WONGBAKER_NUMERICALRESPONSE: 0

## 2022-08-31 ASSESSMENT — PAIN DESCRIPTION - DESCRIPTORS
DESCRIPTORS: ACHING
DESCRIPTORS: ACHING;DISCOMFORT

## 2022-08-31 ASSESSMENT — PAIN DESCRIPTION - ORIENTATION
ORIENTATION: RIGHT;LEFT;MID
ORIENTATION: RIGHT;LEFT;MID

## 2022-08-31 ASSESSMENT — PAIN SCALES - GENERAL
PAINLEVEL_OUTOF10: 8
PAINLEVEL_OUTOF10: 5
PAINLEVEL_OUTOF10: 3
PAINLEVEL_OUTOF10: 2
PAINLEVEL_OUTOF10: 8

## 2022-08-31 ASSESSMENT — PAIN DESCRIPTION - ONSET
ONSET: ON-GOING
ONSET: ON-GOING

## 2022-08-31 NOTE — PROGRESS NOTES
Patient linens changed, gown changed, and patient cleansed. Mucous stool noted from rectum. Tube feed drainage noted from PEG tube. Peg tube cleansed and secured. Kaplan care performed. Applied Triad cream to patient buttocks. Small blister noted on patients left thigh where would vac tubing rests. Zinc ointment applied to this area and tubing repositioned. Patient turned and wedge pillow used for offloading. Patient has no other needs at this time. Standard safety precautions in place. Will monitor.  Electronically signed by Ann Fitzpatrick RN on 8/31/2022 at 6:21 PM

## 2022-08-31 NOTE — CARE COORDINATION
Noted d/c order; [facility] will initiate precert.   Electronically signed by Mando Simmons on 8/31/2022 at 1:41 PM

## 2022-08-31 NOTE — PROGRESS NOTES
[facility] Orthopedic Surgery  Consult Note    This patient is seen in consultation at the request of Dr Chin Hinds    Reason for Consult:  post left wrist ORIF per Dr Janae Norris on 8/3/22    CHIEF COMPLAINT:  left wrist pain    History Obtained From:  patient, electronic medical record    HISTORY OF PRESENT ILLNESS:    The patient is a 65 y.o. female who presents with left wrist pain. She fell and sustained left wrist fx then required left wrist ORIF on 8/3/22. She was doing well at [facility] in wrist splint/brace. Pain is described in left wrist and with the intensity of mild. Pain is described as aching. Discomfort is intermittent. Current admission is for sepsis. She has not seen Dr Janae Norris since wrist surgery.  Stitches have been removed    Past Medical History:        Diagnosis Date    Acid reflux     Anxiety     Arthritis     Atrial fibrillation (HCC)     Closed fracture dislocation of right elbow     Depression     Fibromyalgia     Migraine     WHITNEY (obstructive sleep apnea)     Diagnosed years ago, no CPAP    Rectal cancer (HCC)     Wears glasses     DRIVING       Past Surgical History:        Procedure Laterality Date    APPENDECTOMY      CERVICAL DISCECTOMY      ACDF     SECTION      x4    CHOLECYSTECTOMY      COLECTOMY N/A 2022    LAPAROSCOPIC SIGMOID COLOSTOMY WITH EXCISIONAL DEBRIDEMENT OF SACRAL ULCER, LAPAROSCOPIC GASTROSTOMY TUBE PLACEMENT performed by Tru Friend MD at [address] [facility]      Rectal cancer removed using  incision    COLONOSCOPY      SEVERAL    COLONOSCOPY N/A 2019    COLONOSCOPY POLYPECTOMY SNARE/COLD BIOPSY performed by Shahid Dodge MD at [address] [facility] 2021    COLONOSCOPY WITH BIOPSY performed by Bry Gay MD at [address] [facility] N/A 2022    INCOMPLETE COLONOSCOPY D/T POOR PREP performed by Bry Gay MD at [address] Left 8/3/2022    OPEN REDUCTION INTERNAL FIXATION LEFT DISTAL RADIUS performed by Morris Malloy MD at [address] [facility] Right 1/28/2021    RIGHT LEG EVACUATION OF HEMATOMA performed by Sara Kumar MD at [address] [facility] W/O EXTENSION Right 10/25/2018    OPEN REDUCTION INTERNAL FIXATION RIGHT FEMUR SUPRACONDYLAR FEMORAL FRACTURE WITH C-ARM performed by Dequan Montanez MD at [place] [address] Right 1/31/2021    RIGHT LEG HEMATOMA EVAKUATION, DEBRIDEMENT, AND WOUND VAC PLACEMENT performed by Liz Alfonso MD at [place] [address] Right 2/17/2021    RIGHT LOWER EXTREMITY SKIN GRAFT performed by Liz Alfonso MD at [address] [facility] Left     UPPER GASTROINTESTINAL ENDOSCOPY N/A 11/26/2019    ESOPHAGOGASTRODUODENOSCOPY performed by Shahid Dodge MD at [address] [facility] N/A 8/18/2020    EGD BIOPSY performed by Shahid Dodge MD at [address] [facility] History     Tobacco Use    Smoking status: Never    Smokeless tobacco: Never   Substance Use Topics    Alcohol use: No       Family History   Problem Relation Age of Onset    COPD Mother            Current Medications:   Current Facility-Administered Medications: vashe wound therapy external solution, , Irrigation, Continuous PRN  pantoprazole (PROTONIX) 40 mg in sodium chloride (PF) 10 mL injection, 40 mg, IntraVENous, Q12H  minocycline (MINOCIN;DYNACIN) capsule 100 mg, 100 mg, Oral, BID  acetaminophen (TYLENOL) tablet 650 mg, 650 mg, Oral, 3 times per day  sodium phosphate 11.79 mmol in sodium chloride 0.9 % 250 mL IVPB, 0.16 mmol/kg, IntraVENous, PRN **OR** sodium phosphate 23.55 mmol in sodium chloride 0.9 % 250 mL IVPB, 0.32 mmol/kg, IntraVENous, PRN  magnesium sulfate 2000 mg in 50 mL IVPB premix, 2,000 mg, IntraVENous, PRN  sodium chloride flush 0.9 % injection 5-40 mL, 5-40 mL, IntraVENous, 2 times per day  sodium chloride flush 0.9 % injection 5-40 mL, 5-40 mL, IntraVENous, PRN  0.9 % sodium chloride infusion, 25 mL, IntraVENous, PRN  traMADol (ULTRAM) tablet 50 mg, 50 mg, Oral, Q6H PRN **OR** traMADol (ULTRAM) tablet 100 mg, 100 mg, Oral, Q6H PRN  fluconazole (DIFLUCAN) tablet 200 mg, 200 mg, Oral, Daily  fentaNYL (SUBLIMAZE) injection 25 mcg, 25 mcg, IntraVENous, Q5 Min PRN  HYDROmorphone (DILAUDID) injection 0.5 mg, 0.5 mg, IntraVENous, Q5 Min PRN  promethazine (PHENERGAN) 12.5mg in sodium chloride 0.9% 50 mL IVPB 12.5 mg, 12.5 mg, IntraVENous, Q6H PRN  cefTRIAXone (ROCEPHIN) 2,000 mg in dextrose 5 % 50 mL IVPB mini-bag, 2,000 mg, IntraVENous, Q24H  metroNIDAZOLE (FLAGYL) tablet 500 mg, 500 mg, Oral, 3 times per day  morphine (PF) injection 2 mg, 2 mg, IntraVENous, Q2H PRN **OR** morphine (PF) injection 4 mg, 4 mg, IntraVENous, Q2H PRN  flecainide (TAMBOCOR) tablet 100 mg, 100 mg, Oral, 2 times per day  [Held by provider] apixaban (ELIQUIS) tablet 5 mg, 5 mg, Oral, BID  ALPRAZolam (XANAX) tablet 0.5 mg, 0.5 mg, Oral, Q8H PRN  ondansetron (ZOFRAN) injection 4 mg, 4 mg, IntraVENous, Q4H PRN  polyethylene glycol (GLYCOLAX) packet 17 g, 17 g, Oral, Daily PRN  acetaminophen (TYLENOL) tablet 650 mg, 650 mg, Oral, Q4H PRN **OR** acetaminophen (TYLENOL) suppository 650 mg, 650 mg, Rectal, Q4H PRN  eldertonic liquid 15 mL, 15 mL, Oral, Daily  lactobacillus (CULTURELLE) capsule 2 capsule, 2 capsule, Oral, BID WC  enoxaparin (LOVENOX) injection 40 mg, 40 mg, SubCUTAneous, Daily  Allergies:  TCSOJRW@    REVIEW OF SYSTEMS:    CONSTITUTIONAL:  negative for  chills, sweats, and malaise  MUSCULOSKELETAL:  positive for  myalgias, arthralgias, and pain  All other ROS reviewed in chart or with patient or family and are grossly negative.          PHYSICAL EXAM:    VITALS:  /65   Pulse 90   Temp 97.5 °F (36.4 °C) (Axillary)   Resp 16   Ht 5' 2\" (1.575 m)   Wt 199 lb 9.6 oz (90.5 kg)   SpO2 95%   BMI 36.51 kg/m²     MUSCULOSKELETAL:  Left wrist with intact flex/ext and hand grasp and extension and thumbs up . Left volar wrist with well approximated healing surgical wound, no erythema or pain noted. Able to show FROM left hand grasp and extension. Mild limitation in left wrist post surgery is noted. Bilateral hands with edema extending to forearms. NEUROLOGIC:   Sensory:    Touch:                     Right Upper Extremity:  normal                   Left Upper Extremity:  normal                  Skin warm and dry  Resp deep and easy  Abdomen soft and round  Pulse is with regular rate and rhythm    DATA:    CBC:   Lab Results   Component Value Date/Time    WBC 8.7 08/30/2022 08:45 AM    RBC 3.21 08/30/2022 08:45 AM    HGB 9.9 08/30/2022 08:45 AM    HCT 30.2 08/30/2022 08:45 AM    MCV 94.2 08/30/2022 08:45 AM    MCH 31.0 08/30/2022 08:45 AM    MCHC 32.9 08/30/2022 08:45 AM    RDW 21.5 08/30/2022 08:45 AM     08/30/2022 08:45 AM    MPV 7.9 08/30/2022 08:45 AM     WBC:    Lab Results   Component Value Date/Time    WBC 8.7 08/30/2022 08:45 AM     PT/INR:    Lab Results   Component Value Date/Time    PROTIME 14.1 08/11/2022 06:21 AM    INR 1.10 08/11/2022 06:21 AM     PTT:    Lab Results   Component Value Date/Time    APTT 25.4 08/11/2022 06:21 AM   [APTT  Radiology Review:    Narrative   EXAMINATION:   3 XRAY VIEWS OF THE LEFT WRIST       8/23/2022 5:15 pm       COMPARISON:   Left wrist x-ray dated 08/01/2022       HISTORY:   ORDERING SYSTEM PROVIDED HISTORY: Post op   TECHNOLOGIST PROVIDED HISTORY:   Reason for exam:->Post op   Reason for Exam: Post op       FINDINGS:   Patient is status post ORIF of distal radial fracture. The fracture   fragments are now in anatomic alignment. The hardware is intact without   evidence of periprosthetic loosening or fracture. Osteopenia of the   visualized osseous structures. Degenerative disease of the visualized joint   spaces. There is overlying soft tissue swelling. Impression   Patient is status post ORIF of distal radial fracture. IMPRESSION/RECOMMENDATIONS:  Post ORIF left distal radius fx, stable exam. Stable xrays  Bilateral hand swelling, not related to surgery left wrist, systemic. Plan gentle ROM left hand and wrist. NWB left hand still. OK to leave left wrist brace off due to swelling. Followup Dr Rosa Gr in office in 2-3 weeks.        MUSA Naranjo - CNP  8/31/2022  2:39 PM

## 2022-08-31 NOTE — DISCHARGE SUMMARY
Hospital Discharge Summary    Patient's PCP: Maico Mora MD  Admit Date: 8/21/2022   Discharge Date: 9/1/2022    Admitting Physician: Dr. Yain Durbin MD  Discharge Physician: Dr. Lili Waterman MD     Consults:   IP CONSULT TO GENERAL SURGERY  IP CONSULT TO CRITICAL CARE  IP CONSULT TO INFECTIOUS DISEASES  IP CONSULT TO DIETITIAN  IP CONSULT TO PALLIATIVE CARE  IP CONSULT TO DIETITIAN  IP CONSULT TO ORTHOPEDIC SURGERY    Brief HPI: Patient admitted with sepsis    Brief hospital course: 43-year-old female with history of diabetes type 2, history of colon cancer, sacral wound, GERD, anxiety disorder, osteoarthritis, atrial fibrillation, depression, fibromyalgia, migraine headache, obstructive sleep apnea, who was admitted from SNF on 8/22/2022 after she presented to the emergency room with confusion. In the emergency room, she was found to have sacral wound with subcutaneous gas to the left through to the perirectal fascia. She was diagnosed with sepsis with septic shock, requiring pressor support, multiple antibiotics and admitted to ICU. Assessment/plan:  Sepsis with septic shock, secondary to infected decubitus ulcer and underlying osteomyelitis. Status post surgical debridement with diverting colostomy. Continue antibiotics per ID recommendation she will continue IV and oral antibiotics (IV Rocephin 2 g daily, oral Flagyl 500 mg every 8, oral Diflucan 100 mg daily, oral minocycline 100 mg twice daily) with completion date of 9/23/2022. Patient will need weekly CBC, CMP, magnesium, phosphorus levels, results to be sent to infectious disease specialist, Dr. Carla Cho. Stage IV sacral decubitus ulcer. Status post laparoscopic gastrostomy tube, laparoscopic sigmoid colostomy and excisional debridement of sacral ulcer on 8/24/2022, final wound measurement of 11 x 13 cm. Wound VAC in place. Maintain outpatient follow-up with general surgery. Gram-negative bacteremia/Proteus.   Likely secondary to #2. Continue antibiotics per infectious disease recommendation. Complicated urinary tract infection. Urine cultures grew E. coli and Klebsiella. As noted above, patient already on antibiotics. Acute metabolic encephalopathy, resolved. Secondary to underlying infectious process. Continue treatment of underlying etiologies. Multiple electrolyte abnormalities, including hypophosphatemia, hyponatremia. Recommend weekly CMP, magnesium, phosphorus levels, as noted above. Acute on chronic macrocytic anemia. Etiology is multifactorial, including lower GI bleed, sepsis sepsis, multiple blood draw, hemodilutional effect. Status post transfusion of 1 unit of pRBC on 8/26/2022. As stated above, recommend weekly CBC check, especially while on antibiotics. Patient underwent colonoscopy on 8/12/2022; however, it had to be aborted due to poor prep. She is to follow-up with GI as outpatient. Diffuse anasarca. Likely third spacing from hypoalbuminemia. Asymmetric left forearm swelling. Venous ultrasound of left upper extremity obtained on 8/29/2022 was negative for DVT. History of atrial fibrillation. She was on eliquis, currently held due to unexplained anemia. Recommend repeat H&H in 1 week (post-discharge) and consider restarting eliquis, if Hb remains stable. If restarted, she will continue to require close monitoring of H&H. Other comorbidities: history of diabetes type 2, history of colon cancer, sacral wound, GERD, anxiety disorder, osteoarthritis, atrial fibrillation, depression, fibromyalgia, migraine headache, obstructive sleep apnea, severe protein caloric malnutrition, obesity with BMI of 36 kg/m². Disposition. Patient is stable at this time. She has been cleared for discharge from surgery standpoint.   Final antibiotic recommendation has been made by infectious disease; see [facility]      Invasive procedures:  tatus post surgical debridement with diverting colostomy  Colonoscopy - had to be aborted due to poor prep. Discharge Diagnoses:   Principal Problem:    Sepsis without septic shock ([facility]  Active Problems:    Septic shock (HCC)    DM2 (diabetes mellitus, type 2) ([facility]    Acute cystitis with hematuria    Acute metabolic encephalopathy    Sacral wound    Elevated lactic acid level    Fever    Tachypnea    Sacral osteomyelitis (HCC)    WHITNEY (obstructive sleep apnea)    Overweight (BMI 25.0-29. 9)    Elevated alkaline phosphatase level    Elevated alkaline phosphatase in     History of depression    Nursing home resident    Proteus infection    Gram-negative bacteremia    Lactic acidosis    Altered mental status    Hypotension    Skin ulcer of sacrum (HCC)    Acute cystitis without hematuria    Complicated UTI (urinary tract infection)    Complex care coordination    Receiving intravenous antibiotic treatment as outpatient    Nocardia infection    Atrial fibrillation (HCC)    Tachycardia    Severe protein-calorie malnutrition (HCC)    Essential hypertension    Malignant neoplasm of colon ([facility]    Fibromyalgia  Resolved Problems:    * No resolved hospital problems. *      Physical Exam: /80   Pulse 93   Temp 98.1 °F (36.7 °C) (Oral)   Resp 14   Ht 5' 2\" (1.575 m)   Wt 200 lb 14.4 oz (91.1 kg)   SpO2 97%   BMI 36.75 kg/m²   Head: Normocephalic, atraumatic  Eyes: EOMI, good acuity  ENT: Oral mucosa moist  Neck: No JVD, thyromegaly  CVS: Nml S1S2, no MRG, RRR  Pulm: Clear bilaterally. No crackles/wheezes  Gastrointestinal: Soft, NT/ND, +BS  Musculoskeletal: Diffuse anasarca. Asymmetric left forearm swelling. Warm  Neuro: No focal deficit. Moves extremity spontaneously. Psychiatry: Appropriate affect. Not agitated. Skin: Warm, dry with normal turgor.  No rash  Capillary refill: Brisk,< 3 seconds   Peripheral Pulses: +2 palpable, equal bilaterally     Significant diagnostic studies that may require follow up:  XR WRIST LEFT (2 VIEWS)    Result Date: 8/3/2022  EXAMINATION: SPOT FLUOROSCOPIC IMAGES 8/3/2022 12:15 pm TECHNIQUE: Fluoroscopy was provided by the radiology department for procedure. Radiologist was not present during examination. FLUOROSCOPY DOSE AND TYPE OR TIME AND EXPOSURES: 4.64 seconds fluoroscopy time was utilized for the study with a total of 2 exposures obtained. COMPARISON: 08/01/2022 HISTORY: ORDERING SYSTEM PROVIDED HISTORY: ORIF left distal radius TECHNOLOGIST PROVIDED HISTORY: Reason for exam:->ORIF left distal radius Reason for Exam: ORIF left distal radius Intraprocedural imaging. FINDINGS: 2 spot images of the left wrist were obtained. The images demonstrate that the patient is status post open reduction and internal fixation of a distal left radius fracture, reduced and stabilized by a volar buttress plate and anchoring screws. Intraprocedural fluoroscopic spot images as above. See separate procedure report for more information. XR WRIST LEFT (MIN 3 VIEWS)    Result Date: 8/23/2022  EXAMINATION: 3 XRAY VIEWS OF THE LEFT WRIST 8/23/2022 5:15 pm COMPARISON: Left wrist x-ray dated 08/01/2022 HISTORY: ORDERING SYSTEM PROVIDED HISTORY: Post op TECHNOLOGIST PROVIDED HISTORY: Reason for exam:->Post op Reason for Exam: Post op FINDINGS: Patient is status post ORIF of distal radial fracture. The fracture fragments are now in anatomic alignment. The hardware is intact without evidence of periprosthetic loosening or fracture. Osteopenia of the visualized osseous structures. Degenerative disease of the visualized joint spaces. There is overlying soft tissue swelling. Patient is status post ORIF of distal radial fracture. XR WRIST LEFT (MIN 3 VIEWS)    Result Date: 8/1/2022  EXAMINATION: 3 XRAY VIEWS OF THE LEFT WRIST 8/1/2022 4:50 pm COMPARISON: 07/23/2022.  HISTORY: ORDERING SYSTEM PROVIDED HISTORY: post fx, eval TECHNOLOGIST PROVIDED HISTORY: Reason for exam:->post fx, eval Reason for Exam: FX eval. Cast in place FINDINGS: There has been interval placement of the splint material overlying the left wrist obscuring fine bony details. Compared to prior study, there appears to be slight increased degree of impaction and lateral displacement of the distal fracture fragment. The alignment of the carpal bones appear near anatomic. The bones appear demineralized. Interval placement of the splint material overlying the left wrist obscuring fine bony details. Suggestion of slight worsening lateral displacement and impaction of the fracture fragments when compared to the prior study. The bones appear demineralized. US RENAL COMPLETE    Result Date: 8/1/2022  EXAMINATION: RETROPERITONEAL ULTRASOUND OF THE KIDNEYS AND URINARY BLADDER 8/1/2022 COMPARISON: None HISTORY: ORDERING SYSTEM PROVIDED HISTORY: right hydronephrosis TECHNOLOGIST PROVIDED HISTORY: Reason for exam:->right hydronephrosis FINDINGS: Kidneys: The right kidney measures 9.2 cm in length and the left kidney measures 10.3 cm in length. Kidneys demonstrate normal cortical echogenicity. No evidence of hydronephrosis or intrarenal stones. Bladder: Nonvisualized. No sonographic abnormality. RECOMMENDATIONS: Unavailable     CT ABDOMEN PELVIS W IV CONTRAST Additional Contrast? None    Result Date: 8/22/2022  EXAMINATION: CT OF THE ABDOMEN AND PELVIS WITH CONTRAST 8/21/2022 11:43 pm TECHNIQUE: CT of the abdomen and pelvis was performed with the administration of intravenous contrast. Multiplanar reformatted images are provided for review. Automated exposure control, iterative reconstruction, and/or weight based adjustment of the mA/kV was utilized to reduce the radiation dose to as low as reasonably achievable.  COMPARISON: July 29, 2022 HISTORY: ORDERING SYSTEM PROVIDED HISTORY: wound vac on sacrum, odor, AMS TECHNOLOGIST PROVIDED HISTORY: Reason for exam:->wound vac on sacrum, odor, AMS Additional Contrast?->None Decision Support Exception - unselect if not a suspected or confirmed emergency medical condition->Emergency increased from prior study. Osteoporotic compression fracture of T12 is unchanged. Large sacral decubitus ulcer with subcutaneous gas extending to the coccyx and perirectal fascia. No loculated fluid collections. Increased sclerosis of the adjacent coccyx with soft tissue gas extending to the bone. Findings are compatible with osteomyelitis. Adjacent rectal wall thickening may be reactive. Small bilateral pleural effusions with bibasilar atelectasis. Difficult to exclude superimposed pneumonia in the lung bases. VL Extremity Venous Left    Result Date: 8/29/2022  Upper Extremities Veins  Demographics   Patient Name      Alanna Yee   Date of Study     08/29/2022        Gender             Female   Patient Number    [id number]        Date of Birth      1945   Visit Number      [id number]         Age                65 year(s)   Accession Number  [id number]        Room Number        9569   Corporate ID      [id number]           Emma Lockett MS, RVT   Ordering          [address] Surg  Physician         B, MD             Physician          Gill Manjarrez MD  Procedure Type of Study:   Veins:Upper Extremities Veins, VL EXTREMITY VENOUS DUPLEX LEFT. Vascular Sonographer Report  Impressions Right Impression No other evidence of deep vein thrombosis of the right internal jugular vein. Conclusions   Summary   No evidence of deep vein or superficial venous thrombosis of the left upper  extremity.   Soft tissue swelling  Normal venous blood flow through the bilateral internal jugular veins and in  the left subclavian vein   Signature   ------------------------------------------------------------------  Electronically signed by Ailin Aguilar MD  (Interpreting physician) on 08/29/2022 at 04:48 PM  ------------------------------------------------------------------  [facility] [address] [facility]. Velocities are measured in cm/s ; Diameters are measured in mm Right UE Vein Measurements 2D Measurements +--------+----------+---------------+----------+ ! Location! Visualized! Compressibility! Thrombosis! +--------+----------+---------------+----------+ ! IJV     ! Yes       ! Yes            ! None      ! +--------+----------+---------------+----------+ Doppler Measurements +--------+---------+------+ ! Location! Signal   !Reflux! +--------+---------+------+ ! IJV     ! Pulsatile! No    ! +--------+---------+------+ Left UE Vein Measurements 2D Measurements +--------+----------+---------------+----------+ ! Location! Visualized! Compressibility! Thrombosis! +--------+----------+---------------+----------+ ! IJV     ! Yes       ! Yes            ! None      ! +--------+----------+---------------+----------+ ! SCV     ! Yes       ! Yes            ! None      ! +--------+----------+---------------+----------+ ! Axillary! Yes       ! Yes            ! None      ! +--------+----------+---------------+----------+ ! Brachial!Yes       ! Yes            ! None      ! +--------+----------+---------------+----------+ ! Radial  !Yes       ! Yes            ! None      ! +--------+----------+---------------+----------+ ! Ulnar   ! Yes       ! Yes            ! None      ! +--------+----------+---------------+----------+ ! Basilic ! Yes       ! Yes            ! None      ! +--------+----------+---------------+----------+ ! Cephalic! Yes       ! Yes            ! None      ! +--------+----------+---------------+----------+ Doppler Measurements +--------+---------+------+ ! Location! Signal   !Reflux! +--------+---------+------+ ! IJV     ! Pulsatile! No    ! +--------+---------+------+ ! SCV     ! Pulsatile! No    ! +--------+---------+------+ ! Axillary! Phasic   ! No    ! +--------+---------+------+    XR CHEST PORTABLE    Result Date: 8/21/2022  EXAMINATION: ONE XRAY VIEW OF THE CHEST 8/21/2022 10:27 pm COMPARISON: 07/29/2022 HISTORY: ORDERING SYSTEM PROVIDED HISTORY: Altered Mental Status TECHNOLOGIST PROVIDED HISTORY: Reason for exam:->Altered Mental Status Reason for Exam: Altered Mental Status FINDINGS: Degenerative changes identified in the shoulders bilaterally. Postoperative surgical plate and screws seen in the cervical spine. Dextroscoliotic deformity of the thoracic spine. Multilevel degenerative changes are seen throughout the thoracic and lumbar spine, without acute osseous fracture. No parenchymal consolidation or pleural effusion is identified. No pneumothorax is seen. No acute osseous abnormality is identified. Aortic atherosclerosis. Mitral annular calcifications are identified. Clear chest without acute cardiopulmonary process. FLUORO FOR SURGICAL PROCEDURES    Result Date: 8/3/2022  Radiology exam is complete. No Radiologist dictation. Please follow up with ordering provider. Colonoscopy    Result Date: 8/12/2022  No dictation       Treatments: As above. Discharge Medications:     Medication List        START taking these medications      aspirin EC 81 MG EC tablet  Take 1 tablet by mouth daily     eldertonic Liqd liquid  Take 15 mLs by mouth daily     fluconazole 200 MG tablet  Commonly known as: DIFLUCAN  Take 1 tablet by mouth daily for 22 days     lactobacillus capsule  Take 2 capsules by mouth 2 times daily (with meals)     metroNIDAZOLE 500 MG tablet  Commonly known as: FLAGYL  Take 1 tablet by mouth every 8 hours for 23 days     minocycline 100 MG capsule  Commonly known as: MINOCIN;DYNACIN  Take 1 capsule by mouth 2 times daily for 23 days     traMADol 50 MG tablet  Commonly known as: ULTRAM  Take 1 tablet by mouth every 6 hours as needed for Pain for up to 5 days. vashe wound therapy external solution  Irrigation, CONTINUOUS PRN, Wound Care, for irrigation with Wound V. A.C device            CONTINUE taking these medications      acetaminophen 325 MG tablet  Commonly known as: TYLENOL     ALPRAZolam 0.5 MG tablet  Commonly known as: XANAX  Take 1 tablet by mouth every 8 hours as needed for Anxiety for up to 5 days.      calcium carbonate-vitamin D 600-200 MG-UNIT Tabs     dicyclomine 10 MG capsule  Commonly known as: BENTYL  Take 1 capsule by mouth 4 times daily as needed (cramping/diarrhea)     docusate 100 MG Caps  Commonly known as: COLACE, DULCOLAX  Take 100 mg by mouth 2 times daily as needed for Constipation     escitalopram 10 MG tablet  Commonly known as: LEXAPRO  Take 1 tablet by mouth daily     flecainide 100 MG tablet  Commonly known as: TAMBOCOR  TAKE 1 TABLET BY MOUTH 2 TIMES A DAY     pantoprazole 40 MG tablet  Commonly known as: PROTONIX  TAKE ONE TABLET BY MOUTH TWICE A DAY     polyethylene glycol 17 g packet  Commonly known as: GLYCOLAX  Take 17 g by mouth daily as needed for Constipation     tiZANidine 2 MG tablet  Commonly known as: ZANAFLEX  Take 1 tablet by mouth every 6 hours as needed (muscle pain)     VITAMIN B 12 PO            STOP taking these medications      apixaban 5 MG Tabs tablet  Commonly known as: Eliquis     bisacodyl 10 MG suppository  Commonly known as: DULCOLAX     collagenase 250 UNIT/GM ointment     furosemide 20 MG tablet  Commonly known as: LASIX     metoprolol succinate 25 MG extended release tablet  Commonly known as: TOPROL XL     potassium chloride 20 MEQ extended release tablet  Commonly known as: KLOR-CON M               Where to Get Your Medications        These medications were sent to [address] [place] [address], [address]      Phone: [phone]   fluconazole 200 MG tablet  lactobacillus capsule  metroNIDAZOLE 500 MG tablet  minocycline 100 MG capsule       You can get these medications from any pharmacy    Bring a paper prescription for each of these medications  ALPRAZolam 0.5 MG tablet  traMADol 50 MG tablet       Information about where to get these medications is not yet available    Ask your nurse or doctor about these medications  aspirin EC 81 MG EC tablet  eldertonic Liqd liquid  vashe wound therapy external solution         Activity: activity as tolerated  Diet: ADULT DIET; Regular  ADULT TUBE FEEDING; Gastrostomy; Standard with Fiber; Continuous; 15; Yes; 10; Q 4 hours; 40; 30; Q 4 hours; Protein, Wound Healing; 1 proteinx daily per dietitian recommendations (DO NOT mix directly with TF); mix 1 packet of Meir in 4-6 ounces . .. Disposition: SNF  Discharged Condition: Stable  Follow Up:   Malick Fontana MD  [address] [address]  [phone]    Follow up in 1 month(s)      [facility]  [address] [address]  Barry Santana MD  [address] [address]  [phone]    Schedule an appointment as soon as possible for a visit in 1 week(s)      Violet Orozco MD  [address] [place] [address]  [phone]    Schedule an appointment as soon as possible for a visit in 1 week(s)  Needs weekly CMC, CMP, magnesium, phosphorus levels. Results to ID doctor. Pita Carrion MD  [address] [facility] [address] [address] [address]  [phone]    Schedule an appointment as soon as possible for a visit in 1 week(s)      Code status:  Full Code     Total time spent on discharge, finalizing medications, referrals and arranging outpatient follow up was more than 30 minutes      Thank you Dr. Nain Dykes MD for the opportunity to be involved in this patients care.

## 2022-08-31 NOTE — PROGRESS NOTES
Occupational Therapy  Facility/Department: [address] ORTHOPEDICS  Occupational Daily Treatment Note      Name: Elmer Brunson  : 1945  MRN: [id number]  Date of Service: 2022    Discharge Recommendations:  [facility], [address] with OT          Patient Diagnosis(es): The primary encounter diagnosis was Sacral osteomyelitis ([address] Diagnoses of Sepsis without septic shock ([address] Skin ulcer of sacrum, unspecified ulcer stage ([address] Acute cystitis with hematuria, Altered mental status, unspecified altered mental status type, Hypotension, unspecified hypotension type, and Pressure injury of skin of sacral region, unspecified injury stage were also pertinent to this visit. Past Medical History:  has a past medical history of Acid reflux, Anxiety, Arthritis, Atrial fibrillation (HCC), Closed fracture dislocation of right elbow, Depression, Fibromyalgia, Migraine, WHITNEY (obstructive sleep apnea), Rectal cancer ([address] and Wears glasses. Past Surgical History:  has a past surgical history that includes  section; Total knee arthroplasty (Left); open tx femoral supracondylar fracture w/o extension (Right, 10/25/2018); Gastric bypass surgery; Cholecystectomy; Appendectomy; Colonoscopy; Upper gastrointestinal endoscopy (N/A, 2019); Colonoscopy (N/A, 2019); Upper gastrointestinal endoscopy (N/A, 2020); incision and drainage (Right, 2021); Skin graft (Right, 2021); Skin graft (Right, 2021); Cervical discectomy; Colon surgery; Colonoscopy (N/A, 2021); Forearm surgery (Left, 8/3/2022); Colonoscopy (N/A, 2022); and colectomy (N/A, 2022). Assessment   Performance deficits / Impairments: Decreased functional mobility ; Decreased strength;Decreased endurance;Decreased ADL status; Decreased safe awareness;Decreased high-level IADLs;Decreased balance;Decreased cognition  Assessment: Discussed with OTR am pac score is 9 which indicates need for continued skilled OT to incresae [place] and decrease caregiver burden. Patient able to roll right and left with Max A of 2. Supine to sit with Max A of o2. Sitting on edge of bed for 5 minutes with assist level varied from CGA to Max A with noted right lean. Patient is very limited in ability to participart in therapy session due to exent of scaral wound. Anticipate that patient would benefit from [facility] or [facility] with OT as patient is able to participate. Cont with POC. REQUIRES OT FOLLOW-UP: Yes  Activity Tolerance  Activity Tolerance: Treatment limited secondary to medical complications (free text); Patient limited by fatigue  Activity Tolerance Comments: per wound care RN, no OOB at this time due to extent of sacral wound        Plan   Plan  Times per Week: 2-3x  Current Treatment Recommendations: Strengthening, Balance training, Functional mobility training, Endurance training, Patient/Caregiver education & training, Safety education & training, Self-Care / ADL     Restrictions  Restrictions/Precautions  Restrictions/Precautions: Fall Risk, Weight Bearing  Upper Extremity Weight Bearing Restrictions  Left Upper Extremity Weight Bearing: Non Weight Bearing  Position Activity Restriction  Other position/activity restrictions: H/o Recent Wrist Fx : immob; NWB. Unstageable Sacral Ulcer; Wound Vac.  tube feed. Per wound care RN on 8/31, pt ok to sit EOB for a few minutes but no sitting up in chair due to exposed bone in stage IV sacral ulcer    Subjective   General  Chart Reviewed: Yes  Patient assessed for rehabilitation services?: Yes  Additional Pertinent Hx: 64 y/o female admitted 8/21/2022 with seps, acute metabolic encephalopathy, Acute cystitis with hematuria, and Sacral wound with subcutaneous gas tunneling through to the perirectal fascia. CT head negative. Pt with recent R radiaus fx (7/23/2022) s/p ORIF 8/3.   Response to previous treatment: Patient reporting fatigue but able to participate  Family / Caregiver Present: No  Referring Practitioner: MUSA Solis CNP  Diagnosis: AMS  Subjective  Subjective: Patient supine in bed upon arrival to room with PT. Patient reports pain in buttocks, Does not rate with a pain number. No left wrist brace on, sitting on table. Noted moderate swelling in left hand, unable to don. RN informed and request ortho consult  General Comment  Comments: okay for therapy per RN. Social/Functional History  Social/Functional History  Lives With: Son (Son (does not work). )  Type of Home: House  Home Layout: One level  Home Access: Ramped entrance  Bathroom Shower/Tub: Tub/Shower unit  Bathroom Toilet: Standard  Bathroom Equipment: Grab bars in shower, Tub transfer bench, Hand-held shower, 3-in-1 commode, Grab bars around toilet  Bathroom Accessibility: Accessible  Home Equipment: Will Jazielippel, [place], [employer]  ADL Assistance: [address] [place] [address] Avenue:  (son assists with IADLs)  Ambulation Assistance: Independent (With [facility]  Transfer Assistance: Independent  Occupation: Retired  Additional Comments: Social history obtained from previous PT  encounter 8/1/2022. Pt has been in/out of [facility] since end of July. Pt reports at nursing home she voids via depends and staff use lift (kirby) to get OOB. Objective     Safety Devices  Type of Devices: Call light within reach;Nurse notified; Left in bed  Restraints  Restraints Initially in Place: No  Balance  Sitting: Impaired (seated on edge of bed for 5 minutes with assist level varied from CGA to Max A, right lean)  Standing:  (Unsafe to attempt)        ADL  Toileting: Dependent/Total  Toileting Skilled Clinical Factors: tello and ostomy  Additional Comments: Anticipate pt needing up to Max/total A for ADLs based on ROM, strength, and balance     Activity Tolerance  Activity Tolerance: Treatment limited secondary to medical complications  Activity Tolerance Comments: per wound care on 8/31, no sitting up in chair due to sacral ulcer  Bed mobility  Rolling to Left: Maximum assistance;2 Person assistance  Rolling to Right: Maximum assistance;2 Person assistance  Supine to Sit: Maximum assistance;2 Person assistance (HOB elevated)  Sit to Supine: Maximum assistance;2 Person assistance  Scooting: Dependent/Total;2 Person assistance  Bed Mobility Comments: cues not to use LUE  Transfers  Transfer Comments: defer standing d/t extent of sacral wound, per wound care RN, no OOB at this time     Cognition  Overall Cognitive Status: Exceptions  Arousal/Alertness: Appropriate responses to stimuli  Following Commands:  Follows one step commands with increased time  Memory: Decreased recall of recent events;Decreased short term memory  Initiation: Requires cues for some  Sequencing: Requires cues for some  Orientation  Overall Orientation Status: Within Functional Limits                  Education Given To: Patient  Education Provided: Role of Therapy;Plan of Care;Transfer Training            AM-PAC Score        AM-[facility] Inpatient Daily Activity Raw Score: 9 (08/31/22 1029)  AM-PAC Inpatient ADL T-Scale Score : 25.33 (08/31/22 1029)  ADL Inpatient CMS 0-100% Score: 79.59 (08/31/22 1029)  ADL Inpatient CMS G-Code Modifier : CL (08/31/22 1029)           Goals  Short Term Goals  Time Frame for Short term goals: prior to D/C: all goals ongoing  Short Term Goal 1: complete bed mobility with Mod A  Short Term Goal 2: complete transfers with Mod A and use of stedy  Short Term Goal 3: complete grooming with setup  Short Term Goal 4: tolerate B UE exercises x10 reps for increased strength and endurance with ADLs  Short Term Goal 5: complete bathing and dressing with Mod A  Long Term Goals  Time Frame for Long term goals : STG=LTG  Patient Goals   Patient goals : no stated goals       Therapy Time   Individual Concurrent Group Co-treatment   Time In 1000         Time Out 1040         Minutes 40               Electronically signed by Иван, [id number] on 8/31/2022 at 10:44 AM

## 2022-08-31 NOTE — PROGRESS NOTES
[facility] General Surgery                                   Daily Progress Note                                                         Pt Name: Nkechi Turcios  Medical Record Number: [id number]  Date of Birth 1945   Today's Date: 8/31/2022      ASSESSMENT/PLAN  Stage IV sacral ulcer  -8/24/22 laparoscopic gastrostomy tube, 21 [place] Mi, lap sigmoid colostomy, excisional debridement of sacral ulcer, final wound measurements 11 x 13 cm    -continue TF via G tube, tolerating at goal 40 ml/hr. Intermittent nausea improved. -wound vac per wound care team  -Pain controlled  -+ostomy function  -OK for discharge per surgery. Follow up with Dr. Jabari Shine in 3-4 weeks. Kirstie Aguilar is resting in bed. +ostomy output. Tolerating TFs at goal 40 ml/hr. Pain controlled. OBJECTIVE  VITALS:  height is 5' 2\" (1.575 m) and weight is 199 lb 9.6 oz (90.5 kg). Her oral temperature is 98.2 °F (36.8 °C). Her blood pressure is 134/82 and her pulse is 80. Her respiration is 16 and oxygen saturation is 95%. INTAKE/OUTPUT:    Intake/Output Summary (Last 24 hours) at 8/31/2022 1020  Last data filed at 8/31/2022 0955  Gross per 24 hour   Intake 10 ml   Output 1100 ml   Net -1090 ml       GENERAL: alert, cooperative, no distress  LUNGS: clear to ausculation, without wheezes, rales or rhonci  HEART: normal rate and regular rhythm  ABDOMEN: Soft, appropriately tender, non distended. G-tube present. Incisions clean dry and intact. Ostomy pink, flatus and stool in bag. EXTREMITY: no cyanosis, clubbing or edema. Wound vac functioning. I/O last 3 completed shifts:   In: 600 [P.O.:600]  Out: 1590 [Urine:550; Drains:500; Stool:540]      LABS  Recent Labs     08/30/22  0845   WBC 8.7   HGB 9.9*   HCT 30.2*      *   K 4.2   CL 99   CO2 26   BUN 11   CREATININE <0.5*   MG 1.70*   PHOS 2.2*   CALCIUM 8.0*   AST 16   ALT 11   BILITOT <0.2 EDUCATION  Patient educated about Disease Process, Medications, Smoking Cessation, Oxygenation, Incentive Spirometry and Deep Breath and Cough, Diabetes, Hyperlipidemia, Smoking Cessation, Nutrition, Exercise and Hypertension    Electronically signed by MUSA Avila CNP on 8/31/2022 at 10:20 AM      Agree with above note. The patient was personally seen and examined. Dustin Hodgkins is doing well. Continue TF at goal.  Continue wound vac. Ostomy functioning. OK to discharge from surgical standpoint.   Follow up with me in 3-4 weeks    Abril Boland MD

## 2022-08-31 NOTE — PROGRESS NOTES
This RN and another unit RN attempted to reach [employer] to have a new cannister for the patients wound vac sent to the unit. No answer at this time. Unable to request. Will continue to attempt to reach.   Electronically signed by Bennie Pressley RN on 8/31/2022 at [phone] PM

## 2022-08-31 NOTE — PROGRESS NOTES
Hospital Medicine Progress Note     Date:  8/31/2022    PCP: Earlene Mai MD (Tel: [phone])    Date of Admission: 8/21/2022    Chief complaint:   Chief Complaint   Patient presents with    Altered Mental Status     Arrived from SNF with complaints of altered mental status. Usually alert and oriented. Pt. Was found not responding as normal. Alert and oriented x1       Brief admission history: 25-year-old female with history of diabetes type 2, history of colon cancer, sacral wound, GERD, anxiety disorder, osteoarthritis, atrial fibrillation, depression, fibromyalgia, migraine headache, obstructive sleep apnea, who was admitted from SNF on 8/22/2022 after she presented to the emergency room with confusion. In the emergency room, she was found to have sacral wound with subcutaneous gas to the left through to the perirectal fascia. She was diagnosed with sepsis with septic shock, requiring pressor support, multiple antibiotics and admitted to ICU. Assessment/plan:  Sepsis with septic shock, secondary to infected decubitus ulcer and underlying osteomyelitis. Status postsurgical debridement with diverting colostomy. Continue antibiotics per ID recommendation. Stage IV sacral decubitus ulcer. Status post laparoscopic gastrostomy tube, laparoscopic sigmoid colostomy and excisional debridement of sacral ulcer on 8/24/2022, final wound measurement of 11 x 13 cm. Wound VAC in place. Gram-negative bacteremia/Proteus. Likely secondary to #2. Continue antibiotics per infectious disease recommendation. Complicated urinary tract infection. Urine cultures grew E. coli and Klebsiella. As noted above, patient already on antibiotics. Acute metabolic encephalopathy, resolved. Secondary to underlying infectious process. Continue treatment of underlying etiologies. Multiple electrolyte abnormalities, including hypophosphatemia, hyponatremia. Continue replacement protocol. Rechecking CBC.  CMP, magnesium and phosphorus levels today. Acute on chronic macrocytic anemia. Etiology is multifactorial, including lower GI bleed, sepsis sepsis, multiple blood draw, hemodilutional effect. Status post transfusion of 1 unit of pRBC on 8/26/2022. CBC ordered and pending for today. Patient underwent colonoscopy on 8/12/2022 that had to be aborted due to poor prep. If hemoglobin continues to trend down, we will consult GI to assist with evaluation. Diffuse anasarca. Likely third spacing from hypoalbuminemia. Asymmetric left forearm swelling. Venous ultrasound of left upper extremity obtained on 8/29/2022 was negative for DVT. History of atrial fibrillation. She was on eliquis, currently held due to unexplained anemia. Recommend repeat H&H in 1 week (post-discharge) and consider restarting eliquis, if Hb remains stable. If restarted, she will continue to require close monitoring of H&H. Other comorbidities: history of diabetes type 2, history of colon cancer, sacral wound, GERD, anxiety disorder, osteoarthritis, atrial fibrillation, depression, fibromyalgia, migraine headache, obstructive sleep apnea, severe protein caloric malnutrition, obesity with BMI of 36 kg/m². Disposition. Therapy evaluation ordered. ID has made final antibiotics recommendation. Awaiting general surgery OK for discharge to [facility]. Diet: ADULT DIET; Regular  ADULT TUBE FEEDING; Gastrostomy; Standard with Fiber; Continuous; 15; Yes; 10; Q 4 hours; 40; 30; Q 4 hours; Protein, Wound Healing; 1 proteinx daily per dietitian recommendations (DO NOT mix directly with TF); mix 1 packet of Meir in 4-6 ounces . .. Code status: Full Code   ----------  Subjective  No complaints. Denies any needs. Objective  Physical exam:  Vitals: /82   Pulse 88   Temp 98.2 °F (36.8 °C) (Oral)   Resp 18   Ht 5' 2\" (1.575 m)   Wt 199 lb 9.6 oz (90.5 kg)   SpO2 97%   BMI 36.51 kg/m²   Gen/overall appearance: Not in acute distress. Alert.  Oriented X3. Appears chronically-ill. Head: Normocephalic, atraumatic  Eyes: EOMI, good acuity  ENT: Oral mucosa moist  Neck: No JVD, thyromegaly  CVS: Nml S1S2, no MRG, RRR  Pulm: Clear bilaterally. No crackles/wheezes  Gastrointestinal: Soft, NT/ND, +BS  Musculoskeletal: Diffuse anasarca. Asymmetric left forearm swelling. Warm  Neuro: No focal deficit. Moves extremity spontaneously. Psychiatry: Appropriate affect. Not agitated. Skin: Warm, dry with normal turgor. No rash  Capillary refill: Brisk,< 3 seconds   Peripheral Pulses: +2 palpable, equal bilaterally      24HR INTAKE/OUTPUT:    Intake/Output Summary (Last 24 hours) at 8/31/2022 0854  Last data filed at 8/30/2022 2342  Gross per 24 hour   Intake --   Output 1100 ml   Net -1100 ml       I/O last 3 completed shifts: In: 600 [P.O.:600]  Out: 1590 [Urine:550; Drains:500; Stool:540]  No intake/output data recorded.   Meds:    pantoprazole (PROTONIX) 40 mg injection  40 mg IntraVENous Q12H    minocycline  100 mg Oral BID    acetaminophen  650 mg Oral 3 times per day    sodium chloride flush  5-40 mL IntraVENous 2 times per day    fluconazole  200 mg Oral Daily    cefTRIAXone (ROCEPHIN) IV  2,000 mg IntraVENous Q24H    metroNIDAZOLE  500 mg Oral 3 times per day    flecainide  100 mg Oral 2 times per day    [Held by provider] apixaban  5 mg Oral BID    eldertonic  15 mL Oral Daily    lactobacillus  2 capsule Oral BID WC    enoxaparin  40 mg SubCUTAneous Daily     Infusions:    vashe wound therapy      sodium chloride 25 mL (08/30/22 1225)     PRN Meds: vashe wound therapy, sodium phosphate IVPB **OR** sodium phosphate IVPB, magnesium sulfate, sodium chloride flush, sodium chloride, traMADol **OR** traMADol, fentanNYL, HYDROmorphone, promethazine, morphine **OR** morphine, ALPRAZolam, ondansetron, polyethylene glycol, acetaminophen **OR** acetaminophen    Labs/imaging:  CBC:   Recent Labs     08/30/22  0845   WBC 8.7   HGB 9.9*          BMP:    Recent Labs 08/29/22  0455 08/30/22  0845    131*   K 3.6 4.2    99   CO2 29 26   BUN 13 11   CREATININE <0.5* <0.5*   GLUCOSE 102* 176*       Hepatic:   Recent Labs     08/30/22  0845   AST 16   ALT 11   BILITOT <0.2   ALKPHOS 107       Dbeby Allen MD  -------------------------------  [place] Hospitalist

## 2022-08-31 NOTE — PROGRESS NOTES
female admit from nursing facility 8/21/2022 with Acute Met Encephalopathy, Acute Cystitis with Hematuria, Sacral Wound with Subcutaneous Gas Tunneling through to the Perirectal Fascia, Septic Shock. CT Head : negative. 8/24/2022 S/P Lap Sigmoid Colostomy with Excisional Debride of Sacral Ulcer, Lap G-Tube Placement. 8/25/2022 Sacral Wound Vac Placed. PMH including A-Fib, Colon Ca, L  Wrist Fx S/P ORIF 8/3/2022. Response To Previous Treatment: Patient with no complaints from previous session. Family / Caregiver Present: No  Referring Practitioner: Yumiko Cobb NP  Follows Commands: Within Functional Limits  Subjective  Subjective: Pt agreeable to PT treatment. In bed upon arrival.  Reports pain at sacral wound. Social/Functional History  Social/Functional History  Lives With: Son (Son (does not work). )  Type of Home: House  Home Layout: One level  Home Access: Ramped entrance  Bathroom Shower/Tub: Tub/Shower unit  Bathroom Toilet: Standard  Bathroom Equipment: Grab bars in shower, Tub transfer bench, Hand-held shower, 3-in-1 commode, Grab bars around toilet  Bathroom Accessibility: Accessible  Home Equipment: Lorren Barer, [place], [address]  ADL Assistance: [address]:  (son assists with IADLs)  Ambulation Assistance: Independent (With [employer]  Transfer Assistance: Independent  Occupation: Retired  Additional Comments: Social history obtained from previous PT  encounter 8/1/2022. Pt has been in/out of [facility] since end of July. Pt reports at nursing home she voids via depends and staff use lift (kirby) to get OOB. Other activities: pt positioned for comfort in bed at end of session on R side with wedge in place to offload sacral wound.     Objective   Bed mobility  Rolling to Left: Maximum assistance;2 Person assistance  Rolling to Right: Maximum assistance;2 Person assistance  Supine to Sit: Maximum assistance;2 Person assistance (HOB elevated)  Sit to Supine: Maximum assistance;2 Person assistance  Scooting: Dependent/Total;2 Person assistance  Bed Mobility Comments: cues not to use LUE    Transfers  Bed to Chair: Unable to assess (no chair per wound care)        Balance  Posture: Poor  Sitting - Static: Poor  Sitting - Dynamic: Poor  Comments: sitting balance EOB varying from CGA-maxA; R lean; sat EOB for approx 5 min (limited by fatigue and sacral wound)  Exercise Treatment: seated EOB: x8 alternating LAQs AAROM        AM-PAC Score  AM-PAC Inpatient Mobility Raw Score : 6 (08/31/22 1037)  AM-PAC Inpatient T-Scale Score : 23.55 (08/31/22 1037)  Mobility Inpatient CMS 0-100% Score: 100 (08/31/22 1037)  Mobility Inpatient CMS G-Code Modifier : CN (08/31/22 1037)             Goals  Short Term Goals  Time Frame for Short term goals: Upon d/c acute care setting. (goals ongoing as of 8/31)  Short term goal 1: Bed Mob Mod assist x 2. Short term goal 2: EOB ~ 5 min with Min assist.  Short term goal 3: Attempt Transfers via Stedy (as approp). Short term goal 4: Pt participating in approp Strength Exs. Patient Goals   Patient goals : None stated. Education  Patient Education  Education Given To: Patient  Education Provided: Role of Therapy;Plan of Care;Precautions  Education Method: Verbal  Barriers to Learning: Cognition; Hearing  Education Outcome: Continued education needed      Therapy Time   Individual Concurrent Group Co-treatment   Time In 1000         Time Out 1040         Minutes 40         Timed Code Treatment Minutes: 40 Minutes     Electronically signed by Nitesh Rhodes, PT [id number] on 8/31/2022 at 10:43 AM

## 2022-08-31 NOTE — PLAN OF CARE
Problem: Discharge Planning  Goal: Discharge to home or other facility with appropriate resources  Outcome: Progressing  Flowsheets (Taken 8/31/2022 1809)  Discharge to home or other facility with appropriate resources:   Refer to discharge planning if patient needs post-hospital services based on physician order or complex needs related to functional status, cognitive ability or social support system   Identify barriers to discharge with patient and caregiver   Identify discharge learning needs (meds, wound care, etc)   Arrange for needed discharge resources and transportation as appropriate     Problem: Pain  Goal: Verbalizes/displays adequate comfort level or baseline comfort level  Outcome: Progressing  Flowsheets (Taken 8/31/2022 1809)  Verbalizes/displays adequate comfort level or baseline comfort level:   Encourage patient to monitor pain and request assistance   Administer analgesics based on type and severity of pain and evaluate response   Consider cultural and social influences on pain and pain management   Assess pain using appropriate pain scale   Implement non-pharmacological measures as appropriate and evaluate response     Problem: Safety - Adult  Goal: Free from fall injury  Outcome: Progressing  Flowsheets (Taken 8/31/2022 1809)  Free From Fall Injury: Instruct family/caregiver on patient safety     Problem: Skin/Tissue Integrity  Goal: Absence of new skin breakdown  Description: 1. Monitor for areas of redness and/or skin breakdown  2. Assess vascular access sites hourly  3. Every 4-6 hours minimum:  Change oxygen saturation probe site  4. Every 4-6 hours:  If on nasal continuous positive airway pressure, respiratory therapy assess nares and determine need for appliance change or resting period.   Outcome: Progressing  Note: Skin intact, turn every 2 hours, assisted to position of comfort, heels elevated with pillows       Problem: Chronic Conditions and Co-morbidities  Goal: Patient's chronic conditions and co-morbidity symptoms are monitored and maintained or improved  Outcome: Progressing  Flowsheets (Taken 8/31/2022 1809)  Care Plan - Patient's Chronic Conditions and Co-Morbidity Symptoms are Monitored and Maintained or Improved:   Monitor and assess patient's chronic conditions and comorbid symptoms for stability, deterioration, or improvement   Collaborate with multidisciplinary team to address chronic and comorbid conditions and prevent exacerbation or deterioration   Update acute care plan with appropriate goals if chronic or comorbid symptoms are exacerbated and prevent overall improvement and discharge     Problem: Nutrition Deficit:  Goal: Optimize nutritional status  Outcome: Progressing  Flowsheets (Taken 8/31/2022 1809)  Nutrient intake appropriate for improving, restoring, or maintaining nutritional needs:   Assess nutritional status and recommend course of action   Monitor oral intake, labs, and treatment plans   Recommend appropriate diets, oral nutritional supplements, and vitamin/mineral supplements   Recommend, monitor, and adjust tube feedings and TPN/PPN based on assessed needs     Problem: ABCDS Injury Assessment  Goal: Absence of physical injury  Outcome: Progressing  Flowsheets (Taken 8/31/2022 1809)  Absence of Physical Injury: Implement safety measures based on patient assessment

## 2022-08-31 NOTE — CARE COORDINATION
08/31/22 1514   IMM Letter   IMM Letter given to Patient/Family/Significant other/Guardian/POA/by: 8/31 second IMM letter given to pt and copy provided per ROBINA Lackey RN   IMM Letter date given: 08/31/22   IMM Letter time given: 0837

## 2022-08-31 NOTE — PROGRESS NOTES
Patient in bed, A&O to person, place and situation, disoriented to time. VSS. DEBRA PICC double lumen flushes easily, dressing CDI, NS locked and capped. Patient reports pain 8/10 in her abdomen and buttock area. Pain medications administered as ordered. See MAR. Patient repositioned for comfort and wedge pillow used for offloading. Appetite decreased, encouraging PO intake. AM medications taken without issue. Patient BUE noted to be swollen, per OT the brace for a left wrist fracture cannot be applied d/t swelling. Consult ordered for orthopedic to assess and order an appropriate brace if needed. Patient aware. No other needs verbalized at this time. Standard safety precautions in place. Will monitor.  Electronically signed by Jaleel Mcguire RN on 8/31/2022 at 1000

## 2022-09-01 LAB
A/G RATIO: 0.7 (ref 1.1–2.2)
ALBUMIN SERPL-MCNC: 1.7 G/DL (ref 3.4–5)
ALP BLD-CCNC: 83 U/L (ref 40–129)
ALT SERPL-CCNC: 11 U/L (ref 10–40)
ANION GAP SERPL CALCULATED.3IONS-SCNC: 7 MMOL/L (ref 3–16)
AST SERPL-CCNC: 17 U/L (ref 15–37)
BASOPHILS ABSOLUTE: 0 K/UL (ref 0–0.2)
BASOPHILS RELATIVE PERCENT: 0.2 %
BILIRUB SERPL-MCNC: <0.2 MG/DL (ref 0–1)
BUN BLDV-MCNC: 11 MG/DL (ref 7–20)
CALCIUM SERPL-MCNC: 7.5 MG/DL (ref 8.3–10.6)
CHLORIDE BLD-SCNC: 100 MMOL/L (ref 99–110)
CO2: 28 MMOL/L (ref 21–32)
CREAT SERPL-MCNC: <0.5 MG/DL (ref 0.6–1.2)
EOSINOPHILS ABSOLUTE: 0 K/UL (ref 0–0.6)
EOSINOPHILS RELATIVE PERCENT: 0.1 %
GFR AFRICAN AMERICAN: >60
GFR NON-AFRICAN AMERICAN: >60
GLUCOSE BLD-MCNC: 105 MG/DL (ref 70–99)
GLUCOSE BLD-MCNC: 145 MG/DL (ref 70–99)
HCT VFR BLD CALC: 29 % (ref 36–48)
HEMOGLOBIN: 9.5 G/DL (ref 12–16)
LYMPHOCYTES ABSOLUTE: 1.9 K/UL (ref 1–5.1)
LYMPHOCYTES RELATIVE PERCENT: 24.1 %
MAGNESIUM: 1.5 MG/DL (ref 1.8–2.4)
MCH RBC QN AUTO: 31.1 PG (ref 26–34)
MCHC RBC AUTO-ENTMCNC: 32.9 G/DL (ref 31–36)
MCV RBC AUTO: 94.4 FL (ref 80–100)
MONOCYTES ABSOLUTE: 0.4 K/UL (ref 0–1.3)
MONOCYTES RELATIVE PERCENT: 4.4 %
NEUTROPHILS ABSOLUTE: 5.7 K/UL (ref 1.7–7.7)
NEUTROPHILS RELATIVE PERCENT: 71.2 %
PDW BLD-RTO: 22.1 % (ref 12.4–15.4)
PERFORMED ON: ABNORMAL
PHOSPHORUS: 3 MG/DL (ref 2.5–4.9)
PLATELET # BLD: 229 K/UL (ref 135–450)
PMV BLD AUTO: 7.6 FL (ref 5–10.5)
POTASSIUM SERPL-SCNC: 4.3 MMOL/L (ref 3.5–5.1)
RBC # BLD: 3.07 M/UL (ref 4–5.2)
SODIUM BLD-SCNC: 135 MMOL/L (ref 136–145)
TOTAL PROTEIN: 4 G/DL (ref 6.4–8.2)
WBC # BLD: 8 K/UL (ref 4–11)

## 2022-09-01 PROCEDURE — 2580000003 HC RX 258: Performed by: NURSE PRACTITIONER

## 2022-09-01 PROCEDURE — 6360000002 HC RX W HCPCS: Performed by: INTERNAL MEDICINE

## 2022-09-01 PROCEDURE — 84100 ASSAY OF PHOSPHORUS: CPT

## 2022-09-01 PROCEDURE — 99024 POSTOP FOLLOW-UP VISIT: CPT | Performed by: NURSE PRACTITIONER

## 2022-09-01 PROCEDURE — APPSS15 APP SPLIT SHARED TIME 0-15 MINUTES: Performed by: NURSE PRACTITIONER

## 2022-09-01 PROCEDURE — 94760 N-INVAS EAR/PLS OXIMETRY 1: CPT

## 2022-09-01 PROCEDURE — 6370000000 HC RX 637 (ALT 250 FOR IP): Performed by: NURSE PRACTITIONER

## 2022-09-01 PROCEDURE — 6360000002 HC RX W HCPCS: Performed by: SURGERY

## 2022-09-01 PROCEDURE — APPNB15 APP NON BILLABLE TIME 0-15 MINS: Performed by: NURSE PRACTITIONER

## 2022-09-01 PROCEDURE — 1200000000 HC SEMI PRIVATE

## 2022-09-01 PROCEDURE — 6360000002 HC RX W HCPCS: Performed by: ANESTHESIOLOGY

## 2022-09-01 PROCEDURE — 6370000000 HC RX 637 (ALT 250 FOR IP): Performed by: SURGERY

## 2022-09-01 PROCEDURE — 6360000002 HC RX W HCPCS: Performed by: HOSPITALIST

## 2022-09-01 PROCEDURE — 2580000003 HC RX 258: Performed by: INTERNAL MEDICINE

## 2022-09-01 PROCEDURE — 80053 COMPREHEN METABOLIC PANEL: CPT

## 2022-09-01 PROCEDURE — 85025 COMPLETE CBC W/AUTO DIFF WBC: CPT

## 2022-09-01 PROCEDURE — C9113 INJ PANTOPRAZOLE SODIUM, VIA: HCPCS | Performed by: HOSPITALIST

## 2022-09-01 PROCEDURE — 83735 ASSAY OF MAGNESIUM: CPT

## 2022-09-01 PROCEDURE — 6370000000 HC RX 637 (ALT 250 FOR IP): Performed by: INTERNAL MEDICINE

## 2022-09-01 PROCEDURE — 2580000003 HC RX 258: Performed by: HOSPITALIST

## 2022-09-01 RX ORDER — MAGNESIUM SULFATE IN WATER 40 MG/ML
2000 INJECTION, SOLUTION INTRAVENOUS ONCE
Status: COMPLETED | OUTPATIENT
Start: 2022-09-01 | End: 2022-09-01

## 2022-09-01 RX ORDER — MAGNESIUM SULFATE 1 G/100ML
1000 INJECTION INTRAVENOUS ONCE
Status: COMPLETED | OUTPATIENT
Start: 2022-09-01 | End: 2022-09-01

## 2022-09-01 RX ADMIN — MORPHINE SULFATE 2 MG: 2 INJECTION, SOLUTION INTRAMUSCULAR; INTRAVENOUS at 09:48

## 2022-09-01 RX ADMIN — ONDANSETRON 4 MG: 2 INJECTION INTRAMUSCULAR; INTRAVENOUS at 12:35

## 2022-09-01 RX ADMIN — Medication 2 CAPSULE: at 17:00

## 2022-09-01 RX ADMIN — MINOCYCLINE HYDROCHLORIDE 100 MG: 50 CAPSULE ORAL at 21:00

## 2022-09-01 RX ADMIN — SODIUM CHLORIDE, PRESERVATIVE FREE 10 ML: 5 INJECTION INTRAVENOUS at 21:00

## 2022-09-01 RX ADMIN — TRAMADOL HYDROCHLORIDE 100 MG: 50 TABLET ORAL at 18:11

## 2022-09-01 RX ADMIN — ALPRAZOLAM 0.5 MG: 0.5 TABLET ORAL at 13:43

## 2022-09-01 RX ADMIN — SODIUM CHLORIDE, PRESERVATIVE FREE 10 ML: 5 INJECTION INTRAVENOUS at 10:04

## 2022-09-01 RX ADMIN — FLUCONAZOLE 200 MG: 100 TABLET ORAL at 09:57

## 2022-09-01 RX ADMIN — MINOCYCLINE HYDROCHLORIDE 100 MG: 50 CAPSULE ORAL at 10:01

## 2022-09-01 RX ADMIN — METRONIDAZOLE 500 MG: 500 TABLET ORAL at 05:44

## 2022-09-01 RX ADMIN — CEFTRIAXONE 2000 MG: 2 INJECTION, POWDER, FOR SOLUTION INTRAMUSCULAR; INTRAVENOUS at 18:15

## 2022-09-01 RX ADMIN — FLECAINIDE ACETATE 100 MG: 100 TABLET ORAL at 20:58

## 2022-09-01 RX ADMIN — HYDROMORPHONE HYDROCHLORIDE 0.5 MG: 1 INJECTION, SOLUTION INTRAMUSCULAR; INTRAVENOUS; SUBCUTANEOUS at 21:17

## 2022-09-01 RX ADMIN — SODIUM CHLORIDE, PRESERVATIVE FREE 40 MG: 5 INJECTION INTRAVENOUS at 10:01

## 2022-09-01 RX ADMIN — Medication 2 CAPSULE: at 09:57

## 2022-09-01 RX ADMIN — METRONIDAZOLE 500 MG: 500 TABLET ORAL at 20:59

## 2022-09-01 RX ADMIN — ENOXAPARIN SODIUM 40 MG: 100 INJECTION SUBCUTANEOUS at 09:57

## 2022-09-01 RX ADMIN — MAGNESIUM SULFATE HEPTAHYDRATE 2000 MG: 40 INJECTION, SOLUTION INTRAVENOUS at 10:03

## 2022-09-01 RX ADMIN — TRAMADOL HYDROCHLORIDE 100 MG: 50 TABLET ORAL at 13:41

## 2022-09-01 RX ADMIN — ACETAMINOPHEN 650 MG: 325 TABLET ORAL at 05:44

## 2022-09-01 RX ADMIN — METRONIDAZOLE 500 MG: 500 TABLET ORAL at 13:42

## 2022-09-01 RX ADMIN — ALPRAZOLAM 0.5 MG: 0.5 TABLET ORAL at 21:17

## 2022-09-01 RX ADMIN — FLECAINIDE ACETATE 100 MG: 100 TABLET ORAL at 10:01

## 2022-09-01 RX ADMIN — MAGNESIUM SULFATE HEPTAHYDRATE 1000 MG: 1 INJECTION, SOLUTION INTRAVENOUS at 12:39

## 2022-09-01 RX ADMIN — ACETAMINOPHEN 650 MG: 325 TABLET ORAL at 13:42

## 2022-09-01 RX ADMIN — SODIUM CHLORIDE 250 ML: 9 INJECTION, SOLUTION INTRAVENOUS at 10:02

## 2022-09-01 RX ADMIN — SODIUM CHLORIDE, PRESERVATIVE FREE 40 MG: 5 INJECTION INTRAVENOUS at 20:59

## 2022-09-01 ASSESSMENT — PAIN DESCRIPTION - DESCRIPTORS
DESCRIPTORS: ACHING

## 2022-09-01 ASSESSMENT — PAIN SCALES - GENERAL
PAINLEVEL_OUTOF10: 7
PAINLEVEL_OUTOF10: 5
PAINLEVEL_OUTOF10: 0
PAINLEVEL_OUTOF10: 3
PAINLEVEL_OUTOF10: 7
PAINLEVEL_OUTOF10: 6
PAINLEVEL_OUTOF10: 1
PAINLEVEL_OUTOF10: 8

## 2022-09-01 ASSESSMENT — PAIN SCALES - WONG BAKER
WONGBAKER_NUMERICALRESPONSE: 2

## 2022-09-01 ASSESSMENT — PAIN DESCRIPTION - ORIENTATION
ORIENTATION: LEFT
ORIENTATION: RIGHT;LEFT;MID
ORIENTATION: RIGHT;LEFT;MID
ORIENTATION: RIGHT;MID;LEFT

## 2022-09-01 ASSESSMENT — PAIN DESCRIPTION - LOCATION
LOCATION: ABDOMEN;BUTTOCKS
LOCATION: ABDOMEN;BUTTOCKS
LOCATION: ABDOMEN
LOCATION: ABDOMEN;BUTTOCKS

## 2022-09-01 ASSESSMENT — PAIN DESCRIPTION - ONSET
ONSET: ON-GOING

## 2022-09-01 ASSESSMENT — PAIN DESCRIPTION - PAIN TYPE
TYPE: ACUTE PAIN

## 2022-09-01 ASSESSMENT — PAIN DESCRIPTION - FREQUENCY
FREQUENCY: INTERMITTENT

## 2022-09-01 NOTE — CARE COORDINATION
Spoke with Niru at [facility] as of yet.      Electronically signed by Kemal Goldstein MSW, BEARW, Case Management on 9/1/2022 at 1:39 PM  [facility] [place] [phone]

## 2022-09-01 NOTE — PROGRESS NOTES
[facility] General Surgery                                   Daily Progress Note                                                         Pt Name: Mary Hernandez  Medical Record Number: [id number]  Date of Birth 1945   Today's Date: 9/1/2022      ASSESSMENT/PLAN  Stage IV sacral ulcer  -8/24/22 laparoscopic gastrostomy tube, 21 Western Mi, lap sigmoid colostomy, excisional debridement of sacral ulcer, final wound measurements 11 x 13 cm    -continue TF via G tube, tolerating at goal 40 ml/hr. Intermittent nausea stable.  -wound vac per wound care team  -Pain controlled  -+ostomy function  -OK for discharge per surgery. Follow up with Dr. Misty Valentin in 3-4 weeks. Manjit Fajardo is resting in bed. +ostomy output. Tolerating TFs at goal 40 ml/hr. Pain controlled. OBJECTIVE  VITALS:  height is 5' 2\" (1.575 m) and weight is 200 lb 14.4 oz (91.1 kg). Her oral temperature is 97.7 °F (36.5 °C). Her blood pressure is 121/81 and her pulse is 93. Her respiration is 17 and oxygen saturation is 96%. INTAKE/OUTPUT:    Intake/Output Summary (Last 24 hours) at 9/1/2022 1040  Last data filed at 9/1/2022 1004  Gross per 24 hour   Intake 3510 ml   Output 950 ml   Net 2560 ml       GENERAL: alert, cooperative, no distress  LUNGS: clear to ausculation, without wheezes, rales or rhonci  HEART: normal rate and regular rhythm  ABDOMEN: Soft, appropriately tender, non distended. G-tube present. Incisions clean dry and intact. Ostomy pink, flatus and stool in bag. EXTREMITY: no cyanosis, clubbing or edema. Wound vac functioning. I/O last 3 completed shifts:   In: 0512 [P.O.:360; I.V.:10; NG/GT:3110]  Out: 1100 [Urine:650; Stool:450]      LABS  Recent Labs     09/01/22  0530   WBC 8.0   HGB 9.5*   HCT 29.0*      *   K 4.3      CO2 28   BUN 11   CREATININE <0.5*   MG 1.50*   PHOS 3.0   CALCIUM 7.5*   AST 17   ALT 11   BILITOT <0.2 EDUCATION  Patient educated about Disease Process, Medications, Smoking Cessation, Oxygenation, Incentive Spirometry and Deep Breath and Cough, Diabetes, Hyperlipidemia, Smoking Cessation, Nutrition, Exercise and Hypertension    Electronically signed by MUSA Duong CNP on 9/1/2022 at 10:40 AM

## 2022-09-01 NOTE — CARE COORDINATION
Pt awaiting pre-cert for facility. Pt wound vac removed. Vashe wet to dry placed. Vac placed in dirty hold. Supply chain notified of need for . Will update [facility] for d/c. Will need vac changes MWF. Bedside RN to do dressing change until d/c.       Electronically signed by Kenyon Mirza RN on 9/1/2022 at 2:55 PM

## 2022-09-01 NOTE — PROGRESS NOTES
Patient in bed, A&O to person, place and situation, disoriented to time. VSS. DEBRA PICC double lumen flushes easily, dressing CDI, NS locked and capped, red lumen with intermittent infusions. Patient reports pain 5/10 in her abdomen and buttock area. Pain medications administered as ordered. See MAR. Patient repositioned for comfort and wedge pillow used for offloading. Appetite decreased, encouraging PO intake. AM medications taken without issue. No other needs verbalized at this time. Standard safety precautions in place. Will monitor.  Electronically signed by Jose Rafael Walton RN on 9/1/2022 at [phone] AM

## 2022-09-01 NOTE — PROGRESS NOTES
PM Assessment completed. BP 97/63   Pulse 90   Temp 97.9 °F (36.6 °C) (Oral)   Resp 18   Ht 5' 2\" (1.575 m)   Wt 199 lb 9.6 oz (90.5 kg)   SpO2 95%   BMI 36.51 kg/m²     Alert and oriented x4. Calls appropriately. Plan of care and safety measures reviewed with the patient. Needed items including call light in reach and exit alarm in place.        Electronically signed by Maikol Manzano RN on 8/31/2022 at 8:08 PM

## 2022-09-01 NOTE — PROGRESS NOTES
Patient status remains unchanged from this AM.  Wound vac removed and a moist to dry dressing placed per wound care. Patient turned and wedge pillows utilized for offloading. Patient has no other complaints or concerns. Pain managed with Tramadol this afternoon as Morphine was causing patient to be nauseous. Standard safety precautions in place. Will monitor.  Electronically signed by Rolanda Kellogg RN on 9/1/2022 at 1600 PM

## 2022-09-01 NOTE — PROGRESS NOTES
Hospital Medicine Progress Note     Date:  9/1/2022    PCP: Sandra Miranda MD (Tel: [phone])    Date of Admission: 8/21/2022    Chief complaint:   Chief Complaint   Patient presents with    Altered Mental Status     Arrived from SNF with complaints of altered mental status. Usually alert and oriented. Pt. Was found not responding as normal. Alert and oriented x1       Brief admission history: 65-year-old female with history of diabetes type 2, history of colon cancer, sacral wound, GERD, anxiety disorder, osteoarthritis, atrial fibrillation, depression, fibromyalgia, migraine headache, obstructive sleep apnea, who was admitted from SNF on 8/22/2022 after she presented to the emergency room with confusion. In the emergency room, she was found to have sacral wound with subcutaneous gas to the left through to the perirectal fascia. She was diagnosed with sepsis with septic shock, requiring pressor support, multiple antibiotics and admitted to ICU. Assessment/plan:  Sepsis with septic shock, secondary to infected decubitus ulcer and underlying osteomyelitis. Status postsurgical debridement with diverting colostomy. Continue antibiotics per ID recommendation. Stage IV sacral decubitus ulcer. Status post laparoscopic gastrostomy tube, laparoscopic sigmoid colostomy and excisional debridement of sacral ulcer on 8/24/2022, final wound measurement of 11 x 13 cm. Wound VAC in place. Gram-negative bacteremia/Proteus. Likely secondary to #2. Continue antibiotics per infectious disease recommendation. Complicated urinary tract infection. Urine cultures grew E. coli and Klebsiella. As noted above, patient already on antibiotics. Acute metabolic encephalopathy, resolved. Secondary to underlying infectious process. Continue treatment of underlying etiologies. Multiple electrolyte abnormalities, including hypophosphatemia, hyponatremia. Magnesium low, replaced this morning.   Acute on chronic macrocytic anemia. Etiology is multifactorial, including lower GI bleed, sepsis sepsis, multiple blood draw, hemodilutional effect. Status post transfusion of 1 unit of pRBC on 8/26/2022. Hemoglobin is stable, 9.5 this morning. Patient underwent colonoscopy on 8/12/2022 that had to be aborted due to poor prep. Diffuse anasarca. Likely third spacing from hypoalbuminemia. Asymmetric left forearm swelling. Venous ultrasound of left upper extremity obtained on 8/29/2022 was negative for DVT. History of atrial fibrillation. She was on eliquis, currently held due to unexplained anemia. Recommend repeat H&H in 1 week (post-discharge) and consider restarting eliquis, if Hb remains stable. If restarted, she will continue to require close monitoring of H&H. Other comorbidities: history of diabetes type 2, history of colon cancer, sacral wound, GERD, anxiety disorder, osteoarthritis, atrial fibrillation, depression, fibromyalgia, migraine headache, obstructive sleep apnea, severe protein caloric malnutrition, obesity with BMI of 36 kg/m². Disposition. Therapy evaluation ordered. ID has made final antibiotics recommendation. Awaiting general surgery OK for discharge to [facility]. Diet: ADULT DIET; Regular  ADULT TUBE FEEDING; Gastrostomy; Standard with Fiber; Continuous; 15; Yes; 10; Q 4 hours; 40; 30; Q 4 hours; Protein, Wound Healing; 1 proteinx daily per dietitian recommendations (DO NOT mix directly with TF); mix 1 packet of Meir in 4-6 ounces . .. Code status: Full Code   ----------  Subjective  No complaints. Denies any needs. Objective  Physical exam:  Vitals: /80   Pulse 93   Temp 98.1 °F (36.7 °C) (Oral)   Resp 14   Ht 5' 2\" (1.575 m)   Wt 200 lb 14.4 oz (91.1 kg)   SpO2 97%   BMI 36.75 kg/m²   Gen/overall appearance: Not in acute distress. Alert. Oriented X3. Appears chronically-ill.   Head: Normocephalic, atraumatic  Eyes: EOMI, good acuity  ENT: Oral mucosa moist  Neck: No JVD, thyromegaly  CVS: Nml S1S2, no MRG, RRR  Pulm: Clear bilaterally. No crackles/wheezes  Gastrointestinal: Soft, NT/ND, +BS  Musculoskeletal: Diffuse anasarca. Asymmetric left forearm swelling. Warm  Neuro: No focal deficit. Moves extremity spontaneously. Psychiatry: Appropriate affect. Not agitated. Skin: Warm, dry with normal turgor. No rash  Capillary refill: Brisk,< 3 seconds   Peripheral Pulses: +2 palpable, equal bilaterally      24HR INTAKE/OUTPUT:    Intake/Output Summary (Last 24 hours) at 9/1/2022 1323  Last data filed at 9/1/2022 1227  Gross per 24 hour   Intake 3420 ml   Output 1225 ml   Net 2195 ml       I/O last 3 completed shifts:   In: 0 [P.O.:360; I.V.:10; NG/GT:3110]  Out: 1100 [Urine:650; Stool:450]  I/O this shift:  In: 370 [P.O.:360; I.V.:10]  Out: 275 [Urine:225; Stool:50]  Meds:    magnesium sulfate  1,000 mg IntraVENous Once    pantoprazole (PROTONIX) 40 mg injection  40 mg IntraVENous Q12H    minocycline  100 mg Oral BID    acetaminophen  650 mg Oral 3 times per day    sodium chloride flush  5-40 mL IntraVENous 2 times per day    fluconazole  200 mg Oral Daily    cefTRIAXone (ROCEPHIN) IV  2,000 mg IntraVENous Q24H    metroNIDAZOLE  500 mg Oral 3 times per day    flecainide  100 mg Oral 2 times per day    [Held by provider] apixaban  5 mg Oral BID    eldertonic  15 mL Oral Daily    lactobacillus  2 capsule Oral BID WC    enoxaparin  40 mg SubCUTAneous Daily     Infusions:    vashe wound therapy      sodium chloride 250 mL (09/01/22 1002)     PRN Meds: vashe wound therapy, sodium phosphate IVPB **OR** sodium phosphate IVPB, magnesium sulfate, sodium chloride flush, sodium chloride, traMADol **OR** traMADol, fentanNYL, HYDROmorphone, promethazine, morphine **OR** morphine, ALPRAZolam, ondansetron, polyethylene glycol, acetaminophen **OR** acetaminophen    Labs/imaging:  CBC:   Recent Labs     08/30/22  0845 09/01/22  0530   WBC 8.7 8.0   HGB 9.9* 9.5*    229       BMP:    Recent Labs 08/30/22 0845 09/01/22  0530   * 135*   K 4.2 4.3   CL 99 100   CO2 26 28   BUN 11 11   CREATININE <0.5* <0.5*   GLUCOSE 176* 105*       Hepatic:   Recent Labs     08/30/22 0845 09/01/22  0530   AST 16 17   ALT 11 11   BILITOT <0.2 <0.2   ALKPHOS 107 83         Karen Lynn MD  -------------------------------  [facility]

## 2022-09-01 NOTE — PLAN OF CARE
Problem: Discharge Planning  Goal: Discharge to home or other facility with appropriate resources  Outcome: Progressing  Flowsheets (Taken 9/1/2022 1851)  Discharge to home or other facility with appropriate resources:   Identify barriers to discharge with patient and caregiver   Identify discharge learning needs (meds, wound care, etc)   Refer to discharge planning if patient needs post-hospital services based on physician order or complex needs related to functional status, cognitive ability or social support system   Arrange for needed discharge resources and transportation as appropriate     Problem: Pain  Goal: Verbalizes/displays adequate comfort level or baseline comfort level  Outcome: Progressing  Flowsheets (Taken 9/1/2022 1851)  Verbalizes/displays adequate comfort level or baseline comfort level:   Encourage patient to monitor pain and request assistance   Administer analgesics based on type and severity of pain and evaluate response   Consider cultural and social influences on pain and pain management   Assess pain using appropriate pain scale   Implement non-pharmacological measures as appropriate and evaluate response     Problem: Safety - Adult  Goal: Free from fall injury  Outcome: Progressing  Flowsheets (Taken 9/1/2022 1851)  Free From Fall Injury: Instruct family/caregiver on patient safety     Problem: Skin/Tissue Integrity  Goal: Absence of new skin breakdown  Description: 1. Monitor for areas of redness and/or skin breakdown  2. Assess vascular access sites hourly  3. Every 4-6 hours minimum:  Change oxygen saturation probe site  4. Every 4-6 hours:  If on nasal continuous positive airway pressure, respiratory therapy assess nares and determine need for appliance change or resting period. Outcome: Progressing  Note: Patient skin condition and mucus membrane integrity remain unchanged during this shift. Skin breakdown prevention interventions are in place.  Will continue to monitor and assess.          Problem: Chronic Conditions and Co-morbidities  Goal: Patient's chronic conditions and co-morbidity symptoms are monitored and maintained or improved  Outcome: Progressing  Flowsheets (Taken 9/1/2022 1851)  Care Plan - Patient's Chronic Conditions and Co-Morbidity Symptoms are Monitored and Maintained or Improved:   Monitor and assess patient's chronic conditions and comorbid symptoms for stability, deterioration, or improvement   Collaborate with multidisciplinary team to address chronic and comorbid conditions and prevent exacerbation or deterioration   Update acute care plan with appropriate goals if chronic or comorbid symptoms are exacerbated and prevent overall improvement and discharge     Problem: Nutrition Deficit:  Goal: Optimize nutritional status  Outcome: Progressing  Flowsheets (Taken 9/1/2022 1851)  Nutrient intake appropriate for improving, restoring, or maintaining nutritional needs:   Assess nutritional status and recommend course of action   Recommend appropriate diets, oral nutritional supplements, and vitamin/mineral supplements   Recommend, monitor, and adjust tube feedings and TPN/PPN based on assessed needs   Monitor oral intake, labs, and treatment plans   Provide specific nutrition education to patient or family as appropriate     Problem: ABCDS Injury Assessment  Goal: Absence of physical injury  Outcome: Progressing  Flowsheets (Taken 9/1/2022 1851)  Absence of Physical Injury: Implement safety measures based on patient assessment

## 2022-09-02 LAB
A/G RATIO: 0.9 (ref 1.1–2.2)
ALBUMIN SERPL-MCNC: 1.6 G/DL (ref 3.4–5)
ALP BLD-CCNC: 92 U/L (ref 40–129)
ALT SERPL-CCNC: 9 U/L (ref 10–40)
ANION GAP SERPL CALCULATED.3IONS-SCNC: 5 MMOL/L (ref 3–16)
AST SERPL-CCNC: 14 U/L (ref 15–37)
BASOPHILS ABSOLUTE: 0 K/UL (ref 0–0.2)
BASOPHILS RELATIVE PERCENT: 0.4 %
BILIRUB SERPL-MCNC: <0.2 MG/DL (ref 0–1)
BUN BLDV-MCNC: 11 MG/DL (ref 7–20)
CALCIUM SERPL-MCNC: 7.2 MG/DL (ref 8.3–10.6)
CHLORIDE BLD-SCNC: 99 MMOL/L (ref 99–110)
CO2: 29 MMOL/L (ref 21–32)
CREAT SERPL-MCNC: <0.5 MG/DL (ref 0.6–1.2)
EKG ATRIAL RATE: 89 BPM
EKG DIAGNOSIS: NORMAL
EKG P AXIS: 63 DEGREES
EKG P-R INTERVAL: 232 MS
EKG Q-T INTERVAL: 412 MS
EKG QRS DURATION: 124 MS
EKG QTC CALCULATION (BAZETT): 501 MS
EKG R AXIS: -46 DEGREES
EKG T AXIS: 60 DEGREES
EKG VENTRICULAR RATE: 89 BPM
EOSINOPHILS ABSOLUTE: 0 K/UL (ref 0–0.6)
EOSINOPHILS RELATIVE PERCENT: 0.1 %
GFR AFRICAN AMERICAN: >60
GFR NON-AFRICAN AMERICAN: >60
GLUCOSE BLD-MCNC: 125 MG/DL (ref 70–99)
GLUCOSE BLD-MCNC: 127 MG/DL (ref 70–99)
GLUCOSE BLD-MCNC: 142 MG/DL (ref 70–99)
HCT VFR BLD CALC: 25.7 % (ref 36–48)
HEMOGLOBIN: 8.4 G/DL (ref 12–16)
LV EF: 73 %
LVEF MODALITY: NORMAL
LYMPHOCYTES ABSOLUTE: 1.1 K/UL (ref 1–5.1)
LYMPHOCYTES RELATIVE PERCENT: 18.7 %
MAGNESIUM: 1.7 MG/DL (ref 1.8–2.4)
MCH RBC QN AUTO: 30.6 PG (ref 26–34)
MCHC RBC AUTO-ENTMCNC: 32.5 G/DL (ref 31–36)
MCV RBC AUTO: 94 FL (ref 80–100)
MONOCYTES ABSOLUTE: 0.3 K/UL (ref 0–1.3)
MONOCYTES RELATIVE PERCENT: 5.5 %
NEUTROPHILS ABSOLUTE: 4.4 K/UL (ref 1.7–7.7)
NEUTROPHILS RELATIVE PERCENT: 75.3 %
PDW BLD-RTO: 22.1 % (ref 12.4–15.4)
PERFORMED ON: ABNORMAL
PERFORMED ON: ABNORMAL
PHOSPHORUS: 2.9 MG/DL (ref 2.5–4.9)
PLATELET # BLD: 211 K/UL (ref 135–450)
PMV BLD AUTO: 7.6 FL (ref 5–10.5)
POTASSIUM SERPL-SCNC: 3.8 MMOL/L (ref 3.5–5.1)
RBC # BLD: 2.73 M/UL (ref 4–5.2)
SODIUM BLD-SCNC: 133 MMOL/L (ref 136–145)
TOTAL PROTEIN: 3.4 G/DL (ref 6.4–8.2)
TROPONIN: 0.02 NG/ML
TROPONIN: <0.01 NG/ML
TROPONIN: <0.01 NG/ML
WBC # BLD: 5.8 K/UL (ref 4–11)

## 2022-09-02 PROCEDURE — 2580000003 HC RX 258: Performed by: INTERNAL MEDICINE

## 2022-09-02 PROCEDURE — 99024 POSTOP FOLLOW-UP VISIT: CPT | Performed by: NURSE PRACTITIONER

## 2022-09-02 PROCEDURE — 6360000002 HC RX W HCPCS: Performed by: INTERNAL MEDICINE

## 2022-09-02 PROCEDURE — 84100 ASSAY OF PHOSPHORUS: CPT

## 2022-09-02 PROCEDURE — APPSS15 APP SPLIT SHARED TIME 0-15 MINUTES: Performed by: NURSE PRACTITIONER

## 2022-09-02 PROCEDURE — 6360000002 HC RX W HCPCS: Performed by: SURGERY

## 2022-09-02 PROCEDURE — 2580000003 HC RX 258: Performed by: HOSPITALIST

## 2022-09-02 PROCEDURE — 6370000000 HC RX 637 (ALT 250 FOR IP): Performed by: SURGERY

## 2022-09-02 PROCEDURE — 6360000002 HC RX W HCPCS: Performed by: HOSPITALIST

## 2022-09-02 PROCEDURE — 6370000000 HC RX 637 (ALT 250 FOR IP): Performed by: INTERNAL MEDICINE

## 2022-09-02 PROCEDURE — 6370000000 HC RX 637 (ALT 250 FOR IP): Performed by: NURSE PRACTITIONER

## 2022-09-02 PROCEDURE — 93010 ELECTROCARDIOGRAM REPORT: CPT | Performed by: INTERNAL MEDICINE

## 2022-09-02 PROCEDURE — 84484 ASSAY OF TROPONIN QUANT: CPT

## 2022-09-02 PROCEDURE — A9502 TC99M TETROFOSMIN: HCPCS | Performed by: INTERNAL MEDICINE

## 2022-09-02 PROCEDURE — 93005 ELECTROCARDIOGRAM TRACING: CPT | Performed by: INTERNAL MEDICINE

## 2022-09-02 PROCEDURE — C9113 INJ PANTOPRAZOLE SODIUM, VIA: HCPCS | Performed by: HOSPITALIST

## 2022-09-02 PROCEDURE — 85025 COMPLETE CBC W/AUTO DIFF WBC: CPT

## 2022-09-02 PROCEDURE — 80053 COMPREHEN METABOLIC PANEL: CPT

## 2022-09-02 PROCEDURE — APPNB30 APP NON BILLABLE TIME 0-30 MINS: Performed by: NURSE PRACTITIONER

## 2022-09-02 PROCEDURE — 2580000003 HC RX 258: Performed by: NURSE PRACTITIONER

## 2022-09-02 PROCEDURE — 94760 N-INVAS EAR/PLS OXIMETRY 1: CPT

## 2022-09-02 PROCEDURE — 83735 ASSAY OF MAGNESIUM: CPT

## 2022-09-02 PROCEDURE — 1200000000 HC SEMI PRIVATE

## 2022-09-02 PROCEDURE — 3430000000 HC RX DIAGNOSTIC RADIOPHARMACEUTICAL: Performed by: INTERNAL MEDICINE

## 2022-09-02 RX ORDER — MAGNESIUM SULFATE IN WATER 40 MG/ML
2000 INJECTION, SOLUTION INTRAVENOUS ONCE
Status: COMPLETED | OUTPATIENT
Start: 2022-09-02 | End: 2022-09-02

## 2022-09-02 RX ADMIN — MINOCYCLINE HYDROCHLORIDE 100 MG: 50 CAPSULE ORAL at 08:46

## 2022-09-02 RX ADMIN — SODIUM CHLORIDE, PRESERVATIVE FREE 10 ML: 5 INJECTION INTRAVENOUS at 21:00

## 2022-09-02 RX ADMIN — ACETAMINOPHEN 650 MG: 325 TABLET ORAL at 06:01

## 2022-09-02 RX ADMIN — SODIUM CHLORIDE, PRESERVATIVE FREE 10 ML: 5 INJECTION INTRAVENOUS at 08:47

## 2022-09-02 RX ADMIN — ONDANSETRON 4 MG: 2 INJECTION INTRAMUSCULAR; INTRAVENOUS at 14:45

## 2022-09-02 RX ADMIN — ACETAMINOPHEN 650 MG: 325 TABLET ORAL at 14:40

## 2022-09-02 RX ADMIN — MAGNESIUM SULFATE HEPTAHYDRATE 2000 MG: 40 INJECTION, SOLUTION INTRAVENOUS at 12:38

## 2022-09-02 RX ADMIN — TETROFOSMIN 30 MILLICURIE: 1.38 INJECTION, POWDER, LYOPHILIZED, FOR SOLUTION INTRAVENOUS at 11:33

## 2022-09-02 RX ADMIN — METRONIDAZOLE 500 MG: 500 TABLET ORAL at 22:11

## 2022-09-02 RX ADMIN — Medication 2 CAPSULE: at 16:48

## 2022-09-02 RX ADMIN — LIDOCAINE HYDROCHLORIDE: 20 SOLUTION ORAL; TOPICAL at 10:30

## 2022-09-02 RX ADMIN — Medication 2 CAPSULE: at 08:46

## 2022-09-02 RX ADMIN — MORPHINE SULFATE 4 MG: 4 INJECTION, SOLUTION INTRAMUSCULAR; INTRAVENOUS at 08:49

## 2022-09-02 RX ADMIN — ALPRAZOLAM 0.5 MG: 0.5 TABLET ORAL at 22:11

## 2022-09-02 RX ADMIN — ACETAMINOPHEN 650 MG: 325 TABLET ORAL at 22:11

## 2022-09-02 RX ADMIN — MINOCYCLINE HYDROCHLORIDE 100 MG: 50 CAPSULE ORAL at 22:16

## 2022-09-02 RX ADMIN — FLECAINIDE ACETATE 100 MG: 100 TABLET ORAL at 08:46

## 2022-09-02 RX ADMIN — ONDANSETRON 4 MG: 2 INJECTION INTRAMUSCULAR; INTRAVENOUS at 08:51

## 2022-09-02 RX ADMIN — TRAMADOL HYDROCHLORIDE 100 MG: 50 TABLET ORAL at 22:10

## 2022-09-02 RX ADMIN — MORPHINE SULFATE 4 MG: 4 INJECTION, SOLUTION INTRAMUSCULAR; INTRAVENOUS at 14:45

## 2022-09-02 RX ADMIN — SODIUM CHLORIDE, PRESERVATIVE FREE 40 MG: 5 INJECTION INTRAVENOUS at 08:45

## 2022-09-02 RX ADMIN — METRONIDAZOLE 500 MG: 500 TABLET ORAL at 06:01

## 2022-09-02 RX ADMIN — CEFTRIAXONE 2000 MG: 2 INJECTION, POWDER, FOR SOLUTION INTRAMUSCULAR; INTRAVENOUS at 17:45

## 2022-09-02 RX ADMIN — ENOXAPARIN SODIUM 40 MG: 100 INJECTION SUBCUTANEOUS at 08:46

## 2022-09-02 RX ADMIN — MORPHINE SULFATE 2 MG: 2 INJECTION, SOLUTION INTRAMUSCULAR; INTRAVENOUS at 16:48

## 2022-09-02 RX ADMIN — FLUCONAZOLE 200 MG: 100 TABLET ORAL at 08:46

## 2022-09-02 RX ADMIN — SODIUM CHLORIDE, PRESERVATIVE FREE 40 MG: 5 INJECTION INTRAVENOUS at 22:10

## 2022-09-02 RX ADMIN — METRONIDAZOLE 500 MG: 500 TABLET ORAL at 14:40

## 2022-09-02 ASSESSMENT — PAIN SCALES - GENERAL
PAINLEVEL_OUTOF10: 0
PAINLEVEL_OUTOF10: 0
PAINLEVEL_OUTOF10: 4
PAINLEVEL_OUTOF10: 3
PAINLEVEL_OUTOF10: 6
PAINLEVEL_OUTOF10: 4
PAINLEVEL_OUTOF10: 8
PAINLEVEL_OUTOF10: 6
PAINLEVEL_OUTOF10: 8
PAINLEVEL_OUTOF10: 8

## 2022-09-02 ASSESSMENT — PAIN DESCRIPTION - FREQUENCY
FREQUENCY: INTERMITTENT

## 2022-09-02 ASSESSMENT — PAIN DESCRIPTION - DESCRIPTORS
DESCRIPTORS: ACHING

## 2022-09-02 ASSESSMENT — PAIN - FUNCTIONAL ASSESSMENT
PAIN_FUNCTIONAL_ASSESSMENT: PREVENTS OR INTERFERES SOME ACTIVE ACTIVITIES AND ADLS
PAIN_FUNCTIONAL_ASSESSMENT: ACTIVITIES ARE NOT PREVENTED
PAIN_FUNCTIONAL_ASSESSMENT: ACTIVITIES ARE NOT PREVENTED

## 2022-09-02 ASSESSMENT — PAIN DESCRIPTION - ONSET
ONSET: ON-GOING

## 2022-09-02 ASSESSMENT — PAIN DESCRIPTION - LOCATION
LOCATION: ABDOMEN;BUTTOCKS
LOCATION: ABDOMEN
LOCATION: ABDOMEN

## 2022-09-02 ASSESSMENT — PAIN SCALES - WONG BAKER
WONGBAKER_NUMERICALRESPONSE: 0
WONGBAKER_NUMERICALRESPONSE: 2

## 2022-09-02 ASSESSMENT — PAIN DESCRIPTION - ORIENTATION
ORIENTATION: LEFT;MID
ORIENTATION: LEFT
ORIENTATION: LEFT

## 2022-09-02 ASSESSMENT — PAIN DESCRIPTION - PAIN TYPE
TYPE: ACUTE PAIN

## 2022-09-02 NOTE — PRE-PROCEDURE INSTRUCTIONS
Patient to have a stress test tomorrow. Please assure the patient has no caffeine or decaffeinated beverages or chocolate after 8 pm the evening before the test for a nuclear medicine stress test. Please hold all beta blockers the evening and morning of the test as well. Stop tube feeding after midnight tonight. The patient may have water as desired or ordered.  The patient must have at least 2 Troponin T resulted prior to arriving for the stress test.

## 2022-09-02 NOTE — CARE COORDINATION
09/02/22 1122   IMM Letter   IMM Letter given to Patient/Family/Significant other/Guardian/POA/by: given to patient  hs   IMM Letter date given: 09/02/22   IMM Letter time given: 1118

## 2022-09-02 NOTE — CARE COORDINATION
Spoke with [facility]--no precert as of yet. Electronically signed by HOLLY Carrasco LISW, Case Management on 9/2/2022 at 9:55 AM  [place] [phone]    11:19 AM  Per Rn, patient with chest pain-in process of working up. [facility] with [facility] Explained that insurance precert in process. Provided patient with IMM letter.      Electronically signed by HOLLY Carrasco LISW, Case Management on 9/2/2022 at 11:21 AM  [place] [phone] anterior

## 2022-09-02 NOTE — PLAN OF CARE
Problem: Discharge Planning  Goal: Discharge to home or other facility with appropriate resources  Outcome: Progressing  Flowsheets (Taken 9/2/2022 1503)  Discharge to home or other facility with appropriate resources:   Identify barriers to discharge with patient and caregiver   Identify discharge learning needs (meds, wound care, etc)   Refer to discharge planning if patient needs post-hospital services based on physician order or complex needs related to functional status, cognitive ability or social support system   Arrange for needed discharge resources and transportation as appropriate     Problem: Pain  Goal: Verbalizes/displays adequate comfort level or baseline comfort level  9/2/2022 1503 by Jose Alejandro Gordon RN  Outcome: Progressing  Flowsheets (Taken 9/2/2022 1503)  Verbalizes/displays adequate comfort level or baseline comfort level:   Encourage patient to monitor pain and request assistance   Administer analgesics based on type and severity of pain and evaluate response   Consider cultural and social influences on pain and pain management   Assess pain using appropriate pain scale   Implement non-pharmacological measures as appropriate and evaluate response  9/2/2022 0657 by Claudean Coder, RN  Outcome: Progressing     Problem: Safety - Adult  Goal: Free from fall injury  Outcome: Progressing  Flowsheets (Taken 9/2/2022 1503)  Free From Fall Injury: Instruct family/caregiver on patient safety     Problem: Skin/Tissue Integrity  Goal: Absence of new skin breakdown  Description: 1. Monitor for areas of redness and/or skin breakdown  2. Assess vascular access sites hourly  3. Every 4-6 hours minimum:  Change oxygen saturation probe site  4. Every 4-6 hours:  If on nasal continuous positive airway pressure, respiratory therapy assess nares and determine need for appliance change or resting period.   Outcome: Progressing  Note: Patient skin condition and mucus membrane integrity remain unchanged during this shift. Skin breakdown prevention interventions are in place. Will continue to monitor and assess.        Problem: Chronic Conditions and Co-morbidities  Goal: Patient's chronic conditions and co-morbidity symptoms are monitored and maintained or improved  Outcome: Progressing  Flowsheets (Taken 9/2/2022 1503)  Care Plan - Patient's Chronic Conditions and Co-Morbidity Symptoms are Monitored and Maintained or Improved:   Monitor and assess patient's chronic conditions and comorbid symptoms for stability, deterioration, or improvement   Collaborate with multidisciplinary team to address chronic and comorbid conditions and prevent exacerbation or deterioration   Update acute care plan with appropriate goals if chronic or comorbid symptoms are exacerbated and prevent overall improvement and discharge     Problem: Nutrition Deficit:  Goal: Optimize nutritional status  9/2/2022 1503 by Matthew Maxwell RN  Outcome: Progressing  Flowsheets (Taken 9/2/2022 1503)  Nutrient intake appropriate for improving, restoring, or maintaining nutritional needs:   Assess nutritional status and recommend course of action   Recommend appropriate diets, oral nutritional supplements, and vitamin/mineral supplements   Recommend, monitor, and adjust tube feedings and TPN/PPN based on assessed needs   Monitor oral intake, labs, and treatment plans  9/2/2022 1318 by Attila Frazier RD, LD  Outcome: Progressing     Problem: ABCDS Injury Assessment  Goal: Absence of physical injury  Outcome: Progressing  Flowsheets (Taken 9/2/2022 1503)  Absence of Physical Injury: Implement safety measures based on patient assessment

## 2022-09-02 NOTE — PROGRESS NOTES
Comprehensive Nutrition Assessment    Type and Reason for Visit:  Reassess    Nutrition Recommendations/Plan:   Continue Regular diet. Continue Jevity 1.5 @ 40 ml/hr +1 proteinex daily +Meir (when in stock). Flush per providers. Malnutrition Assessment:  Malnutrition Status: Moderate malnutrition (08/22/22 0843)    Context:  Acute Illness     Findings of the 6 clinical characteristics of malnutrition:  Energy Intake:  Unable to assess  Weight Loss:  No significant weight loss     Body Fat Loss:  Mild body fat loss Orbital, Buccal region   Muscle Mass Loss:  Mild muscle mass loss Temples (temporalis)  Fluid Accumulation:  No significant fluid accumulation     Strength:  Not Performed    Nutrition Assessment:    Follow-up. Pt remains on Regular diet with intakes <50% and receiving all nutrition via PEG with TF running @ goal rate of 40 mL/hr +1 proteinex (Adonica Sorrel when in stock). D/C planning underway, pt developed chest pain today, stress tomorrow. Will continue current nutrition interventions. Nutrition Related Findings:    BM 9/2; +2 BLE and +1/+2 BUE edema; Glu slightly high Wound Type: Multiple, Pressure Injury, Unstageable (heels and sacrum.)       Current Nutrition Intake & Therapies:    Average Meal Intake: 26-50%, 0%  Average Supplements Intake: NPO  ADULT DIET; Regular  ADULT TUBE FEEDING; Gastrostomy; Standard with Fiber; Continuous; 15; Yes; 10; Q 4 hours; 40; 30; Q 4 hours; Protein, Wound Healing; 1 proteinx daily per dietitian recommendations (DO NOT mix directly with TF); mix 1 packet of Meir in 4-6 ounces . .. Diet NPO Exceptions are: Sips of Water with Meds  Current Tube Feeding (TF) Orders:  Feeding Route: Gastrostomy  Formula: Standard with Fiber (Jevity 1.5 fiber containing formula)  Schedule: Continuous  Feeding Regimen: Jevity 1.5 formula goal rate of 40 ml per hour (rate adj for routine Nursing care (~20 hour run).   Additives/Modulars: Protein, Wound Healing (1 bottle of Proteinex per feeding tube per day (DO NOT mix directly with TF) + 1 pkt of Meir mixed with water per feeding tube bid (DO NOT mix directly with TF))  Water Flushes: per provider  Current TF & Flush Orders Provides: 800 ml TV / 1484 (1200 (TF) + 104 (Proteinex) + 180 (Meir) kcals / 82 gms (51(TF) + 26 (Proteinex) + 5 (Meir) Protein / 788 ml free water (608 (TF) + 180 ((flush) per day. Goal TF & Flush Orders Provides: Recommend trophic TF, Jevity 1.5 @ 15 mL/hr. Flush per provider. Provides: 300 mL TV, 450 kcal, 19 gm pro, 228  mL free water. Add Meir BID (mix with 4 oz of water and bolus). Goal TF rate would be Jevity 1.5 @ 40 mL/hr +1 proteinex daily. Add Meir BID. Flush per provider. Provides: 800 mL TV, 1304 kcal, 77 gm pro, 608 mL free water (includes 1 proteinex). Anthropometric Measures:  Height: 5' 2\" (157.5 cm)  Ideal Body Weight (IBW): 110 lbs (50 kg)    Admission Body Weight: 157 lb (71.2 kg)  Current Body Weight: 194 lb (88 kg), 176.4 % IBW. Weight Source: Bed Scale  Current BMI (kg/m2): 35.5                          BMI Categories: Obese Class 2 (BMI 35.0 -39.9)    Estimated Daily Nutrient Needs:        Energy (kcal/day): 4793-9387  kcal (15-18 kcal/kg 71 kg CBW)     Protein (g/day):  gm (1.5-2 gm /kg IBW)     Fluid (ml/day): per provider while in ICU    Nutrition Diagnosis:   Moderate malnutrition related to inadequate protein-energy intake as evidenced by mild muscle loss, mild loss of subcutaneous fat    Nutrition Interventions:   Food and/or Nutrient Delivery: Continue Current Diet, Continue Current Tube Feeding  Nutrition Education/Counseling: No recommendation at this time  Coordination of Nutrition Care: Continue to monitor while inpatient       Goals:  Previous Goal Met: Goal(s) Achieved  Goals:  Tolerate nutrition support at goal rate, by next RD assessment       Nutrition Monitoring and Evaluation:   Behavioral-Environmental Outcomes: None Identified  Food/Nutrient Intake Outcomes: Food and Nutrient Intake, Enteral Nutrition Intake/Tolerance  Physical Signs/Symptoms Outcomes: Biochemical Data, Constipation, Diarrhea, Nausea or Vomiting, Fluid Status or Edema, Skin, Weight    Discharge Planning:     Too soon to determine     Algis Min, RD, LD  Contact: [phone]

## 2022-09-02 NOTE — PROGRESS NOTES
[facility] General Surgery                                   Daily Progress Note                                                         Pt Name: Glenis Chand  Medical Record Number: [id number]  Date of Birth 1945   Today's Date: 9/2/2022      ASSESSMENT/PLAN  Stage IV sacral ulcer  -8/24/22 laparoscopic gastrostomy tube, 21 Western Mi, lap sigmoid colostomy, excisional debridement of sacral ulcer, final wound measurements 11 x 13 cm    -continue TF via G tube, tolerating at goal 40 ml/hr. Intermittent nausea stable. She says nausea is chronic and back at baseline  -wound vac per wound care team  -Pain controlled  -+ostomy function  -OK for discharge per surgery. Follow up with Dr. Jethro Menjivar in 3-4 weeks. Qasim Salazar is resting in bed. +ostomy output. Tolerating TFs at goal 40 ml/hr. Pain controlled. OBJECTIVE  VITALS:  height is 5' 2\" (1.575 m) and weight is 194 lb 3.2 oz (88.1 kg). Her oral temperature is 98.6 °F (37 °C). Her blood pressure is 100/70 and her pulse is 106 (abnormal). Her respiration is 16 and oxygen saturation is 93%. INTAKE/OUTPUT:    Intake/Output Summary (Last 24 hours) at 9/2/2022 1105  Last data filed at 9/2/2022 0859  Gross per 24 hour   Intake 2813 ml   Output 2175 ml   Net 638 ml       GENERAL: alert, cooperative, no distress  LUNGS: clear to ausculation, without wheezes, rales or rhonci  HEART: normal rate and regular rhythm  ABDOMEN: Soft, appropriately tender, non distended. G-tube present. Incisions clean dry and intact. Ostomy pink, flatus and stool in bag. EXTREMITY: no cyanosis, clubbing or edema. Wound vac functioning. I/O last 3 completed shifts:   In: 3913 [P.O.:360; I.V.:1360; NG/GT:2193]  Out: 2175 [Urine:825; Drains:1000; Stool:350]      LABS  Recent Labs     09/01/22  0530 09/02/22  1045   WBC 8.0 5.8   HGB 9.5* 8.4*   HCT 29.0* 25.7*    211   *  --    K 4.3  --      -- CO2 28  --    BUN 11  --    CREATININE <0.5*  --    MG 1.50*  --    PHOS 3.0  --    CALCIUM 7.5*  --    AST 17  --    ALT 11  --    BILITOT <0.2  --          EDUCATION  Patient educated about Disease Process, Medications, Smoking Cessation, Oxygenation, Incentive Spirometry and Deep Breath and Cough, Diabetes, Hyperlipidemia, Smoking Cessation, Nutrition, Exercise and Hypertension    Electronically signed by MUSA Duong CNP on 9/2/2022 at 11:05 AM

## 2022-09-02 NOTE — CARE COORDINATION
Pt in bed. Ostomy pouch changed. Pt has denuded peristomal skin towards 12 o'clock. Pouch wafer not maintaining good seal. Barrier sheet applied to skin breakdown. 1-piece pouch cut to fit. Good seal obtained. Continue routine ostomy care:  Routine ostomy care -   Change pouch every 3 days & PRN. Empty when 1/3 to 1/2 full of stool or gas. Directions:   Measure stoma & cut wafer to fit snuggly around base of stoma   Remove pouch, cleanse skin with warm water, dry thoroughly. Place barrier sheet to peristomal edges and skin breakdown. Place 1 pc pouch with barrier ring. Place hand over pouch 2-3 minutes to warm. 09/02/22 1322   Colostomy LLQ   Placement Date/Time: 08/24/22 1422   Present on Admission/Arrival: No  Location: LLQ   Stomal Appliance 1 piece; Changed   Stoma  Assessment Protrudes; Moist;Pink   Peristomal Assessment Denuded   Treatment Bag change;Packings   Stool Appearance Loose   Stool Color Brown   Stool Amount Medium   Output (mL) 50 ml     Electronically signed by Ml Gomez RN CWOCN on 9/2/2022 at 1:28 PM

## 2022-09-02 NOTE — PROGRESS NOTES
Patient in bed, A&O to person, place and situation, disoriented to time. VSS. DEBRA PICC double lumen flushes easily, dressing CDI, NS locked and capped, red lumen with intermittent infusions. Patient reports pain 8/10 in her abdomen and buttock area. Pain medications administered as ordered. See MAR. Patient repositioned for comfort and wedge pillow used for offloading. Appetite decreased, encouraging PO intake. AM medications taken without issue. No other needs verbalized at this time. Standard safety precautions in place. Will monitor.  Electronically signed by Lorin Easley RN on 9/2/2022 at [address] AM

## 2022-09-02 NOTE — CARE COORDINATION
Spoke with [facility]--precert still pending. [facility] admissions not in on the weekend.      Electronically signed by HOLLY Pichardo, BEARW, Case Management on 9/2/2022 at 4:30 PM  Kendra Stringer [phone]

## 2022-09-02 NOTE — PROGRESS NOTES
Patient status remains unchanged from the AM assessment. VSS. PICC flushing easily, dressing CDI, intermittent infusions. Patient pain managed with ordered medications. See MAR. Patient dressing on sacrum removed, area cleansed with NS, and re packed with moist to dry dressing. Covered with ABD pad and paper tape. Triad cream applied to buttocks and alma area. Abdominal Pannus cleansed with bath wipes and inter dry applied. Patient turned and repositioned with a wedge pillow for comfort. . Left arm elevated. Patient verbalizes no other needs at this time. Standard safety precautions in place. Will monitor.  Electronically signed by Ivan Adams RN on 9/2/2022 at 5:05 PM

## 2022-09-02 NOTE — PLAN OF CARE
Problem: Pain  Goal: Verbalizes/displays adequate comfort level or baseline comfort level  9/2/2022 0657 by Roselia Norwood RN  Outcome: Progressing  9/1/2022 1851 by Rolanda Kellogg RN  Outcome: Progressing  Flowsheets (Taken 9/1/2022 1851)  Verbalizes/displays adequate comfort level or baseline comfort level:   Encourage patient to monitor pain and request assistance   Administer analgesics based on type and severity of pain and evaluate response   Consider cultural and social influences on pain and pain management   Assess pain using appropriate pain scale   Implement non-pharmacological measures as appropriate and evaluate response

## 2022-09-02 NOTE — PROGRESS NOTES
Hospital Medicine Progress Note     Date:  9/2/2022    PCP: Dakota Feng MD (Tel: [phone])    Date of Admission: 8/21/2022    Chief complaint:   Chief Complaint   Patient presents with    Altered Mental Status     Arrived from SNF with complaints of altered mental status. Usually alert and oriented. Pt. Was found not responding as normal. Alert and oriented x1       Brief admission history: 68-year-old female with history of diabetes type 2, history of colon cancer, sacral wound, GERD, anxiety disorder, osteoarthritis, atrial fibrillation, depression, fibromyalgia, migraine headache, obstructive sleep apnea, who was admitted from SNF on 8/22/2022 after she presented to the emergency room with confusion. In the emergency room, she was found to have sacral wound with subcutaneous gas to the left through to the perirectal fascia. She was diagnosed with sepsis with septic shock, requiring pressor support, multiple antibiotics and admitted to ICU. Assessment/plan:  Sepsis with septic shock, secondary to infected decubitus ulcer and underlying osteomyelitis. Status postsurgical debridement with diverting colostomy. Continue antibiotics per ID recommendation. Stage IV sacral decubitus ulcer. Status post laparoscopic gastrostomy tube, laparoscopic sigmoid colostomy and excisional debridement of sacral ulcer on 8/24/2022, final wound measurement of 11 x 13 cm. Wound VAC in place. Gram-negative bacteremia/Proteus. Likely secondary to #2. Continue antibiotics per infectious disease recommendation. Complicated urinary tract infection. Urine cultures grew E. coli and Klebsiella. As noted above, patient already on antibiotics. Acute metabolic encephalopathy, resolved. Secondary to underlying infectious process. Continue treatment of underlying etiologies. Multiple electrolyte abnormalities, including hypophosphatemia, hyponatremia. Electrolytes have been corrected. A  Acute on chronic macrocytic anemia. Etiology is multifactorial, including lower GI bleed, sepsis sepsis, multiple blood draw, hemodilutional effect. Status post transfusion of 1 unit of pRBC on 8/26/2022. Patient underwent colonoscopy on 8/12/2022 that had to be aborted due to poor prep. Close monitoring in rehab. Substernal chest pain, reported on 9/2/2022. Check EKG, serial troponin. Give one dose of GI cocktail. Depending on finding, will likely plan for stress test 9/3/2022. Diffuse anasarca. Likely third spacing from hypoalbuminemia. Asymmetric left forearm swelling. Venous ultrasound of left upper extremity obtained on 8/29/2022 was negative for DVT. History of atrial fibrillation. She was on eliquis, currently held due to unexplained anemia. Recommend repeat H&H in 1 week (post-discharge) and consider restarting eliquis, if Hb remains stable. If restarted, she will continue to require close monitoring of H&H. Other comorbidities: history of diabetes type 2, history of colon cancer, sacral wound, GERD, anxiety disorder, osteoarthritis, atrial fibrillation, depression, fibromyalgia, migraine headache, obstructive sleep apnea, severe protein caloric malnutrition, obesity with BMI of 36 kg/m². Disposition. Therapy evaluation ordered. ID has made final antibiotics recommendation. Awaiting general surgery OK for discharge to [facility]. Diet: ADULT DIET; Regular  ADULT TUBE FEEDING; Gastrostomy; Standard with Fiber; Continuous; 15; Yes; 10; Q 4 hours; 40; 30; Q 4 hours; Protein, Wound Healing; 1 proteinx daily per dietitian recommendations (DO NOT mix directly with TF); mix 1 packet of Meir in 4-6 ounces . ..     Code status: Full Code   ----------  Subjective  Reports chest pain this morning, substernal.    Objective  Physical exam:  Vitals: /70   Pulse (!) 106   Temp 98.6 °F (37 °C) (Oral)   Resp 16   Ht 5' 2\" (1.575 m)   Wt 194 lb 3.2 oz (88.1 kg)   SpO2 93%   BMI 35.52 kg/m²   Gen/overall appearance: Not in acute

## 2022-09-03 LAB
GLUCOSE BLD-MCNC: 106 MG/DL (ref 70–99)
GLUCOSE BLD-MCNC: 88 MG/DL (ref 70–99)
GLUCOSE BLD-MCNC: 89 MG/DL (ref 70–99)
PERFORMED ON: ABNORMAL
PERFORMED ON: NORMAL
PERFORMED ON: NORMAL

## 2022-09-03 PROCEDURE — A9502 TC99M TETROFOSMIN: HCPCS

## 2022-09-03 PROCEDURE — 6360000002 HC RX W HCPCS: Performed by: HOSPITALIST

## 2022-09-03 PROCEDURE — 6370000000 HC RX 637 (ALT 250 FOR IP): Performed by: NURSE PRACTITIONER

## 2022-09-03 PROCEDURE — 6360000002 HC RX W HCPCS: Performed by: SURGERY

## 2022-09-03 PROCEDURE — 6370000000 HC RX 637 (ALT 250 FOR IP): Performed by: INTERNAL MEDICINE

## 2022-09-03 PROCEDURE — 1200000000 HC SEMI PRIVATE

## 2022-09-03 PROCEDURE — 6360000002 HC RX W HCPCS: Performed by: INTERNAL MEDICINE

## 2022-09-03 PROCEDURE — 93017 CV STRESS TEST TRACING ONLY: CPT

## 2022-09-03 PROCEDURE — 51702 INSERT TEMP BLADDER CATH: CPT

## 2022-09-03 PROCEDURE — 78452 HT MUSCLE IMAGE SPECT MULT: CPT

## 2022-09-03 PROCEDURE — 6370000000 HC RX 637 (ALT 250 FOR IP): Performed by: SURGERY

## 2022-09-03 PROCEDURE — C9113 INJ PANTOPRAZOLE SODIUM, VIA: HCPCS | Performed by: HOSPITALIST

## 2022-09-03 PROCEDURE — 2580000003 HC RX 258: Performed by: HOSPITALIST

## 2022-09-03 PROCEDURE — 3430000000 HC RX DIAGNOSTIC RADIOPHARMACEUTICAL

## 2022-09-03 PROCEDURE — 2580000003 HC RX 258: Performed by: NURSE PRACTITIONER

## 2022-09-03 PROCEDURE — 3430000000 HC RX DIAGNOSTIC RADIOPHARMACEUTICAL: Performed by: INTERNAL MEDICINE

## 2022-09-03 PROCEDURE — A9502 TC99M TETROFOSMIN: HCPCS | Performed by: INTERNAL MEDICINE

## 2022-09-03 PROCEDURE — 94760 N-INVAS EAR/PLS OXIMETRY 1: CPT

## 2022-09-03 PROCEDURE — 2580000003 HC RX 258: Performed by: INTERNAL MEDICINE

## 2022-09-03 RX ADMIN — SODIUM CHLORIDE, PRESERVATIVE FREE 10 ML: 5 INJECTION INTRAVENOUS at 21:33

## 2022-09-03 RX ADMIN — MINOCYCLINE HYDROCHLORIDE 100 MG: 50 CAPSULE ORAL at 21:41

## 2022-09-03 RX ADMIN — ACETAMINOPHEN 650 MG: 325 TABLET ORAL at 13:20

## 2022-09-03 RX ADMIN — REGADENOSON 0.4 MG: 0.08 INJECTION, SOLUTION INTRAVENOUS at 10:16

## 2022-09-03 RX ADMIN — FLECAINIDE ACETATE 100 MG: 100 TABLET ORAL at 13:22

## 2022-09-03 RX ADMIN — CEFTRIAXONE 2000 MG: 2 INJECTION, POWDER, FOR SOLUTION INTRAMUSCULAR; INTRAVENOUS at 17:45

## 2022-09-03 RX ADMIN — METRONIDAZOLE 500 MG: 500 TABLET ORAL at 21:34

## 2022-09-03 RX ADMIN — FLECAINIDE ACETATE 100 MG: 100 TABLET ORAL at 03:48

## 2022-09-03 RX ADMIN — ACETAMINOPHEN 650 MG: 325 TABLET ORAL at 21:34

## 2022-09-03 RX ADMIN — Medication 2 CAPSULE: at 13:21

## 2022-09-03 RX ADMIN — ALUMINA, MAGNESIA, AND SIMETHICONE ORAL SUSPENSION REGULAR STRENGTH: 1200; 1200; 120 SUSPENSION ORAL at 17:31

## 2022-09-03 RX ADMIN — ACETAMINOPHEN 650 MG: 325 TABLET ORAL at 05:56

## 2022-09-03 RX ADMIN — FLUCONAZOLE 200 MG: 100 TABLET ORAL at 13:21

## 2022-09-03 RX ADMIN — SODIUM CHLORIDE, PRESERVATIVE FREE 40 MG: 5 INJECTION INTRAVENOUS at 21:34

## 2022-09-03 RX ADMIN — SODIUM CHLORIDE, PRESERVATIVE FREE 40 MG: 5 INJECTION INTRAVENOUS at 13:33

## 2022-09-03 RX ADMIN — SODIUM CHLORIDE, PRESERVATIVE FREE 10 ML: 5 INJECTION INTRAVENOUS at 13:33

## 2022-09-03 RX ADMIN — TETROFOSMIN 30 MILLICURIE: 1.38 INJECTION, POWDER, LYOPHILIZED, FOR SOLUTION INTRAVENOUS at 10:28

## 2022-09-03 RX ADMIN — TRAMADOL HYDROCHLORIDE 100 MG: 50 TABLET ORAL at 06:49

## 2022-09-03 RX ADMIN — METRONIDAZOLE 500 MG: 500 TABLET ORAL at 05:56

## 2022-09-03 RX ADMIN — ENOXAPARIN SODIUM 40 MG: 100 INJECTION SUBCUTANEOUS at 13:31

## 2022-09-03 RX ADMIN — METRONIDAZOLE 500 MG: 500 TABLET ORAL at 13:21

## 2022-09-03 RX ADMIN — MINOCYCLINE HYDROCHLORIDE 100 MG: 50 CAPSULE ORAL at 13:20

## 2022-09-03 RX ADMIN — FLECAINIDE ACETATE 100 MG: 100 TABLET ORAL at 21:34

## 2022-09-03 RX ADMIN — TRAMADOL HYDROCHLORIDE 50 MG: 50 TABLET, COATED ORAL at 17:29

## 2022-09-03 ASSESSMENT — PAIN DESCRIPTION - DESCRIPTORS
DESCRIPTORS: ACHING

## 2022-09-03 ASSESSMENT — PAIN SCALES - GENERAL
PAINLEVEL_OUTOF10: 5
PAINLEVEL_OUTOF10: 7
PAINLEVEL_OUTOF10: 3
PAINLEVEL_OUTOF10: 0
PAINLEVEL_OUTOF10: 1
PAINLEVEL_OUTOF10: 0
PAINLEVEL_OUTOF10: 0

## 2022-09-03 ASSESSMENT — PAIN DESCRIPTION - ONSET
ONSET: ON-GOING
ONSET: ON-GOING

## 2022-09-03 ASSESSMENT — PAIN SCALES - WONG BAKER
WONGBAKER_NUMERICALRESPONSE: 0

## 2022-09-03 ASSESSMENT — PAIN - FUNCTIONAL ASSESSMENT
PAIN_FUNCTIONAL_ASSESSMENT: ACTIVITIES ARE NOT PREVENTED

## 2022-09-03 ASSESSMENT — PAIN DESCRIPTION - LOCATION
LOCATION: ABDOMEN
LOCATION: HEAD
LOCATION: HEAD

## 2022-09-03 ASSESSMENT — PAIN DESCRIPTION - PAIN TYPE
TYPE: ACUTE PAIN
TYPE: ACUTE PAIN

## 2022-09-03 ASSESSMENT — PAIN DESCRIPTION - FREQUENCY
FREQUENCY: INTERMITTENT
FREQUENCY: INTERMITTENT

## 2022-09-03 ASSESSMENT — PAIN DESCRIPTION - ORIENTATION
ORIENTATION: MID

## 2022-09-03 NOTE — PROGRESS NOTES
Hospitalist Progress Note      PCP: Monnie Runner, MD    Date of Admission: 8/21/2022    Subjective: still with some CP    Medications:  Reviewed    Infusion Medications    vashe wound therapy      sodium chloride 250 mL (09/01/22 1002)     Scheduled Medications    pantoprazole (PROTONIX) 40 mg injection  40 mg IntraVENous Q12H    minocycline  100 mg Oral BID    acetaminophen  650 mg Oral 3 times per day    sodium chloride flush  5-40 mL IntraVENous 2 times per day    fluconazole  200 mg Oral Daily    cefTRIAXone (ROCEPHIN) IV  2,000 mg IntraVENous Q24H    metroNIDAZOLE  500 mg Oral 3 times per day    flecainide  100 mg Oral 2 times per day    [Held by provider] apixaban  5 mg Oral BID    eldertonic  15 mL Oral Daily    lactobacillus  2 capsule Oral BID WC    enoxaparin  40 mg SubCUTAneous Daily     PRN Meds: vashe wound therapy, sodium phosphate IVPB **OR** sodium phosphate IVPB, magnesium sulfate, sodium chloride flush, sodium chloride, traMADol **OR** traMADol, fentanNYL, HYDROmorphone, promethazine, morphine **OR** morphine, ALPRAZolam, ondansetron, polyethylene glycol, acetaminophen **OR** acetaminophen      Intake/Output Summary (Last 24 hours) at 9/3/2022 1549  Last data filed at 9/3/2022 1528  Gross per 24 hour   Intake 717 ml   Output 1325 ml   Net -608 ml       Physical Exam Performed:    /80   Pulse 94   Temp 97.5 °F (36.4 °C) (Oral)   Resp 18   Ht 5' 2\" (1.575 m)   Wt 196 lb 11.2 oz (89.2 kg)   SpO2 96%   BMI 35.98 kg/m²     Gen/overall appearance: Not in acute distress. Alert. Oriented X3. Appears chronically-ill. Head: Normocephalic, atraumatic  Eyes: EOMI, good acuity  ENT: Oral mucosa moist  Neck: No JVD, thyromegaly  CVS: Nml S1S2, no MRG, RRR  Pulm: Clear bilaterally. No crackles/wheezes  Gastrointestinal: Soft, NT/ND, +BS  Musculoskeletal: Diffuse anasarca. Asymmetric left forearm swelling. Warm  Neuro: No focal deficit. Moves extremity spontaneously.   Psychiatry: underlying osteomyelitis. Status postsurgical debridement with diverting colostomy. Continue antibiotics per ID recommendation. Stage IV sacral decubitus ulcer. Status post laparoscopic gastrostomy tube, laparoscopic sigmoid colostomy and excisional debridement of sacral ulcer on 8/24/2022, final wound measurement of 11 x 13 cm. Wound VAC in place. Gram-negative bacteremia/Proteus. Likely secondary to #2. Continue antibiotics per infectious disease recommendation. Complicated urinary tract infection. Urine cultures grew E. coli and Klebsiella. As noted above, patient already on antibiotics. Acute metabolic encephalopathy, resolved. Secondary to underlying infectious process. Continue treatment of underlying etiologies. Multiple electrolyte abnormalities, including hypophosphatemia, hyponatremia. Electrolytes have been corrected. A  Acute on chronic macrocytic anemia. Etiology is multifactorial, including lower GI bleed, sepsis sepsis, multiple blood draw, hemodilutional effect. Status post transfusion of 1 unit of pRBC on 8/26/2022. Patient underwent colonoscopy on 8/12/2022 that had to be aborted due to poor prep. Close monitoring in rehab. Substernal chest pain, reported on 9/2/2022. reviewed EKG, serial troponin. Given one dose of GI cocktail. S/p stress test today  Diffuse anasarca. Likely third spacing from hypoalbuminemia. Asymmetric left forearm swelling. Venous ultrasound of left upper extremity obtained on 8/29/2022 was negative for DVT. History of atrial fibrillation. She was on eliquis, currently held due to unexplained anemia. Recommend repeat H&H in 1 week (post-discharge) and consider restarting eliquis, if Hb remains stable. If restarted, she will continue to require close monitoring of H&H.   Other comorbidities: history of diabetes type 2, history of colon cancer, sacral wound, GERD, anxiety disorder, osteoarthritis, atrial fibrillation, depression, fibromyalgia, migraine headache, obstructive sleep apnea, severe protein caloric malnutrition, obesity with BMI of 36 kg/m². Disposition. Therapy evaluation ordered. ID has made final antibiotics recommendation. Awaiting general surgery OK for discharge to ECF.     Diet: Diet NPO Exceptions are: Sips of Water with Meds, Ice Chips  Code Status: Full Code  PT/OT Eval Status: ordered    Dispo - cont care, awaiting stress test        Caroline Jeff MD

## 2022-09-03 NOTE — PROGRESS NOTES
I reordered tube feed as ordered by General Surgery. I see that pt is on a Standard Tube feed and not a Diabetic. Pt doesn't have diabetes listed in history. I again asked the pt if she is diabetic and pt said, \"I don't know. They keep sticking me, so I thought I was diabetic. \" This RN reported to pt that a doctor hasn't diagnosed pt with diabetes. BSS have been stable throughout shift.

## 2022-09-03 NOTE — PROGRESS NOTES
Dr. Kennis Lesch ordered to restart tube feed. Pt's stress test came back normal. Pt asked this RN to update daughter by phone.  This RN called and left a message for pt's daughter Marilu Hunger that pt's stress test came back normal.

## 2022-09-03 NOTE — PROGRESS NOTES
Tubing changed for PEG tube and Jevity 1.5 started at 15 ml/hr with a flush of 30 ml q4. Tube feed rate to increase by 10 ml after 4 hours.

## 2022-09-03 NOTE — PROGRESS NOTES
Pt returned from stress test. Call light within reach, 2/4 siderails up, bed locked and in the lowest position. Dr. Beto Mendez at bedside, discussed plan of care with this RN and pt. Dr. Beto Mendez ordered to keep tube feed disconnected and keep pt NPO, except for ice chips/pudding with medication until the results of the stress test come back.

## 2022-09-03 NOTE — PROGRESS NOTES
Pt expected to have stress test this am. Pt's tubing checked and emptied. Colostomy pouch emptied. Kaplan emptied. Residual checked from PEG tube and flushed. Clamped at this time for stress test. PICC line flushed and alcohol caps changed.  Medications to be given following stress test.

## 2022-09-03 NOTE — PROGRESS NOTES
Sacral Wound cleansed with NSS. Wet to dry dressing applied, along with ABD pads and bordered gauze. Triad cream applied to surrounding skin below wound.

## 2022-09-04 LAB
ANION GAP SERPL CALCULATED.3IONS-SCNC: 7 MMOL/L (ref 3–16)
BASOPHILS ABSOLUTE: 0 K/UL (ref 0–0.2)
BASOPHILS RELATIVE PERCENT: 0.7 %
BUN BLDV-MCNC: 9 MG/DL (ref 7–20)
CALCIUM SERPL-MCNC: 7.3 MG/DL (ref 8.3–10.6)
CHLORIDE BLD-SCNC: 99 MMOL/L (ref 99–110)
CO2: 29 MMOL/L (ref 21–32)
CREAT SERPL-MCNC: <0.5 MG/DL (ref 0.6–1.2)
EOSINOPHILS ABSOLUTE: 0 K/UL (ref 0–0.6)
EOSINOPHILS RELATIVE PERCENT: 0.1 %
GFR AFRICAN AMERICAN: >60
GFR NON-AFRICAN AMERICAN: >60
GLUCOSE BLD-MCNC: 129 MG/DL (ref 70–99)
HCT VFR BLD CALC: 24.7 % (ref 36–48)
HEMOGLOBIN: 8.2 G/DL (ref 12–16)
LYMPHOCYTES ABSOLUTE: 1 K/UL (ref 1–5.1)
LYMPHOCYTES RELATIVE PERCENT: 19.1 %
MCH RBC QN AUTO: 31.1 PG (ref 26–34)
MCHC RBC AUTO-ENTMCNC: 33.2 G/DL (ref 31–36)
MCV RBC AUTO: 93.4 FL (ref 80–100)
MONOCYTES ABSOLUTE: 0.3 K/UL (ref 0–1.3)
MONOCYTES RELATIVE PERCENT: 6 %
NEUTROPHILS ABSOLUTE: 4 K/UL (ref 1.7–7.7)
NEUTROPHILS RELATIVE PERCENT: 74.1 %
PDW BLD-RTO: 22.3 % (ref 12.4–15.4)
PLATELET # BLD: 192 K/UL (ref 135–450)
PMV BLD AUTO: 7.2 FL (ref 5–10.5)
POTASSIUM SERPL-SCNC: 3.8 MMOL/L (ref 3.5–5.1)
RBC # BLD: 2.65 M/UL (ref 4–5.2)
SODIUM BLD-SCNC: 135 MMOL/L (ref 136–145)
WBC # BLD: 5.3 K/UL (ref 4–11)

## 2022-09-04 PROCEDURE — 6360000002 HC RX W HCPCS: Performed by: INTERNAL MEDICINE

## 2022-09-04 PROCEDURE — 80048 BASIC METABOLIC PNL TOTAL CA: CPT

## 2022-09-04 PROCEDURE — 2580000003 HC RX 258: Performed by: HOSPITALIST

## 2022-09-04 PROCEDURE — 6370000000 HC RX 637 (ALT 250 FOR IP): Performed by: SURGERY

## 2022-09-04 PROCEDURE — 36592 COLLECT BLOOD FROM PICC: CPT

## 2022-09-04 PROCEDURE — 1200000000 HC SEMI PRIVATE

## 2022-09-04 PROCEDURE — 6370000000 HC RX 637 (ALT 250 FOR IP): Performed by: INTERNAL MEDICINE

## 2022-09-04 PROCEDURE — 6370000000 HC RX 637 (ALT 250 FOR IP): Performed by: NURSE PRACTITIONER

## 2022-09-04 PROCEDURE — 6360000002 HC RX W HCPCS: Performed by: SURGERY

## 2022-09-04 PROCEDURE — 85025 COMPLETE CBC W/AUTO DIFF WBC: CPT

## 2022-09-04 PROCEDURE — 6360000002 HC RX W HCPCS: Performed by: HOSPITALIST

## 2022-09-04 PROCEDURE — C9113 INJ PANTOPRAZOLE SODIUM, VIA: HCPCS | Performed by: HOSPITALIST

## 2022-09-04 PROCEDURE — 2580000003 HC RX 258: Performed by: INTERNAL MEDICINE

## 2022-09-04 PROCEDURE — 2580000003 HC RX 258: Performed by: NURSE PRACTITIONER

## 2022-09-04 RX ORDER — PANTOPRAZOLE SODIUM 40 MG/1
40 TABLET, DELAYED RELEASE ORAL 2 TIMES DAILY
Status: DISCONTINUED | OUTPATIENT
Start: 2022-09-04 | End: 2022-09-06 | Stop reason: HOSPADM

## 2022-09-04 RX ADMIN — ACETAMINOPHEN 650 MG: 325 TABLET ORAL at 14:25

## 2022-09-04 RX ADMIN — APIXABAN 5 MG: 5 TABLET, FILM COATED ORAL at 21:40

## 2022-09-04 RX ADMIN — MINOCYCLINE HYDROCHLORIDE 100 MG: 50 CAPSULE ORAL at 09:21

## 2022-09-04 RX ADMIN — ACETAMINOPHEN 650 MG: 325 TABLET ORAL at 22:21

## 2022-09-04 RX ADMIN — CEFTRIAXONE 2000 MG: 2 INJECTION, POWDER, FOR SOLUTION INTRAMUSCULAR; INTRAVENOUS at 18:37

## 2022-09-04 RX ADMIN — ACETAMINOPHEN 650 MG: 325 TABLET ORAL at 06:34

## 2022-09-04 RX ADMIN — METRONIDAZOLE 500 MG: 500 TABLET ORAL at 14:25

## 2022-09-04 RX ADMIN — SODIUM CHLORIDE 25 ML: 9 INJECTION, SOLUTION INTRAVENOUS at 18:35

## 2022-09-04 RX ADMIN — Medication 2 CAPSULE: at 17:19

## 2022-09-04 RX ADMIN — FLECAINIDE ACETATE 100 MG: 100 TABLET ORAL at 21:40

## 2022-09-04 RX ADMIN — APIXABAN 5 MG: 5 TABLET, FILM COATED ORAL at 10:16

## 2022-09-04 RX ADMIN — SODIUM CHLORIDE, PRESERVATIVE FREE 40 MG: 5 INJECTION INTRAVENOUS at 09:23

## 2022-09-04 RX ADMIN — Medication 2 CAPSULE: at 09:21

## 2022-09-04 RX ADMIN — FLECAINIDE ACETATE 100 MG: 100 TABLET ORAL at 09:21

## 2022-09-04 RX ADMIN — MORPHINE SULFATE 2 MG: 2 INJECTION, SOLUTION INTRAMUSCULAR; INTRAVENOUS at 18:31

## 2022-09-04 RX ADMIN — MINOCYCLINE HYDROCHLORIDE 100 MG: 50 CAPSULE ORAL at 21:40

## 2022-09-04 RX ADMIN — METRONIDAZOLE 500 MG: 500 TABLET ORAL at 21:40

## 2022-09-04 RX ADMIN — TRAMADOL HYDROCHLORIDE 50 MG: 50 TABLET, COATED ORAL at 21:40

## 2022-09-04 RX ADMIN — PANTOPRAZOLE SODIUM 40 MG: 40 TABLET, DELAYED RELEASE ORAL at 21:40

## 2022-09-04 RX ADMIN — MORPHINE SULFATE 4 MG: 4 INJECTION, SOLUTION INTRAMUSCULAR; INTRAVENOUS at 05:27

## 2022-09-04 RX ADMIN — ENOXAPARIN SODIUM 40 MG: 100 INJECTION SUBCUTANEOUS at 09:22

## 2022-09-04 RX ADMIN — SODIUM CHLORIDE, PRESERVATIVE FREE 10 ML: 5 INJECTION INTRAVENOUS at 22:22

## 2022-09-04 RX ADMIN — FLUCONAZOLE 200 MG: 100 TABLET ORAL at 09:21

## 2022-09-04 RX ADMIN — SODIUM CHLORIDE, PRESERVATIVE FREE 10 ML: 5 INJECTION INTRAVENOUS at 09:24

## 2022-09-04 RX ADMIN — METRONIDAZOLE 500 MG: 500 TABLET ORAL at 06:34

## 2022-09-04 RX ADMIN — TRAMADOL HYDROCHLORIDE 50 MG: 50 TABLET, COATED ORAL at 12:54

## 2022-09-04 ASSESSMENT — PAIN SCALES - GENERAL
PAINLEVEL_OUTOF10: 5
PAINLEVEL_OUTOF10: 0
PAINLEVEL_OUTOF10: 6
PAINLEVEL_OUTOF10: 7
PAINLEVEL_OUTOF10: 6
PAINLEVEL_OUTOF10: 6

## 2022-09-04 ASSESSMENT — PAIN DESCRIPTION - LOCATION
LOCATION: BUTTOCKS;SACRUM
LOCATION: OTHER (COMMENT)
LOCATION: BUTTOCKS;SACRUM

## 2022-09-04 ASSESSMENT — PAIN DESCRIPTION - ORIENTATION
ORIENTATION: MID

## 2022-09-04 ASSESSMENT — PAIN - FUNCTIONAL ASSESSMENT
PAIN_FUNCTIONAL_ASSESSMENT: PREVENTS OR INTERFERES SOME ACTIVE ACTIVITIES AND ADLS

## 2022-09-04 ASSESSMENT — PAIN DESCRIPTION - FREQUENCY
FREQUENCY: INTERMITTENT
FREQUENCY: CONTINUOUS
FREQUENCY: CONTINUOUS

## 2022-09-04 ASSESSMENT — PAIN DESCRIPTION - DESCRIPTORS
DESCRIPTORS: SORE
DESCRIPTORS: ACHING;SHARP
DESCRIPTORS: TEARING
DESCRIPTORS: PRESSURE;SORE

## 2022-09-04 ASSESSMENT — PAIN DESCRIPTION - PAIN TYPE
TYPE: ACUTE PAIN;SURGICAL PAIN
TYPE: ACUTE PAIN
TYPE: ACUTE PAIN

## 2022-09-04 ASSESSMENT — PAIN DESCRIPTION - ONSET
ONSET: ON-GOING

## 2022-09-04 ASSESSMENT — PAIN SCALES - WONG BAKER: WONGBAKER_NUMERICALRESPONSE: 0

## 2022-09-04 NOTE — PROGRESS NOTES
Pt resting in bed, A&Ox4. Pt NPO, with tube feeds infusing through PEG tube. PEG tube site clean, dry and intact. Kaplan in place and draining. Colostomy left quadrant. No c/o pain this AM. PICC right upper arm, normal saline locked. Tele monitor on, NSR this AM. Pt on a specialty bed. All needs met at this time. Call light within reach. Fall precautions in place.  Electronically signed by Patricia Rosen RN on 9/4/2022 at 11:08 AM [phone]

## 2022-09-04 NOTE — PLAN OF CARE
Problem: Pain  Goal: Verbalizes/displays adequate comfort level or baseline comfort level  9/3/2022 2310 by Sandhya Blackwood RN  Outcome: Progressing    Problem: Safety - Adult  Goal: Free from fall injury  9/3/2022 2310 by Sandhya Blackwood RN  Outcome: Progressing     Problem: Skin/Tissue Integrity  Goal: Absence of new skin breakdown  Description: 1. Monitor for areas of redness and/or skin breakdown  2. Assess vascular access sites hourly  3. Every 4-6 hours minimum:  Change oxygen saturation probe site  4. Every 4-6 hours:  If on nasal continuous positive airway pressure, respiratory therapy assess nares and determine need for appliance change or resting period.   9/3/2022 2310 by Sandhya Blackwood RN  Outcome: Progressing

## 2022-09-04 NOTE — PROGRESS NOTES
Hospitalist Progress Note      PCP: Marybeth Dutton MD    Date of Admission: 8/21/2022    Subjective: CP improved, tolerating TF    Medications:  Reviewed    Infusion Medications    vashe wound therapy      sodium chloride 250 mL (09/01/22 1002)     Scheduled Medications    apixaban  5 mg Oral BID    pantoprazole  40 mg Oral BID    minocycline  100 mg Oral BID    acetaminophen  650 mg Oral 3 times per day    sodium chloride flush  5-40 mL IntraVENous 2 times per day    fluconazole  200 mg Oral Daily    cefTRIAXone (ROCEPHIN) IV  2,000 mg IntraVENous Q24H    metroNIDAZOLE  500 mg Oral 3 times per day    flecainide  100 mg Oral 2 times per day    eldertonic  15 mL Oral Daily    lactobacillus  2 capsule Oral BID WC     PRN Meds: vashe wound therapy, sodium phosphate IVPB **OR** sodium phosphate IVPB, magnesium sulfate, sodium chloride flush, sodium chloride, traMADol **OR** traMADol, fentanNYL, HYDROmorphone, promethazine, morphine **OR** morphine, ALPRAZolam, ondansetron, polyethylene glycol, acetaminophen **OR** acetaminophen      Intake/Output Summary (Last 24 hours) at 9/4/2022 1701  Last data filed at 9/4/2022 1257  Gross per 24 hour   Intake 180 ml   Output 1450 ml   Net -1270 ml       Physical Exam Performed:    /80   Pulse 89   Temp 98.4 °F (36.9 °C) (Oral)   Resp 16   Ht 5' 2\" (1.575 m)   Wt 196 lb 11.2 oz (89.2 kg)   SpO2 97%   BMI 35.98 kg/m²        Gen/overall appearance: Not in acute distress. Alert. Oriented X3. Appears chronically-ill. CVS: Nml S1S2, no MRG, RRR  Pulm: Clear bilaterally. No crackles/wheezes  Gastrointestinal: Soft, NT/ND, +BS  Musculoskeletal: Diffuse anasarca. Asymmetric left forearm swelling. Warm  Neuro: No focal deficit. Moves extremity spontaneously. Psychiatry: Appropriate affect. Not agitated. Skin: Warm, dry with normal turgor.  No rash  Capillary refill: Brisk,< 3 seconds   Peripheral Pulses: +2 palpable, equal bilaterally        Labs:   Recent Labs Acute cystitis without hematuria [N30.00]      Priority: Medium    Complicated UTI (urinary tract infection) [N39.0]      Priority: Medium    Sepsis without septic shock ([facility] [address] [A41.9]      Priority: Medium    Acute cystitis with hematuria [N30.01]      Priority: Medium    Acute metabolic encephalopathy [Q70.80]      Priority: Medium    Sacral wound [S31.000A]      Priority: Medium    Elevated lactic acid level [R79.89]      Priority: Medium    Fever [R50.9]      Priority: Medium    Tachypnea [R06.82]      Priority: Medium    Sacral osteomyelitis (HCC) [M46.28]      Priority: Medium    WHITNEY (obstructive sleep apnea) [G47.33]      Priority: Medium    Overweight (BMI 25.0-29. 9) [E66.3]      Priority: Medium    Elevated alkaline phosphatase level [R74.8]      Priority: Medium    Elevated alkaline phosphatase in  [P09.8, R74.8]      Priority: Medium    History of depression [Z86.59]      Priority: Medium    Nursing home resident [Z59.3]      Priority: Medium    Proteus infection [A49.8]      Priority: Medium    Gram-negative bacteremia [R78.81]      Priority: Medium    Lactic acidosis [E87.2]      Priority: Medium    Altered mental status [R41.82]      Priority: Medium    Hypotension [I95.9]      Priority: Medium    Skin ulcer of sacrum ([facility] [address] [L98.429]      Priority: Medium    DM2 (diabetes mellitus, type 2) ([facility] [address] [E11.9]      Priority: Medium    Septic shock ([facility] [address] [A41.9, R65.21]      Priority: Medium    Fibromyalgia [M79.7]     Severe protein-calorie malnutrition ([facility] [address] [E43]     Tachycardia [R00.0]     Atrial fibrillation (HCC) [I48.91]     Malignant neoplasm of colon ([facility] [address] [C18.9]     Essential hypertension [I10]        Sepsis with septic shock, secondary to infected decubitus ulcer and underlying osteomyelitis. Status postsurgical debridement with diverting colostomy. Continue antibiotics per ID recommendation. Stage IV sacral decubitus ulcer.   Status post laparoscopic gastrostomy tube, laparoscopic sigmoid discharge to F. Diet: Diet NPO Exceptions are: Sips of Water with Meds, Ice Chips  ADULT TUBE FEEDING; Gastrostomy; Standard with Fiber; Continuous; 15; Yes; 10; Q 4 hours; 40; 30; Q 4 hours; Protein, Wound Healing; 1 proteinx daily per dietitian recommendations (DO NOT mix directly with TF); mix 1 packet of Meir in 4-6 ounces . ..   Code Status: Full Code  PT/OT Eval Status: ordered    Dispo - awaiting precert      Britt Small MD

## 2022-09-04 NOTE — PLAN OF CARE
Problem: Discharge Planning  Goal: Discharge to home or other facility with appropriate resources  Outcome: Progressing  Flowsheets (Taken 9/4/2022 0806)  Discharge to home or other facility with appropriate resources:   Identify barriers to discharge with patient and caregiver   Arrange for needed discharge resources and transportation as appropriate   Identify discharge learning needs (meds, wound care, etc)     Problem: Pain  Goal: Verbalizes/displays adequate comfort level or baseline comfort level  9/4/2022 0806 by Ric Hancock RN  Outcome: Progressing  Flowsheets (Taken 9/4/2022 0806)  Verbalizes/displays adequate comfort level or baseline comfort level:   Encourage patient to monitor pain and request assistance   Assess pain using appropriate pain scale   Administer analgesics based on type and severity of pain and evaluate response     Problem: Safety - Adult  Goal: Free from fall injury  9/4/2022 0806 by Ric Hancock RN  Outcome: Progressing  Flowsheets (Taken 9/4/2022 0806)  Free From Fall Injury: Instruct family/caregiver on patient safety     Problem: Nutrition Deficit:  Goal: Optimize nutritional status  Outcome: Progressing  Flowsheets (Taken 9/4/2022 0806)  Nutrient intake appropriate for improving, restoring, or maintaining nutritional needs:   Assess nutritional status and recommend course of action   Monitor oral intake, labs, and treatment plans     Problem: ABCDS Injury Assessment  Goal: Absence of physical injury  Outcome: Progressing

## 2022-09-05 PROCEDURE — 6370000000 HC RX 637 (ALT 250 FOR IP): Performed by: NURSE PRACTITIONER

## 2022-09-05 PROCEDURE — 6360000002 HC RX W HCPCS: Performed by: INTERNAL MEDICINE

## 2022-09-05 PROCEDURE — 1200000000 HC SEMI PRIVATE

## 2022-09-05 PROCEDURE — 6360000002 HC RX W HCPCS: Performed by: SURGERY

## 2022-09-05 PROCEDURE — 6370000000 HC RX 637 (ALT 250 FOR IP): Performed by: INTERNAL MEDICINE

## 2022-09-05 PROCEDURE — 94760 N-INVAS EAR/PLS OXIMETRY 1: CPT

## 2022-09-05 PROCEDURE — 6370000000 HC RX 637 (ALT 250 FOR IP): Performed by: SURGERY

## 2022-09-05 PROCEDURE — 2580000003 HC RX 258: Performed by: INTERNAL MEDICINE

## 2022-09-05 PROCEDURE — 2580000003 HC RX 258: Performed by: NURSE PRACTITIONER

## 2022-09-05 RX ADMIN — ACETAMINOPHEN 650 MG: 325 TABLET ORAL at 20:39

## 2022-09-05 RX ADMIN — Medication 2 CAPSULE: at 08:16

## 2022-09-05 RX ADMIN — FLECAINIDE ACETATE 100 MG: 100 TABLET ORAL at 20:39

## 2022-09-05 RX ADMIN — TRAMADOL HYDROCHLORIDE 100 MG: 50 TABLET ORAL at 15:12

## 2022-09-05 RX ADMIN — APIXABAN 5 MG: 5 TABLET, FILM COATED ORAL at 08:16

## 2022-09-05 RX ADMIN — FLECAINIDE ACETATE 100 MG: 100 TABLET ORAL at 08:16

## 2022-09-05 RX ADMIN — PANTOPRAZOLE SODIUM 40 MG: 40 TABLET, DELAYED RELEASE ORAL at 20:38

## 2022-09-05 RX ADMIN — PANTOPRAZOLE SODIUM 40 MG: 40 TABLET, DELAYED RELEASE ORAL at 08:16

## 2022-09-05 RX ADMIN — METRONIDAZOLE 500 MG: 500 TABLET ORAL at 20:39

## 2022-09-05 RX ADMIN — APIXABAN 5 MG: 5 TABLET, FILM COATED ORAL at 20:39

## 2022-09-05 RX ADMIN — ACETAMINOPHEN 650 MG: 325 TABLET, FILM COATED ORAL at 10:58

## 2022-09-05 RX ADMIN — ACETAMINOPHEN 650 MG: 325 TABLET ORAL at 14:07

## 2022-09-05 RX ADMIN — ONDANSETRON 4 MG: 2 INJECTION INTRAMUSCULAR; INTRAVENOUS at 15:44

## 2022-09-05 RX ADMIN — ACETAMINOPHEN 650 MG: 325 TABLET ORAL at 07:00

## 2022-09-05 RX ADMIN — MINOCYCLINE HYDROCHLORIDE 100 MG: 50 CAPSULE ORAL at 20:39

## 2022-09-05 RX ADMIN — SODIUM CHLORIDE, PRESERVATIVE FREE 10 ML: 5 INJECTION INTRAVENOUS at 08:16

## 2022-09-05 RX ADMIN — CEFTRIAXONE 2000 MG: 2 INJECTION, POWDER, FOR SOLUTION INTRAMUSCULAR; INTRAVENOUS at 18:38

## 2022-09-05 RX ADMIN — MINOCYCLINE HYDROCHLORIDE 100 MG: 50 CAPSULE ORAL at 08:17

## 2022-09-05 RX ADMIN — Medication 2 CAPSULE: at 18:38

## 2022-09-05 RX ADMIN — ACETAMINOPHEN 650 MG: 325 TABLET, FILM COATED ORAL at 16:30

## 2022-09-05 RX ADMIN — TRAMADOL HYDROCHLORIDE 100 MG: 50 TABLET ORAL at 20:39

## 2022-09-05 RX ADMIN — METRONIDAZOLE 500 MG: 500 TABLET ORAL at 14:07

## 2022-09-05 RX ADMIN — FLUCONAZOLE 200 MG: 100 TABLET ORAL at 08:16

## 2022-09-05 RX ADMIN — METRONIDAZOLE 500 MG: 500 TABLET ORAL at 06:59

## 2022-09-05 RX ADMIN — ALPRAZOLAM 0.5 MG: 0.5 TABLET ORAL at 18:42

## 2022-09-05 RX ADMIN — TRAMADOL HYDROCHLORIDE 100 MG: 50 TABLET ORAL at 08:22

## 2022-09-05 ASSESSMENT — PAIN DESCRIPTION - ORIENTATION
ORIENTATION: MID

## 2022-09-05 ASSESSMENT — PAIN - FUNCTIONAL ASSESSMENT
PAIN_FUNCTIONAL_ASSESSMENT: PREVENTS OR INTERFERES SOME ACTIVE ACTIVITIES AND ADLS
PAIN_FUNCTIONAL_ASSESSMENT: ACTIVITIES ARE NOT PREVENTED
PAIN_FUNCTIONAL_ASSESSMENT: PREVENTS OR INTERFERES SOME ACTIVE ACTIVITIES AND ADLS
PAIN_FUNCTIONAL_ASSESSMENT: ACTIVITIES ARE NOT PREVENTED
PAIN_FUNCTIONAL_ASSESSMENT: PREVENTS OR INTERFERES SOME ACTIVE ACTIVITIES AND ADLS
PAIN_FUNCTIONAL_ASSESSMENT: ACTIVITIES ARE NOT PREVENTED
PAIN_FUNCTIONAL_ASSESSMENT: ACTIVITIES ARE NOT PREVENTED
PAIN_FUNCTIONAL_ASSESSMENT: PREVENTS OR INTERFERES SOME ACTIVE ACTIVITIES AND ADLS
PAIN_FUNCTIONAL_ASSESSMENT: ACTIVITIES ARE NOT PREVENTED
PAIN_FUNCTIONAL_ASSESSMENT: PREVENTS OR INTERFERES SOME ACTIVE ACTIVITIES AND ADLS
PAIN_FUNCTIONAL_ASSESSMENT: PREVENTS OR INTERFERES SOME ACTIVE ACTIVITIES AND ADLS

## 2022-09-05 ASSESSMENT — PAIN DESCRIPTION - LOCATION
LOCATION: HEAD
LOCATION: HEAD
LOCATION: SACRUM
LOCATION: HEAD
LOCATION: HEAD
LOCATION: SACRUM
LOCATION: HEAD
LOCATION: SACRUM

## 2022-09-05 ASSESSMENT — PAIN DESCRIPTION - ONSET
ONSET: ON-GOING
ONSET: GRADUAL
ONSET: ON-GOING
ONSET: GRADUAL
ONSET: GRADUAL
ONSET: ON-GOING
ONSET: GRADUAL
ONSET: ON-GOING
ONSET: ON-GOING

## 2022-09-05 ASSESSMENT — PAIN DESCRIPTION - FREQUENCY
FREQUENCY: CONTINUOUS
FREQUENCY: INTERMITTENT
FREQUENCY: CONTINUOUS
FREQUENCY: INTERMITTENT
FREQUENCY: INTERMITTENT
FREQUENCY: CONTINUOUS
FREQUENCY: CONTINUOUS

## 2022-09-05 ASSESSMENT — PAIN SCALES - GENERAL
PAINLEVEL_OUTOF10: 4
PAINLEVEL_OUTOF10: 4
PAINLEVEL_OUTOF10: 6
PAINLEVEL_OUTOF10: 3
PAINLEVEL_OUTOF10: 5
PAINLEVEL_OUTOF10: 7
PAINLEVEL_OUTOF10: 5
PAINLEVEL_OUTOF10: 7
PAINLEVEL_OUTOF10: 6

## 2022-09-05 ASSESSMENT — PAIN DESCRIPTION - PAIN TYPE
TYPE: ACUTE PAIN

## 2022-09-05 ASSESSMENT — PAIN SCALES - WONG BAKER
WONGBAKER_NUMERICALRESPONSE: 2
WONGBAKER_NUMERICALRESPONSE: 0

## 2022-09-05 ASSESSMENT — PAIN DESCRIPTION - DESCRIPTORS
DESCRIPTORS: ACHING;DISCOMFORT
DESCRIPTORS: ACHING
DESCRIPTORS: SHARP
DESCRIPTORS: ACHING
DESCRIPTORS: SHARP

## 2022-09-05 NOTE — PROGRESS NOTES
Tube feed started at 1825. Input not cleared on pump from previous shift so 1643mL of input charted for this shift. Pump/setting cleared for more accurate intake. Jevity 1.5L at 40mL/hr which is patients goal rate with 40mL flush given around every 4 hours this shift. Patient tolerating fine.   Electronically signed by Francisca Mac RN on 9/5/2022 at 6:28 PM

## 2022-09-05 NOTE — PROGRESS NOTES
Hospitalist Progress Note      PCP: Hai Hernandez MD    Date of Admission: 8/21/2022    Subjective: CP improved, tolerating TF    Medications:  Reviewed    Infusion Medications    vashe wound therapy      sodium chloride 25 mL (09/04/22 1835)     Scheduled Medications    apixaban  5 mg Oral BID    pantoprazole  40 mg Oral BID    minocycline  100 mg Oral BID    acetaminophen  650 mg Oral 3 times per day    sodium chloride flush  5-40 mL IntraVENous 2 times per day    fluconazole  200 mg Oral Daily    cefTRIAXone (ROCEPHIN) IV  2,000 mg IntraVENous Q24H    metroNIDAZOLE  500 mg Oral 3 times per day    flecainide  100 mg Oral 2 times per day    eldertonic  15 mL Oral Daily    lactobacillus  2 capsule Oral BID WC     PRN Meds: vashe wound therapy, sodium phosphate IVPB **OR** sodium phosphate IVPB, magnesium sulfate, sodium chloride flush, sodium chloride, traMADol **OR** traMADol, fentanNYL, HYDROmorphone, promethazine, morphine **OR** morphine, ALPRAZolam, ondansetron, polyethylene glycol, acetaminophen **OR** acetaminophen      Intake/Output Summary (Last 24 hours) at 9/5/2022 1559  Last data filed at 9/5/2022 1243  Gross per 24 hour   Intake 1177 ml   Output 1375 ml   Net -198 ml         Physical Exam Performed:    BP (!) 142/83   Pulse 88   Temp 98.1 °F (36.7 °C) (Oral)   Resp 18   Ht 5' 2\" (1.575 m)   Wt 194 lb 3.2 oz (88.1 kg)   SpO2 96%   BMI 35.52 kg/m²        Gen/overall appearance: Not in acute distress. Alert. Oriented X3. Appears chronically-ill. CVS: Nml S1S2, no MRG, RRR  Pulm: Clear bilaterally. No crackles/wheezes  Gastrointestinal: Soft, NT/ND, +BS  Musculoskeletal: Diffuse anasarca. Asymmetric left forearm swelling. Warm  Neuro: No focal deficit. Moves extremity spontaneously. Psychiatry: Appropriate affect. Not agitated. Skin: Warm, dry with normal turgor.  No rash  Capillary refill: Brisk,< 3 seconds   Peripheral Pulses: +2 palpable, equal bilaterally        Labs:   Recent Labs 09/04/22  1030   WBC 5.3   HGB 8.2*   HCT 24.7*          Recent Labs     09/04/22  1030   *   K 3.8   CL 99   CO2 29   BUN 9   CREATININE <0.5*   CALCIUM 7.3*       No results for input(s): AST, ALT, BILIDIR, BILITOT, ALKPHOS in the last 72 hours. No results for input(s): INR in the last 72 hours. Recent Labs     09/02/22  1840   TROPONINI 0.02*         Urinalysis:      Lab Results   Component Value Date/Time    NITRU Negative 08/22/2022 12:52 AM    WBCUA 21-50 08/22/2022 12:52 AM    BACTERIA 1+ 08/22/2022 12:52 AM    RBCUA 3-4 08/22/2022 12:52 AM    BLOODU MODERATE 08/22/2022 12:52 AM    SPECGRAV 1.025 08/22/2022 12:52 AM    GLUCOSEU 100 08/22/2022 12:52 AM       Radiology:  NM MYOCARDIAL SPECT REST EXERCISE OR RX   Final Result      VL Extremity Venous Left   Final Result      XR WRIST LEFT (MIN 3 VIEWS)   Final Result   Patient is status post ORIF of distal radial fracture. CT ABDOMEN PELVIS W IV CONTRAST Additional Contrast? None   Final Result   Large sacral decubitus ulcer with subcutaneous gas extending to the coccyx   and perirectal fascia. No loculated fluid collections. Increased sclerosis   of the adjacent coccyx with soft tissue gas extending to the bone. Findings   are compatible with osteomyelitis. Adjacent rectal wall thickening may be reactive. Small bilateral pleural effusions with bibasilar atelectasis. Difficult to   exclude superimposed pneumonia in the lung bases. XR CHEST PORTABLE   Final Result   Clear chest without acute cardiopulmonary process.                  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

## 2022-09-05 NOTE — PROGRESS NOTES
Colostomy bag changed by this RN. Skin around site was cleansed with warm water and pat dry. Ostomy bag is 1 piece bag with Brava protective seal. Patient tolerated well. Skin around stoma is clean, dry, and intact. Stoma is pink and intact. Will continue to monitor and assess.   Electronically signed by Bryce Leslie RN on 9/5/2022 at 12:52 PM

## 2022-09-05 NOTE — PLAN OF CARE
Problem: Discharge Planning  Goal: Discharge to home or other facility with appropriate resources  Outcome: Progressing  Flowsheets (Taken 9/4/2022 0806 by Ricarda Jorge RN)  Discharge to home or other facility with appropriate resources:   Identify barriers to discharge with patient and caregiver   Arrange for needed discharge resources and transportation as appropriate   Identify discharge learning needs (meds, wound care, etc)     Problem: Pain  Goal: Verbalizes/displays adequate comfort level or baseline comfort level  Outcome: Progressing  Flowsheets (Taken 9/4/2022 0806 by Ricarda Jorge RN)  Verbalizes/displays adequate comfort level or baseline comfort level:   Encourage patient to monitor pain and request assistance   Assess pain using appropriate pain scale   Administer analgesics based on type and severity of pain and evaluate response     Problem: Safety - Adult  Goal: Free from fall injury  Outcome: Progressing  Flowsheets (Taken 9/4/2022 0806 by Ricarda Jorge RN)  Free From Fall Injury: Instruct family/caregiver on patient safety     Problem: Skin/Tissue Integrity  Goal: Absence of new skin breakdown  Description: 1. Monitor for areas of redness and/or skin breakdown  2. Assess vascular access sites hourly  3. Every 4-6 hours minimum:  Change oxygen saturation probe site  4. Every 4-6 hours:  If on nasal continuous positive airway pressure, respiratory therapy assess nares and determine need for appliance change or resting period. Outcome: Progressing  Note: Patient will remain free from new skin breakdown. Precautions in place. Will monitor and assess.      Problem: Chronic Conditions and Co-morbidities  Goal: Patient's chronic conditions and co-morbidity symptoms are monitored and maintained or improved  Outcome: Progressing  Flowsheets (Taken 9/2/2022 1503 by Ana Palacios RN)  Care Plan - Patient's Chronic Conditions and Co-Morbidity Symptoms are Monitored and Maintained or Improved: Monitor and assess patient's chronic conditions and comorbid symptoms for stability, deterioration, or improvement   Collaborate with multidisciplinary team to address chronic and comorbid conditions and prevent exacerbation or deterioration   Update acute care plan with appropriate goals if chronic or comorbid symptoms are exacerbated and prevent overall improvement and discharge     Problem: Nutrition Deficit:  Goal: Optimize nutritional status  Outcome: Progressing  Flowsheets (Taken 9/4/2022 0806 by Donaldo Brunson, RN)  Nutrient intake appropriate for improving, restoring, or maintaining nutritional needs:   Assess nutritional status and recommend course of action   Monitor oral intake, labs, and treatment plans     Problem: ABCDS Injury Assessment  Goal: Absence of physical injury  Outcome: Progressing  Flowsheets (Taken 9/3/2022 1640 by Camacho Tay RN)  Absence of Physical Injury: Implement safety measures based on patient assessment

## 2022-09-05 NOTE — PROGRESS NOTES
Patient is resting in bed and is alert and oriented x4. Patient is complaining of pain in her sacrum. PRN pain medication given per MD orders (see eMAR). Patient on specialty bed. Dressing change to sacral wound will be changed later this shift. Patient on continuous tube feedings. PEG site is clean, dry, and intact. Colostomy is intact. Bag will be changed later this shift. Patient takes pills with thin liquids. Patient on RA. IV flushed and clamped/ Patient on tele and running NSR or a-fib. Patient has history of a-fib and is on eliquis. Chronic tello remains in place with adequate yellow clear output. Patient Q2 turns. Patient moves as a Maxislide. Morning medication given and head to toe assessment is complete. All needs are met and safety precautions remain in place. Will continue to monitor and assess.   Electronically signed by Francisca Mac RN on 9/5/2022 at 10:50 AM

## 2022-09-05 NOTE — PROGRESS NOTES
Sacral dressing change completed by this RN. Old dressing removed. Wound cleansed. Wet to dry gauze dressing placed in sacral wound and covered with ABD pad and paper tape. Patient pre-medicated before dressing change. Blister on right hip popped and Mepilex placed over open blister. Mepilex placed on skin tear on right buttocks. Patient tolerated well. Will continue to monitor and assess.   Electronically signed by Keaton Melara RN on 9/5/2022 at 3:57 PM

## 2022-09-05 NOTE — PROGRESS NOTES
Patient is resting in bed. Alert and oriented X 4. Complaining of pain, PRN pain medication given per order (see MAR). PICC line patent and flushed. Kaplan has good urine output. Jevity running at 40ml/hr. Sacral dressing changed this shift. Assessment complete. All patient needs are met at this time. Fall precautions are in place. Call light is in reach.

## 2022-09-06 VITALS
DIASTOLIC BLOOD PRESSURE: 76 MMHG | HEART RATE: 95 BPM | WEIGHT: 200.9 LBS | OXYGEN SATURATION: 96 % | BODY MASS INDEX: 36.97 KG/M2 | SYSTOLIC BLOOD PRESSURE: 134 MMHG | TEMPERATURE: 98.3 F | HEIGHT: 62 IN | RESPIRATION RATE: 16 BRPM

## 2022-09-06 LAB
ANION GAP SERPL CALCULATED.3IONS-SCNC: 5 MMOL/L (ref 3–16)
ANISOCYTOSIS: ABNORMAL
BANDED NEUTROPHILS RELATIVE PERCENT: 6 % (ref 0–7)
BASOPHILS ABSOLUTE: 0 K/UL (ref 0–0.2)
BASOPHILS RELATIVE PERCENT: 0 %
BUN BLDV-MCNC: 8 MG/DL (ref 7–20)
CALCIUM SERPL-MCNC: 7.4 MG/DL (ref 8.3–10.6)
CHLORIDE BLD-SCNC: 100 MMOL/L (ref 99–110)
CO2: 29 MMOL/L (ref 21–32)
CREAT SERPL-MCNC: <0.5 MG/DL (ref 0.6–1.2)
EOSINOPHILS ABSOLUTE: 0 K/UL (ref 0–0.6)
EOSINOPHILS RELATIVE PERCENT: 0 %
GFR AFRICAN AMERICAN: >60
GFR NON-AFRICAN AMERICAN: >60
GLUCOSE BLD-MCNC: 126 MG/DL (ref 70–99)
HCT VFR BLD CALC: 24.9 % (ref 36–48)
HEMOGLOBIN: 8.2 G/DL (ref 12–16)
HYPOCHROMIA: ABNORMAL
LYMPHOCYTES ABSOLUTE: 0.7 K/UL (ref 1–5.1)
LYMPHOCYTES RELATIVE PERCENT: 18 %
MCH RBC QN AUTO: 31 PG (ref 26–34)
MCHC RBC AUTO-ENTMCNC: 33 G/DL (ref 31–36)
MCV RBC AUTO: 94 FL (ref 80–100)
MONOCYTES ABSOLUTE: 0.2 K/UL (ref 0–1.3)
MONOCYTES RELATIVE PERCENT: 6 %
NEUTROPHILS ABSOLUTE: 3 K/UL (ref 1.7–7.7)
NEUTROPHILS RELATIVE PERCENT: 70 %
OVALOCYTES: ABNORMAL
PDW BLD-RTO: 21.8 % (ref 12.4–15.4)
PLATELET # BLD: 188 K/UL (ref 135–450)
PLATELET SLIDE REVIEW: ADEQUATE
PMV BLD AUTO: 7.1 FL (ref 5–10.5)
POIKILOCYTES: ABNORMAL
POLYCHROMASIA: ABNORMAL
POTASSIUM SERPL-SCNC: 3.8 MMOL/L (ref 3.5–5.1)
RBC # BLD: 2.65 M/UL (ref 4–5.2)
SARS-COV-2, NAAT: NOT DETECTED
SLIDE REVIEW: ABNORMAL
SMUDGE CELLS: PRESENT
SODIUM BLD-SCNC: 134 MMOL/L (ref 136–145)
WBC # BLD: 4 K/UL (ref 4–11)

## 2022-09-06 PROCEDURE — 94760 N-INVAS EAR/PLS OXIMETRY 1: CPT

## 2022-09-06 PROCEDURE — 6370000000 HC RX 637 (ALT 250 FOR IP): Performed by: NURSE PRACTITIONER

## 2022-09-06 PROCEDURE — 6370000000 HC RX 637 (ALT 250 FOR IP): Performed by: SURGERY

## 2022-09-06 PROCEDURE — 80048 BASIC METABOLIC PNL TOTAL CA: CPT

## 2022-09-06 PROCEDURE — 2580000003 HC RX 258: Performed by: NURSE PRACTITIONER

## 2022-09-06 PROCEDURE — 2700000000 HC OXYGEN THERAPY PER DAY

## 2022-09-06 PROCEDURE — 87635 SARS-COV-2 COVID-19 AMP PRB: CPT

## 2022-09-06 PROCEDURE — 6370000000 HC RX 637 (ALT 250 FOR IP): Performed by: INTERNAL MEDICINE

## 2022-09-06 PROCEDURE — 85025 COMPLETE CBC W/AUTO DIFF WBC: CPT

## 2022-09-06 PROCEDURE — 6360000002 HC RX W HCPCS: Performed by: SURGERY

## 2022-09-06 RX ORDER — MAGNESIUM HYDROXIDE/ALUMINUM HYDROXICE/SIMETHICONE 120; 1200; 1200 MG/30ML; MG/30ML; MG/30ML
30 SUSPENSION ORAL EVERY 6 HOURS PRN
Status: DISCONTINUED | OUTPATIENT
Start: 2022-09-06 | End: 2022-09-06 | Stop reason: HOSPADM

## 2022-09-06 RX ORDER — METRONIDAZOLE 500 MG/1
500 TABLET ORAL EVERY 8 HOURS SCHEDULED
Qty: 51 TABLET | Refills: 0 | Status: SHIPPED | OUTPATIENT
Start: 2022-09-06 | End: 2022-09-20 | Stop reason: SDUPTHER

## 2022-09-06 RX ORDER — CEFTRIAXONE 500 MG/1
2000 INJECTION, POWDER, FOR SOLUTION INTRAMUSCULAR; INTRAVENOUS EVERY 24 HOURS
Qty: 68 EACH | Refills: 0
Start: 2022-09-06 | End: 2022-09-23

## 2022-09-06 RX ORDER — ALPRAZOLAM 0.5 MG/1
0.5 TABLET ORAL 3 TIMES DAILY PRN
Qty: 9 TABLET | Refills: 0 | Status: SHIPPED | OUTPATIENT
Start: 2022-09-06 | End: 2022-09-09

## 2022-09-06 RX ORDER — TRAMADOL HYDROCHLORIDE 50 MG/1
50 TABLET ORAL EVERY 6 HOURS PRN
Qty: 12 TABLET | Refills: 0 | Status: SHIPPED | OUTPATIENT
Start: 2022-09-06 | End: 2022-09-09

## 2022-09-06 RX ORDER — MINOCYCLINE HYDROCHLORIDE 100 MG/1
100 CAPSULE ORAL 2 TIMES DAILY
Qty: 34 CAPSULE | Refills: 0 | Status: SHIPPED | OUTPATIENT
Start: 2022-09-06 | End: 2022-09-20 | Stop reason: SDUPTHER

## 2022-09-06 RX ORDER — TRAMADOL HYDROCHLORIDE 50 MG/1
50 TABLET ORAL EVERY 6 HOURS PRN
Qty: 15 TABLET | Refills: 0 | Status: SHIPPED | OUTPATIENT
Start: 2022-09-06 | End: 2022-09-06 | Stop reason: SDUPTHER

## 2022-09-06 RX ORDER — FLUCONAZOLE 200 MG/1
200 TABLET ORAL DAILY
Qty: 17 TABLET | Refills: 0 | Status: SHIPPED | OUTPATIENT
Start: 2022-09-06 | End: 2022-09-23

## 2022-09-06 RX ORDER — METRONIDAZOLE 500 MG/1
500 TABLET ORAL EVERY 8 HOURS SCHEDULED
Qty: 51 TABLET | Refills: 0 | Status: SHIPPED | OUTPATIENT
Start: 2022-09-06 | End: 2022-09-06 | Stop reason: SDUPTHER

## 2022-09-06 RX ADMIN — ACETAMINOPHEN 650 MG: 325 TABLET ORAL at 14:23

## 2022-09-06 RX ADMIN — TRAMADOL HYDROCHLORIDE 100 MG: 50 TABLET ORAL at 02:52

## 2022-09-06 RX ADMIN — METRONIDAZOLE 500 MG: 500 TABLET ORAL at 14:23

## 2022-09-06 RX ADMIN — SODIUM CHLORIDE, PRESERVATIVE FREE 10 ML: 5 INJECTION INTRAVENOUS at 08:47

## 2022-09-06 RX ADMIN — FLUCONAZOLE 200 MG: 100 TABLET ORAL at 08:47

## 2022-09-06 RX ADMIN — Medication 2 CAPSULE: at 08:47

## 2022-09-06 RX ADMIN — APIXABAN 5 MG: 5 TABLET, FILM COATED ORAL at 08:47

## 2022-09-06 RX ADMIN — ACETAMINOPHEN 650 MG: 325 TABLET ORAL at 05:28

## 2022-09-06 RX ADMIN — FLECAINIDE ACETATE 100 MG: 100 TABLET ORAL at 08:47

## 2022-09-06 RX ADMIN — TRAMADOL HYDROCHLORIDE 100 MG: 50 TABLET ORAL at 08:53

## 2022-09-06 RX ADMIN — PANTOPRAZOLE SODIUM 40 MG: 40 TABLET, DELAYED RELEASE ORAL at 08:47

## 2022-09-06 RX ADMIN — ACETAMINOPHEN 650 MG: 325 TABLET, FILM COATED ORAL at 09:00

## 2022-09-06 RX ADMIN — METRONIDAZOLE 500 MG: 500 TABLET ORAL at 05:28

## 2022-09-06 RX ADMIN — ONDANSETRON 4 MG: 2 INJECTION INTRAMUSCULAR; INTRAVENOUS at 03:43

## 2022-09-06 RX ADMIN — MINOCYCLINE HYDROCHLORIDE 100 MG: 50 CAPSULE ORAL at 08:47

## 2022-09-06 ASSESSMENT — PAIN - FUNCTIONAL ASSESSMENT
PAIN_FUNCTIONAL_ASSESSMENT: PREVENTS OR INTERFERES SOME ACTIVE ACTIVITIES AND ADLS
PAIN_FUNCTIONAL_ASSESSMENT: ACTIVITIES ARE NOT PREVENTED
PAIN_FUNCTIONAL_ASSESSMENT: ACTIVITIES ARE NOT PREVENTED
PAIN_FUNCTIONAL_ASSESSMENT: PREVENTS OR INTERFERES SOME ACTIVE ACTIVITIES AND ADLS
PAIN_FUNCTIONAL_ASSESSMENT: ACTIVITIES ARE NOT PREVENTED
PAIN_FUNCTIONAL_ASSESSMENT: ACTIVITIES ARE NOT PREVENTED
PAIN_FUNCTIONAL_ASSESSMENT: PREVENTS OR INTERFERES SOME ACTIVE ACTIVITIES AND ADLS
PAIN_FUNCTIONAL_ASSESSMENT: PREVENTS OR INTERFERES SOME ACTIVE ACTIVITIES AND ADLS

## 2022-09-06 ASSESSMENT — PAIN DESCRIPTION - LOCATION
LOCATION: SACRUM
LOCATION: HEAD
LOCATION: SACRUM
LOCATION: HEAD
LOCATION: SACRUM

## 2022-09-06 ASSESSMENT — PAIN SCALES - WONG BAKER
WONGBAKER_NUMERICALRESPONSE: 2
WONGBAKER_NUMERICALRESPONSE: 0
WONGBAKER_NUMERICALRESPONSE: 2
WONGBAKER_NUMERICALRESPONSE: 0

## 2022-09-06 ASSESSMENT — PAIN DESCRIPTION - FREQUENCY
FREQUENCY: CONTINUOUS
FREQUENCY: CONTINUOUS
FREQUENCY: INTERMITTENT
FREQUENCY: CONTINUOUS
FREQUENCY: INTERMITTENT
FREQUENCY: CONTINUOUS

## 2022-09-06 ASSESSMENT — PAIN DESCRIPTION - DESCRIPTORS
DESCRIPTORS: ACHING;DISCOMFORT;SORE
DESCRIPTORS: ACHING
DESCRIPTORS: ACHING;DISCOMFORT;SORE
DESCRIPTORS: ACHING

## 2022-09-06 ASSESSMENT — PAIN DESCRIPTION - ONSET
ONSET: ON-GOING
ONSET: GRADUAL
ONSET: GRADUAL
ONSET: ON-GOING

## 2022-09-06 ASSESSMENT — PAIN DESCRIPTION - ORIENTATION
ORIENTATION: MID

## 2022-09-06 ASSESSMENT — PAIN DESCRIPTION - PAIN TYPE
TYPE: ACUTE PAIN

## 2022-09-06 ASSESSMENT — PAIN SCALES - GENERAL
PAINLEVEL_OUTOF10: 7
PAINLEVEL_OUTOF10: 5
PAINLEVEL_OUTOF10: 3
PAINLEVEL_OUTOF10: 5

## 2022-09-06 NOTE — PROGRESS NOTES
Report called to supervisor at [facility]. All questions and concerns were addressed. No further questions or concerns at this time. Call back number given.   Electronically signed by Saba Burns RN on 9/6/2022 at 5:04 PM

## 2022-09-06 NOTE — PLAN OF CARE
Problem: Discharge Planning  Goal: Discharge to home or other facility with appropriate resources  Outcome: Progressing  Flowsheets  Taken 9/5/2022 2218 by Haily Jewell RN  Discharge to home or other facility with appropriate resources:   Identify barriers to discharge with patient and caregiver   Arrange for needed discharge resources and transportation as appropriate   Identify discharge learning needs (meds, wound care, etc)  Taken 9/5/2022 0844 by Bryce Leslie RN  Discharge to home or other facility with appropriate resources: Identify barriers to discharge with patient and caregiver     Problem: Pain  Goal: Verbalizes/displays adequate comfort level or baseline comfort level  Outcome: Progressing  Flowsheets (Taken 9/5/2022 2218)  Verbalizes/displays adequate comfort level or baseline comfort level:   Encourage patient to monitor pain and request assistance   Assess pain using appropriate pain scale   Administer analgesics based on type and severity of pain and evaluate response     Problem: Safety - Adult  Goal: Free from fall injury  Outcome: Progressing  Flowsheets (Taken 9/5/2022 2218)  Free From Fall Injury: Instruct family/caregiver on patient safety     Problem: Skin/Tissue Integrity  Goal: Absence of new skin breakdown  Description: 1. Monitor for areas of redness and/or skin breakdown  2. Assess vascular access sites hourly  3. Every 4-6 hours minimum:  Change oxygen saturation probe site  4. Every 4-6 hours:  If on nasal continuous positive airway pressure, respiratory therapy assess nares and determine need for appliance change or resting period.   Outcome: Progressing     Problem: Chronic Conditions and Co-morbidities  Goal: Patient's chronic conditions and co-morbidity symptoms are monitored and maintained or improved  Outcome: Progressing  Flowsheets  Taken 9/5/2022 2218 by Janeen Yusuf 34 - Patient's Chronic Conditions and Co-Morbidity Symptoms are Monitored and Maintained or Improved:   Monitor and assess patient's chronic conditions and comorbid symptoms for stability, deterioration, or improvement   Collaborate with multidisciplinary team to address chronic and comorbid conditions and prevent exacerbation or deterioration  Taken 9/5/2022 0844 by Janeen Springer 34 - Patient's Chronic Conditions and Co-Morbidity Symptoms are Monitored and Maintained or Improved: Monitor and assess patient's chronic conditions and comorbid symptoms for stability, deterioration, or improvement     Problem: Nutrition Deficit:  Goal: Optimize nutritional status  Outcome: Progressing  Flowsheets (Taken 9/5/2022 2218)  Nutrient intake appropriate for improving, restoring, or maintaining nutritional needs:   Assess nutritional status and recommend course of action   Recommend appropriate diets, oral nutritional supplements, and vitamin/mineral supplements     Problem: ABCDS Injury Assessment  Goal: Absence of physical injury  Outcome: Progressing  Flowsheets (Taken 9/5/2022 2218)  Absence of Physical Injury: Implement safety measures based on patient assessment

## 2022-09-06 NOTE — PROGRESS NOTES
Patient continues to rest in bed this shift, vital signs remain stable. All needs met at this time, no acute events, bed alarm on.

## 2022-09-06 NOTE — PROGRESS NOTES
Comprehensive Nutrition Assessment    Type and Reason for Visit:  Reassess    Nutrition Recommendations/Plan:   Regular diet   Continue Jevity 1.5 @ 40 ml/hr +1 proteinex daily +Meir daily (when in stock). Maintenance flush since pt taking po or per provider. Malnutrition Assessment:  Malnutrition Status: Moderate malnutrition (08/22/22 0843)    Context:  Acute Illness     Findings of the 6 clinical characteristics of malnutrition:  Energy Intake:  Unable to assess  Weight Loss:  No significant weight loss     Body Fat Loss:  Mild body fat loss Orbital, Buccal region   Muscle Mass Loss:  Mild muscle mass loss Temples (temporalis)  Fluid Accumulation:  No significant fluid accumulation     Strength:  Not Performed    Nutrition Assessment:    Follow-up. Pt continues on Regular diet with supplemental tube feeding. Pt is accepting some po, but overall intake poor. Jevity 1.5 infusing at 40 ml/hr (calculated over 20 hr to account for care) with maintenance flushes. Proteinex and Meir ordered daily (Batsheva Mantle currently backordered). Pt tolerating TF. 325 ml colostomy output x 24 hr.    Nutrition Related Findings:    Reviewed labs Na 135, glucose slightly elevated (would expect this on cont feeds) Wound Type: Multiple, Pressure Injury, Unstageable (heels and sacrum.)       Current Nutrition Intake & Therapies:    Average Meal Intake: 1-25%, %, 0%  Average Supplements Intake: None Ordered  ADULT TUBE FEEDING; Gastrostomy; Standard with Fiber; Continuous; 15; Yes; 10; Q 4 hours; 40; 30; Q 4 hours; Protein, Wound Healing; 1 proteinx daily per dietitian recommendations (DO NOT mix directly with TF); mix 1 packet of Meir in 4-6 ounces . .. ADULT DIET;  Regular  Current Tube Feeding (TF) Orders:  Feeding Route: Gastrostomy  Formula: Standard with Fiber (Jevity 1.5 fiber containing formula)  Schedule: Continuous  Feeding Regimen: Jevity 1.5 formula goal rate of 40 ml per hour (rate adj for routine Nursing care (~20 hour run). Additives/Modulars: Protein, Wound Healing (1 bottle of Proteinex per feeding tube per day (DO NOT mix directly with TF) + 1 pkt of Meir mixed with water per feeding tube bid (DO NOT mix directly with TF))  Water Flushes: per provider  Current TF & Flush Orders Provides: 800 ml TV / 1484 (1200 (TF) + 104 (Proteinex) + 180 (Meir) kcals / 82 gms (51(TF) + 26 (Proteinex) + 5 (Meir) Protein / 788 ml free water (608 (TF) + 180 ((flush) per day. Goal TF & Flush Orders Provides: Recommend trophic TF, Jevity 1.5 @ 15 mL/hr. Flush per provider. Provides: 300 mL TV, 450 kcal, 19 gm pro, 228  mL free water. Add Meir BID (mix with 4 oz of water and bolus). Goal TF rate would be Jevity 1.5 @ 40 mL/hr +1 proteinex daily. Add Meir BID. Flush per provider. Provides: 800 mL TV, 1304 kcal, 77 gm pro, 608 mL free water (includes 1 proteinex). Anthropometric Measures:  Height: 5' 2\" (157.5 cm)  Ideal Body Weight (IBW): 110 lbs (50 kg)    Admission Body Weight: 157 lb (71.2 kg)  Current Body Weight: 200 lb (90.7 kg), 176.4 % IBW. Weight Source: Bed Scale  Current BMI (kg/m2): 36.6                          BMI Categories: Obese Class 2 (BMI 35.0 -39.9)    Estimated Daily Nutrient Needs:        Energy (kcal/day): 4929-0249  kcal (15-18 kcal/kg 71 kg CBW)     Protein (g/day):  gm (1.5-2 gm /kg IBW)     Fluid (ml/day): per provider while in ICU    Nutrition Diagnosis:   Moderate malnutrition related to inadequate protein-energy intake as evidenced by mild muscle loss, mild loss of subcutaneous fat    Nutrition Interventions:   Food and/or Nutrient Delivery: Continue Current Diet, Continue Current Tube Feeding  Nutrition Education/Counseling: No recommendation at this time  Coordination of Nutrition Care: Continue to monitor while inpatient       Goals:  Previous Goal Met: Progressing toward Goal(s)  Goals:  Tolerate nutrition support at goal rate, by next RD assessment       Nutrition Monitoring and Evaluation:   Behavioral-Environmental Outcomes: None Identified  Food/Nutrient Intake Outcomes: Food and Nutrient Intake, Enteral Nutrition Intake/Tolerance  Physical Signs/Symptoms Outcomes: Biochemical Data, Constipation, Diarrhea, Nausea or Vomiting, Fluid Status or Edema, Skin, Weight    Discharge Planning:    Enteral Nutrition, Continue current diet     Marti Tucker, [address] [place] [address],   Contact: [phone]

## 2022-09-06 NOTE — CARE COORDINATION
DISCHARGE SUMMARY     DATE OF DISCHARGE: 9/6/22    DISCHARGE DESTINATION: [facility]    FACILITY:   PHONE NUMBER: [phone]    FAX NUMBER: [phone]    INSURANCE PRECERT OBTAINED: yes    RADHA/TOMÁS COMPLETED: yes    COVID RESULT: in process    TRANSPORTATION:     Company Name:  [facility] medical transport    Jorge L Miramontes up Time: 4:30    Phone Number: [phone]    COMMENTS: Plan is [facility] at [facility], precert is obtained.   Pt and dtr aware and agreeable to dc plan  Electronically signed by [id number] JUAN JOSÉ Casanova on 9/6/2022 at 11:31 AM

## 2022-09-06 NOTE — PROGRESS NOTES
Dressing changed completed to sacral wound by this RN, patient tolerated well. New dressing clean, dry and intact, vital signs stable, I/O's charted.

## 2022-09-06 NOTE — PROGRESS NOTES
Attempted to call report to [facility]. No answer. Unable to leave voicemail. Will attempt later.   Electronically signed by Dexter Obrien RN on 9/6/2022 at 4:48 PM

## 2022-09-06 NOTE — CARE COORDINATION
Spoke with [facility], able to accept and precert has been approved. Pt is able to go there today. Needs COVID, HENS and transport.   Electronically signed by [id number] JUAN JOSÉ Razo on 9/6/2022 at 10:49 AM

## 2022-09-06 NOTE — PROGRESS NOTES
PEG tube feeding stopped for transport. Patient had 434mL of feeding and had 90mL of flush. This RN did a 40mL free water flush after unhooking PEG tube to clear the line. Patient is being discharged with chronic tello and PICC line for IV antibiotics confirmed by MD this afternoon.   Electronically signed by Boris Styles RN on 9/6/2022 at 4:44 PM

## 2022-09-06 NOTE — PROGRESS NOTES
Patient is resting in bed and is alert and oriented x4. Patient is complaining of pain in her sacrum. PRN pain medication given per MD orders (see eMAR). Patient on specialty bed. Dressing change to sacral wound will be changed later this shift. Patient on continuous tube feedings. PEG site is clean, dry, and intact. Colostomy is intact with small, brown, loose/soft output right now Patient takes pills with thin liquids. Patient on RA. IV flushed and clamped. Patient on tele and running NSR or a-fib. Patient has history of a-fib and is on eliquis. Chronic tello remains in place with adequate yellow clear output. Patient Q2 turns. Patient moves as a Maxislide. Morning medication given and head to toe assessment is complete. All needs are met and safety precautions remain in place. Will continue to monitor and assess.   Electronically signed by Sherri Daily RN on 9/6/2022 at 9:33 AM

## 2022-09-06 NOTE — PLAN OF CARE
Problem: Discharge Planning  Goal: Discharge to home or other facility with appropriate resources  9/6/2022 0748 by Rock Felipe RN  Outcome: [place] (Taken 9/5/2022 2218 by Sara Manley RN)  Discharge to home or other facility with appropriate resources:   Identify barriers to discharge with patient and caregiver   Arrange for needed discharge resources and transportation as appropriate   Identify discharge learning needs (meds, wound care, etc)  9/5/2022 2218 by Sara Manley RN  Outcome: Progressing  Flowsheets  Taken 9/5/2022 2218 by Sara Manley RN  Discharge to home or other facility with appropriate resources:   Identify barriers to discharge with patient and caregiver   Arrange for needed discharge resources and transportation as appropriate   Identify discharge learning needs (meds, wound care, etc)  Taken 9/5/2022 0844 by Rock Felipe RN  Discharge to home or other facility with appropriate resources: Identify barriers to discharge with patient and caregiver     Problem: Pain  Goal: Verbalizes/displays adequate comfort level or baseline comfort level  9/6/2022 0748 by Rock Felipe RN  Outcome: Progressing  Flowsheets (Taken 9/5/2022 2218 by Sara Manley RN)  Verbalizes/displays adequate comfort level or baseline comfort level:   Encourage patient to monitor pain and request assistance   Assess pain using appropriate pain scale   Administer analgesics based on type and severity of pain and evaluate response  9/5/2022 2218 by Sara Manley RN  Outcome: Progressing  Flowsheets (Taken 9/5/2022 2218)  Verbalizes/displays adequate comfort level or baseline comfort level:   Encourage patient to monitor pain and request assistance   Assess pain using appropriate pain scale   Administer analgesics based on type and severity of pain and evaluate response     Problem: Safety - Adult  Goal: Free from fall injury  9/6/2022 0748 by Nik Medrano ONEL Major  Outcome: Progressing  Flowsheets (Taken 9/5/2022 2218 by Gela Granado RN)  Free From Fall Injury: Instruct family/caregiver on patient safety  9/5/2022 2218 by Gela Granado RN  Outcome: Progressing  Flowsheets (Taken 9/5/2022 2218)  Free From Fall Injury: Instruct family/caregiver on patient safety     Problem: Skin/Tissue Integrity  Goal: Absence of new skin breakdown  Description: 1. Monitor for areas of redness and/or skin breakdown  2. Assess vascular access sites hourly  3. Every 4-6 hours minimum:  Change oxygen saturation probe site  4. Every 4-6 hours:  If on nasal continuous positive airway pressure, respiratory therapy assess nares and determine need for appliance change or resting period. 9/6/2022 0748 by Joey Simon RN  Outcome: Progressing  Note: Patient will remain free from new skin breakdown. Precautions in place. Will monitor and assess.   9/5/2022 2218 by Gela Granado RN  Outcome: Progressing     Problem: Chronic Conditions and Co-morbidities  Goal: Patient's chronic conditions and co-morbidity symptoms are monitored and maintained or improved  9/6/2022 0748 by Joey Simon RN  Outcome: Progressing  Flowsheets (Taken 9/5/2022 2218 by Gela Granado RN)  Care Plan - Patient's Chronic Conditions and Co-Morbidity Symptoms are Monitored and Maintained or Improved:   Monitor and assess patient's chronic conditions and comorbid symptoms for stability, deterioration, or improvement   Collaborate with multidisciplinary team to address chronic and comorbid conditions and prevent exacerbation or deterioration  9/5/2022 2218 by Gela Granado RN  Outcome: Progressing  Flowsheets  Taken 9/5/2022 2218 by Janeen Beltrán 34 - Patient's Chronic Conditions and Co-Morbidity Symptoms are Monitored and Maintained or Improved:   Monitor and assess patient's chronic conditions and comorbid symptoms for stability, deterioration, or improvement   Collaborate with multidisciplinary team to address chronic and comorbid conditions and prevent exacerbation or deterioration  Taken 9/5/2022 0844 by Janeen Brown 34 - Patient's Chronic Conditions and Co-Morbidity Symptoms are Monitored and Maintained or Improved: Monitor and assess patient's chronic conditions and comorbid symptoms for stability, deterioration, or improvement     Problem: Nutrition Deficit:  Goal: Optimize nutritional status  9/6/2022 0748 by Keaton Melara RN  Outcome: Progressing  Flowsheets (Taken 9/5/2022 2218 by Hermelindo Ken RN)  Nutrient intake appropriate for improving, restoring, or maintaining nutritional needs:   Assess nutritional status and recommend course of action   Recommend appropriate diets, oral nutritional supplements, and vitamin/mineral supplements  9/5/2022 2218 by Hermelindo Ken RN  Outcome: Progressing  Flowsheets (Taken 9/5/2022 2218)  Nutrient intake appropriate for improving, restoring, or maintaining nutritional needs:   Assess nutritional status and recommend course of action   Recommend appropriate diets, oral nutritional supplements, and vitamin/mineral supplements     Problem: ABCDS Injury Assessment  Goal: Absence of physical injury  9/6/2022 0748 by Keaton Melara RN  Outcome: Progressing  Flowsheets (Taken 9/5/2022 2218 by Hermelindo Ken RN)  Absence of Physical Injury: Implement safety measures based on patient assessment  9/5/2022 2218 by Hermelindo Ken RN  Outcome: Progressing  Flowsheets (Taken 9/5/2022 2218)  Absence of Physical Injury: Implement safety measures based on patient assessment

## 2022-09-08 ENCOUNTER — PHARMACY VISIT (OUTPATIENT)
Dept: INFECTIOUS DISEASES | Age: 77
End: 2022-09-08

## 2022-09-08 ENCOUNTER — TELEPHONE (OUTPATIENT)
Dept: INFECTIOUS DISEASES | Age: 77
End: 2022-09-08

## 2022-09-08 DIAGNOSIS — S31.000D WOUND OF SACRAL REGION, SUBSEQUENT ENCOUNTER: Primary | ICD-10-CM

## 2022-09-08 NOTE — PROGRESS NOTES
Dr. Lazaro Osman has placed a referral order on behalf of Dr. Korin Camacho while covering [facility] for pharmacist to manage Outpatient Parental Antimicrobial Therapy (OPAT) pursuant the ID Collaborative Practice Agreement. Patient and/or caregiver has verbally consented to have drug therapy managed by a pharmacist. The benefits and risks of complex antimicrobial therapy including drug-specific adverse reactions and necessary follow-up were discussed with patient and/or caregiver and they are amenable to OPAT and pharmacist management. Pertinent Objective Data:     Wt Readings from Last 1 Encounters:   09/06/22 200 lb 14.4 oz (91.1 kg)      BMI Readings from Last 1 Encounters:   09/06/22 36.75 kg/m²      Serum creatinine: 0.5 mg/dL   Estimated creatinine clearance: 97 mL/min    Lab Results   Component Value Date     (L) 09/06/2022    K 3.8 09/06/2022     09/06/2022    CO2 29 09/06/2022    BUN 8 09/06/2022    CREATININE <0.5 (L) 09/06/2022    GLUCOSE 126 (H) 09/06/2022    CALCIUM 7.4 (L) 09/06/2022    PROT 3.4 (L) 09/02/2022    LABALBU 1.6 (L) 09/02/2022    BILITOT <0.2 09/02/2022    ALKPHOS 92 09/02/2022    AST 14 (L) 09/02/2022    ALT 9 (L) 09/02/2022    LABGLOM >60 09/06/2022    GFRAA >60 09/06/2022    AGRATIO 0.9 (L) 09/02/2022    GLOB 2.6 09/09/2021       Lab Results   Component Value Date    WBC 4.0 09/06/2022    HGB 8.2 (L) 09/06/2022    HCT 24.9 (L) 09/06/2022    MCV 94.0 09/06/2022     09/06/2022    LYMPHOPCT 18.0 09/06/2022    RBC 2.65 (L) 09/06/2022    MCH 31.0 09/06/2022    MCHC 33.0 09/06/2022    RDW 21.8 (H) 09/06/2022       No results found for: CRP    No results found for: SEDRATE    OPAT Orders:  Organism/Diagnosis  EColi, Proteus, Nocardia    Bacteremia 2/2 sacral decubitus wound culture with osteo   Antimicrobial Regimen and Projected End Date IV ceftriaxone 2gm q24hr- 9/23  PO flagyl 500 mg TID- 9/23  PO fluconazole 100 mg daily- 9/23  Pt on flecainide, last qtc= 501 on 9/2  PO minocycline 100 mg BID- 9/23   Weekly Lab Monitoring CBCwDiff, CMP, CRP, and ESR   Repeat Imaging Needed None   Follow up in ID Clinic F/u with Dr. Duke Barr double lumen PICC   LTAC/SNF [facility] and medication orders have been placed. Clinical Pharmacist will review patient weekly or as needed and make recommendations regarding the above therapy plan.      Thank you,  Micah Metzger, PharmD, [facility] [place], [place] Infectious Disease  Phone: [phone] (also available on [employer])  Fax: [phone]    For Pharmacy [address] [place] [address] in place:  Yes  Time Spent (min): 10

## 2022-09-08 NOTE — TELEPHONE ENCOUNTER
Andrea's original orders were for a F/U in 4 weeks that would put her past her end date. Do you still want a F/U? Thanks.

## 2022-09-12 DIAGNOSIS — S31.000D WOUND OF SACRAL REGION, SUBSEQUENT ENCOUNTER: ICD-10-CM

## 2022-09-12 LAB
ALBUMIN SERPL-MCNC: 2.1 G/DL
ALP BLD-CCNC: 101 U/L
ALT SERPL-CCNC: 11 U/L
ANION GAP SERPL CALCULATED.3IONS-SCNC: NORMAL MMOL/L
AST SERPL-CCNC: 15 U/L
BASOPHILS ABSOLUTE: ABNORMAL
BASOPHILS RELATIVE PERCENT: 0.4 %
BILIRUB SERPL-MCNC: 0.2 MG/DL (ref 0.1–1.4)
BUN BLDV-MCNC: 30 MG/DL
C-REACTIVE PROTEIN: 4.1
CALCIUM SERPL-MCNC: 7.5 MG/DL
CHLORIDE BLD-SCNC: 100 MMOL/L
CO2: 26 MMOL/L
CREAT SERPL-MCNC: 0.3 MG/DL
EOSINOPHILS ABSOLUTE: ABNORMAL
EOSINOPHILS RELATIVE PERCENT: 0.5 %
GFR CALCULATED: NORMAL
GLUCOSE BLD-MCNC: 131 MG/DL
HCT VFR BLD CALC: 27.3 % (ref 36–46)
HEMOGLOBIN: 8.9 G/DL (ref 12–16)
LYMPHOCYTES ABSOLUTE: 1.6 /ΜL
LYMPHOCYTES RELATIVE PERCENT: 32.3 %
MCH RBC QN AUTO: 30.5 PG
MCHC RBC AUTO-ENTMCNC: 32.6 G/DL
MCV RBC AUTO: 93.4 FL
MONOCYTES ABSOLUTE: 0.5 /ΜL
MONOCYTES RELATIVE PERCENT: 9.6 %
NEUTROPHILS ABSOLUTE: 2.8 /ΜL
NEUTROPHILS RELATIVE PERCENT: 57.2 %
PDW BLD-RTO: 21.8 %
PLATELET # BLD: 253 K/ΜL
PMV BLD AUTO: 7.5 FL
POTASSIUM SERPL-SCNC: 4.5 MMOL/L
RBC # BLD: 2.83 10^6/ΜL
SEDIMENTATION RATE, ERYTHROCYTE: 55
SODIUM BLD-SCNC: 134 MMOL/L
TOTAL PROTEIN: 4.6
WBC # BLD: 5 10^3/ML

## 2022-09-15 ENCOUNTER — OFFICE VISIT (OUTPATIENT)
Dept: SURGERY | Age: 77
End: 2022-09-15
Payer: MEDICARE

## 2022-09-15 DIAGNOSIS — M46.28 SACRAL OSTEOMYELITIS (HCC): Primary | ICD-10-CM

## 2022-09-15 DIAGNOSIS — L89.154 STAGE IV PRESSURE ULCER OF SACRAL REGION (HCC): ICD-10-CM

## 2022-09-15 PROCEDURE — 99024 POSTOP FOLLOW-UP VISIT: CPT | Performed by: SURGERY

## 2022-09-15 PROCEDURE — 11042 DBRDMT SUBQ TIS 1ST 20SQCM/<: CPT | Performed by: SURGERY

## 2022-09-15 NOTE — PROGRESS NOTES
Post Operative Visit    [address] [facility]   Colleen Wood MD    [address] [facility], [facility], [address] [place] [address]  Basil Todd  Phone: [phone]  Fax: [phone]   YOB: 1945    Date of Visit:  9/15/2022    No ref. provider found  Ya Cano MD    Subjective: Rashel Gamez presents for a four week follow-up s/p laparoscopic gastrostomy tube placement, sigmoid colostomy, and excisional debridement of sacral wound. Overall she has been doing ok since her discharge to the nursing facility. She has had some slough of her wound but the wound vac has been in place. She denies any fevers or chills. Allergies   Allergen Reactions    Demerol Hcl [Meperidine] Other (See Comments)     PATIENT STATES SHE GOT VERY ANXIOUS-LIKE SHE WAS CLIMBING THE WALLS    Pcn [Penicillins]      Patient reports she has not had since she was a child, thinks reaction was hives. Adhesive Tape Rash     No outpatient medications have been marked as taking for the 9/15/22 encounter (Office Visit) with Dov Ryan MD.       There were no vitals filed for this visit. There is no height or weight on file to calculate BMI.      Wt Readings from Last 3 Encounters:   09/06/22 200 lb 14.4 oz (91.1 kg)   08/18/22 168 lb (76.2 kg)   08/04/22 160 lb 0.9 oz (72.6 kg)     BP Readings from Last 3 Encounters:   09/06/22 134/76   08/18/22 (!) 145/76   08/04/22 139/79          Objective:    CONSTITUTIONAL:  awake, alert, no apparent distress    LUNGS:  Resp easy and unlabored  ABDOMEN:  incisions c/d/I, no erythema or induration, soft, non-distended, non-tender, voluntary guarding absent,  and hernia absent  MUSCULOSKELETAL: No edema  NEUROLOGIC:  Mental Status Exam:  Level of Alertness:   awake  Orientation:   person, place, time  SKIN: no erythema or induration      Pathology:  FINAL DIAGNOSIS:     Coccyx, debridement:   - Benign bone with extensive acute osteomyelitis and focal osteonecrosis   and gangrenous necrosis of attached soft tissue.   - Negative for malignancy. BUCCA/BUCCA     ASSESSMENT:     Diagnosis Orders   1. Sacral osteomyelitis (HCC)  IN DEBRIDEMENT, SKIN, SUB-Q TISSUE,=<20 SQ CM      2. 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 She is having some nausea so will call in zofran to her nursing facility. Continue wound care with wound vac. The wound was debrided today in the office to remove slough. After verbal consent was obtained, the wound was sharply debrided with a 4 mm curette back to healthy viable subcutaneous tissue in an area of approximately 10 cm squared. A single vicryl suture along with pressure was used for hemostasis. The patient tolerated the procedure well. A wet to dry dressing was placed over the wound. Will plan on having her follow up in 3-4 weeks for another wound check.      Electronically signed by Sharon Castañeda MD on 9/15/2022

## 2022-09-16 RX ORDER — PANTOPRAZOLE SODIUM 40 MG/1
TABLET, DELAYED RELEASE ORAL
Qty: 180 TABLET | Refills: 1 | Status: SHIPPED | OUTPATIENT
Start: 2022-09-16

## 2022-09-16 NOTE — TELEPHONE ENCOUNTER
Requested Prescriptions     Pending Prescriptions Disp Refills    pantoprazole (PROTONIX) 40 MG tablet [Pharmacy Med Name: PANTOPRAZOLE TAB 40MG DR] 180 tablet 1     Sig: TAKE 1 TABLET TWICE A DAY   Patient requesting a medication refill.     Pharmacy: [facility]  Next office visit: Visit date not found  Last regular office visit: 6/14/2022

## 2022-09-19 LAB
ALBUMIN SERPL-MCNC: 2.2 G/DL
ALP BLD-CCNC: 114 U/L
ALT SERPL-CCNC: 12 U/L
ANION GAP SERPL CALCULATED.3IONS-SCNC: NORMAL MMOL/L
AST SERPL-CCNC: 14 U/L
BASOPHILS ABSOLUTE: ABNORMAL
BASOPHILS RELATIVE PERCENT: 0.2 %
BILIRUB SERPL-MCNC: 0.2 MG/DL (ref 0.1–1.4)
BUN BLDV-MCNC: 20 MG/DL
C-REACTIVE PROTEIN: 2.2
CALCIUM SERPL-MCNC: 7.6 MG/DL
CHLORIDE BLD-SCNC: 99 MMOL/L
CO2: 31 MMOL/L
CREAT SERPL-MCNC: 0.3 MG/DL
EOSINOPHILS ABSOLUTE: ABNORMAL
EOSINOPHILS RELATIVE PERCENT: 0.4 %
GFR CALCULATED: NORMAL
GLUCOSE BLD-MCNC: 115 MG/DL
HCT VFR BLD CALC: 26.6 % (ref 36–46)
HEMOGLOBIN: 8.6 G/DL (ref 12–16)
LYMPHOCYTES ABSOLUTE: 1.6 /ΜL
LYMPHOCYTES RELATIVE PERCENT: 28.2 %
MCH RBC QN AUTO: 29.8 PG
MCHC RBC AUTO-ENTMCNC: 32.3 G/DL
MCV RBC AUTO: 92.2 FL
MONOCYTES ABSOLUTE: 0.5 /ΜL
MONOCYTES RELATIVE PERCENT: 8.8 %
NEUTROPHILS ABSOLUTE: 3.4 /ΜL
NEUTROPHILS RELATIVE PERCENT: 62.4 %
PDW BLD-RTO: 21 %
PLATELET # BLD: 254 K/ΜL
PMV BLD AUTO: 7.4 FL
POTASSIUM SERPL-SCNC: 4.3 MMOL/L
RBC # BLD: 2.88 10^6/ΜL
SEDIMENTATION RATE, ERYTHROCYTE: 35
SODIUM BLD-SCNC: 136 MMOL/L
TOTAL PROTEIN: 4.4
WBC # BLD: 5.5 10^3/ML

## 2022-09-20 ENCOUNTER — CLINICAL DOCUMENTATION (OUTPATIENT)
Dept: INFECTIOUS DISEASES | Age: 77
End: 2022-09-20

## 2022-09-20 DIAGNOSIS — S31.000D WOUND OF SACRAL REGION, SUBSEQUENT ENCOUNTER: ICD-10-CM

## 2022-09-20 RX ORDER — METRONIDAZOLE 500 MG/1
500 TABLET ORAL EVERY 8 HOURS SCHEDULED
Qty: 63 TABLET | Refills: 0 | Status: SHIPPED | OUTPATIENT
Start: 2022-09-23 | End: 2022-10-14

## 2022-09-20 RX ORDER — MINOCYCLINE HYDROCHLORIDE 100 MG/1
100 CAPSULE ORAL 2 TIMES DAILY
Qty: 42 CAPSULE | Refills: 0 | Status: SHIPPED | OUTPATIENT
Start: 2022-09-23 | End: 2022-10-14

## 2022-09-20 NOTE — PROGRESS NOTES
Pt on IV ceftriaxone, PO flagyl, PO fluconazole, PO minocycline for bacteremia 2/2 sacral decubitus wound with evidence of osteo. No f/u. Last note states to end IV abx as planned and start PO afterwards. 8/21 blood: Proteus    8/22 urine: klebsiella oxytoca, EColi    8/24 sacral ulcer tissue: proteus, EColi, nocardia, bacteroides    8/29 foot wound culture: Enterobacter, staph epi    Labs this week are stable. Decreased CRP and ESR. WBC stbale. Do you want to start patient on consolidation therapy with PO?      Chetna Soto, PharmD, [address] [place], [place] Infectious Disease  Phone: [phone] (also available on [employer])  Fax: [phone]    For Pharmacy [id number] [place] [address] in place:  Yes  Time Spent (min): 10

## 2022-09-20 NOTE — PROGRESS NOTES
D/c IV abx as planned cont oral Minocycline and Flagyl as planned for x 3 more weeks  No ID follow up necessary

## 2022-09-22 ENCOUNTER — TELEPHONE (OUTPATIENT)
Dept: INFECTIOUS DISEASES | Age: 77
End: 2022-09-22

## 2022-09-22 NOTE — TELEPHONE ENCOUNTER
Spoke with supervisor Jessica at [address] verbalized understanding to DC IV ABX and pull PICC after last dose on  9/23/22. She also verbalized understanding to continue oral flagyl and minocycline for 3 weeks.

## 2022-09-28 ENCOUNTER — OFFICE VISIT (OUTPATIENT)
Dept: ORTHOPEDIC SURGERY | Age: 77
End: 2022-09-28

## 2022-09-28 VITALS — HEIGHT: 62 IN | WEIGHT: 200 LBS | BODY MASS INDEX: 36.8 KG/M2

## 2022-09-28 DIAGNOSIS — S52.592A OTHER CLOSED FRACTURE OF DISTAL END OF LEFT RADIUS, INITIAL ENCOUNTER: Primary | ICD-10-CM

## 2022-09-28 PROCEDURE — 99024 POSTOP FOLLOW-UP VISIT: CPT | Performed by: ORTHOPAEDIC SURGERY

## 2022-10-03 ENCOUNTER — TELEPHONE (OUTPATIENT)
Dept: ORTHOPEDIC SURGERY | Age: 77
End: 2022-10-03

## 2022-10-03 NOTE — TELEPHONE ENCOUNTER
Yordan Perera   [phone]  Called and is requesting Wt Bearing Status on the patient wrist, she is wondering if the patient needs a brace for her wrist .

## 2022-10-03 NOTE — TELEPHONE ENCOUNTER
Dulce Sullivan returned call. Notified her our office will call back tomorrow after discussing WB status and splint status with SMA tomorrow. OK to leave message on voicemail if she does not answer tomorrow.

## 2022-10-05 DIAGNOSIS — M81.0 SENILE OSTEOPOROSIS: ICD-10-CM

## 2022-10-05 RX ORDER — ALBUTEROL SULFATE 90 UG/1
4 AEROSOL, METERED RESPIRATORY (INHALATION) PRN
OUTPATIENT
Start: 2022-10-05

## 2022-10-05 RX ORDER — EPINEPHRINE 1 MG/ML
0.3 INJECTION, SOLUTION, CONCENTRATE INTRAVENOUS PRN
OUTPATIENT
Start: 2022-10-05

## 2022-10-05 RX ORDER — DIPHENHYDRAMINE HYDROCHLORIDE 50 MG/ML
50 INJECTION INTRAMUSCULAR; INTRAVENOUS
OUTPATIENT
Start: 2022-10-05

## 2022-10-05 RX ORDER — ONDANSETRON 2 MG/ML
8 INJECTION INTRAMUSCULAR; INTRAVENOUS
OUTPATIENT
Start: 2022-10-05

## 2022-10-05 RX ORDER — SODIUM CHLORIDE 0.9 % (FLUSH) 0.9 %
5-40 SYRINGE (ML) INJECTION PRN
OUTPATIENT
Start: 2022-10-05

## 2022-10-05 RX ORDER — ACETAMINOPHEN 325 MG/1
650 TABLET ORAL
OUTPATIENT
Start: 2022-10-05

## 2022-10-05 RX ORDER — SODIUM CHLORIDE 9 MG/ML
INJECTION, SOLUTION INTRAVENOUS CONTINUOUS
OUTPATIENT
Start: 2022-10-05

## 2022-10-05 RX ORDER — ZOLEDRONIC ACID 5 MG/100ML
5 INJECTION, SOLUTION INTRAVENOUS ONCE
OUTPATIENT
Start: 2022-10-05 | End: 2022-10-05

## 2022-10-06 NOTE — DISCHARGE SUMMARY
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]      Priority: Medium    Complex care coordination [Z71.89]      Priority: Medium    Receiving intravenous antibiotic treatment as outpatient [Z79.2]      Priority: Medium    Acute cystitis without hematuria [N30.00]      Priority: Medium    Complicated UTI (urinary tract infection) [N39.0]      Priority: Medium    Sepsis without septic shock ([facility] [A41.9]      Priority: Medium    Acute cystitis with hematuria [N30.01]      Priority: Medium    Acute metabolic encephalopathy [U57.18]      Priority: Medium    Sacral wound [S31.000A]      Priority: Medium    Elevated lactic acid level [R79.89]      Priority: Medium    Fever [R50.9]      Priority: Medium    Tachypnea [R06.82]      Priority: Medium    Sacral osteomyelitis (HCC) [M46.28]      Priority: Medium    WHITNEY (obstructive sleep apnea) [G47.33]      Priority: Medium    Overweight (BMI 25.0-29. 9) [E66.3]      Priority: Medium    Elevated alkaline phosphatase level [R74.8]      Priority: Medium    Elevated alkaline phosphatase in  [P09.8, R74.8]      Priority: Medium    History of depression [Z86.59]      Priority: Medium    Nursing home resident [Z59.3]      Priority: Medium    Proteus infection [A49.8]      Priority: Medium    Gram-negative bacteremia [R78.81]      Priority: Medium    Lactic acidosis [E87.2]      Priority: Medium    Altered mental status [R41.82]      Priority: Medium    Hypotension [I95.9]      Priority: Medium    Skin ulcer of sacrum ([facility] [O79.238]      Priority: Medium    DM2 (diabetes mellitus, type 2) ([facility] [address] [E11.9]      Priority: Medium    Septic shock ([facility] [A41.9, R65.21] Priority: Medium    Fibromyalgia [M79.7]     Severe protein-calorie malnutrition (HCC) [E43]     Tachycardia [R00.0]     Atrial fibrillation (HCC) [I48.91]     Malignant neoplasm of colon ([address] [C18.9]     Essential hypertension [I10]        The patient was seen and examined on day of discharge and this discharge summary is in conjunction with any daily progress note from day of discharge. Hospital Course:   Patient is a poor historian at this time, and information for this report is derived from conversation with the emergency room physician and review of the records. Pt is an 65y.o. year-old female with a history of hypertension, DMII, A fib, fibromyalgia, colon cancer and a sacral wound which is being treated with a wound vac. Per her nursing home, she is normally A&O x 4. Today she was found to be confused and was oriented to self only. In the ER she was found to have a Sacral wound with subcutaneous gas tunneling through to the perirectal fascia with associated septic shock. Surgery was consulted in the ER and asked that her wound vac be removed. She was started on antibiotics and vasopressors and will be admitted to the ICU for further evaluation and treatment. Pt unable to provide associated signs and symptoms at this time due to altered mental status    Sepsis with septic shock, secondary to infected decubitus ulcer and underlying osteomyelitis. Status postsurgical debridement with diverting colostomy. Continue antibiotics per ID recommendation. Stage IV sacral decubitus ulcer. Status post laparoscopic gastrostomy tube, laparoscopic sigmoid colostomy and excisional debridement of sacral ulcer on 8/24/2022, final wound measurement of 11 x 13 cm. Wound VAC in place. Gram-negative bacteremia/Proteus. Likely secondary to #2. Continue antibiotics per infectious disease recommendation. Complicated urinary tract infection. Urine cultures grew E. coli and Klebsiella.   As noted above, patient already on antibiotics. Acute metabolic encephalopathy, resolved. Secondary to underlying infectious process. Continue treatment of underlying etiologies. Multiple electrolyte abnormalities, including hypophosphatemia, hyponatremia. Electrolytes have been corrected. A  Acute on chronic macrocytic anemia. Etiology is multifactorial, including lower GI bleed, sepsis sepsis, multiple blood draw, hemodilutional effect. Status post transfusion of 1 unit of pRBC on 8/26/2022. Patient underwent colonoscopy on 8/12/2022 that had to be aborted due to poor prep. Close monitoring in rehab. Substernal chest pain, reported on 9/2/2022. reviewed EKG, serial troponin. Given one dose of GI cocktail. S/p stress test - neg for ischemia  Diffuse anasarca. Likely third spacing from hypoalbuminemia. Asymmetric left forearm swelling. Venous ultrasound of left upper extremity obtained on 8/29/2022 was negative for DVT. History of atrial fibrillation. She was on eliquis, currently held due to unexplained anemia. Recommend repeat H&H in 1 week (post-discharge) and consider restarting eliquis, if Hb remains stable. If restarted, she will continue to require close monitoring of H&H. Other comorbidities: history of diabetes type 2, history of colon cancer, sacral wound, GERD, anxiety disorder, osteoarthritis, atrial fibrillation, depression, fibromyalgia, migraine headache, obstructive sleep apnea, severe protein caloric malnutrition, obesity with BMI of 36 kg/m². Disposition. Therapy evaluation ordered. ID has made final antibiotics recommendation. Awaiting general surgery OK for discharge to [facility]. Physical Exam Performed:     /76   Pulse 95   Temp 98.3 °F (36.8 °C) (Oral)   Resp 16   Ht 5' 2\" (1.575 m)   Wt 200 lb 14.4 oz (91.1 kg)   SpO2 96%   BMI 36.75 kg/m²     Gen/overall appearance: Not in acute distress. Alert. Oriented X3. Appears chronically-ill. CVS: Nml S1S2, no MRG, RRR  Pulm: Clear bilaterally.  No crackles/wheezes  Gastrointestinal: Soft, NT/ND, +BS  Musculoskeletal: Diffuse anasarca. Asymmetric left forearm swelling. Warm  Neuro: No focal deficit. Moves extremity spontaneously. Psychiatry: Appropriate affect. Not agitated. Skin: Warm, dry with normal turgor. No rash  Capillary refill: Brisk,< 3 seconds   Peripheral Pulses: +2 palpable, equal bilaterally     Labs: For convenience and continuity at follow-up the following most recent labs are provided:      CBC:    Lab Results   Component Value Date/Time    WBC 5.5 09/19/2022 10:00 AM    HGB 8.6 09/19/2022 10:00 AM    HCT 26.6 09/19/2022 10:00 AM     09/19/2022 10:00 AM       Renal:    Lab Results   Component Value Date/Time     09/19/2022 10:00 AM    K 4.3 09/19/2022 10:00 AM    K 3.6 08/29/2022 04:55 AM    CL 99 09/19/2022 10:00 AM    CO2 31 09/19/2022 10:00 AM    BUN 20 09/19/2022 10:00 AM    CREATININE 0.3 09/19/2022 10:00 AM    CALCIUM 7.6 09/19/2022 10:00 AM    PHOS 2.9 09/02/2022 10:45 AM         Significant Diagnostic Studies    Radiology:   NM MYOCARDIAL SPECT REST EXERCISE OR RX   Final Result      VL Extremity Venous Left   Final Result      XR WRIST LEFT (MIN 3 VIEWS)   Final Result   Patient is status post ORIF of distal radial fracture. CT ABDOMEN PELVIS W IV CONTRAST Additional Contrast? None   Final Result   Large sacral decubitus ulcer with subcutaneous gas extending to the coccyx   and perirectal fascia. No loculated fluid collections. Increased sclerosis   of the adjacent coccyx with soft tissue gas extending to the bone. Findings   are compatible with osteomyelitis. Adjacent rectal wall thickening may be reactive. Small bilateral pleural effusions with bibasilar atelectasis. Difficult to   exclude superimposed pneumonia in the lung bases. XR CHEST PORTABLE   Final Result   Clear chest without acute cardiopulmonary process.                 Consults:     IP CONSULT TO GENERAL SURGERY  IP CONSULT TO CRITICAL CARE  IP CONSULT TO INFECTIOUS DISEASES  IP CONSULT TO DIETITIAN  IP CONSULT TO PALLIATIVE CARE  IP CONSULT TO DIETITIAN    Disposition:  SNF     Condition at Discharge: Stable    Discharge Instructions/Follow-up:  pcp in 1 week    Code Status:  Prior     Activity: activity as tolerated    Diet: regular diet      Discharge Medications:     Discharge Medication List as of 9/6/2022  1:26 PM             Details   cefTRIAXone (ROCEPHIN) 500 MG injection Inject 2,000 mg into the muscle every 24 hours for 17 days, Disp-68 each, R-0NO PRINT      lactobacillus (CULTURELLE) capsule Take 2 capsules by mouth 2 times daily (with meals), Disp-240 capsule, R-0Normal      Wound Cleansers (VASHE WOUND THERAPY) external solution Irrigation, CONTINUOUS PRN, Wound Care, for irrigation with Wound V. A.C device, Disp-1 each, R-0NO PRINT      B Complex-Minerals (ELDERTONIC) LIQD liquid Take 15 mLs by mouth daily, Disp-450 mL, R-0NO PRINT                Details   apixaban (ELIQUIS) 5 MG TABS tablet Take 1 tablet by mouth 2 times daily, Disp-60 tablet, R-1Print      fluconazole (DIFLUCAN) 200 MG tablet Take 1 tablet by mouth daily for 17 days, Disp-17 tablet, R-0Print      ALPRAZolam (XANAX) 0.5 MG tablet Take 1 tablet by mouth 3 times daily as needed for Sleep or Anxiety for up to 3 days. , Disp-9 tablet, R-0Print      metroNIDAZOLE (FLAGYL) 500 MG tablet Take 1 tablet by mouth every 8 hours for 17 days, Disp-51 tablet, R-0Print      minocycline (MINOCIN;DYNACIN) 100 MG capsule Take 1 capsule by mouth 2 times daily for 17 days, Disp-34 capsule, R-0Print      traMADol (ULTRAM) 50 MG tablet Take 1 tablet by mouth every 6 hours as needed for Pain for up to 5 days. , Disp-15 tablet, R-0Print                Details   acetaminophen (TYLENOL) 325 MG tablet Take 650 mg by mouth every 6 hours as needed for PainHistorical Med      docusate sodium (COLACE, DULCOLAX) 100 MG CAPS Take 100 mg by mouth 2 times daily as needed for Constipation, Disp-60 capsule, R-0Normal      escitalopram (LEXAPRO) 10 MG tablet Take 1 tablet by mouth daily, Disp-90 tablet, R-3Normal      tiZANidine (ZANAFLEX) 2 MG tablet Take 1 tablet by mouth every 6 hours as needed (muscle pain), Disp-60 tablet, R-11Normal      dicyclomine (BENTYL) 10 MG capsule Take 1 capsule by mouth 4 times daily as needed (cramping/diarrhea), Disp-60 capsule, R-5Normal      pantoprazole (PROTONIX) 40 MG tablet TAKE ONE TABLET BY MOUTH TWICE A DAY, Disp-180 tablet, R-1Normal      flecainide (TAMBOCOR) 100 MG tablet TAKE 1 TABLET BY MOUTH 2 TIMES A DAY, Disp-180 tablet, R-3Normal      Cyanocobalamin (VITAMIN B 12 PO) Take 2,500 mg by mouth dailyHistorical Med      calcium carbonate-vitamin D 600-200 MG-UNIT TABS Take 1 tablet by mouth 2 times dailyHistorical Med             Time Spent on discharge is more than 30 minutes in the examination, evaluation, counseling and review of medications and discharge plan. Signed:    Casandra Zimmerman MD   10/6/2022      Thank you Vicky Padilla MD for the opportunity to be involved in this patient's care. If you have any questions or concerns, please feel free to contact me at [phone].

## 2022-10-08 NOTE — PROGRESS NOTES
DIAGNOSIS:  Left distal radius 2 parts intra-articular displaced fracture, status post ORIF. DATE OF SURGERY:  8/3/2022. HISTORY OF PRESENT ILLNESS:  Ms. Flako Hall 65 y.o.  female right handed returns today  for 2 months postoperative visit. The patient denies any significant pain in the left wrist. Rates pain a 5/10 VAS mild, aching, No numbness or tingling sensation. No fever or Chills. PHYSICAL EXAMINATION:  The incision is completely healed . No signs of any erythema or drainage. She  has no pain with the active or passive range of motion of the left wrist, but decrease ROM. She  has intact sensation, distally, and she  is neurovascularly intact. IMAGING:  Three views left wrist taken today in the office showed anatomic alignment of left distal radius, plate and screws in good position, no loosening. IMPRESSION:  2 weeks out from left distal radius ORIF and doing very well. PLAN:  I have told the patient to work on ROM, as well as strengthening exercises. No heavy impact activities. The patient will come back for a follow up in 6 weeks. At that time, we will take 3 views of the left wrist. PT if needed then. As this patient has demonstrated risk factors for osteoporosis, such as age and evidence of a fracture, I have referred the patient back to the primary care physician for evaluation for osteoporosis, including consideration for DEXA scanning, if this is felt to be clinically indicated. The patient is advised to contact the primary care physician to follow-up for further evaluation.        Anjel Zamora MD

## 2022-11-29 ENCOUNTER — OFFICE VISIT (OUTPATIENT)
Dept: ORTHOPEDIC SURGERY | Age: 77
End: 2022-11-29

## 2022-11-29 VITALS — WEIGHT: 200 LBS | HEIGHT: 62 IN | BODY MASS INDEX: 36.8 KG/M2

## 2022-11-29 DIAGNOSIS — S52.592A OTHER CLOSED FRACTURE OF DISTAL END OF LEFT RADIUS, INITIAL ENCOUNTER: Primary | ICD-10-CM

## 2022-12-05 NOTE — PROGRESS NOTES
DIAGNOSIS:  Left distal radius 2 parts intra-articular displaced fracture, status post ORIF. DATE OF SURGERY:  8/3/2022. HISTORY OF PRESENT ILLNESS:  Ms. Kayla Butcher 65 y.o.  female right handed returns today for 2 months postoperative visit. The patient denies any significant pain in the left wrist. Rates pain a 7/10 VAS mild, aching, No numbness or tingling sensation. No fever or Chills.      Past Medical History:   Diagnosis Date    Acid reflux     Anxiety     Arthritis     Atrial fibrillation (HCC)     Closed fracture dislocation of right elbow     Depression     Fibromyalgia     Migraine     WHITNEY (obstructive sleep apnea)     Diagnosed years ago, no CPAP    Rectal cancer (HCC)     Wears glasses     DRIVING       Past Surgical History:   Procedure Laterality Date    APPENDECTOMY      CERVICAL DISCECTOMY      ACDF     SECTION      x4    CHOLECYSTECTOMY      COLECTOMY N/A 2022    LAPAROSCOPIC SIGMOID COLOSTOMY WITH EXCISIONAL DEBRIDEMENT OF SACRAL ULCER, LAPAROSCOPIC GASTROSTOMY TUBE PLACEMENT performed by Macario Lopez MD at [address] [place] [address]      Rectal cancer removed using  incision    COLONOSCOPY      SEVERAL    COLONOSCOPY N/A 2019    COLONOSCOPY POLYPECTOMY SNARE/COLD BIOPSY performed by Jose Armando Paz MD at [address] [place] [address] N/A 2021    COLONOSCOPY WITH BIOPSY performed by Harrison Mix MD at [address] [place] [address] N/A 2022    INCOMPLETE COLONOSCOPY D/T POOR PREP performed by Harrison Mix MD at [address] [facility] [address] Left 8/3/2022    OPEN REDUCTION INTERNAL FIXATION LEFT DISTAL RADIUS performed by Jennifer Santillan MD at [address] [facility] Right 2021    RIGHT LEG EVACUATION OF HEMATOMA performed by Luis Angulo MD at [address] [place] [address] W/O EXTENSION Right 10/25/2018    OPEN REDUCTION INTERNAL FIXATION RIGHT FEMUR SUPRACONDYLAR FEMORAL FRACTURE WITH C-ARM performed by Sarah Rey MD at [place] [address] Right 1/31/2021    RIGHT LEG HEMATOMA EVAKUATION, DEBRIDEMENT, AND WOUND VAC PLACEMENT performed by Cheyenne Rodriguez MD at [place] [address] Right 2/17/2021    RIGHT LOWER EXTREMITY SKIN GRAFT performed by Cheyenne Rodriguez MD at [address] [place] [address] Ave Left     UPPER GASTROINTESTINAL ENDOSCOPY N/A 11/26/2019    ESOPHAGOGASTRODUODENOSCOPY performed by Haydee Ansari MD at [place] [address] N/A 8/18/2020    EGD BIOPSY performed by Haydee Ansari MD at [address] [place] [address] History     Socioeconomic History    Marital status:      Spouse name: Not on file    Number of children: 4    Years of education: Not on file    Highest education level: High school graduate   Occupational History    Not on file   Tobacco Use    Smoking status: Never    Smokeless tobacco: Never   Vaping Use    Vaping Use: Never used   Substance and Sexual Activity    Alcohol use: No    Drug use: Never    Sexual activity: Not Currently     Partners: Male   Other Topics Concern    Not on file   Social History Narrative    Lives with son     Social Determinants of Health     Financial Resource Strain: Not on file   Food Insecurity: Not on file   Transportation Needs: Not on file   Physical Activity: Not on file   Stress: Not on file   Social Connections: Not on file   Intimate Partner Violence: Not on file   Housing Stability: Not on file       Family History   Problem Relation Age of Onset    COPD Mother        Current Outpatient Medications on File Prior to Visit   Medication Sig Dispense Refill    pantoprazole (PROTONIX) 40 MG tablet TAKE 1 TABLET TWICE A  tablet 1    apixaban (ELIQUIS) 5 MG TABS tablet Take 1 tablet by mouth 2 times daily 60 tablet 1    Wound Cleansers (VASHE WOUND THERAPY) external solution Irrigation, CONTINUOUS PRN, Wound Care, for irrigation with Wound V. A.C device 1 each 0 B Complex-Minerals (ELDERTONIC) LIQD liquid Take 15 mLs by mouth daily 450 mL 0    [DISCONTINUED] aspirin EC 81 MG EC tablet Take 1 tablet by mouth daily 30 tablet 0    acetaminophen (TYLENOL) 325 MG tablet Take 650 mg by mouth every 6 hours as needed for Pain      [DISCONTINUED] potassium chloride (KLOR-CON M) 20 MEQ extended release tablet TAKE 1 TABLET DAILY 90 tablet 1    docusate sodium (COLACE, DULCOLAX) 100 MG CAPS Take 100 mg by mouth 2 times daily as needed for Constipation 60 capsule 0    [DISCONTINUED] metoprolol succinate (TOPROL XL) 25 MG extended release tablet Take 1 tablet by mouth in the morning. 30 tablet 3    [DISCONTINUED] furosemide (LASIX) 20 MG tablet Take 1 tablet by mouth in the morning. With an additional 20 mg on MWF. 30 tablet 0    escitalopram (LEXAPRO) 10 MG tablet Take 1 tablet by mouth daily 90 tablet 3    tiZANidine (ZANAFLEX) 2 MG tablet Take 1 tablet by mouth every 6 hours as needed (muscle pain) 60 tablet 11    dicyclomine (BENTYL) 10 MG capsule Take 1 capsule by mouth 4 times daily as needed (cramping/diarrhea) 60 capsule 5    flecainide (TAMBOCOR) 100 MG tablet TAKE 1 TABLET BY MOUTH 2 TIMES A  tablet 3    Cyanocobalamin (VITAMIN B 12 PO) Take 2,500 mg by mouth daily      calcium carbonate-vitamin D 600-200 MG-UNIT TABS Take 1 tablet by mouth 2 times daily       No current facility-administered medications on file prior to visit. Pertinent items are noted in HPI  Review of systems reviewed from Patient History Form and available in the patient's chart under the Media tab. No change noted. PHYSICAL EXAMINATION:  Ms. Rajani Car is a very pleasant 65 y.o.  female who presents today in no acute distress, awake, alert, and oriented. She is well dressed, nourished and  groomed. Patient with normal affect. Height is  5' 2\" (1.575 m), weight is 200 lb (90.7 kg), Body mass index is 36.58 kg/m². Resting respiratory rate is 16.      The patient walks with no limp The incision is completely healed . No signs of any erythema or drainage. She  has no pain with the active or passive range of motion of the left wrist, good ROM. She  has intact sensation, distally, and she  is neurovascularly intact. IMAGING:  Three views left wrist taken today in the office showed anatomic alignment of left distal radius, plate and screws in good position, no loosening. IMPRESSION:  3 months out from left distal radius ORIF and doing very well. PLAN:  I have told the patient to work on ROM, as well as strengthening exercises. NSAIDs OTC. She can go back to normal activity with no restrictions. She will be seen PRN. I told the patient that it is not unusual to have some achy pain and swelling for up to a year after a fracture. As this patient has demonstrated risk factors for osteoporosis, such as age and evidence of a fracture, I have referred the patient back to the primary care physician for evaluation for osteoporosis, including consideration for DEXA scanning, if this is felt to be clinically indicated. The patient is advised to contact the primary care physician to follow-up for further evaluation.        Alie Quevedo MD

## 2022-12-12 ENCOUNTER — OFFICE VISIT (OUTPATIENT)
Dept: SURGERY | Age: 77
End: 2022-12-12
Payer: MEDICARE

## 2022-12-12 DIAGNOSIS — L89.310: ICD-10-CM

## 2022-12-12 DIAGNOSIS — L89.329 DECUBITUS ULCER OF ISCHIAL AREA, LEFT, UNSPECIFIED PRESSURE ULCER STAGE: ICD-10-CM

## 2022-12-12 DIAGNOSIS — L89.154 SACRAL DECUBITUS ULCER, STAGE IV (HCC): Primary | ICD-10-CM

## 2022-12-12 PROCEDURE — 1123F ACP DISCUSS/DSCN MKR DOCD: CPT | Performed by: SURGERY

## 2022-12-12 PROCEDURE — 99213 OFFICE O/P EST LOW 20 MIN: CPT | Performed by: SURGERY

## 2022-12-12 PROCEDURE — 1090F PRES/ABSN URINE INCON ASSESS: CPT | Performed by: SURGERY

## 2022-12-12 PROCEDURE — G8417 CALC BMI ABV UP PARAM F/U: HCPCS | Performed by: SURGERY

## 2022-12-12 PROCEDURE — G8484 FLU IMMUNIZE NO ADMIN: HCPCS | Performed by: SURGERY

## 2022-12-12 PROCEDURE — G8427 DOCREV CUR MEDS BY ELIG CLIN: HCPCS | Performed by: SURGERY

## 2022-12-12 PROCEDURE — G8400 PT W/DXA NO RESULTS DOC: HCPCS | Performed by: SURGERY

## 2022-12-12 PROCEDURE — 1036F TOBACCO NON-USER: CPT | Performed by: SURGERY

## 2022-12-12 RX ORDER — FUROSEMIDE 20 MG/1
20 TABLET ORAL DAILY
COMMUNITY

## 2022-12-12 RX ORDER — POTASSIUM BICARBONATE 25 MEQ/1
25 TABLET, EFFERVESCENT ORAL 2 TIMES DAILY
Status: ON HOLD | COMMUNITY
End: 2022-12-14

## 2022-12-12 RX ORDER — POTASSIUM CHLORIDE 750 MG/1
10 CAPSULE, EXTENDED RELEASE ORAL DAILY
COMMUNITY

## 2022-12-12 RX ORDER — LACTOSE-REDUCED FOOD/FIBER
40 LIQUID (ML) ORAL CONTINUOUS
COMMUNITY

## 2022-12-12 RX ORDER — POLYETHYLENE GLYCOL 3350 17 G/17G
17 POWDER, FOR SOLUTION ORAL DAILY PRN
COMMUNITY

## 2022-12-12 NOTE — PROGRESS NOTES
Established Patient Visit    [address] [facility]   Sandra Hadley MD    [address] include 234 beds at the [facility], [address] [place] [address]  Basil Todd   Phone: [phone]  Fax: [address]   YOB: 1945    Date of Visit:  12/12/2022    No ref. provider found  Miladys Lamb MD    Subjective: Radha Hernandez presents for a follow-up of her sacral ulcer. Overall she has been doing poorly since her last visit. She is no longer getting the wound vac with her sacral wound. She complains of pain in her sacrum and bilateral buttocks. She has been eating/drinking OK and is still getting tube feeds. She denies any fevers or chills. Allergies   Allergen Reactions    Demerol Hcl [Meperidine] Other (See Comments)     PATIENT STATES SHE GOT VERY ANXIOUS-LIKE SHE WAS CLIMBING THE WALLS    Pcn [Penicillins]      Patient reports she has not had since she was a child, thinks reaction was hives. Adhesive Tape Rash     Outpatient Medications Marked as Taking for the 12/12/22 encounter (Office Visit) with Talia Yang MD   Medication Sig Dispense Refill    pantoprazole (PROTONIX) 40 MG tablet TAKE 1 TABLET TWICE A  tablet 1    apixaban (ELIQUIS) 5 MG TABS tablet Take 1 tablet by mouth 2 times daily 60 tablet 1    Wound Cleansers (VASHE WOUND THERAPY) external solution Irrigation, CONTINUOUS PRN, Wound Care, for irrigation with Wound V. A.C device 1 each 0    acetaminophen (TYLENOL) 325 MG tablet Take 650 mg by mouth every 6 hours as needed for Pain      docusate sodium (COLACE, DULCOLAX) 100 MG CAPS Take 100 mg by mouth 2 times daily as needed for Constipation 60 capsule 0    escitalopram (LEXAPRO) 10 MG tablet Take 1 tablet by mouth daily 90 tablet 3    tiZANidine (ZANAFLEX) 2 MG tablet Take 1 tablet by mouth every 6 hours as needed (muscle pain) 60 tablet 11    dicyclomine (BENTYL) 10 MG capsule Take 1 capsule by mouth 4 times daily as needed (cramping/diarrhea) 60 capsule 5    flecainide (TAMBOCOR) 100 MG tablet TAKE 1 TABLET BY MOUTH 2 TIMES A  tablet 3    Cyanocobalamin (VITAMIN B 12 PO) Take 2,500 mg by mouth daily      calcium carbonate-vitamin D 600-200 MG-UNIT TABS Take 1 tablet by mouth 2 times daily         There were no vitals filed for this visit. There is no height or weight on file to calculate BMI. Wt Readings from Last 3 Encounters:   11/29/22 200 lb (90.7 kg)   09/28/22 200 lb (90.7 kg)   09/06/22 200 lb 14.4 oz (91.1 kg)     BP Readings from Last 3 Encounters:   09/06/22 134/76   08/18/22 (!) 145/76   08/04/22 139/79          Objective:    CONSTITUTIONAL:  awake, alert, no apparent distress    LUNGS:  Resp easy and unlabored  ABDOMEN:  G-tube in place, LLQ colostomy pink with stool in bag, soft, non-distended, non-tender, voluntary guarding absent, no masses palpated and hernia absent  MUSCULOSKELETAL: No edema  NEUROLOGIC:  Mental Status Exam:  Level of Alertness:   awake  Orientation:   person, place, time  SKIN: sacral ulcer 8 x 12.5 cm sacral ulcer overall majority is beefy red, minimal slough, but with tunneling to right ischial ulcer unstageable with significant slough and easily palpable bone at base measuring x.5 x 9 cm, left ischial ulcer unstageable with significant slough measuring 4 x 4.5 cm, all three tender to palpation      ASSESSMENT:     Diagnosis Orders   1. Sacral decubitus ulcer, stage IV ([address]        2. Decubitus ulcer of ischial area, left, unspecified pressure ulcer stage        3. Decubitus ulcer of ischial area, right, unstageable ([address]              PLAN:    Marysol De La Cruz has OK appearing sacral wound but unfortunate progression into bilateral ischial wounds. Attempted light debridement at bedside after informed consent was obtained which she did not tolerate well.   Given then extent of the necrotic tissue, being on eliquis, and the patient's sensitivity in the area, she will do best with formal debridement in the OR. The risks, benefits, and alternatives were discussed with the patient and she is willing to consent for the operation. Also discussed plan with the patient's daughter Savannah Heredia and she was in agreement.     Electronically signed by Renee Beaver MD on 12/12/2022 at 2:28 PM

## 2022-12-12 NOTE — LETTER
Surgery Scheduling Form:      DEMOGRAPHICS:                                                                                                         .    Patient Name:  Deidre Keith  Patient :  1945   Patient SS#:      Patient Phone:  [phone] (home)  Alt. Patient Phone:                     Patient Address:  [address] [address]    PCP:  Sammie Dalton MD  Insurance:  Payor: MEDICARE / Plan: MEDICARE PART A AND B / Product Type: *No Product type* /   Insurance ID Number:    Payer/Plan Subscr  Sex Relation Sub. Ins. ID Effective Group Num   1. MEDICARE - Joey He 1945 Female Self [id number]                                    [address]          DIAGNOSIS & PROCEDURE:                                                                                       .    Diagnosis:     Sacral decubitus ulcer, stage IV (HCC)  - Primary L89.154    Decubitus ulcer of ischial area, left, unspecified pressure ulcer stage  L89.329    Decubitus ulcer of ischial area, right, unstageable (HCC)  L89.310      Operation:  Excisional debridement of sacral ulcer   Location: [facility] OR   Surgeon:    Benna Skiff, MD        [facility] INFORMATION:                                                                                    .    Surgeon's Scheduling Instruction:  elective  Requested Date: 2022    OR Time: 1:50pm          Patient Arrival Time: 12 noon   OR Time Required:  90  Minutes  Anesthesia:  General  Equipment:                                                             SA Required:  Yes     Status:  Outpatient           Standard C-Arm:  No   PAT Required:   Yes                               Best Time to Call:  ANY  Patient Requested to see PCP for Pre-op H & P:  No   Special Comments:                              Benna Skiff, MD    22 at 7:65 PM        PRE-CERTIFICATION INFORMATION: Baldemar Argueta     Procedure:       CPT Code Modifier          03019

## 2022-12-12 NOTE — PROGRESS NOTES
PATIENT RESIDES  [address] DAUGHTER SHILA CORTES AND MEDICATION RECONCILIATION DONE WITH NURSE ALLAN AT NH. PER DAUGHTER PATIENT IS ALERT AND ORIENTED X3 AND PATIENT IS ABLE TO SIGN CONSENT.  PRE-OP INSTRUCTIONS FAXED TO [address]

## 2022-12-12 NOTE — PROGRESS NOTES
[address] time______12:20PM______        Surgery time_____1:50PM_______    Take the following medications with a sip of water: Follow your MD/Surgeons pre-procedure instructions regarding your medications     Do not eat or drink anything after 12:00 midnight prior to your surgery. This includes water chewing gum, mints and ice chips. You may brush your teeth and gargle the morning of your surgery, but do not swallow the water     Please see your family doctor/pediatrician for a history and physical and/or concerning medications. Bring any test results/reports from your physicians office. If you are under the care of a heart doctor or specialist doctor, please be aware that you may be asked to them for clearance    You may be asked to stop blood thinners such as Coumadin, Plavix, Fragmin, Lovenox, etc., or any anti-inflammatories such as:  Aspirin, Ibuprofen, Advil, Naproxen prior to your surgery. HOLD ELIQUIS AS INSTRUCTED   We also ask that you stop any OTC medications such as fish oil, vitamin E, glucosamine, garlic, Multivitamins, COQ 10, etc.    We ask that you do not smoke 24 hours prior to surgery  We ask that you do not  drink any alcoholic beverages 24 hours prior to surgery     You must make arrangements for a responsible adult to take you home after your surgery. For your safety you will not be allowed to leave alone or drive yourself home. Your surgery will be cancelled if you do not have a ride home. Also for your safety, it is strongly suggested that someone stay with you the first 24 hours after your surgery. A parent or legal guardian must accompany a child scheduled for surgery and plan to stay at the hospital until the child is discharged. Please do not bring other children with you. For your comfort, please wear simple loose fitting clothing to the hospital.  Please do not bring valuables.     Do not wear any make-up or nail polish on your fingers or toes      For your safety, please do not wear any jewelry or body piercing's on the day of surgery. All jewelry must be removed. If you have dentures, they will be removed before going to operating room. For your convenience, we will provide you with a container. If you wear contact lenses or glasses, they will be removed, please bring a case for them. If you have a living will and a durable power of  for healthcare, please bring in a copy. As part of our patient safety program to minimize surgical site infections, we ask you to do the following:    Please notify your surgeon if you develop any illness between         now and the  day of your surgery. This includes a cough, cold, fever, sore throat, nausea,         or vomiting, and diarrhea, etc.   Please notify your surgeon if you experience dizziness, shortness         of breath or blurred vision between now and the time of your surgery. Do not shave your operative site 96 hours prior to surgery. For face and neck surgery, men may use an electric razor 48 hours   prior to surgery. You may shower the night before surgery or the morning of   your surgery with an antibacterial soap. You will need to bring a photo ID and insurance card    [facility] has an onsite pharmacy, would you like to utilize our pharmacy     If you will be staying overnight and use a C-pap machine, please bring   your C-pap to hospital     Our goal is to provide you with excellent care, therefore, visitors will be limited to two(2) in the room at a time so that we may focus on providing this care for you. Please contact pre-admission testing if you have any further questions.                  [facility] phone number:  [phone] PAT fax number:  [phone]  Please note these are generalized instructions for all surgical cases, you may be provided with more specific instructions according to your surgery. C-Difficile admission screening and protocol:       * Admitted with diarrhea? [] YES    [x]  NO     *Prior history of C-Diff. In last 3 months? [] YES    [x]  NO     *Antibiotic use in the past 6-8 weeks? []  NO    [x]  YES                 If yes, which ANTIBIOTIC AND REASON___SACRAL ULCER___     *Prior hospitalization or nursing home in the last month? [x]  YES    []  NO        SAFETY FIRST. .call before you fall

## 2022-12-13 ENCOUNTER — ANESTHESIA (OUTPATIENT)
Dept: OPERATING ROOM | Age: 77
End: 2022-12-13
Payer: MEDICARE

## 2022-12-13 ENCOUNTER — ANESTHESIA EVENT (OUTPATIENT)
Dept: OPERATING ROOM | Age: 77
End: 2022-12-13
Payer: MEDICARE

## 2022-12-13 ENCOUNTER — HOSPITAL ENCOUNTER (OUTPATIENT)
Age: 77
Setting detail: OBSERVATION
Discharge: HOME | End: 2022-12-14
Attending: SURGERY | Admitting: SURGERY
Payer: MEDICARE

## 2022-12-13 PROBLEM — L89.320: Status: ACTIVE | Noted: 2022-12-13

## 2022-12-13 PROBLEM — L89.310: Status: ACTIVE | Noted: 2022-12-13

## 2022-12-13 PROBLEM — L89.154 SACRAL DECUBITUS ULCER, STAGE IV (HCC): Status: ACTIVE | Noted: 2022-12-13

## 2022-12-13 PROBLEM — L89.151 DECUBITUS ULCER OF SACRAL REGION, STAGE 1: Status: ACTIVE | Noted: 2022-12-13

## 2022-12-13 LAB
GLUCOSE BLD-MCNC: 87 MG/DL (ref 70–99)
PERFORMED ON: NORMAL
PREALBUMIN: 13.3 MG/DL (ref 20–40)
TRANSFERRIN: 208 MG/DL (ref 200–360)

## 2022-12-13 PROCEDURE — 2580000003 HC RX 258: Performed by: SURGERY

## 2022-12-13 PROCEDURE — 2500000003 HC RX 250 WO HCPCS: Performed by: NURSE ANESTHETIST, CERTIFIED REGISTERED

## 2022-12-13 PROCEDURE — 6360000002 HC RX W HCPCS: Performed by: SURGERY

## 2022-12-13 PROCEDURE — 3700000000 HC ANESTHESIA ATTENDED CARE: Performed by: SURGERY

## 2022-12-13 PROCEDURE — 51702 INSERT TEMP BLADDER CATH: CPT

## 2022-12-13 PROCEDURE — 2709999900 HC NON-CHARGEABLE SUPPLY: Performed by: SURGERY

## 2022-12-13 PROCEDURE — A4217 STERILE WATER/SALINE, 500 ML: HCPCS | Performed by: SURGERY

## 2022-12-13 PROCEDURE — G0378 HOSPITAL OBSERVATION PER HR: HCPCS

## 2022-12-13 PROCEDURE — 3600000012 HC SURGERY LEVEL 2 ADDTL 15MIN: Performed by: SURGERY

## 2022-12-13 PROCEDURE — 2580000003 HC RX 258: Performed by: NURSE ANESTHETIST, CERTIFIED REGISTERED

## 2022-12-13 PROCEDURE — 84134 ASSAY OF PREALBUMIN: CPT

## 2022-12-13 PROCEDURE — 6370000000 HC RX 637 (ALT 250 FOR IP): Performed by: SURGERY

## 2022-12-13 PROCEDURE — 11043 DBRDMT MUSC&/FSCA 1ST 20/<: CPT | Performed by: SURGERY

## 2022-12-13 PROCEDURE — 3600000002 HC SURGERY LEVEL 2 BASE: Performed by: SURGERY

## 2022-12-13 PROCEDURE — 2500000003 HC RX 250 WO HCPCS: Performed by: SURGERY

## 2022-12-13 PROCEDURE — 7100000001 HC PACU RECOVERY - ADDTL 15 MIN: Performed by: SURGERY

## 2022-12-13 PROCEDURE — 11046 DBRDMT MUSC&/FSCA EA ADDL: CPT | Performed by: SURGERY

## 2022-12-13 PROCEDURE — 6360000002 HC RX W HCPCS: Performed by: NURSE ANESTHETIST, CERTIFIED REGISTERED

## 2022-12-13 PROCEDURE — 84466 ASSAY OF TRANSFERRIN: CPT

## 2022-12-13 PROCEDURE — 3700000001 HC ADD 15 MINUTES (ANESTHESIA): Performed by: SURGERY

## 2022-12-13 PROCEDURE — 7100000000 HC PACU RECOVERY - FIRST 15 MIN: Performed by: SURGERY

## 2022-12-13 PROCEDURE — 6360000002 HC RX W HCPCS: Performed by: ANESTHESIOLOGY

## 2022-12-13 RX ORDER — TIZANIDINE 4 MG/1
2 TABLET ORAL EVERY 6 HOURS PRN
Status: DISCONTINUED | OUTPATIENT
Start: 2022-12-13 | End: 2022-12-14 | Stop reason: HOSPADM

## 2022-12-13 RX ORDER — FLECAINIDE ACETATE 100 MG/1
100 TABLET ORAL EVERY 12 HOURS SCHEDULED
Status: DISCONTINUED | OUTPATIENT
Start: 2022-12-13 | End: 2022-12-14 | Stop reason: HOSPADM

## 2022-12-13 RX ORDER — FENTANYL CITRATE 50 UG/ML
25 INJECTION, SOLUTION INTRAMUSCULAR; INTRAVENOUS EVERY 5 MIN PRN
Status: DISCONTINUED | OUTPATIENT
Start: 2022-12-13 | End: 2022-12-13 | Stop reason: HOSPADM

## 2022-12-13 RX ORDER — MIDAZOLAM HYDROCHLORIDE 1 MG/ML
INJECTION INTRAMUSCULAR; INTRAVENOUS PRN
Status: DISCONTINUED | OUTPATIENT
Start: 2022-12-13 | End: 2022-12-13 | Stop reason: SDUPTHER

## 2022-12-13 RX ORDER — SUCCINYLCHOLINE/SOD CL,ISO/PF 200MG/10ML
SYRINGE (ML) INTRAVENOUS PRN
Status: DISCONTINUED | OUTPATIENT
Start: 2022-12-13 | End: 2022-12-13 | Stop reason: SDUPTHER

## 2022-12-13 RX ORDER — ONDANSETRON 2 MG/ML
4 INJECTION INTRAMUSCULAR; INTRAVENOUS
Status: DISCONTINUED | OUTPATIENT
Start: 2022-12-13 | End: 2022-12-13 | Stop reason: HOSPADM

## 2022-12-13 RX ORDER — MAGNESIUM HYDROXIDE 1200 MG/15ML
LIQUID ORAL CONTINUOUS PRN
Status: COMPLETED | OUTPATIENT
Start: 2022-12-13 | End: 2022-12-13

## 2022-12-13 RX ORDER — SODIUM CHLORIDE 9 MG/ML
INJECTION, SOLUTION INTRAVENOUS PRN
Status: DISCONTINUED | OUTPATIENT
Start: 2022-12-13 | End: 2022-12-14 | Stop reason: HOSPADM

## 2022-12-13 RX ORDER — ONDANSETRON 2 MG/ML
4 INJECTION INTRAMUSCULAR; INTRAVENOUS EVERY 6 HOURS PRN
Status: DISCONTINUED | OUTPATIENT
Start: 2022-12-13 | End: 2022-12-14 | Stop reason: HOSPADM

## 2022-12-13 RX ORDER — SODIUM CHLORIDE 0.9 % (FLUSH) 0.9 %
5-40 SYRINGE (ML) INJECTION EVERY 12 HOURS SCHEDULED
Status: DISCONTINUED | OUTPATIENT
Start: 2022-12-13 | End: 2022-12-13 | Stop reason: HOSPADM

## 2022-12-13 RX ORDER — OXYCODONE HYDROCHLORIDE 5 MG/1
5 TABLET ORAL EVERY 4 HOURS PRN
Status: DISCONTINUED | OUTPATIENT
Start: 2022-12-13 | End: 2022-12-14 | Stop reason: HOSPADM

## 2022-12-13 RX ORDER — POTASSIUM CHLORIDE 7.45 MG/ML
10 INJECTION INTRAVENOUS PRN
Status: DISCONTINUED | OUTPATIENT
Start: 2022-12-13 | End: 2022-12-14 | Stop reason: HOSPADM

## 2022-12-13 RX ORDER — ACETAMINOPHEN 500 MG
1000 TABLET ORAL EVERY 8 HOURS SCHEDULED
Status: DISCONTINUED | OUTPATIENT
Start: 2022-12-13 | End: 2022-12-14 | Stop reason: HOSPADM

## 2022-12-13 RX ORDER — GLYCOPYRROLATE 0.2 MG/ML
INJECTION INTRAMUSCULAR; INTRAVENOUS PRN
Status: DISCONTINUED | OUTPATIENT
Start: 2022-12-13 | End: 2022-12-13 | Stop reason: SDUPTHER

## 2022-12-13 RX ORDER — SODIUM CHLORIDE 0.9 % (FLUSH) 0.9 %
5-40 SYRINGE (ML) INJECTION PRN
Status: DISCONTINUED | OUTPATIENT
Start: 2022-12-13 | End: 2022-12-14 | Stop reason: HOSPADM

## 2022-12-13 RX ORDER — MORPHINE SULFATE 2 MG/ML
2 INJECTION, SOLUTION INTRAMUSCULAR; INTRAVENOUS
Status: DISCONTINUED | OUTPATIENT
Start: 2022-12-13 | End: 2022-12-14 | Stop reason: HOSPADM

## 2022-12-13 RX ORDER — ONDANSETRON 2 MG/ML
INJECTION INTRAMUSCULAR; INTRAVENOUS PRN
Status: DISCONTINUED | OUTPATIENT
Start: 2022-12-13 | End: 2022-12-13 | Stop reason: SDUPTHER

## 2022-12-13 RX ORDER — DICYCLOMINE HYDROCHLORIDE 10 MG/1
10 CAPSULE ORAL 4 TIMES DAILY PRN
Status: DISCONTINUED | OUTPATIENT
Start: 2022-12-13 | End: 2022-12-14 | Stop reason: HOSPADM

## 2022-12-13 RX ORDER — SODIUM CHLORIDE 0.9 % (FLUSH) 0.9 %
5-40 SYRINGE (ML) INJECTION EVERY 12 HOURS SCHEDULED
Status: DISCONTINUED | OUTPATIENT
Start: 2022-12-13 | End: 2022-12-14 | Stop reason: HOSPADM

## 2022-12-13 RX ORDER — DEXAMETHASONE SODIUM PHOSPHATE 4 MG/ML
INJECTION, SOLUTION INTRA-ARTICULAR; INTRALESIONAL; INTRAMUSCULAR; INTRAVENOUS; SOFT TISSUE PRN
Status: DISCONTINUED | OUTPATIENT
Start: 2022-12-13 | End: 2022-12-13 | Stop reason: SDUPTHER

## 2022-12-13 RX ORDER — PANTOPRAZOLE SODIUM 40 MG/1
40 TABLET, DELAYED RELEASE ORAL
Status: DISCONTINUED | OUTPATIENT
Start: 2022-12-14 | End: 2022-12-14 | Stop reason: HOSPADM

## 2022-12-13 RX ORDER — ESCITALOPRAM OXALATE 10 MG/1
10 TABLET ORAL DAILY
Status: DISCONTINUED | OUTPATIENT
Start: 2022-12-13 | End: 2022-12-14 | Stop reason: HOSPADM

## 2022-12-13 RX ORDER — SODIUM CHLORIDE 0.9 % (FLUSH) 0.9 %
5-40 SYRINGE (ML) INJECTION PRN
Status: DISCONTINUED | OUTPATIENT
Start: 2022-12-13 | End: 2022-12-13 | Stop reason: HOSPADM

## 2022-12-13 RX ORDER — LIDOCAINE HYDROCHLORIDE 20 MG/ML
INJECTION, SOLUTION EPIDURAL; INFILTRATION; INTRACAUDAL; PERINEURAL PRN
Status: DISCONTINUED | OUTPATIENT
Start: 2022-12-13 | End: 2022-12-13 | Stop reason: SDUPTHER

## 2022-12-13 RX ORDER — METRONIDAZOLE 500 MG/100ML
500 INJECTION, SOLUTION INTRAVENOUS
Status: COMPLETED | OUTPATIENT
Start: 2022-12-13 | End: 2022-12-13

## 2022-12-13 RX ORDER — SODIUM CHLORIDE 9 MG/ML
INJECTION, SOLUTION INTRAVENOUS CONTINUOUS PRN
Status: DISCONTINUED | OUTPATIENT
Start: 2022-12-13 | End: 2022-12-13 | Stop reason: SDUPTHER

## 2022-12-13 RX ORDER — FENTANYL CITRATE 50 UG/ML
INJECTION, SOLUTION INTRAMUSCULAR; INTRAVENOUS PRN
Status: DISCONTINUED | OUTPATIENT
Start: 2022-12-13 | End: 2022-12-13 | Stop reason: SDUPTHER

## 2022-12-13 RX ORDER — MORPHINE SULFATE 4 MG/ML
4 INJECTION, SOLUTION INTRAMUSCULAR; INTRAVENOUS
Status: DISCONTINUED | OUTPATIENT
Start: 2022-12-13 | End: 2022-12-14 | Stop reason: HOSPADM

## 2022-12-13 RX ORDER — DOCUSATE SODIUM 100 MG/1
100 CAPSULE, LIQUID FILLED ORAL 2 TIMES DAILY PRN
Status: DISCONTINUED | OUTPATIENT
Start: 2022-12-13 | End: 2022-12-14 | Stop reason: HOSPADM

## 2022-12-13 RX ORDER — MAGNESIUM SULFATE 1 G/100ML
1000 INJECTION INTRAVENOUS PRN
Status: DISCONTINUED | OUTPATIENT
Start: 2022-12-13 | End: 2022-12-14 | Stop reason: HOSPADM

## 2022-12-13 RX ORDER — SODIUM CHLORIDE 9 MG/ML
INJECTION, SOLUTION INTRAVENOUS PRN
Status: DISCONTINUED | OUTPATIENT
Start: 2022-12-13 | End: 2022-12-13 | Stop reason: HOSPADM

## 2022-12-13 RX ORDER — ROCURONIUM BROMIDE 10 MG/ML
INJECTION, SOLUTION INTRAVENOUS PRN
Status: DISCONTINUED | OUTPATIENT
Start: 2022-12-13 | End: 2022-12-13 | Stop reason: SDUPTHER

## 2022-12-13 RX ORDER — ONDANSETRON 4 MG/1
4 TABLET, ORALLY DISINTEGRATING ORAL EVERY 8 HOURS PRN
Status: DISCONTINUED | OUTPATIENT
Start: 2022-12-13 | End: 2022-12-14 | Stop reason: HOSPADM

## 2022-12-13 RX ORDER — DEXTROSE, SODIUM CHLORIDE, AND POTASSIUM CHLORIDE 5; .45; .15 G/100ML; G/100ML; G/100ML
INJECTION INTRAVENOUS CONTINUOUS
Status: DISCONTINUED | OUTPATIENT
Start: 2022-12-13 | End: 2022-12-14 | Stop reason: HOSPADM

## 2022-12-13 RX ORDER — PROPOFOL 10 MG/ML
INJECTION, EMULSION INTRAVENOUS PRN
Status: DISCONTINUED | OUTPATIENT
Start: 2022-12-13 | End: 2022-12-13 | Stop reason: SDUPTHER

## 2022-12-13 RX ORDER — OXYCODONE HYDROCHLORIDE 5 MG/1
5 TABLET ORAL
Status: DISCONTINUED | OUTPATIENT
Start: 2022-12-13 | End: 2022-12-13 | Stop reason: HOSPADM

## 2022-12-13 RX ORDER — DROPERIDOL 2.5 MG/ML
0.62 INJECTION, SOLUTION INTRAMUSCULAR; INTRAVENOUS
Status: DISCONTINUED | OUTPATIENT
Start: 2022-12-13 | End: 2022-12-13 | Stop reason: HOSPADM

## 2022-12-13 RX ORDER — PHENYLEPHRINE HCL IN 0.9% NACL 1 MG/10 ML
SYRINGE (ML) INTRAVENOUS PRN
Status: DISCONTINUED | OUTPATIENT
Start: 2022-12-13 | End: 2022-12-13 | Stop reason: SDUPTHER

## 2022-12-13 RX ADMIN — Medication 10 ML: at 20:58

## 2022-12-13 RX ADMIN — MIDAZOLAM 2 MG: 1 INJECTION INTRAMUSCULAR; INTRAVENOUS at 16:38

## 2022-12-13 RX ADMIN — DEXAMETHASONE SODIUM PHOSPHATE 8 MG: 4 INJECTION, SOLUTION INTRAMUSCULAR; INTRAVENOUS at 16:47

## 2022-12-13 RX ADMIN — FENTANYL CITRATE 100 MCG: 50 INJECTION INTRAMUSCULAR; INTRAVENOUS at 16:39

## 2022-12-13 RX ADMIN — METRONIDAZOLE 500 MG: 500 INJECTION, SOLUTION INTRAVENOUS at 16:47

## 2022-12-13 RX ADMIN — GLYCOPYRROLATE 0.2 MG: 0.2 INJECTION, SOLUTION INTRAMUSCULAR; INTRAVENOUS at 16:47

## 2022-12-13 RX ADMIN — FENTANYL CITRATE 25 MCG: 50 INJECTION INTRAMUSCULAR; INTRAVENOUS at 17:55

## 2022-12-13 RX ADMIN — PROPOFOL 50 MG: 10 INJECTION, EMULSION INTRAVENOUS at 16:42

## 2022-12-13 RX ADMIN — SODIUM CHLORIDE: 9 INJECTION, SOLUTION INTRAVENOUS at 13:20

## 2022-12-13 RX ADMIN — ROCURONIUM BROMIDE 10 MG: 50 INJECTION INTRAVENOUS at 16:41

## 2022-12-13 RX ADMIN — SODIUM CHLORIDE: 9 INJECTION, SOLUTION INTRAVENOUS at 16:38

## 2022-12-13 RX ADMIN — Medication 200 MCG: at 16:54

## 2022-12-13 RX ADMIN — ESCITALOPRAM OXALATE 10 MG: 10 TABLET ORAL at 20:50

## 2022-12-13 RX ADMIN — MORPHINE SULFATE 4 MG: 4 INJECTION, SOLUTION INTRAMUSCULAR; INTRAVENOUS at 19:54

## 2022-12-13 RX ADMIN — Medication 100 MG: at 16:42

## 2022-12-13 RX ADMIN — FLECAINIDE ACETATE 100 MG: 100 TABLET ORAL at 20:51

## 2022-12-13 RX ADMIN — CEFAZOLIN 2000 MG: 2 INJECTION, POWDER, FOR SOLUTION INTRAMUSCULAR; INTRAVENOUS at 16:38

## 2022-12-13 RX ADMIN — ACETAMINOPHEN 1000 MG: 500 TABLET ORAL at 20:51

## 2022-12-13 RX ADMIN — ONDANSETRON 4 MG: 2 INJECTION INTRAMUSCULAR; INTRAVENOUS at 16:47

## 2022-12-13 RX ADMIN — LIDOCAINE HYDROCHLORIDE 60 MG: 20 INJECTION, SOLUTION EPIDURAL; INFILTRATION; INTRACAUDAL; PERINEURAL at 16:42

## 2022-12-13 RX ADMIN — POTASSIUM CHLORIDE, DEXTROSE MONOHYDRATE AND SODIUM CHLORIDE: 150; 5; 450 INJECTION, SOLUTION INTRAVENOUS at 19:13

## 2022-12-13 RX ADMIN — FENTANYL CITRATE 25 MCG: 50 INJECTION INTRAMUSCULAR; INTRAVENOUS at 18:05

## 2022-12-13 ASSESSMENT — PAIN SCALES - GENERAL
PAINLEVEL_OUTOF10: 6
PAINLEVEL_OUTOF10: 8
PAINLEVEL_OUTOF10: 6
PAINLEVEL_OUTOF10: 3
PAINLEVEL_OUTOF10: 6

## 2022-12-13 ASSESSMENT — PAIN DESCRIPTION - ORIENTATION
ORIENTATION: MID

## 2022-12-13 ASSESSMENT — PAIN DESCRIPTION - ONSET
ONSET: ON-GOING

## 2022-12-13 ASSESSMENT — PAIN DESCRIPTION - FREQUENCY
FREQUENCY: CONTINUOUS

## 2022-12-13 ASSESSMENT — PAIN DESCRIPTION - PAIN TYPE
TYPE: SURGICAL PAIN

## 2022-12-13 ASSESSMENT — PAIN DESCRIPTION - LOCATION
LOCATION: BUTTOCKS
LOCATION: BUTTOCKS;COCCYX
LOCATION: BUTTOCKS
LOCATION: BUTTOCKS

## 2022-12-13 ASSESSMENT — PAIN - FUNCTIONAL ASSESSMENT
PAIN_FUNCTIONAL_ASSESSMENT: PREVENTS OR INTERFERES WITH MANY ACTIVE NOT PASSIVE ACTIVITIES
PAIN_FUNCTIONAL_ASSESSMENT: PREVENTS OR INTERFERES SOME ACTIVE ACTIVITIES AND ADLS
PAIN_FUNCTIONAL_ASSESSMENT: PREVENTS OR INTERFERES SOME ACTIVE ACTIVITIES AND ADLS
PAIN_FUNCTIONAL_ASSESSMENT: 0-10

## 2022-12-13 ASSESSMENT — PAIN DESCRIPTION - DESCRIPTORS
DESCRIPTORS: SHARP;STABBING
DESCRIPTORS: ACHING
DESCRIPTORS: SHARP
DESCRIPTORS: SHARP
DESCRIPTORS: ACHING

## 2022-12-13 NOTE — H&P
Update History & Physical    The patient's History and Physical from my office visit yesterday, December 12, 2022 was reviewed with the patient and I examined the patient. There was no change. The surgical site was confirmed by the patient and me. Plan: The risks, benefits, expected outcome, and alternative to the recommended procedure have been discussed with the patient. Patient understands and wants to proceed with the procedure. Will proceed with excisional debridement of sacral and ischial ulcers. Ancef, flagyl ordered. Bilateral SCDs. Plan to admit after surgery given timing and need to go back to nursing facility.     Electronically signed by Talia Yang MD on 12/13/2022 at 4:30 PM

## 2022-12-13 NOTE — PROGRESS NOTES
Patient admitted to PACU # 5 from OR at 1724 post EXCISIONAL DEBRIDEMENT OF SACRAL ULCER   per Dr Sofia Burris . Attached to PACU monitoring system and report received from anesthesia provider. Patient was reported to be hemodynamically stable during procedure. Patient drowsy on admission and denied pain.

## 2022-12-13 NOTE — PROGRESS NOTES
PACU Transfer Note    Vitals:    12/13/22 1820   BP: 138/68   Pulse: (!) 101   Resp: (!) 55   Temp: 97 °F (36.1 °C)   SpO2: 92%       In: 750 [I.V.:600]  Out: 75     Pain assessment:  present - adequately treated  Pain Level: 3    Report given to Receiving unit RN.    12/13/2022 6:24 PM

## 2022-12-13 NOTE — ANESTHESIA PRE PROCEDURE
Department of Anesthesiology  Preprocedure Note       Name:  Juan Laurent   Age:  65 y.o.  :  1945                                          MRN:  [id number]         Date:  2022      Surgeon: Kanu Garcia):  Guillermo Gaitan MD    Procedure: Procedure(s):  EXCISIONAL DEBRIDEMENT OF SACRAL ULCER    Medications prior to admission:   Prior to Admission medications    Medication Sig Start Date End Date Taking? Authorizing Provider   furosemide (LASIX) 20 MG tablet Take 20 mg by mouth daily   Yes Historical Provider, MD   potassium bicarbonate (K-LYTE) 25 MEQ disintegrating tablet Take 25 mEq by mouth 2 times daily   Yes Historical Provider, MD   potassium chloride (MICRO-K) 10 MEQ extended release capsule Take 10 mEq by mouth daily   Yes Historical Provider, MD   polyethylene glycol (GLYCOLAX) 17 g packet Take 17 g by mouth daily as needed for Constipation   Yes Historical Provider, MD   NONFORMULARY Take 30 mLs by mouth in the morning and at bedtime LIQUID PROTEIN-EITHER ORALLY OR G-TUBE   Yes Historical Provider, MD   Nutritional Supplements (JEVITY 1.2 VERONICA) LIQD 40 mLs by Gastrostomy Tube route continuous   Yes Historical Provider, MD   pantoprazole (PROTONIX) 40 MG tablet TAKE 1 TABLET TWICE A DAY 22   Vaishali Hernandez MD   apixaban (ELIQUIS) 5 MG TABS tablet Take 1 tablet by mouth 2 times daily 22   Gamaliel Luna MD   Wound Cleansers (VASHE WOUND THERAPY) external solution Irrigation, CONTINUOUS PRN, Wound Care, for irrigation with Wound V. A.C device 22   Joaquin Andujar MD   aspirin EC 81 MG EC tablet Take 1 tablet by mouth daily 22  Joaquin Andujar MD   potassium chloride (KLOR-CON M) 20 MEQ extended release tablet TAKE 1 TABLET DAILY 22  Vaishali Hernandez MD   docusate sodium (COLACE, DULCOLAX) 100 MG CAPS Take 100 mg by mouth 2 times daily as needed for Constipation 22   Joaquin Andujar MD   metoprolol succinate (TOPROL XL) 25 MG extended release tablet Take 1 tablet by mouth in the morning. 8/5/22 8/31/22  Min Moya MD   escitalopram (LEXAPRO) 10 MG tablet Take 1 tablet by mouth daily 6/14/22   Solis White MD   tiZANidine (ZANAFLEX) 2 MG tablet Take 1 tablet by mouth every 6 hours as needed (muscle pain)  Patient taking differently: Take 2 mg by mouth every 8 hours as needed (muscle pain) 2/18/22   Solis White MD   dicyclomine (BENTYL) 10 MG capsule Take 1 capsule by mouth 4 times daily as needed (cramping/diarrhea) 2/18/22   Solis White MD   flecainide (TAMBOCOR) 100 MG tablet TAKE 1 TABLET BY MOUTH 2 TIMES A DAY 1/27/22   Nery Sandhu MD   calcium carbonate-vitamin D 600-200 MG-UNIT TABS Take 1 tablet by mouth 2 times daily    Historical Provider, MD       Current medications:    Current Facility-Administered Medications   Medication Dose Route Frequency Provider Last Rate Last Admin    ceFAZolin (ANCEF) 2,000 mg in dextrose 5 % 50 mL IVPB (mini-bag)  2,000 mg IntraVENous On Call to [address] MD        metronidazole (FLAGYL) 500 mg in 0.9% NaCl 100 mL IVPB premix  500 mg IntraVENous On Call to [address] MD         Facility-Administered Medications Ordered in Other Encounters   Medication Dose Route Frequency Provider Last Rate Last Admin    0.9 % sodium chloride infusion   IntraVENous Continuous PRN MUSA Cardoso - CRNA   New Bag at 12/13/22 1320       Allergies: Allergies   Allergen Reactions    Demerol Hcl [Meperidine] Other (See Comments)     PATIENT STATES SHE GOT VERY ANXIOUS-LIKE SHE WAS CLIMBING THE WALLS    Pcn [Penicillins]      Patient reports she has not had since she was a child, thinks reaction was hives.      Adhesive Tape Rash       Problem List:    Patient Active Problem List   Diagnosis Code    Symptomatic anemia D64.9    Atrial fibrillation (HCC) I48.91    Tachycardia R00.0    Severe protein-calorie malnutrition ([address] E43    Aortic valve disorder I35.9    Arthropathy M12.9    Cervical spondylosis without myelopathy M47.812    DDD (degenerative disc disease), cervical M50.30    DDD (degenerative disc disease), lumbosacral M51.37    Essential hypertension I10    Malignant neoplasm of colon ([facility] C18.9    Spinal stenosis in cervical region M48.02    Spinal stenosis, lumbar M48.061    Fibromyalgia M79.7    Gastroesophageal reflux disease without esophagitis K21.9    Lumbar radicular pain M54.16    Pedal edema R60.0    Chronic intractable headache R51.9, G89.29    Esophageal candidiasis (HCC) B37.81    History of gastric bypass Z98.84    Prediabetes R73.03    Age-related osteoporosis without current pathological fracture M81.0    Essential tremor G25.0    Chronic fatigue R53.82    Irritable bowel syndrome with diarrhea K58.0    Borborygmi R19.8    Constipation K59.00    Anxiety F41.9    Weakness R53.1    Age-related physical debility R48    JENNIFER (generalized anxiety disorder) F41.1    Septic shock (HCC) A41.9, R65.21    Atrial fibrillation with rapid ventricular response (HCC) I48.91    DM2 (diabetes mellitus, type 2) ([facility]) E11.9    GI bleed K92.2    MAX (acute kidney injury) ([facility] N17.9    Community acquired pneumonia of left lower lobe of lung J18.9    Hypoxia R09.02    Pneumonia due to infectious organism J18.9    Closed fracture of left distal radius S52.502A    Lower GI bleed K92.2    Sepsis without septic shock ([facility]) A41.9    Acute cystitis with hematuria N30.01    Acute metabolic encephalopathy R25.58    Sacral wound S31.000A    Elevated lactic acid level R79.89    Fever R50.9    Tachypnea R06.82    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    Senile osteoporosis M81.0       Past Medical History:        Diagnosis Date    Acid reflux     Anxiety     Arthritis     Atrial fibrillation (HCC)     Closed fracture dislocation of right elbow     Depression     Fibromyalgia     Migraine     WHITNEY (obstructive sleep apnea)     Diagnosed years ago, no CPAP    Rectal cancer (HCC)     Wears glasses     DRIVING       Past Surgical History:        Procedure Laterality Date    APPENDECTOMY      CERVICAL DISCECTOMY      ACDF     SECTION      x4    CHOLECYSTECTOMY      COLECTOMY N/A 2022    LAPAROSCOPIC SIGMOID COLOSTOMY WITH EXCISIONAL DEBRIDEMENT OF SACRAL ULCER, LAPAROSCOPIC GASTROSTOMY TUBE PLACEMENT performed by Shubham Chacko MD at [address] [facility]      Rectal cancer removed using  incision    COLONOSCOPY      SEVERAL    COLONOSCOPY N/A 2019    COLONOSCOPY POLYPECTOMY SNARE/COLD BIOPSY performed by Meg Higgins MD at [address] COLONOSCOPY N/A 2021    COLONOSCOPY WITH BIOPSY performed by Clarke Staples MD at [address] N/A 2022    INCOMPLETE COLONOSCOPY D/T POOR PREP performed by Clarke Staples MD at [address] Left 8/3/2022    OPEN REDUCTION INTERNAL FIXATION LEFT DISTAL RADIUS performed by Feroz Barajas MD at [address] [facility] Right 2021    RIGHT LEG EVACUATION OF HEMATOMA performed by Alex Miranda MD at [address] Right 10/25/2018    OPEN REDUCTION INTERNAL FIXATION RIGHT FEMUR SUPRACONDYLAR FEMORAL FRACTURE WITH C-ARM performed by Radha Rahman MD at [address] [facility] [address] [facility] [address] Right 1/31/2021    RIGHT LEG HEMATOMA EVAKUATION, DEBRIDEMENT, AND WOUND VAC PLACEMENT performed by Leobardo Rodriguez MD at [address] [facility] [address] Right 2/17/2021    RIGHT LOWER EXTREMITY SKIN GRAFT performed by Leobardo Rodriguez MD at [place] [address] Left     UPPER GASTROINTESTINAL ENDOSCOPY N/A 11/26/2019    ESOPHAGOGASTRODUODENOSCOPY performed by Virgil Carter MD at [facility] N/A 8/18/2020    EGD BIOPSY performed by Virgil Carter MD at [address] [place] [address] History:    Social History     Tobacco Use    Smoking status: Never    Smokeless tobacco: Never   Substance Use Topics    Alcohol use: No                                Counseling given: Not Answered      Vital Signs (Current):   Vitals:    12/12/22 1701 12/13/22 1202 12/13/22 1204 12/13/22 1206   BP:   (!) 157/82    Pulse:  96     Resp:  16     Temp:  98.1 °F (36.7 °C)     TempSrc:  Temporal     SpO2:  95%     Weight: 166 lb (75.3 kg)   166 lb (75.3 kg)   Height:    5' 2\" (1.575 m)                                              BP Readings from Last 3 Encounters:   12/13/22 (!) 157/82   09/06/22 134/76   08/18/22 (!) 145/76       NPO Status: Time of last liquid consumption: 2300                        Time of last solid consumption: 2300                        Date of last liquid consumption: 12/12/22                        Date of last solid food consumption: 12/12/22    BMI:   Wt Readings from Last 3 Encounters:   12/13/22 166 lb (75.3 kg)   11/29/22 200 lb (90.7 kg)   09/28/22 200 lb (90.7 kg)     Body mass index is 30.36 kg/m².     CBC:   Lab Results   Component Value Date/Time    WBC 5.5 09/19/2022 10:00 AM    RBC 2.88 09/19/2022 10:00 AM    HGB 8.6 09/19/2022 10:00 AM    HCT 26.6 09/19/2022 10:00 AM    MCV 92.2 09/19/2022 10:00 AM    RDW 21.0 09/19/2022 10:00 AM     09/19/2022 10:00 AM       CMP:   Lab Results   Component Value Date/Time     09/19/2022 10:00 AM    K 4.3 09/19/2022 10:00 AM    K 3.6 08/29/2022 04:55 AM    CL 99 09/19/2022 10:00 AM    CO2 31 09/19/2022 10:00 AM    BUN 20 09/19/2022 10:00 AM    CREATININE 0.3 09/19/2022 10:00 AM    GFRAA >60 09/06/2022 12:02 PM    AGRATIO 0.9 09/02/2022 10:45 AM    LABGLOM >60 09/06/2022 12:02 PM    GLUCOSE 115 09/19/2022 10:00 AM    PROT 3.4 09/02/2022 10:45 AM    CALCIUM 7.6 09/19/2022 10:00 AM    BILITOT 0.2 09/19/2022 10:00 AM    ALKPHOS 114 09/19/2022 10:00 AM    AST 14 09/19/2022 10:00 AM    ALT 12 09/19/2022 10:00 AM       POC Tests:   Recent Labs     12/13/22  1220   POCGLU 87       Coags:   Lab Results   Component Value Date/Time    PROTIME 14.1 08/11/2022 06:21 AM    INR 1.10 08/11/2022 06:21 AM    APTT 25.4 08/11/2022 06:21 AM       HCG (If Applicable): No results found for: PREGTESTUR, PREGSERUM, HCG, HCGQUANT     ABGs: No results found for: PHART, PO2ART, BZR1LUN, IVD1VCI, BEART, K5JFRJFA     Type & Screen (If Applicable):  No results found for: LABABO, LABRH    Drug/Infectious Status (If Applicable):  No results found for: HIV, HEPCAB    COVID-19 Screening (If Applicable):   Lab Results   Component Value Date/Time    COVID19 Not Detected 09/06/2022 12:05 PM    COVID19 Not Detected 08/12/2020 12:39 PM           Anesthesia Evaluation  Patient summary reviewed no history of anesthetic complications:   Airway: Mallampati: III  TM distance: >3 FB   Neck ROM: full  Mouth opening: > = 3 FB   Dental: normal exam         Pulmonary:   (+) sleep apnea:                             Cardiovascular:  Exercise tolerance: poor (<4 METS),   (+) hypertension:, valvular problems/murmurs: AI, dysrhythmias: atrial fibrillation, pulmonary hypertension:,           Rate: normal                 ROS comment: ECHO 7/22:  Summary   Left ventricular cavity size is normal. There is mild concentric left   ventricular hypertrophy. Left ventricular function is hyperdynamic with   ejection fraction estimated at >70%. Regional wall motion abnormalities   cannot be excluded due to an arrhythmia. . Diastolic function cannot be   assessed due to an arrhythmia. The left atrium is severely dilated. Aortic valve appears sclerotic but opens adequately. The aortic valve   appears trileaflet. Mild-to-moderate aortic regurgitation is present. No   evidence of aortic valve stenosis. The right ventricle is normal in size and function. TAPSE measures 1.71 cm. RV S velocity measures 16.2 cm/s. No pericardial effusion noted. IVC size is dilated (>2.1 cm) and collapses < 50% with respiration   consistent with elevated RA pressure (15 mmHg). Estimated pulmonary artery   systolic pressure is at 52 mmHg assuming a right atrial pressure of 15 mmHg. Stress 2022:     Conclusions      Summary   There is normal isotope uptake at stress and rest. There is no evidence of   myocardial ischemia or scar.   The LVEF is normal and the LV wall motion is normal.      This is a low risk cardiac scan. Neuro/Psych:   (+) headaches: migraine headaches,    (-) seizures and CVA            ROS comment: Altered Mental status GI/Hepatic/Renal:   (+) GERD:,           Endo/Other:    (+) DiabetesType II DM, , blood dyscrasia (H+H 8.6): anemia:., .                  ROS comment: Sepsis 2/2 sacral decubitus ulcer and osteomyelitis Abdominal:             Vascular: negative vascular ROS. Other Findings:             Anesthesia Plan      general     ASA 3     (72 yo F with PMHx of HTN, pulmonary HTN, moderate AI, afib, WHITNEY, GERD, DM, and anemia presenting for sacral ulcer debridement.     I discussed with the patient the risks and benefits to general anesthesia including possible anesthetic complications but not limited to: PONV, damage to the airway and surrounding structures (teeth, lips, gums, tongue, etc.), adverse reactions to medications, cardiac complications (MI, CHF, arrhythmias, etc.), respiratory complications (post-op ventilation, respiratory failure, etc.), neurologic complications (nerve damage, stroke, seizure) and death. The patient was given the opportunity to ask questions and all questions were answered to the patient's satisfaction.  )  Induction: intravenous. MIPS: Postoperative opioids intended and Prophylactic antiemetics administered. Anesthetic plan and risks discussed with patient and healthcare power of . Plan discussed with CRNA. This pre-anesthesia assessment may be used as a history and physical.    DOS STAFF ADDENDUM:    Pt seen and examined, chart reviewed (including anesthesia, drug and allergy history). No interval changes to history and physical examination. Anesthetic plan, risks, benefits, alternatives, and personnel involved discussed with patient. Patient verbalized an understanding and agrees to proceed.       Jorge A Dee MD  December 13, 2022  2:24 PM

## 2022-12-13 NOTE — BRIEF OP NOTE
Brief Postoperative Note      Patient: Seth Parham  YOB: 1945  MRN: [id number]    Date of Procedure: 12/13/2022    Pre-Op Diagnosis: SACRAL DECUBITUS ULCER, STAGE IV, DECUBITUS ULCER OF ISCHIAL AREA, LEFT, UNSPECIFIED PRESSURE ULCER STAGE, DECUBITUS ULCER OF ISCHIAL AREA, RIGHT, UNSTAGEABLE    Post-Op Diagnosis: Same       Procedure(s):  EXCISIONAL DEBRIDEMENT OF SACRAL and bilateral ischial ULCERS with removal of skin, subcutaneous tissue, and fascia measuring sacral: 9.5 x 12.5 x 1.5 cm deep, right ischial 8.5 x 6 cm, left ischial 3.2 x 5.5 cm    Surgeon(s):  Curt Arzate MD    Assistant:  Surgical Assistant: Ubaldo Severin    Anesthesia: General    Estimated Blood Loss (mL): 75 ml    Complications: None    Specimens:   * No specimens in log *    Implants:  * No implants in log *      Drains:   Negative Pressure Wound Therapy Leg Anterior; Lower;Right (Active)       Gastrostomy/Enterostomy/Jejunostomy Tube Gastrostomy 1 20 fr (Active)       Colostomy LLQ (Active)       Findings: necrotic tissue of ischial and sacral wounds with debridement back to healthy, viable tissue    Electronically signed by Curt Arzate MD on 12/13/2022 at 5:15 PM

## 2022-12-14 VITALS
BODY MASS INDEX: 29.25 KG/M2 | HEART RATE: 95 BPM | HEIGHT: 62 IN | OXYGEN SATURATION: 96 % | DIASTOLIC BLOOD PRESSURE: 82 MMHG | WEIGHT: 158.95 LBS | TEMPERATURE: 97.2 F | RESPIRATION RATE: 18 BRPM | SYSTOLIC BLOOD PRESSURE: 153 MMHG

## 2022-12-14 LAB
ANION GAP SERPL CALCULATED.3IONS-SCNC: 10 MMOL/L (ref 3–16)
BASOPHILS ABSOLUTE: 0 K/UL (ref 0–0.2)
BASOPHILS RELATIVE PERCENT: 0.2 %
BUN BLDV-MCNC: 15 MG/DL (ref 7–20)
CALCIUM SERPL-MCNC: 8.2 MG/DL (ref 8.3–10.6)
CHLORIDE BLD-SCNC: 95 MMOL/L (ref 99–110)
CO2: 25 MMOL/L (ref 21–32)
CREAT SERPL-MCNC: <0.5 MG/DL (ref 0.6–1.2)
EOSINOPHILS ABSOLUTE: 0 K/UL (ref 0–0.6)
EOSINOPHILS RELATIVE PERCENT: 0.1 %
GFR SERPL CREATININE-BSD FRML MDRD: >60 ML/MIN/{1.73_M2}
GLUCOSE BLD-MCNC: 152 MG/DL (ref 70–99)
HCT VFR BLD CALC: 27.1 % (ref 36–48)
HEMOGLOBIN: 8.3 G/DL (ref 12–16)
LYMPHOCYTES ABSOLUTE: 1.1 K/UL (ref 1–5.1)
LYMPHOCYTES RELATIVE PERCENT: 14.8 %
MAGNESIUM: 1.7 MG/DL (ref 1.8–2.4)
MCH RBC QN AUTO: 27 PG (ref 26–34)
MCHC RBC AUTO-ENTMCNC: 30.6 G/DL (ref 31–36)
MCV RBC AUTO: 88.2 FL (ref 80–100)
MONOCYTES ABSOLUTE: 0.7 K/UL (ref 0–1.3)
MONOCYTES RELATIVE PERCENT: 10.4 %
NEUTROPHILS ABSOLUTE: 5.3 K/UL (ref 1.7–7.7)
NEUTROPHILS RELATIVE PERCENT: 74.5 %
PDW BLD-RTO: 18.4 % (ref 12.4–15.4)
PHOSPHORUS: 3.3 MG/DL (ref 2.5–4.9)
PLATELET # BLD: 350 K/UL (ref 135–450)
PMV BLD AUTO: 7.4 FL (ref 5–10.5)
POTASSIUM SERPL-SCNC: 4.6 MMOL/L (ref 3.5–5.1)
RBC # BLD: 3.08 M/UL (ref 4–5.2)
SODIUM BLD-SCNC: 130 MMOL/L (ref 136–145)
WBC # BLD: 7.2 K/UL (ref 4–11)

## 2022-12-14 PROCEDURE — APPNB15 APP NON BILLABLE TIME 0-15 MINS: Performed by: PHYSICIAN ASSISTANT

## 2022-12-14 PROCEDURE — 80048 BASIC METABOLIC PNL TOTAL CA: CPT

## 2022-12-14 PROCEDURE — 83735 ASSAY OF MAGNESIUM: CPT

## 2022-12-14 PROCEDURE — APPSS15 APP SPLIT SHARED TIME 0-15 MINUTES: Performed by: PHYSICIAN ASSISTANT

## 2022-12-14 PROCEDURE — 6370000000 HC RX 637 (ALT 250 FOR IP): Performed by: SURGERY

## 2022-12-14 PROCEDURE — 99024 POSTOP FOLLOW-UP VISIT: CPT | Performed by: PHYSICIAN ASSISTANT

## 2022-12-14 PROCEDURE — 36415 COLL VENOUS BLD VENIPUNCTURE: CPT

## 2022-12-14 PROCEDURE — 2580000003 HC RX 258: Performed by: SURGERY

## 2022-12-14 PROCEDURE — G0378 HOSPITAL OBSERVATION PER HR: HCPCS

## 2022-12-14 PROCEDURE — 85025 COMPLETE CBC W/AUTO DIFF WBC: CPT

## 2022-12-14 PROCEDURE — 94760 N-INVAS EAR/PLS OXIMETRY 1: CPT

## 2022-12-14 PROCEDURE — 84100 ASSAY OF PHOSPHORUS: CPT

## 2022-12-14 PROCEDURE — 6360000002 HC RX W HCPCS: Performed by: SURGERY

## 2022-12-14 RX ORDER — TRAZODONE HYDROCHLORIDE 50 MG/1
25-50 TABLET ORAL NIGHTLY PRN
COMMUNITY

## 2022-12-14 RX ORDER — TRAMADOL HYDROCHLORIDE 50 MG/1
50 TABLET ORAL 3 TIMES DAILY PRN
COMMUNITY

## 2022-12-14 RX ADMIN — ESCITALOPRAM OXALATE 10 MG: 10 TABLET ORAL at 08:49

## 2022-12-14 RX ADMIN — MORPHINE SULFATE 4 MG: 4 INJECTION, SOLUTION INTRAMUSCULAR; INTRAVENOUS at 08:49

## 2022-12-14 RX ADMIN — PANTOPRAZOLE SODIUM 40 MG: 40 TABLET, DELAYED RELEASE ORAL at 06:09

## 2022-12-14 RX ADMIN — ACETAMINOPHEN 1000 MG: 500 TABLET ORAL at 06:09

## 2022-12-14 RX ADMIN — Medication 10 ML: at 08:50

## 2022-12-14 RX ADMIN — OXYCODONE 5 MG: 5 TABLET ORAL at 06:09

## 2022-12-14 RX ADMIN — OXYCODONE 5 MG: 5 TABLET ORAL at 10:11

## 2022-12-14 RX ADMIN — FLECAINIDE ACETATE 100 MG: 100 TABLET ORAL at 08:49

## 2022-12-14 RX ADMIN — TIZANIDINE 2 MG: 4 TABLET ORAL at 10:11

## 2022-12-14 RX ADMIN — MORPHINE SULFATE 4 MG: 4 INJECTION, SOLUTION INTRAMUSCULAR; INTRAVENOUS at 11:27

## 2022-12-14 ASSESSMENT — PAIN DESCRIPTION - FREQUENCY: FREQUENCY: INTERMITTENT

## 2022-12-14 ASSESSMENT — PAIN DESCRIPTION - ORIENTATION
ORIENTATION: RIGHT;LEFT;MID
ORIENTATION: RIGHT;LEFT;MID
ORIENTATION: MID
ORIENTATION: MID

## 2022-12-14 ASSESSMENT — PAIN DESCRIPTION - ONSET: ONSET: ON-GOING

## 2022-12-14 ASSESSMENT — PAIN SCALES - GENERAL
PAINLEVEL_OUTOF10: 8
PAINLEVEL_OUTOF10: 5
PAINLEVEL_OUTOF10: 7
PAINLEVEL_OUTOF10: 10

## 2022-12-14 ASSESSMENT — PAIN DESCRIPTION - DESCRIPTORS
DESCRIPTORS: SHARP
DESCRIPTORS: THROBBING;BURNING
DESCRIPTORS: THROBBING;BURNING
DESCRIPTORS: ACHING;DISCOMFORT;SHARP;TENDER

## 2022-12-14 ASSESSMENT — PAIN DESCRIPTION - LOCATION
LOCATION: BUTTOCKS;LEG
LOCATION: BUTTOCKS;LEG
LOCATION: BUTTOCKS;COCCYX
LOCATION: BUTTOCKS

## 2022-12-14 ASSESSMENT — PAIN - FUNCTIONAL ASSESSMENT
PAIN_FUNCTIONAL_ASSESSMENT: PREVENTS OR INTERFERES WITH MANY ACTIVE NOT PASSIVE ACTIVITIES
PAIN_FUNCTIONAL_ASSESSMENT: PREVENTS OR INTERFERES SOME ACTIVE ACTIVITIES AND ADLS

## 2022-12-14 ASSESSMENT — PAIN DESCRIPTION - PAIN TYPE: TYPE: SURGICAL PAIN

## 2022-12-14 NOTE — CONSULTS
Nutrition Note    RECOMMENDATIONS  PO Diet: Regular  Nutrition Support:   Recommend Jevity 1.5 (Standard with fiber formula) at goal of 50 mL/hr. Recommend 30 mL H20 q 4 hours for tube maintenance. Recommend reevaluate IV fluids at this time. Increase flush if Na+ increases greater than 145 mEq/L. Recommend 1 Bottle Proteinex P2Go daily via feeding tube. Flush with 30 mL H20 before and after. Do not mix directly with tube feed. Recommend 1 packet Meir BID mixed with 4 oz water via feeding tube. Flush 30 mL H20 before and after. Do not mix directly with TF.    CURRENT NUTRITION THERAPIES  ADULT DIET; Regular  ADULT TUBE FEEDING; Gastrostomy; Standard with Fiber; Continuous; 40; No; 30; Q 4 hours  Current Tube Feeding (TF) Recommendations:  Feeding Route: Gastrostomy  Formula: Standard with Fiber  Schedule: Continuous  Additives/Modulars: Protein, Wound Healing (1 bottle of Proteinex per feeding tube per day (DO NOT mix directly with TF) + 1 pkt of Meir mixed with 4 oz water per feeding tube BID (DO NOT mix directly with TF))  Water Flushes: per provider  Goal TF & Flush Orders Provides: Recommend Jevity 1.5 with goal rate 50 mL/hr (calculated over 20 hrs for routine nsg care) + 1 Proteinex daily + 1 pkt Juvin BID. This provides 1000 mL TV, 1784 kcal (1500 TF, 104 Proteinex, 180 Meir), 95 gm protein (64 TF, 26 Proteinex, 5 Meir), and 750 mL H20.   Additional Calorie Sources:  D5 @ 50 mL/hr provides 204 kcal daily    COMPARATIVE STANDARDS  Energy (kcal):  1776-9797 kcal (20-25 kcal/kg)     Protein (g):   gm (1.5-2.0 g/kg IBW)       Fluid (mL/day):  8576-1963 mL    Electronically signed by Lion Land RD, LD on 12/14/22 at 9:06 AM EST    Contact: [phone]

## 2022-12-14 NOTE — PLAN OF CARE
Problem: Chronic Conditions and Co-morbidities  Goal: Patient's chronic conditions and co-morbidity symptoms are monitored and maintained or improved  12/14/2022 1115 by Angie Morton RN  Outcome: Progressing  12/14/2022 0358 by Kj Mckeon RN  Outcome: Progressing     Problem: Discharge Planning  Goal: Discharge to home or other facility with appropriate resources  12/14/2022 1115 by Angie Morton RN  Outcome: Progressing  12/14/2022 0358 by Kj Mckeon RN  Outcome: Progressing     Problem: Safety - Adult  Goal: Free from fall injury  12/14/2022 1115 by Angie Morton RN  Outcome: Progressing  12/14/2022 0358 by Kj Mckeon RN  Outcome: Progressing     Problem: Skin/Tissue Integrity  Goal: Absence of new skin breakdown  Description: 1. Monitor for areas of redness and/or skin breakdown  2. Assess vascular access sites hourly  3. Every 4-6 hours minimum:  Change oxygen saturation probe site  4. Every 4-6 hours:  If on nasal continuous positive airway pressure, respiratory therapy assess nares and determine need for appliance change or resting period.   12/14/2022 1115 by Angie Morton RN  Outcome: Progressing  12/14/2022 0358 by Kj Mckeon RN  Outcome: Progressing     Problem: Pain  Goal: Verbalizes/displays adequate comfort level or baseline comfort level  12/14/2022 1115 by Angie Morton RN  Outcome: Progressing  12/14/2022 0358 by Kj Mckeon RN  Outcome: Progressing     Problem: ABCDS Injury Assessment  Goal: Absence of physical injury  Outcome: Progressing

## 2022-12-14 NOTE — PLAN OF CARE
Problem: Chronic Conditions and Co-morbidities  Goal: Patient's chronic conditions and co-morbidity symptoms are monitored and maintained or improved  12/14/2022 1343 by Terry Humphrey RN  Outcome: Completed  12/14/2022 1115 by Terry Humphrey RN  Outcome: Progressing  12/14/2022 0358 by Raffy Beauchamp RN  Outcome: Progressing     Problem: Discharge Planning  Goal: Discharge to home or other facility with appropriate resources  12/14/2022 1343 by Terry Humphrey RN  Outcome: Completed  12/14/2022 1115 by Terry Humphrey RN  Outcome: Progressing  12/14/2022 0358 by Raffy Beauchamp RN  Outcome: Progressing     Problem: Safety - Adult  Goal: Free from fall injury  12/14/2022 1343 by Terry Humphrey RN  Outcome: Completed  12/14/2022 1115 by Terry Humphrey RN  Outcome: Progressing  12/14/2022 0358 by Raffy Beauchamp RN  Outcome: Progressing     Problem: Skin/Tissue Integrity  Goal: Absence of new skin breakdown  Description: 1. Monitor for areas of redness and/or skin breakdown  2. Assess vascular access sites hourly  3. Every 4-6 hours minimum:  Change oxygen saturation probe site  4. Every 4-6 hours:  If on nasal continuous positive airway pressure, respiratory therapy assess nares and determine need for appliance change or resting period.   12/14/2022 1343 by Terry Humphrey RN  Outcome: Completed  12/14/2022 1115 by Terry Humphrey RN  Outcome: Progressing  12/14/2022 0358 by Raffy Beauchamp RN  Outcome: Progressing     Problem: Pain  Goal: Verbalizes/displays adequate comfort level or baseline comfort level  12/14/2022 1343 by Terry Humphrey RN  Outcome: Completed  12/14/2022 1115 by Terry Humphrey RN  Outcome: Progressing  12/14/2022 0358 by Raffy Beauchamp RN  Outcome: Progressing     Problem: ABCDS Injury Assessment  Goal: Absence of physical injury  12/14/2022 1343 by Terry Humphrey RN  Outcome: Completed  12/14/2022 1115 by Terry Humphrey RN  Outcome: Progressing

## 2022-12-14 NOTE — CARE COORDINATION
CASE MANAGEMENT DISCHARGE SUMMARY:    DISCHARGE DATE: 12/14/22    DISCHARGED TO:  Name:  [facility]  Address:  [address] [place] [address], [facility]   Phone:  [phone]  Fax:  [phone]    TRANSPORTATION: [facility]             TIME: [facility]: not needed, OBS & returning, MCR A&B    HENS/PASAAR COMPLETED: not needed, returnign    HIPPA compliant vm left on daughter Raven's phone on pickup time; facility aware of pickup time.     Keisha May RN, BSN, Case Management  [phone]    Electronically signed by Keisha May RN on 12/14/2022 at 12:12 PM

## 2022-12-14 NOTE — CONSULTS
CM will arrange transport back to SNF when ready for dc.     Tyler aWsserman RN, BSN,   [phone]  Electronically signed by Tyler Wasserman RN on 12/14/2022 at 9:33 AM
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
## 2022-12-14 NOTE — CARE COORDINATION
Current Nursing Home Information:  Patient admitted from:    Name:  [facility]  Address:  [address] [place] [address] [place] [address]   Phone:  [phone]  Fax:  [phone]      Call to Jim Paulino, at [phone] who confirmed the patient is: [address], no Precert required for return. Patient Covid vaccination status:    Internal Administration   First Dose COVID-19, J&J, (age 18y+), IM, 0.5 mL  03/08/2021   Second Dose COVID-19, J&J, (age 18y+), IM, 0.5 mL   10/31/2021       Last COVID Lab SARS-CoV-2 (no units)   Date Value   08/12/2020 Not Detected     SARS-CoV-2, NAAT (no units)   Date Value   09/06/2022 Not Detected            Covid Test requirement for return: No Rapid/PCR Covid test needed before return    Talked with daughter Gunjan Mcdonnell, she is in agreement with her mom returning to [facility].     Louisa Bay RN, BSN,   [phone]  Electronically signed by Louisa Bay RN on 12/14/2022 at 9:39 AM

## 2022-12-14 NOTE — PROGRESS NOTES
General and Vascular Surgery                                                           Daily Progress Note                                                             Wilbur Cruz PA-C    Pt Name: Meggan Bryant  Medical Record Number: [id number]  Date of Birth 1945   Today's Date: 12/14/2022    Chief Complaint: sacral ulcer    ASSESSMENT/PLAN  S/P (12/13/22) POD#1: Excisional debridement of skin, subcutaneous  tissue and fascia of sacral and bilateral ischial ulcers with  measurements of sacral ulcer 9.5 cm x 12.5 cm x 1.5 cm, right ischial  ulcer of 6 x 8.5 cm and left ischial ulcer 3.2 x 5.5 cm. Sacral and bilateral buttock ulcers clean. No significant drainage or slough/skin necrosis  +Pain at site of ulcerations  Dressings changed bedside. Tolerated well  Keep pressure off ulcers as much as possible  Continue daily dressing changes  Working on disposition. EDUCATION  Patient educated about their illness/diagnosis, stated above, and all questions answered. We discussed the importance of nutrition, medications they are taking, and healthy lifestyle. Amanuel Harding has improved from yesterday. Pain is well controlled. She has no nausea and no vomiting. OBJECTIVE  VITALS:  height is 5' 2\" (1.575 m) and weight is 158 lb 15.2 oz (72.1 kg). Her oral temperature is 97.2 °F (36.2 °C). Her blood pressure is 153/82 (abnormal) and her pulse is 95. Her respiration is 18 and oxygen saturation is 96%. VITALS:  BP (!) 153/82   Pulse 95   Temp 97.2 °F (36.2 °C) (Oral)   Resp 18   Ht 5' 2\" (1.575 m)   Wt 158 lb 15.2 oz (72.1 kg)   SpO2 96%   BMI 29.07 kg/m²   GENERAL: alert, no distress  LUNGS: clear to ausculation, without wheezes, rales or rhonci  HEART: normal rate and regular rhythm  ABDOMEN: soft, non-tender, non-distended  BUTTOCK: Large sacral and bilateral buttock ulcers clean.  No significant drainage or slough/skin necrosis  I/O last 3 completed shifts: In: 780 [I.V.:600; NG/GT:30; IV Piggyback:150]  Out: 275 [Urine:200; Blood:75]  I/O this shift:  In: 636.4 [I.V.:636.4]  Out: 300 [Urine:300]    LABS  No results for input(s): WBC, HGB, HCT, PLT, NA, K, CL, CO2, BUN, CREATININE, MG, PHOS, CALCIUM, PTT, INR, AST, ALT, BILITOT, BILIDIR, NITRU, COLORU, BACTERIA in the last 72 hours.     Invalid input(s): PT, WBCU, RBCU, LEUKOCYTESUACBC:   Lab Results   Component Value Date/Time    WBC 5.5 09/19/2022 10:00 AM    RBC 2.88 09/19/2022 10:00 AM    HGB 8.6 09/19/2022 10:00 AM    HCT 26.6 09/19/2022 10:00 AM    MCV 92.2 09/19/2022 10:00 AM    MCH 29.8 09/19/2022 10:00 AM    MCHC 32.3 09/19/2022 10:00 AM    RDW 21.0 09/19/2022 10:00 AM     09/19/2022 10:00 AM    MPV 7.4 09/19/2022 10:00 AM     CMP:    Lab Results   Component Value Date/Time     09/19/2022 10:00 AM    K 4.3 09/19/2022 10:00 AM    K 3.6 08/29/2022 04:55 AM    CL 99 09/19/2022 10:00 AM    CO2 31 09/19/2022 10:00 AM    BUN 20 09/19/2022 10:00 AM    CREATININE 0.3 09/19/2022 10:00 AM    GFRAA >60 09/06/2022 12:02 PM    AGRATIO 0.9 09/02/2022 10:45 AM    LABGLOM >60 09/06/2022 12:02 PM    GLUCOSE 115 09/19/2022 10:00 AM    PROT 3.4 09/02/2022 10:45 AM    LABALBU 2.2 09/19/2022 10:00 AM    CALCIUM 7.6 09/19/2022 10:00 AM    BILITOT 0.2 09/19/2022 10:00 AM    ALKPHOS 114 09/19/2022 10:00 AM    AST 14 09/19/2022 10:00 AM    ALT 12 09/19/2022 10:00 AM         Malathi Queen PA-C  Electronically signed 12/14/2022 at 10:32 AM

## 2022-12-14 NOTE — OP NOTE
[address] Breanne [place] 16                                OPERATIVE REPORT    PATIENT NAME: Catalino Olson                   :        1945  MED REC NO:   [id number]                          ROOM:       80  ACCOUNT NO:   [de-identified]                           ADMIT DATE: 2022  PROVIDER:     Janeen Olson MD    DATE OF PROCEDURE:  2022    PREOPERATIVE DIAGNOSIS:  Stage IV sacral decubitus ulcer and unstageable  bilateral ischial decubitus ulcers. POSTOPERATIVE DIAGNOSIS:  Stage IV sacral decubitus ulcer and  unstageable bilateral ischial decubitus ulcers. OPERATION PERFORMED:  Excisional debridement of skin, subcutaneous  tissue and fascia of sacral and bilateral ischial ulcers with  measurements of sacral ulcer 9.5 cm x 12.5 cm x 1.5 cm, right ischial  ulcer of 6 x 8.5 cm and left ischial ulcer 3.2 x 5.5 cm. SURGEON:  Janeen Olson MD    ANESTHESIA:  General endotracheal.    ASA CLASS:  III. DVT PROPHYLAXIS:  The patient was wearing bilateral SCDs. ANTIBIOTICS:  The patient received Ancef 2 gm IV preoperatively. INDICATIONS:  Kaveh Rosales is a 27-year-old female who presented back  to my office yesterday with worsening sacral wound along with new  bilateral ischial wounds. She was not wearing wound VAC for multiple  weeks and has been doing poorly at her nursing facility complaining of  worsening pain. On exam, she had significant slough of bilateral  ischial ulcers and still had some minimal slough with her stage IV  sacral ulcer. Due to these findings, the risks, benefits, and  alternatives were explained to the patient along with the patient's  daughter and she was brought to the operating room. After the risks,  benefits, and alternatives were explained to them, they are willing to  consent for the procedure.     OPERATIVE PROCEDURE:  The patient was brought to the operating room and  placed in the supine position. General anesthesia was induced. She was  then flipped in the prone position and prepped and draped in the usual  sterile fashion. A time-out was then performed. We first started with  the sacral wound. This was circumferentially debrided lightly back to  healthy viable tissue. The patient did have a decent bed of granulation  tissue with a few areas of slough. Her sacral ulcer did extend into the  right ischial ulcer. There was a bridge of skin and subcutaneous tissue  that were healthy and viable. We did debride slough from this tract  along with the right ischial ulcer sharply using a combination of curved  Vasquez scissors along with a 4 mm green curette. This was done back to  healthy viable tissue. We were down to the bone with the right ischial  ulcer making it stage IV. This area that was debrided was 6 x 8.5 cm. We then debrided the left ischial ulcer and this was debrided back to  healthy viable tissue. Its debridement was of skin and subcutaneous  tissue and an area of 3.2 x 5.5 cm. Overall, the sacral wound was 9.5 x  12.5 x 1.5 cm deep. After these wounds were debrided and were  relatively clean, electrocautery was used for hemostasis. We then  placed [employer] x2 under these areas. Overall the patient  tolerated the procedure well, was taken to recovery room in stable  condition. FINDINGS:  Sacral and bilateral ischial ulcers with sizes as above. ESTIMATED BLOOD LOSS:  75 mL. COMPLICATIONS:  None. SPECIMEN:  None.         Donnie Valdez MD    D: 12/13/2022 19:05:20       T: 12/13/2022 20:52:24     [email or web address]  Job#: [id number]     Doc#: [id number]    CC:

## 2022-12-14 NOTE — ANESTHESIA POSTPROCEDURE EVALUATION
Department of Anesthesiology  Postprocedure Note    Patient: Casandra Duarte  MRN: [id number]  YOB: 1945  Date of evaluation: 12/13/2022      Procedure Summary     Date: 12/13/22 Room / Location: [address]    Anesthesia Start: 1638 Anesthesia Stop: 1726    Procedure: EXCISIONAL DEBRIDEMENT OF SACRAL ULCER (Sacrum) Diagnosis:       Sacral decubitus ulcer, stage IV ([address]      Decubitus ulcer of left ischial area, unspecified ulcer stage      Decubitus ulcer of ischial area, right, unstageable ([address]      (SACRAL DECUBITUS ULCER, STAGE IV, DECUBITUS ULCER OF ISCHIAL AREA, LEFT, UNSPECIFIED PRESSURE ULCER STAGE, DECUBITUS ULCER OF ISCHIAL AREA, RIGHT, UNSTAGEABLE)    Surgeons: Quiana Rosa MD Responsible Provider: David Jessica MD    Anesthesia Type: General ASA Status: 3          Anesthesia Type: General    Becky Phase I: Becky Score: 10    Becky Phase II:        Anesthesia Post Evaluation    Patient location during evaluation: PACU  Level of consciousness: awake and alert  Airway patency: patent  Nausea & Vomiting: no nausea and no vomiting  Complications: no  Cardiovascular status: blood pressure returned to baseline  Respiratory status: acceptable  Hydration status: euvolemic  Comments: Postoperative Anesthesia Note    Name:    Casandra Duarte  MRN:      [id number]    Patient Vitals in the past 12 hrs:  12/13/22 1840, BP:(!) 144/71, Temp:98.3 °F (36.8 °C), Temp src:Oral, Pulse:(!) 112, Resp:16  12/13/22 1820, BP:138/68, Temp:97 °F (36.1 °C), Pulse:(!) 101, Resp:(!) 55, SpO2:92 %  12/13/22 1816, BP:(!) 141/46, Pulse:(!) 101, Resp:28, SpO2:93 %  12/13/22 1815, BP:(!) 141/46, Pulse:99, Resp:(!) 33, SpO2:93 %  12/13/22 1805, BP:(!) 153/79, Pulse:(!) 105, Resp:25, SpO2:95 %  12/13/22 1800, BP:(!) 153/79, Pulse:(!) 108, Resp:(!) 39  12/13/22 1745, BP:(!) 140/88, Pulse:(!) 110, Resp:29, SpO2:96 %  12/13/22 1737, BP:133/87, Pulse:(!) 113, Resp:18, SpO2:97 %  12/13/22 1730, BP:(!) 153/88, Pulse:(!) 112, Resp:17  12/13/22 1726, BP:(!) 172/121, Temp:97 °F (36.1 °C), Temp src:Temporal, Pulse:(!) 114, Resp:20, SpO2:100 %  12/13/22 1206, Height:5' 2\" (1.575 m), Weight:166 lb (75.3 kg)  12/13/22 1204, BP:(!) 157/82  12/13/22 1202, Temp:98.1 °F (36.7 °C), Temp src:Temporal, Pulse:96, Resp:16, SpO2:95 %     LABS:    CBC  Lab Results       Component                Value               Date/Time                  WBC                      5.5                 09/19/2022 10:00 AM        HGB                      8.6 (A)             09/19/2022 10:00 AM        HCT                      26.6 (A)            09/19/2022 10:00 AM        PLT                      254                 09/19/2022 10:00 AM   RENAL  Lab Results       Component                Value               Date/Time                  NA                       136                 09/19/2022 10:00 AM        K                        4.3                 09/19/2022 10:00 AM        K                        3.6                 08/29/2022 04:55 AM        CL                       99                  09/19/2022 10:00 AM        CO2                      31                  09/19/2022 10:00 AM        BUN                      20                  09/19/2022 10:00 AM        CREATININE               0.3                 09/19/2022 10:00 AM        GLUCOSE                  115                 09/19/2022 10:00 AM   COAGS  Lab Results       Component                Value               Date/Time                  PROTIME                  14.1                08/11/2022 06:21 AM        INR                      1. 10                08/11/2022 06:21 AM        APTT                     25.4                08/11/2022 06:21 AM     Intake & Output:  @[id number]@    Nausea & Vomiting:  No    Level of Consciousness:  Awake    Pain Assessment:  Adequate analgesia    Anesthesia Complications:  No apparent anesthetic complications    SUMMARY      Vital signs stable  OK to discharge from Stage I post anesthesia care.   Care transferred from Anesthesiology department on discharge from perioperative area

## 2022-12-14 NOTE — FLOWSHEET NOTE
4 Eyes Skin Assessment     NAME:  Brant Bryan  YOB: 1945  MEDICAL RECORD NUMBER:  [id number]    The patient is being assessed for  Admission    I agree that One RN have performed a thorough Head to Toe Skin Assessment on the patient. ALL assessment sites listed below have been assessed. Areas assessed by both nurses:            Does the Patient have a Wound? Yes wound(s) were present on assessment.  LDA wound assessment was Initiated and completed by RN     Pt has stage 4 sacral  pressure ulcer  Sami Prevention initiated by RN: Yes   Wound Care Orders initiated by RN: Yes    Pressure Injury (Stage 3,4, Unstageable, DTI, NWPT, and Complex wounds) if present place referral order by RN under : Yes    New and Established Ostomies, if present place, referral order under : Yes      Nurse 1 eSignature: Electronically signed by Quinton Lowe RN on 12/14/22 at 6:38 AM EST    **SHARE this note so that the co-signing nurse is able to place an eSignature**    Nurse 2 eSignature: Electronically signed by Patrick Blue RN on 12/14/22 at 7:05 AM EST

## 2022-12-14 NOTE — CARE COORDINATION
[address] [address] Continence Nurse  Consult Note       NAME:  Fabio KENDALL  MEDICAL RECORD NUMBER:  [id number]  AGE: 65 y.o. GENDER: female  : 1945  TODAY'S DATE:  2022    Subjective   Reason for WOCN Evaluation and Assessment: ostomy (sacral wound)      Carolina Isaacs is a 65 y.o. female referred by:   [] Physician  [x] Nursing  [] Other:     Wound Identification:  Wound Type: pressure  Contributing Factors: chronic pressure and decreased mobility, malnutrition, age    Wound History: Wound POA. Evolved DTI with frequent admissions. S/P debridement in OR. Has diverting ostomy. Now presents with wound to R buttock area with 4cm bridge to larger sacral wound. Now on TF with feeding tube.    Current Wound Care Treatment:  N/A    Patient Goal of Care:  [x] Wound Healing  [] Odor Control  [] Palliative Care  [] Pain Control   [] Other:         PAST MEDICAL HISTORY        Diagnosis Date    Acid reflux     Anxiety     Arthritis     Atrial fibrillation (HCC)     Closed fracture dislocation of right elbow     Depression     Fibromyalgia     Migraine     WHITNEY (obstructive sleep apnea)     Diagnosed years ago, no CPAP    Rectal cancer (HCC)     Wears glasses     DRIVING       PAST SURGICAL HISTORY    Past Surgical History:   Procedure Laterality Date    APPENDECTOMY      CERVICAL DISCECTOMY      ACDF     SECTION      x4    CHOLECYSTECTOMY      COLECTOMY N/A 2022    LAPAROSCOPIC SIGMOID COLOSTOMY WITH EXCISIONAL DEBRIDEMENT OF SACRAL ULCER, LAPAROSCOPIC GASTROSTOMY TUBE PLACEMENT performed by Cookie Ballard MD at [address] [facility]      Rectal cancer removed using  incision    COLONOSCOPY      SEVERAL    COLONOSCOPY N/A 2019    COLONOSCOPY POLYPECTOMY SNARE/COLD BIOPSY performed by Pat Weems MD at [address] [place] [address] N/A 2021    COLONOSCOPY WITH BIOPSY performed by Vani Paige MD at [address] [place] [address] N/A 2022 Outpatient Medications on File Prior to Encounter   Medication Sig Dispense Refill    traMADol (ULTRAM) 50 MG tablet Take 50 mg by mouth 3 times daily as needed for Pain. traZODone (DESYREL) 50 MG tablet Take 25-50 mg by mouth nightly as needed for Sleep      furosemide (LASIX) 20 MG tablet Take 20 mg by mouth daily      potassium chloride (MICRO-K) 10 MEQ extended release capsule Take 10 mEq by mouth daily      polyethylene glycol (GLYCOLAX) 17 g packet Take 17 g by mouth daily as needed for Constipation      Nutritional Supplements (JEVITY 1.2 VERONICA) LIQD 40 mLs by Gastrostomy Tube route continuous      pantoprazole (PROTONIX) 40 MG tablet TAKE 1 TABLET TWICE A DAY (Patient taking differently: Take 40 mg by mouth in the morning and at bedtime) 180 tablet 1    apixaban (ELIQUIS) 5 MG TABS tablet Take 1 tablet by mouth 2 times daily 60 tablet 1    [DISCONTINUED] aspirin EC 81 MG EC tablet Take 1 tablet by mouth daily 30 tablet 0    [DISCONTINUED] potassium chloride (KLOR-CON M) 20 MEQ extended release tablet TAKE 1 TABLET DAILY 90 tablet 1    docusate sodium (COLACE, DULCOLAX) 100 MG CAPS Take 100 mg by mouth 2 times daily as needed for Constipation 60 capsule 0    [DISCONTINUED] metoprolol succinate (TOPROL XL) 25 MG extended release tablet Take 1 tablet by mouth in the morning.  30 tablet 3    escitalopram (LEXAPRO) 10 MG tablet Take 1 tablet by mouth daily 90 tablet 3    tiZANidine (ZANAFLEX) 2 MG tablet Take 1 tablet by mouth every 6 hours as needed (muscle pain) (Patient taking differently: Take 2 mg by mouth every 8 hours as needed (muscle pain)) 60 tablet 11    dicyclomine (BENTYL) 10 MG capsule Take 1 capsule by mouth 4 times daily as needed (cramping/diarrhea) 60 capsule 5    flecainide (TAMBOCOR) 100 MG tablet TAKE 1 TABLET BY MOUTH 2 TIMES A  tablet 3    calcium carbonate-vitamin D 600-200 MG-UNIT TABS Take 1 tablet by mouth 2 times daily         Objective    BP (!) 153/82   Pulse 95   Temp 97.2 °F (36.2 °C) (Oral)   Resp 18   Ht 5' 2\" (1.575 m)   Wt 158 lb 15.2 oz (72.1 kg)   SpO2 96%   BMI 29.07 kg/m²     LABS:  WBC:    Lab Results   Component Value Date/Time    WBC 7.2 12/14/2022 11:08 AM     H/H:    Lab Results   Component Value Date/Time    HGB 8.3 12/14/2022 11:08 AM    HCT 27.1 12/14/2022 11:08 AM     PTT:    Lab Results   Component Value Date/Time    APTT 25.4 08/11/2022 06:21 AM   [APTT}  PT/INR:    Lab Results   Component Value Date/Time    PROTIME 14.1 08/11/2022 06:21 AM    INR 1.10 08/11/2022 06:21 AM     HgBA1c:    Lab Results   Component Value Date/Time    LABA1C 6.0 09/09/2021 12:45 PM       Assessment   Sami Risk Score: Sami Scale Score: 12    Patient Active Problem List   Diagnosis Code    Symptomatic anemia D64.9    Atrial fibrillation (HCC) I48.91    Tachycardia R00.0    Severe protein-calorie malnutrition ([facility] E43    Aortic valve disorder I35.9    Arthropathy M12.9    Cervical spondylosis without myelopathy M47.812    DDD (degenerative disc disease), cervical M50.30    DDD (degenerative disc disease), lumbosacral M51.37    Essential hypertension I10    Malignant neoplasm of colon ([facility] C18.9    Spinal stenosis in cervical region M48.02    Spinal stenosis, lumbar M48.061    Fibromyalgia M79.7    Gastroesophageal reflux disease without esophagitis K21.9    Lumbar radicular pain M54.16    Pedal edema R60.0    Chronic intractable headache R51.9, G89.29    Esophageal candidiasis (HCC) B37.81    History of gastric bypass Z98.84    Prediabetes R73.03    Age-related osteoporosis without current pathological fracture M81.0    Essential tremor G25.0    Chronic fatigue R53.82    Irritable bowel syndrome with diarrhea K58.0    Borborygmi R19.8    Constipation K59.00    Anxiety F41.9    Weakness R53.1    Age-related physical debility R54    JENNIFER (generalized anxiety disorder) F41.1    Septic shock (HCC) A41.9, R65.21    Atrial fibrillation with rapid ventricular response (HCC) I48.91 DM2 (diabetes mellitus, type 2) ([facility]) E11.9    GI bleed K92.2    MAX (acute kidney injury) ([address] N17.9    Community acquired pneumonia of left lower lobe of lung J18.9    Hypoxia R09.02    Pneumonia due to infectious organism J18.9    Closed fracture of left distal radius S52.502A    Lower GI bleed K92.2    Sepsis without septic shock (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 9) E66.3    Elevated alkaline phosphatase level R74.8    Elevated alkaline phosphatase in  P09.8, R74.8    History of depression Z86.59    Nursing home resident Z59.3    Proteus infection A49.8    Gram-negative bacteremia R78.81    Lactic acidosis E87.20    Altered mental status R41.82    Hypotension I95.9    Skin ulcer of sacrum (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) L89.154    Decubitus ulcer of right ischial area, unstageable ([address] L89.310    Decubitus ulcer of left ischial area, unstageable ([address] L89.320       Measurements:  Wound 22 Sacrum #1 (Active)   Wound Image   22 1256   Wound Etiology Pressure Stage 4 22 1256   Dressing Status New dressing applied 22 1256   Wound Cleansed Cleansed with saline 22 1256   Dressing/Treatment Moist to dry 22 1256   Dressing Change Due 12/15/22 12/14/22 1256   Wound Length (cm) 10 cm 22 1256   Wound Width (cm) 11 cm 22 1256   Wound Depth (cm) 3.5 cm 22 1256   Wound Surface Area (cm^2) 110 cm^2 12/14/22 1256   Change in Wound Size % (l*w) -109.52 12/14/22 1256   Wound Volume (cm^3) 385 cm^3 12/14/22 1256   Wound Assessment Exposed structure bone;Pink/red;Slough 12/14/22 1256   Drainage Amount Moderate 12/14/22 1256   Drainage Description Serosanguinous;Brown 12/14/22 1256   Odor None 12/14/22 1256   Nallely-wound Assessment Fragile; Intact 12/14/22 1256   Margins Defined edges 12/14/22 1256   Wound Thickness Description not for Pressure Injury Full thickness 12/14/22 1256   Number of days: 137       Wound 12/14/22 Buttocks Right #2 (Active)   Wound Etiology Pressure Stage 4 12/14/22 1256   Dressing Status New dressing applied 12/14/22 1256   Wound Cleansed Cleansed with saline 12/14/22 1256   Dressing/Treatment Moist to dry 12/14/22 1256   Dressing Change Due 12/15/22 12/14/22 1256   Wound Length (cm) 6.5 cm 12/14/22 1256   Wound Width (cm) 7 cm 12/14/22 1256   Wound Depth (cm) 3 cm 12/14/22 1256   Wound Surface Area (cm^2) 45.5 cm^2 12/14/22 1256   Wound Volume (cm^3) 136.5 cm^3 12/14/22 1256   Wound Assessment Pink/red 12/14/22 1256   Drainage Amount Moderate 12/14/22 1256   Drainage Description Serosanguinous;Brown 12/14/22 1256   Odor None 12/14/22 1256   Nallely-wound Assessment Fragile 12/14/22 1256   Margins Defined edges 12/14/22 1256   Wound Thickness Description not for Pressure Injury Full thickness 12/14/22 1256   Number of days: 0       Wound 12/14/22 Ischium Left (Active)   Wound Image   12/14/22 1256   Wound Etiology Pressure Unstageable 12/14/22 1256   Dressing Status New dressing applied 12/14/22 1256   Wound Cleansed Cleansed with saline 12/14/22 1256   Dressing/Treatment Moist to dry 12/14/22 1256   Dressing Change Due 12/15/22 12/14/22 1256   Wound Length (cm) 5 cm 12/14/22 1256   Wound Width (cm) 3 cm 12/14/22 1256   Wound Depth (cm) 0.1 cm 12/14/22 1256   Wound Surface Area (cm^2) 15 cm^2 12/14/22 1256   Wound Volume (cm^3) 1.5 cm^3 12/14/22 1256   Wound Assessment Slough;Pink/red 12/14/22 1256   Drainage Amount Scant 12/14/22 1256   Drainage Description Serosanguinous 12/14/22 1256   Odor None 12/14/22 1256   Nallely-wound Assessment Intact 12/14/22 1256   Wound Thickness Description not for Pressure Injury Full thickness 12/14/22 1256   Number of days: 0             Pt awake and alert in visible discomfort from sacral wounds. Above image for sacral wound with bridge. Able to place q tip to wound #1 and #12 S/P debridement in OR 12/13/22. All wounds dressed with vashe wet to dry. Pt to d/c back to nursing home today. Response to treatment:  With complaints of pain. Plan   Plan of Care:   Pt to d/c back to nursing home today. Specialty Bed Required : N/A   [] Low Air Loss   [] Pressure Redistribution  [] Fluid Immersion  [] Bariatric  [] Total Pressure Relief  [] Other:     Current Diet: ADULT DIET; Regular  ADULT TUBE FEEDING; Gastrostomy; Standard with Fiber; Continuous; 50; No; 30; Q 4 hours; Protein, Wound Healing; 1 bottle of Proteinex per feeding tube per day (DO NOT mix directly with TF) flush 30 mL before and after bolus; 1 pkt of Meir mixed . ..   Dietician consult:  Yes    Discharge Plan:  Placement for patient upon discharge: skilled nursing    Patient appropriate for Outpatient [address]: Yes    Referrals:  []   [] [address] [place] [address]  [] Supplies  [] Other    Patient/Caregiver Teaching:  Level of patient/caregiver understanding able to:   [] Indicates understanding       [] Needs reinforcement  [] Unsuccessful      [x] Verbal Understanding  [] Demonstrated understanding       [] No evidence of learning  [] Refused teaching         [] N/A       Electronically signed by Shakila Leigh RN, CWOCN on 12/14/2022 at 1:01 PM

## 2022-12-15 NOTE — TELEPHONE ENCOUNTER
Follow Up Office Visit      Patient Name: Radha Medrano  : 1992   MRN: [id number]     Chief Complaint:    Chief Complaint   Patient presents with   • Follow-up     Patient In office to follow up on migraines.  Patient states she wakes up with migraines daily and throughout the day they progressively get worse.        History of Present Illness: Radha Medrano is a 30 y.o. female who is here today to follow up with migraines and was last seen on 3/17/2022.  Prior to pregnancy she was taking Amitriptyline and Imitrex.  Those medications did help some but she was still having some headaches and migraines.  Migraines returned a few days after she delivered her baby.  She has had 28/30 headache days in the past month and all have progressed into migraines.  She is going to bed with a headache and waking up with one.  She is taking OTC Tylenol PRN.  She feels her migraines are now stronger, located mostly in the left frontal area, throbbing, accompanied by photophobia/phonophobia/nausea/vomiting, lasting more than 4 hours.  She is taking metoprolol 150mg BID for palpitations.  She has identified onions, cabbage and MSG as triggers for her migraines.   *Topiramate is contraindicated because patient is of child-bearing age and is not on any type of birth control.  She is unable to take triptans due to having an abnormal heart rhythm.     Following taken from previous visit note:  Radha Medrano is a 30 y.o. female who is here today to establish care with Neurology for migraines.  She has had chronic headaches since she was a teenager.  She had identified MSG as a trigger, when she was younger.  Her migraines involve the entire head, described as throbbing, accompanied by nausea/vomiting/phonophobia/photophobia, lasting more than 4 hours.  She was taking Imitrex and Amitriptyline for her migraines but has stopped those.  She is taking Zofran ODT PRN nausea and vomiting.  She recently found out she is 5  SX 8/7/22 NWB for 6 weeks. Patient was hospitalized and Dr Asya Wu advised no splint is needed on 8/25/22 and for xrays to be taken. An xray order was also placed on 9/28/22 by [facility]. I left a voicemail at the number given. Line did not state secure, so no PHI was given. Will discuss with SMA if he reviewed the 9/28/22 xrays and what the plan is. weeks pregnant.  [facility], [place], [place], for her first OB appointment next week.  History of concussions.  Additional risk factors- left thyroid lobectomy, depressive disorder, personality disorder, PTSD, anxiety disorder, neck pain and scoliosis.   *CT head with and without contrast showed no acute intracranial abnormality- 2/16/2022.      Subjective      Review of Systems:   Review of Systems   Constitutional: Negative for chills, fatigue and fever.   HENT: Negative for facial swelling, hearing loss, sore throat, tinnitus and trouble swallowing.    Eyes: Negative for blurred vision, double vision, photophobia and visual disturbance.   Respiratory: Negative for cough, chest tightness and shortness of breath.    Cardiovascular: Negative for chest pain, palpitations and leg swelling.   Gastrointestinal: Negative for abdominal pain, nausea and vomiting.   Endocrine: Negative for cold intolerance and heat intolerance.   Musculoskeletal: Negative for gait problem, neck pain and neck stiffness.   Skin: Negative for color change and rash.   Allergic/Immunologic: Negative for environmental allergies and food allergies.   Neurological: Positive for headache. Negative for dizziness, seizures, syncope, speech difficulty, weakness, light-headedness, numbness and memory problem.   Psychiatric/Behavioral: Positive for sleep disturbance. Negative for behavioral problems and depressed mood. The patient is not nervous/anxious.        I have reviewed and the following portions of the patient's history were updated as appropriate: past family history, past medical history, past social history, past surgical history and problem list.    Medications:     Current Outpatient Medications:   •  albuterol sulfate  (90 Base) MCG/ACT inhaler, Inhale 2 puffs Every 4 (Four) Hours As Needed for Wheezing or Shortness of Air., Disp: 18 g, Rfl: 1  •  docusate sodium 100 MG capsule, Take 1 capsule by mouth 2 (Two) Times a  "Day., Disp: 30 capsule, Rfl: 0  •  metoprolol tartrate (LOPRESSOR) 50 MG tablet, Take 1.5 tablets by mouth 2 (Two) Times a Day., Disp: 90 tablet, Rfl: 11  •  sertraline (ZOLOFT) 50 MG tablet, , Disp: , Rfl:   •  Rimegepant Sulfate (Nurtec) 75 MG tablet dispersible tablet, Take 1 tablet by mouth Every Other Day., Disp: 16 tablet, Rfl: 5  •  ubrogepant 100 MG tablet, Take 1 tablet by mouth As Needed (migraine)., Disp: 10 tablet, Rfl: 5    Allergies:   Allergies   Allergen Reactions   • Onion Itching     REPORTS CAUSES MIGRAINES and MAKES THROAT ITCHY     • Cabbage Headache   • Msg [Monosodium Glutamate] Headache   • Sulfa Antibiotics Hives       Objective     Physical Exam:  Vital Signs:   Vitals:    12/15/22 1021   BP: 122/84   BP Location: Right arm   Patient Position: Sitting   Cuff Size: Adult   Pulse: 78   Temp: 97.3 °F (36.3 °C)   SpO2: 97%   Weight: 61 kg (134 lb 6.4 oz)   Height: 154.9 cm (61\")   PainSc:   5   PainLoc: Head     Body mass index is 25.39 kg/m².    Physical Exam  Vitals and nursing note reviewed.   Constitutional:       General: She is not in acute distress.     Appearance: Normal appearance. She is well-developed and normal weight. She is not diaphoretic.   HENT:      Head: Normocephalic and atraumatic.   Eyes:      Extraocular Movements: Extraocular movements intact.      Conjunctiva/sclera: Conjunctivae normal.      Pupils: Pupils are equal, round, and reactive to light.   Pulmonary:      Effort: Pulmonary effort is normal. No respiratory distress.   Musculoskeletal:         General: Normal range of motion.   Skin:     General: Skin is warm and dry.      Findings: No rash.   Neurological:      Mental Status: She is alert and oriented to person, place, and time.   Psychiatric:         Mood and Affect: Mood normal.         Behavior: Behavior normal.         Thought Content: Thought content normal.         Judgment: Judgment normal.         Neurologic Exam     Mental Status   Oriented to person, " place, and time.     Cranial Nerves     CN III, IV, VI   Pupils are equal, round, and reactive to light.       Assessment / Plan      Assessment/Plan:   Diagnoses and all orders for this visit:    1. Intractable chronic migraine without aura and without status migrainosus (Primary)  -     Rimegepant Sulfate (Nurtec) 75 MG tablet dispersible tablet; Take 1 tablet by mouth Every Other Day.  Dispense: 16 tablet; Refill: 5  -     Discontinue: rizatriptan (Maxalt) 10 MG tablet; Take 1 tablet by mouth 1 (One) Time As Needed for Migraine. May repeat in 2 hours if needed  Dispense: 9 tablet; Refill: 2  -     ubrogepant 100 MG tablet; Take 1 tablet by mouth As Needed (migraine).  Dispense: 10 tablet; Refill: 5    2. BMI 25.0-25.9,adult    *Indications and possible SEs of Ubrelvy and Nurtec discussed with patient.   *Patient education on Migraines provided today.     Follow Up:   Return in about 2 months (around 2/15/2023) for Follow Up.    ELISEO Tamayo, FNP-C  [facility]       Please note that portions of this note may have been completed with a voice recognition program. Efforts were made to edit the dictations, but occasionally words are mistranscribed.

## 2022-12-20 NOTE — DISCHARGE SUMMARY
Physician Discharge Summary     Patient ID:  Kira Garcia  [id number]  89 y.o.  1945    Admit date: 12/13/2022    Discharge date and time: 12/14/2022 11:41 AM     Admitting Physician: Citlaly Johnson MD     Discharge Physician: same    Admission Diagnoses: Sacral decubitus ulcer, stage IV ([address] [L89.154]  Decubitus ulcer of left ischial area, unspecified ulcer stage [L89.329]  Decubitus ulcer of ischial area, right, unstageable ([address] [L89.310]  Decubitus ulcer of sacral region, stage 1 [L89.151]    Discharge Diagnoses: same    Admission Condition: fair    Discharged Condition: good    Indication for Admission: wound debridement    Hospital Course:   Stage IV sacral decubitus ulcer and unstageable bilateral ischial decubitus ulcers  -12/13 Excisional debridement of skin, subcutaneous  tissue and fascia of sacral and bilateral ischial ulcers with  measurements of sacral ulcer 9.5 cm x 12.5 cm x 1.5 cm, right ischial  ulcer of 6 x 8.5 cm and left ischial ulcer 3.2 x 5.5 cm. Wound ostomy team consulted post op. Wound stable POD #1. Discussed with outpatient wound care nurse practitioner who will evaluate patient at nursing home tomorrow and place wound vac. Discharged back to nursing home 12/14. Consults: none    Significant Diagnostic Studies:   No orders to display        Discharge Exam:  GENERAL: alert, no distress  LUNGS: clear to ausculation, without wheezes, rales or rhonci  HEART: normal rate and regular rhythm  ABDOMEN: soft, non-tender, non-distended  BUTTOCK: Large sacral and bilateral buttock ulcers clean. No significant drainage or slough/skin necrosis    Disposition: SNF    In process/preliminary results:  Outstanding Order Results       No orders found from 11/14/2022 to 12/14/2022.             Patient Instructions:   Discharge Medication List as of 12/14/2022  1:44 PM        CONTINUE these medications which have NOT CHANGED    Details   pantoprazole (PROTONIX) 40 MG tablet TAKE 1 TABLET TWICE A DAY, Disp-180 tablet, R-1Normal      apixaban (ELIQUIS) 5 MG TABS tablet Take 1 tablet by mouth 2 times daily, Disp-60 tablet, R-1Print      docusate sodium (COLACE, DULCOLAX) 100 MG CAPS Take 100 mg by mouth 2 times daily as needed for Constipation, Disp-60 capsule, R-0Normal      escitalopram (LEXAPRO) 10 MG tablet Take 1 tablet by mouth daily, Disp-90 tablet, R-3Normal      tiZANidine (ZANAFLEX) 2 MG tablet Take 1 tablet by mouth every 6 hours as needed (muscle pain), Disp-60 tablet, R-11Normal      dicyclomine (BENTYL) 10 MG capsule Take 1 capsule by mouth 4 times daily as needed (cramping/diarrhea), Disp-60 capsule, R-5Normal      flecainide (TAMBOCOR) 100 MG tablet TAKE 1 TABLET BY MOUTH 2 TIMES A DAY, Disp-180 tablet, R-3Normal      calcium carbonate-vitamin D 600-200 MG-UNIT TABS Take 1 tablet by mouth 2 times dailyHistorical Med      traMADol (ULTRAM) 50 MG tablet Take 50 mg by mouth 3 times daily as needed for Pain. Historical Med      traZODone (DESYREL) 50 MG tablet Take 25-50 mg by mouth nightly as needed for SleepHistorical Med      furosemide (LASIX) 20 MG tablet Take 20 mg by mouth dailyHistorical Med      potassium chloride (MICRO-K) 10 MEQ extended release capsule Take 10 mEq by mouth dailyHistorical Med      polyethylene glycol (GLYCOLAX) 17 g packet Take 17 g by mouth daily as needed for ConstipationHistorical Med      Nutritional Supplements (JEVITY 1.2 VERONICA) LIQD 40 mLs by Gastrostomy Tube route continuousHistorical Med           STOP taking these medications       Wound Cleansers (VASHE WOUND THERAPY) external solution Comments:   Reason for Stopping:         potassium bicarbonate (K-LYTE) 25 MEQ disintegrating tablet Comments:   Reason for Stopping:         NONFORMULARY Comments:   Reason for Stopping:         aspirin EC 81 MG EC tablet Comments:   Reason for Stopping:         potassium chloride (KLOR-CON M) 20 MEQ extended release tablet Comments:   Reason for Stopping: metoprolol succinate (TOPROL XL) 25 MG extended release tablet Comments:   Reason for Stopping:             Activity: activity as tolerated  Diet: regular diet-TF via G tube. Ostomy in place. Wound Care: as directed. Discussed with discharge facility    Follow-up with Dr. Emiliano Lipscomb as needed.     SignedLearlizet VIGIL-CNP 11:30 AM 12/20/2022   [facility]  Office: [phone]   12/20/2022  11:26 AM

## 2023-01-09 NOTE — ED NOTES
Leg elevated, ice applied.       Juice Henriquez RN  01/28/21 6444 Airway patent, Nasal mucosa clear. Mouth with normal mucosa. Throat has no vesicles, no oropharyngeal exudates and uvula is midline.

## 2023-01-11 ENCOUNTER — TELEMEDICINE (OUTPATIENT)
Dept: INFECTIOUS DISEASES | Age: 78
End: 2023-01-11
Payer: MEDICARE

## 2023-01-11 DIAGNOSIS — M46.28 SACRAL OSTEOMYELITIS (HCC): Primary | ICD-10-CM

## 2023-01-11 DIAGNOSIS — L89.320: ICD-10-CM

## 2023-01-11 DIAGNOSIS — Z79.01 ON ANTICOAGULANT THERAPY: ICD-10-CM

## 2023-01-11 DIAGNOSIS — L89.310: ICD-10-CM

## 2023-01-11 DIAGNOSIS — Z93.3 COLOSTOMY IN PLACE (HCC): ICD-10-CM

## 2023-01-11 DIAGNOSIS — S31.000D WOUND OF SACRAL REGION, SUBSEQUENT ENCOUNTER: ICD-10-CM

## 2023-01-11 DIAGNOSIS — L89.154 SACRAL DECUBITUS ULCER, STAGE IV (HCC): ICD-10-CM

## 2023-01-11 PROCEDURE — G8400 PT W/DXA NO RESULTS DOC: HCPCS | Performed by: INTERNAL MEDICINE

## 2023-01-11 PROCEDURE — G8427 DOCREV CUR MEDS BY ELIG CLIN: HCPCS | Performed by: INTERNAL MEDICINE

## 2023-01-11 PROCEDURE — 1123F ACP DISCUSS/DSCN MKR DOCD: CPT | Performed by: INTERNAL MEDICINE

## 2023-01-11 PROCEDURE — 1090F PRES/ABSN URINE INCON ASSESS: CPT | Performed by: INTERNAL MEDICINE

## 2023-01-11 PROCEDURE — 99215 OFFICE O/P EST HI 40 MIN: CPT | Performed by: INTERNAL MEDICINE

## 2023-01-11 PROCEDURE — 87081 CULTURE SCREEN ONLY: CPT | Performed by: INTERNAL MEDICINE

## 2023-01-11 RX ORDER — SENNA PLUS 8.6 MG/1
2 TABLET ORAL DAILY
COMMUNITY

## 2023-01-11 RX ORDER — LANOLIN ALCOHOL/MO/W.PET/CERES
2000 CREAM (GRAM) TOPICAL DAILY
COMMUNITY

## 2023-01-11 RX ORDER — ACETAMINOPHEN 325 MG/1
650 TABLET ORAL EVERY 6 HOURS PRN
COMMUNITY

## 2023-01-11 RX ORDER — OXYCODONE HYDROCHLORIDE 5 MG/1
5 TABLET ORAL 3 TIMES DAILY
COMMUNITY

## 2023-01-11 NOTE — PROGRESS NOTES
Infectious Diseases Outpatient Follow-up Note Virtual Visit        Primary Care Physician:  Mary Waller MD       History Obtained From:   Patient, EPIC    CHIEF COMPLAINT / ID problem:    Chief Complaint   Patient presents with    Follow-up     sacral decubitus wound with evidence of osteo       HISTORY OF PRESENT ILLNESS / Interval history:    Here for evaluation of sacral osteomyelitis and pressure ulcer she was seen by Danni Warner recently and underwent excisional debridement in OR down to the bone  on 22 she has wound vac in place she is a NH resident and poor mobility - she has diverting ostomy in place she has ischial ulcer as well and has on going pain- she was referred to  clinic for consideration of IV abx due to on going symptoms-wound images reviewed no recent cx to guide abx therapy. She has PEG  tube in place for nutrition-per d/w RN NH can arrange PICC line and wound cx can be obtained to start IV abx - d/w pt and she is agreeable for IV abx.       Past Medical History:    Past Medical History:   Diagnosis Date    Acid reflux     Anxiety     Arthritis     Atrial fibrillation (HCC)     Closed fracture dislocation of right elbow     Depression     Fibromyalgia     Migraine     WHITNEY (obstructive sleep apnea)     Diagnosed years ago, no CPAP    Rectal cancer (HCC)     Wears glasses     DRIVING       Past Surgical History:    Past Surgical History:   Procedure Laterality Date    APPENDECTOMY      CERVICAL DISCECTOMY      ACDF     SECTION      x4    CHOLECYSTECTOMY      COLECTOMY N/A 2022    LAPAROSCOPIC SIGMOID COLOSTOMY WITH EXCISIONAL DEBRIDEMENT OF SACRAL ULCER, LAPAROSCOPIC GASTROSTOMY TUBE PLACEMENT performed by Herminia Swift MD at [address] [facility]      Rectal cancer removed using  incision    COLONOSCOPY      SEVERAL    COLONOSCOPY N/A 2019    COLONOSCOPY POLYPECTOMY SNARE/COLD BIOPSY performed by Yuli Baltazar MD at [address] [place] [address] N/A 7/7/2021    COLONOSCOPY WITH BIOPSY performed by Lisy Douglas MD at [address] [facility] N/A 8/12/2022    INCOMPLETE COLONOSCOPY D/T POOR PREP performed by Lisy Douglas MD at [address] [facility] [address] Left 8/3/2022    OPEN REDUCTION INTERNAL FIXATION LEFT DISTAL RADIUS performed by Ileana Pan MD at [address] [facility] Right 1/28/2021    RIGHT LEG EVACUATION OF HEMATOMA performed by Dasia Flores MD at [address] [facility] W/O EXTENSION Right 10/25/2018    OPEN REDUCTION INTERNAL FIXATION RIGHT FEMUR SUPRACONDYLAR FEMORAL FRACTURE WITH C-ARM performed by Cael Gomes MD at [address] [place] [address] N/A 12/13/2022    EXCISIONAL DEBRIDEMENT OF SACRAL ULCER performed by Donna Fernandez MD at [place] [address] Right 1/31/2021    RIGHT LEG HEMATOMA EVAKUATION, DEBRIDEMENT, AND WOUND VAC PLACEMENT performed by Bobby Vidales MD at [place] [address] Right 2/17/2021    RIGHT LOWER EXTREMITY SKIN GRAFT performed by Bobby Vidales MD at [address] [facility]     UPPER GASTROINTESTINAL ENDOSCOPY N/A 11/26/2019    ESOPHAGOGASTRODUODENOSCOPY performed by Bethany Stewart MD at [address] [place] [address] N/A 8/18/2020    EGD BIOPSY performed by Bethany Stewart MD at [address] [facility]       Current Medications:    Current Outpatient Medications   Medication Sig Dispense Refill    oxyCODONE (ROXICODONE) 5 MG immediate release tablet Take 5 mg by mouth 3 times daily.       vitamin B-12 (CYANOCOBALAMIN) 1000 MCG tablet Take 2,000 mcg by mouth daily      acetaminophen (TYLENOL) 325 MG tablet Take 650 mg by mouth every 6 hours as needed for Pain      senna (SENOKOT) 8.6 MG tablet Take 2 tablets by mouth daily      traZODone (DESYREL) 50 MG tablet Take 25-50 mg by mouth nightly as needed for Sleep      furosemide (LASIX) 20 MG tablet Take 20 mg by mouth daily potassium chloride (MICRO-K) 10 MEQ extended release capsule Take 10 mEq by mouth daily      polyethylene glycol (GLYCOLAX) 17 g packet Take 17 g by mouth daily as needed for Constipation      Nutritional Supplements (JEVITY 1.2 VERONICA) LIQD 40 mLs by Gastrostomy Tube route continuous      pantoprazole (PROTONIX) 40 MG tablet TAKE 1 TABLET TWICE A DAY (Patient taking differently: Take 40 mg by mouth in the morning and at bedtime) 180 tablet 1    apixaban (ELIQUIS) 5 MG TABS tablet Take 1 tablet by mouth 2 times daily 60 tablet 1    docusate sodium (COLACE, DULCOLAX) 100 MG CAPS Take 100 mg by mouth 2 times daily as needed for Constipation 60 capsule 0    escitalopram (LEXAPRO) 10 MG tablet Take 1 tablet by mouth daily 90 tablet 3    tiZANidine (ZANAFLEX) 2 MG tablet Take 1 tablet by mouth every 6 hours as needed (muscle pain) (Patient taking differently: Take 2 mg by mouth every 8 hours as needed (muscle pain)) 60 tablet 11    dicyclomine (BENTYL) 10 MG capsule Take 1 capsule by mouth 4 times daily as needed (cramping/diarrhea) 60 capsule 5    flecainide (TAMBOCOR) 100 MG tablet TAKE 1 TABLET BY MOUTH 2 TIMES A  tablet 3    traMADol (ULTRAM) 50 MG tablet Take 50 mg by mouth 3 times daily as needed for Pain. (Patient not taking: Reported on 1/11/2023)      calcium carbonate-vitamin D 600-200 MG-UNIT TABS Take 1 tablet by mouth 2 times daily (Patient not taking: Reported on 1/11/2023)       No current facility-administered medications for this visit.        Allergies:  Demerol hcl [meperidine], Pcn [penicillins], and Adhesive tape      Immunizations :   Immunization History   Administered Date(s) Administered    COVID-19, J&J, (age 18y+), IM, 0.5 mL 03/08/2021, 10/31/2021    Influenza Vaccine, unspecified formulation 10/21/2018    Influenza, FLUAD, (age 72 y+), Adjuvanted, 0.5mL 09/03/2020, 09/09/2021    Influenza, FLUARIX, FLULAVAL, FLUZONE (age 10 mo+) AND AFLURIA, (age 1 y+), PF, 0.5mL 02/19/2020    Influenza, High Dose (Fluzone 65 yrs and older) 12/09/2015, 10/27/2016, 09/22/2017, 10/21/2018    Pneumococcal Conjugate 13-valent ([id number]) 12/09/2015    Pneumococcal Conjugate Vaccine 10/21/2018    Pneumococcal Polysaccharide ([id number]) 10/21/2018    Tdap (Boostrix, Adacel) 12/09/2015           Social History:    Social History     Socioeconomic History    Marital status:     Number of children: 4    Highest education level: High school graduate   Tobacco Use    Smoking status: Never    Smokeless tobacco: Never   Vaping Use    Vaping Use: Never used   Substance and Sexual Activity    Alcohol use: No    Drug use: Never    Sexual activity: Not Currently     Partners: Male   Social History Narrative    Lives with son     Social History     Tobacco Use   Smoking Status Never   Smokeless Tobacco Never      Family History   Problem Relation Age of Onset    COPD Mother         REVIEW OF SYSTEMS:      CONSTITUTIONAL:  negative for fevers, chills, diaphoresis, activity change, appetite change, fatigue, night sweats and unexpected weight change.    EYES:  negative for blurred vision, eye discharge, visual disturbance and icterus  HEENT:  negative for hearing loss, tinnitus, ear drainage, sinus pressure, nasal congestion, epistaxis and snoring  RESPIRATORY:  No cough , no sob, no sputum production , no wheeze ,no hemoptysis   CARDIOVASCULAR:  negative for chest pain, palpitations, exertional chest pressure/discomfort, edema, syncope  GASTROINTESTINAL:  negative for nausea, vomiting, diarrhea, constipation, blood in stool and abdominal pain  GENITOURINARY:  negative for frequency, dysuria, urinary incontinence, decreased urine volume, and hematuria  HEMATOLOGIC/LYMPHATIC:  negative for easy bruising, bleeding and lymphadenopathy  ALLERGIC/IMMUNOLOGIC:  negative for recurrent infections, angioedema, anaphylaxis and drug reactions  ENDOCRINE:  negative for weight changes and diabetic symptoms including polyuria, polydipsia and polyphagia  MUSCULOSKELETAL:  sacral decubitus ulcer+ pain + drainage+   pain, joint swelling, decreased range of motion and muscle weakness  INTEGUMENTARY: negative for skin color change, rashes, blisters  NEUROLOGICAL:  negative for headaches, slurred speech, unilateral weakness  PSYCHIATRIC/BEHAVIORAL: negative for hallucinations, behavioral problems, confusion and agitation. PHYSICAL EXAM:    Vitals: There were no vitals filed for this visit.   Not possible due to virtual visit      DATA:    CBC:   Lab Results   Component Value Date    WBC 7.2 12/14/2022    HGB 8.3 (L) 12/14/2022    HCT 27.1 (L) 12/14/2022    MCV 88.2 12/14/2022     12/14/2022     RENAL:   Lab Results   Component Value Date    CREATININE <0.5 (L) 12/14/2022    BUN 15 12/14/2022     (L) 12/14/2022    K 4.6 12/14/2022    CL 95 (L) 12/14/2022    CO2 25 12/14/2022     SED RATE:   Lab Results   Component Value Date/Time    SEDRATE 35 09/19/2022 10:00 AM     CK:   Lab Results   Component Value Date/Time    CKTOTAL 49 01/28/2021 02:26 PM     CRP:   Lab Results   Component Value Date/Time    CRP 2.2 09/19/2022 10:00 AM     Hepatic Function Panel:   Lab Results   Component Value Date/Time    ALKPHOS 114 09/19/2022 10:00 AM    ALT 12 09/19/2022 10:00 AM    AST 14 09/19/2022 10:00 AM    PROT 3.4 09/02/2022 10:45 AM    BILITOT 0.2 09/19/2022 10:00 AM    LABALBU 2.2 09/19/2022 10:00 AM     UA:  Lab Results   Component Value Date/Time    COLORU Yellow 08/22/2022 12:52 AM    CLARITYU SL CLOUDY 08/22/2022 12:52 AM    GLUCOSEU 100 08/22/2022 12:52 AM    BILIRUBINUR MODERATE 08/22/2022 12:52 AM    KETUA TRACE 08/22/2022 12:52 AM    SPECGRAV 1.025 08/22/2022 12:52 AM    BLOODU MODERATE 08/22/2022 12:52 AM    PHUR 5.5 08/22/2022 12:52 AM    PROTEINU 30 08/22/2022 12:52 AM    UROBILINOGEN 4.0 08/22/2022 12:52 AM    NITRU Negative 08/22/2022 12:52 AM    LEUKOCYTESUR MODERATE 08/22/2022 12:52 AM    LABMICR YES 08/22/2022 12:52 AM    URINETYPE NotGiven 08/22/2022 12:52 AM      Urine Microscopic:   Lab Results   Component Value Date/Time    BACTERIA 1+ 08/22/2022 12:52 AM    COMU see below 08/22/2022 12:52 AM    HYALCAST 3-5 08/22/2022 12:52 AM    WBCUA 21-50 08/22/2022 12:52 AM    RBCUA 3-4 08/22/2022 12:52 AM    EPIU 0-1 08/22/2022 12:52 AM     Urine Reflex to Culture:   Lab Results   Component Value Date/Time    URRFLXCULT Yes 08/22/2022 12:52 AM       MICRO: cultures reviewed and updated by me     Respiratory Culture:  Lab Results   Component Value Date/Time    [facility]  08/29/2022 01:35 PM     1+ WBC's (Polymorphonuclear)  No Epithelial Cells seen  1+ Gram variable rods       AFB:No results found for: AFBSMEAR    Urine Culture: No results for input(s): LABURIN in the last 72 hours. Imaging:  No orders to display         Labs, Microbiology and  Radiology results pertinent to this visit and from care every where  reviewed in detail by me as part of the consultation     IMPRESSION:     Diagnosis Orders   1. Sacral osteomyelitis ([address]        2. Wound of sacral region, subsequent encounter  Culture, Aerobic and Anaerobic      3. Sacral decubitus ulcer, stage IV ([address]        4. Decubitus ulcer of left ischial area, unstageable ([address]        5. Decubitus ulcer of right ischial area, unstageable ([address]        6. On anticoagulant therapy        7.  Colostomy in place [facility])          Sacral decubitus ulcer and also on going ischial ulcer from pressure she was on IV abx in the past but now recent worsening s/p OR excisional debridement on 12/`13/22 and she is now interested to go back on IV abx due to on going pain and symptoms- Will ask NH to collect new wound cx and can arrange PICC line in anticipation for Iv ABX - d/w RN at NH to update cx by Monday so we can give orders on IV abx   OPAT d/w pt and she is in agreement     PLAN:    Wound images reviewed  Wound cx   Picc LINE   Will arrange IV abx once cx are available  OPAT dw pt  Side effects d/w pt  Improve nutrition -          Jeane De La Fuente is a 77 y.o. female being evaluated by a Virtual Visit (video visit) encounter to address concerns as mentioned above.  A caregiver was present when appropriate. Due to this being a TeleHealth encounter (During COVID-19 public health emergency), evaluation of the following organ systems was limited: Vitals/Constitutional/EENT/Resp/CV/GI//MS/Neuro/Skin/Heme-Lymph-Imm.  Pursuant to the emergency declaration under the [place] Act and the National Emergencies Act, 1135 waiver authority and the Coronavirus Preparedness and Response Supplemental Appropriations Act, this Virtual Visit was conducted with patient's (and/or legal guardian's) consent, to reduce the patient's risk of exposure to COVID-19 and provide necessary medical care.  The patient (and/or legal guardian) has also been advised to contact this office for worsening conditions or problems, and seek emergency medical treatment and/or call 911 if deemed necessary.     Patient identification was verified at the start of the visit: Yes     Total time spent for this encounter: 45 min   on visit (including interval history review of data including labs, cultures, imaging,  ordering labs, development and implementation of treatment plan, co ordination of care, counseling and education ).       Services were provided through a video synchronous discussion virtually to substitute for in-person clinic visit. Patient  located at NH and provider in  office     --Brad Lamas MD on 1/14/2023 at 10:18 PM    An electronic signature was used to authenticate this note.        Addendum :  Wound cx from the NH received on 1/16/23 reviewed    Will plan : IV Ertapenem x 1 gm x q 24 hr x 4 weeks                   Oral Doxycycline x 100mg twice a day x 4 weeks                   Weekly labs CBC with diff, BMP, ESR, CRP                    PICC is in place already     D/w Patient in detail at  the time of consultation.    Thanks for allowing me to  participate in your patient's care and please do not hesitate to  call me with any questions or concerns.     Philip Simpson MD  Infectious Disease  [facility]) Physician  Phone: [phone]   Fax : [phone]

## 2023-01-17 DIAGNOSIS — M46.28 SACRAL OSTEOMYELITIS (HCC): Primary | ICD-10-CM

## 2023-01-17 RX ORDER — DOXYCYCLINE HYCLATE 100 MG/1
100 CAPSULE ORAL 2 TIMES DAILY
Qty: 58 CAPSULE | Refills: 0
Start: 2023-01-17 | End: 2023-02-15

## 2023-01-25 NOTE — PLAN OF CARE
Patient states he will be gone from February 18-February 28th and that he will get his labs on tomorrow. Problem: Pain  Goal: Verbalizes/displays adequate comfort level or baseline comfort level  Outcome: Progressing  Flowsheets (Taken 8/24/2022 0800)  Verbalizes/displays adequate comfort level or baseline comfort level:   Encourage patient to monitor pain and request assistance   Assess pain using appropriate pain scale   Administer analgesics based on type and severity of pain and evaluate response  Note: Patient reminded of need to inform nurse of pain for appropriate medication. Problem: Safety - Adult  Goal: Free from fall injury  Outcome: Progressing  Flowsheets (Taken 8/24/2022 1149)  Free From Fall Injury: Instruct family/caregiver on patient safety     Problem: Nutrition Deficit:  Goal: Optimize nutritional status  Patient says she is \"having trouble feeling hungry\"; seems despondent. Outcome: Not Progressing       Problem: Chronic Conditions and Co-morbidities  Goal: Patient's chronic conditions and co-morbidity symptoms are monitored and maintained or improved  Outcome: Progressing  Flowsheets (Taken 8/24/2022 0800)  Care Plan - Patient's Chronic Conditions and Co-Morbidity Symptoms are Monitored and Maintained or Improved:   Monitor and assess patient's chronic conditions and comorbid symptoms for stability, deterioration, or improvement   Collaborate with multidisciplinary team to address chronic and comorbid conditions and prevent exacerbation or deterioration     Problem: Skin/Tissue Integrity  Goal: Absence of new skin breakdown  Description: 1. Monitor for areas of redness and/or skin breakdown  2. Assess vascular access sites hourly  3. Every 4-6 hours minimum:  Change oxygen saturation probe site  4. Every 4-6 hours:  If on nasal continuous positive airway pressure, respiratory therapy assess nares and determine need for appliance change or resting period.   Outcome: Progressing     Problem: ABCDS Injury Assessment  Goal: Absence of physical injury  Outcome: Progressing  Flowsheets (Taken 8/24/2022 1149)  Absence of Physical Injury: Implement safety measures based on patient assessment

## 2023-01-30 DIAGNOSIS — M46.28 SACRAL OSTEOMYELITIS (HCC): ICD-10-CM

## 2023-01-30 LAB
ALBUMIN SERPL-MCNC: NORMAL G/DL
ALP BLD-CCNC: NORMAL U/L
ALT SERPL-CCNC: NORMAL U/L
ANION GAP SERPL CALCULATED.3IONS-SCNC: NORMAL MMOL/L
AST SERPL-CCNC: NORMAL U/L
BASOPHILS ABSOLUTE: ABNORMAL
BASOPHILS RELATIVE PERCENT: 0.4 %
BILIRUB SERPL-MCNC: NORMAL MG/DL
BUN BLDV-MCNC: 21 MG/DL
C-REACTIVE PROTEIN: 22.5
CALCIUM SERPL-MCNC: 7.9 MG/DL
CHLORIDE BLD-SCNC: 97 MMOL/L
CO2: 35 MMOL/L
CREAT SERPL-MCNC: 0.4 MG/DL
EOSINOPHILS ABSOLUTE: ABNORMAL
EOSINOPHILS RELATIVE PERCENT: 0.8 %
GFR SERPL CREATININE-BSD FRML MDRD: 154 ML/MIN/{1.73_M2}
GLUCOSE BLD-MCNC: 150 MG/DL
HCT VFR BLD CALC: 22.2 % (ref 36–46)
HEMOGLOBIN: 7 G/DL (ref 12–16)
LYMPHOCYTES ABSOLUTE: 1.4 /ΜL
LYMPHOCYTES RELATIVE PERCENT: 23.1 %
MCH RBC QN AUTO: 25 PG
MCHC RBC AUTO-ENTMCNC: 31.4 G/DL
MCV RBC AUTO: 79.5 FL
MONOCYTES ABSOLUTE: 0.5 /ΜL
MONOCYTES RELATIVE PERCENT: 8 %
NEUTROPHILS ABSOLUTE: 4 /ΜL
NEUTROPHILS RELATIVE PERCENT: 67.7 %
PDW BLD-RTO: 18.6 %
PLATELET # BLD: 240 K/ΜL
PMV BLD AUTO: 8.3 FL
POTASSIUM SERPL-SCNC: 4.3 MMOL/L
RBC # BLD: 2.8 10^6/ΜL
SEDIMENTATION RATE, ERYTHROCYTE: 41
SODIUM BLD-SCNC: 135 MMOL/L
TOTAL PROTEIN: NORMAL
WBC # BLD: 5.9 10^3/ML

## 2023-02-01 DIAGNOSIS — M46.28 SACRAL OSTEOMYELITIS (HCC): ICD-10-CM

## 2023-02-01 LAB
ALBUMIN SERPL-MCNC: NORMAL G/DL
ALP BLD-CCNC: NORMAL U/L
ALT SERPL-CCNC: NORMAL U/L
ANION GAP SERPL CALCULATED.3IONS-SCNC: NORMAL MMOL/L
AST SERPL-CCNC: NORMAL U/L
BASOPHILS ABSOLUTE: ABNORMAL
BASOPHILS RELATIVE PERCENT: 0.2 %
BILIRUB SERPL-MCNC: NORMAL MG/DL
BUN BLDV-MCNC: 19 MG/DL
C-REACTIVE PROTEIN: 17.5
CALCIUM SERPL-MCNC: 7.9 MG/DL
CHLORIDE BLD-SCNC: 96 MMOL/L
CO2: 36 MMOL/L
CREAT SERPL-MCNC: 0.4 MG/DL
EOSINOPHILS ABSOLUTE: ABNORMAL
EOSINOPHILS RELATIVE PERCENT: 0.7 %
GFR SERPL CREATININE-BSD FRML MDRD: 154 ML/MIN/{1.73_M2}
GLUCOSE BLD-MCNC: 140 MG/DL
HCT VFR BLD CALC: 24.4 % (ref 36–46)
HEMOGLOBIN: 7.6 G/DL (ref 12–16)
LYMPHOCYTES ABSOLUTE: 1.2 /ΜL
LYMPHOCYTES RELATIVE PERCENT: 18.6 %
MCH RBC QN AUTO: 25.2 PG
MCHC RBC AUTO-ENTMCNC: 31.1 G/DL
MCV RBC AUTO: 80.9 FL
MONOCYTES ABSOLUTE: 0.5 /ΜL
MONOCYTES RELATIVE PERCENT: 7.2 %
NEUTROPHILS ABSOLUTE: 4.8 /ΜL
NEUTROPHILS RELATIVE PERCENT: 73.3 %
PDW BLD-RTO: 19.4 %
PLATELET # BLD: 228 K/ΜL
PMV BLD AUTO: 8.5 FL
POTASSIUM SERPL-SCNC: 4.1 MMOL/L
RBC # BLD: 3.01 10^6/ΜL
SEDIMENTATION RATE, ERYTHROCYTE: 46
SODIUM BLD-SCNC: 135 MMOL/L
TOTAL PROTEIN: NORMAL
WBC # BLD: 6.5 10^3/ML

## 2023-02-15 ENCOUNTER — TELEPHONE (OUTPATIENT)
Dept: INFECTIOUS DISEASES | Age: 78
End: 2023-02-15

## 2023-02-15 NOTE — TELEPHONE ENCOUNTER
Spoke with RN at [facility] verbalized understanding to DC IV ABX and pull PICC after last dose on  2/15/23.

## 2023-03-18 ENCOUNTER — HOSPITAL ENCOUNTER (INPATIENT)
Age: 78
LOS: 3 days | Discharge: HOSPICE | DRG: 871 | End: 2023-03-21
Attending: INTERNAL MEDICINE | Admitting: INTERNAL MEDICINE
Payer: MEDICARE

## 2023-03-18 ENCOUNTER — APPOINTMENT (OUTPATIENT)
Dept: GENERAL RADIOLOGY | Age: 78
DRG: 871 | End: 2023-03-18
Payer: MEDICARE

## 2023-03-18 ENCOUNTER — APPOINTMENT (OUTPATIENT)
Dept: CT IMAGING | Age: 78
DRG: 871 | End: 2023-03-18
Payer: MEDICARE

## 2023-03-18 DIAGNOSIS — J90 BILATERAL PLEURAL EFFUSION: ICD-10-CM

## 2023-03-18 DIAGNOSIS — S31.000D WOUND OF SACRAL REGION, SUBSEQUENT ENCOUNTER: ICD-10-CM

## 2023-03-18 DIAGNOSIS — R31.9 URINARY TRACT INFECTION WITH HEMATURIA, SITE UNSPECIFIED: ICD-10-CM

## 2023-03-18 DIAGNOSIS — N39.0 URINARY TRACT INFECTION WITH HEMATURIA, SITE UNSPECIFIED: ICD-10-CM

## 2023-03-18 DIAGNOSIS — A41.9 SEPSIS WITHOUT ACUTE ORGAN DYSFUNCTION, DUE TO UNSPECIFIED ORGANISM (HCC): Primary | ICD-10-CM

## 2023-03-18 DIAGNOSIS — L97.925 ULCER OF LEFT LOWER EXTREMITY WITH MUSCLE INVOLVEMENT WITHOUT EVIDENCE OF NECROSIS (HCC): ICD-10-CM

## 2023-03-18 LAB
ALBUMIN SERPL-MCNC: 2.1 G/DL (ref 3.4–5)
ALBUMIN/GLOB SERPL: 0.5 {RATIO} (ref 1.1–2.2)
ALP SERPL-CCNC: 193 U/L (ref 40–129)
ALT SERPL-CCNC: 30 U/L (ref 10–40)
AMORPH SED URNS QL MICRO: ABNORMAL /HPF
ANION GAP SERPL CALCULATED.3IONS-SCNC: 8 MMOL/L (ref 3–16)
APTT BLD: 28.4 SEC (ref 23–34.3)
AST SERPL-CCNC: 39 U/L (ref 15–37)
BACTERIA URNS QL MICRO: ABNORMAL /HPF
BASOPHILS # BLD: 0 K/UL (ref 0–0.2)
BASOPHILS NFR BLD: 0.2 %
BILIRUB SERPL-MCNC: <0.2 MG/DL (ref 0–1)
BILIRUB UR QL STRIP.AUTO: NEGATIVE
BUN SERPL-MCNC: 16 MG/DL (ref 7–20)
CALCIUM SERPL-MCNC: 8.8 MG/DL (ref 8.3–10.6)
CHARACTER UR: ABNORMAL
CHLORIDE SERPL-SCNC: 92 MMOL/L (ref 99–110)
CLARITY UR: ABNORMAL
CO2 SERPL-SCNC: 33 MMOL/L (ref 21–32)
COARSE GRAN CASTS #/AREA URNS LPF: ABNORMAL /LPF (ref 0–2)
COLOR UR: YELLOW
CREAT SERPL-MCNC: <0.5 MG/DL (ref 0.6–1.2)
CRYSTALS URNS MICRO: ABNORMAL /HPF
DEPRECATED RDW RBC AUTO: 21.3 % (ref 12.4–15.4)
EOSINOPHIL # BLD: 0 K/UL (ref 0–0.6)
EOSINOPHIL NFR BLD: 0.5 %
EPI CELLS #/AREA URNS AUTO: 4 /HPF (ref 0–5)
FLUAV RNA UPPER RESP QL NAA+PROBE: NEGATIVE
FLUBV AG NPH QL: NEGATIVE
GFR SERPLBLD CREATININE-BSD FMLA CKD-EPI: >60 ML/MIN/{1.73_M2}
GLUCOSE BLD-MCNC: 99 MG/DL (ref 70–99)
GLUCOSE SERPL-MCNC: 123 MG/DL (ref 70–99)
GLUCOSE UR STRIP.AUTO-MCNC: NEGATIVE MG/DL
HCT VFR BLD AUTO: 29.6 % (ref 36–48)
HEMOCCULT STL QL: NORMAL
HEMOCCULT STL QL: NORMAL
HGB BLD-MCNC: 9 G/DL (ref 12–16)
HGB UR QL STRIP.AUTO: ABNORMAL
HYALINE CASTS #/AREA URNS LPF: ABNORMAL /LPF (ref 0–2)
INR PPP: 1.16 (ref 0.87–1.14)
KETONES UR STRIP.AUTO-MCNC: NEGATIVE MG/DL
LACTATE BLDV-SCNC: 3.3 MMOL/L (ref 0.4–1.9)
LEUKOCYTE ESTERASE UR QL STRIP.AUTO: ABNORMAL
LIPASE SERPL-CCNC: 21 U/L (ref 13–60)
LYMPHOCYTES # BLD: 2.2 K/UL (ref 1–5.1)
LYMPHOCYTES NFR BLD: 24 %
MCH RBC QN AUTO: 24.4 PG (ref 26–34)
MCHC RBC AUTO-ENTMCNC: 30.4 G/DL (ref 31–36)
MCV RBC AUTO: 80.2 FL (ref 80–100)
MONOCYTES # BLD: 0.6 K/UL (ref 0–1.3)
MONOCYTES NFR BLD: 6.8 %
NEUTROPHILS # BLD: 6.4 K/UL (ref 1.7–7.7)
NEUTROPHILS NFR BLD: 68.5 %
NITRITE UR QL STRIP.AUTO: NEGATIVE
NT-PROBNP SERPL-MCNC: 2529 PG/ML (ref 0–449)
PERFORMED ON: NORMAL
PH UR STRIP.AUTO: 7.5 [PH] (ref 5–8)
PLATELET # BLD AUTO: 364 K/UL (ref 135–450)
PMV BLD AUTO: 7.7 FL (ref 5–10.5)
POTASSIUM SERPL-SCNC: 4.1 MMOL/L (ref 3.5–5.1)
PROT SERPL-MCNC: 6 G/DL (ref 6.4–8.2)
PROT UR STRIP.AUTO-MCNC: 30 MG/DL
PROTHROMBIN TIME: 14.8 SEC (ref 11.7–14.5)
RBC # BLD AUTO: 3.69 M/UL (ref 4–5.2)
RBC #/AREA URNS HPF: ABNORMAL /HPF (ref 0–4)
SARS-COV-2 RDRP RESP QL NAA+PROBE: NOT DETECTED
SODIUM SERPL-SCNC: 133 MMOL/L (ref 136–145)
SP GR UR STRIP.AUTO: 1.02 (ref 1–1.03)
TROPONIN T SERPL-MCNC: 0.02 NG/ML
UA COMPLETE W REFLEX CULTURE PNL UR: YES
UA DIPSTICK W REFLEX MICRO PNL UR: YES
URN SPEC COLLECT METH UR: ABNORMAL
UROBILINOGEN UR STRIP-ACNC: 0.2 E.U./DL
WBC # BLD AUTO: 9.4 K/UL (ref 4–11)
WBC #/AREA URNS HPF: ABNORMAL /HPF (ref 0–5)
YEAST URNS QL MICRO: PRESENT /HPF

## 2023-03-18 PROCEDURE — 87040 BLOOD CULTURE FOR BACTERIA: CPT

## 2023-03-18 PROCEDURE — 96365 THER/PROPH/DIAG IV INF INIT: CPT

## 2023-03-18 PROCEDURE — 85610 PROTHROMBIN TIME: CPT

## 2023-03-18 PROCEDURE — 80053 COMPREHEN METABOLIC PANEL: CPT

## 2023-03-18 PROCEDURE — 85730 THROMBOPLASTIN TIME PARTIAL: CPT

## 2023-03-18 PROCEDURE — 2580000003 HC RX 258: Performed by: GENERAL ACUTE CARE HOSPITAL

## 2023-03-18 PROCEDURE — 84484 ASSAY OF TROPONIN QUANT: CPT

## 2023-03-18 PROCEDURE — 87086 URINE CULTURE/COLONY COUNT: CPT

## 2023-03-18 PROCEDURE — 83690 ASSAY OF LIPASE: CPT

## 2023-03-18 PROCEDURE — 93005 ELECTROCARDIOGRAM TRACING: CPT | Performed by: INTERNAL MEDICINE

## 2023-03-18 PROCEDURE — 82270 OCCULT BLOOD FECES: CPT

## 2023-03-18 PROCEDURE — 6370000000 HC RX 637 (ALT 250 FOR IP): Performed by: GENERAL ACUTE CARE HOSPITAL

## 2023-03-18 PROCEDURE — 96375 TX/PRO/DX INJ NEW DRUG ADDON: CPT

## 2023-03-18 PROCEDURE — 70450 CT HEAD/BRAIN W/O DYE: CPT

## 2023-03-18 PROCEDURE — 99285 EMERGENCY DEPT VISIT HI MDM: CPT

## 2023-03-18 PROCEDURE — 71045 X-RAY EXAM CHEST 1 VIEW: CPT

## 2023-03-18 PROCEDURE — 87804 INFLUENZA ASSAY W/OPTIC: CPT

## 2023-03-18 PROCEDURE — 83605 ASSAY OF LACTIC ACID: CPT

## 2023-03-18 PROCEDURE — 6360000002 HC RX W HCPCS: Performed by: GENERAL ACUTE CARE HOSPITAL

## 2023-03-18 PROCEDURE — 85025 COMPLETE CBC W/AUTO DIFF WBC: CPT

## 2023-03-18 PROCEDURE — 83880 ASSAY OF NATRIURETIC PEPTIDE: CPT

## 2023-03-18 PROCEDURE — 87635 SARS-COV-2 COVID-19 AMP PRB: CPT

## 2023-03-18 PROCEDURE — 1200000000 HC SEMI PRIVATE

## 2023-03-18 PROCEDURE — 36415 COLL VENOUS BLD VENIPUNCTURE: CPT

## 2023-03-18 PROCEDURE — 81001 URINALYSIS AUTO W/SCOPE: CPT

## 2023-03-18 RX ORDER — 0.9 % SODIUM CHLORIDE 0.9 %
1000 INTRAVENOUS SOLUTION INTRAVENOUS ONCE
Status: DISCONTINUED | OUTPATIENT
Start: 2023-03-18 | End: 2023-03-18

## 2023-03-18 RX ORDER — MAGNESIUM SULFATE IN WATER 40 MG/ML
2000 INJECTION, SOLUTION INTRAVENOUS PRN
Status: DISCONTINUED | OUTPATIENT
Start: 2023-03-18 | End: 2023-03-21 | Stop reason: HOSPADM

## 2023-03-18 RX ORDER — SODIUM CHLORIDE 0.9 % (FLUSH) 0.9 %
10 SYRINGE (ML) INJECTION PRN
Status: DISCONTINUED | OUTPATIENT
Start: 2023-03-18 | End: 2023-03-21 | Stop reason: HOSPADM

## 2023-03-18 RX ORDER — TIZANIDINE 4 MG/1
2 TABLET ORAL EVERY 8 HOURS PRN
Status: DISCONTINUED | OUTPATIENT
Start: 2023-03-18 | End: 2023-03-21 | Stop reason: HOSPADM

## 2023-03-18 RX ORDER — POTASSIUM CHLORIDE 7.45 MG/ML
10 INJECTION INTRAVENOUS PRN
Status: DISCONTINUED | OUTPATIENT
Start: 2023-03-18 | End: 2023-03-21 | Stop reason: HOSPADM

## 2023-03-18 RX ORDER — SODIUM CHLORIDE 0.9 % (FLUSH) 0.9 %
10 SYRINGE (ML) INJECTION EVERY 12 HOURS SCHEDULED
Status: DISCONTINUED | OUTPATIENT
Start: 2023-03-18 | End: 2023-03-21 | Stop reason: HOSPADM

## 2023-03-18 RX ORDER — SODIUM CHLORIDE 9 MG/ML
INJECTION, SOLUTION INTRAVENOUS PRN
Status: DISCONTINUED | OUTPATIENT
Start: 2023-03-18 | End: 2023-03-21 | Stop reason: HOSPADM

## 2023-03-18 RX ORDER — 0.9 % SODIUM CHLORIDE 0.9 %
500 INTRAVENOUS SOLUTION INTRAVENOUS ONCE
Status: COMPLETED | OUTPATIENT
Start: 2023-03-18 | End: 2023-03-18

## 2023-03-18 RX ORDER — ONDANSETRON 2 MG/ML
4 INJECTION INTRAMUSCULAR; INTRAVENOUS ONCE
Status: COMPLETED | OUTPATIENT
Start: 2023-03-18 | End: 2023-03-18

## 2023-03-18 RX ORDER — ACETAMINOPHEN 325 MG/1
650 TABLET ORAL EVERY 6 HOURS PRN
Status: DISCONTINUED | OUTPATIENT
Start: 2023-03-18 | End: 2023-03-21 | Stop reason: HOSPADM

## 2023-03-18 RX ORDER — MORPHINE SULFATE 2 MG/ML
2 INJECTION, SOLUTION INTRAMUSCULAR; INTRAVENOUS ONCE
Status: COMPLETED | OUTPATIENT
Start: 2023-03-18 | End: 2023-03-18

## 2023-03-18 RX ORDER — FUROSEMIDE 10 MG/ML
40 INJECTION INTRAMUSCULAR; INTRAVENOUS 2 TIMES DAILY
Status: DISCONTINUED | OUTPATIENT
Start: 2023-03-19 | End: 2023-03-21 | Stop reason: HOSPADM

## 2023-03-18 RX ORDER — ACETAMINOPHEN 500 MG
1000 TABLET ORAL ONCE
Status: COMPLETED | OUTPATIENT
Start: 2023-03-18 | End: 2023-03-18

## 2023-03-18 RX ORDER — ENOXAPARIN SODIUM 100 MG/ML
40 INJECTION SUBCUTANEOUS DAILY
Status: DISCONTINUED | OUTPATIENT
Start: 2023-03-19 | End: 2023-03-18 | Stop reason: ALTCHOICE

## 2023-03-18 RX ORDER — FUROSEMIDE 10 MG/ML
40 INJECTION INTRAMUSCULAR; INTRAVENOUS ONCE
Status: COMPLETED | OUTPATIENT
Start: 2023-03-18 | End: 2023-03-18

## 2023-03-18 RX ORDER — VANCOMYCIN 1.75 G/350ML
1250 INJECTION, SOLUTION INTRAVENOUS ONCE
Status: COMPLETED | OUTPATIENT
Start: 2023-03-18 | End: 2023-03-18

## 2023-03-18 RX ORDER — ACETAMINOPHEN 650 MG/1
650 SUPPOSITORY RECTAL EVERY 6 HOURS PRN
Status: DISCONTINUED | OUTPATIENT
Start: 2023-03-18 | End: 2023-03-21 | Stop reason: HOSPADM

## 2023-03-18 RX ORDER — ESCITALOPRAM OXALATE 10 MG/1
10 TABLET ORAL DAILY
Status: DISCONTINUED | OUTPATIENT
Start: 2023-03-19 | End: 2023-03-21 | Stop reason: HOSPADM

## 2023-03-18 RX ORDER — ONDANSETRON 2 MG/ML
4 INJECTION INTRAMUSCULAR; INTRAVENOUS EVERY 6 HOURS PRN
Status: DISCONTINUED | OUTPATIENT
Start: 2023-03-18 | End: 2023-03-21 | Stop reason: HOSPADM

## 2023-03-18 RX ORDER — FLUCONAZOLE 100 MG/1
200 TABLET ORAL ONCE
Status: COMPLETED | OUTPATIENT
Start: 2023-03-18 | End: 2023-03-18

## 2023-03-18 RX ORDER — PROMETHAZINE HYDROCHLORIDE 25 MG/1
12.5 TABLET ORAL EVERY 6 HOURS PRN
Status: DISCONTINUED | OUTPATIENT
Start: 2023-03-18 | End: 2023-03-21 | Stop reason: HOSPADM

## 2023-03-18 RX ADMIN — FLUCONAZOLE 200 MG: 100 TABLET ORAL at 18:59

## 2023-03-18 RX ADMIN — FUROSEMIDE 40 MG: 10 INJECTION, SOLUTION INTRAMUSCULAR; INTRAVENOUS at 21:11

## 2023-03-18 RX ADMIN — CEFTRIAXONE 2000 MG: 2 INJECTION, POWDER, FOR SOLUTION INTRAMUSCULAR; INTRAVENOUS at 18:05

## 2023-03-18 RX ADMIN — MORPHINE SULFATE 2 MG: 2 INJECTION, SOLUTION INTRAMUSCULAR; INTRAVENOUS at 16:48

## 2023-03-18 RX ADMIN — VANCOMYCIN 1250 MG: 1.75 INJECTION, SOLUTION INTRAVENOUS at 21:16

## 2023-03-18 RX ADMIN — ACETAMINOPHEN 1000 MG: 500 TABLET ORAL at 21:46

## 2023-03-18 RX ADMIN — SODIUM CHLORIDE 500 ML: 9 INJECTION, SOLUTION INTRAVENOUS at 16:52

## 2023-03-18 RX ADMIN — ONDANSETRON 4 MG: 2 INJECTION INTRAMUSCULAR; INTRAVENOUS at 16:47

## 2023-03-18 ASSESSMENT — ENCOUNTER SYMPTOMS
NAUSEA: 0
ABDOMINAL PAIN: 0
COUGH: 0
BACK PAIN: 0
BLOOD IN STOOL: 1
VOICE CHANGE: 0
SHORTNESS OF BREATH: 0
CHEST TIGHTNESS: 0
VOMITING: 0
SORE THROAT: 0
WHEEZING: 0

## 2023-03-18 ASSESSMENT — PAIN SCALES - PAIN ASSESSMENT IN ADVANCED DEMENTIA (PAINAD)
CONSOLABILITY: 2
FACIALEXPRESSION: 2
TOTALSCORE: 7
CONSOLABILITY: 2
NEGVOCALIZATION: 1
FACIALEXPRESSION: 1
BREATHING: 1
TOTALSCORE: 6
BODYLANGUAGE: 1
NEGVOCALIZATION: 1
BODYLANGUAGE: 1
BREATHING: 1

## 2023-03-18 ASSESSMENT — PAIN SCALES - GENERAL: PAINLEVEL_OUTOF10: 9

## 2023-03-18 ASSESSMENT — PAIN DESCRIPTION - DESCRIPTORS: DESCRIPTORS: ACHING

## 2023-03-18 ASSESSMENT — PAIN DESCRIPTION - LOCATION: LOCATION: ABDOMEN

## 2023-03-18 ASSESSMENT — PAIN - FUNCTIONAL ASSESSMENT: PAIN_FUNCTIONAL_ASSESSMENT: PAIN ASSESSMENT IN ADVANCED DEMENTIA (PAINAD)

## 2023-03-18 NOTE — ED NOTES
Pt arrived from nursing home with increased abdominal pain, blood in her stool or around her rectum, and confusion. Pt baseline is alert and oriented ambulating independently but the pt hasn't gotten out of bed for 2 days per nursing home staff. Pt has a tello, g tube, and colostomy. Pt is full code.       Serita Severance, RN  03/18/23 3499

## 2023-03-19 LAB
ALBUMIN SERPL-MCNC: 2.1 G/DL (ref 3.4–5)
ALBUMIN/GLOB SERPL: 0.7 {RATIO} (ref 1.1–2.2)
ALP SERPL-CCNC: 164 U/L (ref 40–129)
ALT SERPL-CCNC: 23 U/L (ref 10–40)
ANION GAP SERPL CALCULATED.3IONS-SCNC: 11 MMOL/L (ref 3–16)
AST SERPL-CCNC: 27 U/L (ref 15–37)
BACTERIA UR CULT: NORMAL
BASE EXCESS BLDV CALC-SCNC: 12.5 MMOL/L
BASOPHILS # BLD: 0 K/UL (ref 0–0.2)
BASOPHILS NFR BLD: 0.2 %
BILIRUB SERPL-MCNC: 0.3 MG/DL (ref 0–1)
BUN SERPL-MCNC: 16 MG/DL (ref 7–20)
CALCIUM SERPL-MCNC: 8.5 MG/DL (ref 8.3–10.6)
CHLORIDE SERPL-SCNC: 92 MMOL/L (ref 99–110)
CO2 BLDV-SCNC: 38 MMOL/L
CO2 SERPL-SCNC: 31 MMOL/L (ref 21–32)
COHGB MFR BLDV: 1.6 %
CREAT SERPL-MCNC: <0.5 MG/DL (ref 0.6–1.2)
DEPRECATED RDW RBC AUTO: 21.5 % (ref 12.4–15.4)
EKG DIAGNOSIS: NORMAL
EKG Q-T INTERVAL: 326 MS
EKG QRS DURATION: 104 MS
EKG QTC CALCULATION (BAZETT): 428 MS
EKG R AXIS: -11 DEGREES
EKG T AXIS: 238 DEGREES
EKG VENTRICULAR RATE: 104 BPM
EOSINOPHIL # BLD: 0 K/UL (ref 0–0.6)
EOSINOPHIL NFR BLD: 0.1 %
GFR SERPLBLD CREATININE-BSD FMLA CKD-EPI: >60 ML/MIN/{1.73_M2}
GLUCOSE BLD-MCNC: 82 MG/DL (ref 70–99)
GLUCOSE BLD-MCNC: 84 MG/DL (ref 70–99)
GLUCOSE BLD-MCNC: 91 MG/DL (ref 70–99)
GLUCOSE BLD-MCNC: 92 MG/DL (ref 70–99)
GLUCOSE SERPL-MCNC: 91 MG/DL (ref 70–99)
HCO3 BLDV-SCNC: 37 MMOL/L (ref 23–29)
HCT VFR BLD AUTO: 23.9 % (ref 36–48)
HCT VFR BLD AUTO: 26 % (ref 36–48)
HGB BLD-MCNC: 7.5 G/DL (ref 12–16)
HGB BLD-MCNC: 8.1 G/DL (ref 12–16)
LACTATE BLDV-SCNC: 3.1 MMOL/L (ref 0.4–2)
LV EF: 60 %
LVEF MODALITY: NORMAL
LYMPHOCYTES # BLD: 1.3 K/UL (ref 1–5.1)
LYMPHOCYTES NFR BLD: 11.6 %
MAGNESIUM SERPL-MCNC: 1.6 MG/DL (ref 1.8–2.4)
MCH RBC QN AUTO: 24.8 PG (ref 26–34)
MCHC RBC AUTO-ENTMCNC: 31.3 G/DL (ref 31–36)
MCV RBC AUTO: 79.3 FL (ref 80–100)
METHGB MFR BLDV: 0.3 %
MONOCYTES # BLD: 0.8 K/UL (ref 0–1.3)
MONOCYTES NFR BLD: 7.5 %
NEUTROPHILS # BLD: 8.9 K/UL (ref 1.7–7.7)
NEUTROPHILS NFR BLD: 80.6 %
O2 THERAPY: ABNORMAL
PCO2 BLDV: 44.5 MMHG (ref 40–50)
PERFORMED ON: NORMAL
PH BLDV: 7.52 [PH] (ref 7.35–7.45)
PLATELET # BLD AUTO: 410 K/UL (ref 135–450)
PMV BLD AUTO: 7.9 FL (ref 5–10.5)
PO2 BLDV: <30 MMHG
POTASSIUM SERPL-SCNC: 3.5 MMOL/L (ref 3.5–5.1)
PROT SERPL-MCNC: 5.3 G/DL (ref 6.4–8.2)
RBC # BLD AUTO: 3.01 M/UL (ref 4–5.2)
SAO2 % BLDV: 50 %
SODIUM SERPL-SCNC: 134 MMOL/L (ref 136–145)
TROPONIN T SERPL-MCNC: 0.02 NG/ML
WBC # BLD AUTO: 11 K/UL (ref 4–11)

## 2023-03-19 PROCEDURE — C9113 INJ PANTOPRAZOLE SODIUM, VIA: HCPCS | Performed by: INTERNAL MEDICINE

## 2023-03-19 PROCEDURE — 82803 BLOOD GASES ANY COMBINATION: CPT

## 2023-03-19 PROCEDURE — 6370000000 HC RX 637 (ALT 250 FOR IP): Performed by: INTERNAL MEDICINE

## 2023-03-19 PROCEDURE — 85025 COMPLETE CBC W/AUTO DIFF WBC: CPT

## 2023-03-19 PROCEDURE — 83735 ASSAY OF MAGNESIUM: CPT

## 2023-03-19 PROCEDURE — 6360000002 HC RX W HCPCS: Performed by: NURSE PRACTITIONER

## 2023-03-19 PROCEDURE — 85014 HEMATOCRIT: CPT

## 2023-03-19 PROCEDURE — 6360000002 HC RX W HCPCS: Performed by: INTERNAL MEDICINE

## 2023-03-19 PROCEDURE — 1200000000 HC SEMI PRIVATE

## 2023-03-19 PROCEDURE — 93308 TTE F-UP OR LMTD: CPT

## 2023-03-19 PROCEDURE — 36415 COLL VENOUS BLD VENIPUNCTURE: CPT

## 2023-03-19 PROCEDURE — 84484 ASSAY OF TROPONIN QUANT: CPT

## 2023-03-19 PROCEDURE — 2580000003 HC RX 258: Performed by: INTERNAL MEDICINE

## 2023-03-19 PROCEDURE — 99223 1ST HOSP IP/OBS HIGH 75: CPT | Performed by: INTERNAL MEDICINE

## 2023-03-19 PROCEDURE — 80053 COMPREHEN METABOLIC PANEL: CPT

## 2023-03-19 PROCEDURE — 93010 ELECTROCARDIOGRAM REPORT: CPT | Performed by: INTERNAL MEDICINE

## 2023-03-19 PROCEDURE — 85018 HEMOGLOBIN: CPT

## 2023-03-19 PROCEDURE — 83605 ASSAY OF LACTIC ACID: CPT

## 2023-03-19 RX ORDER — FUROSEMIDE 20 MG/1
20 TABLET ORAL DAILY
Status: DISCONTINUED | OUTPATIENT
Start: 2023-03-19 | End: 2023-03-19

## 2023-03-19 RX ORDER — POTASSIUM CHLORIDE 750 MG/1
10 TABLET, FILM COATED, EXTENDED RELEASE ORAL
Status: DISCONTINUED | OUTPATIENT
Start: 2023-03-19 | End: 2023-03-21 | Stop reason: HOSPADM

## 2023-03-19 RX ORDER — ALBUMIN (HUMAN) 12.5 G/50ML
25 SOLUTION INTRAVENOUS ONCE
Status: COMPLETED | OUTPATIENT
Start: 2023-03-20 | End: 2023-03-20

## 2023-03-19 RX ORDER — MORPHINE SULFATE 2 MG/ML
2 INJECTION, SOLUTION INTRAMUSCULAR; INTRAVENOUS EVERY 4 HOURS PRN
Status: DISCONTINUED | OUTPATIENT
Start: 2023-03-19 | End: 2023-03-21 | Stop reason: HOSPADM

## 2023-03-19 RX ORDER — FLECAINIDE ACETATE 100 MG/1
100 TABLET ORAL EVERY 12 HOURS SCHEDULED
Status: DISCONTINUED | OUTPATIENT
Start: 2023-03-19 | End: 2023-03-21 | Stop reason: HOSPADM

## 2023-03-19 RX ORDER — ACETAMINOPHEN 325 MG/1
650 TABLET ORAL EVERY 6 HOURS PRN
Status: DISCONTINUED | OUTPATIENT
Start: 2023-03-19 | End: 2023-03-19 | Stop reason: SDUPTHER

## 2023-03-19 RX ORDER — OXYCODONE HYDROCHLORIDE 5 MG/1
5 TABLET ORAL 3 TIMES DAILY
Status: DISCONTINUED | OUTPATIENT
Start: 2023-03-19 | End: 2023-03-21 | Stop reason: HOSPADM

## 2023-03-19 RX ORDER — SENNA PLUS 8.6 MG/1
2 TABLET ORAL DAILY
Status: DISCONTINUED | OUTPATIENT
Start: 2023-03-19 | End: 2023-03-21 | Stop reason: HOSPADM

## 2023-03-19 RX ORDER — LANOLIN ALCOHOL/MO/W.PET/CERES
2000 CREAM (GRAM) TOPICAL DAILY
Status: DISCONTINUED | OUTPATIENT
Start: 2023-03-19 | End: 2023-03-21 | Stop reason: HOSPADM

## 2023-03-19 RX ORDER — DICYCLOMINE HYDROCHLORIDE 10 MG/1
10 CAPSULE ORAL 4 TIMES DAILY PRN
Status: DISCONTINUED | OUTPATIENT
Start: 2023-03-19 | End: 2023-03-21 | Stop reason: HOSPADM

## 2023-03-19 RX ADMIN — SODIUM CHLORIDE, PRESERVATIVE FREE 10 ML: 5 INJECTION INTRAVENOUS at 20:59

## 2023-03-19 RX ADMIN — MORPHINE SULFATE 2 MG: 2 INJECTION, SOLUTION INTRAMUSCULAR; INTRAVENOUS at 07:16

## 2023-03-19 RX ADMIN — VANCOMYCIN HYDROCHLORIDE 1000 MG: 1 INJECTION, POWDER, LYOPHILIZED, FOR SOLUTION INTRAVENOUS at 15:37

## 2023-03-19 RX ADMIN — OXYCODONE HYDROCHLORIDE 5 MG: 5 TABLET ORAL at 20:57

## 2023-03-19 RX ADMIN — MORPHINE SULFATE 2 MG: 2 INJECTION, SOLUTION INTRAMUSCULAR; INTRAVENOUS at 10:41

## 2023-03-19 RX ADMIN — MEROPENEM 1000 MG: 1 INJECTION, POWDER, FOR SOLUTION INTRAVENOUS at 21:00

## 2023-03-19 RX ADMIN — FUROSEMIDE 40 MG: 10 INJECTION, SOLUTION INTRAMUSCULAR; INTRAVENOUS at 17:17

## 2023-03-19 RX ADMIN — FLECAINIDE ACETATE 100 MG: 100 TABLET ORAL at 13:28

## 2023-03-19 RX ADMIN — Medication 40 MG: at 00:40

## 2023-03-19 RX ADMIN — ESCITALOPRAM OXALATE 10 MG: 10 TABLET, FILM COATED ORAL at 09:22

## 2023-03-19 RX ADMIN — Medication 40 MG: at 09:18

## 2023-03-19 RX ADMIN — FUROSEMIDE 40 MG: 10 INJECTION, SOLUTION INTRAMUSCULAR; INTRAVENOUS at 09:19

## 2023-03-19 RX ADMIN — SODIUM CHLORIDE, PRESERVATIVE FREE 10 ML: 5 INJECTION INTRAVENOUS at 09:39

## 2023-03-19 RX ADMIN — MORPHINE SULFATE 2 MG: 2 INJECTION, SOLUTION INTRAMUSCULAR; INTRAVENOUS at 00:35

## 2023-03-19 RX ADMIN — SENNOSIDES 17.2 MG: 8.6 TABLET, FILM COATED ORAL at 13:28

## 2023-03-19 RX ADMIN — TIZANIDINE 2 MG: 4 TABLET ORAL at 20:58

## 2023-03-19 RX ADMIN — MAGNESIUM SULFATE HEPTAHYDRATE 2000 MG: 40 INJECTION, SOLUTION INTRAVENOUS at 09:37

## 2023-03-19 RX ADMIN — CALCIUM CARBONATE-VITAMIN D TAB 500 MG-200 UNIT 1 TABLET: 500-200 TAB at 17:18

## 2023-03-19 RX ADMIN — TIZANIDINE 2 MG: 4 TABLET ORAL at 09:22

## 2023-03-19 RX ADMIN — FLECAINIDE ACETATE 100 MG: 100 TABLET ORAL at 20:58

## 2023-03-19 RX ADMIN — MEROPENEM 1000 MG: 1 INJECTION, POWDER, FOR SOLUTION INTRAVENOUS at 13:41

## 2023-03-19 RX ADMIN — OXYCODONE HYDROCHLORIDE 5 MG: 5 TABLET ORAL at 13:28

## 2023-03-19 RX ADMIN — POTASSIUM CHLORIDE 10 MEQ: 750 TABLET, EXTENDED RELEASE ORAL at 13:27

## 2023-03-19 RX ADMIN — CALCIUM CARBONATE-VITAMIN D TAB 500 MG-200 UNIT 1 TABLET: 500-200 TAB at 13:28

## 2023-03-19 RX ADMIN — CYANOCOBALAMIN TAB 1000 MCG 2000 MCG: 1000 TAB at 13:28

## 2023-03-19 RX ADMIN — Medication 40 MG: at 20:58

## 2023-03-19 RX ADMIN — MORPHINE SULFATE 2 MG: 2 INJECTION, SOLUTION INTRAMUSCULAR; INTRAVENOUS at 17:54

## 2023-03-19 RX ADMIN — SODIUM CHLORIDE, PRESERVATIVE FREE 10 ML: 5 INJECTION INTRAVENOUS at 00:09

## 2023-03-19 ASSESSMENT — PAIN DESCRIPTION - DESCRIPTORS
DESCRIPTORS: DISCOMFORT
DESCRIPTORS: THROBBING;ACHING;BURNING
DESCRIPTORS: ACHING;THROBBING

## 2023-03-19 ASSESSMENT — PAIN DESCRIPTION - LOCATION
LOCATION: COCCYX;LEG
LOCATION: OTHER (COMMENT)
LOCATION: COCCYX
LOCATION: COCCYX
LOCATION: COCCYX;SACRUM;LEG
LOCATION: OTHER (COMMENT)

## 2023-03-19 ASSESSMENT — PAIN - FUNCTIONAL ASSESSMENT
PAIN_FUNCTIONAL_ASSESSMENT: PREVENTS OR INTERFERES WITH MANY ACTIVE NOT PASSIVE ACTIVITIES
PAIN_FUNCTIONAL_ASSESSMENT: PREVENTS OR INTERFERES WITH MANY ACTIVE NOT PASSIVE ACTIVITIES
PAIN_FUNCTIONAL_ASSESSMENT: PREVENTS OR INTERFERES WITH ALL ACTIVE AND SOME PASSIVE ACTIVITIES

## 2023-03-19 ASSESSMENT — PAIN DESCRIPTION - ORIENTATION
ORIENTATION: RIGHT;LEFT;MID
ORIENTATION: RIGHT;LEFT
ORIENTATION: RIGHT;LEFT
ORIENTATION: LEFT;RIGHT;MID
ORIENTATION: OTHER (COMMENT)

## 2023-03-19 ASSESSMENT — PAIN SCALES - GENERAL
PAINLEVEL_OUTOF10: 6
PAINLEVEL_OUTOF10: 3
PAINLEVEL_OUTOF10: 10
PAINLEVEL_OUTOF10: 8
PAINLEVEL_OUTOF10: 9
PAINLEVEL_OUTOF10: 7

## 2023-03-19 ASSESSMENT — PAIN DESCRIPTION - FREQUENCY: FREQUENCY: INTERMITTENT

## 2023-03-19 ASSESSMENT — PAIN DESCRIPTION - PAIN TYPE: TYPE: ACUTE PAIN

## 2023-03-19 ASSESSMENT — PAIN DESCRIPTION - ONSET: ONSET: PROGRESSIVE

## 2023-03-19 NOTE — ED PROVIDER NOTES
I was available for consultation during patient's ED stay. Patient was cared for by RONALD. I did not evaluate or participate in patient care. EKG Interpretation    Interpreted by emergency department physician    Rhythm: Sinus tachycardia  Rate: 104  Axis: normal  Ectopy: none  Conduction: normal  ST Segments: normal  T Waves: normal  Q Waves: none    Clinical Impression: Tachycardia with left ventricular hypertrophy and a large amount of artifact from patient movement. Normal AL interval, normal QRS duration, normal QT QTC. Normal axis. No arrhythmia or signs of ischemia. Otherwise normal EKG. Interpreted by myself.           MD Elsa Gonzalez MD  03/18/23 0317
CHEST PORTABLE   Final Result   Small to moderate right layering pleural effusion. Small left pleural   effusion. CT HEAD WO CONTRAST    (Results Pending)     XR CHEST PORTABLE    Result Date: 3/18/2023  EXAMINATION: ONE XRAY VIEW OF THE CHEST 3/18/2023 7:09 pm COMPARISON: Chest x-ray dated 08/21/2022. HISTORY: ORDERING SYSTEM PROVIDED HISTORY: SOB TECHNOLOGIST PROVIDED HISTORY: Reason for exam:->SOB Reason for Exam: sob FINDINGS: HEART/MEDIASTINUM: The cardiomediastinal silhouette is stable. PLEURA/LUNGS: Haziness of the right mid to lower lung likely reflecting a small to moderate layering right pleural effusion. There is a small left pleural effusion. .  There is no appreciable pneumothorax. BONES/SOFT TISSUE: No acute abnormality. Cervical spine hardware is again noted. Small to moderate right layering pleural effusion. Small left pleural effusion. No results found. PROCEDURES   Unless otherwise noted below, none     Procedures    CRITICAL CARE TIME (.cctime)   I personally saw the patient and independently provided 28 minutes of non-concurrent critical care time out of the total critical care time provided. This excludes time spent doing separately billable procedures. This includes time at the bedside, data interpretation, medication management, obtaining critical history from collateral sources if the patient is unable to provide it directly, and physician consultation. Specifics of interventions taken and potentially life-threatening diagnostic considerations are listed above in the medical decision making. PAST MEDICAL HISTORY      has a past medical history of Acid reflux, Anxiety, Arthritis, Atrial fibrillation (HCC), Closed fracture dislocation of right elbow, Depression, Fibromyalgia, Migraine, WHITNEY (obstructive sleep apnea), Rectal cancer ([facility] and Wears glasses.      EMERGENCY DEPARTMENT COURSE and DIFFERENTIAL DIAGNOSIS/MDM:   Vitals:    Vitals:    03/18/23 1730

## 2023-03-19 NOTE — H&P
Provider, MD   acetaminophen (TYLENOL) 325 MG tablet Take 650 mg by mouth every 6 hours as needed for Pain    Historical Provider, MD   senna (SENOKOT) 8.6 MG tablet Take 2 tablets by mouth daily    Historical Provider, MD   traMADol (ULTRAM) 50 MG tablet Take 50 mg by mouth 3 times daily as needed for Pain. Patient not taking: Reported on 1/11/2023    Historical Provider, MD   traZODone (DESYREL) 50 MG tablet Take 25-50 mg by mouth nightly as needed for Sleep    Historical Provider, MD   furosemide (LASIX) 20 MG tablet Take 20 mg by mouth daily    Historical Provider, MD   potassium chloride (MICRO-K) 10 MEQ extended release capsule Take 10 mEq by mouth daily    Historical Provider, MD   polyethylene glycol (GLYCOLAX) 17 g packet Take 17 g by mouth daily as needed for Constipation    Historical Provider, MD   Nutritional Supplements (JEVITY 1.2 VERONICA) LIQD 40 mLs by Gastrostomy Tube route continuous    Historical Provider, MD   pantoprazole (PROTONIX) 40 MG tablet TAKE 1 TABLET TWICE A DAY  Patient taking differently: Take 40 mg by mouth in the morning and at bedtime 9/16/22   Raegan Norris MD   apixaban (ELIQUIS) 5 MG TABS tablet Take 1 tablet by mouth 2 times daily 9/6/22   Michael Jefferson MD   aspirin EC 81 MG EC tablet Take 1 tablet by mouth daily 8/31/22 9/6/22  Paulo Miramontes MD   potassium chloride (KLOR-CON M) 20 MEQ extended release tablet TAKE 1 TABLET DAILY 8/5/22 8/31/22  Raegan Norris MD   docusate sodium (COLACE, DULCOLAX) 100 MG CAPS Take 100 mg by mouth 2 times daily as needed for Constipation 8/4/22   Paulo Miramontes MD   metoprolol succinate (TOPROL XL) 25 MG extended release tablet Take 1 tablet by mouth in the morning.  8/5/22 8/31/22  Paulo Miramontes MD   escitalopram (LEXAPRO) 10 MG tablet Take 1 tablet by mouth daily 6/14/22   Raegan Norris MD   tiZANidine (ZANAFLEX) 2 MG tablet Take 1 tablet by mouth every 6 hours as needed (muscle pain)  Patient taking differently: Take

## 2023-03-20 ENCOUNTER — APPOINTMENT (OUTPATIENT)
Dept: GENERAL RADIOLOGY | Age: 78
DRG: 871 | End: 2023-03-20
Payer: MEDICARE

## 2023-03-20 PROBLEM — Z93.3 COLOSTOMY IN PLACE (HCC): Status: ACTIVE | Noted: 2023-03-20

## 2023-03-20 PROBLEM — L97.925 ULCER OF LEFT LOWER EXTREMITY WITH MUSCLE INVOLVEMENT WITHOUT EVIDENCE OF NECROSIS (HCC): Status: ACTIVE | Noted: 2023-03-20

## 2023-03-20 PROBLEM — R50.9 FEVER AND CHILLS: Status: ACTIVE | Noted: 2023-03-20

## 2023-03-20 PROBLEM — J90 BILATERAL PLEURAL EFFUSION: Status: ACTIVE | Noted: 2023-03-20

## 2023-03-20 PROBLEM — E87.20 LACTIC ACID ACIDOSIS: Status: ACTIVE | Noted: 2023-03-20

## 2023-03-20 LAB
ABO + RH BLD: NORMAL
ALBUMIN SERPL-MCNC: 2.1 G/DL (ref 3.4–5)
ALBUMIN/GLOB SERPL: 0.7 {RATIO} (ref 1.1–2.2)
ALP SERPL-CCNC: 128 U/L (ref 40–129)
ALT SERPL-CCNC: 15 U/L (ref 10–40)
ANION GAP SERPL CALCULATED.3IONS-SCNC: 11 MMOL/L (ref 3–16)
ANION GAP SERPL CALCULATED.3IONS-SCNC: 9 MMOL/L (ref 3–16)
AST SERPL-CCNC: 19 U/L (ref 15–37)
BASOPHILS # BLD: 0 K/UL (ref 0–0.2)
BASOPHILS NFR BLD: 0.2 %
BILIRUB SERPL-MCNC: 0.3 MG/DL (ref 0–1)
BLD GP AB SCN SERPL QL: NORMAL
BLOOD BANK DISPENSE STATUS: NORMAL
BLOOD BANK PRODUCT CODE: NORMAL
BPU ID: NORMAL
BUN SERPL-MCNC: 16 MG/DL (ref 7–20)
BUN SERPL-MCNC: 17 MG/DL (ref 7–20)
CALCIUM SERPL-MCNC: 8.2 MG/DL (ref 8.3–10.6)
CALCIUM SERPL-MCNC: 8.2 MG/DL (ref 8.3–10.6)
CHLORIDE SERPL-SCNC: 92 MMOL/L (ref 99–110)
CHLORIDE SERPL-SCNC: 95 MMOL/L (ref 99–110)
CO2 SERPL-SCNC: 30 MMOL/L (ref 21–32)
CO2 SERPL-SCNC: 30 MMOL/L (ref 21–32)
CREAT SERPL-MCNC: <0.5 MG/DL (ref 0.6–1.2)
CREAT SERPL-MCNC: <0.5 MG/DL (ref 0.6–1.2)
DEPRECATED RDW RBC AUTO: 21.2 % (ref 12.4–15.4)
DESCRIPTION BLOOD BANK: NORMAL
EOSINOPHIL # BLD: 0 K/UL (ref 0–0.6)
EOSINOPHIL NFR BLD: 0.2 %
GFR SERPLBLD CREATININE-BSD FMLA CKD-EPI: >60 ML/MIN/{1.73_M2}
GFR SERPLBLD CREATININE-BSD FMLA CKD-EPI: >60 ML/MIN/{1.73_M2}
GLUCOSE BLD-MCNC: 77 MG/DL (ref 70–99)
GLUCOSE BLD-MCNC: 88 MG/DL (ref 70–99)
GLUCOSE SERPL-MCNC: 77 MG/DL (ref 70–99)
GLUCOSE SERPL-MCNC: 82 MG/DL (ref 70–99)
HCT VFR BLD AUTO: 19.5 % (ref 36–48)
HCT VFR BLD AUTO: 24.1 % (ref 36–48)
HGB BLD-MCNC: 6.1 G/DL (ref 12–16)
HGB BLD-MCNC: 7.5 G/DL (ref 12–16)
LACTATE BLDV-SCNC: 0.7 MMOL/L (ref 0.4–2)
LACTATE BLDV-SCNC: 0.9 MMOL/L (ref 0.4–2)
LYMPHOCYTES # BLD: 0.9 K/UL (ref 1–5.1)
LYMPHOCYTES NFR BLD: 12.9 %
MAGNESIUM SERPL-MCNC: 1.8 MG/DL (ref 1.8–2.4)
MCH RBC QN AUTO: 24.8 PG (ref 26–34)
MCHC RBC AUTO-ENTMCNC: 31.2 G/DL (ref 31–36)
MCV RBC AUTO: 79.4 FL (ref 80–100)
MONOCYTES # BLD: 0.5 K/UL (ref 0–1.3)
MONOCYTES NFR BLD: 7.6 %
NEUTROPHILS # BLD: 5.3 K/UL (ref 1.7–7.7)
NEUTROPHILS NFR BLD: 79.1 %
PERFORMED ON: NORMAL
PERFORMED ON: NORMAL
PHOSPHATE SERPL-MCNC: 3.7 MG/DL (ref 2.5–4.9)
PLATELET # BLD AUTO: 320 K/UL (ref 135–450)
PMV BLD AUTO: 7.3 FL (ref 5–10.5)
POTASSIUM SERPL-SCNC: 3.1 MMOL/L (ref 3.5–5.1)
POTASSIUM SERPL-SCNC: 3.5 MMOL/L (ref 3.5–5.1)
POTASSIUM SERPL-SCNC: 3.8 MMOL/L (ref 3.5–5.1)
PROT SERPL-MCNC: 5 G/DL (ref 6.4–8.2)
RBC # BLD AUTO: 2.46 M/UL (ref 4–5.2)
SODIUM SERPL-SCNC: 131 MMOL/L (ref 136–145)
SODIUM SERPL-SCNC: 136 MMOL/L (ref 136–145)
TROPONIN T SERPL-MCNC: 0.03 NG/ML
VANCOMYCIN TROUGH SERPL-MCNC: 16 UG/ML (ref 10–20)
WBC # BLD AUTO: 6.6 K/UL (ref 4–11)

## 2023-03-20 PROCEDURE — 86901 BLOOD TYPING SEROLOGIC RH(D): CPT

## 2023-03-20 PROCEDURE — 83735 ASSAY OF MAGNESIUM: CPT

## 2023-03-20 PROCEDURE — 2580000003 HC RX 258: Performed by: INTERNAL MEDICINE

## 2023-03-20 PROCEDURE — 83605 ASSAY OF LACTIC ACID: CPT

## 2023-03-20 PROCEDURE — 6370000000 HC RX 637 (ALT 250 FOR IP): Performed by: INTERNAL MEDICINE

## 2023-03-20 PROCEDURE — 51702 INSERT TEMP BLADDER CATH: CPT

## 2023-03-20 PROCEDURE — 6360000002 HC RX W HCPCS: Performed by: INTERNAL MEDICINE

## 2023-03-20 PROCEDURE — 99233 SBSQ HOSP IP/OBS HIGH 50: CPT | Performed by: INTERNAL MEDICINE

## 2023-03-20 PROCEDURE — 2000000000 HC ICU R&B

## 2023-03-20 PROCEDURE — 36430 TRANSFUSION BLD/BLD COMPNT: CPT

## 2023-03-20 PROCEDURE — 86923 COMPATIBILITY TEST ELECTRIC: CPT

## 2023-03-20 PROCEDURE — C9113 INJ PANTOPRAZOLE SODIUM, VIA: HCPCS | Performed by: INTERNAL MEDICINE

## 2023-03-20 PROCEDURE — 85018 HEMOGLOBIN: CPT

## 2023-03-20 PROCEDURE — 87040 BLOOD CULTURE FOR BACTERIA: CPT

## 2023-03-20 PROCEDURE — 02HV33Z INSERTION OF INFUSION DEVICE INTO SUPERIOR VENA CAVA, PERCUTANEOUS APPROACH: ICD-10-PCS | Performed by: INTERNAL MEDICINE

## 2023-03-20 PROCEDURE — 99222 1ST HOSP IP/OBS MODERATE 55: CPT | Performed by: SURGERY

## 2023-03-20 PROCEDURE — 85014 HEMATOCRIT: CPT

## 2023-03-20 PROCEDURE — 36415 COLL VENOUS BLD VENIPUNCTURE: CPT

## 2023-03-20 PROCEDURE — 84132 ASSAY OF SERUM POTASSIUM: CPT

## 2023-03-20 PROCEDURE — 80053 COMPREHEN METABOLIC PANEL: CPT

## 2023-03-20 PROCEDURE — 84484 ASSAY OF TROPONIN QUANT: CPT

## 2023-03-20 PROCEDURE — 84100 ASSAY OF PHOSPHORUS: CPT

## 2023-03-20 PROCEDURE — P9016 RBC LEUKOCYTES REDUCED: HCPCS

## 2023-03-20 PROCEDURE — 85025 COMPLETE CBC W/AUTO DIFF WBC: CPT

## 2023-03-20 PROCEDURE — 36556 INSERT NON-TUNNEL CV CATH: CPT

## 2023-03-20 PROCEDURE — 86850 RBC ANTIBODY SCREEN: CPT

## 2023-03-20 PROCEDURE — 6360000002 HC RX W HCPCS: Performed by: NURSE PRACTITIONER

## 2023-03-20 PROCEDURE — 94760 N-INVAS EAR/PLS OXIMETRY 1: CPT

## 2023-03-20 PROCEDURE — 80202 ASSAY OF VANCOMYCIN: CPT

## 2023-03-20 PROCEDURE — 86900 BLOOD TYPING SEROLOGIC ABO: CPT

## 2023-03-20 PROCEDURE — APPNB15 APP NON BILLABLE TIME 0-15 MINS: Performed by: PHYSICIAN ASSISTANT

## 2023-03-20 PROCEDURE — P9047 ALBUMIN (HUMAN), 25%, 50ML: HCPCS | Performed by: NURSE PRACTITIONER

## 2023-03-20 PROCEDURE — 71045 X-RAY EXAM CHEST 1 VIEW: CPT

## 2023-03-20 RX ORDER — SODIUM CHLORIDE 9 MG/ML
INJECTION, SOLUTION INTRAVENOUS PRN
Status: DISCONTINUED | OUTPATIENT
Start: 2023-03-20 | End: 2023-03-21 | Stop reason: HOSPADM

## 2023-03-20 RX ORDER — OXYCODONE HYDROCHLORIDE 5 MG/1
5 TABLET ORAL EVERY 6 HOURS PRN
Status: ON HOLD | COMMUNITY
End: 2023-03-21 | Stop reason: HOSPADM

## 2023-03-20 RX ORDER — ALBUMIN (HUMAN) 12.5 G/50ML
25 SOLUTION INTRAVENOUS ONCE
Status: COMPLETED | OUTPATIENT
Start: 2023-03-20 | End: 2023-03-20

## 2023-03-20 RX ORDER — FERROUS SULFATE 325(65) MG
325 TABLET ORAL 2 TIMES DAILY
Status: ON HOLD | COMMUNITY
End: 2023-03-21 | Stop reason: HOSPADM

## 2023-03-20 RX ORDER — GABAPENTIN 100 MG/1
200 CAPSULE ORAL 2 TIMES DAILY
Status: ON HOLD | COMMUNITY
End: 2023-03-21 | Stop reason: HOSPADM

## 2023-03-20 RX ORDER — VANCOMYCIN 1.75 G/350ML
1250 INJECTION, SOLUTION INTRAVENOUS
Status: DISCONTINUED | OUTPATIENT
Start: 2023-03-20 | End: 2023-03-20

## 2023-03-20 RX ADMIN — FLECAINIDE ACETATE 100 MG: 100 TABLET ORAL at 08:50

## 2023-03-20 RX ADMIN — OXYCODONE HYDROCHLORIDE 5 MG: 5 TABLET ORAL at 23:21

## 2023-03-20 RX ADMIN — Medication 40 MG: at 08:49

## 2023-03-20 RX ADMIN — ESCITALOPRAM OXALATE 10 MG: 10 TABLET, FILM COATED ORAL at 08:51

## 2023-03-20 RX ADMIN — Medication 40 MG: at 22:50

## 2023-03-20 RX ADMIN — CALCIUM CARBONATE-VITAMIN D TAB 500 MG-200 UNIT 1 TABLET: 500-200 TAB at 08:51

## 2023-03-20 RX ADMIN — POTASSIUM CHLORIDE 10 MEQ: 7.46 INJECTION, SOLUTION INTRAVENOUS at 08:49

## 2023-03-20 RX ADMIN — POTASSIUM CHLORIDE 10 MEQ: 7.46 INJECTION, SOLUTION INTRAVENOUS at 13:18

## 2023-03-20 RX ADMIN — CYANOCOBALAMIN TAB 1000 MCG 2000 MCG: 1000 TAB at 08:51

## 2023-03-20 RX ADMIN — ACETAMINOPHEN 650 MG: 325 TABLET ORAL at 13:23

## 2023-03-20 RX ADMIN — ALBUMIN (HUMAN) 25 G: 0.25 INJECTION, SOLUTION INTRAVENOUS at 01:45

## 2023-03-20 RX ADMIN — MEROPENEM 1000 MG: 1 INJECTION, POWDER, FOR SOLUTION INTRAVENOUS at 13:22

## 2023-03-20 RX ADMIN — MEROPENEM 1000 MG: 1 INJECTION, POWDER, FOR SOLUTION INTRAVENOUS at 22:55

## 2023-03-20 RX ADMIN — SENNOSIDES 17.2 MG: 8.6 TABLET, FILM COATED ORAL at 08:51

## 2023-03-20 RX ADMIN — POTASSIUM CHLORIDE 10 MEQ: 750 TABLET, EXTENDED RELEASE ORAL at 08:50

## 2023-03-20 RX ADMIN — MORPHINE SULFATE 2 MG: 2 INJECTION, SOLUTION INTRAMUSCULAR; INTRAVENOUS at 18:17

## 2023-03-20 RX ADMIN — POTASSIUM CHLORIDE 10 MEQ: 7.46 INJECTION, SOLUTION INTRAVENOUS at 09:59

## 2023-03-20 RX ADMIN — SODIUM CHLORIDE, PRESERVATIVE FREE 10 ML: 5 INJECTION INTRAVENOUS at 21:00

## 2023-03-20 RX ADMIN — OXYCODONE HYDROCHLORIDE 5 MG: 5 TABLET ORAL at 08:50

## 2023-03-20 RX ADMIN — ALBUMIN (HUMAN) 25 G: 0.25 INJECTION, SOLUTION INTRAVENOUS at 00:08

## 2023-03-20 RX ADMIN — POTASSIUM CHLORIDE 10 MEQ: 7.46 INJECTION, SOLUTION INTRAVENOUS at 11:53

## 2023-03-20 RX ADMIN — MEROPENEM 1000 MG: 1 INJECTION, POWDER, FOR SOLUTION INTRAVENOUS at 05:02

## 2023-03-20 RX ADMIN — OXYCODONE HYDROCHLORIDE 5 MG: 5 TABLET ORAL at 13:23

## 2023-03-20 RX ADMIN — VANCOMYCIN 1250 MG: 1.75 INJECTION, SOLUTION INTRAVENOUS at 08:48

## 2023-03-20 RX ADMIN — ONDANSETRON 4 MG: 2 INJECTION INTRAMUSCULAR; INTRAVENOUS at 22:41

## 2023-03-20 ASSESSMENT — PAIN SCALES - GENERAL
PAINLEVEL_OUTOF10: 8
PAINLEVEL_OUTOF10: 5
PAINLEVEL_OUTOF10: 7
PAINLEVEL_OUTOF10: 5

## 2023-03-20 ASSESSMENT — PAIN DESCRIPTION - DESCRIPTORS
DESCRIPTORS: SORE;ACHING
DESCRIPTORS: SORE

## 2023-03-20 ASSESSMENT — PAIN DESCRIPTION - LOCATION
LOCATION: BUTTOCKS;LEG
LOCATION: BUTTOCKS
LOCATION: GENERALIZED

## 2023-03-20 ASSESSMENT — PAIN DESCRIPTION - FREQUENCY: FREQUENCY: INTERMITTENT

## 2023-03-20 ASSESSMENT — PAIN DESCRIPTION - ONSET
ONSET: GRADUAL
ONSET: GRADUAL

## 2023-03-20 ASSESSMENT — PAIN DESCRIPTION - ORIENTATION
ORIENTATION: RIGHT;LEFT;MID
ORIENTATION: RIGHT;LEFT;MID
ORIENTATION: RIGHT;LEFT

## 2023-03-20 ASSESSMENT — PAIN DESCRIPTION - PAIN TYPE
TYPE: CHRONIC PAIN;ACUTE PAIN
TYPE: CHRONIC PAIN
TYPE: ACUTE PAIN;CHRONIC PAIN

## 2023-03-20 NOTE — PROCEDURES
INDICATION: Acute blood loss anemia with with hemodynamic instability and no access    ATTENDING PHYSICIAN:     PROCEDURE: Central venous line placement right internal jugular vein    CONSENT:  The procedure, risks, and benefits were explained to patient and consent was obtained. TIME OUT:  The patient and procedure were properly identified with the nurse in the room. STERILE PREP:  Full maximum sterile field/barrier technique was followed (with cap and mask and sterile gown and sterile gloves and large sterile sheet and hand hygeine and 2% chlorhexidine for cutaneous antisepsis). LOCAL ANESTHETIC:   Aqueous lidocaine 5cc    PROCEDURE:   Using direct ultrasound guidance, a right internal jugular vein central venous catheter was placed using a modified seldinger technique without difficulty. Excellent return of non-pulsatile, dark venous blood from all lumens. All lumens were flushed with normal saline. Minimal bleeding occurred and there was hemostasis prior to conclusion of procedure. Catheter was sutured in place and a sterile dressing with biopatch was placed.     COMPLICATIONS:  None  Estimated blood loss: 10 cc    CHEST XRAY REVIEWED: Post-procedure chest x-ray is pending at this time

## 2023-03-20 NOTE — ACP (ADVANCE CARE PLANNING)
discussion completed, Existing advance directive reviewed with patient; no changes to patient's previously recorded wishes, and Portable Do Not Resuscitate prepared for Provider review and signature      Electronically signed by Papito PARKERN, RN, CHPN on 3/20/2023 at 1:10 PM  Palliative Care Nurse  Phone [phone]
basic dialogue that supports the patient's individualized plan of care/goals and shares the quality data associated with the providers was provided to: Isha Lanier    The Patient and/or Patient Representative Agree with the Discharge Plan?   Yes    Shad Martinez RN  Case Management Department  Ph: [phone]

## 2023-03-20 NOTE — SIGNIFICANT EVENT
Paged about patient being hypotensive MAP in the 40s and 50s. Patient initially was managed and managed by NP was given albumin, bolus with minimal improvement. Patient was transferred to ICU, stat labs obtained showing patient was in acute blood loss anemia. There is limited access. Contacting family was unyielding. On examination, patient is pale with no active bleeding however noted bruising and petechia. Central line was placed emergently. On the chart patient had no blood however after discussion with patient, the patient reported that she did want blood transfusion knowing that it can cause death and worse outcome if not received, Plan is to transfuse 1 unit and reassess.

## 2023-03-20 NOTE — PLAN OF CARE
Problem: Discharge Planning  Goal: Discharge to home or other facility with appropriate resources  Outcome: Progressing  Flowsheets  Taken 3/20/2023 0800 by Abi Lenz RN  Discharge to home or other facility with appropriate resources:   Identify barriers to discharge with patient and caregiver   Arrange for needed discharge resources and transportation as appropriate   Identify discharge learning needs (meds, wound care, etc)   Refer to discharge planning if patient needs post-hospital services based on physician order or complex needs related to functional status, cognitive ability or social support system   Arrange for interpreters to assist at discharge as needed  Taken 3/20/2023 0300 by Lety Conner RN  Discharge to home or other facility with appropriate resources: Refer to discharge planning if patient needs post-hospital services based on physician order or complex needs related to functional status, cognitive ability or social support system     Problem: Pain  Goal: Verbalizes/displays adequate comfort level or baseline comfort level  Outcome: Progressing  Flowsheets (Taken 3/20/2023 0258 by Lety Conner RN)  Verbalizes/displays adequate comfort level or baseline comfort level:   Assess pain using appropriate pain scale   Encourage patient to monitor pain and request assistance   Administer analgesics based on type and severity of pain and evaluate response   Implement non-pharmacological measures as appropriate and evaluate response   Notify Licensed Independent Practitioner if interventions unsuccessful or patient reports new pain   Consider cultural and social influences on pain and pain management     Problem: Skin/Tissue Integrity  Goal: Absence of new skin breakdown  Description: 1. Monitor for areas of redness and/or skin breakdown  2. Assess vascular access sites hourly  3. Every 4-6 hours minimum:  Change oxygen saturation probe site  4.   Every 4-6 hours:  If on nasal continuous positive

## 2023-03-21 VITALS
TEMPERATURE: 98.1 F | HEART RATE: 102 BPM | SYSTOLIC BLOOD PRESSURE: 151 MMHG | BODY MASS INDEX: 29.36 KG/M2 | OXYGEN SATURATION: 98 % | HEIGHT: 64 IN | WEIGHT: 171.96 LBS | DIASTOLIC BLOOD PRESSURE: 66 MMHG | RESPIRATION RATE: 38 BRPM

## 2023-03-21 LAB
ALBUMIN SERPL-MCNC: 2.7 G/DL (ref 3.4–5)
ANION GAP SERPL CALCULATED.3IONS-SCNC: 20 MMOL/L (ref 3–16)
BASOPHILS # BLD: 0 K/UL (ref 0–0.2)
BASOPHILS NFR BLD: 0 %
BUN SERPL-MCNC: 15 MG/DL (ref 7–20)
CALCIUM SERPL-MCNC: 8.8 MG/DL (ref 8.3–10.6)
CHLORIDE SERPL-SCNC: 90 MMOL/L (ref 99–110)
CO2 SERPL-SCNC: 23 MMOL/L (ref 21–32)
CREAT SERPL-MCNC: <0.5 MG/DL (ref 0.6–1.2)
DEPRECATED RDW RBC AUTO: 21 % (ref 12.4–15.4)
EOSINOPHIL # BLD: 0 K/UL (ref 0–0.6)
EOSINOPHIL NFR BLD: 0 %
GFR SERPLBLD CREATININE-BSD FMLA CKD-EPI: >60 ML/MIN/{1.73_M2}
GLUCOSE SERPL-MCNC: 114 MG/DL (ref 70–99)
HCT VFR BLD AUTO: 29 % (ref 36–48)
HGB BLD-MCNC: 9.1 G/DL (ref 12–16)
LYMPHOCYTES # BLD: 0.6 K/UL (ref 1–5.1)
LYMPHOCYTES NFR BLD: 3.5 %
MCH RBC QN AUTO: 24.7 PG (ref 26–34)
MCHC RBC AUTO-ENTMCNC: 31.2 G/DL (ref 31–36)
MCV RBC AUTO: 79.2 FL (ref 80–100)
MONOCYTES # BLD: 0.6 K/UL (ref 0–1.3)
MONOCYTES NFR BLD: 3.5 %
NEUTROPHILS # BLD: 14.9 K/UL (ref 1.7–7.7)
NEUTROPHILS NFR BLD: 93 %
PHOSPHATE SERPL-MCNC: 2.7 MG/DL (ref 2.5–4.9)
PLATELET # BLD AUTO: 487 K/UL (ref 135–450)
PMV BLD AUTO: 7.4 FL (ref 5–10.5)
POTASSIUM SERPL-SCNC: 3.7 MMOL/L (ref 3.5–5.1)
RBC # BLD AUTO: 3.67 M/UL (ref 4–5.2)
SODIUM SERPL-SCNC: 133 MMOL/L (ref 136–145)
WBC # BLD AUTO: 16 K/UL (ref 4–11)

## 2023-03-21 PROCEDURE — C9113 INJ PANTOPRAZOLE SODIUM, VIA: HCPCS | Performed by: INTERNAL MEDICINE

## 2023-03-21 PROCEDURE — 6370000000 HC RX 637 (ALT 250 FOR IP): Performed by: INTERNAL MEDICINE

## 2023-03-21 PROCEDURE — 6360000002 HC RX W HCPCS: Performed by: INTERNAL MEDICINE

## 2023-03-21 PROCEDURE — 94760 N-INVAS EAR/PLS OXIMETRY 1: CPT

## 2023-03-21 PROCEDURE — 85025 COMPLETE CBC W/AUTO DIFF WBC: CPT

## 2023-03-21 PROCEDURE — 80069 RENAL FUNCTION PANEL: CPT

## 2023-03-21 PROCEDURE — 2580000003 HC RX 258: Performed by: INTERNAL MEDICINE

## 2023-03-21 PROCEDURE — 6360000002 HC RX W HCPCS: Performed by: NURSE PRACTITIONER

## 2023-03-21 RX ORDER — MORPHINE SULFATE 2 MG/ML
2 INJECTION, SOLUTION INTRAMUSCULAR; INTRAVENOUS EVERY 4 HOURS PRN
Qty: 1 EACH | Refills: 0 | Status: SHIPPED | OUTPATIENT
Start: 2023-03-21 | End: 2023-03-24

## 2023-03-21 RX ORDER — LORAZEPAM 2 MG/ML
1 CONCENTRATE ORAL EVERY 4 HOURS PRN
Qty: 1 EACH | Refills: 0 | Status: SHIPPED | OUTPATIENT
Start: 2023-03-21 | End: 2023-04-04

## 2023-03-21 RX ADMIN — FLECAINIDE ACETATE 100 MG: 100 TABLET ORAL at 02:05

## 2023-03-21 RX ADMIN — ACETAMINOPHEN 650 MG: 325 TABLET ORAL at 08:07

## 2023-03-21 RX ADMIN — ONDANSETRON 4 MG: 2 INJECTION INTRAMUSCULAR; INTRAVENOUS at 04:59

## 2023-03-21 RX ADMIN — Medication 40 MG: at 08:05

## 2023-03-21 RX ADMIN — MEROPENEM 1000 MG: 1 INJECTION, POWDER, FOR SOLUTION INTRAVENOUS at 12:53

## 2023-03-21 RX ADMIN — ONDANSETRON 4 MG: 2 INJECTION INTRAMUSCULAR; INTRAVENOUS at 15:56

## 2023-03-21 RX ADMIN — SENNOSIDES 17.2 MG: 8.6 TABLET, FILM COATED ORAL at 08:04

## 2023-03-21 RX ADMIN — SODIUM CHLORIDE, PRESERVATIVE FREE 10 ML: 5 INJECTION INTRAVENOUS at 08:08

## 2023-03-21 RX ADMIN — MORPHINE SULFATE 2 MG: 2 INJECTION, SOLUTION INTRAMUSCULAR; INTRAVENOUS at 15:56

## 2023-03-21 RX ADMIN — MEROPENEM 1000 MG: 1 INJECTION, POWDER, FOR SOLUTION INTRAVENOUS at 04:59

## 2023-03-21 RX ADMIN — OXYCODONE HYDROCHLORIDE 5 MG: 5 TABLET ORAL at 12:50

## 2023-03-21 RX ADMIN — CALCIUM CARBONATE-VITAMIN D TAB 500 MG-200 UNIT 1 TABLET: 500-200 TAB at 08:04

## 2023-03-21 RX ADMIN — ESCITALOPRAM OXALATE 10 MG: 10 TABLET, FILM COATED ORAL at 08:04

## 2023-03-21 RX ADMIN — FLECAINIDE ACETATE 100 MG: 100 TABLET ORAL at 08:04

## 2023-03-21 RX ADMIN — OXYCODONE HYDROCHLORIDE 5 MG: 5 TABLET ORAL at 08:04

## 2023-03-21 ASSESSMENT — PAIN DESCRIPTION - ORIENTATION: ORIENTATION: MID

## 2023-03-21 ASSESSMENT — PAIN DESCRIPTION - FREQUENCY: FREQUENCY: CONTINUOUS

## 2023-03-21 ASSESSMENT — PAIN DESCRIPTION - DESCRIPTORS: DESCRIPTORS: ACHING

## 2023-03-21 ASSESSMENT — PAIN - FUNCTIONAL ASSESSMENT: PAIN_FUNCTIONAL_ASSESSMENT: ACTIVITIES ARE NOT PREVENTED

## 2023-03-21 ASSESSMENT — PAIN SCALES - GENERAL: PAINLEVEL_OUTOF10: 4

## 2023-03-21 ASSESSMENT — PAIN DESCRIPTION - ONSET: ONSET: GRADUAL

## 2023-03-21 ASSESSMENT — PAIN DESCRIPTION - PAIN TYPE: TYPE: CHRONIC PAIN

## 2023-03-21 ASSESSMENT — PAIN DESCRIPTION - LOCATION: LOCATION: HEAD

## 2023-03-21 NOTE — CONSULTS
Brief Nutrition Note     RD consult TF Order and Management and CHF diet education. Pt discharging with hospice today, will defer diet education at this time. Recommend Jevity 1.5 at 25 mL/hr and as tolerated, advance by 25 mL until at goal rate of 75 mL/hr. Flush per provider. Recommend bolus Meir with 4 ounce water BID and Proteinex once daily. Do not mix directly with TF formula. Flush 30 mL before and after each bolus. CURRENT NUTRITION THERAPIES  ADULT DIET; Regular  ADULT TUBE FEEDING; PEG; Standard with Fiber; Continuous; 25; Yes; 25; Q 4 hours; 75; 30; Q 4 hours; Protein, Wound Healing; One Proteinex daily via syringe. Flush 30 mL before and after each bolus. Do not mix directly with TF.; Bolus Meir mixed . .. Current Tube Feeding (TF) Recommendations:  Feeding Route: PEG  Formula: Standard with Fiber  Schedule: Continuous  Additives/Modulars: Protein, Wound Healing (Bolus Meir in 4 oz water BID. Bolus Proteinex once daily. Do not mix directly with TF formula. FLush 30 mL before and after each bolus.)  Goal TF & Flush Orders Provides: Recommend Jevity 1.5 with goal rate of 75 mL/hr (calculated over 20 hours) + 1 Proteinex bolus. This provides 1500 mL TV, 2354 kcal, 122 gm protein and 1140 mL free fl.  Flush per provider    COMPARATIVE STANDARDS  Energy (kcal):  3989-2984 kcal (25-30 kcal/kg)     Protein (g):  117-156 gm (1.5-2 g/kg)       Fluid (mL/day):  per provider    Electronically signed by Jhonny Nelson RD, LD on 3/21/2023 at 2:32 PM
Clinical Pharmacy Note  Vancomycin Consult    Rex Suárez is a 65 y.o. female ordered Vancomycin for SSTI; consult received from Dr. Grabiel Spicer to manage therapy. Also receiving meropenem. Allergies:  Demerol hcl [meperidine], Pcn [penicillins], and Adhesive tape     Temp max:  Temp (24hrs), Av.7 °F (37.1 °C), Min:97.4 °F (36.3 °C), Max:101.2 °F (38.4 °C)      Recent Labs     23  1416 23  0543   WBC 9.4 11.0       Recent Labs     23  1416 23  0543   BUN 16 16   CREATININE <0.5* <0.5*         Intake/Output Summary (Last 24 hours) at 3/19/2023 1315  Last data filed at 3/19/2023 0341  Gross per 24 hour   Intake 854.01 ml   Output 1250 ml   Net -395.99 ml       Culture Results:  Pending    Ht Readings from Last 1 Encounters:   23 5' 4\" (1.626 m)        Wt Readings from Last 1 Encounters:   23 171 lb 15.3 oz (78 kg)         Estimated Creatinine Clearance: 94 mL/min (based on SCr of 0.5 mg/dL). Assessment/Plan:  Day # 1 of vancomycin. Vancomycin 1250 mg x 1 received yesterday. Vancomycin 1000 mg IV every 12 hours ordered. Goal -600  Predicted       Thank you for the consult.    Alberta Zheng [facility], PharmD, 3/19/2023 1:16 PM
Consult received/ deferred. Pt is now pursuing hospice care. She is not in acute heart failure.
[facility]:     Patient is a/o x3 and able to make her own decisions per Dr. Xander Pena. RN met with patient at bedside to discuss [facility] philosophy, services and LOC. Emotional support given. Pc to pts HC YO Perkins to discuss hospice care and update. Family is on board with hospice/comfort care. Gave family [facility] IPCC contact info. Pt and family want the patient to go to the [facility], she does not wish to return to LTC at [address] [place] [address] updated Clovis Dean at [facility] and . She is appropriate for higher level of care for symptom management of pain, SOB and wounds. Updated RN Selma Awan and CM Belinda Valenzuela. Patient signed [facility] consents and requested medical director to follow. Dr. Xander Pena discharged pt and signed DNR-CC. Gave copies to pt. [address] transferring pt to [facility] at [phone] today. Liaison will fax and call report to Ant Tierney.      Thank you for this referral,   Please call  [facility] with questions/ concerns at [phone]  [address]   [address] Admissions Liaison
Take 1 tablet by mouth 2 times daily 9/6/22   Naresh Jorge MD   aspirin EC 81 MG EC tablet Take 1 tablet by mouth daily 8/31/22 9/6/22  Shital Wilson MD   potassium chloride (KLOR-CON M) 20 MEQ extended release tablet TAKE 1 TABLET DAILY 8/5/22 8/31/22  Yelena Busby MD   docusate sodium (COLACE, DULCOLAX) 100 MG CAPS Take 100 mg by mouth 2 times daily as needed for Constipation 8/4/22   Shital Wilson MD   metoprolol succinate (TOPROL XL) 25 MG extended release tablet Take 1 tablet by mouth in the morning. 8/5/22 8/31/22  Shital Wilson MD   escitalopram (LEXAPRO) 10 MG tablet Take 1 tablet by mouth daily 6/14/22   Yelena Busby MD   tiZANidine (ZANAFLEX) 2 MG tablet Take 1 tablet by mouth every 6 hours as needed (muscle pain)  Patient taking differently: Take 2 mg by mouth every 8 hours as needed (muscle pain) 2/18/22   Yelena Busby MD   dicyclomine (BENTYL) 10 MG capsule Take 1 capsule by mouth 4 times daily as needed (cramping/diarrhea) 2/18/22   Yelena Busby MD   flecainide (TAMBOCOR) 100 MG tablet TAKE 1 TABLET BY MOUTH 2 TIMES A DAY 1/27/22   Shirley Diane MD   calcium carbonate-vitamin D 600-200 MG-UNIT TABS Take 1 tablet by mouth 2 times daily  Patient not taking: Reported on 1/11/2023    Historical Provider, MD       Review of Systems  Pertinent positives and negatives are in HPI, otherwise all systems reviewed and negative    Physical Exam  Vitals:    03/20/23 1000   BP: 132/62   Pulse: 88   Resp: 14   Temp:    SpO2: 99%         Intake/Output Summary (Last 24 hours) at 3/20/2023 1124  Last data filed at 3/20/2023 0849  Gross per 24 hour   Intake 1168 ml   Output 1150 ml   Net 18 ml       Body mass index is 27.74 kg/m². General/Appearance: NAD, alert and interactive  HEENT: PERRLA, Conjunctiva non injected, no scleral icterus. Mucous membranes pink and moist.  Neck is symmetrical. Trachea appears midline.   Lung: normal respiratory effort, no accessory muscle
Hospice      Discussion:  Patient admitted for abd pain, rectal bleeding? She is from [facility], bed bound, she is alert and oriented times four. I have spoken to patient and her daughter/PAUL Carbajal to discuss goals of care. The patient lives in [address] [place] [address], she is bed bound and does not want to return to hospital for further care, she is in pain and wants to pursue hospice for comfort. We discussed code status and they both agree to DNR CC. Raven requested a different ECF, [facility] if possible. Dr Joseph Kylee informed of above orders noted. I will continue to follow Ms. De La Fuente's care as needed. Thank you for allowing me to participate in the care of Ms. De La Fuente .      Electronically signed by Jo Ann Aguilar RN, BSN, [facility] on 3/20/2023 at 11:51 AM  Palliative Care Nurse [facility]  Office: [phone]
osteoporosis    Decubitus ulcer of sacral region, stage 1    Sacral decubitus ulcer, stage IV (HCC)    Decubitus ulcer of right ischial area, unstageable ([address]    Decubitus ulcer of left ischial area, unstageable ([address]    Sepsis ([address]       Multiple medical problems  History of rectal cancer  Sacral pressure wound and OM s/p colostomy     Admitted with fever, encephalopathy   Sacral wound clean based without local signs of infection. She may have some chronic OM but this seems not to be source of acute infection     Necrotic later LLE wound. Pathogenesis, chronicity unclear. No prior documentation of this wound. Multiple attempts to speak to staff caring for her at [facility] went unanswered    Report of abd pain, possible GIB     Pyuria  Unclear if this is chronic tello associated or not     PCN allergy not specified      -continue broad abx, add vanc to meropenem   -will need debridement of LLE wound if full measures are to continue  -needs Palliative care evaluation for GOC given chronic medical conditions, frequent admissions   -will ask Sgy to see  -follow-up pending cultures     ID will follow   No family in the room      Medical Decision Making: The following items were considered in medical decision making:  Discussion of patient care with other providers  Reviewed clinical lab tests  Reviewed radiology tests  Reviewed other diagnostic tests/interventions  Independent review of radiologic images  Microbiology cultures and other micro tests reviewed       Risk of Complications/Morbidity: High   Illness(es)/ Infection present that pose threat to bodily function. There is potential for severe exacerbation of infection/side effects of treatment. Therapy requires intensive monitoring for antimicrobial agent toxicity         Julia Mejia M.D. Thank you for the opportunity to participate in the care of your patient.     Please do not hesitate to contact me:   [phone] office

## 2023-03-21 NOTE — DISCHARGE INSTR - COC
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    Senile osteoporosis M81.0    Decubitus ulcer of sacral region, stage 1 L89.151    Sacral decubitus ulcer, stage IV (HCC) L89.154    Decubitus ulcer of right ischial area, unstageable ([address] L89.310    Decubitus ulcer of left ischial area, unstageable ([address] L89.320    Sepsis ([address] A41.9    Bilateral pleural effusion J90    Colostomy in place [facility]) Z93.3    Ulcer of left lower extremity with muscle involvement without evidence of necrosis ([facility]) L97.925    Lactic acid acidosis E87.20    Fever and chills R50.9       Isolation/Infection:   Isolation            No Isolation          Patient Infection Status       Infection Onset Added Last Indicated Last Indicated By Review Planned Expiration Resolved Resolved By    None active    Resolved    COVID-19 (Rule Out) 03/18/23 03/18/23 03/18/23 COVID-19, Rapid (Ordered)   03/18/23 Rule-Out Test Resulted    COVID-19 (Rule Out) 02/17/21 02/17/21 02/17/21 COVID-19, Rapid (Ordered)   02/17/21 Rule-Out Test Resulted    COVID-19 (Rule Out) 01/28/21 01/28/21 01/28/21 COVID-19 (Ordered)   01/28/21 Rule-Out Test Resulted            Nurse Assessment:  Last Vital Signs: BP (!) 151/66   Pulse (!) 103   Temp 98.1 °F (36.7 °C) (Axillary)   Resp (!) 32   Ht 5' 4\" (1.626 m)   Wt 171 lb 15.3 oz (78 kg)   SpO2 98%   BMI 29.52 kg/m²     Last documented pain score (0-10 scale): Pain Level: 4  Last Weight:   Wt Readings from Last 1 Encounters:   03/21/23 171 lb 15.3 oz (78 kg)     Mental Status:  oriented, alert, and able to concentrate and follow conversation    IV Access:  - Peripheral IV - site  Right Hand, insertion date: 3/18/2023    Nursing Mobility/ADLs:  Walking   Dependent  Transfer  Dependent  Bathing  Dependent  Dressing  Dependent  Toileting  Dependent  Feeding  Dependent  Med Admin  Dependent  Med Delivery   whole    Wound Care Documentation and Therapy:  Negative Pressure Wound Therapy Leg

## 2023-03-21 NOTE — CARE COORDINATION
CASE MANAGEMENT DISCHARGE SUMMARY:    DISCHARGE DATE: 3/21/2023    DISCHARGED TO Inpatient Hospice Unit with [facility] [address]: [address] Transport             TIME: 3471              Electronically signed by Shad Martinez RN on 3/21/2023 at 2:29 PM
Case Management Assessment  Initial Evaluation    Date/Time of Evaluation: 3/20/2023 3:33 PM  Assessment Completed by: Ale Hui RN    If patient is discharged prior to next notation, then this note serves as note for discharge by case management. Patient Name: Casi Abarca                   YOB: 1945  Diagnosis: Bilateral pleural effusion [J90]; Wound of sacral region, subsequent encounter [S31.000D]; Sepsis ([address] [A41.9]; Urinary tract infection with hematuria, site unspecified [N39.0, R31.9]; Sepsis without acute organ dysfunction, due to unspecified organism [facility]) [A41.9]                   Date / Time: 3/18/2023  1:48 PM    Patient Admission Status: Inpatient   Readmission Risk (Low < 19, Mod (19-27), High > 27): Readmission Risk Score: 20.7    Current PCP: Karon Simmonds, MD  PCP verified by CM? Chart Reviewed: Yes      History Provided by: Child/Family, Medical Record  Patient Orientation: Self    Patient Cognition: Alert    Hospitalization in the last 30 days (Readmission):  No    If yes, Readmission Assessment in CM Navigator will be completed. Advance Directives:      Code Status: DNR-CC   Patient's Primary Decision Maker is:      Primary Decision MakeMalcom Mcneill - [phone]    Secondary Decision Maker: Nyla Ratliff - [phone]    Discharge Planning:    Current Nursing Home Information:  Patient admitted from:  Discharging to Facility/ Agency   Name:  [facility]  Address:  [address] [place] [address]   Phone:  [phone]  Fax:  [phone]     Call to [address], Admissions Coordinator, at [phone] who confirmed the patient is: [address], no Precert required for return.       Patient Covid vaccination status:    Internal Administration   First Dose COVID-19, J&J, (age 18y+), IM, 0.5 mL  03/08/2021   Second Dose COVID-19, J&J, (age 18y+), IM, 0.5 mL   10/31/2021       Last COVID Lab SARS-CoV-2 (no
Discharge Planning:    Hospice consult noted. Per call from Palliative Care RN, Lc Linton, patient/family would like referral placed to [facility]. The Plan for Transition of Care is related to the following treatment goals: hospice care    The Patient was provided with a choice of provider and agrees   with the discharge plan. [x] Yes [] No    Freedom of choice list was provided with basic dialogue that supports the patient's individualized plan of care/goals, treatment preferences and shares the quality data associated with the providers. [] Yes [x] No; deferred stating that they would like [facility] referral placed. Referral placed. Call placed to [address] [place] [address],[address] at [facility] who confirmed that patient could discharge to their facility upon discharge. Niru aware of possibility for [facility] to follow to facility. Will continue to monitor for updates. Electronically signed by Aftab Muse RN on 3/20/2023 at 3:19 PM    Call placed to Raven, patient's daughter to provide update. Left HIPAA-compliant message requesting return call.     Electronically signed by Aftab Muse RN on 3/20/2023 at 3:21 PM
applied 03/20/23 1240   Wound Cleansed Not Cleansed 03/20/23 1200   Dressing/Treatment Moist to dry;ABD 03/20/23 1240   Wound Length (cm) 11.5 cm 03/20/23 1240   Wound Width (cm) 7.5 cm 03/20/23 1240   Wound Depth (cm) 1 cm 03/20/23 1240   Wound Surface Area (cm^2) 86.25 cm^2 03/20/23 1240   Wound Volume (cm^3) 86.25 cm^3 03/20/23 1240   Wound Assessment Slough; Denuded 03/19/23 2200   Drainage Amount Moderate 03/18/23 2349   Drainage Description Purulent;Yellow 03/19/23 2200   Odor Moderate 03/19/23 2200   Nallely-wound Assessment Non-blanchable erythema 03/19/23 2200   Margins Undefined edges 03/19/23 2200   Number of days: 1       Wound 03/18/23 Pretibial Right (Active)   Dressing Status New dressing applied 03/20/23 1200   Wound Cleansed Cleansed with saline 03/20/23 1200   Dressing/Treatment ABD;Roll gauze 03/20/23 1200   Wound Assessment Bleeding;Slough;Pink/red 03/18/23 2349   Drainage Amount Moderate 03/19/23 2200   Drainage Description Purulent 03/19/23 2200   Odor Moderate 03/19/23 2200   Number of days: 1       Wound 03/18/23 Pretibial Left;Lateral (Active)   Wound Image   03/20/23 1240   Wound Etiology Other 03/20/23 1240   Dressing Status New dressing applied 03/20/23 1240   Wound Cleansed Cleansed with saline 03/20/23 1240   Dressing/Treatment ABD;Roll gauze; Moist to dry 03/20/23 1240   Wound Length (cm) 13 cm 03/20/23 1240   Wound Width (cm) 4 cm 03/20/23 1240   Wound Depth (cm) 2 cm 03/20/23 1240   Wound Surface Area (cm^2) 52 cm^2 03/20/23 1240   Wound Volume (cm^3) 104 cm^3 03/20/23 1240   Wound Assessment Pink/red;Erythema;Slough; Devitalized tissue 03/20/23 1240   Drainage Amount Small 03/20/23 1240   Drainage Description Serosanguinous;Brown 03/20/23 1240   Odor Mild 03/20/23 1240   Nallely-wound Assessment Intact;Fragile 03/20/23 1240   Margins Defined edges 03/20/23 1240   Wound Thickness Description not for Pressure Injury Full thickness 03/20/23 1240   Number of days: 1       Wound 03/18/23 Heel

## 2023-03-21 NOTE — DISCHARGE SUMMARY
STOP taking these medications      acetaminophen 325 MG tablet  Commonly known as: TYLENOL     apixaban 5 MG Tabs tablet  Commonly known as: Eliquis     aspirin EC 81 MG EC tablet     docusate 100 MG Caps  Commonly known as: COLACE, DULCOLAX     escitalopram 10 MG tablet  Commonly known as: LEXAPRO     ferrous sulfate 325 (65 Fe) MG tablet  Commonly known as: IRON 325     flecainide 100 MG tablet  Commonly known as: TAMBOCOR     furosemide 40 MG tablet  Commonly known as: LASIX     gabapentin 100 MG capsule  Commonly known as: NEURONTIN     metoprolol succinate 25 MG extended release tablet  Commonly known as: TOPROL XL     oxyCODONE 5 MG immediate release tablet  Commonly known as: ROXICODONE     pantoprazole 40 MG tablet  Commonly known as: PROTONIX     polyethylene glycol 17 g packet  Commonly known as: GLYCOLAX     potassium chloride 20 MEQ extended release tablet  Commonly known as: KLOR-CON M     potassium chloride 20 MEQ Tbcr extended release tablet  Commonly known as: KLOR-CON M     senna 8.6 MG tablet  Commonly known as: SENOKOT     traZODone 50 MG tablet  Commonly known as: DESYREL            ASK your doctor about these medications      dicyclomine 10 MG capsule  Commonly known as: BENTYL  Take 1 capsule by mouth 4 times daily as needed (cramping/diarrhea)     tiZANidine 2 MG tablet  Commonly known as: ZANAFLEX  Take 1 tablet by mouth every 6 hours as needed (muscle pain)               Where to Get Your Medications        You can get these medications from any pharmacy    Bring a paper prescription for each of these medications  LORazepam 2 MG/ML concentrated solution  morphine (PF) 2 MG/ML Soln injection           Physical Exam:    Vitals:  Vitals:    03/21/23 0600 03/21/23 0700 03/21/23 0800 03/21/23 0845   BP: (!) 156/80 (!) 151/81 (!) 149/78    Pulse: (!) 104 (!) 104 (!) 103    Resp: (!) 42 (!) 45 27    Temp:       TempSrc:       SpO2: 98% 99% 97% 98%   Weight: 171 lb 15.3 oz (78 kg)

## 2023-03-21 NOTE — PROGRESS NOTES
03/21/23 1448   Encounter Summary   Encounter Overview/Reason  Spiritual/Emotional Needs   Service Provided For: Patient   Referral/Consult From: Palliative Care   Support System Children   Last Encounter  03/21/23  (Emotional and spiritual support - going to hospice, prayer )   Complexity of Encounter Moderate   Begin Time 1400   End Time  1430   Total Time Calculated 30 min   Spiritual/Emotional needs   Type Spiritual Support   Grief, Loss, and Adjustments   Type End of Life;Hospice   Assessment/Intervention/Outcome   Assessment Anxious; Concerns with suffering   Intervention Active listening;Discussed relationship with God;Explored/Affirmed feelings, thoughts, concerns   Outcome Expressed feelings, needs, and concerns
3413- Attempted to call patient's daughter [facility] x 2 with no answer, call went straight to voicemail. [phone]-  Attempted to call patient's son rEick Ramsey x 2 with no answer, call went straight to voicemail. [phone] Brittny Hernandez with [facility], no alternate numbers for patient's son or daughter.     Thang Richardson RN
4 Eyes Skin Assessment     NAME:  David Villar  YOB: 1945  MEDICAL RECORD NUMBER:  [id number]    The patient is being assessed for  Shift Handoff    Patient refused turning off of her right side due to pain, refused to allow complete skin assessment at shift change. Areas assessed by both nurses:        Does the Patient have a Wound?  Other previously noted areas noted       Sami Prevention initiated by RN: Yes   Wound Care Orders initiated by RN: Yes    Pressure Injury (Stage 3,4, Unstageable, DTI, NWPT, and Complex wounds) if present, place referral order by RN under : wound care currently following    New and Established Ostomies, if present place, referral order under : Yes      Nurse 1 eSignature: Electronically signed by Noel Mendez RN on 3/20/23 at 7:54 PM EDT    **SHARE this note so that the co-signing nurse can place an eSignature**    Nurse 2 eSignature: Electronically signed by Lety Howard RN on 3/20/23 at 8:00 PM EDT
4 Eyes Skin Assessment     NAME:  Elmer De La Fuente  YOB: 1945  MEDICAL RECORD NUMBER:  [id number]    The patient is being assessed for  Shift Handoff    I agree that One RN has performed a thorough Head to Toe Skin Assessment on the patient. ALL assessment sites listed below have been assessed. Areas assessed by both nurses:    Other Patient refused turn or reposition for assessment         Does the Patient have a Wound? Yes wound(s) were present on assessment.  LDA wound assessment was Initiated and completed by RN       Sami Prevention initiated by RN: Yes   Wound Care Orders initiated by RN: Yes    Pressure Injury (Stage 3,4, Unstageable, DTI, NWPT, and Complex wounds) if present, place referral order by RN under : Yes    New and Established Ostomies, if present place, referral order under : Yes      Nurse 1 eSignature: Electronically signed by Val Pacheco RN on 3/21/23 at 7:35 AM EDT    **SHARE this note so that the co-signing nurse can place an eSignature**    Nurse 2 eSignature: {Esignature:[id number]}
4 Eyes Skin Assessment     NAME:  Matt Olivas  YOB: 1945  MEDICAL RECORD NUMBER:  [id number]    The patient is being assessed for  Transfer to New Unit    I agree that One RN has performed a thorough Head to Toe Skin Assessment on the patient. ALL assessment sites listed below have been assessed. Areas assessed by both nurses:    Head, Face, Ears, Shoulders, Back, Chest, Arms, Elbows, Hands, Sacrum. Buttock, Coccyx, Ischium, and Legs. Feet and Heels        Does the Patient have a Wound? Yes wound(s) were present on assessment.  LDA wound assessment was Initiated and completed by RN    Multiple documented wounds to coccyx,sacrum, buttocks, heels, and bilateral legs       Sami Prevention initiated by RN: Yes   Wound Care Orders initiated by RN: Yes    Pressure Injury (Stage 3,4, Unstageable, DTI, NWPT, and Complex wounds) if present, place referral order by RN under : Yes    New and Established Ostomies, if present place, referral order under : Yes      Nurse 1 eSignature: Electronically signed by Kasie Lagos RN on 3/20/23 at 4:45 AM EDT    **SHARE this note so that the co-signing nurse can place an eSignature**    Nurse 2 eSignature: Electronically signed by Merry Liao RN on 3/20/23 at 6:40 AM EDT
Albumin given, bp checked post transfusion,bp 66/36 hr 70. Perfect served on call, new orders received.
Bps soft 70/50s, patient placed in trendelenburg, perfect served on call, manual bp 84/72. New orders received.
Bps soft after  IV albumin x2, perfect Taye rWight MD, new orders received to tx patient to ICU bed.
Bs 80, patient orientation is @ baseline 94%RA.
Call made to patient daughter Ami Terry listed in chart to update on plan of care,  no answer, this RN left voicemail and call back number.
Clinical Pharmacy Note  Vancomycin Consult    Amita Ospina is a 65 y.o. female ordered Vancomycin for skin/soft tissue infection; consult received from Dr. Yohan Meier to manage therapy. Also receiving meropenem. Allergies:  Demerol hcl [meperidine], Pcn [penicillins], and Adhesive tape     Temp max:  Temp (24hrs), Av.9 °F (36.6 °C), Min:97.4 °F (36.3 °C), Max:98.4 °F (36.9 °C)      Recent Labs     23  1416 23  0543 23  0240   WBC 9.4 11.0 6.6       Recent Labs     23  1416 23  0543 23  0240   BUN 16 16 16   CREATININE <0.5* <0.5* <0.5*         Intake/Output Summary (Last 24 hours) at 3/20/2023 0414  Last data filed at 3/20/2023 0243  Gross per 24 hour   Intake 820 ml   Output 1150 ml   Net -330 ml       Culture Results:  pending    Ht Readings from Last 1 Encounters:   23 5' 4\" (1.626 m)        Wt Readings from Last 1 Encounters:   23 161 lb 9.6 oz (73.3 kg)         Estimated Creatinine Clearance: 91 mL/min (based on SCr of 0.5 mg/dL). Assessment:  Day # 3 of vancomycin. Current regimen: 1000 mg every 12 hours  Vancomycin level: 16.0 (~9 hours after dose)  Predicted AUC: 585 mg/L, trough 18.5 mg/L  Patient transferred to ICU overnight due to soft Bps  Renal function remains stable/appropriate    Plan:  Change regimen to vancomycin 1250mg IV Q18H  Predicted , trough 13.6 mg/L  Will order level after 2 doses of new regimen (1800 on 3/21)    Thank you for the consult.    Jeannette Palacios, PharmD  3/20/2023 4:21 AM
Clinical Pharmacy Note  Vancomycin Consult    Pharmacy consult received for one-time dose of vancomycin in the Emergency Department per . Scott Landrum, APRN - CNP    Ht Readings from Last 1 Encounters:   12/13/22 5' 2\" (1.575 m)        Wt Readings from Last 1 Encounters:   03/18/23 174 lb 2.6 oz (79 kg)         Assessment/Plan:  Vancomycin 1250mg x 1 in ED. If Vancomycin is to continue on admission and pharmacy is to manage dosing, please re-consult with admission orders.   Matt Yeboah [facility], 3/18/2023 7:06 PM
[facility]  Met with patient. Discussed hospice philosophy and services. Patient expresses a desire to discontinue aggressive wound care and coming back to the hospital as she feels it is becoming a quality of life issue. Jaylyn Hoskins with palliative care called daughter, no answer. Jaylyn Hoskins also reached out to son-in-law Ede Stoneine, no answer. Left voicemail for daughter regarding hospice discussion, will await her return call. Per Jaylyn Hoskins, patient/family considering patient not returning to her long term care facility but instead choosing a different one or going to hospice inpatient unit if patient continues to qualify for hospice inpatient unit on day of discharge from the hospital. [facility] will continue to follow up.      48 Emmy Sparrow    (work cell)  [phone] (main & referrals)
Hospitalist Progress Note      PCP: Jocelyne Quintanilla MD    Date of Admission: 3/18/2023    Chief Complaint: abd pain / rectal bleeding    Hospital Course: 67V with afib, rectal CA, GERD, presented with melanotic stools, sepsis, sacral decub s/p diverting colostomy, uti, LLE necrotic wound,  and hgb 9.0. She developed worsening hypotension, and was transferred to the ICU 3/20. Subjective: Patient is seen and examined on room air, in no respiratory distress, denies abdominal pain, complains of left sided chest pain ongoing for at least several days.        Medications:  Reviewed    Infusion Medications    sodium chloride      sodium chloride       Scheduled Medications    vancomycin  1,250 mg IntraVENous Q18H    oyster shell calcium w/D  1 tablet Oral BID WC    flecainide  100 mg Oral 2 times per day    oxyCODONE  5 mg Oral TID    senna  2 tablet Oral Daily    potassium chloride  10 mEq Oral Daily with breakfast    vitamin B-12  2,000 mcg Oral Daily    meropenem  1,000 mg IntraVENous Q8H    vancomycin (VANCOCIN) intermittent dosing (placeholder)   Other RX Placeholder    sodium chloride flush  10 mL IntraVENous 2 times per day    [Held by provider] furosemide  40 mg IntraVENous BID    [Held by provider] apixaban  5 mg Oral BID    escitalopram  10 mg Oral Daily    pantoprazole (PROTONIX) 40 mg injection  40 mg IntraVENous Q12H     PRN Meds: sodium chloride, morphine, dicyclomine, sodium chloride flush, sodium chloride, potassium chloride, magnesium sulfate, promethazine **OR** ondansetron, acetaminophen **OR** acetaminophen, tiZANidine      Intake/Output Summary (Last 24 hours) at 3/20/2023 0837  Last data filed at 3/20/2023 0243  Gross per 24 hour   Intake 820 ml   Output 1150 ml   Net -330 ml       Physical Exam Performed:    BP (!) 137/53   Pulse 90   Temp 98.6 °F (37 °C) (Oral)   Resp 20   Ht 5' 4\" (1.626 m)   Wt 161 lb 9.6 oz (73.3 kg)   SpO2 100%   BMI 27.74 kg/m²     General appearance: No
Patient adamantly refused to allow this RN to reposition her in the bed. Patient refused to allow this RN to change her dressings on her wounds. Patient refused to allow this RN to transfer patient to a specialty bed due to her wounds. This RN tried to educate patient on the benefits for comfort of repositioning and replacing the bed. This RN attempted to plan timing of pain medication to decrease any discomfort for the patient, however she said \"absolutely not\" anytime this RN mentioned movement of any kind. Patient stated she wanted to \"be done having to do any of that\" in reference to wound dressings and position changes which cause pain. Patient was otherwise pleasant.
Patient admitted to ICU from [address] [facility] for hypotension, VSS upon arrival to the floor. Multiple extensive wounds noted upon patient arrival to the unit. Patient alert and oriented x 2-3, but forgetful.   Oriented to room and unit, call light within reach, Edgar Squires aware of patient's arrival.    Tatyana Starr RN
Patient c/o pain to buttocks/sacral area, BLE. Perfect served on call, waiting for response.
Patient transferred from ED, iv fluids infusing, tello in place and draining. VSS, bed locked in lowest position, call light in reach.
Patient transferred to room 2114. Bedside report  given to Alejandra Osuna, opportunity to ask questions.
See new orders
[facility]  Palliative Care   Progress Note    NAME:  Lolly Flores RECORD NUMBER:  [id number]  AGE: 65 y.o. GENDER: female  : 1945  TODAY'S DATE:  3/21/2023    Subjective: patient alert and oriented. Objective:    Vitals:    23 0845   BP:    Pulse:    Resp:    Temp:    SpO2: 98%     Lab Results   Component Value Date    WBC 16.0 (H) 2023    HGB 9.1 (L) 2023    HCT 29.0 (L) 2023    MCV 79.2 (L) 2023     (H) 2023     Lab Results   Component Value Date    CREATININE <0.5 (L) 2023    BUN 15 2023     (L) 2023    K 3.7 2023    CL 90 (L) 2023    CO2 23 2023     Lab Results   Component Value Date    ALT 15 2023    AST 19 2023    ALKPHOS 128 2023    BILITOT 0.3 2023       Plan: Patient continues to want comfort care. I did reach her daughter and she is in agreement with hospice. Code Status: DNR-CC  Discharge Environment:  [x] Hospice Consult Agency: [address] [place] [address]  [] Inpatient Hospice    [] Home with [address] [facility]   [] ECF with Hospice  [] ECF skilled care with Hospice to follow   [] Other:    Teaching Time:  0hours  20 min     I will continue to follow Ms. De La Fuente's care as needed. Thank you for allowing me to participate in the care of Ms. De La Fuente .      Electronically signed by Vidal Ball, RN, BSN,CHPN on 3/21/2023 at 10:08 AM  Palliative Care Nurse [facility]  Office: [phone]
Specialty bed ordered
[facility]    Patient known to me - last sen in 2021  She is admitted at this time with sepsis, abdominal pain and \"rectal bleeding\"  She has a diverting sigmoid colostomy with stool described as dark  Stool was guaiac negative on admission and it is dark brown now  Eliquis has been held and the patient is on IV pantoprazole  Thr Hgb has drifted down and the patient was transferred to the ICU for hypotension; a unit of PRBCs was given  She is now hemodynamically stable  I pushed the external bumper of her PEG tube back to the appropriate position    At this point, there is no present evidence of GI bleeding  Should the Hgb continue to decline in the absence of gross bleeding, consideration should be given to a CT scan to evaluate for a RPH  QD CBCs have been ordered but the patient said that she will likely refuse the blood draws  If there is any evidence of true melena, I can proceed with an EGD  She is DNR
Wound care consult placed, g tube flushed for patency, colostomy care provided, appliance changed, sacral wounds cleansed and dressed, BLE wounds cleansed and dressed alma/tello care provided.
GLUCOSE 123* 91     POC GLUCOSE:    Recent Labs     03/18/23  2353   POCGLU 99     LIVER PROFILE:   Recent Labs     03/18/23  1416 03/19/23  0543   AST 39* 27   ALT 30 23   LIPASE 21.0  --    LABALBU 2.1* 2.1*   BILITOT <0.2 0.3   ALKPHOS 193* 164*     PT/INR:   Recent Labs     03/18/23  1523   PROTIME 14.8*   INR 1.16*     APTT:   Recent Labs     03/18/23  1523   APTT 28.4     UA:  Recent Labs     03/18/23  1523   COLORU Yellow   PHUR 7.5   WBCUA 21-50*   RBCUA 11-20*   YEAST Present*   BACTERIA 2+*   CLARITYU TURBID*   SPECGRAV 1.016   LEUKOCYTESUR LARGE*   UROBILINOGEN 0.2   BILIRUBINUR Negative   BLOODU LARGE*   GLUCOSEU Negative   KETUA Negative   AMORPHOUS 1+     Microbiology:  Wound Culture: No results for input(s): WNDABS, ORG in the last 72 hours. Invalid input(s):  LABGRAM  Nasal Culture: No results for input(s): ORG, MRSAPCR in the last 72 hours. Blood Culture: No results for input(s): BC, BLOODCULT2 in the last 72 hours. Fungal Culture:   No results for input(s): FUNGSM in the last 72 hours. No results for input(s): FUNCXBLD in the last 72 hours. CSF Culture:  No results for input(s): COLORCSF, APPEARCSF, CFTUBE, CLOTCSF, WBCCSF, RBCCSF, NEUTCSF, NUMCELLSCSF, LYMPHSCSF, MONOCSF, GLUCCSF, VOLCSF in the last 72 hours. Respiratory Culture:  No results for input(s): Hubbard Raza in the last 72 hours. AFB:No results for input(s): AFBSMEAR in the last 72 hours. Urine Culture  No results for input(s): LABURIN in the last 72 hours. RADIOLOGY:    CT HEAD WO CONTRAST   Final Result   1. No acute intracranial abnormality. 2. Stable parenchymal volume loss and chronic white matter microvascular   ischemic changes. XR CHEST PORTABLE   Final Result   Small to moderate right layering pleural effusion. Small left pleural   effusion.              CONSULTS:    IP CONSULT TO PRIMARY CARE PROVIDER  IP CONSULT TO HEART FAILURE NURSE/COORDINATOR  IP CONSULT TO DIETITIAN    ASSESSMENT AND PLAN:
History     Tobacco Use    Smoking status: Never    Smokeless tobacco: Never   Vaping Use    Vaping Use: Never used   Substance Use Topics    Alcohol use: No    Drug use: Never     Social History     Tobacco Use   Smoking Status Never   Smokeless Tobacco Never      Family History   Problem Relation Age of Onset    COPD Mother           REVIEW OF SYSTEMS:      Constitutional:  fevers+ , chills, night sweats  Eyes:  negative for blurred vision, eye discharge, visual disturbance   HEENT:  negative for hearing loss, ear drainage,nasal congestion  Respiratory:  negative for cough, shortness of breath or hemoptysis   Cardiovascular:  negative for chest pain, palpitations, syncope  Gastrointestinal:  negative for nausea, vomiting, diarrhea, constipation, abdominal pain  Genitourinary:  negative for frequency, dysuria, urinary incontinence, hematuria  Hematologic/Lymphatic:  negative for easy bruising, bleeding and lymphadenopathy  Allergic/Immunologic:  negative for recurrent infections, angioedema, anaphylaxis   Endocrine:  negative for weight changes, polyuria, polydipsia and polyphagia  Musculoskeletal: Left leg ulcer+ drainage+ and pain, sacral decubitus ulcer++ , swelling, decreased range of motion  Integumentary: No rashes, skin lesions  Neurological:  negative for headaches, slurred speech, unilateral weakness  Psychiatric: negative for hallucinations,confusion,agitation.                 PHYSICAL EXAM:      Vitals:  T max  101.2  BP (!) 119/53   Pulse 86   Temp 98.2 °F (36.8 °C)   Resp 11   Ht 5' 4\" (1.626 m)   Wt 161 lb 9.6 oz (73.3 kg)   SpO2 100%   BMI 27.74 kg/m²     General Appearance: alert,in some  acute distress, ++  pallor, no icterus chronic ill appearing woman ++   Skin: warm and dry, no rash or erythema  Head: normocephalic and atraumatic  Eyes: pupils equal, round, and reactive to light, conjunctivae normal  ENT: tympanic membrane, external ear and ear canal normal bilaterally, nose without

## 2023-03-22 LAB
BACTERIA BLD CULT ORG #2: NORMAL
BACTERIA BLD CULT: NORMAL

## 2023-03-24 LAB
BACTERIA BLD CULT ORG #2: NORMAL
BACTERIA BLD CULT: NORMAL

## 2023-04-06 NOTE — PROGRESS NOTES
"Initial /78 (BP Location: Right arm, Patient Position: Chair, Cuff Size: Adult Large)   Pulse 96   Temp 97  F (36.1  C) (Tympanic)   Resp 16   Ht 1.676 m (5' 6\")   Wt 107.3 kg (236 lb 8 oz)   LMP 04/04/2023   BMI 38.17 kg/m   Estimated body mass index is 38.17 kg/m  as calculated from the following:    Height as of this encounter: 1.676 m (5' 6\").    Weight as of this encounter: 107.3 kg (236 lb 8 oz). .    Juhi Wagner, CARLOS ALBERTO    " Sacral dressing change completed by this RN. Old dressing removed. Wound cleansed with wound . Wet to dry gauze dressing placed in sacral wound and covered with ABD pad and paper tape. Right hip/thigh Mepliex remains intact. Mepilex still in placed on skin tear on right buttocks. Patient tolerated well. Will continue to monitor and assess.   Electronically signed by Annabelle Gil RN on 9/6/2022 at 3:12 PM

## 2023-07-24 NOTE — PROGRESS NOTES
Physical Therapy  Facility/Department: [address] ICU  Physical Therapy Initial Assessment    Name: Jamarcus Dobbs  : 1945  MRN: [id number]  Date of Service: 2022    Discharge Recommendations:  Continue to assess pending progress, Subacute/Skilled Nursing Facility   PT Equipment Recommendations  Other: Will monitor for potential equipt needs. Jamarcus Dobbs scored a  on the AM-PAC short mobility form. Current research shows that an AM-PAC score of 17 or less is typically not associated with a discharge to the patient's home setting. Based on the patient's AM-PAC score and their current functional mobility deficits, it is recommended that the patient have 3-5 sessions per week of Physical Therapy at d/c to increase the patient's independence. Please see assessment section for further patient specific details. If patient discharges prior to next session this note will serve as a discharge summary. Please see below for the latest assessment towards goals. Assessment   Body Structures, Functions, Activity Limitations Requiring Skilled Therapeutic Intervention: Decreased functional mobility ; Decreased strength;Decreased endurance  Assessment: 64 y/o female admit 2022 with Septic Shock, GI Bleed, A-Fib with RVR. CT Head : negative. PMH as noted including Gastric Bypass, Rectal Ca, L TKR, R Femur Fx/ORIF (10/2018), R LE Traumatic Hematoma (2021 S/P Skin Graft 2022), Recent R Radius Fx 2022 (Splint). PTA pt living with son in home with ramp access and 1st floor bed/bath. Currently,  Pt requiring assist x 2 for eob/oob via Stedy. Recent L UE Fx result Splint. At this time,  will recommend cont PT (3-5) at this time. Therapy Prognosis: Fair  Decision Making: Medium Complexity  History: 64 y/o female admit 2022 with Septic Shock, GI Bleed, A-Fib with RVR. CT Head : negative.   PMH as noted including Gastric Bypass, Rectal Ca, L TKR, R Femur Fx/ORIF (10/2018), R LE Traumatic Hematoma (1/2021 S/P Skin Graft 2/17/2022), Recent R Radius Fx 7/23/2022 (Splint). Exam: See above. Clinical Presentation: See above. Activity Tolerance  Activity Tolerance: Patient limited by endurance  Activity Tolerance Comments: Pt requiring assist x 2 for eob/oob via Stedy. Recent L UE Fx result Splint. Plan   Plan  Plan: 3-5 times per week  Current Treatment Recommendations: Strengthening, Functional mobility training, Transfer training, Gait training, Safety education & training, Patient/Caregiver education & training  Safety Devices  Type of Devices: Call light within reach, Chair alarm in place, Left in chair, Nurse notified     Restrictions  Restrictions/Precautions  Restrictions/Precautions: Fall Risk  Position Activity Restriction  Other position/activity restrictions: Diet : Clear Liquid. Subjective   General  Chart Reviewed: Yes  Patient assessed for rehabilitation services?: Yes  Additional Pertinent Hx: 66 y/o female admit 7/29/2022 with Septic Shock, GI Bleed, A-Fib with RVR. CT Head : negative. PMH as noted including Gastric Bypass, Rectal Ca, L TKR, R Femur Fx/ORIF (10/2018), R LE Traumatic Hematoma (1/2021 S/P Skin Graft 2/17/2022), Recent R Radius Fx 7/23/2022 (Splint). Family / Caregiver Present: No  Referring Practitioner: Dr. Edgard Turner: Within Functional Limits  Subjective  Subjective: Pt agreeable to PT Eval/Rx. Quiet/somewhat flat affect. Social/Functional History  Social/Functional History  Lives With: Son (Son (Does not work). 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 Vision/Hearing  Vision  Vision: Within Functional Limits  Hearing  Hearing: Within functional limits    Cognition   Orientation  Overall Orientation Status: Within Functional Limits  Cognition  Overall Cognitive Status: Exceptions  Arousal/Alertness: Appropriate responses to stimuli  Following Commands: Follows one step commands with increased time  Attention Span: Appears intact  Memory: Decreased recall of recent events  Insights: Decreased awareness of deficits     Objective        Observation/Palpation  Observation: Splint/Wrap L Distal UE. Gross Assessment  AROM: Generally decreased, functional (L UE Splint.)  Strength:  (Grossly 3 3+/5. Weak with functional activities.)          Bed mobility  Supine to Sit: Moderate assistance;Maximum assistance;2 Person assistance (HOB elevated. Pt attempts to initiate LEs towards EOB.)  Transfers  Sit to Stand: Moderate Assistance;Maximum Assistance;2 Person Assistance ([place] [place].)  Stand to sit: Moderate Assistance;2 Person Assistance ([place] [place].)  Bed to Chair: Dependent/Total ([address] [place].)  Comment: NWB L UE due to recent fx. Balance  Comments: EOB : dependent to eob. Close CGA ~ 5 min in prep oob via Stedy. AM-PAC Score  AM-PAC Inpatient Mobility Raw Score : 8 (08/01/22 0941)  AM-PAC Inpatient T-Scale Score : 28.52 (08/01/22 0941)  Mobility Inpatient CMS 0-100% Score: 86.62 (08/01/22 0941)  Mobility Inpatient CMS G-Code Modifier : CM (08/01/22 0941)            Goals  Short Term Goals  Time Frame for Short term goals: Upon d/c acute care setting. Short term goal 1: Bed Mob Mod assist.  Short term goal 2: Transfers via [employer] assist x 2.   Short term goal 3: Attempt Transfers/Amb as approp (?platform walker at that 4 = No assist / stand by assistance

## 2023-11-21 NOTE — CARE COORDINATION
SOCIAL WORK DISCHARGE SUMMARY:      DISCHARGE DATE:                 Tuesday, 2-      DISCHARGE PLACE:                Home                   HOME CARE AGENCY:            [facility]             PHONE NUMBER          [phone]             FAX NUMBER                 [phone]      TRANSPORTATION:                son             TIME:                                When wound vac is removed. PREFERRED PHARMACY:       [facility]             NUMBER:                          md orders:    Sn/pt      Dme:  None.         Liseth Squires [place]     Case Management   [phone]    2/23/2021  9:34 AM FAMILY HISTORY:  No pertinent family history in first degree relatives

## 2024-10-17 NOTE — PROGRESS NOTES
12 year old female with cold sxs with fever to 102 at night, body aches.  No sore throat.   No cough.   Denies nausea, vomiting, diarrhea.  Eating and drinking well.  Nursing notes reviewed and accepted.    Physical examination:  Visit Vitals  /75 (BP Location: RUE - Right upper extremity, Patient Position: Sitting, Cuff Size: Large Adult)   Pulse 92   Temp 98.4 °F (36.9 °C) (Oral)   Resp 16   Wt 88.2 kg (194 lb 6 oz)   LMP 10/09/2024 (Approximate)   SpO2 96%     gen: alert, in no acute distress  chest: clear to auscultation  throat: clear  nose: no rhinorrhea  eyes: lids, conjunctiva clear, no discharge    ASSESSMENT/PLAN:  URI  OTC remedies.  Discussed sxs, medical indications for testing including sxs as well as treatment.  Tylenol.  Rest.  Fluids.  Discussed with pt/grandmother symptoms, etiologies, treatment, concerns, follow-up.  PCP prn, any worsening, not improving, any other concerns.  School excuse per patient request.      Admitted to rehab with traumatic hematoma of right lower leg with infection. A&O x4. Lungs clear, on RA. Denies SOB or chest pain. BS active x4, LBM 1/12/2021. Pain in RLE, 5/10. Prescribed norco given. Large hematoma to RLE, wet to dry dsg in place. Surgery to replace wound vac after therapy. Kaplan draining clear, yellow urine. PICC to RUE. In chair. Belongings and call light within reach, will continue to monitor.

## 2025-04-30 NOTE — TELEPHONE ENCOUNTER
Attempted to contact patient Voice mail box full, unable to leave message.    LEFT LUMBAR L4/5, L5/S1 TFESI UNDER FLUOROSCOPY XR  NO AUTH REQUIRED         Received a refill for warfarin sodium 6 mg tab  Disp 30  Last fill 5-11-19.     Rx wasn't found on list.

## (undated) DEVICE — GARMENT COMPR STD FOR 17IN CALF UNIF THER FLOTRN

## (undated) DEVICE — YANKAUER,BULB TIP,W/O VENT,RIGID,STERILE: Brand: MEDLINE

## (undated) DEVICE — DRESSING,GAUZE,XEROFORM,CURAD,1"X8",ST: Brand: CURAD

## (undated) DEVICE — PAD,ABDOMINAL,8"X7.5",STERILE,LF,1/PK: Brand: MEDLINE

## (undated) DEVICE — DRILL, AO, SCALED: Brand: VARIAX

## (undated) DEVICE — BANDAGE,GAUZE,BULKEE II,4.5"X4.1YD,STRL: Brand: MEDLINE

## (undated) DEVICE — SPONGE GZ W4XL4IN COT 12 PLY TYP VII WVN C FLD DSGN

## (undated) DEVICE — STAPLER SKIN H3.9MM WIRE DIA0.58MM CRWN 6.9MM 35 STPL ROT

## (undated) DEVICE — AGENT HEMSTAT 3GM PURIFIED PLNT STARCH PWD ABSRB ARISTA AH

## (undated) DEVICE — BITE BLK 60FR GRN ENDOSCP AD W STRP SLD DISPOSABLE

## (undated) DEVICE — ELECTRODE ELECSURG NDL 2.8 INX7.2 CM COAT INSUL EDGE

## (undated) DEVICE — SYSTEM SKIN CLSR 60CM 2-OCTYL CYNOACRLT W/ MESH DISPNS

## (undated) DEVICE — CANISTER NEG PRSS 500ML WHT SENSATRAC TBNG CLMP CONN

## (undated) DEVICE — SHEET,DRAPE,53X77,STERILE: Brand: MEDLINE

## (undated) DEVICE — Z DISCONTINUED BY MEDLINE USE 2711682 TRAY SKIN PREP DRY W/ PREM GLV

## (undated) DEVICE — ENDOSCOPY KIT: Brand: MEDLINE INDUSTRIES, INC.

## (undated) DEVICE — GLOVE SURG SZ 65 THK91MIL LTX FREE SYN POLYISOPRENE

## (undated) DEVICE — 3M™ TEGADERM™ TRANSPARENT FILM DRESSING FRAME STYLE, 1626W, 4 IN X 4-3/4 IN (10 CM X 12 CM), 50/CT 4CT/CASE: Brand: 3M™ TEGADERM™

## (undated) DEVICE — 3M™ STERI-STRIP™ COMPOUND BENZOIN TINCTURE 40 BAGS/CARTON 4 CARTONS/CASE C1544: Brand: 3M™ STERI-STRIP™

## (undated) DEVICE — T-DRAPE,EXTREMITY,STERILE: Brand: MEDLINE

## (undated) DEVICE — GOWN,AURORA,NONREINF,RAGLAN,XXL,STERILE: Brand: MEDLINE

## (undated) DEVICE — SOLUTION IV IRRIG POUR BRL 0.9% SODIUM CHL 2F7124

## (undated) DEVICE — STRIP,CLOSURE,WOUND,MEDI-STRIP,1/2X4: Brand: MEDLINE

## (undated) DEVICE — NEEDLE HYPO 23GA L1.5IN TURQ POLYPR HUB S STL THN WALL IM

## (undated) DEVICE — BANDAGE COMPR W4INXL15FT BGE E SGL LAYERED CLP CLSR

## (undated) DEVICE — COVER LT HNDL BLU PLAS

## (undated) DEVICE — INSUFFLATION NEEDLE TO ESTABLISH PNEUMOPERITONEUM.: Brand: INSUFFLATION NEEDLE

## (undated) DEVICE — SUTURE MCRYL + SZ 4-0 L18IN ABSRB UD L19MM PS-2 3/8 CIR MCP496G

## (undated) DEVICE — VAC VERAFLO: Brand: V.A.C. VERAFLO™

## (undated) DEVICE — TRAP POLYP ETRAP

## (undated) DEVICE — GUIDE WIRE, BALL-TIPPED, STERILE

## (undated) DEVICE — MAJOR SET UP PK

## (undated) DEVICE — SPECIMEN COLLECTION 4-PORT MANIFOLD: Brand: NEPTUNE 2

## (undated) DEVICE — GLOVE SURG SZ 65 L12IN FNGR THK79MIL GRN LTX FREE

## (undated) DEVICE — BLADE ES ELASTOMERIC COAT INSUL DURABLE BEND UPTO 90DEG

## (undated) DEVICE — 1010 S-DRAPE TOWEL DRAPE 10/BX: Brand: STERI-DRAPE™

## (undated) DEVICE — TROCAR: Brand: KII SLEEVE

## (undated) DEVICE — GOWN,SIRUS,POLYRNF,BRTHSLV,XL,30/CS: Brand: MEDLINE

## (undated) DEVICE — UNTHREADED GUIDE WIRE: Brand: FIXOS

## (undated) DEVICE — GOWN SIRUS NONREIN XL W/TWL: Brand: MEDLINE INDUSTRIES, INC.

## (undated) DEVICE — GLOVE SURG SZ 6 L12IN FNGR THK79MIL GRN LTX FREE

## (undated) DEVICE — GLOVE SURG SZ 8 L12IN FNGR THK79MIL GRN LTX FREE

## (undated) DEVICE — 3M™ TEGADERM™ TRANSPARENT FILM DRESSING FRAME STYLE, 1627, 4 IN X 10 IN (10 CM X 25 CM), 20/CT 4CT/CASE: Brand: 3M™ TEGADERM™

## (undated) DEVICE — COVER,TABLE,77X90,STERILE: Brand: MEDLINE

## (undated) DEVICE — HAND AND ELBOW: Brand: MEDLINE INDUSTRIES, INC.

## (undated) DEVICE — TROCAR: Brand: KII SHIELDED BLADED ACCESS SYSTEM

## (undated) DEVICE — Z INACTIVE USE 2660664 SOLUTION IRRIG 3000ML 0.9% SOD CHL USP UROMATIC PLAS CONT

## (undated) DEVICE — SUTURE VCRL + SZ 3-0 L18IN ABSRB UD SH 1/2 CIR TAPERCUT NDL VCP864D

## (undated) DEVICE — BASIC SINGLE BASIN 1-LF: Brand: MEDLINE INDUSTRIES, INC.

## (undated) DEVICE — IMMOBILIZER KNEE CUTAWAY 24 IN

## (undated) DEVICE — GOWN SIRUS NONREIN LG W/TWL: Brand: MEDLINE INDUSTRIES, INC.

## (undated) DEVICE — RELOAD STPL L60MM H1.5-3.6MM REG TISS BLU GRIPPING SURF B

## (undated) DEVICE — MEDI-VAC NON-CONDUCTIVE SUCTION TUBING: Brand: CARDINAL HEALTH

## (undated) DEVICE — ELECTRODE PT RET AD L9FT HI MOIST COND ADH HYDRGEL CORDED

## (undated) DEVICE — SUTURE PDS II SZ 0 L60IN ABSRB VLT L48MM CTX 1/2 CIR Z990G

## (undated) DEVICE — ELECTRODE BLDE L6.5IN CAUT EXT DISP

## (undated) DEVICE — CANISTER, RIGID, 1200CC: Brand: MEDLINE INDUSTRIES, INC.

## (undated) DEVICE — SUTURE NONABSORBABLE MONOFILAMENT 4-0 PS-2 18 IN BLU PROLENE 8682H

## (undated) DEVICE — MAJOR BSIN SETUP PK

## (undated) DEVICE — HYPODERMIC SAFETY NEEDLE: Brand: MAGELLAN

## (undated) DEVICE — SOLUTION SCRB 4OZ 10% POVIDONE IOD ANTIMIC BTL

## (undated) DEVICE — INTENDED FOR TISSUE SEPARATION, AND OTHER PROCEDURES THAT REQUIRE A SHARP SURGICAL BLADE TO PUNCTURE OR CUT.: Brand: BARD-PARKER ® STAINLESS STEEL BLADES

## (undated) DEVICE — SOLUTION IRRIG 3000ML 0.9% SOD CHL USP UROMATIC PLAS CONT

## (undated) DEVICE — GENERAL LAPAROSCOPY: Brand: MEDLINE INDUSTRIES, INC.

## (undated) DEVICE — Z DISCONTINUED PER MEDLINE USE 2752023 DRESSING FOAM W7.62XL7.62CM SIL ADH WTRPRF FLM BK SQ SHP

## (undated) DEVICE — PENCIL ES L3M BTTN SWCH S STL HEX LOK BLDE ELECTRD HOLSTER

## (undated) DEVICE — DRILL, AO, STERILE

## (undated) DEVICE — CONTAINER SPEC 480ML CLR POLYSTYR 10% NEUT BUFF FRMLN ZN

## (undated) DEVICE — SPONGE LAP W18XL18IN WHT COT 4 PLY FLD STRUNG RADPQ DISP ST

## (undated) DEVICE — GLOVE ORTHO 8   MSG9480

## (undated) DEVICE — SYRINGE MED 10ML TRNSLUC BRL PLUNG BLK MRK POLYPR CTRL

## (undated) DEVICE — COVER,MAYO STAND,STERILE: Brand: MEDLINE

## (undated) DEVICE — ACCESS PLATFORM FOR MINIMALLY INVASIVE SURGERY.: Brand: GELPORT® LAPAROSCOPIC  SYSTEM

## (undated) DEVICE — DERMATOME BLADES: Brand: DERMATOME

## (undated) DEVICE — Device

## (undated) DEVICE — BRUSH CYTO L140CM DIA1.5MM WRK CHN 2MM BRIST SHTH RNG HNDL

## (undated) DEVICE — KIT DSG LRG NEG PRSS WND THER VAC GRANUFOAM

## (undated) DEVICE — DECANTER: Brand: UNBRANDED

## (undated) DEVICE — SYRINGE IRRIG 60ML SFT PLIABLE BLB EZ TO GRP 1 HND USE W/

## (undated) DEVICE — KIT EVAC 400CC DIA1/8IN H PAT 12.5IN 3 SPR RND SHP PVC DRN

## (undated) DEVICE — TOWEL,OR,DSP,ST,BLUE,STD,4/PK,20PK/CS: Brand: MEDLINE

## (undated) DEVICE — 3M™ IOBAN™ 2 ANTIMICROBIAL INCISE DRAPE 6650EZ: Brand: IOBAN™ 2

## (undated) DEVICE — FORCEPS BX 240CM 2.4MM L NDL RAD JAW 4 M00513334

## (undated) DEVICE — SUTURE VCRL SZ 2-0 L18IN ABSRB UD CT-1 L36MM 1/2 CIR J839D

## (undated) DEVICE — SEALER LAP L37CM MARYLAND JAW OPN NANO COAT MULTIFUNCTIONAL

## (undated) DEVICE — 30977 SEE SHARP - ENHANCED INTRAOPERATIVE LAPAROSCOPE CLEANING & DEFOGGING: Brand: 30977 SEE SHARP - ENHANCED INTRAOPERATIVE LAPAROSCOPE CLEANING & DEFOGGING

## (undated) DEVICE — SYRINGE 20ML LL S/C 50

## (undated) DEVICE — GLOVE ORANGE PI 7 1/2   MSG9075

## (undated) DEVICE — HANDPIECE SUCTION TUBING INTERPULSE 10FT

## (undated) DEVICE — CONNECTOR DSG Y FOR 1 VAC THER UNIT TRAC

## (undated) DEVICE — SUTURE VCRL SZ 3-0 L18IN ABSRB UD L26MM SH 1/2 CIR J864D

## (undated) DEVICE — STERILE TOTAL HIP DRAPE PK: Brand: CARDINAL HEALTH

## (undated) DEVICE — DRESSING FOAM W4XL10IN POLYUR SAFETAC MULTILAYERED ABSRB PD

## (undated) DEVICE — SUTURE PERMAHAND SZ 2-0 L18IN NONABSORBABLE BLK L26MM SH C012D

## (undated) DEVICE — 3M™ IOBAN™ 2 ANTIMICROBIAL INCISE DRAPE 6640EZ: Brand: IOBAN™ 2

## (undated) DEVICE — CASSETTE THER 38 MM W/ INSTILLATION TBNG VAC VERALINK

## (undated) DEVICE — MERCY HEALTH WEST TURNOVER: Brand: MEDLINE INDUSTRIES, INC.

## (undated) DEVICE — SYRINGE MED 10ML LUERLOCK TIP W/O SFTY DISP

## (undated) DEVICE — KIT NEG PRSS M DSG FOAM VAC VERAFLO

## (undated) DEVICE — TOTAL BASIC PK

## (undated) DEVICE — SUTURE VCRL + SZ 2-0 L27IN ABSRB CLR CT-1 1/2 CIR TAPERCUT VCP259H

## (undated) DEVICE — GLOVE SURG SZ 85 L12IN FNGR THK87MIL WHT LTX FREE

## (undated) DEVICE — KIRSCHNER WIRE
Type: IMPLANTABLE DEVICE | Site: WRIST | Status: NON-FUNCTIONAL
Brand: VARIAX
Removed: 2022-08-03

## (undated) DEVICE — LEGGINGS, PAIR, CLEAR, STERILE: Brand: MEDLINE

## (undated) DEVICE — PACK DRP UNIV W BK TBL MAYO STD BTM TOP SIDE UTIL CVR ZONE

## (undated) DEVICE — DRAPE,MINOR PROC,6X6 FEN, STER: Brand: MEDLINE

## (undated) DEVICE — KIT OR ROOM TURNOVER W/STRAP

## (undated) DEVICE — MAJOR SET UP: Brand: MEDLINE INDUSTRIES, INC.

## (undated) DEVICE — COUNTER NDL 40 COUNT HLD 70 NUM FOAM BLK SGL MAG W BLDE REMV

## (undated) DEVICE — DRAPE,T,LAPARO,TRANS,STERILE: Brand: MEDLINE

## (undated) DEVICE — STAPLER INT L60MM DIA12MM STD TISS TI LNAR CUT LN 6 ROW

## (undated) DEVICE — SNARE ENDOSCP 11 MM BRAIDED WIRE CAPTFLX DISP

## (undated) DEVICE — CHLORAPREP 26ML ORANGE

## (undated) DEVICE — DRAPE C ARM UNIV W41XL74IN CLR PLAS XR VELC CLSR POLY STRP

## (undated) DEVICE — DRESSING,GAUZE,XEROFORM,CURAD,5"X9",ST: Brand: CURAD

## (undated) DEVICE — SUTURE VCRL SZ 0 L18IN ABSRB UD L36MM CT-1 1/2 CIR J840D

## (undated) DEVICE — APPLICATOR MEDICATED 26 CC SOLUTION HI LT ORNG CHLORAPREP

## (undated) DEVICE — SPEEDGUIDE DRILL AO: Brand: VARIAX

## (undated) DEVICE — SOLUTION PREP POVIDONE IOD FOR SKIN MUCOUS MEM PRIOR TO

## (undated) DEVICE — GLOVE ORANGE PI 8 1/2   MSG9085

## (undated) DEVICE — SKIN MARKER REGULAR TIP WITH RULER CAP AND LABELS: Brand: DEVON

## (undated) DEVICE — SUTURE PERMAHAND SZ 3-0 L30IN NONABSORBABLE BLK SH L26MM K832H

## (undated) DEVICE — STAPLER SKIN H3.9MM WIRE DIA0.58MM CRWN 6.9MM 35 STPL FIX

## (undated) DEVICE — SYRINGE MED 30ML STD CLR PLAS LUERLOCK TIP N CTRL DISP

## (undated) DEVICE — STEPDRILL: Brand: T2

## (undated) DEVICE — BITE BLOCK ENDOSCP AD 60 FR W/ ADJ STRP PLAS GRN BLOX

## (undated) DEVICE — SOLUTION IRRIG 1000ML 09% SOD CHL USP PIC PLAS CONTAINER

## (undated) DEVICE — GLOVE SURG SZ 8 CRM LTX FREE POLYISOPRENE POLYMER BEAD ANTI

## (undated) DEVICE — TUBING, SUCTION, 1/4" X 12', STRAIGHT: Brand: MEDLINE

## (undated) DEVICE — DRAPE,UTILITY,TAPE,15X26,STERILE: Brand: MEDLINE

## (undated) DEVICE — SNARE VASC L240CM LOOP W10MM SHTH DIA2.4MM RND STIFF CLD